# Patient Record
Sex: FEMALE | Race: WHITE | NOT HISPANIC OR LATINO | Employment: UNEMPLOYED | URBAN - METROPOLITAN AREA
[De-identification: names, ages, dates, MRNs, and addresses within clinical notes are randomized per-mention and may not be internally consistent; named-entity substitution may affect disease eponyms.]

---

## 2018-01-15 NOTE — MISCELLANEOUS
Provider Comments  Provider Comments:   pt no showed today 08/30/2016 lmom asking pt to give office a call back      Signatures   Electronically signed by : Cristino Mittal, ; Aug 30 2016  3:13PM EST                       (Author)

## 2018-12-02 ENCOUNTER — HOSPITAL ENCOUNTER (EMERGENCY)
Facility: HOSPITAL | Age: 48
Discharge: HOME | End: 2018-12-02
Attending: EMERGENCY MEDICINE | Admitting: EMERGENCY MEDICINE
Payer: COMMERCIAL

## 2018-12-02 VITALS
SYSTOLIC BLOOD PRESSURE: 146 MMHG | DIASTOLIC BLOOD PRESSURE: 85 MMHG | OXYGEN SATURATION: 96 % | TEMPERATURE: 98.6 F | HEIGHT: 60 IN | WEIGHT: 200 LBS | RESPIRATION RATE: 18 BRPM | BODY MASS INDEX: 39.27 KG/M2 | HEART RATE: 83 BPM

## 2018-12-02 DIAGNOSIS — M54.9 CHRONIC BACK PAIN, UNSPECIFIED BACK LOCATION, UNSPECIFIED BACK PAIN LATERALITY: Primary | ICD-10-CM

## 2018-12-02 DIAGNOSIS — G89.29 CHRONIC BACK PAIN, UNSPECIFIED BACK LOCATION, UNSPECIFIED BACK PAIN LATERALITY: Primary | ICD-10-CM

## 2018-12-02 PROCEDURE — 99283 EMERGENCY DEPT VISIT LOW MDM: CPT

## 2018-12-02 PROCEDURE — 96372 THER/PROPH/DIAG INJ SC/IM: CPT

## 2018-12-02 RX ORDER — LEVETIRACETAM 500 MG/1
500 TABLET ORAL EVERY 12 HOURS SCHEDULED
COMMUNITY
Start: 2018-03-19 | End: 2022-01-14 | Stop reason: SDUPTHER

## 2018-12-02 RX ORDER — CITALOPRAM 40 MG/1
40 TABLET ORAL DAILY
COMMUNITY
End: 2022-01-13 | Stop reason: SDUPTHER

## 2018-12-02 RX ORDER — HYDROCODONE BITARTRATE AND ACETAMINOPHEN 7.5; 325 MG/1; MG/1
1 TABLET ORAL EVERY 6 HOURS PRN
COMMUNITY
Start: 2018-07-11 | End: 2021-03-26 | Stop reason: HOSPADM

## 2018-12-02 RX ORDER — PREGABALIN 50 MG/1
CAPSULE ORAL
COMMUNITY
Start: 2018-10-12 | End: 2021-08-13

## 2018-12-02 RX ORDER — CYCLOBENZAPRINE HCL 10 MG
10 TABLET ORAL ONCE
Status: COMPLETED | OUTPATIENT
Start: 2018-12-02 | End: 2018-12-02

## 2018-12-02 RX ORDER — SUMATRIPTAN 100 MG/1
100 TABLET, FILM COATED ORAL
COMMUNITY
End: 2021-04-12

## 2018-12-02 RX ORDER — LISINOPRIL 10 MG/1
10 TABLET ORAL
COMMUNITY
Start: 2018-03-19 | End: 2022-01-13 | Stop reason: SDUPTHER

## 2018-12-02 RX ORDER — KETOROLAC TROMETHAMINE 30 MG/ML
30 INJECTION, SOLUTION INTRAMUSCULAR; INTRAVENOUS ONCE
Status: COMPLETED | OUTPATIENT
Start: 2018-12-02 | End: 2018-12-02

## 2018-12-02 RX ORDER — SOLIFENACIN SUCCINATE 5 MG/1
5 TABLET, FILM COATED ORAL DAILY
COMMUNITY
End: 2022-01-13 | Stop reason: SDUPTHER

## 2018-12-02 RX ORDER — PANTOPRAZOLE SODIUM 40 MG/1
TABLET, DELAYED RELEASE ORAL
COMMUNITY
Start: 2018-09-10 | End: 2021-08-13 | Stop reason: SDUPTHER

## 2018-12-02 RX ORDER — DEXAMETHASONE SODIUM PHOSPHATE 4 MG/ML
10 INJECTION, SOLUTION INTRA-ARTICULAR; INTRALESIONAL; INTRAMUSCULAR; INTRAVENOUS; SOFT TISSUE ONCE
Status: COMPLETED | OUTPATIENT
Start: 2018-12-02 | End: 2018-12-02

## 2018-12-02 RX ORDER — ALBUTEROL SULFATE 90 UG/1
1 AEROSOL, METERED RESPIRATORY (INHALATION) EVERY 6 HOURS PRN
COMMUNITY
Start: 2017-10-28 | End: 2022-04-06 | Stop reason: SDUPTHER

## 2018-12-02 RX ORDER — ATORVASTATIN CALCIUM 40 MG/1
TABLET, FILM COATED ORAL
COMMUNITY
Start: 2018-09-10 | End: 2022-01-17 | Stop reason: SDUPTHER

## 2018-12-02 RX ORDER — METOCLOPRAMIDE 10 MG/1
10 TABLET ORAL DAILY
COMMUNITY
Start: 2018-04-17 | End: 2022-01-13 | Stop reason: SDUPTHER

## 2018-12-02 RX ADMIN — DEXAMETHASONE SODIUM PHOSPHATE 10 MG: 4 INJECTION, SOLUTION INTRA-ARTICULAR; INTRALESIONAL; INTRAMUSCULAR; INTRAVENOUS; SOFT TISSUE at 16:58

## 2018-12-02 RX ADMIN — CYCLOBENZAPRINE HYDROCHLORIDE 10 MG: 10 TABLET, FILM COATED ORAL at 16:58

## 2018-12-02 RX ADMIN — KETOROLAC TROMETHAMINE 30 MG: 30 INJECTION, SOLUTION INTRAMUSCULAR at 16:58
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
## 2018-12-02 NOTE — DISCHARGE INSTRUCTIONS
Back Pain   WHAT YOU NEED TO KNOW:   Back pain is common  It can be caused by many conditions, such as arthritis or the breakdown of spinal discs  Your risk for back pain is increased by injuries, lack of activity, or repeated bending and twisting  You may feel sore or stiff on one or both sides of your back  The pain may spread to your buttocks or thighs  DISCHARGE INSTRUCTIONS:   Medicines:   · NSAIDs  help decrease swelling and pain  This medicine is available with or without a doctor's order  NSAIDs can cause stomach bleeding or kidney problems in certain people  If you take blood thinner medicine, always ask your healthcare provider if NSAIDs are safe for you  Always read the medicine label and follow directions  · Acetaminophen  decreases pain  It is available without a doctor's order  Ask how much to take and how often to take it  Follow directions  Acetaminophen can cause liver damage if not taken correctly  · Prescription pain medicine  may be given  Ask your healthcare provider how to take this medicine safely  · Take your medicine as directed  Contact your healthcare provider if you think your medicine is not helping or if you have side effects  Tell him or her if you are allergic to any medicine  Keep a list of the medicines, vitamins, and herbs you take  Include the amounts, and when and why you take them  Bring the list or the pill bottles to follow-up visits  Carry your medicine list with you in case of an emergency  Follow up with your healthcare provider in 2 weeks, or as directed:  Write down your questions so you remember to ask them during your visits  How to manage your back pain:   · Apply ice  on your back or affected area for 15 to 20 minutes every hour or as directed  Use an ice pack, or put crushed ice in a plastic bag  Cover it with a towel  Ice helps prevent tissue damage and decreases pain      · Apply heat  on your back or affected area for 20 to 30 minutes every 2 hours for as many days as directed  Heat helps decrease pain and muscle spasms  · Stay active  as much as you can without causing more pain  Bed rest could make your back pain worse  Avoid heavy lifting until your pain is gone  Return to the emergency department if:   · You have pain, numbness, or weakness in one or both legs  · Your pain becomes so severe that you cannot walk  · You cannot control your urine or bowel movements  · You have severe back pain with chest pain  · You have severe back pain, nausea, and vomiting  · You have severe back pain that spreads to your side or genital area  Contact your healthcare provider if:   · You have back pain that does not get better with rest and pain medicine  · You have a fever  · You have pain that worsens when you are on your back or when you rest     · You have pain that worsens when you cough or sneeze  · You lose weight without trying  · You have questions or concerns about your condition or care  © 2017 [facility] Information is for End User's use only and may not be sold, redistributed or otherwise used for commercial purposes  All illustrations and images included in CareNotes® are the copyrighted property of [employer] , [employer]  or Hilton Mcdaniel  The above information is an  only  It is not intended as medical advice for individual conditions or treatments  Talk to your doctor, nurse or pharmacist before following any medical regimen to see if it is safe and effective for you

## 2019-06-22 ENCOUNTER — OFFICE VISIT (OUTPATIENT)
Dept: URGENT CARE | Facility: CLINIC | Age: 49
End: 2019-06-22
Payer: COMMERCIAL

## 2019-06-22 ENCOUNTER — APPOINTMENT (OUTPATIENT)
Dept: RADIOLOGY | Facility: CLINIC | Age: 49
End: 2019-06-22
Payer: COMMERCIAL

## 2019-06-22 VITALS
TEMPERATURE: 98 F | DIASTOLIC BLOOD PRESSURE: 56 MMHG | OXYGEN SATURATION: 99 % | RESPIRATION RATE: 16 BRPM | HEART RATE: 67 BPM | SYSTOLIC BLOOD PRESSURE: 121 MMHG | BODY MASS INDEX: 39.27 KG/M2 | HEIGHT: 60 IN | WEIGHT: 200 LBS

## 2019-06-22 DIAGNOSIS — S53.402A ELBOW SPRAIN, LEFT, INITIAL ENCOUNTER: ICD-10-CM

## 2019-06-22 DIAGNOSIS — S63.502A LEFT WRIST SPRAIN, INITIAL ENCOUNTER: Primary | ICD-10-CM

## 2019-06-22 DIAGNOSIS — R20.2 PARESTHESIA AND PAIN OF LEFT EXTREMITY: ICD-10-CM

## 2019-06-22 DIAGNOSIS — S49.92XA ARM INJURY, LEFT, INITIAL ENCOUNTER: ICD-10-CM

## 2019-06-22 DIAGNOSIS — S63.92XA HAND SPRAIN, LEFT, INITIAL ENCOUNTER: ICD-10-CM

## 2019-06-22 DIAGNOSIS — M79.609 PARESTHESIA AND PAIN OF LEFT EXTREMITY: ICD-10-CM

## 2019-06-22 PROCEDURE — 73110 X-RAY EXAM OF WRIST: CPT

## 2019-06-22 PROCEDURE — 99214 OFFICE O/P EST MOD 30 MIN: CPT | Performed by: NURSE PRACTITIONER

## 2019-06-22 PROCEDURE — 73130 X-RAY EXAM OF HAND: CPT

## 2019-06-22 PROCEDURE — 73080 X-RAY EXAM OF ELBOW: CPT

## 2019-06-22 RX ORDER — ACETAMINOPHEN AND CODEINE PHOSPHATE 300; 30 MG/1; MG/1
1 TABLET ORAL EVERY 6 HOURS PRN
Refills: 0 | COMMUNITY
Start: 2019-06-05 | End: 2021-03-26 | Stop reason: HOSPADM

## 2019-06-22 RX ORDER — NAPROXEN 500 MG/1
500 TABLET ORAL 2 TIMES DAILY WITH MEALS
Qty: 28 TABLET | Refills: 0 | Status: SHIPPED | OUTPATIENT
Start: 2019-06-22 | End: 2020-02-03

## 2019-06-22 RX ORDER — CHLORHEXIDINE GLUCONATE 0.12 MG/ML
RINSE ORAL
Refills: 0 | COMMUNITY
Start: 2019-06-05 | End: 2021-04-12

## 2019-06-22 RX ORDER — CLINDAMYCIN HYDROCHLORIDE 300 MG/1
300 CAPSULE ORAL 4 TIMES DAILY
Refills: 0 | COMMUNITY
Start: 2019-06-05 | End: 2020-04-20 | Stop reason: ALTCHOICE

## 2019-06-22 RX ORDER — MELOXICAM 15 MG/1
15 TABLET ORAL DAILY
COMMUNITY
Start: 2019-05-13 | End: 2021-05-06

## 2019-10-20 ENCOUNTER — APPOINTMENT (EMERGENCY)
Dept: CT IMAGING | Facility: HOSPITAL | Age: 49
End: 2019-10-20
Payer: COMMERCIAL

## 2019-10-20 ENCOUNTER — HOSPITAL ENCOUNTER (EMERGENCY)
Facility: HOSPITAL | Age: 49
Discharge: HOME | End: 2019-10-20
Attending: EMERGENCY MEDICINE | Admitting: EMERGENCY MEDICINE
Payer: COMMERCIAL

## 2019-10-20 ENCOUNTER — OFFICE VISIT (OUTPATIENT)
Dept: URGENT CARE | Facility: CLINIC | Age: 49
End: 2019-10-20
Payer: COMMERCIAL

## 2019-10-20 VITALS
HEART RATE: 60 BPM | DIASTOLIC BLOOD PRESSURE: 64 MMHG | TEMPERATURE: 98.4 F | RESPIRATION RATE: 18 BRPM | OXYGEN SATURATION: 96 % | SYSTOLIC BLOOD PRESSURE: 130 MMHG | WEIGHT: 185 LBS | HEIGHT: 60 IN | BODY MASS INDEX: 36.32 KG/M2

## 2019-10-20 VITALS
RESPIRATION RATE: 18 BRPM | BODY MASS INDEX: 36.32 KG/M2 | HEIGHT: 60 IN | TEMPERATURE: 97 F | DIASTOLIC BLOOD PRESSURE: 82 MMHG | OXYGEN SATURATION: 98 % | HEART RATE: 76 BPM | SYSTOLIC BLOOD PRESSURE: 118 MMHG | WEIGHT: 185 LBS

## 2019-10-20 DIAGNOSIS — I10 HYPERTENSION: ICD-10-CM

## 2019-10-20 DIAGNOSIS — R10.9 RIGHT SIDED ABDOMINAL PAIN: Primary | ICD-10-CM

## 2019-10-20 DIAGNOSIS — N30.00 ACUTE CYSTITIS: Primary | ICD-10-CM

## 2019-10-20 DIAGNOSIS — R10.9 ABDOMINAL PAIN: ICD-10-CM

## 2019-10-20 DIAGNOSIS — N28.1 RENAL CYST: ICD-10-CM

## 2019-10-20 LAB
ALBUMIN SERPL BCP-MCNC: 3.9 G/DL (ref 3.5–5.7)
ALP SERPL-CCNC: 115 U/L (ref 40–150)
ALT SERPL W P-5'-P-CCNC: 28 U/L (ref 7–52)
ANION GAP SERPL CALCULATED.3IONS-SCNC: 8 MMOL/L (ref 4–13)
APTT PPP: 28 SECONDS (ref 23–37)
AST SERPL W P-5'-P-CCNC: 16 U/L (ref 13–39)
BACTERIA UR QL AUTO: ABNORMAL /HPF
BASOPHILS # BLD AUTO: 0 THOUSANDS/ΜL (ref 0–0.1)
BASOPHILS NFR BLD AUTO: 1 % (ref 0–2)
BILIRUB SERPL-MCNC: 0.5 MG/DL (ref 0.2–1)
BILIRUB UR QL STRIP: ABNORMAL
BUN SERPL-MCNC: 13 MG/DL (ref 7–25)
CALCIUM SERPL-MCNC: 9.5 MG/DL (ref 8.6–10.5)
CHLORIDE SERPL-SCNC: 100 MMOL/L (ref 98–107)
CLARITY UR: ABNORMAL
CO2 SERPL-SCNC: 30 MMOL/L (ref 21–31)
COLOR UR: YELLOW
CREAT SERPL-MCNC: 0.78 MG/DL (ref 0.6–1.2)
EOSINOPHIL # BLD AUTO: 0.1 THOUSAND/ΜL (ref 0–0.61)
EOSINOPHIL NFR BLD AUTO: 2 % (ref 0–5)
ERYTHROCYTE [DISTWIDTH] IN BLOOD BY AUTOMATED COUNT: 14.1 % (ref 11.5–14.5)
GFR SERPL CREATININE-BSD FRML MDRD: 90 ML/MIN/1.73SQ M
GLUCOSE SERPL-MCNC: 262 MG/DL (ref 65–99)
GLUCOSE UR STRIP-MCNC: ABNORMAL MG/DL
HCT VFR BLD AUTO: 39.5 % (ref 42–47)
HGB BLD-MCNC: 13.1 G/DL (ref 12–16)
HGB UR QL STRIP.AUTO: NEGATIVE
INR PPP: 1.11 (ref 0.9–1.5)
KETONES UR STRIP-MCNC: NEGATIVE MG/DL
LEUKOCYTE ESTERASE UR QL STRIP: ABNORMAL
LIPASE SERPL-CCNC: 27 U/L (ref 11–82)
LYMPHOCYTES # BLD AUTO: 2.7 THOUSANDS/ΜL (ref 0.6–4.47)
LYMPHOCYTES NFR BLD AUTO: 34 % (ref 21–51)
MCH RBC QN AUTO: 27.1 PG (ref 26–34)
MCHC RBC AUTO-ENTMCNC: 33.1 G/DL (ref 31–37)
MCV RBC AUTO: 82 FL (ref 81–99)
MONOCYTES # BLD AUTO: 0.6 THOUSAND/ΜL (ref 0.17–1.22)
MONOCYTES NFR BLD AUTO: 7 % (ref 2–12)
NEUTROPHILS # BLD AUTO: 4.6 THOUSANDS/ΜL (ref 1.4–6.5)
NEUTS SEG NFR BLD AUTO: 57 % (ref 42–75)
NITRITE UR QL STRIP: NEGATIVE
NON-SQ EPI CELLS URNS QL MICRO: ABNORMAL /HPF
PH UR STRIP.AUTO: 5 [PH]
PLATELET # BLD AUTO: 301 THOUSANDS/UL (ref 149–390)
PMV BLD AUTO: 8.4 FL (ref 8.6–11.7)
POTASSIUM SERPL-SCNC: 4 MMOL/L (ref 3.5–5.5)
PROT SERPL-MCNC: 7.1 G/DL (ref 6.4–8.9)
PROT UR STRIP-MCNC: NEGATIVE MG/DL
PROTHROMBIN TIME: 12.8 SECONDS (ref 10.2–13)
RBC # BLD AUTO: 4.83 MILLION/UL (ref 3.9–5.2)
RBC #/AREA URNS AUTO: ABNORMAL /HPF
SODIUM SERPL-SCNC: 138 MMOL/L (ref 134–143)
SP GR UR STRIP.AUTO: >=1.03 (ref 1–1.03)
UROBILINOGEN UR QL STRIP.AUTO: 0.2 E.U./DL
WBC # BLD AUTO: 8 THOUSAND/UL (ref 4.8–10.8)
WBC #/AREA URNS AUTO: ABNORMAL /HPF

## 2019-10-20 PROCEDURE — 81001 URINALYSIS AUTO W/SCOPE: CPT | Performed by: PHYSICIAN ASSISTANT

## 2019-10-20 PROCEDURE — 80053 COMPREHEN METABOLIC PANEL: CPT | Performed by: PHYSICIAN ASSISTANT

## 2019-10-20 PROCEDURE — 87086 URINE CULTURE/COLONY COUNT: CPT | Performed by: PHYSICIAN ASSISTANT

## 2019-10-20 PROCEDURE — 85025 COMPLETE CBC W/AUTO DIFF WBC: CPT | Performed by: PHYSICIAN ASSISTANT

## 2019-10-20 PROCEDURE — 36415 COLL VENOUS BLD VENIPUNCTURE: CPT | Performed by: PHYSICIAN ASSISTANT

## 2019-10-20 PROCEDURE — 85610 PROTHROMBIN TIME: CPT | Performed by: PHYSICIAN ASSISTANT

## 2019-10-20 PROCEDURE — 96376 TX/PRO/DX INJ SAME DRUG ADON: CPT

## 2019-10-20 PROCEDURE — G0382 LEV 3 HOSP TYPE B ED VISIT: HCPCS | Performed by: PHYSICIAN ASSISTANT

## 2019-10-20 PROCEDURE — 99284 EMERGENCY DEPT VISIT MOD MDM: CPT

## 2019-10-20 PROCEDURE — 83690 ASSAY OF LIPASE: CPT | Performed by: PHYSICIAN ASSISTANT

## 2019-10-20 PROCEDURE — 85730 THROMBOPLASTIN TIME PARTIAL: CPT | Performed by: PHYSICIAN ASSISTANT

## 2019-10-20 PROCEDURE — 96365 THER/PROPH/DIAG IV INF INIT: CPT

## 2019-10-20 PROCEDURE — 81003 URINALYSIS AUTO W/O SCOPE: CPT | Performed by: PHYSICIAN ASSISTANT

## 2019-10-20 PROCEDURE — 74177 CT ABD & PELVIS W/CONTRAST: CPT

## 2019-10-20 PROCEDURE — 96375 TX/PRO/DX INJ NEW DRUG ADDON: CPT

## 2019-10-20 PROCEDURE — 96361 HYDRATE IV INFUSION ADD-ON: CPT

## 2019-10-20 RX ORDER — ONDANSETRON 4 MG/1
4 TABLET, ORALLY DISINTEGRATING ORAL EVERY 8 HOURS PRN
Qty: 20 TABLET | Refills: 0 | Status: SHIPPED | OUTPATIENT
Start: 2019-10-20 | End: 2020-04-20 | Stop reason: ALTCHOICE

## 2019-10-20 RX ORDER — CEFTRIAXONE 1 G/50ML
1000 INJECTION, SOLUTION INTRAVENOUS ONCE
Status: COMPLETED | OUTPATIENT
Start: 2019-10-20 | End: 2019-10-20

## 2019-10-20 RX ORDER — KETOROLAC TROMETHAMINE 30 MG/ML
30 INJECTION, SOLUTION INTRAMUSCULAR; INTRAVENOUS ONCE
Status: COMPLETED | OUTPATIENT
Start: 2019-10-20 | End: 2019-10-20

## 2019-10-20 RX ORDER — CEPHALEXIN 500 MG/1
500 CAPSULE ORAL EVERY 12 HOURS SCHEDULED
Qty: 20 CAPSULE | Refills: 0 | Status: SHIPPED | OUTPATIENT
Start: 2019-10-20 | End: 2019-10-30

## 2019-10-20 RX ORDER — ONDANSETRON 2 MG/ML
4 INJECTION INTRAMUSCULAR; INTRAVENOUS ONCE
Status: COMPLETED | OUTPATIENT
Start: 2019-10-20 | End: 2019-10-20

## 2019-10-20 RX ORDER — TRAMADOL HYDROCHLORIDE 50 MG/1
50 TABLET ORAL EVERY 6 HOURS PRN
Qty: 10 TABLET | Refills: 0 | Status: SHIPPED | OUTPATIENT
Start: 2019-10-20 | End: 2020-04-20 | Stop reason: ALTCHOICE

## 2019-10-20 RX ADMIN — MORPHINE SULFATE 2 MG: 2 INJECTION, SOLUTION INTRAMUSCULAR; INTRAVENOUS at 17:12

## 2019-10-20 RX ADMIN — MORPHINE SULFATE 2 MG: 2 INJECTION, SOLUTION INTRAMUSCULAR; INTRAVENOUS at 19:04

## 2019-10-20 RX ADMIN — IOHEXOL 100 ML: 350 INJECTION, SOLUTION INTRAVENOUS at 17:44

## 2019-10-20 RX ADMIN — KETOROLAC TROMETHAMINE 30 MG: 30 INJECTION, SOLUTION INTRAMUSCULAR; INTRAVENOUS at 17:08

## 2019-10-20 RX ADMIN — SODIUM CHLORIDE 1000 ML: 0.9 INJECTION, SOLUTION INTRAVENOUS at 16:24

## 2019-10-20 RX ADMIN — ONDANSETRON 4 MG: 2 INJECTION INTRAMUSCULAR; INTRAVENOUS at 18:56

## 2019-10-20 RX ADMIN — ONDANSETRON 4 MG: 2 INJECTION INTRAMUSCULAR; INTRAVENOUS at 16:25

## 2019-10-20 RX ADMIN — CEFTRIAXONE 1000 MG: 1 INJECTION, SOLUTION INTRAVENOUS at 18:59

## 2019-10-20 NOTE — PROGRESS NOTES
[facility]        NAME: Augustine Castano is a 50 y o  female  : 1970    MRN: [id number]  DATE: 2019  TIME: 3:06 PM    Assessment and Plan   Right sided abdominal pain [R10 9]  1  Right sided abdominal pain  Transfer to other facility         Patient Instructions       Go to the emergency room for further evaluation  Chief Complaint     Chief Complaint   Patient presents with    Abdominal Pain     vomiting, diarrhea and right abdominal pain since yesterday  Had same symptoms 6 days ago and it went away, then returned yesterday         History of Present Illness       Patient started with vomiting and diarrhea last Tuesday it lasted for a day or so and now return yesterday  She now has right-sided abdominal pain  Denies any fever chills  Review of Systems   Review of Systems   Constitutional: Negative for chills and fever  Gastrointestinal: Positive for abdominal pain, diarrhea, nausea and vomiting  Hematological: Negative for adenopathy  Current Medications       Current Outpatient Medications:     albuterol (PROAIR HFA) 90 mcg/act inhaler, Inhale 1 puff every 6 (six) hours as needed, Disp: , Rfl:     Aspirin (ASPIR-81 PO), Take 81 mg by mouth, Disp: , Rfl:     Aspirin Buf,CaCarb-MgCarb-MgO, (BUFFERIN LOW DOSE) 81 MG TABS, Take 81 mg by mouth, Disp: , Rfl:     atorvastatin (LIPITOR) 40 mg tablet, take 1 tablet by mouth at bedtime, Disp: , Rfl:     insulin degludec (TRESIBA) 200 units/mL CONCENTRATED U-200 injection pen, Inject 84 units daily   E11 65, Disp: , Rfl:     insulin glulisine (APIDRA SOLOSTAR) 100 units/mL injection pen, Inject 18 Units under the skin, Disp: , Rfl:     levETIRAcetam (KEPPRA) 500 mg tablet, Take 500 mg by mouth, Disp: , Rfl:     lisinopril (ZESTRIL) 10 mg tablet, Take 10 mg by mouth, Disp: , Rfl:     meloxicam (MOBIC) 15 mg tablet, Take 15 mg by mouth daily, Disp: , Rfl:     metFORMIN (GLUCOPHAGE) 1000 MG tablet, take 1 tablet by mouth twice a day WITH MEALS, Disp: , Rfl:     metoclopramide (REGLAN) 10 mg tablet, Take 10 mg by mouth, Disp: , Rfl:     metoprolol tartrate (LOPRESSOR) 25 mg tablet, Take 25 mg by mouth, Disp: , Rfl:     pantoprazole (PROTONIX) 40 mg tablet, take 1 tablet by mouth twice a day, Disp: , Rfl:     pregabalin (LYRICA) 50 mg capsule, take 1 capsule by mouth three times a day, Disp: , Rfl:     sitaGLIPtin (JANUVIA) 100 mg tablet, Take by mouth, Disp: , Rfl:     solifenacin (VESICARE) 5 mg tablet, Take by mouth, Disp: , Rfl:     SUMAtriptan (IMITREX) 100 mg tablet, Take 100 mg by mouth, Disp: , Rfl:     acetaminophen-codeine (TYLENOL #3) 300-30 mg per tablet, Take 1 tablet by mouth every 6 (six) hours as needed, Disp: , Rfl: 0    chlorhexidine (PERIDEX) 0 12 % solution, Rinse with 1/2 ounce by mouth for 30 seconds then spit out   use twice a day, Disp: , Rfl: 0    citalopram (CELEXA) 40 mg tablet, Take by mouth, Disp: , Rfl:     clindamycin (CLEOCIN) 300 MG capsule, Take 300 mg by mouth 4 (four) times a day, Disp: , Rfl: 0    HYDROcodone-acetaminophen (NORCO) 7 5-325 mg per tablet, Take 1 tablet by mouth every 6 (six) hours as needed, Disp: , Rfl:     naproxen (NAPROSYN) 500 mg tablet, Take 1 tablet (500 mg total) by mouth 2 (two) times a day with meals for 14 days, Disp: 28 tablet, Rfl: 0    Current Allergies     Allergies as of 10/20/2019 - Reviewed 10/20/2019   Allergen Reaction Noted    Azithromycin GI Intolerance and Hives 07/13/2012    Benztropine  08/09/2016    Butorphanol  08/09/2016    Iodine  02/25/2016    Penicillins  11/12/2015    Zolpidem  11/12/2015            The following portions of the patient's history were reviewed and updated as appropriate: allergies, current medications, past family history, past medical history, past social history, past surgical history and problem list      Past Medical History:   Diagnosis Date    Depression     Diabetes mellitus ([address] )     Gastroparesis  GERD (gastroesophageal reflux disease)     Hyperlipidemia     Hypertension     Sciatica     Seizure disorder ([facility] )        History reviewed  No pertinent surgical history  History reviewed  No pertinent family history  Medications have been verified  Objective   /82   Pulse 76   Temp (!) 97 °F (36 1 °C) (Tympanic)   Resp 18   Ht 5' (1 524 m)   Wt 83 9 kg (185 lb)   SpO2 98%   BMI 36 13 kg/m²        Physical Exam     Physical Exam   Constitutional: She is oriented to person, place, and time  She appears well-developed and well-nourished  HENT:   Head: Normocephalic and atraumatic  Abdominal: Soft  Normal appearance  There is tenderness in the right upper quadrant and right lower quadrant  Positive psoas sign   Neurological: She is alert and oriented to person, place, and time  Skin: Skin is warm and dry  Psychiatric: She has a normal mood and affect  Her behavior is normal    Nursing note and vitals reviewed

## 2019-10-20 NOTE — ED PROVIDER NOTES
History  Chief Complaint   Patient presents with    Abdominal Pain     RLQ     Nausea    Vomiting     on and off for the last week, vomited a couple times today     Diarrhea     multiple tiems today and since monday      51-year-old female presents emergency room with complaints of right lower quadrant abdominal pain which she states radiates to her mid abdomen  She admitted to symptoms beginning approximately 1 week ago with nausea a few episodes of vomiting and diarrhea  She states the symptoms and resolved and now returned again in last 2 days prompting her to present to the urgent care today for evaluation  She was advised to come to the emergency room for further evaluation due to work with right lower quadrant abdominal pain  No medication tried at home  No chest pain or shortness of breath fevers or chills  Patient is status post cholecystectomy and hysterectomy  Patient denies urinary complaints dysuria frequency urgency or gross hematuria  History provided by:  Patient   used: No    Abdominal Pain   Pain location:  RLQ  Pain quality: aching and dull    Pain radiation: Radiates to umbilicus    Pain severity:  Moderate  Onset quality:  Sudden  Duration:  1 week  Timing:  Intermittent  Progression:  Waxing and waning  Chronicity:  New  Context: not recent travel, not sick contacts, not suspicious food intake and not trauma    Relieved by:  None tried  Worsened by:  Nothing  Ineffective treatments:  None tried  Associated symptoms: diarrhea, nausea and vomiting    Associated symptoms: no chest pain, no chills, no constipation, no cough, no dysuria, no fatigue, no fever, no hematuria, no melena, no shortness of breath and no sore throat    Diarrhea:     Quality:  Semi-solid    Number of occurrences:  Several    Severity:  Mild    Timing:  Intermittent    Progression:  Unchanged  Nausea:     Severity:  Moderate    Onset quality:  Sudden    Timing:  Intermittent    Progression: Waxing and waning  Vomiting:     Quality:  Stomach contents    Number of occurrences:  Several    Severity:  Moderate    Timing:  Intermittent    Progression:  Unchanged  Risk factors: not obese    Nausea   The primary symptoms include abdominal pain, nausea, vomiting and diarrhea  Primary symptoms do not include fever, fatigue, melena, dysuria, myalgias or rash  The illness does not include chills or constipation  Vomiting   Associated symptoms: abdominal pain and diarrhea    Associated symptoms: no chills, no cough, no fever, no headaches, no myalgias and no sore throat    Diarrhea   Associated symptoms: abdominal pain and vomiting    Associated symptoms: no chills, no diaphoresis, no fever, no headaches and no myalgias        Allergies   Allergen Reactions    Azithromycin GI Intolerance and Hives    Benztropine     Butorphanol Hallucinations    Penicillins     Zolpidem            Prior to Admission Medications   Prescriptions Last Dose Informant Patient Reported? Taking? Aspirin (ASPIR-81 PO)   Yes No   Sig: Take 81 mg by mouth   Aspirin Buf,CaCarb-MgCarb-MgO, (BUFFERIN LOW DOSE) 81 MG TABS   Yes No   Sig: Take 81 mg by mouth   HYDROcodone-acetaminophen (NORCO) 7 5-325 mg per tablet   Yes No   Sig: Take 1 tablet by mouth every 6 (six) hours as needed   SUMAtriptan (IMITREX) 100 mg tablet   Yes No   Sig: Take 100 mg by mouth   acetaminophen-codeine (TYLENOL #3) 300-30 mg per tablet   Yes No   Sig: Take 1 tablet by mouth every 6 (six) hours as needed   albuterol (PROAIR HFA) 90 mcg/act inhaler   Yes No   Sig: Inhale 1 puff every 6 (six) hours as needed   atorvastatin (LIPITOR) 40 mg tablet   Yes No   Sig: take 1 tablet by mouth at bedtime   chlorhexidine (PERIDEX) 0 12 % solution   Yes No   Sig: Rinse with 1/2 ounce by mouth for 30 seconds then spit out   use twice a day   citalopram (CELEXA) 40 mg tablet   Yes No   Sig: Take by mouth   clindamycin (CLEOCIN) 300 MG capsule   Yes No   Sig: Take 300 mg by mouth 4 (four) times a day   insulin degludec (TRESIBA) 200 units/mL CONCENTRATED U-200 injection pen   Yes No   Sig: Inject 84 units daily  E11 65   insulin glulisine (APIDRA SOLOSTAR) 100 units/mL injection pen   Yes No   Sig: Inject 18 Units under the skin   levETIRAcetam (KEPPRA) 500 mg tablet   Yes No   Sig: Take 500 mg by mouth   lisinopril (ZESTRIL) 10 mg tablet   Yes No   Sig: Take 10 mg by mouth   meloxicam (MOBIC) 15 mg tablet   Yes No   Sig: Take 15 mg by mouth daily   metFORMIN (GLUCOPHAGE) 1000 MG tablet   Yes No   Sig: take 1 tablet by mouth twice a day WITH MEALS   metoclopramide (REGLAN) 10 mg tablet   Yes No   Sig: Take 10 mg by mouth   metoprolol tartrate (LOPRESSOR) 25 mg tablet   Yes No   Sig: Take 25 mg by mouth   naproxen (NAPROSYN) 500 mg tablet   No No   Sig: Take 1 tablet (500 mg total) by mouth 2 (two) times a day with meals for 14 days   pantoprazole (PROTONIX) 40 mg tablet   Yes No   Sig: take 1 tablet by mouth twice a day   pregabalin (LYRICA) 50 mg capsule   Yes No   Sig: take 1 capsule by mouth three times a day   sitaGLIPtin (JANUVIA) 100 mg tablet   Yes No   Sig: Take by mouth   solifenacin (VESICARE) 5 mg tablet   Yes No   Sig: Take by mouth      Facility-Administered Medications: None       Past Medical History:   Diagnosis Date    Depression     Diabetes mellitus (HCC)     Gastroparesis     GERD (gastroesophageal reflux disease)     Hyperlipidemia     Hypertension     Sciatica     Seizure disorder (HCC)        Past Surgical History:   Procedure Laterality Date    CHOLECYSTECTOMY      HYSTERECTOMY         History reviewed  No pertinent family history  I have reviewed and agree with the history as documented  Social History     Tobacco Use    Smoking status: Never Smoker    Smokeless tobacco: Never Used   Substance Use Topics    Alcohol use: No    Drug use: No        Review of Systems   Constitutional: Negative for chills, diaphoresis, fatigue and fever     HENT: Negative for congestion, ear pain, rhinorrhea, sneezing and sore throat  Respiratory: Negative for cough, shortness of breath, wheezing and stridor  Cardiovascular: Negative for chest pain, palpitations and leg swelling  Gastrointestinal: Positive for abdominal pain, diarrhea, nausea and vomiting  Negative for abdominal distention, blood in stool, constipation and melena  Genitourinary: Negative for difficulty urinating, dysuria, frequency, hematuria and urgency  Musculoskeletal: Negative for gait problem, myalgias and neck pain  Skin: Negative for rash  Neurological: Negative for dizziness, syncope, weakness, light-headedness and headaches  All other systems reviewed and are negative  Physical Exam  Physical Exam   Constitutional: She is oriented to person, place, and time  She appears well-developed and well-nourished  HENT:   Head: Normocephalic and atraumatic  Mouth/Throat: Oropharynx is clear and moist    Eyes: Pupils are equal, round, and reactive to light  EOM are normal    Neck: Normal range of motion  Neck supple  No JVD present  No tracheal deviation present  Cardiovascular: Normal rate, regular rhythm, normal heart sounds and intact distal pulses  Pulmonary/Chest: Effort normal and breath sounds normal  No respiratory distress  She has no wheezes  Abdominal: Soft  Bowel sounds are normal  She exhibits no distension and no mass  There is tenderness in the right lower quadrant  There is no CVA tenderness  Positive psoas sign   Musculoskeletal: Normal range of motion  She exhibits no edema or tenderness  Neurological: She is alert and oriented to person, place, and time  Skin: Skin is warm and dry  No rash noted  No erythema  Nursing note and vitals reviewed        Vital Signs  ED Triage Vitals [10/20/19 1529]   Temperature Pulse Respirations Blood Pressure SpO2   (!) 97 °F (36 1 °C) 74 16 157/75 99 %      Temp Source Heart Rate Source Patient Position - Orthostatic VS BP Location FiO2 (%)   Temporal Monitor Sitting Left arm --      Pain Score       7           Vitals:    10/20/19 1800 10/20/19 1830 10/20/19 1900 10/20/19 1903   BP: 121/59 123/60 131/60 131/60   Pulse: 57 58 58 57   Patient Position - Orthostatic VS:             Visual Acuity      ED Medications  Medications   cefTRIAXone (ROCEPHIN) IVPB (premix) 1,000 mg (1,000 mg Intravenous New Bag 10/20/19 1859)   sodium chloride 0 9 % bolus 1,000 mL (1,000 mL Intravenous New Bag 10/20/19 1624)   ondansetron (ZOFRAN) injection 4 mg (4 mg Intravenous Given 10/20/19 1625)   ketorolac (TORADOL) injection 30 mg (30 mg Intravenous Given 10/20/19 1708)   morphine injection 2 mg (2 mg Intravenous Given 10/20/19 1712)   iohexol (OMNIPAQUE) 350 MG/ML injection (MULTI-DOSE) 100 mL (100 mL Intravenous Given 10/20/19 1744)   ondansetron (ZOFRAN) injection 4 mg (4 mg Intravenous Given 10/20/19 1856)   morphine injection 2 mg (2 mg Intravenous Given 10/20/19 1904)       Diagnostic Studies  Results Reviewed     Procedure Component Value Units Date/Time    Lipase [[id number]]  (Normal) Collected:  10/20/19 1619    Lab Status:  Final result Specimen:  Blood from Arm, Left Updated:  10/20/19 1644     Lipase 27 u/L     Comprehensive metabolic panel [[id number]]  (Abnormal) Collected:  10/20/19 1619    Lab Status:  Final result Specimen:  Blood from Arm, Left Updated:  10/20/19 1644     Sodium 138 mmol/L      Potassium 4 0 mmol/L      Chloride 100 mmol/L      CO2 30 mmol/L      ANION GAP 8 mmol/L      BUN 13 mg/dL      Creatinine 0 78 mg/dL      Glucose 262 mg/dL      Calcium 9 5 mg/dL      AST 16 U/L      ALT 28 U/L      Alkaline Phosphatase 115 U/L      Total Protein 7 1 g/dL      Albumin 3 9 g/dL      Total Bilirubin 0 50 mg/dL      eGFR 90 ml/min/1 73sq m     Narrative:       [place] guidelines for Chronic Kidney Disease (CKD):     Stage 1 with normal or high GFR (GFR > 90 mL/min/1 73 square meters)   Stage 2 Mild CKD (GFR = 60-89 mL/min/1 73 square meters)    Stage 3A Moderate CKD (GFR = 45-59 mL/min/1 73 square meters)    Stage 3B Moderate CKD (GFR = 30-44 mL/min/1 73 square meters)    Stage 4 Severe CKD (GFR = 15-29 mL/min/1 73 square meters)    Stage 5 End Stage CKD (GFR <15 mL/min/1 73 square meters)  Note: GFR calculation is accurate only with a steady state creatinine    Urine Microscopic [[id number]]  (Abnormal) Collected:  10/20/19 1607    Lab Status:  Final result Specimen:  Urine, Clean Catch Updated:  10/20/19 1642     RBC, UA 0-1 /hpf      WBC, UA 10-20 /hpf      Epithelial Cells Occasional /hpf      Bacteria, UA Occasional /hpf     Urine culture [[id number]] Collected:  10/20/19 1607    Lab Status:   In process Specimen:  Urine, Clean Catch Updated:  10/20/19 1641    Protime-INR [[id number]]  (Normal) Collected:  10/20/19 1619    Lab Status:  Final result Specimen:  Blood from Arm, Left Updated:  10/20/19 1641     Protime 12 8 seconds      INR 1 11    APTT [[id number]]  (Normal) Collected:  10/20/19 1619    Lab Status:  Final result Specimen:  Blood from Arm, Left Updated:  10/20/19 1641     PTT 28 seconds     CBC and differential [[id number]]  (Abnormal) Collected:  10/20/19 1619    Lab Status:  Final result Specimen:  Blood from Arm, Left Updated:  10/20/19 1627     WBC 8 00 Thousand/uL      RBC 4 83 Million/uL      Hemoglobin 13 1 g/dL      Hematocrit 39 5 %      MCV 82 fL      MCH 27 1 pg      MCHC 33 1 g/dL      RDW 14 1 %      MPV 8 4 fL      Platelets 287 Thousands/uL      Neutrophils Relative 57 %      Lymphocytes Relative 34 %      Monocytes Relative 7 %      Eosinophils Relative 2 %      Basophils Relative 1 %      Neutrophils Absolute 4 60 Thousands/µL      Lymphocytes Absolute 2 70 Thousands/µL      Monocytes Absolute 0 60 Thousand/µL      Eosinophils Absolute 0 10 Thousand/µL      Basophils Absolute 0 00 Thousands/µL     UA w Reflex to Microscopic w Reflex to Culture [[id number]] (Abnormal) Collected:  10/20/19 1607    Lab Status:  Final result Specimen:  Urine, Clean Catch Updated:  10/20/19 1627     Color, UA Yellow     Clarity, UA Slightly Cloudy     Specific Gravity, UA >=1 030     pH, UA 5 0     Leukocytes, UA 1+     Nitrite, UA Negative     Protein, UA Negative mg/dl      Glucose, UA 2+ mg/dl      Ketones, UA Negative mg/dl      Urobilinogen, UA 0 2 E U /dl      Bilirubin, UA 1+     Blood, UA Negative                 CT abdomen pelvis with contrast   Final Result by Aaliyah Fay MD (10/20 7820)      1  Bladder wall thickening, may represent cystitis  2   Cystic structure in the kidney, indeterminate  Recommend nonemergent renal ultrasound following symptom resolution  Additionally, the bladder wall should be evaluated on this follow-up  The study was marked in [facility] for immediate notification  Workstation performed: [id number]                    Procedures  Procedures       ED Course  ED Course as of Oct 20 1922   Justice Gupta Oct 20, 2019   1711 Counseled patient regarding labs within normal limits  She is tender right lower quadrant with radiation to the [facility] bottom suggesting appendicitis although without a white count afebrile will CT scan with IV contrast to rule out surgical pathology  46 Counseled with patient regarding CT findings of suggestive cystitis  Will treat with IV antibiotics discharged on oral antibiotics and Zofran  Anticipatory guidance given and patient stable for discharge home  Cystic structure in kidney was previously known by patient advise her following up with her PCP for further outpatient evaluation                                    MDM  Number of Diagnoses or Management Options  Abdominal pain: new and requires workup  Acute cystitis: new and requires workup  Hypertension:   Renal cyst:      Amount and/or Complexity of Data Reviewed  Clinical lab tests: ordered and reviewed  Tests in the radiology section of CPT®: ordered and reviewed  Review and summarize past medical records: yes  Independent visualization of images, tracings, or specimens: yes    Risk of Complications, Morbidity, and/or Mortality  Presenting problems: moderate  Diagnostic procedures: moderate  Management options: moderate  General comments: 15-year-old female presents emergency room with right lower quadrant abdominal pain  Nausea vomiting and diarrhea intermittently over the past week  No fevers or chills  Patient has history of colectomy and hysterectomy  Labs within normal limits CT suggests acute cystitis  Stable for discharge home on oral antibiotics follow-up with PCP for cystic structure of the kidney  Patient Progress  Patient progress: stable      Disposition  Final diagnoses:   Acute cystitis   Abdominal pain   Hypertension   Renal cyst - follow up with PCP     Time reflects when diagnosis was documented in both MDM as applicable and the Disposition within this note     Time User Action Codes Description Comment    10/20/2019  6:57 PM Sumaya Preciado Add [N30 00] Acute cystitis     10/20/2019  6:57 PM Sumaya Preciado Add [R10 9] Abdominal pain     10/20/2019  7:00 PM Sumaya Preciado Add [I10] Hypertension     10/20/2019  7:01 PM Sumaya Preciado Add [N28 1] Renal cyst     10/20/2019  7:01 PM Sumaya Preciado Modify [N28 1] Renal cyst follow up with PCP      ED Disposition     ED Disposition Condition Date/Time Comment    Discharge Stable Sun Oct 20, 2019  6:57 PM Annalise Oliveros discharge to home/self care              Follow-up Information     Follow up With Specialties Details Why Contact Clare Boyer DO Family Medicine Schedule an appointment as soon as possible for a visit in 5 days If symptoms worsen return to [address] [place] [id number] [phone]            Patient's Medications   Discharge Prescriptions    CEPHALEXIN (KEFLEX) 500 MG CAPSULE    Take 1 capsule (500 mg total) by mouth every 12 (twelve) hours for 10 days       Start Date: 10/20/2019End Date: 10/30/2019       Order Dose: 500 mg       Quantity: 20 capsule    Refills: 0    ONDANSETRON (ZOFRAN-ODT) 4 MG DISINTEGRATING TABLET    Take 1 tablet (4 mg total) by mouth every 8 (eight) hours as needed for nausea or vomiting       Start Date: 10/20/2019End Date: --       Order Dose: 4 mg       Quantity: 20 tablet    Refills: 0    TRAMADOL (ULTRAM) 50 MG TABLET    Take 1 tablet (50 mg total) by mouth every 6 (six) hours as needed for moderate pain for up to 10 doses       Start Date: 10/20/2019End Date: --       Order Dose: 50 mg       Quantity: 10 tablet    Refills: 0     No discharge procedures on file      ED Provider  Electronically Signed by           Akilah Cardozo PA-C  10/20/19 1926

## 2019-10-22 LAB — BACTERIA UR CULT: NORMAL

## 2020-02-02 ENCOUNTER — TRANSCRIBE ORDERS (OUTPATIENT)
Dept: URGENT CARE | Facility: CLINIC | Age: 50
End: 2020-02-02

## 2020-02-02 ENCOUNTER — APPOINTMENT (OUTPATIENT)
Dept: RADIOLOGY | Facility: CLINIC | Age: 50
End: 2020-02-02
Payer: COMMERCIAL

## 2020-02-02 DIAGNOSIS — M54.40 LOW BACK PAIN WITH SCIATICA, SCIATICA LATERALITY UNSPECIFIED, UNSPECIFIED BACK PAIN LATERALITY, UNSPECIFIED CHRONICITY: ICD-10-CM

## 2020-02-02 DIAGNOSIS — M54.2 CERVICALGIA: Primary | ICD-10-CM

## 2020-02-02 DIAGNOSIS — M54.2 CERVICALGIA: ICD-10-CM

## 2020-02-02 PROCEDURE — 72110 X-RAY EXAM L-2 SPINE 4/>VWS: CPT

## 2020-02-02 PROCEDURE — 72050 X-RAY EXAM NECK SPINE 4/5VWS: CPT

## 2020-02-03 ENCOUNTER — APPOINTMENT (EMERGENCY)
Dept: CT IMAGING | Facility: HOSPITAL | Age: 50
End: 2020-02-03
Payer: COMMERCIAL

## 2020-02-03 ENCOUNTER — HOSPITAL ENCOUNTER (EMERGENCY)
Facility: HOSPITAL | Age: 50
Discharge: HOME | End: 2020-02-03
Attending: FAMILY MEDICINE
Payer: COMMERCIAL

## 2020-02-03 VITALS
TEMPERATURE: 98.8 F | BODY MASS INDEX: 33.49 KG/M2 | OXYGEN SATURATION: 95 % | SYSTOLIC BLOOD PRESSURE: 151 MMHG | HEIGHT: 62 IN | WEIGHT: 182 LBS | HEART RATE: 73 BPM | DIASTOLIC BLOOD PRESSURE: 91 MMHG | RESPIRATION RATE: 18 BRPM

## 2020-02-03 DIAGNOSIS — R73.9 HYPERGLYCEMIA: Primary | ICD-10-CM

## 2020-02-03 DIAGNOSIS — E86.0 DEHYDRATION: ICD-10-CM

## 2020-02-03 DIAGNOSIS — Z79.4 TYPE 2 DIABETES MELLITUS WITHOUT COMPLICATION, WITH LONG-TERM CURRENT USE OF INSULIN (HCC): ICD-10-CM

## 2020-02-03 DIAGNOSIS — E11.9 TYPE 2 DIABETES MELLITUS WITHOUT COMPLICATION, WITH LONG-TERM CURRENT USE OF INSULIN (HCC): ICD-10-CM

## 2020-02-03 LAB
ALBUMIN SERPL BCP-MCNC: 4 G/DL (ref 3.5–5.7)
ALP SERPL-CCNC: 120 U/L (ref 40–150)
ALT SERPL W P-5'-P-CCNC: 39 U/L (ref 7–52)
ANION GAP SERPL CALCULATED.3IONS-SCNC: 8 MMOL/L (ref 4–13)
AST SERPL W P-5'-P-CCNC: 25 U/L (ref 13–39)
BASE EX.OXY STD BLDV CALC-SCNC: 79 %
BASE EXCESS BLDV CALC-SCNC: 0.2 MMOL/L
BASOPHILS # BLD AUTO: 0 THOUSANDS/ΜL (ref 0–0.1)
BASOPHILS NFR BLD AUTO: 0 % (ref 0–2)
BETA-HYDROXYBUTYRATE: 0.1 MMOL/L
BILIRUB SERPL-MCNC: 0.3 MG/DL (ref 0.2–1)
BUN SERPL-MCNC: 18 MG/DL (ref 7–25)
CALCIUM SERPL-MCNC: 9.2 MG/DL (ref 8.6–10.5)
CHLORIDE SERPL-SCNC: 95 MMOL/L (ref 98–107)
CO2 SERPL-SCNC: 27 MMOL/L (ref 21–31)
CREAT SERPL-MCNC: 0.8 MG/DL (ref 0.6–1.2)
EOSINOPHIL # BLD AUTO: 0.1 THOUSAND/ΜL (ref 0–0.61)
EOSINOPHIL NFR BLD AUTO: 2 % (ref 0–5)
ERYTHROCYTE [DISTWIDTH] IN BLOOD BY AUTOMATED COUNT: 13.8 % (ref 11.5–14.5)
GFR SERPL CREATININE-BSD FRML MDRD: 87 ML/MIN/1.73SQ M
GLUCOSE SERPL-MCNC: 198 MG/DL (ref 65–140)
GLUCOSE SERPL-MCNC: 284 MG/DL (ref 65–140)
GLUCOSE SERPL-MCNC: 352 MG/DL (ref 65–99)
GLUCOSE SERPL-MCNC: 407 MG/DL (ref 65–140)
HCO3 BLDV-SCNC: 26.6 MMOL/L (ref 24–30)
HCT VFR BLD AUTO: 42.4 % (ref 42–47)
HGB BLD-MCNC: 13.7 G/DL (ref 12–16)
LYMPHOCYTES # BLD AUTO: 2.9 THOUSANDS/ΜL (ref 0.6–4.47)
LYMPHOCYTES NFR BLD AUTO: 37 % (ref 21–51)
MCH RBC QN AUTO: 26.4 PG (ref 26–34)
MCHC RBC AUTO-ENTMCNC: 32.2 G/DL (ref 31–37)
MCV RBC AUTO: 82 FL (ref 81–99)
MONOCYTES # BLD AUTO: 0.6 THOUSAND/ΜL (ref 0.17–1.22)
MONOCYTES NFR BLD AUTO: 8 % (ref 2–12)
NEUTROPHILS # BLD AUTO: 4.1 THOUSANDS/ΜL (ref 1.4–6.5)
NEUTS SEG NFR BLD AUTO: 53 % (ref 42–75)
O2 CT BLDV-SCNC: 15.4 ML/DL
PCO2 BLDV: 50.5 MM HG
PH BLDV: 7.34 [PH] (ref 7.3–7.4)
PLATELET # BLD AUTO: 290 THOUSANDS/UL (ref 149–390)
PMV BLD AUTO: 8.2 FL (ref 8.6–11.7)
PO2 BLDV: 50 MM HG (ref 35–45)
POTASSIUM SERPL-SCNC: 4 MMOL/L (ref 3.5–5.5)
PROT SERPL-MCNC: 6.8 G/DL (ref 6.4–8.9)
RBC # BLD AUTO: 5.18 MILLION/UL (ref 3.9–5.2)
SODIUM SERPL-SCNC: 130 MMOL/L (ref 134–143)
TROPONIN I SERPL-MCNC: <0.03 NG/ML
WBC # BLD AUTO: 7.7 THOUSAND/UL (ref 4.8–10.8)

## 2020-02-03 PROCEDURE — 80053 COMPREHEN METABOLIC PANEL: CPT | Performed by: PHYSICIAN ASSISTANT

## 2020-02-03 PROCEDURE — 84484 ASSAY OF TROPONIN QUANT: CPT | Performed by: PHYSICIAN ASSISTANT

## 2020-02-03 PROCEDURE — 96360 HYDRATION IV INFUSION INIT: CPT

## 2020-02-03 PROCEDURE — 70450 CT HEAD/BRAIN W/O DYE: CPT

## 2020-02-03 PROCEDURE — 85025 COMPLETE CBC W/AUTO DIFF WBC: CPT | Performed by: PHYSICIAN ASSISTANT

## 2020-02-03 PROCEDURE — 93005 ELECTROCARDIOGRAM TRACING: CPT

## 2020-02-03 PROCEDURE — 82948 REAGENT STRIP/BLOOD GLUCOSE: CPT

## 2020-02-03 PROCEDURE — 99284 EMERGENCY DEPT VISIT MOD MDM: CPT | Performed by: PHYSICIAN ASSISTANT

## 2020-02-03 PROCEDURE — 82010 KETONE BODYS QUAN: CPT | Performed by: PHYSICIAN ASSISTANT

## 2020-02-03 PROCEDURE — 36415 COLL VENOUS BLD VENIPUNCTURE: CPT | Performed by: PHYSICIAN ASSISTANT

## 2020-02-03 PROCEDURE — 82805 BLOOD GASES W/O2 SATURATION: CPT | Performed by: PHYSICIAN ASSISTANT

## 2020-02-03 PROCEDURE — 96361 HYDRATE IV INFUSION ADD-ON: CPT

## 2020-02-03 PROCEDURE — 96372 THER/PROPH/DIAG INJ SC/IM: CPT

## 2020-02-03 PROCEDURE — 99285 EMERGENCY DEPT VISIT HI MDM: CPT

## 2020-02-03 RX ORDER — ALBUTEROL SULFATE 0.63 MG/3ML
SOLUTION RESPIRATORY (INHALATION)
COMMUNITY
Start: 2020-01-20

## 2020-02-03 RX ORDER — ONDANSETRON 4 MG/1
TABLET, FILM COATED ORAL
COMMUNITY
Start: 2019-11-21 | End: 2021-04-12 | Stop reason: SDUPTHER

## 2020-02-03 RX ORDER — INSULIN GLARGINE 100 [IU]/ML
60 INJECTION, SOLUTION SUBCUTANEOUS DAILY
COMMUNITY
Start: 2020-01-24 | End: 2021-08-26 | Stop reason: SDUPTHER

## 2020-02-03 RX ORDER — CYCLOBENZAPRINE HCL 5 MG
TABLET ORAL
COMMUNITY
Start: 2020-01-30 | End: 2021-04-12

## 2020-02-03 RX ORDER — DICYCLOMINE HCL 20 MG
TABLET ORAL
COMMUNITY
Start: 2019-11-12 | End: 2020-04-20 | Stop reason: ALTCHOICE

## 2020-02-03 RX ORDER — DOCUSATE SODIUM 100 MG/1
100 CAPSULE, LIQUID FILLED ORAL 2 TIMES DAILY
COMMUNITY
Start: 2020-01-21 | End: 2022-01-13 | Stop reason: SDUPTHER

## 2020-02-03 RX ORDER — DIPHENHYDRAMINE HCL 50 MG/1
CAPSULE ORAL
COMMUNITY
Start: 2019-11-12

## 2020-02-03 RX ORDER — PEN NEEDLE, DIABETIC 31 GX5/16"
NEEDLE, DISPOSABLE MISCELLANEOUS
COMMUNITY
Start: 2020-01-21

## 2020-02-03 RX ORDER — DIPHENHYDRAMINE HYDROCHLORIDE 12.5 MG/5ML
LIQUID ORAL
COMMUNITY
Start: 2019-11-10 | End: 2021-04-12

## 2020-02-03 RX ADMIN — SODIUM CHLORIDE 1000 ML: 0.9 INJECTION, SOLUTION INTRAVENOUS at 20:56

## 2020-02-03 RX ADMIN — INSULIN HUMAN 8 UNITS: 100 INJECTION, SOLUTION PARENTERAL at 21:19

## 2020-02-03 RX ADMIN — SODIUM CHLORIDE 1000 ML: 0.9 INJECTION, SOLUTION INTRAVENOUS at 19:07

## 2020-02-03 NOTE — ED PROVIDER NOTES
History  Chief Complaint   Patient presents with    Hyperglycemia - Symptomatic     tired; blood sugar greater than 500 at home per patient      This is a 70-year-old female patient in some tendon diabetes who has an endocrinologist   She states that today her sugar has been above 500 and she feels very thirsty she is urinating a lot and she feels a bit dizzy  She states she feels like this when her sure he is high  No fever no chills no headache blurred vision double vision cough congestion sore throat nausea vomiting diarrhea abdominal pain no chest pain or shortness of breath  No urinary symptoms  Nothing makes it better or worse  Differential diagnosis includes not limited to hyperglycemia, ketoacidosis          Prior to Admission Medications   Prescriptions Last Dose Informant Patient Reported? Taking?    Aspirin (ASPIR-81 PO) 2/3/2020 at Unknown time  Yes Yes   Sig: Take 81 mg by mouth   Aspirin Buf,CaCarb-MgCarb-MgO, (BUFFERIN LOW DOSE) 81 MG TABS   Yes No   Sig: Take 81 mg by mouth   B-D UF III MINI PEN NEEDLES 31G X 5 MM MISC   Yes Yes   BANOPHEN 50 MG capsule   Yes Yes   Sig: take 1 capsule by mouth every 6 hours if needed for itching    MG capsule 2/3/2020 at Unknown time  Yes Yes   Sig: Take 100 mg by mouth 2 (two) times a day   HYDROcodone-acetaminophen (NORCO) 7 5-325 mg per tablet Not Taking at Unknown time  Yes No   Sig: Take 1 tablet by mouth every 6 (six) hours as needed   LANTUS SOLOSTAR 100 units/mL injection pen   Yes Yes   Sig: INJECT 54 UNITS UNDER THE SKIN DAILY   RA ALLERGY MEDICATION 25 MG capsule   Yes Yes   Sig: take 1 capsule by mouth every 6 hours if needed for itching   SUMAtriptan (IMITREX) 100 mg tablet Unknown at Unknown time  Yes No   Sig: Take 100 mg by mouth   acetaminophen-codeine (TYLENOL #3) 300-30 mg per tablet Not Taking at Unknown time  Yes No   Sig: Take 1 tablet by mouth every 6 (six) hours as needed   albuterol (ACCUNEB) 0 63 MG/3ML nebulizer solution   Yes Yes   Sig: take 3 milliliters by mouth every 4 hours if needed for wheezing   albuterol (PROAIR HFA) 90 mcg/act inhaler Unknown at Unknown time  Yes No   Sig: Inhale 1 puff every 6 (six) hours as needed   atorvastatin (LIPITOR) 40 mg tablet 2/2/2020 at Unknown time  Yes Yes   Sig: take 1 tablet by mouth at bedtime   chlorhexidine (PERIDEX) 0 12 % solution Not Taking at Unknown time  Yes No   Sig: Rinse with 1/2 ounce by mouth for 30 seconds then spit out  use twice a day   citalopram (CELEXA) 40 mg tablet 2/3/2020 at Unknown time  Yes Yes   Sig: Take 40 mg by mouth daily    clindamycin (CLEOCIN) 300 MG capsule Not Taking at Unknown time  Yes No   Sig: Take 300 mg by mouth 4 (four) times a day   cyclobenzaprine (FLEXERIL) 5 mg tablet 2/2/2020 at Unknown time  Yes Yes   Sig: take 1 to 2 tablets by mouth three times a day if needed for muscle spasm   dicyclomine (BENTYL) 20 mg tablet 2/2/2020 at Unknown time  Yes Yes   Sig: take 1 tablet by mouth every 6 hours for 7 days   insulin degludec (TRESIBA) 200 units/mL CONCENTRATED U-200 injection pen 2/3/2020 at Unknown time  Yes Yes   Sig: Inject 84 units daily   E11 65   insulin glulisine (APIDRA SOLOSTAR) 100 units/mL injection pen 2/3/2020 at Unknown time  Yes Yes   Sig: Inject 18 Units under the skin   levETIRAcetam (KEPPRA) 500 mg tablet 2/3/2020 at Unknown time  Yes Yes   Sig: Take 500 mg by mouth every 12 (twelve) hours    lisinopril (ZESTRIL) 10 mg tablet 2/3/2020 at Unknown time  Yes Yes   Sig: Take 10 mg by mouth   meloxicam (MOBIC) 15 mg tablet 2/3/2020 at Unknown time  Yes Yes   Sig: Take 15 mg by mouth daily   metFORMIN (GLUCOPHAGE) 1000 MG tablet 2/3/2020 at Unknown time  Yes Yes   Sig: take 1 tablet by mouth twice a day WITH MEALS   metoclopramide (REGLAN) 10 mg tablet 2/3/2020 at Unknown time  Yes Yes   Sig: Take 10 mg by mouth   metoprolol tartrate (LOPRESSOR) 25 mg tablet 2/3/2020 at Unknown time  Yes Yes   Sig: Take 25 mg by mouth   ondansetron (ZOFRAN) 4 mg tablet Unknown at Unknown time  Yes No   Sig: take 1 tablet by mouth up to three times a day if needed for nausea   ondansetron (ZOFRAN-ODT) 4 mg disintegrating tablet   No No   Sig: Take 1 tablet (4 mg total) by mouth every 8 (eight) hours as needed for nausea or vomiting   pantoprazole (PROTONIX) 40 mg tablet 2/3/2020 at Unknown time  Yes Yes   Sig: take 1 tablet by mouth twice a day   pregabalin (LYRICA) 50 mg capsule 2/3/2020 at Unknown time  Yes Yes   Sig: take 1 capsule by mouth three times a day   sitaGLIPtin (JANUVIA) 100 mg tablet 2/3/2020 at Unknown time  Yes Yes   Sig: Take 100 mg by mouth daily    solifenacin (VESICARE) 5 mg tablet 2/3/2020 at Unknown time  Yes Yes   Sig: Take 5 mg by mouth daily    traMADol (ULTRAM) 50 mg tablet Not Taking at Unknown time  No No   Sig: Take 1 tablet (50 mg total) by mouth every 6 (six) hours as needed for moderate pain for up to 10 doses   Patient not taking: Reported on 2/3/2020      Facility-Administered Medications: None       Past Medical History:   Diagnosis Date    Depression     Diabetes mellitus (HCC)     Gastroparesis     GERD (gastroesophageal reflux disease)     Hyperlipidemia     Hypertension     Sciatica     Seizure disorder ([facility] [address] )        Past Surgical History:   Procedure Laterality Date    CHOLECYSTECTOMY      HYSTERECTOMY         History reviewed  No pertinent family history  I have reviewed and agree with the history as documented  Social History     Tobacco Use    Smoking status: Former Smoker     Last attempt to quit:      Years since quittin 1    Smokeless tobacco: Never Used   Substance Use Topics    Alcohol use: No    Drug use: No        Review of Systems   All other systems reviewed and are negative  Physical Exam  Physical Exam   Constitutional: She is oriented to person, place, and time  She appears well-developed and well-nourished  HENT:   Head: Normocephalic and atraumatic     Right Ear: External ear normal  Left Ear: External ear normal    Nose: Nose normal    Mouth/Throat: Oropharynx is clear and moist    Eyes: Pupils are equal, round, and reactive to light  Conjunctivae are normal    Neck: Normal range of motion  Neck supple  Cardiovascular: Normal rate and regular rhythm  Pulmonary/Chest: Effort normal and breath sounds normal    Abdominal: Soft  Bowel sounds are normal  There is no tenderness  Musculoskeletal: She exhibits no edema  Neurological: She is alert and oriented to person, place, and time  She displays normal reflexes  No cranial nerve deficit or sensory deficit  She exhibits normal muscle tone  Coordination normal    Skin: Skin is warm  Psychiatric: She has a normal mood and affect  Her behavior is normal    Nursing note and vitals reviewed        Vital Signs  ED Triage Vitals [02/03/20 1826]   Temperature Pulse Respirations Blood Pressure SpO2   98 8 °F (37 1 °C) 87 16 141/97 97 %      Temp Source Heart Rate Source Patient Position - Orthostatic VS BP Location FiO2 (%)   Temporal Monitor Sitting Left arm --      Pain Score       8           Vitals:    02/03/20 1826 02/03/20 2100 02/03/20 2304   BP: 141/97 162/85 151/91   Pulse: 87 64 73   Patient Position - Orthostatic VS: Sitting Lying          Visual Acuity      ED Medications  Medications   sodium chloride 0 9 % bolus 1,000 mL (0 mL Intravenous Stopped 2/3/20 2056)   sodium chloride 0 9 % bolus 1,000 mL (0 mL Intravenous Stopped 2/3/20 2305)   insulin regular (HumuLIN R,NovoLIN R) injection 8 Units (8 Units Subcutaneous Given 2/3/20 2119)       Diagnostic Studies  Results Reviewed     Procedure Component Value Units Date/Time    Fingerstick Glucose (POCT) [[id number]]  (Abnormal) Collected:  02/03/20 2225    Lab Status:  Final result Updated:  02/03/20 2226     POC Glucose 198 mg/dl     Fingerstick Glucose (POCT) [[id number]]  (Abnormal) Collected:  02/03/20 2034    Lab Status:  Final result Updated:  02/03/20 2036     POC Glucose 284 mg/dl Troponin I [[id number]]  (Normal) Collected:  02/03/20 1904    Lab Status:  Final result Specimen:  Blood from Arm, Left Updated:  02/03/20 2003     Troponin I <0 03 ng/mL     Blood gas, venous [[id number]]  (Abnormal) Collected:  02/03/20 1902    Lab Status:  Final result Specimen:  Blood from Arm, Left Updated:  02/03/20 1944     pH, Ovidio 7 340     pCO2, Ovidio 50 5 mm Hg      pO2, Ovidio 50 0 mm Hg      HCO3, Ovidio 26 6 mmol/L      Base Excess, Ovidio 0 2 mmol/L      O2 Content, Ovidio 15 4 ml/dL      O2 HGB, VENOUS 79 0 %     Comprehensive metabolic panel [[id number]]  (Abnormal) Collected:  02/03/20 1902    Lab Status:  Final result Specimen:  Blood from Arm, Left Updated:  02/03/20 1932     Sodium 130 mmol/L      Potassium 4 0 mmol/L      Chloride 95 mmol/L      CO2 27 mmol/L      ANION GAP 8 mmol/L      BUN 18 mg/dL      Creatinine 0 80 mg/dL      Glucose 352 mg/dL      Calcium 9 2 mg/dL      AST 25 U/L      ALT 39 U/L      Alkaline Phosphatase 120 U/L      Total Protein 6 8 g/dL      Albumin 4 0 g/dL      Total Bilirubin 0 30 mg/dL      eGFR 87 ml/min/1 73sq m     Narrative:       [place] guidelines for Chronic Kidney Disease (CKD):     Stage 1 with normal or high GFR (GFR > 90 mL/min/1 73 square meters)    Stage 2 Mild CKD (GFR = 60-89 mL/min/1 73 square meters)    Stage 3A Moderate CKD (GFR = 45-59 mL/min/1 73 square meters)    Stage 3B Moderate CKD (GFR = 30-44 mL/min/1 73 square meters)    Stage 4 Severe CKD (GFR = 15-29 mL/min/1 73 square meters)    Stage 5 End Stage CKD (GFR <15 mL/min/1 73 square meters)  Note: GFR calculation is accurate only with a steady state creatinine    Beta Hydroxybutyrate [[id number]]  (Normal) Collected:  02/03/20 1902    Lab Status:  Final result Specimen:  Blood from Arm, Left Updated:  02/03/20 1928     BETA-HYDROXYBUTYRATE 0 1 mmol/L     CBC and differential [[id number]]  (Abnormal) Collected:  02/03/20 1902    Lab Status:  Final result Specimen:  Blood from Arm, Left Updated:  02/03/20 1926     WBC 7 70 Thousand/uL      RBC 5 18 Million/uL      Hemoglobin 13 7 g/dL      Hematocrit 42 4 %      MCV 82 fL      MCH 26 4 pg      MCHC 32 2 g/dL      RDW 13 8 %      MPV 8 2 fL      Platelets 009 Thousands/uL      Neutrophils Relative 53 %      Lymphocytes Relative 37 %      Monocytes Relative 8 %      Eosinophils Relative 2 %      Basophils Relative 0 %      Neutrophils Absolute 4 10 Thousands/µL      Lymphocytes Absolute 2 90 Thousands/µL      Monocytes Absolute 0 60 Thousand/µL      Eosinophils Absolute 0 10 Thousand/µL      Basophils Absolute 0 00 Thousands/µL     Fingerstick Glucose (POCT) [[id number]]  (Abnormal) Collected:  02/03/20 1826    Lab Status:  Final result Updated:  02/03/20 1828     POC Glucose 407 mg/dl                  CT head without contrast   Final Result by Deny Castañeda MD (02/03 2142)      No acute intracranial hemorrhage, mass effect or edema  Workstation performed: [id number]                    Procedures  Procedures         ED Course  ED Course as of Feb 05 0901   Mon Feb 03, 2020   1932 Corrected serum sodium 128      1945 Initial EKG interpreted by me 70 beats per minute normal sinus rhythm no ST elevation no ectopic normal axis  no comparison      2043  presented and stated that when she was above 500 she appeared a bit dazed  She answered questions appropriately just slowed she got slower were to have any stroke-like symptoms  She just was not herself  States that she is back to baseline  Patient does states she has a generalized headache no blurred vision or double vision  2052 Patient takes apidra insulin at bedtime  We do not carry that  She was some 0 1 milligram/kilogram regular insulin    Subcutaneous      2055 Signed out to Dr Jimmy Cintron                                  [facility]      Disposition  Final diagnoses:   Hyperglycemia   Type 2 diabetes mellitus without complication, with long-term current use of insulin ([facility] [address] )   Dehydration     Time reflects when diagnosis was documented in both MDM as applicable and the Disposition within this note     Time User Action Codes Description Comment    2/3/2020 10:45 PM Obey Cruz [R73 9] Hyperglycemia     2/3/2020 10:45 PM Obey Cruz [E11 9,  Z79 4] Type 2 diabetes mellitus without complication, with long-term current use of insulin ([facility] [address] )     2/3/2020 10:46 PM Shaun Benavides Add [E86 0] Dehydration       ED Disposition     ED Disposition Condition Date/Time Comment    Discharge Stable Mon Feb 3, 2020 10:46 PM Annalise Oliveros discharge to home/self care  Follow-up Information    None         Discharge Medication List as of 2/3/2020 10:46 PM      CONTINUE these medications which have NOT CHANGED    Details   albuterol (ACCUNEB) 0 63 MG/3ML nebulizer solution take 3 milliliters by mouth every 4 hours if needed for wheezing, Historical Med      Aspirin (ASPIR-81 PO) Take 81 mg by mouth, Starting Mon 2/20/2012, Historical Med      atorvastatin (LIPITOR) 40 mg tablet take 1 tablet by mouth at bedtime, Historical Med      B-D UF III MINI PEN NEEDLES 31G X 5 MM MISC Starting Tue 1/21/2020, Historical Med      !! BANOPHEN 50 MG capsule take 1 capsule by mouth every 6 hours if needed for itching, Historical Med      citalopram (CELEXA) 40 mg tablet Take 40 mg by mouth daily , Historical Med      cyclobenzaprine (FLEXERIL) 5 mg tablet take 1 to 2 tablets by mouth three times a day if needed for muscle spasm, Historical Med      dicyclomine (BENTYL) 20 mg tablet take 1 tablet by mouth every 6 hours for 7 days, Historical Med       MG capsule Take 100 mg by mouth 2 (two) times a day, Starting Tue 1/21/2020, Historical Med      insulin degludec (TRESIBA) 200 units/mL CONCENTRATED U-200 injection pen Inject 84 units daily   E11 65, Historical Med      insulin glulisine (APIDRA SOLOSTAR) 100 units/mL injection pen Inject 18 Units under the skin, Starting Fri 5/11/2018, Historical Med      LANTUS SOLOSTAR 100 units/mL injection pen INJECT 54 UNITS UNDER THE SKIN DAILY, Historical Med      levETIRAcetam (KEPPRA) 500 mg tablet Take 500 mg by mouth every 12 (twelve) hours , Starting Mon 3/19/2018, Historical Med      lisinopril (ZESTRIL) 10 mg tablet Take 10 mg by mouth, Starting Mon 3/19/2018, Historical Med      meloxicam (MOBIC) 15 mg tablet Take 15 mg by mouth daily, Starting Mon 5/13/2019, Until Tue 5/12/2020, Historical Med      metFORMIN (GLUCOPHAGE) 1000 MG tablet take 1 tablet by mouth twice a day WITH MEALS, Historical Med      metoclopramide (REGLAN) 10 mg tablet Take 10 mg by mouth, Starting Tue 4/17/2018, Historical Med      metoprolol tartrate (LOPRESSOR) 25 mg tablet Take 25 mg by mouth, Starting Mon 7/9/2018, Historical Med      pantoprazole (PROTONIX) 40 mg tablet take 1 tablet by mouth twice a day, Historical Med      pregabalin (LYRICA) 50 mg capsule take 1 capsule by mouth three times a day, Historical Med      !! RA ALLERGY MEDICATION 25 MG capsule take 1 capsule by mouth every 6 hours if needed for itching, Historical Med      sitaGLIPtin (JANUVIA) 100 mg tablet Take 100 mg by mouth daily , Historical Med      solifenacin (VESICARE) 5 mg tablet Take 5 mg by mouth daily , Historical Med      acetaminophen-codeine (TYLENOL #3) 300-30 mg per tablet Take 1 tablet by mouth every 6 (six) hours as needed, Starting Wed 6/5/2019, Historical Med      albuterol (PROAIR HFA) 90 mcg/act inhaler Inhale 1 puff every 6 (six) hours as needed, Starting Sat 10/28/2017, Historical Med      Aspirin Buf,CaCarb-MgCarb-MgO, (BUFFERIN LOW DOSE) 81 MG TABS Take 81 mg by mouth, Starting Mon 2/20/2012, Historical Med      chlorhexidine (PERIDEX) 0 12 % solution Rinse with 1/2 ounce by mouth for 30 seconds then spit out   use twice a day, Historical Med      clindamycin (CLEOCIN) 300 MG capsule Take 300 mg by mouth 4 (four) times a day, Starting Wed 6/5/2019, Historical Med HYDROcodone-acetaminophen (NORCO) 7 5-325 mg per tablet Take 1 tablet by mouth every 6 (six) hours as needed, Starting Wed 7/11/2018, Historical Med      ondansetron (ZOFRAN) 4 mg tablet take 1 tablet by mouth up to three times a day if needed for nausea, Historical Med      ondansetron (ZOFRAN-ODT) 4 mg disintegrating tablet Take 1 tablet (4 mg total) by mouth every 8 (eight) hours as needed for nausea or vomiting, Starting Sun 10/20/2019, Normal      SUMAtriptan (IMITREX) 100 mg tablet Take 100 mg by mouth, Historical Med      traMADol (ULTRAM) 50 mg tablet Take 1 tablet (50 mg total) by mouth every 6 (six) hours as needed for moderate pain for up to 10 doses, Starting Sun 10/20/2019, Print       !! - Potential duplicate medications found  Please discuss with provider  No discharge procedures on file      ED Provider  Electronically Signed by           Nara Baum PA-C  02/05/20 8770

## 2020-02-04 LAB
ATRIAL RATE: 70 BPM
P AXIS: 28 DEGREES
PR INTERVAL: 162 MS
QRS AXIS: 8 DEGREES
QRSD INTERVAL: 80 MS
QT INTERVAL: 404 MS
QTC INTERVAL: 436 MS
T WAVE AXIS: 22 DEGREES
VENTRICULAR RATE: 70 BPM

## 2020-02-04 PROCEDURE — 93010 ELECTROCARDIOGRAM REPORT: CPT | Performed by: INTERNAL MEDICINE

## 2020-02-04 NOTE — DISCHARGE INSTRUCTIONS
RETURN IF WORSE IN ANY WAY: CHEST PAIN, SHORTNESS OF BREATH, INCREASED PAIN, FEVER OR FLU LIKE SYMPTOMS, OR NEW AND CONCERNING SYMPTOMS SIGNS OR SYMPTOMS:    PLEASE CALL YOUR ENDOCRINOLOGIST AND PRIMARY DOCTOR IN THE MORNING TO SET UP FOLLOW UP FOR TOMORROW  PLEASE REVIEW THE WORK UP RESULTS WITH YOUR DOCTOR

## 2020-02-04 NOTE — ED NOTES
Pt states the blood glucose result of 284mg/dL is normal for her and that is her baseline  Meghann Galicia made aware of same        Barbara Han RN  02/03/20 2037

## 2020-02-04 NOTE — ED CARE HANDOFF
Emergency Department Sign Out Note        Sign out and transfer of care from [address]  See Separate Emergency Department note  The patient, Kerry Brittle, was evaluated by the previous provider for HYPERGLYCEMIA    Workup Completed:  LABS      ED Course / Workup Pending (followup):    2100  PT GETTING SECOND IVF BOLUS AND SQ INSULIN AND IF   IMPROVED GLUCOSE PT CAN GO HOME    2235  CT BRAIN - WITHOUT CONTRAST     INDICATION:   dizzy      COMPARISON:  4/20/2014     TECHNIQUE:  CT examination of the brain was performed  In addition to axial images, coronal 2D reformatted images were created and submitted for interpretation        Radiation dose length product (DLP) for this visit:  789 mGy-cm   This examination, like all CT scans performed in the [facility], was performed utilizing techniques to minimize radiation dose exposure, including the use of iterative   reconstruction and automated exposure control        IMAGE QUALITY:  Diagnostic      FINDINGS:     PARENCHYMA:  No intracranial mass, mass effect or midline shift  No CT signs of acute infarction  No acute parenchymal hemorrhage  Atherosclerotic calcifications noted      VENTRICLES AND EXTRA-AXIAL SPACES:  Normal for the patient's age      VISUALIZED ORBITS AND PARANASAL SINUSES:  Status post medial antrostomy on the left    Mild mucosal thickening bilaterally      CALVARIUM AND EXTRACRANIAL SOFT TISSUES:  Mild degenerative changes of both temporomandibular joints     IMPRESSION:     No acute intracranial hemorrhage, mass effect or edema         2240  PT LOOKS GREAT  LABS AND CT REVIEWED W/ PT  SHE IS VERY HAPPY W/ HER ER CARE AND READY TO MANAGE FROM HOME, FEELS MUCH BETTER  IS GRATEFUL FOR HER CARE  UNDERSTANDS RETURN PRECAUTIONS, WILL F/U W/ PCP AND ENDOCRINOLOGIST                      Procedures  MDM    Disposition  Final diagnoses:   None     ED Disposition     None      Follow-up Information    None       Patient's Medications   Discharge Prescriptions    No medications on file     No discharge procedures on file         ED Provider  Electronically Signed by     Gema Sanabria MD  02/03/20 9525

## 2020-02-05 ENCOUNTER — TELEPHONE (OUTPATIENT)
Dept: URGENT CARE | Facility: CLINIC | Age: 50
End: 2020-02-05

## 2020-03-27 ENCOUNTER — OFFICE VISIT (OUTPATIENT)
Dept: URGENT CARE | Facility: CLINIC | Age: 50
End: 2020-03-27
Payer: COMMERCIAL

## 2020-03-27 VITALS
HEIGHT: 62 IN | SYSTOLIC BLOOD PRESSURE: 130 MMHG | OXYGEN SATURATION: 95 % | RESPIRATION RATE: 18 BRPM | TEMPERATURE: 97.5 F | HEART RATE: 81 BPM | WEIGHT: 186.8 LBS | BODY MASS INDEX: 34.37 KG/M2 | DIASTOLIC BLOOD PRESSURE: 85 MMHG

## 2020-03-27 DIAGNOSIS — L08.9 LOCAL INFECTION OF THE SKIN AND SUBCUTANEOUS TISSUE, UNSPECIFIED: Primary | ICD-10-CM

## 2020-03-27 PROCEDURE — 90715 TDAP VACCINE 7 YRS/> IM: CPT

## 2020-03-27 PROCEDURE — 99203 OFFICE O/P NEW LOW 30 MIN: CPT | Performed by: PHYSICIAN ASSISTANT

## 2020-03-27 PROCEDURE — G0382 LEV 3 HOSP TYPE B ED VISIT: HCPCS | Performed by: PHYSICIAN ASSISTANT

## 2020-03-27 PROCEDURE — 99283 EMERGENCY DEPT VISIT LOW MDM: CPT | Performed by: PHYSICIAN ASSISTANT

## 2020-03-27 RX ORDER — SULFAMETHOXAZOLE AND TRIMETHOPRIM 800; 160 MG/1; MG/1
1 TABLET ORAL EVERY 12 HOURS SCHEDULED
Qty: 20 TABLET | Refills: 0 | Status: SHIPPED | OUTPATIENT
Start: 2020-03-27 | End: 2020-04-06

## 2020-03-27 NOTE — PROGRESS NOTES
[facility]        NAME: Enrique Wilson is a 52 y o  female  : 1970    MRN: [id number]  DATE: 2020  TIME: 3:02 PM    Assessment and Plan   Local infection of the skin and subcutaneous tissue, unspecified [L08 9]  1  Local infection of the skin and subcutaneous tissue, unspecified  sulfamethoxazole-trimethoprim (BACTRIM DS) 800-160 mg per tablet    TDAP Vaccine greater than or equal to 8yo         Patient Instructions     Patient Instructions   Hydration and rest  Keep wound clean and dry  Apply topical antibiotic ointment  Do not pick or sratch at wound  Return to clinic or follow up with PCP if symptoms do not improve in 3 days  Go to ER if fever, numbness or weakness occurs  Chief Complaint     Chief Complaint   Patient presents with    Blister     on left hand ring finger sore /blister started monday night was useing cream and covering it          History of Present Illness       54-year-old female presents clinic with complaints of red oozing wound on left ring finger x4 days  She denies fever, numbness, weakness  She denies injury that she recalls  Patient has been applying topical Neosporin with little relief  Unsure of last tetanus  Review of Systems   Review of Systems   Constitutional: Negative for chills and fever  HENT: Negative for congestion, rhinorrhea, sneezing and trouble swallowing  Respiratory: Negative for cough, shortness of breath and wheezing  Cardiovascular: Negative for chest pain  Skin: Positive for wound  Allergic/Immunologic: Negative for environmental allergies, food allergies and immunocompromised state           Current Medications       Current Outpatient Medications:     acetaminophen-codeine (TYLENOL #3) 300-30 mg per tablet, Take 1 tablet by mouth every 6 (six) hours as needed, Disp: , Rfl: 0    albuterol (ACCUNEB) 0 63 MG/3ML nebulizer solution, take 3 milliliters by mouth every 4 hours if needed for wheezing, Disp: , Rfl:   albuterol (PROAIR HFA) 90 mcg/act inhaler, Inhale 1 puff every 6 (six) hours as needed, Disp: , Rfl:     Aspirin (ASPIR-81 PO), Take 81 mg by mouth, Disp: , Rfl:     atorvastatin (LIPITOR) 40 mg tablet, take 1 tablet by mouth at bedtime, Disp: , Rfl:     B-D UF III MINI PEN NEEDLES 31G X 5 MM MISC, , Disp: , Rfl:     BANOPHEN 50 MG capsule, take 1 capsule by mouth every 6 hours if needed for itching, Disp: , Rfl:     chlorhexidine (PERIDEX) 0 12 % solution, Rinse with 1/2 ounce by mouth for 30 seconds then spit out   use twice a day, Disp: , Rfl: 0    citalopram (CELEXA) 40 mg tablet, Take 40 mg by mouth daily , Disp: , Rfl:     clindamycin (CLEOCIN) 300 MG capsule, Take 300 mg by mouth 4 (four) times a day, Disp: , Rfl: 0    cyclobenzaprine (FLEXERIL) 5 mg tablet, take 1 to 2 tablets by mouth three times a day if needed for muscle spasm, Disp: , Rfl:     dicyclomine (BENTYL) 20 mg tablet, take 1 tablet by mouth every 6 hours for 7 days, Disp: , Rfl:      MG capsule, Take 100 mg by mouth 2 (two) times a day, Disp: , Rfl:     HYDROcodone-acetaminophen (NORCO) 7 5-325 mg per tablet, Take 1 tablet by mouth every 6 (six) hours as needed, Disp: , Rfl:     insulin glulisine (APIDRA SOLOSTAR) 100 units/mL injection pen, Inject 18 Units under the skin, Disp: , Rfl:     LANTUS SOLOSTAR 100 units/mL injection pen, INJECT 54 UNITS UNDER THE SKIN DAILY, Disp: , Rfl:     levETIRAcetam (KEPPRA) 500 mg tablet, Take 500 mg by mouth every 12 (twelve) hours , Disp: , Rfl:     lisinopril (ZESTRIL) 10 mg tablet, Take 10 mg by mouth, Disp: , Rfl:     meloxicam (MOBIC) 15 mg tablet, Take 15 mg by mouth daily, Disp: , Rfl:     metFORMIN (GLUCOPHAGE) 1000 MG tablet, take 1 tablet by mouth twice a day WITH MEALS, Disp: , Rfl:     metoclopramide (REGLAN) 10 mg tablet, Take 10 mg by mouth, Disp: , Rfl:     metoprolol tartrate (LOPRESSOR) 25 mg tablet, Take 25 mg by mouth, Disp: , Rfl:     ondansetron (ZOFRAN) 4 mg tablet, take 1 tablet by mouth up to three times a day if needed for nausea, Disp: , Rfl:     pantoprazole (PROTONIX) 40 mg tablet, take 1 tablet by mouth twice a day, Disp: , Rfl:     pregabalin (LYRICA) 50 mg capsule, take 1 capsule by mouth three times a day, Disp: , Rfl:     RA ALLERGY MEDICATION 25 MG capsule, take 1 capsule by mouth every 6 hours if needed for itching, Disp: , Rfl:     sitaGLIPtin (JANUVIA) 100 mg tablet, Take 100 mg by mouth daily , Disp: , Rfl:     solifenacin (VESICARE) 5 mg tablet, Take 5 mg by mouth daily , Disp: , Rfl:     SUMAtriptan (IMITREX) 100 mg tablet, Take 100 mg by mouth, Disp: , Rfl:     traMADol (ULTRAM) 50 mg tablet, Take 1 tablet (50 mg total) by mouth every 6 (six) hours as needed for moderate pain for up to 10 doses, Disp: 10 tablet, Rfl: 0    Aspirin Buf,CaCarb-MgCarb-MgO, (BUFFERIN LOW DOSE) 81 MG TABS, Take 81 mg by mouth, Disp: , Rfl:     insulin degludec (TRESIBA) 200 units/mL CONCENTRATED U-200 injection pen, Inject 84 units daily   E11 65, Disp: , Rfl:     ondansetron (ZOFRAN-ODT) 4 mg disintegrating tablet, Take 1 tablet (4 mg total) by mouth every 8 (eight) hours as needed for nausea or vomiting (Patient not taking: Reported on 3/27/2020), Disp: 20 tablet, Rfl: 0    sulfamethoxazole-trimethoprim (BACTRIM DS) 800-160 mg per tablet, Take 1 tablet by mouth every 12 (twelve) hours for 10 days, Disp: 20 tablet, Rfl: 0    Current Allergies     Allergies as of 03/27/2020 - Reviewed 03/27/2020   Allergen Reaction Noted    Azithromycin GI Intolerance and Hives 07/13/2012    Benztropine  08/09/2016    Butorphanol Hallucinations 08/09/2016    Penicillins  11/12/2015    Zolpidem  11/12/2015            The following portions of the patient's history were reviewed and updated as appropriate: allergies, current medications, past family history, past medical history, past social history, past surgical history and problem list      Past Medical History: Diagnosis Date    Depression     Diabetes mellitus ([facility] [address] )     Gastroparesis     GERD (gastroesophageal reflux disease)     Hyperlipidemia     Hypertension     Sciatica     Seizure disorder (HCC)        Past Surgical History:   Procedure Laterality Date    CHOLECYSTECTOMY      HYSTERECTOMY         History reviewed  No pertinent family history  Medications have been verified  Objective   /85   Pulse 81   Temp 97 5 °F (36 4 °C)   Resp 18   Ht 5' 2" (1 575 m)   Wt 84 7 kg (186 lb 12 8 oz)   SpO2 95%   BMI 34 17 kg/m²        Physical Exam     Physical Exam   Constitutional: She appears well-developed and well-nourished  No distress  HENT:   Head: Normocephalic  Cardiovascular: Normal rate, regular rhythm and intact distal pulses  Pulmonary/Chest: Effort normal and breath sounds normal    Skin: Capillary refill takes less than 2 seconds  Erythematous and oozing wound on dorsal mid 4th phalanx of left hand  Tenderness and heat to palpation  No streaking erythema  No tenosynovitis  No necrosis  No abscess  Vitals reviewed

## 2020-03-27 NOTE — PATIENT INSTRUCTIONS
Hydration and rest  Keep wound clean and dry  Apply topical antibiotic ointment  Do not pick or sratch at wound  Return to clinic or follow up with PCP if symptoms do not improve in 3 days  Go to ER if fever, numbness or weakness occurs

## 2020-04-09 ENCOUNTER — OFFICE VISIT (OUTPATIENT)
Dept: URGENT CARE | Facility: CLINIC | Age: 50
End: 2020-04-09
Payer: COMMERCIAL

## 2020-04-09 VITALS
SYSTOLIC BLOOD PRESSURE: 156 MMHG | HEART RATE: 85 BPM | DIASTOLIC BLOOD PRESSURE: 78 MMHG | OXYGEN SATURATION: 97 % | HEIGHT: 62 IN | RESPIRATION RATE: 18 BRPM | BODY MASS INDEX: 34.48 KG/M2 | TEMPERATURE: 97.7 F | WEIGHT: 187.4 LBS

## 2020-04-09 DIAGNOSIS — S80.01XA CONTUSION OF RIGHT KNEE, INITIAL ENCOUNTER: Primary | ICD-10-CM

## 2020-04-09 PROCEDURE — 99213 OFFICE O/P EST LOW 20 MIN: CPT | Performed by: PHYSICIAN ASSISTANT

## 2020-04-09 PROCEDURE — 99283 EMERGENCY DEPT VISIT LOW MDM: CPT | Performed by: PHYSICIAN ASSISTANT

## 2020-04-09 PROCEDURE — G0382 LEV 3 HOSP TYPE B ED VISIT: HCPCS | Performed by: PHYSICIAN ASSISTANT

## 2020-04-14 ENCOUNTER — EVALUATION (OUTPATIENT)
Dept: PHYSICAL THERAPY | Age: 50
End: 2020-04-14
Payer: COMMERCIAL

## 2020-04-14 DIAGNOSIS — S80.01XA CONTUSION OF RIGHT KNEE, INITIAL ENCOUNTER: ICD-10-CM

## 2020-04-14 PROCEDURE — 97014 ELECTRIC STIMULATION THERAPY: CPT | Performed by: PHYSICAL THERAPIST

## 2020-04-14 PROCEDURE — 97162 PT EVAL MOD COMPLEX 30 MIN: CPT | Performed by: PHYSICAL THERAPIST

## 2020-04-14 PROCEDURE — 97110 THERAPEUTIC EXERCISES: CPT | Performed by: PHYSICAL THERAPIST

## 2020-04-14 PROCEDURE — 97140 MANUAL THERAPY 1/> REGIONS: CPT | Performed by: PHYSICAL THERAPIST

## 2020-04-17 ENCOUNTER — OFFICE VISIT (OUTPATIENT)
Dept: PHYSICAL THERAPY | Age: 50
End: 2020-04-17
Payer: COMMERCIAL

## 2020-04-17 DIAGNOSIS — S80.01XA CONTUSION OF RIGHT KNEE, INITIAL ENCOUNTER: Primary | ICD-10-CM

## 2020-04-17 PROCEDURE — 97113 AQUATIC THERAPY/EXERCISES: CPT

## 2020-04-20 ENCOUNTER — OFFICE VISIT (OUTPATIENT)
Dept: PHYSICAL THERAPY | Age: 50
End: 2020-04-20
Payer: COMMERCIAL

## 2020-04-20 ENCOUNTER — APPOINTMENT (EMERGENCY)
Dept: RADIOLOGY | Facility: HOSPITAL | Age: 50
End: 2020-04-20
Payer: COMMERCIAL

## 2020-04-20 ENCOUNTER — HOSPITAL ENCOUNTER (EMERGENCY)
Facility: HOSPITAL | Age: 50
Discharge: HOME | End: 2020-04-20
Attending: EMERGENCY MEDICINE | Admitting: EMERGENCY MEDICINE
Payer: COMMERCIAL

## 2020-04-20 VITALS
HEART RATE: 81 BPM | OXYGEN SATURATION: 98 % | RESPIRATION RATE: 18 BRPM | BODY MASS INDEX: 33.47 KG/M2 | WEIGHT: 183 LBS | TEMPERATURE: 97.1 F | SYSTOLIC BLOOD PRESSURE: 173 MMHG | DIASTOLIC BLOOD PRESSURE: 89 MMHG

## 2020-04-20 DIAGNOSIS — M79.661 PAIN IN RIGHT LOWER LEG: Primary | ICD-10-CM

## 2020-04-20 DIAGNOSIS — S80.01XA CONTUSION OF RIGHT KNEE, INITIAL ENCOUNTER: Primary | ICD-10-CM

## 2020-04-20 PROCEDURE — 73590 X-RAY EXAM OF LOWER LEG: CPT

## 2020-04-20 PROCEDURE — 99282 EMERGENCY DEPT VISIT SF MDM: CPT | Performed by: EMERGENCY MEDICINE

## 2020-04-20 PROCEDURE — 99283 EMERGENCY DEPT VISIT LOW MDM: CPT

## 2020-04-20 PROCEDURE — 97113 AQUATIC THERAPY/EXERCISES: CPT | Performed by: PHYSICAL THERAPIST

## 2020-04-23 ENCOUNTER — APPOINTMENT (OUTPATIENT)
Dept: PHYSICAL THERAPY | Age: 50
End: 2020-04-23
Payer: COMMERCIAL

## 2020-04-24 ENCOUNTER — APPOINTMENT (OUTPATIENT)
Dept: PHYSICAL THERAPY | Age: 50
End: 2020-04-24
Payer: COMMERCIAL

## 2020-04-27 ENCOUNTER — OFFICE VISIT (OUTPATIENT)
Dept: PHYSICAL THERAPY | Age: 50
End: 2020-04-27
Payer: COMMERCIAL

## 2020-04-27 DIAGNOSIS — S80.01XA CONTUSION OF RIGHT KNEE, INITIAL ENCOUNTER: Primary | ICD-10-CM

## 2020-04-27 PROCEDURE — 97113 AQUATIC THERAPY/EXERCISES: CPT | Performed by: PHYSICAL THERAPIST

## 2020-04-30 ENCOUNTER — OFFICE VISIT (OUTPATIENT)
Dept: PHYSICAL THERAPY | Age: 50
End: 2020-04-30
Payer: COMMERCIAL

## 2020-04-30 DIAGNOSIS — S80.01XA CONTUSION OF RIGHT KNEE, INITIAL ENCOUNTER: Primary | ICD-10-CM

## 2020-04-30 PROCEDURE — 97113 AQUATIC THERAPY/EXERCISES: CPT

## 2020-05-04 ENCOUNTER — OFFICE VISIT (OUTPATIENT)
Dept: PHYSICAL THERAPY | Age: 50
End: 2020-05-04
Payer: COMMERCIAL

## 2020-05-04 DIAGNOSIS — S80.01XA CONTUSION OF RIGHT KNEE, INITIAL ENCOUNTER: Primary | ICD-10-CM

## 2020-05-04 PROCEDURE — 97113 AQUATIC THERAPY/EXERCISES: CPT | Performed by: PHYSICAL THERAPIST

## 2020-05-07 ENCOUNTER — APPOINTMENT (OUTPATIENT)
Dept: PHYSICAL THERAPY | Age: 50
End: 2020-05-07
Payer: COMMERCIAL

## 2020-05-08 ENCOUNTER — OFFICE VISIT (OUTPATIENT)
Dept: PHYSICAL THERAPY | Age: 50
End: 2020-05-08
Payer: COMMERCIAL

## 2020-05-08 DIAGNOSIS — S80.01XA CONTUSION OF RIGHT KNEE, INITIAL ENCOUNTER: Primary | ICD-10-CM

## 2020-05-08 PROCEDURE — 97113 AQUATIC THERAPY/EXERCISES: CPT | Performed by: PHYSICAL THERAPIST

## 2020-05-11 ENCOUNTER — APPOINTMENT (OUTPATIENT)
Dept: PHYSICAL THERAPY | Age: 50
End: 2020-05-11
Payer: COMMERCIAL

## 2020-05-14 ENCOUNTER — OFFICE VISIT (OUTPATIENT)
Dept: OBGYN CLINIC | Facility: CLINIC | Age: 50
End: 2020-05-14
Payer: COMMERCIAL

## 2020-05-14 VITALS
TEMPERATURE: 97.8 F | HEIGHT: 62 IN | BODY MASS INDEX: 34.41 KG/M2 | SYSTOLIC BLOOD PRESSURE: 130 MMHG | DIASTOLIC BLOOD PRESSURE: 82 MMHG | WEIGHT: 187 LBS

## 2020-05-14 DIAGNOSIS — M54.16 RADICULOPATHY, LUMBAR REGION: Primary | ICD-10-CM

## 2020-05-14 DIAGNOSIS — M76.811 ANTERIOR TIBIAL TENDONITIS, RIGHT: ICD-10-CM

## 2020-05-14 PROCEDURE — 99243 OFF/OP CNSLTJ NEW/EST LOW 30: CPT | Performed by: ORTHOPAEDIC SURGERY

## 2020-05-19 ENCOUNTER — APPOINTMENT (OUTPATIENT)
Dept: PHYSICAL THERAPY | Age: 50
End: 2020-05-19
Payer: COMMERCIAL

## 2020-05-20 ENCOUNTER — EVALUATION (OUTPATIENT)
Dept: PHYSICAL THERAPY | Age: 50
End: 2020-05-20
Payer: COMMERCIAL

## 2020-05-20 DIAGNOSIS — M54.16 LUMBAR RADICULOPATHY: Primary | ICD-10-CM

## 2020-05-20 PROCEDURE — 97162 PT EVAL MOD COMPLEX 30 MIN: CPT | Performed by: PHYSICAL THERAPIST

## 2020-05-20 PROCEDURE — 97113 AQUATIC THERAPY/EXERCISES: CPT | Performed by: PHYSICAL THERAPIST

## 2020-05-22 ENCOUNTER — OFFICE VISIT (OUTPATIENT)
Dept: PHYSICAL THERAPY | Age: 50
End: 2020-05-22
Payer: COMMERCIAL

## 2020-05-22 ENCOUNTER — APPOINTMENT (OUTPATIENT)
Dept: PHYSICAL THERAPY | Age: 50
End: 2020-05-22
Payer: COMMERCIAL

## 2020-05-22 DIAGNOSIS — M54.16 LUMBAR RADICULOPATHY: Primary | ICD-10-CM

## 2020-05-22 DIAGNOSIS — S80.01XA CONTUSION OF RIGHT KNEE, INITIAL ENCOUNTER: ICD-10-CM

## 2020-05-22 PROCEDURE — 97113 AQUATIC THERAPY/EXERCISES: CPT | Performed by: PHYSICAL THERAPIST

## 2020-05-27 ENCOUNTER — OFFICE VISIT (OUTPATIENT)
Dept: PHYSICAL THERAPY | Age: 50
End: 2020-05-27
Payer: COMMERCIAL

## 2020-05-27 DIAGNOSIS — S80.01XA CONTUSION OF RIGHT KNEE, INITIAL ENCOUNTER: ICD-10-CM

## 2020-05-27 DIAGNOSIS — M54.16 LUMBAR RADICULOPATHY: Primary | ICD-10-CM

## 2020-05-27 PROCEDURE — 97113 AQUATIC THERAPY/EXERCISES: CPT | Performed by: PHYSICAL THERAPIST

## 2020-05-29 ENCOUNTER — OFFICE VISIT (OUTPATIENT)
Dept: PHYSICAL THERAPY | Age: 50
End: 2020-05-29
Payer: COMMERCIAL

## 2020-05-29 DIAGNOSIS — M54.16 LUMBAR RADICULOPATHY: Primary | ICD-10-CM

## 2020-05-29 DIAGNOSIS — S80.01XA CONTUSION OF RIGHT KNEE, INITIAL ENCOUNTER: ICD-10-CM

## 2020-05-29 PROCEDURE — 97113 AQUATIC THERAPY/EXERCISES: CPT | Performed by: PHYSICAL THERAPIST

## 2020-06-03 ENCOUNTER — APPOINTMENT (OUTPATIENT)
Dept: PHYSICAL THERAPY | Age: 50
End: 2020-06-03
Payer: COMMERCIAL

## 2020-06-03 NOTE — PROGRESS NOTES
Daily Note     Today's date: 6/3/2020  Patient name: Melville Gaucher  : 1970  MRN: [id number]  Referring provider: Srinivasan Lal PA-C  Dx:   Encounter Diagnosis     ICD-10-CM    1  Lumbar radiculopathy M54 16    2  Contusion of right knee, initial encounter S80  01XA                   Subjective: ***      Objective: See treatment diary below      Assessment: Tolerated treatment {Tolerated treatment :1565564794}   Patient {assessment:[id number]}      Plan: {PLAN:9143956208}     Precautions: DM, HTN    Daily Diary Treatment:   Exercise Diary     Water walking 10 10' 10' 10' 10 10 10 10' 10 10   Pool rules 5'            Gait training             Home exercise pgm/patient education             Wall: t/h raises 1 1' 1' 1' 1' 1' 1 1' 1 1   Hip abd/add 2 2' 2' 2' 2' 2' 2 2' 2 2   Marching 1 1' 1' 1' 1' 1' 1 1' 1 1   squats 1 1' 1' 1' 1' 1' 1 1' 1 1   Knee flex/ext 1 1' 1' 1' 1' 1' 1 1' 1 1   Step-ups (fwd/bkwd/ss) 4'  nt       3   Hip flexion/ext 4' 4' 4' 4' 4' 4' 4 4' 4 4   SLS w UE mvmt  AROM/ball toss   SLS nv? nv 10" 3x ea 10" 4x 10" 4x 10" 4x 2' 4x 4x   Weight shifting              UE Noodle work x 4         3' 3' 3   UE AROM             Resistive UE work (paddles, bells, TB)             Core work on noodle (sitting/stdg)          3'   Red  pushdowns   Sit on noodle like horse 5' 10' 10' 10' 10' 10' 10 5' 5' 5   Seated on pool bench w proper posture 1            Ankle df/pf 1 1' 1' 1' 1' 1' 1 1' 1 1   marching 1 1' 1' 1' 1' 1' 1 1' 1 1   Hip Ab/add 1 1' 1' 1' 1' 1' 1 1' 1 1   Knee flex/ext 1 1' 1' 1' 1' 1' 1 1' 1 1   Deep water mvmt 2' 5' bike 4' 6' 4' 4' 4 4' 4 4   Deep water tx/stretching 5 5' 5' 10' 5' 5' 5 5' 5 5   Specific self - stretches wall/steps 3 2' 2' 2' 2' 2' 2 2' 2 2       Modalities     whirlpool 10 10' 10 10' 10' 10 10 10 10 10

## 2020-06-05 ENCOUNTER — OFFICE VISIT (OUTPATIENT)
Dept: PHYSICAL THERAPY | Age: 50
End: 2020-06-05
Payer: COMMERCIAL

## 2020-06-05 DIAGNOSIS — M54.16 LUMBAR RADICULOPATHY: Primary | ICD-10-CM

## 2020-06-05 DIAGNOSIS — S80.01XA CONTUSION OF RIGHT KNEE, INITIAL ENCOUNTER: ICD-10-CM

## 2020-06-05 PROCEDURE — 97113 AQUATIC THERAPY/EXERCISES: CPT

## 2020-06-08 ENCOUNTER — OFFICE VISIT (OUTPATIENT)
Dept: PHYSICAL THERAPY | Age: 50
End: 2020-06-08
Payer: COMMERCIAL

## 2020-06-08 DIAGNOSIS — S80.01XA CONTUSION OF RIGHT KNEE, INITIAL ENCOUNTER: ICD-10-CM

## 2020-06-08 DIAGNOSIS — M54.16 LUMBAR RADICULOPATHY: Primary | ICD-10-CM

## 2020-06-08 PROCEDURE — 97113 AQUATIC THERAPY/EXERCISES: CPT

## 2020-06-11 ENCOUNTER — APPOINTMENT (OUTPATIENT)
Dept: PHYSICAL THERAPY | Age: 50
End: 2020-06-11
Payer: COMMERCIAL

## 2020-06-11 NOTE — PROGRESS NOTES
PT Re-Evaluation     Today's date: 2020  Patient name: Cheryl Graham  : 1970  MRN: [id number]  Referring provider: Grzegorz Colón PA-C  Dx:   Encounter Diagnosis     ICD-10-CM    1  Lumbar radiculopathy M54 16    2  Contusion of right knee, initial encounter S80  01XA                   Assessment  Assessment details: Cheryl Graham is a 52 y o  female who presents with pain, decreased strength, decreased ROM and ambulatory dysfunction  Due to these impairments, Patient has difficulty performing a/iadls  Patient's clinical presentation is consistent with their referring diagnosis of right knee pain/contusion  Patient c/o significant pain in right knee radiating down right shin and in arch of foot  Patient has antalgic gait, wears knee brace on right knee also  Advised patient not to wear brace except for ambulation not during rest or sleep  Also advised patient  Follow up with PCP and possibly Ortho consult due to high levels of pain with referred pain down RLE from knee  No significant sign of swelling in lower leg or bruising to speak of  Patient did feel better after session with pain reducing from 10/10 to "9/10"  " I am able to walk on my whole foot now", however still significant antalgic gait and advised an AD for ambulation  Plan to try PT in the water due to large pain level  Patient would benefit from skilled physical therapy to address their aforementioned impairments, improve their level of function and to improve their overall quality of life  Impairments: abnormal gait, abnormal or restricted ROM, activity intolerance, impaired physical strength, lacks appropriate home exercise program, pain with function, weight-bearing intolerance, poor posture  and poor body mechanics  Understanding of Dx/Px/POC: good   Prognosis: good    Goals  ST-4 WEEKS  For knee  1  Decrease pain right knee < 8/10  on VAS at its worst   2   Increase AROM right knee > 90° flexion     3   Improve ambulation to no antalgic gait on level surface  4  Able to perform stairs step over step  For LB  1  Decrease pain by 2 points on VAS at its worst   2   Increase ROM by > 5 deg in all deficients planes  3   Increase CORE/LE by 1/2 MMT grade in all deficient planes  LT-6 WEEKS  1  Patient to be independent with a/iadls  2  Increase functional activities for leisure and home activities to previous LOF  3  Independent with HEP and/or fitness program   4  Increase strength right knee > 4-/5  Plan  Patient would benefit from: skilled physical therapy  Planned modality interventions: cryotherapy, thermotherapy: hydrocollator packs and unattended electrical stimulation  Planned therapy interventions: activity modification, body mechanics training, aquatic therapy, flexibility, functional ROM exercises, home exercise program, IADL retraining, joint mobilization, manual therapy, neuromuscular re-education, patient education, postural training, strengthening, stretching, therapeutic activities, therapeutic exercise, balance/weight bearing training and gait training  Frequency: 2-3x week  Duration in weeks: 12  Plan of Care beginning date: 2020  Plan of Care expiration date: 2020  Treatment plan discussed with: patient        Subjective Evaluation    History of Present Illness  Date of onset: 2020  Mechanism of injury: Patient reports her left knee gave out on 20 and she fell landing on right knee in her home  She ER next day and xray: negative for fracture and referred to PT  Patient given a knee brace and she felt it was too tight hurting her posterior knee so she is currently wearing her own brace on right knee  Patient c/o severe pain in right infra patella with pain radiating down shin and pain to step on right foot in arch     Pain  Current pain rating: 10  At worst pain rating: 10  Location: right knee and shin  Quality: sharp, throbbing, radiating and discomfort  Relieving factors: ice and heat  Progression: worsening    Social Support  Steps to enter house: yes  3  Stairs in house: yes   Lives in: multiple-level home  Lives with: spouse and young children    Employment status: not working    Diagnostic Tests  X-ray: normal  Patient Goals  Patient goals for therapy: decreased pain, increased motion, increased strength and independence with ADLs/IADLs  Patient goal: return to housework, stand longer to shop        Objective     Static Posture   General Observations  Scoliosis  Observations     Right Knee   Positive for effusion  Additional Observation Details  Slight edema noted infra patella on right compared to left  Not measurable due to location  Tenderness     Right Knee   Tenderness in the inferior patella  Right Ankle/Foot   Tenderness in the plantar fascia       Additional Tenderness Details  Hypersensitive along tibial crest    Neurological Testing     Sensation     Knee     Right Knee   Intact: hot/cold discrimination   Hypersensation: light touch     Active Range of Motion   Left Hip   Normal active range of motion    Right Hip   Normal active range of motion  Left Knee   Normal active range of motion    Right Knee   Flexion: 60 degrees with pain  Extension: 0 degrees   Left Ankle/Foot   Normal active range of motion    Right Ankle/Foot   Dorsiflexion (ke): WFL  Plantar flexion: WFL    Passive Range of Motion     Right Knee   Flexion: 90 degrees with pain    Mobility     Additional Mobility Details  Pain with right patella mobilization due to tenderness infra patella    Strength/Myotome Testing     Left Hip   Normal muscle strength    Right Hip   Planes of Motion   Flexion: WFL  Abduction: WFL  Adduction: WFL    Left Knee   Normal strength    Right Knee   Flexion: 3-  Extension: 3-    Right Ankle/Foot   Dorsiflexion: 3+  Plantar flexion: 3+    Ambulation   Weight-Bearing Status   Weight-Bearing Status (Right): weight-bearing as tolerated    Assistive device used: none    Ambulation: Level Surfaces   Ambulation without assistive device: independent    Ambulation: Stairs   Ascend stairs: independent  Pattern: non-reciprocal  Railings: two rails  Descend stairs: independent  Pattern: non-reciprocal  Railings: two rails    Observational Gait   Gait: antalgic   Decreased walking speed, stride length and right stance time  Quality of Movement During Gait     Knee    Knee (Right): Positive increased flexion during stance       Functional Assessment        Single Leg Stance   Left: 15 seconds  Right: 4 seconds             Precautions: DM, HTN    Daily Diary Treatment:   Exercise Diary  6/5 6/8 5/8 5/20 5/22 5/27 5/29   Water walking 10 10' 10' 10' 10 10 10 10' 10 10   Pool rules             Gait training             Home exercise pgm/patient education             Wall: t/h raises 1 1' 1' 1' 1' 1' 1 1' 1 1   Hip abd/add 2 2' 2' 2' 2' 2' 2 2' 2 2   Marching 1 1' 1' 1' 1' 1' 1 1' 1 1   squats 1 1' 1' 1' 1' 1' 1 1' 1 1   Knee flex/ext 1 1' 1' 1' 1' 1' 1 1' 1 1   Step-ups (fwd/bkwd/ss) 4 4 nt       3   Hip flexion/ext 4' 4' 4' 4' 4' 4' 4 4' 4 4   SLS w UE mvmt  AROM/ball toss 2' 2' SLS nv? nv 10" 3x ea 10" 4x 10" 4x 10" 4x 2' 4x 4x   Weight shifting              UE Noodle work x 4  3' 4'      3' 3' 3   Core press w/ KB 2' 2           Resistive UE work (paddles, bells, TB)             Core work on noodle (sitting/stdg)          3'   Red  pushdowns   Sit on noodle like horse   10' 10' 10' 10' 10 5' 5' 5   Seated on pool bench w proper posture             Ankle df/pf 1 1' 1' 1' 1' 1' 1 1' 1 1   marching 1 1' 1' 1' 1' 1' 1 1' 1 1   Hip Ab/add 1 1' 1' 1' 1' 1' 1 1' 1 1   Knee flex/ext 1 1' 1' 1' 1' 1' 1 1' 1 1   Deep water mvmt 6' 6' bike 4' 6' 4' 4' 4 4' 4 4   Deep water tx/stretching 5 10' 5' 10' 5' 5' 5 5' 5 5   Specific self - stretches wall/steps 3 2' 2' 2' 2' 2' 2 2' 2 2       Modalities  6/5 6/8 5/8 5/20 5/22 5/27 5/29   whirlpool 10 10' 10 10' 10' 10 10 10 10 10

## 2020-06-16 ENCOUNTER — APPOINTMENT (OUTPATIENT)
Dept: PHYSICAL THERAPY | Age: 50
End: 2020-06-16
Payer: COMMERCIAL

## 2020-06-18 ENCOUNTER — OFFICE VISIT (OUTPATIENT)
Dept: PHYSICAL THERAPY | Age: 50
End: 2020-06-18
Payer: COMMERCIAL

## 2020-06-18 DIAGNOSIS — S80.01XA CONTUSION OF RIGHT KNEE, INITIAL ENCOUNTER: ICD-10-CM

## 2020-06-18 DIAGNOSIS — M54.16 LUMBAR RADICULOPATHY: Primary | ICD-10-CM

## 2020-06-18 PROCEDURE — 97113 AQUATIC THERAPY/EXERCISES: CPT

## 2020-06-23 ENCOUNTER — EVALUATION (OUTPATIENT)
Dept: PHYSICAL THERAPY | Age: 50
End: 2020-06-23
Payer: COMMERCIAL

## 2020-06-23 DIAGNOSIS — M54.16 LUMBAR RADICULOPATHY: Primary | ICD-10-CM

## 2020-06-23 DIAGNOSIS — S80.01XA CONTUSION OF RIGHT KNEE, INITIAL ENCOUNTER: ICD-10-CM

## 2020-06-23 PROCEDURE — 97113 AQUATIC THERAPY/EXERCISES: CPT | Performed by: PHYSICAL THERAPIST

## 2020-06-25 ENCOUNTER — APPOINTMENT (OUTPATIENT)
Dept: PHYSICAL THERAPY | Age: 50
End: 2020-06-25
Payer: COMMERCIAL

## 2020-09-11 ENCOUNTER — HOSPITAL ENCOUNTER (EMERGENCY)
Facility: HOSPITAL | Age: 50
Discharge: HOME | End: 2020-09-11
Attending: EMERGENCY MEDICINE | Admitting: EMERGENCY MEDICINE
Payer: COMMERCIAL

## 2020-09-11 VITALS
SYSTOLIC BLOOD PRESSURE: 105 MMHG | HEART RATE: 77 BPM | WEIGHT: 183 LBS | TEMPERATURE: 97.7 F | HEIGHT: 61 IN | DIASTOLIC BLOOD PRESSURE: 65 MMHG | OXYGEN SATURATION: 98 % | BODY MASS INDEX: 34.55 KG/M2 | RESPIRATION RATE: 16 BRPM

## 2020-09-11 DIAGNOSIS — L02.92 FURUNCLE: Primary | ICD-10-CM

## 2020-09-11 PROCEDURE — 99284 EMERGENCY DEPT VISIT MOD MDM: CPT | Performed by: EMERGENCY MEDICINE

## 2020-09-11 PROCEDURE — 99283 EMERGENCY DEPT VISIT LOW MDM: CPT

## 2020-09-11 RX ORDER — CEPHALEXIN 500 MG/1
500 CAPSULE ORAL ONCE
Status: COMPLETED | OUTPATIENT
Start: 2020-09-11 | End: 2020-09-11

## 2020-09-11 RX ORDER — CEPHALEXIN 500 MG/1
500 CAPSULE ORAL EVERY 6 HOURS SCHEDULED
Qty: 28 CAPSULE | Refills: 0 | Status: SHIPPED | OUTPATIENT
Start: 2020-09-11 | End: 2020-09-18

## 2020-09-11 RX ADMIN — CEPHALEXIN 500 MG: 500 CAPSULE ORAL at 22:46

## 2020-09-12 NOTE — ED PROVIDER NOTES
History  Chief Complaint   Patient presents with    Sore     open lesion back of neck, dried rainage back of neck X2 days  3 weeks ago had a tick below this site     This is a 52year old female who complains of a draining sore on the back of her neck for the last 2 weeks  Slowly worsening  No trauma  Has never had this before  Is an uncontrolled diabetic  No fevers or chills  Is able to tolerate po without difficulty  No generalized malaise  Prior to Admission Medications   Prescriptions Last Dose Informant Patient Reported? Taking? Aspirin (ASPIR-81 PO)   Yes No   Sig: Take 81 mg by mouth   Aspirin Buf,CaCarb-MgCarb-MgO, (BUFFERIN LOW DOSE) 81 MG TABS   Yes No   Sig: Take 81 mg by mouth   B-D UF III MINI PEN NEEDLES 31G X 5 MM MISC   Yes No   BANOPHEN 50 MG capsule   Yes No   Sig: take 1 capsule by mouth every 6 hours if needed for itching    MG capsule   Yes No   Sig: Take 100 mg by mouth 2 (two) times a day   HYDROcodone-acetaminophen (NORCO) 7 5-325 mg per tablet   Yes No   Sig: Take 1 tablet by mouth every 6 (six) hours as needed   LANTUS SOLOSTAR 100 units/mL injection pen  Self Yes No   Si Units    RA ALLERGY MEDICATION 25 MG capsule   Yes No   Sig: take 1 capsule by mouth every 6 hours if needed for itching   SUMAtriptan (IMITREX) 100 mg tablet   Yes No   Sig: Take 100 mg by mouth   acetaminophen-codeine (TYLENOL #3) 300-30 mg per tablet   Yes No   Sig: Take 1 tablet by mouth every 6 (six) hours as needed   albuterol (ACCUNEB) 0 63 MG/3ML nebulizer solution   Yes No   Sig: take 3 milliliters by mouth every 4 hours if needed for wheezing   albuterol (PROAIR HFA) 90 mcg/act inhaler   Yes No   Sig: Inhale 1 puff every 6 (six) hours as needed   atorvastatin (LIPITOR) 40 mg tablet   Yes No   Sig: take 1 tablet by mouth at bedtime   chlorhexidine (PERIDEX) 0 12 % solution   Yes No   Sig: Rinse with 1/2 ounce by mouth for 30 seconds then spit out   use twice a day   citalopram (CELEXA) 40 mg tablet   Yes No   Sig: Take 40 mg by mouth daily    cyclobenzaprine (FLEXERIL) 5 mg tablet   Yes No   Sig: take 1 to 2 tablets by mouth three times a day if needed for muscle spasm   insulin degludec (TRESIBA) 200 units/mL CONCENTRATED U-200 injection pen   Yes No   Sig: Inject 84 units daily  E11 65   insulin glulisine (APIDRA SOLOSTAR) 100 units/mL injection pen  Self Yes No   Sig: Inject 20 Units under the skin    levETIRAcetam (KEPPRA) 500 mg tablet   Yes No   Sig: Take 500 mg by mouth every 12 (twelve) hours    lisinopril (ZESTRIL) 10 mg tablet   Yes No   Sig: Take 10 mg by mouth   meloxicam (MOBIC) 15 mg tablet   Yes No   Sig: Take 15 mg by mouth daily   metFORMIN (GLUCOPHAGE) 1000 MG tablet   Yes No   Sig: take 1 tablet by mouth twice a day WITH MEALS   metoclopramide (REGLAN) 10 mg tablet   Yes No   Sig: Take 10 mg by mouth   metoprolol tartrate (LOPRESSOR) 25 mg tablet   Yes No   Sig: Take 25 mg by mouth   ondansetron (ZOFRAN) 4 mg tablet   Yes No   Sig: take 1 tablet by mouth up to three times a day if needed for nausea   pantoprazole (PROTONIX) 40 mg tablet   Yes No   Sig: take 1 tablet by mouth twice a day   pregabalin (LYRICA) 50 mg capsule   Yes No   Sig: take 1 capsule by mouth three times a day   sitaGLIPtin (JANUVIA) 100 mg tablet   Yes No   Sig: Take 100 mg by mouth daily    solifenacin (VESICARE) 5 mg tablet   Yes No   Sig: Take 5 mg by mouth daily       Facility-Administered Medications: None       Past Medical History:   Diagnosis Date    Depression     Diabetes mellitus (HCC)     Gastroparesis     GERD (gastroesophageal reflux disease)     Hyperlipidemia     Hypertension     Sciatica     Seizure disorder (HCC)        Past Surgical History:   Procedure Laterality Date    CHOLECYSTECTOMY      HYSTERECTOMY         History reviewed  No pertinent family history  I have reviewed and agree with the history as documented      E-Cigarette/Vaping    E-Cigarette Use Never User E-Cigarette/Vaping Substances     Social History     Tobacco Use    Smoking status: Former Smoker     Last attempt to quit:      Years since quittin 7    Smokeless tobacco: Never Used   Substance Use Topics    Alcohol use: No    Drug use: No       Review of Systems   Constitutional: Negative for activity change, chills, fatigue and fever  HENT: Negative for congestion  Eyes: Negative for visual disturbance  Respiratory: Negative for cough, chest tightness and shortness of breath  Cardiovascular: Negative for chest pain  Gastrointestinal: Negative for abdominal pain, diarrhea and vomiting  Genitourinary: Negative for dysuria  Neurological: Negative for dizziness, weakness and numbness  Physical Exam  Physical Exam  Constitutional:       Appearance: She is well-developed  HENT:      Head: Normocephalic and atraumatic  Comments: 3 cm lesion on back of neck which is crusted and mildly tender  No active discharge  Eyes:      Conjunctiva/sclera: Conjunctivae normal       Pupils: Pupils are equal, round, and reactive to light  Neck:      Musculoskeletal: Normal range of motion and neck supple  Cardiovascular:      Rate and Rhythm: Normal rate and regular rhythm  Heart sounds: Normal heart sounds  Pulmonary:      Effort: Pulmonary effort is normal  No respiratory distress  Breath sounds: Normal breath sounds  No stridor  No wheezing, rhonchi or rales  Abdominal:      General: Bowel sounds are normal  There is no distension  Palpations: Abdomen is soft  Tenderness: There is no abdominal tenderness  There is no guarding or rebound  Musculoskeletal: Normal range of motion  Skin:     General: Skin is warm and dry  Capillary Refill: Capillary refill takes less than 2 seconds  Neurological:      Mental Status: She is alert and oriented to person, place, and time     Psychiatric:         Behavior: Behavior normal          Vital Signs  ED Triage Vitals   Temperature Pulse Respirations Blood Pressure SpO2   09/11/20 2243 09/11/20 2217 09/11/20 2217 09/11/20 2217 09/11/20 2217   97 7 °F (36 5 °C) 77 16 96/73 98 %      Temp Source Heart Rate Source Patient Position - Orthostatic VS BP Location FiO2 (%)   09/11/20 2243 -- -- -- --   Oral          Pain Score       --                  Vitals:    09/11/20 2217 09/11/20 2243   BP: 96/73 105/65   Pulse: 77          Visual Acuity      ED Medications  Medications   cephalexin (KEFLEX) capsule 500 mg (500 mg Oral Given 9/11/20 2246)       Diagnostic Studies  Results Reviewed     None                 No orders to display              Procedures  Procedures         ED Course                                       MDM  Number of Diagnoses or Management Options  Furuncle: new and requires workup  Diagnosis management comments: This is a 54-year-old female with a small impetigo/cellulitis and the back of the neck  Repeatedly discussed the importance of maintaining good glycemic control for healing  Patient started on antibiotics in the ED and given a script to go home with  Discussed warning signs and symptoms with the patient as well as when to return to the emergency department versus follow up with [facility]  Patient states understanding and agreement with the plan  Disposition  Final diagnoses:   Furuncle     Time reflects when diagnosis was documented in both MDM as applicable and the Disposition within this note     Time User Action Codes Description Comment    9/11/2020 10:38 PM Gerda Cavazos Add [L02 92] Furuncle       ED Disposition     ED Disposition Condition Date/Time Comment    Discharge Stable Fri Sep 11, 2020 10:38 PM Aaron Castillo discharge to home/self care              Follow-up Information     Follow up With Specialties Details Why Contact Clare Jauregui, DO Family Medicine In 2 days  [place] [address]  [phone]            Discharge Medication List as of 9/11/2020 10:38 PM      CONTINUE these medications which have NOT CHANGED    Details   acetaminophen-codeine (TYLENOL #3) 300-30 mg per tablet Take 1 tablet by mouth every 6 (six) hours as needed, Starting Wed 6/5/2019, Historical Med      albuterol (ACCUNEB) 0 63 MG/3ML nebulizer solution take 3 milliliters by mouth every 4 hours if needed for wheezing, Historical Med      albuterol (PROAIR HFA) 90 mcg/act inhaler Inhale 1 puff every 6 (six) hours as needed, Starting Sat 10/28/2017, Historical Med      Aspirin (ASPIR-81 PO) Take 81 mg by mouth, Starting Mon 2/20/2012, Historical Med      Aspirin Buf,CaCarb-MgCarb-MgO, (BUFFERIN LOW DOSE) 81 MG TABS Take 81 mg by mouth, Starting Mon 2/20/2012, Historical Med      atorvastatin (LIPITOR) 40 mg tablet take 1 tablet by mouth at bedtime, Historical Med      B-D UF III MINI PEN NEEDLES 31G X 5 MM MISC Starting Tue 1/21/2020, Historical Med      !! BANOPHEN 50 MG capsule take 1 capsule by mouth every 6 hours if needed for itching, Historical Med      chlorhexidine (PERIDEX) 0 12 % solution Rinse with 1/2 ounce by mouth for 30 seconds then spit out  use twice a day, Historical Med      citalopram (CELEXA) 40 mg tablet Take 40 mg by mouth daily , Historical Med      cyclobenzaprine (FLEXERIL) 5 mg tablet take 1 to 2 tablets by mouth three times a day if needed for muscle spasm, Historical Med       MG capsule Take 100 mg by mouth 2 (two) times a day, Starting Tue 1/21/2020, Historical Med      HYDROcodone-acetaminophen ([address] [facility] [address]) 7 5-325 mg per tablet Take 1 tablet by mouth every 6 (six) hours as needed, Starting Wed 7/11/2018, Historical Med      insulin degludec (TRESIBA) 200 units/mL CONCENTRATED U-200 injection pen Inject 84 units daily   E11 65, Historical Med      insulin glulisine (APIDRA SOLOSTAR) 100 units/mL injection pen Inject 20 Units under the skin , Starting Fri 5/11/2018, Historical Med      LANTUS SOLOSTAR 100 units/mL injection pen 64 Units , Historical Med      levETIRAcetam (KEPPRA) 500 mg tablet Take 500 mg by mouth every 12 (twelve) hours , Starting Mon 3/19/2018, Historical Med      lisinopril (ZESTRIL) 10 mg tablet Take 10 mg by mouth, Starting Mon 3/19/2018, Historical Med      meloxicam (MOBIC) 15 mg tablet Take 15 mg by mouth daily, Starting Mon 5/13/2019, Until Tue 5/12/2020, Historical Med      metFORMIN (GLUCOPHAGE) 1000 MG tablet take 1 tablet by mouth twice a day WITH MEALS, Historical Med      metoclopramide (REGLAN) 10 mg tablet Take 10 mg by mouth, Starting Tue 4/17/2018, Historical Med      metoprolol tartrate (LOPRESSOR) 25 mg tablet Take 25 mg by mouth, Starting Mon 7/9/2018, Historical Med      ondansetron (ZOFRAN) 4 mg tablet take 1 tablet by mouth up to three times a day if needed for nausea, Historical Med      pantoprazole (PROTONIX) 40 mg tablet take 1 tablet by mouth twice a day, Historical Med      pregabalin (LYRICA) 50 mg capsule take 1 capsule by mouth three times a day, Historical Med      !! RA ALLERGY MEDICATION 25 MG capsule take 1 capsule by mouth every 6 hours if needed for itching, Historical Med      sitaGLIPtin (JANUVIA) 100 mg tablet Take 100 mg by mouth daily , Historical Med      solifenacin (VESICARE) 5 mg tablet Take 5 mg by mouth daily , Historical Med      SUMAtriptan (IMITREX) 100 mg tablet Take 100 mg by mouth, Historical Med       !! - Potential duplicate medications found  Please discuss with provider  No discharge procedures on file      PDMP Review     None          ED Provider  Electronically Signed by           Arnold Biswas MD  09/13/20 4303

## 2020-10-29 LAB
LEFT EYE DIABETIC RETINOPATHY: NORMAL
RIGHT EYE DIABETIC RETINOPATHY: NORMAL

## 2021-03-23 ENCOUNTER — HOSPITAL ENCOUNTER (INPATIENT)
Facility: HOSPITAL | Age: 51
LOS: 1 days | Discharge: HOME | DRG: 224 | End: 2021-03-26
Attending: FAMILY MEDICINE | Admitting: SURGERY
Payer: COMMERCIAL

## 2021-03-23 ENCOUNTER — APPOINTMENT (EMERGENCY)
Dept: CT IMAGING | Facility: HOSPITAL | Age: 51
DRG: 224 | End: 2021-03-23
Payer: COMMERCIAL

## 2021-03-23 DIAGNOSIS — N17.9 ACUTE RENAL FAILURE (ARF) (HCC): ICD-10-CM

## 2021-03-23 DIAGNOSIS — K59.00 CONSTIPATION: ICD-10-CM

## 2021-03-23 DIAGNOSIS — I15.2 HYPERTENSION ASSOCIATED WITH DIABETES (HCC): ICD-10-CM

## 2021-03-23 DIAGNOSIS — Z79.4 TYPE 2 DIABETES MELLITUS WITH DIABETIC AUTONOMIC NEUROPATHY, WITH LONG-TERM CURRENT USE OF INSULIN (HCC): ICD-10-CM

## 2021-03-23 DIAGNOSIS — R11.0 NAUSEA: ICD-10-CM

## 2021-03-23 DIAGNOSIS — E11.43 TYPE 2 DIABETES MELLITUS WITH DIABETIC AUTONOMIC NEUROPATHY, WITH LONG-TERM CURRENT USE OF INSULIN (HCC): ICD-10-CM

## 2021-03-23 DIAGNOSIS — E11.59 HYPERTENSION ASSOCIATED WITH DIABETES (HCC): ICD-10-CM

## 2021-03-23 DIAGNOSIS — R10.84 GENERALIZED ABDOMINAL PAIN: Primary | ICD-10-CM

## 2021-03-23 DIAGNOSIS — R10.31 ACUTE RIGHT LOWER QUADRANT PAIN: ICD-10-CM

## 2021-03-23 PROBLEM — E11.42 DIABETIC POLYNEUROPATHY ASSOCIATED WITH TYPE 2 DIABETES MELLITUS (HCC): Status: ACTIVE | Noted: 2018-04-07

## 2021-03-23 PROBLEM — C64.1 RENAL CELL CARCINOMA OF RIGHT KIDNEY (HCC): Status: ACTIVE | Noted: 2017-05-15

## 2021-03-23 PROBLEM — M43.16 SPONDYLOLISTHESIS OF LUMBAR REGION: Status: ACTIVE | Noted: 2018-04-10

## 2021-03-23 PROBLEM — M54.40 CHRONIC LOW BACK PAIN WITH SCIATICA: Status: ACTIVE | Noted: 2018-04-07

## 2021-03-23 PROBLEM — E66.09 OBESITY DUE TO EXCESS CALORIES: Status: ACTIVE | Noted: 2017-04-14

## 2021-03-23 PROBLEM — E03.9 ACQUIRED HYPOTHYROIDISM: Status: ACTIVE | Noted: 2017-04-05

## 2021-03-23 PROBLEM — G89.29 CHRONIC LOW BACK PAIN WITH SCIATICA: Status: ACTIVE | Noted: 2018-04-07

## 2021-03-23 LAB
ANION GAP SERPL CALCULATED.3IONS-SCNC: 6 MMOL/L (ref 4–13)
BASOPHILS # BLD AUTO: 0 THOUSANDS/ΜL (ref 0–0.1)
BASOPHILS NFR BLD AUTO: 1 % (ref 0–2)
BILIRUB UR QL STRIP: NEGATIVE
BUN SERPL-MCNC: 31 MG/DL (ref 7–25)
CALCIUM SERPL-MCNC: 8.9 MG/DL (ref 8.6–10.5)
CHLORIDE SERPL-SCNC: 102 MMOL/L (ref 98–107)
CLARITY UR: CLEAR
CO2 SERPL-SCNC: 28 MMOL/L (ref 21–31)
COLOR UR: YELLOW
CREAT SERPL-MCNC: 1.14 MG/DL (ref 0.6–1.2)
EOSINOPHIL # BLD AUTO: 0.3 THOUSAND/ΜL (ref 0–0.61)
EOSINOPHIL NFR BLD AUTO: 3 % (ref 0–5)
ERYTHROCYTE [DISTWIDTH] IN BLOOD BY AUTOMATED COUNT: 13.9 % (ref 11.5–14.5)
FLUAV RNA RESP QL NAA+PROBE: NEGATIVE
FLUBV RNA RESP QL NAA+PROBE: NEGATIVE
GFR SERPL CREATININE-BSD FRML MDRD: 56 ML/MIN/1.73SQ M
GLUCOSE SERPL-MCNC: 107 MG/DL (ref 65–140)
GLUCOSE SERPL-MCNC: 275 MG/DL (ref 65–99)
GLUCOSE UR STRIP-MCNC: ABNORMAL MG/DL
HCT VFR BLD AUTO: 37 % (ref 42–47)
HGB BLD-MCNC: 11.9 G/DL (ref 12–16)
HGB UR QL STRIP.AUTO: NEGATIVE
KETONES UR STRIP-MCNC: NEGATIVE MG/DL
LEUKOCYTE ESTERASE UR QL STRIP: NEGATIVE
LYMPHOCYTES # BLD AUTO: 2.4 THOUSANDS/ΜL (ref 0.6–4.47)
LYMPHOCYTES NFR BLD AUTO: 28 % (ref 21–51)
MCH RBC QN AUTO: 26.6 PG (ref 26–34)
MCHC RBC AUTO-ENTMCNC: 32.3 G/DL (ref 31–37)
MCV RBC AUTO: 83 FL (ref 81–99)
MONOCYTES # BLD AUTO: 0.6 THOUSAND/ΜL (ref 0.17–1.22)
MONOCYTES NFR BLD AUTO: 7 % (ref 2–12)
NEUTROPHILS # BLD AUTO: 5.5 THOUSANDS/ΜL (ref 1.4–6.5)
NEUTS SEG NFR BLD AUTO: 63 % (ref 42–75)
NITRITE UR QL STRIP: NEGATIVE
PH UR STRIP.AUTO: 5.5 [PH]
PLATELET # BLD AUTO: 293 THOUSANDS/UL (ref 149–390)
PLATELET # BLD AUTO: 336 THOUSANDS/UL (ref 149–390)
PMV BLD AUTO: 8.7 FL (ref 8.6–11.7)
POTASSIUM SERPL-SCNC: 4.6 MMOL/L (ref 3.5–5.5)
PROT UR STRIP-MCNC: NEGATIVE MG/DL
RBC # BLD AUTO: 4.49 MILLION/UL (ref 3.9–5.2)
RSV RNA RESP QL NAA+PROBE: NEGATIVE
SARS-COV-2 RNA RESP QL NAA+PROBE: NEGATIVE
SODIUM SERPL-SCNC: 136 MMOL/L (ref 134–143)
SP GR UR STRIP.AUTO: 1.01 (ref 1–1.03)
UROBILINOGEN UR QL STRIP.AUTO: 0.2 E.U./DL
WBC # BLD AUTO: 8.8 THOUSAND/UL (ref 4.8–10.8)

## 2021-03-23 PROCEDURE — 94664 DEMO&/EVAL PT USE INHALER: CPT

## 2021-03-23 PROCEDURE — 96374 THER/PROPH/DIAG INJ IV PUSH: CPT

## 2021-03-23 PROCEDURE — 36415 COLL VENOUS BLD VENIPUNCTURE: CPT | Performed by: FAMILY MEDICINE

## 2021-03-23 PROCEDURE — 99232 SBSQ HOSP IP/OBS MODERATE 35: CPT | Performed by: SURGERY

## 2021-03-23 PROCEDURE — 99285 EMERGENCY DEPT VISIT HI MDM: CPT

## 2021-03-23 PROCEDURE — 85025 COMPLETE CBC W/AUTO DIFF WBC: CPT | Performed by: FAMILY MEDICINE

## 2021-03-23 PROCEDURE — 96375 TX/PRO/DX INJ NEW DRUG ADDON: CPT

## 2021-03-23 PROCEDURE — 74176 CT ABD & PELVIS W/O CONTRAST: CPT

## 2021-03-23 PROCEDURE — 80048 BASIC METABOLIC PNL TOTAL CA: CPT | Performed by: FAMILY MEDICINE

## 2021-03-23 PROCEDURE — 96361 HYDRATE IV INFUSION ADD-ON: CPT

## 2021-03-23 PROCEDURE — 94760 N-INVAS EAR/PLS OXIMETRY 1: CPT

## 2021-03-23 PROCEDURE — 85049 AUTOMATED PLATELET COUNT: CPT | Performed by: SURGERY

## 2021-03-23 PROCEDURE — G1004 CDSM NDSC: HCPCS

## 2021-03-23 PROCEDURE — 0241U HB NFCT DS VIR RESP RNA 4 TRGT: CPT

## 2021-03-23 PROCEDURE — 82948 REAGENT STRIP/BLOOD GLUCOSE: CPT

## 2021-03-23 PROCEDURE — 81003 URINALYSIS AUTO W/O SCOPE: CPT | Performed by: FAMILY MEDICINE

## 2021-03-23 PROCEDURE — 99285 EMERGENCY DEPT VISIT HI MDM: CPT | Performed by: FAMILY MEDICINE

## 2021-03-23 RX ORDER — CITALOPRAM 20 MG/1
40 TABLET ORAL DAILY
Status: DISCONTINUED | OUTPATIENT
Start: 2021-03-23 | End: 2021-03-26 | Stop reason: HOSPADM

## 2021-03-23 RX ORDER — PANTOPRAZOLE SODIUM 40 MG/1
40 TABLET, DELAYED RELEASE ORAL
Status: DISCONTINUED | OUTPATIENT
Start: 2021-03-24 | End: 2021-03-24

## 2021-03-23 RX ORDER — METOCLOPRAMIDE 5 MG/1
10 TABLET ORAL 2 TIMES DAILY
Status: DISCONTINUED | OUTPATIENT
Start: 2021-03-23 | End: 2021-03-26 | Stop reason: HOSPADM

## 2021-03-23 RX ORDER — PREGABALIN 50 MG/1
50 CAPSULE ORAL 3 TIMES DAILY
Status: DISCONTINUED | OUTPATIENT
Start: 2021-03-23 | End: 2021-03-26 | Stop reason: HOSPADM

## 2021-03-23 RX ORDER — ONDANSETRON 2 MG/ML
4 INJECTION INTRAMUSCULAR; INTRAVENOUS ONCE
Status: COMPLETED | OUTPATIENT
Start: 2021-03-23 | End: 2021-03-23

## 2021-03-23 RX ORDER — ALBUTEROL SULFATE 90 UG/1
1 AEROSOL, METERED RESPIRATORY (INHALATION) EVERY 4 HOURS PRN
Status: DISCONTINUED | OUTPATIENT
Start: 2021-03-23 | End: 2021-03-23

## 2021-03-23 RX ORDER — HEPARIN SODIUM 5000 [USP'U]/ML
5000 INJECTION, SOLUTION INTRAVENOUS; SUBCUTANEOUS EVERY 8 HOURS SCHEDULED
Status: DISCONTINUED | OUTPATIENT
Start: 2021-03-23 | End: 2021-03-24

## 2021-03-23 RX ORDER — ALBUTEROL SULFATE 90 UG/1
1 AEROSOL, METERED RESPIRATORY (INHALATION) EVERY 6 HOURS PRN
Status: DISCONTINUED | OUTPATIENT
Start: 2021-03-23 | End: 2021-03-23

## 2021-03-23 RX ORDER — LISINOPRIL 10 MG/1
10 TABLET ORAL DAILY
Status: DISCONTINUED | OUTPATIENT
Start: 2021-03-23 | End: 2021-03-24

## 2021-03-23 RX ORDER — HYDROCODONE BITARTRATE AND ACETAMINOPHEN 5; 325 MG/1; MG/1
1 TABLET ORAL EVERY 6 HOURS PRN
Status: DISCONTINUED | OUTPATIENT
Start: 2021-03-23 | End: 2021-03-26 | Stop reason: HOSPADM

## 2021-03-23 RX ORDER — SODIUM CHLORIDE 9 MG/ML
150 INJECTION, SOLUTION INTRAVENOUS CONTINUOUS
Status: DISCONTINUED | OUTPATIENT
Start: 2021-03-23 | End: 2021-03-24

## 2021-03-23 RX ORDER — ALBUTEROL SULFATE 90 UG/1
1 AEROSOL, METERED RESPIRATORY (INHALATION) EVERY 6 HOURS PRN
Status: DISCONTINUED | OUTPATIENT
Start: 2021-03-23 | End: 2021-03-23 | Stop reason: SDUPTHER

## 2021-03-23 RX ORDER — ONDANSETRON 2 MG/ML
4 INJECTION INTRAMUSCULAR; INTRAVENOUS EVERY 6 HOURS PRN
Status: DISCONTINUED | OUTPATIENT
Start: 2021-03-23 | End: 2021-03-26 | Stop reason: HOSPADM

## 2021-03-23 RX ORDER — KETOROLAC TROMETHAMINE 30 MG/ML
30 INJECTION, SOLUTION INTRAMUSCULAR; INTRAVENOUS ONCE
Status: COMPLETED | OUTPATIENT
Start: 2021-03-23 | End: 2021-03-23

## 2021-03-23 RX ORDER — MORPHINE SULFATE 4 MG/ML
4 INJECTION, SOLUTION INTRAMUSCULAR; INTRAVENOUS ONCE
Status: COMPLETED | OUTPATIENT
Start: 2021-03-23 | End: 2021-03-23

## 2021-03-23 RX ORDER — ALBUTEROL SULFATE 90 UG/1
2 AEROSOL, METERED RESPIRATORY (INHALATION) EVERY 4 HOURS PRN
Status: DISCONTINUED | OUTPATIENT
Start: 2021-03-23 | End: 2021-03-26 | Stop reason: HOSPADM

## 2021-03-23 RX ORDER — ASPIRIN 81 MG/1
81 TABLET ORAL DAILY
Status: DISCONTINUED | OUTPATIENT
Start: 2021-03-23 | End: 2021-03-26 | Stop reason: HOSPADM

## 2021-03-23 RX ORDER — LEVETIRACETAM 500 MG/1
500 TABLET ORAL EVERY 12 HOURS SCHEDULED
Status: DISCONTINUED | OUTPATIENT
Start: 2021-03-23 | End: 2021-03-24

## 2021-03-23 RX ORDER — OXYBUTYNIN CHLORIDE 5 MG/1
5 TABLET, EXTENDED RELEASE ORAL DAILY
Status: DISCONTINUED | OUTPATIENT
Start: 2021-03-23 | End: 2021-03-26 | Stop reason: HOSPADM

## 2021-03-23 RX ORDER — ATORVASTATIN CALCIUM 40 MG/1
40 TABLET, FILM COATED ORAL
Status: DISCONTINUED | OUTPATIENT
Start: 2021-03-23 | End: 2021-03-26 | Stop reason: HOSPADM

## 2021-03-23 RX ORDER — HYDROMORPHONE HCL/PF 1 MG/ML
1 SYRINGE (ML) INJECTION
Status: DISCONTINUED | OUTPATIENT
Start: 2021-03-23 | End: 2021-03-26

## 2021-03-23 RX ADMIN — OXYBUTYNIN 5 MG: 5 TABLET, FILM COATED, EXTENDED RELEASE ORAL at 19:18

## 2021-03-23 RX ADMIN — ONDANSETRON 4 MG: 2 INJECTION INTRAMUSCULAR; INTRAVENOUS at 14:25

## 2021-03-23 RX ADMIN — SITAGLIPTIN 100 MG: 100 TABLET, FILM COATED ORAL at 19:18

## 2021-03-23 RX ADMIN — LEVETIRACETAM 500 MG: 500 TABLET, FILM COATED ORAL at 20:55

## 2021-03-23 RX ADMIN — MORPHINE SULFATE 4 MG: 4 INJECTION INTRAVENOUS at 14:25

## 2021-03-23 RX ADMIN — SODIUM CHLORIDE 1000 ML: 0.9 INJECTION, SOLUTION INTRAVENOUS at 13:18

## 2021-03-23 RX ADMIN — SODIUM CHLORIDE 500 ML: 0.9 INJECTION, SOLUTION INTRAVENOUS at 20:56

## 2021-03-23 RX ADMIN — ATORVASTATIN CALCIUM 40 MG: 40 TABLET, FILM COATED ORAL at 21:20

## 2021-03-23 RX ADMIN — PREGABALIN 50 MG: 50 CAPSULE ORAL at 19:18

## 2021-03-23 RX ADMIN — FAMOTIDINE 20 MG: 10 INJECTION INTRAVENOUS at 14:24

## 2021-03-23 RX ADMIN — METOCLOPRAMIDE 10 MG: 5 TABLET ORAL at 20:55

## 2021-03-23 RX ADMIN — SODIUM CHLORIDE 150 ML/HR: 0.9 INJECTION, SOLUTION INTRAVENOUS at 19:18

## 2021-03-23 RX ADMIN — KETOROLAC TROMETHAMINE 30 MG: 30 INJECTION, SOLUTION INTRAMUSCULAR; INTRAVENOUS at 13:18

## 2021-03-23 RX ADMIN — HEPARIN SODIUM 5000 UNITS: 5000 INJECTION INTRAVENOUS; SUBCUTANEOUS at 19:18

## 2021-03-23 RX ADMIN — HYDROCODONE BITARTRATE AND ACETAMINOPHEN 1 TABLET: 5; 325 TABLET ORAL at 20:55

## 2021-03-23 RX ADMIN — SODIUM CHLORIDE 1000 ML: 0.9 INJECTION, SOLUTION INTRAVENOUS at 19:26

## 2021-03-23 RX ADMIN — SODIUM CHLORIDE 1000 ML: 0.9 INJECTION, SOLUTION INTRAVENOUS at 19:25

## 2021-03-23 NOTE — ED NOTES
1600 and 1630 meds not yet given as all meds have not yet been verified by Edgardo Cardona RN  03/23/21 [id number]

## 2021-03-23 NOTE — H&P
H&P Exam - General Surgery   Raj Hills 48 y o  female MRN: [id number]  Unit/Bed#: ED 06 Encounter: [id number]    Assessment/Plan     Assessment:  The patient is a 40-year-old female with a past medical history significant for type 2 diabetes mellitus, hypertension gastroparesis and removed right renal cell carcinoma presenting with several days of progressively worsening right lower quadrant abdominal pain of uncertain etiology for which inpatient admission is now indicated  Plan:  1  Admission to the hospital for 23 hour observation  2  IV fluid resuscitation to treat her mild renal insufficiency   3  Consultation with the internal medicine service to manage her hypertension and diabetes  4  repeat history, physical examination and lab studies on the morning of Wednesday March 24th    History of Present Illness      HPI:  Raj Hills is a 48 y o  female who presents with several days of progressively worsening right lower quadrant pain of uncertain etiology for which 23 hour observation is indicated  Review of Systems   Constitutional: Positive for activity change, appetite change and fatigue  Negative for chills and fever  HENT: Negative for ear pain and sore throat  Eyes: Negative for pain and visual disturbance  Respiratory: Negative for cough and shortness of breath  Cardiovascular: Negative for chest pain and palpitations  Gastrointestinal: Positive for abdominal pain, constipation and nausea  Negative for vomiting  Endocrine:        History of diabetes complicated by hypertension and gastroparesis   Genitourinary: Negative for dysuria and hematuria  History of right renal cell carcinoma status post robotic partial right nephrectomy approximately 4 years ago at [facility]   Musculoskeletal: Negative for arthralgias and back pain  Skin: Negative for color change and rash  Neurological: Positive for seizures  Negative for syncope  Hematological: Negative  Psychiatric/Behavioral:        History of bipolar disorder   All other systems reviewed and are negative        Historical Information   Past Medical History:   Diagnosis Date    Depression     Diabetes mellitus ([address] )     Gastroparesis     GERD (gastroesophageal reflux disease)     Hyperlipidemia     Hypertension     Sciatica     Seizure disorder (HCC)      Past Surgical History:   Procedure Laterality Date    CHOLECYSTECTOMY      HYSTERECTOMY       Social History   Social History     Substance and Sexual Activity   Alcohol Use No     Social History     Substance and Sexual Activity   Drug Use No     Social History     Tobacco Use   Smoking Status Former Smoker    Quit date:     Years since quittin 2   Smokeless Tobacco Never Used     E-Cigarette/Vaping    E-Cigarette Use Never User      E-Cigarette/Vaping Substances     Family History: non-contributory    Meds/Allergies   all medications and allergies reviewed  Allergies   Allergen Reactions    Azithromycin GI Intolerance and Hives    Benztropine     Butorphanol Hallucinations    Penicillins     Zolpidem        Objective   First Vitals:   Blood Pressure: 114/59 (21 1232)  Pulse: 78 (21 1232)  Temperature: 97 5 °F (36 4 °C) (21 1232)  Temp Source: Temporal (21 1232)  Respirations: 20 (21 1232)  Weight - Scale: 83 5 kg (184 lb) (21 1232)  SpO2: 98 % (21 1232)    Current Vitals:   Blood Pressure: 122/74 (21 1448)  Pulse: 66 (21 1515)  Temperature: 97 5 °F (36 4 °C) (21 1232)  Temp Source: Temporal (21 1232)  Respirations: 20 (21 1448)  Weight - Scale: 83 5 kg (184 lb) (21 1232)  SpO2: 100 % (21 1515)      Intake/Output Summary (Last 24 hours) at 3/23/2021 1536  Last data filed at 3/23/2021 1418  Gross per 24 hour   Intake 1000 ml   Output --   Net 1000 ml       Invasive Devices     Peripheral Intravenous Line            Peripheral IV 21 Right Antecubital less than 1 day                Physical Exam  Vitals signs and nursing note reviewed  Constitutional:       General: She is not in acute distress  Appearance: She is well-developed  Comments: Patient is a pleasant 60-year-old female awake alert oriented  Competent reliable as a historian  Accompanied by her  at the bedside  HENT:      Head: Normocephalic and atraumatic  Nose: Nose normal       Mouth/Throat:      Mouth: Mucous membranes are moist    Eyes:      Conjunctiva/sclera: Conjunctivae normal    Neck:      Musculoskeletal: Neck supple  Cardiovascular:      Rate and Rhythm: Normal rate and regular rhythm  Heart sounds: No murmur  Pulmonary:      Effort: Pulmonary effort is normal  No respiratory distress  Breath sounds: Normal breath sounds  Abdominal:      Palpations: Abdomen is soft  Tenderness: There is no abdominal tenderness  Comments: Rotund  Mild tenderness to percussion palpation limited to the right side of the abdomen equally in the upper and lower quadrants  No true guarding rebound or peritoneal signs  No masses noted no hernias appreciated  Musculoskeletal: Normal range of motion  Skin:     General: Skin is warm and dry  Neurological:      General: No focal deficit present  Mental Status: She is alert and oriented to person, place, and time  Psychiatric:         Mood and Affect: Mood normal          Behavior: Behavior normal          Thought Content: Thought content normal          Judgment: Judgment normal          Lab Results: I have personally reviewed pertinent lab results  Imaging: I have personally reviewed pertinent reports  EKG, Pathology, and Other Studies: I have personally reviewed pertinent reports  Code Status: No Order  Advance Directive and Living Will:      Power of :    POLST:      Counseling / Coordination of Care  Total floor / unit time spent today 35 minutes    Greater than 50% of total time was spent with the patient and / or family counseling and / or coordination of care  A description of the counseling / coordination of care: 15

## 2021-03-23 NOTE — ED PROVIDER NOTES
History  Chief Complaint   Patient presents with    Flank Pain     right sided flank pain       History provided by:  Patient   used: No    This 49-year-old female presented with complain of right-sided flank pain started about 4 days ago  Patient has history of renal cell cancer status post partial nephrectomy presented with complain of flank pain  Patient states the her pain has been intermittent however is was constant today which prompted this ED visit  She does complain of some nausea but denies any vomiting or diarrhea  Denies any dysuria urgency frequency  She denies any blood in the urine  She states nothing makes the pain better or worse and she has not done anything for the pain  She denies any fever chills at this time  Patient denies any similar history of pain in the past     Prior to Admission Medications   Prescriptions Last Dose Informant Patient Reported? Taking?    Aspirin (ASPIR-81 PO)   Yes Yes   Sig: Take 81 mg by mouth   Aspirin Buf,CaCarb-MgCarb-MgO, (BUFFERIN LOW DOSE) 81 MG TABS Not Taking at Unknown time  Yes No   Sig: Take 81 mg by mouth   B-D UF III MINI PEN NEEDLES 31G X 5 MM MISC   Yes Yes   BANOPHEN 50 MG capsule   Yes Yes   Sig: take 1 capsule by mouth every 6 hours if needed for itching    MG capsule   Yes Yes   Sig: Take 100 mg by mouth 2 (two) times a day   HYDROcodone-acetaminophen (NORCO) 7 5-325 mg per tablet Not Taking at Unknown time  Yes No   Sig: Take 1 tablet by mouth every 6 (six) hours as needed   LANTUS SOLOSTAR 100 units/mL injection pen Not Taking at Unknown time Self Yes No   Sig: Inject 60 Units under the skin daily    RA ALLERGY MEDICATION 25 MG capsule Not Taking at Unknown time  Yes No   Sig: take 1 capsule by mouth every 6 hours if needed for itching   SUMAtriptan (IMITREX) 100 mg tablet   Yes Yes   Sig: Take 100 mg by mouth   acetaminophen-codeine (TYLENOL #3) 300-30 mg per tablet Not Taking at Unknown time  Yes No   Sig: Take 1 tablet by mouth every 6 (six) hours as needed   albuterol (ACCUNEB) 0 63 MG/3ML nebulizer solution   Yes Yes   Sig: take 3 milliliters by mouth every 4 hours if needed for wheezing   albuterol (PROAIR HFA) 90 mcg/act inhaler   Yes Yes   Sig: Inhale 1 puff every 6 (six) hours as needed   atorvastatin (LIPITOR) 40 mg tablet   Yes Yes   Sig: take 1 tablet by mouth at bedtime   chlorhexidine (PERIDEX) 0 12 % solution   Yes Yes   Sig: Rinse with 1/2 ounce by mouth for 30 seconds then spit out  use twice a day   citalopram (CELEXA) 40 mg tablet   Yes Yes   Sig: Take 40 mg by mouth daily    cyclobenzaprine (FLEXERIL) 5 mg tablet   Yes Yes   Sig: take 1 to 2 tablets by mouth three times a day if needed for muscle spasm   insulin degludec (TRESIBA) 200 units/mL CONCENTRATED U-200 injection pen   Yes No   Sig: Inject 84 units daily   E11 65   insulin glulisine (APIDRA SOLOSTAR) 100 units/mL injection pen  Self Yes No   Sig: Inject 20 Units under the skin    levETIRAcetam (KEPPRA) 500 mg tablet   Yes Yes   Sig: Take 500 mg by mouth every 12 (twelve) hours    lisinopril (ZESTRIL) 10 mg tablet   Yes Yes   Sig: Take 10 mg by mouth   meloxicam (MOBIC) 15 mg tablet   Yes Yes   Sig: Take 15 mg by mouth daily   metFORMIN (GLUCOPHAGE) 1000 MG tablet   Yes Yes   Sig: take 1 tablet by mouth twice a day WITH MEALS   metoclopramide (REGLAN) 10 mg tablet   Yes Yes   Sig: Take 10 mg by mouth   metoprolol tartrate (LOPRESSOR) 25 mg tablet   Yes Yes   Sig: Take 25 mg by mouth   ondansetron (ZOFRAN) 4 mg tablet   Yes Yes   Sig: take 1 tablet by mouth up to three times a day if needed for nausea   pantoprazole (PROTONIX) 40 mg tablet   Yes Yes   Sig: take 1 tablet by mouth twice a day   pregabalin (LYRICA) 50 mg capsule   Yes Yes   Sig: take 1 capsule by mouth three times a day   sitaGLIPtin (JANUVIA) 100 mg tablet   Yes Yes   Sig: Take 100 mg by mouth daily    solifenacin (VESICARE) 5 mg tablet   Yes Yes   Sig: Take 5 mg by mouth daily Facility-Administered Medications: None       Past Medical History:   Diagnosis Date    Depression     Diabetes mellitus ([address] )     Gastroparesis     GERD (gastroesophageal reflux disease)     Hyperlipidemia     Hypertension     Sciatica     Seizure disorder (HCC)        Past Surgical History:   Procedure Laterality Date    CHOLECYSTECTOMY      HYSTERECTOMY         History reviewed  No pertinent family history  I have reviewed and agree with the history as documented  E-Cigarette/Vaping    E-Cigarette Use Never User      E-Cigarette/Vaping Substances     Social History     Tobacco Use    Smoking status: Former Smoker     Quit date:      Years since quittin 2    Smokeless tobacco: Never Used   Substance Use Topics    Alcohol use: No    Drug use: No       Review of Systems   Constitutional: Negative  HENT: Negative  Eyes: Negative  Respiratory: Negative  Cardiovascular: Negative  Gastrointestinal: Positive for abdominal pain and nausea  Negative for constipation, diarrhea and vomiting  Endocrine: Negative  Genitourinary: Negative  Musculoskeletal: Negative  Skin: Negative  Allergic/Immunologic: Negative  Neurological: Negative  Psychiatric/Behavioral: Negative  Physical Exam  Physical Exam  Vitals signs and nursing note reviewed  Constitutional:       General: She is not in acute distress  Appearance: Normal appearance  She is not ill-appearing or diaphoretic  HENT:      Head: Normocephalic and atraumatic  Nose: Nose normal    Eyes:      Extraocular Movements: Extraocular movements intact  Conjunctiva/sclera: Conjunctivae normal       Pupils: Pupils are equal, round, and reactive to light  Neck:      Musculoskeletal: Normal range of motion and neck supple  No muscular tenderness  Cardiovascular:      Rate and Rhythm: Normal rate and regular rhythm     Pulmonary:      Effort: Pulmonary effort is normal       Breath sounds: Normal breath sounds  Abdominal:      General: Bowel sounds are normal       Palpations: Abdomen is soft  Tenderness: There is abdominal tenderness (generalized)  Musculoskeletal: Normal range of motion  Skin:     General: Skin is warm  Neurological:      General: No focal deficit present  Mental Status: She is alert and oriented to person, place, and time     Psychiatric:         Mood and Affect: Mood normal          Behavior: Behavior normal          Vital Signs  ED Triage Vitals   Temperature Pulse Respirations Blood Pressure SpO2   03/23/21 1232 03/23/21 1232 03/23/21 1232 03/23/21 1232 03/23/21 1232   97 5 °F (36 4 °C) 78 20 114/59 98 %      Temp Source Heart Rate Source Patient Position - Orthostatic VS BP Location FiO2 (%)   03/23/21 1232 03/23/21 1448 03/23/21 1448 03/23/21 1448 --   Temporal Monitor Lying Right arm       Pain Score       03/23/21 1232       7           Vitals:    03/23/21 1232 03/23/21 1448 03/23/21 1515   BP: 114/59 122/74    Pulse: 78 63 66   Patient Position - Orthostatic VS:  Lying          Visual Acuity      ED Medications  Medications   sodium chloride 0 9 % bolus 1,000 mL (0 mL Intravenous Stopped 3/23/21 1418)   ketorolac (TORADOL) injection 30 mg (30 mg Intravenous Given 3/23/21 1318)   morphine (PF) 4 mg/mL injection 4 mg (4 mg Intravenous Given 3/23/21 1425)   ondansetron (ZOFRAN) injection 4 mg (4 mg Intravenous Given 3/23/21 1425)   famotidine (PEPCID) injection 20 mg (20 mg Intravenous Given 3/23/21 1424)       Diagnostic Studies  Results Reviewed     Procedure Component Value Units Date/Time    Basic metabolic panel [[id number]]  (Abnormal) Collected: 03/23/21 1302    Lab Status: Final result Specimen: Blood from Arm, Right Updated: 03/23/21 1325     Sodium 136 mmol/L      Potassium 4 6 mmol/L      Chloride 102 mmol/L      CO2 28 mmol/L      ANION GAP 6 mmol/L      BUN 31 mg/dL      Creatinine 1 14 mg/dL      Glucose 275 mg/dL      Calcium 8 9 mg/dL      eGFR 64 ml/min/1 73sq m     Narrative:      National Kid[employer] Disease Foundation guidelines for Chronic Kidney Disease (CKD):     Stage 1 with normal or high GFR (GFR > 90 mL/min/1 73 square meters)    Stage 2 Mild CKD (GFR = 60-89 mL/min/1 73 square meters)    Stage 3A Moderate CKD (GFR = 45-59 mL/min/1 73 square meters)    Stage 3B Moderate CKD (GFR = 30-44 mL/min/1 73 square meters)    Stage 4 Severe CKD (GFR = 15-29 mL/min/1 73 square meters)    Stage 5 End Stage CKD (GFR <15 mL/min/1 73 square meters)  Note: GFR calculation is accurate only with a steady state creatinine    CBC and differential [[id number]]  (Abnormal) Collected: 03/23/21 1302    Lab Status: Final result Specimen: Blood from Arm, Right Updated: 03/23/21 1318     WBC 8 80 Thousand/uL      RBC 4 49 Million/uL      Hemoglobin 11 9 g/dL      Hematocrit 37 0 %      MCV 83 fL      MCH 26 6 pg      MCHC 32 3 g/dL      RDW 13 9 %      MPV 8 7 fL      Platelets 068 Thousands/uL      Neutrophils Relative 63 %      Lymphocytes Relative 28 %      Monocytes Relative 7 %      Eosinophils Relative 3 %      Basophils Relative 1 %      Neutrophils Absolute 5 50 Thousands/µL      Lymphocytes Absolute 2 40 Thousands/µL      Monocytes Absolute 0 60 Thousand/µL      Eosinophils Absolute 0 30 Thousand/µL      Basophils Absolute 0 00 Thousands/µL     UA w Reflex to Microscopic w Reflex to Culture [[id number]]  (Abnormal) Collected: 03/23/21 1301    Lab Status: Final result Specimen: Urine, Clean Catch Updated: 03/23/21 1312     Color, UA Yellow     Clarity, UA Clear     Specific Gravity, UA 1 015     pH, UA 5 5     Leukocytes, UA Negative     Nitrite, UA Negative     Protein, UA Negative mg/dl      Glucose, UA 3+ mg/dl      Ketones, UA Negative mg/dl      Urobilinogen, UA 0 2 E U /dl      Bilirubin, UA Negative     Blood, UA Negative                 CT renal stone study abdomen pelvis wo contrast   Final Result by Carol Mittal MD (03/23 6123)      No evidence of nephrolithiasis or obstructive uropathy  Evidence of partial right nephrectomy, cholecystectomy, and hysterectomy  Previously noted hypodense renal cortical lower pole lesion is not apparent on this noncontrast study  If additional characterization of the left lower pole renal parenchyma  Workstation performed: [id number]                    Procedures  Procedures         ED Course  ED Course as of Mar 23 1540   Tue Mar 23, 2021   1525 Discuss with Dr Troy Green, saw the pt in the ED  Recommending admission for observation  [address] [place] [address] under surgery ( Dr Troy Green) and consult inpt  MDM    Disposition  Final diagnoses:   Generalized abdominal pain   Acute renal failure (ARF) (HCC)   Nausea   Constipation     Time reflects when diagnosis was documented in both MDM as applicable and the Disposition within this note     Time User Action Codes Description Comment    3/23/2021  3:37 PM Minetta Dania Add [R10 84] Generalized abdominal pain     3/23/2021  3:39 PM Minetta Dania Add [N17 9] Acute renal failure (ARF) ([address] )     3/23/2021  3:40 PM Minetta Dania Add [R11 0] Nausea     3/23/2021  3:40 PM Minetta Dania Add [K59 00] Constipation       ED Disposition     ED Disposition Condition Date/Time Comment    Admit Stable Tue Mar 23, 2021  3:37 PM Case was discussed with Matty Snell and the patient's admission status was agreed to be Admission Status: observation status to the service of Dr Lauren Grnada   Follow-up Information    None         Patient's Medications   Discharge Prescriptions    No medications on file     No discharge procedures on file      PDMP Review     None          ED Provider  Electronically Signed by           Jolene Ramos MD  03/23/21 1225

## 2021-03-24 ENCOUNTER — ANESTHESIA (OUTPATIENT)
Dept: PERIOP | Facility: HOSPITAL | Age: 51
DRG: 224 | End: 2021-03-24
Payer: COMMERCIAL

## 2021-03-24 ENCOUNTER — TELEPHONE (OUTPATIENT)
Dept: OTHER | Facility: OTHER | Age: 51
End: 2021-03-24

## 2021-03-24 ENCOUNTER — ANESTHESIA EVENT (OUTPATIENT)
Dept: PERIOP | Facility: HOSPITAL | Age: 51
DRG: 224 | End: 2021-03-24
Payer: COMMERCIAL

## 2021-03-24 PROBLEM — K66.0 INTRA-ABDOMINAL ADHESIONS: Status: ACTIVE | Noted: 2021-03-24

## 2021-03-24 PROBLEM — K45.8 INTERNAL HERNIA: Status: ACTIVE | Noted: 2021-03-24

## 2021-03-24 PROBLEM — I95.9 HYPOTENSION: Status: ACTIVE | Noted: 2021-03-24

## 2021-03-24 LAB
ABO GROUP BLD: NORMAL
ABO GROUP BLD: NORMAL
ANION GAP SERPL CALCULATED.3IONS-SCNC: 5 MMOL/L (ref 4–13)
BASOPHILS # BLD AUTO: 0 THOUSANDS/ΜL (ref 0–0.1)
BASOPHILS NFR BLD AUTO: 1 % (ref 0–2)
BLD GP AB SCN SERPL QL: NEGATIVE
BUN SERPL-MCNC: 27 MG/DL (ref 7–25)
CALCIUM SERPL-MCNC: 8 MG/DL (ref 8.6–10.5)
CHLORIDE SERPL-SCNC: 110 MMOL/L (ref 98–107)
CO2 SERPL-SCNC: 25 MMOL/L (ref 21–31)
CREAT SERPL-MCNC: 1.14 MG/DL (ref 0.6–1.2)
EOSINOPHIL # BLD AUTO: 0.2 THOUSAND/ΜL (ref 0–0.61)
EOSINOPHIL NFR BLD AUTO: 3 % (ref 0–5)
ERYTHROCYTE [DISTWIDTH] IN BLOOD BY AUTOMATED COUNT: 14.2 % (ref 11.5–14.5)
GFR SERPL CREATININE-BSD FRML MDRD: 56 ML/MIN/1.73SQ M
GLUCOSE SERPL-MCNC: 106 MG/DL (ref 65–140)
GLUCOSE SERPL-MCNC: 106 MG/DL (ref 65–99)
GLUCOSE SERPL-MCNC: 144 MG/DL (ref 65–140)
GLUCOSE SERPL-MCNC: 157 MG/DL (ref 65–140)
GLUCOSE SERPL-MCNC: 181 MG/DL (ref 65–140)
GLUCOSE SERPL-MCNC: 92 MG/DL (ref 65–140)
HCT VFR BLD AUTO: 31.6 % (ref 42–47)
HGB BLD-MCNC: 10.3 G/DL (ref 12–16)
LIPASE SERPL-CCNC: 41 U/L (ref 11–82)
LYMPHOCYTES # BLD AUTO: 3.1 THOUSANDS/ΜL (ref 0.6–4.47)
LYMPHOCYTES NFR BLD AUTO: 51 % (ref 21–51)
MAGNESIUM SERPL-MCNC: 1.9 MG/DL (ref 1.9–2.7)
MCH RBC QN AUTO: 27.1 PG (ref 26–34)
MCHC RBC AUTO-ENTMCNC: 32.8 G/DL (ref 31–37)
MCV RBC AUTO: 83 FL (ref 81–99)
MONOCYTES # BLD AUTO: 0.4 THOUSAND/ΜL (ref 0.17–1.22)
MONOCYTES NFR BLD AUTO: 7 % (ref 2–12)
NEUTROPHILS # BLD AUTO: 2.4 THOUSANDS/ΜL (ref 1.4–6.5)
NEUTS SEG NFR BLD AUTO: 39 % (ref 42–75)
PHOSPHATE SERPL-MCNC: 3.8 MG/DL (ref 3–5.5)
PLATELET # BLD AUTO: 282 THOUSANDS/UL (ref 149–390)
PLATELET # BLD AUTO: 297 THOUSANDS/UL (ref 149–390)
PMV BLD AUTO: 8.6 FL (ref 8.6–11.7)
POTASSIUM SERPL-SCNC: 4.3 MMOL/L (ref 3.5–5.5)
RBC # BLD AUTO: 3.82 MILLION/UL (ref 3.9–5.2)
RH BLD: POSITIVE
RH BLD: POSITIVE
SODIUM SERPL-SCNC: 140 MMOL/L (ref 134–143)
SPECIMEN EXPIRATION DATE: NORMAL
WBC # BLD AUTO: 6.2 THOUSAND/UL (ref 4.8–10.8)

## 2021-03-24 PROCEDURE — 99232 SBSQ HOSP IP/OBS MODERATE 35: CPT | Performed by: PHYSICIAN ASSISTANT

## 2021-03-24 PROCEDURE — 88304 TISSUE EXAM BY PATHOLOGIST: CPT | Performed by: PATHOLOGY

## 2021-03-24 PROCEDURE — 88312 SPECIAL STAINS GROUP 1: CPT | Performed by: PATHOLOGY

## 2021-03-24 PROCEDURE — 44970 LAPAROSCOPY APPENDECTOMY: CPT | Performed by: PHYSICIAN ASSISTANT

## 2021-03-24 PROCEDURE — 86901 BLOOD TYPING SEROLOGIC RH(D): CPT | Performed by: PHYSICIAN ASSISTANT

## 2021-03-24 PROCEDURE — 86850 RBC ANTIBODY SCREEN: CPT | Performed by: PHYSICIAN ASSISTANT

## 2021-03-24 PROCEDURE — 0DTJ4ZZ RESECTION OF APPENDIX, PERCUTANEOUS ENDOSCOPIC APPROACH: ICD-10-PCS | Performed by: SURGERY

## 2021-03-24 PROCEDURE — 82948 REAGENT STRIP/BLOOD GLUCOSE: CPT

## 2021-03-24 PROCEDURE — 85025 COMPLETE CBC W/AUTO DIFF WBC: CPT | Performed by: SURGERY

## 2021-03-24 PROCEDURE — 86900 BLOOD TYPING SEROLOGIC ABO: CPT | Performed by: PHYSICIAN ASSISTANT

## 2021-03-24 PROCEDURE — 84100 ASSAY OF PHOSPHORUS: CPT | Performed by: SURGERY

## 2021-03-24 PROCEDURE — 85049 AUTOMATED PLATELET COUNT: CPT | Performed by: PHYSICIAN ASSISTANT

## 2021-03-24 PROCEDURE — 80048 BASIC METABOLIC PNL TOTAL CA: CPT | Performed by: SURGERY

## 2021-03-24 PROCEDURE — 44970 LAPAROSCOPY APPENDECTOMY: CPT | Performed by: SURGERY

## 2021-03-24 PROCEDURE — 83690 ASSAY OF LIPASE: CPT | Performed by: INTERNAL MEDICINE

## 2021-03-24 PROCEDURE — 99243 OFF/OP CNSLTJ NEW/EST LOW 30: CPT | Performed by: INTERNAL MEDICINE

## 2021-03-24 PROCEDURE — 0DN84ZZ RELEASE SMALL INTESTINE, PERCUTANEOUS ENDOSCOPIC APPROACH: ICD-10-PCS | Performed by: SURGERY

## 2021-03-24 PROCEDURE — 83735 ASSAY OF MAGNESIUM: CPT | Performed by: SURGERY

## 2021-03-24 RX ORDER — PROPOFOL 10 MG/ML
INJECTION, EMULSION INTRAVENOUS AS NEEDED
Status: DISCONTINUED | OUTPATIENT
Start: 2021-03-24 | End: 2021-03-24

## 2021-03-24 RX ORDER — FENTANYL CITRATE/PF 50 MCG/ML
50 SYRINGE (ML) INJECTION
Status: DISCONTINUED | OUTPATIENT
Start: 2021-03-24 | End: 2021-03-24 | Stop reason: HOSPADM

## 2021-03-24 RX ORDER — CEFAZOLIN SODIUM 2 G/50ML
2000 SOLUTION INTRAVENOUS
Status: CANCELLED | OUTPATIENT
Start: 2021-03-24

## 2021-03-24 RX ORDER — BUPIVACAINE HYDROCHLORIDE 5 MG/ML
INJECTION, SOLUTION PERINEURAL AS NEEDED
Status: DISCONTINUED | OUTPATIENT
Start: 2021-03-24 | End: 2021-03-24 | Stop reason: HOSPADM

## 2021-03-24 RX ORDER — HEPARIN SODIUM 5000 [USP'U]/ML
5000 INJECTION, SOLUTION INTRAVENOUS; SUBCUTANEOUS EVERY 8 HOURS SCHEDULED
Status: DISCONTINUED | OUTPATIENT
Start: 2021-03-24 | End: 2021-03-26 | Stop reason: HOSPADM

## 2021-03-24 RX ORDER — ROCURONIUM BROMIDE 10 MG/ML
INJECTION, SOLUTION INTRAVENOUS AS NEEDED
Status: DISCONTINUED | OUTPATIENT
Start: 2021-03-24 | End: 2021-03-24

## 2021-03-24 RX ORDER — PROMETHAZINE HYDROCHLORIDE 25 MG/ML
25 INJECTION, SOLUTION INTRAMUSCULAR; INTRAVENOUS ONCE AS NEEDED
Status: DISCONTINUED | OUTPATIENT
Start: 2021-03-24 | End: 2021-03-24 | Stop reason: HOSPADM

## 2021-03-24 RX ORDER — CEFAZOLIN SODIUM 1 G/3ML
INJECTION, POWDER, FOR SOLUTION INTRAMUSCULAR; INTRAVENOUS AS NEEDED
Status: DISCONTINUED | OUTPATIENT
Start: 2021-03-24 | End: 2021-03-24

## 2021-03-24 RX ORDER — ONDANSETRON 2 MG/ML
INJECTION INTRAMUSCULAR; INTRAVENOUS AS NEEDED
Status: DISCONTINUED | OUTPATIENT
Start: 2021-03-24 | End: 2021-03-24

## 2021-03-24 RX ORDER — MIDAZOLAM HYDROCHLORIDE 2 MG/2ML
INJECTION, SOLUTION INTRAMUSCULAR; INTRAVENOUS AS NEEDED
Status: DISCONTINUED | OUTPATIENT
Start: 2021-03-24 | End: 2021-03-24

## 2021-03-24 RX ORDER — GLYCOPYRROLATE 0.2 MG/ML
INJECTION INTRAMUSCULAR; INTRAVENOUS AS NEEDED
Status: DISCONTINUED | OUTPATIENT
Start: 2021-03-24 | End: 2021-03-24

## 2021-03-24 RX ORDER — MAGNESIUM HYDROXIDE 1200 MG/15ML
LIQUID ORAL AS NEEDED
Status: DISCONTINUED | OUTPATIENT
Start: 2021-03-24 | End: 2021-03-24 | Stop reason: HOSPADM

## 2021-03-24 RX ORDER — SODIUM CHLORIDE, SODIUM LACTATE, POTASSIUM CHLORIDE, CALCIUM CHLORIDE 600; 310; 30; 20 MG/100ML; MG/100ML; MG/100ML; MG/100ML
INJECTION, SOLUTION INTRAVENOUS CONTINUOUS PRN
Status: DISCONTINUED | OUTPATIENT
Start: 2021-03-24 | End: 2021-03-24

## 2021-03-24 RX ORDER — NEOSTIGMINE METHYLSULFATE 1 MG/ML
INJECTION INTRAVENOUS AS NEEDED
Status: DISCONTINUED | OUTPATIENT
Start: 2021-03-24 | End: 2021-03-24

## 2021-03-24 RX ORDER — FENTANYL CITRATE 50 UG/ML
INJECTION, SOLUTION INTRAMUSCULAR; INTRAVENOUS AS NEEDED
Status: DISCONTINUED | OUTPATIENT
Start: 2021-03-24 | End: 2021-03-24

## 2021-03-24 RX ORDER — LIDOCAINE HYDROCHLORIDE 10 MG/ML
INJECTION, SOLUTION EPIDURAL; INFILTRATION; INTRACAUDAL; PERINEURAL AS NEEDED
Status: DISCONTINUED | OUTPATIENT
Start: 2021-03-24 | End: 2021-03-24

## 2021-03-24 RX ORDER — HYDROMORPHONE HCL/PF 1 MG/ML
0.5 SYRINGE (ML) INJECTION
Status: DISCONTINUED | OUTPATIENT
Start: 2021-03-24 | End: 2021-03-24 | Stop reason: HOSPADM

## 2021-03-24 RX ORDER — PANTOPRAZOLE SODIUM 40 MG/1
40 INJECTION, POWDER, FOR SOLUTION INTRAVENOUS
Status: DISCONTINUED | OUTPATIENT
Start: 2021-03-25 | End: 2021-03-26 | Stop reason: HOSPADM

## 2021-03-24 RX ORDER — DEXTROSE, SODIUM CHLORIDE, SODIUM LACTATE, POTASSIUM CHLORIDE, AND CALCIUM CHLORIDE 5; .6; .31; .03; .02 G/100ML; G/100ML; G/100ML; G/100ML; G/100ML
125 INJECTION, SOLUTION INTRAVENOUS CONTINUOUS
Status: DISCONTINUED | OUTPATIENT
Start: 2021-03-24 | End: 2021-03-25

## 2021-03-24 RX ORDER — METOPROLOL TARTRATE 5 MG/5ML
5 INJECTION INTRAVENOUS EVERY 8 HOURS PRN
Status: DISCONTINUED | OUTPATIENT
Start: 2021-03-24 | End: 2021-03-26 | Stop reason: HOSPADM

## 2021-03-24 RX ADMIN — FENTANYL CITRATE 50 MCG: 50 INJECTION INTRAMUSCULAR; INTRAVENOUS at 17:35

## 2021-03-24 RX ADMIN — ATORVASTATIN CALCIUM 40 MG: 40 TABLET, FILM COATED ORAL at 21:18

## 2021-03-24 RX ADMIN — PROPOFOL 200 MG: 10 INJECTION, EMULSION INTRAVENOUS at 16:19

## 2021-03-24 RX ADMIN — PREGABALIN 50 MG: 50 CAPSULE ORAL at 21:19

## 2021-03-24 RX ADMIN — DEXTROSE, SODIUM CHLORIDE, SODIUM LACTATE, POTASSIUM CHLORIDE, AND CALCIUM CHLORIDE 125 ML/HR: 5; .6; .31; .03; .02 INJECTION, SOLUTION INTRAVENOUS at 22:10

## 2021-03-24 RX ADMIN — HEPARIN SODIUM 5000 UNITS: 5000 INJECTION INTRAVENOUS; SUBCUTANEOUS at 21:18

## 2021-03-24 RX ADMIN — PREGABALIN 50 MG: 50 CAPSULE ORAL at 09:13

## 2021-03-24 RX ADMIN — HYDROMORPHONE HYDROCHLORIDE 1 MG: 1 INJECTION, SOLUTION INTRAMUSCULAR; INTRAVENOUS; SUBCUTANEOUS at 21:19

## 2021-03-24 RX ADMIN — NEOSTIGMINE METHYLSULFATE 4 MG: 1 INJECTION, SOLUTION INTRAVENOUS at 18:10

## 2021-03-24 RX ADMIN — FENTANYL CITRATE 50 MCG: 50 INJECTION INTRAMUSCULAR; INTRAVENOUS at 17:53

## 2021-03-24 RX ADMIN — ASPIRIN 81 MG: 81 TABLET, COATED ORAL at 09:12

## 2021-03-24 RX ADMIN — DEXTROSE, SODIUM CHLORIDE, SODIUM LACTATE, POTASSIUM CHLORIDE, AND CALCIUM CHLORIDE 150 ML/HR: 5; .6; .31; .03; .02 INJECTION, SOLUTION INTRAVENOUS at 11:08

## 2021-03-24 RX ADMIN — LIDOCAINE HYDROCHLORIDE 50 MG: 10 INJECTION, SOLUTION EPIDURAL; INFILTRATION; INTRACAUDAL; PERINEURAL at 16:19

## 2021-03-24 RX ADMIN — CITALOPRAM HYDROBROMIDE 40 MG: 20 TABLET ORAL at 09:12

## 2021-03-24 RX ADMIN — FENTANYL CITRATE 50 MCG: 50 INJECTION INTRAMUSCULAR; INTRAVENOUS at 16:19

## 2021-03-24 RX ADMIN — ONDANSETRON 4 MG: 2 INJECTION INTRAMUSCULAR; INTRAVENOUS at 16:58

## 2021-03-24 RX ADMIN — LEVETIRACETAM 500 MG: 500 TABLET, FILM COATED ORAL at 09:14

## 2021-03-24 RX ADMIN — LEVETIRACETAM 500 MG: 100 INJECTION, SOLUTION INTRAVENOUS at 21:18

## 2021-03-24 RX ADMIN — MIDAZOLAM HYDROCHLORIDE 2 MG: 1 INJECTION, SOLUTION INTRAMUSCULAR; INTRAVENOUS at 16:14

## 2021-03-24 RX ADMIN — PANTOPRAZOLE SODIUM 40 MG: 40 TABLET, DELAYED RELEASE ORAL at 05:14

## 2021-03-24 RX ADMIN — OXYBUTYNIN 5 MG: 5 TABLET, FILM COATED, EXTENDED RELEASE ORAL at 09:13

## 2021-03-24 RX ADMIN — GLYCOPYRROLATE 0.4 MG: 0.2 INJECTION, SOLUTION INTRAMUSCULAR; INTRAVENOUS at 18:10

## 2021-03-24 RX ADMIN — ROCURONIUM BROMIDE 50 MG: 10 INJECTION INTRAVENOUS at 16:19

## 2021-03-24 RX ADMIN — SODIUM CHLORIDE 150 ML/HR: 0.9 INJECTION, SOLUTION INTRAVENOUS at 03:13

## 2021-03-24 RX ADMIN — HYDROCODONE BITARTRATE AND ACETAMINOPHEN 1 TABLET: 5; 325 TABLET ORAL at 03:11

## 2021-03-24 RX ADMIN — LEVETIRACETAM 500 MG: 100 INJECTION, SOLUTION INTRAVENOUS at 11:07

## 2021-03-24 RX ADMIN — METOCLOPRAMIDE 10 MG: 5 TABLET ORAL at 21:19

## 2021-03-24 RX ADMIN — FENTANYL CITRATE 50 MCG: 50 INJECTION INTRAMUSCULAR; INTRAVENOUS at 16:58

## 2021-03-24 RX ADMIN — HEPARIN SODIUM 5000 UNITS: 5000 INJECTION INTRAVENOUS; SUBCUTANEOUS at 05:14

## 2021-03-24 RX ADMIN — METOCLOPRAMIDE 10 MG: 5 TABLET ORAL at 09:13

## 2021-03-24 RX ADMIN — FENTANYL CITRATE 50 MCG: 50 INJECTION INTRAMUSCULAR; INTRAVENOUS at 19:05

## 2021-03-24 RX ADMIN — SODIUM CHLORIDE, SODIUM LACTATE, POTASSIUM CHLORIDE, AND CALCIUM CHLORIDE: .6; .31; .03; .02 INJECTION, SOLUTION INTRAVENOUS at 15:58

## 2021-03-24 RX ADMIN — CEFAZOLIN 2000 MG: 1 INJECTION, POWDER, FOR SOLUTION INTRAMUSCULAR; INTRAVENOUS at 15:58

## 2021-03-24 RX ADMIN — FENTANYL CITRATE 50 MCG: 50 INJECTION INTRAMUSCULAR; INTRAVENOUS at 18:55

## 2021-03-24 NOTE — PLAN OF CARE
Problem: Potential for Falls  Goal: Patient will remain free of falls  Description: INTERVENTIONS:  - Assess patient frequently for physical needs  -  Identify cognitive and physical deficits and behaviors that affect risk of falls    -  [place] fall precautions as indicated by assessment   - Educate patient/family on patient safety including physical limitations  - Instruct patient to call for assistance with activity based on assessment  - Modify environment to reduce risk of injury  - Consider OT/PT consult to assist with strengthening/mobility  Outcome: Progressing     Problem: PAIN - ADULT  Goal: Verbalizes/displays adequate comfort level or baseline comfort level  Description: Interventions:  - Encourage patient to monitor pain and request assistance  - Assess pain using appropriate pain scale  - Administer analgesics based on type and severity of pain and evaluate response  - Implement non-pharmacological measures as appropriate and evaluate response  - Consider cultural and social influences on pain and pain management  - Notify physician/advanced practitioner if interventions unsuccessful or patient reports new pain  Outcome: Progressing     Problem: INFECTION - ADULT  Goal: Absence or prevention of progression during hospitalization  Description: INTERVENTIONS:  - Assess and monitor for signs and symptoms of infection  - Monitor lab/diagnostic results  - Monitor all insertion sites, i e  indwelling lines, tubes, and drains  - Monitor endotracheal if appropriate and nasal secretions for changes in amount and color  - [place] appropriate cooling/warming therapies per order  - Administer medications as ordered  - Instruct and encourage patient and family to use good hand hygiene technique  - Identify and instruct in appropriate isolation precautions for identified infection/condition  Outcome: Progressing  Goal: Absence of fever/infection during neutropenic period  Description: INTERVENTIONS:  - Monitor WBC    Outcome: Progressing     Problem: SAFETY ADULT  Goal: Patient will remain free of falls  Description: INTERVENTIONS:  - Assess patient frequently for physical needs  -  Identify cognitive and physical deficits and behaviors that affect risk of falls    -  [place] fall precautions as indicated by assessment   - Educate patient/family on patient safety including physical limitations  - Instruct patient to call for assistance with activity based on assessment  - Modify environment to reduce risk of injury  - Consider OT/PT consult to assist with strengthening/mobility  Outcome: Progressing  Goal: Maintain or return to baseline ADL function  Description: INTERVENTIONS:  -  Assess patient's ability to carry out ADLs; assess patient's baseline for ADL function and identify physical deficits which impact ability to perform ADLs (bathing, care of mouth/teeth, toileting, grooming, dressing, etc )  - Assess/evaluate cause of self-care deficits   - Assess range of motion  - Assess patient's mobility; develop plan if impaired  - Assess patient's need for assistive devices and provide as appropriate  - Encourage maximum independence but intervene and supervise when necessary  - Involve family in performance of ADLs  - Assess for home care needs following discharge   - Consider OT consult to assist with ADL evaluation and planning for discharge  - Provide patient education as appropriate  Outcome: Progressing  Goal: Maintain or return mobility status to optimal level  Description: INTERVENTIONS:  - Assess patient's baseline mobility status (ambulation, transfers, stairs, etc )    - Identify cognitive and physical deficits and behaviors that affect mobility  - Identify mobility aids required to assist with transfers and/or ambulation (gait belt, sit-to-stand, lift, walker, cane, etc )  - [place] fall precautions as indicated by assessment  - Record patient progress and toleration of activity level on Mobility SBAR; progress patient to next Phase/Stage  - Instruct patient to call for assistance with activity based on assessment  - Consider rehabilitation consult to assist with strengthening/weightbearing, etc   Outcome: Progressing     Problem: DISCHARGE PLANNING  Goal: Discharge to home or other facility with appropriate resources  Description: INTERVENTIONS:  - Identify barriers to discharge w/patient and caregiver  - Arrange for needed discharge resources and transportation as appropriate  - Identify discharge learning needs (meds, wound care, etc )  - Arrange for interpretive services to assist at discharge as needed  - Refer to Case Management Department for coordinating discharge planning if the patient needs post-hospital services based on physician/advanced practitioner order or complex needs related to functional status, cognitive ability, or social support system  Outcome: Progressing     Problem: Knowledge Deficit  Goal: Patient/family/caregiver demonstrates understanding of disease process, treatment plan, medications, and discharge instructions  Description: Complete learning assessment and assess knowledge base    Interventions:  - Provide teaching at level of understanding  - Provide teaching via preferred learning methods  Outcome: Progressing

## 2021-03-24 NOTE — ASSESSMENT & PLAN NOTE
/74 - > 84/58 since admission  Continue IV fluid resuscitation with D5 LR @150 mL/hr while NPO  Recheck BP now and Q2 x 3 today  Internal medicine consulted

## 2021-03-24 NOTE — ASSESSMENT & PLAN NOTE
· Blood pressure has been on the lower side  · Will hold off on lisinopril for now  · Will continue metoprolol with holding parameters  · Continue IV fluids as needed

## 2021-03-24 NOTE — UTILIZATION REVIEW
Initial Clinical Review    Admission: Date/Time/Statement:   Admission Orders (From admission, onward)     Ordered        03/23/21 1538  Place in Observation  Once                   Orders Placed This Encounter   Procedures    Place in Observation     Standing Status:   Standing     Number of Occurrences:   1     Order Specific Question:   Level of Care     Answer:   Med Surg [16]     ED Arrival Information     Expected Arrival Acuity Means of Arrival Escorted By Service Admission Type    - 3/23/2021 12:24 Urgent Walk-In Spouse Surgery-General Urgent    Arrival Complaint    right sided pain        Chief Complaint   Patient presents with    Flank Pain     right sided flank pain     Assessment/Plan:   66-year-old female presented with complain of right-sided flank pain started about 4 days ago  Patient has history of renal cell cancer status post partial nephrectomy presented with complain of flank pain  Patient states the her pain has been intermittent however is was constant today which prompted this ED visit  She does complain of some nausea but denies any vomiting or diarrhea  Denies any dysuria urgency frequency  She denies any blood in the urine  She states nothing makes the pain better or worse and she has not done anything for the pain  She denies any fever chills at this time  Patient denies any similar history of pain in the past   Assessment:  The patient is a 66-year-old female with a past medical history significant for type 2 diabetes mellitus, hypertension gastroparesis and removed right renal cell carcinoma presenting with several days of progressively worsening right lower quadrant abdominal pain of uncertain etiology for which inpatient admission is now indicated      Plan:  1  Admission to the hospital for 23 hour observation  2  IV fluid resuscitation to treat her mild renal insufficiency   3  Consultation with the internal medicine service to manage her hypertension and diabetes  4  repeat history, physical examination and lab studies on the morning of Wednesday March 24th      Persistent pain, scheduled for lap appy later today      ED Triage Vitals   Temperature Pulse Respirations Blood Pressure SpO2   03/23/21 1232 03/23/21 1232 03/23/21 1232 03/23/21 1232 03/23/21 1232   97 5 °F (36 4 °C) 78 20 114/59 98 %      Temp Source Heart Rate Source Patient Position - Orthostatic VS BP Location FiO2 (%)   03/23/21 1232 03/23/21 1448 03/23/21 1448 03/23/21 1448 --   Temporal Monitor Lying Right arm       Pain Score       03/23/21 1232       7          Wt Readings from Last 1 Encounters:   03/23/21 84 8 kg (186 lb 13 4 oz)     Additional Vital Signs:   Date/Time  Temp  Pulse  Resp  BP  MAP (mmHg)  SpO2  O2 Device  Patient Position - Orthostatic VS   03/24/21 0711  97 2 °F (36 2 °C)Abnormal   57  18  84/58Abnormal   --  95 %  None (Room air)  Lying   03/24/21 0251  --  64  --  92/58  --  --  --  --   03/23/21 2312  97 5 °F (36 4 °C)  60  18  87/50Abnormal   64  94 %  None (Room air)  Lying   03/23/21 2055  --  72  --  94/58  --  --  --  --   03/23/21 2000  --  74  --  90/58  --  --  --  Sitting   03/23/21 1842  97 6 °F (36 4 °C)  76  20  95/53  --  96 %  None (Room air)  Sitting   03/23/21 1820  97 6 °F (36 4 °C)  67  16  95/53  --  96 %  None (Room air)  Sitting   03/23/21 1730  --  63  --  100/59  75  96 %  None (Room air)  --     Pertinent Labs/Diagnostic Test Results:   Results from last 7 days   Lab Units 03/23/21  1605   SARS-COV-2  Negative     Results from last 7 days   Lab Units 03/24/21  0525 03/23/21  1605 03/23/21  1302   WBC Thousand/uL 6 20  --  8 80   HEMOGLOBIN g/dL 10 3*  --  11 9*   HEMATOCRIT % 31 6*  --  37 0*   PLATELETS Thousands/uL 282 293 336   NEUTROS ABS Thousands/µL 2 40  --  5 50     Results from last 7 days   Lab Units 03/24/21  0525 03/23/21  1302   SODIUM mmol/L 140 136   POTASSIUM mmol/L 4 3 4 6   CHLORIDE mmol/L 110* 102   CO2 mmol/L 25 28   ANION GAP mmol/L 5 6   BUN mg/dL 27* 31*   CREATININE mg/dL 1 14 1 14   EGFR ml/min/1 73sq m 56 56   CALCIUM mg/dL 8 0* 8 9   MAGNESIUM mg/dL 1 9  --    PHOSPHORUS mg/dL 3 8  --      Results from last 7 days   Lab Units 03/24/21  0604 03/24/21  0009 03/23/21  2006   POC GLUCOSE mg/dl 106 92 107     Results from last 7 days   Lab Units 03/24/21  0525 03/23/21  1302   GLUCOSE RANDOM mg/dL 106* 275*     BETA-HYDROXYBUTYRATE   Date Value Ref Range Status   02/03/2020 0 1 <0 6 mmol/L Final      Results from last 7 days   Lab Units 03/23/21  1301   CLARITY UA  Clear   COLOR UA  Yellow   SPEC GRAV UA  1 015   PH UA  5 5   GLUCOSE UA mg/dl 3+*   KETONES UA mg/dl Negative   BLOOD UA  Negative   PROTEIN UA mg/dl Negative   NITRITE UA  Negative   BILIRUBIN UA  Negative   UROBILINOGEN UA E U /dl 0 2   LEUKOCYTES UA  Negative     Results from last 7 days   Lab Units 03/23/21  1605   INFLUENZA A PCR  Negative   INFLUENZA B PCR  Negative   RSV PCR  Negative     3/23  CT renal stone study abdomen pelvis wo contrast  No evidence of nephrolithiasis or obstructive uropathy  Evidence of partial right nephrectomy, cholecystectomy, and hysterectomy  Previously noted hypodense renal cortical lower pole lesion is not apparent on this noncontrast study  If additional characterization of the left lower pole renal parenchyma      ED Treatment:   Medication Administration from 03/23/2021 1224 to 03/23/2021 1758       Date/Time Order Dose Route Action     03/23/2021 1318 sodium chloride 0 9 % bolus 1,000 mL 1,000 mL Intravenous New Bag     03/23/2021 1318 ketorolac (TORADOL) injection 30 mg 30 mg Intravenous Given     03/23/2021 1425 morphine (PF) 4 mg/mL injection 4 mg 4 mg Intravenous Given     03/23/2021 1425 ondansetron (ZOFRAN) injection 4 mg 4 mg Intravenous Given     03/23/2021 1424 famotidine (PEPCID) injection 20 mg 20 mg Intravenous Given        Past Medical History:   Diagnosis Date    Depression     Diabetes mellitus (HCC)     Gastroparesis     GERD (gastroesophageal reflux disease)     Hyperlipidemia     Hypertension     Sciatica     Seizure disorder (HCC)      Present on Admission:   Acute right lower quadrant pain   Acquired hypothyroidism   Bipolar disorder ([place] [address] )   Type 2 diabetes mellitus with neurologic complication ([place] [address] )   Diabetic polyneuropathy associated with type 2 diabetes mellitus ([facility])   Gastroparesis   Hypertension associated with diabetes ([place] [address] )   Obesity due to excess calories   Renal cell carcinoma of right kidney ([place] [address] )    Admitting Diagnosis: Nausea [R11 0]  Generalized abdominal pain [R10 84]  Constipation [K59 00]  Flank pain [R10 9]  Acute renal failure (ARF) ([facility]) [N17 9]  Hypertension associated with diabetes ([place] [address] ) [E11 59, I10]  Type 2 diabetes mellitus with diabetic autonomic neuropathy, with long-term current use of insulin ([facility]) [E11 43, Z79 4]  Age/Sex: 48 y o  female  Admission Orders:  Consult internal medicine  accuchecks    Scheduled Medications:  aspirin, 81 mg, Oral, Daily  atorvastatin, 40 mg, Oral, HS  citalopram, 40 mg, Oral, Daily  heparin (porcine), 5,000 Units, Subcutaneous, Q8H HOLDEN  insulin lispro, 1-6 Units, Subcutaneous, TID AC  levETIRAcetam, 500 mg, Oral, Q12H HOLDEN  lisinopril, 10 mg, Oral, Daily  metoclopramide, 10 mg, Oral, BID  metoprolol tartrate, 25 mg, Oral, Q12H HOLDEN  oxybutynin, 5 mg, Oral, Daily  pantoprazole, 40 mg, Oral, Early Morning  pregabalin, 50 mg, Oral, TID  sitaGLIPtin, 100 mg, Oral, Daily    Continuous IV Infusions:  sodium chloride, 150 mL/hr, Intravenous, Continuous    PRN Meds:  albuterol, 2 puff, Inhalation, Q4H PRN  HYDROcodone-acetaminophen, 1 tablet, Oral, Q6H PRN  HYDROmorphone, 1 mg, Intravenous, Q3H PRN  ondansetron, 4 mg, Intravenous, Q6H PRN    Network Utilization Review Department  ATTENTION: Please call with any questions or concerns to [phone] and carefully listen to the prompts so that you are directed to the right person   All voicemails are confidential   Perez benites requests for admission clinical reviews, approved or denied determinations and any other requests to dedicated fax number below belonging to the campus where the patient is receiving treatment   List of dedicated fax numbers for the Facilities:  [address] [place] [address] DENIALS (Administrative/Medical Necessity) [phone]   [address] (Maternity/NICU/Pediatrics) [phone] [address] Nicola Singleton 1264 (  Piedad Briones "Steffi" 103) [address] [address] Crissy Delgado 1481 [address] [place] [address] [phone]

## 2021-03-24 NOTE — ASSESSMENT & PLAN NOTE
· Per patient she has a history of partial resection  · Continue outpatient follow-up with PCP and Oncology as needed

## 2021-03-24 NOTE — ASSESSMENT & PLAN NOTE
47 yo F hx renal cell CA s/p partial nephrectomy 2017 admitted and now HD#2 with worsening acute RLQ pain, nausea, fatigue starting 3/20/21  Today reports pain better controlled, but overall not improved, poor appetite, no vomiting/diarrhea or fever  Abdomen flat and soft, TTP in RLQ and right flank without guarding  T 97 2, HR 57, RR 18, BP 84/58, SpO2 95% on RA  BUN 27, Cr 1 14, WBC 6 2, Hgb 10 3, Plt 282  CT abdomen/pelvis 3/23  Without acute pathology  No evidence of recurrent renal CA  Assessment:  Acute RLQ pain    Plan:  Discussed with Dr Jade Miguel  Recommendation made to proceed with diagnostic laparoscopy possible appendectomy  Discussed with patient who is agreeable  NPO, IV fluids, anticipate surgery today 16:00  Informed written consent to be obtained

## 2021-03-24 NOTE — PROGRESS NOTES
[address] [facility]  Progress Note Rupal Helm 1970, 48 y o  female MRN: [id number]  Unit/Bed#: -02 Encounter: [id number]  Primary Care Provider: Tobi Raman DO   Date and time admitted to hospital: 3/23/2021 12:39 PM    * Acute right lower quadrant pain  Assessment & Plan  49 yo F hx renal cell CA s/p partial nephrectomy 2017 admitted and now HD#2 with worsening acute RLQ pain, nausea, fatigue starting 3/20/21  Today reports pain better controlled, but overall not improved, poor appetite, no vomiting/diarrhea or fever  Abdomen flat and soft, TTP in RLQ and right flank without guarding  T 97 2, HR 57, RR 18, BP 84/58, SpO2 95% on RA  BUN 27, Cr 1 14, WBC 6 2, Hgb 10 3, Plt 282  CT abdomen/pelvis 3/23  Without acute pathology  No evidence of recurrent renal CA  Assessment:  Acute RLQ pain    Plan:  Discussed with Dr Katia Street  Recommendation made to proceed with diagnostic laparoscopy possible appendectomy  Discussed with patient who is agreeable  NPO, IV fluids, anticipate surgery today 16:00  Informed written consent to be obtained  Hypotension  Assessment & Plan  /74 - > 84/58 since admission  Continue IV fluid resuscitation with D5 LR @150 mL/hr while NPO  Recheck BP now and Q2 x 3 today  Internal medicine consulted  Subjective/Objective   Chief Complaint: "Can't eat much"    Subjective: Reports eating jello and a little juice this AM  Appetite still poor with nausea  No vomiting  Abdominal pain described as constant sharp pain in RLQ with radiation to flank/back  No bloating, cramping, heartburn  No diarrhea/constipation  No urinary/vaginal symptoms  No fever or chills  Objective: No overnight events    Blood pressure (!) 84/58, pulse 57, temperature (!) 97 2 °F (36 2 °C), temperature source Temporal, resp  rate 18, height 5' 1" (1 549 m), weight 84 8 kg (186 lb 13 4 oz), SpO2 95 %, not currently breastfeeding  ,Body mass index is 35 3 kg/m²  Intake/Output Summary (Last 24 hours) at 3/24/2021 1125  Last data filed at 3/24/2021 0214  Gross per 24 hour   Intake 1000 ml   Output 250 ml   Net 750 ml       Invasive Devices     Peripheral Intravenous Line            Peripheral IV 03/23/21 Right Antecubital less than 1 day                Physical Exam  Vitals signs and nursing note reviewed  Constitutional:       General: She is not in acute distress  Appearance: She is well-developed  She is not diaphoretic  HENT:      Head: Normocephalic and atraumatic  Eyes:      Conjunctiva/sclera: Conjunctivae normal       Pupils: Pupils are equal, round, and reactive to light  Neck:      Musculoskeletal: Normal range of motion  Pulmonary:      Effort: No respiratory distress  Abdominal:      General: Bowel sounds are normal  There is no distension  Palpations: Abdomen is soft  Tenderness: There is abdominal tenderness  There is no guarding  Hernia: No hernia is present  Comments: TTP in RLQ and R flank  Musculoskeletal: Normal range of motion  Skin:     General: Skin is warm and dry  Capillary Refill: Capillary refill takes less than 2 seconds  Neurological:      Mental Status: She is alert and oriented to person, place, and time  Psychiatric:         Behavior: Behavior normal            Lab, Imaging and other studies:  I have personally reviewed pertinent lab results    , CBC:   Lab Results   Component Value Date    WBC 6 20 03/24/2021    HGB 10 3 (L) 03/24/2021    HCT 31 6 (L) 03/24/2021    MCV 83 03/24/2021     03/24/2021    MCH 27 1 03/24/2021    MCHC 32 8 03/24/2021    RDW 14 2 03/24/2021    MPV 8 6 03/24/2021   , CMP:   Lab Results   Component Value Date    SODIUM 140 03/24/2021    K 4 3 03/24/2021     (H) 03/24/2021    CO2 25 03/24/2021    BUN 27 (H) 03/24/2021    CREATININE 1 14 03/24/2021    CALCIUM 8 0 (L) 03/24/2021    EGFR 56 03/24/2021     VTE Pharmacologic Prophylaxis: Heparin  VTE Mechanical Prophylaxis: sequential compression device

## 2021-03-24 NOTE — NURSING NOTE
Pt returned to Room 113-02 from the OR  Pt AOx4; lethargic  2L O2 progressing  Lung sounds clear  6 lap sites in total observed to pt's abdomen and umbilical; same CDI  VSS  IV fluids infusing  No complaints of pain

## 2021-03-24 NOTE — CONSULTS
[address] 1970, 48 y o  female MRN: [id number]  Unit/Bed#: -02 Encounter: [id number]  Primary Care Provider: Connie Cervantes DO   Date and time admitted to hospital: 3/23/2021 12:39 PM    Inpatient consult to Internal Medicine  Consult performed by: Georgette Garcia MD  Consult ordered by: Michelet Gaytan MD        * Acute right lower quadrant pain  Assessment & Plan  · No obvious etiology found on CT scan of the abdomen/pelvis  · Continue pain control as needed  · Diet as tolerated  · Further management per surgical team    Type 2 diabetes mellitus with neurologic complication ([place] [address] )  Assessment & Plan  · Hold patient's metformin and Januvia for now  · Will initiate insulin sliding scale  · Diabetic diet  · Will monitor fingersticks before meals and at bedtime  · Will adjust regimen as needed  · Patient may resume home meds once cleared for discharge    Hypertension associated with diabetes ([place] [address] )  Assessment & Plan  · Blood pressure has been on the lower side  · Will hold off on lisinopril for now  · Will continue metoprolol with holding parameters  · Continue IV fluids as needed    Renal cell carcinoma of right kidney [facility])  Assessment & Plan  · Per patient she has a history of partial resection  · Continue outpatient follow-up with PCP and Oncology as needed    Bipolar disorder ([place] [address] )  Assessment & Plan  · Stable  · Continue home medications        Recommendations for Discharge:  · Per Primary Service    History of Present Illness:  Mohinder Sarabia is a 48 y o  female who is originally admitted to the surgical service due to abdominal pain  We are consulted for medical management  Patient with worsening abdominal pain going on for approximately 1 week now  Pain is located in the right side of abdomen, 7 of 10 at worse  Sharp/aching pain  Nonradiating  No alleviating or exacerbating factors    Patient states that she still has this pain this morning however is slightly improved with pain control  Denies any vomiting however was feeling nauseous earlier  No fever or chills  Patient does have a history of renal cell carcinoma of the right kidney with partial resection  Also with history of cholecystectomy and hysterectomy  Patient denies any chest pain or shortness of breath  No lightheadedness or dizziness  Does have decreased appetite  Review of Systems:  Review of Systems   Constitutional: Positive for appetite change and fatigue  Negative for chills and fever  Gastrointestinal: Positive for abdominal pain and nausea  Negative for blood in stool  Neurological: Negative for dizziness and light-headedness  All other systems reviewed and are negative  Past Medical and Surgical History:   Past Medical History:   Diagnosis Date    Depression     Diabetes mellitus ([facility] [address] )     Gastroparesis     GERD (gastroesophageal reflux disease)     Hyperlipidemia     Hypertension     Sciatica     Seizure disorder (HCC)        Past Surgical History:   Procedure Laterality Date    CHOLECYSTECTOMY      HYSTERECTOMY         Meds/Allergies:  all medications and allergies reviewed    Allergies:    Allergies   Allergen Reactions    Azithromycin GI Intolerance and Hives    Benztropine     Butorphanol Hallucinations    Penicillins     Zolpidem        Social History:     Marital Status: /Civil Union    Substance Use History:   Social History     Substance and Sexual Activity   Alcohol Use Never    Frequency: Never     Social History     Tobacco Use   Smoking Status Former Smoker    Quit date:     Years since quittin 2   Smokeless Tobacco Never Used     Social History     Substance and Sexual Activity   Drug Use Never       Family History:  I have reviewed the patients family history    Physical Exam:   Vitals:   Blood Pressure: (!) 84/58 (21 0711)  Pulse: 57 (21 0711)  Temperature: (!) 97 2 °F (36 2 °C) (21 3539)  Temp Source: Temporal (03/24/21 0711)  Respirations: 18 (03/24/21 0711)  Height: 5' 1" (154 9 cm) (03/23/21 1820)  Weight - Scale: 84 8 kg (186 lb 13 4 oz) (03/23/21 1820)  SpO2: 95 % (03/24/21 0711)    Physical Exam  Constitutional:       General: She is not in acute distress  Appearance: She is obese  HENT:      Head: Normocephalic and atraumatic  Nose: Nose normal       Mouth/Throat:      Mouth: Mucous membranes are dry  Eyes:      Extraocular Movements: Extraocular movements intact  Conjunctiva/sclera: Conjunctivae normal    Neck:      Musculoskeletal: Normal range of motion and neck supple  Cardiovascular:      Rate and Rhythm: Normal rate and regular rhythm  Pulmonary:      Effort: Pulmonary effort is normal  No respiratory distress  Abdominal:      Palpations: Abdomen is soft  Tenderness: There is abdominal tenderness  Musculoskeletal: Normal range of motion  General: No swelling  Skin:     General: Skin is warm and dry  Neurological:      General: No focal deficit present  Mental Status: She is alert  Cranial Nerves: No cranial nerve deficit  Psychiatric:         Mood and Affect: Mood normal          Behavior: Behavior normal          Additional Data:   Lab Results: I have personally reviewed pertinent reports        Results from last 7 days   Lab Units 03/24/21  0525   WBC Thousand/uL 6 20   HEMOGLOBIN g/dL 10 3*   HEMATOCRIT % 31 6*   PLATELETS Thousands/uL 282   NEUTROS PCT % 39*   LYMPHS PCT % 51   MONOS PCT % 7   EOS PCT % 3     Results from last 7 days   Lab Units 03/24/21  0525   SODIUM mmol/L 140   POTASSIUM mmol/L 4 3   CHLORIDE mmol/L 110*   CO2 mmol/L 25   BUN mg/dL 27*   CREATININE mg/dL 1 14   CALCIUM mg/dL 8 0*             Lab Results   Component Value Date/Time    HGBA1C 12 6 (H) 08/10/2020 08:31 AM    HGBA1C >14 9 (H) 02/26/2019 03:37 AM    HGBA1C 14 2 (H) 08/29/2018 08:16 AM     Results from last 7 days   Lab Units 03/24/21  0604 03/24/21  0009 03/23/21 2006   POC GLUCOSE mg/dl 106 92 107       Imaging: I have personally reviewed pertinent reports  CT renal stone study abdomen pelvis wo contrast   Final Result by Matilde Berkowitz MD (03/23 8097)      No evidence of nephrolithiasis or obstructive uropathy  Evidence of partial right nephrectomy, cholecystectomy, and hysterectomy  Previously noted hypodense renal cortical lower pole lesion is not apparent on this noncontrast study  If additional characterization of the left lower pole renal parenchyma  Workstation performed: [id number]           ** Please Note: This note has been constructed using a voice recognition system   **

## 2021-03-24 NOTE — ASSESSMENT & PLAN NOTE
· Hold patient's metformin and Januvia for now  · Will initiate insulin sliding scale  · Diabetic diet  · Will monitor fingersticks before meals and at bedtime  · Will adjust regimen as needed  · Patient may resume home meds once cleared for discharge

## 2021-03-24 NOTE — NURSING NOTE
Pt taken to OR holding in her bed  Pt was in no acute distress  D5 Lactated ringers sent over with pt  Pre-op checklist completed on previous shift

## 2021-03-24 NOTE — ANESTHESIA PREPROCEDURE EVALUATION
Procedure:  LAPAROSCOPY DIAGNOSTIC (N/A Abdomen)  APPENDECTOMY LAPAROSCOPIC (N/A Abdomen)    Relevant Problems   CARDIO   (+) Hypertension associated with diabetes ([address] )      ENDO   (+) Acquired hypothyroidism   (+) Type 2 diabetes mellitus with neurologic complication (HCC)      /RENAL   (+) Renal cell carcinoma of right kidney (HCC)      MUSCULOSKELETAL   (+) Chronic low back pain with sciatica      NEURO/PSYCH   (+) Diabetic polyneuropathy associated with type 2 diabetes mellitus (HCC)        Physical Exam    Airway    Mallampati score: III  TM Distance: >3 FB       Dental   upper dentures and lower dentures,     Cardiovascular      Pulmonary      Other Findings        Anesthesia Plan  ASA Score- 3     Anesthesia Type- general with ASA Monitors  Additional Monitors:   Airway Plan: ETT  Plan Factors-Exercise tolerance (METS): >4 METS  Chart reviewed  EKG reviewed  Existing labs reviewed  Patient summary reviewed  Patient is not a current smoker  There is medical exclusion for perioperative obstructive sleep apnea risk education  Induction- intravenous  Postoperative Plan- Plan for postoperative opioid use  Informed Consent- Anesthetic plan and risks discussed with patient  I personally reviewed this patient with the CRNA  Discussed and agreed on the Anesthesia Plan with the CRNA  Joshua Dalton

## 2021-03-24 NOTE — OP NOTE
OPERATIVE REPORT  PATIENT NAME: Sean Barraza    :  1970  MRN: [id number]  Pt Location: [facility] OR ROOM 03    SURGERY DATE: 3/24/2021    Surgeon(s) and Role:     * Alex Gutierrez MD - Primary     * Debby Khan PA-C  The PA was necessary to provide expert assistance; i e  in the form of providing optimal exposure with retraction, suturing, and assistance with dissection in order to perform the most efficient operation and in order to optimize patient safety in the abscence of a qualified surgical resident  Preop Diagnosis:  Acute right lower quadrant pain [R10 31]    Post-Op Diagnosis Codes:     * Acute right lower quadrant pain [R10 31]     * Internal hernia [K45 8]     * Intra-abdominal adhesions [K66 0]    Procedure(s) (LRB):  LAPAROSCOPY DIAGNOSTIC, EXTENSIVE DESI (N/A)  APPENDECTOMY LAPAROSCOPIC (N/A)    Specimen(s):  ID Type Source Tests Collected by Time Destination   1 : APPENDIX AND CONTENTS Tissue Appendix TISSUE Josh Marquez MD 3/24/2021 1749        Estimated Blood Loss:   Minimal    Drains:  Urethral Catheter Latex 16 Fr  (Active)   Collection Container Standard drainage bag 21 1654   Number of days: 0       Anesthesia Type:   General    Operative Indications:  Acute right lower quadrant pain [R10 31]  Patient is a 45-year-old female admitted with acute right lower quadrant abdominal pain of uncertain etiology for which diagnostic laparoscopy is now indicated  Operative Findings:  Patient is found to have an internal hernia in right mid abdomen secondary to adhesions from her prior abdominal surgeries  This resulted in a chronic partial small-bowel obstruction as the likely etiology of her symptom complex  After complete adhesiolysis performed laparoscopically and incidental laparoscopic appendectomy was performed on what appeared to be a grossly normal appendix  This decision had been made preoperatively by both patient and surgeon together      At the completion of the surgery the small bowel was run in a retrograde fashion from ileocecal valve to the ligament of Treitz and the procedure completed uneventfully  Complications:   None    Procedure and Technique:  Patient taken to the operating room where she was properly identified monitored and anesthetized  She received antibiotics perioperatively  Sequential compression devices used for deep vein thrombosis prophylaxis  Castle catheter placed  Abdomen prepped and draped under sterile conditions using aseptic technique  Time-out performed  Skin incised at the umbilicus  Peritoneal cavity entered bluntly with the Tamra clamp  12 mm trocar inserted  Dense intra-abdominal adhesions were encountered  An 11 mm trocar placed in the left upper quadrant  Two 5 mm ports placed in the left mid abdomen all under direct visualization  Laparoscopic lysis of adhesions undertaken during which time the internal hernia was unroofed in the right mid abdomen  A full small-bowel adhesiolysis undertaken  At the completion of the small bowel adhesiolysis and incidental appendectomy was performed in the routine fashion  A window made in the mesoappendix  Appendix divided at its base with a single fire of the Endo GI stapler  The mesoappendix divided with Harmonic lan  He was placed in an endobag and delivered through the umbilical trocar site  Procedure completed with the running of the entirety of the small bowel from ileocecal valve to the ligament of Treitz  Satisfied with the lysis of adhesions the procedure completed with closure of the left upper quadrant trocar site with an 0 Vicryl suture  The periumbilical fascial defect closed with the figure-of-eight of 0 Vicryl suture  Skin closed with subcuticular 4 Monocryl suture  Wounds infiltrated with 0 5% Marcaine  Wounds dressed sterilely  Patient extubated taken to recovery in stable condition       I was present for the entire procedure    Patient Disposition:  PACU     SIGNATURE: Chintan Weaver MD  DATE: March 24, 2021  TIME: 6:18 PM

## 2021-03-24 NOTE — ASSESSMENT & PLAN NOTE
· No obvious etiology found on CT scan of the abdomen/pelvis  · Continue pain control as needed  · Will add on lipase level  · Diet as tolerated  · Further management per surgical team

## 2021-03-25 PROBLEM — E03.9 ACQUIRED HYPOTHYROIDISM: Status: RESOLVED | Noted: 2017-04-05 | Resolved: 2021-03-25

## 2021-03-25 LAB
ANION GAP SERPL CALCULATED.3IONS-SCNC: 9 MMOL/L (ref 4–13)
BASOPHILS # BLD AUTO: 0 THOUSANDS/ΜL (ref 0–0.1)
BASOPHILS NFR BLD AUTO: 0 % (ref 0–2)
BUN SERPL-MCNC: 17 MG/DL (ref 7–25)
CALCIUM SERPL-MCNC: 8.8 MG/DL (ref 8.6–10.5)
CHLORIDE SERPL-SCNC: 105 MMOL/L (ref 98–107)
CO2 SERPL-SCNC: 26 MMOL/L (ref 21–31)
CREAT SERPL-MCNC: 1.06 MG/DL (ref 0.6–1.2)
EOSINOPHIL # BLD AUTO: 0.1 THOUSAND/ΜL (ref 0–0.61)
EOSINOPHIL NFR BLD AUTO: 1 % (ref 0–5)
ERYTHROCYTE [DISTWIDTH] IN BLOOD BY AUTOMATED COUNT: 13.9 % (ref 11.5–14.5)
GFR SERPL CREATININE-BSD FRML MDRD: 61 ML/MIN/1.73SQ M
GLUCOSE SERPL-MCNC: 182 MG/DL (ref 65–140)
GLUCOSE SERPL-MCNC: 183 MG/DL (ref 65–140)
GLUCOSE SERPL-MCNC: 204 MG/DL (ref 65–140)
GLUCOSE SERPL-MCNC: 204 MG/DL (ref 65–99)
GLUCOSE SERPL-MCNC: 207 MG/DL (ref 65–140)
GLUCOSE SERPL-MCNC: 216 MG/DL (ref 65–140)
HCT VFR BLD AUTO: 33.7 % (ref 42–47)
HGB BLD-MCNC: 11 G/DL (ref 12–16)
LYMPHOCYTES # BLD AUTO: 2.6 THOUSANDS/ΜL (ref 0.6–4.47)
LYMPHOCYTES NFR BLD AUTO: 27 % (ref 21–51)
MAGNESIUM SERPL-MCNC: 1.9 MG/DL (ref 1.9–2.7)
MCH RBC QN AUTO: 27.4 PG (ref 26–34)
MCHC RBC AUTO-ENTMCNC: 32.7 G/DL (ref 31–37)
MCV RBC AUTO: 84 FL (ref 81–99)
MONOCYTES # BLD AUTO: 0.7 THOUSAND/ΜL (ref 0.17–1.22)
MONOCYTES NFR BLD AUTO: 7 % (ref 2–12)
NEUTROPHILS # BLD AUTO: 6.2 THOUSANDS/ΜL (ref 1.4–6.5)
NEUTS SEG NFR BLD AUTO: 65 % (ref 42–75)
PHOSPHATE SERPL-MCNC: 3.8 MG/DL (ref 3–5.5)
PLATELET # BLD AUTO: 300 THOUSANDS/UL (ref 149–390)
PMV BLD AUTO: 8.7 FL (ref 8.6–11.7)
POTASSIUM SERPL-SCNC: 3.9 MMOL/L (ref 3.5–5.5)
RBC # BLD AUTO: 4.03 MILLION/UL (ref 3.9–5.2)
SODIUM SERPL-SCNC: 140 MMOL/L (ref 134–143)
WBC # BLD AUTO: 9.5 THOUSAND/UL (ref 4.8–10.8)

## 2021-03-25 PROCEDURE — 80048 BASIC METABOLIC PNL TOTAL CA: CPT | Performed by: PHYSICIAN ASSISTANT

## 2021-03-25 PROCEDURE — NC001 PR NO CHARGE: Performed by: SURGERY

## 2021-03-25 PROCEDURE — C9113 INJ PANTOPRAZOLE SODIUM, VIA: HCPCS | Performed by: PHYSICIAN ASSISTANT

## 2021-03-25 PROCEDURE — 85025 COMPLETE CBC W/AUTO DIFF WBC: CPT | Performed by: PHYSICIAN ASSISTANT

## 2021-03-25 PROCEDURE — 83735 ASSAY OF MAGNESIUM: CPT | Performed by: PHYSICIAN ASSISTANT

## 2021-03-25 PROCEDURE — 84100 ASSAY OF PHOSPHORUS: CPT | Performed by: PHYSICIAN ASSISTANT

## 2021-03-25 PROCEDURE — 99232 SBSQ HOSP IP/OBS MODERATE 35: CPT | Performed by: INTERNAL MEDICINE

## 2021-03-25 PROCEDURE — 99024 POSTOP FOLLOW-UP VISIT: CPT | Performed by: PHYSICIAN ASSISTANT

## 2021-03-25 PROCEDURE — 82948 REAGENT STRIP/BLOOD GLUCOSE: CPT

## 2021-03-25 RX ORDER — INSULIN GLARGINE 100 [IU]/ML
8 INJECTION, SOLUTION SUBCUTANEOUS
Status: DISCONTINUED | OUTPATIENT
Start: 2021-03-25 | End: 2021-03-26 | Stop reason: HOSPADM

## 2021-03-25 RX ORDER — SODIUM CHLORIDE, SODIUM LACTATE, POTASSIUM CHLORIDE, CALCIUM CHLORIDE 600; 310; 30; 20 MG/100ML; MG/100ML; MG/100ML; MG/100ML
75 INJECTION, SOLUTION INTRAVENOUS CONTINUOUS
Status: DISCONTINUED | OUTPATIENT
Start: 2021-03-25 | End: 2021-03-26

## 2021-03-25 RX ADMIN — METOCLOPRAMIDE 10 MG: 5 TABLET ORAL at 08:08

## 2021-03-25 RX ADMIN — CITALOPRAM HYDROBROMIDE 40 MG: 20 TABLET ORAL at 08:08

## 2021-03-25 RX ADMIN — PREGABALIN 50 MG: 50 CAPSULE ORAL at 16:30

## 2021-03-25 RX ADMIN — LEVETIRACETAM 500 MG: 100 INJECTION, SOLUTION INTRAVENOUS at 21:36

## 2021-03-25 RX ADMIN — SODIUM CHLORIDE, SODIUM LACTATE, POTASSIUM CHLORIDE, AND CALCIUM CHLORIDE 75 ML/HR: .6; .31; .03; .02 INJECTION, SOLUTION INTRAVENOUS at 13:57

## 2021-03-25 RX ADMIN — ATORVASTATIN CALCIUM 40 MG: 40 TABLET, FILM COATED ORAL at 21:30

## 2021-03-25 RX ADMIN — METOCLOPRAMIDE 10 MG: 5 TABLET ORAL at 21:30

## 2021-03-25 RX ADMIN — HEPARIN SODIUM 5000 UNITS: 5000 INJECTION INTRAVENOUS; SUBCUTANEOUS at 05:35

## 2021-03-25 RX ADMIN — PREGABALIN 50 MG: 50 CAPSULE ORAL at 21:30

## 2021-03-25 RX ADMIN — HYDROMORPHONE HYDROCHLORIDE 1 MG: 1 INJECTION, SOLUTION INTRAMUSCULAR; INTRAVENOUS; SUBCUTANEOUS at 18:51

## 2021-03-25 RX ADMIN — LEVETIRACETAM 500 MG: 100 INJECTION, SOLUTION INTRAVENOUS at 09:46

## 2021-03-25 RX ADMIN — HEPARIN SODIUM 5000 UNITS: 5000 INJECTION INTRAVENOUS; SUBCUTANEOUS at 13:57

## 2021-03-25 RX ADMIN — PREGABALIN 50 MG: 50 CAPSULE ORAL at 08:08

## 2021-03-25 RX ADMIN — INSULIN GLARGINE 8 UNITS: 100 INJECTION, SOLUTION SUBCUTANEOUS at 21:30

## 2021-03-25 RX ADMIN — PANTOPRAZOLE SODIUM 40 MG: 40 INJECTION, POWDER, FOR SOLUTION INTRAVENOUS at 08:08

## 2021-03-25 RX ADMIN — INSULIN LISPRO 2 UNITS: 100 INJECTION, SOLUTION INTRAVENOUS; SUBCUTANEOUS at 12:10

## 2021-03-25 RX ADMIN — HEPARIN SODIUM 5000 UNITS: 5000 INJECTION INTRAVENOUS; SUBCUTANEOUS at 21:30

## 2021-03-25 RX ADMIN — HYDROMORPHONE HYDROCHLORIDE 1 MG: 1 INJECTION, SOLUTION INTRAMUSCULAR; INTRAVENOUS; SUBCUTANEOUS at 22:30

## 2021-03-25 RX ADMIN — INSULIN LISPRO 2 UNITS: 100 INJECTION, SOLUTION INTRAVENOUS; SUBCUTANEOUS at 08:07

## 2021-03-25 RX ADMIN — DEXTROSE, SODIUM CHLORIDE, SODIUM LACTATE, POTASSIUM CHLORIDE, AND CALCIUM CHLORIDE 125 ML/HR: 5; .6; .31; .03; .02 INJECTION, SOLUTION INTRAVENOUS at 06:14

## 2021-03-25 RX ADMIN — HYDROMORPHONE HYDROCHLORIDE 1 MG: 1 INJECTION, SOLUTION INTRAMUSCULAR; INTRAVENOUS; SUBCUTANEOUS at 08:08

## 2021-03-25 RX ADMIN — ASPIRIN 81 MG: 81 TABLET, COATED ORAL at 08:07

## 2021-03-25 RX ADMIN — HYDROMORPHONE HYDROCHLORIDE 1 MG: 1 INJECTION, SOLUTION INTRAMUSCULAR; INTRAVENOUS; SUBCUTANEOUS at 00:39

## 2021-03-25 RX ADMIN — INSULIN LISPRO 1 UNITS: 100 INJECTION, SOLUTION INTRAVENOUS; SUBCUTANEOUS at 16:33

## 2021-03-25 RX ADMIN — OXYBUTYNIN 5 MG: 5 TABLET, FILM COATED, EXTENDED RELEASE ORAL at 08:08

## 2021-03-25 NOTE — ASSESSMENT & PLAN NOTE
· Hold patient's metformin and Januvia for now  · Continue insulin sliding scale  · Will add Lantus 8 units q h s    · Diabetic diet  · Will monitor fingersticks before meals and at bedtime  · Will adjust regimen as needed  · Patient may resume home meds once cleared for discharge

## 2021-03-25 NOTE — PROGRESS NOTES
[address] [facility]  Progress Note Formerly [facility], [facility] 1970, 48 y o  female MRN: [id number]  Unit/Bed#: -01 Encounter: [id number]  Primary Care Provider: aNkita العراقي DO   Date and time admitted to hospital: 3/23/2021 12:39 PM    Type 2 diabetes mellitus with neurologic complication [facility])  Assessment & Plan  · Hold patient's metformin and Januvia for now  · Continue insulin sliding scale  · Will add Lantus 8 units q h s  · Diabetic diet  · Will monitor fingersticks before meals and at bedtime  · Will adjust regimen as needed  · Patient may resume home meds once cleared for discharge    Hypertension associated with diabetes ([facility] )  Assessment & Plan  · Blood pressure stable  · Will continue to hold lisinopril  · Will continue metoprolol with holding parameters  · Continue IV fluids as needed    Renal cell carcinoma of right kidney [facility])  Assessment & Plan  · Per patient she has a history of partial resection  · Continue outpatient follow-up with PCP and Oncology as needed    Bipolar disorder ([facility] )  Assessment & Plan  · Stable  · Continue home medications    VTE Prophylaxis:  Heparin    Patient Centered Rounds: I have performed bedside rounds with nursing staff today  Discussions with Specialists or Other Care Team Provider: yes  Education and Discussions with Family / Patient:  Updated patient regarding plan of care    Current Length of Stay: 0 day(s)    Current Patient Status: Inpatient   Certification Statement: The patient will continue to require additional inpatient hospital stay due to Appendectomy    Discharge Plan:  Per primary team    Code Status: Level 1 - Full Code    Subjective:   No overnight events noted  Patient had appendectomy done on 2021  Appears to have tolerated the procedure well  Patient tolerating diet at this time  States her pain is controlled    Ambulating    Objective:     Vitals:   Temp (24hrs), Av °F (36 7 °C), Min:97 4 °F (36 3 °C), Max:98 7 °F (37 1 °C)    Temp:  [97 4 °F (36 3 °C)-98 7 °F (37 1 °C)] 98 °F (36 7 °C)  HR:  [54-86] 60  Resp:  [4-21] 18  BP: ()/(54-78) 110/70  SpO2:  [92 %-99 %] 97 %  Body mass index is 36 57 kg/m²  Input and Output Summary (last 24 hours): Intake/Output Summary (Last 24 hours) at 3/25/2021 1444  Last data filed at 3/24/2021 1802  Gross per 24 hour   Intake 1000 ml   Output --   Net 1000 ml       Physical Exam:   Physical Exam  Constitutional:       General: She is not in acute distress  Appearance: She is obese  HENT:      Head: Normocephalic and atraumatic  Nose: Nose normal       Mouth/Throat:      Mouth: Mucous membranes are dry  Eyes:      Extraocular Movements: Extraocular movements intact  Conjunctiva/sclera: Conjunctivae normal    Neck:      Musculoskeletal: Normal range of motion and neck supple  Cardiovascular:      Rate and Rhythm: Normal rate and regular rhythm  Pulmonary:      Effort: Pulmonary effort is normal  No respiratory distress  Abdominal:      Palpations: Abdomen is soft  Comments: Abdominal binder in place  No tenderness noted   Musculoskeletal:         General: No tenderness  Comments: Generalized weakness   Skin:     General: Skin is warm and dry  Neurological:      General: No focal deficit present  Mental Status: She is alert  Cranial Nerves: No cranial nerve deficit     Psychiatric:         Mood and Affect: Mood normal          Behavior: Behavior normal          Additional Data:     Labs:    Results from last 7 days   Lab Units 03/25/21  0440   WBC Thousand/uL 9 50   HEMOGLOBIN g/dL 11 0*   HEMATOCRIT % 33 7*   PLATELETS Thousands/uL 300   NEUTROS PCT % 65   LYMPHS PCT % 27   MONOS PCT % 7   EOS PCT % 1     Results from last 7 days   Lab Units 03/25/21  0440   SODIUM mmol/L 140   POTASSIUM mmol/L 3 9   CHLORIDE mmol/L 105   CO2 mmol/L 26   BUN mg/dL 17   CREATININE mg/dL 1 06   CALCIUM mg/dL 8 8         Results from last 7 days   Lab Units 03/25/21  1129 03/25/21  0531 03/25/21  0113 03/24/21  1841 03/24/21  1531 03/24/21  1026 03/24/21  0604 03/24/21  0009 03/23/21 2006   POC GLUCOSE mg/dl 216* 204* 207* 181* 157* 144* 106 92 107           * I Have Reviewed All Lab Data Listed Above  * Additional Pertinent Lab Tests Reviewed: [facility] Admission  Reviewed    Imaging:  Imaging Reports Reviewed Today Include:  No new imaging    Recent Cultures (last 7 days):           Last 24 Hours Medication List:   Current Facility-Administered Medications   Medication Dose Route Frequency Provider Last Rate    albuterol  2 puff Inhalation Q4H PRN BRUCE Barrios-MICHELLE      aspirin  81 mg Oral Daily Lori Betancourt MD      atorvastatin  40 mg Oral HS Lori Betancourt MD      citalopram  40 mg Oral Daily Lori Betancourt MD      heparin (porcine)  5,000 Units Subcutaneous [facility] Alex Barrios      HYDROcodone-acetaminophen  1 tablet Oral Q6H PRN Luci Diehl PA-C      HYDROmorphone  1 mg Intravenous Q3H PRN Luci Diehl, PA-C      insulin glargine  8 Units Subcutaneous HS Yi Hall MD      insulin lispro  1-6 Units Subcutaneous TID AC Luci Diehl PA-C      lactated ringers  75 mL/hr Intravenous Continuous Gerardo S Dimitriadis, PA-C 75 mL/hr (03/25/21 1357)    levETIRAcetam  500 mg Intravenous Q12H [address] BRUCE Barrios-C 500 mg (03/25/21 0946)    metoclopramide  10 mg Oral BID Lori eBtancourt MD      metoprolol  5 mg Intravenous Q8H PRN BRUCE Barrios-MICHELLE      ondansetron  4 mg Intravenous Q6H PRN BRUCE Barrios-MICHELLE      oxybutynin  5 mg Oral Daily Lori Betancourt MD      pantoprazole  40 mg Intravenous Q24H [address] Luci Diehl, PA-C      pregabalin  50 mg Oral TID Lori Betancourt MD          Today, Patient Was Seen By: Yi Hall MD    ** Please Note: Dictation voice to text software may have been used in the creation of this document   **

## 2021-03-25 NOTE — PROGRESS NOTES
Progress Note - General Surgery   Holly Vazquez 48 y o  female MRN: [id number]  Unit/Bed#: -01 Encounter: [id number]    Assessment:  49 yo female POD 1 s/p diagnostic laparoscopy, lysis of adhesions and incidental appendectomy  Notes RLQ pain improved since surgery  Notes generalized 8/10 abdominal pain and soreness, worse in LUQ  Pain well controlled with medications  Notes 1 BM after surgery last night  Denies fevers, N/V, diarrhea, constipation  Tolerating clear liquid diet well  Hyperglycemia: POC glucose 106-207  T 98, P 60, RR 18, /70, 97% RA  Hgb 11, WBC 9 5, Na 140, K 3 9, P 3 8, Mag 1 9      Plan:  Advance to diabetic full diet   Change fluids to LR without D5  Consult PT to improve mobility and ambulation  Continue to monitor POC glucose and Humalog TID with meals   Encourage ambulation and water intake  Encourage incentive spirometry to optimize pulmonary function   Discuss with case management for inpt rehab vs home upon discharge      Subjective/Objective   Chief Complaint: : "sore, but feel better overall"    Subjective:   49 yo female with PMHx for renal cell cancer s/p partial nephrectomy, seizures, HTN, HLD, GERD, DM, depression POD 1 s/p diagnostic laparoscopy, lysis of adhesions and incidental appendectomy  Notes generalized pain and soreness at its worst 8/10, more pronounced on the LUQ but states she feels better overall from admission  Pain medications are controlling pain well  Notes stabbing RLQ pain has resolved  Pt reports BM yesterday evening after surgery  Pt tolerating clear liquid diet well and notes normal appetite  Urinating with no difficulties  Ambulating with help to the bathroom  Pt denies fever, chills, CP, SOB, nausea, vomiting, constipation, diarrhea  Objective:   Blood pressure 110/70, pulse 60, temperature 98 °F (36 7 °C), resp  rate 18, height 5' 1" (1 549 m), weight 87 8 kg (193 lb 9 oz), SpO2 97 %, not currently breastfeeding  ,Body mass index is 36 57 kg/m²  Intake/Output Summary (Last 24 hours) at 3/25/2021 1254  Last data filed at 3/24/2021 1802  Gross per 24 hour   Intake 1000 ml   Output --   Net 1000 ml       Invasive Devices     Peripheral Intravenous Line            Peripheral IV 03/23/21 Right Antecubital 1 day    Peripheral IV 03/24/21 Right Hand less than 1 day                Physical Exam: /70   Pulse 60   Temp 98 °F (36 7 °C)   Resp 18   Ht 5' 1" (1 549 m)   Wt 87 8 kg (193 lb 9 oz)   SpO2 97%   BMI 36 57 kg/m²      General appearance: alert and oriented, in no acute distress  Head: Normocephalic, without obvious abnormality, atraumatic  Lungs: clear to auscultation bilaterally  Heart: regular rate and rhythm, S1, S2 normal, no murmur, click, rub or gallop  Abdomen: incision sites well approximated, no erythema or discharge, normal BS x4, moderate TTP and percussion   Neuro: alert and oriented to person, place, time, situation    Lab, Imaging and other studies:  I have personally reviewed pertinent lab results    , CBC:   Lab Results   Component Value Date    WBC 9 50 03/25/2021    HGB 11 0 (L) 03/25/2021    HCT 33 7 (L) 03/25/2021    MCV 84 03/25/2021     03/25/2021    MCH 27 4 03/25/2021    MCHC 32 7 03/25/2021    RDW 13 9 03/25/2021    MPV 8 7 03/25/2021   , CMP:   Lab Results   Component Value Date    SODIUM 140 03/25/2021    K 3 9 03/25/2021     03/25/2021    CO2 26 03/25/2021    BUN 17 03/25/2021    CREATININE 1 06 03/25/2021    CALCIUM 8 8 03/25/2021    EGFR 61 03/25/2021     VTE Pharmacologic Prophylaxis: Sequential compression device (Venodyne)  and Heparin  VTE Mechanical Prophylaxis: sequential compression device

## 2021-03-25 NOTE — CASE MANAGEMENT
Cm management met with this pt yesterday morning before she went for surgery,pt was aware she was an obs status that was changed to inpt , pt went to the or for dx lap/lysis of adhesions and incidental appendectomy, pt lives with her spouse in a 2 story home with also a basement, 12-14 steps inside, br on the 2nd floor, DME: glucometer ,walker, commode shower chair, pulse ox, bp ,nebulizer, w/c, pt has hx of bipolar, pt denies smoking & alcohol, pt drives and is independent, pt has had hhc in the past and she does not know the agency, pt has pt/ot consult,pt not staboe for d/c , cm will continue to work on a safe d/c plan, family will transport, CM reviewed d/c planning process including the following: identifying help at home, patient preference for d/c planning needs, availability of treatment team to discuss questions or concerns patient and/or family may have regarding understanding medications and recognizing signs and symptoms once discharged  CM also encouraged patient to follow up with all recommended appointments after discharge  Patient advised of importance for patient and family to participate in managing patients medical well being  Patient/caregiver received discharge checklist   Content reviewed  Patient/caregiver encouraged to participate in discharge plan of care prior to discharge home

## 2021-03-25 NOTE — ASSESSMENT & PLAN NOTE
47 yo F hx renal cell CA s/p partial nephrectomy 2017 admitted and now postop day 1 status post laparoscopic lysis of adhesions and incidental appendectomy  Describes pain as 8/10, tolerating clear liquids, ambulating  Abdomen mild distention, soft, moderate generalized tenderness worse in left upper quadrant  AFVSS on RA, Glucose >200  CT abdomen/pelvis 3/23  Without acute pathology  No evidence of recurrent renal CA  Assessment:  Acute RLQ pain    Plan:  Discussed with Dr Eliot Gardner  Advanced to diabetic full liquid diet  LR @ 75 mL/hr    PT/OT consult

## 2021-03-25 NOTE — TELEPHONE ENCOUNTER
Missed the doctor's phone call last evening  Was told to contact the office  Please give me a call back about the results of Haylee's surgery

## 2021-03-25 NOTE — ASSESSMENT & PLAN NOTE
· Blood pressure stable  · Will continue to hold lisinopril  · Will continue metoprolol with holding parameters  · Continue IV fluids as needed

## 2021-03-25 NOTE — UTILIZATION REVIEW
Continued Stay Review    Date: 3/25/2021                        Current Patient Class: OBSERVATION [email or web address] UPGRADED TO INPT [email or web address] D/T PERSISTENT PAIN POST-OP AND NEED FOR IV ANALGESICS  Current Level of Care: med-surg  03/25/21 1139  Inpatient Admission Once     Question Answer Comment   Level of Care Med Surg    Estimated length of stay More than 2 Midnights    Certification I certify that inpatient services are medically necessary for this patient for a duration of greater than two midnights  See H&P and MD Progress Notes for additional information about the patient's course of treatment  03/25/21 1138       HPI:50 y o  female initially admitted OBS on [email or web address] upgraded to Inpt D/T acute RLQ pain  Assessment/Plan: past medical history significant for type 2 diabetes mellitus, hypertension gastroparesis and removed right renal cell carcinoma presenting with several days of progressively worsening right lower quadrant abdominal pain of uncertain etiology  Persistent RLQ pain despite IV analgesics  OR- lap diagnostic and Lap Appendectomy  Clear liq diet, IVF  IV analgesics  SURGERY DATE: 3/24/2021     Preop Diagnosis:  Acute right lower quadrant pain [R10 31]     Post-Op Diagnosis Codes:     * Acute right lower quadrant pain [R10 31]     * Internal hernia [K45 8]     * Intra-abdominal adhesions [K66 0]     Procedure(s) (LRB):  LAPAROSCOPY DIAGNOSTIC, EXTENSIVE DESI (N/A)  APPENDECTOMY LAPAROSCOPIC (N/A)       Estimated Blood Loss:   Minimal     Anesthesia Type:   General     Operative Indications:  Acute right lower quadrant pain [R10 31]  Patient is a 59-year-old female admitted with acute right lower quadrant abdominal pain of uncertain etiology for which diagnostic laparoscopy is now indicated      Operative Findings:  Patient is found to have an internal hernia in right mid abdomen secondary to adhesions from her prior abdominal surgeries    This resulted in a chronic partial small-bowel obstruction as the likely etiology of her symptom complex      After complete adhesiolysis performed laparoscopically and incidental laparoscopic appendectomy was performed on what appeared to be a grossly normal appendix  This decision had been made preoperatively by both patient and surgeon together      At the completion of the surgery the small bowel was run in a retrograde fashion from ileocecal valve to the ligament of Treitz and the procedure completed uneventfully      Complications:   None    Pertinent Labs/Diagnostic Results:   Results from last 7 days   Lab Units 03/23/21  1605   SARS-COV-2  Negative     Results from last 7 days   Lab Units 03/25/21  0440 03/24/21 2034 03/24/21  0525  03/23/21  1302   WBC Thousand/uL 9 50  --  6 20  --  8 80   HEMOGLOBIN g/dL 11 0*  --  10 3*  --  11 9*   HEMATOCRIT % 33 7*  --  31 6*  --  37 0*   PLATELETS Thousands/uL 300 297 282   < > 336   NEUTROS ABS Thousands/µL 6 20  --  2 40  --  5 50    < > = values in this interval not displayed           Results from last 7 days   Lab Units 03/25/21  0440 03/24/21  0525 03/23/21  1302   SODIUM mmol/L 140 140 136   POTASSIUM mmol/L 3 9 4 3 4 6   CHLORIDE mmol/L 105 110* 102   CO2 mmol/L 26 25 28   ANION GAP mmol/L 9 5 6   BUN mg/dL 17 27* 31*   CREATININE mg/dL 1 06 1 14 1 14   EGFR ml/min/1 73sq m 61 56 56   CALCIUM mg/dL 8 8 8 0* 8 9   MAGNESIUM mg/dL 1 9 1 9  --    PHOSPHORUS mg/dL 3 8 3 8  --          Results from last 7 days   Lab Units 03/25/21  0531 03/25/21  0113 03/24/21  1841 03/24/21  1531 03/24/21  1026 03/24/21  0604 03/24/21  0009 03/23/21 2006   POC GLUCOSE mg/dl 204* 207* 181* 157* 144* 106 92 107     Results from last 7 days   Lab Units 03/25/21  0440 03/24/21  0525 03/23/21  1302   GLUCOSE RANDOM mg/dL 204* 106* 275*         Results from last 7 days   Lab Units 03/24/21  0525   LIPASE u/L 41       Results from last 7 days   Lab Units 03/23/21  1301   CLARITY UA  Clear   COLOR UA  Yellow   SPEC GRAV UA  1 015 PH UA  5 5   GLUCOSE UA mg/dl 3+*   KETONES UA mg/dl Negative   BLOOD UA  Negative   PROTEIN UA mg/dl Negative   NITRITE UA  Negative   BILIRUBIN UA  Negative   UROBILINOGEN UA E U /dl 0 2   LEUKOCYTES UA  Negative     Results from last 7 days   Lab Units 03/23/21  1605   INFLUENZA A PCR  Negative   INFLUENZA B PCR  Negative   RSV PCR  Negative       Vital Signs:   03/25/21 0730  98 4 °F (36 9 °C)  54Abnormal   18  106/54  --  96 %  --  --  --  None (Room air)  --  Lying   03/25/21 0624  --  72  --  102/68  --  --  --  --  --  --  --  --   03/25/21 0300  97 8 °F (36 6 °C)  67  18  98/55  --  96 %  28  --  2 L/min  Nasal cannula  --  Lying   03/24/21 2243  97 9 °F (36 6 °C)  66  17  110/60  --  97 %  28  --  2 L/min  Nasal cannula  --  Lying   03/24/21 2211  97 8 °F (36 6 °C)  65  17  109/61  79  97 %  28  --  2 L/min  Nasal cannula  --  Lying   03/24/21 2033  98 7 °F (37 1 °C)  60  18  123/63  87  92 %  --  --  --  None (Room air)  --  Lying   03/24/21 1938  97 7 °F (36 5 °C)  62  18  114/63  --  --  --  --  --  --  --  Sitting   03/24/21 1930  97 7 °F (36 5 °C)  62  17  136/64  92  95 %  28  --  2 L/min  Nasal cannula  --  Lying   03/24/21 1921  --  --  --  --  --  94 %  28  --  2 L/min  Nasal cannula  --  --   03/24/21 1912  --  62  18  109/58  --  98 %  28  --  2 L/min  Nasal cannula  WDL  --   03/24/21 1902  --  --  18  --  --  --  28  --  2 L/min  Nasal cannula  --  --   03/24/21 1852  --  77  20  144/78  --  98 %  --  4 L/min  --  Nasal cannula  WDL  --   03/24/21 1842  --  81  21  145/77  --  99 %  --  6 L/min  --  Simple mask  --  --   03/24/21 1833  --  --  4Abnormal   --  --  --  --  --  --  --  --  --   03/24/21 1832  97 4 °F (36 3 °C)Abnormal   86  20  152/72  --  97 %  --  6 L/min                 Medications:   Scheduled Medications:  aspirin, 81 mg, Oral, Daily  atorvastatin, 40 mg, Oral, HS  citalopram, 40 mg, Oral, Daily  heparin (porcine), 5,000 Units, Subcutaneous, Q8H [facility]  insulin lispro, 1-6 Units, Subcutaneous, TID AC  levETIRAcetam, 500 mg, Intravenous, Q12H HOLDEN  metoclopramide, 10 mg, Oral, BID  oxybutynin, 5 mg, Oral, Daily  pantoprazole, 40 mg, Intravenous, Q24H HOLDEN  pregabalin, 50 mg, Oral, TID      Continuous IV Infusions:  dextrose 5% lactated ringer's, 125 mL/hr, Intravenous, Continuous    sodium chloride 0 9 % infusion   Rate: 150 mL/hr Dose: 150 mL/hr  Freq: Continuous Route: IV  Indications of Use: IV Hydration  Last Dose: Stopped (03/25/21 0822)  Start: 03/23/21 1600 End: 03/24/21 1005    PRN Meds:  albuterol, 2 puff, Inhalation, Q4H PRN  HYDROcodone-acetaminophen, 1 tablet, Oral, Q6H PRN  HYDROmorphone, 1 mg, Intravenous, Q3H PRN 3/24 x 1, 3/25 x 2  metoprolol, 5 mg, Intravenous, Q8H PRN  ondansetron, 4 mg, Intravenous, Q6H PRN    3/24:  Is  oob  I&o  Clear liq diet  Daily weights  scd      Discharge Plan: TBD    Network Utilization Review Department  ATTENTION: Please call with any questions or concerns to [phone] and carefully listen to the prompts so that you are directed to the right person  All voicemails are confidential   Kiah Lerma all requests for admission clinical reviews, approved or denied determinations and any other requests to dedicated fax number below belonging to the campus where the patient is receiving treatment   List of dedicated fax numbers for the Facilities:  [address] DENIALS (Administrative/Medical Necessity) [phone]   [address] (Maternity/NICU/Pediatrics) [phone]   [address] [place] [address] Dr Nicola Singleton 0039 (  [address] Briones "[address]) [id number] [place] [address] [phone]     [address] [phone]   [place] [address] [phone]

## 2021-03-25 NOTE — PLAN OF CARE
Problem: Potential for Falls  Goal: Patient will remain free of falls  Description: INTERVENTIONS:  - Assess patient frequently for physical needs  -  Identify cognitive and physical deficits and behaviors that affect risk of falls    -  [place] fall precautions as indicated by assessment   - Educate patient/family on patient safety including physical limitations  - Instruct patient to call for assistance with activity based on assessment  - Modify environment to reduce risk of injury  - Consider OT/PT consult to assist with strengthening/mobility  Outcome: Progressing     Problem: PAIN - ADULT  Goal: Verbalizes/displays adequate comfort level or baseline comfort level  Description: Interventions:  - Encourage patient to monitor pain and request assistance  - Assess pain using appropriate pain scale  - Administer analgesics based on type and severity of pain and evaluate response  - Implement non-pharmacological measures as appropriate and evaluate response  - Consider cultural and social influences on pain and pain management  - Notify physician/advanced practitioner if interventions unsuccessful or patient reports new pain  Outcome: Progressing     Problem: INFECTION - ADULT  Goal: Absence or prevention of progression during hospitalization  Description: INTERVENTIONS:  - Assess and monitor for signs and symptoms of infection  - Monitor lab/diagnostic results  - Monitor all insertion sites, i e  indwelling lines, tubes, and drains  - Monitor endotracheal if appropriate and nasal secretions for changes in amount and color  - [place] appropriate cooling/warming therapies per order  - Administer medications as ordered  - Instruct and encourage patient and family to use good hand hygiene technique  - Identify and instruct in appropriate isolation precautions for identified infection/condition  Outcome: Progressing  Goal: Absence of fever/infection during neutropenic period  Description: INTERVENTIONS:  - Monitor WBC    Outcome: Progressing     Problem: SAFETY ADULT  Goal: Patient will remain free of falls  Description: INTERVENTIONS:  - Assess patient frequently for physical needs  -  Identify cognitive and physical deficits and behaviors that affect risk of falls    -  [place] fall precautions as indicated by assessment   - Educate patient/family on patient safety including physical limitations  - Instruct patient to call for assistance with activity based on assessment  - Modify environment to reduce risk of injury  - Consider OT/PT consult to assist with strengthening/mobility  Outcome: Progressing  Goal: Maintain or return to baseline ADL function  Description: INTERVENTIONS:  -  Assess patient's ability to carry out ADLs; assess patient's baseline for ADL function and identify physical deficits which impact ability to perform ADLs (bathing, care of mouth/teeth, toileting, grooming, dressing, etc )  - Assess/evaluate cause of self-care deficits   - Assess range of motion  - Assess patient's mobility; develop plan if impaired  - Assess patient's need for assistive devices and provide as appropriate  - Encourage maximum independence but intervene and supervise when necessary  - Involve family in performance of ADLs  - Assess for home care needs following discharge   - Consider OT consult to assist with ADL evaluation and planning for discharge  - Provide patient education as appropriate  Outcome: Progressing  Goal: Maintain or return mobility status to optimal level  Description: INTERVENTIONS:  - Assess patient's baseline mobility status (ambulation, transfers, stairs, etc )    - Identify cognitive and physical deficits and behaviors that affect mobility  - Identify mobility aids required to assist with transfers and/or ambulation (gait belt, sit-to-stand, lift, walker, cane, etc )  - [place] fall precautions as indicated by assessment  - Record patient progress and toleration of activity level on Mobility SBAR; progress patient to next Phase/Stage  - Instruct patient to call for assistance with activity based on assessment  - Consider rehabilitation consult to assist with strengthening/weightbearing, etc   Outcome: Progressing     Problem: DISCHARGE PLANNING  Goal: Discharge to home or other facility with appropriate resources  Description: INTERVENTIONS:  - Identify barriers to discharge w/patient and caregiver  - Arrange for needed discharge resources and transportation as appropriate  - Identify discharge learning needs (meds, wound care, etc )  - Arrange for interpretive services to assist at discharge as needed  - Refer to Case Management Department for coordinating discharge planning if the patient needs post-hospital services based on physician/advanced practitioner order or complex needs related to functional status, cognitive ability, or social support system  Outcome: Progressing     Problem: Knowledge Deficit  Goal: Patient/family/caregiver demonstrates understanding of disease process, treatment plan, medications, and discharge instructions  Description: Complete learning assessment and assess knowledge base    Interventions:  - Provide teaching at level of understanding  - Provide teaching via preferred learning methods  Outcome: Progressing

## 2021-03-25 NOTE — PROGRESS NOTES
[facility]  Progress Note Sri Fierro 1970, 48 y o  female MRN: [id number]  Unit/Bed#: -01 Encounter: [id number]  Primary Care Provider: Daisy Cronin DO   Date and time admitted to hospital: 3/23/2021 12:39 PM    Acute right lower quadrant pain  Assessment & Plan  49 yo F hx renal cell CA s/p partial nephrectomy 2017 admitted and now postop day 1 status post laparoscopic lysis of adhesions and incidental appendectomy  Describes pain as 8/10, tolerating clear liquids, ambulating  Abdomen mild distention, soft, moderate generalized tenderness worse in left upper quadrant  AFVSS on RA, Glucose >200  CT abdomen/pelvis 3/23  Without acute pathology  No evidence of recurrent renal CA  Assessment:  Acute RLQ pain    Plan:  Discussed with Dr Cowan Median  Advanced to diabetic full liquid diet  LR @ 75 mL/hr  PT/OT consult          Subjective/Objective   Chief Complaint: "Not bad'    Subjective:  Reports 8/10 pain at times, little better controlled today  Tolerating clear liquid breakfast and lunch, requesting more substantial food  No nausea or vomiting  No bowel movements postop period ambulating and urinating  Objective:  No overnight events  Blood pressure 110/70, pulse 60, temperature 98 °F (36 7 °C), resp  rate 18, height 5' 1" (1 549 m), weight 87 8 kg (193 lb 9 oz), SpO2 97 %, not currently breastfeeding  ,Body mass index is 36 57 kg/m²  Intake/Output Summary (Last 24 hours) at 3/25/2021 1438  Last data filed at 3/24/2021 1802  Gross per 24 hour   Intake 1000 ml   Output --   Net 1000 ml       Invasive Devices     Peripheral Intravenous Line            Peripheral IV 03/23/21 Right Antecubital 2 days    Peripheral IV 03/24/21 Right Hand less than 1 day                Physical Exam  Vitals signs and nursing note reviewed  Constitutional:       General: She is not in acute distress  Appearance: She is well-developed  She is not diaphoretic  HENT:      Head: Normocephalic and atraumatic  Eyes:      Conjunctiva/sclera: Conjunctivae normal       Pupils: Pupils are equal, round, and reactive to light  Neck:      Musculoskeletal: Normal range of motion  Cardiovascular:      Rate and Rhythm: Normal rate and regular rhythm  Pulmonary:      Effort: Pulmonary effort is normal  No respiratory distress  Breath sounds: Normal breath sounds  Abdominal:      General: Bowel sounds are normal  There is distension (Mild)  Palpations: Abdomen is soft  Tenderness: There is abdominal tenderness ( to moderate generalized, worse in the left upper quadrant)  Comments: Incisions clean dry and intact  Musculoskeletal: Normal range of motion  Skin:     General: Skin is warm and dry  Capillary Refill: Capillary refill takes less than 2 seconds  Neurological:      Mental Status: She is alert and oriented to person, place, and time  Psychiatric:         Behavior: Behavior normal            Lab, Imaging and other studies:  I have personally reviewed pertinent lab results    , CBC:   Lab Results   Component Value Date    WBC 9 50 03/25/2021    HGB 11 0 (L) 03/25/2021    HCT 33 7 (L) 03/25/2021    MCV 84 03/25/2021     03/25/2021    MCH 27 4 03/25/2021    MCHC 32 7 03/25/2021    RDW 13 9 03/25/2021    MPV 8 7 03/25/2021   , CMP:   Lab Results   Component Value Date    SODIUM 140 03/25/2021    K 3 9 03/25/2021     03/25/2021    CO2 26 03/25/2021    BUN 17 03/25/2021    CREATININE 1 06 03/25/2021    CALCIUM 8 8 03/25/2021    EGFR 61 03/25/2021   1  VTE Pharmacologic Prophylaxis: Heparin  VTE Mechanical Prophylaxis: sequential compression device

## 2021-03-26 VITALS
WEIGHT: 194 LBS | HEIGHT: 61 IN | RESPIRATION RATE: 16 BRPM | BODY MASS INDEX: 36.63 KG/M2 | HEART RATE: 59 BPM | OXYGEN SATURATION: 95 % | TEMPERATURE: 97.1 F | DIASTOLIC BLOOD PRESSURE: 67 MMHG | SYSTOLIC BLOOD PRESSURE: 128 MMHG

## 2021-03-26 LAB
GLUCOSE SERPL-MCNC: 128 MG/DL (ref 65–140)
GLUCOSE SERPL-MCNC: 207 MG/DL (ref 65–140)

## 2021-03-26 PROCEDURE — C9113 INJ PANTOPRAZOLE SODIUM, VIA: HCPCS | Performed by: PHYSICIAN ASSISTANT

## 2021-03-26 PROCEDURE — 82948 REAGENT STRIP/BLOOD GLUCOSE: CPT

## 2021-03-26 PROCEDURE — 99024 POSTOP FOLLOW-UP VISIT: CPT | Performed by: PHYSICIAN ASSISTANT

## 2021-03-26 PROCEDURE — 97162 PT EVAL MOD COMPLEX 30 MIN: CPT

## 2021-03-26 PROCEDURE — 97166 OT EVAL MOD COMPLEX 45 MIN: CPT

## 2021-03-26 RX ORDER — HYDROCODONE BITARTRATE AND ACETAMINOPHEN 5; 325 MG/1; MG/1
1 TABLET ORAL EVERY 6 HOURS PRN
Qty: 12 TABLET | Refills: 0 | Status: SHIPPED | OUTPATIENT
Start: 2021-03-26 | End: 2021-04-05

## 2021-03-26 RX ORDER — HYDROCODONE BITARTRATE AND ACETAMINOPHEN 5; 325 MG/1; MG/1
1 TABLET ORAL EVERY 6 HOURS PRN
Qty: 12 TABLET | Refills: 0 | Status: CANCELLED | OUTPATIENT
Start: 2021-03-26 | End: 2021-04-05

## 2021-03-26 RX ORDER — HYDROMORPHONE HCL/PF 1 MG/ML
1 SYRINGE (ML) INJECTION
Status: DISCONTINUED | OUTPATIENT
Start: 2021-03-26 | End: 2021-03-26 | Stop reason: HOSPADM

## 2021-03-26 RX ORDER — HYDROCODONE BITARTRATE AND ACETAMINOPHEN 5; 325 MG/1; MG/1
2 TABLET ORAL EVERY 6 HOURS PRN
Status: DISCONTINUED | OUTPATIENT
Start: 2021-03-26 | End: 2021-03-26 | Stop reason: HOSPADM

## 2021-03-26 RX ADMIN — CITALOPRAM HYDROBROMIDE 40 MG: 20 TABLET ORAL at 08:29

## 2021-03-26 RX ADMIN — ASPIRIN 81 MG: 81 TABLET, COATED ORAL at 08:29

## 2021-03-26 RX ADMIN — PREGABALIN 50 MG: 50 CAPSULE ORAL at 08:29

## 2021-03-26 RX ADMIN — INSULIN LISPRO 2 UNITS: 100 INJECTION, SOLUTION INTRAVENOUS; SUBCUTANEOUS at 12:02

## 2021-03-26 RX ADMIN — PANTOPRAZOLE SODIUM 40 MG: 40 INJECTION, POWDER, FOR SOLUTION INTRAVENOUS at 08:29

## 2021-03-26 RX ADMIN — HYDROMORPHONE HYDROCHLORIDE 1 MG: 1 INJECTION, SOLUTION INTRAMUSCULAR; INTRAVENOUS; SUBCUTANEOUS at 04:29

## 2021-03-26 RX ADMIN — SODIUM CHLORIDE, SODIUM LACTATE, POTASSIUM CHLORIDE, AND CALCIUM CHLORIDE 75 ML/HR: .6; .31; .03; .02 INJECTION, SOLUTION INTRAVENOUS at 04:29

## 2021-03-26 RX ADMIN — HEPARIN SODIUM 5000 UNITS: 5000 INJECTION INTRAVENOUS; SUBCUTANEOUS at 06:16

## 2021-03-26 RX ADMIN — METOCLOPRAMIDE 10 MG: 5 TABLET ORAL at 08:29

## 2021-03-26 RX ADMIN — LEVETIRACETAM 500 MG: 100 INJECTION, SOLUTION INTRAVENOUS at 08:45

## 2021-03-26 RX ADMIN — HYDROCODONE BITARTRATE AND ACETAMINOPHEN 2 TABLET: 5; 325 TABLET ORAL at 08:49

## 2021-03-26 RX ADMIN — OXYBUTYNIN 5 MG: 5 TABLET, FILM COATED, EXTENDED RELEASE ORAL at 08:29

## 2021-03-26 NOTE — PLAN OF CARE
Problem: Potential for Falls  Goal: Patient will remain free of falls  Description: INTERVENTIONS:  - Assess patient frequently for physical needs  -  Identify cognitive and physical deficits and behaviors that affect risk of falls    -  [place] fall precautions as indicated by assessment   - Educate patient/family on patient safety including physical limitations  - Instruct patient to call for assistance with activity based on assessment  - Modify environment to reduce risk of injury  - Consider OT/PT consult to assist with strengthening/mobility  Outcome: Progressing     Problem: PAIN - ADULT  Goal: Verbalizes/displays adequate comfort level or baseline comfort level  Description: Interventions:  - Encourage patient to monitor pain and request assistance  - Assess pain using appropriate pain scale  - Administer analgesics based on type and severity of pain and evaluate response  - Implement non-pharmacological measures as appropriate and evaluate response  - Consider cultural and social influences on pain and pain management  - Notify physician/advanced practitioner if interventions unsuccessful or patient reports new pain  Outcome: Progressing     Problem: INFECTION - ADULT  Goal: Absence or prevention of progression during hospitalization  Description: INTERVENTIONS:  - Assess and monitor for signs and symptoms of infection  - Monitor lab/diagnostic results  - Monitor all insertion sites, i e  indwelling lines, tubes, and drains  - Monitor endotracheal if appropriate and nasal secretions for changes in amount and color  - [place] appropriate cooling/warming therapies per order  - Administer medications as ordered  - Instruct and encourage patient and family to use good hand hygiene technique  - Identify and instruct in appropriate isolation precautions for identified infection/condition  Outcome: Progressing  Goal: Absence of fever/infection during neutropenic period  Description: INTERVENTIONS:  - Monitor WBC    Outcome: Progressing     Problem: SAFETY ADULT  Goal: Patient will remain free of falls  Description: INTERVENTIONS:  - Assess patient frequently for physical needs  -  Identify cognitive and physical deficits and behaviors that affect risk of falls    -  [place] fall precautions as indicated by assessment   - Educate patient/family on patient safety including physical limitations  - Instruct patient to call for assistance with activity based on assessment  - Modify environment to reduce risk of injury  - Consider OT/PT consult to assist with strengthening/mobility  Outcome: Progressing  Goal: Maintain or return to baseline ADL function  Description: INTERVENTIONS:  -  Assess patient's ability to carry out ADLs; assess patient's baseline for ADL function and identify physical deficits which impact ability to perform ADLs (bathing, care of mouth/teeth, toileting, grooming, dressing, etc )  - Assess/evaluate cause of self-care deficits   - Assess range of motion  - Assess patient's mobility; develop plan if impaired  - Assess patient's need for assistive devices and provide as appropriate  - Encourage maximum independence but intervene and supervise when necessary  - Involve family in performance of ADLs  - Assess for home care needs following discharge   - Consider OT consult to assist with ADL evaluation and planning for discharge  - Provide patient education as appropriate  Outcome: Progressing  Goal: Maintain or return mobility status to optimal level  Description: INTERVENTIONS:  - Assess patient's baseline mobility status (ambulation, transfers, stairs, etc )    - Identify cognitive and physical deficits and behaviors that affect mobility  - Identify mobility aids required to assist with transfers and/or ambulation (gait belt, sit-to-stand, lift, walker, cane, etc )  - [place] fall precautions as indicated by assessment  - Record patient progress and toleration of activity level on Mobility SBAR; progress patient to next Phase/Stage  - Instruct patient to call for assistance with activity based on assessment  - Consider rehabilitation consult to assist with strengthening/weightbearing, etc   Outcome: Progressing     Problem: DISCHARGE PLANNING  Goal: Discharge to home or other facility with appropriate resources  Description: INTERVENTIONS:  - Identify barriers to discharge w/patient and caregiver  - Arrange for needed discharge resources and transportation as appropriate  - Identify discharge learning needs (meds, wound care, etc )  - Arrange for interpretive services to assist at discharge as needed  - Refer to Case Management Department for coordinating discharge planning if the patient needs post-hospital services based on physician/advanced practitioner order or complex needs related to functional status, cognitive ability, or social support system  Outcome: Progressing     Problem: Knowledge Deficit  Goal: Patient/family/caregiver demonstrates understanding of disease process, treatment plan, medications, and discharge instructions  Description: Complete learning assessment and assess knowledge base    Interventions:  - Provide teaching at level of understanding  - Provide teaching via preferred learning methods  Outcome: Progressing

## 2021-03-26 NOTE — PLAN OF CARE
Problem: PHYSICAL THERAPY ADULT  Goal: Performs mobility at highest level of function for planned discharge setting  See evaluation for individualized goals  Description: Treatment/Interventions: Functional transfer training, LE strengthening/ROM, Elevations, Therapeutic exercise, Endurance training, Patient/family training, Equipment eval/education, Bed mobility, Gait training, Spoke to nursing, Spoke to case management  Equipment Recommended: (pt encouraged to continue to use RW)       See flowsheet documentation for full assessment, interventions and recommendations  Note: Prognosis: Good  Problem List: Decreased strength, Decreased endurance, Impaired balance, Decreased mobility, Obesity, Pain  Assessment: Pt is 48 y o  female seen for PT evaluation on 3/26/2021 s/p admit to [address] on 3/23/2021 w/ Internal hernia, s/p appendectomy 3/24/21  PT consulted to assess pt's functional mobility and d/c needs  Order placed for PT eval and tx, w/ up as tolerated order  PT performed at least 2 patient identifiers during session: Name and wristband  Comorbidities affecting pt's physical performance at time of assessment include: DM type 2, HTN, renal cell carcinoma of R kidney, bipolar disorder  PTA, pt was independent w/ all functional mobility w/ no AD, ambulates community distances and elevations, ambulates household distances, has 3 VARGAS and lives w/ spouse in 2 level home  Personal factors affecting pt at time of IE include: lives in 2 story house, stairs to enter home, positive fall history, unable to perform physical activity and inability to perform IADLs  Please find objective findings from PT assessment regarding body systems outlined above with impairments and limitations including weakness, impaired balance, decreased endurance, pain, decreased activity tolerance, fall risk and decreased skin integrity, as well as mobility assessment (need for cueing for mobility technique)   The following objective measures performed on IE also reveal limitations: Barthel Index: 34/934 and AM-PAC 6-Clicks: 43/57  Pt's clinical presentation is currently evolving  Pt to benefit from continued PT tx to address deficits as defined above and maximize level of functional independent mobility and consistency  From PT/mobility standpoint, recommendation at time of d/c would be OP PT vs HHPT, pending progress in order to facilitate return to PLOF  Barriers to Discharge: None     PT Discharge Recommendation: Home with skilled therapy, Return to previous environment with social support(OP PT vs HHPT, pending progress)     PT - OK to Discharge: Yes(when medically cleared)    See flowsheet documentation for full assessment

## 2021-03-26 NOTE — NURSING NOTE
Dr Misty Chopra was in to see and eval the pt and she is ok for d/c to home, iv site removed with the tip intact, all lap abd sites cdi, avs reviewed with the pt, all questions answered to her satisfaction, taken to family car via w/c by the pca and the pt was d/c'd from the hospital

## 2021-03-26 NOTE — CASE MANAGEMENT
Pt was accepted by [facility], pt was made aware she wi receive a call from the nurse, pt's spouse will transport the pt hoe, pt denies any additional d/c needs, pt is in agreement with the d/c and d/c plan home with spouse and hhc

## 2021-03-26 NOTE — UTILIZATION REVIEW
Notification of Inpatient Admission/Inpatient Authorization Request   This is a Notification of Inpatient Admission for [address]  Be advised that this patient was admitted to our facility under Inpatient Status  Contact Darling Fish at [phone] for additional admission information  [facility] DE[facility]  DEDICATED -[phone]  Patient Name:   David Muhammad   YOB: 1970       [address]   [address]:   [address]  Tax ID: [id number]  NPI: [id number] Attending Provider/NPI:  Phone:  Address: [place] [[id number]]  [phone]  Same as Facility   Place of Service Code: 24 Place of Service Name: [address] [place] [address]   Start Date: 3/25/21 1138 Discharge Date & Time: 3/26/2021  1:50 PM    Type of Admission: Inpatient Status Discharge Disposition   (if discharged): Home with [address] [employer] [facility]   Patient Diagnoses: Nausea [R11 0]  Generalized abdominal pain [R10 84]  Constipation [K59 00]  Flank pain [R10 9]  Acute renal failure (ARF) ([address] ) [N17 9]  Hypertension associated with diabetes ([address] ) [E11 59, I10]  Type 2 diabetes mellitus with diabetic autonomic neuropathy, with long-term current use of insulin ([address] ) [E11 43, Z79 4]     Orders: Admission Orders (From admission, onward)     Ordered        03/25/21 1138  Inpatient Admission  Once         03/23/21 1538  Place in Observation  Once                    Assigned Utilization Review Contact: Ritika Weiss  Utilization   Network Utilization Review Department  Phone: [phone]; Fax [phone]  Email: [email or web address]   ATTENTION PAYERS: Please call the assigned Utilization  directly with any questions or concerns ALL voicemails in the department are confidential  Send all requests for admission clinical reviews, approved or denied determinations and any other requests to dedicated fax number belonging to the campus where the patient is receiving treatment

## 2021-03-26 NOTE — DISCHARGE SUMMARY
[facility]  Discharge- Ros Felix 1970, 48 y o  female MRN: [id number]  Unit/Bed#: -[place] Encounter: [id number]  Primary Care Provider: Nadir Cochran DO   Date and time admitted to hospital: 3/23/2021 12:39 PM    Acute right lower quadrant pain  Assessment & Plan  47 yo F hx renal cell CA s/p partial nephrectomy 2017 admitted and now postop day 2 status post laparoscopic lysis of adhesions and incidental appendectomy  Pain controlled, tolerating diet  Abdomen soft with mild incisional tenderness  Afebrile, vital signs stable  CT abdomen/pelvis 3/23  Without acute pathology  No evidence of recurrent renal CA  Assessment:  Acute RLQ pain    Plan:  Discussed with Dr Sylvester Gandhi  Discharged home with home health care  Outpatient follow-up in 2 weeks  Postop instructions reviewed  Resolved Problems  Date Reviewed: 3/24/2021    None          Admission Date:   Admission Orders (From admission, onward)     Ordered        03/25/21 1138  Inpatient Admission  Once         03/23/21 1538  Place in Observation  Once                     Admitting Diagnosis: Nausea [R11 0]  Generalized abdominal pain [R10 84]  Constipation [K59 00]  Flank pain [R10 9]  Acute renal failure (ARF) (HCC) [N17 9]  Hypertension associated with diabetes ([address] ) [E11 59, I10]  Type 2 diabetes mellitus with diabetic autonomic neuropathy, with long-term current use of insulin (HCC) [E11 43, Z79 4]    HPI:  Pleasant 71-year-old female presenting with acute right lower quadrant pain of unknown etiology for with which observation on the general surgery service was recommended by Dr Sylvester Gandhi  Procedures Performed: No orders of the defined types were placed in this encounter  Summary of Hospital Course:     Hospital day 1 she was admitted IV fluids, pain control, clear liquid diet  Hospital day 2 reported persistent symptoms for which recommendation made for diagnostic laparoscopy  Procedure completed by Dr Sheron Hays  Procedure included diagnostic laparoscopy extensive laparoscopic lysis of adhesions and incidental appendectomy  Hospital day 3 reported significant postoperative pain, no nausea or vomiting, her abdomen was soft with incisional tenderness, vital signs stable on room air  Hospital day 4 reported improvement of her pain, tolerating full liquid diet, ambulating and urinating  She appeared clinically and hemodynamically stable for discharge home with outpatient surgical follow-up  Significant Findings, Care, Treatment and Services Provided:  Right lower quadrant pain secondary to adhesive disease and chronic partial small-bowel obstruction requiring laparoscopic lysis of adhesions and incidental appendectomy  Complications:  None    Condition at Discharge: stable         Discharge instructions/Information to patient and family:   See after visit summary for information provided to patient and family  Provisions for Follow-Up Care:  See after visit summary for information related to follow-up care and any pertinent home health orders  PCP: Hemalatha Smart DO    Disposition: Home with home health care    Planned Readmission: No    Discharge Statement   I spent 5 minutes discharging the patient  This time was spent on the day of discharge  I had direct contact with the patient on the day of discharge  Additional documentation is required if more than 30 minutes were spent on discharge  Discharge Medications:  See after visit summary for reconciled discharge medications provided to patient and family

## 2021-03-26 NOTE — DISCHARGE INSTR - AVS FIRST PAGE
[facility]    Discharge Instructions  Light activity for 2 weeks  No heavy lifting for 2 weeks  Max 10 lbs for 2 weeks  No driving for 3-7 days or until pain is well controlled  Take discharge medications as prescribed  Notify our office for nausea, vomiting, fever, diarrhea, chest pain, trouble breathing  Follow up in our office in 2 weeks or sooner if needed  Call with additional questions or concerns [phone]

## 2021-03-26 NOTE — CASE MANAGEMENT
Pt to be d/c home today , A post acute care recommendation was made by your care team for [address]  Discussed Freedom of Choice with patient  List of agencies given to patient via in person  patient aware the list is custom filtered for them by insurance and that [facility] post acute providers are designated   Pt's choice is [facility] referrral was sent as requestes,  will transport the pt home,

## 2021-03-26 NOTE — PLAN OF CARE
Problem: Potential for Falls  Goal: Patient will remain free of falls  Description: INTERVENTIONS:  - Assess patient frequently for physical needs  -  Identify cognitive and physical deficits and behaviors that affect risk of falls    -  [place] fall precautions as indicated by assessment   - Educate patient/family on patient safety including physical limitations  - Instruct patient to call for assistance with activity based on assessment  - Modify environment to reduce risk of injury  - Consider OT/PT consult to assist with strengthening/mobility  3/26/2021 1224 by Sophie Gauthier RN  Outcome: Adequate for Discharge  3/26/2021 0858 by Sophie Gauthier RN  Outcome: Progressing     Problem: PAIN - ADULT  Goal: Verbalizes/displays adequate comfort level or baseline comfort level  Description: Interventions:  - Encourage patient to monitor pain and request assistance  - Assess pain using appropriate pain scale  - Administer analgesics based on type and severity of pain and evaluate response  - Implement non-pharmacological measures as appropriate and evaluate response  - Consider cultural and social influences on pain and pain management  - Notify physician/advanced practitioner if interventions unsuccessful or patient reports new pain  3/26/2021 1224 by Sophie Gauthier RN  Outcome: Adequate for Discharge  3/26/2021 0858 by Sophie Gauthier RN  Outcome: Progressing     Problem: INFECTION - ADULT  Goal: Absence or prevention of progression during hospitalization  Description: INTERVENTIONS:  - Assess and monitor for signs and symptoms of infection  - Monitor lab/diagnostic results  - Monitor all insertion sites, i e  indwelling lines, tubes, and drains  - Monitor endotracheal if appropriate and nasal secretions for changes in amount and color  - [place] appropriate cooling/warming therapies per order  - Administer medications as ordered  - Instruct and encourage patient and family to use good hand hygiene technique  - Identify and instruct in appropriate isolation precautions for identified infection/condition  3/26/2021 1224 by Rickie Carrasco RN  Outcome: Adequate for Discharge  3/26/2021 0858 by Rickie Carrasco RN  Outcome: Progressing  Goal: Absence of fever/infection during neutropenic period  Description: INTERVENTIONS:  - Monitor WBC    3/26/2021 1224 by Rickie Carrasco RN  Outcome: Adequate for Discharge  3/26/2021 0858 by Rickie Carrasco RN  Outcome: Progressing     Problem: SAFETY ADULT  Goal: Patient will remain free of falls  Description: INTERVENTIONS:  - Assess patient frequently for physical needs  -  Identify cognitive and physical deficits and behaviors that affect risk of falls    -  [place] fall precautions as indicated by assessment   - Educate patient/family on patient safety including physical limitations  - Instruct patient to call for assistance with activity based on assessment  - Modify environment to reduce risk of injury  - Consider OT/PT consult to assist with strengthening/mobility  3/26/2021 1224 by Rickie Carrasco RN  Outcome: Adequate for Discharge  3/26/2021 0858 by Rickie Carrasco RN  Outcome: Progressing  Goal: Maintain or return to baseline ADL function  Description: INTERVENTIONS:  -  Assess patient's ability to carry out ADLs; assess patient's baseline for ADL function and identify physical deficits which impact ability to perform ADLs (bathing, care of mouth/teeth, toileting, grooming, dressing, etc )  - Assess/evaluate cause of self-care deficits   - Assess range of motion  - Assess patient's mobility; develop plan if impaired  - Assess patient's need for assistive devices and provide as appropriate  - Encourage maximum independence but intervene and supervise when necessary  - Involve family in performance of ADLs  - Assess for home care needs following discharge   - Consider OT consult to assist with ADL evaluation and planning for discharge  - Provide patient education as appropriate  3/26/2021 1224 by Mauricio Barbosa RN  Outcome: Adequate for Discharge  3/26/2021 0234 by Mauricio Barbosa RN  Outcome: Progressing  Goal: Maintain or return mobility status to optimal level  Description: INTERVENTIONS:  - Assess patient's baseline mobility status (ambulation, transfers, stairs, etc )    - Identify cognitive and physical deficits and behaviors that affect mobility  - Identify mobility aids required to assist with transfers and/or ambulation (gait belt, sit-to-stand, lift, walker, cane, etc )  - [place] fall precautions as indicated by assessment  - Record patient progress and toleration of activity level on Mobility SBAR; progress patient to next Phase/Stage  - Instruct patient to call for assistance with activity based on assessment  - Consider rehabilitation consult to assist with strengthening/weightbearing, etc   3/26/2021 1224 by Mauricio Barbosa RN  Outcome: Adequate for Discharge  3/26/2021 0858 by Mauricio Barbosa RN  Outcome: Progressing     Problem: DISCHARGE PLANNING  Goal: Discharge to home or other facility with appropriate resources  Description: INTERVENTIONS:  - Identify barriers to discharge w/patient and caregiver  - Arrange for needed discharge resources and transportation as appropriate  - Identify discharge learning needs (meds, wound care, etc )  - Arrange for interpretive services to assist at discharge as needed  - Refer to Case Management Department for coordinating discharge planning if the patient needs post-hospital services based on physician/advanced practitioner order or complex needs related to functional status, cognitive ability, or social support system  3/26/2021 1224 by Mauricio Barbosa RN  Outcome: Adequate for Discharge  3/26/2021 0858 by Mauricio Barbosa RN  Outcome: Progressing     Problem: Knowledge Deficit  Goal: Patient/family/caregiver demonstrates understanding of disease process, treatment plan, medications, and discharge instructions  Description: Complete learning assessment and assess knowledge base    Interventions:  - Provide teaching at level of understanding  - Provide teaching via preferred learning methods  3/26/2021 1224 by Sophie Gauthier RN  Outcome: Adequate for Discharge  3/26/2021 0858 by Sophie Gauthier RN  Outcome: Progressing

## 2021-03-26 NOTE — PLAN OF CARE
Problem: OCCUPATIONAL THERAPY ADULT  Goal: Performs self-care activities at highest level of function for planned discharge setting  See evaluation for individualized goals  Description: Treatment Interventions: ADL retraining, Functional transfer training, UE strengthening/ROM, Endurance training, Patient/family training, Equipment evaluation/education          See flowsheet documentation for full assessment, interventions and recommendations  Note: Limitation: Decreased ADL status, Decreased UE strength, Decreased Safe judgement during ADL, Decreased endurance, Decreased self-care trans, Decreased high-level ADLs  Prognosis: Good  Assessment: Patient is a 48 y o  female seen for OT evaluation at [address] [place] [address] on 3/26/21  s/p Internal hernia  Comorbidities impacting functional performance include: Bipolar Disorder, DM2, gastroparesis, HTN, obesity, renal cell carcinoma of R kidney, abdominal pain, hypotension, and intraabdominal adhesions  Patient presents with active orders for OT eval and treat and up as tolerated  Performed at least 2 patient identifiers during session including name and wristband  Patient reports prior level of function as independent  with ADLs, needs light assistance with IADLs, ambulatory without AD , and lives with spouse in a 2 story house  with 3 VARGAS c HR  Pt states she is able to sleep in first floor recliner if needed  Patient is retired  and does  drive  Upon initial evaluation, patient requires supervision for UB ADLs, min assist for LB ADLs, and supervision for transfers and functional mobility with RW  Based on functional eval, patient presents A&Ox4 with intact  attention, safety awareness, and memory  Occupational performance is affected by the following deficits: endurance , muscular strength , balance , functional reach  and (+) pain   Patient would benefit from OT services to maximize independence in self-care and transfers   Personal factors impacting performance include: (+) Hx of falls , VARGAS home , FOS to second floor and decreased IADL independence  Occupational areas to address include:bathing/showering, toileting, dressing , personal hygiene/grooming , bed mobility , functional mobility, meal preparation, fall prevention  and home management   Recommending return to previous environment with skilled therapy upon D/C         OT Discharge Recommendation: Home with skilled therapy  OT - OK to Discharge: Yes(Once medically clear )  ZAK Foster

## 2021-03-26 NOTE — PHYSICAL THERAPY NOTE
Physical Therapy Evaluation     Patient's Name: Alirio Mccain    Admitting Diagnosis  Nausea [R11 0]  Generalized abdominal pain [R10 84]  Constipation [K59 00]  Flank pain [R10 9]  Acute renal failure (ARF) ([facility] ) [N17 9]  Hypertension associated with diabetes ([facility] ) [E11 59, I10]  Type 2 diabetes mellitus with diabetic autonomic neuropathy, with long-term current use of insulin ([facility] ) [E11 43, Z79 4]    Problem List  Patient Active Problem List   Diagnosis    Radiculopathy, lumbar region    Anterior tibial tendonitis, right    Bipolar disorder ([facility] )    Chronic low back pain with sciatica    Type 2 diabetes mellitus with neurologic complication ([facility] )    Diabetic polyneuropathy associated with type 2 diabetes mellitus ([facility] )    Gastroparesis    Hypertension associated with diabetes ([facility] )    Spondylolisthesis of lumbar region    Obesity due to excess calories    Renal cell carcinoma of right kidney ([facility] )    Acute right lower quadrant pain    Hypotension    Internal hernia    Intra-abdominal adhesions       Past Medical History  Past Medical History:   Diagnosis Date    Depression     Diabetes mellitus ([facility] )     Gastroparesis     GERD (gastroesophageal reflux disease)     Hyperlipidemia     Hypertension     Sciatica     Seizure disorder [facility])        Past Surgical History  Past Surgical History:   Procedure Laterality Date    CHOLECYSTECTOMY      HYSTERECTOMY      CA LAP,APPENDECTOMY N/A 3/24/2021    Procedure: APPENDECTOMY LAPAROSCOPIC;  Surgeon: Jacque Hutchinson MD;  Location: [address] [place] [address] OR;  Service: General    CA LAP,DIAGNOSTIC ABDOMEN N/A 3/24/2021    Procedure: LAPAROSCOPY DIAGNOSTIC, EXTENSIVE DESI;  Surgeon: Jacque Hutchinson MD;  Location: [address] [place] [address] OR;  Service: General          03/26/21 0916   PT Last Visit   PT Visit Date 03/26/21   Note Type   Note type Evaluation   Pain Assessment   Pain Assessment Tool 0-10   Pain Score 7   Pain Location/Orientation Location: Abdomen   Pain Onset/Description Onset: Ongoing   Patient's Stated Pain Goal No pain   Hospital Pain Intervention(s) Repositioned; Emotional support; Rest   Home Living   Type of [address] Two level;Bed/bath upstairs;Stairs to enter with rails  (pt can sleep on recliner on 1st floor; 3 VARGSA)   Bathroom Shower/Tub Tub/shower unit   Bathroom Toilet Raised   Bathroom Equipment Shower chair;Grab bars in [facility] [address] Walker;Cane   Prior Function   Level of [place] Independent with ADLs and functional mobility   Lives With [place] Help From Family   ADL Assistance Independent   IADLs Needs assistance  ( assists with laundry)   Falls in the last 6 months 1 to 4  (1 fall d/t pain)   Comments pt drives   Restrictions/Precautions   Weight Bearing Precautions Per Order No   Braces or Orthoses Other (Comment)  (abdominal binder (PT donned appropriately at this time))   Other Precautions Multiple lines; Fall Risk;Pain   General   Family/Caregiver Present No   Cognition   Arousal/Participation Alert   Orientation Level Oriented X4   Memory Decreased recall of precautions   Following Commands Follows one step commands without difficulty   Comments pt agreeable to PT session   RLE Assessment   RLE Assessment WFL  (grossly 4-/5 throughout)   LLE Assessment   LLE Assessment WFL  (grossly 4-/5 throughout)   Coordination   Movements are Fluid and Coordinated 1   Sensation WFL   Light Touch   RLE Light Touch Grossly intact   LLE Light Touch Grossly intact   Bed Mobility   Sit to Supine 5  Supervision   Additional items Assist x 1;HOB elevated; Bedrails; Increased time required;Verbal cues   Additional Comments pt denies any lightheadedness/dizziness  Guarded mobility d/t pain   Transfers   Sit to Stand 5  Supervision   Additional items Assist x 1; Armrests; Increased time required   Stand to Sit 5  Supervision   Additional items Assist x 1; Increased time required;Verbal cues Toilet transfer 5  Supervision   Additional items Assist x 1;Standard toilet;Verbal cues; Increased time required   Ambulation/Elevation   Gait pattern Improper Weight shift;Decreased foot clearance; Foward flexed; Step to;Excessively slow   Gait Assistance 5  Supervision   Additional items Assist x 1;Verbal cues; Tactile cues   Assistive Device Rolling walker   Distance 125 ft   Stair Management Assistance Not tested   Balance   Static Sitting Good   Dynamic Sitting Fair +   Static Standing Fair +   Dynamic Standing Fair   Ambulatory Fair  (with RW)   Endurance Deficit   Endurance Deficit Yes   Activity Tolerance   Activity Tolerance Patient limited by pain; Patient limited by fatigue   Nurse Made Aware Yes, RN Paula made aware of outcomes/recs   Assessment   Prognosis Good   Problem List Decreased strength;Decreased endurance; Impaired balance;Decreased mobility;Obesity;Pain   Assessment Pt is 48 y o  female seen for PT evaluation on 3/26/2021 s/p admit [address] on 3/23/2021 w/ Internal hernia, s/p appendectomy 3/24/21  PT consulted to assess pt's functional mobility and d/c needs  Order placed for PT eval and tx, w/ up as tolerated order  PT performed at least 2 patient identifiers during session: Name and wristband  Comorbidities affecting pt's physical performance at time of assessment include: DM type 2, HTN, renal cell carcinoma of R kidney, bipolar disorder  PTA, pt was independent w/ all functional mobility w/ no AD, ambulates community distances and elevations, ambulates household distances, has 3 VARGAS and lives w/ spouse in 2 level home  Personal factors affecting pt at time of IE include: lives in 2 story house, stairs to enter home, positive fall history, unable to perform physical activity and inability to perform IADLs   Please find objective findings from PT assessment regarding body systems outlined above with impairments and limitations including weakness, impaired balance, decreased endurance, pain, decreased activity tolerance, fall risk and decreased skin integrity, as well as mobility assessment (need for cueing for mobility technique)  The following objective measures performed on IE also reveal limitations: Barthel Index: 05/446 and AM-PAC 6-Clicks: 12/63  Pt's clinical presentation is currently evolving  Pt to benefit from continued PT tx to address deficits as defined above and maximize level of functional independent mobility and consistency  From PT/mobility standpoint, recommendation at time of d/c would be OP PT vs HHPT, pending progress in order to facilitate return to PLOF  Barriers to Discharge None   Goals   Patient Goals "to return home"   RUST Expiration Date 04/05/21   Short Term Goal #1 In 7-10 days: Increase bilateral LE strength 1/2 grade to facilitate independent mobility, Perform all bed mobility tasks modified independent to decrease caregiver burden, Perform all transfers modified independent to improve independence, Ambulate > 150 ft  with least restrictive assistive device modified independent w/o LOB and w/ normalized gait pattern 100% of the time, Navigate 12 stairs with distant S with unilateral handrail to facilitate return to previous living environment, Increase all balance 1/2 grade to decrease risk for falls, Complete exercise program independently and Tolerate 4 hr OOB to faciliate upright tolerance   PT Treatment Day 0   Plan   Treatment/Interventions Functional transfer training;LE strengthening/ROM; Elevations; Therapeutic exercise; Endurance training;Patient/family training;Equipment eval/education; Bed mobility;Gait training;Spoke to nursing;Spoke to case management   PT Frequency   (3-5x/wk)   Recommendation   PT Discharge Recommendation Home with skilled therapy; Return to previous environment with social support  (OP PT vs HHPT, pending progress)   Equipment Recommended   (pt encouraged to continue to use RW)   PT - OK to Discharge Yes  (when medically cleared)   Additional Comments Pt's raw score on the AM-PAC Basic Mobility inpatient short form is 21, standardized score is 45 55  Patients at this level are likely to benefit from DC to home, however, please refer to therapist recommendation for safe DC planning     AM-PAC Basic Mobility Inpatient   Turning in Bed Without Bedrails 4   Lying on Back to Sitting on Edge of Flat Bed 4   Moving Bed to Chair 4   Standing Up From Chair 4   Walk in Room 3   Climb 3-5 Stairs 2   Basic Mobility Inpatient Raw Score 21   Basic Mobility Standardized Score 45 55   Barthel Index   Feeding 10   Bathing 0   Grooming Score 5   Dressing Score 5   Bladder Score 10   Bowels Score 10   Toilet Use Score 10   Transfers (Bed/Chair) Score 10   Mobility (Level Surface) Score 10   Stairs Score 0   Barthel Index Score 70         Laure Lombard, PT

## 2021-03-26 NOTE — OCCUPATIONAL THERAPY NOTE
Occupational Therapy Evaluation      Jeremy Silveira    3/26/2021    Patient Active Problem List   Diagnosis    Radiculopathy, lumbar region    Anterior tibial tendonitis, right    Bipolar disorder ([facility] )    Chronic low back pain with sciatica    Type 2 diabetes mellitus with neurologic complication ([facility] )    Diabetic polyneuropathy associated with type 2 diabetes mellitus ([facility] )    Gastroparesis    Hypertension associated with diabetes ([facility] )    Spondylolisthesis of lumbar region    Obesity due to excess calories    Renal cell carcinoma of right kidney ([facility] )    Acute right lower quadrant pain    Hypotension    Internal hernia    Intra-abdominal adhesions       Past Medical History:   Diagnosis Date    Depression     Diabetes mellitus ([facility] )     Gastroparesis     GERD (gastroesophageal reflux disease)     Hyperlipidemia     Hypertension     Sciatica     Seizure disorder [facility])        Past Surgical History:   Procedure Laterality Date    CHOLECYSTECTOMY      HYSTERECTOMY      VA LAP,APPENDECTOMY N/A 3/24/2021    Procedure: APPENDECTOMY LAPAROSCOPIC;  Surgeon: Gabrielle Dhillon MD;  Location: [address] [place] [address] OR;  Service: General    VA LAP,DIAGNOSTIC ABDOMEN N/A 3/24/2021    Procedure: LAPAROSCOPY DIAGNOSTIC, EXTENSIVE DESI;  Surgeon: Gabrielle Dhillon MD;  Location: [address] [place] [address] OR;  Service: General        03/26/21 0902   OT Last Visit   OT Visit Date 03/26/21   Note Type   Note type Evaluation   Restrictions/Precautions   Weight Bearing Precautions Per Order No   Braces or Orthoses Other (Comment)  (Abdominal binder  Donned c PT during session )   Pain Assessment   Pain Assessment Tool 0-10   Pain Score 7   Pain Location/Orientation Location: Abdomen   Pain Onset/Description Onset: Ongoing;Frequency: Constant/Continuous   Hospital Pain Intervention(s) Repositioned; Emotional support; Rest   Home Living   Type of Home House  (1/2 a double )   Home Layout Two level;Bed/bath upstairs;Stairs to enter with rails  (Pt reports able to sleep on 1st floor recliner  3 VARGAS c HR  )   Bathroom Shower/Tub Tub/shower unit   Bathroom Toilet Raised   Bathroom Equipment Shower chair;Grab bars in shower   [address] Walker;Cane  (No AD used at baseline )   Prior Function   Level of Archer Independent with ADLs and functional mobility   Lives With [place]el[place] Help From Family   ADL Assistance Independent   IADLs Needs assistance  (husbands assists c laundry )   Falls in the last 6 months 1 to 4  (1 fall d/t pain recently )   Vocational Retired   Comments (+) driving    Lifestyle   Autonomy Pt lives c spouse in 2 story home with 3 VARGAS and FOS to second floor, however pt reports able to sleep in 1st floor recliner if needed  Pt reports PLOF as (I) c ADLs and needs light assistance c IADLS  Pt is retired, driving, and reports no use of AD at baseline    Reciprocal Relationships spouse    Service to Others retired    Intrinsic Gratification enjoys making jewerly    ADL   Eating Assistance 5  [address] [facility] 5  [address] [facility] [address] 5  [address] [facility] 4  [address] 5  [address] 4  [address]  4  [address]   Additional Comments Pt limited by pain, decreased balance, and strength    Bed Mobility   Supine to Sit   (DNT: pt seated OOB in chair upon arrival )   Sit to Supine 5  Supervision   Additional items Assist x 1;HOB elevated; Bedrails; Increased time required;Verbal cues   Additional Comments Pt denies lightheadedness/dizziness c positional changes  Transfers   Sit to Stand 5  Supervision   Additional items Assist x 1; Armrests; Increased time required   Stand to Sit 5  Supervision   Additional items Assist x 1; Increased time required;Verbal cues   Toilet transfer 5  Supervision   Additional items Assist x 1;Standard toilet;Verbal cues; Increased time required   Functional Mobility   Functional Mobility 5  Supervision   Additional Comments Pt ambulated household distance within room to bathroom  Pt grossly unsteady, but no overt LOB noted  VC provided for safe body mechanics    Additional items Rolling walker   Balance   Static Sitting Good   Dynamic Sitting Fair +   Static Standing Fair +   Dynamic Standing Fair   Activity Tolerance   Activity Tolerance Patient limited by pain   Medical Staff Made Aware PT DIEGO Brown present during session    Nurse Made Aware RN Adolfo Sunshine made aware of otucomes/recs    RUE Assessment   RUE Assessment WFL  (~grossly 4/5 MMT based on functional evaluation )   LUE Assessment   LUE Assessment WFL  (~grossly 4/5 MMT based on functional )   Hand Function   Gross Motor Coordination Functional   Fine Motor Coordination Functional   Sensation   Light Touch No apparent deficits  (per pt )   Vision-Basic Assessment   Current Vision Wears glasses all the time   Cognition   Overall Cognitive Status [facility]   Arousal/Participation Alert; Responsive; Cooperative   Attention Within functional limits   Orientation Level Oriented X4   Memory Decreased recall of precautions   Following Commands Follows one step commands without difficulty   Comments Pt agreeable to OT session   Assessment   Limitation Decreased ADL status; Decreased UE strength;Decreased Safe judgement during ADL;Decreased endurance;Decreased self-care trans;Decreased high-level ADLs   Prognosis Good   Assessment Patient is a 48 y o  female seen for OT evaluation at [address] [place] [address] on 3/26/21  s/p Internal hernia  Comorbidities impacting functional performance include: Bipolar Disorder, DM2, gastroparesis, HTN, obesity, renal cell carcinoma of R kidney, abdominal pain, hypotension, and intraabdominal adhesions  Patient presents with active orders for OT eval and treat and up as tolerated   Performed at least 2 patient identifiers during session including name and wristband  Patient reports prior level of function as independent  with ADLs, needs light assistance with IADLs, ambulatory without AD , and lives with spouse in a 2 story house  with 3 VARGAS c HR  Pt states she is able to sleep in first floor recliner if needed  Patient is retired  and does  drive  Upon initial evaluation, patient requires supervision for UB ADLs, min assist for LB ADLs, and supervision for transfers and functional mobility with RW  Based on functional eval, patient presents A&Ox4 with intact  attention, safety awareness, and memory  Occupational performance is affected by the following deficits: endurance , muscular strength , balance , functional reach  and (+) pain   Patient would benefit from OT services to maximize independence in self-care and transfers  Personal factors impacting performance include: (+) Hx of falls , VARGAS home , FOS to second floor and decreased IADL independence  Occupational areas to address include:bathing/showering, toileting, dressing , personal hygiene/grooming , bed mobility , functional mobility, meal preparation, fall prevention  and home management   Recommending return to previous environment with skilled therapy upon D/C  Plan   Treatment Interventions ADL retraining;Functional transfer training;UE strengthening/ROM; Endurance training;Patient/family training;Equipment evaluation/education   Goal Expiration Date 04/05/21   OT Treatment Day 0   OT Frequency 3-5x/wk   Recommendation   OT Discharge Recommendation Home with skilled therapy   OT - OK to Discharge Yes  (Once medically clear )   Additional Comments  Pt seated in bed at end of session  Bed alarm activated and call bell in reach    Additional Comments 2 The patient's raw score on the AM-PAC Daily Activity inpatient short form is 19, standardized score is 40 22, greater than 39 4  Patients at this level are likely to benefit from discharge to home   Please refer to the recommendation of the Occupational Therapist for safe discharge planning  AM-PAC Daily Activity Inpatient   Lower Body Dressing 3   Bathing 3   Toileting 3   Upper Body Dressing 3   Grooming 3   Eating 4   Daily Activity Raw Score 19   Daily Activity Standardized Score (Calc for Raw Score >=11) 40 22   AM-PAC Applied Cognition Inpatient   Following a Speech/Presentation 4   Understanding Ordinary Conversation 4   Taking Medications 4   Remembering Where Things Are Placed or Put Away 4   Remembering List of 4-5 Errands 3   Taking Care of Complicated Tasks 4   Applied Cognition Raw Score 23   Applied Cognition Standardized Score 53 08   Barthel Index   Feeding 10   Bathing 0   Grooming Score 5   Dressing Score 5   Bladder Score 10   Bowels Score 10   Toilet Use Score 5   Transfers (Bed/Chair) Score 10   Mobility (Level Surface) Score 10   Stairs Score 0  (DNT)   Barthel Index Score 65   Goals     Pt will complete UB ADLs Mod independent  with use of AE as needed for increased ADL independence within 10 days  Pt will complete LB ADLs Mod independent   with use of AE as needed for increased ADL independence within 10 days  Pt will complete toileting Mod independent   with use of DME for increased ADL independence within 10 days  Pt will demonstrate proper body mechanics to complete transfers and functional mobility with Mod independent  and use of AD for increased safety and functional mobility within 10 days  Pt will demonstrate standing tolerance of 5 min with Mod independent  and use of AD for increased endurance and activity tolerance within 10 days  Pt will demonstrate OOB sitting tolerance of 2-4 hr/day for increased activity tolerance and engagement within 10 days  Pt will participate in 10 min of BUE therapeutic exercise to increase strength, ROM, and endurance during ADL/IADLs within 10 days       Pt will demonstrate proper body mechanics and fall prevention strategies during 100% of tx sessions for increased safety awareness during ADL/IADLs    Pt will verbalize and demonstrate proper use of long handled AE during 100% of tx session for increased ADL independence upon D/C     ZAK Ray

## 2021-03-26 NOTE — ASSESSMENT & PLAN NOTE
47 yo F hx renal cell CA s/p partial nephrectomy 2017 admitted and now postop day 2 status post laparoscopic lysis of adhesions and incidental appendectomy  Pain controlled, tolerating diet  Abdomen soft with mild incisional tenderness  Afebrile, vital signs stable  CT abdomen/pelvis 3/23  Without acute pathology  No evidence of recurrent renal CA  Assessment:  Acute RLQ pain    Plan:  Discussed with Dr Fernando Alfonso  Discharged home with home health care  Outpatient follow-up in 2 weeks  Postop instructions reviewed

## 2021-03-31 ENCOUNTER — TELEPHONE (OUTPATIENT)
Dept: SURGERY | Facility: CLINIC | Age: 51
End: 2021-03-31

## 2021-03-31 NOTE — TELEPHONE ENCOUNTER
Per a fax received from [facility] - the Site of Care for the patient has been started on 3/29/21 per Remington Eric RN

## 2021-04-05 ENCOUNTER — TELEPHONE (OUTPATIENT)
Dept: SURGERY | Facility: CLINIC | Age: 51
End: 2021-04-05

## 2021-04-05 NOTE — TELEPHONE ENCOUNTER
Donnell Mendez with [facility] called to advise that even though they are scheduled to see the patient twice per week, they will only be treating her once as she has an appointment with our office on Wednesday 4/7/21

## 2021-04-07 ENCOUNTER — OFFICE VISIT (OUTPATIENT)
Dept: SURGERY | Facility: CLINIC | Age: 51
End: 2021-04-07

## 2021-04-07 VITALS — TEMPERATURE: 97.1 F

## 2021-04-07 DIAGNOSIS — R10.31 ACUTE RIGHT LOWER QUADRANT PAIN: ICD-10-CM

## 2021-04-07 DIAGNOSIS — K66.0 INTRA-ABDOMINAL ADHESIONS: ICD-10-CM

## 2021-04-07 DIAGNOSIS — K45.8 INTERNAL HERNIA: Primary | ICD-10-CM

## 2021-04-07 PROCEDURE — 99024 POSTOP FOLLOW-UP VISIT: CPT | Performed by: SURGERY

## 2021-04-07 RX ORDER — EMPAGLIFLOZIN 10 MG/1
10 TABLET, FILM COATED ORAL DAILY
COMMUNITY
Start: 2021-01-25 | End: 2021-08-26 | Stop reason: DRUGHIGH

## 2021-04-07 RX ORDER — OMEGA-3S/DHA/EPA/FISH OIL/D3 300MG-1000
400 CAPSULE ORAL DAILY
COMMUNITY
Start: 2021-01-07 | End: 2022-01-13 | Stop reason: SDUPTHER

## 2021-04-07 RX ORDER — FENOFIBRATE 48 MG/1
48 TABLET, COATED ORAL DAILY
COMMUNITY
Start: 2021-03-11 | End: 2022-01-17 | Stop reason: SDUPTHER

## 2021-04-07 NOTE — LETTER
April 7, 2021     Sheree Ellis DO  [address] [address]    Patient: Steven Rehman   YOB: 1970   Date of Visit: 4/7/2021       Dear Dr Umm Crouch: Thank you for referring Steven Rehman to me for evaluation  Below are my notes for this consultation  If you have questions, please do not hesitate to call me  I look forward to following your patient along with you  Sincerely,        Sukumar Barboza MD        CC: No Recipients  Sukumar Barboza MD  4/7/2021  9:38 AM  Incomplete  Assessment/Plan:    Internal hernia    Patient is a pleasant 51-year-old female status post diagnostic laparoscopy laparoscopic lysis of adhesions and laparoscopic appendectomy for the treatment of her acute right lower quadrant pain  The patient reports feeling well in the postoperative period  Her only complaint is that of left upper quadrant pain in the area of the 11 mm trocar site  On physical examination she is well appearing  Awake alert oriented  Competent reliable as a historian  Her surgical wounds are clean dry and intact  Her abdomen soft and otherwise nontender to palpation with the exception of the left upper quadrant around the  11 mm trocar site  Patient appears well  Operative note and pathology reports reviewed with the patient which included and appendix that revealed chronic granulomatous changes  Likely related to her prior hysterectomy  Patient was reassured that the left upper quadrant pain is normal following laparoscopic repair where the left upper quadrant trocar site was closed with a Vicryl suture  This should be self-limited and dissipate as the suture is reabsorbed  I look for to seeing her back in a month's time  She has been encouraged to follow up sooner on an as-needed basis  Subjective:      Patient ID: Steven Rehman is a 48 y o  female  Patient is here today for s/p diagnostic lap 03/24/201  Stated that she is having pain on her left side with no fever, chills, nausea or vomiting  Noris Graham MA          Review of Systems   Constitutional: Negative for chills and fever  HENT: Negative for ear pain and sore throat  Eyes: Negative for pain and visual disturbance  Respiratory: Negative for cough and shortness of breath  Cardiovascular: Negative for chest pain and palpitations  Gastrointestinal: Negative for abdominal pain and vomiting  Genitourinary: Negative for dysuria and hematuria  Musculoskeletal: Negative for arthralgias and back pain  Skin: Negative for color change and rash  Neurological: Negative for seizures and syncope  All other systems reviewed and are negative  Objective:      Temp (!) 97 1 °F (36 2 °C) (Temporal)          Physical Exam  Constitutional:       Appearance: She is well-developed  HENT:      Head: Normocephalic and atraumatic  Eyes:      Conjunctiva/sclera: Conjunctivae normal       Pupils: Pupils are equal, round, and reactive to light  Neck:      Musculoskeletal: Normal range of motion and neck supple  Cardiovascular:      Rate and Rhythm: Normal rate and regular rhythm  Pulmonary:      Effort: Pulmonary effort is normal       Breath sounds: Normal breath sounds  Abdominal:      General: Bowel sounds are normal       Palpations: Abdomen is soft  Musculoskeletal: Normal range of motion  Skin:     General: Skin is warm and dry  Neurological:      Mental Status: She is alert and oriented to person, place, and time  Psychiatric:         Behavior: Behavior normal          Thought Content:  Thought content normal          Judgment: Judgment normal

## 2021-04-07 NOTE — PROGRESS NOTES
Assessment/Plan:    Internal hernia    Patient is a pleasant 60-year-old female status post diagnostic laparoscopy laparoscopic lysis of adhesions and laparoscopic appendectomy for the treatment of her acute right lower quadrant pain  The patient reports feeling well in the postoperative period  Her only complaint is that of left upper quadrant pain in the area of the 11 mm trocar site  On physical examination she is well appearing  Awake alert oriented  Competent reliable as a historian  Her surgical wounds are clean dry and intact  Her abdomen soft and otherwise nontender to palpation with the exception of the left upper quadrant around the  11 mm trocar site  Patient appears well  Operative note and pathology reports reviewed with the patient which included and appendix that revealed chronic granulomatous changes  Likely related to her prior hysterectomy  Patient was reassured that the left upper quadrant pain is normal following laparoscopic repair where the left upper quadrant trocar site was closed with a Vicryl suture  This should be self-limited and dissipate as the suture is reabsorbed  I look for to seeing her back in a month's time  She has been encouraged to follow up sooner on an as-needed basis  Subjective:      Patient ID: Brit Galeana is a 48 y o  female  Patient is here today for s/p diagnostic lap 03/24/201  Stated that she is having pain on her left side with no fever, chills, nausea or vomiting  Noris Graham MA          Review of Systems   Constitutional: Negative for chills and fever  HENT: Negative for ear pain and sore throat  Eyes: Negative for pain and visual disturbance  Respiratory: Negative for cough and shortness of breath  Cardiovascular: Negative for chest pain and palpitations  Gastrointestinal: Negative for abdominal pain and vomiting  Genitourinary: Negative for dysuria and hematuria     Musculoskeletal: Negative for arthralgias and back pain  Skin: Negative for color change and rash  Neurological: Negative for seizures and syncope  All other systems reviewed and are negative  Objective:      Temp (!) 97 1 °F (36 2 °C) (Temporal)          Physical Exam  Constitutional:       Appearance: She is well-developed  HENT:      Head: Normocephalic and atraumatic  Eyes:      Conjunctiva/sclera: Conjunctivae normal       Pupils: Pupils are equal, round, and reactive to light  Neck:      Musculoskeletal: Normal range of motion and neck supple  Cardiovascular:      Rate and Rhythm: Normal rate and regular rhythm  Pulmonary:      Effort: Pulmonary effort is normal       Breath sounds: Normal breath sounds  Abdominal:      General: Bowel sounds are normal       Palpations: Abdomen is soft  Musculoskeletal: Normal range of motion  Skin:     General: Skin is warm and dry  Neurological:      Mental Status: She is alert and oriented to person, place, and time  Psychiatric:         Behavior: Behavior normal          Thought Content:  Thought content normal          Judgment: Judgment normal

## 2021-04-07 NOTE — ASSESSMENT & PLAN NOTE
Patient is a pleasant 63-year-old female status post diagnostic laparoscopy laparoscopic lysis of adhesions and laparoscopic appendectomy for the treatment of her acute right lower quadrant pain  The patient reports feeling well in the postoperative period  Her only complaint is that of left upper quadrant pain in the area of the 11 mm trocar site  On physical examination she is well appearing  Awake alert oriented  Competent reliable as a historian  Her surgical wounds are clean dry and intact  Her abdomen soft and otherwise nontender to palpation with the exception of the left upper quadrant around the  11 mm trocar site  Patient appears well  Operative note and pathology reports reviewed with the patient which included and appendix that revealed chronic granulomatous changes  Likely related to her prior hysterectomy  Patient was reassured that the left upper quadrant pain is normal following laparoscopic repair where the left upper quadrant trocar site was closed with a Vicryl suture  This should be self-limited and dissipate as the suture is reabsorbed  I look for to seeing her back in a month's time  She has been encouraged to follow up sooner on an as-needed basis

## 2021-04-07 NOTE — PROGRESS NOTES
Assessment/Plan:  49 yo female with PMHx for HTN, DM type II, right renal cell carcinoma and bipolar disorder who presents for 2 week follow up s/p diagnostic laparoscopy for RLQ pain with lysis of adhesions and incidental appendectomy  Pt is doing well, no complaints except constant dull LUQ pain since discharge from hospital     On physical exam patient appears well, older than stated age, no acute distress  Pt is a reliable historian  Abdomen exam: bowel sounds present x 4, tender to percussion and palpation only in LUQ  No tenderness in RUQ or lower quadrants  Incision sites well approximated and healing well without erythema, bleeding or drainage  Pathology revealed non-necrotizing granulomatous appendicitis with foreign body giant cell reaction with no evidence of neoplasm or malignancy  Pt is healing well from surgery  Reviewed pathology report  Re-assured her that her LUQ pain is common due to larger incision site and vicryl stitch placed in the area to prevent future hernias  Informed pt the pain should resolve over the next couple of months completely  Pt instructed to follow up in 1 month or if she is feeling well she may call and cancel the appointment  Pt educated that she can call with any questions or concerns  Diagnoses and all orders for this visit:    Internal hernia    Acute right lower quadrant pain    Intra-abdominal adhesions    Other orders  -     cholecalciferol (VITAMIN D3) 400 units tablet; Take 400 Units by mouth daily  -     Jardiance 10 MG TABS; Take 10 mg by mouth daily  -     fenofibrate (TRICOR) 48 mg tablet; Take 48 mg by mouth daily          Subjective:      Patient ID: Marlo Alpers is a 48 y o  female  49 yo female with PMHx for HTN, DM type II, right renal cell carcinoma and bipolar disorder who presents for 2 week follow up s/p diagnostic laparoscopy for RLQ pain with lysis of adhesions and incidental appendectomy   Pt states she is doing well and her RLQ has resolved since surgery  Pt notes pain in her LUQ that has been present since discharge  She notes the pain is constant and has not worsened  Pt states she takes tylenol and uses ice to help with the discomfort  Pt denies fevers, chills, CP, SOB, other abdominal pain, N/V, diarrhea, constipation  The following portions of the patient's history were reviewed and updated as appropriate: allergies, current medications, past family history, past medical history, past social history, past surgical history and problem list     Review of Systems   Respiratory: Negative for shortness of breath  Cardiovascular: Negative for chest pain  Gastrointestinal: Negative for abdominal pain, blood in stool, constipation, diarrhea, nausea and vomiting  All other systems reviewed and are negative  Objective: There were no vitals taken for this visit  Physical Exam  Constitutional:       General: She is not in acute distress  Appearance: She is obese  HENT:      Head: Normocephalic and atraumatic  Cardiovascular:      Rate and Rhythm: Normal rate and regular rhythm  Pulses: Normal pulses  Heart sounds: Normal heart sounds  Pulmonary:      Effort: Pulmonary effort is normal       Breath sounds: Normal breath sounds  Abdominal:      General: Abdomen is flat  Bowel sounds are normal  There is no distension  Palpations: Abdomen is soft  Tenderness: There is abdominal tenderness (LUQ only)  There is no guarding  Comments: Incision sites well approximated and healing well without erythema, bleeding, draining  Neurological:      Mental Status: She is alert

## 2021-04-08 ENCOUNTER — TELEPHONE (OUTPATIENT)
Dept: SURGERY | Facility: CLINIC | Age: 51
End: 2021-04-08

## 2021-04-08 NOTE — TELEPHONE ENCOUNTER
Leonel Schlatter with [address] [facility] called to make our office aware that she is discharging Barak [facility]   Patient feels she no longer needs there services

## 2021-04-12 ENCOUNTER — OFFICE VISIT (OUTPATIENT)
Dept: FAMILY MEDICINE CLINIC | Facility: CLINIC | Age: 51
End: 2021-04-12
Payer: COMMERCIAL

## 2021-04-12 VITALS
BODY MASS INDEX: 34.25 KG/M2 | OXYGEN SATURATION: 98 % | WEIGHT: 181.4 LBS | HEIGHT: 61 IN | SYSTOLIC BLOOD PRESSURE: 124 MMHG | DIASTOLIC BLOOD PRESSURE: 72 MMHG | RESPIRATION RATE: 20 BRPM | TEMPERATURE: 97.7 F | HEART RATE: 79 BPM

## 2021-04-12 DIAGNOSIS — Z79.4 TYPE 2 DIABETES MELLITUS WITH DIABETIC AUTONOMIC NEUROPATHY, WITH LONG-TERM CURRENT USE OF INSULIN (HCC): Primary | ICD-10-CM

## 2021-04-12 DIAGNOSIS — E55.9 VITAMIN D DEFICIENCY: ICD-10-CM

## 2021-04-12 DIAGNOSIS — D50.8 IRON DEFICIENCY ANEMIA SECONDARY TO INADEQUATE DIETARY IRON INTAKE: ICD-10-CM

## 2021-04-12 DIAGNOSIS — Z12.31 ENCOUNTER FOR SCREENING MAMMOGRAM FOR MALIGNANT NEOPLASM OF BREAST: ICD-10-CM

## 2021-04-12 DIAGNOSIS — F41.8 DEPRESSION WITH ANXIETY: ICD-10-CM

## 2021-04-12 DIAGNOSIS — E11.9 ENCOUNTER FOR DIABETIC FOOT EXAM (HCC): ICD-10-CM

## 2021-04-12 DIAGNOSIS — E66.09 CLASS 1 OBESITY DUE TO EXCESS CALORIES WITH SERIOUS COMORBIDITY AND BODY MASS INDEX (BMI) OF 34.0 TO 34.9 IN ADULT: ICD-10-CM

## 2021-04-12 DIAGNOSIS — J45.20 MILD INTERMITTENT ASTHMA WITHOUT COMPLICATION: ICD-10-CM

## 2021-04-12 DIAGNOSIS — Z12.11 SCREENING FOR COLON CANCER: ICD-10-CM

## 2021-04-12 DIAGNOSIS — E78.00 HYPERCHOLESTEREMIA: ICD-10-CM

## 2021-04-12 DIAGNOSIS — Z76.89 ESTABLISHING CARE WITH NEW DOCTOR, ENCOUNTER FOR: ICD-10-CM

## 2021-04-12 DIAGNOSIS — E11.43 TYPE 2 DIABETES MELLITUS WITH DIABETIC AUTONOMIC NEUROPATHY, WITH LONG-TERM CURRENT USE OF INSULIN (HCC): Primary | ICD-10-CM

## 2021-04-12 DIAGNOSIS — K31.84 GASTROPARESIS: ICD-10-CM

## 2021-04-12 DIAGNOSIS — E78.1 HYPERTRIGLYCERIDEMIA: ICD-10-CM

## 2021-04-12 DIAGNOSIS — Z13.29 SCREENING FOR THYROID DISORDER: ICD-10-CM

## 2021-04-12 LAB — SL AMB POCT HEMOGLOBIN AIC: 10.7 (ref ?–6.5)

## 2021-04-12 PROCEDURE — 99203 OFFICE O/P NEW LOW 30 MIN: CPT | Performed by: NURSE PRACTITIONER

## 2021-04-12 PROCEDURE — 83036 HEMOGLOBIN GLYCOSYLATED A1C: CPT | Performed by: NURSE PRACTITIONER

## 2021-04-12 PROCEDURE — 3725F SCREEN DEPRESSION PERFORMED: CPT | Performed by: NURSE PRACTITIONER

## 2021-04-12 PROCEDURE — 3046F HEMOGLOBIN A1C LEVEL >9.0%: CPT | Performed by: NURSE PRACTITIONER

## 2021-04-12 RX ORDER — ONDANSETRON 4 MG/1
4 TABLET, FILM COATED ORAL EVERY 8 HOURS PRN
Qty: 20 TABLET | Refills: 0 | Status: SHIPPED | OUTPATIENT
Start: 2021-04-12

## 2021-04-12 NOTE — PROGRESS NOTES
[facility]        NAME: Ashok Clinton is a 48 y o  female  : 1970    MRN: [id number]  DATE: 2021  TIME: 1:09 PM    Assessment and Plan   Type 2 diabetes mellitus with diabetic autonomic neuropathy, with long-term current use of insulin (HCC) [E11 43, Z79 4]  1  Type 2 diabetes mellitus with diabetic autonomic neuropathy, with long-term current use of insulin (HCC)  POCT hemoglobin A1c    Ambulatory referral to Endocrinology    Comprehensive metabolic panel    Microalbumin / creatinine urine ratio    HEMOGLOBIN A1C W/ EAG ESTIMATION   2  Encounter for screening mammogram for malignant neoplasm of breast     3  Screening for colon cancer     4  Depression with anxiety     5  Mild intermittent asthma without complication     6  Gastroparesis  Ambulatory referral to Gastroenterology    ondansetron (ZOFRAN) 4 mg tablet    Comprehensive metabolic panel   7  Hypercholesteremia  Lipid panel   8  Hypertriglyceridemia  Lipid panel   9  Iron deficiency anemia secondary to inadequate dietary iron intake  Iron Panel (Includes Ferritin, Iron Sat%, Iron, and TIBC)    CBC and differential   10  Vitamin D deficiency  Vitamin D 25 hydroxy   11  Screening for thyroid disorder  TSH, 3rd generation with Free T4 reflex   12  Establishing care with new doctor, encounter for     13  Encounter for diabetic foot exam ([facility] [address] )     14   Class 1 obesity due to excess calories with serious comorbidity and body mass index (BMI) of 34 0 to 34 9 in adult           Patient Instructions     Patient Instructions   Go to lab for bloodwork/urine sample after 21  Keep appointment with Maty Neumann, Endocrinology tomorrow at [address] [facility] same medications - call for refills  Referral given to local GI  Will get Mammo/[place]/DM eye exam in Care Everywhere to capture  4 month medcheck or call sooner for problems/concerns          Chief Complaint     Chief Complaint   Patient presents with   [employer] Establish Care     pt verbalized no complaints today         History of Present Illness       Here to establish care-     [facility]- worked well for her blood sugars but was too expensive- not covered by insurance  Her HgA1c today is 10 7, down from 12 6 in August 2020  Currently taking Metformin 1000mg twice daily, Jardiance 10mg daily- started a few months ago, Januvia 100mg daily, Lantus 60 units in the morning, and Apidra 20 units three times per day  Her Endocrinologist is with [facility] but is requesting to switch locally to [place] because she lives in [place] and this is more convenient  Asthma- Albuterol inhaler and neb seasonally- is mild- not currently using    Cholesterol- Atorvastatin and Tricor    HTN- Lisinopril 10mg and Lopressor 25mg BID    Chronic low back pain- Meloxicam 15mg daily    Chronic Nausea- Reglan 10mg daily in the morning every day and Zofran 4mg as needed about one time per month  Has been diagnosed with Gastroparesis- would like to find a local Gastroenterologist  Has been feeling improved with Protonix- started on this a couple of years ago    Neuropathy- Lyrica 50mg daily- Neurologist    Overactive Bladder- Vesicare 5mg daily- has been taking this for 4 years          Review of Systems   Review of Systems   Constitutional: Negative for activity change, diaphoresis, fatigue and fever  HENT: Negative for congestion, facial swelling, hearing loss, rhinorrhea, sinus pressure, sinus pain, sneezing, sore throat and voice change  Eyes: Negative for discharge and visual disturbance  Respiratory: Negative for cough, choking, chest tightness, shortness of breath, wheezing and stridor  Cardiovascular: Negative for chest pain, palpitations and leg swelling  Gastrointestinal: Positive for nausea  Negative for abdominal distention, abdominal pain, constipation, diarrhea and vomiting  Endocrine: Negative for polydipsia, polyphagia and polyuria     Genitourinary: Negative for difficulty urinating, dysuria, frequency and urgency  Musculoskeletal: Negative for arthralgias, back pain, gait problem, joint swelling, myalgias, neck pain and neck stiffness  Skin: Negative for color change, rash and wound  Neurological: Negative for dizziness, syncope, speech difficulty, weakness, light-headedness and headaches  Hematological: Negative for adenopathy  Does not bruise/bleed easily  Psychiatric/Behavioral: Negative for agitation, behavioral problems, confusion, hallucinations, sleep disturbance and suicidal ideas  The patient is not nervous/anxious            Current Medications       Current Outpatient Medications:     albuterol (ACCUNEB) 0 63 MG/3ML nebulizer solution, take 3 milliliters by mouth every 4 hours if needed for wheezing, Disp: , Rfl:     albuterol (PROAIR HFA) 90 mcg/act inhaler, Inhale 1 puff every 6 (six) hours as needed, Disp: , Rfl:     Aspirin (ASPIR-81 PO), Take 81 mg by mouth, Disp: , Rfl:     atorvastatin (LIPITOR) 40 mg tablet, take 1 tablet by mouth at bedtime, Disp: , Rfl:     BANOPHEN 50 MG capsule, take 1 capsule by mouth every 6 hours if needed for itching, Disp: , Rfl:     cholecalciferol (VITAMIN D3) 400 units tablet, Take 400 Units by mouth daily, Disp: , Rfl:     citalopram (CELEXA) 40 mg tablet, Take 40 mg by mouth daily , Disp: , Rfl:      MG capsule, Take 100 mg by mouth 2 (two) times a day, Disp: , Rfl:     fenofibrate (TRICOR) 48 mg tablet, Take 48 mg by mouth daily, Disp: , Rfl:     insulin glulisine (APIDRA SOLOSTAR) 100 units/mL injection pen, Inject 20 Units under the skin , Disp: , Rfl:     Jardiance 10 MG TABS, Take 10 mg by mouth daily, Disp: , Rfl:     LANTUS SOLOSTAR 100 units/mL injection pen, Inject 60 Units under the skin daily , Disp: , Rfl:     levETIRAcetam (KEPPRA) 500 mg tablet, Take 500 mg by mouth every 12 (twelve) hours , Disp: , Rfl:     lisinopril (ZESTRIL) 10 mg tablet, Take 10 mg by mouth, Disp: , Rfl:    meloxicam (MOBIC) 15 mg tablet, Take 15 mg by mouth daily, Disp: , Rfl:     metFORMIN (GLUCOPHAGE) 1000 MG tablet, take 1 tablet by mouth twice a day WITH MEALS, Disp: , Rfl:     metoclopramide (REGLAN) 10 mg tablet, Take 10 mg by mouth daily, Disp: , Rfl:     metoprolol tartrate (LOPRESSOR) 25 mg tablet, Take 25 mg by mouth 2 (two) times a day, Disp: , Rfl:     ondansetron (ZOFRAN) 4 mg tablet, Take 1 tablet (4 mg total) by mouth every 8 (eight) hours as needed for nausea or vomiting, Disp: 20 tablet, Rfl: 0    pantoprazole (PROTONIX) 40 mg tablet, take 1 tablet by mouth twice a day, Disp: , Rfl:     pregabalin (LYRICA) 50 mg capsule, take 1 capsule by mouth three times a day, Disp: , Rfl:     sitaGLIPtin (JANUVIA) 100 mg tablet, Take 100 mg by mouth daily , Disp: , Rfl:     solifenacin (VESICARE) 5 mg tablet, Take 5 mg by mouth daily , Disp: , Rfl:     B-D UF III MINI PEN NEEDLES 31G X 5 MM MISC, , Disp: , Rfl:     Current Allergies     Allergies as of 04/12/2021 - Reviewed 04/12/2021   Allergen Reaction Noted    Azithromycin GI Intolerance and Hives 07/13/2012    Benztropine  08/09/2016    Butorphanol Hallucinations 08/09/2016    Penicillins  11/12/2015    Zolpidem  11/12/2015            The following portions of the patient's history were reviewed and updated as appropriate: allergies, current medications, past family history, past medical history, past social history, past surgical history and problem list      Past Medical History:   Diagnosis Date    Depression     Diabetes mellitus ([address] )     Gastroparesis     GERD (gastroesophageal reflux disease)     Hyperlipidemia     Hypertension     Sciatica     Seizure disorder [facility])        Past Surgical History:   Procedure Laterality Date    CHOLECYSTECTOMY      HYSTERECTOMY      [place] LAP,APPENDECTOMY N/A 3/24/2021    Procedure: APPENDECTOMY LAPAROSCOPIC;  Surgeon: Maricruz Jiménez MD;  Location: [facility] MAIN OR;  Service: Lupis ROME LAP,DIAGNOSTIC ABDOMEN N/A 3/24/2021    Procedure: LAPAROSCOPY DIAGNOSTIC, EXTENSIVE DESI;  Surgeon: Narcisa Zaldivar MD;  Location: [facility] MAIN OR;  Service: General       History reviewed  No pertinent family history  Medications have been verified  Objective   /72   Pulse 79   Temp 97 7 °F (36 5 °C)   Resp 20   Ht 5' 1" (1 549 m)   Wt 82 3 kg (181 lb 6 4 oz)   SpO2 98%   BMI 34 28 kg/m²        Physical Exam     Physical Exam  Vitals signs and nursing note reviewed  Constitutional:       General: She is not in acute distress  Appearance: Normal appearance  She is well-developed  She is not diaphoretic  HENT:      Head: Normocephalic and atraumatic  Right Ear: Tympanic membrane, ear canal and external ear normal       Left Ear: Tympanic membrane, ear canal and external ear normal       Nose: Nose normal       Mouth/Throat:      Mouth: Mucous membranes are moist       Dentition: Abnormal dentition (no teeth- top or bottom)  Pharynx: Oropharynx is clear  Uvula midline  Eyes:      General:         Right eye: No discharge  Left eye: No discharge  Conjunctiva/sclera: Conjunctivae normal       Pupils: Pupils are equal, round, and reactive to light  Neck:      Musculoskeletal: Normal range of motion and neck supple  Thyroid: No thyromegaly  Trachea: No tracheal deviation  Cardiovascular:      Rate and Rhythm: Normal rate and regular rhythm  Pulses: no weak pulses          Dorsalis pedis pulses are 2+ on the right side and 2+ on the left side  Heart sounds: Normal heart sounds  No murmur  No friction rub  No gallop  Pulmonary:      Effort: Pulmonary effort is normal  No respiratory distress  Breath sounds: Normal breath sounds  No wheezing or rales  Musculoskeletal: Normal range of motion  General: No tenderness or deformity  Right lower leg: No edema  Left lower leg: No edema     Feet:      Right foot:      Skin integrity: No ulcer, skin breakdown, erythema, warmth, callus or dry skin  Left foot:      Skin integrity: No ulcer, skin breakdown, erythema, warmth, callus or dry skin  Skin:     General: Skin is warm and dry  Findings: No erythema or rash  Neurological:      Mental Status: She is alert and oriented to person, place, and time  Cranial Nerves: No cranial nerve deficit  Coordination: Coordination normal    Psychiatric:         Mood and Affect: Mood normal          Behavior: Behavior normal  Behavior is cooperative  Thought Content: Thought content normal          Judgment: Judgment normal          Patient's shoes and socks removed  Right Foot/Ankle   Right Foot Inspection  Skin Exam: skin normal and skin intact no dry skin, no warmth, no callus, no erythema, no maceration, no abnormal color, no pre-ulcer, no ulcer and no callus                          Toe Exam: ROM and strength within normal limits  Sensory       Monofilament testing: intact  Vascular  Capillary refills: < 3 seconds  The right DP pulse is 2+  Left Foot/Ankle  Left Foot Inspection  Skin Exam: skin normal and skin intactno dry skin, no warmth, no erythema, no maceration, normal color, no pre-ulcer, no ulcer and no callus                         Toe Exam: ROM and strength within normal limits                   Sensory       Monofilament: intact  Vascular  Capillary refills: < 3 seconds  The left DP pulse is 2+  Assign Risk Category:  No deformity present; No loss of protective sensation; No weak pulses       Risk: 0      BMI Counseling: Body mass index is 34 28 kg/m²  The BMI is above normal  Nutrition recommendations include decreasing portion sizes, encouraging healthy choices of fruits and vegetables, decreasing fast food intake, consuming healthier snacks, limiting drinks that contain sugar, moderation in carbohydrate intake and increasing intake of lean protein   Exercise recommendations include exercising 3-5 times per week

## 2021-04-12 NOTE — PATIENT INSTRUCTIONS
Go to lab for bloodwork/urine sample after 5/12/21  Keep appointment with Elige Romberg, Endocrinology tomorrow at [address] same medications - call for refills  Referral given to local GI  Will get Mammo/[place]/DM eye exam in Care Everywhere to capture  4 month medcheck or call sooner for problems/concerns

## 2021-04-13 ENCOUNTER — TELEMEDICINE (OUTPATIENT)
Dept: ENDOCRINOLOGY | Facility: CLINIC | Age: 51
End: 2021-04-13
Payer: COMMERCIAL

## 2021-04-13 DIAGNOSIS — E11.43 TYPE 2 DIABETES MELLITUS WITH DIABETIC AUTONOMIC NEUROPATHY, WITH LONG-TERM CURRENT USE OF INSULIN (HCC): Primary | ICD-10-CM

## 2021-04-13 DIAGNOSIS — Z79.4 TYPE 2 DIABETES MELLITUS WITH DIABETIC AUTONOMIC NEUROPATHY, WITH LONG-TERM CURRENT USE OF INSULIN (HCC): Primary | ICD-10-CM

## 2021-04-13 DIAGNOSIS — E78.00 HYPERCHOLESTEREMIA: ICD-10-CM

## 2021-04-13 PROCEDURE — 1036F TOBACCO NON-USER: CPT | Performed by: NURSE PRACTITIONER

## 2021-04-13 PROCEDURE — 99244 OFF/OP CNSLTJ NEW/EST MOD 40: CPT | Performed by: NURSE PRACTITIONER

## 2021-04-13 NOTE — ASSESSMENT & PLAN NOTE
Patient is currently uncontrolled  She would benefit from a CGM  The patient is a candidate for CGM use: Indications: Diabetes Type 2  The patient is treated with 3 or more injections of insulin daily  The patients performs SMBG at least 4 times daily   SMBG data is being used to make adjustments to the insulin regimen    Instructed patient to keep a glucose log to share with me for review  Recommend discontinuing DDP-4 and replacing with GLP-1, ozempic  Patient will benefit from cardiovascular protection, reduction of HgA1C, and weight loss  Referral provided to meet with RD for MNT  Encouraged patient to increase physical activity  Reviewed hypoglycemia prevention  Signs and symptoms, and treatment options  Counseled on the negative effects of uncontrolled diabetes including retinopathy, neuropathy, nephropathy, MI, and CVA       Lab Results   Component Value Date    HGBA1C 10 7 (A) 04/12/2021

## 2021-04-13 NOTE — PROGRESS NOTES
Virtual Regular Visit      Assessment/Plan:    Problem List Items Addressed This Visit        Endocrine    Type 2 diabetes mellitus with neurologic complication ([facility] [address] ) - Primary     Patient is currently uncontrolled  She would benefit from a CGM  The patient is a candidate for CGM use: Indications: Diabetes Type 2  The patient is treated with 3 or more injections of insulin daily  The patients performs SMBG at least 4 times daily   SMBG data is being used to make adjustments to the insulin regimen    Instructed patient to keep a glucose log to share with me for review  Recommend discontinuing DDP-4 and replacing with GLP-1, ozempic  Patient will benefit from cardiovascular protection, reduction of HgA1C, and weight loss  Referral provided to meet with RD for MNT  Encouraged patient to increase physical activity  Reviewed hypoglycemia prevention  Signs and symptoms, and treatment options  Counseled on the negative effects of uncontrolled diabetes including retinopathy, neuropathy, nephropathy, MI, and CVA  Lab Results   Component Value Date    HGBA1C 10 7 (A) 04/12/2021            Relevant Medications    Semaglutide,0 25 or 0 5MG/DOS, 2 MG/1 5ML SOPN    Other Relevant Orders    Ambulatory referral to Diabetic Education       Other    Hypercholesteremia     Continue current regimen                       Reason for visit is   Chief Complaint   Patient presents with    Virtual Regular Visit        Encounter provider SYLWIA Valerio    Provider located at [address] [address] [phone]      Recent Visits  Date Type Provider Dept   04/12/21 Office Visit [facility]   Showing recent visits within past 7 days and meeting all other requirements     Today's Visits  Date Type Provider Dept   04/13/21 [id number] [facility] [place]   Showing today's visits and meeting all other requirements     Future Appointments  No visits were found meeting these conditions  Showing future appointments within next 150 days and meeting all other requirements        The patient was identified by name and date of birth  Debbie Al was informed that this is a telemedicine visit and that the visit is being conducted through [employer] and patient was informed that this is a secure, HIPAA-compliant platform  She agrees to proceed     My office door was closed  No one else was in the room  She acknowledged consent and understanding of privacy and security of the video platform  The patient has agreed to participate and understands they can discontinue the visit at any time  Patient is aware this is a billable service  Subjective    History of Present Illness:   Debbie Al is a 48 y o  female with HTN, HLD, and T2  diabetes with long term use of insulin since approximately 2013  Reports complications of neuropathy and gastroparesis  Denies recent severe hypoglycemic or severe hyperglycemic episodes  Denies any issues with her current regimen  home glucose monitoring: are performed regularly 4 times daily  Last HgA1C was elevated at 10 7%  Ref Range & Units 4/12/21 12:32 PM   Hemoglobin A1C 6 5 10 7Abnormal           Specimen Collected: 04/12/21 12:32 PM   Last Resulted: 04/12/21 12:32 PM        Patient has PMH significant for renal cell CA s/p partial nephrectomy  Home blood glucose readings:   Before breakfast: 196 (usually in the 200s)     Current regimen:   Apidra 20-20-20  Lantus 60 units daily  Jardiance 10 mg  Metformin 1,000 BID  Januvia 100 mg daily    HPI    Type: T2DM    Onset: 7 years ago, patient lost consciousness while at work  Was taken to the ED where she was told she was diabetic       Medications: Various insulins for 4 years      Blood Sugars: 4x daily  Symptons of hyper- dizzy, GI upset  Symptoms of hypo- dizzy/pass out    Meter: Freestyle    Diet: 6 small meals  1- toast, cereal, crackers with cream cheese and apple butter  2-yogurt, boost  Soups, vegetables    Exercise: Walks    Influenza vaccine: negative; egg allergy    Pneumovax: negative    Ophthalmology: Upcoming appointment    Podiatry/Foot care: Denies neuropathy, performs self care    Dentist: mayra    Diabetes education/nutrition: Remote; referral given    Last Eye Exam: Upcoming appointment; denies changes to vision  Last Foot Exam: 9/4/2020    For HLD, she is taking 40 mg of atorvastatin and 48 mg of fenofibrate  She denies myalgias  For HTN, she is taking 10 mg of lisinopril and 25 mg of metoprolol BID  She denies HA and cough         Past Medical History:   Diagnosis Date    Depression     [facility] ([facility] [address] )     Gastroparesis     GERD (gastroesophageal reflux disease)     Hyperlipidemia     Hypertension     Sciatica     Seizure disorder [facility])        Past Surgical History:   Procedure Laterality Date    CHOLECYSTECTOMY      HYSTERECTOMY      [place] LAP,APPENDECTOMY N/A 3/24/2021    Procedure: APPENDECTOMY LAPAROSCOPIC;  Surgeon: Mer Martin MD;  Location: [address] [place] [address] OR;  Service: General    [place] LAP,DIAGNOSTIC ABDOMEN N/A 3/24/2021    Procedure: LAPAROSCOPY DIAGNOSTIC, EXTENSIVE DESI;  Surgeon: Mer Martin MD;  Location: [address] [place] [address] OR;  Service: General       Current Outpatient Medications   Medication Sig Dispense Refill    albuterol (ACCUNEB) 0 63 MG/3ML nebulizer solution take 3 milliliters by mouth every 4 hours if needed for wheezing      albuterol (PROAIR HFA) 90 mcg/act inhaler Inhale 1 puff every 6 (six) hours as needed      Aspirin (ASPIR-81 PO) Take 81 mg by mouth      atorvastatin (LIPITOR) 40 mg tablet take 1 tablet by mouth at bedtime      B-D UF III MINI PEN NEEDLES 31G X 5 MM MISC       BANOPHEN 50 MG capsule take 1 capsule by mouth every 6 hours if needed for itching      cholecalciferol (VITAMIN D3) 400 units tablet Take 400 Units by mouth daily      citalopram (CELEXA) 40 mg tablet Take 40 mg by mouth daily        MG capsule Take 100 mg by mouth 2 (two) times a day      fenofibrate (TRICOR) 48 mg tablet Take 48 mg by mouth daily      insulin glulisine (APIDRA SOLOSTAR) 100 units/mL injection pen Inject 20 Units under the skin       Jardiance 10 MG TABS Take 10 mg by mouth daily      LANTUS SOLOSTAR 100 units/mL injection pen Inject 60 Units under the skin daily       levETIRAcetam (KEPPRA) 500 mg tablet Take 500 mg by mouth every 12 (twelve) hours       lisinopril (ZESTRIL) 10 mg tablet Take 10 mg by mouth      meloxicam (MOBIC) 15 mg tablet Take 15 mg by mouth daily      metFORMIN (GLUCOPHAGE) 1000 MG tablet take 1 tablet by mouth twice a day WITH MEALS      metoclopramide (REGLAN) 10 mg tablet Take 10 mg by mouth daily      metoprolol tartrate (LOPRESSOR) 25 mg tablet Take 25 mg by mouth 2 (two) times a day      ondansetron (ZOFRAN) 4 mg tablet Take 1 tablet (4 mg total) by mouth every 8 (eight) hours as needed for nausea or vomiting 20 tablet 0    pantoprazole (PROTONIX) 40 mg tablet take 1 tablet by mouth twice a day      pregabalin (LYRICA) 50 mg capsule take 1 capsule by mouth three times a day      Semaglutide,0 25 or 0 5MG/DOS, 2 MG/1 5ML SOPN Inject 0 25 mg once weekly for four weeks then increase to 0 5 mg once weekly  4 pen 1    sitaGLIPtin (JANUVIA) 100 mg tablet Take 100 mg by mouth daily       solifenacin (VESICARE) 5 mg tablet Take 5 mg by mouth daily        No current facility-administered medications for this visit  Allergies   Allergen Reactions    Azithromycin GI Intolerance and Hives    Benztropine     Butorphanol Hallucinations    Penicillins     Zolpidem        Review of Systems   Constitutional: Negative for activity change, appetite change, fatigue and unexpected weight change     HENT: Positive for dental problem (edentulous)  Negative for sore throat, trouble swallowing and voice change  Eyes: Negative for visual disturbance  Respiratory: Negative for cough, chest tightness and shortness of breath  Cardiovascular: Negative for chest pain, palpitations and leg swelling  Gastrointestinal: Negative for constipation, diarrhea, nausea and vomiting  Endocrine: Negative for polydipsia, polyphagia and polyuria  Genitourinary: Negative for frequency  Musculoskeletal: Negative for arthralgias, back pain, gait problem, joint swelling and myalgias  Skin: Negative for wound  Allergic/Immunologic: Positive for environmental allergies  Neurological: Negative for dizziness, weakness, light-headedness, numbness and headaches  Hematological: Does not bruise/bleed easily  Psychiatric/Behavioral: Negative for decreased concentration, dysphoric mood and sleep disturbance  The patient is not nervous/anxious  Video Exam    There were no vitals filed for this visit  Physical Exam     Physical Exam   Constitutional: She is oriented to person, place, and time  She appears well-developed and well-nourished  No distress  HENT:   Head: Normocephalic and atraumatic  Neck: Normal range of motion  Pulmonary/Chest: Effort normal    Musculoskeletal: Normal range of motion  Neurological: She is alert and oriented to person, place, and time  Skin: She is not diaphoretic  Psychiatric: She has a normal mood and affect  Her behavior is normal        I spent 45 minutes directly with the patient during this visit      VIRTUAL VISIT DISCLAIMER    Debbie Al acknowledges that she has consented to an online visit or consultation  She understands that the online visit is based solely on information provided by her, and that, in the absence of a face-to-face physical evaluation by the physician, the diagnosis she receives is both limited and provisional in terms of accuracy and completeness   This is not intended to replace a full medical face-to-face evaluation by the physician  Joseph Goodwin understands and accepts these terms

## 2021-04-14 ENCOUNTER — OFFICE VISIT (OUTPATIENT)
Dept: SURGERY | Facility: CLINIC | Age: 51
End: 2021-04-14
Payer: COMMERCIAL

## 2021-04-14 ENCOUNTER — TELEPHONE (OUTPATIENT)
Dept: ADMINISTRATIVE | Facility: OTHER | Age: 51
End: 2021-04-14

## 2021-04-14 ENCOUNTER — TELEPHONE (OUTPATIENT)
Dept: SURGERY | Facility: CLINIC | Age: 51
End: 2021-04-14

## 2021-04-14 ENCOUNTER — TELEPHONE (OUTPATIENT)
Dept: ENDOCRINOLOGY | Facility: CLINIC | Age: 51
End: 2021-04-14

## 2021-04-14 VITALS
HEART RATE: 81 BPM | HEIGHT: 61 IN | SYSTOLIC BLOOD PRESSURE: 132 MMHG | BODY MASS INDEX: 34.28 KG/M2 | DIASTOLIC BLOOD PRESSURE: 74 MMHG | TEMPERATURE: 97.8 F | RESPIRATION RATE: 20 BRPM

## 2021-04-14 DIAGNOSIS — Z48.89 ENCOUNTER FOR POST SURGICAL WOUND CHECK: Primary | ICD-10-CM

## 2021-04-14 PROCEDURE — 10060 I&D ABSCESS SIMPLE/SINGLE: CPT | Performed by: PHYSICIAN ASSISTANT

## 2021-04-14 NOTE — TELEPHONE ENCOUNTER
Upon review of the In Basket request we have found/obtained the documentation  After careful review of the document we are unable to complete this request for CRC: Colonoscopy and Diabetic Eye Exam because the documentation does not have the result(s) needed to close the requested care gap(s)  Any additional questions or concerns should be emailed to the Practice Liaisons via [email or web address] email, please do not reply via In Basket      Thank you  Pita Engle MA

## 2021-04-14 NOTE — TELEPHONE ENCOUNTER
----- Message from Alessandro Hamilton sent at 4/13/2021  9:57 PM EDT -----  Regarding: Non-Urgent Medical Question  Contact: [phone]  Evening  I just wanted to let you know that one of my incisions opened and there is a little puss coming out    I did cover it but is there anything else I should do?      Rubén Mojgan

## 2021-04-14 NOTE — ASSESSMENT & PLAN NOTE
Evelina Whitmore returns with acute pain of her LLQ port site  Some drainage and chills noted  On exam it is erythematous with induration and epidermal necrosis  Informed consent obtained and incision was opened with suture removal and debridement of epidermal necrosis  No purulence encountered  Wound was scrubbed with soap/water and bandage applied with Neosporin  Advised daily wound care and follow-up in 1 week  Agreeable to call sooner with any questions or concerns

## 2021-04-14 NOTE — TELEPHONE ENCOUNTER
Upon review of the In Basket request we were able to locate, review, and update the patient chart as requested for Mammogram     Any additional questions or concerns should be emailed to the Practice Liaisons via [email or web address] email, please do not reply via In Basket      Thank you  Cristina Myers MA

## 2021-04-14 NOTE — TELEPHONE ENCOUNTER
Left message for the patient that we can put her on the schedule today at 10:15 am with [facility]

## 2021-04-14 NOTE — TELEPHONE ENCOUNTER
----- Message from Debbie Hubbard sent at 4/12/2021  3:14 PM EDT -----  04/12/21 3:19 PM    Hello, our patient Alessandro Hamilton has had Diabetic Eye Exam completed/performed  Please assist in updating the patient chart by pulling the Care Everywhere (CE) document  The date of service is 10/2020  Hello, our patient Alessandro Hamilton has had Mammogram completed/performed  Please assist in updating the patient chart by pulling the Care Everywhere (CE) document  The date of service is 5/2020  Hel, our patient Alessandro Hamilton has had CRC: Colonoscopy completed/performed  Please assist in updating the patient chart by pulling the Care Everywhere (CE) document  The date of service is 12/2019              Thank you,  NATHAN Hubbard PG

## 2021-04-14 NOTE — PROGRESS NOTES
Post-Op Note - General Surgery   Chucky Giron 48 y o  female MRN: [id number]  Encounter: [id number]    Assessment/Plan    Encounter for post surgical wound check  Arlis Pallas returns with acute pain of her LLQ port site  Some drainage and chills noted  On exam it is erythematous with induration and epidermal necrosis  Informed consent obtained and incision was opened with suture removal and debridement of epidermal necrosis  No purulence encountered  Wound was scrubbed with soap/water and bandage applied with Neosporin  Advised daily wound care and follow-up in 1 week  Agreeable to call sooner with any questions or concerns  Diagnoses and all orders for this visit:    Encounter for post surgical wound check    Other orders  -     Incision and Drainage        Subjective    No chief complaint on file  Patient is here today for discharge at 2 of her incision site that started to drain last night  The left lower incision is sore with discharge and the mid abdominal incision is open with no discharge  Stated that she gets chills at night along with no appetite  Oral Spar    Pleasant 49 yo F here for follow-up incision check after lap appe and DESI  Reports drainage and pain last night affecting LLQ port site  Chills without fever  Mild rash  Review of Systems   Constitutional: Positive for chills  Skin: Positive for wound  The following portions of the patient's history were reviewed and updated as appropriate: allergies, current medications, past family history, past medical history, past social history, past surgical history and problem list     Objective      Blood pressure 132/74, pulse 81, temperature 97 8 °F (36 6 °C), temperature source Temporal, resp  rate 20, height 5' 1" (1 549 m), not currently breastfeeding  Physical Exam  Vitals signs and nursing note reviewed  Constitutional:       General: She is not in acute distress  Appearance: She is well-developed   She is not diaphoretic  HENT:      Head: Normocephalic and atraumatic  Eyes:      Conjunctiva/sclera: Conjunctivae normal       Pupils: Pupils are equal, round, and reactive to light  Neck:      Musculoskeletal: Normal range of motion  Pulmonary:      Effort: No respiratory distress  Abdominal:       Musculoskeletal: Normal range of motion  Skin:     General: Skin is warm and dry  Capillary Refill: Capillary refill takes less than 2 seconds  Neurological:      Mental Status: She is alert and oriented to person, place, and time  Psychiatric:         Behavior: Behavior normal        Incision and Drainage    Date/Time: 4/14/2021 2:08 PM  Performed by: Pati Rahman PA-C  Authorized by: Pati Rahman PA-C   Universal Protocol:  Consent: Verbal consent obtained  Risks and benefits: risks, benefits and alternatives were discussed  Consent given by: patient  Patient understanding: patient states understanding of the procedure being performed      Patient location:  Clinic  Location:     Type:  Surgical wound infection    Size:  2 cm    Location:  Trunk    Trunk location:  Abdomen  Pre-procedure details:     Skin preparation:  Betadine    Skin preparation:  Swab x 2  Anesthesia (see MAR for exact dosages): Anesthesia method:  Local infiltration    Local anesthetic:  Lidocaine 1% WITH epi  Procedure details:     Complexity:  Simple    Needle aspiration: no      Incision types:  Stab incision    Scalpel blade:  15    Approach:  Open    Incision depth:  Subcutaneous    Wound management:  Probed and deloculated and debrided    Drainage:  Bloody    Drainage amount:  Scant    Wound treatment:  Wound left open    Packing materials:  None  Post-procedure details:     Patient tolerance of procedure: Tolerated well, no immediate complications  Comments:      Informed consent obtained  Positioned supine  Area prepped with betadine swab x 2   Local administered using 10 mL 1% xylocaine with epinephrine  Incision opened with #11 scalpel and Monocryl suture cut and removed  Subcutaneous tissue probed without evidence of pus/abscess/tunneling  Partial thickness skin necrosis affecting 5 mm x 3 mm portion of lower skin edge noted and sharply debrided with iris scissors to expose healthy dermis  Skin and wound cleansed with soap/water  Topical antibiotic ointment and border gauze dressing applied  Minimal blood loss with good hemostasis  Tolerated well          Signature:  Mae Rahman PA-C  Date: 4/14/2021 Time: 2:06 PM

## 2021-04-20 ENCOUNTER — TELEPHONE (OUTPATIENT)
Dept: SURGERY | Facility: CLINIC | Age: 51
End: 2021-04-20

## 2021-04-20 NOTE — TELEPHONE ENCOUNTER
I spoke with Charline Swenson regarding her concerns and I stated that Dr Jose Daniel Araya and Cassidy Milian are in surgery, however Dr Kei Whitmore is here today in the office and she can seen by him  Patient states she will wait until tomorrow to be seen at her scheduled appointment

## 2021-04-20 NOTE — TELEPHONE ENCOUNTER
----- Message from Aaron Castillo sent at 4/19/2021  7:54 PM EDT -----  Regarding: Non-Urgent Medical Question  Contact: [phone]  Hello there,  The one that was infected is not looking good and there is a hard spot above the infected area    I have been keeping it clean    please let me know what I need to do      thank you

## 2021-04-21 ENCOUNTER — OFFICE VISIT (OUTPATIENT)
Dept: SURGERY | Facility: CLINIC | Age: 51
End: 2021-04-21
Payer: COMMERCIAL

## 2021-04-21 VITALS — TEMPERATURE: 97.4 F

## 2021-04-21 DIAGNOSIS — Z48.89 ENCOUNTER FOR POST SURGICAL WOUND CHECK: Primary | ICD-10-CM

## 2021-04-21 RX ORDER — SULFAMETHOXAZOLE AND TRIMETHOPRIM 800; 160 MG/1; MG/1
1 TABLET ORAL EVERY 12 HOURS SCHEDULED
Qty: 14 TABLET | Refills: 0 | Status: SHIPPED | OUTPATIENT
Start: 2021-04-21 | End: 2021-04-28

## 2021-04-21 NOTE — PROGRESS NOTES
Post-Op Note - General Surgery   Katya Trejo 48 y o  female MRN: [id number]  Encounter: [id number]    Assessment/Plan    Encounter for post surgical wound check  New area just superior to the prior I&D appears infected without spontaneous drainage  I&D advised, informed verbal consent obtained, and procedure completed in the office  No gross purulence encountered, small area of epidermal necrosis noted  Suspect inciting event was probable button-holed skin closure with pressure necrosis  Advised daily wound care with soap scrub, antibiotic ointment, and bandage  Will start Bactrim DS x 1 week and follow-up with Dr Saad Gaston  Diagnoses and all orders for this visit:    Encounter for post surgical wound check  -     sulfamethoxazole-trimethoprim (BACTRIM DS) 800-160 mg per tablet; Take 1 tablet by mouth every 12 (twelve) hours for 7 days        Subjective      Chief Complaint   Patient presents with    Wound Check     1 wk wound check of LLQ     Patient is here today for a one week check LLQ incision sie  Stated that she has a hard lump above the incision site with discharge  No fever or chills  Eileen Jones 63-year-old female here for follow-up wound check after I&D left lower quadrant port site infection  Reports drainage has been decreasing however noticed new lump just above the prior I&D site and is sensitive to touch with redness  No fevers or chills  Not currently on antibiotics  Review of Systems   All other systems reviewed and are negative  The following portions of the patient's history were reviewed and updated as appropriate: allergies, current medications, past family history, past medical history, past social history, past surgical history and problem list     Objective      Temperature (!) 97 4 °F (36 3 °C), temperature source Temporal, not currently breastfeeding  Physical Exam  Vitals signs and nursing note reviewed     Constitutional:       General: She is not in acute distress  Appearance: She is well-developed  She is not diaphoretic  HENT:      Head: Normocephalic and atraumatic  Eyes:      Conjunctiva/sclera: Conjunctivae normal       Pupils: Pupils are equal, round, and reactive to light  Neck:      Musculoskeletal: Normal range of motion  Pulmonary:      Effort: No respiratory distress  Abdominal:       Musculoskeletal: Normal range of motion  Skin:     General: Skin is warm and dry  Capillary Refill: Capillary refill takes less than 2 seconds  Neurological:      Mental Status: She is alert and oriented to person, place, and time  Psychiatric:         Behavior: Behavior normal        Incision and Drainage    Date/Time: 4/22/2021 1:39 PM  Performed by: Eric Rahman PA-C  Authorized by: Eric Rahman PA-C   Universal Protocol:  Consent: Verbal consent obtained  Consent given by: patient      Patient location:  Clinic  Location:     Type:  Abscess    Size:  1 cm    Location:  Trunk    Trunk location:  Abdomen  Pre-procedure details:     Skin preparation:  Betadine  Anesthesia (see MAR for exact dosages): Anesthesia method:  Local infiltration    Local anesthetic:  Lidocaine 1% WITH epi  Procedure details:     Complexity:  Simple    Incision types:  Stab incision    Scalpel blade:  11    Approach:  Open    Incision depth:  Subcutaneous    Drainage amount:  Scant    Wound treatment:  Wound left open    Packing materials:  None  Post-procedure details:     Patient tolerance of procedure: Tolerated well, no immediate complications  Comments:      Informed consent obtained  Positioned supine  Area prepped with Betadine swabs x2 draped  Local anesthetic administered utilizing 1% xylocaine with epinephrine in a field block  Single stab incision made with 11 blade scalpel area of maximal fluctuance  Scant drainage noted diluted by local anesthesia  Probed without obvious tunneling or loculations    Procedure terminated, wound cleansed and dressed with topical antibiotic and bandage  Tolerated well with minimal blood loss no complications no specimens          Signature:  Lex Rahman PA-C  Date: 4/22/2021 Time: 1:38 PM

## 2021-04-21 NOTE — ASSESSMENT & PLAN NOTE
New area just superior to the prior I&D appears infected without spontaneous drainage  I&D advised, informed verbal consent obtained, and procedure completed in the office  No gross purulence encountered, small area of epidermal necrosis noted  Suspect inciting event was probable button-holed skin closure with pressure necrosis  Advised daily wound care with soap scrub, antibiotic ointment, and bandage  Will start Bactrim DS x 1 week and follow-up with Dr Live Charles

## 2021-04-22 PROCEDURE — 10060 I&D ABSCESS SIMPLE/SINGLE: CPT | Performed by: PHYSICIAN ASSISTANT

## 2021-04-26 ENCOUNTER — TELEPHONE (OUTPATIENT)
Dept: SURGERY | Facility: CLINIC | Age: 51
End: 2021-04-26

## 2021-04-26 ENCOUNTER — OFFICE VISIT (OUTPATIENT)
Dept: SURGERY | Facility: CLINIC | Age: 51
End: 2021-04-26

## 2021-04-26 VITALS
WEIGHT: 174 LBS | HEART RATE: 80 BPM | HEIGHT: 61 IN | DIASTOLIC BLOOD PRESSURE: 78 MMHG | BODY MASS INDEX: 32.85 KG/M2 | SYSTOLIC BLOOD PRESSURE: 116 MMHG

## 2021-04-26 DIAGNOSIS — Z12.31 ENCOUNTER FOR SCREENING MAMMOGRAM FOR MALIGNANT NEOPLASM OF BREAST: Primary | ICD-10-CM

## 2021-04-26 DIAGNOSIS — Z48.89 ENCOUNTER FOR POST SURGICAL WOUND CHECK: Primary | ICD-10-CM

## 2021-04-26 PROCEDURE — 3008F BODY MASS INDEX DOCD: CPT | Performed by: NURSE PRACTITIONER

## 2021-04-26 PROCEDURE — 99024 POSTOP FOLLOW-UP VISIT: CPT | Performed by: SURGERY

## 2021-04-26 NOTE — TELEPHONE ENCOUNTER
Called and lm that Dr Krystal Ridley would like to see her today and to call the office so we can set up appt  Another incision has opened w/pus draining

## 2021-04-26 NOTE — LETTER
April 26, 2021     Dahlia Becker, [facility]    Patient: Fina Yeh   YOB: 1970   Date of Visit: 4/26/2021       Dear Dr Price Files: Thank you for referring Fina eYh to me for evaluation  Below are my notes for this consultation  If you have questions, please do not hesitate to call me  I look forward to following your patient along with you  Sincerely,        Dina Ralph MD        CC: No Recipients  Dina Ralph MD  4/26/2021  8:26 PM  Incomplete  Assessment/Plan:    Encounter for post surgical wound check  Patient returns to the office with questions regarding the left inferior 5 mm trocar site and her umbilical trocar site  Patient reports some drainage from the wounds and has expressed concern for a possible infection  On physical examination the patient is pleasant well-nourished well-developed female  She is in no acute distress  Awake alert oriented  Competent reliable as a historian  Inspection of the left inferior trocar site reveals a slight opening of the skin but no evidence of active soft tissue infection  Inspection of the umbilical trocar site reveals the wound to be clean dry and intact without signs of postoperative wound infection  I have reassured the patient that there appears to be no evidence for a postoperative wound infection at the present time  I educated her regarding the normal wound healing process  I believe that her symptom complex is likely secondary to a combination of her obesity, the location of the 5 mm trocar site directly at the base of a crease in her abdominal wall pannus and her diabetes  I have educated the patient regarding wound care  I look for to seeing her back in a month's time  She has been encouraged to follow up sooner on an as-needed basis         Diagnoses and all orders for this visit:    Encounter for post surgical wound check Subjective:      Patient ID: Alessandro Hamilton is a 48 y o  female  HPI    The following portions of the patient's history were reviewed and updated as appropriate: allergies, current medications, past family history, past medical history, past social history, past surgical history and problem list     Review of Systems   Constitutional: Negative for chills and fever  HENT: Negative for ear pain and sore throat  Eyes: Negative for pain and visual disturbance  Respiratory: Negative for cough and shortness of breath  Cardiovascular: Negative for chest pain and palpitations  Gastrointestinal: Negative for abdominal pain and vomiting  Genitourinary: Negative for dysuria and hematuria  Musculoskeletal: Negative for arthralgias and back pain  Skin: Negative for color change and rash  Neurological: Negative for seizures and syncope  All other systems reviewed and are negative  Objective:      /78   Pulse 80   Ht 5' 1" (1 549 m)   Wt 78 9 kg (174 lb)   BMI 32 88 kg/m²          Physical Exam  Constitutional:       Appearance: She is well-developed  HENT:      Head: Normocephalic and atraumatic  Eyes:      Conjunctiva/sclera: Conjunctivae normal       Pupils: Pupils are equal, round, and reactive to light  Neck:      Musculoskeletal: Normal range of motion and neck supple  Cardiovascular:      Rate and Rhythm: Normal rate and regular rhythm  Pulmonary:      Effort: Pulmonary effort is normal       Breath sounds: Normal breath sounds  Abdominal:      General: Bowel sounds are normal       Palpations: Abdomen is soft  Musculoskeletal: Normal range of motion  Skin:     General: Skin is warm and dry  Comments: Skin exam as described above   Neurological:      Mental Status: She is alert and oriented to person, place, and time  Psychiatric:         Behavior: Behavior normal          Thought Content:  Thought content normal          Judgment: Judgment normal

## 2021-04-27 ENCOUNTER — TELEPHONE (OUTPATIENT)
Dept: ADMINISTRATIVE | Facility: OTHER | Age: 51
End: 2021-04-27

## 2021-04-27 NOTE — TELEPHONE ENCOUNTER
----- Message from Debbie Kauffman sent at 4/26/2021  2:31 PM EDT -----  04/26/21 2:31 PM    Hello, our patient Hilda Sim has had Diabetic Eye Exam completed/performed  Please assist in updating the patient chart by making an External outreach to Dr Dilip Jeffers facility located in [place], [place]  The date of service is 10/2020      Thank you,  NATHAN Kauffman PG

## 2021-04-27 NOTE — LETTER
Shelvy Mingle Procedure Request Form: Colonoscopy      Date Requested: 21  Patient: Fam Alpers  Patient : 1970   Referring Provider: Bunny Iyer, SYLWIA        Date of Procedure ______________________________       The above patient has informed us that they have completed their   most recent Colonoscopy at your facility  Please complete   this form and attach all corresponding procedure reports/results  Comments __________________________________________________________  ____________________________________________________________________  ____________________________________________________________________  ____________________________________________________________________    Facility Completing Procedure _________________________________________    Form Completed By (print name) _______________________________________      Signature __________________________________________________________      These reports are needed for  compliance    Please fax this completed form and a copy of the procedure report to our office located at [place] [address] as soon as possible to [phone] ryland Tineo: Phone [phone]    We thank you for your assistance in treating our mutual patient

## 2021-04-27 NOTE — TELEPHONE ENCOUNTER
Upon review of the In Basket request we were able to locate, review, and update the patient chart as requested for CRC: Colonoscopy and Diabetic Eye Exam     Any additional questions or concerns should be emailed to the Practice Liaisons via [email or web address] email, please do not reply via In Basket      Thank you  Vijaya Rees MA

## 2021-04-27 NOTE — ASSESSMENT & PLAN NOTE
Patient returns to the office with questions regarding the left inferior 5 mm trocar site and her umbilical trocar site  Patient reports some drainage from the wounds and has expressed concern for a possible infection  On physical examination the patient is pleasant well-nourished well-developed female  She is in no acute distress  Awake alert oriented  Competent reliable as a historian  Inspection of the left inferior trocar site reveals a slight opening of the skin but no evidence of active soft tissue infection  Inspection of the umbilical trocar site reveals the wound to be clean dry and intact without signs of postoperative wound infection  I have reassured the patient that there appears to be no evidence for a postoperative wound infection at the present time  I educated her regarding the normal wound healing process  I believe that her symptom complex is likely secondary to a combination of her obesity, the location of the 5 mm trocar site directly at the base of a crease in her abdominal wall pannus and her diabetes  I have educated the patient regarding wound care  I look for to seeing her back in a month's time  She has been encouraged to follow up sooner on an as-needed basis

## 2021-04-27 NOTE — TELEPHONE ENCOUNTER
Upon review of the In Basket request and the patient's chart, initial outreach has been made via fax, please see Contacts section for details       Thank you  Sharon Rodriguez MA

## 2021-04-27 NOTE — PROGRESS NOTES
Assessment/Plan:    Encounter for post surgical wound check  Patient returns to the office with questions regarding the left inferior 5 mm trocar site and her umbilical trocar site  Patient reports some drainage from the wounds and has expressed concern for a possible infection  On physical examination the patient is pleasant well-nourished well-developed female  She is in no acute distress  Awake alert oriented  Competent reliable as a historian  Inspection of the left inferior trocar site reveals a slight opening of the skin but no evidence of active soft tissue infection  Inspection of the umbilical trocar site reveals the wound to be clean dry and intact without signs of postoperative wound infection  I have reassured the patient that there appears to be no evidence for a postoperative wound infection at the present time  I educated her regarding the normal wound healing process  I believe that her symptom complex is likely secondary to a combination of her obesity, the location of the 5 mm trocar site directly at the base of a crease in her abdominal wall pannus and her diabetes  I have educated the patient regarding wound care  I look for to seeing her back in a month's time  She has been encouraged to follow up sooner on an as-needed basis  Diagnoses and all orders for this visit:    Encounter for post surgical wound check          Subjective:      Patient ID: Suri Littlejohn is a 48 y o  female  HPI    The following portions of the patient's history were reviewed and updated as appropriate: allergies, current medications, past family history, past medical history, past social history, past surgical history and problem list     Review of Systems   Constitutional: Negative for chills and fever  HENT: Negative for ear pain and sore throat  Eyes: Negative for pain and visual disturbance  Respiratory: Negative for cough and shortness of breath  Cardiovascular: Negative for chest pain and palpitations  Gastrointestinal: Negative for abdominal pain and vomiting  Genitourinary: Negative for dysuria and hematuria  Musculoskeletal: Negative for arthralgias and back pain  Skin: Negative for color change and rash  Neurological: Negative for seizures and syncope  All other systems reviewed and are negative  Objective:      /78   Pulse 80   Ht 5' 1" (1 549 m)   Wt 78 9 kg (174 lb)   BMI 32 88 kg/m²          Physical Exam  Constitutional:       Appearance: She is well-developed  HENT:      Head: Normocephalic and atraumatic  Eyes:      Conjunctiva/sclera: Conjunctivae normal       Pupils: Pupils are equal, round, and reactive to light  Neck:      Musculoskeletal: Normal range of motion and neck supple  Cardiovascular:      Rate and Rhythm: Normal rate and regular rhythm  Pulmonary:      Effort: Pulmonary effort is normal       Breath sounds: Normal breath sounds  Abdominal:      General: Bowel sounds are normal       Palpations: Abdomen is soft  Musculoskeletal: Normal range of motion  Skin:     General: Skin is warm and dry  Comments: Skin exam as described above   Neurological:      Mental Status: She is alert and oriented to person, place, and time  Psychiatric:         Behavior: Behavior normal          Thought Content:  Thought content normal          Judgment: Judgment normal

## 2021-04-27 NOTE — LETTER
Diabetic Eye Exam Form    Date Requested: 21  Patient: Marlo Alpers  Patient : 1970   Referring Provider: SYLWIA Leonard    Dilated Retinal Exam, Optomap-Iris Exam, or Fundus Photography Done         Yes (Lime one above)         No     Date of Diabetic Eye Exam ______________________________  Left Eye      Exam did show retinopathy    Exam did not show retinopathy         Mild       Moderate       None       Proliferative       Severe     Right Eye     Exam did show retinopathy    Exam did not show retinopathy         Mild       Moderate       None       Proliferative       Severe     Comments __________________________________________________________    Practice Providing Exam ______________________________________________    Exam Performed By (print name) _______________________________________      Provider Signature ___________________________________________________      These reports are needed for  compliance    Please fax this completed form and a copy of the Diabetic Eye Exam report to our office located at [place] [address] as soon as possible to [phone] attention Fabienne Tineo: Phone [phone]    We thank you for your assistance in treating our mutual patient

## 2021-04-27 NOTE — TELEPHONE ENCOUNTER
----- Message from Debbie Kauffman sent at 4/26/2021  2:30 PM EDT -----  04/26/21 2:31 PM    Hello, our patient Hilda Sim has had CRC: Colonoscopy completed/performed  Please assist in updating the patient chart by making an External outreach to [facility] located in [place], [place]  The date of service is 12/30/19      Thank you,  NATHAN Kauffman PG

## 2021-05-06 DIAGNOSIS — G89.4 CHRONIC PAIN SYNDROME: Primary | ICD-10-CM

## 2021-05-06 RX ORDER — MELOXICAM 15 MG/1
15 TABLET ORAL DAILY
Qty: 90 TABLET | Refills: 3 | Status: SHIPPED | OUTPATIENT
Start: 2021-05-06 | End: 2021-08-13

## 2021-05-10 ENCOUNTER — HOSPITAL ENCOUNTER (OUTPATIENT)
Dept: MAMMOGRAPHY | Facility: HOSPITAL | Age: 51
Discharge: HOME | End: 2021-05-10
Payer: COMMERCIAL

## 2021-05-10 VITALS — BODY MASS INDEX: 32.85 KG/M2 | WEIGHT: 174 LBS | HEIGHT: 61 IN

## 2021-05-10 DIAGNOSIS — Z12.31 ENCOUNTER FOR SCREENING MAMMOGRAM FOR MALIGNANT NEOPLASM OF BREAST: ICD-10-CM

## 2021-05-10 PROCEDURE — 77063 BREAST TOMOSYNTHESIS BI: CPT

## 2021-05-10 PROCEDURE — 77067 SCR MAMMO BI INCL CAD: CPT

## 2021-05-19 ENCOUNTER — TELEPHONE (OUTPATIENT)
Dept: FAMILY MEDICINE CLINIC | Facility: CLINIC | Age: 51
End: 2021-05-19

## 2021-05-19 NOTE — TELEPHONE ENCOUNTER
Left message that pt needs to call [phone] on her [facility] card and change her PCP to Dr Lucy Garcia

## 2021-05-26 ENCOUNTER — OFFICE VISIT (OUTPATIENT)
Dept: SURGERY | Facility: CLINIC | Age: 51
End: 2021-05-26
Payer: COMMERCIAL

## 2021-05-26 VITALS
RESPIRATION RATE: 20 BRPM | HEIGHT: 61 IN | BODY MASS INDEX: 32.85 KG/M2 | HEART RATE: 88 BPM | TEMPERATURE: 96.9 F | SYSTOLIC BLOOD PRESSURE: 118 MMHG | WEIGHT: 174 LBS | DIASTOLIC BLOOD PRESSURE: 72 MMHG

## 2021-05-26 DIAGNOSIS — Z48.89 ENCOUNTER FOR POST SURGICAL WOUND CHECK: Primary | ICD-10-CM

## 2021-05-26 PROCEDURE — 3008F BODY MASS INDEX DOCD: CPT | Performed by: NURSE PRACTITIONER

## 2021-05-26 PROCEDURE — 17250 CHEM CAUT OF GRANLTJ TISSUE: CPT | Performed by: SURGERY

## 2021-05-26 NOTE — ASSESSMENT & PLAN NOTE
Patient returns for one-month wound check status post laparoscopic appendectomy  The patient reports persistent intermittent drainage from the left lower quadrant trocar site  She denies that the wound is impacting her quality of life in any meaningful way  On physical examination the wound is clean and open  Abnormal better granulation tissue at the wound base  3 mm maximum depth  A single stick of silver nitrate applied to chemically cauterize the wound  I look for to seeing her back in 2 weeks time

## 2021-05-26 NOTE — PROGRESS NOTES
Assessment/Plan:    Encounter for post surgical wound check    Patient returns for one-month wound check status post laparoscopic appendectomy  The patient reports persistent intermittent drainage from the left lower quadrant trocar site  She denies that the wound is impacting her quality of life in any meaningful way  On physical examination the wound is clean and open  Abnormal better granulation tissue at the wound base  3 mm maximum depth  A single stick of silver nitrate applied to chemically cauterize the wound  I look for to seeing her back in 2 weeks time  Subjective:      Patient ID: Kain Parsons is a 48 y o  female  Patient is here today for a one month follow up lap diagnostic and appy 3/24/2021  Stated that her left lower incision site keeps rubbing opening up otherwise she is doing well  No fever or chills  Noris Graham MA        Review of Systems   Constitutional: Negative for chills and fever  HENT: Negative for ear pain and sore throat  Eyes: Negative for pain and visual disturbance  Respiratory: Negative for cough and shortness of breath  Cardiovascular: Negative for chest pain and palpitations  Gastrointestinal: Negative for abdominal pain and vomiting  Genitourinary: Negative for dysuria and hematuria  Musculoskeletal: Negative for arthralgias and back pain  Skin: Negative for color change and rash  Neurological: Negative for seizures and syncope  All other systems reviewed and are negative  Objective:      /72 (BP Location: Left arm, Patient Position: Sitting, Cuff Size: Standard)   Pulse 88   Temp (!) 96 9 °F (36 1 °C) (Temporal)   Resp 20   Ht 5' 1" (1 549 m)   Wt 78 9 kg (174 lb)   BMI 32 88 kg/m²            Physical Exam  Constitutional:       Appearance: She is well-developed  HENT:      Head: Normocephalic and atraumatic     Eyes:      Conjunctiva/sclera: Conjunctivae normal       Pupils: Pupils are equal, round, and reactive to light  Neck:      Musculoskeletal: Normal range of motion and neck supple  Cardiovascular:      Rate and Rhythm: Normal rate and regular rhythm  Pulmonary:      Effort: Pulmonary effort is normal       Breath sounds: Normal breath sounds  Abdominal:      General: Bowel sounds are normal       Palpations: Abdomen is soft  Musculoskeletal: Normal range of motion  Skin:     General: Skin is warm and dry  Comments: As described above   Neurological:      Mental Status: She is alert and oriented to person, place, and time  Psychiatric:         Behavior: Behavior normal          Thought Content: Thought content normal          Judgment: Judgment normal        Lesion Destruction    Date/Time: 5/26/2021 9:12 AM  Performed by: Janette Fraser MD  Authorized by: Janette Fraser MD   Universal Protocol:  Consent: Verbal consent obtained  Risks and benefits: risks, benefits and alternatives were discussed  Consent given by: patient  Time out: Immediately prior to procedure a "time out" was called to verify the correct patient, procedure, equipment, support staff and site/side marked as required    Timeout called at: 5/26/2021 9:12 AM   Patient understanding: patient states understanding of the procedure being performed  Patient consent: the patient's understanding of the procedure matches consent given  Site marked: the operative site was marked  Patient identity confirmed: verbally with patient      Procedure Details - Lesion Destruction:     Number of Lesions:  1  Lesion 1:     Body area:  Trunk    Trunk location:  Abdomen    Final defect size (mm):  5    Malignancy: granulation tissue      Destruction method: chemical removal

## 2021-05-26 NOTE — LETTER
May 26, 2021     Kristen Billings, [address] [place] [address]    Patient: Edilberto Earl   YOB: 1970   Date of Visit: 5/26/2021       Dear Dr Ester Odell: Thank you for referring Edilberto Earl to me for evaluation  Below are my notes for this consultation  If you have questions, please do not hesitate to call me  I look forward to following your patient along with you  Sincerely,        Edwin Hutchinson MD        CC: No Recipients  Edwin Hutchinson MD  5/26/2021  9:12 AM  Sign when Signing Visit  Assessment/Plan:    Encounter for post surgical wound check    Patient returns for one-month wound check status post laparoscopic appendectomy  The patient reports persistent intermittent drainage from the left lower quadrant trocar site  She denies that the wound is impacting her quality of life in any meaningful way  On physical examination the wound is clean and open  Abnormal better granulation tissue at the wound base  3 mm maximum depth  A single stick of silver nitrate applied to chemically cauterize the wound  I look for to seeing her back in 2 weeks time  Subjective:      Patient ID: Edilberto Earl is a 48 y o  female  Patient is here today for a one month follow up lap diagnostic and appy 3/24/2021  Stated that her left lower incision site keeps rubbing opening up otherwise she is doing well  No fever or chills  Noris Graham MA        Review of Systems   Constitutional: Negative for chills and fever  HENT: Negative for ear pain and sore throat  Eyes: Negative for pain and visual disturbance  Respiratory: Negative for cough and shortness of breath  Cardiovascular: Negative for chest pain and palpitations  Gastrointestinal: Negative for abdominal pain and vomiting  Genitourinary: Negative for dysuria and hematuria  Musculoskeletal: Negative for arthralgias and back pain     Skin: Negative for color change and rash    Neurological: Negative for seizures and syncope  All other systems reviewed and are negative  Objective:      /72 (BP Location: Left arm, Patient Position: Sitting, Cuff Size: Standard)   Pulse 88   Temp (!) 96 9 °F (36 1 °C) (Temporal)   Resp 20   Ht 5' 1" (1 549 m)   Wt 78 9 kg (174 lb)   BMI 32 88 kg/m²            Physical Exam  Constitutional:       Appearance: She is well-developed  HENT:      Head: Normocephalic and atraumatic  Eyes:      Conjunctiva/sclera: Conjunctivae normal       Pupils: Pupils are equal, round, and reactive to light  Neck:      Musculoskeletal: Normal range of motion and neck supple  Cardiovascular:      Rate and Rhythm: Normal rate and regular rhythm  Pulmonary:      Effort: Pulmonary effort is normal       Breath sounds: Normal breath sounds  Abdominal:      General: Bowel sounds are normal       Palpations: Abdomen is soft  Musculoskeletal: Normal range of motion  Skin:     General: Skin is warm and dry  Comments: As described above   Neurological:      Mental Status: She is alert and oriented to person, place, and time  Psychiatric:         Behavior: Behavior normal          Thought Content: Thought content normal          Judgment: Judgment normal        Lesion Destruction    Date/Time: 5/26/2021 9:12 AM  Performed by: Barak Collier MD  Authorized by: Barak Collier MD   [facility] Protocol:  Consent: Verbal consent obtained  Risks and benefits: risks, benefits and alternatives were discussed  Consent given by: patient  Time out: Immediately prior to procedure a "time out" was called to verify the correct patient, procedure, equipment, support staff and site/side marked as required    Timeout called at: 5/26/2021 9:12 AM   Patient understanding: patient states understanding of the procedure being performed  Patient consent: the patient's understanding of the procedure matches consent given  Site marked: the operative site was marked  Patient identity confirmed: verbally with patient      Procedure Details - Lesion Destruction:     Number of Lesions:  1  Lesion 1:     Body area:  Trunk    Trunk location:  Abdomen    Final defect size (mm):  5    Malignancy: granulation tissue      Destruction method: chemical removal

## 2021-06-09 ENCOUNTER — OFFICE VISIT (OUTPATIENT)
Dept: SURGERY | Facility: CLINIC | Age: 51
End: 2021-06-09

## 2021-06-09 DIAGNOSIS — Z48.89 ENCOUNTER FOR POST SURGICAL WOUND CHECK: Primary | ICD-10-CM

## 2021-06-09 PROCEDURE — 99024 POSTOP FOLLOW-UP VISIT: CPT | Performed by: SURGERY

## 2021-06-09 NOTE — PROGRESS NOTES
Assessment/Plan:    Encounter for post surgical wound check   Patient returns to the office voicing no new concerns regarding the left lower quadrant trocar site  She reports that continues to heal nicely  Her wound care regimen has included topical application of zinc oxide on a daily basis and covering the wound with a Band-Aid  On physical examination the patient is well appearing  Awake alert oriented  Competent reliable as a historian  Inspection of the wound reveals it to be well healed and epithelialized without the necessity to redressed at today  Patient appears clinically stable status post laparoscopic appendectomy  All questions answered to the satisfaction of the patient  She has been released  She has been encouraged to return to the practice with any questions or concerns in the future  Diagnoses and all orders for this visit:    Encounter for post surgical wound check          Subjective:      Patient ID: Joseph Goodwin is a 48 y o  female  HPI    The following portions of the patient's history were reviewed and updated as appropriate: allergies, current medications, past family history, past medical history, past social history, past surgical history and problem list     Review of Systems   Constitutional: Negative for chills and fever  HENT: Negative for ear pain and sore throat  Eyes: Negative for pain and visual disturbance  Respiratory: Negative for cough and shortness of breath  Cardiovascular: Negative for chest pain and palpitations  Gastrointestinal: Negative for abdominal pain and vomiting  Genitourinary: Negative for dysuria and hematuria  Musculoskeletal: Negative for arthralgias and back pain  Skin: Negative for color change and rash  Neurological: Negative for seizures and syncope  All other systems reviewed and are negative  Objective: There were no vitals taken for this visit           Physical Exam  Constitutional: Appearance: She is well-developed  HENT:      Head: Normocephalic and atraumatic  Eyes:      Conjunctiva/sclera: Conjunctivae normal       Pupils: Pupils are equal, round, and reactive to light  Neck:      Musculoskeletal: Normal range of motion and neck supple  Cardiovascular:      Rate and Rhythm: Normal rate and regular rhythm  Pulmonary:      Effort: Pulmonary effort is normal       Breath sounds: Normal breath sounds  Abdominal:      General: Bowel sounds are normal       Palpations: Abdomen is soft  Musculoskeletal: Normal range of motion  Skin:     General: Skin is warm and dry  Comments: Left lower quadrant trocar site well-healed   Neurological:      Mental Status: She is alert and oriented to person, place, and time  Psychiatric:         Behavior: Behavior normal          Thought Content:  Thought content normal          Judgment: Judgment normal

## 2021-06-09 NOTE — LETTER
June 9, 2021     [facility] [address] [place] [address]    Patient: Debbie Al   YOB: 1970   Date of Visit: 6/9/2021       Dear Dr Iron eD La Rosa: Thank you for referring Debbie Al to me for evaluation  Below are my notes for this consultation  If you have questions, please do not hesitate to call me  I look forward to following your patient along with you  Sincerely,        Anat Mullen MD        CC: No Recipients  Anat Mullen MD  6/9/2021 10:49 AM  Incomplete  Assessment/Plan:    Encounter for post surgical wound check   Patient returns to the office voicing no new concerns regarding the left lower quadrant trocar site  She reports that continues to heal nicely  Her wound care regimen has included topical application of zinc oxide on a daily basis and covering the wound with a Band-Aid  On physical examination the patient is well appearing  Awake alert oriented  Competent reliable as a historian  Inspection of the wound reveals it to be well healed and epithelialized without the necessity to redressed at today  Patient appears clinically stable status post laparoscopic appendectomy  All questions answered to the satisfaction of the patient  She has been released  She has been encouraged to return to the practice with any questions or concerns in the future  Diagnoses and all orders for this visit:    Encounter for post surgical wound check          Subjective:      Patient ID: Debbie Al is a 48 y o  female  HPI    The following portions of the patient's history were reviewed and updated as appropriate: allergies, current medications, past family history, past medical history, past social history, past surgical history and problem list     Review of Systems   Constitutional: Negative for chills and fever  HENT: Negative for ear pain and sore throat  Eyes: Negative for pain and visual disturbance  Respiratory: Negative for cough and shortness of breath  Cardiovascular: Negative for chest pain and palpitations  Gastrointestinal: Negative for abdominal pain and vomiting  Genitourinary: Negative for dysuria and hematuria  Musculoskeletal: Negative for arthralgias and back pain  Skin: Negative for color change and rash  Neurological: Negative for seizures and syncope  All other systems reviewed and are negative  Objective: There were no vitals taken for this visit  Physical Exam  Constitutional:       Appearance: She is well-developed  HENT:      Head: Normocephalic and atraumatic  Eyes:      Conjunctiva/sclera: Conjunctivae normal       Pupils: Pupils are equal, round, and reactive to light  Neck:      Musculoskeletal: Normal range of motion and neck supple  Cardiovascular:      Rate and Rhythm: Normal rate and regular rhythm  Pulmonary:      Effort: Pulmonary effort is normal       Breath sounds: Normal breath sounds  Abdominal:      General: Bowel sounds are normal       Palpations: Abdomen is soft  Musculoskeletal: Normal range of motion  Skin:     General: Skin is warm and dry  Comments: Left lower quadrant trocar site well-healed   Neurological:      Mental Status: She is alert and oriented to person, place, and time  Psychiatric:         Behavior: Behavior normal          Thought Content:  Thought content normal          Judgment: Judgment normal

## 2021-06-09 NOTE — ASSESSMENT & PLAN NOTE
Patient returns to the office voicing no new concerns regarding the left lower quadrant trocar site  She reports that continues to heal nicely  Her wound care regimen has included topical application of zinc oxide on a daily basis and covering the wound with a Band-Aid  On physical examination the patient is well appearing  Awake alert oriented  Competent reliable as a historian  Inspection of the wound reveals it to be well healed and epithelialized without the necessity to redressed at today  Patient appears clinically stable status post laparoscopic appendectomy  All questions answered to the satisfaction of the patient  She has been released  She has been encouraged to return to the practice with any questions or concerns in the future

## 2021-08-10 ENCOUNTER — APPOINTMENT (OUTPATIENT)
Dept: LAB | Facility: CLINIC | Age: 51
End: 2021-08-10
Payer: COMMERCIAL

## 2021-08-10 DIAGNOSIS — Z79.4 TYPE 2 DIABETES MELLITUS WITH DIABETIC AUTONOMIC NEUROPATHY, WITH LONG-TERM CURRENT USE OF INSULIN (HCC): ICD-10-CM

## 2021-08-10 DIAGNOSIS — E55.9 VITAMIN D DEFICIENCY: ICD-10-CM

## 2021-08-10 DIAGNOSIS — K31.84 GASTROPARESIS: ICD-10-CM

## 2021-08-10 DIAGNOSIS — E78.00 HYPERCHOLESTEREMIA: ICD-10-CM

## 2021-08-10 DIAGNOSIS — Z13.29 SCREENING FOR THYROID DISORDER: ICD-10-CM

## 2021-08-10 DIAGNOSIS — E11.43 TYPE 2 DIABETES MELLITUS WITH DIABETIC AUTONOMIC NEUROPATHY, WITH LONG-TERM CURRENT USE OF INSULIN (HCC): ICD-10-CM

## 2021-08-10 DIAGNOSIS — D50.8 IRON DEFICIENCY ANEMIA SECONDARY TO INADEQUATE DIETARY IRON INTAKE: ICD-10-CM

## 2021-08-10 DIAGNOSIS — Z79.899 MEDICATION MANAGEMENT: ICD-10-CM

## 2021-08-10 DIAGNOSIS — E78.1 HYPERTRIGLYCERIDEMIA: ICD-10-CM

## 2021-08-10 LAB
25(OH)D3 SERPL-MCNC: 32.8 NG/ML (ref 30–100)
ALBUMIN SERPL BCP-MCNC: 3.2 G/DL (ref 3.5–5)
ALP SERPL-CCNC: 129 U/L (ref 46–116)
ALT SERPL W P-5'-P-CCNC: 36 U/L (ref 12–78)
ANION GAP SERPL CALCULATED.3IONS-SCNC: 2 MMOL/L (ref 4–13)
AST SERPL W P-5'-P-CCNC: 20 U/L (ref 5–45)
BASOPHILS # BLD AUTO: 0.05 THOUSANDS/ΜL (ref 0–0.1)
BASOPHILS NFR BLD AUTO: 1 % (ref 0–1)
BILIRUB SERPL-MCNC: 0.25 MG/DL (ref 0.2–1)
BUN SERPL-MCNC: 19 MG/DL (ref 5–25)
CALCIUM ALBUM COR SERPL-MCNC: 9.6 MG/DL (ref 8.3–10.1)
CALCIUM SERPL-MCNC: 9 MG/DL (ref 8.3–10.1)
CHLORIDE SERPL-SCNC: 105 MMOL/L (ref 100–108)
CHOLEST SERPL-MCNC: 193 MG/DL (ref 50–200)
CO2 SERPL-SCNC: 27 MMOL/L (ref 21–32)
CREAT SERPL-MCNC: 1.12 MG/DL (ref 0.6–1.3)
EOSINOPHIL # BLD AUTO: 0.29 THOUSAND/ΜL (ref 0–0.61)
EOSINOPHIL NFR BLD AUTO: 3 % (ref 0–6)
ERYTHROCYTE [DISTWIDTH] IN BLOOD BY AUTOMATED COUNT: 13.4 % (ref 11.6–15.1)
EST. AVERAGE GLUCOSE BLD GHB EST-MCNC: 217 MG/DL
FERRITIN SERPL-MCNC: 98 NG/ML (ref 8–388)
GFR SERPL CREATININE-BSD FRML MDRD: 57 ML/MIN/1.73SQ M
GLUCOSE P FAST SERPL-MCNC: 210 MG/DL (ref 65–99)
HBA1C MFR BLD: 9.2 %
HCT VFR BLD AUTO: 35.5 % (ref 34.8–46.1)
HDLC SERPL-MCNC: 40 MG/DL
HGB BLD-MCNC: 11.5 G/DL (ref 11.5–15.4)
IMM GRANULOCYTES # BLD AUTO: 0.04 THOUSAND/UL (ref 0–0.2)
IMM GRANULOCYTES NFR BLD AUTO: 0 % (ref 0–2)
IRON SATN MFR SERPL: 32 %
IRON SERPL-MCNC: 49 UG/DL (ref 50–170)
LDLC SERPL CALC-MCNC: 80 MG/DL (ref 0–100)
LYMPHOCYTES # BLD AUTO: 4.26 THOUSANDS/ΜL (ref 0.6–4.47)
LYMPHOCYTES NFR BLD AUTO: 43 % (ref 14–44)
MCH RBC QN AUTO: 27.1 PG (ref 26.8–34.3)
MCHC RBC AUTO-ENTMCNC: 32.4 G/DL (ref 31.4–37.4)
MCV RBC AUTO: 84 FL (ref 82–98)
MONOCYTES # BLD AUTO: 0.71 THOUSAND/ΜL (ref 0.17–1.22)
MONOCYTES NFR BLD AUTO: 7 % (ref 4–12)
NEUTROPHILS # BLD AUTO: 4.49 THOUSANDS/ΜL (ref 1.85–7.62)
NEUTS SEG NFR BLD AUTO: 46 % (ref 43–75)
NONHDLC SERPL-MCNC: 153 MG/DL
NRBC BLD AUTO-RTO: 0 /100 WBCS
PLATELET # BLD AUTO: 358 THOUSANDS/UL (ref 149–390)
PMV BLD AUTO: 10.6 FL (ref 8.9–12.7)
POTASSIUM SERPL-SCNC: 3.8 MMOL/L (ref 3.5–5.3)
PROT SERPL-MCNC: 8.1 G/DL (ref 6.4–8.2)
RBC # BLD AUTO: 4.24 MILLION/UL (ref 3.81–5.12)
SODIUM SERPL-SCNC: 134 MMOL/L (ref 136–145)
TIBC SERPL-MCNC: 155 UG/DL (ref 250–450)
TRIGL SERPL-MCNC: 366 MG/DL
TSH SERPL DL<=0.05 MIU/L-ACNC: 1.32 UIU/ML (ref 0.36–3.74)
WBC # BLD AUTO: 9.84 THOUSAND/UL (ref 4.31–10.16)

## 2021-08-10 PROCEDURE — 36415 COLL VENOUS BLD VENIPUNCTURE: CPT

## 2021-08-10 PROCEDURE — 84443 ASSAY THYROID STIM HORMONE: CPT

## 2021-08-10 PROCEDURE — 80061 LIPID PANEL: CPT

## 2021-08-10 PROCEDURE — 80053 COMPREHEN METABOLIC PANEL: CPT

## 2021-08-10 PROCEDURE — 83550 IRON BINDING TEST: CPT

## 2021-08-10 PROCEDURE — 82746 ASSAY OF FOLIC ACID SERUM: CPT

## 2021-08-10 PROCEDURE — 82728 ASSAY OF FERRITIN: CPT

## 2021-08-10 PROCEDURE — 82306 VITAMIN D 25 HYDROXY: CPT

## 2021-08-10 PROCEDURE — 85025 COMPLETE CBC W/AUTO DIFF WBC: CPT

## 2021-08-10 PROCEDURE — 83540 ASSAY OF IRON: CPT

## 2021-08-10 PROCEDURE — 83036 HEMOGLOBIN GLYCOSYLATED A1C: CPT

## 2021-08-11 NOTE — PROGRESS NOTES
[facility]  Gastroenterology Outpatient Consultation  Patient Esau Ricci   Age 46 y o  Gender female   MRN: [id number]  [facility] [id number]     ASSESSMENT AND PLAN:   Problem List Items Addressed This Visit        Digestive    Gastroparesis - Primary     Patient does report having intermittent episodes of vomiting, nausea, early satiety  She did have a recent upper endoscopy in 2019 that was unrevealing (I  do not have the report but a gastric biopsy was negative for H pylori)  She has not had a gastric emptying scan in many years  I suspect her symptoms are related to gastroparesis in addition to poorly controlled diabetes  I noticed her last hemoglobin A1c was 9 2  Her gastric function may improve with improvement of her hemoglobin A1c  She is working on tighter control of her diabetes  She has been seeing Endocrinology  I did give her instructions regarding a low residue diet  Also asked her to look up the [facility] site for a low residue diet as well as this is very helpful  When she has episodes of vomiting, she should go on a clear liquid diet and maybe use a nutritional supplement that is diabetes friendly like Glucerna or its equivalent  The pharmacy could probably help her with this  I would check a gastric emptying scan  Check ELIZABET  Small frequent meals           Relevant Medications    famotidine (PEPCID) 40 MG tablet    Other Relevant Orders    NM gastric emptying    ELIZABET Screen w/ Reflex to Titer/Pattern    Celiac Disease Antibody Profile    GERD (gastroesophageal reflux disease)     Jason Us reports having reflux dating back many years  In fact she is currently on pantoprazole 40 mg twice a day  I did suggest maybe trying to decrease this dose to 40 mg once a day  Take this 30 min to 1 hr before 1 meal a day  If she develops significant breakthrough heartburn with making this change, did ask her to resume 40 mg twice a day    I will also add famotidine 40 mg, take this 2 hr before bed  Lifestyle modifications for gastroesophageal reflux disease were discussed and include limiting fried and fatty foods, mints, chocolates, carbonated and caffeinated beverages , and alcohol, etc   Avoid lying down for 3 hours after meals  If you have nighttime symptoms consider raising the head of the bed up on 4-6 inch blocks  Pillows typically are not useful  If you are overweight, weight loss will be helpful  I also suggested that she stop taking meloxicam as this can cause inflammation in the GI tract and ulcers  Relevant Medications    famotidine (PEPCID) 40 MG tablet    Slow transit constipation     Haylee typically with having 1 bowel movement every other day  Recently she has noticed worsening constipation since her laparoscopy with lysis of adhesions back in March of 2021  MiraLax makes her sick  I did suggest trying benefit fiber 2 tsp fulls mixed in 8 oz of water daily  I would avoid fiber capsules in addition to other fiber powder that do not mix clear  I would also avoid fiber bars for now especially given her gastroparesis  Given that she is skipping 3 weeks without a bowel movement, I will try either Linzess, or true lanced  I will hold off on Amitiza at this time given that he can lead to nausea  For now we will try Linzess at 72 mcg, take 1 tablet daily  I explained if she gets significant diarrhea she should stop taking this medication  Genitourinary    Renal cell carcinoma of right kidney ([facility] [address] )     I did review her records regarding right-sided renal cell carcinoma  She had a partial nephrectomy on the right side in May of 2017  She is followed closely by Urology  Her follow-up CT scans have been unrevealing  Other    History of colon polyps     Primo Ted did have a colonoscopy back on December 12, 2019    She had a 4 mm polyp in transverse colon that was a tubular adenoma, a 3 mm polyp in the sigmoid colon, biopsy unremarkable and a 3 mm polyp in the rectum that was hyperplastic random biopsies were negative for any form of microscopic colitis  A 3 year follow-up was recommended at that time thus she will be due for repeat colonoscopy in December of 2022  Anemia, unspecified     Patient has had a mild anemia  Her most recent iron studies suggest more anemia of chronic disease with a low TIBC  Check B12 and folate for completeness  Relevant Orders    Celiac Disease Antibody Profile      Other Visit Diagnoses     Medication management        Relevant Orders    Vitamin B12    Folate (Completed)         _____________________________________________________________    HPI:   Denise Buerger is a delightful 51-year-old woman who I am seeing in the office in consultation to establish a new gastroenterologist   She does have a variety of gastrointestinal issues including constipation, gastroparesis, reflux  Patient reports that she had a bad week last week  She had a lot of nausea and episodes of vomiting  She had a hard time keeping anything down  This lasted for about 4 days  She will experience these episodes about every other month  She does report feeling very full after just a few bites of food  There has been no dysphagia  Her reflux seems to be well controlled on pantoprazole 40 mg twice a day and that she rarely has breakthrough heartburn  She does take metoclopramide 10 mg twice a day before 2 meals a day  She does try to watch what she eats but she does not specifically follow a low residue diet  She does have occasional breakthrough heartburn but it is less than twice a week  Despite the symptoms she has not experienced any weight loss  She does report having constipation that she may not have a bowel movement for 3 weeks  Her last bowel movement was last week and was formed  There is no diarrhea  There is no rectal bleeding or melena    She does get abdominal pain typically right before she needs to have a bowel movement, about every 3 weeks, pain is relieved with a bowel movement  She states her constipation has gotten worse since she had the laparoscopic lysis of adhesions and incidental appendectomy back in March of 2021  She has tried using MiraLax for constipation but it made her sick  She has never tried Linzess, through Lantus, or Amitiza  There has been no rectal bleeding or melena  Prior to surgery back in March her bowel movements are every other day  She has tried using a fiber 1 bar which seems to help with her constipation to some degree  Not currently using any laxatives  She does take meloxicam once a day  She denies any family history of colon cancer colon polyps  She does not smoke tobacco and does not drink alcohol  Records reviewed for 20 min prior to office visit     08/10/2021 chemistry panel  Sodium 134   Glucose 210   AST 20   ALT 36   Alk-phos 129   Albumin 3 2   Iron 49   Ferritin 98   Iron saturation 32   TIBC 155   Vitamin-D 32 8  CBC-WBC 9 8 HGB 11 5 HCT 35 5 MCV 84 platelet count [id number]  Hemoglobin A1c 9 2 down from 10 7      6/09/2021 saw  General surgery in follow-up  3/2021 laparoscopic extensive lysis of adhesions and incidental appendectomy    03/03/2021 CT scan [facility]   Heterogeneous steatosis of the liver      03/23/2021 CT scan abdomen pelvis   Prior colonoscopy   Moderate amount of stool in the rectosigmoid colon    Probable appendectomy  Degenerative changes SI joints and lower lumbar spine  Prior hysterectomy   Prior cholecystectomy  Evidence of partial right nephrectomy    01/11/2020  Hepatitis a antibody total negative   Hepatitis B core antibody IgM negative   Hepatitis B core antibody total negative   Hepatitis-B surface antigen negative  Hepatitis B surface antibody negative   Hepatitis a antibody IgG negative  Celiac antibodies negative  Mitochondrial antibody negative  Liver kidney microsomal antibody negative   ELIZABET negative  Smooth muscle antibody negative    12/12/2019 colonoscopy Dr John José  Normal terminal ileum   4 mm polyp transverse colon  Tubular adenoma  3 mm polyp sigmoid colon  Biopsy unremarkable  3 mm polyp rectum  Hyperplastic polyp  Otherwise normal colonoscopy  Random biopsies obtained to evaluate for microscopic colitis  Biopsies negative for microscopic or collagenous colitis  Repeat colonoscopy 3 years recommended    12/12/2019 EGD, no report available however pathology report available  Biopsies stomach mild reactive gastropathy  Negative for H pylori      Allergies   Allergen Reactions    Azithromycin GI Intolerance and Hives    Benztropine     Butorphanol Hallucinations    Penicillins     Zolpidem      Current Outpatient Medications   Medication Sig Dispense Refill    albuterol (ACCUNEB) 0 63 MG/3ML nebulizer solution take 3 milliliters by mouth every 4 hours if needed for wheezing      albuterol (PROAIR HFA) 90 mcg/act inhaler Inhale 1 puff every 6 (six) hours as needed      Aspirin (ASPIR-81 PO) Take 81 mg by mouth      atorvastatin (LIPITOR) 40 mg tablet take 1 tablet by mouth at bedtime      B-D UF III MINI PEN NEEDLES 31G X 5 MM MISC       BANOPHEN 50 MG capsule take 1 capsule by mouth every 6 hours if needed for itching      cholecalciferol (VITAMIN D3) 400 units tablet Take 400 Units by mouth daily      citalopram (CELEXA) 40 mg tablet Take 40 mg by mouth daily        MG capsule Take 100 mg by mouth 2 (two) times a day      fenofibrate (TRICOR) 48 mg tablet Take 48 mg by mouth daily      levETIRAcetam (KEPPRA) 500 mg tablet Take 500 mg by mouth every 12 (twelve) hours       lisinopril (ZESTRIL) 10 mg tablet Take 10 mg by mouth      metoclopramide (REGLAN) 10 mg tablet Take 10 mg by mouth daily      metoprolol tartrate (LOPRESSOR) 25 mg tablet Take 25 mg by mouth 2 (two) times a day      ondansetron (ZOFRAN) 4 mg tablet Take 1 tablet (4 mg total) by mouth every 8 (eight) hours as needed for nausea or vomiting 20 tablet 0    solifenacin (VESICARE) 5 mg tablet Take 5 mg by mouth daily       Continuous Blood Gluc  (FreeStyle Refugio 14 Day [place]) TEMITOPE Use for continuous blood glucose monitoring 1 each 0    Continuous Blood Gluc Sensor (FreeStyle Refugio 14 Day Sensor) MISC Use one sensor every 14 days for continuous blood sugar monitoring  6 each 3    Empagliflozin 25 MG TABS Take 1 tablet (25 mg total) by mouth every morning 90 tablet 1    famotidine (PEPCID) 40 MG tablet Take 1 tablet (40 mg total) by mouth daily at bedtime Take in evening 2 hours prior to bed 30 tablet 3    glucose blood (OneTouch Verio) test strip Use to test blood glucose 4 times a day 200 strip 2    insulin glulisine (Apidra SoloStar) 100 units/mL injection pen Inject 20 units under the skin three times daily with meals   30 mL 1    Lantus SoloStar 100 units/mL injection pen Inject 70 Units under the skin daily 30 mL 1    linaCLOtide (Linzess) 72 MCG CAPS Take 1 capsule by mouth daily before breakfast 10 capsule 0    linaCLOtide (Linzess) 72 MCG CAPS Take 1 capsule by mouth daily before breakfast 30 capsule 5    metFORMIN (GLUCOPHAGE) 1000 MG tablet Take 1 tablet (1,000 mg total) by mouth 2 (two) times a day with meals 180 tablet 1    neomycin-polymyxin-hydrocortisone (CORTISPORIN) otic solution Administer 4 drops to the right ear every 6 (six) hours 10 mL 0    ondansetron (ZOFRAN-ODT) 4 mg disintegrating tablet Take 1 tablet (4 mg total) by mouth every 6 (six) hours as needed for nausea or vomiting 20 tablet 0    pantoprazole (PROTONIX) 40 mg tablet Take 1 tablet (40 mg total) by mouth 2 (two) times a day 180 tablet 3    pregabalin (LYRICA) 100 mg capsule Take 1 capsule (100 mg total) by mouth 3 (three) times a day 270 capsule 1    semaglutide, 1 mg/dose, (Ozempic) 4 MG/3ML SOPN injection pen Inject 0 75 mL (1 mg total) under the skin once a week 9 mL 1     No current facility-administered medications for this visit  MEDICAL HISTORY:  Past Medical History:   Diagnosis Date    Depression     Diabetes mellitus ([address] )     Gastroparesis     GERD (gastroesophageal reflux disease)     Hyperlipidemia     Hypertension     Sciatica     Seizure disorder [facility])      Past Surgical History:   Procedure Laterality Date    BREAST BIOPSY Left  benign    CHOLECYSTECTOMY      COLONOSCOPY      HYSTERECTOMY      GA LAP,APPENDECTOMY N/A 3/24/2021    Procedure: APPENDECTOMY LAPAROSCOPIC;  Surgeon: Camille Upton MD;  [facility];  Service: General    GA LAP,DIAGNOSTIC ABDOMEN N/A 3/24/2021    Procedure: LAPAROSCOPY DIAGNOSTIC, EXTENSIVE DESI;  Surgeon: Camille Upton MD;  [facility];  Service: General    UPPER GASTROINTESTINAL ENDOSCOPY       Social History     Substance and Sexual Activity   Alcohol Use Never     Social History     Substance and Sexual Activity   Drug Use Never     Social History     Tobacco Use   Smoking Status Former Smoker    Quit date: 200    Years since quittin 9   Smokeless Tobacco Never Used     Family History   Problem Relation Age of Onset    No Known Problems Mother     No Known Problems Father     No Known Problems Daughter     No Known Problems Maternal Grandmother     No Known Problems Paternal Grandmother     No Known Problems Paternal Aunt     No Known Problems Paternal Aunt     No Known Problems Paternal Aunt        REVIEW OF SYSTEMS:  CONSTITUTIONAL: Denies any fever, chills, rigors, and weight loss  HEENT: No earache or tinnitus  Denies hearing loss or visual disturbances  She does report some visual changes associated with her diabetes  CARDIOVASCULAR: No chest pain or palpitations  RESPIRATORY: Denies any cough, hemoptysis, shortness of breath or dyspnea on exertion  GASTROINTESTINAL: As noted in the History of Present Illness  GENITOURINARY: No problems with urination  Denies any hematuria or dysuria    NEUROLOGIC:  She does report numbness in her feet and legs  MUSCULOSKELETAL: Denies any muscle or joint pain  SKIN: Denies skin rashes or itching  ENDOCRINE: Denies excessive thirst   She does report some heat intolerance  PSYCHOSOCIAL: Denies depression or anxiety  Denies any recent memory loss  Objective   Blood pressure 108/78, pulse 84, temperature (!) 97 1 °F (36 2 °C), temperature source Tympanic, resp  rate 16, height 5' 1" (1 549 m), weight 79 8 kg (176 lb), not currently breastfeeding  Body mass index is 33 25 kg/m²  PHYSICAL EXAM:   General Appearance: Alert, cooperative, no distress  HEENT: Normocephalic, atraumatic, anicteric  Neck: Supple, symmetrical, trachea midline  Lungs: Clear to auscultation bilaterally; no rales, rhonchi or wheezing; respirations unlabored   Heart: Regular rate and rhythm; no murmur, rub, or gallop  Abdomen: Soft, bowel sounds normal, non-tender, non-distended; no masses, there is no hepatosplenomegaly  No spider angiomas  Obese abdomen   No splash  Genitalia: Deferred   Rectal: Deferred   Extremities: No cyanosis, clubbing or edema   Skin: No jaundice, rashes, or lesions   Lymph nodes: No palpable cervical lymphadenopathy   Lab Results:   Appointment on 08/10/2021   Component Date Value    Cholesterol 08/10/2021 193     Triglycerides 08/10/2021 366*    HDL, Direct 08/10/2021 40     LDL Calculated 08/10/2021 80     Non-HDL-Chol (CHOL-HDL) 08/10/2021 153     Sodium 08/10/2021 134*    Potassium 08/10/2021 3 8     Chloride 08/10/2021 105     CO2 08/10/2021 27     ANION GAP 08/10/2021 2*    BUN 08/10/2021 19     Creatinine 08/10/2021 1 12     Glucose, Fasting 08/10/2021 210*    Calcium 08/10/2021 9 0     Corrected Calcium 08/10/2021 9 6     AST 08/10/2021 20     ALT 08/10/2021 36     Alkaline Phosphatase 08/10/2021 129*    Total Protein 08/10/2021 8 1     Albumin 08/10/2021 3 2*    Total Bilirubin 08/10/2021 0 25     eGFR 08/10/2021 57     Hemoglobin A1C 08/10/2021 9 2*    EAG 08/10/2021 217     WBC 08/10/2021 9 84     RBC 08/10/2021 4 24     Hemoglobin 08/10/2021 11 5     Hematocrit 08/10/2021 35 5     MCV 08/10/2021 84     MCH 08/10/2021 27 1     MCHC 08/10/2021 32 4     RDW 08/10/2021 13 4     MPV 08/10/2021 10 6     Platelets 37/63/2082 358     nRBC 08/10/2021 0     Neutrophils Relative 08/10/2021 46     Immat GRANS % 08/10/2021 0     Lymphocytes Relative 08/10/2021 43     Monocytes Relative 08/10/2021 7     Eosinophils Relative 08/10/2021 3     Basophils Relative 08/10/2021 1     Neutrophils Absolute 08/10/2021 4 49     Immature Grans Absolute 08/10/2021 0 04     Lymphocytes Absolute 08/10/2021 4 26     Monocytes Absolute 08/10/2021 0 71     Eosinophils Absolute 08/10/2021 0 29     Basophils Absolute 08/10/2021 0 05     Vit D, 25-Hydroxy 08/10/2021 32 8     TSH 3RD GENERATON 08/10/2021 1 320     Iron Saturation 08/10/2021 32     TIBC 08/10/2021 155*    Iron 08/10/2021 49*    Ferritin 08/10/2021 98     Folate 08/10/2021 9 5      Radiology Results:   No results found    [address] [facility] Tim    11/29/21   Cc:

## 2021-08-12 ENCOUNTER — CONSULT (OUTPATIENT)
Dept: GASTROENTEROLOGY | Facility: CLINIC | Age: 51
End: 2021-08-12
Payer: COMMERCIAL

## 2021-08-12 VITALS
DIASTOLIC BLOOD PRESSURE: 78 MMHG | TEMPERATURE: 97.1 F | SYSTOLIC BLOOD PRESSURE: 108 MMHG | BODY MASS INDEX: 33.23 KG/M2 | RESPIRATION RATE: 16 BRPM | HEART RATE: 84 BPM | WEIGHT: 176 LBS | HEIGHT: 61 IN

## 2021-08-12 DIAGNOSIS — K31.84 GASTROPARESIS: Primary | ICD-10-CM

## 2021-08-12 DIAGNOSIS — D64.9 ANEMIA, UNSPECIFIED TYPE: ICD-10-CM

## 2021-08-12 DIAGNOSIS — K59.01 SLOW TRANSIT CONSTIPATION: ICD-10-CM

## 2021-08-12 DIAGNOSIS — K21.9 GASTROESOPHAGEAL REFLUX DISEASE, UNSPECIFIED WHETHER ESOPHAGITIS PRESENT: ICD-10-CM

## 2021-08-12 DIAGNOSIS — Z86.010 HISTORY OF COLON POLYPS: ICD-10-CM

## 2021-08-12 DIAGNOSIS — C64.1 RENAL CELL CARCINOMA OF RIGHT KIDNEY (HCC): ICD-10-CM

## 2021-08-12 DIAGNOSIS — Z79.899 MEDICATION MANAGEMENT: ICD-10-CM

## 2021-08-12 PROBLEM — Z86.0100 HISTORY OF COLON POLYPS: Status: ACTIVE | Noted: 2021-08-12

## 2021-08-12 LAB — FOLATE SERPL-MCNC: 9.5 NG/ML (ref 3.1–17.5)

## 2021-08-12 PROCEDURE — 99244 OFF/OP CNSLTJ NEW/EST MOD 40: CPT | Performed by: INTERNAL MEDICINE

## 2021-08-12 RX ORDER — LINACLOTIDE 72 UG/1
1 CAPSULE, GELATIN COATED ORAL
Qty: 10 CAPSULE | Refills: 0 | Status: SHIPPED | COMMUNITY
Start: 2021-08-12 | End: 2021-08-26 | Stop reason: SDUPTHER

## 2021-08-12 RX ORDER — FAMOTIDINE 40 MG/1
40 TABLET, FILM COATED ORAL
Qty: 30 TABLET | Refills: 3 | Status: SHIPPED | OUTPATIENT
Start: 2021-08-12 | End: 2021-11-30 | Stop reason: SDUPTHER

## 2021-08-12 NOTE — ASSESSMENT & PLAN NOTE
I did review her records regarding right-sided renal cell carcinoma  She had a partial nephrectomy on the right side in May of 2017  She is followed closely by Urology  Her follow-up CT scans have been unrevealing

## 2021-08-12 NOTE — ASSESSMENT & PLAN NOTE
Patient has had a mild anemia  Her most recent iron studies suggest more anemia of chronic disease with a low TIBC  Check B12 and folate for completeness

## 2021-08-12 NOTE — ASSESSMENT & PLAN NOTE
Marjan Almeidaelías typically with having 1 bowel movement every other day  Recently she has noticed worsening constipation since her laparoscopy with lysis of adhesions back in March of 2021  MiraLax makes her sick  I did suggest trying benefit fiber 2 tsp fulls mixed in 8 oz of water daily  I would avoid fiber capsules in addition to other fiber powder that do not mix clear  I would also avoid fiber bars for now especially given her gastroparesis  Given that she is skipping 3 weeks without a bowel movement, I will try either Linzess, or true lanced  I will hold off on Amitiza at this time given that he can lead to nausea  For now we will try Linzess at 72 mcg, take 1 tablet daily  I explained if she gets significant diarrhea she should stop taking this medication

## 2021-08-12 NOTE — ASSESSMENT & PLAN NOTE
Caleb Carrasco reports having reflux dating back many years  In fact she is currently on pantoprazole 40 mg twice a day  I did suggest maybe trying to decrease this dose to 40 mg once a day  Take this 30 min to 1 hr before 1 meal a day  If she develops significant breakthrough heartburn with making this change, did ask her to resume 40 mg twice a day  I will also add famotidine 40 mg, take this 2 hr before bed  Lifestyle modifications for gastroesophageal reflux disease were discussed and include limiting fried and fatty foods, mints, chocolates, carbonated and caffeinated beverages , and alcohol, etc   Avoid lying down for 3 hours after meals  If you have nighttime symptoms consider raising the head of the bed up on 4-6 inch blocks  Pillows typically are not useful  If you are overweight, weight loss will be helpful  I also suggested that she stop taking meloxicam as this can cause inflammation in the GI tract and ulcers

## 2021-08-12 NOTE — ASSESSMENT & PLAN NOTE
Colton Maldonado did have a colonoscopy back on December 12, 2019  She had a 4 mm polyp in transverse colon that was a tubular adenoma, a 3 mm polyp in the sigmoid colon, biopsy unremarkable and a 3 mm polyp in the rectum that was hyperplastic random biopsies were negative for any form of microscopic colitis  A 3 year follow-up was recommended at that time thus she will be due for repeat colonoscopy in December of 2022

## 2021-08-12 NOTE — PATIENT INSTRUCTIONS
Gastroparesis  Patient does report having intermittent episodes of vomiting, nausea, early satiety  She did have a recent upper endoscopy in 2019 that was unrevealing (I  do not have the report but a gastric biopsy was negative for H pylori)  She has not had a gastric emptying scan in many years  I suspect her symptoms are related to gastroparesis in addition to poorly controlled diabetes  I noticed her last hemoglobin A1c was 9 2  Her gastric function may improve with improvement of her hemoglobin A1c  She is working on tighter control of her diabetes  She has been seeing Endocrinology  I did give her instructions regarding a low residue diet  Also asked her to look up the [address] [place] [address] site for a low residue diet as well as this is very helpful  When she has episodes of vomiting, she should go on a clear liquid diet and maybe use a nutritional supplement that is diabetes friendly like Glucerna or its equivalent  The pharmacy could probably help her with this  I would check a gastric emptying scan  Check ELIZABET  Small frequent meals      GERD (gastroesophageal reflux disease)  Herlinda Mcdaniel reports having reflux dating back many years  In fact she is currently on pantoprazole 40 mg twice a day  I did suggest maybe trying to decrease this dose to 40 mg once a day  Take this 30 min to 1 hr before 1 meal a day  If she develops significant breakthrough heartburn with making this change, did ask her to resume 40 mg twice a day  I will also add famotidine 40 mg, take this 2 hr before bed  Lifestyle modifications for gastroesophageal reflux disease were discussed and include limiting fried and fatty foods, mints, chocolates, carbonated and caffeinated beverages , and alcohol, etc   Avoid lying down for 3 hours after meals  If you have nighttime symptoms consider raising the head of the bed up on 4-6 inch blocks  Pillows typically are not useful  If you are overweight, weight loss will be helpful      I also suggested that she stop taking meloxicam as this can cause inflammation in the GI tract and ulcers  History of colon polyps  Jami Arriaza did have a colonoscopy back on December 12, 2019  She had a 4 mm polyp in transverse colon that was a tubular adenoma, a 3 mm polyp in the sigmoid colon, biopsy unremarkable and a 3 mm polyp in the rectum that was hyperplastic random biopsies were negative for any form of microscopic colitis  A 3 year follow-up was recommended at that time thus she will be due for repeat colonoscopy in December of 2022  Slow transit constipation  Jami Arriaza typically with having 1 bowel movement every other day  Recently she has noticed worsening constipation since her laparoscopy with lysis of adhesions back in March of 2021  MiraLax makes her sick  I did suggest trying benefit fiber 2 tsp fulls mixed in 8 oz of water daily  I would avoid fiber capsules in addition to other fiber powder that do not mix clear  I would also avoid fiber bars for now especially given her gastroparesis  Given that she is skipping 3 weeks without a bowel movement, I will try either Linzess, or true lanced  I will hold off on Amitiza at this time given that he can lead to nausea  For now we will try Linzess at 72 mcg, take 1 tablet daily  I explained if she gets significant diarrhea she should stop taking this medication  Renal cell carcinoma of right kidney ([facility] [address] )  I did review her records regarding right-sided renal cell carcinoma  She had a partial nephrectomy on the right side in May of 2017  She is followed closely by Urology  Her follow-up CT scans have been unrevealing  Anemia, unspecified  Patient has had a mild anemia  Her most recent iron studies suggest more anemia of chronic disease with a low TIBC  Check B12 and folate for completeness

## 2021-08-12 NOTE — ASSESSMENT & PLAN NOTE
Patient does report having intermittent episodes of vomiting, nausea, early satiety  She did have a recent upper endoscopy in 2019 that was unrevealing (I  do not have the report but a gastric biopsy was negative for H pylori)  She has not had a gastric emptying scan in many years  I suspect her symptoms are related to gastroparesis in addition to poorly controlled diabetes  I noticed her last hemoglobin A1c was 9 2  Her gastric function may improve with improvement of her hemoglobin A1c  She is working on tighter control of her diabetes  She has been seeing Endocrinology  I did give her instructions regarding a low residue diet  Also asked her to look up the [address] [place] [address] site for a low residue diet as well as this is very helpful  When she has episodes of vomiting, she should go on a clear liquid diet and maybe use a nutritional supplement that is diabetes friendly like Glucerna or its equivalent  The pharmacy could probably help her with this  I would check a gastric emptying scan    Check ELIZABET  Small frequent meals

## 2021-08-13 ENCOUNTER — OFFICE VISIT (OUTPATIENT)
Dept: FAMILY MEDICINE CLINIC | Facility: CLINIC | Age: 51
End: 2021-08-13
Payer: COMMERCIAL

## 2021-08-13 VITALS
OXYGEN SATURATION: 99 % | HEART RATE: 74 BPM | DIASTOLIC BLOOD PRESSURE: 80 MMHG | BODY MASS INDEX: 33.79 KG/M2 | SYSTOLIC BLOOD PRESSURE: 112 MMHG | HEIGHT: 61 IN | WEIGHT: 179 LBS | RESPIRATION RATE: 12 BRPM | TEMPERATURE: 98 F

## 2021-08-13 DIAGNOSIS — Z79.4 TYPE 2 DIABETES MELLITUS WITH DIABETIC AUTONOMIC NEUROPATHY, WITH LONG-TERM CURRENT USE OF INSULIN (HCC): Primary | ICD-10-CM

## 2021-08-13 DIAGNOSIS — E11.43 TYPE 2 DIABETES MELLITUS WITH DIABETIC AUTONOMIC NEUROPATHY, WITH LONG-TERM CURRENT USE OF INSULIN (HCC): Primary | ICD-10-CM

## 2021-08-13 DIAGNOSIS — K31.84 GASTROPARESIS: ICD-10-CM

## 2021-08-13 DIAGNOSIS — K21.9 GASTROESOPHAGEAL REFLUX DISEASE WITHOUT ESOPHAGITIS: ICD-10-CM

## 2021-08-13 DIAGNOSIS — E11.42 DIABETIC POLYNEUROPATHY ASSOCIATED WITH TYPE 2 DIABETES MELLITUS (HCC): ICD-10-CM

## 2021-08-13 DIAGNOSIS — F41.8 DEPRESSION WITH ANXIETY: ICD-10-CM

## 2021-08-13 DIAGNOSIS — H60.391 OTHER INFECTIVE ACUTE OTITIS EXTERNA OF RIGHT EAR: ICD-10-CM

## 2021-08-13 DIAGNOSIS — Z00.00 ANNUAL PHYSICAL EXAM: ICD-10-CM

## 2021-08-13 LAB
CREAT UR-MCNC: 58.8 MG/DL
MICROALBUMIN UR-MCNC: 6 MG/L (ref 0–20)
MICROALBUMIN/CREAT 24H UR: 10 MG/G CREATININE (ref 0–30)

## 2021-08-13 PROCEDURE — 99214 OFFICE O/P EST MOD 30 MIN: CPT | Performed by: NURSE PRACTITIONER

## 2021-08-13 PROCEDURE — 99396 PREV VISIT EST AGE 40-64: CPT | Performed by: NURSE PRACTITIONER

## 2021-08-13 PROCEDURE — 82570 ASSAY OF URINE CREATININE: CPT | Performed by: NURSE PRACTITIONER

## 2021-08-13 PROCEDURE — 82043 UR ALBUMIN QUANTITATIVE: CPT | Performed by: NURSE PRACTITIONER

## 2021-08-13 RX ORDER — PREGABALIN 100 MG/1
100 CAPSULE ORAL 3 TIMES DAILY
Qty: 270 CAPSULE | Refills: 1 | Status: SHIPPED | OUTPATIENT
Start: 2021-08-13 | End: 2022-02-08

## 2021-08-13 RX ORDER — PANTOPRAZOLE SODIUM 40 MG/1
40 TABLET, DELAYED RELEASE ORAL 2 TIMES DAILY
Qty: 180 TABLET | Refills: 3 | Status: SHIPPED | OUTPATIENT
Start: 2021-08-13 | End: 2021-11-30 | Stop reason: SDUPTHER

## 2021-08-13 NOTE — PROGRESS NOTES
[facility]        NAME: Ronal Tijerina is a 48 y o  female  : 1970    MRN: [id number]  DATE: 2021  TIME: 1:51 PM    Assessment and Plan   Type 2 diabetes mellitus with diabetic autonomic neuropathy, with long-term current use of insulin ([facility]) [E11 43, Z79 4]  1  Type 2 diabetes mellitus with diabetic autonomic neuropathy, with long-term current use of insulin ([facility])  Microalbumin / creatinine urine ratio   2  Gastroesophageal reflux disease without esophagitis  pantoprazole (PROTONIX) 40 mg tablet   3  Diabetic polyneuropathy associated with type 2 diabetes mellitus ([facility])  pregabalin (LYRICA) 100 mg capsule   4  Other infective acute otitis externa of right ear  neomycin-polymyxin-hydrocortisone (CORTISPORIN) otic solution   5  Depression with anxiety     6  Gastroparesis     7  Annual physical exam           Patient Instructions     Patient Instructions   Make appointment with Endocrinology ASAP  Increase Lyrica to 100mg three times a day  Continue other medications  Urine sample today for Microalbumin  6 month medcheck or call sooner for problems/concerns          Chief Complaint     Chief Complaint   Patient presents with    Follow-up         History of Present Illness       Here for lab review- her HgA1c and glucose are very high- she is agreeable to follow up with Endo for guidance  Reviewed her other lab results- will continue to monitor her Triglycerides as her glucose improves  C/o neuropathy/severe pain in bilateral shins when touched- is taking Lyrica 50mg three times a day, is agreeable to increase to 100mg three times a day  C/o right ear canal very painful x a few days      Review of Systems   Review of Systems   Constitutional: Negative for activity change, diaphoresis, fatigue and fever  HENT: Positive for ear pain  Negative for congestion, facial swelling, hearing loss, rhinorrhea, sinus pressure, sinus pain, sneezing, sore throat and voice change  Eyes: Negative for discharge and visual disturbance  Respiratory: Negative for cough, choking, chest tightness, shortness of breath, wheezing and stridor  Cardiovascular: Negative for chest pain, palpitations and leg swelling  Gastrointestinal: Negative for abdominal distention, abdominal pain, constipation, diarrhea, nausea and vomiting  Endocrine: Negative for polydipsia, polyphagia and polyuria  Genitourinary: Negative for difficulty urinating, dysuria, frequency and urgency  Musculoskeletal: Positive for arthralgias and myalgias  Negative for back pain, gait problem, joint swelling, neck pain and neck stiffness  Skin: Negative for color change, rash and wound  Neurological: Negative for dizziness, syncope, speech difficulty, weakness, light-headedness and headaches  Hematological: Negative for adenopathy  Does not bruise/bleed easily  Psychiatric/Behavioral: Negative for agitation, behavioral problems, confusion, hallucinations, sleep disturbance and suicidal ideas  The patient is not nervous/anxious            Current Medications       Current Outpatient Medications:     albuterol (ACCUNEB) 0 63 MG/3ML nebulizer solution, take 3 milliliters by mouth every 4 hours if needed for wheezing, Disp: , Rfl:     albuterol (PROAIR HFA) 90 mcg/act inhaler, Inhale 1 puff every 6 (six) hours as needed, Disp: , Rfl:     Aspirin (ASPIR-81 PO), Take 81 mg by mouth, Disp: , Rfl:     atorvastatin (LIPITOR) 40 mg tablet, take 1 tablet by mouth at bedtime, Disp: , Rfl:     B-D UF III MINI PEN NEEDLES 31G X 5 MM MISC, , Disp: , Rfl:     BANOPHEN 50 MG capsule, take 1 capsule by mouth every 6 hours if needed for itching, Disp: , Rfl:     cholecalciferol (VITAMIN D3) 400 units tablet, Take 400 Units by mouth daily, Disp: , Rfl:     citalopram (CELEXA) 40 mg tablet, Take 40 mg by mouth daily , Disp: , Rfl:      MG capsule, Take 100 mg by mouth 2 (two) times a day, Disp: , Rfl:     famotidine (PEPCID) 40 MG tablet, Take 1 tablet (40 mg total) by mouth daily at bedtime Take in evening 2 hours prior to bed, Disp: 30 tablet, Rfl: 3    fenofibrate (TRICOR) 48 mg tablet, Take 48 mg by mouth daily, Disp: , Rfl:     insulin glulisine (APIDRA SOLOSTAR) 100 units/mL injection pen, Inject 20 Units under the skin , Disp: , Rfl:     Jardiance 10 MG TABS, Take 10 mg by mouth daily, Disp: , Rfl:     LANTUS SOLOSTAR 100 units/mL injection pen, Inject 60 Units under the skin daily , Disp: , Rfl:     levETIRAcetam (KEPPRA) 500 mg tablet, Take 500 mg by mouth every 12 (twelve) hours , Disp: , Rfl:     linaCLOtide (Linzess) 72 MCG CAPS, Take 1 capsule by mouth daily before breakfast, Disp: 10 capsule, Rfl: 0    lisinopril (ZESTRIL) 10 mg tablet, Take 10 mg by mouth, Disp: , Rfl:     metFORMIN (GLUCOPHAGE) 1000 MG tablet, take 1 tablet by mouth twice a day WITH MEALS, Disp: , Rfl:     metoclopramide (REGLAN) 10 mg tablet, Take 10 mg by mouth daily, Disp: , Rfl:     metoprolol tartrate (LOPRESSOR) 25 mg tablet, Take 25 mg by mouth 2 (two) times a day, Disp: , Rfl:     ondansetron (ZOFRAN) 4 mg tablet, Take 1 tablet (4 mg total) by mouth every 8 (eight) hours as needed for nausea or vomiting, Disp: 20 tablet, Rfl: 0    pantoprazole (PROTONIX) 40 mg tablet, Take 1 tablet (40 mg total) by mouth 2 (two) times a day, Disp: 180 tablet, Rfl: 3    Semaglutide,0 25 or 0 5MG/DOS, 2 MG/1 5ML SOPN, Inject 0 25 mg once weekly for four weeks then increase to 0 5 mg once weekly  , Disp: 4 pen, Rfl: 1    solifenacin (VESICARE) 5 mg tablet, Take 5 mg by mouth daily , Disp: , Rfl:     neomycin-polymyxin-hydrocortisone (CORTISPORIN) otic solution, Administer 4 drops to the right ear every 6 (six) hours, Disp: 10 mL, Rfl: 0    pregabalin (LYRICA) 100 mg capsule, Take 1 capsule (100 mg total) by mouth 3 (three) times a day, Disp: 270 capsule, Rfl: 1    Current Allergies     Allergies as of 08/13/2021 - Reviewed 08/13/2021 Allergen Reaction Noted    Azithromycin GI Intolerance and Hives 07/13/2012    Benztropine  08/09/2016    Butorphanol Hallucinations 08/09/2016    Penicillins  11/12/2015    Zolpidem  11/12/2015            The following portions of the patient's history were reviewed and updated as appropriate: allergies, current medications, past family history, past medical history, past social history, past surgical history and problem list      Past Medical History:   Diagnosis Date    Depression     Diabetes mellitus ([facility] )     Gastroparesis     GERD (gastroesophageal reflux disease)     Hyperlipidemia     Hypertension     Sciatica     Seizure disorder ([facility] )        Past Surgical History:   Procedure Laterality Date    BREAST BIOPSY Left  benign    CHOLECYSTECTOMY      COLONOSCOPY      HYSTERECTOMY      HI LAP,APPENDECTOMY N/A 3/24/2021    Procedure: APPENDECTOMY LAPAROSCOPIC;  Surgeon: Angelika Aburto MD;  Location: [address] [place] [address] MAIN OR;  Service: General    HI LAP,DIAGNOSTIC ABDOMEN N/A 3/24/2021    Procedure: LAPAROSCOPY DIAGNOSTIC, EXTENSIVE DESI;  Surgeon: Aneglika Aburto MD;  Location: [address] [place] [address] MAIN OR;  Service: General    UPPER GASTROINTESTINAL ENDOSCOPY         Family History   Problem Relation Age of Onset    No Known Problems Mother     No Known Problems Father     No Known Problems Daughter     No Known Problems Maternal Grandmother     No Known Problems Paternal Grandmother     No Known Problems Paternal Aunt     No Known Problems Paternal Aunt     No Known Problems Paternal Aunt          Medications have been verified  Objective   /80   Pulse 74   Temp 98 °F (36 7 °C) (Tympanic)   Resp 12   Ht 5' 1" (1 549 m)   Wt 81 2 kg (179 lb)   SpO2 99%   BMI 33 82 kg/m²        Physical Exam     Physical Exam  Vitals and nursing note reviewed  Constitutional:       General: She is not in acute distress  Appearance: Normal appearance  She is well-developed  She is not diaphoretic  HENT:      Head: Normocephalic and atraumatic  Right Ear: Tympanic membrane normal  Tenderness (right inserting otoscope/ there is erythema and mild swelling of canal) present  Left Ear: Tympanic membrane, ear canal and external ear normal       Nose: Nose normal       Mouth/Throat:      Mouth: Mucous membranes are moist       Pharynx: Uvula midline  Eyes:      General:         Right eye: No discharge  Left eye: No discharge  Conjunctiva/sclera: Conjunctivae normal       Pupils: Pupils are equal, round, and reactive to light  Neck:      Thyroid: No thyromegaly  Trachea: No tracheal deviation  Cardiovascular:      Rate and Rhythm: Normal rate and regular rhythm  Heart sounds: Normal heart sounds  No murmur heard  No friction rub  No gallop  Pulmonary:      Effort: Pulmonary effort is normal  No respiratory distress  Breath sounds: Normal breath sounds  No wheezing or rales  Musculoskeletal:         General: No tenderness or deformity  Normal range of motion  Cervical back: Normal range of motion and neck supple  Right lower leg: No edema  Left lower leg: No edema  Skin:     General: Skin is warm and dry  Findings: No erythema or rash  Neurological:      Mental Status: She is alert and oriented to person, place, and time  Cranial Nerves: No cranial nerve deficit  Coordination: Coordination normal    Psychiatric:         Mood and Affect: Mood normal          Behavior: Behavior normal  Behavior is cooperative  Thought Content:  Thought content normal          Judgment: Judgment normal

## 2021-08-13 NOTE — PATIENT INSTRUCTIONS
Make appointment with Endocrinology ASAP  Increase Lyrica to 100mg three times a day  Continue other medications  Urine sample today for Microalbumin  6 month medcheck or call sooner for problems/concerns

## 2021-08-13 NOTE — PROGRESS NOTES
HPI:  Holli Ralph is a 48 y o  female here for her yearly health maintenance exam    Patient Active Problem List   Diagnosis    Radiculopathy, lumbar region    Anterior tibial tendonitis, right    [facility]    Chronic low back pain with sciatica    Type [facility] complication [facility]    Diabetic polyneuropathy associated with type 2 diabetes mellitus ([facility]    Gastroparesis    Hypertension associated with diabetes ([facility]    Spondylolisthesis of lumbar region    Obesity due to excess calories    Renal cell carcinoma of right kidney ([facility] )    Acute right lower quadrant pain    Hypotension    Internal hernia    Intra-abdominal adhesions    Depression with anxiety    Mild intermittent asthma without complication    Hypercholesteremia    Hypertriglyceridemia    Iron deficiency anemia secondary to inadequate dietary iron intake    Encounter for post surgical wound check    GERD (gastroesophageal reflux disease)    History of colon polyps    Slow transit constipation    Anemia, unspecified     Past Medical History:   Diagnosis Date    Depression     Diabetes mellitus (HCC)     Gastroparesis     GERD (gastroesophageal reflux disease)     Hyperlipidemia     Hypertension     Sciatica     Seizure disorder ([facility])          PHQ-9 Depression Screening    PHQ-9:   Frequency of the following problems over the past two weeks:      Little interest or pleasure in doing things: 0 - not at all  Feeling down, depressed, or hopeless: 0 - not at all  Trouble falling or staying asleep, or sleeping too much: 0 - not at all  Feeling tired or having little energy: 2 - more than half the days  Poor appetite or overeatin - not at all  Feeling bad about yourself - or that you are a failure or have let yourself or your family down: 0 - not at all  Trouble concentrating on things, such as reading the newspaper or watching television: 0 - not at all  Moving or speaking so slowly that other people could have noticed   Or the opposite - being so fidgety or restless that you have been moving around a lot more than usual: 0 - not at all  Thoughts that you would be better off dead, or of hurting yourself in some way: 0 - not at all  PHQ-2 Score: 0  PHQ-9 Score: 2           Current Outpatient Medications   Medication Sig Dispense Refill    albuterol (ACCUNEB) 0 63 MG/3ML nebulizer solution take 3 milliliters by mouth every 4 hours if needed for wheezing      albuterol (PROAIR HFA) 90 mcg/act inhaler Inhale 1 puff every 6 (six) hours as needed      Aspirin (ASPIR-81 PO) Take 81 mg by mouth      atorvastatin (LIPITOR) 40 mg tablet take 1 tablet by mouth at bedtime      B-D UF III MINI PEN NEEDLES 31G X 5 MM MISC       BANOPHEN 50 MG capsule take 1 capsule by mouth every 6 hours if needed for itching      cholecalciferol (VITAMIN D3) 400 units tablet Take 400 Units by mouth daily      citalopram (CELEXA) 40 mg tablet Take 40 mg by mouth daily        MG capsule Take 100 mg by mouth 2 (two) times a day      famotidine (PEPCID) 40 MG tablet Take 1 tablet (40 mg total) by mouth daily at bedtime Take in evening 2 hours prior to bed 30 tablet 3    fenofibrate (TRICOR) 48 mg tablet Take 48 mg by mouth daily      insulin glulisine (APIDRA SOLOSTAR) 100 units/mL injection pen Inject 20 Units under the skin       Jardiance 10 MG TABS Take 10 mg by mouth daily      LANTUS SOLOSTAR 100 units/mL injection pen Inject 60 Units under the skin daily       levETIRAcetam (KEPPRA) 500 mg tablet Take 500 mg by mouth every 12 (twelve) hours       linaCLOtide (Linzess) 72 MCG CAPS Take 1 capsule by mouth daily before breakfast 10 capsule 0    lisinopril (ZESTRIL) 10 mg tablet Take 10 mg by mouth      metFORMIN (GLUCOPHAGE) 1000 MG tablet take 1 tablet by mouth twice a day WITH MEALS      metoclopramide (REGLAN) 10 mg tablet Take 10 mg by mouth daily      metoprolol tartrate (LOPRESSOR) 25 mg tablet Take 25 mg by mouth 2 (two) times a day      ondansetron (ZOFRAN) 4 mg tablet Take 1 tablet (4 mg total) by mouth every 8 (eight) hours as needed for nausea or vomiting 20 tablet 0    pantoprazole (PROTONIX) 40 mg tablet Take 1 tablet (40 mg total) by mouth 2 (two) times a day 180 tablet 3    Semaglutide,0 25 or 0 5MG/DOS, 2 MG/1 5ML SOPN Inject 0 25 mg once weekly for four weeks then increase to 0 5 mg once weekly  4 pen 1    solifenacin (VESICARE) 5 mg tablet Take 5 mg by mouth daily       neomycin-polymyxin-hydrocortisone (CORTISPORIN) otic solution Administer 4 drops to the right ear every 6 (six) hours 10 mL 0    pregabalin (LYRICA) 100 mg capsule Take 1 capsule (100 mg total) by mouth 3 (three) times a day 270 capsule 1     No current facility-administered medications for this visit  Allergies   Allergen Reactions    Azithromycin GI Intolerance and Hives    Benztropine     Butorphanol Hallucinations    Penicillins     Zolpidem      Immunization History   Administered Date(s) Administered    INFLUENZA 10/13/2010, 11/28/2011, 10/28/2013, 09/06/2017, 03/01/2020    Influenza Quadrivalent Preservative Free 3 years and older IM 01/04/2017    Pneumococcal Polysaccharide PPV23 08/29/2010, 02/25/2016    Tdap 03/27/2020    Tuberculin Skin Test-PPD Intradermal 09/23/2019, 10/06/2020       Patient Care Team:  China Wilson as PCP - General (Family Medicine)  Morena See, DO as PCP - [address] [place] [address] (RTE)  Morena Stapleton, DO as PCP - PCP-[employer] (RTE)  Miles Callaway DO (Gastroenterology)    Review of Systems   Constitutional: Negative for activity change, diaphoresis, fatigue and fever  HENT: Positive for ear pain  Negative for congestion, facial swelling, hearing loss, rhinorrhea, sinus pressure, sinus pain, sneezing, sore throat and voice change  Eyes: Negative for discharge and visual disturbance     Respiratory: Negative for cough, choking, chest tightness, shortness of breath, wheezing and stridor  Cardiovascular: Negative for chest pain, palpitations and leg swelling  Gastrointestinal: Negative for abdominal distention, abdominal pain, constipation, diarrhea, nausea and vomiting  Endocrine: Negative for polydipsia, polyphagia and polyuria  Genitourinary: Negative for difficulty urinating, dysuria, frequency and urgency  Musculoskeletal: Positive for arthralgias and myalgias  Negative for back pain, gait problem, joint swelling, neck pain and neck stiffness  Skin: Negative for color change, rash and wound  Neurological: Negative for dizziness, syncope, speech difficulty, weakness, light-headedness and headaches  Hematological: Negative for adenopathy  Does not bruise/bleed easily  Psychiatric/Behavioral: Negative for agitation, behavioral problems, confusion, hallucinations, sleep disturbance and suicidal ideas  The patient is not nervous/anxious  Physical Exam :  Physical Exam  Vitals and nursing note reviewed  Constitutional:       General: She is not in acute distress  Appearance: Normal appearance  She is well-developed  She is not diaphoretic  HENT:      Ears:      Comments: See acute note  Neck:      Thyroid: No thyromegaly  Trachea: No tracheal deviation  Cardiovascular:      Rate and Rhythm: Normal rate and regular rhythm  Heart sounds: Normal heart sounds  No murmur heard  Pulmonary:      Effort: Pulmonary effort is normal  No respiratory distress  Breath sounds: Normal breath sounds  No wheezing  Musculoskeletal:         General: No tenderness or deformity  Normal range of motion  Cervical back: Normal range of motion and neck supple  Right lower leg: No edema  Left lower leg: No edema  Skin:     General: Skin is warm and dry  Findings: No erythema or rash  Neurological:      Mental Status: She is alert and oriented to person, place, and time     Psychiatric: Behavior: Behavior normal  Behavior is cooperative  Thought Content: Thought content normal          Judgment: Judgment normal            Reviewed Updated [facility] Prior Wellness Visits:   Last Health Maintenance visit information was reviewed, patient interviewed , no change since last HM visit yes  Last HM visit information was reviewed, patient interviewed and updates made to the record today yes    Assessment and Plan:  1  Annual physical exam     2  Type 2 diabetes mellitus with diabetic autonomic neuropathy, with long-term current use of insulin (HCC)  Microalbumin / creatinine urine ratio   3  Gastroesophageal reflux disease without esophagitis  pantoprazole (PROTONIX) 40 mg tablet   4  Diabetic polyneuropathy associated with type 2 diabetes mellitus (HCC)  pregabalin (LYRICA) 100 mg capsule   5  Other infective acute otitis externa of right ear  neomycin-polymyxin-hydrocortisone (CORTISPORIN) otic solution   6  Depression with anxiety     7   Gastroparesis         Health Maintenance Due   Topic Date Due    Hepatitis C Screening  Never done    COVID-19 Vaccine (1) Never done    HIV Screening  Never done    Annual Physical  Never done    Cervical Cancer Screening  Never done    Influenza Vaccine (1) 09/01/2021

## 2021-08-16 ENCOUNTER — TELEPHONE (OUTPATIENT)
Dept: ENDOCRINOLOGY | Facility: CLINIC | Age: 51
End: 2021-08-16

## 2021-08-16 NOTE — TELEPHONE ENCOUNTER
----- Message from [address] Ryder sent at 8/13/2021 12:28 PM EDT -----  Can we get her scheduled? Also ask if she has been testing her sugars? If not, start so I can adjust her insulin appropriately  Thanks    ----- Message -----  From: SYLWIA Diaz  Sent: 8/13/2021  12:13 PM EDT  To: SYLWIA Huerta! I just saw our mutual patient in the office  Her new Hga1c and glucose are still very high- she said she has no follow up with you and she is unsure how to increase her insulin  Can someone reach out to her to adjust her insulin and get her scheduled  ? Thank you!   Have a great weekend,   Allyson Diaz

## 2021-08-26 ENCOUNTER — OFFICE VISIT (OUTPATIENT)
Dept: ENDOCRINOLOGY | Facility: CLINIC | Age: 51
End: 2021-08-26
Payer: COMMERCIAL

## 2021-08-26 VITALS
OXYGEN SATURATION: 98 % | HEART RATE: 80 BPM | HEIGHT: 61 IN | WEIGHT: 176.2 LBS | BODY MASS INDEX: 33.27 KG/M2 | DIASTOLIC BLOOD PRESSURE: 64 MMHG | SYSTOLIC BLOOD PRESSURE: 92 MMHG

## 2021-08-26 DIAGNOSIS — K59.01 SLOW TRANSIT CONSTIPATION: ICD-10-CM

## 2021-08-26 DIAGNOSIS — E11.59 HYPERTENSION ASSOCIATED WITH DIABETES (HCC): ICD-10-CM

## 2021-08-26 DIAGNOSIS — E11.43 TYPE 2 DIABETES MELLITUS WITH DIABETIC AUTONOMIC NEUROPATHY, WITH LONG-TERM CURRENT USE OF INSULIN (HCC): Primary | ICD-10-CM

## 2021-08-26 DIAGNOSIS — E66.09 CLASS 1 OBESITY DUE TO EXCESS CALORIES WITH SERIOUS COMORBIDITY AND BODY MASS INDEX (BMI) OF 33.0 TO 33.9 IN ADULT: ICD-10-CM

## 2021-08-26 DIAGNOSIS — I15.2 HYPERTENSION ASSOCIATED WITH DIABETES (HCC): ICD-10-CM

## 2021-08-26 DIAGNOSIS — Z79.4 TYPE 2 DIABETES MELLITUS WITH DIABETIC AUTONOMIC NEUROPATHY, WITH LONG-TERM CURRENT USE OF INSULIN (HCC): Primary | ICD-10-CM

## 2021-08-26 DIAGNOSIS — E78.00 HYPERCHOLESTEREMIA: ICD-10-CM

## 2021-08-26 PROCEDURE — 99214 OFFICE O/P EST MOD 30 MIN: CPT | Performed by: NURSE PRACTITIONER

## 2021-08-26 PROCEDURE — 3078F DIAST BP <80 MM HG: CPT | Performed by: NURSE PRACTITIONER

## 2021-08-26 PROCEDURE — 3074F SYST BP LT 130 MM HG: CPT | Performed by: NURSE PRACTITIONER

## 2021-08-26 RX ORDER — BLOOD-GLUCOSE,RECEIVER,CONT
EACH MISCELLANEOUS
Qty: 1 EACH | Refills: 0 | Status: SHIPPED | OUTPATIENT
Start: 2021-08-26 | End: 2021-10-07

## 2021-08-26 RX ORDER — LINACLOTIDE 72 UG/1
1 CAPSULE, GELATIN COATED ORAL
Qty: 10 CAPSULE | Refills: 0 | Status: SHIPPED | COMMUNITY
Start: 2021-08-26 | End: 2021-11-13 | Stop reason: SDUPTHER

## 2021-08-26 RX ORDER — BLOOD-GLUCOSE SENSOR
EACH MISCELLANEOUS
Qty: 1 EACH | Refills: 12 | Status: SHIPPED | OUTPATIENT
Start: 2021-08-26 | End: 2021-10-07

## 2021-08-26 RX ORDER — INSULIN GLULISINE 100 [IU]/ML
INJECTION, SOLUTION SUBCUTANEOUS
Qty: 30 ML | Refills: 1 | Status: SHIPPED | OUTPATIENT
Start: 2021-08-26 | End: 2021-11-30 | Stop reason: SDUPTHER

## 2021-08-26 RX ORDER — INSULIN GLARGINE 100 [IU]/ML
60 INJECTION, SOLUTION SUBCUTANEOUS DAILY
Qty: 30 ML | Refills: 1 | Status: SHIPPED | OUTPATIENT
Start: 2021-08-26 | End: 2021-11-19

## 2021-08-26 RX ORDER — BLOOD-GLUCOSE TRANSMITTER
EACH MISCELLANEOUS
Qty: 1 EACH | Refills: 3 | Status: SHIPPED | OUTPATIENT
Start: 2021-08-26 | End: 2021-10-07

## 2021-08-26 NOTE — PATIENT INSTRUCTIONS
1  Increase Jardiance to 25 mg daily  2  Increase ozempic to 1mg weekly  3  Schedule an appointment to meet with dietician, please  4  Continue to test blood sugar 4x daily  Let me know about the Dexcom if that works out  5  Send sugar logs to me in the meantime so I know how your numbers look  Hopefully, we will be able to start decreasing your insulin

## 2021-08-26 NOTE — ASSESSMENT & PLAN NOTE
Continue to focus on a healthy diet and regular exercise  Weight loss will reduce her insulin needs

## 2021-08-26 NOTE — ASSESSMENT & PLAN NOTE
Patient remains uncontrolled  Due to lack of data, no adjustments to insulin at this time  Increase Ozempic to 1mg weekly  Increase to Jardiance to 25 mg daily  Another referral given for MNT  Strongly encouraged patient to follow up with dietician  Patient had difficulty using freestyle carri due to losing sensors frequently  She does need a cgm  The patient is a candidate for CGM use: Indications: Diabetes Type 2  The patient is treated with 3 or more injections of insulin daily  The patients performs SMBG at least 4 times daily   SMBG data is being used to make adjustments to the insulin regimen  In the meantime, patient is to continue testing blood sugars 4 times daily and share her glucose logs with me for medication adjustments  Counseled on pathophysiology of diabetes  Counseled on negative, long-term effects associated with uncontrolled diabetes including neuropathy, nephropathy, retinopathy, heart attack, and stroke    Lab Results   Component Value Date    HGBA1C 9 2 (H) 08/10/2021

## 2021-08-26 NOTE — PROGRESS NOTES
Established Patient Progress Note      Chief Complaint   Patient presents with    Diabetes Type 2        History of Present Illness:   Kayley Washington is a 48 y o  female with HTN, HLD, and T2  diabetes with long term use of insulin since approximately 2013  Reports complications of neuropathy  Denies recent severe hypoglycemic or severe hyperglycemic episodes  Denies any issues with her current regimen  home glucose monitoring: are performed regularly 4 times daily  Patient's initial appointment was on 4/13/2021 via telemedicine  At that time, a CGM was ordered and her DPP-4 was replaced by ozempic  She was also referred to diabetes education for MNT  Home blood glucose readings: Patient did not bring log or glucometer to today's visit      Reports blood sugars range between 150-250 mg/dL  She reports feeling well  She denies polydipsia, polyuria, and blurred vision  She has been working on diet and exercise to lose weight  Component      Latest Ref Rng & Units 8/10/2020 4/12/2021 8/10/2021   Hemoglobin A1C      Normal 3 8-5 6%; PreDiabetic 5 7-6 4%;  Diabetic >=6 5%; Glycemic control for adults with diabetes <7 0% % 12 6 (H) 10 7 (A) 9 2 (H)   eAG, EST AVG Glucose      mg/dl 315 (H)  217        Current regimen:   Apidra 20 units TID with meals  Lantus 60 units daily  Jardiance 10 units daily  Metformin 1,000 mg BID  Ozempic 0 5 mg daily    Last Eye Exam: UTD  Last Foot Exam: UTD    Patient Active Problem List   Diagnosis    Radiculopathy, lumbar region    Anterior tibial tendonitis, right    Bipolar disorder ([address] )    Chronic low back pain with sciatica    Type 2 diabetes mellitus with neurologic complication ([address] )    Diabetic polyneuropathy associated with type 2 diabetes mellitus ([address] )    Gastroparesis    Hypertension associated with diabetes ([address] )    Spondylolisthesis of lumbar region    Obesity due to excess calories    Renal cell carcinoma of right kidney ([address] )    Acute right lower quadrant pain    Hypotension    Internal hernia    Intra-abdominal adhesions    Depression with anxiety    Mild intermittent asthma without complication    Hypercholesteremia    Hypertriglyceridemia    Iron deficiency anemia secondary to inadequate dietary iron intake    Encounter for post surgical wound check    GERD (gastroesophageal reflux disease)    History of colon polyps    Slow transit constipation    Anemia, unspecified      Past Medical History:   Diagnosis Date    Depression     Diabetes mellitus ([place] [address] )     Gastroparesis     GERD (gastroesophageal reflux disease)     Hyperlipidemia     Hypertension     Sciatica     Seizure disorder ([place] [address] )       Past Surgical History:   Procedure Laterality Date    BREAST BIOPSY Left  benign    CHOLECYSTECTOMY      COLONOSCOPY      HYSTERECTOMY      IL LAP,APPENDECTOMY N/A 3/24/2021    Procedure: APPENDECTOMY LAPAROSCOPIC;  Surgeon: Emelia Mcintyre MD;  Location: [address] [place] [address] OR;  Service: General    IL LAP,DIAGNOSTIC ABDOMEN N/A 3/24/2021    Procedure: LAPAROSCOPY DIAGNOSTIC, EXTENSIVE DESI;  Surgeon: Emelia Mcintyre MD;  Location: [address] [place] [address] OR;  Service: General    UPPER GASTROINTESTINAL ENDOSCOPY        Family History   Problem Relation Age of Onset    No Known Problems Mother     No Known Problems Father     No Known Problems Daughter     No Known Problems Maternal Grandmother     No Known Problems Paternal Grandmother     No Known Problems Paternal Aunt     No Known Problems Paternal Aunt     No Known Problems Paternal Aunt      Social History     Tobacco Use    Smoking status: Former Smoker     Quit date:      Years since quittin 6    Smokeless tobacco: Never Used   Substance Use Topics    Alcohol use: Never     Allergies   Allergen Reactions    Azithromycin GI Intolerance and Hives    Benztropine     Butorphanol Hallucinations    Penicillins     Zolpidem          Current Outpatient Medications:     albuterol (ACCUNEB) 0 63 MG/3ML nebulizer solution, take 3 milliliters by mouth every 4 hours if needed for wheezing, Disp: , Rfl:     albuterol (PROAIR HFA) 90 mcg/act inhaler, Inhale 1 puff every 6 (six) hours as needed, Disp: , Rfl:     Aspirin (ASPIR-81 PO), Take 81 mg by mouth, Disp: , Rfl:     atorvastatin (LIPITOR) 40 mg tablet, take 1 tablet by mouth at bedtime, Disp: , Rfl:     B-D UF III MINI PEN NEEDLES 31G X 5 MM MISC, , Disp: , Rfl:     BANOPHEN 50 MG capsule, take 1 capsule by mouth every 6 hours if needed for itching, Disp: , Rfl:     cholecalciferol (VITAMIN D3) 400 units tablet, Take 400 Units by mouth daily, Disp: , Rfl:     citalopram (CELEXA) 40 mg tablet, Take 40 mg by mouth daily , Disp: , Rfl:      MG capsule, Take 100 mg by mouth 2 (two) times a day, Disp: , Rfl:     famotidine (PEPCID) 40 MG tablet, Take 1 tablet (40 mg total) by mouth daily at bedtime Take in evening 2 hours prior to bed, Disp: 30 tablet, Rfl: 3    fenofibrate (TRICOR) 48 mg tablet, Take 48 mg by mouth daily, Disp: , Rfl:     insulin glulisine (Apidra SoloStar) 100 units/mL injection pen, Inject 20 units under the skin three times daily with meals  , Disp: 30 mL, Rfl: 1    Lantus SoloStar 100 units/mL injection pen, Inject 60 Units under the skin daily, Disp: 30 mL, Rfl: 1    levETIRAcetam (KEPPRA) 500 mg tablet, Take 500 mg by mouth every 12 (twelve) hours , Disp: , Rfl:     linaCLOtide (Linzess) 72 MCG CAPS, Take 1 capsule by mouth daily before breakfast, Disp: 10 capsule, Rfl: 0    lisinopril (ZESTRIL) 10 mg tablet, Take 10 mg by mouth, Disp: , Rfl:     metFORMIN (GLUCOPHAGE) 1000 MG tablet, take 1 tablet by mouth twice a day WITH MEALS, Disp: , Rfl:     metoclopramide (REGLAN) 10 mg tablet, Take 10 mg by mouth daily, Disp: , Rfl:     metoprolol tartrate (LOPRESSOR) 25 mg tablet, Take 25 mg by mouth 2 (two) times a day, Disp: , Rfl:     neomycin-polymyxin-hydrocortisone (CORTISPORIN) otic solution, Administer 4 drops to the right ear every 6 (six) hours, Disp: 10 mL, Rfl: 0    ondansetron (ZOFRAN) 4 mg tablet, Take 1 tablet (4 mg total) by mouth every 8 (eight) hours as needed for nausea or vomiting, Disp: 20 tablet, Rfl: 0    pantoprazole (PROTONIX) 40 mg tablet, Take 1 tablet (40 mg total) by mouth 2 (two) times a day, Disp: 180 tablet, Rfl: 3    pregabalin (LYRICA) 100 mg capsule, Take 1 capsule (100 mg total) by mouth 3 (three) times a day, Disp: 270 capsule, Rfl: 1    solifenacin (VESICARE) 5 mg tablet, Take 5 mg by mouth daily , Disp: , Rfl:     Continuous Blood Gluc  (Dexcom G6 ) TEMITOPE, Disp 1 Kit, use as directed, Disp: 1 each, Rfl: 0    Continuous Blood Gluc Sensor (Dexcom G6 Sensor) MISC, Disp 1 box of 3 sensors with 12 refills change sensor q 10 days, Disp: 1 each, Rfl: 12    Continuous Blood Gluc Transmit (Dexcom G6 Transmitter) MISC, Dispense 1 kit, change every 90 days, Disp: 1 each, Rfl: 3    Empagliflozin 25 MG TABS, Take 1 tablet (25 mg total) by mouth every morning, Disp: 90 tablet, Rfl: 1    Semaglutide, 1 MG/DOSE, 2 MG/1 5ML SOPN, Inject 1 mg under the skin once a week, Disp: 4 5 mL, Rfl: 2    Review of Systems   Constitutional: Negative for activity change, appetite change, fatigue and unexpected weight change  HENT: Negative for sore throat, trouble swallowing and voice change  Eyes: Negative for visual disturbance  Respiratory: Negative for cough, chest tightness and shortness of breath  Cardiovascular: Negative for chest pain, palpitations and leg swelling  Gastrointestinal: Negative for constipation, diarrhea, nausea and vomiting  Endocrine: Negative for cold intolerance, heat intolerance, polydipsia, polyphagia and polyuria  Genitourinary: Negative for frequency  Musculoskeletal: Negative for arthralgias, gait problem and joint swelling  Skin: Negative for wound  Allergic/Immunologic: Positive for environmental allergies  Negative for food allergies  Neurological: Negative for dizziness, weakness, light-headedness, numbness and headaches  Hematological: Does not bruise/bleed easily  Psychiatric/Behavioral: Negative for decreased concentration, dysphoric mood and sleep disturbance  The patient is not nervous/anxious  Physical Exam:  Body mass index is 33 29 kg/m²  BP 92/64 (BP Location: Left arm, Cuff Size: Adult)   Pulse 80   Ht 5' 1" (1 549 m)   Wt 79 9 kg (176 lb 3 2 oz)   SpO2 98%   BMI 33 29 kg/m²    Wt Readings from Last 3 Encounters:   08/26/21 79 9 kg (176 lb 3 2 oz)   08/13/21 81 2 kg (179 lb)   08/12/21 79 8 kg (176 lb)       Physical Exam  Vitals reviewed  Constitutional:       General: She is not in acute distress  Appearance: She is well-developed  She is obese  She is not ill-appearing  HENT:      Head: Normocephalic and atraumatic  Comments: Mask in place  Eyes:      Pupils: Pupils are equal, round, and reactive to light  Neck:      Thyroid: No thyromegaly  Cardiovascular:      Rate and Rhythm: Normal rate and regular rhythm  Pulses: Normal pulses  Heart sounds: Normal heart sounds  Pulmonary:      Effort: Pulmonary effort is normal       Breath sounds: Normal breath sounds  Abdominal:      General: Bowel sounds are normal  There is no distension  Palpations: Abdomen is soft  Tenderness: There is no abdominal tenderness  Musculoskeletal:      Cervical back: Normal range of motion and neck supple  Right lower leg: No edema  Left lower leg: No edema  Lymphadenopathy:      Cervical: No cervical adenopathy  Skin:     General: Skin is warm and dry  Capillary Refill: Capillary refill takes less than 2 seconds  Neurological:      Mental Status: She is alert and oriented to person, place, and time        Gait: Gait normal    Psychiatric:         Mood and Affect: Mood normal          Behavior: Behavior normal            Labs:   Lab Results   Component Value Date    HGBA1C 9 2 (H) 08/10/2021    HGBA1C 10 7 (A) 04/12/2021    HGBA1C 12 6 (H) 08/10/2020     Lab Results   Component Value Date    CREATININE 1 12 08/10/2021    CREATININE 1 06 03/25/2021    CREATININE 1 14 03/24/2021    BUN 19 08/10/2021    K 3 8 08/10/2021     08/10/2021    CO2 27 08/10/2021     eGFR   Date Value Ref Range Status   08/10/2021 57 ml/min/1 73sq m Final     Lab Results   Component Value Date    HDL 40 08/10/2021    TRIG 366 (H) 08/10/2021     Lab Results   Component Value Date    ALT 36 08/10/2021    AST 20 08/10/2021    ALKPHOS 129 (H) 08/10/2021     Lab Results   Component Value Date    EIT9DZSYMVVO 1 320 08/10/2021     No results found for: FREET4, TSI    Impression & Plan:    Problem List Items Addressed This Visit        Endocrine    Type 2 diabetes mellitus with neurologic complication ([facility] ) - Primary     Patient remains uncontrolled  Due to lack of data, no adjustments to insulin at this time  Increase Ozempic to 1mg weekly  Increase to Jardiance to 25 mg daily  Another referral given for MNT  Strongly encouraged patient to follow up with dietician  Patient had difficulty using freestyle carri due to losing sensors frequently  She does need a cgm  The patient is a candidate for CGM use: Indications: Diabetes Type 2  The patient is treated with 3 or more injections of insulin daily  The patients performs SMBG at least 4 times daily   SMBG data is being used to make adjustments to the insulin regimen  In the meantime, patient is to continue testing blood sugars 4 times daily and share her glucose logs with me for medication adjustments  Counseled on pathophysiology of diabetes  Counseled on negative, long-term effects associated with uncontrolled diabetes including neuropathy, nephropathy, retinopathy, heart attack, and stroke    Lab Results   Component Value Date    HGBA1C 9 2 (H) 08/10/2021            Relevant Medications    Continuous Blood Gluc  (Dexcom G6 ) TEMITOPE    Continuous Blood Gluc Sensor (Dexcom G6 Sensor) MISC    Continuous Blood Gluc Transmit (Dexcom G6 Transmitter) MISC    Empagliflozin 25 MG TABS    Semaglutide, 1 MG/DOSE, 2 MG/1 5ML SOPN    Lantus SoloStar 100 units/mL injection pen    insulin glulisine (Apidra SoloStar) 100 units/mL injection pen    Other Relevant Orders    Ambulatory referral to Diabetic Education    Comprehensive metabolic panel Lab Collect    HEMOGLOBIN A1C W/ EAG ESTIMATION Lab Collect    Hypertension associated with diabetes ([facility])       BP is 92/64  Lab Results   Component Value Date    HGBA1C 9 2 (H) 08/10/2021            Relevant Medications    Empagliflozin 25 MG TABS    Semaglutide, 1 MG/DOSE, 2 MG/1 5ML SOPN    Lantus SoloStar 100 units/mL injection pen    insulin glulisine (Apidra SoloStar) 100 units/mL injection pen       Other    Obesity due to excess calories       Continue to focus on a healthy diet and regular exercise  Weight loss will reduce her insulin needs  Hypercholesteremia       Continue statin  Orders Placed This Encounter   Procedures    Comprehensive metabolic panel Lab Collect     This is a patient instruction: Patient fasting for 8 hours or longer recommended  Standing Status:   Future     Standing Expiration Date:   8/26/2022    HEMOGLOBIN A1C W/ EAG ESTIMATION Lab Collect     Standing Status:   Future     Standing Expiration Date:   8/26/2022    Ambulatory referral to Diabetic Education     Standing Status:   Future     Standing Expiration Date:   8/26/2022     Referral Priority:   Routine     Referral Type:   Consult - AMB     Referral Reason:   Specialty Services Required     Referred to Provider:   Lady Heredia     Requested Specialty:   Diabetes Services     Number of Visits Requested:   1     Expiration Date:   8/26/2022       Patient Instructions   1  Increase Jardiance to 25 mg daily  2  Increase ozempic to 1mg weekly  3  Schedule an appointment to meet with dietician, please     4  Continue to test blood sugar 4x daily  Let me know about the Dexcom if that works out  5  Send sugar logs to me in the meantime so I know how your numbers look  Hopefully, we will be able to start decreasing your insulin  Discussed with the patient and all questioned fully answered  She will call me if any problems arise  Follow-up appointment in 3 months       Counseled patient on diagnostic results, prognosis, risk and benefit of treatment options, instruction for management, importance of treatment compliance, Risk  factor reduction and impressions    SYLWIA Wilkerson

## 2021-09-07 ENCOUNTER — HOSPITAL ENCOUNTER (EMERGENCY)
Facility: HOSPITAL | Age: 51
Discharge: HOME | End: 2021-09-07
Attending: EMERGENCY MEDICINE
Payer: COMMERCIAL

## 2021-09-07 ENCOUNTER — TELEPHONE (OUTPATIENT)
Dept: ENDOCRINOLOGY | Facility: CLINIC | Age: 51
End: 2021-09-07

## 2021-09-07 ENCOUNTER — APPOINTMENT (EMERGENCY)
Dept: CT IMAGING | Facility: HOSPITAL | Age: 51
End: 2021-09-07
Payer: COMMERCIAL

## 2021-09-07 VITALS
OXYGEN SATURATION: 96 % | TEMPERATURE: 97.4 F | DIASTOLIC BLOOD PRESSURE: 76 MMHG | BODY MASS INDEX: 33.23 KG/M2 | HEART RATE: 78 BPM | WEIGHT: 176 LBS | SYSTOLIC BLOOD PRESSURE: 135 MMHG | HEIGHT: 61 IN | RESPIRATION RATE: 18 BRPM

## 2021-09-07 DIAGNOSIS — N12 PYELONEPHRITIS: Primary | ICD-10-CM

## 2021-09-07 LAB
ALBUMIN SERPL BCP-MCNC: 3.8 G/DL (ref 3.5–5.7)
ALP SERPL-CCNC: 154 U/L (ref 40–150)
ALT SERPL W P-5'-P-CCNC: 84 U/L (ref 7–52)
ANION GAP SERPL CALCULATED.3IONS-SCNC: 8 MMOL/L (ref 4–13)
AST SERPL W P-5'-P-CCNC: 57 U/L (ref 13–39)
BACTERIA UR QL AUTO: ABNORMAL /HPF
BASOPHILS # BLD AUTO: 0 THOUSANDS/ΜL (ref 0–0.1)
BASOPHILS NFR BLD AUTO: 1 % (ref 0–2)
BILIRUB SERPL-MCNC: 0.3 MG/DL (ref 0.2–1)
BILIRUB UR QL STRIP: NEGATIVE
BUN SERPL-MCNC: 19 MG/DL (ref 7–25)
CALCIUM SERPL-MCNC: 9.2 MG/DL (ref 8.6–10.5)
CHLORIDE SERPL-SCNC: 99 MMOL/L (ref 98–107)
CLARITY UR: ABNORMAL
CO2 SERPL-SCNC: 27 MMOL/L (ref 21–31)
COLOR UR: YELLOW
CREAT SERPL-MCNC: 1.02 MG/DL (ref 0.6–1.2)
EOSINOPHIL # BLD AUTO: 0.1 THOUSAND/ΜL (ref 0–0.61)
EOSINOPHIL NFR BLD AUTO: 1 % (ref 0–5)
ERYTHROCYTE [DISTWIDTH] IN BLOOD BY AUTOMATED COUNT: 13.7 % (ref 11.5–14.5)
GFR SERPL CREATININE-BSD FRML MDRD: 64 ML/MIN/1.73SQ M
GLUCOSE SERPL-MCNC: 279 MG/DL (ref 65–99)
GLUCOSE UR STRIP-MCNC: ABNORMAL MG/DL
HCT VFR BLD AUTO: 34 % (ref 42–47)
HGB BLD-MCNC: 11.3 G/DL (ref 12–16)
HGB UR QL STRIP.AUTO: ABNORMAL
KETONES UR STRIP-MCNC: NEGATIVE MG/DL
LEUKOCYTE ESTERASE UR QL STRIP: ABNORMAL
LIPASE SERPL-CCNC: 43 U/L (ref 11–82)
LYMPHOCYTES # BLD AUTO: 1.7 THOUSANDS/ΜL (ref 0.6–4.47)
LYMPHOCYTES NFR BLD AUTO: 18 % (ref 21–51)
MCH RBC QN AUTO: 27.1 PG (ref 26–34)
MCHC RBC AUTO-ENTMCNC: 33.2 G/DL (ref 31–37)
MCV RBC AUTO: 82 FL (ref 81–99)
MONOCYTES # BLD AUTO: 0.7 THOUSAND/ΜL (ref 0.17–1.22)
MONOCYTES NFR BLD AUTO: 7 % (ref 2–12)
NEUTROPHILS # BLD AUTO: 7.1 THOUSANDS/ΜL (ref 1.4–6.5)
NEUTS SEG NFR BLD AUTO: 73 % (ref 42–75)
NITRITE UR QL STRIP: POSITIVE
NON-SQ EPI CELLS URNS QL MICRO: ABNORMAL /HPF
OTHER STN SPEC: ABNORMAL
PH UR STRIP.AUTO: 5.5 [PH]
PLATELET # BLD AUTO: 371 THOUSANDS/UL (ref 149–390)
PMV BLD AUTO: 8.2 FL (ref 8.6–11.7)
POTASSIUM SERPL-SCNC: 4.6 MMOL/L (ref 3.5–5.5)
PROT SERPL-MCNC: 6.8 G/DL (ref 6.4–8.9)
PROT UR STRIP-MCNC: ABNORMAL MG/DL
RBC # BLD AUTO: 4.16 MILLION/UL (ref 3.9–5.2)
RBC #/AREA URNS AUTO: ABNORMAL /HPF
SODIUM SERPL-SCNC: 134 MMOL/L (ref 134–143)
SP GR UR STRIP.AUTO: 1.02 (ref 1–1.03)
UROBILINOGEN UR QL STRIP.AUTO: 0.2 E.U./DL
WBC # BLD AUTO: 9.7 THOUSAND/UL (ref 4.8–10.8)
WBC #/AREA URNS AUTO: ABNORMAL /HPF

## 2021-09-07 PROCEDURE — 99284 EMERGENCY DEPT VISIT MOD MDM: CPT

## 2021-09-07 PROCEDURE — 80053 COMPREHEN METABOLIC PANEL: CPT | Performed by: EMERGENCY MEDICINE

## 2021-09-07 PROCEDURE — 87077 CULTURE AEROBIC IDENTIFY: CPT | Performed by: EMERGENCY MEDICINE

## 2021-09-07 PROCEDURE — 87186 SC STD MICRODIL/AGAR DIL: CPT | Performed by: EMERGENCY MEDICINE

## 2021-09-07 PROCEDURE — 85025 COMPLETE CBC W/AUTO DIFF WBC: CPT | Performed by: EMERGENCY MEDICINE

## 2021-09-07 PROCEDURE — 96374 THER/PROPH/DIAG INJ IV PUSH: CPT

## 2021-09-07 PROCEDURE — 83690 ASSAY OF LIPASE: CPT | Performed by: EMERGENCY MEDICINE

## 2021-09-07 PROCEDURE — 87086 URINE CULTURE/COLONY COUNT: CPT | Performed by: EMERGENCY MEDICINE

## 2021-09-07 PROCEDURE — 96375 TX/PRO/DX INJ NEW DRUG ADDON: CPT

## 2021-09-07 PROCEDURE — 74176 CT ABD & PELVIS W/O CONTRAST: CPT

## 2021-09-07 PROCEDURE — 99285 EMERGENCY DEPT VISIT HI MDM: CPT | Performed by: EMERGENCY MEDICINE

## 2021-09-07 PROCEDURE — 81001 URINALYSIS AUTO W/SCOPE: CPT | Performed by: EMERGENCY MEDICINE

## 2021-09-07 PROCEDURE — 36415 COLL VENOUS BLD VENIPUNCTURE: CPT | Performed by: EMERGENCY MEDICINE

## 2021-09-07 RX ORDER — OXYCODONE HYDROCHLORIDE 5 MG/1
5 TABLET ORAL EVERY 6 HOURS PRN
Qty: 5 TABLET | Refills: 0 | Status: SHIPPED | OUTPATIENT
Start: 2021-09-07 | End: 2021-09-12

## 2021-09-07 RX ORDER — KETOROLAC TROMETHAMINE 30 MG/ML
15 INJECTION, SOLUTION INTRAMUSCULAR; INTRAVENOUS ONCE
Status: DISCONTINUED | OUTPATIENT
Start: 2021-09-07 | End: 2021-09-07

## 2021-09-07 RX ORDER — ONDANSETRON 4 MG/1
4 TABLET, ORALLY DISINTEGRATING ORAL EVERY 6 HOURS PRN
Qty: 20 TABLET | Refills: 0 | Status: SHIPPED | OUTPATIENT
Start: 2021-09-07

## 2021-09-07 RX ORDER — MORPHINE SULFATE 4 MG/ML
4 INJECTION, SOLUTION INTRAMUSCULAR; INTRAVENOUS ONCE
Status: COMPLETED | OUTPATIENT
Start: 2021-09-07 | End: 2021-09-07

## 2021-09-07 RX ORDER — CIPROFLOXACIN 500 MG/1
500 TABLET, FILM COATED ORAL ONCE
Status: COMPLETED | OUTPATIENT
Start: 2021-09-07 | End: 2021-09-07

## 2021-09-07 RX ORDER — HYDROMORPHONE HCL/PF 1 MG/ML
0.5 SYRINGE (ML) INJECTION ONCE
Status: COMPLETED | OUTPATIENT
Start: 2021-09-07 | End: 2021-09-07

## 2021-09-07 RX ORDER — CIPROFLOXACIN 500 MG/1
500 TABLET, FILM COATED ORAL EVERY 12 HOURS SCHEDULED
Qty: 20 TABLET | Refills: 0 | Status: SHIPPED | OUTPATIENT
Start: 2021-09-07 | End: 2021-09-17

## 2021-09-07 RX ADMIN — MORPHINE SULFATE 4 MG: 4 INJECTION INTRAVENOUS at 12:59

## 2021-09-07 RX ADMIN — CIPROFLOXACIN 500 MG: 500 TABLET, FILM COATED ORAL at 15:01

## 2021-09-07 RX ADMIN — HYDROMORPHONE HYDROCHLORIDE 0.5 MG: 1 INJECTION, SOLUTION INTRAMUSCULAR; INTRAVENOUS; SUBCUTANEOUS at 15:02

## 2021-09-07 NOTE — TELEPHONE ENCOUNTER
Received a request for a Prior Auth to be started for Jardiance  Initiated through [employer]  Key: [facility]  Approval received  Good through 9/4/2022    Prior Auth initiated for     [employer]   Key: [id number]  Key: [id number]  Both Denied  Need to go through DME  Will call and inquire about the DME used

## 2021-09-07 NOTE — ED PROVIDER NOTES
History  Chief Complaint   Patient presents with    Flank Pain     left flank pain that began on sunday     Patient is a 77-year-old female with history of hypertension, diabetes who presents for evaluation of left flank pain  Patient says the symptoms started on Sunday  She says that the pain has been persistent and worsening  She says that it mainly sees in the flank but does radiate into left lower abdomen  She admits to pain after urinating  She denies any hematuria increased urinary frequency  She admits to some nausea and vomiting yesterday denies any fevers or chills  Prior to Admission Medications   Prescriptions Last Dose Informant Patient Reported? Taking?    Aspirin (ASPIR-81 PO)  Self Yes No   Sig: Take 81 mg by mouth   B-D UF III MINI PEN NEEDLES 31G X 5 MM MISC  Self Yes No   BANOPHEN 50 MG capsule  Self Yes No   Sig: take 1 capsule by mouth every 6 hours if needed for itching   Continuous Blood Gluc  (Dexcom G6 ) TEMITOPE   No No   Sig: Disp 1 Kit, use as directed   Continuous Blood Gluc Sensor (Dexcom G6 Sensor) MISC   No No   Sig: Disp 1 box of 3 sensors with 12 refills change sensor q 10 days   Continuous Blood Gluc Transmit (Dexcom G6 Transmitter) MISC   No No   Sig: Dispense 1 kit, change every 90 days    MG capsule  Self Yes No   Sig: Take 100 mg by mouth 2 (two) times a day   Empagliflozin 25 MG TABS   No No   Sig: Take 1 tablet (25 mg total) by mouth every morning   Lantus SoloStar 100 units/mL injection pen   No No   Sig: Inject 60 Units under the skin daily   Semaglutide, 1 MG/DOSE, 2 MG/1 5ML SOPN   No No   Sig: Inject 1 mg under the skin once a week   albuterol (ACCUNEB) 0 63 MG/3ML nebulizer solution  Self Yes No   Sig: take 3 milliliters by mouth every 4 hours if needed for wheezing   albuterol (PROAIR HFA) 90 mcg/act inhaler  Self Yes No   Sig: Inhale 1 puff every 6 (six) hours as needed   atorvastatin (LIPITOR) 40 mg tablet  Self Yes No   Sig: take 1 tablet by mouth at bedtime   cholecalciferol (VITAMIN D3) 400 units tablet  Self Yes No   Sig: Take 400 Units by mouth daily   citalopram (CELEXA) 40 mg tablet  Self Yes No   Sig: Take 40 mg by mouth daily    famotidine (PEPCID) 40 MG tablet   No No   Sig: Take 1 tablet (40 mg total) by mouth daily at bedtime Take in evening 2 hours prior to bed   fenofibrate (TRICOR) 48 mg tablet  Self Yes No   Sig: Take 48 mg by mouth daily   insulin glulisine (Apidra SoloStar) 100 units/mL injection pen   No No   Sig: Inject 20 units under the skin three times daily with meals     levETIRAcetam (KEPPRA) 500 mg tablet  Self Yes No   Sig: Take 500 mg by mouth every 12 (twelve) hours    linaCLOtide (Linzess) 72 MCG CAPS   No No   Sig: Take 1 capsule by mouth daily before breakfast   lisinopril (ZESTRIL) 10 mg tablet  Self Yes No   Sig: Take 10 mg by mouth   metFORMIN (GLUCOPHAGE) 1000 MG tablet  Self Yes No   Sig: take 1 tablet by mouth twice a day WITH MEALS   metoclopramide (REGLAN) 10 mg tablet  Self Yes No   Sig: Take 10 mg by mouth daily   metoprolol tartrate (LOPRESSOR) 25 mg tablet  Self Yes No   Sig: Take 25 mg by mouth 2 (two) times a day   neomycin-polymyxin-hydrocortisone (CORTISPORIN) otic solution   No No   Sig: Administer 4 drops to the right ear every 6 (six) hours   ondansetron (ZOFRAN) 4 mg tablet  Self No No   Sig: Take 1 tablet (4 mg total) by mouth every 8 (eight) hours as needed for nausea or vomiting   pantoprazole (PROTONIX) 40 mg tablet   No No   Sig: Take 1 tablet (40 mg total) by mouth 2 (two) times a day   pregabalin (LYRICA) 100 mg capsule   No No   Sig: Take 1 capsule (100 mg total) by mouth 3 (three) times a day   solifenacin (VESICARE) 5 mg tablet  Self Yes No   Sig: Take 5 mg by mouth daily       Facility-Administered Medications: None       Past Medical History:   Diagnosis Date    Depression     Diabetes mellitus (HCC)     Gastroparesis     GERD (gastroesophageal reflux disease)     Hyperlipidemia     Hypertension     Sciatica     Seizure disorder [facility])        Past Surgical History:   Procedure Laterality Date    BREAST BIOPSY Left  benign    CHOLECYSTECTOMY      COLONOSCOPY      HYSTERECTOMY      KS LAP,APPENDECTOMY N/A 3/24/2021    Procedure: APPENDECTOMY LAPAROSCOPIC;  Surgeon: Michael Nuñez MD;  Location: [facility] MAIN OR;  Service: General    [place] LAP,DIAGNOSTIC ABDOMEN N/A 3/24/2021    Procedure: LAPAROSCOPY DIAGNOSTIC, EXTENSIVE DESI;  Surgeon: Michael Nuñez MD;  Location: [facility] MAIN OR;  Service: General    UPPER GASTROINTESTINAL ENDOSCOPY         Family History   Problem Relation Age of Onset    No Known Problems Mother     No Known Problems Father     No Known Problems Daughter     No Known Problems Maternal Grandmother     No Known Problems Paternal Grandmother     No Known Problems Paternal Aunt     No Known Problems Paternal Aunt     No Known Problems Paternal Aunt      I have reviewed and agree with the history as documented  E-Cigarette/Vaping    E-Cigarette Use Never User      E-Cigarette/Vaping Substances    Nicotine No     THC No     CBD No     Flavoring No     Other No     Unknown No      Social History     Tobacco Use    Smoking status: Former Smoker     Quit date:      Years since quittin 7    Smokeless tobacco: Never Used   Vaping Use    Vaping Use: Never used   Substance Use Topics    Alcohol use: Never    Drug use: Never       Review of Systems   Constitutional: Negative for chills, diaphoresis and fever  HENT: Negative for congestion, sinus pressure, sore throat and trouble swallowing  Eyes: Negative for pain, discharge and itching  Respiratory: Negative for cough, chest tightness, shortness of breath and wheezing  Cardiovascular: Negative for chest pain, palpitations and leg swelling  Gastrointestinal: Positive for nausea and vomiting  Negative for abdominal distention, abdominal pain, blood in stool and diarrhea  Endocrine: Negative for polyphagia and polyuria  Genitourinary: Positive for dysuria and flank pain  Negative for difficulty urinating, hematuria, pelvic pain and vaginal bleeding  Musculoskeletal: Negative for arthralgias and back pain  Skin: Negative for rash  Neurological: Negative for dizziness, syncope, weakness, light-headedness and headaches  Physical Exam  Physical Exam  Vitals and nursing note reviewed  Constitutional:       General: She is not in acute distress  Appearance: She is well-developed  HENT:      Head: Normocephalic and atraumatic  Right Ear: External ear normal       Left Ear: External ear normal       Mouth/Throat:      Pharynx: No oropharyngeal exudate  Eyes:      Conjunctiva/sclera: Conjunctivae normal       Pupils: Pupils are equal, round, and reactive to light  Cardiovascular:      Rate and Rhythm: Normal rate and regular rhythm  Heart sounds: Normal heart sounds  No murmur heard  No friction rub  No gallop  Pulmonary:      Effort: Pulmonary effort is normal  No respiratory distress  Breath sounds: Normal breath sounds  No wheezing or rales  Abdominal:      General: There is no distension  Palpations: Abdomen is soft  Tenderness: There is abdominal tenderness (LLQ)  There is left CVA tenderness  There is no guarding  Musculoskeletal:         General: No tenderness or deformity  Normal range of motion  Cervical back: Normal range of motion and neck supple  Lymphadenopathy:      Cervical: No cervical adenopathy  Skin:     General: Skin is warm and dry  Neurological:      General: No focal deficit present  Mental Status: She is alert and oriented to person, place, and time  Cranial Nerves: No cranial nerve deficit  Sensory: No sensory deficit  Motor: No weakness or abnormal muscle tone        Coordination: Coordination normal          Vital Signs  ED Triage Vitals [09/07/21 1146]   Temperature Pulse Respirations Blood Pressure SpO2   (!) 97 4 °F (36 3 °C) 84 20 140/66 98 %      Temp Source Heart Rate Source Patient Position - Orthostatic VS BP Location FiO2 (%)   Tympanic Monitor Sitting Left arm --      Pain Score       9           Vitals:    09/07/21 1146 09/07/21 1230 09/07/21 1259 09/07/21 1537   BP: 140/66 143/80 (!) 181/85 135/76   Pulse: 84 83 80 78   Patient Position - Orthostatic VS: Sitting Lying Lying          Visual Acuity      ED Medications  Medications   morphine (PF) 4 mg/mL injection 4 mg (4 mg Intravenous Given 9/7/21 1259)   HYDROmorphone (DILAUDID) injection 0 5 mg (0 5 mg Intravenous Given 9/7/21 1502)   ciprofloxacin (CIPRO) tablet 500 mg (500 mg Oral Given 9/7/21 1501)       Diagnostic Studies  Results Reviewed     Procedure Component Value Units Date/Time    Urine culture [[id number]] Collected: 09/07/21 1156    Lab Status:  In process Specimen: Urine, Clean Catch Updated: 09/07/21 1334    Urine Microscopic [[id number]]  (Abnormal) Collected: 09/07/21 1156    Lab Status: Final result Specimen: Urine, Clean Catch Updated: 09/07/21 1229     RBC, UA 2-4 /hpf      WBC, UA Innumerable /hpf      Epithelial Cells Occasional /hpf      Bacteria, UA Innumerable /hpf      OTHER OBSERVATIONS WBCs Clumped  Renal Epithelial Cells Present    Comprehensive metabolic panel [[id number]]  (Abnormal) Collected: 09/07/21 1155    Lab Status: Final result Specimen: Blood from Arm, Left Updated: 09/07/21 1220     Sodium 134 mmol/L      Potassium 4 6 mmol/L      Chloride 99 mmol/L      CO2 27 mmol/L      ANION GAP 8 mmol/L      BUN 19 mg/dL      Creatinine 1 02 mg/dL      Glucose 279 mg/dL      Calcium 9 2 mg/dL      AST 57 U/L      ALT 84 U/L      Alkaline Phosphatase 154 U/L      Total Protein 6 8 g/dL      Albumin 3 8 g/dL      Total Bilirubin 0 30 mg/dL      eGFR 64 ml/min/1 73sq m     Narrative:      [place] guidelines for Chronic Kidney Disease (CKD):     Stage 1 with normal or high GFR (GFR > 90 mL/min/1 73 square meters)    Stage 2 Mild CKD (GFR = 60-89 mL/min/1 73 square meters)    Stage 3A Moderate CKD (GFR = 45-59 mL/min/1 73 square meters)    Stage 3B Moderate CKD (GFR = 30-44 mL/min/1 73 square meters)    Stage 4 Severe CKD (GFR = 15-29 mL/min/1 73 square meters)    Stage 5 End Stage CKD (GFR <15 mL/min/1 73 square meters)  Note: GFR calculation is accurate only with a steady state creatinine    Lipase [[id number]]  (Normal) Collected: 09/07/21 1155    Lab Status: Final result Specimen: Blood from Arm, Left Updated: 09/07/21 1219     Lipase 43 u/L     UA (URINE) with reflex to Scope [[id number]]  (Abnormal) Collected: 09/07/21 1156    Lab Status: Final result Specimen: Urine, Clean Catch Updated: 09/07/21 1209     Color, UA Yellow     Clarity, UA Cloudy     Specific [place], UA 1 020     pH, UA 5 5     Leukocytes, UA Trace     Nitrite, UA Positive     Protein, UA 2+ mg/dl      Glucose, UA 3+ mg/dl      Ketones, UA Negative mg/dl      Urobilinogen, UA 0 2 E U /dl      Bilirubin, UA Negative     Blood, UA Trace-Intact    CBC and differential [[id number]]  (Abnormal) Collected: 09/07/21 1155    Lab Status: Final result Specimen: Blood from Arm, Left Updated: 09/07/21 1203     WBC 9 70 Thousand/uL      RBC 4 16 Million/uL      Hemoglobin 11 3 g/dL      Hematocrit 34 0 %      MCV 82 fL      MCH 27 1 pg      MCHC 33 2 g/dL      RDW 13 7 %      MPV 8 2 fL      Platelets 333 Thousands/uL      Neutrophils Relative 73 %      Lymphocytes Relative 18 %      Monocytes Relative 7 %      Eosinophils Relative 1 %      Basophils Relative 1 %      Neutrophils Absolute 7 10 Thousands/µL      Lymphocytes Absolute 1 70 Thousands/µL      Monocytes Absolute 0 70 Thousand/µL      Eosinophils Absolute 0 10 Thousand/µL      Basophils Absolute 0 00 Thousands/µL                  CT renal stone study abdomen pelvis wo contrast   Final Result by Melissa Valenzuela DO (09/07 7850)   1    Fat stranding involving the left renal collecting system and left ureter without calcified ureteral or renal calculus  Given the concurrent bladder wall thickening and perivesicular fat stranding, findings are highly suspicious for cystitis with    ascending infection involving the left urinary tract  There is prominence of the left renal pelvis without [place] hydronephrosis  A recently passed ureteral calculus can produce similar findings, however, this is felt to be less likely  2   Status post partial right nephrectomy  Previously described subcentimeter hypodensities of within the bilateral kidneys are not well characterized on this noncontrast study  Consider nonemergent follow-up renal ultrasound  The study was marked in [facility] for immediate notification  Workstation performed: [id number]                    Procedures  Procedures         ED Course                                           MDM  Number of Diagnoses or Management Options  Pyelonephritis  Diagnosis management comments: 59-year-old female presenting for left-sided flank pain  Persistent for past 2 days  Associated with dysuria, nausea vomiting  Has no CVA tenderness in left lower quadrant abdominal tenderness on exam   Vitals within normal limits  Will obtain CBC, CMP, UA  Will obtain CT abdomen pelvis without contrast given concern for kidney stone  CT shows signs of pyelonephritis, no stone noted  Labs within normal limits  Vitals remained within normal limits  Offered discharge versus home with p o  Antibiotics and small amount of pain medicine  Patient would like to go home at this time    Told to return to the ED if symptoms worsen      Disposition  Final diagnoses:   Pyelonephritis     Time reflects when diagnosis was documented in both MDM as applicable and the Disposition within this note     Time User Action Codes Description Comment    9/7/2021  3:27 PM Gabriele Pointer Add [N12] Pyelonephritis       ED Disposition     ED Disposition Condition Date/Time Comment    Discharge Stable Tue Sep 7, 2021  3:27 PM Hailey Miramontes discharge to home/self care  Follow-up Information     Follow up With Specialties Details Why Contact Info Additional Information    Maryellen Hernandez, [address], Nurse Practitioner, Emergency Medicine Schedule an appointment as soon as possible for a visit  For follow up of symptoms [address]       [address]  Emergency Department Emergency Medicine Go to  If symptoms worsen [place] [address]  [phone] [facility]  Emergency Department          Patient's Medications   Discharge Prescriptions    CIPROFLOXACIN (CIPRO) 500 MG TABLET    Take 1 tablet (500 mg total) by mouth every 12 (twelve) hours for 10 days       Start Date: 9/7/2021  End Date: 9/17/2021       Order Dose: 500 mg       Quantity: 20 tablet    Refills: 0    ONDANSETRON (ZOFRAN-ODT) 4 MG DISINTEGRATING TABLET    Take 1 tablet (4 mg total) by mouth every 6 (six) hours as needed for nausea or vomiting       Start Date: 9/7/2021  End Date: --       Order Dose: 4 mg       Quantity: 20 tablet    Refills: 0    OXYCODONE (ROXICODONE) 5 MG IMMEDIATE RELEASE TABLET    Take 1 tablet (5 mg total) by mouth every 6 (six) hours as needed for severe pain for up to 5 daysMax Daily Amount: 20 mg       Start Date: 9/7/2021  End Date: 9/12/2021       Order Dose: 5 mg       Quantity: 5 tablet    Refills: 0     No discharge procedures on file      PDMP Review       Value Time User    PDMP Reviewed  Yes 3/26/2021 10:43 AM Guerline Rahman PA-C          ED Provider  Electronically Signed by           Valerie Russo DO  09/07/21 5466

## 2021-09-07 NOTE — ED NOTES
PT expressed to this RN that she would like toradol listed as an allergy "because it does not work for me so I want it listed as an allergy " Explained to patient that it should not be listed as an allergy because that is truly not an allergy and it could prevent her from receiving other medications in the future if I list toradol as an allergy  Pt verbalized understanding and was ok with not having me list toradol as an allergy after I explained the reasoning to her        Alejandro Montenegro, BRIAN  09/07/21 8488

## 2021-09-09 LAB — BACTERIA UR CULT: ABNORMAL

## 2021-09-16 ENCOUNTER — APPOINTMENT (EMERGENCY)
Dept: CT IMAGING | Facility: HOSPITAL | Age: 51
End: 2021-09-16
Payer: COMMERCIAL

## 2021-09-16 ENCOUNTER — HOSPITAL ENCOUNTER (EMERGENCY)
Facility: HOSPITAL | Age: 51
Discharge: HOME | End: 2021-09-17
Attending: EMERGENCY MEDICINE | Admitting: EMERGENCY MEDICINE
Payer: COMMERCIAL

## 2021-09-16 DIAGNOSIS — N39.0 UTI (URINARY TRACT INFECTION): Primary | ICD-10-CM

## 2021-09-16 LAB
ALBUMIN SERPL BCP-MCNC: 4 G/DL (ref 3.5–5.7)
ALP SERPL-CCNC: 124 U/L (ref 40–150)
ALT SERPL W P-5'-P-CCNC: 29 U/L (ref 7–52)
ANION GAP SERPL CALCULATED.3IONS-SCNC: 8 MMOL/L (ref 4–13)
AST SERPL W P-5'-P-CCNC: 20 U/L (ref 13–39)
BASOPHILS # BLD AUTO: 0 THOUSANDS/ΜL (ref 0–0.1)
BASOPHILS NFR BLD AUTO: 1 % (ref 0–2)
BILIRUB SERPL-MCNC: 0.3 MG/DL (ref 0.2–1)
BILIRUB UR QL STRIP: NEGATIVE
BUN SERPL-MCNC: 17 MG/DL (ref 7–25)
CALCIUM SERPL-MCNC: 9.4 MG/DL (ref 8.6–10.5)
CHLORIDE SERPL-SCNC: 101 MMOL/L (ref 98–107)
CLARITY UR: CLEAR
CO2 SERPL-SCNC: 27 MMOL/L (ref 21–31)
COLOR UR: YELLOW
CREAT SERPL-MCNC: 0.95 MG/DL (ref 0.6–1.2)
EOSINOPHIL # BLD AUTO: 0.2 THOUSAND/ΜL (ref 0–0.61)
EOSINOPHIL NFR BLD AUTO: 2 % (ref 0–5)
ERYTHROCYTE [DISTWIDTH] IN BLOOD BY AUTOMATED COUNT: 13.8 % (ref 11.5–14.5)
GFR SERPL CREATININE-BSD FRML MDRD: 70 ML/MIN/1.73SQ M
GLUCOSE SERPL-MCNC: 184 MG/DL (ref 65–99)
GLUCOSE UR STRIP-MCNC: ABNORMAL MG/DL
HCT VFR BLD AUTO: 35.5 % (ref 42–47)
HGB BLD-MCNC: 11.6 G/DL (ref 12–16)
HGB UR QL STRIP.AUTO: NEGATIVE
KETONES UR STRIP-MCNC: NEGATIVE MG/DL
LEUKOCYTE ESTERASE UR QL STRIP: NEGATIVE
LIPASE SERPL-CCNC: 60 U/L (ref 11–82)
LYMPHOCYTES # BLD AUTO: 3.1 THOUSANDS/ΜL (ref 0.6–4.47)
LYMPHOCYTES NFR BLD AUTO: 36 % (ref 21–51)
MCH RBC QN AUTO: 27.1 PG (ref 26–34)
MCHC RBC AUTO-ENTMCNC: 32.7 G/DL (ref 31–37)
MCV RBC AUTO: 83 FL (ref 81–99)
MONOCYTES # BLD AUTO: 0.7 THOUSAND/ΜL (ref 0.17–1.22)
MONOCYTES NFR BLD AUTO: 8 % (ref 2–12)
NEUTROPHILS # BLD AUTO: 4.6 THOUSANDS/ΜL (ref 1.4–6.5)
NEUTS SEG NFR BLD AUTO: 53 % (ref 42–75)
NITRITE UR QL STRIP: NEGATIVE
PH UR STRIP.AUTO: 5 [PH]
PLATELET # BLD AUTO: 475 THOUSANDS/UL (ref 149–390)
PMV BLD AUTO: 8.1 FL (ref 8.6–11.7)
POTASSIUM SERPL-SCNC: 4 MMOL/L (ref 3.5–5.5)
PROT SERPL-MCNC: 7 G/DL (ref 6.4–8.9)
PROT UR STRIP-MCNC: NEGATIVE MG/DL
RBC # BLD AUTO: 4.28 MILLION/UL (ref 3.9–5.2)
SODIUM SERPL-SCNC: 136 MMOL/L (ref 134–143)
SP GR UR STRIP.AUTO: 1.02 (ref 1–1.03)
UROBILINOGEN UR QL STRIP.AUTO: 0.2 E.U./DL
WBC # BLD AUTO: 8.5 THOUSAND/UL (ref 4.8–10.8)

## 2021-09-16 PROCEDURE — 99284 EMERGENCY DEPT VISIT MOD MDM: CPT | Performed by: EMERGENCY MEDICINE

## 2021-09-16 PROCEDURE — 81003 URINALYSIS AUTO W/O SCOPE: CPT | Performed by: EMERGENCY MEDICINE

## 2021-09-16 PROCEDURE — G1004 CDSM NDSC: HCPCS

## 2021-09-16 PROCEDURE — 85025 COMPLETE CBC W/AUTO DIFF WBC: CPT | Performed by: EMERGENCY MEDICINE

## 2021-09-16 PROCEDURE — 83690 ASSAY OF LIPASE: CPT | Performed by: EMERGENCY MEDICINE

## 2021-09-16 PROCEDURE — 96361 HYDRATE IV INFUSION ADD-ON: CPT

## 2021-09-16 PROCEDURE — 80053 COMPREHEN METABOLIC PANEL: CPT | Performed by: EMERGENCY MEDICINE

## 2021-09-16 PROCEDURE — 96374 THER/PROPH/DIAG INJ IV PUSH: CPT

## 2021-09-16 PROCEDURE — 74176 CT ABD & PELVIS W/O CONTRAST: CPT

## 2021-09-16 PROCEDURE — 36415 COLL VENOUS BLD VENIPUNCTURE: CPT | Performed by: EMERGENCY MEDICINE

## 2021-09-16 PROCEDURE — 96375 TX/PRO/DX INJ NEW DRUG ADDON: CPT

## 2021-09-16 PROCEDURE — 99284 EMERGENCY DEPT VISIT MOD MDM: CPT

## 2021-09-16 RX ORDER — OXYCODONE HYDROCHLORIDE AND ACETAMINOPHEN 5; 325 MG/1; MG/1
1 TABLET ORAL ONCE
Status: COMPLETED | OUTPATIENT
Start: 2021-09-16 | End: 2021-09-16

## 2021-09-16 RX ORDER — KETOROLAC TROMETHAMINE 30 MG/ML
15 INJECTION, SOLUTION INTRAMUSCULAR; INTRAVENOUS ONCE
Status: COMPLETED | OUTPATIENT
Start: 2021-09-16 | End: 2021-09-16

## 2021-09-16 RX ORDER — ONDANSETRON 2 MG/ML
4 INJECTION INTRAMUSCULAR; INTRAVENOUS ONCE
Status: COMPLETED | OUTPATIENT
Start: 2021-09-16 | End: 2021-09-16

## 2021-09-16 RX ORDER — SULFAMETHOXAZOLE AND TRIMETHOPRIM 800; 160 MG/1; MG/1
1 TABLET ORAL ONCE
Status: COMPLETED | OUTPATIENT
Start: 2021-09-16 | End: 2021-09-17

## 2021-09-16 RX ORDER — SULFAMETHOXAZOLE AND TRIMETHOPRIM 800; 160 MG/1; MG/1
1 TABLET ORAL 2 TIMES DAILY
Qty: 14 TABLET | Refills: 0 | Status: SHIPPED | OUTPATIENT
Start: 2021-09-16 | End: 2021-09-23

## 2021-09-16 RX ADMIN — ONDANSETRON 4 MG: 2 INJECTION INTRAMUSCULAR; INTRAVENOUS at 20:37

## 2021-09-16 RX ADMIN — OXYCODONE HYDROCHLORIDE AND ACETAMINOPHEN 1 TABLET: 5; 325 TABLET ORAL at 21:05

## 2021-09-16 RX ADMIN — KETOROLAC TROMETHAMINE 15 MG: 30 INJECTION, SOLUTION INTRAMUSCULAR; INTRAVENOUS at 20:36

## 2021-09-16 RX ADMIN — SODIUM CHLORIDE 1000 ML: 0.9 INJECTION, SOLUTION INTRAVENOUS at 20:37

## 2021-09-17 VITALS
WEIGHT: 175 LBS | RESPIRATION RATE: 18 BRPM | HEIGHT: 61 IN | BODY MASS INDEX: 33.04 KG/M2 | DIASTOLIC BLOOD PRESSURE: 76 MMHG | TEMPERATURE: 97.9 F | SYSTOLIC BLOOD PRESSURE: 142 MMHG | HEART RATE: 78 BPM | OXYGEN SATURATION: 97 %

## 2021-09-17 RX ADMIN — SULFAMETHOXAZOLE AND TRIMETHOPRIM 1 TABLET: 800; 160 TABLET ORAL at 00:05

## 2021-09-17 NOTE — ED CARE HANDOFF
Emergency Department Sign Out Note        Sign out and transfer of care from Dr Barajas  See Separate Emergency Department note  The patient, Miguel Crooks, was evaluated by the previous provider for dysuria, urinary symptoms  Workup Completed:  Lab work, urinalysis, CT imaging    ED Course / Workup Pending (followup):  CT imaging negative  Patient to be placed on antibiotics per Dr Barajas for possible persistent UTI  ED Course as of Sep 16 2240   Thu Sep 16, 2021   2152 Pyelo previously nausea, urinary symptoms  CT imaging pending dispo with antibiotics        Procedures  MDM    Disposition  Final diagnoses:   UTI (urinary tract infection)     Time reflects when diagnosis was documented in both MDM as applicable and the Disposition within this note     Time User Action Codes Description Comment    9/16/2021 10:40 PM Thania To Add [N39 0] UTI (urinary tract infection)       ED Disposition     ED Disposition Condition Date/Time Comment    Discharge Stable Thu Sep 16, 2021 10:38 PM Miguel Crooks discharge to home/self care  Follow-up Information     Follow up With Specialties Details Why Contact Info Additional [address]  Emergency Department Emergency Medicine  If symptoms worsen [facility]  [phone] [address]  Emergency Department        Patient's Medications   Discharge Prescriptions    SULFAMETHOXAZOLE-TRIMETHOPRIM (BACTRIM DS) 800-160 MG PER TABLET    Take 1 tablet by mouth 2 (two) times a day for 7 days smx-tmp DS (BACTRIM) 800-160 mg tabs (1tab q12 D10)       Start Date: 9/16/2021 End Date: 9/23/2021       Order Dose: 1 tablet       Quantity: 14 tablet    Refills: 0     No discharge procedures on file         ED Provider  Electronically Signed by     Tatiana Borges MD  09/16/21 7768

## 2021-09-17 NOTE — ED PROVIDER NOTES
Chief Complaint   Patient presents with    Flank Pain     right side, denies nausea/vomiting/diarrhea     This is a 51-year-old female complains of right suprapubic to flank pain  She states this is identical to when she had pyelonephritis 1 week ago  Her symptoms started approximately 1 day after discontinuation of antibiotics for pyelonephritis  She does not think she has had any kidney stones in this area before previously  She states that she has discomfort after urinating as well as a feeling of incomplete emptying of the bladder  She states she is having some nausea but has not vomited  No change in vaginal discharge  Bowel movements with her baseline which is chronic constipation  Prior to Admission Medications   Prescriptions Last Dose Informant Patient Reported? Taking?    Aspirin (ASPIR-81 PO)  Self Yes No   Sig: Take 81 mg by mouth   B-D UF III MINI PEN NEEDLES 31G X 5 MM MISC  Self Yes No   BANOPHEN 50 MG capsule  Self Yes No   Sig: take 1 capsule by mouth every 6 hours if needed for itching   Continuous Blood Gluc  (Dexcom G6 ) TEMITOPE   No No   Sig: Disp 1 Kit, use as directed   Continuous Blood Gluc Sensor (Dexcom G6 Sensor) MISC   No No   Sig: Disp 1 box of 3 sensors with 12 refills change sensor q 10 days   Continuous Blood Gluc Transmit (Dexcom G6 Transmitter) MISC   No No   Sig: Dispense 1 kit, change every 90 days    MG capsule  Self Yes No   Sig: Take 100 mg by mouth 2 (two) times a day   Empagliflozin 25 MG TABS   No No   Sig: Take 1 tablet (25 mg total) by mouth every morning   Lantus SoloStar 100 units/mL injection pen   No No   Sig: Inject 60 Units under the skin daily   Semaglutide, 1 MG/DOSE, 2 MG/1 5ML SOPN   No No   Sig: Inject 1 mg under the skin once a week   albuterol (ACCUNEB) 0 63 MG/3ML nebulizer solution  Self Yes No   Sig: take 3 milliliters by mouth every 4 hours if needed for wheezing   albuterol (PROAIR HFA) 90 mcg/act inhaler  Self Yes No Sig: Inhale 1 puff every 6 (six) hours as needed   atorvastatin (LIPITOR) 40 mg tablet  Self Yes No   Sig: take 1 tablet by mouth at bedtime   cholecalciferol (VITAMIN D3) 400 units tablet  Self Yes No   Sig: Take 400 Units by mouth daily   ciprofloxacin (CIPRO) 500 mg tablet   No No   Sig: Take 1 tablet (500 mg total) by mouth every 12 (twelve) hours for 10 days   citalopram (CELEXA) 40 mg tablet  Self Yes No   Sig: Take 40 mg by mouth daily    famotidine (PEPCID) 40 MG tablet   No No   Sig: Take 1 tablet (40 mg total) by mouth daily at bedtime Take in evening 2 hours prior to bed   fenofibrate (TRICOR) 48 mg tablet  Self Yes No   Sig: Take 48 mg by mouth daily   insulin glulisine (Apidra SoloStar) 100 units/mL injection pen   No No   Sig: Inject 20 units under the skin three times daily with meals     levETIRAcetam (KEPPRA) 500 mg tablet  Self Yes No   Sig: Take 500 mg by mouth every 12 (twelve) hours    linaCLOtide (Linzess) 72 MCG CAPS   No No   Sig: Take 1 capsule by mouth daily before breakfast   lisinopril (ZESTRIL) 10 mg tablet  Self Yes No   Sig: Take 10 mg by mouth   metFORMIN (GLUCOPHAGE) 1000 MG tablet  Self Yes No   Sig: take 1 tablet by mouth twice a day WITH MEALS   metoclopramide (REGLAN) 10 mg tablet  Self Yes No   Sig: Take 10 mg by mouth daily   metoprolol tartrate (LOPRESSOR) 25 mg tablet  Self Yes No   Sig: Take 25 mg by mouth 2 (two) times a day   neomycin-polymyxin-hydrocortisone (CORTISPORIN) otic solution   No No   Sig: Administer 4 drops to the right ear every 6 (six) hours   ondansetron (ZOFRAN) 4 mg tablet  Self No No   Sig: Take 1 tablet (4 mg total) by mouth every 8 (eight) hours as needed for nausea or vomiting   ondansetron (ZOFRAN-ODT) 4 mg disintegrating tablet   No No   Sig: Take 1 tablet (4 mg total) by mouth every 6 (six) hours as needed for nausea or vomiting   pantoprazole (PROTONIX) 40 mg tablet   No No   Sig: Take 1 tablet (40 mg total) by mouth 2 (two) times a day pregabalin (LYRICA) 100 mg capsule   No No   Sig: Take 1 capsule (100 mg total) by mouth 3 (three) times a day   solifenacin (VESICARE) 5 mg tablet  Self Yes No   Sig: Take 5 mg by mouth daily       Facility-Administered Medications: None       Past Medical History:   Diagnosis Date    Depression     Diabetes mellitus (HCC)     Gastroparesis     GERD (gastroesophageal reflux disease)     Hyperlipidemia     Hypertension     Sciatica     Seizure disorder ([address] )        Past Surgical History:   Procedure Laterality Date    BREAST BIOPSY Left  benign    CHOLECYSTECTOMY      COLONOSCOPY      HYSTERECTOMY      [place] LAP,APPENDECTOMY N/A 3/24/2021    Procedure: APPENDECTOMY LAPAROSCOPIC;  Surgeon: Michelle Herrera MD;  Location: [address] [place] [address] OR;  Service: General    WA LAP,DIAGNOSTIC ABDOMEN N/A 3/24/2021    Procedure: LAPAROSCOPY DIAGNOSTIC, EXTENSIVE DESI;  Surgeon: Michelle Herrera MD;  Location: [address] [place] [address];  Service: General    UPPER GASTROINTESTINAL ENDOSCOPY         Family History   Problem Relation Age of Onset    No Known Problems Mother     No Known Problems Father     No Known Problems Daughter     No Known Problems Maternal Grandmother     No Known Problems Paternal Grandmother     No Known Problems Paternal Aunt     No Known Problems Paternal Aunt     No Known Problems Paternal Aunt      I have reviewed and agree with the history as documented  E-Cigarette/Vaping    E-Cigarette Use Never User      E-Cigarette/Vaping Substances    Nicotine No     THC No     CBD No     Flavoring No     Other No     Unknown No      Social History     Tobacco Use    Smoking status: Former Smoker     Quit date:      Years since quittin 7    Smokeless tobacco: Never Used   Vaping Use    Vaping Use: Never used   Substance Use Topics    Alcohol use: Never    Drug use: Never       Review of Systems   Constitutional: Negative for activity change, chills, fatigue and fever     HENT: Negative for congestion  Eyes: Negative for visual disturbance  Respiratory: Negative for cough, chest tightness and shortness of breath  Cardiovascular: Negative for chest pain  Gastrointestinal: Positive for abdominal pain  Negative for diarrhea and vomiting  Genitourinary: Positive for difficulty urinating, dysuria and flank pain  Negative for vaginal discharge and vaginal pain  Skin: Negative for rash  Neurological: Negative for dizziness, weakness and numbness  Physical Exam  Physical Exam  Constitutional:       General: She is not in acute distress  Appearance: She is well-developed  She is not ill-appearing, toxic-appearing or diaphoretic  HENT:      Head: Normocephalic and atraumatic  Right Ear: External ear normal       Left Ear: External ear normal    Eyes:      Conjunctiva/sclera: Conjunctivae normal       Pupils: Pupils are equal, round, and reactive to light  Cardiovascular:      Rate and Rhythm: Normal rate and regular rhythm  Heart sounds: Normal heart sounds  Pulmonary:      Effort: Pulmonary effort is normal  No respiratory distress  Breath sounds: Normal breath sounds  Abdominal:      General: Bowel sounds are normal  There is no distension  Palpations: Abdomen is soft  Tenderness: There is abdominal tenderness  There is right CVA tenderness  There is no left CVA tenderness or guarding  Musculoskeletal:         General: Normal range of motion  Cervical back: Normal range of motion and neck supple  Skin:     General: Skin is warm and dry  Capillary Refill: Capillary refill takes less than 2 seconds  Neurological:      Mental Status: She is alert and oriented to person, place, and time     Psychiatric:         Behavior: Behavior normal          Vital Signs  ED Triage Vitals [09/16/21 2012]   Temperature Pulse Respirations Blood Pressure SpO2   97 9 °F (36 6 °C) 78 18 143/79 97 %      Temp Source Heart Rate Source Patient Position - Orthostatic VS BP Location FiO2 (%)   Tympanic Monitor Sitting Left arm --      Pain Score       8           Vitals:    09/16/21 2012 09/17/21 0015   BP: 143/79 142/76   Pulse: 78 78   Patient Position - Orthostatic VS: Sitting          Visual Acuity      ED Medications  Medications   ondansetron (ZOFRAN) injection 4 mg (4 mg Intravenous Given 9/16/21 2037)   sodium chloride 0 9 % bolus 1,000 mL (0 mL Intravenous Stopped 9/17/21 0015)   ketorolac (TORADOL) injection 15 mg (15 mg Intravenous Given 9/16/21 2036)   oxyCODONE-acetaminophen (PERCOCET) 5-325 mg per tablet 1 tablet (1 tablet Oral Given 9/16/21 2105)   sulfamethoxazole-trimethoprim (BACTRIM DS) 800-160 mg per tablet 1 tablet (1 tablet Oral Given 9/17/21 0005)       Diagnostic Studies  Results Reviewed     Procedure Component Value Units Date/Time    CMP [[id number]]  (Abnormal) Collected: 09/16/21 2030    Lab Status: Final result Specimen: Blood from Arm, Left Updated: 09/16/21 2052     Sodium 136 mmol/L      Potassium 4 0 mmol/L      Chloride 101 mmol/L      CO2 27 mmol/L      ANION GAP 8 mmol/L      BUN 17 mg/dL      Creatinine 0 95 mg/dL      Glucose 184 mg/dL      Calcium 9 4 mg/dL      AST 20 U/L      ALT 29 U/L      Alkaline Phosphatase 124 U/L      Total Protein 7 0 g/dL      Albumin 4 0 g/dL      Total Bilirubin 0 30 mg/dL      eGFR 70 ml/min/1 73sq m     Narrative:      Kaci guidelines for Chronic Kidney Disease (CKD):     Stage 1 with normal or high GFR (GFR > 90 mL/min/1 73 square meters)    Stage 2 Mild CKD (GFR = 60-89 mL/min/1 73 square meters)    Stage 3A Moderate CKD (GFR = 45-59 mL/min/1 73 square meters)    Stage 3B Moderate CKD (GFR = 30-44 mL/min/1 73 square meters)    Stage 4 Severe CKD (GFR = 15-29 mL/min/1 73 square meters)    Stage 5 End Stage CKD (GFR <15 mL/min/1 73 square meters)  Note: GFR calculation is accurate only with a steady state creatinine    Lipase [[id number]]  (Normal) Collected: 09/16/21 2030    Lab Status: Final result Specimen: Blood from Arm, Left Updated: 09/16/21 2050     Lipase 60 u/L     UA w Reflex to Microscopic w Reflex to Culture [[id number]]  (Abnormal) Collected: 09/16/21 2029    Lab Status: Final result Specimen: Urine, Clean Catch Updated: 09/16/21 2038     Color, UA Yellow     Clarity, UA Clear     Specific [place], UA 1 020     pH, UA 5 0     Leukocytes, UA Negative     Nitrite, UA Negative     Protein, UA Negative mg/dl      Glucose, UA 1+ mg/dl      Ketones, UA Negative mg/dl      Urobilinogen, UA 0 2 E U /dl      Bilirubin, UA Negative     Blood, UA Negative    CBC and differential [[id number]]  (Abnormal) Collected: 09/16/21 2030    Lab Status: Final result Specimen: Blood from Arm, Left Updated: 09/16/21 2037     WBC 8 50 Thousand/uL      RBC 4 28 Million/uL      Hemoglobin 11 6 g/dL      Hematocrit 35 5 %      MCV 83 fL      MCH 27 1 pg      MCHC 32 7 g/dL      RDW 13 8 %      MPV 8 1 fL      Platelets 118 Thousands/uL      Neutrophils Relative 53 %      Lymphocytes Relative 36 %      Monocytes Relative 8 %      Eosinophils Relative 2 %      Basophils Relative 1 %      Neutrophils Absolute 4 60 Thousands/µL      Lymphocytes Absolute 3 10 Thousands/µL      Monocytes Absolute 0 70 Thousand/µL      Eosinophils Absolute 0 20 Thousand/µL      Basophils Absolute 0 00 Thousands/µL                  CT renal stone study abdomen pelvis wo contrast   Final Result by Juan M Bailey MD (09/16 2215)         1  Previously visualized findings attributed to left ureteritis have resolved  No evidence of nephrolithiasis or obstructive uropathy  2   Constipation  Workstation performed: [id number]                    Procedures  Procedures         ED Course                             SBIRT 22yo+      Most Recent Value   SBIRT (24 yo +)   In order to provide better care to our patients, we are screening all of our patients for alcohol and drug use   Would it be okay to ask you these screening questions? Yes Filed at: 09/16/2021 2024   Initial Alcohol Screen: US AUDIT-C    1  How often do you have a drink containing alcohol?  0 Filed at: 09/16/2021 2024   2  How many drinks containing alcohol do you have on a typical day you are drinking? 0 Filed at: 09/16/2021 2024   3a  Male UNDER 65: How often do you have five or more drinks on one occasion? 0 Filed at: 09/16/2021 2024   3b  FEMALE Any Age, or MALE 65+: How often do you have 4 or more drinks on one occassion? 0 Filed at: 09/16/2021 2024   Audit-C Score  0 Filed at: 09/16/2021 2024   LILIA: How many times in the past year have you    Used an illegal drug or used a prescription medication for non-medical reasons? Never Filed at: 09/16/2021 2024                    MDM  Number of Diagnoses or Management Options  UTI (urinary tract infection)  Diagnosis management comments: This is a 49-year-old female with urinary tract infection  Patient has a relatively benign UA but very typical symptoms in light of her chest having had pyelonephritis 1 week ago  Scan still pending at the time of sign-out  If scan is negative plan for discharge with antibiotics  Patient appears clinically well and is agreeable with the plan  Amount and/or Complexity of Data Reviewed  Clinical lab tests: ordered and reviewed  Independent visualization of images, tracings, or specimens: yes        Disposition  Final diagnoses:   UTI (urinary tract infection)     Time reflects when diagnosis was documented in both MDM as applicable and the Disposition within this note     Time User Action Codes Description Comment    9/16/2021 10:40 PM steve, [address] [[address] 0] UTI (urinary tract infection)       ED Disposition     ED Disposition Condition Date/Time Comment    Discharge Stable Thu Sep 16, 2021 10:38 PM Kandace Patel discharge to home/self care              Follow-up Information     Follow up With Specialties Details Why Contact Info Additional [address] [facility]  Emergency Department Emergency Medicine  If symptoms worsen [address] [facility] [id number] [phone] [address]  Emergency Department          Discharge Medication List as of 9/17/2021 12:08 AM      START taking these medications    Details   sulfamethoxazole-trimethoprim (BACTRIM DS) 800-160 mg per tablet Take 1 tablet by mouth 2 (two) times a day for 7 days smx-tmp DS (BACTRIM) 800-160 mg tabs (1tab q12 D10), Starting Thu 9/16/2021, Until Thu 9/23/2021, Normal         CONTINUE these medications which have NOT CHANGED    Details   albuterol (ACCUNEB) 0 63 MG/3ML nebulizer solution take 3 milliliters by mouth every 4 hours if needed for wheezing, Historical Med      albuterol (PROAIR HFA) 90 mcg/act inhaler Inhale 1 puff every 6 (six) hours as needed, Starting Sat 10/28/2017, Historical Med      Aspirin (ASPIR-81 PO) Take 81 mg by mouth, Starting Mon 2/20/2012, Historical Med      atorvastatin (LIPITOR) 40 mg tablet take 1 tablet by mouth at bedtime, Historical Med      B-D UF III MINI PEN NEEDLES 31G X 5 MM MISC Starting Tue 1/21/2020, Historical Med      BANOPHEN 50 MG capsule take 1 capsule by mouth every 6 hours if needed for itching, Historical Med      cholecalciferol (VITAMIN D3) 400 units tablet Take 400 Units by mouth daily, Starting Thu 1/7/2021, Historical Med      ciprofloxacin (CIPRO) 500 mg tablet Take 1 tablet (500 mg total) by mouth every 12 (twelve) hours for 10 days, Starting Tue 9/7/2021, Until Fri 9/17/2021, Normal      citalopram (CELEXA) 40 mg tablet Take 40 mg by mouth daily , Historical Med      Continuous Blood Gluc  (Dexcom G6 ) TEMITOPE Disp 1 Kit, use as directed, Normal      Continuous Blood Gluc Sensor (Dexcom G6 Sensor) MISC Disp 1 box of 3 sensors with 12 refills change sensor q 10 days, Normal      Continuous Blood Gluc Transmit (Dexcom G6 Transmitter) MISC Dispense 1 kit, change every 90 days, Normal       MG capsule Take 100 mg by mouth 2 (two) times a day, Starting Tue 1/21/2020, Historical Med      Empagliflozin 25 MG TABS Take 1 tablet (25 mg total) by mouth every morning, Starting Thu 8/26/2021, Normal      famotidine (PEPCID) 40 MG tablet Take 1 tablet (40 mg total) by mouth daily at bedtime Take in evening 2 hours prior to bed, Starting Thu 8/12/2021, Normal      fenofibrate (TRICOR) 48 mg tablet Take 48 mg by mouth daily, Starting Thu 3/11/2021, Historical Med      insulin glulisine (Apidra SoloStar) 100 units/mL injection pen Inject 20 units under the skin three times daily with meals  , Normal      Lantus SoloStar 100 units/mL injection pen Inject 60 Units under the skin daily, Starting Thu 8/26/2021, Normal      levETIRAcetam (KEPPRA) 500 mg tablet Take 500 mg by mouth every 12 (twelve) hours , Starting Mon 3/19/2018, Historical Med      linaCLOtide (Linzess) 72 MCG CAPS Take 1 capsule by mouth daily before breakfast, Starting Thu 8/26/2021, Sample      lisinopril (ZESTRIL) 10 mg tablet Take 10 mg by mouth, Starting Mon 3/19/2018, Historical Med      metFORMIN (GLUCOPHAGE) 1000 MG tablet take 1 tablet by mouth twice a day WITH MEALS, Historical Med      metoclopramide (REGLAN) 10 mg tablet Take 10 mg by mouth daily, Starting Tue 4/17/2018, Historical Med      metoprolol tartrate (LOPRESSOR) 25 mg tablet Take 25 mg by mouth 2 (two) times a day, Starting Mon 7/9/2018, Historical Med      neomycin-polymyxin-hydrocortisone (CORTISPORIN) otic solution Administer 4 drops to the right ear every 6 (six) hours, Starting Fri 8/13/2021, Normal      ondansetron (ZOFRAN) 4 mg tablet Take 1 tablet (4 mg total) by mouth every 8 (eight) hours as needed for nausea or vomiting, Starting Mon 4/12/2021, Normal      ondansetron (ZOFRAN-ODT) 4 mg disintegrating tablet Take 1 tablet (4 mg total) by mouth every 6 (six) hours as needed for nausea or vomiting, Starting Tue 9/7/2021, Normal pantoprazole (PROTONIX) 40 mg tablet Take 1 tablet (40 mg total) by mouth 2 (two) times a day, Starting Fri 8/13/2021, Normal      pregabalin (LYRICA) 100 mg capsule Take 1 capsule (100 mg total) by mouth 3 (three) times a day, Starting Fri 8/13/2021, Normal      Semaglutide, 1 MG/DOSE, 2 MG/1 5ML SOPN Inject 1 mg under the skin once a week, Starting Thu 8/26/2021, Normal      solifenacin (VESICARE) 5 mg tablet Take 5 mg by mouth daily , Historical Med           No discharge procedures on file      PDMP Review       Value Time User    PDMP Reviewed  Yes 3/26/2021 10:43 AM Farrah Rahman PA-C          ED Provider  Electronically Signed by           Myah Crowley MD  09/17/21 7570

## 2021-09-21 ENCOUNTER — TELEPHONE (OUTPATIENT)
Dept: ENDOCRINOLOGY | Facility: CLINIC | Age: 51
End: 2021-09-21

## 2021-09-23 ENCOUNTER — TELEPHONE (OUTPATIENT)
Dept: ENDOCRINOLOGY | Facility: CLINIC | Age: 51
End: 2021-09-23

## 2021-10-05 ENCOUNTER — TELEPHONE (OUTPATIENT)
Dept: VASCULAR SURGERY | Facility: CLINIC | Age: 51
End: 2021-10-05

## 2021-10-05 DIAGNOSIS — Z79.4 TYPE 2 DIABETES MELLITUS WITH DIABETIC AUTONOMIC NEUROPATHY, WITH LONG-TERM CURRENT USE OF INSULIN (HCC): ICD-10-CM

## 2021-10-05 DIAGNOSIS — E11.43 TYPE 2 DIABETES MELLITUS WITH DIABETIC AUTONOMIC NEUROPATHY, WITH LONG-TERM CURRENT USE OF INSULIN (HCC): ICD-10-CM

## 2021-10-07 RX ORDER — FLASH GLUCOSE SCANNING READER
EACH MISCELLANEOUS
Qty: 1 EACH | Refills: 0 | Status: SHIPPED | OUTPATIENT
Start: 2021-10-07

## 2021-10-07 RX ORDER — BLOOD-GLUCOSE,RECEIVER,CONT
EACH MISCELLANEOUS
Qty: 1 EACH | Refills: 0 | Status: CANCELLED | OUTPATIENT
Start: 2021-10-07

## 2021-10-07 RX ORDER — FLASH GLUCOSE SENSOR
KIT MISCELLANEOUS
Qty: 6 EACH | Refills: 3 | Status: SHIPPED | OUTPATIENT
Start: 2021-10-07 | End: 2022-02-25 | Stop reason: SDUPTHER

## 2021-10-07 RX ORDER — BLOOD-GLUCOSE SENSOR
EACH MISCELLANEOUS
Qty: 1 EACH | Refills: 12 | Status: CANCELLED | OUTPATIENT
Start: 2021-10-07

## 2021-10-07 RX ORDER — BLOOD-GLUCOSE TRANSMITTER
EACH MISCELLANEOUS
Qty: 1 EACH | Refills: 3 | Status: CANCELLED | OUTPATIENT
Start: 2021-10-07

## 2021-10-12 ENCOUNTER — PATIENT MESSAGE (OUTPATIENT)
Dept: ENDOCRINOLOGY | Facility: CLINIC | Age: 51
End: 2021-10-12

## 2021-10-13 ENCOUNTER — TELEPHONE (OUTPATIENT)
Dept: ENDOCRINOLOGY | Facility: CLINIC | Age: 51
End: 2021-10-13

## 2021-10-26 DIAGNOSIS — E11.43 TYPE 2 DIABETES MELLITUS WITH DIABETIC AUTONOMIC NEUROPATHY, WITH LONG-TERM CURRENT USE OF INSULIN (HCC): Primary | ICD-10-CM

## 2021-10-26 DIAGNOSIS — Z79.4 TYPE 2 DIABETES MELLITUS WITH DIABETIC AUTONOMIC NEUROPATHY, WITH LONG-TERM CURRENT USE OF INSULIN (HCC): Primary | ICD-10-CM

## 2021-11-01 ENCOUNTER — PATIENT MESSAGE (OUTPATIENT)
Dept: ENDOCRINOLOGY | Facility: CLINIC | Age: 51
End: 2021-11-01

## 2021-11-01 DIAGNOSIS — E11.43 TYPE 2 DIABETES MELLITUS WITH DIABETIC AUTONOMIC NEUROPATHY, WITH LONG-TERM CURRENT USE OF INSULIN (HCC): Primary | ICD-10-CM

## 2021-11-01 DIAGNOSIS — Z79.4 TYPE 2 DIABETES MELLITUS WITH DIABETIC AUTONOMIC NEUROPATHY, WITH LONG-TERM CURRENT USE OF INSULIN (HCC): Primary | ICD-10-CM

## 2021-11-02 RX ORDER — BLOOD SUGAR DIAGNOSTIC
STRIP MISCELLANEOUS
Qty: 200 STRIP | Refills: 2 | Status: SHIPPED | OUTPATIENT
Start: 2021-11-02

## 2021-11-10 ENCOUNTER — PATIENT MESSAGE (OUTPATIENT)
Dept: ENDOCRINOLOGY | Facility: CLINIC | Age: 51
End: 2021-11-10

## 2021-11-10 DIAGNOSIS — Z79.4 TYPE 2 DIABETES MELLITUS WITH DIABETIC AUTONOMIC NEUROPATHY, WITH LONG-TERM CURRENT USE OF INSULIN (HCC): ICD-10-CM

## 2021-11-10 DIAGNOSIS — E11.43 TYPE 2 DIABETES MELLITUS WITH DIABETIC AUTONOMIC NEUROPATHY, WITH LONG-TERM CURRENT USE OF INSULIN (HCC): ICD-10-CM

## 2021-11-19 RX ORDER — INSULIN GLARGINE 100 [IU]/ML
70 INJECTION, SOLUTION SUBCUTANEOUS DAILY
Qty: 30 ML | Refills: 1
Start: 2021-11-19 | End: 2021-11-30 | Stop reason: SDUPTHER

## 2021-11-28 DIAGNOSIS — K59.01 SLOW TRANSIT CONSTIPATION: ICD-10-CM

## 2021-11-28 RX ORDER — LINACLOTIDE 72 UG/1
1 CAPSULE, GELATIN COATED ORAL
Qty: 10 CAPSULE | Refills: 0 | Status: CANCELLED | OUTPATIENT
Start: 2021-11-28

## 2021-11-30 ENCOUNTER — OFFICE VISIT (OUTPATIENT)
Dept: GASTROENTEROLOGY | Facility: CLINIC | Age: 51
End: 2021-11-30
Payer: COMMERCIAL

## 2021-11-30 ENCOUNTER — TELEPHONE (OUTPATIENT)
Dept: PULMONOLOGY | Facility: CLINIC | Age: 51
End: 2021-11-30

## 2021-11-30 ENCOUNTER — TELEPHONE (OUTPATIENT)
Dept: ENDOCRINOLOGY | Facility: CLINIC | Age: 51
End: 2021-11-30

## 2021-11-30 ENCOUNTER — TELEMEDICINE (OUTPATIENT)
Dept: ENDOCRINOLOGY | Facility: CLINIC | Age: 51
End: 2021-11-30
Payer: COMMERCIAL

## 2021-11-30 VITALS — WEIGHT: 175 LBS | BODY MASS INDEX: 33.07 KG/M2

## 2021-11-30 VITALS
WEIGHT: 178.6 LBS | DIASTOLIC BLOOD PRESSURE: 68 MMHG | RESPIRATION RATE: 16 BRPM | HEART RATE: 72 BPM | HEIGHT: 61 IN | BODY MASS INDEX: 33.72 KG/M2 | SYSTOLIC BLOOD PRESSURE: 112 MMHG | TEMPERATURE: 97.5 F

## 2021-11-30 DIAGNOSIS — E11.43 TYPE 2 DIABETES MELLITUS WITH DIABETIC AUTONOMIC NEUROPATHY, WITH LONG-TERM CURRENT USE OF INSULIN (HCC): Primary | ICD-10-CM

## 2021-11-30 DIAGNOSIS — Z79.4 TYPE 2 DIABETES MELLITUS WITH DIABETIC AUTONOMIC NEUROPATHY, WITH LONG-TERM CURRENT USE OF INSULIN (HCC): Primary | ICD-10-CM

## 2021-11-30 DIAGNOSIS — K31.84 GASTROPARESIS: Primary | ICD-10-CM

## 2021-11-30 DIAGNOSIS — D64.9 ANEMIA, UNSPECIFIED TYPE: ICD-10-CM

## 2021-11-30 DIAGNOSIS — K59.01 SLOW TRANSIT CONSTIPATION: ICD-10-CM

## 2021-11-30 DIAGNOSIS — Z86.010 HISTORY OF COLON POLYPS: ICD-10-CM

## 2021-11-30 DIAGNOSIS — K21.9 GASTROESOPHAGEAL REFLUX DISEASE WITHOUT ESOPHAGITIS: ICD-10-CM

## 2021-11-30 DIAGNOSIS — K21.9 GASTROESOPHAGEAL REFLUX DISEASE, UNSPECIFIED WHETHER ESOPHAGITIS PRESENT: ICD-10-CM

## 2021-11-30 PROCEDURE — 99214 OFFICE O/P EST MOD 30 MIN: CPT | Performed by: INTERNAL MEDICINE

## 2021-11-30 PROCEDURE — 3074F SYST BP LT 130 MM HG: CPT | Performed by: INTERNAL MEDICINE

## 2021-11-30 PROCEDURE — G2012 BRIEF CHECK IN BY MD/QHP: HCPCS | Performed by: NURSE PRACTITIONER

## 2021-11-30 PROCEDURE — 3078F DIAST BP <80 MM HG: CPT | Performed by: INTERNAL MEDICINE

## 2021-11-30 RX ORDER — LINACLOTIDE 72 UG/1
1 CAPSULE, GELATIN COATED ORAL
Qty: 30 CAPSULE | Refills: 5 | Status: SHIPPED | OUTPATIENT
Start: 2021-11-30 | End: 2022-02-21 | Stop reason: SDUPTHER

## 2021-11-30 RX ORDER — PANTOPRAZOLE SODIUM 40 MG/1
40 TABLET, DELAYED RELEASE ORAL 2 TIMES DAILY
Qty: 180 TABLET | Refills: 3 | Status: SHIPPED | OUTPATIENT
Start: 2021-11-30

## 2021-11-30 RX ORDER — INSULIN GLARGINE 100 [IU]/ML
60 INJECTION, SOLUTION SUBCUTANEOUS DAILY
Qty: 30 ML | Refills: 1 | Status: SHIPPED | OUTPATIENT
Start: 2021-11-30 | End: 2022-01-25

## 2021-11-30 RX ORDER — FAMOTIDINE 40 MG/1
40 TABLET, FILM COATED ORAL
Qty: 30 TABLET | Refills: 6 | Status: SHIPPED | OUTPATIENT
Start: 2021-11-30

## 2021-11-30 RX ORDER — INSULIN GLULISINE 100 [IU]/ML
INJECTION, SOLUTION SUBCUTANEOUS
Qty: 30 ML | Refills: 1 | Status: SHIPPED | OUTPATIENT
Start: 2021-11-30 | End: 2022-01-25

## 2021-12-01 ENCOUNTER — LAB (OUTPATIENT)
Dept: LAB | Facility: CLINIC | Age: 51
End: 2021-12-01
Payer: COMMERCIAL

## 2021-12-01 DIAGNOSIS — E11.43 TYPE 2 DIABETES MELLITUS WITH DIABETIC AUTONOMIC NEUROPATHY, WITH LONG-TERM CURRENT USE OF INSULIN (HCC): ICD-10-CM

## 2021-12-01 DIAGNOSIS — K31.84 GASTROPARESIS: ICD-10-CM

## 2021-12-01 DIAGNOSIS — Z79.4 TYPE 2 DIABETES MELLITUS WITH DIABETIC AUTONOMIC NEUROPATHY, WITH LONG-TERM CURRENT USE OF INSULIN (HCC): ICD-10-CM

## 2021-12-01 DIAGNOSIS — D64.9 ANEMIA, UNSPECIFIED TYPE: ICD-10-CM

## 2021-12-01 LAB
ALBUMIN SERPL BCP-MCNC: 3.4 G/DL (ref 3.5–5)
ALP SERPL-CCNC: 137 U/L (ref 46–116)
ALT SERPL W P-5'-P-CCNC: 37 U/L (ref 12–78)
ANION GAP SERPL CALCULATED.3IONS-SCNC: 7 MMOL/L (ref 4–13)
AST SERPL W P-5'-P-CCNC: 21 U/L (ref 5–45)
BILIRUB SERPL-MCNC: 0.39 MG/DL (ref 0.2–1)
BUN SERPL-MCNC: 18 MG/DL (ref 5–25)
CALCIUM ALBUM COR SERPL-MCNC: 9.9 MG/DL (ref 8.3–10.1)
CALCIUM SERPL-MCNC: 9.4 MG/DL (ref 8.3–10.1)
CHLORIDE SERPL-SCNC: 107 MMOL/L (ref 100–108)
CHOLEST SERPL-MCNC: 176 MG/DL
CO2 SERPL-SCNC: 25 MMOL/L (ref 21–32)
CREAT SERPL-MCNC: 1.25 MG/DL (ref 0.6–1.3)
EST. AVERAGE GLUCOSE BLD GHB EST-MCNC: 240 MG/DL
FERRITIN SERPL-MCNC: 33 NG/ML (ref 8–388)
FOLATE SERPL-MCNC: 17.5 NG/ML (ref 3.1–17.5)
GFR SERPL CREATININE-BSD FRML MDRD: 50 ML/MIN/1.73SQ M
GLUCOSE P FAST SERPL-MCNC: 216 MG/DL (ref 65–99)
HBA1C MFR BLD: 10 %
HDLC SERPL-MCNC: 48 MG/DL
IRON SATN MFR SERPL: 11 % (ref 15–50)
IRON SERPL-MCNC: 37 UG/DL (ref 50–170)
LDLC SERPL CALC-MCNC: 83 MG/DL (ref 0–100)
NONHDLC SERPL-MCNC: 128 MG/DL
POTASSIUM SERPL-SCNC: 4.2 MMOL/L (ref 3.5–5.3)
PROT SERPL-MCNC: 7.6 G/DL (ref 6.4–8.2)
SODIUM SERPL-SCNC: 139 MMOL/L (ref 136–145)
TIBC SERPL-MCNC: 342 UG/DL (ref 250–450)
TRIGL SERPL-MCNC: 224 MG/DL
VIT B12 SERPL-MCNC: 447 PG/ML (ref 100–900)

## 2021-12-01 PROCEDURE — 36415 COLL VENOUS BLD VENIPUNCTURE: CPT

## 2021-12-01 PROCEDURE — 80061 LIPID PANEL: CPT

## 2021-12-01 PROCEDURE — 3046F HEMOGLOBIN A1C LEVEL >9.0%: CPT | Performed by: INTERNAL MEDICINE

## 2021-12-01 PROCEDURE — 82746 ASSAY OF FOLIC ACID SERUM: CPT

## 2021-12-01 PROCEDURE — 82784 ASSAY IGA/IGD/IGG/IGM EACH: CPT

## 2021-12-01 PROCEDURE — 83540 ASSAY OF IRON: CPT

## 2021-12-01 PROCEDURE — 83516 IMMUNOASSAY NONANTIBODY: CPT

## 2021-12-01 PROCEDURE — 82607 VITAMIN B-12: CPT

## 2021-12-01 PROCEDURE — 82728 ASSAY OF FERRITIN: CPT

## 2021-12-01 PROCEDURE — 83550 IRON BINDING TEST: CPT

## 2021-12-01 PROCEDURE — 83036 HEMOGLOBIN GLYCOSYLATED A1C: CPT

## 2021-12-01 PROCEDURE — 86255 FLUORESCENT ANTIBODY SCREEN: CPT

## 2021-12-01 PROCEDURE — 86038 ANTINUCLEAR ANTIBODIES: CPT

## 2021-12-01 PROCEDURE — 80053 COMPREHEN METABOLIC PANEL: CPT

## 2021-12-02 ENCOUNTER — TELEPHONE (OUTPATIENT)
Dept: GASTROENTEROLOGY | Facility: CLINIC | Age: 51
End: 2021-12-02

## 2021-12-02 DIAGNOSIS — D64.9 ANEMIA, UNSPECIFIED TYPE: Primary | ICD-10-CM

## 2021-12-02 LAB
ENDOMYSIUM IGA SER QL: NEGATIVE
GLIADIN PEPTIDE IGA SER-ACNC: 4 UNITS (ref 0–19)
GLIADIN PEPTIDE IGG SER-ACNC: 2 UNITS (ref 0–19)
IGA SERPL-MCNC: 197 MG/DL (ref 87–352)
TTG IGA SER-ACNC: <2 U/ML (ref 0–3)
TTG IGG SER-ACNC: <2 U/ML (ref 0–5)

## 2021-12-03 LAB — RYE IGE QN: NEGATIVE

## 2021-12-06 ENCOUNTER — TELEPHONE (OUTPATIENT)
Dept: GASTROENTEROLOGY | Facility: CLINIC | Age: 51
End: 2021-12-06

## 2021-12-22 DIAGNOSIS — R05.9 COUGH: Primary | ICD-10-CM

## 2021-12-23 RX ORDER — BENZONATATE 200 MG/1
200 CAPSULE ORAL 3 TIMES DAILY PRN
Qty: 60 CAPSULE | Refills: 1 | Status: SHIPPED | OUTPATIENT
Start: 2021-12-23

## 2021-12-27 ENCOUNTER — OFFICE VISIT (OUTPATIENT)
Dept: FAMILY MEDICINE CLINIC | Facility: CLINIC | Age: 51
End: 2021-12-27
Payer: COMMERCIAL

## 2021-12-27 VITALS
DIASTOLIC BLOOD PRESSURE: 70 MMHG | TEMPERATURE: 97.1 F | HEART RATE: 74 BPM | BODY MASS INDEX: 34.17 KG/M2 | WEIGHT: 181 LBS | RESPIRATION RATE: 18 BRPM | SYSTOLIC BLOOD PRESSURE: 118 MMHG | HEIGHT: 61 IN | OXYGEN SATURATION: 95 %

## 2021-12-27 DIAGNOSIS — J01.01 ACUTE RECURRENT MAXILLARY SINUSITIS: Primary | ICD-10-CM

## 2021-12-27 DIAGNOSIS — B34.9 ACUTE VIRAL SYNDROME: ICD-10-CM

## 2021-12-27 PROCEDURE — 0241U HB NFCT DS VIR RESP RNA 4 TRGT: CPT | Performed by: NURSE PRACTITIONER

## 2021-12-27 PROCEDURE — 99213 OFFICE O/P EST LOW 20 MIN: CPT | Performed by: NURSE PRACTITIONER

## 2021-12-27 RX ORDER — SULFAMETHOXAZOLE AND TRIMETHOPRIM 800; 160 MG/1; MG/1
1 TABLET ORAL EVERY 12 HOURS SCHEDULED
Qty: 20 TABLET | Refills: 0 | Status: SHIPPED | OUTPATIENT
Start: 2021-12-27 | End: 2022-01-06

## 2021-12-29 LAB
FLUAV RNA RESP QL NAA+PROBE: NEGATIVE
FLUBV RNA RESP QL NAA+PROBE: NEGATIVE
RSV RNA RESP QL NAA+PROBE: NEGATIVE
SARS-COV-2 RNA RESP QL NAA+PROBE: NEGATIVE

## 2022-01-13 DIAGNOSIS — I15.2 HYPERTENSION ASSOCIATED WITH DIABETES (HCC): Primary | ICD-10-CM

## 2022-01-13 DIAGNOSIS — E55.9 VITAMIN D DEFICIENCY: ICD-10-CM

## 2022-01-13 DIAGNOSIS — K59.00 CONSTIPATION, UNSPECIFIED CONSTIPATION TYPE: ICD-10-CM

## 2022-01-13 DIAGNOSIS — K21.9 GASTROESOPHAGEAL REFLUX DISEASE, UNSPECIFIED WHETHER ESOPHAGITIS PRESENT: ICD-10-CM

## 2022-01-13 DIAGNOSIS — F31.9 BIPOLAR AFFECTIVE DISORDER, REMISSION STATUS UNSPECIFIED (HCC): ICD-10-CM

## 2022-01-13 DIAGNOSIS — E11.59 HYPERTENSION ASSOCIATED WITH DIABETES (HCC): Primary | ICD-10-CM

## 2022-01-13 DIAGNOSIS — N32.89 BLADDER SPASMS: ICD-10-CM

## 2022-01-13 RX ORDER — LISINOPRIL 10 MG/1
10 TABLET ORAL DAILY
Qty: 90 TABLET | Refills: 1 | Status: SHIPPED | OUTPATIENT
Start: 2022-01-13 | End: 2022-06-10 | Stop reason: SDUPTHER

## 2022-01-13 RX ORDER — DOCUSATE SODIUM 100 MG/1
100 CAPSULE, LIQUID FILLED ORAL 2 TIMES DAILY
Qty: 180 CAPSULE | Refills: 1 | Status: SHIPPED | OUTPATIENT
Start: 2022-01-13 | End: 2022-02-21 | Stop reason: SDUPTHER

## 2022-01-13 RX ORDER — OMEGA-3S/DHA/EPA/FISH OIL/D3 300MG-1000
400 CAPSULE ORAL DAILY
Qty: 90 TABLET | Refills: 1 | Status: SHIPPED | OUTPATIENT
Start: 2022-01-13 | End: 2022-08-04

## 2022-01-13 RX ORDER — SOLIFENACIN SUCCINATE 5 MG/1
5 TABLET, FILM COATED ORAL DAILY
Qty: 90 TABLET | Refills: 1 | Status: SHIPPED | OUTPATIENT
Start: 2022-01-13 | End: 2022-06-06 | Stop reason: SDUPTHER

## 2022-01-13 RX ORDER — CITALOPRAM 40 MG/1
40 TABLET ORAL DAILY
Qty: 90 TABLET | Refills: 1 | Status: SHIPPED | OUTPATIENT
Start: 2022-01-13 | End: 2022-06-10 | Stop reason: SDUPTHER

## 2022-01-13 RX ORDER — METOCLOPRAMIDE 10 MG/1
10 TABLET ORAL DAILY
Qty: 90 TABLET | Refills: 1 | Status: SHIPPED | OUTPATIENT
Start: 2022-01-13 | End: 2022-02-21 | Stop reason: SDUPTHER

## 2022-01-13 NOTE — TELEPHONE ENCOUNTER
Does not look like we have filled any of these medications in the past    Her last appointment here was 12/27/2021  Next appointment is 2/17/22

## 2022-01-14 DIAGNOSIS — R56.9 SEIZURES (HCC): Primary | ICD-10-CM

## 2022-01-14 RX ORDER — LEVETIRACETAM 500 MG/1
500 TABLET ORAL EVERY 12 HOURS SCHEDULED
Qty: 180 TABLET | Refills: 1 | Status: SHIPPED | OUTPATIENT
Start: 2022-01-14 | End: 2022-06-09 | Stop reason: SDUPTHER

## 2022-01-17 DIAGNOSIS — E78.1 HYPERTRIGLYCERIDEMIA: ICD-10-CM

## 2022-01-17 DIAGNOSIS — E78.00 HYPERCHOLESTEREMIA: Primary | ICD-10-CM

## 2022-01-17 RX ORDER — FENOFIBRATE 48 MG/1
48 TABLET, COATED ORAL DAILY
Qty: 90 TABLET | Refills: 3 | Status: SHIPPED | OUTPATIENT
Start: 2022-01-17

## 2022-01-17 RX ORDER — ATORVASTATIN CALCIUM 40 MG/1
40 TABLET, FILM COATED ORAL
Qty: 90 TABLET | Refills: 3 | Status: SHIPPED | OUTPATIENT
Start: 2022-01-17

## 2022-01-17 NOTE — TELEPHONE ENCOUNTER
Patient requesting refill(s) of: atorvastatin     Last filled: historical provider      Patient requesting refill(s) of: fenofibrate    Last filled: historical provider  Last appt:12/27/2021  Next appt:2/17/2022  [email or web address]

## 2022-01-18 ENCOUNTER — TELEPHONE (OUTPATIENT)
Dept: FAMILY MEDICINE CLINIC | Facility: CLINIC | Age: 52
End: 2022-01-18

## 2022-01-18 DIAGNOSIS — R43.0 LOSS OF SMELL: ICD-10-CM

## 2022-01-18 DIAGNOSIS — R43.2 LOSS OF TASTE: Primary | ICD-10-CM

## 2022-01-18 DIAGNOSIS — R05.9 COUGH: ICD-10-CM

## 2022-01-18 PROCEDURE — U0003 INFECTIOUS AGENT DETECTION BY NUCLEIC ACID (DNA OR RNA); SEVERE ACUTE RESPIRATORY SYNDROME CORONAVIRUS 2 (SARS-COV-2) (CORONAVIRUS DISEASE [COVID-19]), AMPLIFIED PROBE TECHNIQUE, MAKING USE OF HIGH THROUGHPUT TECHNOLOGIES AS DESCRIBED BY CMS-2020-01-R: HCPCS | Performed by: NURSE PRACTITIONER

## 2022-01-18 PROCEDURE — U0005 INFEC AGEN DETEC AMPLI PROBE: HCPCS | Performed by: NURSE PRACTITIONER

## 2022-01-19 ENCOUNTER — TELEPHONE (OUTPATIENT)
Dept: FAMILY MEDICINE CLINIC | Facility: CLINIC | Age: 52
End: 2022-01-19

## 2022-01-19 LAB — SARS-COV-2 RNA RESP QL NAA+PROBE: NEGATIVE

## 2022-01-19 NOTE — TELEPHONE ENCOUNTER
Pt called requesting results from covid test done yesterday  Informed pt results are negative  All questions answered

## 2022-01-25 ENCOUNTER — OFFICE VISIT (OUTPATIENT)
Dept: ENDOCRINOLOGY | Facility: CLINIC | Age: 52
End: 2022-01-25
Payer: COMMERCIAL

## 2022-01-25 VITALS
TEMPERATURE: 98.4 F | SYSTOLIC BLOOD PRESSURE: 120 MMHG | DIASTOLIC BLOOD PRESSURE: 65 MMHG | HEART RATE: 75 BPM | HEIGHT: 61 IN | BODY MASS INDEX: 32.85 KG/M2 | WEIGHT: 174 LBS

## 2022-01-25 DIAGNOSIS — E11.42 DIABETIC POLYNEUROPATHY ASSOCIATED WITH TYPE 2 DIABETES MELLITUS (HCC): ICD-10-CM

## 2022-01-25 DIAGNOSIS — Z79.4 TYPE 2 DIABETES MELLITUS WITH DIABETIC AUTONOMIC NEUROPATHY, WITH LONG-TERM CURRENT USE OF INSULIN (HCC): Primary | ICD-10-CM

## 2022-01-25 DIAGNOSIS — E78.1 HYPERTRIGLYCERIDEMIA: ICD-10-CM

## 2022-01-25 DIAGNOSIS — I95.9 HYPOTENSION, UNSPECIFIED HYPOTENSION TYPE: ICD-10-CM

## 2022-01-25 DIAGNOSIS — E66.09 CLASS 1 OBESITY DUE TO EXCESS CALORIES WITH SERIOUS COMORBIDITY AND BODY MASS INDEX (BMI) OF 33.0 TO 33.9 IN ADULT: ICD-10-CM

## 2022-01-25 DIAGNOSIS — K31.84 GASTROPARESIS: ICD-10-CM

## 2022-01-25 DIAGNOSIS — E11.43 TYPE 2 DIABETES MELLITUS WITH DIABETIC AUTONOMIC NEUROPATHY, WITH LONG-TERM CURRENT USE OF INSULIN (HCC): Primary | ICD-10-CM

## 2022-01-25 PROCEDURE — 3078F DIAST BP <80 MM HG: CPT | Performed by: NURSE PRACTITIONER

## 2022-01-25 PROCEDURE — 99214 OFFICE O/P EST MOD 30 MIN: CPT | Performed by: NURSE PRACTITIONER

## 2022-01-25 PROCEDURE — 95251 CONT GLUC MNTR ANALYSIS I&R: CPT | Performed by: NURSE PRACTITIONER

## 2022-01-25 PROCEDURE — 3074F SYST BP LT 130 MM HG: CPT | Performed by: NURSE PRACTITIONER

## 2022-01-25 RX ORDER — INSULIN GLARGINE 100 [IU]/ML
66 INJECTION, SOLUTION SUBCUTANEOUS DAILY
Qty: 30 ML | Refills: 1
Start: 2022-01-25 | End: 2022-02-10

## 2022-01-25 RX ORDER — INSULIN GLULISINE 100 [IU]/ML
INJECTION, SOLUTION SUBCUTANEOUS
Qty: 30 ML | Refills: 1
Start: 2022-01-25 | End: 2022-02-10

## 2022-01-25 NOTE — PROGRESS NOTES
Established Patient Progress Note      Chief Complaint   Patient presents with    Diabetes Type 2        History of Present Illness:   Serenity Hernandez is a 46 y o  female with HTN, HLD, and T2 diabetes with long term use of insulin since approximately 2013  Reports complications of neuropathy  Denies any issues with her current regimen  home glucose monitoring: are performed regularly 4 times daily  Patient's last appointment was a telemedicine visit on 11/30/2021  At that time, it was recommended that she make an appointment with the diabetes educator for MNT  She has yet to do this  Component      Latest Ref Rng & Units 4/12/2021 8/10/2021 12/1/2021   Hemoglobin A1C      Normal 3 8-5 6%; PreDiabetic 5 7-6 4%; Diabetic >=6 5%; Glycemic control for adults with diabetes <7 0% % 10 7 (A) 9 2 (H) 10 0 (H)   eAG, EST AVG Glucose      mg/dl  217 240     Component      Latest Ref Rng & Units 9/7/2021 9/16/2021 12/1/2021   eGFR      ml/min/1 73sq m 64 70 Shania Del [place] 47   Device used [employer] use     Indication   Type 2 Diabetes    More than 72 hours of data was reviewed  Report to be scanned to chart  Date Range: 01/12/2022-1/25/2022    Analysis of data:   Average Glucose: 251 mg/dL  Coefficient of Variation: 37%   Time in Target Range: 30%   Time Above Range: 21% >180-250; 49% > 250 mg/dL  Time Below Range: 0%    Interpretation of data:  Patient is experiencing persistent hyperglycemia  Most notably, she has a pattern of postprandial hyperglycemia after dinner  She states that she knows she needs to make changes in her diet  She has started walking- approximately 2 blocks- a few times per week  She reports fatigue, polydipsia, and polyuria  She denies missing any doses of her medication          Current regimen:   Jardiance 25 mg daily  Lantus 60 units daily  Apidra 20-20-20  Metformin 1,000 mg BID  ozempic 1 mg weekly    Last Eye Exam: Reminded to schedule  Last Foot Exam: UTD      For hyperlipidemia, she is taking 40 mg of atorvastatin daily  She denies myalgias  For hypertension, she is taking 10 mg lisinopril daily and 25 mg of metoprolol tartrate twice daily  She denies headache, cough, and orthostatic hypotension      Patient Active Problem List   Diagnosis    Radiculopathy, lumbar region    Anterior tibial tendonitis, right    Bipolar disorder ([place] [address] )    Chronic low back pain with sciatica    Type 2 diabetes mellitus with neurologic complication ([place] [address] )    Diabetic polyneuropathy associated with type 2 diabetes mellitus ([place] [address] )    Gastroparesis    Hypertension associated with diabetes ([place] [address] )    Spondylolisthesis of lumbar region    Obesity due to excess calories    Renal cell carcinoma of right kidney (HCC)    Acute right lower quadrant pain    Hypotension    Internal hernia    Intra-abdominal adhesions    Depression with anxiety    Mild intermittent asthma without complication    Hypercholesteremia    Hypertriglyceridemia    Iron deficiency anemia secondary to inadequate dietary iron intake    Encounter for post surgical wound check    GERD (gastroesophageal reflux disease)    History of colon polyps    Slow transit constipation    Anemia, unspecified      Past Medical History:   Diagnosis Date    Depression     Diabetes mellitus ([place] [address] )     Gastroparesis     GERD (gastroesophageal reflux disease)     Hyperlipidemia     Hypertension     Sciatica     Seizure disorder ([place] [address] )       Past Surgical History:   Procedure Laterality Date    BREAST BIOPSY Left  benign    CHOLECYSTECTOMY      COLONOSCOPY      HYSTERECTOMY      NC LAP,APPENDECTOMY N/A 3/24/2021    Procedure: APPENDECTOMY LAPAROSCOPIC;  Surgeon: Galdino Kemp MD;  Location: [address] [place] [address] MAIN OR;  Service: General    NC LAP,DIAGNOSTIC ABDOMEN N/A 3/24/2021    Procedure: LAPAROSCOPY DIAGNOSTIC, EXTENSIVE DESI;  Surgeon: Galdino Kemp MD;  Location: [address] [place] [address] MAIN OR;  Service: General    UPPER GASTROINTESTINAL ENDOSCOPY        Family History   Problem Relation Age of Onset    No Known Problems Mother     No Known Problems Father     No Known Problems Daughter     No Known Problems Maternal Grandmother     No Known Problems Paternal Grandmother     No Known Problems Paternal Aunt     No Known Problems Paternal Aunt     No Known Problems Paternal Aunt      Social History     Tobacco Use    Smoking status: Former Smoker     Quit date:      Years since quittin 0    Smokeless tobacco: Never Used   Substance Use Topics    Alcohol use: Never     Allergies   Allergen Reactions    Azithromycin GI Intolerance and Hives    Benztropine     Butorphanol Hallucinations    Penicillins     Zolpidem          Current Outpatient Medications:     albuterol (ACCUNEB) 0 63 MG/3ML nebulizer solution, take 3 milliliters by mouth every 4 hours if needed for wheezing, Disp: , Rfl:     albuterol (PROAIR HFA) 90 mcg/act inhaler, Inhale 1 puff every 6 (six) hours as needed, Disp: , Rfl:     Aspirin (ASPIR-81 PO), Take 81 mg by mouth, Disp: , Rfl:     atorvastatin (LIPITOR) 40 mg tablet, Take 1 tablet (40 mg total) by mouth daily at bedtime, Disp: 90 tablet, Rfl: 3    B-D UF III MINI PEN NEEDLES 31G X 5 MM MISC, , Disp: , Rfl:     BANOPHEN 50 MG capsule, take 1 capsule by mouth every 6 hours if needed for itching, Disp: , Rfl:     benzonatate (TESSALON) 200 MG capsule, Take 1 capsule (200 mg total) by mouth 3 (three) times a day as needed for cough, Disp: 60 capsule, Rfl: 1    cholecalciferol (VITAMIN D3) 400 units tablet, Take 1 tablet (400 Units total) by mouth daily, Disp: 90 tablet, Rfl: 1    citalopram (CeleXA) 40 mg tablet, Take 1 tablet (40 mg total) by mouth daily, Disp: 90 tablet, Rfl: 1    Continuous Blood Gluc  (FreeStyle Refugio 14 Day [place]) TEMITOPE, Use for continuous blood glucose monitoring, Disp: 1 each, Rfl: 0    Continuous Blood Gluc Sensor (FreeStyle Refugio 14 Day Sensor) MISC, Use one sensor every 14 days for continuous blood sugar monitoring , Disp: 6 each, Rfl: 3     MG capsule, Take 1 capsule (100 mg total) by mouth 2 (two) times a day, Disp: 180 capsule, Rfl: 1    Empagliflozin 25 MG TABS, Take 1 tablet (25 mg total) by mouth every morning, Disp: 90 tablet, Rfl: 1    famotidine (PEPCID) 40 MG tablet, Take 1 tablet (40 mg total) by mouth daily at bedtime Take in evening 2 hours prior to bed, Disp: 30 tablet, Rfl: 6    fenofibrate (TRICOR) 48 mg tablet, Take 1 tablet (48 mg total) by mouth daily, Disp: 90 tablet, Rfl: 3    glucose blood (OneTouch Verio) test strip, Use to test blood glucose 4 times a day, Disp: 200 strip, Rfl: 2    insulin glulisine (Apidra SoloStar) 100 units/mL injection pen, Inject 20 units before breakfast and lunch   Inject 28 units prior to dinner , Disp: 30 mL, Rfl: 1    Lantus SoloStar 100 units/mL injection pen, Inject 66 Units under the skin daily, Disp: 30 mL, Rfl: 1    levETIRAcetam (KEPPRA) 500 mg tablet, Take 1 tablet (500 mg total) by mouth every 12 (twelve) hours, Disp: 180 tablet, Rfl: 1    linaCLOtide (Linzess) 72 MCG CAPS, Take 1 capsule by mouth daily before breakfast, Disp: 30 capsule, Rfl: 5    lisinopril (ZESTRIL) 10 mg tablet, Take 1 tablet (10 mg total) by mouth daily, Disp: 90 tablet, Rfl: 1    metFORMIN (GLUCOPHAGE) 1000 MG tablet, Take 1 tablet (1,000 mg total) by mouth 2 (two) times a day with meals, Disp: 180 tablet, Rfl: 1    metoclopramide (REGLAN) 10 mg tablet, Take 1 tablet (10 mg total) by mouth daily, Disp: 90 tablet, Rfl: 1    metoprolol tartrate (LOPRESSOR) 25 mg tablet, Take 1 tablet (25 mg total) by mouth 2 (two) times a day, Disp: 180 tablet, Rfl: 1    neomycin-polymyxin-hydrocortisone (CORTISPORIN) otic solution, Administer 4 drops to the right ear every 6 (six) hours, Disp: 10 mL, Rfl: 0    ondansetron (ZOFRAN) 4 mg tablet, Take 1 tablet (4 mg total) by mouth every 8 (eight) hours as needed for nausea or vomiting, Disp: 20 tablet, Rfl: 0    ondansetron (ZOFRAN-ODT) 4 mg disintegrating tablet, Take 1 tablet (4 mg total) by mouth every 6 (six) hours as needed for nausea or vomiting, Disp: 20 tablet, Rfl: 0    pantoprazole (PROTONIX) 40 mg tablet, Take 1 tablet (40 mg total) by mouth 2 (two) times a day, Disp: 180 tablet, Rfl: 3    pregabalin (LYRICA) 100 mg capsule, Take 1 capsule (100 mg total) by mouth 3 (three) times a day, Disp: 270 capsule, Rfl: 1    semaglutide, 1 mg/dose, (Ozempic) 4 MG/3ML SOPN injection pen, Inject 0 75 mL (1 mg total) under the skin once a week, Disp: 9 mL, Rfl: 1    solifenacin (VESIcare) 5 mg tablet, Take 1 tablet (5 mg total) by mouth daily, Disp: 90 tablet, Rfl: 1    Review of Systems   Constitutional: Positive for fatigue  Negative for activity change, appetite change and unexpected weight change  HENT: Negative for dental problem, sore throat, trouble swallowing and voice change  Eyes: Negative for visual disturbance  Respiratory: Negative for cough, chest tightness and shortness of breath  Cardiovascular: Negative for chest pain, palpitations and leg swelling  Gastrointestinal: Negative for constipation, diarrhea, nausea and vomiting  Endocrine: Positive for polydipsia and polyuria  Negative for cold intolerance, heat intolerance and polyphagia  Genitourinary: Positive for frequency  Musculoskeletal: Negative for arthralgias, back pain, gait problem and myalgias  Skin: Negative for wound  Allergic/Immunologic: Positive for environmental allergies  Negative for food allergies  Neurological: Positive for numbness  Negative for dizziness, weakness, light-headedness and headaches  Hematological: Does not bruise/bleed easily  Psychiatric/Behavioral: Negative for decreased concentration, dysphoric mood and sleep disturbance  The patient is not nervous/anxious  Physical Exam:  Body mass index is 32 88 kg/m²    /65   Pulse 75   Temp 98 4 °F (36 9 °C)   Ht 5' 1" (1 549 m)   Wt 78 9 kg (174 lb)   BMI 32 88 kg/m²    Wt Readings from Last 3 Encounters:   01/25/22 78 9 kg (174 lb)   12/27/21 82 1 kg (181 lb)   11/30/21 81 kg (178 lb 9 6 oz)       Physical Exam  Vitals reviewed  Constitutional:       General: She is not in acute distress  Appearance: She is well-developed  She is obese  She is not ill-appearing  HENT:      Head: Normocephalic and atraumatic  Comments: Mask in place  Eyes:      Pupils: Pupils are equal, round, and reactive to light  Neck:      Thyroid: No thyromegaly  Cardiovascular:      Rate and Rhythm: Normal rate and regular rhythm  Pulses: Normal pulses  Heart sounds: Normal heart sounds  Pulmonary:      Effort: Pulmonary effort is normal       Breath sounds: Normal breath sounds  Abdominal:      General: Bowel sounds are normal  There is no distension  Palpations: Abdomen is soft  Tenderness: There is no abdominal tenderness  Musculoskeletal:      Cervical back: Normal range of motion and neck supple  Right lower leg: No edema  Left lower leg: No edema  Lymphadenopathy:      Cervical: No cervical adenopathy  Skin:     General: Skin is warm and dry  Capillary Refill: Capillary refill takes less than 2 seconds  Neurological:      Mental Status: She is alert and oriented to person, place, and time        Gait: Gait normal    Psychiatric:         Mood and Affect: Mood normal          Behavior: Behavior normal            Labs:   Lab Results   Component Value Date    HGBA1C 10 0 (H) 12/01/2021    HGBA1C 9 2 (H) 08/10/2021    HGBA1C 10 7 (A) 04/12/2021     Lab Results   Component Value Date    CREATININE 1 25 12/01/2021    CREATININE 0 95 09/16/2021    CREATININE 1 02 09/07/2021    BUN 18 12/01/2021    K 4 2 12/01/2021     12/01/2021    CO2 25 12/01/2021     eGFR   Date Value Ref Range Status   12/01/2021 50 ml/min/1 73sq m Final     Lab Results   Component Value Date    HDL 48 (L) 12/01/2021 TRIG 224 (H) 12/01/2021     Lab Results   Component Value Date    ALT 37 12/01/2021    AST 21 12/01/2021    ALKPHOS 137 (H) 12/01/2021     Lab Results   Component Value Date    QYH1AIYSVGOO 1 320 08/10/2021     No results found for: FREET4, TSI    Impression & Plan:    Problem List Items Addressed This Visit        Digestive    Gastroparesis     Patient denies symptoms of gastroparesis  Denies any difficulty tolerating ozempic  Endocrine    Type 2 diabetes mellitus with neurologic complication ([place] [address] ) - Primary     Patient remains uncontrolled with persistent hyperglycemia  Counseled on pathophysiology of diabetes  Counseled on negative, long-term effects associated with uncontrolled diabetes including neuropathy, nephropathy, retinopathy, heart attack, and stroke  Patient's diet is a significant barrier to diabetic control  Referral given for MNT  Strongly recommended patient schedule an appointment for this  In the meantime, increase Lantus to 66 units daily  Continue with 20 units of novolog prior to breakfast and lunch  Increase pre-dinner novolog to 28 units  Counseled on appropriate surveillance and treatment of hypoglycemia  Continue to engage in regular physical activity  Will try to link patient's CGM with the office so that I can review her data in between appointments and make necessary medication adjustments  Lab Results   Component Value Date    HGBA1C 10 0 (H) 12/01/2021            Relevant Medications    Lantus SoloStar 100 units/mL injection pen    insulin glulisine (Apidra SoloStar) 100 units/mL injection pen    Other Relevant Orders    HEMOGLOBIN A1C W/ EAG ESTIMATION Lab Collect    Comprehensive metabolic panel Lab Collect    Microalbumin / creatinine urine ratio Lab Collect    Ambulatory referral to Diabetic Education    Diabetic polyneuropathy associated with type 2 diabetes mellitus ([place] [address] )     Continue Lyrica     Lab Results   Component Value Date    HGBA1C 10 0 (H) 12/01/2021 Relevant Medications    Lantus SoloStar 100 units/mL injection pen    insulin glulisine (Apidra SoloStar) 100 units/mL injection pen       Cardiovascular and Mediastinum    Hypotension     /65  Continue current regimen  Other    Obesity due to excess calories     Continue Ozempic  Meet with dietician  Hypertriglyceridemia     Has improved somewhat  Counseled on the relationship between diet, uncontrolled diabetes, and elevated TG  Continue fenofibrate  Orders Placed This Encounter   Procedures    HEMOGLOBIN A1C W/ EAG ESTIMATION Lab Collect     Standing Status:   Future     Standing Expiration Date:   1/25/2023    Comprehensive metabolic panel Lab Collect     This is a patient instruction: Patient fasting for 8 hours or longer recommended  Standing Status:   Future     Standing Expiration Date:   1/25/2023    Microalbumin / creatinine urine ratio Lab Collect     Standing Status:   Future     Standing Expiration Date:   1/25/2023    Ambulatory referral to Diabetic Education     Standing Status:   Future     Standing Expiration Date:   1/25/2023     Referral Priority:   Routine     Referral Type:   Consult - AMB     Referral Reason:   Specialty Services Required     Referred to Provider:   Татьяна Hutchinson     Requested Specialty:   Diabetes Services     Number of Visits Requested:   1     Expiration Date:   1/25/2023       Patient Instructions   1  Increase Lantus to 66 units daily  2  Increase Apidra prior to dinner to 28 units  Continue with 20 units prior to breakfast and lunch  3  Make an appointment to see Randal Sales for medical nutrition therapy  4  Schedule an eye exam        Discussed with the patient and all questioned fully answered  She will call me if any problems arise  Follow-up appointment in 4 months       Counseled patient on diagnostic results, prognosis, risk and benefit of treatment options, instruction for management, importance of treatment compliance, Risk  factor reduction and impressions    Luis Kimball, SYLWIA

## 2022-01-25 NOTE — PATIENT INSTRUCTIONS
1  Increase Lantus to 66 units daily  2  Increase Apidra prior to dinner to 28 units  Continue with 20 units prior to breakfast and lunch  3  Make an appointment to see Priscilla Romero for medical nutrition therapy    4  Schedule an eye exam

## 2022-01-25 NOTE — ASSESSMENT & PLAN NOTE
Patient remains uncontrolled with persistent hyperglycemia  Counseled on pathophysiology of diabetes  Counseled on negative, long-term effects associated with uncontrolled diabetes including neuropathy, nephropathy, retinopathy, heart attack, and stroke  Patient's diet is a significant barrier to diabetic control  Referral given for MNT  Strongly recommended patient schedule an appointment for this  In the meantime, increase Lantus to 66 units daily  Continue with 20 units of novolog prior to breakfast and lunch  Increase pre-dinner novolog to 28 units  Counseled on appropriate surveillance and treatment of hypoglycemia  Continue to engage in regular physical activity  Will try to link patient's CGM with the office so that I can review her data in between appointments and make necessary medication adjustments    Lab Results   Component Value Date    HGBA1C 10 0 (H) 12/01/2021

## 2022-01-25 NOTE — ASSESSMENT & PLAN NOTE
Has improved somewhat  Counseled on the relationship between diet, uncontrolled diabetes, and elevated TG  Continue fenofibrate

## 2022-02-08 DIAGNOSIS — E11.42 DIABETIC POLYNEUROPATHY ASSOCIATED WITH TYPE 2 DIABETES MELLITUS (HCC): ICD-10-CM

## 2022-02-08 RX ORDER — PREGABALIN 100 MG/1
CAPSULE ORAL
Qty: 270 CAPSULE | Refills: 3 | Status: SHIPPED | OUTPATIENT
Start: 2022-02-08 | End: 2022-06-10 | Stop reason: SDUPTHER

## 2022-02-08 NOTE — TELEPHONE ENCOUNTER
Patient requesting refill(s) of:  pregabalin (LYRICA) 100 mg capsule  Last filled:8/13/21  Last appt:12/27/21  Next appt:2/17/22  Pharmacy: [facility],

## 2022-02-09 ENCOUNTER — TELEPHONE (OUTPATIENT)
Dept: ENDOCRINOLOGY | Facility: CLINIC | Age: 52
End: 2022-02-09

## 2022-02-09 NOTE — TELEPHONE ENCOUNTER
----- Message from [id number] sent at 2/8/2022 12:49 PM EST -----  Please download her Mary Francois report for review  ----- Message -----  From: SYLWIA Vasquez  Sent: 2/8/2022   7:00 AM EST  To: SYLWIA Vasquez    Look at Mary Francois and make insulin adjustments  Is she scheduled with adelso?

## 2022-02-10 ENCOUNTER — TREATMENT (OUTPATIENT)
Dept: ENDOCRINOLOGY | Facility: CLINIC | Age: 52
End: 2022-02-10
Payer: COMMERCIAL

## 2022-02-10 DIAGNOSIS — Z79.4 TYPE 2 DIABETES MELLITUS WITH DIABETIC AUTONOMIC NEUROPATHY, WITH LONG-TERM CURRENT USE OF INSULIN (HCC): ICD-10-CM

## 2022-02-10 DIAGNOSIS — E11.43 TYPE 2 DIABETES MELLITUS WITH DIABETIC AUTONOMIC NEUROPATHY, WITH LONG-TERM CURRENT USE OF INSULIN (HCC): ICD-10-CM

## 2022-02-10 PROCEDURE — 95251 CONT GLUC MNTR ANALYSIS I&R: CPT | Performed by: NURSE PRACTITIONER

## 2022-02-10 RX ORDER — INSULIN GLARGINE 100 [IU]/ML
INJECTION, SOLUTION SUBCUTANEOUS
Qty: 30 ML | Refills: 1
Start: 2022-02-10 | End: 2022-02-11 | Stop reason: SDUPTHER

## 2022-02-10 RX ORDER — INSULIN GLULISINE 100 [IU]/ML
INJECTION, SOLUTION SUBCUTANEOUS
Qty: 30 ML | Refills: 1
Start: 2022-02-10 | End: 2022-02-11 | Stop reason: SDUPTHER

## 2022-02-10 NOTE — PROGRESS NOTES
Current regimen:    Jardiance 25 mg daily  Lantus 66 units daily  Apidra 20-20-28  Metformin 1,000 mg BID  Ozempic 1 mg once weekly    Kelsey Jetty   Device used [facility] use     Indication   Type 2 Diabetes    More than 72 hours of data was reviewed  Report to be scanned to chart  Date Range: 1/26/22-2/8/22    Analysis of data:   Average Glucose: 299  Coefficient of Variation: 24 8%    Time in Target Range: 6%   Time Above Range: 21% 181-250 mg/dL; 73% >250 mg/dL   Time Below Range: 0%     Interpretation of data: Patient is completely uncontrolled with persistent hyperglycemia  Please instruct her to increase Lantus to 80 units daily  I would like her to split this into two separate doses  Take 40 units in the morning and 40 units in the evening  Try in inject approximately 12 hours apart  Increase Apidra to 24 units before breakfast and lunch  Take 34 units prior to dinner  Continue the rest of her medications as prescribed  I have adjusted medications in the chart  She will run out of her insulin faster  Does she need me to send refills now?

## 2022-02-10 NOTE — Clinical Note
Please call patient with the instructions: Her blood sugars remain completely uncontrolled  increase Lantus to 80 units daily  I would like her to split this into two separate doses  Take 40 units in the morning and 40 units in the evening  Try in inject approximately 12 hours apart  Increase Apidra to 24 units before breakfast and lunch  Take 34 units prior to dinner  Continue the rest of her medications as prescribed  I have adjusted medications in the chart  She will run out of her insulin faster  Does she need me to send refills now?

## 2022-02-11 DIAGNOSIS — Z79.4 TYPE 2 DIABETES MELLITUS WITH DIABETIC AUTONOMIC NEUROPATHY, WITH LONG-TERM CURRENT USE OF INSULIN (HCC): ICD-10-CM

## 2022-02-11 DIAGNOSIS — E11.43 TYPE 2 DIABETES MELLITUS WITH DIABETIC AUTONOMIC NEUROPATHY, WITH LONG-TERM CURRENT USE OF INSULIN (HCC): ICD-10-CM

## 2022-02-11 RX ORDER — INSULIN GLARGINE 100 [IU]/ML
INJECTION, SOLUTION SUBCUTANEOUS
Qty: 30 ML | Refills: 1 | Status: SHIPPED | OUTPATIENT
Start: 2022-02-11 | End: 2022-02-17

## 2022-02-11 RX ORDER — INSULIN GLULISINE 100 [IU]/ML
INJECTION, SOLUTION SUBCUTANEOUS
Qty: 30 ML | Refills: 1 | Status: SHIPPED | OUTPATIENT
Start: 2022-02-11 | End: 2022-02-17

## 2022-02-11 NOTE — TELEPHONE ENCOUNTER
----- Message from [id number] sent at 2/10/2022 12:27 PM EST -----  Please call patient with the instructions: Her blood sugars remain completely uncontrolled  increase Lantus to 80 units daily  I would like her to split this into two separate doses  Take 40 units in the morning and 40 units in the evening  Try in inject approximately 12 hours apart  Increase Apidra to 24 units before breakfast and lunch  Take 34 units prior to dinner  Continue the rest of her medications as prescribed  I have adjusted medications in the chart  She will run out of her insulin faster  Does she need me to send refills now?

## 2022-02-16 ENCOUNTER — OFFICE VISIT (OUTPATIENT)
Dept: DIABETES SERVICES | Facility: CLINIC | Age: 52
End: 2022-02-16
Payer: COMMERCIAL

## 2022-02-16 VITALS — BODY MASS INDEX: 35.12 KG/M2 | HEIGHT: 61 IN | WEIGHT: 186 LBS

## 2022-02-16 DIAGNOSIS — Z79.4 TYPE 2 DIABETES MELLITUS WITH DIABETIC AUTONOMIC NEUROPATHY, WITH LONG-TERM CURRENT USE OF INSULIN (HCC): ICD-10-CM

## 2022-02-16 DIAGNOSIS — E11.43 TYPE 2 DIABETES MELLITUS WITH DIABETIC AUTONOMIC NEUROPATHY, WITH LONG-TERM CURRENT USE OF INSULIN (HCC): ICD-10-CM

## 2022-02-16 PROCEDURE — 97802 MEDICAL NUTRITION INDIV IN: CPT | Performed by: DIETITIAN, REGISTERED

## 2022-02-16 NOTE — PROGRESS NOTES
Medical Nutrition Therapy        Assessment    Visit Type: Initial visit    Chief complaint Pt has had diabetes for 30 years  She has required insulin for 9 years  States she never saw a dietitian before  Also relates that her blood sugars are always high lately and she is under a lot of stress    HPI: Stephania Márquez diet history reveals attempts to reduce her carbohydrate intake by avoiding added sugar  Her diet remains high in saturated fat  Her living situation with her cousin makes it difficult for her to control some food choices  She does include some whole grains, vegetables, and fruits  She is edentulous and has gastroparesis, so does better with a soft diet  Currently meals range from 25 to 75 grams of carbohydrate  Explained basic pathophysiology of diabetes and impact of diet on blood glucose levels  Together we discussed what foods contain CHO, reading a food label, and serving sizes  Used the portion booklet to teach Chariton Shara GARCIAS more about food groups and basic carbohydrate counting  Created an individualized meal plan for Haylee GARCIAS with 3 meals and 2-3 snacks providing 30-45 g carb per meal and 15 g carb per snack  This plan will help promote weight loss  Put together sample meals for Dinorah GARCIAS's reference  Haylee GARCIAS demonstrated good understanding, Prieto Haque will call with questions or for more education      Ht Readings from Last 1 Encounters:   01/25/22 5' 1" (1 549 m)     Wt Readings from Last 2 Encounters:   01/25/22 78 9 kg (174 lb)   12/27/21 82 1 kg (181 lb)     Weight Change: Yes gained 10# recently, footwear may contribute    Medical Diagnosis/ICD10: E11 43, Z79 4 (ICD-10-CM) - Type 2 diabetes mellitus with diabetic autonomic neuropathy, with long-term current use of insulin ([email or web address] )    Barriers to Learning: emotional, she is very worried about her mom right now, overall has a lot of stress in current living situation and chronic pain    Do you follow any special diet presently?: No, avoids candy, soda, juice  Who shops: patient and cousin  Who cooks: cousin    Slept from 4 am until 12 noon, usually sleeps from 8 - 11 pm until 8-9 am  Food Log: Completed via the method of food recall    Breakfast: usually 8:30-9:30, today at 12 noon, reduced sugar cinnamon apple oatmeal packet, made with water, 1 large banana, small glass of 2% milk, a little coffee w/ a little sugar free creamer OR scrambled egg and 1 piece of toast and fruit and coffee  Morning Snack:11 am, 3 saltine crackers and a piece of cheese  Lunch:12:30-1, 1/2 can soup and a few crackers, 12 oz  milk  Afternoon Snack: same as am (missed it yesterday)  Dinner: 5-6, cheesesteak without the bread, applesauce (at a diner)  Evening Snack:popcorn, 1/2 a bag or less  Beverages: coffee, milk, water  Eating out/Take out:not often  Exercise a little, starting to walk with her cousin, 2 blocks both ways    Calorie needs 1400kcals/day Carbs: 30-45 g/meal, 15 g/snack         Nutrition Diagnosis:  Food and nutrition related knowledge deficit  related to Lack of prior exposure to accurate nutrition related information as evidenced by No prior knowledge of need for food and nutrition related recommendations    Intervention: plate method, reduced fat intake, carbohydrate counting, increased plant based foods, meal timing, meal planning, individualized meal plan, monitoring portion control, exercise guidelines and food diary     Treatment Goals: Patient understands education and recommendations, Patient will count carbohydrates and Patient will exercise    Monitoring and evaluation:    Term code indicator  FH 1 6 3 Carbohydrate Intake Criteria: Follow meal plan  Term code indicator  CH 2 2 Treatments/Therapy/Alternative Medicine Criteria: Walk daily, a shorter walk after each meal is good for blood sugar control    Patients Response to Instruction:  Comprehensiongood  Motivationgood  Expected Compliancefair    Thank you for coming to the [facility] for education today  Please feel free to call with any questions or concerns      [address] [facility] [place] [address] [phone]

## 2022-02-16 NOTE — Clinical Note
Initial MNT completed, for your review  She's dealing with additional stress with mother hospitalized right now

## 2022-02-16 NOTE — PATIENT INSTRUCTIONS
1  Follow meal plan, keep mealtime carbs between 30-45 grams for each meal  2  Keep walking, walking after meals is a great way to help get blood sugar levels down  3   Use measuring cups and read food labels

## 2022-02-17 ENCOUNTER — OFFICE VISIT (OUTPATIENT)
Dept: FAMILY MEDICINE CLINIC | Facility: CLINIC | Age: 52
End: 2022-02-17
Payer: COMMERCIAL

## 2022-02-17 VITALS
RESPIRATION RATE: 19 BRPM | WEIGHT: 184.2 LBS | SYSTOLIC BLOOD PRESSURE: 112 MMHG | HEART RATE: 78 BPM | OXYGEN SATURATION: 98 % | BODY MASS INDEX: 34.78 KG/M2 | DIASTOLIC BLOOD PRESSURE: 72 MMHG | HEIGHT: 61 IN | TEMPERATURE: 99.2 F

## 2022-02-17 DIAGNOSIS — C64.1 RENAL CELL CARCINOMA OF RIGHT KIDNEY (HCC): ICD-10-CM

## 2022-02-17 DIAGNOSIS — E55.9 VITAMIN D DEFICIENCY: ICD-10-CM

## 2022-02-17 DIAGNOSIS — E11.9 ENCOUNTER FOR DIABETIC FOOT EXAM (HCC): ICD-10-CM

## 2022-02-17 DIAGNOSIS — Z85.528 HISTORY OF KIDNEY CANCER: ICD-10-CM

## 2022-02-17 DIAGNOSIS — Z79.4 TYPE 2 DIABETES MELLITUS WITH DIABETIC AUTONOMIC NEUROPATHY, WITH LONG-TERM CURRENT USE OF INSULIN (HCC): Primary | ICD-10-CM

## 2022-02-17 DIAGNOSIS — E66.09 CLASS 1 OBESITY DUE TO EXCESS CALORIES WITH SERIOUS COMORBIDITY AND BODY MASS INDEX (BMI) OF 34.0 TO 34.9 IN ADULT: ICD-10-CM

## 2022-02-17 DIAGNOSIS — N18.31 STAGE 3A CHRONIC KIDNEY DISEASE (HCC): ICD-10-CM

## 2022-02-17 DIAGNOSIS — D50.8 OTHER IRON DEFICIENCY ANEMIA: ICD-10-CM

## 2022-02-17 DIAGNOSIS — E11.43 TYPE 2 DIABETES MELLITUS WITH DIABETIC AUTONOMIC NEUROPATHY, WITH LONG-TERM CURRENT USE OF INSULIN (HCC): Primary | ICD-10-CM

## 2022-02-17 PROBLEM — E66.811 CLASS 1 OBESITY DUE TO EXCESS CALORIES WITH SERIOUS COMORBIDITY AND BODY MASS INDEX (BMI) OF 34.0 TO 34.9 IN ADULT: Status: ACTIVE | Noted: 2017-04-14

## 2022-02-17 LAB — SL AMB POCT HEMOGLOBIN AIC: 11.7 (ref ?–6.5)

## 2022-02-17 PROCEDURE — 3074F SYST BP LT 130 MM HG: CPT | Performed by: NURSE PRACTITIONER

## 2022-02-17 PROCEDURE — 99214 OFFICE O/P EST MOD 30 MIN: CPT | Performed by: NURSE PRACTITIONER

## 2022-02-17 PROCEDURE — 3078F DIAST BP <80 MM HG: CPT | Performed by: NURSE PRACTITIONER

## 2022-02-17 PROCEDURE — 83036 HEMOGLOBIN GLYCOSYLATED A1C: CPT | Performed by: NURSE PRACTITIONER

## 2022-02-17 PROCEDURE — 3046F HEMOGLOBIN A1C LEVEL >9.0%: CPT | Performed by: NURSE PRACTITIONER

## 2022-02-17 RX ORDER — INSULIN GLULISINE 100 [IU]/ML
INJECTION, SOLUTION SUBCUTANEOUS
Qty: 30 ML | Refills: 1
Start: 2022-02-17 | End: 2022-02-22 | Stop reason: SDUPTHER

## 2022-02-17 RX ORDER — INSULIN GLARGINE 100 [IU]/ML
INJECTION, SOLUTION SUBCUTANEOUS
Qty: 30 ML | Refills: 1
Start: 2022-02-17 | End: 2022-02-22 | Stop reason: SDUPTHER

## 2022-02-17 NOTE — PROGRESS NOTES
[facility]        NAME: Aisha Arias is a 46 y o  female  : 1970    MRN: [id number]  DATE: 2022  TIME: 11:11 AM    Assessment and Plan   Type 2 diabetes mellitus with diabetic autonomic neuropathy, with long-term current use of insulin (HCC) [E11 43, Z79 4]  1  Type 2 diabetes mellitus with diabetic autonomic neuropathy, with long-term current use of insulin (HCC)  POCT hemoglobin A1c   2  Vitamin D deficiency  Vitamin D 25 hydroxy   3  Other iron deficiency anemia  CBC and differential    Iron Panel (Includes Ferritin, Iron Sat%, Iron, and TIBC)   4  Stage 3a chronic kidney disease ([facility] )  Ambulatory Referral to Nephrology   5  History of kidney cancer  Ambulatory Referral to Nephrology   6  Renal cell carcinoma of right kidney (HCC)     7  Class 1 obesity due to excess calories with serious comorbidity and body mass index (BMI) of 34 0 to 34 9 in adult     8  Encounter for diabetic foot exam [facility])           Patient Instructions     Patient Instructions   Call Dr Carline Martinez to schedule diabetic eye exam  Referral given for Nephrology  Call Jasmine Stone with Endocrinology for sooner appointment than May  Get labs done when due for Endocrinology  10% - bad control"> 10% - bad control,Hemoglobin A1c (HbA1c) greater than 10% indicating poor diabetic control,Haemoglobin A1c greater than 10% indicating poor diabetic control">   Diabetes Mellitus Type 2 in Adults, Ambulatory Care   GENERAL INFORMATION:   Diabetes mellitus type 2  is a disease that affects how your body uses glucose (sugar)  Insulin helps move sugar out of the blood so it can be used for energy  Normally, when the blood sugar level increases, the pancreas makes more insulin  Type 2 diabetes develops because either the body cannot make enough insulin, or it cannot use the insulin correctly  After many years, your pancreas may stop making insulin    Common symptoms include the following:   · More hunger or thirst than usual     · Frequent urination     · Weight loss without trying     · Blurred vision  Seek immediate care for the following symptoms:   · Severe abdominal pain, or pain that spreads to your back  You may also be vomiting  · Trouble staying awake or focusing    · Shaking or sweating    · Blurred or double vision    · Breath has a fruity, sweet smell    · Breathing is deep and labored, or rapid and shallow    · Heartbeat is fast and weak  Treatment for diabetes mellitus type 2  includes keeping your blood sugar at a normal level  You must eat the right foods, and exercise regularly  You may also need medicine if you cannot control your blood sugar level with nutrition and exercise  Manage diabetes mellitus type 2:   · Check your blood sugar level  You will be taught how to check a small drop of blood in a glucose monitor  Ask your healthcare provider when and how often to check during the day  Ask your healthcare provider what your blood sugar levels should be when you check them  · Keep track of carbohydrates (sugar and starchy foods)  Your blood sugar level can get too high if you eat too many carbohydrates  Your dietitian will help you plan meals and snacks that have the right amount of carbohydrates  · Eat low-fat foods  Some examples are skinless chicken and low-fat milk  · Eat less sodium (salt)  Some examples of high-sodium foods to limit are soy sauce, potato chips, and soup  Do not add salt to food you cook  Limit your use of table salt  · Eat high-fiber foods  Foods that are a good source of fiber include vegetables, whole grain bread, and beans  · Limit alcohol  Alcohol affects your blood sugar level and can make it harder to manage your diabetes  Women should limit alcohol to 1 drink a day  Men should limit alcohol to 2 drinks a day  A drink of alcohol is 12 ounces of beer, 5 ounces of wine, or 1½ ounces of liquor  · Get regular exercise    Exercise can help keep your blood sugar level steady, decrease your risk of heart disease, and help you lose weight  Exercise for at least 30 minutes, 5 days a week  Include muscle strengthening activities 2 days each week  Work with your healthcare provider to create an exercise plan  · Check your feet each day  for injuries or open sores  Ask your healthcare provider for activities you can do if you have an open sore  · Quit smoking  If you smoke, it is never too late to quit  Smoking can worsen the problems that may occur with diabetes  Ask your healthcare provider for information about how to stop smoking if you are having trouble quitting  · Ask about your weight:  Ask healthcare providers if you need to lose weight, and how much to lose  Ask them to help you with a weight loss program  Even a 10 to 15 pound weight loss can help you manage your blood sugar level  · Carry medical alert identification  Wear medical alert jewelry or carry a card that says you have diabetes  Ask your healthcare provider where to get these items  · Ask about vaccines  Diabetes puts you at risk of serious illness if you get the flu, pneumonia, or hepatitis  Ask your healthcare provider if you should get a flu, pneumonia, or hepatitis B vaccine, and when to get the vaccine  Follow up with your healthcare provider as directed:  Write down your questions so you remember to ask them during your visits  CARE AGREEMENT:   You have the right to help plan your care  Learn about your health condition and how it may be treated  Discuss treatment options with your caregivers to decide what care you want to receive  You always have the right to refuse treatment  The above information is an  only  It is not intended as medical advice for individual conditions or treatments  Talk to your doctor, nurse or pharmacist before following any medical regimen to see if it is safe and effective for you    © 2014 [address] is for End User's use only and may not be sold, redistributed or otherwise used for commercial purposes  All illustrations and images included in CareNotes® are the copyrighted property of A D A M , Inc  or Hilton Mcdaniel  Chief Complaint     Chief Complaint   Patient presents with    Follow-up         History of Present Illness       Here for follow up-   Needs HgA1c today- last one was early December 10 0- she reports her sugars have been high at home recently from the stress with her mother  In office today- 11 7- will reach out to Endocrinology for sooner appointment      Review of Systems   Review of Systems   Constitutional: Negative for activity change, diaphoresis, fatigue and fever  HENT: Negative for congestion, facial swelling, hearing loss, rhinorrhea, sinus pressure, sinus pain, sneezing, sore throat and voice change  Eyes: Negative for discharge and visual disturbance  Respiratory: Negative for cough, choking, chest tightness, shortness of breath, wheezing and stridor  Cardiovascular: Negative for chest pain, palpitations and leg swelling  Gastrointestinal: Negative for abdominal distention, abdominal pain, constipation, diarrhea, nausea and vomiting  Endocrine: Negative for polydipsia, polyphagia and polyuria  Genitourinary: Negative for difficulty urinating, dysuria, frequency and urgency  Musculoskeletal: Negative for arthralgias, back pain, gait problem, joint swelling, myalgias, neck pain and neck stiffness  Skin: Negative for color change, rash and wound  Neurological: Negative for dizziness, syncope, speech difficulty, weakness, light-headedness and headaches  Hematological: Negative for adenopathy  Does not bruise/bleed easily  Psychiatric/Behavioral: Negative for agitation, behavioral problems, confusion, hallucinations, sleep disturbance and suicidal ideas  The patient is nervous/anxious (mother is having surgery today)            Current Medications       Current Outpatient Medications:     albuterol (ACCUNEB) 0 63 MG/3ML nebulizer solution, take 3 milliliters by mouth every 4 hours if needed for wheezing, Disp: , Rfl:     albuterol (PROAIR HFA) 90 mcg/act inhaler, Inhale 1 puff every 6 (six) hours as needed, Disp: , Rfl:     Aspirin (ASPIR-81 PO), Take 81 mg by mouth, Disp: , Rfl:     atorvastatin (LIPITOR) 40 mg tablet, Take 1 tablet (40 mg total) by mouth daily at bedtime, Disp: 90 tablet, Rfl: 3    B-D UF III MINI PEN NEEDLES 31G X 5 MM MISC, , Disp: , Rfl:     BANOPHEN 50 MG capsule, take 1 capsule by mouth every 6 hours if needed for itching, Disp: , Rfl:     benzonatate (TESSALON) 200 MG capsule, Take 1 capsule (200 mg total) by mouth 3 (three) times a day as needed for cough, Disp: 60 capsule, Rfl: 1    cholecalciferol (VITAMIN D3) 400 units tablet, Take 1 tablet (400 Units total) by mouth daily, Disp: 90 tablet, Rfl: 1    citalopram (CeleXA) 40 mg tablet, Take 1 tablet (40 mg total) by mouth daily, Disp: 90 tablet, Rfl: 1    Continuous Blood Gluc  (FreeStyle Refugio 14 Day [place]) TEMITOPE, Use for continuous blood glucose monitoring, Disp: 1 each, Rfl: 0    Continuous Blood Gluc Sensor (FreeStyle Refugio 14 Day Sensor) MISC, Use one sensor every 14 days for continuous blood sugar monitoring , Disp: 6 each, Rfl: 3     MG capsule, Take 1 capsule (100 mg total) by mouth 2 (two) times a day, Disp: 180 capsule, Rfl: 1    Empagliflozin 25 MG TABS, Take 1 tablet (25 mg total) by mouth every morning, Disp: 90 tablet, Rfl: 1    famotidine (PEPCID) 40 MG tablet, Take 1 tablet (40 mg total) by mouth daily at bedtime Take in evening 2 hours prior to bed, Disp: 30 tablet, Rfl: 6    fenofibrate (TRICOR) 48 mg tablet, Take 1 tablet (48 mg total) by mouth daily, Disp: 90 tablet, Rfl: 3    glucose blood (OneTouch Verio) test strip, Use to test blood glucose 4 times a day, Disp: 200 strip, Rfl: 2    insulin glulisine (Apidra SoloStar) 100 units/mL injection pen, Inject 24 units before breakfast and lunch  Inject 34 units prior to dinner , Disp: 30 mL, Rfl: 1    Lantus SoloStar 100 units/mL injection pen, Inject 40 units every 12 hours  , Disp: 30 mL, Rfl: 1    levETIRAcetam (KEPPRA) 500 mg tablet, Take 1 tablet (500 mg total) by mouth every 12 (twelve) hours, Disp: 180 tablet, Rfl: 1    linaCLOtide (Linzess) 72 MCG CAPS, Take 1 capsule by mouth daily before breakfast, Disp: 30 capsule, Rfl: 5    lisinopril (ZESTRIL) 10 mg tablet, Take 1 tablet (10 mg total) by mouth daily, Disp: 90 tablet, Rfl: 1    metFORMIN (GLUCOPHAGE) 1000 MG tablet, Take 1 tablet (1,000 mg total) by mouth 2 (two) times a day with meals, Disp: 180 tablet, Rfl: 1    metoclopramide (REGLAN) 10 mg tablet, Take 1 tablet (10 mg total) by mouth daily, Disp: 90 tablet, Rfl: 1    metoprolol tartrate (LOPRESSOR) 25 mg tablet, Take 1 tablet (25 mg total) by mouth 2 (two) times a day, Disp: 180 tablet, Rfl: 1    neomycin-polymyxin-hydrocortisone (CORTISPORIN) otic solution, Administer 4 drops to the right ear every 6 (six) hours, Disp: 10 mL, Rfl: 0    ondansetron (ZOFRAN) 4 mg tablet, Take 1 tablet (4 mg total) by mouth every 8 (eight) hours as needed for nausea or vomiting, Disp: 20 tablet, Rfl: 0    ondansetron (ZOFRAN-ODT) 4 mg disintegrating tablet, Take 1 tablet (4 mg total) by mouth every 6 (six) hours as needed for nausea or vomiting, Disp: 20 tablet, Rfl: 0    pantoprazole (PROTONIX) 40 mg tablet, Take 1 tablet (40 mg total) by mouth 2 (two) times a day, Disp: 180 tablet, Rfl: 3    pregabalin (LYRICA) 100 mg capsule, take 1 capsule by mouth three times a day, Disp: 270 capsule, Rfl: 3    semaglutide, 1 mg/dose, (Ozempic) 4 MG/3ML SOPN injection pen, Inject 0 75 mL (1 mg total) under the skin once a week, Disp: 9 mL, Rfl: 1    solifenacin (VESIcare) 5 mg tablet, Take 1 tablet (5 mg total) by mouth daily, Disp: 90 tablet, Rfl: 1    Current Allergies Allergies as of 02/17/2022 - Reviewed 02/17/2022   Allergen Reaction Noted    Azithromycin GI Intolerance and Hives 07/13/2012    Benztropine  08/09/2016    Butorphanol Hallucinations 08/09/2016    Penicillins  11/12/2015    Zolpidem  11/12/2015            The following portions of the patient's history were reviewed and updated as appropriate: allergies, current medications, past family history, past medical history, past social history, past surgical history and problem list      Past Medical History:   Diagnosis Date    Depression     Diabetes mellitus ([facility] [address] )     Gastroparesis     GERD (gastroesophageal reflux disease)     Hyperlipidemia     Hypertension     Sciatica     [facility]        Past Surgical History:   Procedure Laterality Date    BREAST BIOPSY Left  benign    CHOLECYSTECTOMY      COLONOSCOPY      HYSTERECTOMY      [place] LAP,APPENDECTOMY N/A 3/24/2021    Procedure: APPENDECTOMY LAPAROSCOPIC;  Surgeon: Luz Marina Sotelo MD;  Location: [address] [place] [address] OR;  Service: General    [place] LAP,DIAGNOSTIC ABDOMEN N/A 3/24/2021    Procedure: LAPAROSCOPY DIAGNOSTIC, EXTENSIVE DESI;  Surgeon: Luz Marina Sotelo MD;  Location: [address] [place] [address] OR;  Service: General    UPPER GASTROINTESTINAL ENDOSCOPY         Family History   Problem Relation Age of Onset    No Known Problems Mother     No Known Problems Father     No Known Problems Daughter     No Known Problems Maternal Grandmother     No Known Problems Paternal Grandmother     No Known Problems Paternal Aunt     No Known Problems Paternal Aunt     No Known Problems Paternal Aunt          Medications have been verified  Objective   /72 (BP Location: Left arm, Patient Position: Sitting, Cuff Size: Standard)   Pulse 78   Temp 99 2 °F (37 3 °C)   Resp 19   Ht 5' 1" (1 549 m)   Wt 83 6 kg (184 lb 3 2 oz)   SpO2 98%   BMI 34 80 kg/m²        Physical Exam     Physical Exam  Vitals and nursing note reviewed     Constitutional: General: She is not in acute distress  Appearance: She is well-developed  She is not diaphoretic  Neck:      Thyroid: No thyromegaly  Trachea: No tracheal deviation  Cardiovascular:      Rate and Rhythm: Normal rate and regular rhythm  Pulses: no weak pulses          Dorsalis pedis pulses are 2+ on the right side and 2+ on the left side  Heart sounds: Normal heart sounds  No murmur heard  Pulmonary:      Effort: Pulmonary effort is normal  No respiratory distress  Breath sounds: Normal breath sounds  No wheezing  Musculoskeletal:         General: No tenderness or deformity  Normal range of motion  Cervical back: Normal range of motion and neck supple  Right lower leg: No edema  Left lower leg: No edema  Feet:      Right foot:      Skin integrity: Dry skin present  No ulcer, skin breakdown, erythema, warmth or callus  Left foot:      Skin integrity: Dry skin present  No ulcer, skin breakdown, erythema, warmth or callus  Skin:     General: Skin is warm and dry  Findings: No erythema or rash  Neurological:      Mental Status: She is alert and oriented to person, place, and time  Psychiatric:         Attention and Perception: Attention normal          Mood and Affect: Affect is flat  Speech: Speech normal          Behavior: Behavior normal  Behavior is cooperative  Thought Content: Thought content normal          Cognition and Memory: Cognition and memory normal          Judgment: Judgment normal            Patient's shoes and socks removed  Right Foot/Ankle   Right Foot Inspection  Skin Exam: skin normal, skin intact and dry skin  No warmth, no callus, no erythema, no maceration, no abnormal color, no pre-ulcer, no ulcer and no callus  Toe Exam: ROM and strength within normal limits  Sensory   Monofilament testing: intact    Vascular  Capillary refills: < 3 seconds  The right DP pulse is 2+       Left Foot/Ankle  Left Foot Inspection  Skin Exam: skin normal, skin intact and dry skin  No warmth, no erythema, no maceration, normal color, no pre-ulcer, no ulcer and no callus  Toe Exam: ROM and strength within normal limits  Sensory   Monofilament testing: intact    Vascular  Capillary refills: < 3 seconds  The left DP pulse is 2+  Assign Risk Category  No deformity present  No loss of protective sensation  No weak pulses  Risk: 0    BMI Counseling: Body mass index is 34 8 kg/m²  The BMI is above normal  Nutrition recommendations include decreasing portion sizes, encouraging healthy choices of fruits and vegetables, decreasing fast food intake, consuming healthier snacks, limiting drinks that contain sugar, moderation in carbohydrate intake and increasing intake of lean protein  Rationale for BMI follow-up plan is due to patient being overweight or obese

## 2022-02-17 NOTE — TELEPHONE ENCOUNTER
Ok  Please instruct patient to increase Lantus to 45 units in the morning and 45 units at night  Increase Apridra to 28 units before breakfast and lunch  Continue with 34 units before dinner  Let's look again then at the end of next week

## 2022-02-17 NOTE — TELEPHONE ENCOUNTER
Spoke to patient she is indeed doing the changes to her insulin regimen and the Ganesh Girard report is after changes as of the 10th of febuary her scan shows from [facility] 3rd to 16th

## 2022-02-17 NOTE — TELEPHONE ENCOUNTER
Can we please confirm that patient made the recommended changes? Is this current [facility] [place] report after she made these changes or is she doing something different?

## 2022-02-18 ENCOUNTER — TELEPHONE (OUTPATIENT)
Dept: ENDOCRINOLOGY | Facility: CLINIC | Age: 52
End: 2022-02-18

## 2022-02-21 DIAGNOSIS — K59.01 SLOW TRANSIT CONSTIPATION: ICD-10-CM

## 2022-02-22 ENCOUNTER — TELEPHONE (OUTPATIENT)
Dept: ENDOCRINOLOGY | Facility: CLINIC | Age: 52
End: 2022-02-22

## 2022-02-22 DIAGNOSIS — E11.43 TYPE 2 DIABETES MELLITUS WITH DIABETIC AUTONOMIC NEUROPATHY, WITH LONG-TERM CURRENT USE OF INSULIN (HCC): ICD-10-CM

## 2022-02-22 DIAGNOSIS — Z79.4 TYPE 2 DIABETES MELLITUS WITH DIABETIC AUTONOMIC NEUROPATHY, WITH LONG-TERM CURRENT USE OF INSULIN (HCC): ICD-10-CM

## 2022-02-22 RX ORDER — INSULIN GLARGINE 100 [IU]/ML
INJECTION, SOLUTION SUBCUTANEOUS
Qty: 30 ML | Refills: 1 | Status: SHIPPED | OUTPATIENT
Start: 2022-02-22 | End: 2022-04-19

## 2022-02-22 RX ORDER — INSULIN GLULISINE 100 [IU]/ML
INJECTION, SOLUTION SUBCUTANEOUS
Qty: 30 ML | Refills: 1 | Status: SHIPPED | OUTPATIENT
Start: 2022-02-22 | End: 2022-04-19

## 2022-02-22 NOTE — TELEPHONE ENCOUNTER
----- Message from Serenity Hernandez sent at 2/20/2022  2:16 PM EST -----  Regarding: My insulin   I went to [facility] for my insulin and they told me that it was canceled  What is going on? Please let me know so that I can pick it up

## 2022-02-22 NOTE — TELEPHONE ENCOUNTER
Spoke to [facility] and patient was not sure what happened how ever we where able to resend the rx for her insulins over to [facility] and pharmacy had them ready for

## 2022-02-22 NOTE — TELEPHONE ENCOUNTER
----- Message from Anlay George sent at 2/20/2022  2:16 PM EST -----  Regarding: My insulin   I went to [facility] for my insulin and they told me that it was canceled  What is going on? Please let me know so that I can pick it up

## 2022-02-24 ENCOUNTER — PATIENT MESSAGE (OUTPATIENT)
Dept: ENDOCRINOLOGY | Facility: CLINIC | Age: 52
End: 2022-02-24

## 2022-02-24 RX ORDER — LINACLOTIDE 72 UG/1
1 CAPSULE, GELATIN COATED ORAL
Qty: 30 CAPSULE | Refills: 0 | Status: SHIPPED | OUTPATIENT
Start: 2022-02-24

## 2022-02-25 DIAGNOSIS — E11.43 TYPE 2 DIABETES MELLITUS WITH DIABETIC AUTONOMIC NEUROPATHY, WITH LONG-TERM CURRENT USE OF INSULIN (HCC): ICD-10-CM

## 2022-02-25 DIAGNOSIS — Z79.4 TYPE 2 DIABETES MELLITUS WITH DIABETIC AUTONOMIC NEUROPATHY, WITH LONG-TERM CURRENT USE OF INSULIN (HCC): ICD-10-CM

## 2022-02-25 RX ORDER — FLASH GLUCOSE SENSOR
KIT MISCELLANEOUS
Qty: 6 EACH | Refills: 3 | Status: SHIPPED | OUTPATIENT
Start: 2022-02-25

## 2022-02-25 NOTE — TELEPHONE ENCOUNTER
----- Message from Esvin Parker sent at 2/25/2022  3:23 PM EST -----  Regarding: Blood sugar   Free style Amy 2 test stripes

## 2022-02-28 ENCOUNTER — PATIENT MESSAGE (OUTPATIENT)
Dept: ENDOCRINOLOGY | Facility: CLINIC | Age: 52
End: 2022-02-28

## 2022-03-01 ENCOUNTER — TELEPHONE (OUTPATIENT)
Dept: ENDOCRINOLOGY | Facility: CLINIC | Age: 52
End: 2022-03-01

## 2022-03-01 ENCOUNTER — TREATMENT (OUTPATIENT)
Dept: ENDOCRINOLOGY | Facility: CLINIC | Age: 52
End: 2022-03-01
Payer: COMMERCIAL

## 2022-03-01 DIAGNOSIS — E11.43 TYPE 2 DIABETES MELLITUS WITH DIABETIC AUTONOMIC NEUROPATHY, WITH LONG-TERM CURRENT USE OF INSULIN (HCC): ICD-10-CM

## 2022-03-01 DIAGNOSIS — Z79.4 TYPE 2 DIABETES MELLITUS WITH DIABETIC AUTONOMIC NEUROPATHY, WITH LONG-TERM CURRENT USE OF INSULIN (HCC): ICD-10-CM

## 2022-03-01 PROCEDURE — 95251 CONT GLUC MNTR ANALYSIS I&R: CPT | Performed by: NURSE PRACTITIONER

## 2022-03-01 NOTE — Clinical Note
Blood sugars were running very high from 2/16-2/21 followed by a few days without sensor data  Starting on Saturday 02/26, blood sugars are much more reasonable except for two incidences where blood sugar dropped below 70 mg/dL  Did the diet change? Did she start a new insulin pen? Based on data from Saturday, Sunday, and Monday, I would not recommend changing any doses at this time

## 2022-03-01 NOTE — TELEPHONE ENCOUNTER
----- Message from SYLWIA Shetty sent at 3/1/2022 12:40 PM EST -----  Blood sugars were running very high from 2/16-2/21 followed by a few days without sensor data  Starting on Saturday 02/26, blood sugars are much more reasonable except for two incidences where blood sugar dropped below 70 mg/dL  Did the diet change? Did she start a new insulin pen? Based on data from Saturday, Sunday, and Monday, I would not recommend changing any doses at this time

## 2022-03-01 NOTE — PROGRESS NOTES
Current regimen:    Jardiance 25 mg daily  Metformin 1,000 mg BID  Ozempic 1 mg once weekly  Lantus 45 units twice daily  Apidra 28-28-34    Eliason Muss   [facility] use     Indication   Type 2 Diabetes    More than 72 hours of data was reviewed  Report to be scanned to chart  Date Range: 2/16/2022-3/1/2022    Analysis of data:   Average Glucose:  223 mg/dL  Coefficient of Variation:  45%    Time in Target Range:  44%   Time Above Range:  20% 181-250 mg/dL; 35% greater than 250 mg/dL   Time Below Range:  1% 54-69 mg/dL     Interpretation of data: Blood sugars were running very high from 2/16-2/21 followed by a few days without sensor data  Starting on Saturday 02/26, blood sugars are much more reasonable except for two incidences where blood sugar dropped below 70 mg/dL  Did the diet change? Did she start a new insulin pen? Based on data from Saturday, Sunday, and Monday, I would not recommend changing any doses at this time 
 at the [employer]

## 2022-03-01 NOTE — TELEPHONE ENCOUNTER
Scanned patients Tahira Oglesby report for provider to review to help respond to patients mychart message regarding sugars

## 2022-03-01 NOTE — TELEPHONE ENCOUNTER
Spoke to patient she will send a message next week to update us on how her numbers are trending, advised her to move her low sugar alarm up tp 80-90 as she does not hear it right away so by the time she is getting up to correct she is in the 50's

## 2022-03-08 ENCOUNTER — TELEPHONE (OUTPATIENT)
Dept: ENDOCRINOLOGY | Facility: CLINIC | Age: 52
End: 2022-03-08

## 2022-03-08 NOTE — TELEPHONE ENCOUNTER
Left message for patient to either review her message from provider in MyChart or to call the office  See Message below for details

## 2022-03-08 NOTE — TELEPHONE ENCOUNTER
Regarding: Blood sugar levels   ----- Message from Jovanni Garcia sent at 3/7/2022  3:47 PM EST -----       ----- Message from Susu Stevens to SYLWIA Rush sent at 3/5/2022  8:40 PM -----   Hi, Im just concerned, I took my insulin before I ate lunch  Went for a walk and my blood sugars have been low  Im not sure what is going on ?? Im trying so hard to keep my blood sugars in the range that they should be    what do I do??? Is it to much insulin now    please help me figure this out

## 2022-03-22 ENCOUNTER — TELEPHONE (OUTPATIENT)
Dept: NEPHROLOGY | Facility: CLINIC | Age: 52
End: 2022-03-22

## 2022-03-22 DIAGNOSIS — N18.31 STAGE 3A CHRONIC KIDNEY DISEASE (HCC): ICD-10-CM

## 2022-03-22 DIAGNOSIS — I15.2 HYPERTENSION ASSOCIATED WITH DIABETES (HCC): Primary | ICD-10-CM

## 2022-03-22 DIAGNOSIS — E11.59 HYPERTENSION ASSOCIATED WITH DIABETES (HCC): Primary | ICD-10-CM

## 2022-03-22 DIAGNOSIS — C64.1 RENAL CELL CARCINOMA OF RIGHT KIDNEY (HCC): ICD-10-CM

## 2022-03-22 NOTE — TELEPHONE ENCOUNTER
Left message for patient that she needs to have fasting blood work and urine done before her appointment with Dr Bunny Gaviria next week  Told her to call our office if she has further questions

## 2022-03-25 ENCOUNTER — APPOINTMENT (EMERGENCY)
Dept: RADIOLOGY | Facility: HOSPITAL | Age: 52
End: 2022-03-25
Payer: COMMERCIAL

## 2022-03-25 ENCOUNTER — HOSPITAL ENCOUNTER (EMERGENCY)
Facility: HOSPITAL | Age: 52
Discharge: HOME | End: 2022-03-25
Attending: EMERGENCY MEDICINE
Payer: COMMERCIAL

## 2022-03-25 VITALS
WEIGHT: 178 LBS | HEART RATE: 66 BPM | DIASTOLIC BLOOD PRESSURE: 66 MMHG | BODY MASS INDEX: 33.61 KG/M2 | OXYGEN SATURATION: 97 % | SYSTOLIC BLOOD PRESSURE: 114 MMHG | RESPIRATION RATE: 20 BRPM | TEMPERATURE: 98.4 F | HEIGHT: 61 IN

## 2022-03-25 DIAGNOSIS — R07.89 ATYPICAL CHEST PAIN: Primary | ICD-10-CM

## 2022-03-25 LAB
ALBUMIN SERPL BCP-MCNC: 4 G/DL (ref 3.5–5)
ALP SERPL-CCNC: 81 U/L (ref 34–104)
ALT SERPL W P-5'-P-CCNC: 20 U/L (ref 7–52)
ANION GAP SERPL CALCULATED.3IONS-SCNC: 10 MMOL/L (ref 4–13)
APTT PPP: 27 SECONDS (ref 23–37)
AST SERPL W P-5'-P-CCNC: 20 U/L (ref 13–39)
ATRIAL RATE: 65 BPM
BASOPHILS # BLD AUTO: 0.04 THOUSANDS/ΜL (ref 0–0.1)
BASOPHILS NFR BLD AUTO: 0 % (ref 0–1)
BILIRUB SERPL-MCNC: 0.31 MG/DL (ref 0.2–1)
BUN SERPL-MCNC: 26 MG/DL (ref 5–25)
CALCIUM SERPL-MCNC: 9.4 MG/DL (ref 8.4–10.2)
CARDIAC TROPONIN I PNL SERPL HS: <2 NG/L
CHLORIDE SERPL-SCNC: 102 MMOL/L (ref 96–108)
CO2 SERPL-SCNC: 25 MMOL/L (ref 21–32)
CREAT SERPL-MCNC: 1.13 MG/DL (ref 0.6–1.3)
D DIMER PPP FEU-MCNC: 0.43 UG/ML FEU
EOSINOPHIL # BLD AUTO: 0.16 THOUSAND/ΜL (ref 0–0.61)
EOSINOPHIL NFR BLD AUTO: 2 % (ref 0–6)
ERYTHROCYTE [DISTWIDTH] IN BLOOD BY AUTOMATED COUNT: 14.4 % (ref 11.6–15.1)
GFR SERPL CREATININE-BSD FRML MDRD: 56 ML/MIN/1.73SQ M
GLUCOSE SERPL-MCNC: 187 MG/DL (ref 65–140)
HCT VFR BLD AUTO: 38 % (ref 34.8–46.1)
HGB BLD-MCNC: 12 G/DL (ref 11.5–15.4)
IMM GRANULOCYTES # BLD AUTO: 0.01 THOUSAND/UL (ref 0–0.2)
IMM GRANULOCYTES NFR BLD AUTO: 0 % (ref 0–2)
INR PPP: 0.91 (ref 0.84–1.19)
LYMPHOCYTES # BLD AUTO: 4.45 THOUSANDS/ΜL (ref 0.6–4.47)
LYMPHOCYTES NFR BLD AUTO: 47 % (ref 14–44)
MCH RBC QN AUTO: 26.4 PG (ref 26.8–34.3)
MCHC RBC AUTO-ENTMCNC: 31.6 G/DL (ref 31.4–37.4)
MCV RBC AUTO: 84 FL (ref 82–98)
MONOCYTES # BLD AUTO: 0.84 THOUSAND/ΜL (ref 0.17–1.22)
MONOCYTES NFR BLD AUTO: 9 % (ref 4–12)
NEUTROPHILS # BLD AUTO: 3.99 THOUSANDS/ΜL (ref 1.85–7.62)
NEUTS SEG NFR BLD AUTO: 42 % (ref 43–75)
NRBC BLD AUTO-RTO: 0 /100 WBCS
P AXIS: 59 DEGREES
PLATELET # BLD AUTO: 347 THOUSANDS/UL (ref 149–390)
PMV BLD AUTO: 10.4 FL (ref 8.9–12.7)
POTASSIUM SERPL-SCNC: 4 MMOL/L (ref 3.5–5.3)
PR INTERVAL: 168 MS
PROT SERPL-MCNC: 7.4 G/DL (ref 6.4–8.4)
PROTHROMBIN TIME: 12.2 SECONDS (ref 11.6–14.5)
QRS AXIS: 39 DEGREES
QRSD INTERVAL: 76 MS
QT INTERVAL: 416 MS
QTC INTERVAL: 432 MS
RBC # BLD AUTO: 4.55 MILLION/UL (ref 3.81–5.12)
SODIUM SERPL-SCNC: 137 MMOL/L (ref 135–147)
T WAVE AXIS: 56 DEGREES
VENTRICULAR RATE: 65 BPM
WBC # BLD AUTO: 9.49 THOUSAND/UL (ref 4.31–10.16)

## 2022-03-25 PROCEDURE — 84484 ASSAY OF TROPONIN QUANT: CPT | Performed by: EMERGENCY MEDICINE

## 2022-03-25 PROCEDURE — 85730 THROMBOPLASTIN TIME PARTIAL: CPT | Performed by: EMERGENCY MEDICINE

## 2022-03-25 PROCEDURE — 96374 THER/PROPH/DIAG INJ IV PUSH: CPT

## 2022-03-25 PROCEDURE — 80053 COMPREHEN METABOLIC PANEL: CPT | Performed by: EMERGENCY MEDICINE

## 2022-03-25 PROCEDURE — 99285 EMERGENCY DEPT VISIT HI MDM: CPT

## 2022-03-25 PROCEDURE — 85610 PROTHROMBIN TIME: CPT | Performed by: EMERGENCY MEDICINE

## 2022-03-25 PROCEDURE — 93010 ELECTROCARDIOGRAM REPORT: CPT | Performed by: INTERNAL MEDICINE

## 2022-03-25 PROCEDURE — 93005 ELECTROCARDIOGRAM TRACING: CPT

## 2022-03-25 PROCEDURE — 71045 X-RAY EXAM CHEST 1 VIEW: CPT

## 2022-03-25 PROCEDURE — 36415 COLL VENOUS BLD VENIPUNCTURE: CPT | Performed by: EMERGENCY MEDICINE

## 2022-03-25 PROCEDURE — 99285 EMERGENCY DEPT VISIT HI MDM: CPT | Performed by: EMERGENCY MEDICINE

## 2022-03-25 PROCEDURE — 85025 COMPLETE CBC W/AUTO DIFF WBC: CPT | Performed by: EMERGENCY MEDICINE

## 2022-03-25 PROCEDURE — 85379 FIBRIN DEGRADATION QUANT: CPT | Performed by: EMERGENCY MEDICINE

## 2022-03-25 RX ORDER — ASPIRIN 325 MG
325 TABLET ORAL ONCE
Status: COMPLETED | OUTPATIENT
Start: 2022-03-25 | End: 2022-03-25

## 2022-03-25 RX ORDER — ONDANSETRON 2 MG/ML
4 INJECTION INTRAMUSCULAR; INTRAVENOUS ONCE
Status: COMPLETED | OUTPATIENT
Start: 2022-03-25 | End: 2022-03-25

## 2022-03-25 RX ORDER — NITROGLYCERIN 0.4 MG/1
0.4 TABLET SUBLINGUAL ONCE
Status: COMPLETED | OUTPATIENT
Start: 2022-03-25 | End: 2022-03-25

## 2022-03-25 RX ADMIN — NITROGLYCERIN 0.4 MG: 0.4 TABLET SUBLINGUAL at 07:40

## 2022-03-25 RX ADMIN — ONDANSETRON 4 MG: 2 INJECTION INTRAMUSCULAR; INTRAVENOUS at 07:44

## 2022-03-25 RX ADMIN — ASPIRIN 325 MG: 325 TABLET ORAL at 07:45

## 2022-03-25 NOTE — ED PROVIDER NOTES
History  Chief Complaint   Patient presents with    Chest Pain     starting 1 hour PTA, radiates into back and down left arm     Patient is a 63-year-old female with history of diabetes, hypertension, hyperlipidemia, presents for evaluation of chest pain  Patient says that the chest pain woke her up about an hour prior to arrival   She describes it as a sharp sensation in the center of her chest   She says it radiates into her mid back into her left arm  She says it was associated with some lightheadedness, shortness of breath and nausea  She denies any vomiting or diaphoresis  She denies personal history of cardiac disease  She says that the chest pain does feel worse when she takes a deep breath in  She denies a history of DVT/PE, leg pain or swelling  Prior to Admission Medications   Prescriptions Last Dose Informant Patient Reported? Taking? Aspirin (ASPIR-81 PO)  Self Yes No   Sig: Take 81 mg by mouth   B-D UF III MINI PEN NEEDLES 31G X 5 MM MISC  Self Yes No   BANOPHEN 50 MG capsule  Self Yes No   Sig: take 1 capsule by mouth every 6 hours if needed for itching   Continuous Blood Gluc  (FreeStyle Refugio 14 Day [place]) TEMITOPE  Self No No   Sig: Use for continuous blood glucose monitoring   Continuous Blood Gluc Sensor (FreeStyle Refugio 14 Day Sensor) MISC   No No   Sig: Use one sensor every 14 days for continuous blood sugar monitoring   MG capsule   No No   Sig: Take 1 capsule (100 mg total) by mouth 2 (two) times a day   Empagliflozin 25 MG TABS  Self No No   Sig: Take 1 tablet (25 mg total) by mouth every morning   Lantus SoloStar 100 units/mL injection pen   No No   Sig: Inject 45 units every 12 hours     albuterol (ACCUNEB) 0 63 MG/3ML nebulizer solution  Self Yes No   Sig: take 3 milliliters by mouth every 4 hours if needed for wheezing   albuterol (PROAIR HFA) 90 mcg/act inhaler  Self Yes No   Sig: Inhale 1 puff every 6 (six) hours as needed   atorvastatin (LIPITOR) 40 mg tablet   No No   Sig: Take 1 tablet (40 mg total) by mouth daily at bedtime   benzonatate (TESSALON) 200 MG capsule   No No   Sig: Take 1 capsule (200 mg total) by mouth 3 (three) times a day as needed for cough   cholecalciferol (VITAMIN D3) 400 units tablet   No No   Sig: Take 1 tablet (400 Units total) by mouth daily   citalopram (CeleXA) 40 mg tablet   No No   Sig: Take 1 tablet (40 mg total) by mouth daily   famotidine (PEPCID) 40 MG tablet   No No   Sig: Take 1 tablet (40 mg total) by mouth daily at bedtime Take in evening 2 hours prior to bed   fenofibrate (TRICOR) 48 mg tablet   No No   Sig: Take 1 tablet (48 mg total) by mouth daily   glucose blood (OneTouch Verio) test strip  Self No No   Sig: Use to test blood glucose 4 times a day   insulin glulisine (Apidra SoloStar) 100 units/mL injection pen   No No   Sig: Inject 28 units before breakfast and lunch  Inject 34 units prior to dinner     levETIRAcetam (KEPPRA) 500 mg tablet   No No   Sig: Take 1 tablet (500 mg total) by mouth every 12 (twelve) hours   linaCLOtide (Linzess) 72 MCG CAPS   No No   Sig: Take 1 capsule by mouth daily before breakfast   lisinopril (ZESTRIL) 10 mg tablet   No No   Sig: Take 1 tablet (10 mg total) by mouth daily   metFORMIN (GLUCOPHAGE) 1000 MG tablet  Self No No   Sig: Take 1 tablet (1,000 mg total) by mouth 2 (two) times a day with meals   metoclopramide (REGLAN) 10 mg tablet   No No   Sig: Take 1 tablet (10 mg total) by mouth daily   metoprolol tartrate (LOPRESSOR) 25 mg tablet   No No   Sig: Take 1 tablet (25 mg total) by mouth 2 (two) times a day   neomycin-polymyxin-hydrocortisone (CORTISPORIN) otic solution  Self No No   Sig: Administer 4 drops to the right ear every 6 (six) hours   ondansetron (ZOFRAN) 4 mg tablet  Self No No   Sig: Take 1 tablet (4 mg total) by mouth every 8 (eight) hours as needed for nausea or vomiting   ondansetron (ZOFRAN-ODT) 4 mg disintegrating tablet  Self No No   Sig: Take 1 tablet (4 mg total) by mouth every 6 (six) hours as needed for nausea or vomiting   pantoprazole (PROTONIX) 40 mg tablet   No No   Sig: Take 1 tablet (40 mg total) by mouth 2 (two) times a day   pregabalin (LYRICA) 100 mg capsule   No No   Sig: take 1 capsule by mouth three times a day   semaglutide, 1 mg/dose, (Ozempic) 4 MG/3ML SOPN injection pen  Self No No   Sig: Inject 0 75 mL (1 mg total) under the skin once a week   solifenacin (VESIcare) 5 mg tablet   No No   Sig: Take 1 tablet (5 mg total) by mouth daily      Facility-Administered Medications: None       Past Medical History:   Diagnosis Date    Depression     Diabetes mellitus ([place] [address] )     Gastroparesis     GERD (gastroesophageal reflux disease)     Hyperlipidemia     Hypertension     Sciatica     Seizure disorder ([place] [address] )        Past Surgical History:   Procedure Laterality Date    BREAST BIOPSY Left  benign    CHOLECYSTECTOMY      COLONOSCOPY      HYSTERECTOMY      OK LAP,APPENDECTOMY N/A 3/24/2021    Procedure: APPENDECTOMY LAPAROSCOPIC;  Surgeon: Barak Collier MD;  Location: [address] [place] [address] OR;  Service: General    OK LAP,DIAGNOSTIC ABDOMEN N/A 3/24/2021    Procedure: LAPAROSCOPY DIAGNOSTIC, EXTENSIVE DESI;  Surgeon: Barak Collier MD;  Location: [address] [place] [address] OR;  Service: General    UPPER GASTROINTESTINAL ENDOSCOPY         Family History   Problem Relation Age of Onset    No Known Problems Mother     No Known Problems Father     No Known Problems Daughter     No Known Problems Maternal Grandmother     No Known Problems Paternal Grandmother     No Known Problems Paternal Aunt     No Known Problems Paternal Aunt     No Known Problems Paternal Aunt      I have reviewed and agree with the history as documented      E-Cigarette/Vaping    E-Cigarette Use Never User      E-Cigarette/Vaping Substances    Nicotine No     THC No     CBD No     Flavoring No     Other No     Unknown No      Social History     Tobacco Use    Smoking status: Former Smoker     Quit date: 200     Years since quittin 2    Smokeless tobacco: Never Used   Vaping Use    Vaping Use: Never used   Substance Use Topics    Alcohol use: Never    Drug use: Never       Review of Systems   Constitutional: Negative for chills, diaphoresis and fever  HENT: Negative for congestion, sinus pressure, sore throat and trouble swallowing  Eyes: Negative for pain, discharge and itching  Respiratory: Positive for shortness of breath  Negative for cough, chest tightness and wheezing  Cardiovascular: Positive for chest pain  Negative for palpitations and leg swelling  Gastrointestinal: Positive for nausea  Negative for abdominal distention, abdominal pain, blood in stool, diarrhea and vomiting  Endocrine: Negative for polyphagia and polyuria  Genitourinary: Negative for difficulty urinating, dysuria, flank pain, hematuria, pelvic pain and vaginal bleeding  Musculoskeletal: Negative for arthralgias and back pain  Skin: Negative for rash  Neurological: Positive for light-headedness  Negative for dizziness, syncope, weakness and headaches  Physical Exam  Physical Exam  Vitals and nursing note reviewed  Constitutional:       General: She is not in acute distress  Appearance: She is well-developed  HENT:      Head: Normocephalic and atraumatic  Right Ear: External ear normal       Left Ear: External ear normal       Nose: Nose normal       Mouth/Throat:      Mouth: Mucous membranes are moist       Pharynx: No oropharyngeal exudate  Eyes:      Conjunctiva/sclera: Conjunctivae normal       Pupils: Pupils are equal, round, and reactive to light  Cardiovascular:      Rate and Rhythm: Normal rate and regular rhythm  Heart sounds: Normal heart sounds  No murmur heard  No friction rub  No gallop  Pulmonary:      Effort: Pulmonary effort is normal  No respiratory distress  Breath sounds: Normal breath sounds  No wheezing or rales     Chest:      Chest wall: Tenderness (sternal) present  Abdominal:      General: There is no distension  Palpations: Abdomen is soft  Tenderness: There is no abdominal tenderness  There is no guarding  Musculoskeletal:         General: No swelling, tenderness or deformity  Normal range of motion  Cervical back: Normal range of motion and neck supple  Lymphadenopathy:      Cervical: No cervical adenopathy  Skin:     General: Skin is warm and dry  Neurological:      General: No focal deficit present  Mental Status: She is alert and oriented to person, place, and time  Mental status is at baseline  Cranial Nerves: No cranial nerve deficit  Sensory: No sensory deficit  Motor: No weakness or abnormal muscle tone        Coordination: Coordination normal          Vital Signs  ED Triage Vitals   Temperature Pulse Respirations Blood Pressure SpO2   03/25/22 0626 03/25/22 0626 03/25/22 0626 03/25/22 0626 03/25/22 0626   98 4 °F (36 9 °C) 66 17 137/81 100 %      Temp Source Heart Rate Source Patient Position - Orthostatic VS BP Location FiO2 (%)   03/25/22 0626 03/25/22 0626 03/25/22 0834 03/25/22 0834 --   Oral Monitor Lying Left arm       Pain Score       03/25/22 0624       9           Vitals:    03/25/22 0626 03/25/22 0834   BP: 137/81 114/66   Pulse: 66 66   Patient Position - Orthostatic VS:  Lying         Visual Acuity      ED Medications  Medications   ondansetron (ZOFRAN) injection 4 mg (4 mg Intravenous Given 3/25/22 0744)   nitroglycerin (NITROSTAT) SL tablet 0 4 mg (0 4 mg Sublingual Given 3/25/22 0740)   aspirin tablet 325 mg (325 mg Oral Given 3/25/22 0745)       Diagnostic Studies  Results Reviewed     Procedure Component Value Units Date/Time    Comprehensive metabolic panel [[id number]]  (Abnormal) Collected: 03/25/22 0636    Lab Status: Final result Specimen: Blood from Arm, Right Updated: 03/25/22 0719     Sodium 137 mmol/L      Potassium 4 0 mmol/L      Chloride 102 mmol/L      CO2 25 mmol/L      ANION GAP 10 mmol/L      BUN 26 mg/dL      Creatinine 1 13 mg/dL      Glucose 187 mg/dL      Calcium 9 4 mg/dL      AST 20 U/L      ALT 20 U/L      Alkaline Phosphatase 81 U/L      Total Protein 7 4 g/dL      Albumin 4 0 g/dL      Total Bilirubin 0 31 mg/dL      eGFR 56 ml/min/1 73sq m     Narrative:      [place] guidelines for Chronic Kidney Disease (CKD):     Stage 1 with normal or high GFR (GFR > 90 mL/min/1 73 square meters)    Stage 2 Mild CKD (GFR = 60-89 mL/min/1 73 square meters)    Stage 3A Moderate CKD (GFR = 45-59 mL/min/1 73 square meters)    Stage 3B Moderate CKD (GFR = 30-44 mL/min/1 73 square meters)    Stage 4 Severe CKD (GFR = 15-29 mL/min/1 73 square meters)    Stage 5 End Stage CKD (GFR <15 mL/min/1 73 square meters)  Note: GFR calculation is accurate only with a steady state creatinine    HS Troponin 0hr (reflex protocol) [[id number]]  (Normal) Collected: 03/25/22 0636    Lab Status: Final result Specimen: Blood from Arm, Right Updated: 03/25/22 0717     hs TnI 0hr <2 ng/L     D-Dimer [[id number]]  (Normal) Collected: 03/25/22 0636    Lab Status: Final result Specimen: Blood from Arm, Right Updated: 03/25/22 0706     D-Dimer, Quant 0 43 ug/ml FEU     Narrative: In the evaluation for possible pulmonary embolism, in the appropriate (Well's Score of 4 or less) patient, the age adjusted d-dimer cutoff for this patient can be calculated as:    Age x 0 01 (in ug/mL) for Age-adjusted D-dimer exclusion threshold for a patient over 50 years      Protime-INR [[id number]]  (Normal) Collected: 03/25/22 0636    Lab Status: Final result Specimen: Blood from Arm, Right Updated: 03/25/22 0704     Protime 12 2 seconds      INR 0 91    APTT [[id number]]  (Normal) Collected: 03/25/22 0636    Lab Status: Final result Specimen: Blood from Arm, Right Updated: 03/25/22 0704     PTT 27 seconds     CBC and differential [[id number]]  (Abnormal) Collected: 03/25/22 0636    Lab Status: Final result Specimen: Blood from Arm, Right Updated: 03/25/22 0645     WBC 9 49 Thousand/uL      RBC 4 55 Million/uL      Hemoglobin 12 0 g/dL      Hematocrit 38 0 %      MCV 84 fL      MCH 26 4 pg      MCHC 31 6 g/dL      RDW 14 4 %      MPV 10 4 fL      Platelets 896 Thousands/uL      nRBC 0 /100 WBCs      Neutrophils Relative 42 %      Immat GRANS % 0 %      Lymphocytes Relative 47 %      Monocytes Relative 9 %      Eosinophils Relative 2 %      Basophils Relative 0 %      Neutrophils Absolute 3 99 Thousands/µL      Immature Grans Absolute 0 01 Thousand/uL      Lymphocytes Absolute 4 45 Thousands/µL      Monocytes Absolute 0 84 Thousand/µL      Eosinophils Absolute 0 16 Thousand/µL      Basophils Absolute 0 04 Thousands/µL                  XR chest 1 view portable   Final Result by Dee Powell MD (03/25 1082)      No acute cardiopulmonary disease        Findings are stable            Workstation performed: [id number]                    Procedures  ECG 12 Lead Documentation Only    Date/Time: 3/25/2022 6:34 AM  Performed by: Obed Doll DO  Authorized by: Obed Doll DO     Indications / Diagnosis:  Chest pain, sob  ECG reviewed by me, the ED Provider: yes    Patient location:  ED  Previous ECG:     Previous ECG:  Compared to current    Similarity:  No change    Comparison to cardiac monitor: Yes    Interpretation:     Interpretation: normal    Rate:     ECG rate:  65    ECG rate assessment: normal    Rhythm:     Rhythm: sinus rhythm    Ectopy:     Ectopy: none    QRS:     QRS axis:  Normal  Conduction:     Conduction: normal    ST segments:     ST segments:  Normal  T waves:     T waves: normal               ED Course             HEART Risk Score      Most Recent Value   Heart Score Risk Calculator    History 1 Filed at: 03/25/2022 1501   ECG 0 Filed at: 03/25/2022 1501   Age 1 Filed at: 03/25/2022 1501   Risk Factors 2 Filed at: 03/25/2022 1501   Troponin 0 Filed at: 03/25/2022 1501 HEART Score 4 Filed at: 03/25/2022 1501                PERC Rule for PE      Most Recent Value   PERC Rule for PE    Age >=50 1 Filed at: 03/25/2022 0633   HR >=100 0 Filed at: 03/25/2022 0633   O2 Sat on room air < 95% 0 Filed at: 03/25/2022 9597   History of PE or DVT 0 Filed at: 03/25/2022 2417   Recent trauma or surgery 0 Filed at: 03/25/2022 5758   Hemoptysis 0 Filed at: 03/25/2022 3235   Exogenous estrogen 0 Filed at: 03/25/2022 2176   Unilateral leg swelling 0 Filed at: 03/25/2022 3682   PERC Rule for PE Results 1 Filed at: 03/25/2022 5868              SBIRT 20yo+      Most Recent Value   SBIRT (25 yo +)    In order to provide better care to our patients, we are screening all of our patients for alcohol and drug use  Would it be okay to ask you these screening questions? Yes Filed at: 03/25/2022 3485   Initial Alcohol Screen: US AUDIT-C     1  How often do you have a drink containing alcohol? 0 Filed at: 03/25/2022 0639   2  How many drinks containing alcohol do you have on a typical day you are drinking? 0 Filed at: 03/25/2022 0639   3a  Male UNDER 65: How often do you have five or more drinks on one occasion? 0 Filed at: 03/25/2022 0639   3b  FEMALE Any Age, or MALE 65+: How often do you have 4 or more drinks on one occassion? 0 Filed at: 03/25/2022 5635   Audit-C Score 0 Filed at: 03/25/2022 4090   LILIA: How many times in the past year have you    Used an illegal drug or used a prescription medication for non-medical reasons?  Never Filed at: 03/25/2022 1693          Wells' Criteria for PE      Most Recent Value   Wells' Criteria for PE    Clinical signs and symptoms of DVT 0 Filed at: 03/25/2022 5106   PE is primary diagnosis or equally likely 0 Filed at: 03/25/2022 0633   HR >100 0 Filed at: 03/25/2022 6837   Immobilization at least 3 days or Surgery in the previous 4 weeks 0 Filed at: 03/25/2022 0165   Previous, objectively diagnosed PE or DVT 0 Filed at: 03/25/2022 6445   Hemoptysis 0 Filed at: 03/25/2022 1492   Malignancy with treatment within 6 months or palliative 0 Filed at: 03/25/2022 9055   Wells' Criteria Total 0 Filed at: 03/25/2022 9199                [facility]  Number of Diagnoses or Management Options  Diagnosis management comments: 69-year-old female with history of diabetes, hypertension, hyperlipidemia presenting for evaluation of substernal chest pain  Woke her up about an hour prior to arrival   Nathanael Kendra in nature  Radiates into back and left arm  Associated lightheadedness, shortness of breath  Says chest pain is worse when she takes a deep breath  No leg pain or leg swelling  No history DVT or PE  Patient's vitals within normal limits  Will obtain cardiac workup, given pleuritic nature of pain in patient's age, will obtain D-dimer  EKG normal sinus rhythm with no ischemic changes      Disposition  Final diagnoses:   Atypical chest pain     Time reflects when diagnosis was documented in both MDM as applicable and the Disposition within this note     Time User Action Codes Description Comment    3/25/2022  8:21 AM Robles Elizabeth [R07 [id number]] Atypical chest pain       ED Disposition     ED Disposition Condition Date/Time Comment    Discharge Stable Fri Mar 25, 2022  8:21 AM [employer] Divers discharge to home/self care              Follow-up Information     Follow up With Specialties Details Why [address], [address] [facility], Nurse Practitioner, Emergency Medicine Schedule an appointment as soon as possible for a visit in 2 days If symptoms worsen [address] [facility] [address]  [phone]            Discharge Medication List as of 3/25/2022  8:22 AM      CONTINUE these medications which have NOT CHANGED    Details   albuterol (ACCUNEB) 0 63 MG/3ML nebulizer solution take 3 milliliters by mouth every 4 hours if needed for wheezing, Historical Med      albuterol (PROAIR HFA) 90 mcg/act inhaler Inhale 1 puff every 6 (six) hours as needed, Starting Sat 10/28/2017, Historical Med      Aspirin (ASPIR-81 PO) Take 81 mg by mouth, Starting Mon 2/20/2012, Historical Med      atorvastatin (LIPITOR) 40 mg tablet Take 1 tablet (40 mg total) by mouth daily at bedtime, Starting Mon 1/17/2022, Normal      B-D UF III MINI PEN NEEDLES 31G X 5 MM MISC Starting Tue 1/21/2020, Historical Med      BANOPHEN 50 MG capsule take 1 capsule by mouth every 6 hours if needed for itching, Historical Med      benzonatate (TESSALON) 200 MG capsule Take 1 capsule (200 mg total) by mouth 3 (three) times a day as needed for cough, Starting Thu 12/23/2021, Normal      cholecalciferol (VITAMIN D3) 400 units tablet Take 1 tablet (400 Units total) by mouth daily, Starting Thu 1/13/2022, Normal      citalopram (CeleXA) 40 mg tablet Take 1 tablet (40 mg total) by mouth daily, Starting Thu 1/13/2022, Normal      Continuous Blood Gluc  (FreeStyle Refugio 14 Day [place]) TEMITOPE Use for continuous blood glucose monitoring, Print      Continuous Blood Gluc Sensor (FreeStyle Refugio 14 Day Sensor) MISC Use one sensor every 14 days for continuous blood sugar monitoring , Print       MG capsule Take 1 capsule (100 mg total) by mouth 2 (two) times a day, Starting Mon 2/21/2022, Normal      Empagliflozin 25 MG TABS Take 1 tablet (25 mg total) by mouth every morning, Starting Tue 11/30/2021, Normal      famotidine (PEPCID) 40 MG tablet Take 1 tablet (40 mg total) by mouth daily at bedtime Take in evening 2 hours prior to bed, Starting Tue 11/30/2021, Normal      fenofibrate (TRICOR) 48 mg tablet Take 1 tablet (48 mg total) by mouth daily, Starting Mon 1/17/2022, Normal      glucose blood (OneTouch Verio) test strip Use to test blood glucose 4 times a day, Normal      insulin glulisine (Apidra SoloStar) 100 units/mL injection pen Inject 28 units before breakfast and lunch  Inject 34 units prior to dinner , Normal      Lantus SoloStar 100 units/mL injection pen Inject 45 units every 12 hours  , Normal levETIRAcetam (KEPPRA) 500 mg tablet Take 1 tablet (500 mg total) by mouth every 12 (twelve) hours, Starting Fri 1/14/2022, Normal      linaCLOtide (Linzess) 72 MCG CAPS Take 1 capsule by mouth daily before breakfast, Starting Thu 2/24/2022, Normal      lisinopril (ZESTRIL) 10 mg tablet Take 1 tablet (10 mg total) by mouth daily, Starting Thu 1/13/2022, Normal      metFORMIN (GLUCOPHAGE) 1000 MG tablet Take 1 tablet (1,000 mg total) by mouth 2 (two) times a day with meals, Starting Tue 10/26/2021, Normal      metoclopramide (REGLAN) 10 mg tablet Take 1 tablet (10 mg total) by mouth daily, Starting Mon 2/21/2022, Normal      metoprolol tartrate (LOPRESSOR) 25 mg tablet Take 1 tablet (25 mg total) by mouth 2 (two) times a day, Starting Thu 1/13/2022, Normal      neomycin-polymyxin-hydrocortisone (CORTISPORIN) otic solution Administer 4 drops to the right ear every 6 (six) hours, Starting Fri 8/13/2021, Normal      ondansetron (ZOFRAN) 4 mg tablet Take 1 tablet (4 mg total) by mouth every 8 (eight) hours as needed for nausea or vomiting, Starting Mon 4/12/2021, Normal      ondansetron (ZOFRAN-ODT) 4 mg disintegrating tablet Take 1 tablet (4 mg total) by mouth every 6 (six) hours as needed for nausea or vomiting, Starting Tue 9/7/2021, Normal      pantoprazole (PROTONIX) 40 mg tablet Take 1 tablet (40 mg total) by mouth 2 (two) times a day, Starting Tue 11/30/2021, Normal      pregabalin (LYRICA) 100 mg capsule take 1 capsule by mouth three times a day, Normal      semaglutide, 1 mg/dose, (Ozempic) 4 MG/3ML SOPN injection pen Inject 0 75 mL (1 mg total) under the skin once a week, Starting Tue 10/26/2021, Normal      solifenacin (VESIcare) 5 mg tablet Take 1 tablet (5 mg total) by mouth daily, Starting Thu 1/13/2022, Normal                 PDMP Review       Value Time User    PDMP Reviewed  Yes 3/26/2021 10:43 AM Biju Rahman PA-C          ED Provider  Electronically Signed by           Kristie Auguste, DO  03/25/22 1502

## 2022-03-25 NOTE — ED CARE HANDOFF
Emergency Department Sign Out Note        Sign out and transfer of care from Dr Tc Guerra   See Separate Emergency Department note  The patient, Essie Merlos, was evaluated by the previous provider for chest pain   Workup Completed:  Labs and chest x-ray    ED Course / Workup Pending (followup): Patient was seen examined by bedside  Patient is awake alert oriented x3 GCS 15  Heart rate is 2  Patient is chest pain-free  Patient requesting discharge home        HEART Risk Score      Most Recent Value   Heart Score Risk Calculator    History 0 Filed at: 03/25/2022 0809   ECG 0 Filed at: 03/25/2022 0809   Age 1 Filed at: 03/25/2022 0809   Risk Factors 1 Filed at: 03/25/2022 0809   Troponin 0 Filed at: 03/25/2022 0809   HEART Score 2 Filed at: 03/25/2022 0809                PERC Rule for PE      Most Recent Value   PERC Rule for PE    Age >=50 1 Filed at: 03/25/2022 0633   HR >=100 0 Filed at: 03/25/2022 0633   O2 Sat on room air < 95% 0 Filed at: 03/25/2022 5066   History of PE or DVT 0 Filed at: 03/25/2022 4996   Recent trauma or surgery 0 Filed at: 03/25/2022 7331   Hemoptysis 0 Filed at: 03/25/2022 1761   Exogenous estrogen 0 Filed at: 03/25/2022 1468   Unilateral leg swelling 0 Filed at: 03/25/2022 6311   PERC Rule for PE Results 1 Filed at: 03/25/2022 5715              Wells' Criteria for PE      Most Recent Value   Wells' Criteria for PE    Clinical signs and symptoms of DVT 0 Filed at: 03/25/2022 0084   PE is primary diagnosis or equally likely 0 Filed at: 03/25/2022 0633   HR >100 0 Filed at: 03/25/2022 6780   Immobilization at least 3 days or Surgery in the previous 4 weeks 0 Filed at: 03/25/2022 2847   Previous, objectively diagnosed PE or DVT 0 Filed at: 03/25/2022 4202   Hemoptysis 0 Filed at: 03/25/2022 5691   Malignancy with treatment within 6 months or palliative 0 Filed at: 03/25/2022 9836   Wells' Criteria Total 0 Filed at: 03/25/2022 3015                ED Course as of 03/25/22 0829   [facility] Mar 25, 2022   0658 Diabetic with chest pain going to l arm, vitals are stable  EKG normal pending labs    1117 Patient still complaining of chest pain will try nitroglycerin also requesting for something for nausea  Zofran is ordered  D-dimer is negative  Troponin pending    [id number] Patient is lying comfortably in bed states she has the feeling so much better pain has resolved  Lab result discussed with patient troponin is negative patient does not want to wait for 2nd set of enzymes  She is awake alert oriented x3 GCS 15  Precaution provided regarding return recommending follow-up with the Cardiology  Procedures  MDM        Disposition  Final diagnoses:   Atypical chest pain     Time reflects when diagnosis was documented in both MDM as applicable and the Disposition within this note     Time User Action Codes Description Comment    3/25/2022  8:21 AM Robles Elizabeth [[id number]] Atypical chest pain       ED Disposition     ED Disposition Condition Date/Time Comment    Discharge Stable Fri Mar 25, 2022  8:21 AM Nila Divers discharge to home/self care              Follow-up Information     Follow up With Specialties Details Why Contact Info    Nila Altamirano, [address], Nurse Practitioner, Emergency Medicine Schedule an appointment as soon as possible for a visit in 2 days If symptoms worsen [address]  [phone]          Patient's Medications   Discharge Prescriptions    No medications on file            ED Provider  Electronically Signed by     Krystina Gomez MD  03/25/22 5355

## 2022-03-28 ENCOUNTER — APPOINTMENT (OUTPATIENT)
Dept: LAB | Facility: CLINIC | Age: 52
End: 2022-03-28
Payer: COMMERCIAL

## 2022-03-28 ENCOUNTER — TELEPHONE (OUTPATIENT)
Dept: NEPHROLOGY | Facility: CLINIC | Age: 52
End: 2022-03-28

## 2022-03-28 DIAGNOSIS — E11.59 HYPERTENSION ASSOCIATED WITH DIABETES (HCC): ICD-10-CM

## 2022-03-28 DIAGNOSIS — I15.2 HYPERTENSION ASSOCIATED WITH DIABETES (HCC): ICD-10-CM

## 2022-03-28 DIAGNOSIS — C64.1 RENAL CELL CARCINOMA OF RIGHT KIDNEY (HCC): ICD-10-CM

## 2022-03-28 DIAGNOSIS — N18.31 STAGE 3A CHRONIC KIDNEY DISEASE (HCC): ICD-10-CM

## 2022-03-28 LAB
ANION GAP SERPL CALCULATED.3IONS-SCNC: 4 MMOL/L (ref 4–13)
BACTERIA UR QL AUTO: ABNORMAL /HPF
BASOPHILS # BLD AUTO: 0.05 THOUSANDS/ΜL (ref 0–0.1)
BASOPHILS NFR BLD AUTO: 1 % (ref 0–1)
BILIRUB UR QL STRIP: NEGATIVE
BUN SERPL-MCNC: 35 MG/DL (ref 5–25)
CALCIUM SERPL-MCNC: 9.2 MG/DL (ref 8.3–10.1)
CHLORIDE SERPL-SCNC: 105 MMOL/L (ref 100–108)
CLARITY UR: CLEAR
CO2 SERPL-SCNC: 28 MMOL/L (ref 21–32)
COLOR UR: ABNORMAL
CREAT SERPL-MCNC: 1.09 MG/DL (ref 0.6–1.3)
CREAT UR-MCNC: 42.9 MG/DL
EOSINOPHIL # BLD AUTO: 0.18 THOUSAND/ΜL (ref 0–0.61)
EOSINOPHIL NFR BLD AUTO: 2 % (ref 0–6)
ERYTHROCYTE [DISTWIDTH] IN BLOOD BY AUTOMATED COUNT: 14.5 % (ref 11.6–15.1)
GFR SERPL CREATININE-BSD FRML MDRD: 58 ML/MIN/1.73SQ M
GLUCOSE P FAST SERPL-MCNC: 239 MG/DL (ref 65–99)
GLUCOSE UR STRIP-MCNC: ABNORMAL MG/DL
HCT VFR BLD AUTO: 36.3 % (ref 34.8–46.1)
HGB BLD-MCNC: 11.4 G/DL (ref 11.5–15.4)
HGB UR QL STRIP.AUTO: NEGATIVE
IMM GRANULOCYTES # BLD AUTO: 0.04 THOUSAND/UL (ref 0–0.2)
IMM GRANULOCYTES NFR BLD AUTO: 0 % (ref 0–2)
KETONES UR STRIP-MCNC: NEGATIVE MG/DL
LEUKOCYTE ESTERASE UR QL STRIP: ABNORMAL
LYMPHOCYTES # BLD AUTO: 3.27 THOUSANDS/ΜL (ref 0.6–4.47)
LYMPHOCYTES NFR BLD AUTO: 35 % (ref 14–44)
MCH RBC QN AUTO: 26 PG (ref 26.8–34.3)
MCHC RBC AUTO-ENTMCNC: 31.4 G/DL (ref 31.4–37.4)
MCV RBC AUTO: 83 FL (ref 82–98)
MONOCYTES # BLD AUTO: 0.69 THOUSAND/ΜL (ref 0.17–1.22)
MONOCYTES NFR BLD AUTO: 7 % (ref 4–12)
NEUTROPHILS # BLD AUTO: 5.26 THOUSANDS/ΜL (ref 1.85–7.62)
NEUTS SEG NFR BLD AUTO: 55 % (ref 43–75)
NITRITE UR QL STRIP: NEGATIVE
NON-SQ EPI CELLS URNS QL MICRO: ABNORMAL /HPF
NRBC BLD AUTO-RTO: 0 /100 WBCS
PH UR STRIP.AUTO: 6 [PH]
PLATELET # BLD AUTO: 391 THOUSANDS/UL (ref 149–390)
PMV BLD AUTO: 10.6 FL (ref 8.9–12.7)
POTASSIUM SERPL-SCNC: 4.1 MMOL/L (ref 3.5–5.3)
PROT UR STRIP-MCNC: NEGATIVE MG/DL
PROT UR-MCNC: <6 MG/DL
PROT/CREAT UR: <0.14 MG/G{CREAT} (ref 0–0.1)
RBC # BLD AUTO: 4.38 MILLION/UL (ref 3.81–5.12)
RBC #/AREA URNS AUTO: ABNORMAL /HPF
SODIUM SERPL-SCNC: 137 MMOL/L (ref 136–145)
SP GR UR STRIP.AUTO: 1.03 (ref 1–1.03)
UROBILINOGEN UR STRIP-ACNC: <2 MG/DL
WBC # BLD AUTO: 9.49 THOUSAND/UL (ref 4.31–10.16)
WBC #/AREA URNS AUTO: ABNORMAL /HPF

## 2022-03-28 PROCEDURE — 82570 ASSAY OF URINE CREATININE: CPT

## 2022-03-28 PROCEDURE — 85025 COMPLETE CBC W/AUTO DIFF WBC: CPT

## 2022-03-28 PROCEDURE — 80048 BASIC METABOLIC PNL TOTAL CA: CPT

## 2022-03-28 PROCEDURE — 36415 COLL VENOUS BLD VENIPUNCTURE: CPT

## 2022-03-28 PROCEDURE — 84156 ASSAY OF PROTEIN URINE: CPT

## 2022-03-28 PROCEDURE — 81001 URINALYSIS AUTO W/SCOPE: CPT

## 2022-03-28 NOTE — TELEPHONE ENCOUNTER
Appointment Confirmation   Person confirmed appointment with  If not patient, name of the person [email or web address]    Date and time of appointment 3/29/22  4pm    Patient acknowledged and will be at appointment? no    Did you advise the patient that they will need a urine sample if they are a new patient?   Yes   Did you advise the patient to bring their current medications for verification? (including any OTC) Yes    Additional Information

## 2022-03-29 ENCOUNTER — CONSULT (OUTPATIENT)
Dept: NEPHROLOGY | Facility: CLINIC | Age: 52
End: 2022-03-29
Payer: COMMERCIAL

## 2022-03-29 VITALS
BODY MASS INDEX: 34.17 KG/M2 | SYSTOLIC BLOOD PRESSURE: 132 MMHG | OXYGEN SATURATION: 98 % | DIASTOLIC BLOOD PRESSURE: 72 MMHG | HEIGHT: 61 IN | WEIGHT: 181 LBS | HEART RATE: 60 BPM

## 2022-03-29 DIAGNOSIS — Z85.528 HISTORY OF KIDNEY CANCER: ICD-10-CM

## 2022-03-29 DIAGNOSIS — N18.31 STAGE 3A CHRONIC KIDNEY DISEASE (HCC): ICD-10-CM

## 2022-03-29 DIAGNOSIS — Z79.4 TYPE 2 DIABETES MELLITUS WITH DIABETIC AUTONOMIC NEUROPATHY, WITH LONG-TERM CURRENT USE OF INSULIN (HCC): ICD-10-CM

## 2022-03-29 DIAGNOSIS — I15.2 HYPERTENSION ASSOCIATED WITH DIABETES (HCC): ICD-10-CM

## 2022-03-29 DIAGNOSIS — R80.9 PROTEINURIA, UNSPECIFIED TYPE: ICD-10-CM

## 2022-03-29 DIAGNOSIS — E11.9 TYPE 2 DIABETES MELLITUS WITHOUT COMPLICATION, WITHOUT LONG-TERM CURRENT USE OF INSULIN (HCC): Primary | ICD-10-CM

## 2022-03-29 DIAGNOSIS — E11.43 TYPE 2 DIABETES MELLITUS WITH DIABETIC AUTONOMIC NEUROPATHY, WITH LONG-TERM CURRENT USE OF INSULIN (HCC): ICD-10-CM

## 2022-03-29 DIAGNOSIS — C64.1 RENAL CELL CARCINOMA OF RIGHT KIDNEY (HCC): ICD-10-CM

## 2022-03-29 DIAGNOSIS — E11.59 HYPERTENSION ASSOCIATED WITH DIABETES (HCC): ICD-10-CM

## 2022-03-29 DIAGNOSIS — E55.9 VITAMIN D DEFICIENCY: ICD-10-CM

## 2022-03-29 PROCEDURE — 99245 OFF/OP CONSLTJ NEW/EST HI 55: CPT | Performed by: INTERNAL MEDICINE

## 2022-03-29 PROCEDURE — 3075F SYST BP GE 130 - 139MM HG: CPT | Performed by: INTERNAL MEDICINE

## 2022-03-29 PROCEDURE — 3078F DIAST BP <80 MM HG: CPT | Performed by: INTERNAL MEDICINE

## 2022-03-29 NOTE — ASSESSMENT & PLAN NOTE
Lab Results   Component Value Date    EGFR 58 03/28/2022    EGFR 56 03/25/2022    EGFR 50 12/01/2021    CREATININE 1 09 03/28/2022    CREATININE 1 13 03/25/2022    CREATININE 1 25 12/01/2021   She has chronic kidney disease stage IIIA with a baseline creatinine of 1-1 2 mg/dL  Factors include decreased renal mass from a partial right nephrectomy, longstanding diabetes mellitus with hyperglycemia, hypertension  Will check a kidney ultrasound to evaluate the cortex  Will check urine microalbumin to creatinine ratios  Will give her information on diabetes in the kidneys, safe over-the-counter medications to take  Recommend that she switch pantoprazole to Pepcid 20 mg twice a day for acid reflux to see if this helps  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

## 2022-03-29 NOTE — ASSESSMENT & PLAN NOTE
Lab Results   Component Value Date    HGBA1C 11 7 (A) 02/17/2022    she is following with endocrinology  Currently taking insulin and Jardiance    As well as metformin

## 2022-03-29 NOTE — ASSESSMENT & PLAN NOTE
Lab Results   Component Value Date    HGBA1C 11 7 (A) 02/17/2022     Agree with continuing lisinopril

## 2022-03-29 NOTE — PROGRESS NOTES
[address] [facility], [place]    Name: Kristel Gutiérrez  YOB: 1970      Assessment/Plan:           Problem List Items Addressed This Visit        Endocrine    Type 2 diabetes mellitus with neurologic complication [facility])       Lab Results   Component Value Date    HGBA1C 11 7 (A) 02/17/2022    she is following with endocrinology  Currently taking insulin and Jardiance  As well as metformin         Relevant Medications    Empagliflozin (Jardiance) 25 MG TABS    Hypertension associated with diabetes ([facility] [address] )       Lab Results   Component Value Date    HGBA1C 11 7 (A) 02/17/2022     Agree with continuing lisinopril  Relevant Medications    Empagliflozin (Jardiance) 25 MG TABS       Genitourinary    Renal cell carcinoma of right kidney ([facility] [address] )       She will have a CT scan of the kidneys to evaluate for stability         Stage 3a chronic kidney disease [facility])     Lab Results   Component Value Date    EGFR 58 03/28/2022    EGFR 56 03/25/2022    EGFR 50 12/01/2021    CREATININE 1 09 03/28/2022    CREATININE 1 13 03/25/2022    CREATININE 1 25 12/01/2021   She has chronic kidney disease stage IIIA with a baseline creatinine of 1-1 2 mg/dL  Factors include decreased renal mass from a partial right nephrectomy, longstanding diabetes mellitus with hyperglycemia, hypertension  Will check a kidney ultrasound to evaluate the cortex  Will check urine microalbumin to creatinine ratios  Will give her information on diabetes in the kidneys, safe over-the-counter medications to take  Recommend that she switch pantoprazole to Pepcid 20 mg twice a day for acid reflux to see if this helps  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              Other    History of kidney cancer    Vitamin D deficiency      Continue vitamin-D supplement            Other Visit Diagnoses     Type 2 diabetes mellitus without complication, without long-term current use of insulin (HCC)    -  Primary    Relevant Medications    Empagliflozin (Jardiance) 25 MG TABS            Subjective:      Patient ID: Rafiq Barraza is a 46 y o  female  referred by Nannette Sidhu    HPI Had a partial right nephrectomy on 5/15/17 for clear cell CA at [facility] Organ Has DM_ insulin requiring for 10+ years without retinopathy  She has hypertension, HHLD     Her last creatinine was 1 09 mg/dl but has ranged 0 9-1 2 mg/dl   She does not take NSAIDs and has no FH of kidney disease    The following portions of the patient's history were reviewed and updated as appropriate: allergies, current medications, past family history, past medical history, past social history, past surgical history and problem list     Review of Systems   Constitutional: Negative for fatigue  HENT: Negative for hearing loss  Eyes: Negative for visual disturbance  Respiratory: Negative for cough and shortness of breath  Cardiovascular: Positive for chest pain  Negative for palpitations and leg swelling  Went to ED the other day and nothing was found   Gastrointestinal: Negative for abdominal pain, blood in stool, constipation and diarrhea  Genitourinary: Negative for difficulty urinating, dysuria, flank pain, frequency and urgency  Musculoskeletal: Negative for arthralgias, back pain and gait problem  Neurological: Negative for dizziness, weakness and headaches  Hematological: Does not bruise/bleed easily  Psychiatric/Behavioral: Negative for dysphoric mood           Social History     Socioeconomic History    Marital status: /Civil Union     Spouse name: None    Number of children: None    Years of education: None    Highest education level: None   Occupational History    None   Tobacco Use    Smoking status: Former Smoker     Quit date:      Years since quittin 2    Smokeless tobacco: Never Used   Vaping Use    Vaping Use: Never used   Substance and Sexual Activity    Alcohol use: Never    Drug use: Never    Sexual activity: Not Currently     Partners: Male   Other Topics Concern    None   Social History Narrative    None     Social Determinants of Health     Financial Resource Strain: Not on file   Food Insecurity: Not on file   Transportation Needs: Not on file   Physical Activity: Not on file   Stress: Not on file   Social Connections: Not on file   Intimate Partner Violence: Not on file   Housing Stability: Not on file     Past Medical History:   Diagnosis Date    Depression     Diabetes mellitus ([facility] )     Gastroparesis     GERD (gastroesophageal reflux disease)     Hyperlipidemia     Hypertension     Sciatica     Seizure disorder ([facility] )      Past Surgical History:   Procedure Laterality Date    BREAST BIOPSY Left  benign    CHOLECYSTECTOMY      COLONOSCOPY      HYSTERECTOMY      [place] LAP,APPENDECTOMY N/A 3/24/2021    Procedure: APPENDECTOMY LAPAROSCOPIC;  Surgeon: Igor Seymour MD;  Location: [facility] MAIN OR;  Service: General    [place] LAP,DIAGNOSTIC ABDOMEN N/A 3/24/2021    Procedure: LAPAROSCOPY DIAGNOSTIC, EXTENSIVE DESI;  Surgeon: Igor Seymour MD;  Location: [facility] MAIN OR;  Service: General    UPPER GASTROINTESTINAL ENDOSCOPY         Current Outpatient Medications:     albuterol (ACCUNEB) 0 63 MG/3ML nebulizer solution, take 3 milliliters by mouth every 4 hours if needed for wheezing, Disp: , Rfl:     albuterol (PROAIR HFA) 90 mcg/act inhaler, Inhale 1 puff every 6 (six) hours as needed, Disp: , Rfl:     Aspirin (ASPIR-81 PO), Take 81 mg by mouth, Disp: , Rfl:     atorvastatin (LIPITOR) 40 mg tablet, Take 1 tablet (40 mg total) by mouth daily at bedtime, Disp: 90 tablet, Rfl: 3    B-D UF III MINI PEN NEEDLES 31G X 5 MM MISC, , Disp: , Rfl:     BANOPHEN 50 MG capsule, take 1 capsule by mouth every 6 hours if needed for itching, Disp: , Rfl:     benzonatate (TESSALON) 200 MG capsule, Take 1 capsule (200 mg total) by mouth 3 (three) times a day as needed for cough, Disp: 60 capsule, Rfl: 1    cholecalciferol (VITAMIN D3) 400 units tablet, Take 1 tablet (400 Units total) by mouth daily, Disp: 90 tablet, Rfl: 1    citalopram (CeleXA) 40 mg tablet, Take 1 tablet (40 mg total) by mouth daily, Disp: 90 tablet, Rfl: 1    Continuous Blood Gluc  (FreeStyle Refugio 14 Day [place]) TEMITOPE, Use for continuous blood glucose monitoring, Disp: 1 each, Rfl: 0    Continuous Blood Gluc Sensor (FreeStyle Refugio 14 Day Sensor) MISC, Use one sensor every 14 days for continuous blood sugar monitoring , Disp: 6 each, Rfl: 3     MG capsule, Take 1 capsule (100 mg total) by mouth 2 (two) times a day, Disp: 180 capsule, Rfl: 3    Empagliflozin 25 MG TABS, Take 1 tablet (25 mg total) by mouth every morning, Disp: 90 tablet, Rfl: 1    famotidine (PEPCID) 40 MG tablet, Take 1 tablet (40 mg total) by mouth daily at bedtime Take in evening 2 hours prior to bed, Disp: 30 tablet, Rfl: 6    fenofibrate (TRICOR) 48 mg tablet, Take 1 tablet (48 mg total) by mouth daily, Disp: 90 tablet, Rfl: 3    glucose blood (OneTouch Verio) test strip, Use to test blood glucose 4 times a day, Disp: 200 strip, Rfl: 2    insulin glulisine (Apidra SoloStar) 100 units/mL injection pen, Inject 28 units before breakfast and lunch  Inject 34 units prior to dinner , Disp: 30 mL, Rfl: 1    Lantus SoloStar 100 units/mL injection pen, Inject 45 units every 12 hours  , Disp: 30 mL, Rfl: 1    levETIRAcetam (KEPPRA) 500 mg tablet, Take 1 tablet (500 mg total) by mouth every 12 (twelve) hours, Disp: 180 tablet, Rfl: 1    linaCLOtide (Linzess) 72 MCG CAPS, Take 1 capsule by mouth daily before breakfast, Disp: 30 capsule, Rfl: 0    lisinopril (ZESTRIL) 10 mg tablet, Take 1 tablet (10 mg total) by mouth daily, Disp: 90 tablet, Rfl: 1    metFORMIN (GLUCOPHAGE) 1000 MG tablet, Take 1 tablet (1,000 mg total) by mouth 2 (two) times a day with meals, Disp: 180 tablet, Rfl: 1    metoclopramide (REGLAN) 10 mg tablet, Take 1 tablet (10 mg total) by mouth daily, Disp: 90 tablet, Rfl: 3    metoprolol tartrate (LOPRESSOR) 25 mg tablet, Take 1 tablet (25 mg total) by mouth 2 (two) times a day, Disp: 180 tablet, Rfl: 1    neomycin-polymyxin-hydrocortisone (CORTISPORIN) otic solution, Administer 4 drops to the right ear every 6 (six) hours, Disp: 10 mL, Rfl: 0    ondansetron (ZOFRAN) 4 mg tablet, Take 1 tablet (4 mg total) by mouth every 8 (eight) hours as needed for nausea or vomiting, Disp: 20 tablet, Rfl: 0    ondansetron (ZOFRAN-ODT) 4 mg disintegrating tablet, Take 1 tablet (4 mg total) by mouth every 6 (six) hours as needed for nausea or vomiting, Disp: 20 tablet, Rfl: 0    pantoprazole (PROTONIX) 40 mg tablet, Take 1 tablet (40 mg total) by mouth 2 (two) times a day, Disp: 180 tablet, Rfl: 3    pregabalin (LYRICA) 100 mg capsule, take 1 capsule by mouth three times a day, Disp: 270 capsule, Rfl: 3    semaglutide, 1 mg/dose, (Ozempic) 4 MG/3ML SOPN injection pen, Inject 0 75 mL (1 mg total) under the skin once a week, Disp: 9 mL, Rfl: 1    solifenacin (VESIcare) 5 mg tablet, Take 1 tablet (5 mg total) by mouth daily, Disp: 90 tablet, Rfl: 1    Empagliflozin (Jardiance) 25 MG TABS, Take 1 tablet (25 mg total) by mouth every morning, Disp: 90 tablet, Rfl: 3    Lab Results   Component Value Date    SODIUM 137 03/28/2022    K 4 1 03/28/2022     03/28/2022    CO2 28 03/28/2022    AGAP 4 03/28/2022    BUN 35 (H) 03/28/2022    CREATININE 1 09 03/28/2022    GLUC 187 (H) 03/25/2022    GLUF 239 (H) 03/28/2022    CALCIUM 9 2 03/28/2022    AST 20 03/25/2022    ALT 20 03/25/2022    ALKPHOS 81 03/25/2022    TP 7 4 03/25/2022    TBILI 0 31 03/25/2022    EGFR 58 03/28/2022     Lab Results   Component Value Date    WBC 9 49 03/28/2022    HGB 11 4 (L) 03/28/2022    HCT 36 3 03/28/2022    MCV 83 03/28/2022     (H) 03/28/2022     Lab Results   Component Value Date CHOLESTEROL 176 12/01/2021    CHOLESTEROL 193 08/10/2021     Lab Results   Component Value Date    HDL 48 (L) 12/01/2021    HDL 40 08/10/2021     Lab Results   Component Value Date    LDLCALC 83 12/01/2021    LDLCALC 80 08/10/2021     Lab Results   Component Value Date    TRIG 224 (H) 12/01/2021    TRIG 366 (H) 08/10/2021     No results found for: [place], [place]  Lab Results   Component Value Date    RCY4XNIPKWUZ 1 320 08/10/2021     Lab Results   Component Value Date    CALCIUM 9 2 03/28/2022    PHOS 3 8 03/25/2021     No results found for: SPEP, UPEP  No results found for: ELVIS KILGORE4HUR        Objective:      /72   Pulse 60   Ht 5' 1" (1 549 m)   Wt 82 1 kg (181 lb)   SpO2 98%   BMI 34 20 kg/m²          Physical Exam  Vitals reviewed  Constitutional:       Appearance: She is normal weight  HENT:      Right Ear: External ear normal       Left Ear: External ear normal    Eyes:      Extraocular Movements: Extraocular movements intact  Conjunctiva/sclera: Conjunctivae normal       Pupils: Pupils are equal, round, and reactive to light  Cardiovascular:      Rate and Rhythm: Normal rate and regular rhythm  Pulmonary:      Effort: Pulmonary effort is normal  No respiratory distress  Breath sounds: Normal breath sounds  No wheezing or rales  Abdominal:      General: Bowel sounds are normal  There is no distension  Palpations: Abdomen is soft  Hernia: No hernia is present  Musculoskeletal:         General: Normal range of motion  Right lower leg: No edema  Left lower leg: No edema  Skin:     General: Skin is warm and dry  Neurological:      General: No focal deficit present  Mental Status: She is alert and oriented to person, place, and time  Mental status is at baseline  Motor: No weakness  Gait: Gait normal    Psychiatric:         Mood and Affect: Mood normal          Behavior: Behavior normal          Thought Content:  Thought content normal  Judgment: Judgment normal

## 2022-04-06 DIAGNOSIS — J45.20 MILD INTERMITTENT ASTHMA WITHOUT COMPLICATION: Primary | ICD-10-CM

## 2022-04-06 NOTE — TELEPHONE ENCOUNTER
Patient requesting refill(s) of: albuterol inhaler    Last filled: 10/28/2017  Last appt: 2/17/22  Next appt: N/A  Pharmacy: [facility]

## 2022-04-07 RX ORDER — ALBUTEROL SULFATE 90 UG/1
1 AEROSOL, METERED RESPIRATORY (INHALATION) EVERY 6 HOURS PRN
Qty: 18 G | Refills: 3 | Status: SHIPPED | OUTPATIENT
Start: 2022-04-07

## 2022-04-08 ENCOUNTER — CLINICAL SUPPORT (OUTPATIENT)
Dept: FAMILY MEDICINE CLINIC | Facility: CLINIC | Age: 52
End: 2022-04-08
Payer: COMMERCIAL

## 2022-04-08 VITALS — WEIGHT: 180 LBS | BODY MASS INDEX: 34.01 KG/M2

## 2022-04-08 DIAGNOSIS — Z79.4 TYPE 2 DIABETES MELLITUS WITH DIABETIC AUTONOMIC NEUROPATHY, WITH LONG-TERM CURRENT USE OF INSULIN (HCC): Primary | ICD-10-CM

## 2022-04-08 DIAGNOSIS — E11.43 TYPE 2 DIABETES MELLITUS WITH DIABETIC AUTONOMIC NEUROPATHY, WITH LONG-TERM CURRENT USE OF INSULIN (HCC): Primary | ICD-10-CM

## 2022-04-08 LAB — SL AMB POCT HEMOGLOBIN AIC: 9.8 (ref ?–6.5)

## 2022-04-08 PROCEDURE — 3046F HEMOGLOBIN A1C LEVEL >9.0%: CPT | Performed by: INTERNAL MEDICINE

## 2022-04-08 PROCEDURE — 83036 HEMOGLOBIN GLYCOSYLATED A1C: CPT

## 2022-04-12 ENCOUNTER — TELEPHONE (OUTPATIENT)
Dept: DIABETES SERVICES | Facility: CLINIC | Age: 52
End: 2022-04-12

## 2022-04-14 ENCOUNTER — CONSULT (OUTPATIENT)
Dept: CARDIOLOGY CLINIC | Facility: CLINIC | Age: 52
End: 2022-04-14
Payer: COMMERCIAL

## 2022-04-14 VITALS
SYSTOLIC BLOOD PRESSURE: 100 MMHG | HEART RATE: 66 BPM | BODY MASS INDEX: 34.36 KG/M2 | WEIGHT: 182 LBS | DIASTOLIC BLOOD PRESSURE: 68 MMHG | HEIGHT: 61 IN

## 2022-04-14 DIAGNOSIS — Z79.4 TYPE 2 DIABETES MELLITUS WITH DIABETIC AUTONOMIC NEUROPATHY, WITH LONG-TERM CURRENT USE OF INSULIN (HCC): ICD-10-CM

## 2022-04-14 DIAGNOSIS — R07.89 ATYPICAL CHEST PAIN: Primary | ICD-10-CM

## 2022-04-14 DIAGNOSIS — E78.00 HYPERCHOLESTEREMIA: ICD-10-CM

## 2022-04-14 DIAGNOSIS — E11.43 TYPE 2 DIABETES MELLITUS WITH DIABETIC AUTONOMIC NEUROPATHY, WITH LONG-TERM CURRENT USE OF INSULIN (HCC): ICD-10-CM

## 2022-04-14 DIAGNOSIS — E11.59 HYPERTENSION ASSOCIATED WITH DIABETES (HCC): ICD-10-CM

## 2022-04-14 DIAGNOSIS — E66.09 CLASS 1 OBESITY DUE TO EXCESS CALORIES WITH SERIOUS COMORBIDITY AND BODY MASS INDEX (BMI) OF 34.0 TO 34.9 IN ADULT: ICD-10-CM

## 2022-04-14 DIAGNOSIS — I15.2 HYPERTENSION ASSOCIATED WITH DIABETES (HCC): ICD-10-CM

## 2022-04-14 PROCEDURE — 3078F DIAST BP <80 MM HG: CPT | Performed by: INTERNAL MEDICINE

## 2022-04-14 PROCEDURE — 99244 OFF/OP CNSLTJ NEW/EST MOD 40: CPT | Performed by: INTERNAL MEDICINE

## 2022-04-14 PROCEDURE — 3074F SYST BP LT 130 MM HG: CPT | Performed by: INTERNAL MEDICINE

## 2022-04-14 NOTE — PROGRESS NOTES
Cardiology Consultation     Piero Silverio  [id number]  1970  [facility] [address] [id number]      1  Atypical chest pain  Ambulatory Referral to Cardiology   2  Hypertension associated with diabetes ([address] )     3  Hypercholesteremia     4  Class 1 obesity due to excess calories with serious comorbidity and body mass index (BMI) of 34 0 to 34 9 in adult         Discussion/Summary:  1  Chest pain  2  Hypertension  3  Hyperlipidemia  4  Obesity  5  History CVA    -EKG 03/25/2022 showing sinus rhythm  -in the setting of multiple comorbidities and risk factors would recommend patient undergo exercise nuclear stress test for further evaluation room possible ischemic evaluation  -will also have patient undergo transthoracic echocardiogram to evaluate overall cardiac structure and function  -patient continue aspirin 81 mg daily, atorvastatin 40 mg daily, lisinopril 10 mg daily, metoprolol tartrate 25 mg twice daily  -patient counseled on dietary lifestyle modifications including following a low-salt low-fat heart healthy diet with some weight loss  -patient notes she is leaving for vacation on Sunday and while I did advise against this patient notes she has been looking forward to this for some time and will be going but states she will be very careful and if anything urgent were to arise she would seek emergency medical care immediately  -patient will be seen in 1 month or sooner if necessary once testing is completed  -patient counseled if she were to have any warning or alarm type symptoms she is to seek emergency medical care immediately      History of Present Illness:  - patient is a 63-year-old female with hypertension, CKD, history of renal cell carcinoma in the right kidney s/p resection in 2017, uncontrolled diabetes mellitus, history of stroke in 1990 1 obesity who presents to the office today for evaluation after being seen in the emergency department on 03/25/2022 for chest pain  The patient notes that that time the pain woke her up from sleep about an hour prior to arriving in the emergency department  The pain was sharp in nature and radiated to her back  She notes the pain lasted for about 5 minutes in duration and then resolved on its own   -she notes that since that ER visit she has had these symptoms twice both other times at rest with the same duration of 5 minutes sharp in nature and radiating to her back  -currently in the office she denies any chest pain, palpitations, lightheadedness or dizziness, loss of consciousness lower extremity swelling or shortness of breath   -patient denies any tobacco or illicit drug use and has very intermittent social alcohol use  -patient does note history of swallowing difficulties and will be seeing primary care physician about referral to GI to determine if this could also be a contributing factor   -patient notes significant family history of heart disease in her father who had MI at age 48, mother who had MI at age 39, grandmother but is unsure of age  And her aunt has history of strokes      Patient Active Problem List   Diagnosis    Radiculopathy, lumbar region    Anterior tibial tendonitis, right    Bipolar disorder ([address] )    Chronic low back pain with sciatica    Type 2 diabetes mellitus with neurologic complication ([address] )    Diabetic polyneuropathy associated with type 2 diabetes mellitus ([address] )    Gastroparesis    Hypertension associated with diabetes ([address] )    Spondylolisthesis of lumbar region    Class 1 obesity due to excess calories with serious comorbidity and body mass index (BMI) of 34 0 to 34 9 in adult    Renal cell carcinoma of right kidney (HCC)    Acute right lower quadrant pain    Hypotension    Internal hernia    Intra-abdominal adhesions    Depression with anxiety    Mild intermittent asthma without complication    Hypercholesteremia    Hypertriglyceridemia    Iron deficiency anemia secondary to inadequate dietary iron intake    Encounter for post surgical wound check    GERD (gastroesophageal reflux disease)    History of colon polyps    Slow transit constipation    Anemia, unspecified    History of kidney cancer    Vitamin D deficiency    Stage 3a chronic kidney disease (HCC)     Past Medical History:   Diagnosis Date    Depression     Diabetes mellitus (HCC)     Gastroparesis     GERD (gastroesophageal reflux disease)     Hyperlipidemia     Hypertension     Sciatica     Seizure disorder ([address] )      Social History     Socioeconomic History    Marital status: /Civil Union     Spouse name: Not on file    Number of children: Not on file    Years of education: Not on file    Highest education level: Not on file   Occupational History    Not on file   Tobacco Use    Smoking status: Former Smoker     Quit date:      Years since quittin 3    Smokeless tobacco: Never Used   Vaping Use    Vaping Use: Never used   Substance and Sexual Activity    Alcohol use: Never    Drug use: Never    Sexual activity: Not Currently     Partners: Male   Other Topics Concern    Not on file   Social History Narrative    Not on file     Social Determinants of Health     Financial Resource Strain: Not on file   Food Insecurity: Not on file   Transportation Needs: Not on file   Physical Activity: Not on file   Stress: Not on file   Social Connections: Not on file   Intimate Partner Violence: Not on file   Housing Stability: Not on file      Family History   Problem Relation Age of Onset    No Known Problems Mother     No Known Problems Father     No Known Problems Daughter     No Known Problems Maternal Grandmother     No Known Problems Paternal Grandmother     No Known Problems Paternal Aunt     No Known Problems Paternal Aunt     No Known Problems Paternal Aunt      Past Surgical History:   Procedure Laterality Date    BREAST BIOPSY Left  benign    CHOLECYSTECTOMY      COLONOSCOPY      HYSTERECTOMY      FL LAP,APPENDECTOMY N/A 3/24/2021    Procedure: APPENDECTOMY LAPAROSCOPIC;  Surgeon: Gabrielle Dhillon MD;  Location: [facility] MAIN OR;  Service: General    FL LAP,DIAGNOSTIC ABDOMEN N/A 3/24/2021    Procedure: LAPAROSCOPY DIAGNOSTIC, EXTENSIVE DESI;  Surgeon: Gabrielle Dhillon MD;  Location: [facility] MAIN OR;  Service: General    UPPER GASTROINTESTINAL ENDOSCOPY         Current Outpatient Medications:     albuterol (ProAir HFA) 90 mcg/act inhaler, Inhale 1 puff every 6 (six) hours as needed for wheezing or shortness of breath, Disp: 18 g, Rfl: 3    Aspirin (ASPIR-81 PO), Take 81 mg by mouth, Disp: , Rfl:     atorvastatin (LIPITOR) 40 mg tablet, Take 1 tablet (40 mg total) by mouth daily at bedtime, Disp: 90 tablet, Rfl: 3    B-D UF III MINI PEN NEEDLES 31G X 5 MM MISC, , Disp: , Rfl:     BANOPHEN 50 MG capsule, take 1 capsule by mouth every 6 hours if needed for itching, Disp: , Rfl:     benzonatate (TESSALON) 200 MG capsule, Take 1 capsule (200 mg total) by mouth 3 (three) times a day as needed for cough, Disp: 60 capsule, Rfl: 1    cholecalciferol (VITAMIN D3) 400 units tablet, Take 1 tablet (400 Units total) by mouth daily, Disp: 90 tablet, Rfl: 1    citalopram (CeleXA) 40 mg tablet, Take 1 tablet (40 mg total) by mouth daily, Disp: 90 tablet, Rfl: 1    Continuous Blood Gluc  (FreeStyle Refugio 14 Day [place]) TEMITOPE, Use for continuous blood glucose monitoring, Disp: 1 each, Rfl: 0    Continuous Blood Gluc Sensor (FreeStyle Refugio 14 Day Sensor) MISC, Use one sensor every 14 days for continuous blood sugar monitoring , Disp: 6 each, Rfl: 3     MG capsule, Take 1 capsule (100 mg total) by mouth 2 (two) times a day, Disp: 180 capsule, Rfl: 3    Empagliflozin (Jardiance) 25 MG TABS, Take 1 tablet (25 mg total) by mouth every morning, Disp: 90 tablet, Rfl: 3    famotidine (PEPCID) 40 MG tablet, Take 1 tablet (40 mg total) by mouth daily at bedtime Take in evening 2 hours prior to bed, Disp: 30 tablet, Rfl: 6    fenofibrate (TRICOR) 48 mg tablet, Take 1 tablet (48 mg total) by mouth daily, Disp: 90 tablet, Rfl: 3    glucose blood (OneTouch Verio) test strip, Use to test blood glucose 4 times a day, Disp: 200 strip, Rfl: 2    insulin glulisine (Apidra SoloStar) 100 units/mL injection pen, Inject 28 units before breakfast and lunch  Inject 34 units prior to dinner , Disp: 30 mL, Rfl: 1    Lantus SoloStar 100 units/mL injection pen, Inject 45 units every 12 hours  , Disp: 30 mL, Rfl: 1    levETIRAcetam (KEPPRA) 500 mg tablet, Take 1 tablet (500 mg total) by mouth every 12 (twelve) hours, Disp: 180 tablet, Rfl: 1    linaCLOtide (Linzess) 72 MCG CAPS, Take 1 capsule by mouth daily before breakfast, Disp: 30 capsule, Rfl: 0    lisinopril (ZESTRIL) 10 mg tablet, Take 1 tablet (10 mg total) by mouth daily, Disp: 90 tablet, Rfl: 1    metFORMIN (GLUCOPHAGE) 1000 MG tablet, Take 1 tablet (1,000 mg total) by mouth 2 (two) times a day with meals, Disp: 180 tablet, Rfl: 1    metoclopramide (REGLAN) 10 mg tablet, Take 1 tablet (10 mg total) by mouth daily, Disp: 90 tablet, Rfl: 3    metoprolol tartrate (LOPRESSOR) 25 mg tablet, Take 1 tablet (25 mg total) by mouth 2 (two) times a day, Disp: 180 tablet, Rfl: 1    neomycin-polymyxin-hydrocortisone (CORTISPORIN) otic solution, Administer 4 drops to the right ear every 6 (six) hours, Disp: 10 mL, Rfl: 0    ondansetron (ZOFRAN) 4 mg tablet, Take 1 tablet (4 mg total) by mouth every 8 (eight) hours as needed for nausea or vomiting, Disp: 20 tablet, Rfl: 0    ondansetron (ZOFRAN-ODT) 4 mg disintegrating tablet, Take 1 tablet (4 mg total) by mouth every 6 (six) hours as needed for nausea or vomiting, Disp: 20 tablet, Rfl: 0    pantoprazole (PROTONIX) 40 mg tablet, Take 1 tablet (40 mg total) by mouth 2 (two) times a day, Disp: 180 tablet, Rfl: 3    pregabalin (LYRICA) 100 mg capsule, take 1 capsule by mouth three times a day, Disp: 270 capsule, Rfl: 3    semaglutide, 1 mg/dose, (Ozempic) 4 MG/3ML SOPN injection pen, Inject 0 75 mL (1 mg total) under the skin once a week, Disp: 9 mL, Rfl: 1    solifenacin (VESIcare) 5 mg tablet, Take 1 tablet (5 mg total) by mouth daily, Disp: 90 tablet, Rfl: 1    albuterol (ACCUNEB) 0 63 MG/3ML nebulizer solution, take 3 milliliters by mouth every 4 hours if needed for wheezing, Disp: , Rfl:     Empagliflozin 25 MG TABS, Take 1 tablet (25 mg total) by mouth every morning, Disp: 90 tablet, Rfl: 1  Allergies   Allergen Reactions    Azithromycin GI Intolerance and Hives    Benztropine     Butorphanol Hallucinations    Penicillins     Zolpidem          Labs:  Clinical Support on 04/08/2022   Component Date Value    Hemoglobin A1C 04/08/2022 9 8*   Appointment on 03/28/2022   Component Date Value    Sodium 03/28/2022 137     Potassium 03/28/2022 4 1     Chloride 03/28/2022 105     CO2 03/28/2022 28     ANION GAP 03/28/2022 4     BUN 03/28/2022 35*    Creatinine 03/28/2022 1 09     Glucose, Fasting 03/28/2022 239*    Calcium 03/28/2022 9 2     eGFR 03/28/2022 58     WBC 03/28/2022 9 49     RBC 03/28/2022 4 38     Hemoglobin 03/28/2022 11 4*    Hematocrit 03/28/2022 36 3     MCV 03/28/2022 83     MCH 03/28/2022 26 0*    MCHC 03/28/2022 31 4     RDW 03/28/2022 14 5     MPV 03/28/2022 10 6     Platelets 30/22/2145 391*    nRBC 03/28/2022 0     Neutrophils Relative 03/28/2022 55     Immat GRANS % 03/28/2022 0     Lymphocytes Relative 03/28/2022 35     Monocytes Relative 03/28/2022 7     Eosinophils Relative 03/28/2022 2     Basophils Relative 03/28/2022 1     Neutrophils Absolute 03/28/2022 5 26     Immature Grans Absolute 03/28/2022 0 04     Lymphocytes Absolute 03/28/2022 3 27     Monocytes Absolute 03/28/2022 0 69     Eosinophils Absolute 03/28/2022 0 18     Basophils Absolute 03/28/2022 0 05    Admission on 03/25/2022, Discharged on 03/25/2022   Component Date Value    WBC 03/25/2022 9 49     RBC 03/25/2022 4 55     Hemoglobin 03/25/2022 12 0     Hematocrit 03/25/2022 38 0     MCV 03/25/2022 84     MCH 03/25/2022 26 4*    MCHC 03/25/2022 31 6     RDW 03/25/2022 14 4     MPV 03/25/2022 10 4     Platelets 40/93/6972 347     nRBC 03/25/2022 0     Neutrophils Relative 03/25/2022 42*    Immat GRANS % 03/25/2022 0     Lymphocytes Relative 03/25/2022 47*    Monocytes Relative 03/25/2022 9     Eosinophils Relative 03/25/2022 2     Basophils Relative 03/25/2022 0     Neutrophils Absolute 03/25/2022 3 99     Immature Grans Absolute 03/25/2022 0 01     Lymphocytes Absolute 03/25/2022 4 45     Monocytes Absolute 03/25/2022 0 84     Eosinophils Absolute 03/25/2022 0 16     Basophils Absolute 03/25/2022 0 04     Sodium 03/25/2022 137     Potassium 03/25/2022 4 0     Chloride 03/25/2022 102     CO2 03/25/2022 25     ANION GAP 03/25/2022 10     BUN 03/25/2022 26*    Creatinine 03/25/2022 1 13     Glucose 03/25/2022 187*    Calcium 03/25/2022 9 4     AST 03/25/2022 20     ALT 03/25/2022 20     Alkaline Phosphatase 03/25/2022 81     Total Protein 03/25/2022 7 4     Albumin 03/25/2022 4 0     Total Bilirubin 03/25/2022 0 31     eGFR 03/25/2022 56     D-Dimer, Quant 03/25/2022 0 43     hs TnI 0hr 03/25/2022 <2     Protime 03/25/2022 12 2     INR 03/25/2022 0 91     PTT 03/25/2022 27     Ventricular Rate 03/25/2022 65     Atrial Rate 03/25/2022 65     NV Interval 03/25/2022 168     QRSD Interval 03/25/2022 76     QT Interval 03/25/2022 416     QTC Interval 03/25/2022 432     P Axis 03/25/2022 59     QRS Axis 03/25/2022 39     T Wave Axis 03/25/2022 56    Orders Only on 03/22/2022   Component Date Value    Color, UA 03/28/2022 Light Yellow     Clarity, UA 03/28/2022 Clear     Specific Gravity, UA 03/28/2022 1 026     pH, UA 03/28/2022 6 0     Leukocytes, UA 03/28/2022 Elevated glucose may cause decreased leukocyte values  See urine microscopic for [facility] result/*    Nitrite, UA 03/28/2022 Negative     Protein, UA 03/28/2022 Negative     Glucose, UA 03/28/2022 >=1000 (1%)*    Ketones, UA 03/28/2022 Negative     Urobilinogen, UA 03/28/2022 <2 0     Bilirubin, UA 03/28/2022 Negative     Blood, UA 03/28/2022 Negative     RBC, UA 03/28/2022 1-2     WBC, UA 03/28/2022 None Seen     Epithelial Cells 03/28/2022 Occasional     Bacteria, UA 03/28/2022 None Seen     Creatinine, Ur 03/28/2022 42 9     Protein Urine Random 03/28/2022 <6     Prot/Creat Ratio, Ur 03/28/2022 <0 14*   Office Visit on 02/17/2022   Component Date Value    Hemoglobin A1C 02/17/2022 11 7*        Imaging: XR chest 1 view portable    Result Date: 3/25/2022  Narrative: CHEST INDICATION:   chest pain  COMPARISON:  9/9/2016 EXAM PERFORMED/VIEWS:  XR CHEST PORTABLE Single view FINDINGS: Cardiomediastinal silhouette appears unremarkable  The lungs are clear  No pneumothorax or pleural effusion  Osseous structures appear within normal limits for patient age  Impression: No acute cardiopulmonary disease  Findings are stable Workstation performed: [id number]       Review of Systems:  Review of Systems   Constitutional: Negative for chills, diaphoresis, fatigue and fever  HENT: Negative for trouble swallowing and voice change  Eyes: Negative for pain and redness  Respiratory: Negative for cough, shortness of breath and wheezing  Cardiovascular: Negative for chest pain, palpitations and leg swelling  Gastrointestinal: Negative for abdominal pain, constipation, diarrhea, nausea and vomiting  Genitourinary: Negative for dysuria  Musculoskeletal: Negative for neck pain and neck stiffness  Neurological: Negative for dizziness, syncope, light-headedness and headaches  Psychiatric/Behavioral: Negative for agitation and confusion     All other systems reviewed and are negative  Vitals:    04/14/22 1509   BP: 100/68   Pulse: 66   Weight: 82 6 kg (182 lb)   Height: 5' 1" (1 549 m)     Vitals:    04/14/22 1509   Weight: 82 6 kg (182 lb)     Height: 5' 1" (154 9 cm)     Physical Exam:  Physical Exam  Vitals reviewed  Constitutional:       General: She is not in acute distress  Appearance: She is obese  She is not diaphoretic  HENT:      Head: Normocephalic and atraumatic  Eyes:      General:         Right eye: No discharge  Left eye: No discharge  Neck:      Comments: Trachea midline, no JVD present  Cardiovascular:      Rate and Rhythm: Normal rate and regular rhythm  Heart sounds: No friction rub  Pulmonary:      Effort: Pulmonary effort is normal  No respiratory distress  Breath sounds: Normal breath sounds  No wheezing  Abdominal:      General: Bowel sounds are normal       Palpations: Abdomen is soft  Tenderness: There is no abdominal tenderness  Musculoskeletal:      Right lower leg: No edema  Left lower leg: No edema  Skin:     General: Skin is warm and dry  Neurological:      Mental Status: She is alert  Comments: Awake, alert, able answer questions appropriately, able move extremities bilaterally     Psychiatric:         Mood and Affect: Mood normal          Behavior: Behavior normal

## 2022-04-19 DIAGNOSIS — E11.43 TYPE 2 DIABETES MELLITUS WITH DIABETIC AUTONOMIC NEUROPATHY, WITH LONG-TERM CURRENT USE OF INSULIN (HCC): ICD-10-CM

## 2022-04-19 DIAGNOSIS — Z79.4 TYPE 2 DIABETES MELLITUS WITH DIABETIC AUTONOMIC NEUROPATHY, WITH LONG-TERM CURRENT USE OF INSULIN (HCC): ICD-10-CM

## 2022-04-19 RX ORDER — INSULIN GLARGINE 100 [IU]/ML
INJECTION, SOLUTION SUBCUTANEOUS
Qty: 30 ML | Refills: 1 | Status: SHIPPED | OUTPATIENT
Start: 2022-04-19 | End: 2022-05-25

## 2022-04-19 RX ORDER — INSULIN GLULISINE 100 [IU]/ML
INJECTION, SOLUTION SUBCUTANEOUS
Qty: 30 ML | Refills: 1 | Status: SHIPPED | OUTPATIENT
Start: 2022-04-19 | End: 2022-05-25

## 2022-05-09 NOTE — TELEPHONE ENCOUNTER
Patient called stating she has been experiencing super low blood sugars when injecting her Apidra and Lantus insulins  I inquired how low they have been dropping and what she has been giving herself unit wise  She stated this happened 2 weeks ago before she went on vacation  She stated her blood sugar was 100 and after injecting it dropped down to 50  She stated for the past 2 weeks she has not been taking any insulin or checking her blood sugars as she has been away  She started taking her insulin again today and put her Refugio back on last night  She stated this morning her sugar was a little over 360's and she injected Apidra 28 units then she took it before lunch and it was 363 so she injected again as directed

## 2022-05-10 LAB
LEFT EYE DIABETIC RETINOPATHY: NORMAL
RIGHT EYE DIABETIC RETINOPATHY: NORMAL

## 2022-05-10 NOTE — TELEPHONE ENCOUNTER
Please let the patient know that when her blood sugars are running low, she is to call the office so that we can make appropriate adjustments to her insulin  She is not to stop insulin altogether  Because she did this, her blood sugars are now running high  Is a possible for me to get a Hien Santana report?

## 2022-05-10 NOTE — TELEPHONE ENCOUNTER
Patient is connected in our computer, I printed data and will place on Kathi's desk  Patient called today stating at 1:36 pm her sugar was 366, after injecting Apidra her sugar is now 179

## 2022-05-10 NOTE — TELEPHONE ENCOUNTER
Given that patient did not have her sensor on and was not using insulin, I am not surprised that her blood sugars are so high  I have only 2 days of information here  A blood sugar of 179 is better  Goal blood sugar is  currently  She needs to take her insulin and scan her sensor regularly, ex- 10x daily is not too much  Once there is more data, I will be able to make recommendations  I also think she should come in for education on insulin use/injections

## 2022-05-10 NOTE — TELEPHONE ENCOUNTER
Left message for patient to call the office  We need her Izzy Paul either brought to the office or linked in order for provider to access and change insulin  Mindy patient was instructed to call the office when she runs low  Please see provider message below

## 2022-05-12 NOTE — TELEPHONE ENCOUNTER
(Pt had been experiencing super low blood sugars when injecting her Apidra and Lantus insulins  Hadn't taken insulin for 2 weeks, sensor was off etc )    LM for pt to call back    Need to discuss timing of insulin injection vs when she eats her meal and also explain the function of both  Not stopping when she has concerns  She needs to call the office to discuss lows and we can also review her report at that time    Per Yanet Saldivar: Goal blood sugar is  currently  She needs to take her insulin and scan her sensor regularly, ex- 10x daily is not too much  Once there is more data, I will be able to make recommendations  I also think she should come in for education on insulin use/injections

## 2022-05-19 ENCOUNTER — HOSPITAL ENCOUNTER (OUTPATIENT)
Dept: CT IMAGING | Facility: HOSPITAL | Age: 52
Discharge: HOME | End: 2022-05-19
Attending: INTERNAL MEDICINE
Payer: COMMERCIAL

## 2022-05-19 DIAGNOSIS — C64.1 RENAL CELL CARCINOMA OF RIGHT KIDNEY (HCC): ICD-10-CM

## 2022-05-19 PROCEDURE — 74150 CT ABDOMEN W/O CONTRAST: CPT

## 2022-05-19 PROCEDURE — G1004 CDSM NDSC: HCPCS

## 2022-05-23 ENCOUNTER — APPOINTMENT (EMERGENCY)
Dept: CT IMAGING | Facility: HOSPITAL | Age: 52
End: 2022-05-23
Payer: COMMERCIAL

## 2022-05-23 ENCOUNTER — TELEPHONE (OUTPATIENT)
Dept: NEPHROLOGY | Facility: CLINIC | Age: 52
End: 2022-05-23

## 2022-05-23 ENCOUNTER — TELEPHONE (OUTPATIENT)
Dept: FAMILY MEDICINE CLINIC | Facility: CLINIC | Age: 52
End: 2022-05-23

## 2022-05-23 ENCOUNTER — OFFICE VISIT (OUTPATIENT)
Dept: URGENT CARE | Facility: CLINIC | Age: 52
End: 2022-05-23
Payer: COMMERCIAL

## 2022-05-23 ENCOUNTER — HOSPITAL ENCOUNTER (EMERGENCY)
Facility: HOSPITAL | Age: 52
Discharge: HOME | End: 2022-05-23
Attending: EMERGENCY MEDICINE | Admitting: EMERGENCY MEDICINE
Payer: COMMERCIAL

## 2022-05-23 VITALS
RESPIRATION RATE: 18 BRPM | BODY MASS INDEX: 35.5 KG/M2 | TEMPERATURE: 98.2 F | SYSTOLIC BLOOD PRESSURE: 118 MMHG | WEIGHT: 188 LBS | OXYGEN SATURATION: 97 % | DIASTOLIC BLOOD PRESSURE: 76 MMHG | HEART RATE: 72 BPM | HEIGHT: 61 IN

## 2022-05-23 VITALS
BODY MASS INDEX: 35.52 KG/M2 | HEART RATE: 59 BPM | DIASTOLIC BLOOD PRESSURE: 65 MMHG | SYSTOLIC BLOOD PRESSURE: 108 MMHG | WEIGHT: 188 LBS | RESPIRATION RATE: 18 BRPM | OXYGEN SATURATION: 94 % | TEMPERATURE: 98 F

## 2022-05-23 DIAGNOSIS — R42 DIZZINESS: Primary | ICD-10-CM

## 2022-05-23 DIAGNOSIS — R73.9 HYPERGLYCEMIA: ICD-10-CM

## 2022-05-23 DIAGNOSIS — R73.9 HYPERGLYCEMIA: Primary | ICD-10-CM

## 2022-05-23 DIAGNOSIS — R42 DIZZY: ICD-10-CM

## 2022-05-23 DIAGNOSIS — E11.43 TYPE 2 DIABETES MELLITUS WITH DIABETIC AUTONOMIC NEUROPATHY, WITH LONG-TERM CURRENT USE OF INSULIN (HCC): ICD-10-CM

## 2022-05-23 DIAGNOSIS — Z79.4 TYPE 2 DIABETES MELLITUS WITH DIABETIC AUTONOMIC NEUROPATHY, WITH LONG-TERM CURRENT USE OF INSULIN (HCC): ICD-10-CM

## 2022-05-23 LAB
ALBUMIN SERPL BCP-MCNC: 4.2 G/DL (ref 3.5–5)
ALP SERPL-CCNC: 97 U/L (ref 34–104)
ALT SERPL W P-5'-P-CCNC: 27 U/L (ref 7–52)
ANION GAP SERPL CALCULATED.3IONS-SCNC: 7 MMOL/L (ref 4–13)
APTT PPP: 24 SECONDS (ref 23–37)
AST SERPL W P-5'-P-CCNC: 26 U/L (ref 13–39)
BASOPHILS # BLD AUTO: 0.04 THOUSANDS/ΜL (ref 0–0.1)
BASOPHILS NFR BLD AUTO: 0 % (ref 0–1)
BILIRUB SERPL-MCNC: 0.42 MG/DL (ref 0.2–1)
BILIRUB UR QL STRIP: NEGATIVE
BUN SERPL-MCNC: 39 MG/DL (ref 5–25)
CALCIUM SERPL-MCNC: 9.7 MG/DL (ref 8.4–10.2)
CARDIAC TROPONIN I PNL SERPL HS: 2 NG/L
CHLORIDE SERPL-SCNC: 97 MMOL/L (ref 96–108)
CLARITY UR: CLEAR
CO2 SERPL-SCNC: 28 MMOL/L (ref 21–32)
COLOR UR: YELLOW
CREAT SERPL-MCNC: 1.2 MG/DL (ref 0.6–1.3)
EOSINOPHIL # BLD AUTO: 0.19 THOUSAND/ΜL (ref 0–0.61)
EOSINOPHIL NFR BLD AUTO: 2 % (ref 0–6)
ERYTHROCYTE [DISTWIDTH] IN BLOOD BY AUTOMATED COUNT: 14 % (ref 11.6–15.1)
FLUAV RNA RESP QL NAA+PROBE: NEGATIVE
FLUBV RNA RESP QL NAA+PROBE: NEGATIVE
GFR SERPL CREATININE-BSD FRML MDRD: 52 ML/MIN/1.73SQ M
GLUCOSE SERPL-MCNC: 284 MG/DL (ref 65–140)
GLUCOSE SERPL-MCNC: 314 MG/DL (ref 65–140)
GLUCOSE UR STRIP-MCNC: ABNORMAL MG/DL
HCT VFR BLD AUTO: 38.4 % (ref 34.8–46.1)
HGB BLD-MCNC: 12.8 G/DL (ref 11.5–15.4)
HGB UR QL STRIP.AUTO: NEGATIVE
IMM GRANULOCYTES # BLD AUTO: 0.02 THOUSAND/UL (ref 0–0.2)
IMM GRANULOCYTES NFR BLD AUTO: 0 % (ref 0–2)
INR PPP: 0.88 (ref 0.84–1.19)
KETONES UR STRIP-MCNC: NEGATIVE MG/DL
LACTATE SERPL-SCNC: 1.8 MMOL/L (ref 0.5–2)
LEUKOCYTE ESTERASE UR QL STRIP: NEGATIVE
LYMPHOCYTES # BLD AUTO: 2.65 THOUSANDS/ΜL (ref 0.6–4.47)
LYMPHOCYTES NFR BLD AUTO: 26 % (ref 14–44)
MAGNESIUM SERPL-MCNC: 2.1 MG/DL (ref 1.9–2.7)
MCH RBC QN AUTO: 27.3 PG (ref 26.8–34.3)
MCHC RBC AUTO-ENTMCNC: 33.3 G/DL (ref 31.4–37.4)
MCV RBC AUTO: 82 FL (ref 82–98)
MONOCYTES # BLD AUTO: 0.52 THOUSAND/ΜL (ref 0.17–1.22)
MONOCYTES NFR BLD AUTO: 5 % (ref 4–12)
NEUTROPHILS # BLD AUTO: 6.76 THOUSANDS/ΜL (ref 1.85–7.62)
NEUTS SEG NFR BLD AUTO: 67 % (ref 43–75)
NITRITE UR QL STRIP: NEGATIVE
NRBC BLD AUTO-RTO: 0 /100 WBCS
PH UR STRIP.AUTO: 7 [PH]
PLATELET # BLD AUTO: 296 THOUSANDS/UL (ref 149–390)
PMV BLD AUTO: 10.8 FL (ref 8.9–12.7)
POTASSIUM SERPL-SCNC: 5.2 MMOL/L (ref 3.5–5.3)
PROCALCITONIN SERPL-MCNC: 0.06 NG/ML
PROT SERPL-MCNC: 7.4 G/DL (ref 6.4–8.4)
PROT UR STRIP-MCNC: NEGATIVE MG/DL
PROTHROMBIN TIME: 11.9 SECONDS (ref 11.6–14.5)
RBC # BLD AUTO: 4.69 MILLION/UL (ref 3.81–5.12)
RSV RNA RESP QL NAA+PROBE: NEGATIVE
SARS-COV-2 RNA RESP QL NAA+PROBE: NEGATIVE
SODIUM SERPL-SCNC: 132 MMOL/L (ref 135–147)
SP GR UR STRIP.AUTO: 1.01 (ref 1–1.03)
UROBILINOGEN UR QL STRIP.AUTO: 0.2 E.U./DL
WBC # BLD AUTO: 10.18 THOUSAND/UL (ref 4.31–10.16)

## 2022-05-23 PROCEDURE — 99213 OFFICE O/P EST LOW 20 MIN: CPT | Performed by: PHYSICIAN ASSISTANT

## 2022-05-23 PROCEDURE — 85730 THROMBOPLASTIN TIME PARTIAL: CPT | Performed by: EMERGENCY MEDICINE

## 2022-05-23 PROCEDURE — 87040 BLOOD CULTURE FOR BACTERIA: CPT | Performed by: EMERGENCY MEDICINE

## 2022-05-23 PROCEDURE — 99285 EMERGENCY DEPT VISIT HI MDM: CPT | Performed by: EMERGENCY MEDICINE

## 2022-05-23 PROCEDURE — 81003 URINALYSIS AUTO W/O SCOPE: CPT | Performed by: EMERGENCY MEDICINE

## 2022-05-23 PROCEDURE — 85610 PROTHROMBIN TIME: CPT | Performed by: EMERGENCY MEDICINE

## 2022-05-23 PROCEDURE — G1004 CDSM NDSC: HCPCS

## 2022-05-23 PROCEDURE — 70480 CT ORBIT/EAR/FOSSA W/O DYE: CPT

## 2022-05-23 PROCEDURE — 85025 COMPLETE CBC W/AUTO DIFF WBC: CPT | Performed by: EMERGENCY MEDICINE

## 2022-05-23 PROCEDURE — 84484 ASSAY OF TROPONIN QUANT: CPT | Performed by: EMERGENCY MEDICINE

## 2022-05-23 PROCEDURE — 96360 HYDRATION IV INFUSION INIT: CPT

## 2022-05-23 PROCEDURE — 82948 REAGENT STRIP/BLOOD GLUCOSE: CPT | Performed by: PHYSICIAN ASSISTANT

## 2022-05-23 PROCEDURE — 99284 EMERGENCY DEPT VISIT MOD MDM: CPT

## 2022-05-23 PROCEDURE — 0241U HB NFCT DS VIR RESP RNA 4 TRGT: CPT | Performed by: EMERGENCY MEDICINE

## 2022-05-23 PROCEDURE — 80053 COMPREHEN METABOLIC PANEL: CPT | Performed by: EMERGENCY MEDICINE

## 2022-05-23 PROCEDURE — 83605 ASSAY OF LACTIC ACID: CPT | Performed by: EMERGENCY MEDICINE

## 2022-05-23 PROCEDURE — 96361 HYDRATE IV INFUSION ADD-ON: CPT

## 2022-05-23 PROCEDURE — 84145 PROCALCITONIN (PCT): CPT | Performed by: EMERGENCY MEDICINE

## 2022-05-23 PROCEDURE — 36415 COLL VENOUS BLD VENIPUNCTURE: CPT | Performed by: EMERGENCY MEDICINE

## 2022-05-23 PROCEDURE — 83735 ASSAY OF MAGNESIUM: CPT | Performed by: EMERGENCY MEDICINE

## 2022-05-23 PROCEDURE — 70450 CT HEAD/BRAIN W/O DYE: CPT

## 2022-05-23 RX ADMIN — SODIUM CHLORIDE 1000 ML: 0.9 INJECTION, SOLUTION INTRAVENOUS at 18:23

## 2022-05-23 NOTE — PROGRESS NOTES
[address]      NAME: Ana María Uribe is a 46 y o  female  : 1970    MRN: [id number]  DATE: May 23, 2022  TIME: 2:50 PM    Assessment and Plan   Hyperglycemia [R73 9]  1  Hyperglycemia     2  Dizzy  Transfer to other facility       Patient Instructions   Due to bs over 300, along with symptoms, will send to ER  Also appears dehydrated  Patient's  to drive to [facility] for further workup  Patient agreeable to plan  Proceed to ER  Chief Complaint     Chief Complaint   Patient presents with    Earache     Pt c/o left ear pain that started 2 weeks ago, vertigo started 1 week ago  History of Present Illness   Ana María Uribe presents to the clinic c/o    Reports elevated bs lately due to increased household stress  Yesterday sugar was reported to be 400  Earache   There is pain in the left ear  This is a new problem  The current episode started 1 to 4 weeks ago (2 weeks)  The problem occurs constantly  The problem has been gradually worsening  There has been no fever  The pain is at a severity of 9/10  The pain is moderate  Pertinent negatives include no abdominal pain, coughing, diarrhea, ear discharge, headaches, rash, sore throat or vomiting  She has tried ear drops for the symptoms  The treatment provided no relief  There is no history of a tympanostomy tube  Review of Systems   Review of Systems   Constitutional: Negative for chills, diaphoresis, fatigue and fever  HENT: Positive for ear pain  Negative for congestion, ear discharge, facial swelling and sore throat  Eyes: Negative for photophobia, pain, discharge, redness, itching and visual disturbance  Respiratory: Negative for apnea, cough, chest tightness, shortness of breath and wheezing  Cardiovascular: Negative for chest pain and palpitations  Gastrointestinal: Negative for abdominal pain, diarrhea and vomiting  Skin: Negative for color change, rash and wound     Neurological: Positive for dizziness (reported a spinning of the room sensation)  Negative for headaches  Hematological: Negative for adenopathy           Current Medications     Long-Term Medications   Medication Sig Dispense Refill    Apidra SoloStar 100 units/mL injection pen INJECT 28 UNITS BEFORE BREAKFAST AND LUNCH, INJECT 34 UNITS PRIOR TO DINNER 30 mL 1    atorvastatin (LIPITOR) 40 mg tablet Take 1 tablet (40 mg total) by mouth daily at bedtime 90 tablet 3    B-D UF III MINI PEN NEEDLES 31G X 5 MM MISC       BANOPHEN 50 MG capsule take 1 capsule by mouth every 6 hours if needed for itching      cholecalciferol (VITAMIN D3) 400 units tablet Take 1 tablet (400 Units total) by mouth daily 90 tablet 1    citalopram (CeleXA) 40 mg tablet Take 1 tablet (40 mg total) by mouth daily 90 tablet 1     MG capsule Take 1 capsule (100 mg total) by mouth 2 (two) times a day 180 capsule 3    Empagliflozin (Jardiance) 25 MG TABS Take 1 tablet (25 mg total) by mouth every morning 90 tablet 3    Empagliflozin 25 MG TABS Take 1 tablet (25 mg total) by mouth every morning 90 tablet 1    famotidine (PEPCID) 40 MG tablet Take 1 tablet (40 mg total) by mouth daily at bedtime Take in evening 2 hours prior to bed 30 tablet 6    fenofibrate (TRICOR) 48 mg tablet Take 1 tablet (48 mg total) by mouth daily 90 tablet 3    Lantus SoloStar 100 units/mL injection pen INJECT 45 UNITS EVERY 12 HOURS 30 mL 1    levETIRAcetam (KEPPRA) 500 mg tablet Take 1 tablet (500 mg total) by mouth every 12 (twelve) hours 180 tablet 1    lisinopril (ZESTRIL) 10 mg tablet Take 1 tablet (10 mg total) by mouth daily 90 tablet 1    metFORMIN (GLUCOPHAGE) 1000 MG tablet Take 1 tablet (1,000 mg total) by mouth 2 (two) times a day with meals 180 tablet 1    metoprolol tartrate (LOPRESSOR) 25 mg tablet Take 1 tablet (25 mg total) by mouth 2 (two) times a day 180 tablet 1    neomycin-polymyxin-hydrocortisone (CORTISPORIN) otic solution Administer 4 drops to the right ear every 6 (six) hours 10 mL 0    ondansetron (ZOFRAN) 4 mg tablet Take 1 tablet (4 mg total) by mouth every 8 (eight) hours as needed for nausea or vomiting 20 tablet 0    ondansetron (ZOFRAN-ODT) 4 mg disintegrating tablet Take 1 tablet (4 mg total) by mouth every 6 (six) hours as needed for nausea or vomiting 20 tablet 0    pantoprazole (PROTONIX) 40 mg tablet Take 1 tablet (40 mg total) by mouth 2 (two) times a day 180 tablet 3    pregabalin (LYRICA) 100 mg capsule take 1 capsule by mouth three times a day 270 capsule 3    solifenacin (VESIcare) 5 mg tablet Take 1 tablet (5 mg total) by mouth daily 90 tablet 1       Current Allergies     Allergies as of 05/23/2022 - Reviewed 05/23/2022   Allergen Reaction Noted    Azithromycin GI Intolerance and Hives 07/13/2012    Benztropine  08/09/2016    Butorphanol Hallucinations 08/09/2016    Penicillins  11/12/2015    Zolpidem  11/12/2015            The following portions of the patient's history were reviewed and updated as appropriate: allergies, current medications, past family history, past medical history, past social history, past surgical history and problem list   Past Medical History:   Diagnosis Date    Depression     Diabetes mellitus ([facility] [address] )     Gastroparesis     GERD (gastroesophageal reflux disease)     Hyperlipidemia     Hypertension     Sciatica     Seizure disorder ([facility] [address] )      Past Surgical History:   Procedure Laterality Date    BREAST BIOPSY Left  benign    CHOLECYSTECTOMY      COLONOSCOPY      HYSTERECTOMY      TX LAP,APPENDECTOMY N/A 3/24/2021    Procedure: APPENDECTOMY LAPAROSCOPIC;  Surgeon: Richard Hayes MD;  Location: [facility] MAIN OR;  Service: General    TX LAP,DIAGNOSTIC ABDOMEN N/A 3/24/2021    Procedure: LAPAROSCOPY DIAGNOSTIC, EXTENSIVE DESI;  Surgeon: Richard Hayes MD;  Location: [facility] MAIN OR;  Service: General    UPPER GASTROINTESTINAL ENDOSCOPY       Social History     Socioeconomic History    Marital status: /Civil Union     Spouse name: Not on file    Number of children: Not on file    Years of education: Not on file    Highest education level: Not on file   Occupational History    Not on file   Tobacco Use    Smoking status: Former Smoker     Quit date:      Years since quittin 4    Smokeless tobacco: Never Used   Vaping Use    Vaping Use: Never used   Substance and Sexual Activity    Alcohol use: Never    Drug use: Never    Sexual activity: Not Currently     Partners: Male   Other Topics Concern    Not on file   Social History Narrative    Not on file     Social Determinants of Health     Financial Resource Strain: Not on file   Food Insecurity: Not on file   Transportation Needs: Not on file   Physical Activity: Not on file   Stress: Not on file   Social Connections: Not on file   Intimate Partner Violence: Not on file   Housing Stability: Not on file       Objective   /76   Pulse 72   Temp 98 2 °F (36 8 °C)   Resp 18   Ht 5' 1" (1 549 m)   Wt 85 3 kg (188 lb)   SpO2 97%   BMI 35 52 kg/m²      Physical Exam     Physical Exam  Vitals and nursing note reviewed  Constitutional:       General: She is not in acute distress  Appearance: She is well-developed  She is not diaphoretic  HENT:      Head: Normocephalic and atraumatic  Right Ear: Tympanic membrane and external ear normal       Left Ear: Tympanic membrane and external ear normal  Tenderness (TTP tragus ) present  No drainage or swelling  Nose: Nose normal       Mouth/Throat:      Mouth: Mucous membranes are dry  Pharynx: Oropharynx is clear  No posterior oropharyngeal erythema  Eyes:      General: No scleral icterus  Right eye: No discharge  Left eye: No discharge  Conjunctiva/sclera: Conjunctivae normal    Cardiovascular:      Rate and Rhythm: Normal rate and regular rhythm  Heart sounds: Normal heart sounds  No murmur heard  No friction rub  No gallop     Pulmonary:      Effort: Pulmonary effort is normal  No respiratory distress  Breath sounds: Normal breath sounds  No decreased breath sounds, wheezing, rhonchi or rales  Skin:     General: Skin is warm and dry  Coloration: Skin is not pale  Findings: No erythema or rash  Neurological:      Mental Status: She is alert and oriented to person, place, and time  Psychiatric:         Behavior: Behavior normal          Thought Content:  Thought content normal          Judgment: Judgment normal          Nicole Ruth PA-C

## 2022-05-23 NOTE — ED PROVIDER NOTES
History  Chief Complaint   Patient presents with    Dizziness     Pt reports dizziness x1 week and left ear pain  HPI    This is a 59-year-old female with a history of seizure disorder, sciatica, hypertension, hyperlipidemia, gastroparesis, depression, GERD, CVA in 1990, history of renal cell carcinoma in the right kidney status post resection 2017 and diabetes (last hemoglobin A1c as of February 2022 was 11 7), presents the emergency department with a chief complaint of 1 week of dizziness along with coinciding left ear pain  Patient reports that her blood sugars been anywhere from the mid 300-420  Patient denies any fever or chills  Patient does report that she has had mastoiditis on the left side previously  Patient denies vomiting does report a history of nausea, denies abdominal pain chest pain diarrhea shortness of breath syncope  Remaining portion of the 12 point review systems is negative  Strong family history of premature cardiovascular disease in the family, father had MI at age 48, mother had an MI at age 39  Prior to Admission Medications   Prescriptions Last Dose Informant Patient Reported? Taking? Apidra SoloStar 100 units/mL injection pen   No No   Sig: INJECT 28 UNITS BEFORE BREAKFAST AND LUNCH, INJECT 34 UNITS PRIOR TO DINNER   Aspirin (ASPIR-81 PO)  Self Yes No   Sig: Take 81 mg by mouth   B-D UF III MINI PEN NEEDLES 31G X 5 MM MISC  Self Yes No   BANOPHEN 50 MG capsule  Self Yes No   Sig: take 1 capsule by mouth every 6 hours if needed for itching   Continuous Blood Gluc  (FreeStyle Refugio 14 Day [place]) TEMITOPE  Self No No   Sig: Use for continuous blood glucose monitoring   Continuous Blood Gluc Sensor (FreeStyle Refugio 14 Day Sensor) MISC   No No   Sig: Use one sensor every 14 days for continuous blood sugar monitoring      MG capsule   No No   Sig: Take 1 capsule (100 mg total) by mouth 2 (two) times a day   Empagliflozin (Jardiance) 25 MG TABS   No No   Sig: Take 1 tablet (25 mg total) by mouth every morning   Empagliflozin 25 MG TABS  Self No No   Sig: Take 1 tablet (25 mg total) by mouth every morning   Lantus SoloStar 100 units/mL injection pen   No No   Sig: INJECT 45 UNITS EVERY 12 HOURS   albuterol (ACCUNEB) 0 63 MG/3ML nebulizer solution  Self Yes No   Sig: take 3 milliliters by mouth every 4 hours if needed for wheezing   albuterol (ProAir HFA) 90 mcg/act inhaler   No No   Sig: Inhale 1 puff every 6 (six) hours as needed for wheezing or shortness of breath   atorvastatin (LIPITOR) 40 mg tablet   No No   Sig: Take 1 tablet (40 mg total) by mouth daily at bedtime   benzonatate (TESSALON) 200 MG capsule   No No   Sig: Take 1 capsule (200 mg total) by mouth 3 (three) times a day as needed for cough   cholecalciferol (VITAMIN D3) 400 units tablet   No No   Sig: Take 1 tablet (400 Units total) by mouth daily   citalopram (CeleXA) 40 mg tablet   No No   Sig: Take 1 tablet (40 mg total) by mouth daily   famotidine (PEPCID) 40 MG tablet   No No   Sig: Take 1 tablet (40 mg total) by mouth daily at bedtime Take in evening 2 hours prior to bed   fenofibrate (TRICOR) 48 mg tablet   No No   Sig: Take 1 tablet (48 mg total) by mouth daily   glucose blood (OneTouch Verio) test strip  Self No No   Sig: Use to test blood glucose 4 times a day   levETIRAcetam (KEPPRA) 500 mg tablet   No No   Sig: Take 1 tablet (500 mg total) by mouth every 12 (twelve) hours   linaCLOtide (Linzess) 72 MCG CAPS   No No   Sig: Take 1 capsule by mouth daily before breakfast   lisinopril (ZESTRIL) 10 mg tablet   No No   Sig: Take 1 tablet (10 mg total) by mouth daily   metFORMIN (GLUCOPHAGE) 1000 MG tablet   No No   Sig: Take 1 tablet (1,000 mg total) by mouth 2 (two) times a day with meals   metoclopramide (REGLAN) 10 mg tablet   No No   Sig: Take 1 tablet (10 mg total) by mouth daily   metoprolol tartrate (LOPRESSOR) 25 mg tablet   No No   Sig: Take 1 tablet (25 mg total) by mouth 2 (two) times a day neomycin-polymyxin-hydrocortisone (CORTISPORIN) otic solution  Self No No   Sig: Administer 4 drops to the right ear every 6 (six) hours   ondansetron (ZOFRAN) 4 mg tablet  Self No No   Sig: Take 1 tablet (4 mg total) by mouth every 8 (eight) hours as needed for nausea or vomiting   ondansetron (ZOFRAN-ODT) 4 mg disintegrating tablet  Self No No   Sig: Take 1 tablet (4 mg total) by mouth every 6 (six) hours as needed for nausea or vomiting   pantoprazole (PROTONIX) 40 mg tablet   No No   Sig: Take 1 tablet (40 mg total) by mouth 2 (two) times a day   pregabalin (LYRICA) 100 mg capsule   No No   Sig: take 1 capsule by mouth three times a day   semaglutide, 1 mg/dose, (Ozempic) 4 MG/3ML SOPN injection pen  Self No No   Sig: Inject 0 75 mL (1 mg total) under the skin once a week   solifenacin (VESIcare) 5 mg tablet   No No   Sig: Take 1 tablet (5 mg total) by mouth daily      Facility-Administered Medications: None       Past Medical History:   Diagnosis Date    Depression     Diabetes mellitus (HCC)     Gastroparesis     GERD (gastroesophageal reflux disease)     Hyperlipidemia     Hypertension     Sciatica     Seizure disorder (HCC)        Past Surgical History:   Procedure Laterality Date    BREAST BIOPSY Left  benign    CHOLECYSTECTOMY      COLONOSCOPY      HYSTERECTOMY      GA LAP,APPENDECTOMY N/A 3/24/2021    Procedure: APPENDECTOMY LAPAROSCOPIC;  Surgeon: Gerri Day MD;  Location: [facility] MAIN OR;  Service: General    GA LAP,DIAGNOSTIC ABDOMEN N/A 3/24/2021    Procedure: LAPAROSCOPY DIAGNOSTIC, EXTENSIVE DESI;  Surgeon: Gerri Day MD;  Location: [facility] MAIN OR;  Service: General    UPPER GASTROINTESTINAL ENDOSCOPY         Family History   Problem Relation Age of Onset    No Known Problems Mother     No Known Problems Father     No Known Problems Daughter     No Known Problems Maternal Grandmother     No Known Problems Paternal Grandmother     No Known Problems Paternal Aunt     No Known Problems Paternal Aunt     No Known Problems Paternal Aunt      I have reviewed and agree with the history as documented  E-Cigarette/Vaping    E-Cigarette Use Never User      E-Cigarette/Vaping Substances    Nicotine No     THC No     CBD No     Flavoring No     Other No     Unknown No      Social History     Tobacco Use    Smoking status: Former Smoker     Quit date:      Years since quittin 4    Smokeless tobacco: Never Used   Vaping Use    Vaping Use: Never used   Substance Use Topics    Alcohol use: Never    Drug use: Never       Review of Systems   Constitutional: Negative  HENT: Negative  Eyes: Negative  Respiratory: Negative  Cardiovascular: Negative  Gastrointestinal: Negative  Endocrine: Negative  Genitourinary: Negative  Musculoskeletal: Negative  Skin: Negative  Allergic/Immunologic: Negative  Neurological: Positive for dizziness  Hematological: Negative  Psychiatric/Behavioral: Negative  Physical Exam  Physical Exam  Vitals and nursing note reviewed  Constitutional:       General: She is awake  Appearance: She is obese  Comments: BURDICK x 2   HENT:      Head: Normocephalic and atraumatic  Right Ear: Tympanic membrane, ear canal and external ear normal       Left Ear: Tympanic membrane, ear canal and external ear normal       Ears:      Comments: Left-sided mastoid tenderness upon palpation, no induration erythema noted  Nose: Nose normal       Mouth/Throat:      Mouth: Mucous membranes are moist       Pharynx: Oropharynx is clear  Eyes:      Extraocular Movements: Extraocular movements intact  Conjunctiva/sclera: Conjunctivae normal       Pupils: Pupils are equal, round, and reactive to light  Cardiovascular:      Rate and Rhythm: Normal rate and regular rhythm  Pulses: Normal pulses  Heart sounds: Normal heart sounds     Pulmonary:      Effort: Pulmonary effort is normal       Breath sounds: Normal breath sounds  Abdominal:      General: Abdomen is flat  Bowel sounds are normal       Palpations: Abdomen is soft  Genitourinary:     General: Normal vulva  Musculoskeletal:         General: Normal range of motion  Cervical back: Normal range of motion  Skin:     General: Skin is warm  Capillary Refill: Capillary refill takes less than 2 seconds  Neurological:      General: No focal deficit present  Mental Status: She is oriented to person, place, and time  Mental status is at baseline  Cranial Nerves: No cranial nerve deficit  Sensory: No sensory deficit  Motor: No weakness  Coordination: Coordination normal       Gait: Gait normal       Deep Tendon Reflexes: Reflexes normal       Comments: Negative HINTS exam    Psychiatric:         Mood and Affect: Mood normal          Vital Signs  ED Triage Vitals   Temperature Pulse Respirations Blood Pressure SpO2   05/23/22 1828 05/23/22 1625 05/23/22 1625 05/23/22 1625 05/23/22 1625   98 °F (36 7 °C) 61 18 100/62 100 %      Temp Source Heart Rate Source Patient Position - Orthostatic VS BP Location FiO2 (%)   05/23/22 1828 05/23/22 1625 05/23/22 2049 05/23/22 1625 --   Tympanic Monitor Lying Right arm       Pain Score       --                  Vitals:    05/23/22 1625 05/23/22 2049   BP: 100/62 108/65   Pulse: 61 59   Patient Position - Orthostatic VS:  Lying         Visual Acuity      ED Medications  Medications   sodium chloride 0 9 % bolus 1,000 mL (0 mL Intravenous Stopped 5/23/22 2137)       Diagnostic Studies  Results Reviewed     Procedure Component Value Units Date/Time    Blood culture #1 [[id number]] Collected: 05/23/22 1716    Lab Status: Preliminary result Specimen: Blood from Arm, Right Updated: 05/23/22 2304     Blood Culture Received in Microbiology Lab  Culture in Progress      HS Troponin 0hr (reflex protocol) [[id number]]  (Normal) Collected: 05/23/22 1818    Lab Status: Final result Specimen: Blood Updated: 05/23/22 1841     hs TnI 0hr 2 ng/L     Blood culture #2 [[id number]] Collected: 05/23/22 1716    Lab Status: In process Specimen: Blood from Arm, Right Updated: 05/23/22 [address], Influenza A/B, RSV PCR, [facility] - 2 hour STAT [[id number]]  (Normal) Collected: 05/23/22 1716    Lab Status: Final result Specimen: Nares from Nasopharyngeal Swab Updated: 05/23/22 1813     SARS-CoV-2 Negative     INFLUENZA A PCR Negative     INFLUENZA B PCR Negative     RSV PCR Negative    Narrative:      FOR PEDIATRIC PATIENTS - copy/paste COVID Guidelines URL to browser: [email or web address]  [employer]    SARS-CoV-2 assay is a Nucleic Acid Amplification assay intended for the  qualitative detection of nucleic acid from SARS-CoV-2 in nasopharyngeal  swabs  Results are for the presumptive identification of SARS-CoV-2 RNA  Positive results are indicative of infection with SARS-CoV-2, the virus  causing COVID-19, but do not rule out bacterial infection or co-infection  with other viruses  Laboratories within the [place] and its  territories are required to report all positive results to the appropriate  public health authorities  Negative results do not preclude SARS-CoV-2  infection and should not be used as the sole basis for treatment or other  patient management decisions  Negative results must be combined with  clinical observations, patient history, and epidemiological information  This test has not been FDA cleared or approved  This test has been authorized by FDA under an Emergency Use Authorization  (EUA)  This test is only authorized for the duration of time the  declaration that circumstances exist justifying the authorization of the  emergency use of an in vitro diagnostic tests for detection of SARS-CoV-2  virus and/or diagnosis of COVID-19 infection under section 564(b)(1) of  the Act, 21 U  S C  150GTY-2(L)(2), unless the authorization is terminated  or revoked sooner  The test has been validated but independent review by FDA  and CLIA is pending  Test performed using [employer] GeneXpert: This RT-PCR assay targets N2,  a region unique to SARS-CoV-2  A conserved region in the E-gene was chosen  for pan-Sarbecovirus detection which includes SARS-CoV-2  Procalcitonin [[id number]]  (Normal) Collected: 05/23/22 1716    Lab Status: Final result Specimen: Blood from Arm, Right Updated: 05/23/22 1751     Procalcitonin 0 06 ng/ml     Comprehensive metabolic panel [[id number]]  (Abnormal) Collected: 05/23/22 1716    Lab Status: Final result Specimen: Blood from Arm, Right Updated: 05/23/22 1743     Sodium 132 mmol/L      Potassium 5 2 mmol/L      Chloride 97 mmol/L      CO2 28 mmol/L      ANION GAP 7 mmol/L      BUN 39 mg/dL      Creatinine 1 20 mg/dL      Glucose 284 mg/dL      Calcium 9 7 mg/dL      AST 26 U/L      ALT 27 U/L      Alkaline Phosphatase 97 U/L      Total Protein 7 4 g/dL      Albumin 4 2 g/dL      Total Bilirubin 0 42 mg/dL      eGFR 52 ml/min/1 73sq m     Narrative:      [place] guidelines for Chronic Kidney Disease (CKD):     Stage 1 with normal or high GFR (GFR > 90 mL/min/1 73 square meters)    Stage 2 Mild CKD (GFR = 60-89 mL/min/1 73 square meters)    Stage 3A Moderate CKD (GFR = 45-59 mL/min/1 73 square meters)    Stage 3B Moderate CKD (GFR = 30-44 mL/min/1 73 square meters)    Stage 4 Severe CKD (GFR = 15-29 mL/min/1 73 square meters)    Stage 5 End Stage CKD (GFR <15 mL/min/1 73 square meters)  Note: GFR calculation is accurate only with a steady state creatinine    Lactic acid [[id number]]  (Normal) Collected: 05/23/22 1716    Lab Status: Final result Specimen: Blood from Arm, Right Updated: 05/23/22 1743     LACTIC ACID 1 8 mmol/L     Narrative:      Result may be elevated if tourniquet was used during collection      Magnesium [[id number]]  (Normal) Collected: 05/23/22 1716    Lab Status: Final result Specimen: Blood from Arm, Right Updated: 05/23/22 1743     Magnesium 2 1 mg/dL     Protime-INR [[id number]]  (Normal) Collected: 05/23/22 1716    Lab Status: Final result Specimen: Blood from Arm, Right Updated: 05/23/22 1737     Protime 11 9 seconds      INR 0 88    APTT [[id number]]  (Normal) Collected: 05/23/22 1716    Lab Status: Final result Specimen: Blood from Arm, Right Updated: 05/23/22 1737     PTT 24 seconds     CBC and differential [[id number]]  (Abnormal) Collected: 05/23/22 1716    Lab Status: Final result Specimen: Blood from Arm, Right Updated: 05/23/22 1725     WBC 10 18 Thousand/uL      RBC 4 69 Million/uL      Hemoglobin 12 8 g/dL      Hematocrit 38 4 %      MCV 82 fL      MCH 27 3 pg      MCHC 33 3 g/dL      RDW 14 0 %      MPV 10 8 fL      Platelets 889 Thousands/uL      nRBC 0 /100 WBCs      Neutrophils Relative 67 %      Immat GRANS % 0 %      Lymphocytes Relative 26 %      Monocytes Relative 5 %      Eosinophils Relative 2 %      Basophils Relative 0 %      Neutrophils Absolute 6 76 Thousands/µL      Immature Grans Absolute 0 02 Thousand/uL      Lymphocytes Absolute 2 65 Thousands/µL      Monocytes Absolute 0 52 Thousand/µL      Eosinophils Absolute 0 19 Thousand/µL      Basophils Absolute 0 04 Thousands/µL     UA w Reflex to Microscopic w Reflex to Culture [[id number]]  (Abnormal) Collected: 05/23/22 1700    Lab Status: Final result Specimen: Urine, Clean Catch Updated: 05/23/22 1712     Color, UA Yellow     Clarity, UA Clear     Specific Gravity, UA 1 010     pH, UA 7 0     Leukocytes, UA Negative     Nitrite, UA Negative     Protein, UA Negative mg/dl      Glucose, UA 3+ mg/dl      Ketones, UA Negative mg/dl      Urobilinogen, UA 0 2 E U /dl      Bilirubin, UA Negative     Blood, UA Negative                 CT head without contrast   Final Result by Deepti Bingham DO (05/23 1943)      No acute intracranial abnormality                    Workstation performed: [id number]         CT orbits/temporal bones/skull base wo contrast   Final Result by Aashish Dugan MD (05/23 1738)      No evidence of otitis media or mastoiditis  Workstation performed: [id number]                    Procedures  ECG 12 Lead Documentation Only    Date/Time: 5/23/2022 5:30 PM  Performed by: Karen Cota DO  Authorized by: Teodoro Okeefe III, DO     Indications / Diagnosis:  DIZZINESS  ECG reviewed by me, the ED Provider: yes    Patient location:  ED  Comments:      I personally reviewed this EKG is performed the May 23, 2022, EKG was completed at 5:28 p m  and interpreted by me at 5:30 p m   Normal sinus rhythm no acute ST abnormalities all intervals within normal limits, no evidence of Brugada syndrome, no evidence of ST elevation, heart rate below 100  ED Course  ED Course as of 05/23/22 2316   Mon May 23, 2022   1644 Patient seen and evaluated, H and P completed, 47 y/o w female present s/ dizziness, L ear pain with prior hx of mastoiditits on this side previously, known DM: highest BS: 420 over the course of 1 week, concern for mastoiditis, TM clear, pain along the mastoid area, case d/w Dr Ferraro Mention from radiology, noted CT of temporal bones w/o contrast approximate, if fluid or erosion of walls of the mastoid, safely assume no mastoiditis  1744 CT the temple area shows no evidence of mastoiditis  1903 Patient is reassessed, now she knows that she has a mild headache with the dizziness, heel to shin testing negative, finger-nose testing negative, patient does have lateral nystagmus wanting to a peripheral cause of the patient's presentation  Due to the patient being a poorly controlled diabetic will proceed with a CT of the head  2009 Will attempt to ambulate the patient, baseline labs unremarkable  2100 Patient was able to ambulate with minimal assistance, blood sugar is 284, CT the head negative  Patient's exam is consistent with inner ear dysfunction, lateral nystagmus  HEART Risk Score    Flowsheet Row Most Recent Value   Heart Score Risk Calculator    History 0 Filed at: 05/23/2022 1903   ECG 0 Filed at: 05/23/2022 1903   Age 1 Filed at: 05/23/2022 1903   Risk Factors 1 Filed at: 05/23/2022 1903   Troponin 0 Filed at: 05/23/2022 1903   HEART Score 2 Filed at: 05/23/2022 1903                        SBIRT 20yo+    Flowsheet Row Most Recent Value   SBIRT (23 yo +)    In order to provide better care to our patients, we are screening all of our patients for alcohol and drug use  Would it be okay to ask you these screening questions? Yes Filed at: 05/23/2022 1829   Initial Alcohol Screen: US AUDIT-C     1  How often do you have a drink containing alcohol? 0 Filed at: 05/23/2022 1829   2  How many drinks containing alcohol do you have on a typical day you are drinking? 0 Filed at: 05/23/2022 1829   3a  Male UNDER 65: How often do you have five or more drinks on one occasion? 0 Filed at: 05/23/2022 1829   3b  FEMALE Any Age, or MALE 65+: How often do you have 4 or more drinks on one occassion? 0 Filed at: 05/23/2022 1829   Audit-C Score 0 Filed at: 05/23/2022 1829   LILIA: How many times in the past year have you    Used an illegal drug or used a prescription medication for non-medical reasons? Never Filed at: 05/23/2022 1829                    MDM  Number of Diagnoses or Management Options  Dizziness  Hyperglycemia  Diagnosis management comments: 26-year-old female presents the emergency department with a history of uncontrolled diabetes, hemoglobin A1c historically greater than 10, dizziness x1 week no syncopal episodes, does report a left-sided ear pain, TMs clear, after consultation with the radiologist due to prior history of mastoiditis, CT was ordered without contrast was negative    Patient was given IV fluid hydration, blood sugar was mid 250 range, no evidence of gapped metabolic acidosis, CT the head negative, exam points to the fact the patient most likely has a peripheral cause, negative HINTS exam, lateral nystagmus noted, no vertical nystagmus, no dysmetria or past pointing, patient was able ambulate without difficulty with minimal assistance my staff, advised patient to follow-up with her PCP regarding more optimal management of her fluctuating hyperglycemia which could be contributing factor of patient's presentation, EKG unremarkable, troponin  enzymes or less than threshold for repeating lab work  Patient stable for discharge  Portions of the record may have been created with voice recognition software  Occasional wrong word or "sound a like" substitutions may have occurred due to the inherent limitations of voice recognition software  Read the chart carefully and recognize, using context, where substitutions have occurred  Counseling: I had a detailed discussion with the patient and/or guardian regarding: the historical points, exam findings, and any diagnostic results supporting the discharge diagnosis, lab results, radiology results, discharge instructions reviewed with patient and/or family/caregiver and understanding was verbalized   Instructions given to return to the emergency department if symptoms worsen or persist, or if there are any questions or concerns that arise at home         Amount and/or Complexity of Data Reviewed  Clinical lab tests: ordered and reviewed  Tests in the radiology section of CPT®: ordered and reviewed  Tests in the medicine section of CPT®: ordered and reviewed        Disposition  Final diagnoses:   Dizziness   Hyperglycemia     Time reflects when diagnosis was documented in both MDM as applicable and the Disposition within this note     Time User Action Codes Description Comment    5/23/2022  4:49 PM Zarina Liner Add [R42] Dizziness     5/23/2022  4:49 PM Zarina Liner Add [R73 9] Hyperglycemia       ED Disposition     ED Disposition   Discharge    Condition   Stable    Date/Time   Mon May 23, 2022  9:28 PM    Comment [address] discharge to home/self care                 Follow-up Information     Follow up With Specialties Details Why [address], [address] [facility], Nurse Practitioner, Emergency Medicine   [address] [facility]  [facility] [address]  [phone]            Discharge Medication List as of 5/23/2022  9:29 PM      CONTINUE these medications which have NOT CHANGED    Details   albuterol (ACCUNEB) 0 63 MG/3ML nebulizer solution take 3 milliliters by mouth every 4 hours if needed for wheezing, Historical Med      albuterol (ProAir HFA) 90 mcg/act inhaler Inhale 1 puff every 6 (six) hours as needed for wheezing or shortness of breath, Starting Thu 4/7/2022, Normal      Apidra SoloStar 100 units/mL injection pen INJECT 28 UNITS BEFORE BREAKFAST AND LUNCH, INJECT 34 UNITS PRIOR TO DINNER, Normal      Aspirin (ASPIR-81 PO) Take 81 mg by mouth, Starting Mon 2/20/2012, Historical Med      atorvastatin (LIPITOR) 40 mg tablet Take 1 tablet (40 mg total) by mouth daily at bedtime, Starting Mon 1/17/2022, Normal      B-D UF III MINI PEN NEEDLES 31G X 5 MM MISC Starting Tue 1/21/2020, Historical Med      BANOPHEN 50 MG capsule take 1 capsule by mouth every 6 hours if needed for itching, Historical Med      benzonatate (TESSALON) 200 MG capsule Take 1 capsule (200 mg total) by mouth 3 (three) times a day as needed for cough, Starting Thu 12/23/2021, Normal      cholecalciferol (VITAMIN D3) 400 units tablet Take 1 tablet (400 Units total) by mouth daily, Starting Thu 1/13/2022, Normal      citalopram (CeleXA) 40 mg tablet Take 1 tablet (40 mg total) by mouth daily, Starting Thu 1/13/2022, Normal      Continuous Blood Gluc  (FreeStyle Refugio 14 Day [place]) TEMITOPE Use for continuous blood glucose monitoring, Print      Continuous Blood Gluc Sensor (FreeStyle Refugio 14 Day Sensor) MISC Use one sensor every 14 days for continuous blood sugar monitoring , Print       MG capsule Take 1 capsule (100 mg total) by mouth 2 (two) times a day, Starting Mon 2/21/2022, Normal      !! Empagliflozin (Jardiance) 25 MG TABS Take 1 tablet (25 mg total) by mouth every morning, Starting Tue 3/29/2022, No Print      !! Empagliflozin 25 MG TABS Take 1 tablet (25 mg total) by mouth every morning, Starting Tue 11/30/2021, Normal      famotidine (PEPCID) 40 MG tablet Take 1 tablet (40 mg total) by mouth daily at bedtime Take in evening 2 hours prior to bed, Starting Tue 11/30/2021, Normal      fenofibrate (TRICOR) 48 mg tablet Take 1 tablet (48 mg total) by mouth daily, Starting Mon 1/17/2022, Normal      glucose blood (OneTouch Verio) test strip Use to test blood glucose 4 times a day, Normal      Lantus SoloStar 100 units/mL injection pen INJECT 45 UNITS EVERY 12 HOURS, Normal      levETIRAcetam (KEPPRA) 500 mg tablet Take 1 tablet (500 mg total) by mouth every 12 (twelve) hours, Starting Fri 1/14/2022, Normal      linaCLOtide (Linzess) 72 MCG CAPS Take 1 capsule by mouth daily before breakfast, Starting Thu 2/24/2022, Normal      lisinopril (ZESTRIL) 10 mg tablet Take 1 tablet (10 mg total) by mouth daily, Starting Thu 1/13/2022, Normal      metFORMIN (GLUCOPHAGE) 1000 MG tablet Take 1 tablet (1,000 mg total) by mouth 2 (two) times a day with meals, Starting Thu 4/14/2022, Normal      metoclopramide (REGLAN) 10 mg tablet Take 1 tablet (10 mg total) by mouth daily, Starting Mon 2/21/2022, Normal      metoprolol tartrate (LOPRESSOR) 25 mg tablet Take 1 tablet (25 mg total) by mouth 2 (two) times a day, Starting Thu 1/13/2022, Normal      neomycin-polymyxin-hydrocortisone (CORTISPORIN) otic solution Administer 4 drops to the right ear every 6 (six) hours, Starting Fri 8/13/2021, Normal      ondansetron (ZOFRAN) 4 mg tablet Take 1 tablet (4 mg total) by mouth every 8 (eight) hours as needed for nausea or vomiting, Starting Mon 4/12/2021, Normal      ondansetron (ZOFRAN-ODT) 4 mg disintegrating tablet Take 1 tablet (4 mg total) by mouth every 6 (six) hours as needed for nausea or vomiting, Starting Tue 9/7/2021, Normal      pantoprazole (PROTONIX) 40 mg tablet Take 1 tablet (40 mg total) by mouth 2 (two) times a day, Starting Tue 11/30/2021, Normal      pregabalin (LYRICA) 100 mg capsule take 1 capsule by mouth three times a day, Normal      semaglutide, 1 mg/dose, (Ozempic) 4 MG/3ML SOPN injection pen Inject 0 75 mL (1 mg total) under the skin once a week, Starting Tue 10/26/2021, Normal      solifenacin (VESIcare) 5 mg tablet Take 1 tablet (5 mg total) by mouth daily, Starting Thu 1/13/2022, Normal       !! - Potential duplicate medications found  Please discuss with provider  No discharge procedures on file      PDMP Review       Value Time User    PDMP Reviewed  Yes 3/26/2021 10:43 AM Candace Kawasaki Dimitriadis, PA-C          ED Provider  Electronically Signed by           Nakita Walker III, DO  05/23/22 [id number]

## 2022-05-23 NOTE — TELEPHONE ENCOUNTER
Pt calling she has ear ache and dizzy for about 2 weeks now, checked with provider, she said to go to urgent care

## 2022-05-24 ENCOUNTER — TELEPHONE (OUTPATIENT)
Dept: NEPHROLOGY | Facility: CLINIC | Age: 52
End: 2022-05-24

## 2022-05-24 RX ORDER — EMPAGLIFLOZIN 25 MG/1
TABLET, FILM COATED ORAL
Qty: 90 TABLET | Refills: 1 | Status: SHIPPED | OUTPATIENT
Start: 2022-05-24 | End: 2022-06-09 | Stop reason: SDUPTHER

## 2022-05-24 NOTE — TELEPHONE ENCOUNTER
Spoke with patient she is aware  Patient stated she didn't go for her ultrasound that you ordered back in March  Patient asked if you would like her to have the ultrasound done before 3-6 months

## 2022-05-24 NOTE — TELEPHONE ENCOUNTER
Has a complicated cyst on the upper part of the right kidney  The recommend ultrasound follow up  Wouldl have one in 3-6 months

## 2022-05-25 ENCOUNTER — HOSPITAL ENCOUNTER (OUTPATIENT)
Dept: NON INVASIVE DIAGNOSTICS | Facility: CLINIC | Age: 52
Discharge: HOME | End: 2022-05-25
Payer: COMMERCIAL

## 2022-05-25 VITALS
HEIGHT: 61 IN | WEIGHT: 188 LBS | HEART RATE: 80 BPM | BODY MASS INDEX: 35.5 KG/M2 | SYSTOLIC BLOOD PRESSURE: 122 MMHG | DIASTOLIC BLOOD PRESSURE: 80 MMHG

## 2022-05-25 DIAGNOSIS — E11.43 TYPE 2 DIABETES MELLITUS WITH DIABETIC AUTONOMIC NEUROPATHY, WITH LONG-TERM CURRENT USE OF INSULIN (HCC): ICD-10-CM

## 2022-05-25 DIAGNOSIS — R07.89 ATYPICAL CHEST PAIN: ICD-10-CM

## 2022-05-25 DIAGNOSIS — Z79.4 TYPE 2 DIABETES MELLITUS WITH DIABETIC AUTONOMIC NEUROPATHY, WITH LONG-TERM CURRENT USE OF INSULIN (HCC): ICD-10-CM

## 2022-05-25 LAB
AORTIC ROOT: 3.2 CM
AORTIC VALVE MEAN VELOCITY: 8.4 M/S
APICAL FOUR CHAMBER EJECTION FRACTION: 62 %
ASCENDING AORTA: 3 CM
AV LVOT MEAN GRADIENT: 2 MMHG
AV LVOT PEAK GRADIENT: 4 MMHG
AV MEAN GRADIENT: 3 MMHG
AV PEAK GRADIENT: 6 MMHG
DOP CALC AO PEAK VEL: 1.22 M/S
DOP CALC AO VTI: 31.56 CM
DOP CALC LVOT PEAK VEL VTI: 26.77 CM
DOP CALC LVOT PEAK VEL: 1.02 M/S
DOP CALC RVOT PEAK VEL: 0.65 M/S
DOP CALC RVOT VTI: 18.51 CM
E WAVE DECELERATION TIME: 281 MS
E/A RATIO: 1.19
FRACTIONAL SHORTENING: 35 (ref 28–44)
INTERVENTRICULAR SEPTUM IN DIASTOLE (PARASTERNAL SHORT AXIS VIEW): 0.7 CM
INTERVENTRICULAR SEPTUM: 0.7 CM (ref 0.6–1.1)
LAAS-AP4: 15.8 CM2
LEFT ATRIUM SIZE: 3.8 CM
LEFT ATRIUM VOLUME INDEX (MOD BIPLANE): 20.7
LEFT INTERNAL DIMENSION IN SYSTOLE: 2.8 CM (ref 2.1–4)
LEFT VENTRICULAR INTERNAL DIMENSION IN DIASTOLE: 4.3 CM (ref 3.5–6)
LEFT VENTRICULAR POSTERIOR WALL IN END DIASTOLE: 0.6 CM
LEFT VENTRICULAR STROKE VOLUME: 54 ML
LVSV (TEICH): 54 ML
MV E'TISSUE VEL-SEP: 12 CM/S
MV PEAK A VEL: 0.75 M/S
MV PEAK E VEL: 89 CM/S
MV STENOSIS PRESSURE HALF TIME: 81 MS
MV VALVE AREA P 1/2 METHOD: 2.7
PV MEAN GRADIENT: 1 MMHG
PV MEAN VELOCITY: 49 CM/S
PV PEAK GRADIENT: 2 MMHG
PV PEAK VELOCITY: 73 CM/S
PV VTI: 19.51 CM
RIGHT VENTRICLE ID DIMENSION: 1.7 CM
SL CV LV EF: 65
SL CV PED ECHO LEFT VENTRICLE DIASTOLIC VOLUME (MOD BIPLANE) 2D: 82 ML
SL CV PED ECHO LEFT VENTRICLE SYSTOLIC VOLUME (MOD BIPLANE) 2D: 28 ML
SL CV RVOT MAX GRADIENT: 2 MMHG
SL CV RVOT MEAN GRADIENT: 1 MMHG
SL CV RVOT VMEAN: 0.49 M/S

## 2022-05-25 PROCEDURE — 93306 TTE W/DOPPLER COMPLETE: CPT

## 2022-05-25 PROCEDURE — 93306 TTE W/DOPPLER COMPLETE: CPT | Performed by: INTERNAL MEDICINE

## 2022-05-25 RX ORDER — INSULIN GLARGINE 100 [IU]/ML
INJECTION, SOLUTION SUBCUTANEOUS
Qty: 30 ML | Refills: 1 | Status: SHIPPED | OUTPATIENT
Start: 2022-05-25 | End: 2022-07-13

## 2022-05-25 RX ORDER — INSULIN GLULISINE 100 [IU]/ML
INJECTION, SOLUTION SUBCUTANEOUS
Qty: 30 ML | Refills: 1 | Status: SHIPPED | OUTPATIENT
Start: 2022-05-25

## 2022-05-26 ENCOUNTER — HOSPITAL ENCOUNTER (OUTPATIENT)
Dept: ULTRASOUND IMAGING | Facility: HOSPITAL | Age: 52
Discharge: HOME | End: 2022-05-26
Attending: INTERNAL MEDICINE
Payer: COMMERCIAL

## 2022-05-26 DIAGNOSIS — N18.31 STAGE 3A CHRONIC KIDNEY DISEASE (HCC): ICD-10-CM

## 2022-05-26 PROCEDURE — 76770 US EXAM ABDO BACK WALL COMP: CPT

## 2022-05-28 LAB
BACTERIA BLD CULT: NORMAL
BACTERIA BLD CULT: NORMAL

## 2022-05-31 ENCOUNTER — TELEPHONE (OUTPATIENT)
Dept: NEPHROLOGY | Facility: CLINIC | Age: 52
End: 2022-05-31

## 2022-05-31 NOTE — TELEPHONE ENCOUNTER
Kidney ultrasound demonstrates chronic kidney disease   The area where the kidney cells reside is thin  When she comes in for a visit I will explain using an anatomic diagram  No masses or stones or blockages at all

## 2022-06-01 ENCOUNTER — OFFICE VISIT (OUTPATIENT)
Dept: CARDIOLOGY CLINIC | Facility: CLINIC | Age: 52
End: 2022-06-01
Payer: COMMERCIAL

## 2022-06-01 VITALS
DIASTOLIC BLOOD PRESSURE: 62 MMHG | WEIGHT: 190 LBS | HEART RATE: 100 BPM | OXYGEN SATURATION: 98 % | BODY MASS INDEX: 35.87 KG/M2 | HEIGHT: 61 IN | RESPIRATION RATE: 16 BRPM | SYSTOLIC BLOOD PRESSURE: 112 MMHG | TEMPERATURE: 96.9 F

## 2022-06-01 DIAGNOSIS — R07.9 CHEST PAIN, UNSPECIFIED TYPE: Primary | ICD-10-CM

## 2022-06-01 DIAGNOSIS — E66.09 CLASS 2 OBESITY DUE TO EXCESS CALORIES WITH BODY MASS INDEX (BMI) OF 35.0 TO 35.9 IN ADULT, UNSPECIFIED WHETHER SERIOUS COMORBIDITY PRESENT: ICD-10-CM

## 2022-06-01 DIAGNOSIS — E78.5 HYPERLIPIDEMIA, UNSPECIFIED HYPERLIPIDEMIA TYPE: ICD-10-CM

## 2022-06-01 DIAGNOSIS — I10 PRIMARY HYPERTENSION: ICD-10-CM

## 2022-06-01 PROCEDURE — 99214 OFFICE O/P EST MOD 30 MIN: CPT | Performed by: INTERNAL MEDICINE

## 2022-06-01 NOTE — PROGRESS NOTES
Cardiology Follow up    Mouna Garcia  [id number]  1970  [facility] [address] [address] [phone]      1  Chest pain, unspecified type     2  Primary hypertension     3  Hyperlipidemia, unspecified hyperlipidemia type     4  Class 2 obesity due to excess calories with body mass index (BMI) of 35 0 to 35 9 in adult, unspecified whether serious comorbidity present         Discussion/Summary:  1  Chest pain  2  Hypertension  3  Hyperlipidemia  4  Obesity  5  History CVA      -transthoracic echocardiogram 05/25/2022 showing left ventricular systolic function normal estimated LVEF 65% with trace mitral regurgitation and normal right ventricular systolic function  -nuclear stress testing is pending at this time we will follow-up results once completed  -as patient's blood pressures are very well controlled home will continue current medical regimen with aspirin 81 mg daily, atorvastatin 40 mg daily, lisinopril 10 mg daily and metoprolol tartrate 25 mg twice daily however if patient's blood pressures remain 2 well controlled or drop consistently into the 63X mmHg systolic range will likely reduce medical therapy above and patient will continue to monitor and let our office know if there is any significant issues    -will see patient in 3 months or sooner if necessary once testing is completed  -patient counseled on dietary and lifestyle modifications including following a low-salt low-fat heart healthy diet with weight loss and if stress testing is negative she will be strongly encouraged to up titrate physical activity as tolerated  -patient counseled if she were to have any warning or alarm type symptoms she is to seek emergency medical care immediately    History of Present Illness:  -patient is a 59-year-old female with hypertension, CKD and history of renal cell carcinoma in the right kidney status post resection 2017, uncontrolled diabetes mellitus, history of stroke in 1990, obesity who initially presented to the office in April of 2022 for evaluation after being seen in the emergency department on March 25, 2022 for chest pain  The patient noted that at that time the pain woke her up from sleep about an hour prior to arrival in the ER was sharp in nature and radiated to her back she had 100 and lasted about 5 minutes in duration and then resolved on its own she had been having some symptoms a couple times since that ER visit prior to last office visit and testing/evaluation was ordered   -unfortunately patient due to stressful living situation issues had not been able to undergo stress testing at this time but will be rescheduling it for the near future but does note that in the meantime since her stress at home has improved chest pain has also improved   -she denies any current chest pain, palpitations, lightheadedness or dizziness, loss of consciousness, shortness of breath or lower extremity swelling and overall is feeling better today   -patient notes home blood pressure readings averaging around 100-110 mmHg in denies any issues with dizziness, lightheadedness or loss of consciousness          Patient Active Problem List   Diagnosis    Radiculopathy, lumbar region    Anterior tibial tendonitis, right    Bipolar disorder ([address] )    Chronic low back pain with sciatica    Type 2 diabetes mellitus with neurologic complication ([address] )    Diabetic polyneuropathy associated with type 2 diabetes mellitus ([address] )    Gastroparesis    Hypertension associated with diabetes ([address] )    Spondylolisthesis of lumbar region    Class 1 obesity due to excess calories with serious comorbidity and body mass index (BMI) of 34 0 to 34 9 in adult    Renal cell carcinoma of right kidney (HCC)    Acute right lower quadrant pain    Hypotension    Internal hernia    Intra-abdominal adhesions    Depression with anxiety    Mild intermittent asthma without complication    Hypercholesteremia    Hypertriglyceridemia    Iron deficiency anemia secondary to inadequate dietary iron intake    Encounter for post surgical wound check    GERD (gastroesophageal reflux disease)    History of colon polyps    Slow transit constipation    Anemia, unspecified    History of kidney cancer    Vitamin D deficiency    Stage 3a chronic kidney disease (HCC)     Past Medical History:   Diagnosis Date    Depression     Diabetes mellitus (HCC)     Gastroparesis     GERD (gastroesophageal reflux disease)     Hyperlipidemia     Hypertension     Sciatica     Seizure disorder ([facility] )      Social History     Socioeconomic History    Marital status: /Civil Union     Spouse name: Not on file    Number of children: Not on file    Years of education: Not on file    Highest education level: Not on file   Occupational History    Not on file   Tobacco Use    Smoking status: Former Smoker     Quit date:      Years since quittin 4    Smokeless tobacco: Never Used   Vaping Use    Vaping Use: Never used   Substance and Sexual Activity    Alcohol use: Never    Drug use: Never    Sexual activity: Not Currently     Partners: Male   Other Topics Concern    Not on file   Social History Narrative    Not on file     Social Determinants of Health     Financial Resource Strain: Not on file   Food Insecurity: Not on file   Transportation Needs: Not on file   Physical Activity: Not on file   Stress: Not on file   Social Connections: Not on file   Intimate Partner Violence: Not on file   Housing Stability: Not on file      Family History   Problem Relation Age of Onset    No Known Problems Mother     No Known Problems Father     No Known Problems Daughter     No Known Problems Maternal Grandmother     No Known Problems Paternal Grandmother     No Known Problems Paternal Aunt     No Known Problems Paternal Aunt     No Known Problems Paternal Aunt      Past Surgical History:   Procedure Laterality Date    BREAST BIOPSY Left  benign    CHOLECYSTECTOMY      COLONOSCOPY      HYSTERECTOMY      IL LAP,APPENDECTOMY N/A 3/24/2021    Procedure: APPENDECTOMY LAPAROSCOPIC;  Surgeon: Alex Gutierrez MD;  Location: [address] [place] [address] OR;  Service: General    IL LAP,DIAGNOSTIC ABDOMEN N/A 3/24/2021    Procedure: LAPAROSCOPY DIAGNOSTIC, EXTENSIVE DESI;  Surgeon: Alex Gutierrez MD;  Location[facility] OR;  Service: General    UPPER GASTROINTESTINAL ENDOSCOPY         Current Outpatient Medications:     albuterol (ProAir HFA) 90 mcg/act inhaler, Inhale 1 puff every 6 (six) hours as needed for wheezing or shortness of breath, Disp: 18 g, Rfl: 3    Aspirin (ASPIR-81 PO), Take 81 mg by mouth, Disp: , Rfl:     atorvastatin (LIPITOR) 40 mg tablet, Take 1 tablet (40 mg total) by mouth daily at bedtime, Disp: 90 tablet, Rfl: 3    B-D UF III MINI PEN NEEDLES 31G X 5 MM MISC, , Disp: , Rfl:     BANOPHEN 50 MG capsule, take 1 capsule by mouth every 6 hours if needed for itching, Disp: , Rfl:     benzonatate (TESSALON) 200 MG capsule, Take 1 capsule (200 mg total) by mouth 3 (three) times a day as needed for cough, Disp: 60 capsule, Rfl: 1    cholecalciferol (VITAMIN D3) 400 units tablet, Take 1 tablet (400 Units total) by mouth daily, Disp: 90 tablet, Rfl: 1    citalopram (CeleXA) 40 mg tablet, Take 1 tablet (40 mg total) by mouth daily, Disp: 90 tablet, Rfl: 1    Continuous Blood Gluc  (FreeStyle Refugio 14 Day [place]) TEMITOPE, Use for continuous blood glucose monitoring, Disp: 1 each, Rfl: 0    Continuous Blood Gluc Sensor (FreeStyle Refugio 14 Day Sensor) MISC, Use one sensor every 14 days for continuous blood sugar monitoring , Disp: 6 each, Rfl: 3     MG capsule, Take 1 capsule (100 mg total) by mouth 2 (two) times a day, Disp: 180 capsule, Rfl: 3    famotidine (PEPCID) 40 MG tablet, Take 1 tablet (40 mg total) by mouth daily at bedtime Take in evening 2 hours prior to bed, Disp: 30 tablet, Rfl: 6    fenofibrate (TRICOR) 48 mg tablet, Take 1 tablet (48 mg total) by mouth daily, Disp: 90 tablet, Rfl: 3    glucose blood (OneTouch Verio) test strip, Use to test blood glucose 4 times a day, Disp: 200 strip, Rfl: 2    insulin glulisine (Apidra SoloStar) 100 units/mL injection pen, INJECT 28 UNITS BEFORE BREAKFAST, INJECT 34 UNITS PRIOR TO DINNER, Disp: 30 mL, Rfl: 1    Jardiance 25 MG TABS, take 1 tablet by mouth every morning, Disp: 90 tablet, Rfl: 1    Lantus SoloStar 100 units/mL injection pen, INJECT 45 UNITS EVERY 12 HOURS, Disp: 30 mL, Rfl: 1    levETIRAcetam (KEPPRA) 500 mg tablet, Take 1 tablet (500 mg total) by mouth every 12 (twelve) hours, Disp: 180 tablet, Rfl: 1    linaCLOtide (Linzess) 72 MCG CAPS, Take 1 capsule by mouth daily before breakfast, Disp: 30 capsule, Rfl: 0    lisinopril (ZESTRIL) 10 mg tablet, Take 1 tablet (10 mg total) by mouth daily, Disp: 90 tablet, Rfl: 1    metFORMIN (GLUCOPHAGE) 1000 MG tablet, Take 1 tablet (1,000 mg total) by mouth 2 (two) times a day with meals, Disp: 180 tablet, Rfl: 1    metoclopramide (REGLAN) 10 mg tablet, Take 1 tablet (10 mg total) by mouth daily, Disp: 90 tablet, Rfl: 3    metoprolol tartrate (LOPRESSOR) 25 mg tablet, Take 1 tablet (25 mg total) by mouth 2 (two) times a day, Disp: 180 tablet, Rfl: 1    neomycin-polymyxin-hydrocortisone (CORTISPORIN) otic solution, Administer 4 drops to the right ear every 6 (six) hours, Disp: 10 mL, Rfl: 0    ondansetron (ZOFRAN) 4 mg tablet, Take 1 tablet (4 mg total) by mouth every 8 (eight) hours as needed for nausea or vomiting, Disp: 20 tablet, Rfl: 0    ondansetron (ZOFRAN-ODT) 4 mg disintegrating tablet, Take 1 tablet (4 mg total) by mouth every 6 (six) hours as needed for nausea or vomiting, Disp: 20 tablet, Rfl: 0    pantoprazole (PROTONIX) 40 mg tablet, Take 1 tablet (40 mg total) by mouth 2 (two) times a day, Disp: 180 tablet, Rfl: 3    pregabalin (LYRICA) 100 mg capsule, take 1 capsule by mouth three times a day, Disp: 270 capsule, Rfl: 3    semaglutide, 1 mg/dose, (Ozempic) 4 MG/3ML SOPN injection pen, Inject 0 75 mL (1 mg total) under the skin once a week, Disp: 9 mL, Rfl: 1    solifenacin (VESIcare) 5 mg tablet, Take 1 tablet (5 mg total) by mouth daily, Disp: 90 tablet, Rfl: 1    albuterol (ACCUNEB) 0 63 MG/3ML nebulizer solution, take 3 milliliters by mouth every 4 hours if needed for wheezing, Disp: , Rfl:   Allergies   Allergen Reactions    Azithromycin GI Intolerance and Hives    Benztropine     Butorphanol Hallucinations    Penicillins     Zolpidem          Labs:  Hospital Outpatient Visit on 05/25/2022   Component Date Value    LV EF 05/25/2022 65     A4C EF 05/25/2022 62     LVIDd 05/25/2022 4 30     LVIDS 05/25/2022 2 80     IVSd 05/25/2022 0 70     LVPWd 05/25/2022 0 60     FS 05/25/2022 35     MV E' Tissue Velocity Se* 05/25/2022 12     LA Volume Index (BP) 05/25/2022 20 7     E/A ratio 05/25/2022 1 19     E wave deceleration time 05/25/2022 281     MV Peak E Herman 05/25/2022 89     MV Peak A Herman 05/25/2022 0 75     AV LVOT peak gradient 05/25/2022 4     LVOT peak VTI 05/25/2022 26 77     LVOT peak herman 05/25/2022 1 02     RVOT peak VTI 05/25/2022 18 51     RVID d 05/25/2022 1 7     RVOT VMEAN 05/25/2022 0 49     RVOT PMEAN 05/25/2022 1     RVOT PMAX 05/25/2022 2     LA size 05/25/2022 3 8     Ao peak herman retrograde 05/25/2022 1 22     Ao VTI 05/25/2022 31 56     AV mean gradient 05/25/2022 3     LVOT mn grad 05/25/2022 2 0     AV peak gradient 05/25/2022 6     MV stenosis pressure 1/2* 05/25/2022 81     MV valve area p 1/2 meth* 05/25/2022 2 70     PV mean gradient antegra* 05/25/2022 1     PV peak gradient antegra* 05/25/2022 2     RVOT peak herman 05/25/2022 0 65     Pulmonic Valve Systolic * 55/83/3069 85 81     Ao root 05/25/2022 3 20     Asc Ao 05/25/2022 3     PV Mean Herman 05/25/2022 49     Aortic valve mean veloci* 05/25/2022 8 40     Left ventricular stroke * 05/25/2022 54 00     IVS 05/25/2022 0 7     PV Peak Velocity 05/25/2022 73     LEFT VENTRICLE SYSTOLIC * 61/60/5535 28     LV DIASTOLIC VOLUME (MOD* 80/76/4256 82     Left Atrium Area-systoli* 05/25/2022 15 8     LVSV, 2D 05/25/2022 54    Admission on 05/23/2022, Discharged on 05/23/2022   Component Date Value    WBC 05/23/2022 10 18 (A)    RBC 05/23/2022 4 69     Hemoglobin 05/23/2022 12 8     Hematocrit 05/23/2022 38 4     MCV 05/23/2022 82     MCH 05/23/2022 27 3     MCHC 05/23/2022 33 3     RDW 05/23/2022 14 0     MPV 05/23/2022 10 8     Platelets 91/84/6360 296     nRBC 05/23/2022 0     Neutrophils Relative 05/23/2022 67     Immat GRANS % 05/23/2022 0     Lymphocytes Relative 05/23/2022 26     Monocytes Relative 05/23/2022 5     Eosinophils Relative 05/23/2022 2     Basophils Relative 05/23/2022 0     Neutrophils Absolute 05/23/2022 6 76     Immature Grans Absolute 05/23/2022 0 02     Lymphocytes Absolute 05/23/2022 2 65     Monocytes Absolute 05/23/2022 0 52     Eosinophils Absolute 05/23/2022 0 19     Basophils Absolute 05/23/2022 0 04     Sodium 05/23/2022 132 (A)    Potassium 05/23/2022 5 2     Chloride 05/23/2022 97     CO2 05/23/2022 28     ANION GAP 05/23/2022 7     BUN 05/23/2022 39 (A)    Creatinine 05/23/2022 1 20     Glucose 05/23/2022 284 (A)    Calcium 05/23/2022 9 7     AST 05/23/2022 26     ALT 05/23/2022 27     Alkaline Phosphatase 05/23/2022 97     Total Protein 05/23/2022 7 4     Albumin 05/23/2022 4 2     Total Bilirubin 05/23/2022 0 42     eGFR 05/23/2022 52     LACTIC ACID 05/23/2022 1 8     Procalcitonin 05/23/2022 0 06     Protime 05/23/2022 11 9     INR 05/23/2022 0 88     PTT 05/23/2022 24     Blood Culture 05/23/2022 No Growth After 5 Days   Blood Culture 05/23/2022 No Growth After 5 Days       Color, UA 05/23/2022 Yellow     Clarity, UA 05/23/2022 Clear     Specific Gravity, UA 05/23/2022 1 010     pH, UA 05/23/2022 7 0     Leukocytes, UA 05/23/2022 Negative     Nitrite, UA 05/23/2022 Negative     Protein, UA 05/23/2022 Negative     Glucose, UA 05/23/2022 3+ (A)    Ketones, UA 05/23/2022 Negative     Urobilinogen, UA 05/23/2022 0 2     Bilirubin, UA 05/23/2022 Negative     Blood, UA 05/23/2022 Negative     Magnesium 05/23/2022 2 1     SARS-CoV-2 05/23/2022 Negative     INFLUENZA A PCR 05/23/2022 Negative     INFLUENZA B PCR 05/23/2022 Negative     RSV PCR 05/23/2022 Negative     hs TnI 0hr 05/23/2022 2    Office Visit on 05/23/2022   Component Date Value    POC Glucose 05/23/2022 314 (A)   Clinical Support on 04/08/2022   Component Date Value    Hemoglobin A1C 04/08/2022 9 8 (A)        Imaging: CT abdomen wo contrast    Result Date: 5/23/2022  Narrative: CT - ABDOMEN WITHOUT IV CONTRAST INDICATION:   C64 1: Malignant neoplasm of right kidney, except renal pelvis  COMPARISON:  9/16/2021 TECHNIQUE: CT examination of the abdomen was performed without intravenous contrast  This examination was performed without intravenous contrast in the context of the critical nationwide Omnipaque shortage  Lack of IV contrast limits the sensitivity for pathology within the visualized solid organs  Axial, sagittal, and coronal 2D reformatted images were created from the source data and submitted for interpretation  Radiation dose length product (DLP) for this visit:  505 36 mGy-cm   This examination, like all CT scans performed in the [facility], was performed utilizing techniques to minimize radiation dose exposure, including the use of iterative  reconstruction and automated exposure control  Enteric contrast was administered  FINDINGS: ABDOMEN LOWER CHEST:  No clinically significant abnormality identified in the visualized lower chest  LIVER/BILIARY TREE:  Unremarkable   GALLBLADDER:  Gallbladder is surgically absent  SPLEEN:  Unremarkable  PANCREAS: Unremarkable  ADRENAL GLANDS:  Unremarkable  KIDNEYS/URETERS:  There is a 1 3 cm exophytic lesion at the upper pole of the right kidney  This does not have Hounsfield units consistent with a simple cyst   Possible complex cyst   Recommend ultrasound follow-up  (2/24, 601/99)  Otherwise, posttreatment changes with cortical defect at the lower pole reidentified  No hydronephrosis  VISUALIZED STOMACH AND BOWEL:  Unremarkable  VISUALIZED ABDOMINAL CAVITY:  No pathologically enlarged periportal, mesenteric or upper retroperitoneal lymph nodes  No ascites  VISUALIZED BODY WALL:  Unremarkable  VESSELS:  Unremarkable for patient's age  OSSEOUS STRUCTURES:  No acute fracture or destructive osseous lesion  Impression: 1 3 cm exophytic lesion at the upper pole of the right kidney  Ultrasound follow-up recommended  The study was marked in EPIC for significant notification  Workstation performed: [id number]     CT head without contrast    Result Date: 5/23/2022  Narrative: CT BRAIN - WITHOUT CONTRAST INDICATION:   Dizziness, non-specific dizziness, headache  COMPARISON:  February 3, 2020 TECHNIQUE:  CT examination of the brain was performed  In addition to axial images, sagittal and coronal 2D reformatted images were created and submitted for interpretation  Radiation dose length product (DLP) for this visit:  848  93 mGy-cm   This examination, like all CT scans performed in the [facility], was performed utilizing techniques to minimize radiation dose exposure, including the use of iterative  reconstruction and automated exposure control  IMAGE QUALITY:  Diagnostic  FINDINGS: PARENCHYMA:  No intracranial mass, mass effect or midline shift  No CT signs of acute infarction  No acute parenchymal hemorrhage  Arterial atherosclerosis  VENTRICLES AND EXTRA-AXIAL SPACES:  Normal for the patient's age   VISUALIZED ORBITS AND PARANASAL SINUSES:  No acute abnormality involving the orbits  Mild scattered sinus mucosal thickening is noted  No fluid levels are seen  Stable postop changes (left antrectomy)  CALVARIUM AND EXTRACRANIAL SOFT TISSUES:  Normal   Similar degenerative changes both temporomandibular joints  Impression: No acute intracranial abnormality  Workstation performed: [id number]     CT orbits/temporal bones/skull base wo contrast    Result Date: 5/23/2022  Narrative: CT ORBITS WITHOUT CONTRAST INDICATION:   Left mastoid pain  COMPARISON: 2/3/2020 TECHNIQUE: Axial CT imaging through the orbits was performed  In addition, sagittal and coronal reformatted images were submitted for interpretation  This examination was performed without intravenous contrast in the context of the critical nationwide Omnipaque shortage  Radiation dose length product (DLP) for this visit:  635 mGy-cm   This examination, like all CT scans performed in the [facility], was performed utilizing techniques to minimize radiation dose exposure, including the use of iterative reconstruction and automated exposure control  IMAGE QUALITY: Diagnostic  FINDINGS: ORBITS: Bilateral lens implants  No retro-orbital inflammation  SINUSES: Postsurgical change  Polypoid mucosal thickening in the left maxillary sinus, likely chronic  SOFT TISSUES: No inflammation, gas or fluid collection  BONY STRUCTURES: Right external auditory canal is intact  No blunting of the scutum  Well-pneumatized and clear middle ear  Limited visualization of the ossicles, grossly intact  No widening of the right internal auditory canal   Cochlea, vestibule and semicircular canals are intact  Facial nerve describes a normal course without canal dehiscence  Left extracranial auditory canals intact  No blunting of the scutum  Well-pneumatized and clear left middle ear  Limited evaluation of the ossicles, grossly intact    No widening of the left internal auditory canal   Intact cochlea, vestibule and semicircular canals  Facial nerve describes a normal course without evidence of canal dehiscence  Right mastoid air cells are well-pneumatized and clear  Abscess or dehiscence of the lateral wall of the left mastoid bone and absence of the air cells suggests postsurgical change from prostatectomy versus sequela of remote infection  No fluid or inflammation in the mastoidectomy bowl  Impression: No evidence of otitis media or mastoiditis  Workstation performed: [id number]     US kidney and bladder    Result Date: 5/29/2022  Narrative: RENAL ULTRASOUND INDICATION:   N18 31: Chronic kidney disease, stage 3a  COMPARISON: None TECHNIQUE:   Ultrasound of the retroperitoneum was performed with a curvilinear transducer utilizing volumetric sweeps and still imaging techniques  FINDINGS: KIDNEYS: Symmetric and normal size  Right kidney:  10 1 x 4 7 x 4 8 cm  Volume 119 2 mL Left kidney:  11 0 x 6 2 x 4 5 cm  Volume 159 3 mL Right kidney The renal parenchyma is diffusely echogenic consistent with medical renal disease  No mass is identified  Simple cyst measuring 1 5 x 1 3 x 1 4 cm and 1 7 x 1 6 x 1 5 cm is  No hydronephrosis  No shadowing calculi  No perinephric fluid collections  Left kidney The renal parenchyma is diffusely echogenic consistent with medical renal disease  No mass is identified  Simple cyst measuring 7 x 7 x 11 mm  No hydronephrosis  No shadowing calculi  No perinephric fluid collections  URETERS: Nonvisualized  BLADDER: Normally distended  No focal thickening or mass lesions  Right jet not visualized  Echogenic liver of fatty infiltration  Impression: Echogenic kidneys of medical renal disease  Bilateral simple renal cysts  Incidental fatty liver  Workstation performed: [id number]     Echo complete w/ contrast if indicated    Result Date: 5/25/2022  Narrative: [place]  Left Ventricle: Left ventricular cavity size is normal  Wall thickness is normal  The left ventricular ejection fraction is 65%  Systolic function is normal  Wall motion is normal  Diastolic function is normal    Right Ventricle: Systolic function is normal    Pericardium: There is no pericardial effusion  Review of Systems:  Review of Systems   Constitutional: Negative for chills, diaphoresis, fatigue, fever and unexpected weight change  HENT: Negative for trouble swallowing and voice change  Eyes: Negative for pain and redness  Respiratory: Negative for shortness of breath and wheezing  Cardiovascular: Negative for chest pain, palpitations and leg swelling  Gastrointestinal: Negative for abdominal pain, constipation, diarrhea, nausea and vomiting  Genitourinary: Negative for dysuria  Musculoskeletal: Negative for neck pain and neck stiffness  Neurological: Negative for dizziness, syncope, light-headedness and headaches  Psychiatric/Behavioral: Negative for agitation and confusion  All other systems reviewed and are negative  Vitals:    06/01/22 1140   BP: 112/62   Pulse: 100   Resp: 16   Temp: (!) 96 9 °F (36 1 °C)   TempSrc: Tympanic   SpO2: 98%   Weight: 86 2 kg (190 lb)   Height: 5' 1" (1 549 m)     Vitals:    06/01/22 1140   Weight: 86 2 kg (190 lb)     Height: 5' 1" (154 9 cm)     Physical Exam:  Physical Exam  Vitals reviewed  Constitutional:       General: She is not in acute distress  Appearance: Normal appearance  She is obese  She is not diaphoretic  HENT:      Head: Normocephalic and atraumatic  Eyes:      General:         Right eye: No discharge  Left eye: No discharge  Neck:      Comments: Trachea midline, no JVD present  Cardiovascular:      Rate and Rhythm: Normal rate and regular rhythm  Heart sounds: No friction rub  Pulmonary:      Effort: Pulmonary effort is normal  No respiratory distress  Breath sounds: No wheezing or rhonchi  Abdominal:      General: Bowel sounds are normal       Palpations: Abdomen is soft  Tenderness:  There is no abdominal tenderness  Musculoskeletal:      Right lower leg: No edema  Left lower leg: No edema  Skin:     General: Skin is warm and dry  Neurological:      Mental Status: She is alert  Comments: Awake, alert, able to answer questions appropriately, able move extremities bilaterally     Psychiatric:         Mood and Affect: Mood normal          Behavior: Behavior normal

## 2022-06-06 DIAGNOSIS — I15.2 HYPERTENSION ASSOCIATED WITH DIABETES (HCC): ICD-10-CM

## 2022-06-06 DIAGNOSIS — E11.59 HYPERTENSION ASSOCIATED WITH DIABETES (HCC): ICD-10-CM

## 2022-06-06 DIAGNOSIS — N32.89 BLADDER SPASMS: ICD-10-CM

## 2022-06-06 RX ORDER — SOLIFENACIN SUCCINATE 5 MG/1
5 TABLET, FILM COATED ORAL DAILY
Qty: 90 TABLET | Refills: 1 | Status: SHIPPED | OUTPATIENT
Start: 2022-06-06

## 2022-06-06 NOTE — TELEPHONE ENCOUNTER
Lisseth Vergara called from [address] is getting blister pack from this pharmacy  She is new to the pharmacy    They are asking if Oswald Sanchez can extend her script for Metoprolol tartrate 25 mg to a 90 day supply  # 180 tabs    She only has enough for 45 days    So they would not have to call back so soon    She will need a refill on her Vesicare 5 mg takes 1 QD # 90    Please advise    Phone: [phone]  Fax [phone]

## 2022-06-07 ENCOUNTER — OFFICE VISIT (OUTPATIENT)
Dept: NEPHROLOGY | Facility: CLINIC | Age: 52
End: 2022-06-07
Payer: COMMERCIAL

## 2022-06-07 VITALS
WEIGHT: 190.6 LBS | HEART RATE: 64 BPM | DIASTOLIC BLOOD PRESSURE: 64 MMHG | BODY MASS INDEX: 35.98 KG/M2 | OXYGEN SATURATION: 98 % | HEIGHT: 61 IN | SYSTOLIC BLOOD PRESSURE: 118 MMHG

## 2022-06-07 DIAGNOSIS — N25.81 SECONDARY HYPERPARATHYROIDISM (HCC): ICD-10-CM

## 2022-06-07 DIAGNOSIS — K59.00 CONSTIPATION, UNSPECIFIED CONSTIPATION TYPE: ICD-10-CM

## 2022-06-07 DIAGNOSIS — I12.9 BENIGN HYPERTENSION WITH CKD (CHRONIC KIDNEY DISEASE) STAGE III (HCC): ICD-10-CM

## 2022-06-07 DIAGNOSIS — C64.1 CLEAR CELL CARCINOMA OF KIDNEY, RIGHT (HCC): ICD-10-CM

## 2022-06-07 DIAGNOSIS — E55.9 VITAMIN D DEFICIENCY: ICD-10-CM

## 2022-06-07 DIAGNOSIS — E11.21 DIABETIC NEPHROPATHY ASSOCIATED WITH TYPE 2 DIABETES MELLITUS (HCC): ICD-10-CM

## 2022-06-07 DIAGNOSIS — N18.30 BENIGN HYPERTENSION WITH CKD (CHRONIC KIDNEY DISEASE) STAGE III (HCC): ICD-10-CM

## 2022-06-07 DIAGNOSIS — N18.31 STAGE 3A CHRONIC KIDNEY DISEASE (HCC): Primary | ICD-10-CM

## 2022-06-07 PROCEDURE — 99214 OFFICE O/P EST MOD 30 MIN: CPT | Performed by: NURSE PRACTITIONER

## 2022-06-07 PROCEDURE — 3078F DIAST BP <80 MM HG: CPT | Performed by: NURSE PRACTITIONER

## 2022-06-07 PROCEDURE — 3074F SYST BP LT 130 MM HG: CPT | Performed by: NURSE PRACTITIONER

## 2022-06-07 RX ORDER — DOCUSATE SODIUM 100 MG/1
CAPSULE, LIQUID FILLED ORAL
Qty: 30 CAPSULE | Refills: 0 | Status: SHIPPED | OUTPATIENT
Start: 2022-06-07 | End: 2022-06-13

## 2022-06-07 NOTE — PROGRESS NOTES
Nephrology   Office Follow-Up  Clemencia Olivera 46 y o  female MRN: [id number]    Encounter: [id number]        Edgardo Cisneros was seen in the [place] office today  All diagnoses and orders for visit:     1  Stage 3a chronic kidney disease ([facility] )  · Likely due to decreased nephron mass status post partial nephrectomy and diabetic nephropathy  She also tells me she was 400 lb and lost weight on her own placing her at risk for obesity related FSGS  Needs better blood sugar control  Continue Jardiance and lisinopril  Lab work in August   Continue to focus on healthy lifestyle including further weight loss, diet and exercise, avoidance of nephrotoxins like NSAIDs and contrast dye   -     Comprehensive metabolic panel; Future; Expected date: 08/01/2022   -     Microalbumin / creatinine urine ratio; Future; Expected date: 08/01/2022   -     Urinalysis with microscopic; Future; Expected date: 08/01/2022   -     Vitamin D 25 hydroxy; Future; Expected date: 08/01/2022   -     CBC and differential; Future; Expected date: 08/01/2022  2  Diabetic nephropathy associated with type 2 diabetes mellitus ([facility] )  · Suspect non proteinuric diabetic kidney disease  She has neuropathy and retinopathy  She is on lisinopril and Jardiance  Her A1c is not controlled  We discussed this in detail  She will continue care according to her endocrinologist   3  Clear cell carcinoma of kidney, right ([facility] )  · No evidence of malignancy on updated ultrasound  Reviewed in detail with patient  She will have surveillance imaging at the expertise of the primary nephrologist  4  Benign hypertension with CKD (chronic kidney disease) stage III (HCC)  · Blood pressure is appropriate, no changes made  5  Secondary hyperparathyroidism (HCC)   -     PTH, intact; Future; Expected date: 08/01/2022  6  Vitamin D deficiency   -     Vitamin D 25 hydroxy;  Future; Expected date: 08/01/2022        HPI: Clemencia Olivera is a 46 y o  female Who is here for follow-up regarding chronic kidney disease stage IIIA and renal cyst   Other pertinent medical problems include clear cell carcinoma of right kidney status post partial nephrectomy 05/05/2017, longstanding uncontrolled type 2 diabetes mellitus since 8th grade with retinopathy and neuropathy, hypertension, GERD  Patient presents today with questions regarding most recent kidney ultrasound  Did review this in detail  She tells me she has had diabetes since 8th grade  She calls herself a brittle diabetic  She also states she has had hypertension for some time  She denies family history of  Dialysis or kidney disease  She denies NSAID use  Stable from a Nephrology perspective but must focus on better blood sugar control as she is at risk for progression of chronic kidney disease  I explained this to her in detail  She will avoid potential nephrotoxins including NSAIDs and contrast dye -reviewed this in detail patient she expressed understanding  She will have surveillance imaging for clear cell carcinoma at the discretion of her primary nephrologist   Blood work to be done in August and then return office with primary nephrologist later this year  ROS:   Review of Systems   Constitutional: Positive for unexpected weight change  Negative for chills and fever  HENT: Negative for ear pain and sore throat  Eyes: Negative for pain and visual disturbance  Respiratory: Negative for cough and shortness of breath  Cardiovascular: Negative for chest pain and palpitations  Gastrointestinal: Negative for abdominal pain and vomiting  Genitourinary: Negative for dysuria and hematuria  Musculoskeletal: Negative for arthralgias and back pain  Skin: Negative for color change and rash  Neurological: Negative for seizures and syncope  All other systems reviewed and are negative        Allergies: Azithromycin, Benztropine, Butorphanol, Penicillins, and Zolpidem    Medications: Current Outpatient Medications:     albuterol (ProAir HFA) 90 mcg/act inhaler, Inhale 1 puff every 6 (six) hours as needed for wheezing or shortness of breath, Disp: 18 g, Rfl: 3    Aspirin (ASPIR-81 PO), Take 81 mg by mouth, Disp: , Rfl:     atorvastatin (LIPITOR) 40 mg tablet, Take 1 tablet (40 mg total) by mouth daily at bedtime, Disp: 90 tablet, Rfl: 3    B-D UF III MINI PEN NEEDLES 31G X 5 MM MISC, , Disp: , Rfl:     BANOPHEN 50 MG capsule, take 1 capsule by mouth every 6 hours if needed for itching, Disp: , Rfl:     benzonatate (TESSALON) 200 MG capsule, Take 1 capsule (200 mg total) by mouth 3 (three) times a day as needed for cough, Disp: 60 capsule, Rfl: 1    cholecalciferol (VITAMIN D3) 400 units tablet, Take 1 tablet (400 Units total) by mouth daily, Disp: 90 tablet, Rfl: 1    citalopram (CeleXA) 40 mg tablet, Take 1 tablet (40 mg total) by mouth daily, Disp: 90 tablet, Rfl: 1    Continuous Blood Gluc  (FreeStyle Refugio 14 Day Darling) [facility], Use for continuous blood glucose monitoring, Disp: 1 each, Rfl: 0    Continuous Blood Gluc Sensor (FreeStyle Refugio 14 Day Sensor) MISC, Use one sensor every 14 days for continuous blood sugar monitoring , Disp: 6 each, Rfl: 3    docusate sodium (COLACE) 100 mg capsule, TAKE ONE CAPSULE BY MOUTH TWICE DAILY, Disp: 30 capsule, Rfl: 0    famotidine (PEPCID) 40 MG tablet, Take 1 tablet (40 mg total) by mouth daily at bedtime Take in evening 2 hours prior to bed, Disp: 30 tablet, Rfl: 6    fenofibrate (TRICOR) 48 mg tablet, Take 1 tablet (48 mg total) by mouth daily, Disp: 90 tablet, Rfl: 3    glucose blood (OneTouch Verio) test strip, Use to test blood glucose 4 times a day, Disp: 200 strip, Rfl: 2    insulin glulisine (Apidra SoloStar) 100 units/mL injection pen, INJECT 28 UNITS BEFORE BREAKFAST, INJECT 34 UNITS PRIOR TO DINNER, Disp: 30 mL, Rfl: 1    Jardiance 25 MG TABS, take 1 tablet by mouth every morning, Disp: 90 tablet, Rfl: 1    Lantus SoloStar 100 units/mL injection pen, INJECT 45 UNITS EVERY 12 HOURS, Disp: 30 mL, Rfl: 1    levETIRAcetam (KEPPRA) 500 mg tablet, Take 1 tablet (500 mg total) by mouth every 12 (twelve) hours, Disp: 180 tablet, Rfl: 1    linaCLOtide (Linzess) 72 MCG CAPS, Take 1 capsule by mouth daily before breakfast, Disp: 30 capsule, Rfl: 0    lisinopril (ZESTRIL) 10 mg tablet, Take 1 tablet (10 mg total) by mouth daily, Disp: 90 tablet, Rfl: 1    metFORMIN (GLUCOPHAGE) 1000 MG tablet, Take 1 tablet (1,000 mg total) by mouth 2 (two) times a day with meals, Disp: 180 tablet, Rfl: 1    metoclopramide (REGLAN) 10 mg tablet, Take 1 tablet (10 mg total) by mouth daily, Disp: 90 tablet, Rfl: 3    metoprolol tartrate (LOPRESSOR) 25 mg tablet, Take 1 tablet (25 mg total) by mouth 2 (two) times a day, Disp: 180 tablet, Rfl: 1    ondansetron (ZOFRAN) 4 mg tablet, Take 1 tablet (4 mg total) by mouth every 8 (eight) hours as needed for nausea or vomiting, Disp: 20 tablet, Rfl: 0    ondansetron (ZOFRAN-ODT) 4 mg disintegrating tablet, Take 1 tablet (4 mg total) by mouth every 6 (six) hours as needed for nausea or vomiting, Disp: 20 tablet, Rfl: 0    pantoprazole (PROTONIX) 40 mg tablet, Take 1 tablet (40 mg total) by mouth 2 (two) times a day, Disp: 180 tablet, Rfl: 3    pregabalin (LYRICA) 100 mg capsule, take 1 capsule by mouth three times a day, Disp: 270 capsule, Rfl: 3    semaglutide, 1 mg/dose, (Ozempic) 4 MG/3ML SOPN injection pen, Inject 0 75 mL (1 mg total) under the skin once a week, Disp: 9 mL, Rfl: 1    solifenacin (VESIcare) 5 mg tablet, Take 1 tablet (5 mg total) by mouth daily, Disp: 90 tablet, Rfl: 1    albuterol (ACCUNEB) 0 63 MG/3ML nebulizer solution, take 3 milliliters by mouth every 4 hours if needed for wheezing (Patient not taking: Reported on 6/7/2022), Disp: , Rfl:     neomycin-polymyxin-hydrocortisone (CORTISPORIN) otic solution, Administer 4 drops to the right ear every 6 (six) hours (Patient not taking: Reported on 6/7/2022), Disp: 10 mL, Rfl: 0    Past Medical History:   Diagnosis Date    Depression     Diabetes mellitus ([facility] )     Gastroparesis     GERD (gastroesophageal reflux disease)     Hyperlipidemia     Hypertension     Sciatica     Seizure disorder [facility])      Past Surgical History:   Procedure Laterality Date    BREAST BIOPSY Left  benign    CHOLECYSTECTOMY      COLONOSCOPY      HYSTERECTOMY      IN LAP,APPENDECTOMY N/A 3/24/2021    Procedure: APPENDECTOMY LAPAROSCOPIC;  Surgeon: Anuj Dean MD;  Location: [facility] MAIN OR;  Service: General    IN LAP,DIAGNOSTIC ABDOMEN N/A 3/24/2021    Procedure: LAPAROSCOPY DIAGNOSTIC, EXTENSIVE DESI;  Surgeon: Anuj Dean MD;  Location: [facility] MAIN OR;  Service: General    UPPER GASTROINTESTINAL ENDOSCOPY       Family History   Problem Relation Age of Onset    No Known Problems Mother     No Known Problems Father     No Known Problems Daughter     No Known Problems Maternal Grandmother     No Known Problems Paternal Grandmother     No Known Problems Paternal Aunt     No Known Problems Paternal Aunt     No Known Problems Paternal Aunt       reports that she quit smoking about 32 years ago  She has never used smokeless tobacco  She reports that she does not drink alcohol and does not use drugs  Physical Exam:   Vitals:    06/07/22 1333   BP: 118/64   Pulse: 64   SpO2: 98%   Weight: 86 5 kg (190 lb 9 6 oz)   Height: 5' 1" (1 549 m)     Body mass index is 36 01 kg/m²      General: conscious, cooperative, in no acute distress, appears older than stated age  Eyes: conjunctivae pale, anicteric sclerae  ENT: lips and mucous membranes moist  Neck: supple, no JVD, no masses  Chest:  essentially clear breath sounds bilaterally, no crackles, ronchus or wheezings  CVS: S1 & S2, normal rate, regular rhythm  Abdomen: soft, non-tender, non-distended, normoactive bowel sounds, rounded, obese  Extremities: no edema of both legs  Skin: no rash   Neuro: awake, alert, oriented       Diagnostic Data:  Lab: I have personally reviewed pertinent lab results  ,   CBC:       CMP: No results found for: SODIUM, K, CL, CO2, ANIONGAP, BUN, CREATININE, GLUCOSE, CALCIUM, AST, ALT, ALKPHOS, PROT, BILITOT, EGFR,   PT/INR: No results found for: PT, INR,   Magnesium: No components found for: MAG,  Phosphorous: No results found for: PHOS    Patient Instructions   Goal A1C 7 0%, goal blood pressure < 130/70 mmHg  Weight loss is encouraged  Repeat lab work August       Portions of the record may have been created with voice recognition software  Occasional wrong word or "sound a like" substitutions may have occurred due to the inherent limitations of voice recognition software  Read the chart carefully and recognize, using context, where substitutions have occurred  If you have any questions, please contact the dictating provider

## 2022-06-08 DIAGNOSIS — R56.9 SEIZURES (HCC): ICD-10-CM

## 2022-06-08 NOTE — TELEPHONE ENCOUNTER
Delia Hinson from [address] [place] [address] called about patient's Tad Josephine  Maricarmen Leigh it was called in on 6/6/22 (I don't see a note)  Saying it was called in for Kepra 500mg 1 daily but it had previously been called in for twice daily  Is asking for clarification  Please advise

## 2022-06-09 DIAGNOSIS — E11.43 TYPE 2 DIABETES MELLITUS WITH DIABETIC AUTONOMIC NEUROPATHY, WITH LONG-TERM CURRENT USE OF INSULIN (HCC): ICD-10-CM

## 2022-06-09 DIAGNOSIS — Z79.4 TYPE 2 DIABETES MELLITUS WITH DIABETIC AUTONOMIC NEUROPATHY, WITH LONG-TERM CURRENT USE OF INSULIN (HCC): ICD-10-CM

## 2022-06-09 RX ORDER — LEVETIRACETAM 500 MG/1
500 TABLET ORAL EVERY 12 HOURS SCHEDULED
Qty: 180 TABLET | Refills: 1 | Status: SHIPPED | OUTPATIENT
Start: 2022-06-09

## 2022-06-09 NOTE — TELEPHONE ENCOUNTER
No one here verballed this in- please call patient to discuss   You can send me RX for appropriate refill to take twice daily

## 2022-06-09 NOTE — TELEPHONE ENCOUNTER
Patient said she switched pharmacy's from [facility] to the new [facility]  [place] [facility] had verbally transferred over her scripts  Spoke to [facility] and they confirmed  Had verbal for 1 daily       Sending script back for approval

## 2022-06-09 NOTE — TELEPHONE ENCOUNTER
We have not filled this for her since January 2022 and it should be every 12 hours  Can he clarify who the ordering provider is on it?

## 2022-06-09 NOTE — TELEPHONE ENCOUNTER
When I spoke to him yesterday he said it was a verbal he received on 6/6/2022 from provider Arielle

## 2022-06-09 NOTE — TELEPHONE ENCOUNTER
Pt is transferring all her prescriptions to [facility], they are missing a RX for Jardiance ,Please approve pending RX

## 2022-06-10 DIAGNOSIS — I15.2 HYPERTENSION ASSOCIATED WITH DIABETES (HCC): ICD-10-CM

## 2022-06-10 DIAGNOSIS — F31.9 BIPOLAR AFFECTIVE DISORDER, REMISSION STATUS UNSPECIFIED (HCC): ICD-10-CM

## 2022-06-10 DIAGNOSIS — E11.59 HYPERTENSION ASSOCIATED WITH DIABETES (HCC): ICD-10-CM

## 2022-06-10 DIAGNOSIS — E11.42 DIABETIC POLYNEUROPATHY ASSOCIATED WITH TYPE 2 DIABETES MELLITUS (HCC): ICD-10-CM

## 2022-06-10 RX ORDER — CITALOPRAM 40 MG/1
40 TABLET ORAL DAILY
Qty: 90 TABLET | Refills: 1 | Status: SHIPPED | OUTPATIENT
Start: 2022-06-10

## 2022-06-10 RX ORDER — LISINOPRIL 10 MG/1
10 TABLET ORAL DAILY
Qty: 90 TABLET | Refills: 1 | Status: SHIPPED | OUTPATIENT
Start: 2022-06-10

## 2022-06-10 RX ORDER — PREGABALIN 100 MG/1
100 CAPSULE ORAL 3 TIMES DAILY
Qty: 270 CAPSULE | Refills: 3 | Status: SHIPPED | OUTPATIENT
Start: 2022-06-10

## 2022-06-10 NOTE — TELEPHONE ENCOUNTER
Needs to get HgA1c done every 30 days until under 9 0- over due since 5/8- please ask her to come to the office for this

## 2022-06-10 NOTE — TELEPHONE ENCOUNTER
Patient requesting refill(s) of: Pregabalin 100mg    Last filled: 2/8/22 #270 x3    Patient requesting refill(s) of: Lisinopril 10mg     Last filled: 1/13/22 #90 x1    Patient requesting refill(s) of: Citalopram 40mg    Last filled: 1/13/22 #90 x1  Last appt: 2/17/22  Next appt: 8/25/22  Pharmacy: [address] [place] [address]

## 2022-06-13 DIAGNOSIS — K59.00 CONSTIPATION, UNSPECIFIED CONSTIPATION TYPE: ICD-10-CM

## 2022-06-13 RX ORDER — DOCUSATE SODIUM 100 MG/1
CAPSULE, LIQUID FILLED ORAL
Qty: 30 CAPSULE | Refills: 0 | Status: SHIPPED | OUTPATIENT
Start: 2022-06-13 | End: 2022-08-08

## 2022-06-14 ENCOUNTER — CLINICAL SUPPORT (OUTPATIENT)
Dept: FAMILY MEDICINE CLINIC | Facility: CLINIC | Age: 52
End: 2022-06-14
Payer: COMMERCIAL

## 2022-06-14 ENCOUNTER — TELEPHONE (OUTPATIENT)
Dept: FAMILY MEDICINE CLINIC | Facility: CLINIC | Age: 52
End: 2022-06-14

## 2022-06-14 DIAGNOSIS — Z79.4 TYPE 2 DIABETES MELLITUS WITH DIABETIC AUTONOMIC NEUROPATHY, WITH LONG-TERM CURRENT USE OF INSULIN (HCC): Primary | ICD-10-CM

## 2022-06-14 DIAGNOSIS — E11.43 TYPE 2 DIABETES MELLITUS WITH DIABETIC AUTONOMIC NEUROPATHY, WITH LONG-TERM CURRENT USE OF INSULIN (HCC): Primary | ICD-10-CM

## 2022-06-14 LAB — SL AMB POCT HEMOGLOBIN AIC: 13.3 (ref ?–6.5)

## 2022-06-14 PROCEDURE — 83036 HEMOGLOBIN GLYCOSYLATED A1C: CPT

## 2022-06-14 PROCEDURE — 99211 OFF/OP EST MAY X REQ PHY/QHP: CPT

## 2022-06-14 PROCEDURE — 3046F HEMOGLOBIN A1C LEVEL >9.0%: CPT

## 2022-06-14 NOTE — TELEPHONE ENCOUNTER
Patient called the office back  Patient said she forgot about coming yesterday  Will come in today before eye doctor appointment

## 2022-06-14 NOTE — TELEPHONE ENCOUNTER
Per chart review saw patient did not come in yesterday to have a1c done  Called and left message to have patient call the office back

## 2022-06-14 NOTE — TELEPHONE ENCOUNTER
This is still very high  It looks like endocrine manages her diabetes, so I will defer any changes to them  Please verify current medications she's taking for diabetes, including insulin dosing and home BG records

## 2022-06-14 NOTE — TELEPHONE ENCOUNTER
I did send a message to the front staff after a refill of 1 of her medications, she does need to be scheduled for follow-up appointment

## 2022-06-14 NOTE — TELEPHONE ENCOUNTER
Called and spoke with pt  Informed as below  States she takes Ozempic 1 mg/wk, Jardiance 25 mg daily, Metformin 1000 mg BID, Apidra 28 IU in the morning and 34 Iu before dinner, Lantus 45 IU Q12H      Reports she has not been able to get readings on her Orellana as it just says "High" and her fingerstick only says 400 when she checks sugars

## 2022-06-15 NOTE — TELEPHONE ENCOUNTER
Blood sugar is very high-- if she ate without taking insulin that is the reason for the high blood sugar  IF she doesn't want to take her full dose of Apidra, she can take a small amount according to the scale below to get some insulin to get the blood sugar down a little bit since it is a few hours after lunch  Check Blood sugar again and given Apidra  according to sliding scale below so if her blood sugar is still 388 that would be 10 units  150-200: 2 units  201-250: 4 units  251-300: 6 units  301-350: 8 units  Over 350: 10 units     Her follow up is not until September so if she can not get in sooner, she should send blood sugar readings for revew in a week

## 2022-06-15 NOTE — TELEPHONE ENCOUNTER
Spoke to patient her current blood sugar was 388  I asked when she had last given herself insulin or eaten lunch she had eaten about an hour ago and had not given herself any insulin  Shes afraid of "dropping" to low  I made her a follow up appt and instructed her to drink plenty of fluids, keep getting her ketone levels, and went over signs over hyperglycemia   Instructed patient to go to ED if sugars remain high

## 2022-06-16 NOTE — TELEPHONE ENCOUNTER
Spoke to patient and relayed/reviewed the sliding scale as documented below from provider  She understood  I will be looking to get her in sooner and she will be sending us her sugars next week

## 2022-06-30 ENCOUNTER — HOSPITAL ENCOUNTER (OUTPATIENT)
Dept: NUCLEAR MEDICINE | Facility: HOSPITAL | Age: 52
Discharge: HOME | End: 2022-06-30
Payer: COMMERCIAL

## 2022-06-30 ENCOUNTER — HOSPITAL ENCOUNTER (OUTPATIENT)
Dept: NON INVASIVE DIAGNOSTICS | Facility: HOSPITAL | Age: 52
Discharge: HOME | End: 2022-06-30
Payer: COMMERCIAL

## 2022-06-30 VITALS
SYSTOLIC BLOOD PRESSURE: 114 MMHG | HEIGHT: 61 IN | BODY MASS INDEX: 34.36 KG/M2 | HEART RATE: 61 BPM | OXYGEN SATURATION: 98 % | WEIGHT: 182 LBS | DIASTOLIC BLOOD PRESSURE: 70 MMHG | RESPIRATION RATE: 16 BRPM

## 2022-06-30 DIAGNOSIS — R07.89 ATYPICAL CHEST PAIN: ICD-10-CM

## 2022-06-30 LAB
NUC STRESS EJECTION FRACTION: 75 %
RATE PRESSURE PRODUCT: NORMAL
SL CV REST NUCLEAR ISOTOPE DOSE: 10.5 MCI
SL CV STRESS NUCLEAR ISOTOPE DOSE: 32.8 MCI
SL CV STRESS RECOVERY BP: NORMAL MMHG
SL CV STRESS RECOVERY HR: 86 BPM
SL CV STRESS RECOVERY O2 SAT: 100 %
STRESS ANGINA INDEX: 0
STRESS BASELINE BP: NORMAL MMHG
STRESS BASELINE HR: 61 BPM
STRESS O2 SAT REST: 98 %
STRESS PEAK HR: 112 BPM
STRESS POST O2 SAT PEAK: 97 %
STRESS POST PEAK BP: 160 MMHG
STRESS/REST PERFUSION RATIO: 0.79

## 2022-06-30 PROCEDURE — G1004 CDSM NDSC: HCPCS

## 2022-06-30 PROCEDURE — 78452 HT MUSCLE IMAGE SPECT MULT: CPT

## 2022-06-30 PROCEDURE — 93017 CV STRESS TEST TRACING ONLY: CPT

## 2022-06-30 PROCEDURE — A9502 TC99M TETROFOSMIN: HCPCS

## 2022-06-30 PROCEDURE — 78452 HT MUSCLE IMAGE SPECT MULT: CPT | Performed by: INTERNAL MEDICINE

## 2022-06-30 PROCEDURE — 93016 CV STRESS TEST SUPVJ ONLY: CPT | Performed by: INTERNAL MEDICINE

## 2022-06-30 PROCEDURE — 93018 CV STRESS TEST I&R ONLY: CPT | Performed by: INTERNAL MEDICINE

## 2022-06-30 RX ADMIN — REGADENOSON 0.4 MG: 0.08 INJECTION, SOLUTION INTRAVENOUS at 09:16

## 2022-07-05 LAB
CHEST PAIN STATEMENT: NORMAL
MAX DIASTOLIC BP: 90 MMHG
MAX HEART RATE: 112 BPM
MAX PREDICTED HEART RATE: 169 BPM
MAX. SYSTOLIC BP: 160 MMHG
PROTOCOL NAME: NORMAL
REASON FOR TERMINATION: NORMAL
TARGET HR FORMULA: NORMAL
TEST INDICATION: NORMAL
TIME IN EXERCISE PHASE: NORMAL

## 2022-07-13 DIAGNOSIS — E11.43 TYPE 2 DIABETES MELLITUS WITH DIABETIC AUTONOMIC NEUROPATHY, WITH LONG-TERM CURRENT USE OF INSULIN (HCC): ICD-10-CM

## 2022-07-13 DIAGNOSIS — Z79.4 TYPE 2 DIABETES MELLITUS WITH DIABETIC AUTONOMIC NEUROPATHY, WITH LONG-TERM CURRENT USE OF INSULIN (HCC): ICD-10-CM

## 2022-07-13 RX ORDER — INSULIN GLARGINE 100 [IU]/ML
INJECTION, SOLUTION SUBCUTANEOUS
Qty: 30 ML | Refills: 1 | Status: SHIPPED | OUTPATIENT
Start: 2022-07-13 | End: 2022-08-24

## 2022-08-04 DIAGNOSIS — E55.9 VITAMIN D DEFICIENCY: ICD-10-CM

## 2022-08-04 RX ORDER — OMEGA-3S/DHA/EPA/FISH OIL/D3 300MG-1000
CAPSULE ORAL
Qty: 90 TABLET | Refills: 1 | Status: SHIPPED | OUTPATIENT
Start: 2022-08-04

## 2022-08-08 DIAGNOSIS — K59.00 CONSTIPATION, UNSPECIFIED CONSTIPATION TYPE: ICD-10-CM

## 2022-08-08 RX ORDER — DOCUSATE SODIUM 100 MG/1
CAPSULE, LIQUID FILLED ORAL
Qty: 30 CAPSULE | Refills: 0 | Status: SHIPPED | OUTPATIENT
Start: 2022-08-08 | End: 2022-08-17

## 2022-08-15 DIAGNOSIS — E11.43 TYPE 2 DIABETES MELLITUS WITH DIABETIC AUTONOMIC NEUROPATHY, WITH LONG-TERM CURRENT USE OF INSULIN (HCC): ICD-10-CM

## 2022-08-15 DIAGNOSIS — Z79.4 TYPE 2 DIABETES MELLITUS WITH DIABETIC AUTONOMIC NEUROPATHY, WITH LONG-TERM CURRENT USE OF INSULIN (HCC): ICD-10-CM

## 2022-08-15 RX ORDER — INSULIN GLULISINE 100 [IU]/ML
INJECTION, SOLUTION SUBCUTANEOUS
Qty: 30 ML | Refills: 1 | Status: SHIPPED | OUTPATIENT
Start: 2022-08-15 | End: 2022-10-12

## 2022-08-17 DIAGNOSIS — K59.00 CONSTIPATION, UNSPECIFIED CONSTIPATION TYPE: ICD-10-CM

## 2022-08-17 RX ORDER — DOCUSATE SODIUM 100 MG/1
CAPSULE, LIQUID FILLED ORAL
Qty: 30 CAPSULE | Refills: 0 | Status: SHIPPED | OUTPATIENT
Start: 2022-08-17 | End: 2022-09-08

## 2022-08-23 ENCOUNTER — OFFICE VISIT (OUTPATIENT)
Dept: FAMILY MEDICINE CLINIC | Facility: CLINIC | Age: 52
End: 2022-08-23
Payer: COMMERCIAL

## 2022-08-23 ENCOUNTER — TELEPHONE (OUTPATIENT)
Dept: FAMILY MEDICINE CLINIC | Facility: CLINIC | Age: 52
End: 2022-08-23

## 2022-08-23 VITALS
TEMPERATURE: 98.3 F | HEIGHT: 61 IN | HEART RATE: 72 BPM | BODY MASS INDEX: 36.82 KG/M2 | SYSTOLIC BLOOD PRESSURE: 108 MMHG | WEIGHT: 195 LBS | RESPIRATION RATE: 20 BRPM | DIASTOLIC BLOOD PRESSURE: 68 MMHG | OXYGEN SATURATION: 100 %

## 2022-08-23 DIAGNOSIS — Z13.820 SCREENING FOR OSTEOPOROSIS: ICD-10-CM

## 2022-08-23 DIAGNOSIS — Z79.4 TYPE 2 DIABETES MELLITUS WITH DIABETIC AUTONOMIC NEUROPATHY, WITH LONG-TERM CURRENT USE OF INSULIN (HCC): ICD-10-CM

## 2022-08-23 DIAGNOSIS — E11.65 UNCONTROLLED DIABETES MELLITUS WITH HYPERGLYCEMIA, WITH LONG-TERM CURRENT USE OF INSULIN (HCC): Primary | ICD-10-CM

## 2022-08-23 DIAGNOSIS — Z79.4 UNCONTROLLED DIABETES MELLITUS WITH HYPERGLYCEMIA, WITH LONG-TERM CURRENT USE OF INSULIN (HCC): Primary | ICD-10-CM

## 2022-08-23 DIAGNOSIS — Z12.31 ENCOUNTER FOR SCREENING MAMMOGRAM FOR BREAST CANCER: ICD-10-CM

## 2022-08-23 DIAGNOSIS — E11.43 TYPE 2 DIABETES MELLITUS WITH DIABETIC AUTONOMIC NEUROPATHY, WITH LONG-TERM CURRENT USE OF INSULIN (HCC): ICD-10-CM

## 2022-08-23 DIAGNOSIS — E78.00 HYPERCHOLESTEREMIA: ICD-10-CM

## 2022-08-23 DIAGNOSIS — E11.9 ENCOUNTER FOR DIABETIC FOOT EXAM (HCC): ICD-10-CM

## 2022-08-23 DIAGNOSIS — Z00.00 ANNUAL PHYSICAL EXAM: ICD-10-CM

## 2022-08-23 PROCEDURE — 99214 OFFICE O/P EST MOD 30 MIN: CPT | Performed by: NURSE PRACTITIONER

## 2022-08-23 PROCEDURE — 99396 PREV VISIT EST AGE 40-64: CPT | Performed by: NURSE PRACTITIONER

## 2022-08-23 RX ORDER — SEMAGLUTIDE 1.34 MG/ML
INJECTION, SOLUTION SUBCUTANEOUS
Qty: 9 ML | Refills: 0 | Status: SHIPPED | OUTPATIENT
Start: 2022-08-23 | End: 2022-10-26 | Stop reason: ALTCHOICE

## 2022-08-23 NOTE — PROGRESS NOTES
HPI:  Karlee Lee is a 46 y o  female here for her yearly health maintenance exam    Patient Active Problem List   Diagnosis    Radiculopathy, lumbar region    Anterior tibial tendonitis, right    [facility]    Chronic low back pain with sciatica    Type 2 diabetes mellitus with neurologic complication ([facility] )    Diabetic polyneuropathy associated with type 2 diabetes mellitus ([facility]    Gastroparesis    Hypertension associated with diabetes ([facility]    Spondylolisthesis of lumbar region    Class 1 obesity due to excess calories with serious comorbidity and body mass index (BMI) of 34 0 to 34 9 in adult    Clear cell carcinoma of kidney, right (HCC)    Acute right lower quadrant pain    Hypotension    Internal hernia    Intra-abdominal adhesions    Depression with anxiety    Mild intermittent asthma without complication    Hypercholesteremia    Hypertriglyceridemia    Iron deficiency anemia secondary to inadequate dietary iron intake    Encounter for post surgical wound check    GERD (gastroesophageal reflux disease)    History of colon polyps    Slow transit constipation    Anemia, unspecified    History of kidney cancer    Vitamin D deficiency    Stage 3a chronic kidney disease ([place] [address] )    Secondary hyperparathyroidism ([place] [address] )    Atypical chest pain     Past Medical History:   Diagnosis Date    Atypical chest pain     Depression     [facility]     Gastroparesis     GERD (gastroesophageal reflux disease)     Hyperlipidemia     Hypertension     Sciatica     Seizure disorder ([facility] )          PHQ-2/9 Depression Screening    Little interest or pleasure in doing things: 0 - not at all  Feeling down, depressed, or hopeless: 0 - not at all  Trouble falling or staying asleep, or sleeping too much: 0 - not at all  Feeling tired or having little energy: 0 - not at all  Poor appetite or overeatin - not at all  Feeling bad about yourself - or that you are a failure or have let yourself or your family down: 0 - not at all  Trouble concentrating on things, such as reading the newspaper or watching television: 0 - not at all  Moving or speaking so slowly that other people could have noticed  Or the opposite - being so fidgety or restless that you have been moving around a lot more than usual: 0 - not at all  Thoughts that you would be better off dead, or of hurting yourself in some way: 0 - not at all  PHQ-9 Score: 0   PHQ-9 Interpretation: No or Minimal depression            Current Outpatient Medications   Medication Sig Dispense Refill    albuterol (ProAir HFA) 90 mcg/act inhaler Inhale 1 puff every 6 (six) hours as needed for wheezing or shortness of breath 18 g 3    Apidra SoloStar 100 units/mL injection pen INJECT 28 UNITS BEFORE BREAKFAST, INJECT 34 UNITS PRIOR TO DINNER 30 mL 1    Aspirin (ASPIR-81 PO) Take 81 mg by mouth      atorvastatin (LIPITOR) 40 mg tablet Take 1 tablet (40 mg total) by mouth daily at bedtime 90 tablet 3    B-D UF III MINI PEN NEEDLES 31G X 5 MM MISC       BANOPHEN 50 MG capsule take 1 capsule by mouth every 6 hours if needed for itching      cholecalciferol (VITAMIN D3) 400 units tablet TAKE ONE TABLET BY MOUTH DAILY 90 tablet 1    citalopram (CeleXA) 40 mg tablet Take 1 tablet (40 mg total) by mouth daily 90 tablet 1    Continuous Blood Gluc  (FreeStyle Refugio 14 Day Hugo) TEMITOPE Use for continuous blood glucose monitoring 1 each 0    Continuous Blood Gluc Sensor (FreeStyle Refugio 14 Day Sensor) MISC Use one sensor every 14 days for continuous blood sugar monitoring   6 each 3    docusate sodium (COLACE) 100 mg capsule TAKE ONE CAPSULE BY MOUTH TWICE DAILY 30 capsule 0    Empagliflozin (Jardiance) 25 MG TABS Take 1 tablet (25 mg total) by mouth every morning 90 tablet 1    famotidine (PEPCID) 40 MG tablet Take 1 tablet (40 mg total) by mouth daily at bedtime Take in evening 2 hours prior to bed 30 tablet 6    fenofibrate (TRICOR) 48 mg tablet Take 1 tablet (48 mg total) by mouth daily 90 tablet 3    glucose blood (OneTouch Verio) test strip Use to test blood glucose 4 times a day 200 strip 2    Lantus SoloStar 100 units/mL injection pen INJECT 45 UNITS EVERY 12 HOURS 30 mL 1    levETIRAcetam (KEPPRA) 500 mg tablet Take 1 tablet (500 mg total) by mouth every 12 (twelve) hours 180 tablet 1    linaCLOtide (Linzess) 72 MCG CAPS Take 1 capsule by mouth daily before breakfast 30 capsule 0    lisinopril (ZESTRIL) 10 mg tablet Take 1 tablet (10 mg total) by mouth daily 90 tablet 1    metFORMIN (GLUCOPHAGE) 1000 MG tablet Take 1 tablet (1,000 mg total) by mouth 2 (two) times a day with meals 180 tablet 1    metoclopramide (REGLAN) 10 mg tablet Take 1 tablet (10 mg total) by mouth daily 90 tablet 3    metoprolol tartrate (LOPRESSOR) 25 mg tablet Take 1 tablet (25 mg total) by mouth 2 (two) times a day 180 tablet 1    neomycin-polymyxin-hydrocortisone (CORTISPORIN) otic solution Administer 4 drops to the right ear every 6 (six) hours 10 mL 0    ondansetron (ZOFRAN) 4 mg tablet Take 1 tablet (4 mg total) by mouth every 8 (eight) hours as needed for nausea or vomiting 20 tablet 0    ondansetron (ZOFRAN-ODT) 4 mg disintegrating tablet Take 1 tablet (4 mg total) by mouth every 6 (six) hours as needed for nausea or vomiting 20 tablet 0    Ozempic, 1 MG/DOSE, 4 MG/3ML SOPN injection pen INJECT 1 MG TOTAL UNDER THE SKIN ONCE A WEEK 9 mL 0    pantoprazole (PROTONIX) 40 mg tablet Take 1 tablet (40 mg total) by mouth 2 (two) times a day 180 tablet 3    pregabalin (LYRICA) 100 mg capsule Take 1 capsule (100 mg total) by mouth 3 (three) times a day 270 capsule 3    solifenacin (VESIcare) 5 mg tablet Take 1 tablet (5 mg total) by mouth daily 90 tablet 1    benzonatate (TESSALON) 200 MG capsule Take 1 capsule (200 mg total) by mouth 3 (three) times a day as needed for cough 60 capsule 1     No current facility-administered medications for this visit  Allergies   Allergen Reactions    Azithromycin GI Intolerance and Hives    Benztropine     Butorphanol Hallucinations    Penicillins     Zolpidem      Immunization History   Administered Date(s) Administered    INFLUENZA 10/13/2010, 11/28/2011, 10/28/2013, 09/06/2017, 03/01/2020    Influenza Quadrivalent Preservative Free 3 years and older IM 01/04/2017    Pneumococcal Polysaccharide PPV23 08/29/2010, 02/25/2016    Tdap 03/27/2020    Tuberculin Skin Test-PPD Intradermal 09/23/2019, 10/06/2020       Patient Care Team:  Sonal Block as PCP - General (Family Medicine)  [place] Patient, DO as PCP - [address] [place] [address] (RTE)  [place] Patient, DO as PCP - PCP-[employer] ([place]E)  Ashu Martin DO (Gastroenterology)  Governor Miles as Nurse Practitioner (Endocrinology)    Review of Systems   Constitutional: Positive for fatigue  Negative for activity change, diaphoresis and fever  HENT: Negative for congestion, facial swelling, hearing loss, rhinorrhea, sinus pressure, sinus pain, sneezing, sore throat and voice change  Eyes: Negative for discharge and visual disturbance  Respiratory: Negative for cough, choking, chest tightness, shortness of breath, wheezing and stridor  Cardiovascular: Negative for chest pain, palpitations and leg swelling  Gastrointestinal: Negative for abdominal distention, abdominal pain, constipation, diarrhea, nausea and vomiting  Endocrine: Negative for polydipsia, polyphagia and polyuria  Genitourinary: Negative for difficulty urinating, dysuria, frequency and urgency  Musculoskeletal: Negative for arthralgias, back pain, gait problem, joint swelling, myalgias, neck pain and neck stiffness  Skin: Negative for color change, rash and wound  Neurological: Negative for dizziness, syncope, speech difficulty, weakness, light-headedness and headaches  Hematological: Negative for adenopathy  Does not bruise/bleed easily  Psychiatric/Behavioral: Negative for agitation, behavioral problems, confusion, hallucinations, sleep disturbance and suicidal ideas  The patient is nervous/anxious  Physical Exam :  Physical Exam  Vitals and nursing note reviewed  Constitutional:       General: She is not in acute distress  Appearance: She is well-developed  She is not diaphoretic  Neck:      Thyroid: No thyromegaly  Trachea: No tracheal deviation  Cardiovascular:      Rate and Rhythm: Normal rate and regular rhythm  Heart sounds: Normal heart sounds  No murmur heard  Pulmonary:      Effort: Pulmonary effort is normal  No respiratory distress  Breath sounds: Normal breath sounds  No wheezing  Musculoskeletal:         General: No tenderness or deformity  Normal range of motion  Cervical back: Normal range of motion and neck supple  Right lower leg: No edema  Left lower leg: No edema  Lymphadenopathy:      Cervical: No cervical adenopathy  Skin:     General: Skin is warm and dry  Findings: No erythema or rash  Neurological:      Mental Status: She is alert and oriented to person, place, and time  Psychiatric:         Attention and Perception: Attention normal          Mood and Affect: Mood is anxious  Affect is flat  Speech: Speech normal          Behavior: Behavior normal  Behavior is cooperative  Thought Content: Thought content normal          Cognition and Memory: Cognition and memory normal          Judgment: Judgment normal            Reviewed Updated [facility]ness Visits:   Last Health Maintenance visit information was reviewed, patient interviewed , no change since last HM visit yes  Last HM visit information was reviewed, patient interviewed and updates made to the record today yes    Assessment and Plan:  1  Annual physical exam     2  Encounter for screening mammogram for breast cancer  Mammo screening bilateral w 3d & cad   3   Type 2 diabetes mellitus with diabetic autonomic neuropathy, with long-term current use of insulin ([facility] )     4  Uncontrolled diabetes mellitus with hyperglycemia, with long-term current use of insulin ([facility] )  Ambulatory Referral to Endocrinology    HEMOGLOBIN A1C W/ EAG ESTIMATION   5  Screening for osteoporosis  DXA bone density spine hip and pelvis   6  Hypercholesteremia  Lipid panel   7   Encounter for diabetic foot exam [facility])         Health Maintenance Due   Topic Date Due    Hepatitis C Screening  Never done    COVID-19 Vaccine (1) Never done    HIV Screening  Never done    Cervical Cancer Screening  Never done    Pneumococcal Vaccine: Pediatrics (0 to 5 Years) and At-Risk Patients (6 to 59 Years) (2 - PCV) 02/25/2017    Osteoporosis Screening  Never done    Breast Cancer Screening: Mammogram  05/10/2022    Influenza Vaccine (1) 09/01/2022    DM Eye Exam  10/29/2022    BMI: Followup Plan  02/17/2023

## 2022-08-23 NOTE — PROGRESS NOTES
[facility]        NAME: Brenda Gutierrez is a 46 y o  female  : 1970    MRN: [id number]  DATE: 2022  TIME: 11:26 AM    Assessment and Plan   Uncontrolled diabetes mellitus with hyperglycemia, with long-term current use of insulin ([facility] [E11 65, Z79 4]  1  Uncontrolled diabetes mellitus with hyperglycemia, with long-term current use of insulin ([facility] )  Ambulatory Referral to Endocrinology    HEMOGLOBIN A1C W/ EAG ESTIMATION   2  Encounter for screening mammogram for breast cancer  Mammo screening bilateral w 3d & cad   3  Type 2 diabetes mellitus with diabetic autonomic neuropathy, with long-term current use of insulin ([facility] )     4  Screening for osteoporosis  DXA bone density spine hip and pelvis   5  Hypercholesteremia  Lipid panel   6  Encounter for diabetic foot exam ([place] [id number] )     7  Annual physical exam           Patient Instructions     Patient Instructions   Continue all diabetic medications- change Aphidra to 20units 3x/day, consider 4th dose per day  Keep appointment with Dr Jasmin Gonzalez on   Get labs done as ordered  Will get DM eye exam faxed from Dr Sheikh Gabby office  3 month medcheck or call sooner for problems/concerns          Chief Complaint     Chief Complaint   Patient presents with    Follow-up         History of Present Illness       Here today for routine visit  She reports she does not see her Endo until  but her glucose levels have been very high  Her current Orellana is reading "HI", she reports it goes to 400  Her HgA1c in the office is reporting an error code that it is too high to read  Her HgA1c in  was 13 3, up from 9 8 2 months prior  Glucose level in our office is 468  She reports feeling very tired all the time and is questioning if this can be from her glucose levels     She is currently taking Apidra SoloStar 28 units before breakfast and 24 units before dinner, Lantus 45 units twice a day, Ozempic 1mg weekly, Jardiance 25mg daily, and Metformin 1000mg twice daily  She reports her glucose levels do drop quickly to 73, feels symptomatic with this number  The lows occur after her Aphidra dosing, she reports it happens every time, twice a day  She took her Aphidra this morning  Discussed with Dr Dayana Womack, she would like her to try 20units 3x/day of Aphidra  Discussed this with patient- she is agreeable, will also add 4th time a day dosing if needed  She reports she is unable to get into her Endocrinologist when needed and finds they do not accomodate her schedule  Would like to switch to another Endo- was able to get an appointment with Dr Eva Montes on 8/29 at 11am        Review of Systems   Review of Systems   Constitutional: Positive for activity change and fatigue  Negative for diaphoresis and fever  HENT: Negative for congestion, facial swelling, hearing loss, rhinorrhea, sinus pressure, sinus pain, sneezing, sore throat and voice change  Eyes: Negative for discharge and visual disturbance  Respiratory: Negative for cough, choking, chest tightness, shortness of breath, wheezing and stridor  Cardiovascular: Negative for chest pain, palpitations and leg swelling  Gastrointestinal: Negative for abdominal distention, abdominal pain, constipation, diarrhea, nausea and vomiting  Endocrine: Negative for polydipsia, polyphagia and polyuria  Genitourinary: Negative for difficulty urinating, dysuria, frequency and urgency  Musculoskeletal: Negative for arthralgias, back pain, gait problem, joint swelling, myalgias, neck pain and neck stiffness  Skin: Negative for color change, rash and wound  Neurological: Negative for dizziness, syncope, speech difficulty, weakness, light-headedness and headaches  Hematological: Negative for adenopathy  Does not bruise/bleed easily  Psychiatric/Behavioral: Negative for agitation, behavioral problems, confusion, hallucinations, sleep disturbance and suicidal ideas  The patient is nervous/anxious  Current Medications       Current Outpatient Medications:     albuterol (ProAir HFA) 90 mcg/act inhaler, Inhale 1 puff every 6 (six) hours as needed for wheezing or shortness of breath, Disp: 18 g, Rfl: 3    Apidra SoloStar 100 units/mL injection pen, INJECT 28 UNITS BEFORE BREAKFAST, INJECT 34 UNITS PRIOR TO DINNER, Disp: 30 mL, Rfl: 1    Aspirin (ASPIR-81 PO), Take 81 mg by mouth, Disp: , Rfl:     atorvastatin (LIPITOR) 40 mg tablet, Take 1 tablet (40 mg total) by mouth daily at bedtime, Disp: 90 tablet, Rfl: 3    B-D UF III MINI PEN NEEDLES 31G X 5 MM MISC, , Disp: , Rfl:     BANOPHEN 50 MG capsule, take 1 capsule by mouth every 6 hours if needed for itching, Disp: , Rfl:     cholecalciferol (VITAMIN D3) 400 units tablet, TAKE ONE TABLET BY MOUTH DAILY, Disp: 90 tablet, Rfl: 1    citalopram (CeleXA) 40 mg tablet, Take 1 tablet (40 mg total) by mouth daily, Disp: 90 tablet, Rfl: 1    Continuous Blood Gluc  (FreeStyle Refugio 14 Day Angie) TEMITOPE, Use for continuous blood glucose monitoring, Disp: 1 each, Rfl: 0    Continuous Blood Gluc Sensor (FreeStyle Refugio 14 Day Sensor) MISC, Use one sensor every 14 days for continuous blood sugar monitoring , Disp: 6 each, Rfl: 3    docusate sodium (COLACE) 100 mg capsule, TAKE ONE CAPSULE BY MOUTH TWICE DAILY, Disp: 30 capsule, Rfl: 0    Empagliflozin (Jardiance) 25 MG TABS, Take 1 tablet (25 mg total) by mouth every morning, Disp: 90 tablet, Rfl: 1    famotidine (PEPCID) 40 MG tablet, Take 1 tablet (40 mg total) by mouth daily at bedtime Take in evening 2 hours prior to bed, Disp: 30 tablet, Rfl: 6    fenofibrate (TRICOR) 48 mg tablet, Take 1 tablet (48 mg total) by mouth daily, Disp: 90 tablet, Rfl: 3    glucose blood (OneTouch Verio) test strip, Use to test blood glucose 4 times a day, Disp: 200 strip, Rfl: 2    Lantus SoloStar 100 units/mL injection pen, INJECT 45 UNITS EVERY 12 HOURS, Disp: 30 mL, Rfl: 1    levETIRAcetam (KEPPRA) 500 mg tablet, Take 1 tablet (500 mg total) by mouth every 12 (twelve) hours, Disp: 180 tablet, Rfl: 1    linaCLOtide (Linzess) 72 MCG CAPS, Take 1 capsule by mouth daily before breakfast, Disp: 30 capsule, Rfl: 0    lisinopril (ZESTRIL) 10 mg tablet, Take 1 tablet (10 mg total) by mouth daily, Disp: 90 tablet, Rfl: 1    metFORMIN (GLUCOPHAGE) 1000 MG tablet, Take 1 tablet (1,000 mg total) by mouth 2 (two) times a day with meals, Disp: 180 tablet, Rfl: 1    metoclopramide (REGLAN) 10 mg tablet, Take 1 tablet (10 mg total) by mouth daily, Disp: 90 tablet, Rfl: 3    metoprolol tartrate (LOPRESSOR) 25 mg tablet, Take 1 tablet (25 mg total) by mouth 2 (two) times a day, Disp: 180 tablet, Rfl: 1    neomycin-polymyxin-hydrocortisone (CORTISPORIN) otic solution, Administer 4 drops to the right ear every 6 (six) hours, Disp: 10 mL, Rfl: 0    ondansetron (ZOFRAN) 4 mg tablet, Take 1 tablet (4 mg total) by mouth every 8 (eight) hours as needed for nausea or vomiting, Disp: 20 tablet, Rfl: 0    ondansetron (ZOFRAN-ODT) 4 mg disintegrating tablet, Take 1 tablet (4 mg total) by mouth every 6 (six) hours as needed for nausea or vomiting, Disp: 20 tablet, Rfl: 0    Ozempic, 1 MG/DOSE, 4 MG/3ML SOPN injection pen, INJECT 1 MG TOTAL UNDER THE SKIN ONCE A WEEK, Disp: 9 mL, Rfl: 0    pantoprazole (PROTONIX) 40 mg tablet, Take 1 tablet (40 mg total) by mouth 2 (two) times a day, Disp: 180 tablet, Rfl: 3    pregabalin (LYRICA) 100 mg capsule, Take 1 capsule (100 mg total) by mouth 3 (three) times a day, Disp: 270 capsule, Rfl: 3    solifenacin (VESIcare) 5 mg tablet, Take 1 tablet (5 mg total) by mouth daily, Disp: 90 tablet, Rfl: 1    benzonatate (TESSALON) 200 MG capsule, Take 1 capsule (200 mg total) by mouth 3 (three) times a day as needed for cough, Disp: 60 capsule, Rfl: 1    Current Allergies     Allergies as of 08/23/2022 - Reviewed 08/23/2022   Allergen Reaction Noted    Azithromycin GI Intolerance and Hives 07/13/2012  Benztropine  08/09/2016    Butorphanol Hallucinations 08/09/2016    Penicillins  11/12/2015    Zolpidem  11/12/2015            The following portions of the patient's history were reviewed and updated as appropriate: allergies, current medications, past family history, past medical history, past social history, past surgical history and problem list      Past Medical History:   Diagnosis Date    Atypical chest pain     Depression     [facility]     Gastroparesis     GERD (gastroesophageal reflux disease)     Hyperlipidemia     Hypertension     Sciatica     [facility] ([facility] )        Past Surgical History:   Procedure Laterality Date    BREAST BIOPSY Left  benign    CHOLECYSTECTOMY      COLONOSCOPY      HYSTERECTOMY      OH LAP,APPENDECTOMY N/A 3/24/2021    Procedure: APPENDECTOMY LAPAROSCOPIC;  Surgeon: Idalia Grossman MD;  Location: [facility] MAIN OR;  Service: General    OH LAP,DIAGNOSTIC ABDOMEN N/A 3/24/2021    Procedure: LAPAROSCOPY DIAGNOSTIC, EXTENSIVE DESI;  Surgeon: Idalia Grossman MD;  Location: [facility] MAIN OR;  Service: General    UPPER GASTROINTESTINAL ENDOSCOPY         Family History   Problem Relation Age of Onset    No Known Problems Mother     No Known Problems Father     No Known Problems Daughter     No Known Problems Maternal Grandmother     No Known Problems Paternal Grandmother     No Known Problems Paternal Aunt     No Known Problems Paternal Aunt     No Known Problems Paternal Aunt          Medications have been verified  Objective   /68   Pulse 72   Temp 98 3 °F (36 8 °C) (Tympanic)   Resp 20   Ht 5' 1" (1 549 m)   Wt 88 5 kg (195 lb)   SpO2 100%   BMI 36 84 kg/m²        Physical Exam     Physical Exam  Vitals and nursing note reviewed  Constitutional:       General: She is not in acute distress  Appearance: She is well-developed  She is not diaphoretic  Neck:      Thyroid: No thyromegaly  Trachea: No tracheal deviation  Cardiovascular:      Rate and Rhythm: Normal rate and regular rhythm  Pulses: no weak pulses          Dorsalis pedis pulses are 2+ on the right side and 2+ on the left side  Heart sounds: Normal heart sounds  No murmur heard  Pulmonary:      Effort: Pulmonary effort is normal  No respiratory distress  Breath sounds: Normal breath sounds  No wheezing  Musculoskeletal:         General: No tenderness or deformity  Normal range of motion  Cervical back: Normal range of motion and neck supple  Feet:      Right foot:      Skin integrity: No ulcer, skin breakdown, erythema, warmth, callus or dry skin  Left foot:      Skin integrity: No ulcer, skin breakdown, erythema, warmth, callus or dry skin  Skin:     General: Skin is warm and dry  Findings: No erythema or rash  Neurological:      Mental Status: She is alert and oriented to person, place, and time  Psychiatric:         Attention and Perception: Attention normal          Mood and Affect: Mood is anxious  Affect is flat  Speech: Speech normal          Behavior: Behavior normal  Behavior is cooperative  Thought Content: Thought content normal          Cognition and Memory: Cognition and memory normal          Judgment: Judgment normal          Patient's shoes and socks removed  Right Foot/Ankle   Right Foot Inspection  Skin Exam: skin normal and skin intact  No dry skin, no warmth, no callus, no erythema, no maceration, no abnormal color, no pre-ulcer, no ulcer and no callus  Toe Exam: ROM and strength within normal limits  Sensory   Monofilament testing: intact    Vascular  Capillary refills: < 3 seconds  The right DP pulse is 2+  Left Foot/Ankle  Left Foot Inspection  Skin Exam: skin normal and skin intact  No dry skin, no warmth, no erythema, no maceration, normal color, no pre-ulcer, no ulcer and no callus  Toe Exam: ROM and strength within normal limits       Sensory   Monofilament testing: intact    Vascular  Capillary refills: < 3 seconds  The left DP pulse is 2+       Assign Risk Category  No deformity present  No loss of protective sensation  No weak pulses  Risk: 0

## 2022-08-23 NOTE — PATIENT INSTRUCTIONS
Continue all diabetic medications- change Aphidra to 20units 3x/day, consider 4th dose per day  Keep appointment with Dr Freida York on 8/29  Get labs done as ordered  Will get DM eye exam faxed from Dr Micheline Jack office  3 month medcheck or call sooner for problems/concerns
36.4

## 2022-08-23 NOTE — Clinical Note
Brian Abdi, I am sending this patient to you, scheduled on 8/29 at 11am  She has been followed with our Endo here in [place] but is finding them very hard to get in with and help with her medication  I have included the chart so you can see the HPI in her acute note from today  I did tell her if you have any other recommendations before Monday I would let her know  She is getting labs done tomorrow  The HgA1c today was too high to read on our in office machine  Thanks!   SYLWIA Zhu

## 2022-08-23 NOTE — TELEPHONE ENCOUNTER
Called and left voicemail for [facility] [phone] to fax the eye report over for the patient, left fax and call back number,

## 2022-08-24 DIAGNOSIS — E78.00 HYPERCHOLESTEREMIA: ICD-10-CM

## 2022-08-24 DIAGNOSIS — E11.43 TYPE 2 DIABETES MELLITUS WITH DIABETIC AUTONOMIC NEUROPATHY, WITH LONG-TERM CURRENT USE OF INSULIN (HCC): ICD-10-CM

## 2022-08-24 DIAGNOSIS — E11.59 HYPERTENSION ASSOCIATED WITH DIABETES (HCC): ICD-10-CM

## 2022-08-24 DIAGNOSIS — Z79.4 TYPE 2 DIABETES MELLITUS WITH DIABETIC AUTONOMIC NEUROPATHY, WITH LONG-TERM CURRENT USE OF INSULIN (HCC): ICD-10-CM

## 2022-08-24 DIAGNOSIS — I15.2 HYPERTENSION ASSOCIATED WITH DIABETES (HCC): ICD-10-CM

## 2022-08-24 RX ORDER — LISINOPRIL 10 MG/1
TABLET ORAL
Qty: 90 TABLET | Refills: 1 | Status: SHIPPED | OUTPATIENT
Start: 2022-08-24 | End: 2022-10-26

## 2022-08-24 RX ORDER — ATORVASTATIN CALCIUM 40 MG/1
TABLET, FILM COATED ORAL
Qty: 90 TABLET | Refills: 3 | Status: SHIPPED | OUTPATIENT
Start: 2022-08-24 | End: 2022-09-07 | Stop reason: SDUPTHER

## 2022-08-24 RX ORDER — INSULIN GLARGINE 100 [IU]/ML
INJECTION, SOLUTION SUBCUTANEOUS
Qty: 30 ML | Refills: 1 | Status: SHIPPED | OUTPATIENT
Start: 2022-08-24

## 2022-08-29 ENCOUNTER — OFFICE VISIT (OUTPATIENT)
Dept: ENDOCRINOLOGY | Facility: CLINIC | Age: 52
End: 2022-08-29
Payer: COMMERCIAL

## 2022-08-29 VITALS
HEIGHT: 61 IN | SYSTOLIC BLOOD PRESSURE: 120 MMHG | HEART RATE: 60 BPM | WEIGHT: 195.2 LBS | BODY MASS INDEX: 36.85 KG/M2 | DIASTOLIC BLOOD PRESSURE: 78 MMHG

## 2022-08-29 DIAGNOSIS — E11.59 HYPERTENSION ASSOCIATED WITH DIABETES (HCC): ICD-10-CM

## 2022-08-29 DIAGNOSIS — E11.43 TYPE 2 DIABETES MELLITUS WITH DIABETIC AUTONOMIC NEUROPATHY, WITH LONG-TERM CURRENT USE OF INSULIN (HCC): Primary | ICD-10-CM

## 2022-08-29 DIAGNOSIS — Z79.4 TYPE 2 DIABETES MELLITUS WITH DIABETIC AUTONOMIC NEUROPATHY, WITH LONG-TERM CURRENT USE OF INSULIN (HCC): Primary | ICD-10-CM

## 2022-08-29 DIAGNOSIS — I15.2 HYPERTENSION ASSOCIATED WITH DIABETES (HCC): ICD-10-CM

## 2022-08-29 PROCEDURE — 99214 OFFICE O/P EST MOD 30 MIN: CPT | Performed by: PHYSICIAN ASSISTANT

## 2022-08-29 PROCEDURE — 3074F SYST BP LT 130 MM HG: CPT | Performed by: PHYSICIAN ASSISTANT

## 2022-08-29 PROCEDURE — 3078F DIAST BP <80 MM HG: CPT | Performed by: PHYSICIAN ASSISTANT

## 2022-08-29 PROCEDURE — 95251 CONT GLUC MNTR ANALYSIS I&R: CPT | Performed by: PHYSICIAN ASSISTANT

## 2022-08-29 NOTE — PATIENT INSTRUCTIONS
Start lantus again but at 45 units  Continue apidra at 20 units with meals - can take 1/2 dose if eating less  Restart ozempic  Obtain labwork when possible (ASAP)  Scan Orellana more often  Goal is at least once every 8 hours (Be aware it only uploads 8 hours worth of data at a time)

## 2022-08-29 NOTE — PROGRESS NOTES
Najma Garcia 46 y o  female MRN: [id number]    Encounter: [id number]      Assessment/Plan   Problem List Items Addressed This Visit        Endocrine    Type 2 diabetes mellitus with neurologic complication ([facility] [address] ) - Primary       Lab Results   Component Value Date    HGBA1C 13 3 (A) 06/14/2022 ·   A1c from 6/14/22 13 3, but review of prior labs show persistent A1c > 10 since 2018  · She notes she has been on several medications for diabetes over the past several years as well as Lantus and Apidra  She is frustrated that medications and insulin is no longer working and inquires about insulin pump, which is a reasonable strategy to pursue  · Patient's variable diet and variable portion sizes pose a challenge to this process, but she is willing and open to accept our advice  · Will refer Jennifer Else for Diabetic education -  pre-pump education as well as sliding scale insulin and carbohydrate counting teaching needed  · Labwork needed - C-peptide  · She understands the process to start insulin pump will likely take several months  · Short term medication adjustment includes continue Apidra at 20 units, but if she eats a very small meal and is fearful of low she can 1/2 the dose if needed  · Restart Lanuts but at 45 units daily (previously on 45 units BID)  Anticipate we may need to increase further depending on her response  · Restart Ozempic  It appears she tolerated this well, and it may help aid in weight loss  Discussed with patient it generally does not contribute to hypoglycemia  · In the meantime encouraged patient to eat consistent carbohydrates with each meal, and balance meals with a healthy protein, fat, or fiber (including vegetables), which should help blunt extreme BG highs and lows  · She will keep a blood glucose diary to review  · Will set a reminder to review her Refugio BG report in 1 weeks time after these adjustments, and she will follow up in the office with me in 2 weeks           Relevant Orders    C-peptide Lab Collect    Ambulatory referral to Diabetic Education    Hypertension associated with diabetes ([facility] [address] )       Lab Results   Component Value Date    HGBA1C 13 3 (A) 06/14/2022 ·   BP stable at 120/78  · Continue ACE-I               CC: Diabetes  History of Present Illness     HPI:  Mario Newman is a pleasant 46year old female here for DM follow up  She notes she did make recommended medication changes by her PCP on 8/23 - Apidra was decreased to 20 units with all meals  This was adjusted due to reports of hypoglycemia following some meals  For example, she reports recent BG in the 46s  She felt very weak at the time  However, in addition this change, she also made her own adjustments to include discontinuation of Lantus as well as Ozempic  She was fearful of the extreme lows she was getting with insulin, and felt taking less may help this  Additionally, she felt Ozempic was no longer working  Overall, Mario Newman feels pretty lousy  She reports fatigue all the time, which is worse when BG is extremely high or drops below 150  Today, for example, her BG during our visit is 131 and she feels very tired, as if she could go to sleep  Regarding diet, it is very variable in size and carb content  This morning, for example, she had just a piece of toast and butter as she didn't feel hungry  Mario Newman has seen a dietician in February 2022 for some basic diabetic education overview and advice  She cannot recall formal teaching on carb counting, but does have some awareness  She reports her short acting insulin has always been a set dose with meals, no sliding scale or carb counting insulin  Today she is inquiring about insulin pump therapy  She is open to advice    Of note, Mario Newman has carried a DM diagnosis since 1991  Has neuropathy, as well as CKD stage 3  No history of CAD  She is up to date with foot and eye exams  Review of Systems   Constitutional: Positive for fatigue   Negative for chills and fever  HENT: Negative for ear pain and sore throat  Eyes: Negative for pain and visual disturbance  Respiratory: Negative for cough and shortness of breath  Cardiovascular: Negative for chest pain and palpitations  Gastrointestinal: Negative for abdominal pain and vomiting  Genitourinary: Negative for dysuria and hematuria  Musculoskeletal: Negative for arthralgias and back pain  Skin: Negative for color change and rash  Neurological: Negative for seizures and syncope  All other systems reviewed and are negative        Historical Information   Past Medical History:   Diagnosis Date    Atypical chest pain     Depression     Diabetes mellitus (HCC)     Gastroparesis     GERD (gastroesophageal reflux disease)     Hyperlipidemia     Hypertension     Sciatica     Seizure disorder [facility])      Past Surgical History:   Procedure Laterality Date    BREAST BIOPSY Left  benign    CHOLECYSTECTOMY      COLONOSCOPY      HYSTERECTOMY      NH LAP,APPENDECTOMY N/A 3/24/2021    Procedure: APPENDECTOMY LAPAROSCOPIC;  Surgeon: Janette Fraser MD;  Location: [address] [place] [address] OR;  Service: General    NH LAP,DIAGNOSTIC ABDOMEN N/A 3/24/2021    Procedure: LAPAROSCOPY DIAGNOSTIC, EXTENSIVE DESI;  Surgeon: Janette Fraser MD;  Location: [address] [place] [address] OR;  Service: General    UPPER GASTROINTESTINAL ENDOSCOPY       Social History   Social History     Substance and Sexual Activity   Alcohol Use Never     Social History     Substance and Sexual Activity   Drug Use Never     Social History     Tobacco Use   Smoking Status Former Smoker    Quit date: 200    Years since quittin 6   Smokeless Tobacco Never Used     Family History:   Family History   Problem Relation Age of Onset    No Known Problems Mother     No Known Problems Father     No Known Problems Daughter     No Known Problems Maternal Grandmother     No Known Problems Paternal Grandmother     No Known Problems Paternal Aunt     No Known Problems Paternal Aunt     No Known Problems Paternal Aunt        Meds/Allergies   Current Outpatient Medications   Medication Sig Dispense Refill    albuterol (ProAir HFA) 90 mcg/act inhaler Inhale 1 puff every 6 (six) hours as needed for wheezing or shortness of breath 18 g 3    Apidra SoloStar 100 units/mL injection pen INJECT 28 UNITS BEFORE BREAKFAST, INJECT 34 UNITS PRIOR TO DINNER (Patient taking differently: 20 Units 3 (three) times a day with meals I) 30 mL 1    Aspirin (ASPIR-81 PO) Take 81 mg by mouth      atorvastatin (LIPITOR) 40 mg tablet TAKE ONE TABLET BY MOUTH AT BEDTIME 90 tablet 3    B-D UF III MINI PEN NEEDLES 31G X 5 MM MISC       BANOPHEN 50 MG capsule take 1 capsule by mouth every 6 hours if needed for itching      benzonatate (TESSALON) 200 MG capsule Take 1 capsule (200 mg total) by mouth 3 (three) times a day as needed for cough 60 capsule 1    cholecalciferol (VITAMIN D3) 400 units tablet TAKE ONE TABLET BY MOUTH DAILY 90 tablet 1    citalopram (CeleXA) 40 mg tablet Take 1 tablet (40 mg total) by mouth daily 90 tablet 1    Continuous Blood Gluc  (FreeStyle Refugio 14 Day [place]) TEMITOPE Use for continuous blood glucose monitoring 1 each 0    Continuous Blood Gluc Sensor (FreeStyle Refugio 14 Day Sensor) MISC Use one sensor every 14 days for continuous blood sugar monitoring   6 each 3    docusate sodium (COLACE) 100 mg capsule TAKE ONE CAPSULE BY MOUTH TWICE DAILY 30 capsule 0    Empagliflozin (Jardiance) 25 MG TABS Take 1 tablet (25 mg total) by mouth every morning 90 tablet 1    famotidine (PEPCID) 40 MG tablet Take 1 tablet (40 mg total) by mouth daily at bedtime Take in evening 2 hours prior to bed 30 tablet 6    fenofibrate (TRICOR) 48 mg tablet Take 1 tablet (48 mg total) by mouth daily 90 tablet 3    glucose blood (OneTouch Verio) test strip Use to test blood glucose 4 times a day 200 strip 2    levETIRAcetam (KEPPRA) 500 mg tablet Take 1 tablet (500 mg total) by mouth every 12 (twelve) hours 180 tablet 1    linaCLOtide (Linzess) 72 MCG CAPS Take 1 capsule by mouth daily before breakfast 30 capsule 0    lisinopril (ZESTRIL) 10 mg tablet TAKE ONE TABLET BY MOUTH DAILY 90 tablet 1    metFORMIN (GLUCOPHAGE) 1000 MG tablet Take 1 tablet (1,000 mg total) by mouth 2 (two) times a day with meals 180 tablet 1    metoclopramide (REGLAN) 10 mg tablet Take 1 tablet (10 mg total) by mouth daily 90 tablet 3    metoprolol tartrate (LOPRESSOR) 25 mg tablet TAKE 1 TABLET (25 MG TOTAL) BY MOUTH 2 (TWO) TIMES A  tablet 1    ondansetron (ZOFRAN) 4 mg tablet Take 1 tablet (4 mg total) by mouth every 8 (eight) hours as needed for nausea or vomiting 20 tablet 0    pantoprazole (PROTONIX) 40 mg tablet Take 1 tablet (40 mg total) by mouth 2 (two) times a day 180 tablet 3    pregabalin (LYRICA) 100 mg capsule Take 1 capsule (100 mg total) by mouth 3 (three) times a day 270 capsule 3    solifenacin (VESIcare) 5 mg tablet Take 1 tablet (5 mg total) by mouth daily 90 tablet 1    Lantus SoloStar 100 units/mL SOPN INJECT 45 UNITS EVERY 12 HOURS (Patient not taking: Reported on 8/29/2022) 30 mL 1    neomycin-polymyxin-hydrocortisone (CORTISPORIN) otic solution Administer 4 drops to the right ear every 6 (six) hours 10 mL 0    ondansetron (ZOFRAN-ODT) 4 mg disintegrating tablet Take 1 tablet (4 mg total) by mouth every 6 (six) hours as needed for nausea or vomiting 20 tablet 0    Ozempic, 1 MG/DOSE, 4 MG/3ML SOPN injection pen INJECT 1 MG TOTAL UNDER THE SKIN ONCE A WEEK (Patient not taking: Reported on 8/29/2022) 9 mL 0     No current facility-administered medications for this visit       Allergies   Allergen Reactions    Azithromycin GI Intolerance and Hives    Benztropine     Butorphanol Hallucinations    Penicillins     Zolpidem        Objective   Vitals: Blood pressure 120/78, pulse 60, height 5' 1" (1 549 m), weight 88 5 kg (195 lb 3 2 oz), not currently breastfeeding  Physical Exam  Vitals reviewed  HENT:      Head: Normocephalic  Mouth/Throat:      Mouth: Mucous membranes are moist    Pulmonary:      Effort: Pulmonary effort is normal    Skin:     General: Skin is warm and dry  Neurological:      Mental Status: She is alert and oriented to person, place, and time  Psychiatric:         Mood and Affect: Mood normal        The history was obtained from the review of the chart, patient      Lab Results:   Lab Results   Component Value Date/Time    Hemoglobin A1C 13 3 (A) 06/14/2022 12:55 PM    Hemoglobin A1C 9 8 (A) 04/08/2022 04:29 PM    Hemoglobin A1C 11 7 (A) 02/17/2022 11:19 AM    Hemoglobin A1C 10 0 (H) 12/01/2021 01:04 PM    WBC 10 18 (H) 05/23/2022 05:16 PM    WBC 9 49 03/28/2022 12:44 PM    WBC 9 49 03/25/2022 06:36 AM    Hemoglobin 12 8 05/23/2022 05:16 PM    Hemoglobin 11 4 (L) 03/28/2022 12:44 PM    Hemoglobin 12 0 03/25/2022 06:36 AM    Hematocrit 38 4 05/23/2022 05:16 PM    Hematocrit 36 3 03/28/2022 12:44 PM    Hematocrit 38 0 03/25/2022 06:36 AM    MCV 82 05/23/2022 05:16 PM    MCV 83 03/28/2022 12:44 PM    MCV 84 03/25/2022 06:36 AM    Platelets 857 39/69/5061 05:16 PM    Platelets 055 (H) 77/17/2768 12:44 PM    Platelets 168 06/71/5075 06:36 AM    BUN 39 (H) 05/23/2022 05:16 PM    BUN 35 (H) 03/28/2022 12:44 PM    BUN 26 (H) 03/25/2022 06:36 AM    Potassium 5 2 05/23/2022 05:16 PM    Potassium 4 1 03/28/2022 12:44 PM    Potassium 4 0 03/25/2022 06:36 AM    Chloride 97 05/23/2022 05:16 PM    Chloride 105 03/28/2022 12:44 PM    Chloride 102 03/25/2022 06:36 AM    CO2 28 05/23/2022 05:16 PM    CO2 28 03/28/2022 12:44 PM    CO2 25 03/25/2022 06:36 AM    Creatinine 1 20 05/23/2022 05:16 PM    Creatinine 1 09 03/28/2022 12:44 PM    Creatinine 1 13 03/25/2022 06:36 AM    AST 26 05/23/2022 05:16 PM    AST 20 03/25/2022 06:36 AM    AST 21 12/01/2021 01:04 PM    ALT 27 05/23/2022 05:16 PM    ALT 20 03/25/2022 06:36 AM    ALT 37 12/01/2021 01:04 PM Albumin 4 2 05/23/2022 05:16 PM    Albumin 4 0 03/25/2022 06:36 AM    Albumin 3 4 (L) 12/01/2021 01:04 PM    HDL, Direct 48 (L) 12/01/2021 01:04 PM    Triglycerides 224 (H) 12/01/2021 01:04 PM       Lise Emery   Device used: [facility] use   Indication: Type 2 Diabetes    More than 72 hours of data was reviewed  Report to be scanned to chart  Date Range: 8/15/22 - 8/28/22     % time CGM used: 45%  Average Glucose: 298  Coefficient of Variation: 30 2%  Time in Target Range: 12%  Time Above Range: 88% (68% very high, 20% high)  Time Below Range: 0%    Interpretation of data: Patient is pretty consistently hyperglycemic, with average BG of almost 300  She does still have some meals where there is a spike and then subsequent extreme drop in her BG  Imaging Studies: n/a    Portions of the record may have been created with voice recognition software  Occasional wrong word or "sound a like" substitutions may have occurred due to the inherent limitations of voice recognition software  Read the chart carefully and recognize, using context, where substitutions have occurred

## 2022-08-29 NOTE — ASSESSMENT & PLAN NOTE
Lab Results   Component Value Date    HGBA1C 13 3 (A) 06/14/2022   · BP stable at 120/78    · Continue ACE-I

## 2022-08-29 NOTE — ASSESSMENT & PLAN NOTE
Lab Results   Component Value Date    HGBA1C 13 3 (A) 06/14/2022   · A1c from 6/14/22 13 3, but review of prior labs show persistent A1c > 10 since 2018  · She notes she has been on several medications for diabetes over the past several years as well as Lantus and Apidra  She is frustrated that medications and insulin is no longer working and inquires about insulin pump, which is a reasonable strategy to pursue  · Patient's variable diet and variable portion sizes pose a challenge to this process, but she is willing and open to accept our advice  · Will refer Rubén Holdens for Diabetic education -  pre-pump education as well as sliding scale insulin and carbohydrate counting teaching needed  · Labwork needed - C-peptide  · She understands the process to start insulin pump will likely take several months  · Short term medication adjustment includes continue Apidra at 20 units, but if she eats a very small meal and is fearful of low she can 1/2 the dose if needed  · Restart Lanuts but at 45 units daily (previously on 45 units BID)  Anticipate we may need to increase further depending on her response  · Restart Ozempic  It appears she tolerated this well, and it may help aid in weight loss  Discussed with patient it generally does not contribute to hypoglycemia  · In the meantime encouraged patient to eat consistent carbohydrates with each meal, and balance meals with a healthy protein, fat, or fiber (including vegetables), which should help blunt extreme BG highs and lows  · She will keep a blood glucose diary to review  · Will set a reminder to review her Refugio BG report in 1 weeks time after these adjustments, and she will follow up in the office with me in 2 weeks

## 2022-08-30 ENCOUNTER — APPOINTMENT (OUTPATIENT)
Dept: LAB | Facility: CLINIC | Age: 52
End: 2022-08-30
Payer: COMMERCIAL

## 2022-08-30 DIAGNOSIS — E55.9 VITAMIN D DEFICIENCY: ICD-10-CM

## 2022-08-30 DIAGNOSIS — E78.00 HYPERCHOLESTEREMIA: ICD-10-CM

## 2022-08-30 DIAGNOSIS — E11.65 UNCONTROLLED DIABETES MELLITUS WITH HYPERGLYCEMIA, WITH LONG-TERM CURRENT USE OF INSULIN (HCC): ICD-10-CM

## 2022-08-30 DIAGNOSIS — Z79.4 TYPE 2 DIABETES MELLITUS WITH DIABETIC AUTONOMIC NEUROPATHY, WITH LONG-TERM CURRENT USE OF INSULIN (HCC): ICD-10-CM

## 2022-08-30 DIAGNOSIS — E11.43 TYPE 2 DIABETES MELLITUS WITH DIABETIC AUTONOMIC NEUROPATHY, WITH LONG-TERM CURRENT USE OF INSULIN (HCC): ICD-10-CM

## 2022-08-30 DIAGNOSIS — D50.8 OTHER IRON DEFICIENCY ANEMIA: ICD-10-CM

## 2022-08-30 DIAGNOSIS — N25.81 SECONDARY HYPERPARATHYROIDISM (HCC): ICD-10-CM

## 2022-08-30 DIAGNOSIS — Z79.4 UNCONTROLLED DIABETES MELLITUS WITH HYPERGLYCEMIA, WITH LONG-TERM CURRENT USE OF INSULIN (HCC): ICD-10-CM

## 2022-08-30 DIAGNOSIS — N18.31 STAGE 3A CHRONIC KIDNEY DISEASE (HCC): ICD-10-CM

## 2022-08-30 LAB
25(OH)D3 SERPL-MCNC: 34.2 NG/ML (ref 30–100)
ALBUMIN SERPL BCP-MCNC: 3 G/DL (ref 3.5–5)
ALP SERPL-CCNC: 74 U/L (ref 46–116)
ALT SERPL W P-5'-P-CCNC: 35 U/L (ref 12–78)
ANION GAP SERPL CALCULATED.3IONS-SCNC: 5 MMOL/L (ref 4–13)
AST SERPL W P-5'-P-CCNC: 22 U/L (ref 5–45)
BACTERIA UR QL AUTO: ABNORMAL /HPF
BASOPHILS # BLD AUTO: 0.06 THOUSANDS/ΜL (ref 0–0.1)
BASOPHILS NFR BLD AUTO: 1 % (ref 0–1)
BILIRUB SERPL-MCNC: 0.18 MG/DL (ref 0.2–1)
BILIRUB UR QL STRIP: NEGATIVE
BUN SERPL-MCNC: 30 MG/DL (ref 5–25)
CALCIUM ALBUM COR SERPL-MCNC: 9.9 MG/DL (ref 8.3–10.1)
CALCIUM SERPL-MCNC: 9.1 MG/DL (ref 8.3–10.1)
CHLORIDE SERPL-SCNC: 107 MMOL/L (ref 96–108)
CHOLEST SERPL-MCNC: 176 MG/DL
CLARITY UR: CLEAR
CO2 SERPL-SCNC: 26 MMOL/L (ref 21–32)
COLOR UR: ABNORMAL
CREAT SERPL-MCNC: 1.33 MG/DL (ref 0.6–1.3)
CREAT UR-MCNC: 38.7 MG/DL
CREAT UR-MCNC: 38.7 MG/DL
EOSINOPHIL # BLD AUTO: 0.21 THOUSAND/ΜL (ref 0–0.61)
EOSINOPHIL NFR BLD AUTO: 3 % (ref 0–6)
ERYTHROCYTE [DISTWIDTH] IN BLOOD BY AUTOMATED COUNT: 13.9 % (ref 11.6–15.1)
EST. AVERAGE GLUCOSE BLD GHB EST-MCNC: 352 MG/DL
FERRITIN SERPL-MCNC: 28 NG/ML (ref 8–388)
GFR SERPL CREATININE-BSD FRML MDRD: 46 ML/MIN/1.73SQ M
GLUCOSE P FAST SERPL-MCNC: 182 MG/DL (ref 65–99)
GLUCOSE UR STRIP-MCNC: ABNORMAL MG/DL
HBA1C MFR BLD: 13.9 %
HCT VFR BLD AUTO: 36.7 % (ref 34.8–46.1)
HDLC SERPL-MCNC: 49 MG/DL
HGB BLD-MCNC: 11.3 G/DL (ref 11.5–15.4)
HGB UR QL STRIP.AUTO: NEGATIVE
IMM GRANULOCYTES # BLD AUTO: 0.04 THOUSAND/UL (ref 0–0.2)
IMM GRANULOCYTES NFR BLD AUTO: 1 % (ref 0–2)
IRON SATN MFR SERPL: 13 % (ref 15–50)
IRON SERPL-MCNC: 44 UG/DL (ref 50–170)
KETONES UR STRIP-MCNC: NEGATIVE MG/DL
LDLC SERPL CALC-MCNC: 78 MG/DL (ref 0–100)
LEUKOCYTE ESTERASE UR QL STRIP: ABNORMAL
LYMPHOCYTES # BLD AUTO: 3.54 THOUSANDS/ΜL (ref 0.6–4.47)
LYMPHOCYTES NFR BLD AUTO: 43 % (ref 14–44)
MCH RBC QN AUTO: 26.2 PG (ref 26.8–34.3)
MCHC RBC AUTO-ENTMCNC: 30.8 G/DL (ref 31.4–37.4)
MCV RBC AUTO: 85 FL (ref 82–98)
MICROALBUMIN UR-MCNC: <5 MG/L (ref 0–20)
MICROALBUMIN/CREAT 24H UR: <13 MG/G CREATININE (ref 0–30)
MONOCYTES # BLD AUTO: 0.74 THOUSAND/ΜL (ref 0.17–1.22)
MONOCYTES NFR BLD AUTO: 9 % (ref 4–12)
NEUTROPHILS # BLD AUTO: 3.67 THOUSANDS/ΜL (ref 1.85–7.62)
NEUTS SEG NFR BLD AUTO: 43 % (ref 43–75)
NITRITE UR QL STRIP: NEGATIVE
NON-SQ EPI CELLS URNS QL MICRO: ABNORMAL /HPF
NONHDLC SERPL-MCNC: 127 MG/DL
NRBC BLD AUTO-RTO: 0 /100 WBCS
PH UR STRIP.AUTO: 6 [PH]
PLATELET # BLD AUTO: 368 THOUSANDS/UL (ref 149–390)
PMV BLD AUTO: 11.3 FL (ref 8.9–12.7)
POTASSIUM SERPL-SCNC: 4.6 MMOL/L (ref 3.5–5.3)
PROT SERPL-MCNC: 6.3 G/DL (ref 6.4–8.4)
PROT UR STRIP-MCNC: NEGATIVE MG/DL
PROT UR-MCNC: <6 MG/DL
PROT/CREAT UR: <0.16 MG/G{CREAT} (ref 0–0.1)
PTH-INTACT SERPL-MCNC: 37 PG/ML (ref 18.4–80.1)
RBC # BLD AUTO: 4.32 MILLION/UL (ref 3.81–5.12)
RBC #/AREA URNS AUTO: ABNORMAL /HPF
SODIUM SERPL-SCNC: 138 MMOL/L (ref 135–147)
SP GR UR STRIP.AUTO: 1.01 (ref 1–1.03)
TIBC SERPL-MCNC: 328 UG/DL (ref 250–450)
TRIGL SERPL-MCNC: 243 MG/DL
UROBILINOGEN UR STRIP-ACNC: <2 MG/DL
WBC # BLD AUTO: 8.26 THOUSAND/UL (ref 4.31–10.16)
WBC #/AREA URNS AUTO: ABNORMAL /HPF

## 2022-08-30 PROCEDURE — 82306 VITAMIN D 25 HYDROXY: CPT

## 2022-08-30 PROCEDURE — 82728 ASSAY OF FERRITIN: CPT

## 2022-08-30 PROCEDURE — 85025 COMPLETE CBC W/AUTO DIFF WBC: CPT

## 2022-08-30 PROCEDURE — 80061 LIPID PANEL: CPT

## 2022-08-30 PROCEDURE — 3046F HEMOGLOBIN A1C LEVEL >9.0%: CPT | Performed by: PHYSICIAN ASSISTANT

## 2022-08-30 PROCEDURE — 80053 COMPREHEN METABOLIC PANEL: CPT

## 2022-08-30 PROCEDURE — 83540 ASSAY OF IRON: CPT

## 2022-08-30 PROCEDURE — 36415 COLL VENOUS BLD VENIPUNCTURE: CPT

## 2022-08-30 PROCEDURE — 83036 HEMOGLOBIN GLYCOSYLATED A1C: CPT

## 2022-08-30 PROCEDURE — 84681 ASSAY OF C-PEPTIDE: CPT

## 2022-08-30 PROCEDURE — 83550 IRON BINDING TEST: CPT

## 2022-08-30 PROCEDURE — 83970 ASSAY OF PARATHORMONE: CPT

## 2022-08-31 ENCOUNTER — TELEPHONE (OUTPATIENT)
Dept: NEPHROLOGY | Facility: CLINIC | Age: 52
End: 2022-08-31

## 2022-08-31 DIAGNOSIS — N18.31 STAGE 3A CHRONIC KIDNEY DISEASE (HCC): Primary | ICD-10-CM

## 2022-08-31 LAB — C PEPTIDE SERPL-MCNC: 4.3 NG/ML (ref 1.1–4.4)

## 2022-09-07 ENCOUNTER — OFFICE VISIT (OUTPATIENT)
Dept: CARDIOLOGY CLINIC | Facility: CLINIC | Age: 52
End: 2022-09-07
Payer: COMMERCIAL

## 2022-09-07 VITALS
SYSTOLIC BLOOD PRESSURE: 126 MMHG | HEART RATE: 67 BPM | HEIGHT: 61 IN | DIASTOLIC BLOOD PRESSURE: 72 MMHG | OXYGEN SATURATION: 98 % | WEIGHT: 197.8 LBS | TEMPERATURE: 96.6 F | BODY MASS INDEX: 37.34 KG/M2

## 2022-09-07 DIAGNOSIS — E66.09 CLASS 2 OBESITY DUE TO EXCESS CALORIES WITH BODY MASS INDEX (BMI) OF 37.0 TO 37.9 IN ADULT, UNSPECIFIED WHETHER SERIOUS COMORBIDITY PRESENT: ICD-10-CM

## 2022-09-07 DIAGNOSIS — I10 PRIMARY HYPERTENSION: ICD-10-CM

## 2022-09-07 DIAGNOSIS — R07.89 ATYPICAL CHEST PAIN: Primary | ICD-10-CM

## 2022-09-07 DIAGNOSIS — E78.00 HYPERCHOLESTEREMIA: ICD-10-CM

## 2022-09-07 PROCEDURE — 99214 OFFICE O/P EST MOD 30 MIN: CPT | Performed by: INTERNAL MEDICINE

## 2022-09-07 RX ORDER — ATORVASTATIN CALCIUM 80 MG/1
80 TABLET, FILM COATED ORAL
Qty: 30 TABLET | Refills: 6 | Status: SHIPPED | OUTPATIENT
Start: 2022-09-07

## 2022-09-08 DIAGNOSIS — K59.00 CONSTIPATION, UNSPECIFIED CONSTIPATION TYPE: ICD-10-CM

## 2022-09-08 RX ORDER — DOCUSATE SODIUM 100 MG/1
CAPSULE, LIQUID FILLED ORAL
Qty: 30 CAPSULE | Refills: 0 | Status: SHIPPED | OUTPATIENT
Start: 2022-09-08 | End: 2022-10-28

## 2022-09-12 ENCOUNTER — OFFICE VISIT (OUTPATIENT)
Dept: ENDOCRINOLOGY | Facility: CLINIC | Age: 52
End: 2022-09-12
Payer: COMMERCIAL

## 2022-09-12 ENCOUNTER — APPOINTMENT (OUTPATIENT)
Dept: LAB | Facility: CLINIC | Age: 52
End: 2022-09-12
Payer: COMMERCIAL

## 2022-09-12 VITALS
HEIGHT: 61 IN | DIASTOLIC BLOOD PRESSURE: 60 MMHG | HEART RATE: 66 BPM | WEIGHT: 196.4 LBS | BODY MASS INDEX: 37.08 KG/M2 | SYSTOLIC BLOOD PRESSURE: 100 MMHG

## 2022-09-12 DIAGNOSIS — Z79.4 TYPE 2 DIABETES MELLITUS WITH DIABETIC AUTONOMIC NEUROPATHY, WITH LONG-TERM CURRENT USE OF INSULIN (HCC): Primary | ICD-10-CM

## 2022-09-12 DIAGNOSIS — N18.31 STAGE 3A CHRONIC KIDNEY DISEASE (HCC): ICD-10-CM

## 2022-09-12 DIAGNOSIS — I15.2 HYPERTENSION ASSOCIATED WITH DIABETES (HCC): ICD-10-CM

## 2022-09-12 DIAGNOSIS — F31.9 BIPOLAR AFFECTIVE DISORDER, REMISSION STATUS UNSPECIFIED (HCC): ICD-10-CM

## 2022-09-12 DIAGNOSIS — E11.43 TYPE 2 DIABETES MELLITUS WITH DIABETIC AUTONOMIC NEUROPATHY, WITH LONG-TERM CURRENT USE OF INSULIN (HCC): Primary | ICD-10-CM

## 2022-09-12 DIAGNOSIS — E11.59 HYPERTENSION ASSOCIATED WITH DIABETES (HCC): ICD-10-CM

## 2022-09-12 LAB
ANION GAP SERPL CALCULATED.3IONS-SCNC: 6 MMOL/L (ref 4–13)
BUN SERPL-MCNC: 35 MG/DL (ref 5–25)
CALCIUM SERPL-MCNC: 9.1 MG/DL (ref 8.3–10.1)
CHLORIDE SERPL-SCNC: 99 MMOL/L (ref 96–108)
CO2 SERPL-SCNC: 26 MMOL/L (ref 21–32)
CREAT SERPL-MCNC: 1.55 MG/DL (ref 0.6–1.3)
GFR SERPL CREATININE-BSD FRML MDRD: 38 ML/MIN/1.73SQ M
GLUCOSE P FAST SERPL-MCNC: 332 MG/DL (ref 65–99)
POTASSIUM SERPL-SCNC: 4.9 MMOL/L (ref 3.5–5.3)
SODIUM SERPL-SCNC: 131 MMOL/L (ref 135–147)

## 2022-09-12 PROCEDURE — 3078F DIAST BP <80 MM HG: CPT | Performed by: PHYSICIAN ASSISTANT

## 2022-09-12 PROCEDURE — 95251 CONT GLUC MNTR ANALYSIS I&R: CPT | Performed by: PHYSICIAN ASSISTANT

## 2022-09-12 PROCEDURE — 99213 OFFICE O/P EST LOW 20 MIN: CPT | Performed by: PHYSICIAN ASSISTANT

## 2022-09-12 PROCEDURE — 84681 ASSAY OF C-PEPTIDE: CPT

## 2022-09-12 PROCEDURE — 3074F SYST BP LT 130 MM HG: CPT | Performed by: PHYSICIAN ASSISTANT

## 2022-09-12 PROCEDURE — 80048 BASIC METABOLIC PNL TOTAL CA: CPT

## 2022-09-12 PROCEDURE — 36415 COLL VENOUS BLD VENIPUNCTURE: CPT

## 2022-09-12 RX ORDER — CITALOPRAM 40 MG/1
TABLET ORAL
Qty: 90 TABLET | Refills: 1 | Status: SHIPPED | OUTPATIENT
Start: 2022-09-12

## 2022-09-12 NOTE — PROGRESS NOTES
Pablo Atkins 46 y o  female MRN: [id number]    Encounter: [id number]      Assessment/Plan   Problem List Items Addressed This Visit        Endocrine    Type 2 diabetes mellitus with neurologic complication ([place] [address] ) - Primary       Lab Results   Component Value Date    HGBA1C 13 9 (H) 08/30/2022 ·   Short term goals - getting patient in a safer range with BG for her upcoming vacation, obtaining supplies needed for CGM during vacation  Long term - A1c < 7 0, prevention of poor health outcomes, possible transition to insulin pump  · Will provide samples of Refugio sensor x 4 weeks of wear time  · Encouraged to check CGM frequently: upon waking, TID AC, and just prior to bedtime  This is in an effort to caputure more data  · Increase Lantus from 45 units BID to 47 units BID  · Continue Apidra 10-20-20, can take an additional 2 units with each meal if planning for higher carb content / heavy meal  May benefit from carb counting / flexible insulin training in future, even if pump not available  · Restart Ozempic at 0 5mg weekly Mondays  With plan to up-titrate further to 1mg dose at next visit if she tolerates  · Continue Jardiance, metformin at previous doses  · Patient still interested in pursing Insulin pump  In preparation for this we need to take the following steps:   · Referral to CDE for pump training / carb counting  · C-peptide, fasting BMP - obtained at lab today  Relevant Orders    Ambulatory referral to Diabetic Education    Hypertension associated with diabetes ([place] [address] )       Lab Results   Component Value Date    HGBA1C 13 9 (H) 08/30/2022 ·   BP stable today  CC: Diabetes  History of Present Illness     HPI:  Patient is a pleasant 46year old female here for short interval follow up of uncontrolled diabetes follow up before she departs for her 2 week cruise       She is currently without any CGM sensors and unfortunately will not obtain refills in time before she departs on her cruise  She has not been checking fingerstick glucose routinely due to cold fingers, pain  We did print out a recent CGM report for the patient on 22 and attempted to review over the phone at that time in anticipation for this visit today, so that data is reviewed below  Medications:   Ozempic - holding for 1 month, wasn't sure if she should restart yet  Lantus - 45 units BID  Apidra 10 units with breakfast, 20 units with lunch and dinner  Metformin 1000mg BID  Jardiance 25mg daily  She is feeling well overall, no complaints  Looking forward to her upcoming cruise  Recently finished preparing woodworking / crafts for a [employer], so is looking forward to relax after putting in more hours preparing for this  Review of Systems   All other systems reviewed and are negative        Historical Information   Past Medical History:   Diagnosis Date    Atypical chest pain     Depression     Diabetes mellitus (HCC)     Gastroparesis     GERD (gastroesophageal reflux disease)     Hyperlipidemia     Hypertension     Sciatica     Seizure disorder [facility])      Past Surgical History:   Procedure Laterality Date    BREAST BIOPSY Left  benign    CHOLECYSTECTOMY      COLONOSCOPY      HYSTERECTOMY      MT LAP,APPENDECTOMY N/A 3/24/2021    Procedure: APPENDECTOMY LAPAROSCOPIC;  Surgeon: Terrance Sorto MD;  Location: [address] [place] [address] OR;  Service: General    MT LAP,DIAGNOSTIC ABDOMEN N/A 3/24/2021    Procedure: LAPAROSCOPY DIAGNOSTIC, EXTENSIVE DESI;  Surgeon: Terrance Sorto MD;  Location: [address] [place] [address] OR;  Service: General    UPPER GASTROINTESTINAL ENDOSCOPY       Social History   Social History     Substance and Sexual Activity   Alcohol Use Never     Social History     Substance and Sexual Activity   Drug Use Never     Social History     Tobacco Use   Smoking Status Former Smoker    Quit date: 200    Years since quittin 7   Smokeless Tobacco Never Used     Family History:   Family History Problem Relation Age of Onset    No Known Problems Mother     No Known Problems Father     No Known Problems Daughter     No Known Problems Maternal Grandmother     No Known Problems Paternal Grandmother     No Known Problems Paternal Aunt     No Known Problems Paternal Aunt     No Known Problems Paternal Aunt        Meds/Allergies   Current Outpatient Medications   Medication Sig Dispense Refill    albuterol (ProAir HFA) 90 mcg/act inhaler Inhale 1 puff every 6 (six) hours as needed for wheezing or shortness of breath 18 g 3    Apidra SoloStar 100 units/mL injection pen INJECT 28 UNITS BEFORE BREAKFAST, INJECT 34 UNITS PRIOR TO DINNER (Patient taking differently: 20 Units 3 (three) times a day with meals I) 30 mL 1    Aspirin (ASPIR-81 PO) Take 81 mg by mouth      atorvastatin (LIPITOR) 80 mg tablet Take 1 tablet (80 mg total) by mouth daily at bedtime 30 tablet 6    B-D UF III MINI PEN NEEDLES 31G X 5 MM MISC       BANOPHEN 50 MG capsule take 1 capsule by mouth every 6 hours if needed for itching      benzonatate (TESSALON) 200 MG capsule Take 1 capsule (200 mg total) by mouth 3 (three) times a day as needed for cough 60 capsule 1    cholecalciferol (VITAMIN D3) 400 units tablet TAKE ONE TABLET BY MOUTH DAILY 90 tablet 1    citalopram (CeleXA) 40 mg tablet Take 1 tablet (40 mg total) by mouth daily 90 tablet 1    docusate sodium (COLACE) 100 mg capsule TAKE ONE CAPSULE BY MOUTH TWICE DAILY 30 capsule 0    Empagliflozin (Jardiance) 25 MG TABS Take 1 tablet (25 mg total) by mouth every morning 90 tablet 1    famotidine (PEPCID) 40 MG tablet Take 1 tablet (40 mg total) by mouth daily at bedtime Take in evening 2 hours prior to bed 30 tablet 6    fenofibrate (TRICOR) 48 mg tablet Take 1 tablet (48 mg total) by mouth daily 90 tablet 3    glucose blood (OneTouch Verio) test strip Use to test blood glucose 4 times a day 200 strip 2    Lantus SoloStar 100 units/mL SOPN INJECT 45 UNITS EVERY 12 HOURS 30 mL 1    levETIRAcetam (KEPPRA) 500 mg tablet Take 1 tablet (500 mg total) by mouth every 12 (twelve) hours 180 tablet 1    linaCLOtide (Linzess) 72 MCG CAPS Take 1 capsule by mouth daily before breakfast 30 capsule 0    lisinopril (ZESTRIL) 10 mg tablet TAKE ONE TABLET BY MOUTH DAILY 90 tablet 1    metFORMIN (GLUCOPHAGE) 1000 MG tablet Take 1 tablet (1,000 mg total) by mouth 2 (two) times a day with meals 180 tablet 1    metoclopramide (REGLAN) 10 mg tablet Take 1 tablet (10 mg total) by mouth daily 90 tablet 3    metoprolol tartrate (LOPRESSOR) 25 mg tablet TAKE 1 TABLET (25 MG TOTAL) BY MOUTH 2 (TWO) TIMES A  tablet 1    neomycin-polymyxin-hydrocortisone (CORTISPORIN) otic solution Administer 4 drops to the right ear every 6 (six) hours 10 mL 0    ondansetron (ZOFRAN) 4 mg tablet Take 1 tablet (4 mg total) by mouth every 8 (eight) hours as needed for nausea or vomiting 20 tablet 0    ondansetron (ZOFRAN-ODT) 4 mg disintegrating tablet Take 1 tablet (4 mg total) by mouth every 6 (six) hours as needed for nausea or vomiting 20 tablet 0    pantoprazole (PROTONIX) 40 mg tablet Take 1 tablet (40 mg total) by mouth 2 (two) times a day 180 tablet 3    pregabalin (LYRICA) 100 mg capsule Take 1 capsule (100 mg total) by mouth 3 (three) times a day 270 capsule 3    solifenacin (VESIcare) 5 mg tablet Take 1 tablet (5 mg total) by mouth daily 90 tablet 1    Continuous Blood Gluc  (FreeStyle Refugio 14 Day [place]) TEMITOPE Use for continuous blood glucose monitoring (Patient not taking: Reported on 9/12/2022) 1 each 0    Continuous Blood Gluc Sensor (FreeStyle Refugio 14 Day Sensor) MISC Use one sensor every 14 days for continuous blood sugar monitoring   (Patient not taking: Reported on 9/12/2022) 6 each 3    Ozempic, 1 MG/DOSE, 4 MG/3ML SOPN injection pen INJECT 1 MG TOTAL UNDER THE SKIN ONCE A WEEK (Patient not taking: Reported on 9/12/2022) 9 mL 0     No current facility-administered medications for this visit  Allergies   Allergen Reactions    Azithromycin GI Intolerance and Hives    Benztropine     Butorphanol Hallucinations    Penicillins     Zolpidem        Objective   Vitals: Blood pressure 100/60, pulse 66, height 5' 1" (1 549 m), weight 89 1 kg (196 lb 6 4 oz), not currently breastfeeding  Physical Exam  Vitals reviewed  HENT:      Head: Normocephalic  Cardiovascular:      Rate and Rhythm: Normal rate and regular rhythm  Pulmonary:      Effort: Pulmonary effort is normal  No respiratory distress  Musculoskeletal:      Right lower leg: No edema  Left lower leg: No edema  Skin:     General: Skin is warm and dry  Neurological:      Mental Status: She is alert  Psychiatric:         Mood and Affect: Mood normal          The history was obtained from the review of the chart, patient      Lab Results:   Lab Results   Component Value Date/Time    Hemoglobin A1C 13 9 (H) 08/30/2022 08:27 AM    Hemoglobin A1C 13 3 (A) 06/14/2022 12:55 PM    Hemoglobin A1C 9 8 (A) 04/08/2022 04:29 PM    Hemoglobin A1C 11 7 (A) 02/17/2022 11:19 AM    Hemoglobin A1C 10 0 (H) 12/01/2021 01:04 PM    WBC 8 26 08/30/2022 08:27 AM    WBC 10 18 (H) 05/23/2022 05:16 PM    WBC 9 49 03/28/2022 12:44 PM    Hemoglobin 11 3 (L) 08/30/2022 08:27 AM    Hemoglobin 12 8 05/23/2022 05:16 PM    Hemoglobin 11 4 (L) 03/28/2022 12:44 PM    Hematocrit 36 7 08/30/2022 08:27 AM    Hematocrit 38 4 05/23/2022 05:16 PM    Hematocrit 36 3 03/28/2022 12:44 PM    MCV 85 08/30/2022 08:27 AM    MCV 82 05/23/2022 05:16 PM    MCV 83 03/28/2022 12:44 PM    Platelets 837 23/34/4369 08:27 AM    Platelets 016 17/51/7432 05:16 PM    Platelets 328 (H) 62/97/1266 12:44 PM    BUN 30 (H) 08/30/2022 08:27 AM    BUN 39 (H) 05/23/2022 05:16 PM    BUN 35 (H) 03/28/2022 12:44 PM    Potassium 4 6 08/30/2022 08:27 AM    Potassium 5 2 05/23/2022 05:16 PM    Potassium 4 1 03/28/2022 12:44 PM    Chloride 107 08/30/2022 08:27 AM    Chloride 97 05/23/2022 05:16 PM    Chloride 105 03/28/2022 12:44 PM    CO2 26 08/30/2022 08:27 AM    CO2 28 05/23/2022 05:16 PM    CO2 28 03/28/2022 12:44 PM    Creatinine 1 33 (H) 08/30/2022 08:27 AM    Creatinine 1 20 05/23/2022 05:16 PM    Creatinine 1 09 03/28/2022 12:44 PM    AST 22 08/30/2022 08:27 AM    AST 26 05/23/2022 05:16 PM    AST 20 03/25/2022 06:36 AM    ALT 35 08/30/2022 08:27 AM    ALT 27 05/23/2022 05:16 PM    ALT 20 03/25/2022 06:36 AM    Albumin 3 0 (L) 08/30/2022 08:27 AM    Albumin 4 2 05/23/2022 05:16 PM    Albumin 4 0 03/25/2022 06:36 AM    HDL, Direct 49 (L) 08/30/2022 08:27 AM    HDL, Direct 48 (L) 12/01/2021 01:04 PM    Triglycerides 243 (H) 08/30/2022 08:27 AM    Triglycerides 224 (H) 12/01/2021 01:04 PM       CGM report:  Marvell Severs   Device used: [facility] use     Indication: Type 2 Diabetes    More than 72 hours of data was reviewed  Report to be scanned to chart  Date Range: 8/26/22 - 9/8/22    Analysis of data:   % time CGM used: 51%  Average Glucose: 268  Coefficient of Variation: 38 1%  Time in Target Range: 25%  Time Above Range: 17% high, 58% very high  Time Below Range: 0%    Interpretation of data: BG still very uncontrolled but more time spent in target range now compared to prior Esteban report from earlier this month  She still has not had any significant hypoglycemia  There is still some issues with scanning the sensor frequently enough to capture full day  Imaging Studies: none    Portions of the record may have been created with voice recognition software  Occasional wrong word or "sound a like" substitutions may have occurred due to the inherent limitations of voice recognition software  Read the chart carefully and recognize, using context, where substitutions have occurred

## 2022-09-12 NOTE — ASSESSMENT & PLAN NOTE
Lab Results   Component Value Date    HGBA1C 13 9 (H) 08/30/2022   · Short term goals - getting patient in a safer range with BG for her upcoming vacation, obtaining supplies needed for CGM during vacation  Long term - A1c < 7 0, prevention of poor health outcomes, possible transition to insulin pump  · Will provide samples of Refugio sensor x 4 weeks of wear time  · Encouraged to check CGM frequently: upon waking, TID AC, and just prior to bedtime  This is in an effort to caputure more data  · Increase Lantus from 45 units BID to 47 units BID  · Continue Apidra 10-20-20, can take an additional 2 units with each meal if planning for higher carb content / heavy meal  May benefit from carb counting / flexible insulin training in future, even if pump not available  · Restart Ozempic at 0 5mg weekly Mondays  With plan to up-titrate further to 1mg dose at next visit if she tolerates  · Continue Jardiance, metformin at previous doses  · Patient still interested in pursing Insulin pump  In preparation for this we need to take the following steps:   · Referral to CDE for pump training / carb counting  · C-peptide, fasting BMP - obtained at lab today

## 2022-09-13 LAB — C PEPTIDE SERPL-MCNC: 5.9 NG/ML (ref 1.1–4.4)

## 2022-09-27 DIAGNOSIS — E11.43 TYPE 2 DIABETES MELLITUS WITH DIABETIC AUTONOMIC NEUROPATHY, WITH LONG-TERM CURRENT USE OF INSULIN (HCC): ICD-10-CM

## 2022-09-27 DIAGNOSIS — K21.9 GASTROESOPHAGEAL REFLUX DISEASE WITHOUT ESOPHAGITIS: ICD-10-CM

## 2022-09-27 DIAGNOSIS — Z79.4 TYPE 2 DIABETES MELLITUS WITH DIABETIC AUTONOMIC NEUROPATHY, WITH LONG-TERM CURRENT USE OF INSULIN (HCC): ICD-10-CM

## 2022-09-27 DIAGNOSIS — K21.9 GASTROESOPHAGEAL REFLUX DISEASE, UNSPECIFIED WHETHER ESOPHAGITIS PRESENT: Primary | ICD-10-CM

## 2022-09-27 RX ORDER — BLOOD SUGAR DIAGNOSTIC
STRIP MISCELLANEOUS
Qty: 200 STRIP | Refills: 2 | Status: SHIPPED | OUTPATIENT
Start: 2022-09-27

## 2022-09-27 RX ORDER — FAMOTIDINE 40 MG/1
40 TABLET, FILM COATED ORAL
Qty: 30 TABLET | Refills: 6 | Status: SHIPPED | OUTPATIENT
Start: 2022-09-27 | End: 2022-09-30 | Stop reason: SDUPTHER

## 2022-09-27 RX ORDER — PEN NEEDLE, DIABETIC 31 GX5/16"
NEEDLE, DISPOSABLE MISCELLANEOUS
Qty: 450 EACH | Refills: 0 | Status: SHIPPED | OUTPATIENT
Start: 2022-09-27

## 2022-09-29 DIAGNOSIS — R56.9 SEIZURES (HCC): ICD-10-CM

## 2022-09-30 ENCOUNTER — TELEPHONE (OUTPATIENT)
Dept: SURGERY | Facility: CLINIC | Age: 52
End: 2022-09-30

## 2022-09-30 ENCOUNTER — TELEPHONE (OUTPATIENT)
Dept: FAMILY MEDICINE CLINIC | Facility: CLINIC | Age: 52
End: 2022-09-30

## 2022-09-30 DIAGNOSIS — K21.9 GASTROESOPHAGEAL REFLUX DISEASE, UNSPECIFIED WHETHER ESOPHAGITIS PRESENT: ICD-10-CM

## 2022-09-30 DIAGNOSIS — K21.9 GASTROESOPHAGEAL REFLUX DISEASE WITHOUT ESOPHAGITIS: ICD-10-CM

## 2022-09-30 DIAGNOSIS — T75.3XXS MOTION SICKNESS, SEQUELA: Primary | ICD-10-CM

## 2022-09-30 RX ORDER — FAMOTIDINE 40 MG/1
40 TABLET, FILM COATED ORAL
Qty: 30 TABLET | Refills: 6 | Status: SHIPPED | OUTPATIENT
Start: 2022-09-30

## 2022-09-30 NOTE — TELEPHONE ENCOUNTER
Called patient because I had seen that she had canceled her appt and I wanted to see if she wanted to reschedule   Pt states that she wanted to let it go for a couple of months so I put her in my recall list

## 2022-09-30 NOTE — TELEPHONE ENCOUNTER
Stevan Elijah called and stated that she will be going on 2 cruises back to back  For she will need for 14 days    And she will also be doing another cruise in December, again another 14 days    She did get to use a friends patch on the last cruise  She said it was called Scopolamine (spelling might not be correct)  She would like patches   Might have to call her and get correct spelling  She stated that is the one that works    Please advise    [facility]    Phone: [phone]

## 2022-10-02 RX ORDER — LEVETIRACETAM 500 MG/1
TABLET ORAL
Qty: 180 TABLET | Refills: 1 | Status: SHIPPED | OUTPATIENT
Start: 2022-10-02

## 2022-10-03 RX ORDER — SCOLOPAMINE TRANSDERMAL SYSTEM 1 MG/1
1 PATCH, EXTENDED RELEASE TRANSDERMAL
Qty: 24 PATCH | Refills: 1 | Status: SHIPPED | OUTPATIENT
Start: 2022-10-03

## 2022-10-05 ENCOUNTER — OFFICE VISIT (OUTPATIENT)
Dept: NEPHROLOGY | Facility: CLINIC | Age: 52
End: 2022-10-05
Payer: COMMERCIAL

## 2022-10-05 VITALS
HEART RATE: 67 BPM | SYSTOLIC BLOOD PRESSURE: 110 MMHG | OXYGEN SATURATION: 99 % | HEIGHT: 61 IN | DIASTOLIC BLOOD PRESSURE: 62 MMHG | BODY MASS INDEX: 36.82 KG/M2 | WEIGHT: 195 LBS

## 2022-10-05 DIAGNOSIS — C64.1 CLEAR CELL CARCINOMA OF KIDNEY, RIGHT (HCC): ICD-10-CM

## 2022-10-05 DIAGNOSIS — I15.2 HYPERTENSION ASSOCIATED WITH DIABETES (HCC): ICD-10-CM

## 2022-10-05 DIAGNOSIS — N18.31 STAGE 3A CHRONIC KIDNEY DISEASE (HCC): Primary | ICD-10-CM

## 2022-10-05 DIAGNOSIS — E87.0 HYPEROSMOLAR HYPONATREMIA: ICD-10-CM

## 2022-10-05 DIAGNOSIS — E11.42 DIABETIC POLYNEUROPATHY ASSOCIATED WITH TYPE 2 DIABETES MELLITUS (HCC): ICD-10-CM

## 2022-10-05 DIAGNOSIS — I12.9 BENIGN HYPERTENSION WITH CKD (CHRONIC KIDNEY DISEASE) STAGE III (HCC): ICD-10-CM

## 2022-10-05 DIAGNOSIS — N18.30 BENIGN HYPERTENSION WITH CKD (CHRONIC KIDNEY DISEASE) STAGE III (HCC): ICD-10-CM

## 2022-10-05 DIAGNOSIS — E11.59 HYPERTENSION ASSOCIATED WITH DIABETES (HCC): ICD-10-CM

## 2022-10-05 DIAGNOSIS — D50.8 IRON DEFICIENCY ANEMIA SECONDARY TO INADEQUATE DIETARY IRON INTAKE: ICD-10-CM

## 2022-10-05 DIAGNOSIS — E87.1 HYPEROSMOLAR HYPONATREMIA: ICD-10-CM

## 2022-10-05 PROCEDURE — 99213 OFFICE O/P EST LOW 20 MIN: CPT | Performed by: NURSE PRACTITIONER

## 2022-10-05 NOTE — PATIENT INSTRUCTIONS
Start taking iron heme polypeptide (Proferrin) 1 pill a day for your iron deficiency   You can find this on [employer]  Lab work in 2 months

## 2022-10-06 ENCOUNTER — TELEPHONE (OUTPATIENT)
Dept: FAMILY MEDICINE CLINIC | Facility: CLINIC | Age: 52
End: 2022-10-06

## 2022-10-06 PROBLEM — E87.1 HYPEROSMOLAR HYPONATREMIA: Status: ACTIVE | Noted: 2022-10-06

## 2022-10-06 PROBLEM — I12.9 BENIGN HYPERTENSION WITH CKD (CHRONIC KIDNEY DISEASE) STAGE III (HCC): Status: ACTIVE | Noted: 2022-02-17

## 2022-10-06 PROBLEM — E87.0 HYPEROSMOLAR HYPONATREMIA: Status: ACTIVE | Noted: 2022-10-06

## 2022-10-06 PROBLEM — N18.30 BENIGN HYPERTENSION WITH CKD (CHRONIC KIDNEY DISEASE) STAGE III (HCC): Status: ACTIVE | Noted: 2022-02-17

## 2022-10-06 NOTE — PROGRESS NOTES
Nephrology   Office Follow-Up  Esvin Parker 46 y o  female MRN: [id number]    Encounter: [id number]        Edgardo Cisneros was seen in the [place] office today  All diagnoses and orders for visit:     1  Stage 3a chronic kidney disease (HCC)  · Elevated creatinine from typical, baseline usually around 1 0-1 2 mg/dL  Her blood sugar was very elevated and possibly causing volume depletion from osmotic diuresis  She saw endocrinology  She will have short interval lab work and if no better will image  -     Comprehensive metabolic panel; Future; Expected date: 12/07/2022  2  Diabetic polyneuropathy associated with type 2 diabetes mellitus ([facility] )  · Long standing uncontrolled T2DM  Continue lisinopril and Jardiance at this time  Insulin management per endocrinology  3  Benign hypertension with CKD (chronic kidney disease) stage III (HCC)  · Blood pressure appropriate, no changes made  4  Iron deficiency anemia secondary to inadequate dietary iron intake  · Declined iron infusions  She will trial Proferrin 1 tab daily  Per her report, normal colonoscopy 2019  5  Clear cell carcinoma of kidney, right [facility])  · Per Urology  6  Hypertension associated with diabetes ([facility] )  7  Hyperosmolar hyponatremia  · Serum sodium corrects to low end of normal for blood sugar    HPI: Esvin Parker is a 46 y o  female who follows with Dr Kristy Sim for CKD 3a, probable diabetic nephropathy, hypertension, history of clear cell RCC of right kidney s/p partial nephrectomy  Other pertinent medical problems include history diabetic polyneuropathy, previous morbid obesity > 400 pounds, YINKA    Kidney function reduced from previous  She will attempt better blood sugar control per our Endocrinology colleages and repeat lab work in about 2 months  I have also recommend IV iron for which she declined and prefers to try Proferrin 1 tab per day  ROS:   Review of Systems   Constitutional: Positive for fatigue   Negative for chills and fever  HENT: Negative for ear pain and sore throat  Eyes: Negative for pain and visual disturbance  Respiratory: Negative for cough and shortness of breath  Cardiovascular: Negative for chest pain and palpitations  Gastrointestinal: Negative for abdominal pain and vomiting  Genitourinary: Negative for dysuria and hematuria  Musculoskeletal: Negative for arthralgias and back pain  Skin: Negative for color change and rash  Neurological: Positive for weakness  Negative for seizures and syncope  All other systems reviewed and are negative        Allergies: Azithromycin, Benztropine, Butorphanol, Penicillins, and Zolpidem    Medications:   Current Outpatient Medications:     albuterol (ProAir HFA) 90 mcg/act inhaler, Inhale 1 puff every 6 (six) hours as needed for wheezing or shortness of breath, Disp: 18 g, Rfl: 3    Apidra SoloStar 100 units/mL injection pen, INJECT 28 UNITS BEFORE BREAKFAST, INJECT 34 UNITS PRIOR TO DINNER (Patient taking differently: 20 Units 3 (three) times a day with meals I), Disp: 30 mL, Rfl: 1    Aspirin (ASPIR-81 PO), Take 81 mg by mouth, Disp: , Rfl:     atorvastatin (LIPITOR) 80 mg tablet, Take 1 tablet (80 mg total) by mouth daily at bedtime, Disp: 30 tablet, Rfl: 6    B-D UF III MINI PEN NEEDLES 31G X 5 MM MISC, Pt uses 5 pen needles daily, Disp: 450 each, Rfl: 0    BANOPHEN 50 MG capsule, take 1 capsule by mouth every 6 hours if needed for itching, Disp: , Rfl:     benzonatate (TESSALON) 200 MG capsule, Take 1 capsule (200 mg total) by mouth 3 (three) times a day as needed for cough, Disp: 60 capsule, Rfl: 1    cholecalciferol (VITAMIN D3) 400 units tablet, TAKE ONE TABLET BY MOUTH DAILY, Disp: 90 tablet, Rfl: 1    citalopram (CeleXA) 40 mg tablet, TAKE ONE TABLET BY MOUTH DAILY, Disp: 90 tablet, Rfl: 1    docusate sodium (COLACE) 100 mg capsule, TAKE ONE CAPSULE BY MOUTH TWICE DAILY, Disp: 30 capsule, Rfl: 0    Empagliflozin (Jardiance) 25 MG TABS, Take 1 tablet (25 mg total) by mouth every morning, Disp: 90 tablet, Rfl: 1    famotidine (PEPCID) 40 MG tablet, Take 1 tablet (40 mg total) by mouth daily at bedtime Take in evening 2 hours prior to bed, Disp: 30 tablet, Rfl: 6    fenofibrate (TRICOR) 48 mg tablet, Take 1 tablet (48 mg total) by mouth daily, Disp: 90 tablet, Rfl: 3    glucose blood (OneTouch Verio) test strip, Use to test blood glucose 4 times a day, Disp: 200 strip, Rfl: 2    Iron Heme Polypeptide 12 MG TABS, Take 1 tablet by mouth, Disp: , Rfl:     Lantus SoloStar 100 units/mL SOPN, INJECT 45 UNITS EVERY 12 HOURS, Disp: 30 mL, Rfl: 1    levETIRAcetam (KEPPRA) 500 mg tablet, TAKE ONE TABLET TWICE DAILY, Disp: 180 tablet, Rfl: 1    linaCLOtide (Linzess) 72 MCG CAPS, Take 1 capsule by mouth daily before breakfast, Disp: 30 capsule, Rfl: 0    lisinopril (ZESTRIL) 10 mg tablet, TAKE ONE TABLET BY MOUTH DAILY, Disp: 90 tablet, Rfl: 1    metFORMIN (GLUCOPHAGE) 1000 MG tablet, Take 1 tablet (1,000 mg total) by mouth 2 (two) times a day with meals, Disp: 180 tablet, Rfl: 1    metoclopramide (REGLAN) 10 mg tablet, Take 1 tablet (10 mg total) by mouth daily, Disp: 90 tablet, Rfl: 3    metoprolol tartrate (LOPRESSOR) 25 mg tablet, TAKE 1 TABLET (25 MG TOTAL) BY MOUTH 2 (TWO) TIMES A DAY, Disp: 180 tablet, Rfl: 1    neomycin-polymyxin-hydrocortisone (CORTISPORIN) otic solution, Administer 4 drops to the right ear every 6 (six) hours, Disp: 10 mL, Rfl: 0    ondansetron (ZOFRAN) 4 mg tablet, Take 1 tablet (4 mg total) by mouth every 8 (eight) hours as needed for nausea or vomiting, Disp: 20 tablet, Rfl: 0    ondansetron (ZOFRAN-ODT) 4 mg disintegrating tablet, Take 1 tablet (4 mg total) by mouth every 6 (six) hours as needed for nausea or vomiting, Disp: 20 tablet, Rfl: 0    pantoprazole (PROTONIX) 40 mg tablet, Take 1 tablet (40 mg total) by mouth 2 (two) times a day, Disp: 180 tablet, Rfl: 3    pregabalin (LYRICA) 100 mg capsule, Take 1 capsule (100 mg total) by mouth 3 (three) times a day, Disp: 270 capsule, Rfl: 3    scopolamine (TRANSDERM-SCOP) 1 mg/3 days TD 72 hr patch, Place 1 patch on the skin every third day, Disp: 24 patch, Rfl: 1    solifenacin (VESIcare) 5 mg tablet, Take 1 tablet (5 mg total) by mouth daily, Disp: 90 tablet, Rfl: 1    Continuous Blood Gluc  (FreeStyle Refugio 14 Day [place]) TEMITOPE, Use for continuous blood glucose monitoring (Patient not taking: No sig reported), Disp: 1 each, Rfl: 0    Continuous Blood Gluc Sensor (FreeStyle Refugio 14 Day Sensor) MISC, Use one sensor every 14 days for continuous blood sugar monitoring   (Patient not taking: No sig reported), Disp: 6 each, Rfl: 3    Ozempic, 1 MG/DOSE, 4 MG/3ML SOPN injection pen, INJECT 1 MG TOTAL UNDER THE SKIN ONCE A WEEK (Patient not taking: No sig reported), Disp: 9 mL, Rfl: 0    Past Medical History:   Diagnosis Date    Atypical chest pain     Depression     Diabetes mellitus (HCC)     Gastroparesis     GERD (gastroesophageal reflux disease)     Hyperlipidemia     Hypertension     Sciatica     Seizure disorder [facility])      Past Surgical History:   Procedure Laterality Date    BREAST BIOPSY Left  benign    CHOLECYSTECTOMY      COLONOSCOPY      HYSTERECTOMY      MT LAP,APPENDECTOMY N/A 3/24/2021    Procedure: APPENDECTOMY LAPAROSCOPIC;  Surgeon: Ary Najjar, MD;  Location: [address] [place] [address] OR;  Service: General    MT LAP,DIAGNOSTIC ABDOMEN N/A 3/24/2021    Procedure: LAPAROSCOPY DIAGNOSTIC, EXTENSIVE DESI;  Surgeon: Ary Najjar, MD;  Location: [address] [place] [address] OR;  Service: General    UPPER GASTROINTESTINAL ENDOSCOPY       Family History   Problem Relation Age of Onset    No Known Problems Mother     No Known Problems Father     No Known Problems Daughter     No Known Problems Maternal Grandmother     No Known Problems Paternal Grandmother     No Known Problems Paternal Aunt     No Known Problems Paternal Aunt     No Known Problems Paternal Aunt reports that she quit smoking about 32 years ago  She has never used smokeless tobacco  She reports that she does not drink alcohol and does not use drugs  Physical Exam:   Vitals:    10/05/22 1352   BP: 110/62   BP Location: Left arm   Patient Position: Sitting   Cuff Size: Large   Pulse: 67   SpO2: 99%   Weight: 88 5 kg (195 lb)   Height: 5' 1" (1 549 m)     Body mass index is 36 84 kg/m²  General: conscious, cooperative, in no acute distress, appears older than stated age, chronically ill appearing   Eyes: conjunctivae pale, anicteric sclerae  ENT: lips and mucous membranes moist  Neck: supple, no JVD, no masses  Chest:  essentially clear breath sounds bilaterally, no crackles, ronchus or wheezings  CVS: S1 & S2, normal rate, regular rhythm  Abdomen: soft, non-tender, non-distended, normoactive bowel sounds, rounded, obese  Extremities: no edema of both legs  Skin: no rash pale  Neuro: awake, alert, oriented       Diagnostic Data:  Lab: I have personally reviewed pertinent lab results  ,   CBC:       CMP: No results found for: SODIUM, K, CL, CO2, ANIONGAP, BUN, CREATININE, GLUCOSE, CALCIUM, AST, ALT, ALKPHOS, PROT, BILITOT, EGFR,   PT/INR: No results found for: PT, INR,   Magnesium: No components found for: MAG,  Phosphorous: No results found for: PHOS    Patient Instructions   Start taking iron heme polypeptide (Proferrin) 1 pill a day for your iron deficiency  You can find this on [employer], [address] [place] [address] work in 2 months       Portions of the record may have been created with voice recognition software  Occasional wrong word or "sound a like" substitutions may have occurred due to the inherent limitations of voice recognition software  Read the chart carefully and recognize, using context, where substitutions have occurred  If you have any questions, please contact the dictating provider

## 2022-10-06 NOTE — TELEPHONE ENCOUNTER
Called and left voicemail for patient to call the office back, she is going on a trip and was looking for the medication to hold her over, the transderm needs a prior auth before hand, called to see if the medication was able to be paid out of pocket in the mean time,     Prior Auth Submitted: [id number]

## 2022-10-10 NOTE — TELEPHONE ENCOUNTER
Letter scanned into media that medication does not require a prior auth  Pharmacy needs to process a preferred NDC  Called pharmacy and informed of above  They will contact insurance to see what they preferred NDC is

## 2022-10-12 ENCOUNTER — TELEPHONE (OUTPATIENT)
Dept: ENDOCRINOLOGY | Facility: CLINIC | Age: 52
End: 2022-10-12

## 2022-10-12 NOTE — TELEPHONE ENCOUNTER
[facility] [address] [facility], Pt has [employer] , pt may need to try first Victoza or Torsten

## 2022-10-13 ENCOUNTER — TELEPHONE (OUTPATIENT)
Dept: ENDOCRINOLOGY | Facility: CLINIC | Age: 52
End: 2022-10-13

## 2022-10-13 NOTE — TELEPHONE ENCOUNTER
Called patient and left a message to give our office a call back to see if she would like an appointment just because the doctors schedule is filling up pretty quickly

## 2022-10-21 DIAGNOSIS — N32.89 BLADDER SPASMS: ICD-10-CM

## 2022-10-21 RX ORDER — SOLIFENACIN SUCCINATE 5 MG/1
TABLET, FILM COATED ORAL
Qty: 90 TABLET | Refills: 1 | Status: SHIPPED | OUTPATIENT
Start: 2022-10-21

## 2022-10-24 ENCOUNTER — OFFICE VISIT (OUTPATIENT)
Dept: FAMILY MEDICINE CLINIC | Facility: CLINIC | Age: 52
End: 2022-10-24
Payer: COMMERCIAL

## 2022-10-24 ENCOUNTER — TELEPHONE (OUTPATIENT)
Dept: SURGERY | Facility: CLINIC | Age: 52
End: 2022-10-24

## 2022-10-24 ENCOUNTER — APPOINTMENT (OUTPATIENT)
Dept: LAB | Facility: CLINIC | Age: 52
End: 2022-10-24
Payer: COMMERCIAL

## 2022-10-24 VITALS
OXYGEN SATURATION: 99 % | HEART RATE: 67 BPM | BODY MASS INDEX: 37 KG/M2 | HEIGHT: 61 IN | RESPIRATION RATE: 20 BRPM | SYSTOLIC BLOOD PRESSURE: 102 MMHG | DIASTOLIC BLOOD PRESSURE: 64 MMHG | TEMPERATURE: 98 F | WEIGHT: 196 LBS

## 2022-10-24 DIAGNOSIS — Z79.4 UNCONTROLLED DIABETES MELLITUS WITH HYPERGLYCEMIA, WITH LONG-TERM CURRENT USE OF INSULIN (HCC): ICD-10-CM

## 2022-10-24 DIAGNOSIS — R53.83 FATIGUE, UNSPECIFIED TYPE: ICD-10-CM

## 2022-10-24 DIAGNOSIS — E55.9 VITAMIN D DEFICIENCY: ICD-10-CM

## 2022-10-24 DIAGNOSIS — E11.65 UNCONTROLLED DIABETES MELLITUS WITH HYPERGLYCEMIA, WITH LONG-TERM CURRENT USE OF INSULIN (HCC): Primary | ICD-10-CM

## 2022-10-24 DIAGNOSIS — D50.9 IRON DEFICIENCY ANEMIA, UNSPECIFIED IRON DEFICIENCY ANEMIA TYPE: ICD-10-CM

## 2022-10-24 DIAGNOSIS — Z79.4 UNCONTROLLED DIABETES MELLITUS WITH HYPERGLYCEMIA, WITH LONG-TERM CURRENT USE OF INSULIN (HCC): Primary | ICD-10-CM

## 2022-10-24 DIAGNOSIS — E11.65 UNCONTROLLED DIABETES MELLITUS WITH HYPERGLYCEMIA, WITH LONG-TERM CURRENT USE OF INSULIN (HCC): ICD-10-CM

## 2022-10-24 LAB
25(OH)D3 SERPL-MCNC: 25.4 NG/ML (ref 30–100)
ALBUMIN SERPL BCP-MCNC: 3.1 G/DL (ref 3.5–5)
ALP SERPL-CCNC: 90 U/L (ref 46–116)
ALT SERPL W P-5'-P-CCNC: 31 U/L (ref 12–78)
ANION GAP SERPL CALCULATED.3IONS-SCNC: 7 MMOL/L (ref 4–13)
AST SERPL W P-5'-P-CCNC: 16 U/L (ref 5–45)
BASOPHILS # BLD AUTO: 0.04 THOUSANDS/ÂΜL (ref 0–0.1)
BASOPHILS NFR BLD AUTO: 1 % (ref 0–1)
BILIRUB SERPL-MCNC: 0.16 MG/DL (ref 0.2–1)
BUN SERPL-MCNC: 34 MG/DL (ref 5–25)
CALCIUM ALBUM COR SERPL-MCNC: 9.9 MG/DL (ref 8.3–10.1)
CALCIUM SERPL-MCNC: 9.2 MG/DL (ref 8.3–10.1)
CHLORIDE SERPL-SCNC: 102 MMOL/L (ref 96–108)
CO2 SERPL-SCNC: 24 MMOL/L (ref 21–32)
CREAT SERPL-MCNC: 1.54 MG/DL (ref 0.6–1.3)
EOSINOPHIL # BLD AUTO: 0.24 THOUSAND/ÂΜL (ref 0–0.61)
EOSINOPHIL NFR BLD AUTO: 3 % (ref 0–6)
ERYTHROCYTE [DISTWIDTH] IN BLOOD BY AUTOMATED COUNT: 13.8 % (ref 11.6–15.1)
EST. AVERAGE GLUCOSE BLD GHB EST-MCNC: 335 MG/DL
FERRITIN SERPL-MCNC: 45 NG/ML (ref 8–388)
GFR SERPL CREATININE-BSD FRML MDRD: 38 ML/MIN/1.73SQ M
GLUCOSE P FAST SERPL-MCNC: 455 MG/DL (ref 65–99)
HBA1C MFR BLD: 13.3 %
HCT VFR BLD AUTO: 37 % (ref 34.8–46.1)
HGB BLD-MCNC: 11.4 G/DL (ref 11.5–15.4)
IMM GRANULOCYTES # BLD AUTO: 0.04 THOUSAND/UL (ref 0–0.2)
IMM GRANULOCYTES NFR BLD AUTO: 1 % (ref 0–2)
IRON SATN MFR SERPL: 15 % (ref 15–50)
IRON SERPL-MCNC: 53 UG/DL (ref 50–170)
LYMPHOCYTES # BLD AUTO: 2.66 THOUSANDS/ÂΜL (ref 0.6–4.47)
LYMPHOCYTES NFR BLD AUTO: 30 % (ref 14–44)
MCH RBC QN AUTO: 25.7 PG (ref 26.8–34.3)
MCHC RBC AUTO-ENTMCNC: 30.8 G/DL (ref 31.4–37.4)
MCV RBC AUTO: 84 FL (ref 82–98)
MONOCYTES # BLD AUTO: 0.64 THOUSAND/ÂΜL (ref 0.17–1.22)
MONOCYTES NFR BLD AUTO: 7 % (ref 4–12)
NEUTROPHILS # BLD AUTO: 5.14 THOUSANDS/ÂΜL (ref 1.85–7.62)
NEUTS SEG NFR BLD AUTO: 58 % (ref 43–75)
NRBC BLD AUTO-RTO: 0 /100 WBCS
PLATELET # BLD AUTO: 332 THOUSANDS/UL (ref 149–390)
PMV BLD AUTO: 11.3 FL (ref 8.9–12.7)
POTASSIUM SERPL-SCNC: 5.3 MMOL/L (ref 3.5–5.3)
PROT SERPL-MCNC: 6.9 G/DL (ref 6.4–8.4)
RBC # BLD AUTO: 4.43 MILLION/UL (ref 3.81–5.12)
SODIUM SERPL-SCNC: 133 MMOL/L (ref 135–147)
TIBC SERPL-MCNC: 350 UG/DL (ref 250–450)
WBC # BLD AUTO: 8.76 THOUSAND/UL (ref 4.31–10.16)

## 2022-10-24 PROCEDURE — 99214 OFFICE O/P EST MOD 30 MIN: CPT | Performed by: NURSE PRACTITIONER

## 2022-10-24 PROCEDURE — 85025 COMPLETE CBC W/AUTO DIFF WBC: CPT

## 2022-10-24 PROCEDURE — 83540 ASSAY OF IRON: CPT

## 2022-10-24 PROCEDURE — 83036 HEMOGLOBIN GLYCOSYLATED A1C: CPT

## 2022-10-24 PROCEDURE — 82728 ASSAY OF FERRITIN: CPT

## 2022-10-24 PROCEDURE — 36415 COLL VENOUS BLD VENIPUNCTURE: CPT

## 2022-10-24 PROCEDURE — 82306 VITAMIN D 25 HYDROXY: CPT

## 2022-10-24 PROCEDURE — 80053 COMPREHEN METABOLIC PANEL: CPT

## 2022-10-24 PROCEDURE — 83550 IRON BINDING TEST: CPT

## 2022-10-24 NOTE — PATIENT INSTRUCTIONS
Please get blood work done today  Keep appointment with Endocrinology we made for you this coming week  Can take Sudafed OTC for ear fullness and congestion

## 2022-10-24 NOTE — TELEPHONE ENCOUNTER
Called patient and stated I notice she canceled her appt for 10/26/22 with Dr Anu Spence and asked if she would like to reschedule? She stated no, that she has other olivia issues to take care of  I asked if I can put her in my recall list for 3 months to follow up on how she is doing and she stated yes

## 2022-10-24 NOTE — PROGRESS NOTES
[facility]        NAME: Satnam Allen is a 46 y o  female  : 1970    MRN: [id number]  DATE: 2022  TIME: 1:16 PM    Assessment and Plan   Uncontrolled diabetes mellitus with hyperglycemia, with long-term current use of insulin (HCC) [E11 65, Z79 4]  1  Uncontrolled diabetes mellitus with hyperglycemia, with long-term current use of insulin (HCC)  Comprehensive metabolic panel    CBC and differential    HEMOGLOBIN A1C W/ EAG ESTIMATION    Ambulatory Referral to Endocrinology   2  Fatigue, unspecified type  Comprehensive metabolic panel    CBC and differential   3  Vitamin D deficiency  Vitamin D 25 hydroxy   4  Iron deficiency anemia, unspecified iron deficiency anemia type  Iron Panel (Includes Ferritin, Iron Sat%, Iron, and TIBC)         Patient Instructions     Patient Instructions   Please get blood work done today  Keep appointment with Endocrinology we made for you this coming week  Can take Sudafed OTC for ear fullness and congestion          Chief Complaint     Chief Complaint   Patient presents with   • Follow-up         History of Present Illness       Not feeling well, feeling fatigued  Reports that her iron is low and she has been dealing with that by increasing food with iron  Declines referral to Hematology  Did not take her blood sugar today because she did not eat and is not hungry  In office, her machine says "high" which pt reports means her blood sugar is >400  Pt did have an appointment with Endocrinologist, but had to cancel follow up because she was fatigued, having numbness in her legs, difficulty walking  She did not call to reschedule because she did not have her number  New referral to Endocrinology placed  Upon reviewing the blood sugars from her device, her blood sugars have been > 370 for the last week and a half  Pt reports she is compliant with her DM medication, has cut out sugary drinks like soda, and has been drinking a lot of water  Mild pressure in her ears- declines nasal spray, discussed OTC Sudafed  Review of Systems   Review of Systems   Constitutional: Positive for activity change and fatigue  Negative for diaphoresis and fever  HENT: Positive for ear pain (fullness)  Negative for congestion, facial swelling, hearing loss, rhinorrhea, sinus pressure, sinus pain, sneezing, sore throat and voice change  Eyes: Negative for discharge and visual disturbance  Respiratory: Negative for cough, choking, chest tightness, shortness of breath, wheezing and stridor  Cardiovascular: Negative for chest pain, palpitations and leg swelling  Gastrointestinal: Negative for abdominal distention, abdominal pain, constipation, diarrhea, nausea and vomiting  Endocrine: Negative for polydipsia, polyphagia and polyuria  Genitourinary: Negative for difficulty urinating, dysuria, frequency and urgency  Musculoskeletal: Negative for arthralgias, back pain, gait problem, joint swelling, myalgias, neck pain and neck stiffness  Skin: Negative for color change, rash and wound  Neurological: Negative for dizziness, syncope, speech difficulty, weakness, light-headedness and headaches  Hematological: Negative for adenopathy  Does not bruise/bleed easily  Psychiatric/Behavioral: Negative for agitation, behavioral problems, confusion, hallucinations, sleep disturbance and suicidal ideas  The patient is not nervous/anxious            Current Medications       Current Outpatient Medications:   •  albuterol (ProAir HFA) 90 mcg/act inhaler, Inhale 1 puff every 6 (six) hours as needed for wheezing or shortness of breath, Disp: 18 g, Rfl: 3  •  Aspirin (ASPIR-81 PO), Take 81 mg by mouth, Disp: , Rfl:   •  atorvastatin (LIPITOR) 80 mg tablet, Take 1 tablet (80 mg total) by mouth daily at bedtime, Disp: 30 tablet, Rfl: 6  •  B-D UF III MINI PEN NEEDLES 31G X 5 MM MISC, Pt uses 5 pen needles daily, Disp: 450 each, Rfl: 0  •  cholecalciferol (VITAMIN D3) 400 units tablet, TAKE ONE TABLET BY MOUTH DAILY, Disp: 90 tablet, Rfl: 1  •  citalopram (CeleXA) 40 mg tablet, TAKE ONE TABLET BY MOUTH DAILY, Disp: 90 tablet, Rfl: 1  •  docusate sodium (COLACE) 100 mg capsule, TAKE ONE CAPSULE BY MOUTH TWICE DAILY, Disp: 30 capsule, Rfl: 0  •  Empagliflozin (Jardiance) 25 MG TABS, Take 1 tablet (25 mg total) by mouth every morning, Disp: 90 tablet, Rfl: 1  •  famotidine (PEPCID) 40 MG tablet, Take 1 tablet (40 mg total) by mouth daily at bedtime Take in evening 2 hours prior to bed, Disp: 30 tablet, Rfl: 6  •  fenofibrate (TRICOR) 48 mg tablet, Take 1 tablet (48 mg total) by mouth daily, Disp: 90 tablet, Rfl: 3  •  glucose blood (OneTouch Verio) test strip, Use to test blood glucose 4 times a day, Disp: 200 strip, Rfl: 2  •  insulin glulisine (Apidra SoloStar) 100 units/mL injection pen, INJECT 10 UNITS BEFORE BREAKFAST, INJECT 20 UNITS PRIOR TO LUNCH AND DINNER, Disp: 30 mL, Rfl: 1  •  Iron Heme Polypeptide 12 MG TABS, Take 1 tablet by mouth, Disp: , Rfl:   •  Lantus SoloStar 100 units/mL SOPN, INJECT 45 UNITS EVERY 12 HOURS, Disp: 30 mL, Rfl: 1  •  linaCLOtide (Linzess) 72 MCG CAPS, Take 1 capsule by mouth daily before breakfast, Disp: 30 capsule, Rfl: 0  •  lisinopril (ZESTRIL) 10 mg tablet, TAKE ONE TABLET BY MOUTH DAILY, Disp: 90 tablet, Rfl: 1  •  metFORMIN (GLUCOPHAGE) 1000 MG tablet, Take 1 tablet (1,000 mg total) by mouth 2 (two) times a day with meals, Disp: 180 tablet, Rfl: 1  •  metoclopramide (REGLAN) 10 mg tablet, Take 1 tablet (10 mg total) by mouth daily, Disp: 90 tablet, Rfl: 3  •  metoprolol tartrate (LOPRESSOR) 25 mg tablet, TAKE 1 TABLET (25 MG TOTAL) BY MOUTH 2 (TWO) TIMES A DAY, Disp: 180 tablet, Rfl: 1  •  ondansetron (ZOFRAN) 4 mg tablet, Take 1 tablet (4 mg total) by mouth every 8 (eight) hours as needed for nausea or vomiting, Disp: 20 tablet, Rfl: 0  •  ondansetron (ZOFRAN-ODT) 4 mg disintegrating tablet, Take 1 tablet (4 mg total) by mouth every 6 (six) hours as needed for nausea or vomiting, Disp: 20 tablet, Rfl: 0  •  Ozempic, 1 MG/DOSE, 4 MG/3ML SOPN injection pen, INJECT 1 MG TOTAL UNDER THE SKIN ONCE A WEEK, Disp: 9 mL, Rfl: 0  •  pantoprazole (PROTONIX) 40 mg tablet, Take 1 tablet (40 mg total) by mouth 2 (two) times a day, Disp: 180 tablet, Rfl: 3  •  pregabalin (LYRICA) 100 mg capsule, Take 1 capsule (100 mg total) by mouth 3 (three) times a day, Disp: 270 capsule, Rfl: 3  •  scopolamine (TRANSDERM-SCOP) 1 mg/3 days TD 72 hr patch, Place 1 patch on the skin every third day, Disp: 24 patch, Rfl: 1  •  solifenacin (VESICARE) 5 mg tablet, TAKE ONE TABLET BY MOUTH DAILY, Disp: 90 tablet, Rfl: 1  •  BANOPHEN 50 MG capsule, take 1 capsule by mouth every 6 hours if needed for itching, Disp: , Rfl:   •  benzonatate (TESSALON) 200 MG capsule, Take 1 capsule (200 mg total) by mouth 3 (three) times a day as needed for cough, Disp: 60 capsule, Rfl: 1  •  Continuous Blood Gluc  (FreeStyle Refugio 14 Day [place]) [facility], Use for continuous blood glucose monitoring, Disp: 1 each, Rfl: 0  •  Continuous Blood Gluc Sensor (FreeStyle Refugio 14 Day Sensor) MISC, Use one sensor every 14 days for continuous blood sugar monitoring , Disp: 6 each, Rfl: 3  •  levETIRAcetam (KEPPRA) 500 mg tablet, TAKE ONE TABLET TWICE DAILY, Disp: 180 tablet, Rfl: 1  •  neomycin-polymyxin-hydrocortisone (CORTISPORIN) otic solution, Administer 4 drops to the right ear every 6 (six) hours, Disp: 10 mL, Rfl: 0    Current Allergies     Allergies as of 10/24/2022 - Reviewed 10/24/2022   Allergen Reaction Noted   • Azithromycin GI Intolerance and Hives 07/13/2012   • Benztropine  08/09/2016   • Butorphanol Hallucinations 08/09/2016   • Penicillins  11/12/2015   • Zolpidem  11/12/2015            The following portions of the patient's history were reviewed and updated as appropriate: allergies, current medications, past family history, past medical history, past social history, past surgical history and problem list      Past Medical History:   Diagnosis Date   • Atypical chest pain    • Depression    • Diabetes mellitus ([address] )    • Gastroparesis    • GERD (gastroesophageal reflux disease)    • Hyperlipidemia    • Hypertension    • Sciatica    • Seizure disorder [facility])        Past Surgical History:   Procedure Laterality Date   • BREAST BIOPSY Left  benign   • CHOLECYSTECTOMY     • COLONOSCOPY     • HYSTERECTOMY     • ND LAP,APPENDECTOMY N/A 3/24/2021    Procedure: APPENDECTOMY LAPAROSCOPIC;  Surgeon: Sofi Bryant MD;  Location: [facility] MAIN OR;  Service: General   • ND LAP,DIAGNOSTIC ABDOMEN N/A 3/24/2021    Procedure: LAPAROSCOPY DIAGNOSTIC, EXTENSIVE DESI;  Surgeon: Sofi Bryant MD;  Location: [facility] MAIN OR;  Service: General   • UPPER GASTROINTESTINAL ENDOSCOPY         Family History   Problem Relation Age of Onset   • No Known Problems Mother    • No Known Problems Father    • No Known Problems Daughter    • No Known Problems Maternal Grandmother    • No Known Problems Paternal Grandmother    • No Known Problems Paternal Aunt    • No Known Problems Paternal Aunt    • No Known Problems Paternal Aunt          Medications have been verified  Objective   /64   Pulse 67   Temp 98 °F (36 7 °C) (Tympanic)   Resp 20   Ht 5' 1" (1 549 m)   Wt 88 9 kg (196 lb)   SpO2 99%   BMI 37 03 kg/m²        Physical Exam     Physical Exam  Vitals and nursing note reviewed  Constitutional:       General: She is not in acute distress  Appearance: She is well-developed  She is obese  She is ill-appearing  She is not diaphoretic  HENT:      Right Ear: Ear canal and external ear normal  A middle ear effusion is present  There is no impacted cerumen  Tympanic membrane is not erythematous  Left Ear: Ear canal and external ear normal  A middle ear effusion is present  There is no impacted cerumen  Tympanic membrane is not erythematous        Ears:      Comments: Fluid noted behind left ear drum, no erythema noted  Neck:      Thyroid: No thyromegaly  Trachea: No tracheal deviation  Cardiovascular:      Rate and Rhythm: Normal rate and regular rhythm  Heart sounds: Normal heart sounds  Pulmonary:      Effort: Pulmonary effort is normal  No respiratory distress  Breath sounds: Normal breath sounds  No wheezing  Musculoskeletal:         General: No tenderness or deformity  Normal range of motion  Cervical back: Normal range of motion and neck supple  Skin:     General: Skin is warm and dry  Findings: No erythema or rash  Neurological:      Mental Status: She is alert and oriented to person, place, and time  Psychiatric:         Attention and Perception: Attention normal          Mood and Affect: Affect is flat  Speech: Speech normal          Behavior: Behavior normal  Behavior is cooperative  Thought Content:  Thought content normal          Cognition and Memory: Cognition and memory normal          Judgment: Judgment normal

## 2022-10-25 NOTE — ASSESSMENT & PLAN NOTE
Lab Results   Component Value Date    HGBA1C 13 3 (H) 10/24/2022   · Although was initially doing well the month following her last visit, BG trends have significantly worsened the past month, > 400 at times  · Continues to have severely uncontrolled DM with latest A1c > 13  · Likely due to treatment non-compliance, understandably due to fear of hypoglycemic events  Insulin compliance is only about 50%, but varies; difficult to predict her TDD of basal / bolus needs  · We reviewed importance of BG control to limit complications related to diabetes  Reviewed A1c goal is < 7, BG ideally between   Short term, consistently < 200 is acceptable  · Diet - diabetic education recommended again  Patient would like to hold off for now (busy schedule)  I did provide some basic nutrition advice - balanced meals at regular intervals, limiting added sugars  ·   Pharmacotherapy:   · For pharmacotherapy changes, particularly with insulin, I used shared decision making with the patient to formulate a plan we are both comfortable with, with the goal of 100% compliance  · For basal insulin, would decrease Lantus to 40 units ONCE daily in effort to increase compliance  · Will decrease Apidra from 10-20-20 to goal of 10-10-10  She felt 20 units would at times drop her BG too quickly, leading to symptoms  She understands it's ok to hold or reduce the insulin dose if appetite is poor, not eating a lot  · Attempted to prescribe Ozempic recently at 1mg dose, but unfortunately not covered, so she has been off GLP1  She did tolerate it well, and did experience weight loss as well as improvement in BG trends  Will send in Rx for Trulicity 4 09SW weekly x 1 month, with plans to further increase if well tolerated  · Already on SGLT2i, - Jardiance 25mg daily, ok to continue this  · STOP metformin for now due to worsening creatinine clearance / GFR      Follow up / monitoring plan:  · Data monitoring - encouraged patient to scan Kartik Jimenez very frequently to capture more accurate trends and capture more data throughout the day  · Food diary / log - encouraged  · Patient to call us on Friday 10/28 to review BG trends, with plans to continue weekly check ins and further adjustment in treatment plan as needed  · She will notify us as needed for extreme hyperglycemia or hypoglycemia  · Reviewed signs / symptoms to look for that would warrant ER evaluation, particularly extreme fatigue or weakness, inability to tolerate PO intake or with vomiting  · Obtain repeat CMP in 1 week  · Follow up in office in 1 month

## 2022-10-25 NOTE — PROGRESS NOTES
"Chief Complaint   Patient presents with     Prenatal Care     20 week 3 day     Pt presents to clinic today for prenatal care 20 week 3 day. Patient has no other concerns.   Initial /70   Pulse 84   Wt 94.8 kg (209 lb)   LMP 05/26/2022   BMI 33.73 kg/m   Estimated body mass index is 33.73 kg/m  as calculated from the following:    Height as of 7/21/22: 1.676 m (5' 6\").    Weight as of this encounter: 94.8 kg (209 lb).  Medication Reconciliation: complete          Anyi Doan, TIFFANIE  " Cardiology Follow Up    Prabhakar Nagel  [id number]  1970  [facility] [address] [address] [phone]      1  Atypical chest pain     2  Hypercholesteremia  atorvastatin (LIPITOR) 80 mg tablet   3  Primary hypertension     4  Class 2 obesity due to excess calories with body mass index (BMI) of 37 0 to 37 9 in adult, unspecified whether serious comorbidity present         Discussion/Summary:  1  Chest pain-improved  2  Hypertension  3  Hyperlipidemia  4  Obesity  5  History of CVA    -transthoracic echocardiogram 05/25/2022 showing left ventricular systolic function normal estimated LVEF 65% with trace mitral regurgitation and normal right ventricular systolic function  -nuclear stress test 06/30/2022 showed no diagnostic evidence of ischemia on perfusion imaging however artifact was present with LVEF 75%  -lipid panel 08/30/2022 showing total cholesterol 176, triglyceride 243, HDL 49, LDL 78  -as patient is still above LDL goal will increase atorvastatin 80 mg daily and continue fenofibrate 40 mg daily  If and next check LDL still above goal may need to initiate Zetia at that time  -patient will continue aspirin 81 mg daily  -patient will continue lisinopril 10 mg daily, metoprolol tartrate 25 mg twice daily  -patient will continue to monitor home blood pressure readings and if blood pressures maintained greater than 130/80 mmHg will need to up titrate medical therapy at that time  -counseled patient on dietary and lifestyle modifications including following a low-salt, low-fat, heart healthy diet with weight loss and continued physical activity as tolerated  -I will see patient in 6 months or sooner if necessary  -patient counseled if she were to have any warning or alarm type symptoms she is to seek emergency medical care immediately      History of Present Illness:  - patient is a 80-year-old female with hypertension, CKD, history of renal cell carcinoma and right kidney s/p resection 2017, diabetes mellitus, history of stroke in 1990, obesity who initially presented to the office in April of 2022 after being seen in the emergency department on March 25, 2022 for chest pain  The patient noted at that time the pain woke her up from sleep about an hour prior to arrival in the ER, which sharp in nature, radiated to her back lasting approximately 5 minutes in duration then resolved on its own as she had been having some symptoms a couple times since the ER visit she underwent testing and evaluation   -since last office visit patient denies any chest pain, palpitations, lightheadedness or dizziness, loss of consciousness or shortness of breath  She notes at home blood pressures are well controlled except for a couple times when she has been stressed however this is short-lived after she calms down blood pressure returns to within normal range      Patient Active Problem List   Diagnosis    Radiculopathy, lumbar region    Anterior tibial tendonitis, right    Bipolar disorder ([address] )    Chronic low back pain with sciatica    Type 2 diabetes mellitus with neurologic complication ([address] )    Diabetic polyneuropathy associated with type 2 diabetes mellitus ([address] )    Gastroparesis    Hypertension associated with diabetes ([address] )    Spondylolisthesis of lumbar region    Class 1 obesity due to excess calories with serious comorbidity and body mass index (BMI) of 34 0 to 34 9 in adult    Clear cell carcinoma of kidney, right (HCC)    Acute right lower quadrant pain    Hypotension    Internal hernia    Intra-abdominal adhesions    Depression with anxiety    Mild intermittent asthma without complication    Hypercholesteremia    Hypertriglyceridemia    Iron deficiency anemia secondary to inadequate dietary iron intake    Encounter for post surgical wound check    GERD (gastroesophageal reflux disease)    History of colon polyps    Slow transit constipation    Anemia, unspecified    History of kidney cancer    Vitamin D deficiency    Stage 3a chronic kidney disease ([place] [address] )    Secondary hyperparathyroidism ([place] [address] )    Atypical chest pain     Past Medical History:   Diagnosis Date    Atypical chest pain     Depression     Diabetes mellitus ([place] [address] )     Gastroparesis     GERD (gastroesophageal reflux disease)     Hyperlipidemia     Hypertension     Sciatica     Seizure disorder ([place] [address] )      Social History     Socioeconomic History    Marital status: /Civil Union     Spouse name: Not on file    Number of children: Not on file    Years of education: Not on file    Highest education level: Not on file   Occupational History    Not on file   Tobacco Use    Smoking status: Former Smoker     Quit date:      Years since quittin 7    Smokeless tobacco: Never Used   Vaping Use    Vaping Use: Never used   Substance and Sexual Activity    Alcohol use: Never    Drug use: Never    Sexual activity: Not Currently     Partners: Male   Other Topics Concern    Not on file   Social History Narrative    Not on file     Social Determinants of Health     Financial Resource Strain: Not on file   Food Insecurity: Not on file   Transportation Needs: Not on file   Physical Activity: Not on file   Stress: Not on file   Social Connections: Not on file   Intimate Partner Violence: Not on file   Housing Stability: Not on file      Family History   Problem Relation Age of Onset    No Known Problems Mother     No Known Problems Father     No Known Problems Daughter     No Known Problems Maternal Grandmother     No Known Problems Paternal Grandmother     No Known Problems Paternal Aunt     No Known Problems Paternal Aunt     No Known Problems Paternal Aunt      Past Surgical History:   Procedure Laterality Date    BREAST BIOPSY Left  benign    CHOLECYSTECTOMY      COLONOSCOPY      HYSTERECTOMY      [place] LAP,APPENDECTOMY N/A 3/24/2021    Procedure: APPENDECTOMY LAPAROSCOPIC;  Surgeon: Luz Marina Sotelo MD;  Location: [facility] MAIN OR;  Service: General    [place] LAP,DIAGNOSTIC ABDOMEN N/A 3/24/2021    Procedure: LAPAROSCOPY DIAGNOSTIC, EXTENSIVE DESI;  Surgeon: Luz Marina Sotelo MD;  Location: [facility] MAIN OR;  Service: General    UPPER GASTROINTESTINAL ENDOSCOPY         Current Outpatient Medications:     albuterol (ProAir HFA) 90 mcg/act inhaler, Inhale 1 puff every 6 (six) hours as needed for wheezing or shortness of breath, Disp: 18 g, Rfl: 3    Apidra SoloStar 100 units/mL injection pen, INJECT 28 UNITS BEFORE BREAKFAST, INJECT 34 UNITS PRIOR TO DINNER (Patient taking differently: 20 Units 3 (three) times a day with meals I), Disp: 30 mL, Rfl: 1    Aspirin (ASPIR-81 PO), Take 81 mg by mouth, Disp: , Rfl:     atorvastatin (LIPITOR) 80 mg tablet, Take 1 tablet (80 mg total) by mouth daily at bedtime, Disp: 30 tablet, Rfl: 6    B-D UF III MINI PEN NEEDLES 31G X 5 MM MISC, , Disp: , Rfl:     BANOPHEN 50 MG capsule, take 1 capsule by mouth every 6 hours if needed for itching, Disp: , Rfl:     benzonatate (TESSALON) 200 MG capsule, Take 1 capsule (200 mg total) by mouth 3 (three) times a day as needed for cough, Disp: 60 capsule, Rfl: 1    cholecalciferol (VITAMIN D3) 400 units tablet, TAKE ONE TABLET BY MOUTH DAILY, Disp: 90 tablet, Rfl: 1    citalopram (CeleXA) 40 mg tablet, Take 1 tablet (40 mg total) by mouth daily, Disp: 90 tablet, Rfl: 1    Continuous Blood Gluc  (FreeStyle Refugio 14 Day Camilla) TEMITOPE, Use for continuous blood glucose monitoring, Disp: 1 each, Rfl: 0    Continuous Blood Gluc Sensor (FreeStyle Refugio 14 Day Sensor) MISC, Use one sensor every 14 days for continuous blood sugar monitoring , Disp: 6 each, Rfl: 3    docusate sodium (COLACE) 100 mg capsule, TAKE ONE CAPSULE BY MOUTH TWICE DAILY, Disp: 30 capsule, Rfl: 0    Empagliflozin (Jardiance) 25 MG TABS, Take 1 tablet (25 mg total) by mouth every morning, Disp: 90 tablet, Rfl: 1    famotidine (PEPCID) 40 MG tablet, Take 1 tablet (40 mg total) by mouth daily at bedtime Take in evening 2 hours prior to bed, Disp: 30 tablet, Rfl: 6    fenofibrate (TRICOR) 48 mg tablet, Take 1 tablet (48 mg total) by mouth daily, Disp: 90 tablet, Rfl: 3    glucose blood (OneTouch Verio) test strip, Use to test blood glucose 4 times a day, Disp: 200 strip, Rfl: 2    Lantus SoloStar 100 units/mL SOPN, INJECT 45 UNITS EVERY 12 HOURS, Disp: 30 mL, Rfl: 1    levETIRAcetam (KEPPRA) 500 mg tablet, Take 1 tablet (500 mg total) by mouth every 12 (twelve) hours, Disp: 180 tablet, Rfl: 1    linaCLOtide (Linzess) 72 MCG CAPS, Take 1 capsule by mouth daily before breakfast, Disp: 30 capsule, Rfl: 0    lisinopril (ZESTRIL) 10 mg tablet, TAKE ONE TABLET BY MOUTH DAILY, Disp: 90 tablet, Rfl: 1    metFORMIN (GLUCOPHAGE) 1000 MG tablet, Take 1 tablet (1,000 mg total) by mouth 2 (two) times a day with meals, Disp: 180 tablet, Rfl: 1    metoclopramide (REGLAN) 10 mg tablet, Take 1 tablet (10 mg total) by mouth daily, Disp: 90 tablet, Rfl: 3    metoprolol tartrate (LOPRESSOR) 25 mg tablet, TAKE 1 TABLET (25 MG TOTAL) BY MOUTH 2 (TWO) TIMES A DAY, Disp: 180 tablet, Rfl: 1    neomycin-polymyxin-hydrocortisone (CORTISPORIN) otic solution, Administer 4 drops to the right ear every 6 (six) hours, Disp: 10 mL, Rfl: 0    ondansetron (ZOFRAN) 4 mg tablet, Take 1 tablet (4 mg total) by mouth every 8 (eight) hours as needed for nausea or vomiting, Disp: 20 tablet, Rfl: 0    ondansetron (ZOFRAN-ODT) 4 mg disintegrating tablet, Take 1 tablet (4 mg total) by mouth every 6 (six) hours as needed for nausea or vomiting, Disp: 20 tablet, Rfl: 0    Ozempic, 1 MG/DOSE, 4 MG/3ML SOPN injection pen, INJECT 1 MG TOTAL UNDER THE SKIN ONCE A WEEK, Disp: 9 mL, Rfl: 0    pantoprazole (PROTONIX) 40 mg tablet, Take 1 tablet (40 mg total) by mouth 2 (two) times a day, Disp: 180 tablet, Rfl: 3    pregabalin (LYRICA) 100 mg capsule, Take 1 capsule (100 mg total) by mouth 3 (three) times a day, Disp: 270 capsule, Rfl: 3    solifenacin (VESIcare) 5 mg tablet, Take 1 tablet (5 mg total) by mouth daily, Disp: 90 tablet, Rfl: 1  Allergies   Allergen Reactions    Azithromycin GI Intolerance and Hives    Benztropine     Butorphanol Hallucinations    Penicillins     Zolpidem          Labs:  Appointment on 08/30/2022   Component Date Value    Sodium 08/30/2022 138     Potassium 08/30/2022 4 6     Chloride 08/30/2022 107     CO2 08/30/2022 26     ANION GAP 08/30/2022 5     BUN 08/30/2022 30 (A)    Creatinine 08/30/2022 1 33 (A)    Glucose, Fasting 08/30/2022 182 (A)    Calcium 08/30/2022 9 1     Corrected Calcium 08/30/2022 9 9     AST 08/30/2022 22     ALT 08/30/2022 35     Alkaline Phosphatase 08/30/2022 74     Total Protein 08/30/2022 6 3 (A)    Albumin 08/30/2022 3 0 (A)    Total Bilirubin 08/30/2022 0 18 (A)    eGFR 08/30/2022 46     PTH 08/30/2022 37 0     Hemoglobin A1C 08/30/2022 13 9 (A)    EAG 08/30/2022 352     Cholesterol 08/30/2022 176     Triglycerides 08/30/2022 243 (A)    HDL, Direct 08/30/2022 49 (A)    LDL Calculated 08/30/2022 78     Non-HDL-Chol (CHOL-HDL) 08/30/2022 127     C-Peptide 08/30/2022 4 3     WBC 08/30/2022 8 26     RBC 08/30/2022 4 32     Hemoglobin 08/30/2022 11 3 (A)    Hematocrit 08/30/2022 36 7     MCV 08/30/2022 85     MCH 08/30/2022 26 2 (A)    MCHC 08/30/2022 30 8 (A)    RDW 08/30/2022 13 9     MPV 08/30/2022 11 3     Platelets 96/09/7161 368     nRBC 08/30/2022 0     Neutrophils Relative 08/30/2022 43     Immat GRANS % 08/30/2022 1     Lymphocytes Relative 08/30/2022 43     Monocytes Relative 08/30/2022 9     Eosinophils Relative 08/30/2022 3     Basophils Relative 08/30/2022 1     Neutrophils Absolute 08/30/2022 3 67     Immature Grans Absolute 08/30/2022 0 04     Lymphocytes Absolute 08/30/2022 3 54     Monocytes Absolute 08/30/2022 0 74     Eosinophils Absolute 08/30/2022 0 21     Basophils Absolute 08/30/2022 0 06     Vit D, 25-Hydroxy 08/30/2022 34 2     Iron Saturation 08/30/2022 13 (A)    TIBC 08/30/2022 328     Iron 08/30/2022 44 (A)    Ferritin 08/30/2022 28         Imaging: No results found  Review of Systems:  Review of Systems   Constitutional: Negative for chills, diaphoresis, fatigue and fever  HENT: Negative for trouble swallowing and voice change  Eyes: Negative for pain and redness  Respiratory: Negative for shortness of breath and wheezing  Cardiovascular: Negative for chest pain, palpitations and leg swelling  Gastrointestinal: Negative for abdominal pain, constipation, diarrhea, nausea and vomiting  Genitourinary: Negative for dysuria  Musculoskeletal: Negative for neck pain and neck stiffness  Skin: Negative for rash  Neurological: Negative for dizziness, syncope, light-headedness and headaches  Psychiatric/Behavioral: Negative for agitation and confusion  All other systems reviewed and are negative  Vitals:    09/07/22 1247   BP: 126/72   BP Location: Right arm   Patient Position: Sitting   Cuff Size: Standard   Pulse: 67   Temp: (!) 96 6 °F (35 9 °C)   TempSrc: Tympanic   SpO2: 98%   Weight: 89 7 kg (197 lb 12 8 oz)   Height: 5' 1" (1 549 m)     Vitals:    09/07/22 1247   Weight: 89 7 kg (197 lb 12 8 oz)     Height: 5' 1" (154 9 cm)     Physical Exam:  Physical Exam  Vitals reviewed  Constitutional:       General: She is not in acute distress  Appearance: She is obese  She is not diaphoretic  HENT:      Head: Normocephalic and atraumatic  Eyes:      General:         Right eye: No discharge  Left eye: No discharge  Neck:      Comments: Trachea midline, no JVD present  Cardiovascular:      Rate and Rhythm: Normal rate and regular rhythm  Heart sounds: No friction rub     Pulmonary:      Effort: No respiratory distress  Breath sounds: No wheezing or rhonchi  Chest:      Chest wall: No tenderness  Abdominal:      General: Bowel sounds are normal       Palpations: Abdomen is soft  Tenderness: There is no abdominal tenderness  There is no rebound  Musculoskeletal:      Right lower leg: No edema  Left lower leg: No edema  Skin:     General: Skin is warm and dry  Neurological:      Mental Status: She is alert  Comments: Awake, alert, able to answer questions appropriately, able move extremities bilaterally     Psychiatric:         Mood and Affect: Mood normal          Behavior: Behavior normal

## 2022-10-25 NOTE — PROGRESS NOTES
Evangelista Patino 46 y o  female MRN: [id number]    Encounter: [id number]      Assessment/Plan    Overall, this is a 46year old uncontrolled type 2 diabetic that is having worsening hyperglycemia likely due to treatment noncompliance, with fatigue from severe hyperglycemia, but also in the setting of mild LINDY, borderline hypotension, as well as mild anemia, vitamin D deficiency  Problem List Items Addressed This Visit        Endocrine    Type 2 diabetes mellitus with hyperglycemia, with long-term current use of insulin ([facility] [address] ) - Primary       Lab Results   Component Value Date    HGBA1C 13 3 (H) 10/24/2022 ·   Although was initially doing well the month following her last visit, BG trends have significantly worsened the past month, > 400 at times  · Continues to have severely uncontrolled DM with latest A1c > 13  · Likely due to treatment non-compliance, understandably due to fear of hypoglycemic events  Insulin compliance is only about 50%, but varies; difficult to predict her TDD of basal / bolus needs  · We reviewed importance of BG control to limit complications related to diabetes  Reviewed A1c goal is < 7, BG ideally between   Short term, consistently < 200 is acceptable  · Diet - diabetic education recommended again  Patient would like to hold off for now (busy schedule)  I did provide some basic nutrition advice - balanced meals at regular intervals, limiting added sugars  ·   Pharmacotherapy:   · For pharmacotherapy changes, particularly with insulin, I used shared decision making with the patient to formulate a plan we are both comfortable with, with the goal of 100% compliance  · For basal insulin, would decrease Lantus to 40 units ONCE daily in effort to increase compliance  · Will decrease Apidra from 10-20-20 to goal of 10-10-10  She felt 20 units would at times drop her BG too quickly, leading to symptoms   She understands it's ok to hold or reduce the insulin dose if appetite is poor, not eating a lot  · Attempted to prescribe Ozempic recently at 1mg dose, but unfortunately not covered, so she has been off GLP1  She did tolerate it well, and did experience weight loss as well as improvement in BG trends  Will send in Rx for Trulicity 4 74EJ weekly x 1 month, with plans to further increase if well tolerated  · Already on SGLT2i, - Jardiance 25mg daily, ok to continue this  · STOP metformin for now due to worsening creatinine clearance / GFR  Follow up / monitoring plan:  · Data monitoring - encouraged patient to scan Chris Genei very frequently to capture more accurate trends and capture more data throughout the day  · Food diary / log - encouraged  · Patient to call us on Friday 10/28 to review BG trends, with plans to continue weekly check ins and further adjustment in treatment plan as needed  · She will notify us as needed for extreme hyperglycemia or hypoglycemia  · Reviewed signs / symptoms to look for that would warrant ER evaluation, particularly extreme fatigue or weakness, inability to tolerate PO intake or with vomiting  · Obtain repeat CMP in 1 week  · Follow up in office in 1 month  Relevant Medications    ergocalciferol (VITAMIN D2) 50,000 units    Dulaglutide (Trulicity) 1 5 KO/4 6UF SOPN    Other Relevant Orders    Comprehensive metabolic panel Lab Collect    TSH, 3rd generation with Free T4 reflex       Cardiovascular and Mediastinum    Hypotension     · Borderline low BP with some symptoms concerning for orthostatic hypotension  · Would HOLD ACE-I for now, especially in the setting of mild LINDY / worsening CKD  · Repeat CMP in 1 week           Benign hypertension with CKD (chronic kidney disease) stage III [facility])     Lab Results   Component Value Date    EGFR 38 10/24/2022    EGFR 38 09/12/2022    EGFR 46 08/30/2022    CREATININE 1 54 (H) 10/24/2022    CREATININE 1 55 (H) 09/12/2022    CREATININE 1 33 (H) 08/30/2022 ·   Suspect mild LINDY vs  Recently worsening CKD  Baseline Crt was 1 1 earlier this year, now increased to 1 5 on recent labs with hyponatremia, likely in the setting of dehydration due to polyuria from hyperglycemia  No anion gap on recent labs  · Suspect once BG is corrected, LINDY will resolve  · Tolerating PO intake well, encouraged her to increase fluid intake  · Would also hold ACE-I for now, especially with borderline hypotension  · Hold metformin for now due to CrCl of < 40, with consideration to restart in future pending improvement in renal function  · Repeat CMP in 1 week  Other    Vitamin D deficiency     · Recurrent issue  Likely contributing to fatigue  · Will start Vitamin D 50,000 units weekly for 8 weeks and reassess vitamin D level at that time  Relevant Medications    ergocalciferol (VITAMIN D2) 50,000 units      Other Visit Diagnoses     Type 2 diabetes mellitus with diabetic autonomic neuropathy, with long-term current use of insulin (HCC)        Relevant Medications    Dulaglutide (Trulicity) 1 5 EQ/3 0BZ SOPN    Other fatigue        Relevant Orders    TSH, 3rd generation with Free T4 reflex          CC: Diabetes  History of Present Illness     HPI:  Patient is a 46year old female here for short interval follow up that was coordinated by her PCP due to persistent and worsening hyperglycemia (> 400 at times), weakness and fatigue  Patient has had worsening fatigue over the past month, and especially this past week  She also reports arthralgias, particularly in the lower extremities  Increased abdominal pain after eating as well without nausea or vomiting  She feels "lousy" overall, sleeping a lot  She is very thirsty and urinating a lot; she feels dehydrated  She gets dizzy frequently, particularly with position changes from sitting to standing  She recently went on a 2 week cruise, and reports things were going well initially   She had recently lost almost 20lbs since starting Ozempic, and blood glucose levels were improving - getting more values in the 200 range vs  300s as previously  However, while on the cruise she experienced a symptomatic hypoglycemic event, BG in the 60s  She felt very ill and weak for almost a full day  Since that time, she has been very fearful of taking insulin as prescribed  Art Whiteside admits that she is actually only taking her prescribed insulin about 50% of the time this past month  She is currently prescribed Apidra TID AC at 10-20-20, as well as Lantus 47 units BID  She usually only takes Apidra twice a day, skipping lunchtime dose, and at times she reduces the 20 unit dose to 10 units  She is also particularly hesitant to take Lantus, only taking it 45 units once a day, and sometimes skipping days altogether  She also started Ozempic within the past few months, although this was stopped approximately one month ago  It appears the attempt at increasing from 0 5mg weekly dose to 1mg weekly dose was denied by her insurance  She has experienced rebound weight gain of about 20lbs  Cleven Ege is also prescribed at 25mg daily, metformin 1000mg twice daily  Refugio CGM is active only 31% of the time  Average BG is 318  Further detailed analysis is below  Review of Systems   Constitutional: Positive for fatigue  Negative for chills and fever  HENT: Negative for ear pain and sore throat  Eyes: Negative for pain and visual disturbance  Respiratory: Negative for cough and shortness of breath  Cardiovascular: Negative for chest pain and palpitations  Gastrointestinal: Positive for abdominal pain and constipation  Negative for diarrhea, nausea and vomiting  Endocrine: Positive for polydipsia and polyuria  Genitourinary: Negative for dysuria and hematuria  Musculoskeletal: Positive for arthralgias  Negative for back pain  Skin: Negative for color change and rash  Neurological: Positive for dizziness and light-headedness  Negative for seizures and syncope  All other systems reviewed and are negative        Historical Information   Past Medical History:   Diagnosis Date   • Atypical chest pain    • Depression    • Diabetes mellitus ([facility] [address] )    • Gastroparesis    • GERD (gastroesophageal reflux disease)    • Hyperlipidemia    • Hypertension    • Sciatica    • Seizure disorder [facility])      Past Surgical History:   Procedure Laterality Date   • BREAST BIOPSY Left  benign   • CHOLECYSTECTOMY     • COLONOSCOPY     • HYSTERECTOMY     • [place] LAP,APPENDECTOMY N/A 3/24/2021    Procedure: APPENDECTOMY LAPAROSCOPIC;  Surgeon: Nadine Domínguez MD;  Location: [facility] MAIN OR;  Service: General   • [place] LAP,DIAGNOSTIC ABDOMEN N/A 3/24/2021    Procedure: LAPAROSCOPY DIAGNOSTIC, EXTENSIVE DESI;  Surgeon: Nadine Domínguez MD;  Location: [facility] MAIN OR;  Service: General   • UPPER GASTROINTESTINAL ENDOSCOPY       Social History   Social History     Substance and Sexual Activity   Alcohol Use Never     Social History     Substance and Sexual Activity   Drug Use Never     Social History     Tobacco Use   Smoking Status Former Smoker   • Quit date:    • Years since quittin 8   Smokeless Tobacco Never Used     Family History:   Family History   Problem Relation Age of Onset   • No Known Problems Mother    • No Known Problems Father    • No Known Problems Daughter    • No Known Problems Maternal Grandmother    • No Known Problems Paternal Grandmother    • No Known Problems Paternal Aunt    • No Known Problems Paternal Aunt    • No Known Problems Paternal Aunt        Meds/Allergies   Current Outpatient Medications   Medication Sig Dispense Refill   • albuterol (ProAir HFA) 90 mcg/act inhaler Inhale 1 puff every 6 (six) hours as needed for wheezing or shortness of breath 18 g 3   • Aspirin (ASPIR-81 PO) Take 81 mg by mouth     • atorvastatin (LIPITOR) 80 mg tablet Take 1 tablet (80 mg total) by mouth daily at bedtime 30 tablet 6   • B-D UF III MINI PEN NEEDLES 31G X 5 MM MISC Pt uses 5 pen needles daily 450 each 0   • BANOPHEN 50 MG capsule take 1 capsule by mouth every 6 hours if needed for itching     • benzonatate (TESSALON) 200 MG capsule Take 1 capsule (200 mg total) by mouth 3 (three) times a day as needed for cough 60 capsule 1   • cholecalciferol (VITAMIN D3) 400 units tablet TAKE ONE TABLET BY MOUTH DAILY 90 tablet 1   • citalopram (CeleXA) 40 mg tablet TAKE ONE TABLET BY MOUTH DAILY 90 tablet 1   • Continuous Blood Gluc  (FreeStyle Refugio 14 Day [place]) [facility] Use for continuous blood glucose monitoring 1 each 0   • Continuous Blood Gluc Sensor (FreeStyle Refugio 14 Day Sensor) MISC Use one sensor every 14 days for continuous blood sugar monitoring   6 each 3   • docusate sodium (COLACE) 100 mg capsule TAKE ONE CAPSULE BY MOUTH TWICE DAILY 30 capsule 0   • Empagliflozin (Jardiance) 25 MG TABS Take 1 tablet (25 mg total) by mouth every morning 90 tablet 1   • famotidine (PEPCID) 40 MG tablet Take 1 tablet (40 mg total) by mouth daily at bedtime Take in evening 2 hours prior to bed 30 tablet 6   • fenofibrate (TRICOR) 48 mg tablet Take 1 tablet (48 mg total) by mouth daily 90 tablet 3   • glucose blood (OneTouch Verio) test strip Use to test blood glucose 4 times a day 200 strip 2   • insulin glulisine (Apidra SoloStar) 100 units/mL injection pen INJECT 10 UNITS BEFORE BREAKFAST, INJECT 20 UNITS PRIOR TO LUNCH AND DINNER 30 mL 1   • Iron Heme Polypeptide 12 MG TABS Take 1 tablet by mouth     • Lantus SoloStar 100 units/mL SOPN INJECT 45 UNITS EVERY 12 HOURS 30 mL 1   • levETIRAcetam (KEPPRA) 500 mg tablet TAKE ONE TABLET TWICE DAILY 180 tablet 1   • linaCLOtide (Linzess) 72 MCG CAPS Take 1 capsule by mouth daily before breakfast 30 capsule 0   • lisinopril (ZESTRIL) 10 mg tablet TAKE ONE TABLET BY MOUTH DAILY 90 tablet 1   • metFORMIN (GLUCOPHAGE) 1000 MG tablet Take 1 tablet (1,000 mg total) by mouth 2 (two) times a day with meals 180 tablet 1   • metoclopramide (REGLAN) 10 mg tablet Take 1 tablet (10 mg total) by mouth daily 90 tablet 3   • metoprolol tartrate (LOPRESSOR) 25 mg tablet TAKE 1 TABLET (25 MG TOTAL) BY MOUTH 2 (TWO) TIMES A  tablet 1   • neomycin-polymyxin-hydrocortisone (CORTISPORIN) otic solution Administer 4 drops to the right ear every 6 (six) hours 10 mL 0   • ondansetron (ZOFRAN) 4 mg tablet Take 1 tablet (4 mg total) by mouth every 8 (eight) hours as needed for nausea or vomiting 20 tablet 0   • ondansetron (ZOFRAN-ODT) 4 mg disintegrating tablet Take 1 tablet (4 mg total) by mouth every 6 (six) hours as needed for nausea or vomiting 20 tablet 0   • Ozempic, 1 MG/DOSE, 4 MG/3ML SOPN injection pen INJECT 1 MG TOTAL UNDER THE SKIN ONCE A WEEK 9 mL 0   • pantoprazole (PROTONIX) 40 mg tablet Take 1 tablet (40 mg total) by mouth 2 (two) times a day 180 tablet 3   • pregabalin (LYRICA) 100 mg capsule Take 1 capsule (100 mg total) by mouth 3 (three) times a day 270 capsule 3   • scopolamine (TRANSDERM-SCOP) 1 mg/3 days TD 72 hr patch Place 1 patch on the skin every third day 24 patch 1   • solifenacin (VESICARE) 5 mg tablet TAKE ONE TABLET BY MOUTH DAILY 90 tablet 1     No current facility-administered medications for this visit  Allergies   Allergen Reactions   • Azithromycin GI Intolerance and Hives   • Benztropine    • Butorphanol Hallucinations   • Penicillins    • Zolpidem        Objective   Vitals: Blood pressure 100/62, height 5' 1" (1 549 m), weight 88 5 kg (195 lb 3 2 oz), not currently breastfeeding  Physical Exam  Vitals reviewed  Constitutional:       General: She is not in acute distress  Appearance: She is not ill-appearing  HENT:      Mouth/Throat:      Mouth: Mucous membranes are dry  Cardiovascular:      Rate and Rhythm: Normal rate and regular rhythm  Heart sounds: Normal heart sounds  Pulmonary:      Effort: Pulmonary effort is normal  No respiratory distress  Breath sounds: Normal breath sounds  No rales     Musculoskeletal:      Right lower leg: No edema  Left lower leg: No edema  Skin:     Coloration: Skin is pale  Neurological:      Mental Status: She is alert and oriented to person, place, and time  Psychiatric:         Mood and Affect: Mood normal           The history was obtained from the review of the chart, patient      Lab Results:   Component      Latest Ref Rng & Units 10/24/2022   WBC      4 31 - 10 16 Thousand/uL 8 76   Red Blood Cell Count      3 81 - 5 12 Million/uL 4 43   Hemoglobin      11 5 - 15 4 g/dL 11 4 (L)   HCT      34 8 - 46 1 % 37 0   MCV      82 - 98 fL 84   MCH      26 8 - 34 3 pg 25 7 (L)   MCHC      31 4 - 37 4 g/dL 30 8 (L)   RDW      11 6 - 15 1 % 13 8   MPV      8 9 - 12 7 fL 11 3   Platelet Count      626 - 390 Thousands/uL 332   nRBC      /100 WBCs 0   Neutrophils %      43 - 75 % 58   Immat GRANS %      0 - 2 % 1   Lymphocytes Relative      14 - 44 % 30   Monocytes Relative      4 - 12 % 7   Eosinophils      0 - 6 % 3   Basophils Relative      0 - 1 % 1   Absolute Neutrophils      1 85 - 7 62 Thousands/µL 5 14   Immature Grans Absolute      0 00 - 0 20 Thousand/uL 0 04   Lymphocytes Absolute      0 60 - 4 47 Thousands/µL 2 66   Absolute Monocytes      0 17 - 1 22 Thousand/µL 0 64   Absolute Eosinophils      0 00 - 0 61 Thousand/µL 0 24   Basophils Absolute      0 00 - 0 10 Thousands/µL 0 04   Sodium      135 - 147 mmol/L 133 (L)   Potassium      3 5 - 5 3 mmol/L 5 3   Chloride      96 - 108 mmol/L 102   CO2      21 - 32 mmol/L 24   Anion Gap      4 - 13 mmol/L 7   BUN      5 - 25 mg/dL 34 (H)   Creatinine      0 60 - 1 30 mg/dL 1 54 (H)   GLUCOSE FASTING      65 - 99 mg/dL 455 (H)   Calcium      8 3 - 10 1 mg/dL 9 2   CORRECTED CALCIUM      8 3 - 10 1 mg/dL 9 9   AST      5 - 45 U/L 16   ALT      12 - 78 U/L 31   Alkaline Phosphatase      46 - 116 U/L 90   Total Protein      6 4 - 8 4 g/dL 6 9   Albumin      3 5 - 5 0 g/dL 3 1 (L)   TOTAL BILIRUBIN      0 20 - 1 00 mg/dL 0 16 (L)   eGFR      ml/min/1 73sq m 38   Iron Saturation      15 - 50 % 15   TIBC      250 - 450 ug/dL 350   Iron      50 - 170 ug/dL 53   Hemoglobin A1C      Normal 3 8-5 6%; PreDiabetic 5 7-6 4%; Diabetic >=6 5%; Glycemic control for adults with diabetes <7 0% % 13 3 (H)   eAG, EST AVG Glucose      mg/dl 335   Vit D, 25-Hydroxy      30 0 - 100 0 ng/mL 25 4 (L)   Ferritin      8 - 388 ng/mL 45         Continuous glucose monitor analysis:   Carline Galeano   Device used:Refugio, Home use   Indication:  Type 2 Diabetes  More than 72 hours of data was reviewed  Report to be scanned to chart  Date Range: 9/29 - 10/26  Analysis of data:   % time CGM used: 31%  Average Glucose: 318  SD : 24 5%  Time in Target Range: 5%  Time Above Range: 20% high, 75% very high  Time Below Range: 0% low, 0% very low  Interpretation of data: persistent severe hyperglycemia with target range achieved only sometimes around early AM and 6pm  Limited data overall however due to active time of 31%  Imaging Studies: n/a    Portions of the record may have been created with voice recognition software  Occasional wrong word or "sound a like" substitutions may have occurred due to the inherent limitations of voice recognition software  Read the chart carefully and recognize, using context, where substitutions have occurred

## 2022-10-26 ENCOUNTER — OFFICE VISIT (OUTPATIENT)
Dept: ENDOCRINOLOGY | Facility: CLINIC | Age: 52
End: 2022-10-26
Payer: COMMERCIAL

## 2022-10-26 VITALS
SYSTOLIC BLOOD PRESSURE: 100 MMHG | DIASTOLIC BLOOD PRESSURE: 62 MMHG | HEIGHT: 61 IN | BODY MASS INDEX: 36.85 KG/M2 | WEIGHT: 195.2 LBS

## 2022-10-26 DIAGNOSIS — I95.9 HYPOTENSION, UNSPECIFIED HYPOTENSION TYPE: ICD-10-CM

## 2022-10-26 DIAGNOSIS — I12.9 BENIGN HYPERTENSION WITH CKD (CHRONIC KIDNEY DISEASE) STAGE III (HCC): ICD-10-CM

## 2022-10-26 DIAGNOSIS — Z79.4 TYPE 2 DIABETES MELLITUS WITH DIABETIC AUTONOMIC NEUROPATHY, WITH LONG-TERM CURRENT USE OF INSULIN (HCC): ICD-10-CM

## 2022-10-26 DIAGNOSIS — E11.43 TYPE 2 DIABETES MELLITUS WITH DIABETIC AUTONOMIC NEUROPATHY, WITH LONG-TERM CURRENT USE OF INSULIN (HCC): ICD-10-CM

## 2022-10-26 DIAGNOSIS — E55.9 VITAMIN D DEFICIENCY: ICD-10-CM

## 2022-10-26 DIAGNOSIS — R53.83 OTHER FATIGUE: ICD-10-CM

## 2022-10-26 DIAGNOSIS — N18.30 BENIGN HYPERTENSION WITH CKD (CHRONIC KIDNEY DISEASE) STAGE III (HCC): ICD-10-CM

## 2022-10-26 DIAGNOSIS — E11.65 TYPE 2 DIABETES MELLITUS WITH HYPERGLYCEMIA, WITH LONG-TERM CURRENT USE OF INSULIN (HCC): Primary | ICD-10-CM

## 2022-10-26 DIAGNOSIS — Z79.4 TYPE 2 DIABETES MELLITUS WITH HYPERGLYCEMIA, WITH LONG-TERM CURRENT USE OF INSULIN (HCC): Primary | ICD-10-CM

## 2022-10-26 PROBLEM — N17.9 AKI (ACUTE KIDNEY INJURY) (HCC): Status: ACTIVE | Noted: 2022-10-26

## 2022-10-26 PROCEDURE — 99214 OFFICE O/P EST MOD 30 MIN: CPT | Performed by: PHYSICIAN ASSISTANT

## 2022-10-26 PROCEDURE — 95251 CONT GLUC MNTR ANALYSIS I&R: CPT | Performed by: PHYSICIAN ASSISTANT

## 2022-10-26 RX ORDER — ERGOCALCIFEROL 1.25 MG/1
50000 CAPSULE ORAL WEEKLY
Qty: 8 CAPSULE | Refills: 1 | Status: SHIPPED | OUTPATIENT
Start: 2022-10-26

## 2022-10-26 RX ORDER — DULAGLUTIDE 1.5 MG/.5ML
1.5 INJECTION, SOLUTION SUBCUTANEOUS WEEKLY
Qty: 2 ML | Refills: 1 | Status: SHIPPED | OUTPATIENT
Start: 2022-10-26

## 2022-10-26 NOTE — ASSESSMENT & PLAN NOTE
· Recurrent issue  Likely contributing to fatigue  · Will start Vitamin D 50,000 units weekly for 8 weeks and reassess vitamin D level at that time

## 2022-10-26 NOTE — PATIENT INSTRUCTIONS
Haylee's goals/plan for the next month:  1) Start Trulicity 4 7MV weekly  2) Will reduce dosing of Lantus to 40 units daily with the goal of 100% compliance  3) Reduce Novolog to 10 units with each meal with goal of 100% compliance  4) Scan the Orellana frequently! Every 2-4 hours  5) Labs - CMP, TSH in 1 week  6) Vitamin D - start taking weekly  7) come back in 1 month

## 2022-10-26 NOTE — ASSESSMENT & PLAN NOTE
Lab Results   Component Value Date    EGFR 38 10/24/2022    EGFR 38 09/12/2022    EGFR 46 08/30/2022    CREATININE 1 54 (H) 10/24/2022    CREATININE 1 55 (H) 09/12/2022    CREATININE 1 33 (H) 08/30/2022   · Suspect mild LINDY vs  Recently worsening CKD  Baseline Crt was 1 1 earlier this year, now increased to 1 5 on recent labs with hyponatremia, likely in the setting of dehydration due to polyuria from hyperglycemia  No anion gap on recent labs  · Suspect once BG is corrected, LINDY will resolve  · Tolerating PO intake well, encouraged her to increase fluid intake  · Would also hold ACE-I for now, especially with borderline hypotension  · Hold metformin for now due to CrCl of < 40, with consideration to restart in future pending improvement in renal function  · Repeat CMP in 1 week

## 2022-10-26 NOTE — ASSESSMENT & PLAN NOTE
· Borderline low BP with some symptoms concerning for orthostatic hypotension  · Would HOLD ACE-I for now, especially in the setting of mild LINDY / worsening CKD  · Repeat CMP in 1 week

## 2022-10-28 ENCOUNTER — TELEPHONE (OUTPATIENT)
Dept: ENDOCRINOLOGY | Facility: CLINIC | Age: 52
End: 2022-10-28

## 2022-10-28 DIAGNOSIS — Z79.4 TYPE 2 DIABETES MELLITUS WITH DIABETIC AUTONOMIC NEUROPATHY, WITH LONG-TERM CURRENT USE OF INSULIN (HCC): ICD-10-CM

## 2022-10-28 DIAGNOSIS — E11.43 TYPE 2 DIABETES MELLITUS WITH DIABETIC AUTONOMIC NEUROPATHY, WITH LONG-TERM CURRENT USE OF INSULIN (HCC): ICD-10-CM

## 2022-10-28 DIAGNOSIS — K59.00 CONSTIPATION, UNSPECIFIED CONSTIPATION TYPE: ICD-10-CM

## 2022-10-28 RX ORDER — INSULIN GLULISINE 100 [IU]/ML
INJECTION, SOLUTION SUBCUTANEOUS
Qty: 30 ML | Refills: 1 | Status: SHIPPED | OUTPATIENT
Start: 2022-10-28

## 2022-10-28 RX ORDER — DOCUSATE SODIUM 100 MG/1
CAPSULE, LIQUID FILLED ORAL
Qty: 30 CAPSULE | Refills: 0 | Status: SHIPPED | OUTPATIENT
Start: 2022-10-28 | End: 2022-11-02

## 2022-10-28 NOTE — TELEPHONE ENCOUNTER
Farzana Parra and I checked in about her progress the over the past few days  She has started her new insulin regimen of Apidra 10 units with each meal, and Lantus 40 units in morning  She also started Trulicity at 7 82IO weekly on the evening of 10/26  Farzana Fidel is concerned her BG dropped to 63 around 7:30pm yesterday evening  She ate cheeseburger and salad around 5pm, administering 10 units of Apidra at the start of the meal  She ate an egg sandwich and candy to treat her hypoglycemic event  Throughout the night her blood sugar remained stable  When she checked again this morning at 10:30 her BG was 203  Farzana Fidel is asking if she can possibly take a lower dose of Novolog, 5 units, with dinner from now on, which I agree this is a reasonable solution to try  She will continue Apidra 10 units with breakfast and lunch, 5 units with dinner  Also continue Lantus at 40 units daily for now, as fasting values seem acceptable (108 yesterday, 203 this morning)  She will obtain follow up labs early next week and we will check in again once resulted

## 2022-10-31 ENCOUNTER — NURSE TRIAGE (OUTPATIENT)
Dept: OTHER | Facility: OTHER | Age: 52
End: 2022-10-31

## 2022-10-31 NOTE — TELEPHONE ENCOUNTER
Patient had bleeding at site of new Refugio sensor placed this afternoon  The bleeding stopped  Should she leave the sensor in or change it? Please follow up with patient  Reason for Disposition  • Nursing judgment    Answer Assessment - Initial Assessment Questions  1  REASON FOR CALL or QUESTION: "What is your reason for calling today?" or "How can I best help you?" or "What question do you have that I can help answer?"      Patient put new sensor for Orellana system and the site started bleeding  The bleeding stopped  Is it alright to leave this sensor in or should I change it?     Protocols used: INFORMATION ONLY CALL - NO TRIAGE-ADULT-[place]

## 2022-11-01 DIAGNOSIS — I15.2 HYPERTENSION ASSOCIATED WITH DIABETES (HCC): ICD-10-CM

## 2022-11-01 DIAGNOSIS — E11.59 HYPERTENSION ASSOCIATED WITH DIABETES (HCC): ICD-10-CM

## 2022-11-01 DIAGNOSIS — E11.43 TYPE 2 DIABETES MELLITUS WITH DIABETIC AUTONOMIC NEUROPATHY, WITH LONG-TERM CURRENT USE OF INSULIN (HCC): ICD-10-CM

## 2022-11-01 DIAGNOSIS — K59.00 CONSTIPATION, UNSPECIFIED CONSTIPATION TYPE: ICD-10-CM

## 2022-11-01 DIAGNOSIS — Z79.4 TYPE 2 DIABETES MELLITUS WITH DIABETIC AUTONOMIC NEUROPATHY, WITH LONG-TERM CURRENT USE OF INSULIN (HCC): ICD-10-CM

## 2022-11-01 RX ORDER — INSULIN GLARGINE 100 [IU]/ML
INJECTION, SOLUTION SUBCUTANEOUS
Qty: 30 ML | Refills: 1 | Status: SHIPPED | OUTPATIENT
Start: 2022-11-01

## 2022-11-01 NOTE — TELEPHONE ENCOUNTER
Spoke to patient, she reported she left the sensor on and it has been reading with no problem  Advised patient that this can happen upon insertion due to the capillaries just below the skins service  She reported bleeding did stop and has no further concern

## 2022-11-02 ENCOUNTER — APPOINTMENT (EMERGENCY)
Dept: RADIOLOGY | Facility: HOSPITAL | Age: 52
End: 2022-11-02

## 2022-11-02 ENCOUNTER — APPOINTMENT (EMERGENCY)
Dept: CT IMAGING | Facility: HOSPITAL | Age: 52
End: 2022-11-02

## 2022-11-02 ENCOUNTER — HOSPITAL ENCOUNTER (EMERGENCY)
Facility: HOSPITAL | Age: 52
Discharge: HOME | End: 2022-11-02
Attending: EMERGENCY MEDICINE

## 2022-11-02 VITALS
WEIGHT: 192 LBS | HEART RATE: 71 BPM | TEMPERATURE: 98.6 F | BODY MASS INDEX: 36.25 KG/M2 | HEIGHT: 61 IN | SYSTOLIC BLOOD PRESSURE: 134 MMHG | RESPIRATION RATE: 18 BRPM | DIASTOLIC BLOOD PRESSURE: 75 MMHG | OXYGEN SATURATION: 95 %

## 2022-11-02 DIAGNOSIS — S49.91XA INJURY OF RIGHT SHOULDER, INITIAL ENCOUNTER: Primary | ICD-10-CM

## 2022-11-02 RX ORDER — DOCUSATE SODIUM 100 MG/1
CAPSULE, LIQUID FILLED ORAL
Qty: 30 CAPSULE | Refills: 0 | Status: SHIPPED | OUTPATIENT
Start: 2022-11-02

## 2022-11-02 RX ORDER — OXYCODONE HYDROCHLORIDE 5 MG/1
5 TABLET ORAL ONCE
Status: COMPLETED | OUTPATIENT
Start: 2022-11-02 | End: 2022-11-02

## 2022-11-02 RX ADMIN — OXYCODONE HYDROCHLORIDE 5 MG: 5 TABLET ORAL at 21:03

## 2022-11-03 NOTE — ED PROVIDER NOTES
History  Chief Complaint   Patient presents with   • Shoulder Injury     Pt reports she was on the ground painting when her cousin tripped and fell on her R shoulder     Patient is a 20-year-old female with history of diabetes mellitus, hypertension, hyperlipidemia that presents for evaluation right shoulder pain  Patient says that about an hour prior to ED arrival someone tripped and fell into her landing on her right shoulder  She did not fall to the ground  However, she is complaining of moderate sharp/stabbing nonradiating right shoulder pain  She says the pain is exacerbated by movement of the right arm  No weakness or numbness in the right upper extremity noted  No chest pain or dyspnea  He has also endorse a generalized headache and some bilateral neck pain  She has not taken anything for symptoms  She otherwise denies abdominal pain urinary or bowel symptoms  On 81 mg aspirin  No surgical history of the right shoulder noted  Prior to Admission Medications   Prescriptions Last Dose Informant Patient Reported? Taking? Aspirin (ASPIR-81 PO)  Self Yes No   Sig: Take 81 mg by mouth   B-D UF III MINI PEN NEEDLES 31G X 5 MM MISC  Self No No   Sig: Pt uses 5 pen needles daily   BANOPHEN 50 MG capsule  Self Yes No   Sig: take 1 capsule by mouth every 6 hours if needed for itching   Continuous Blood Gluc  (FreeStyle Refugio 14 Day [place]) TEMITOPE   No No   Sig: Use for continuous blood glucose monitoring   Continuous Blood Gluc Sensor (FreeStyle Refugio 14 Day Sensor) MISC   No No   Sig: Use one sensor every 14 days for continuous blood sugar monitoring     Dulaglutide (Trulicity) 1 5 HD/2 3WB SOPN   No No   Sig: Inject 0 5 mL (1 5 mg total) under the skin once a week   Empagliflozin (Jardiance) 25 MG TABS  Self No No   Sig: Take 1 tablet (25 mg total) by mouth every morning   Iron Heme Polypeptide 12 MG TABS   Yes No   Sig: Take 1 tablet by mouth   Lantus SoloStar 100 units/mL SOPN   No No Sig: INJECT 45 UNITS EVERY 12 HOURS   albuterol (ProAir HFA) 90 mcg/act inhaler  Self No No   Sig: Inhale 1 puff every 6 (six) hours as needed for wheezing or shortness of breath   atorvastatin (LIPITOR) 80 mg tablet  Self No No   Sig: Take 1 tablet (80 mg total) by mouth daily at bedtime   cholecalciferol (VITAMIN D3) 400 units tablet  Self No No   Sig: TAKE ONE TABLET BY MOUTH DAILY   citalopram (CeleXA) 40 mg tablet  Self No No   Sig: TAKE ONE TABLET BY MOUTH DAILY   docusate sodium (COLACE) 100 mg capsule   No No   Sig: TAKE ONE CAPSULE BY MOUTH TWICE DAILY   ergocalciferol (VITAMIN D2) 50,000 units   No No   Sig: Take 1 capsule (50,000 Units total) by mouth once a week   famotidine (PEPCID) 40 MG tablet  Self No No   Sig: Take 1 tablet (40 mg total) by mouth daily at bedtime Take in evening 2 hours prior to bed   fenofibrate (TRICOR) 48 mg tablet  Self No No   Sig: Take 1 tablet (48 mg total) by mouth daily   glucose blood (OneTouch Verio) test strip  Self No No   Sig: Use to test blood glucose 4 times a day   insulin glulisine (Apidra SoloStar) 100 units/mL injection pen   No No   Sig: Inject 10 units before breakfast and lunch, 5 units before dinner     levETIRAcetam (KEPPRA) 500 mg tablet  Self No No   Sig: TAKE ONE TABLET TWICE DAILY   linaCLOtide (Linzess) 72 MCG CAPS  Self No No   Sig: Take 1 capsule by mouth daily before breakfast   metoclopramide (REGLAN) 10 mg tablet  Self No No   Sig: Take 1 tablet (10 mg total) by mouth daily   metoprolol tartrate (LOPRESSOR) 25 mg tablet   No No   Sig: TAKE 1 TABLET (25 MG TOTAL) BY MOUTH 2 (TWO) TIMES A DAY   neomycin-polymyxin-hydrocortisone (CORTISPORIN) otic solution  Self No No   Sig: Administer 4 drops to the right ear every 6 (six) hours   ondansetron (ZOFRAN) 4 mg tablet  Self No No   Sig: Take 1 tablet (4 mg total) by mouth every 8 (eight) hours as needed for nausea or vomiting   ondansetron (ZOFRAN-ODT) 4 mg disintegrating tablet  Self No No   Sig: Take 1 tablet (4 mg total) by mouth every 6 (six) hours as needed for nausea or vomiting   pantoprazole (PROTONIX) 40 mg tablet  Self No No   Sig: Take 1 tablet (40 mg total) by mouth 2 (two) times a day   pregabalin (LYRICA) 100 mg capsule  Self No No   Sig: Take 1 capsule (100 mg total) by mouth 3 (three) times a day   scopolamine (TRANSDERM-SCOP) 1 mg/3 days TD 72 hr patch  Self No No   Sig: Place 1 patch on the skin every third day   solifenacin (VESICARE) 5 mg tablet   No No   Sig: TAKE ONE TABLET BY MOUTH DAILY      Facility-Administered Medications: None       Past Medical History:   Diagnosis Date   • Atypical chest pain    • Depression    • Diabetes mellitus (HCC)    • Gastroparesis    • GERD (gastroesophageal reflux disease)    • Hyperlipidemia    • Hypertension    • Sciatica    • Seizure disorder ([address] )        Past Surgical History:   Procedure Laterality Date   • BREAST BIOPSY Left  benign   • CHOLECYSTECTOMY     • COLONOSCOPY     • HYSTERECTOMY     • VT LAP,APPENDECTOMY N/A 3/24/2021    Procedure: APPENDECTOMY LAPAROSCOPIC;  Surgeon: Uri Zarco MD;  Location: [facility] MAIN OR;  Service: General   • VT LAP,DIAGNOSTIC ABDOMEN N/A 3/24/2021    Procedure: LAPAROSCOPY DIAGNOSTIC, EXTENSIVE DESI;  Surgeon: Uri Zarco MD;  Location: [facility] MAIN OR;  Service: General   • UPPER GASTROINTESTINAL ENDOSCOPY         Family History   Problem Relation Age of Onset   • No Known Problems Mother    • No Known Problems Father    • No Known Problems Daughter    • No Known Problems Maternal Grandmother    • No Known Problems Paternal Grandmother    • No Known Problems Paternal Aunt    • No Known Problems Paternal Aunt    • No Known Problems Paternal Aunt      I have reviewed and agree with the history as documented      E-Cigarette/Vaping   • E-Cigarette Use Never User      E-Cigarette/Vaping Substances   • Nicotine No    • THC No    • CBD No    • Flavoring No    • Other No    • Unknown No      Social History     Tobacco Use   • Smoking status: Former Smoker     Quit date:      Years since quittin 8   • Smokeless tobacco: Never Used   Vaping Use   • Vaping Use: Never used   Substance Use Topics   • Alcohol use: Never   • Drug use: Never       Review of Systems   Constitutional: Negative for fever  HENT: Negative for sore throat  Respiratory: Negative for shortness of breath  Cardiovascular: Negative for chest pain  Gastrointestinal: Negative for abdominal pain  Genitourinary: Negative for dysuria  Musculoskeletal: Positive for neck pain  Negative for back pain  Right shoulder pain   Skin: Negative for rash  Neurological: Positive for headaches  Negative for light-headedness  Psychiatric/Behavioral: Negative for agitation  All other systems reviewed and are negative  Physical Exam  Physical Exam  Vitals reviewed  Constitutional:       General: She is not in acute distress  Appearance: She is well-developed  HENT:      Head: Normocephalic  Eyes:      Pupils: Pupils are equal, round, and reactive to light  Cardiovascular:      Rate and Rhythm: Normal rate and regular rhythm  Heart sounds: Normal heart sounds  Pulmonary:      Effort: Pulmonary effort is normal       Breath sounds: Normal breath sounds  Abdominal:      General: Bowel sounds are normal  There is no distension  Palpations: Abdomen is soft  Tenderness: There is no abdominal tenderness  There is no guarding  Musculoskeletal:         General: Tenderness present  No deformity  Cervical back: Normal range of motion and neck supple  Comments: Right shoulder:  Mild diffuse tenderness  Neurovascular intact distally  Decreased range of motion secondary to pain   Skin:     General: Skin is warm and dry  Capillary Refill: Capillary refill takes less than 2 seconds  Neurological:      Mental Status: She is alert and oriented to person, place, and time  Cranial Nerves: No cranial nerve deficit  Sensory: No sensory deficit  Psychiatric:         Behavior: Behavior normal          Thought Content: Thought content normal          Judgment: Judgment normal          Vital Signs  ED Triage Vitals [11/02/22 2033]   Temperature Pulse Respirations Blood Pressure SpO2   98 6 °F (37 °C) 71 18 134/75 95 %      Temp Source Heart Rate Source Patient Position - Orthostatic VS BP Location FiO2 (%)   Oral Monitor Sitting Left arm --      Pain Score       8           Vitals:    11/02/22 2033   BP: 134/75   Pulse: 71   Patient Position - Orthostatic VS: Sitting         Visual Acuity      ED Medications  Medications   oxyCODONE (ROXICODONE) IR tablet 5 mg (5 mg Oral Given 11/2/22 2103)       Diagnostic Studies  Results Reviewed     None                 CT spine cervical without contrast   Final Result by Alfredo Davis MD (11/02 2129)      No cervical spine fracture or traumatic malalignment  Incidental thyroid nodule(s) for which nonemergent thyroid ultrasound is recommended  Workstation performed: [id number]         CT head without contrast   Final Result by Alfredo Davis MD (11/02 2130)      No acute intracranial abnormality  Workstation performed: [id number]         XR shoulder 2+ vw right    (Results Pending)              Procedures  Procedures         ED Course                               SBIRT 22yo+    Flowsheet Row Most Recent Value   SBIRT (23 yo +)    In order to provide better care to our patients, we are screening all of our patients for alcohol and drug use  Would it be okay to ask you these screening questions? Yes Filed at: 11/02/2022 2035   Initial Alcohol Screen: US AUDIT-C     1  How often do you have a drink containing alcohol? 0 Filed at: 11/02/2022 2035   2  How many drinks containing alcohol do you have on a typical day you are drinking? 0 Filed at: 11/02/2022 2035   3a  Male UNDER 65: How often do you have five or more drinks on one occasion?  0 Filed at: 11/02/2022 2035   3b  FEMALE Any Age, or MALE 65+: How often do you have 4 or more drinks on one occassion? 0 Filed at: 11/02/2022 2035   Audit-C Score 0 Filed at: 11/02/2022 2035   LILIA: How many times in the past year have you    Used an illegal drug or used a prescription medication for non-medical reasons? Never Filed at: 11/02/2022 2035                    MDM  Number of Diagnoses or Management Options  Injury of right shoulder, initial encounter  Diagnosis management comments: Patient is a 59-year-old female presents for evaluation right shoulder pain  Likely rotator cuff tear/injury  Radiographs negative  Otherwise CT head and C-spine negative  Advised supportive measures and strict return precautions with orthopedic follow-up  Disposition  Final diagnoses:   Injury of right shoulder, initial encounter     Time reflects when diagnosis was documented in both MDM as applicable and the Disposition within this note     Time User Action Codes Description Comment    11/2/2022  9:37 PM George To Injury of right shoulder, initial encounter     11/2/2022  9:38 PM Cara Neff Modify [O22 59IM] Injury of right shoulder, initial encounter       ED Disposition     ED Disposition   Discharge    Condition   Stable    Date/Time   Wed Nov 2, 2022 2137    14 [facility] discharge to home/self care                 Follow-up Information     Follow up With Specialties Details Why Contact Info Additional [address]  Emergency Department Emergency Medicine  If symptoms worsen [address] [phone]  [facility] Emergency Department, [address] [place] [address] Tomás Trujillo, [address] Simona Hoffman, DO Orthopedic Surgery Schedule an appointment as soon as possible for a visit   [address]  [address] [facility] [address]             Discharge Medication List as of 11/2/2022  9:38 PM      CONTINUE these medications which have NOT CHANGED    Details   albuterol (ProAir HFA) 90 mcg/act inhaler Inhale 1 puff every 6 (six) hours as needed for wheezing or shortness of breath, Starting Thu 4/7/2022, Normal      Aspirin (ASPIR-81 PO) Take 81 mg by mouth, Starting Mon 2/20/2012, Historical Med      atorvastatin (LIPITOR) 80 mg tablet Take 1 tablet (80 mg total) by mouth daily at bedtime, Starting Wed 9/7/2022, Normal      B-D UF III MINI PEN NEEDLES 31G X 5 MM MISC Pt uses 5 pen needles daily, Normal      BANOPHEN 50 MG capsule take 1 capsule by mouth every 6 hours if needed for itching, Historical Med      cholecalciferol (VITAMIN D3) 400 units tablet TAKE ONE TABLET BY MOUTH DAILY, Normal      citalopram (CeleXA) 40 mg tablet TAKE ONE TABLET BY MOUTH DAILY, Normal      Continuous Blood Gluc  (FreeStyle Refugio 14 Day [place]) TEMITOPE Use for continuous blood glucose monitoring, Print      Continuous Blood Gluc Sensor (FreeStyle Refugio 14 Day Sensor) MISC Use one sensor every 14 days for continuous blood sugar monitoring , Print      docusate sodium (COLACE) 100 mg capsule TAKE ONE CAPSULE BY MOUTH TWICE DAILY, Normal      Dulaglutide (Trulicity) 1 5 AY/0 3QN SOPN Inject 0 5 mL (1 5 mg total) under the skin once a week, Starting Wed 10/26/2022, Normal      Empagliflozin (Jardiance) 25 MG TABS Take 1 tablet (25 mg total) by mouth every morning, Starting Thu 6/9/2022, Normal      ergocalciferol (VITAMIN D2) 50,000 units Take 1 capsule (50,000 Units total) by mouth once a week, Starting Wed 10/26/2022, Normal      famotidine (PEPCID) 40 MG tablet Take 1 tablet (40 mg total) by mouth daily at bedtime Take in evening 2 hours prior to bed, Starting Fri 9/30/2022, Normal      fenofibrate (TRICOR) 48 mg tablet Take 1 tablet (48 mg total) by mouth daily, Starting Mon 1/17/2022, Normal      glucose blood (OneTouch Verio) test strip Use to test blood glucose 4 times a day, Normal      insulin glulisine (Apidra SoloStar) 100 units/mL injection pen Inject 10 units before breakfast and lunch, 5 units before dinner , Normal      Iron Heme Polypeptide 12 MG TABS Take 1 tablet by mouth, Historical Med      Lantus SoloStar 100 units/mL SOPN INJECT 45 UNITS EVERY 12 HOURS, Normal      levETIRAcetam (KEPPRA) 500 mg tablet TAKE ONE TABLET TWICE DAILY, Normal      linaCLOtide (Linzess) 72 MCG CAPS Take 1 capsule by mouth daily before breakfast, Starting Thu 2/24/2022, Normal      metoclopramide (REGLAN) 10 mg tablet Take 1 tablet (10 mg total) by mouth daily, Starting Mon 2/21/2022, Normal      metoprolol tartrate (LOPRESSOR) 25 mg tablet TAKE 1 TABLET (25 MG TOTAL) BY MOUTH 2 (TWO) TIMES A DAY, Starting Wed 11/2/2022, Normal      neomycin-polymyxin-hydrocortisone (CORTISPORIN) otic solution Administer 4 drops to the right ear every 6 (six) hours, Starting Fri 8/13/2021, Normal      ondansetron (ZOFRAN) 4 mg tablet Take 1 tablet (4 mg total) by mouth every 8 (eight) hours as needed for nausea or vomiting, Starting Mon 4/12/2021, Normal      ondansetron (ZOFRAN-ODT) 4 mg disintegrating tablet Take 1 tablet (4 mg total) by mouth every 6 (six) hours as needed for nausea or vomiting, Starting Tue 9/7/2021, Normal      pantoprazole (PROTONIX) 40 mg tablet Take 1 tablet (40 mg total) by mouth 2 (two) times a day, Starting Tue 11/30/2021, Normal      pregabalin (LYRICA) 100 mg capsule Take 1 capsule (100 mg total) by mouth 3 (three) times a day, Starting Fri 6/10/2022, Normal      scopolamine (TRANSDERM-SCOP) 1 mg/3 days TD 72 hr patch Place 1 patch on the skin every third day, Starting Mon 10/3/2022, Normal      solifenacin (VESICARE) 5 mg tablet TAKE ONE TABLET BY MOUTH DAILY, Normal                 PDMP Review       Value Time User    PDMP Reviewed  Yes 3/26/2021 10:43 AM Dickson Rahman PA-C          ED Provider  Electronically Signed by           Paco Antonio MD  11/03/22 0004

## 2022-11-04 ENCOUNTER — TELEPHONE (OUTPATIENT)
Dept: ENDOCRINOLOGY | Facility: CLINIC | Age: 52
End: 2022-11-04

## 2022-11-04 NOTE — TELEPHONE ENCOUNTER
Spoke with Wood Ryan via phone to see how things are going  She is doing well overall, with reports of one hyperglycemic event the past few days, BG > 400  But this was in the setting of acute pain following an ER visit  Since that time she has been getting BG values between 100 - 200 range  She had to discontinue her CGM due to impending MRI for her R shoulder  She plans to place CGM sensor again this weekend  Will plan to follow up again with patient in 10 days for CGM review

## 2022-11-05 ENCOUNTER — OFFICE VISIT (OUTPATIENT)
Dept: OBGYN CLINIC | Facility: CLINIC | Age: 52
End: 2022-11-05

## 2022-11-05 VITALS
HEIGHT: 61 IN | WEIGHT: 200 LBS | HEART RATE: 73 BPM | DIASTOLIC BLOOD PRESSURE: 68 MMHG | SYSTOLIC BLOOD PRESSURE: 102 MMHG | BODY MASS INDEX: 37.76 KG/M2

## 2022-11-05 DIAGNOSIS — S49.91XA INJURY OF RIGHT SHOULDER, INITIAL ENCOUNTER: ICD-10-CM

## 2022-11-05 NOTE — PROGRESS NOTES
[facility] [place] [id number]  [facility] [id number] [phone]      Chief Complaint:  Chief Complaint   Patient presents with   • Right Shoulder - Pain       Vitals:  /68   Pulse 73   Ht 5' 1" (1 549 m)   Wt 90 7 kg (200 lb)   BMI 37 79 kg/m²     The following portions of the patient's history were reviewed and updated as appropriate: allergies, current medications, past family history, past medical history, past social history, past surgical history, and problem list       Subjective:   Patient ID: Bob Zamora is a 46 y o  female  Here c/o R shoulder pain  Her cousin tripped and fell forward and hit her R shoulder 3 days ago  Seen in ER  CT/XR done- neg for fx  Given sling  Note reviewed  Still having pain  Sharp pain  Hurts to move shoulder  Denies n/t  Taking tylenol      Review of Systems   Constitutional: Negative for fatigue and fever  Respiratory: Negative for shortness of breath  Cardiovascular: Negative for chest pain  Gastrointestinal: Negative for abdominal pain and nausea  Genitourinary: Negative for dysuria  Skin: Negative for rash and wound  Neurological: Negative for weakness and headaches  Objective:  Right Shoulder Exam     Tenderness   The patient is experiencing tenderness in the acromion  Range of Motion   Active abduction: abnormal   Passive abduction: abnormal   Extension: normal   External rotation: normal   Forward flexion: abnormal             Physical Exam  Constitutional:       Appearance: Normal appearance  She is normal weight  Eyes:      Extraocular Movements: Extraocular movements intact  Pulmonary:      Effort: Pulmonary effort is normal    Musculoskeletal:         General: Tenderness present  Cervical back: Normal range of motion  Skin:     General: Skin is warm and dry  Neurological:      General: No focal deficit present        Mental Status: She is alert and oriented to person, place, and time  Mental status is at baseline  Psychiatric:         Mood and Affect: Mood normal          Behavior: Behavior normal          Thought Content: Thought content normal          Judgment: Judgment normal          I have personally reviewed pertinent films in PACS and my interpretation is XR-  R shoulder nml study  Assessment/Plan:  Assessment/Plan   Diagnoses and all orders for this visit:    Injury of right shoulder, initial encounter  -     Ambulatory Referral to Orthopedic Surgery        Return in about 2 weeks (around 11/19/2022) for Rechshana Gutiérrez

## 2022-11-05 NOTE — PATIENT INSTRUCTIONS
F/u 2 wks  Begin wearing sling for 1/2 day  Begin range of motion exercises as tolerated  F/u with PCP- US thyroid- nodule seen on CT  Icing/heat/OTC pain meds as needed

## 2022-11-07 DIAGNOSIS — E11.42 DIABETIC POLYNEUROPATHY ASSOCIATED WITH TYPE 2 DIABETES MELLITUS (HCC): ICD-10-CM

## 2022-11-07 RX ORDER — PREGABALIN 100 MG/1
CAPSULE ORAL
Qty: 270 CAPSULE | Refills: 1 | Status: SHIPPED | OUTPATIENT
Start: 2022-11-07

## 2022-11-08 ENCOUNTER — OFFICE VISIT (OUTPATIENT)
Dept: FAMILY MEDICINE CLINIC | Facility: CLINIC | Age: 52
End: 2022-11-08

## 2022-11-08 VITALS
HEIGHT: 61 IN | OXYGEN SATURATION: 98 % | BODY MASS INDEX: 37.76 KG/M2 | RESPIRATION RATE: 20 BRPM | HEART RATE: 74 BPM | DIASTOLIC BLOOD PRESSURE: 68 MMHG | WEIGHT: 200 LBS | TEMPERATURE: 98.8 F | SYSTOLIC BLOOD PRESSURE: 110 MMHG

## 2022-11-08 DIAGNOSIS — E04.1 THYROID NODULE GREATER THAN OR EQUAL TO 1.5 CM IN DIAMETER INCIDENTALLY NOTED ON IMAGING STUDY: Primary | ICD-10-CM

## 2022-11-08 NOTE — PROGRESS NOTES
[facility]        NAME: Gerri Evans is a 46 y o  female  : 1970    MRN: [id number]  DATE: 2022  TIME: 8:40 AM    Assessment and Plan   Thyroid nodule greater than or equal to 1 5 cm in diameter incidentally noted on imaging study [E04 1]  1  Thyroid nodule greater than or equal to 1 5 cm in diameter incidentally noted on imaging study  US thyroid         Patient Instructions     Patient Instructions   Thyroid ultrasound scheduled  Scheduled with GI   Consider putting Refugio back on for better monitoring of glucose levels  Get bloodwork done this week as ordered  Call for problems/concerns          Chief Complaint     Chief Complaint   Patient presents with   • Follow-up         History of Present Illness       Here for follow up- was told to come here to get order for thyroid ultrasound for an incidental right 1 7cm thyroid nodule seen on CT scan cervical spine  Reports some issue with swallowing, feeling fullness in her throat  We discussed GI referral also for her GERD and upcoming due colonoscopy  She is willing to schedule with GI for this  Review of Systems   Review of Systems   Constitutional: Negative for activity change, diaphoresis, fatigue and fever  HENT: Positive for trouble swallowing  Negative for congestion, facial swelling, hearing loss, rhinorrhea, sinus pressure, sinus pain, sneezing, sore throat and voice change  Eyes: Negative for discharge and visual disturbance  Respiratory: Negative for cough, choking, chest tightness, shortness of breath, wheezing and stridor  Cardiovascular: Negative for chest pain, palpitations and leg swelling  Gastrointestinal: Negative for abdominal distention, abdominal pain, constipation, diarrhea, nausea and vomiting  Endocrine: Negative for polydipsia, polyphagia and polyuria  Genitourinary: Negative for difficulty urinating, dysuria, frequency and urgency     Musculoskeletal: Negative for arthralgias, back pain, gait problem, joint swelling, myalgias, neck pain and neck stiffness  Skin: Negative for color change, rash and wound  Neurological: Negative for dizziness, syncope, speech difficulty, weakness, light-headedness and headaches  Hematological: Negative for adenopathy  Does not bruise/bleed easily  Psychiatric/Behavioral: Negative for agitation, behavioral problems, confusion, hallucinations, sleep disturbance and suicidal ideas  The patient is not nervous/anxious            Current Medications       Current Outpatient Medications:   •  albuterol (ProAir HFA) 90 mcg/act inhaler, Inhale 1 puff every 6 (six) hours as needed for wheezing or shortness of breath, Disp: 18 g, Rfl: 3  •  Aspirin (ASPIR-81 PO), Take 81 mg by mouth, Disp: , Rfl:   •  atorvastatin (LIPITOR) 80 mg tablet, Take 1 tablet (80 mg total) by mouth daily at bedtime, Disp: 30 tablet, Rfl: 6  •  B-D UF III MINI PEN NEEDLES 31G X 5 MM MISC, Pt uses 5 pen needles daily, Disp: 450 each, Rfl: 0  •  BANOPHEN 50 MG capsule, take 1 capsule by mouth every 6 hours if needed for itching, Disp: , Rfl:   •  cholecalciferol (VITAMIN D3) 400 units tablet, TAKE ONE TABLET BY MOUTH DAILY, Disp: 90 tablet, Rfl: 1  •  citalopram (CeleXA) 40 mg tablet, TAKE ONE TABLET BY MOUTH DAILY, Disp: 90 tablet, Rfl: 1  •  Continuous Blood Gluc  (FreeStyle Refugio 14 Day [place]) TEMITOPE, Use for continuous blood glucose monitoring, Disp: 1 each, Rfl: 0  •  Continuous Blood Gluc Sensor (FreeStyle Refugio 14 Day Sensor) MISC, Use one sensor every 14 days for continuous blood sugar monitoring , Disp: 6 each, Rfl: 3  •  docusate sodium (COLACE) 100 mg capsule, TAKE ONE CAPSULE BY MOUTH TWICE DAILY, Disp: 30 capsule, Rfl: 0  •  Dulaglutide (Trulicity) 1 5 AX/3 1AN SOPN, Inject 0 5 mL (1 5 mg total) under the skin once a week, Disp: 2 mL, Rfl: 1  •  Empagliflozin (Jardiance) 25 MG TABS, Take 1 tablet (25 mg total) by mouth every morning, Disp: 90 tablet, Rfl: 1  •  famotidine (PEPCID) 40 MG tablet, Take 1 tablet (40 mg total) by mouth daily at bedtime Take in evening 2 hours prior to bed, Disp: 30 tablet, Rfl: 6  •  fenofibrate (TRICOR) 48 mg tablet, Take 1 tablet (48 mg total) by mouth daily, Disp: 90 tablet, Rfl: 3  •  glucose blood (OneTouch Verio) test strip, Use to test blood glucose 4 times a day, Disp: 200 strip, Rfl: 2  •  insulin glulisine (Apidra SoloStar) 100 units/mL injection pen, Inject 10 units before breakfast and lunch, 5 units before dinner , Disp: 30 mL, Rfl: 1  •  Iron Heme Polypeptide 12 MG TABS, Take 1 tablet by mouth, Disp: , Rfl:   •  Lantus SoloStar 100 units/mL SOPN, INJECT 45 UNITS EVERY 12 HOURS, Disp: 30 mL, Rfl: 1  •  levETIRAcetam (KEPPRA) 500 mg tablet, TAKE ONE TABLET TWICE DAILY, Disp: 180 tablet, Rfl: 1  •  linaCLOtide (Linzess) 72 MCG CAPS, Take 1 capsule by mouth daily before breakfast, Disp: 30 capsule, Rfl: 0  •  metoclopramide (REGLAN) 10 mg tablet, Take 1 tablet (10 mg total) by mouth daily, Disp: 90 tablet, Rfl: 3  •  metoprolol tartrate (LOPRESSOR) 25 mg tablet, TAKE 1 TABLET (25 MG TOTAL) BY MOUTH 2 (TWO) TIMES A DAY, Disp: 180 tablet, Rfl: 1  •  neomycin-polymyxin-hydrocortisone (CORTISPORIN) otic solution, Administer 4 drops to the right ear every 6 (six) hours, Disp: 10 mL, Rfl: 0  •  ondansetron (ZOFRAN) 4 mg tablet, Take 1 tablet (4 mg total) by mouth every 8 (eight) hours as needed for nausea or vomiting, Disp: 20 tablet, Rfl: 0  •  ondansetron (ZOFRAN-ODT) 4 mg disintegrating tablet, Take 1 tablet (4 mg total) by mouth every 6 (six) hours as needed for nausea or vomiting, Disp: 20 tablet, Rfl: 0  •  pantoprazole (PROTONIX) 40 mg tablet, Take 1 tablet (40 mg total) by mouth 2 (two) times a day, Disp: 180 tablet, Rfl: 3  •  pregabalin (LYRICA) 100 mg capsule, TAKE ONE CAPSULE BY MOUTH THREE TIMES A DAY, Disp: 270 capsule, Rfl: 1  •  solifenacin (VESICARE) 5 mg tablet, TAKE ONE TABLET BY MOUTH DAILY, Disp: 90 tablet, Rfl: 1  •  ergocalciferol (VITAMIN D2) 50,000 units, Take 1 capsule (50,000 Units total) by mouth once a week, Disp: 8 capsule, Rfl: 1  •  scopolamine (TRANSDERM-SCOP) 1 mg/3 days TD 72 hr patch, Place 1 patch on the skin every third day, Disp: 24 patch, Rfl: 1    Current Allergies     Allergies as of 11/08/2022 - Reviewed 11/08/2022   Allergen Reaction Noted   • Azithromycin GI Intolerance and Hives 07/13/2012   • Benztropine  08/09/2016   • Butorphanol Hallucinations 08/09/2016   • Penicillins  11/12/2015   • Zolpidem  11/12/2015            The following portions of the patient's history were reviewed and updated as appropriate: allergies, current medications, past family history, past medical history, past social history, past surgical history and problem list      Past Medical History:   Diagnosis Date   • Atypical chest pain    • Depression    • [facility] )    • Gastroparesis    • GERD (gastroesophageal reflux disease)    • Hyperlipidemia    • Hypertension    • Sciatica    • Seizure disorder ([facility] )        Past Surgical History:   Procedure Laterality Date   • BREAST BIOPSY Left  benign   • CHOLECYSTECTOMY     • COLONOSCOPY     • HYSTERECTOMY     • ME LAP,APPENDECTOMY N/A 3/24/2021    Procedure: APPENDECTOMY LAPAROSCOPIC;  Surgeon: Richard Hayes MD;  Location: [facility] MAIN OR;  Service: General   • ME LAP,DIAGNOSTIC ABDOMEN N/A 3/24/2021    Procedure: LAPAROSCOPY DIAGNOSTIC, EXTENSIVE DESI;  Surgeon: Richard Hayes MD;  Location: [facility] MAIN OR;  Service: General   • UPPER GASTROINTESTINAL ENDOSCOPY         Family History   Problem Relation Age of Onset   • No Known Problems Mother    • No Known Problems Father    • No Known Problems Daughter    • No Known Problems Maternal Grandmother    • No Known Problems Paternal Grandmother    • No Known Problems Paternal Aunt    • No Known Problems Paternal Aunt    • No Known Problems Paternal Aunt          Medications have been verified          Objective   /68 Pulse 74   Temp 98 8 °F (37 1 °C) (Tympanic)   Resp 20   Ht 5' 1" (1 549 m)   Wt 90 7 kg (200 lb)   SpO2 98%   BMI 37 79 kg/m²        Physical Exam     Physical Exam  Vitals and nursing note reviewed  Constitutional:       General: She is not in acute distress  Appearance: She is well-developed  She is not diaphoretic  Neck:      Thyroid: Thyromegaly (mildly enlarged on right side) present  No thyroid mass or thyroid tenderness  Trachea: No tracheal deviation  Cardiovascular:      Rate and Rhythm: Normal rate and regular rhythm  Heart sounds: Normal heart sounds  No murmur heard  Pulmonary:      Effort: Pulmonary effort is normal  No respiratory distress  Breath sounds: Normal breath sounds  No wheezing  Musculoskeletal:         General: No tenderness or deformity  Normal range of motion  Cervical back: Normal range of motion and neck supple  Skin:     General: Skin is warm and dry  Findings: No erythema or rash  Neurological:      Mental Status: She is alert and oriented to person, place, and time  Psychiatric:         Behavior: Behavior normal  Behavior is cooperative  Thought Content:  Thought content normal          Judgment: Judgment normal

## 2022-11-08 NOTE — PATIENT INSTRUCTIONS
Thyroid ultrasound scheduled  Scheduled with GI   Consider putting Refugio back on for better monitoring of glucose levels  Get bloodwork done this week as ordered  Call for problems/concerns

## 2022-11-10 ENCOUNTER — HOSPITAL ENCOUNTER (OUTPATIENT)
Dept: ULTRASOUND IMAGING | Facility: HOSPITAL | Age: 52
End: 2022-11-10

## 2022-11-10 DIAGNOSIS — K21.9 GASTROESOPHAGEAL REFLUX DISEASE WITHOUT ESOPHAGITIS: ICD-10-CM

## 2022-11-10 DIAGNOSIS — E04.1 THYROID NODULE GREATER THAN OR EQUAL TO 1.5 CM IN DIAMETER INCIDENTALLY NOTED ON IMAGING STUDY: ICD-10-CM

## 2022-11-10 RX ORDER — PANTOPRAZOLE SODIUM 40 MG/1
TABLET, DELAYED RELEASE ORAL
Qty: 380 TABLET | Refills: 2 | Status: SHIPPED | OUTPATIENT
Start: 2022-11-10

## 2022-11-14 ENCOUNTER — HOSPITAL ENCOUNTER (EMERGENCY)
Facility: HOSPITAL | Age: 52
Discharge: HOME | End: 2022-11-15
Attending: EMERGENCY MEDICINE

## 2022-11-14 ENCOUNTER — TELEPHONE (OUTPATIENT)
Dept: ENDOCRINOLOGY | Facility: CLINIC | Age: 52
End: 2022-11-14

## 2022-11-14 ENCOUNTER — APPOINTMENT (OUTPATIENT)
Dept: RADIOLOGY | Facility: CLINIC | Age: 52
End: 2022-11-14

## 2022-11-14 ENCOUNTER — APPOINTMENT (OUTPATIENT)
Dept: LAB | Facility: CLINIC | Age: 52
End: 2022-11-14

## 2022-11-14 DIAGNOSIS — R06.02 SOB (SHORTNESS OF BREATH) ON EXERTION: ICD-10-CM

## 2022-11-14 DIAGNOSIS — U07.1 COVID-19: Primary | ICD-10-CM

## 2022-11-14 DIAGNOSIS — Z79.4 TYPE 2 DIABETES MELLITUS WITH HYPERGLYCEMIA, WITH LONG-TERM CURRENT USE OF INSULIN (HCC): ICD-10-CM

## 2022-11-14 DIAGNOSIS — E11.65 TYPE 2 DIABETES MELLITUS WITH HYPERGLYCEMIA, WITH LONG-TERM CURRENT USE OF INSULIN (HCC): ICD-10-CM

## 2022-11-14 DIAGNOSIS — U07.1 COVID-19: ICD-10-CM

## 2022-11-14 DIAGNOSIS — U07.1 COVID: ICD-10-CM

## 2022-11-14 DIAGNOSIS — R53.83 OTHER FATIGUE: ICD-10-CM

## 2022-11-14 DIAGNOSIS — R06.2 WHEEZING: Primary | ICD-10-CM

## 2022-11-14 LAB
ALBUMIN SERPL BCP-MCNC: 3.3 G/DL (ref 3.5–5)
ALP SERPL-CCNC: 92 U/L (ref 46–116)
ALT SERPL W P-5'-P-CCNC: 54 U/L (ref 12–78)
ANION GAP SERPL CALCULATED.3IONS-SCNC: 9 MMOL/L (ref 4–13)
AST SERPL W P-5'-P-CCNC: 43 U/L (ref 5–45)
BILIRUB SERPL-MCNC: 0.46 MG/DL (ref 0.2–1)
BUN SERPL-MCNC: 28 MG/DL (ref 5–25)
CALCIUM ALBUM COR SERPL-MCNC: 9.8 MG/DL (ref 8.3–10.1)
CALCIUM SERPL-MCNC: 9.2 MG/DL (ref 8.3–10.1)
CHLORIDE SERPL-SCNC: 101 MMOL/L (ref 96–108)
CO2 SERPL-SCNC: 23 MMOL/L (ref 21–32)
CREAT SERPL-MCNC: 1.63 MG/DL (ref 0.6–1.3)
GFR SERPL CREATININE-BSD FRML MDRD: 36 ML/MIN/1.73SQ M
GLUCOSE P FAST SERPL-MCNC: 267 MG/DL (ref 65–99)
POTASSIUM SERPL-SCNC: 4.2 MMOL/L (ref 3.5–5.3)
PROT SERPL-MCNC: 7.9 G/DL (ref 6.4–8.4)
SODIUM SERPL-SCNC: 133 MMOL/L (ref 135–147)
TSH SERPL DL<=0.05 MIU/L-ACNC: 1.09 UIU/ML (ref 0.45–4.5)

## 2022-11-14 RX ORDER — BUDESONIDE AND FORMOTEROL FUMARATE DIHYDRATE 80; 4.5 UG/1; UG/1
2 AEROSOL RESPIRATORY (INHALATION) 2 TIMES DAILY
Qty: 10.2 G | Refills: 0 | Status: SHIPPED | OUTPATIENT
Start: 2022-11-14

## 2022-11-14 NOTE — TELEPHONE ENCOUNTER
She has an allergy to zithromax and can't take prednisone due to her uncontrolled glucose levels  She is out of the time window for Paxlovid  You could offer her a steroid inhaler like Pulmicort or Symbicort depending on insurance coverage  Is she using her albuterol? Does she want a chest xray?

## 2022-11-14 NOTE — TELEPHONE ENCOUNTER
Patient tested positive for covid today  Symptoms started about Thursday last week  Cough, sinus congestion, little appetite  Reports a hard time breathing at night time  Patient does have a Pulse OX but cannot find it and hasn't used it while being sick  Reports a little bit of chest pressure and a little SOB when walking  Please advise       [phone]

## 2022-11-14 NOTE — TELEPHONE ENCOUNTER
CGM review:   Device used:  Refugio, home use  Indication: Type 2 Diabetes  More than 72 hours of data was reviewed  Report to be scanned to chart  Date Range: 10/17/22 - 11/13/22  Analysis of data:   % time CGM used: 46%  Average Glucose: 261  GMI - 9 6%  Coefficient of Variation: 38 2%  Time in Target Range: 28%  Time Above Range: 19% high, 53% very high  Time Below Range: 0% low, 0% very low  Interpretation of data: CGM data improving with BG averages, although data available still lacking (only 46% active)  Target range BG levels improving - 28% now compared to only 5% at prior! Discussed with [facility] via phone  Of note, she was recently diagnosed with Covid, lost appetite and not eating or drinking much due to throat pain  She has NOT taken any insulin the past few days for this reason  Despite this, she is pretty consistently at a BG > 180 regardless, into the 300s at times over the past few days  Encouraged [facility] to try Lantus at a reduced dose of 10 units daily, and Apidra to 2 units only with meals and snacks, with the instruction to increase a few units at a time as she is comfortable, and ok to wait until seeing how much she eats before injecting Apidra  Ideally, 50% of her usual basal-bolus insulin requirements were recommended, but she would like to start very low due to fear of hypoglycemia  Reviewed our BG goal is still to get her to < 200, and to continue to wear and scan CGM for protective benefit from extreme hyper- and hypoglycemia events  Also reminded to obtain labs - TSH and CMP, when able

## 2022-11-15 VITALS
SYSTOLIC BLOOD PRESSURE: 123 MMHG | OXYGEN SATURATION: 96 % | TEMPERATURE: 97.8 F | RESPIRATION RATE: 16 BRPM | HEART RATE: 69 BPM | DIASTOLIC BLOOD PRESSURE: 75 MMHG

## 2022-11-15 DIAGNOSIS — E78.1 HYPERTRIGLYCERIDEMIA: ICD-10-CM

## 2022-11-15 RX ORDER — PREDNISONE 10 MG/1
TABLET ORAL
Qty: 35 TABLET | Refills: 0 | Status: SHIPPED | OUTPATIENT
Start: 2022-11-15 | End: 2022-11-21 | Stop reason: ALTCHOICE

## 2022-11-15 RX ORDER — LIDOCAINE HYDROCHLORIDE 20 MG/ML
15 SOLUTION OROPHARYNGEAL ONCE
Status: COMPLETED | OUTPATIENT
Start: 2022-11-15 | End: 2022-11-15

## 2022-11-15 RX ORDER — ALBUTEROL SULFATE 90 UG/1
2 AEROSOL, METERED RESPIRATORY (INHALATION) ONCE
Status: COMPLETED | OUTPATIENT
Start: 2022-11-15 | End: 2022-11-15

## 2022-11-15 RX ADMIN — PREDNISONE 50 MG: 10 TABLET ORAL at 00:37

## 2022-11-15 RX ADMIN — ALBUTEROL SULFATE 2 PUFF: 90 AEROSOL, METERED RESPIRATORY (INHALATION) at 00:37

## 2022-11-15 RX ADMIN — LIDOCAINE HYDROCHLORIDE 15 ML: 20 SOLUTION ORAL; TOPICAL at 00:36

## 2022-11-15 NOTE — ED PROVIDER NOTES
History  Chief Complaint   Patient presents with   • Shortness of Breath     Pt reports at home covid + test, SOB, muscle aches, back pain, chest pain     77-year-old female with past medical history diabetes, hypertension, hyperlipidemia presenting with coughing, wheezing, muscle aches over last several days  Patient has been taking Tylenol for fevers and body aches  She tested positive for COVID this morning  She had outpatient blood work and chest x-ray today  She denies chest pain, nausea, vomiting  Does report a sore throat which is worse with swallowing  Patient states her wheezing is improved after she gives herself albuterol, however then returns afterwards  Shortness of Breath  Severity:  Mild  Onset quality:  Gradual  Timing:  Constant  Chronicity:  New  Relieved by: Inhaler  Worsened by:  Nothing  Ineffective treatments:  None tried  Associated symptoms: fever and wheezing    Associated symptoms: no abdominal pain, no chest pain, no cough, no rash, no sore throat and no vomiting        Prior to Admission Medications   Prescriptions Last Dose Informant Patient Reported? Taking? Aspirin (ASPIR-81 PO)   Yes No   Sig: Take 81 mg by mouth   B-D UF III MINI PEN NEEDLES 31G X 5 MM MISC   No No   Sig: Pt uses 5 pen needles daily   BANOPHEN 50 MG capsule   Yes No   Sig: take 1 capsule by mouth every 6 hours if needed for itching   Continuous Blood Gluc  (FreeStyle Refugio 14 Day [place]) TEMITOPE   No No   Sig: Use for continuous blood glucose monitoring   Continuous Blood Gluc Sensor (FreeStyle Refugio 14 Day Sensor) MISC   No No   Sig: Use one sensor every 14 days for continuous blood sugar monitoring     Dulaglutide (Trulicity) 1 5 /3 9VJ SOPN   No No   Sig: Inject 0 5 mL (1 5 mg total) under the skin once a week   Iron Heme Polypeptide 12 MG TABS   Yes No   Sig: Take 1 tablet by mouth   Lantus SoloStar 100 units/mL SOPN   No No   Sig: INJECT 45 UNITS EVERY 12 HOURS   atorvastatin (LIPITOR) 80 mg tablet   No No   Sig: Take 1 tablet (80 mg total) by mouth daily at bedtime   budesonide-formoterol (Symbicort) 80-4 5 MCG/ACT inhaler   No No   Sig: Inhale 2 puffs 2 (two) times a day Rinse mouth after use  cholecalciferol (VITAMIN D3) 400 units tablet   No No   Sig: TAKE ONE TABLET BY MOUTH DAILY   citalopram (CeleXA) 40 mg tablet   No No   Sig: TAKE ONE TABLET BY MOUTH DAILY   docusate sodium (COLACE) 100 mg capsule   No No   Sig: TAKE ONE CAPSULE BY MOUTH TWICE DAILY   ergocalciferol (VITAMIN D2) 50,000 units   No No   Sig: Take 1 capsule (50,000 Units total) by mouth once a week   famotidine (PEPCID) 40 MG tablet   No No   Sig: Take 1 tablet (40 mg total) by mouth daily at bedtime Take in evening 2 hours prior to bed   glucose blood (OneTouch Verio) test strip   No No   Sig: Use to test blood glucose 4 times a day   insulin glulisine (Apidra SoloStar) 100 units/mL injection pen   No No   Sig: Inject 10 units before breakfast and lunch, 5 units before dinner     levETIRAcetam (KEPPRA) 500 mg tablet   No No   Sig: TAKE ONE TABLET TWICE DAILY   linaCLOtide (Linzess) 72 MCG CAPS   No No   Sig: Take 1 capsule by mouth daily before breakfast   metoclopramide (REGLAN) 10 mg tablet   No No   Sig: Take 1 tablet (10 mg total) by mouth daily   metoprolol tartrate (LOPRESSOR) 25 mg tablet   No No   Sig: TAKE 1 TABLET (25 MG TOTAL) BY MOUTH 2 (TWO) TIMES A DAY   neomycin-polymyxin-hydrocortisone (CORTISPORIN) otic solution   No No   Sig: Administer 4 drops to the right ear every 6 (six) hours   ondansetron (ZOFRAN) 4 mg tablet   No No   Sig: Take 1 tablet (4 mg total) by mouth every 8 (eight) hours as needed for nausea or vomiting   ondansetron (ZOFRAN-ODT) 4 mg disintegrating tablet   No No   Sig: Take 1 tablet (4 mg total) by mouth every 6 (six) hours as needed for nausea or vomiting   pantoprazole (PROTONIX) 40 mg tablet   No No   Sig: TAKE ONE TABLET BY MOUTH TWICE DAILY   pregabalin (LYRICA) 100 mg capsule   No No Sig: TAKE ONE CAPSULE BY MOUTH THREE TIMES A DAY   scopolamine (TRANSDERM-SCOP) 1 mg/3 days TD 72 hr patch   No No   Sig: Place 1 patch on the skin every third day   solifenacin (VESICARE) 5 mg tablet   No No   Sig: TAKE ONE TABLET BY MOUTH DAILY      Facility-Administered Medications: None       Past Medical History:   Diagnosis Date   • Atypical chest pain    • Depression    • Diabetes mellitus ([place] [address] )    • Gastroparesis    • GERD (gastroesophageal reflux disease)    • Hyperlipidemia    • Hypertension    • Sciatica    • Seizure disorder ([place] [address] )        Past Surgical History:   Procedure Laterality Date   • BREAST BIOPSY Left  benign   • CHOLECYSTECTOMY     • COLONOSCOPY     • HYSTERECTOMY     • UT LAP,APPENDECTOMY N/A 3/24/2021    Procedure: APPENDECTOMY LAPAROSCOPIC;  Surgeon: Cecelia Lloyd MD;  Location: [address] [place] [address] MAIN OR;  Service: General   • UT LAP,DIAGNOSTIC ABDOMEN N/A 3/24/2021    Procedure: LAPAROSCOPY DIAGNOSTIC, EXTENSIVE DESI;  Surgeon: Cecelia Lloyd MD;  Location: [address] [place] [address] MAIN OR;  Service: General   • UPPER GASTROINTESTINAL ENDOSCOPY         Family History   Problem Relation Age of Onset   • No Known Problems Mother    • No Known Problems Father    • No Known Problems Daughter    • No Known Problems Maternal Grandmother    • No Known Problems Paternal Grandmother    • No Known Problems Paternal Aunt    • No Known Problems Paternal Aunt    • No Known Problems Paternal Aunt      I have reviewed and agree with the history as documented      E-Cigarette/Vaping   • E-Cigarette Use Never User      E-Cigarette/Vaping Substances   • Nicotine No    • THC No    • CBD No    • Flavoring No    • Other No    • Unknown No      Social History     Tobacco Use   • Smoking status: Former     Types: Cigarettes     Quit date:      Years since quittin 8   • Smokeless tobacco: Never   Vaping Use   • Vaping Use: Never used   Substance Use Topics   • Alcohol use: Never   • Drug use: Never       Review of Systems Constitutional: Positive for chills and fever  HENT: Negative for congestion, nosebleeds, rhinorrhea and sore throat  Eyes: Negative for pain and visual disturbance  Respiratory: Positive for shortness of breath and wheezing  Negative for cough  Cardiovascular: Negative for chest pain and leg swelling  Gastrointestinal: Negative for abdominal distention, abdominal pain, diarrhea, nausea and vomiting  Genitourinary: Negative for dysuria and frequency  Musculoskeletal: Positive for arthralgias and myalgias  Negative for back pain and joint swelling  Skin: Negative for rash and wound  Neurological: Negative for weakness and numbness  Psychiatric/Behavioral: Negative for decreased concentration and suicidal ideas  Physical Exam  Physical Exam  Constitutional:       Appearance: She is well-developed  HENT:      Head: Normocephalic and atraumatic  Eyes:      Conjunctiva/sclera: Conjunctivae normal       Pupils: Pupils are equal, round, and reactive to light  Neck:      Trachea: No tracheal deviation  Cardiovascular:      Rate and Rhythm: Normal rate and regular rhythm  Heart sounds: Normal heart sounds  No murmur heard  Pulmonary:      Effort: Pulmonary effort is normal  No respiratory distress  Breath sounds: Normal breath sounds  No wheezing (scatterred) or rales  Abdominal:      General: Bowel sounds are normal  There is no distension  Palpations: Abdomen is soft  Tenderness: There is no abdominal tenderness  Musculoskeletal:         General: No deformity  Cervical back: Normal range of motion and neck supple  Skin:     General: Skin is warm and dry  Capillary Refill: Capillary refill takes less than 2 seconds  Neurological:      Mental Status: She is alert and oriented to person, place, and time  Sensory: No sensory deficit     Psychiatric:         Judgment: Judgment normal          Vital Signs  ED Triage Vitals [11/14/22 [address] Temperature Pulse Respirations Blood Pressure SpO2   97 8 °F (36 6 °C) 93 16 123/75 95 %      Temp Source Heart Rate Source Patient Position - Orthostatic VS BP Location FiO2 (%)   Oral Monitor Sitting Left arm --      Pain Score       --           Vitals:    11/14/22 2355 11/15/22 0016   BP: 123/75    Pulse: 93 69   Patient Position - Orthostatic VS: Sitting          Visual Acuity      ED Medications  Medications   Lidocaine Viscous HCl (XYLOCAINE) 2 % mucosal solution 15 mL (15 mL Swish & Swallow Given 11/15/22 0036)   predniSONE tablet 50 mg (50 mg Oral Given 11/15/22 0037)   albuterol (PROVENTIL HFA,VENTOLIN HFA) inhaler 2 puff (2 puffs Inhalation Given 11/15/22 0037)       Diagnostic Studies  Results Reviewed     None                 No orders to display              Procedures  Procedures         ED Course                               SBIRT 20yo+    Flowsheet Row Most Recent Value   SBIRT (25 yo +)    In order to provide better care to our patients, we are screening all of our patients for alcohol and drug use  Would it be okay to ask you these screening questions? Yes Filed at: 11/15/2022 0001   Initial Alcohol Screen: US AUDIT-C     1  How often do you have a drink containing alcohol? 0 Filed at: 11/15/2022 0001   2  How many drinks containing alcohol do you have on a typical day you are drinking? 0 Filed at: 11/15/2022 0001   3a  Male UNDER 65: How often do you have five or more drinks on one occasion? 0 Filed at: 11/15/2022 0001   3b  FEMALE Any Age, or MALE 65+: How often do you have 4 or more drinks on one occassion? 0 Filed at: 11/15/2022 0001   Audit-C Score 0 Filed at: 11/15/2022 0001   LILIA: How many times in the past year have you    Used an illegal drug or used a prescription medication for non-medical reasons?  Never Filed at: 11/15/2022 0001                    MDM  Number of Diagnoses or Management Options  COVID: new and requires workup  Wheezing: new and requires workup  Diagnosis management comments: 75-year-old female presenting with signs and symptoms consistent with COVID,  She is between 95 and 96% on room air, resting comfortably without signs of respiratory distress  Symptoms not consistent with a cardiac etiology  Her symptoms are easily explained by the COVID as well as E wheezing that she is having, and improvement with albuterol  A pulmonary embolism a not improve with albuterol, and would not have wheezing on exam   Additionally she is not tachycardic, normal oxygen saturation, and minimally at risk for this diagnosis  I believe further testing for this would expose patient to unnecessary risk (need for dialysis), especially given her baseline depressed GFR  Her Labs were reassuring and CXR taken today was normal       Amount and/or Complexity of Data Reviewed  Review and summarize past medical records: yes  Independent visualization of images, tracings, or specimens: yes    Risk of Complications, Morbidity, and/or Mortality  Presenting problems: moderate  Diagnostic procedures: minimal  Management options: moderate        Disposition  Final diagnoses:   Wheezing   COVID     Time reflects when diagnosis was documented in both MDM as applicable and the Disposition within this note     Time User Action Codes Description Comment    11/15/2022 12:21 AM Enriqueta Nash Add [R06 2] Wheezing     11/15/2022 12:21 AM Enriqueta Nash Add [U07 1] [facility]       ED Disposition     ED Disposition   Discharge    Condition   Stable    Date/Time   Tue Nov 15, 2022 12:21 AM    Comment   Karlee Lee discharge to home/self care                 Follow-up Information     Follow up With Specialties Details Why [address], [address] [place] [address], Nurse Practitioner, Emergency Medicine Schedule an appointment as soon as possible for a visit   [facility] [address]  [phone]            Discharge Medication List as of 11/15/2022 12:22 AM      START taking these medications    Details   predniSONE 10 mg tablet Multiple Dosages:Starting Tue 11/15/2022, Until Sat 11/19/2022 at 2359, THEN Starting Sun 11/20/2022, Until Sun 11/20/2022 at 2359, THEN Starting Mon 11/21/2022, Until Mon 11/21/2022 at 2359, THEN Starting Tue 11/22/2022, Until Tue 11/22/2022 at 2359 , THEN Starting Wed 11/23/2022, Until Wed 11/23/2022 at 2359Take 5 tablets (50 mg total) by mouth daily for 5 days, THEN 4 tablets (40 mg total) daily for 1 day, THEN 3 tablets (30 mg total) daily for 1 day, THEN 2 tablets (20 mg total) daily for 1 day , THEN 1 tablet (10 mg total) daily for 1 day , Normal         CONTINUE these medications which have NOT CHANGED    Details   Aspirin (ASPIR-81 PO) Take 81 mg by mouth, Starting Mon 2/20/2012, Historical Med      atorvastatin (LIPITOR) 80 mg tablet Take 1 tablet (80 mg total) by mouth daily at bedtime, Starting Wed 9/7/2022, Normal      B-D UF III MINI PEN NEEDLES 31G X 5 MM MISC Pt uses 5 pen needles daily, Normal      BANOPHEN 50 MG capsule take 1 capsule by mouth every 6 hours if needed for itching, Historical Med      budesonide-formoterol (Symbicort) 80-4 5 MCG/ACT inhaler Inhale 2 puffs 2 (two) times a day Rinse mouth after use , Starting Mon 11/14/2022, Normal      cholecalciferol (VITAMIN D3) 400 units tablet TAKE ONE TABLET BY MOUTH DAILY, Normal      citalopram (CeleXA) 40 mg tablet TAKE ONE TABLET BY MOUTH DAILY, Normal      Continuous Blood Gluc  (FreeStyle Refugio 14 Day [place]) TEMITOPE Use for continuous blood glucose monitoring, Print      Continuous Blood Gluc Sensor (FreeStyle Refugio 14 Day Sensor) MISC Use one sensor every 14 days for continuous blood sugar monitoring , Print      docusate sodium (COLACE) 100 mg capsule TAKE ONE CAPSULE BY MOUTH TWICE DAILY, Normal      Dulaglutide (Trulicity) 1 5 DF/1 2SQ SOPN Inject 0 5 mL (1 5 mg total) under the skin once a week, Starting Wed 10/26/2022, Normal      ergocalciferol (VITAMIN D2) 50,000 units Take 1 capsule (50,000 Units total) by mouth once a week, Starting Wed 10/26/2022, Normal      famotidine (PEPCID) 40 MG tablet Take 1 tablet (40 mg total) by mouth daily at bedtime Take in evening 2 hours prior to bed, Starting Fri 9/30/2022, Normal      glucose blood (OneTouch Verio) test strip Use to test blood glucose 4 times a day, Normal      insulin glulisine (Apidra SoloStar) 100 units/mL injection pen Inject 10 units before breakfast and lunch, 5 units before dinner , Normal      Iron Heme Polypeptide 12 MG TABS Take 1 tablet by mouth, Historical Med      Lantus SoloStar 100 units/mL SOPN INJECT 45 UNITS EVERY 12 HOURS, Normal      levETIRAcetam (KEPPRA) 500 mg tablet TAKE ONE TABLET TWICE DAILY, Normal      linaCLOtide (Linzess) 72 MCG CAPS Take 1 capsule by mouth daily before breakfast, Starting Thu 2/24/2022, Normal      metoclopramide (REGLAN) 10 mg tablet Take 1 tablet (10 mg total) by mouth daily, Starting Mon 2/21/2022, Normal      metoprolol tartrate (LOPRESSOR) 25 mg tablet TAKE 1 TABLET (25 MG TOTAL) BY MOUTH 2 (TWO) TIMES A DAY, Starting Wed 11/2/2022, Normal      neomycin-polymyxin-hydrocortisone (CORTISPORIN) otic solution Administer 4 drops to the right ear every 6 (six) hours, Starting Fri 8/13/2021, Normal      ondansetron (ZOFRAN) 4 mg tablet Take 1 tablet (4 mg total) by mouth every 8 (eight) hours as needed for nausea or vomiting, Starting Mon 4/12/2021, Normal      ondansetron (ZOFRAN-ODT) 4 mg disintegrating tablet Take 1 tablet (4 mg total) by mouth every 6 (six) hours as needed for nausea or vomiting, Starting Tue 9/7/2021, Normal      pantoprazole (PROTONIX) 40 mg tablet TAKE ONE TABLET BY MOUTH TWICE DAILY, Normal      pregabalin (LYRICA) 100 mg capsule TAKE ONE CAPSULE BY MOUTH THREE TIMES A DAY, Normal      scopolamine (TRANSDERM-SCOP) 1 mg/3 days TD 72 hr patch Place 1 patch on the skin every third day, Starting Mon 10/3/2022, Normal      solifenacin (VESICARE) 5 mg tablet TAKE ONE TABLET BY MOUTH DAILY, Normal      albuterol (ProAir HFA) 90 mcg/act inhaler Inhale 1 puff every 6 (six) hours as needed for wheezing or shortness of breath, Starting Thu 4/7/2022, Normal      Empagliflozin (Jardiance) 25 MG TABS Take 1 tablet (25 mg total) by mouth every morning, Starting Thu 6/9/2022, Normal      fenofibrate (TRICOR) 48 mg tablet Take 1 tablet (48 mg total) by mouth daily, Starting Mon 1/17/2022, Normal             No discharge procedures on file      PDMP Review       Value Time User    PDMP Reviewed  Yes 3/26/2021 10:43 AM Eber Rahman PA-C          ED Provider  Electronically Signed by           Sabino January, DO 11/17/22 2051

## 2022-11-16 DIAGNOSIS — E11.43 TYPE 2 DIABETES MELLITUS WITH DIABETIC AUTONOMIC NEUROPATHY, WITH LONG-TERM CURRENT USE OF INSULIN (HCC): ICD-10-CM

## 2022-11-16 DIAGNOSIS — Z79.4 TYPE 2 DIABETES MELLITUS WITH DIABETIC AUTONOMIC NEUROPATHY, WITH LONG-TERM CURRENT USE OF INSULIN (HCC): ICD-10-CM

## 2022-11-16 DIAGNOSIS — J45.20 MILD INTERMITTENT ASTHMA WITHOUT COMPLICATION: ICD-10-CM

## 2022-11-16 LAB
ATRIAL RATE: 68 BPM
P AXIS: 25 DEGREES
PR INTERVAL: 160 MS
QRS AXIS: 8 DEGREES
QRSD INTERVAL: 76 MS
QT INTERVAL: 448 MS
QTC INTERVAL: 476 MS
T WAVE AXIS: 19 DEGREES
VENTRICULAR RATE: 68 BPM

## 2022-11-16 RX ORDER — ALBUTEROL SULFATE 90 UG/1
AEROSOL, METERED RESPIRATORY (INHALATION)
Qty: 36 G | Refills: 3 | Status: SHIPPED | OUTPATIENT
Start: 2022-11-16

## 2022-11-16 RX ORDER — FENOFIBRATE 48 MG/1
TABLET, COATED ORAL
Qty: 90 TABLET | Refills: 3 | Status: SHIPPED | OUTPATIENT
Start: 2022-11-16

## 2022-11-18 NOTE — PROGRESS NOTES
Oz Pereira 46 y o  female MRN: [id number]    Encounter: [id number]      Assessment/Plan   Problem List Items Addressed This Visit        Endocrine    Type 2 diabetes mellitus with hyperglycemia, with long-term current use of insulin ([address] )       Lab Results   Component Value Date    HGBA1C 13 3 (H) 10/24/2022 ·   Until recent acute illness  BG control was greatly improving based on CGM review  Currently, however, she is recovering from Covid and just finished steroid taper, BG have been variable  · Would recommend she continue to slowly increase Lantus and Apidra as appetite increases, with goal dosing of Lantus 40 units daily (currently 5 units daily in setting of acute illness), Apidra 10 - 10 - 5 (currently 5 units TID AC) if tolerated  · Continue Trulicity at 8 3OS weekly dose for now  · Continue Jardiance 25mg daily  · HOLD metformin due to CKD, GFR < 40 recently  · Continue CGM use ASAP as this will help with BG monitoring and protective benefit of alarms when extreme highs or lows occur  · Will set notification to review CGM report in 1-2 weeks with patient to ensure stability  · Follow up in office in 2 months  · Repeat A1c in 2 months time  Relevant Medications    Insulin Glargine Solostar (Lantus SoloStar) 100 UNIT/ML SOPN       Cardiovascular and Mediastinum    Hypotension     · Now resolved  Off lisinopril  Continue to hold  Benign hypertension with CKD (chronic kidney disease) stage III [facility])     Lab Results   Component Value Date    EGFR 36 11/14/2022    EGFR 38 10/24/2022    EGFR 38 09/12/2022    CREATININE 1 63 (H) 11/14/2022    CREATININE 1 54 (H) 10/24/2022    CREATININE 1 55 (H) 09/12/2022 ·   Creatinine trends continue to worsen but this was in the setting of acute illness  · Continue to hold ACE-I and metformin     · Follow up BMP in 1 week        Other Visit Diagnoses     Type 2 diabetes mellitus with diabetic autonomic neuropathy, with long-term current use of insulin (HCC)    -  Primary    Relevant Medications    Insulin Glargine Solostar (Lantus SoloStar) 100 UNIT/ML SOPN    Other Relevant Orders    HEMOGLOBIN A1C W/ EAG ESTIMATION Lab Collect    Basic metabolic panel Lab Collect          CC: Diabetes  History of Present Illness     HPI:  Huy Vu unfortunately has been recently diagnosed with Covid, and required a recent ER visit 11/15/22, where she was prescribed oral steroids due to wheezing / SOB  Current Diabetic medication regimen:   Apidra reduced to 5 units with meals (prescribed dose is 10-5-5), Lantus only 5 units for now (prescribed dose is 40 units daily)  Jardiance 25mg daily  Trulicity 8 1QV weekly  Of note, metformin and lisinopril were recently discontinued due to worsening renal function as well as borderline hypotension  Huy Vu usually wears Refugio CGM, but has removed it in the setting of acute illness, going back to 2-4 times daily fingerstick testing  BG values were running as high as 300-400s last week when acutely ill and on steroid taper  Now, since discontinuing steroids yesterday, her BG this morning is in the 90s  Yesterday BG was in the 200s at [place], nothing > 300  Appetite and food intake is still very limited, she is getting her smell and taste back so feels things are slowly improving, will make attempt to eat more over the next several days  Weight reviewed 195lbs --> 186 since October  Has upcoming cruise planned mid-December  Would like to have BG stable prior to leaving  Review of Systems   Constitutional: Negative for chills and fever  HENT: Negative for ear pain and sore throat  Eyes: Negative for pain and visual disturbance  Respiratory: Positive for cough (residual from acute illness)  Negative for shortness of breath  Cardiovascular: Negative for chest pain and palpitations  Gastrointestinal: Negative for abdominal pain and vomiting  Genitourinary: Negative for dysuria and hematuria  Musculoskeletal: Negative for arthralgias and back pain  Skin: Negative for color change and rash  Neurological: Negative for seizures and syncope  All other systems reviewed and are negative        Historical Information   Past Medical History:   Diagnosis Date   • Atypical chest pain    • Depression    • Diabetes mellitus (HCC)    • Gastroparesis    • GERD (gastroesophageal reflux disease)    • Hyperlipidemia    • Hypertension    • Sciatica    • Seizure disorder [facility])      Past Surgical History:   Procedure Laterality Date   • BREAST BIOPSY Left  benign   • CHOLECYSTECTOMY     • COLONOSCOPY     • HYSTERECTOMY     • CA LAP,APPENDECTOMY N/A 3/24/2021    Procedure: APPENDECTOMY LAPAROSCOPIC;  Surgeon: Glenna Ambriz MD;  Location: [facility] MAIN OR;  Service: General   • CA LAP,DIAGNOSTIC ABDOMEN N/A 3/24/2021    Procedure: LAPAROSCOPY DIAGNOSTIC, EXTENSIVE DESI;  Surgeon: Glenna Ambriz MD;  Location: [facility] MAIN OR;  Service: General   • UPPER GASTROINTESTINAL ENDOSCOPY       Social History   Social History     Substance and Sexual Activity   Alcohol Use Never     Social History     Substance and Sexual Activity   Drug Use Never     Social History     Tobacco Use   Smoking Status Former   • Types: Cigarettes   • Quit date:    • Years since quittin 9   Smokeless Tobacco Never     Family History:   Family History   Problem Relation Age of Onset   • No Known Problems Mother    • No Known Problems Father    • No Known Problems Daughter    • No Known Problems Maternal Grandmother    • No Known Problems Paternal Grandmother    • No Known Problems Paternal Aunt    • No Known Problems Paternal Aunt    • No Known Problems Paternal Aunt        Meds/Allergies   Current Outpatient Medications   Medication Sig Dispense Refill   • albuterol (PROVENTIL HFA,VENTOLIN HFA) 90 mcg/act inhaler INHALE ONE PUFF BY MOUTH AND INTO THE LUNGS EVERY 6 HOURS AS NEEDED FOR WHEEZING 36 g 3   • Aspirin (ASPIR-81 PO) Take 81 mg by mouth • atorvastatin (LIPITOR) 80 mg tablet Take 1 tablet (80 mg total) by mouth daily at bedtime 30 tablet 6   • B-D UF III MINI PEN NEEDLES 31G X 5 MM MISC Pt uses 5 pen needles daily 450 each 0   • BANOPHEN 50 MG capsule take 1 capsule by mouth every 6 hours if needed for itching     • budesonide-formoterol (Symbicort) 80-4 5 MCG/ACT inhaler Inhale 2 puffs 2 (two) times a day Rinse mouth after use  10 2 g 0   • cholecalciferol (VITAMIN D3) 400 units tablet TAKE ONE TABLET BY MOUTH DAILY 90 tablet 1   • citalopram (CeleXA) 40 mg tablet TAKE ONE TABLET BY MOUTH DAILY 90 tablet 1   • Continuous Blood Gluc  (FreeStyle Refugio 14 Day [place]) [facility] Use for continuous blood glucose monitoring 1 each 0   • Continuous Blood Gluc Sensor (FreeStyle Refugio 14 Day Sensor) MISC Use one sensor every 14 days for continuous blood sugar monitoring  6 each 3   • docusate sodium (COLACE) 100 mg capsule TAKE ONE CAPSULE BY MOUTH TWICE DAILY 30 capsule 0   • Dulaglutide (Trulicity) 1 5 MT/8 1AD SOPN Inject 0 5 mL (1 5 mg total) under the skin once a week 2 mL 1   • Empagliflozin (Jardiance) 25 MG TABS Take 1 tablet (25 mg total) by mouth every morning 90 tablet 1   • ergocalciferol (VITAMIN D2) 50,000 units Take 1 capsule (50,000 Units total) by mouth once a week 8 capsule 1   • famotidine (PEPCID) 40 MG tablet Take 1 tablet (40 mg total) by mouth daily at bedtime Take in evening 2 hours prior to bed 30 tablet 6   • fenofibrate (TRICOR) 48 mg tablet TAKE ONE TABLET BY MOUTH DAILY 90 tablet 3   • glucose blood (OneTouch Verio) test strip Use to test blood glucose 4 times a day 200 strip 2   • insulin glulisine (Apidra SoloStar) 100 units/mL injection pen Inject 10 units before breakfast and lunch, 5 units before dinner   30 mL 1   • Iron Heme Polypeptide 12 MG TABS Take 1 tablet by mouth     • Lantus SoloStar 100 units/mL SOPN INJECT 45 UNITS EVERY 12 HOURS 30 mL 1   • levETIRAcetam (KEPPRA) 500 mg tablet TAKE ONE TABLET TWICE DAILY 180 tablet 1   • linaCLOtide (Linzess) 72 MCG CAPS Take 1 capsule by mouth daily before breakfast 30 capsule 0   • metoclopramide (REGLAN) 10 mg tablet Take 1 tablet (10 mg total) by mouth daily 90 tablet 3   • metoprolol tartrate (LOPRESSOR) 25 mg tablet TAKE 1 TABLET (25 MG TOTAL) BY MOUTH 2 (TWO) TIMES A  tablet 1   • neomycin-polymyxin-hydrocortisone (CORTISPORIN) otic solution Administer 4 drops to the right ear every 6 (six) hours 10 mL 0   • ondansetron (ZOFRAN) 4 mg tablet Take 1 tablet (4 mg total) by mouth every 8 (eight) hours as needed for nausea or vomiting 20 tablet 0   • ondansetron (ZOFRAN-ODT) 4 mg disintegrating tablet Take 1 tablet (4 mg total) by mouth every 6 (six) hours as needed for nausea or vomiting 20 tablet 0   • pantoprazole (PROTONIX) 40 mg tablet TAKE ONE TABLET BY MOUTH TWICE DAILY 380 tablet 2   • predniSONE 10 mg tablet Take 5 tablets (50 mg total) by mouth daily for 5 days, THEN 4 tablets (40 mg total) daily for 1 day, THEN 3 tablets (30 mg total) daily for 1 day, THEN 2 tablets (20 mg total) daily for 1 day, THEN 1 tablet (10 mg total) daily for 1 day  35 tablet 0   • pregabalin (LYRICA) 100 mg capsule TAKE ONE CAPSULE BY MOUTH THREE TIMES A  capsule 1   • scopolamine (TRANSDERM-SCOP) 1 mg/3 days TD 72 hr patch Place 1 patch on the skin every third day 24 patch 1   • solifenacin (VESICARE) 5 mg tablet TAKE ONE TABLET BY MOUTH DAILY 90 tablet 1     No current facility-administered medications for this visit  Allergies   Allergen Reactions   • Azithromycin GI Intolerance and Hives   • Benztropine    • Butorphanol Hallucinations   • Penicillins    • Zolpidem        Objective   Vitals: Blood pressure 118/72, pulse 83, height 5' 2" (1 575 m), weight 84 5 kg (186 lb 3 2 oz), not currently breastfeeding  Physical Exam  Vitals reviewed  Constitutional:       General: She is not in acute distress  Appearance: She is not ill-appearing     HENT:      Head: Normocephalic  Cardiovascular:      Rate and Rhythm: Normal rate and regular rhythm  Pulmonary:      Effort: Pulmonary effort is normal  No respiratory distress  Breath sounds: Normal breath sounds  No wheezing or rales  Musculoskeletal:      Right lower leg: No edema  Left lower leg: No edema  Skin:     Coloration: Skin is pale  Neurological:      Mental Status: She is alert  The history was obtained from the review of the chart, patient      Lab Results:   Component      Latest Ref Rng & Units 10/24/2022 11/14/2022   WBC      4 31 - 10 16 Thousand/uL 8 76    Red Blood Cell Count      3 81 - 5 12 Million/uL 4 43    Hemoglobin      11 5 - 15 4 g/dL 11 4 (L)    HCT      34 8 - 46 1 % 37 0    MCV      82 - 98 fL 84    MCH      26 8 - 34 3 pg 25 7 (L)    MCHC      31 4 - 37 4 g/dL 30 8 (L)    RDW      11 6 - 15 1 % 13 8    MPV      8 9 - 12 7 fL 11 3    Platelet Count      632 - 390 Thousands/uL 332    nRBC      /100 WBCs 0    Neutrophils %      43 - 75 % 58    Immat GRANS %      0 - 2 % 1    Lymphocytes Relative      14 - 44 % 30    Monocytes Relative      4 - 12 % 7    Eosinophils      0 - 6 % 3    Basophils Relative      0 - 1 % 1    Absolute Neutrophils      1 85 - 7 62 Thousands/µL 5 14    Immature Grans Absolute      0 00 - 0 20 Thousand/uL 0 04    Lymphocytes Absolute      0 60 - 4 47 Thousands/µL 2 66    Absolute Monocytes      0 17 - 1 22 Thousand/µL 0 64    Absolute Eosinophils      0 00 - 0 61 Thousand/µL 0 24    Basophils Absolute      0 00 - 0 10 Thousands/µL 0 04    Sodium      135 - 147 mmol/L 133 (L) 133 (L)   Potassium      3 5 - 5 3 mmol/L 5 3 4 2   Chloride      96 - 108 mmol/L 102 101   CO2      21 - 32 mmol/L 24 23   Anion Gap      4 - 13 mmol/L 7 9   BUN      5 - 25 mg/dL 34 (H) 28 (H)   Creatinine      0 60 - 1 30 mg/dL 1 54 (H) 1 63 (H)   GLUCOSE FASTING      65 - 99 mg/dL 455 (H) 267 (H)   Calcium      8 3 - 10 1 mg/dL 9 2 9 2   CORRECTED CALCIUM      8 3 - 10 1 mg/dL 9 9 9 8   AST      5 - 45 U/L 16 43   ALT      12 - 78 U/L 31 54   Alkaline Phosphatase      46 - 116 U/L 90 92   Total Protein      6 4 - 8 4 g/dL 6 9 7 9   Albumin      3 5 - 5 0 g/dL 3 1 (L) 3 3 (L)   TOTAL BILIRUBIN      0 20 - 1 00 mg/dL 0 16 (L) 0 46   eGFR      ml/min/1 73sq m 38 36   Iron Saturation      15 - 50 % 15    TIBC      250 - 450 ug/dL 350    Iron      50 - 170 ug/dL 53    Hemoglobin A1C      Normal 3 8-5 6%; PreDiabetic 5 7-6 4%; Diabetic >=6 5%; Glycemic control for adults with diabetes <7 0% % 13 3 (H)    eAG, EST AVG Glucose      mg/dl 335    Vit D, 25-Hydroxy      30 0 - 100 0 ng/mL 25 4 (L)    Ferritin      8 - 388 ng/mL 45    TSH 3RD GENERATON      0 450 - 4 500 uIU/mL  1 090       Imaging Studies: n/a    Portions of the record may have been created with voice recognition software  Occasional wrong word or "sound a like" substitutions may have occurred due to the inherent limitations of voice recognition software  Read the chart carefully and recognize, using context, where substitutions have occurred

## 2022-11-21 ENCOUNTER — OFFICE VISIT (OUTPATIENT)
Dept: ENDOCRINOLOGY | Facility: CLINIC | Age: 52
End: 2022-11-21

## 2022-11-21 VITALS
BODY MASS INDEX: 34.27 KG/M2 | SYSTOLIC BLOOD PRESSURE: 118 MMHG | HEIGHT: 62 IN | WEIGHT: 186.2 LBS | DIASTOLIC BLOOD PRESSURE: 72 MMHG | HEART RATE: 83 BPM

## 2022-11-21 DIAGNOSIS — I12.9 BENIGN HYPERTENSION WITH CKD (CHRONIC KIDNEY DISEASE) STAGE III (HCC): ICD-10-CM

## 2022-11-21 DIAGNOSIS — N18.30 BENIGN HYPERTENSION WITH CKD (CHRONIC KIDNEY DISEASE) STAGE III (HCC): ICD-10-CM

## 2022-11-21 DIAGNOSIS — E11.43 TYPE 2 DIABETES MELLITUS WITH DIABETIC AUTONOMIC NEUROPATHY, WITH LONG-TERM CURRENT USE OF INSULIN (HCC): Primary | ICD-10-CM

## 2022-11-21 DIAGNOSIS — E11.65 TYPE 2 DIABETES MELLITUS WITH HYPERGLYCEMIA, WITH LONG-TERM CURRENT USE OF INSULIN (HCC): ICD-10-CM

## 2022-11-21 DIAGNOSIS — Z79.4 TYPE 2 DIABETES MELLITUS WITH DIABETIC AUTONOMIC NEUROPATHY, WITH LONG-TERM CURRENT USE OF INSULIN (HCC): Primary | ICD-10-CM

## 2022-11-21 DIAGNOSIS — I95.9 HYPOTENSION, UNSPECIFIED HYPOTENSION TYPE: ICD-10-CM

## 2022-11-21 DIAGNOSIS — Z79.4 TYPE 2 DIABETES MELLITUS WITH HYPERGLYCEMIA, WITH LONG-TERM CURRENT USE OF INSULIN (HCC): ICD-10-CM

## 2022-11-21 RX ORDER — INSULIN GLARGINE 100 [IU]/ML
INJECTION, SOLUTION SUBCUTANEOUS
Qty: 30 ML | Refills: 1 | Status: SHIPPED
Start: 2022-11-21

## 2022-11-21 RX ORDER — INSULIN GLARGINE 100 [IU]/ML
INJECTION, SOLUTION SUBCUTANEOUS
Qty: 30 ML | Refills: 1 | Status: SHIPPED | OUTPATIENT
Start: 2022-11-21 | End: 2022-11-21

## 2022-11-21 NOTE — ASSESSMENT & PLAN NOTE
Lab Results   Component Value Date    HGBA1C 13 3 (H) 10/24/2022   · Until recent acute illness  BG control was greatly improving based on CGM review  Currently, however, she is recovering from Covid and just finished steroid taper, BG have been variable  · Would recommend she continue to slowly increase Lantus and Apidra as appetite increases, with goal dosing of Lantus 40 units daily (currently 5 units daily in setting of acute illness), Apidra 10 - 10 - 5 (currently 5 units TID AC) if tolerated  · Continue Trulicity at 1 6LN weekly dose for now  · Continue Jardiance 25mg daily  · HOLD metformin due to CKD, GFR < 40 recently  · Continue CGM use ASAP as this will help with BG monitoring and protective benefit of alarms when extreme highs or lows occur  · Will set notification to review CGM report in 1-2 weeks with patient to ensure stability  · Follow up in office in 2 months  · Repeat A1c in 2 months time

## 2022-11-21 NOTE — ASSESSMENT & PLAN NOTE
Lab Results   Component Value Date    EGFR 36 11/14/2022    EGFR 38 10/24/2022    EGFR 38 09/12/2022    CREATININE 1 63 (H) 11/14/2022    CREATININE 1 54 (H) 10/24/2022    CREATININE 1 55 (H) 09/12/2022   · Creatinine trends continue to worsen but this was in the setting of acute illness  · Continue to hold ACE-I and metformin     · Follow up BMP in 1 week

## 2022-11-25 DIAGNOSIS — K59.00 CONSTIPATION, UNSPECIFIED CONSTIPATION TYPE: ICD-10-CM

## 2022-11-25 RX ORDER — DOCUSATE SODIUM 100 MG/1
CAPSULE, LIQUID FILLED ORAL
Qty: 30 CAPSULE | Refills: 0 | Status: SHIPPED | OUTPATIENT
Start: 2022-11-25

## 2022-11-29 ENCOUNTER — OFFICE VISIT (OUTPATIENT)
Dept: FAMILY MEDICINE CLINIC | Facility: CLINIC | Age: 52
End: 2022-11-29

## 2022-11-29 VITALS
RESPIRATION RATE: 20 BRPM | OXYGEN SATURATION: 99 % | WEIGHT: 187 LBS | HEART RATE: 78 BPM | DIASTOLIC BLOOD PRESSURE: 74 MMHG | SYSTOLIC BLOOD PRESSURE: 130 MMHG | HEIGHT: 62 IN | TEMPERATURE: 97.4 F | BODY MASS INDEX: 34.41 KG/M2

## 2022-11-29 DIAGNOSIS — E11.42 DIABETIC POLYNEUROPATHY ASSOCIATED WITH TYPE 2 DIABETES MELLITUS (HCC): ICD-10-CM

## 2022-11-29 DIAGNOSIS — E11.65 TYPE 2 DIABETES MELLITUS WITH HYPERGLYCEMIA, WITH LONG-TERM CURRENT USE OF INSULIN (HCC): Primary | ICD-10-CM

## 2022-11-29 DIAGNOSIS — I12.9 BENIGN HYPERTENSION WITH CKD (CHRONIC KIDNEY DISEASE) STAGE III (HCC): ICD-10-CM

## 2022-11-29 DIAGNOSIS — F41.8 DEPRESSION WITH ANXIETY: ICD-10-CM

## 2022-11-29 DIAGNOSIS — K21.9 GASTROESOPHAGEAL REFLUX DISEASE WITHOUT ESOPHAGITIS: ICD-10-CM

## 2022-11-29 DIAGNOSIS — E04.1 THYROID NODULE: ICD-10-CM

## 2022-11-29 DIAGNOSIS — E66.09 CLASS 1 OBESITY DUE TO EXCESS CALORIES WITH SERIOUS COMORBIDITY AND BODY MASS INDEX (BMI) OF 34.0 TO 34.9 IN ADULT: ICD-10-CM

## 2022-11-29 DIAGNOSIS — E55.9 VITAMIN D DEFICIENCY: ICD-10-CM

## 2022-11-29 DIAGNOSIS — E78.00 HYPERCHOLESTEREMIA: ICD-10-CM

## 2022-11-29 DIAGNOSIS — Z79.4 TYPE 2 DIABETES MELLITUS WITH HYPERGLYCEMIA, WITH LONG-TERM CURRENT USE OF INSULIN (HCC): Primary | ICD-10-CM

## 2022-11-29 DIAGNOSIS — N18.30 BENIGN HYPERTENSION WITH CKD (CHRONIC KIDNEY DISEASE) STAGE III (HCC): ICD-10-CM

## 2022-11-29 LAB — SL AMB POCT HEMOGLOBIN AIC: 13.4 (ref ?–6.5)

## 2022-11-29 NOTE — PATIENT INSTRUCTIONS
Continue same medications- get more aggressive with glucose control  Return in 3 months or sooner for problems/concerns

## 2022-11-29 NOTE — PROGRESS NOTES
[facility]        NAME: Jose F Do is a 46 y o  female  : 1970    MRN: [id number]  DATE: 2022  TIME: 10:42 AM    Assessment and Plan   Type 2 diabetes mellitus with hyperglycemia, with long-term current use of insulin (HCC) [E11 65, Z79 4]  1  Type 2 diabetes mellitus with hyperglycemia, with long-term current use of insulin ([facility])  POCT hemoglobin A1c      2  Thyroid nodule  Ambulatory Referral to Otolaryngology      3  Hypercholesteremia        4  Depression with anxiety        5  Class 1 obesity due to excess calories with serious comorbidity and body mass index (BMI) of 34 0 to 34 9 in adult        6  Benign hypertension with CKD (chronic kidney disease) stage III ([facility] [address] )        7  Gastroesophageal reflux disease without esophagitis        8  Vitamin D deficiency        9  Diabetic polyneuropathy associated with type 2 diabetes mellitus [facility])              Patient Instructions     Patient Instructions   Continue same medications- get more aggressive with glucose control  Return in 3 months or sooner for problems/concerns          Chief Complaint     Chief Complaint   Patient presents with   • Follow-up         History of Present Illness       Here for follow up-  She reports she had covid a few weeks ago- she reports that she has not been doing well with her diabetes during that time and now her phone is acting up and only shows "high" on her [place]  She has been testing her sugar with a glucometer- reports this morning was 200  HgA1c today in office 13 4, 1 month ago was 13 3  She was seen by Endocrinology 1 week ago- was given different doses of insulin, off of Metformin, Lisinopril, and Ozempic- put her on Trulicity once a week  She recently started this (about 1 month)  She has her 5th dose tomorrow  When seen by Endo the goal is to get to Lantus 40 units per day, she is currently taking 10 units       Reviewed her other medications- is doing well on Celexa, taking her Vitamin D weekly, Lyrica, and Pantoprazole    Most recent US of thyroid did recommend either biopsy of nodule or repeat US in 1 year- she would like biopsy done      Review of Systems   Review of Systems   Constitutional: Negative for diaphoresis and fever  HENT: Negative for congestion, facial swelling, hearing loss, rhinorrhea, sinus pressure, sinus pain, sneezing, sore throat and voice change  Eyes: Negative for discharge and visual disturbance  Respiratory: Negative for cough, choking, chest tightness, shortness of breath, wheezing and stridor  Cardiovascular: Negative for chest pain, palpitations and leg swelling  Gastrointestinal: Negative for abdominal distention, abdominal pain, constipation, diarrhea, nausea and vomiting  Endocrine: Negative for polydipsia, polyphagia and polyuria  Genitourinary: Negative for difficulty urinating, dysuria, frequency and urgency  Musculoskeletal: Negative for arthralgias, back pain, gait problem, joint swelling, myalgias, neck pain and neck stiffness  Skin: Negative for color change, rash and wound  Neurological: Negative for dizziness, syncope, speech difficulty, weakness, light-headedness and headaches  Hematological: Negative for adenopathy  Does not bruise/bleed easily  Psychiatric/Behavioral: Negative for agitation, behavioral problems, confusion, hallucinations, sleep disturbance and suicidal ideas  The patient is not nervous/anxious            Current Medications       Current Outpatient Medications:   •  albuterol (PROVENTIL HFA,VENTOLIN HFA) 90 mcg/act inhaler, INHALE ONE PUFF BY MOUTH AND INTO THE LUNGS EVERY 6 HOURS AS NEEDED FOR WHEEZING, Disp: 36 g, Rfl: 3  •  Aspirin (ASPIR-81 PO), Take 81 mg by mouth, Disp: , Rfl:   •  atorvastatin (LIPITOR) 80 mg tablet, Take 1 tablet (80 mg total) by mouth daily at bedtime, Disp: 30 tablet, Rfl: 6  •  B-D UF III MINI PEN NEEDLES 31G X 5 MM MISC, Pt uses 5 pen needles daily, Disp: 450 each, Rfl: 0  •  cholecalciferol (VITAMIN D3) 400 units tablet, TAKE ONE TABLET BY MOUTH DAILY, Disp: 90 tablet, Rfl: 1  •  citalopram (CeleXA) 40 mg tablet, TAKE ONE TABLET BY MOUTH DAILY, Disp: 90 tablet, Rfl: 1  •  Continuous Blood Gluc  (FreeStyle Refugio 14 Day [place]) TEMITOPE, Use for continuous blood glucose monitoring, Disp: 1 each, Rfl: 0  •  Continuous Blood Gluc Sensor (FreeStyle Refugio 14 Day Sensor) MISC, Use one sensor every 14 days for continuous blood sugar monitoring , Disp: 6 each, Rfl: 3  •  docusate sodium (COLACE) 100 mg capsule, TAKE ONE CAPSULE BY MOUTH TWICE DAILY, Disp: 30 capsule, Rfl: 0  •  Dulaglutide (Trulicity) 1 5 [place]/0 6QM SOPN, Inject 0 5 mL (1 5 mg total) under the skin once a week, Disp: 2 mL, Rfl: 1  •  Empagliflozin (Jardiance) 25 MG TABS, Take 1 tablet (25 mg total) by mouth every morning, Disp: 90 tablet, Rfl: 1  •  ergocalciferol (VITAMIN D2) 50,000 units, Take 1 capsule (50,000 Units total) by mouth once a week, Disp: 8 capsule, Rfl: 1  •  famotidine (PEPCID) 40 MG tablet, Take 1 tablet (40 mg total) by mouth daily at bedtime Take in evening 2 hours prior to bed, Disp: 30 tablet, Rfl: 6  •  fenofibrate (TRICOR) 48 mg tablet, TAKE ONE TABLET BY MOUTH DAILY, Disp: 90 tablet, Rfl: 3  •  glucose blood (OneTouch Verio) test strip, Use to test blood glucose 4 times a day, Disp: 200 strip, Rfl: 2  •  Insulin Glargine Solostar (Lantus SoloStar) 100 UNIT/ML SOPN, 40 units daily, Disp: 30 mL, Rfl: 1  •  insulin glulisine (Apidra SoloStar) 100 units/mL injection pen, Inject 10 units before breakfast and lunch, 5 units before dinner , Disp: 30 mL, Rfl: 1  •  Iron Heme Polypeptide 12 MG TABS, Take 1 tablet by mouth, Disp: , Rfl:   •  levETIRAcetam (KEPPRA) 500 mg tablet, TAKE ONE TABLET TWICE DAILY, Disp: 180 tablet, Rfl: 1  •  linaCLOtide (Linzess) 72 MCG CAPS, Take 1 capsule by mouth daily before breakfast, Disp: 30 capsule, Rfl: 0  •  metoclopramide (REGLAN) 10 mg tablet, Take 1 tablet (10 mg total) by mouth daily, Disp: 90 tablet, Rfl: 3  •  metoprolol tartrate (LOPRESSOR) 25 mg tablet, TAKE 1 TABLET (25 MG TOTAL) BY MOUTH 2 (TWO) TIMES A DAY, Disp: 180 tablet, Rfl: 1  •  ondansetron (ZOFRAN) 4 mg tablet, Take 1 tablet (4 mg total) by mouth every 8 (eight) hours as needed for nausea or vomiting, Disp: 20 tablet, Rfl: 0  •  pantoprazole (PROTONIX) 40 mg tablet, TAKE ONE TABLET BY MOUTH TWICE DAILY, Disp: 380 tablet, Rfl: 2  •  pregabalin (LYRICA) 100 mg capsule, TAKE ONE CAPSULE BY MOUTH THREE TIMES A DAY, Disp: 270 capsule, Rfl: 1  •  scopolamine (TRANSDERM-SCOP) 1 mg/3 days TD 72 hr patch, Place 1 patch on the skin every third day, Disp: 24 patch, Rfl: 1  •  solifenacin (VESICARE) 5 mg tablet, TAKE ONE TABLET BY MOUTH DAILY, Disp: 90 tablet, Rfl: 1  •  BANOPHEN 50 MG capsule, take 1 capsule by mouth every 6 hours if needed for itching, Disp: , Rfl:   •  budesonide-formoterol (Symbicort) 80-4 5 MCG/ACT inhaler, Inhale 2 puffs 2 (two) times a day Rinse mouth after use , Disp: 10 2 g, Rfl: 0  •  neomycin-polymyxin-hydrocortisone (CORTISPORIN) otic solution, Administer 4 drops to the right ear every 6 (six) hours, Disp: 10 mL, Rfl: 0    Current Allergies     Allergies as of 11/29/2022 - Reviewed 11/29/2022   Allergen Reaction Noted   • Azithromycin GI Intolerance and Hives 07/13/2012   • Benztropine  08/09/2016   • Butorphanol Hallucinations 08/09/2016   • Penicillins  11/12/2015   • Zolpidem  11/12/2015            The following portions of the patient's history were reviewed and updated as appropriate: allergies, current medications, past family history, past medical history, past social history, past surgical history and problem list      Past Medical History:   Diagnosis Date   • Atypical chest pain    • Depression    • Diabetes mellitus ([facility] [address] )    • Gastroparesis    • GERD (gastroesophageal reflux disease)    • Hyperlipidemia    • Hypertension    • Sciatica    • Seizure disorder (HCC)        Past Surgical History:   Procedure Laterality Date   • BREAST BIOPSY Left  benign   • CHOLECYSTECTOMY     • COLONOSCOPY     • HYSTERECTOMY     • OK LAP,APPENDECTOMY N/A 3/24/2021    Procedure: APPENDECTOMY LAPAROSCOPIC;  Surgeon: Harpreet Wetzel MD;  Location: [facility] MAIN OR;  Service: General   • OK LAP,DIAGNOSTIC ABDOMEN N/A 3/24/2021    Procedure: LAPAROSCOPY DIAGNOSTIC, EXTENSIVE DESI;  Surgeon: Harpreet Wetzel MD;  Location: [facility] MAIN OR;  Service: General   • UPPER GASTROINTESTINAL ENDOSCOPY         Family History   Problem Relation Age of Onset   • No Known Problems Mother    • No Known Problems Father    • No Known Problems Daughter    • No Known Problems Maternal Grandmother    • No Known Problems Paternal Grandmother    • No Known Problems Paternal Aunt    • No Known Problems Paternal Aunt    • No Known Problems Paternal Aunt          Medications have been verified  Objective   /74   Pulse 78   Temp (!) 97 4 °F (36 3 °C) (Tympanic)   Resp 20   Ht 5' 2" (1 575 m)   Wt 84 8 kg (187 lb)   SpO2 99%   BMI 34 20 kg/m²        Physical Exam     Physical Exam  Vitals and nursing note reviewed  Constitutional:       General: She is active  She is not in acute distress  Vital signs are normal       Appearance: She is well-developed and well-nourished  She is not diaphoretic  Eyes:      Extraocular Movements: EOM normal    Neck:      Thyroid: No thyromegaly  Trachea: No tracheal deviation  Cardiovascular:      Rate and Rhythm: Normal rate and regular rhythm  Heart sounds: Normal heart sounds  Pulmonary:      Effort: Pulmonary effort is normal  No respiratory distress  Breath sounds: Normal breath sounds  No wheezing  Musculoskeletal:         General: No tenderness, deformity or edema  Normal range of motion  Cervical back: Normal range of motion and neck supple  Skin:     General: Skin is warm and dry  Findings: No erythema or rash     Neurological:      Mental Status: She is alert and oriented to person, place, and time  Psychiatric:         Attention and Perception: Attention normal          Mood and Affect: Mood and affect normal  Affect is flat  Behavior: Behavior normal  Behavior is cooperative  Thought Content:  Thought content normal          Judgment: Judgment normal

## 2022-11-30 ENCOUNTER — APPOINTMENT (OUTPATIENT)
Dept: LAB | Facility: CLINIC | Age: 52
End: 2022-11-30

## 2022-11-30 ENCOUNTER — TELEPHONE (OUTPATIENT)
Dept: NEPHROLOGY | Facility: CLINIC | Age: 52
End: 2022-11-30

## 2022-11-30 DIAGNOSIS — Z79.4 TYPE 2 DIABETES MELLITUS WITH DIABETIC AUTONOMIC NEUROPATHY, WITH LONG-TERM CURRENT USE OF INSULIN (HCC): ICD-10-CM

## 2022-11-30 DIAGNOSIS — N18.31 STAGE 3A CHRONIC KIDNEY DISEASE (HCC): ICD-10-CM

## 2022-11-30 DIAGNOSIS — E11.43 TYPE 2 DIABETES MELLITUS WITH DIABETIC AUTONOMIC NEUROPATHY, WITH LONG-TERM CURRENT USE OF INSULIN (HCC): ICD-10-CM

## 2022-11-30 LAB
ALBUMIN SERPL BCP-MCNC: 2.9 G/DL (ref 3.5–5)
ALP SERPL-CCNC: 89 U/L (ref 46–116)
ALT SERPL W P-5'-P-CCNC: 20 U/L (ref 12–78)
ANION GAP SERPL CALCULATED.3IONS-SCNC: 6 MMOL/L (ref 4–13)
AST SERPL W P-5'-P-CCNC: 13 U/L (ref 5–45)
BILIRUB SERPL-MCNC: 0.25 MG/DL (ref 0.2–1)
BUN SERPL-MCNC: 26 MG/DL (ref 5–25)
CALCIUM ALBUM COR SERPL-MCNC: 10.2 MG/DL (ref 8.3–10.1)
CALCIUM SERPL-MCNC: 9.3 MG/DL (ref 8.3–10.1)
CHLORIDE SERPL-SCNC: 102 MMOL/L (ref 96–108)
CO2 SERPL-SCNC: 27 MMOL/L (ref 21–32)
CREAT SERPL-MCNC: 1.6 MG/DL (ref 0.6–1.3)
EST. AVERAGE GLUCOSE BLD GHB EST-MCNC: 326 MG/DL
GFR SERPL CREATININE-BSD FRML MDRD: 36 ML/MIN/1.73SQ M
GLUCOSE P FAST SERPL-MCNC: 561 MG/DL (ref 65–99)
HBA1C MFR BLD: 13 %
POTASSIUM SERPL-SCNC: 4.3 MMOL/L (ref 3.5–5.3)
PROT SERPL-MCNC: 6.9 G/DL (ref 6.4–8.4)
SODIUM SERPL-SCNC: 135 MMOL/L (ref 135–147)

## 2022-11-30 NOTE — TELEPHONE ENCOUNTER
Please call patient and let her know fasting blood sugar greater than 500  Ask her if she addressed this?  I will forward to Dr Wilson Hilton

## 2022-11-30 NOTE — TELEPHONE ENCOUNTER
Mark Henson from [facility] lab called with a critical glucose of 561

## 2022-12-01 ENCOUNTER — TELEPHONE (OUTPATIENT)
Dept: ENDOCRINOLOGY | Facility: CLINIC | Age: 52
End: 2022-12-01

## 2022-12-01 NOTE — TELEPHONE ENCOUNTER
Called patient to check in re: uncontrolled BG - Left voicemail  I mentioned recent MyChart message re: Jesenia Roche 2 CGM  I also noted her critically high BG on recent BMP yesterday (> 600), and inquired if she had adjusted her insulin recently to treat this, and if she is having any concerning symptoms  Patient does have both Lantus and Apidra, but has been non-compliant with dosing due to very poor appetite / food intake from recent Covid infection, coupled with her fear of hypoglycemia  We have been working with her closely to titrate diabetes medication regimen  I asked her to call us back ASAP to check in and further assess for potential concerning symptoms

## 2022-12-02 DIAGNOSIS — E11.43 TYPE 2 DIABETES MELLITUS WITH DIABETIC AUTONOMIC NEUROPATHY, WITH LONG-TERM CURRENT USE OF INSULIN (HCC): Primary | ICD-10-CM

## 2022-12-02 DIAGNOSIS — Z79.4 TYPE 2 DIABETES MELLITUS WITH DIABETIC AUTONOMIC NEUROPATHY, WITH LONG-TERM CURRENT USE OF INSULIN (HCC): Primary | ICD-10-CM

## 2022-12-02 RX ORDER — DULAGLUTIDE 3 MG/.5ML
3 INJECTION, SOLUTION SUBCUTANEOUS WEEKLY
Qty: 2 ML | Refills: 3 | Status: SHIPPED | OUTPATIENT
Start: 2022-12-02 | End: 2023-03-15 | Stop reason: SDUPTHER

## 2022-12-02 NOTE — TELEPHONE ENCOUNTER
Spoke with patient via phone re: recent critically elevated BG on CMP  Patient denies feeling any concerning symptoms  Her SMBG checks have been fasting in the 200s the past few days  Potential lab error? [place] Heck 2 has been malfunctioning, she will replace with new sensor and let us know of any concerning BG values  Will opt to increase Trulicity from 2 6KF weekly to 3mg weekly dose in attempt for further BG control  Continue insulin regimen with Lantus at 10 units daily and Apidra 10 units with meals (patient has not felt comfortable up-titrating past this yet)  She may be able to increase basal insulin due to continued elevated fasting BG, but would exercise caution with starting new Trulicy dose, and instead, make 1 adjustment at a time

## 2022-12-05 DIAGNOSIS — E11.65 TYPE 2 DIABETES MELLITUS WITH HYPERGLYCEMIA, WITH LONG-TERM CURRENT USE OF INSULIN (HCC): Primary | ICD-10-CM

## 2022-12-05 DIAGNOSIS — Z79.4 TYPE 2 DIABETES MELLITUS WITH HYPERGLYCEMIA, WITH LONG-TERM CURRENT USE OF INSULIN (HCC): Primary | ICD-10-CM

## 2022-12-05 RX ORDER — LANCING DEVICE
EACH MISCELLANEOUS
Qty: 100 EACH | Refills: 1 | Status: SHIPPED | OUTPATIENT
Start: 2022-12-05

## 2022-12-06 ENCOUNTER — TELEPHONE (OUTPATIENT)
Dept: OTHER | Facility: OTHER | Age: 52
End: 2022-12-06

## 2022-12-06 NOTE — TELEPHONE ENCOUNTER
Patient stated she was prescribed Dulaglutide (Trulicity) 3 XM/8 5GH SOPN  Patient expressed that the pharmacy is on back order for this medication and is requesting a call back to see if the office has any samples

## 2022-12-07 ENCOUNTER — TELEPHONE (OUTPATIENT)
Dept: NEPHROLOGY | Facility: CLINIC | Age: 52
End: 2022-12-07

## 2022-12-07 ENCOUNTER — TELEPHONE (OUTPATIENT)
Dept: ENDOCRINOLOGY | Facility: CLINIC | Age: 52
End: 2022-12-07

## 2022-12-07 NOTE — TELEPHONE ENCOUNTER
Patient called and her pharamcy cant get trulicity, she has 3 pens with the lower dose she can take that till other dose come in correct

## 2022-12-15 DIAGNOSIS — E11.43 TYPE 2 DIABETES MELLITUS WITH DIABETIC AUTONOMIC NEUROPATHY, WITH LONG-TERM CURRENT USE OF INSULIN (HCC): ICD-10-CM

## 2022-12-15 DIAGNOSIS — N32.89 BLADDER SPASMS: ICD-10-CM

## 2022-12-15 DIAGNOSIS — Z79.4 TYPE 2 DIABETES MELLITUS WITH DIABETIC AUTONOMIC NEUROPATHY, WITH LONG-TERM CURRENT USE OF INSULIN (HCC): ICD-10-CM

## 2022-12-15 DIAGNOSIS — K59.00 CONSTIPATION, UNSPECIFIED CONSTIPATION TYPE: ICD-10-CM

## 2022-12-15 RX ORDER — SOLIFENACIN SUCCINATE 5 MG/1
TABLET, FILM COATED ORAL
Qty: 90 TABLET | Refills: 1 | Status: SHIPPED | OUTPATIENT
Start: 2022-12-15

## 2022-12-15 RX ORDER — INSULIN GLARGINE 100 [IU]/ML
INJECTION, SOLUTION SUBCUTANEOUS
Qty: 30 ML | Refills: 1 | Status: SHIPPED | OUTPATIENT
Start: 2022-12-15

## 2022-12-15 RX ORDER — DOCUSATE SODIUM 100 MG/1
CAPSULE, LIQUID FILLED ORAL
Qty: 30 CAPSULE | Refills: 0 | Status: SHIPPED | OUTPATIENT
Start: 2022-12-15 | End: 2022-12-19 | Stop reason: SDUPTHER

## 2022-12-16 DIAGNOSIS — E11.43 TYPE 2 DIABETES MELLITUS WITH DIABETIC AUTONOMIC NEUROPATHY, WITH LONG-TERM CURRENT USE OF INSULIN (HCC): ICD-10-CM

## 2022-12-16 DIAGNOSIS — Z79.4 TYPE 2 DIABETES MELLITUS WITH DIABETIC AUTONOMIC NEUROPATHY, WITH LONG-TERM CURRENT USE OF INSULIN (HCC): ICD-10-CM

## 2022-12-16 RX ORDER — PEN NEEDLE, DIABETIC 31 GX5/16"
NEEDLE, DISPOSABLE MISCELLANEOUS
Qty: 500 EACH | Refills: 0 | Status: SHIPPED | OUTPATIENT
Start: 2022-12-16

## 2022-12-19 DIAGNOSIS — U07.1 COVID-19: ICD-10-CM

## 2022-12-19 DIAGNOSIS — R06.02 SOB (SHORTNESS OF BREATH) ON EXERTION: ICD-10-CM

## 2022-12-19 DIAGNOSIS — K59.00 CONSTIPATION, UNSPECIFIED CONSTIPATION TYPE: ICD-10-CM

## 2022-12-19 RX ORDER — DOCUSATE SODIUM 100 MG/1
100 CAPSULE, LIQUID FILLED ORAL 2 TIMES DAILY
Qty: 30 CAPSULE | Refills: 0 | Status: SHIPPED | OUTPATIENT
Start: 2022-12-19

## 2022-12-19 RX ORDER — BUDESONIDE AND FORMOTEROL FUMARATE DIHYDRATE 80; 4.5 UG/1; UG/1
2 AEROSOL RESPIRATORY (INHALATION) 2 TIMES DAILY
Qty: 10.2 G | Refills: 0 | Status: SHIPPED | OUTPATIENT
Start: 2022-12-19

## 2022-12-19 NOTE — TELEPHONE ENCOUNTER
Patient requesting refill(s) of: symbicort    Last filled: 11/14/22    Patient requesting refill(s) of: docusate sodium    Last filled: 12/15/2022  Last appt:11/29/22  Next appt:  3/6/23  Pharmacy:  [address] [place] [address]

## 2022-12-20 ENCOUNTER — TELEPHONE (OUTPATIENT)
Dept: FAMILY MEDICINE CLINIC | Facility: CLINIC | Age: 52
End: 2022-12-20

## 2022-12-21 ENCOUNTER — OFFICE VISIT (OUTPATIENT)
Dept: FAMILY MEDICINE CLINIC | Facility: CLINIC | Age: 52
End: 2022-12-21

## 2022-12-21 VITALS
BODY MASS INDEX: 36.1 KG/M2 | RESPIRATION RATE: 18 BRPM | DIASTOLIC BLOOD PRESSURE: 64 MMHG | SYSTOLIC BLOOD PRESSURE: 102 MMHG | TEMPERATURE: 98.1 F | HEART RATE: 75 BPM | WEIGHT: 196.2 LBS | OXYGEN SATURATION: 97 % | HEIGHT: 62 IN

## 2022-12-21 DIAGNOSIS — H70.92 MASTOIDITIS OF LEFT SIDE: ICD-10-CM

## 2022-12-21 DIAGNOSIS — H66.002 ACUTE SUPPURATIVE OTITIS MEDIA OF LEFT EAR WITHOUT SPONTANEOUS RUPTURE OF TYMPANIC MEMBRANE, RECURRENCE NOT SPECIFIED: Primary | ICD-10-CM

## 2022-12-21 RX ORDER — CIPROFLOXACIN 500 MG/1
500 TABLET, FILM COATED ORAL EVERY 12 HOURS SCHEDULED
Qty: 14 TABLET | Refills: 0 | Status: SHIPPED | OUTPATIENT
Start: 2022-12-21 | End: 2022-12-28

## 2022-12-21 NOTE — PROGRESS NOTES
[facility]        NAME: Christi Palomo is a 46 y o  female  : 1970    MRN: [id number]  DATE: 2022  TIME: 2:42 PM    Assessment and Plan   1  Acute suppurative otitis media of left ear without spontaneous rupture of tympanic membrane, recurrence not specified  -     ciprofloxacin (CIPRO) 500 mg tablet; Take 1 tablet (500 mg total) by mouth every 12 (twelve) hours for 7 days    2  Mastoiditis of left side  -     ciprofloxacin (CIPRO) 500 mg tablet; Take 1 tablet (500 mg total) by mouth every 12 (twelve) hours for 7 days           Chief Complaint     Chief Complaint   Patient presents with   • Earache     Onset last night  Left ear  The ear was draining a little but and hearing is decreased on that side  History of Present Illness       47yo female presents for acute same-day visit due to ear pain  Pt reports that it started yesterday with left ear pain, drainage and hearing loss  She reports a history of possible mastoidectomy on that side as well as prior tube placement  No longer follows with ENT  Denies fever/chills, sore throat or URI symptoms  Review of Systems   Review of Systems   Constitutional: Negative for appetite change, chills, fatigue and fever  HENT: Positive for ear discharge, ear pain and hearing loss  Negative for postnasal drip, rhinorrhea and sore throat  Respiratory: Negative for cough            Current Medications       Current Outpatient Medications:   •  albuterol (PROVENTIL HFA,VENTOLIN HFA) 90 mcg/act inhaler, INHALE ONE PUFF BY MOUTH AND INTO THE LUNGS EVERY 6 HOURS AS NEEDED FOR WHEEZING, Disp: 36 g, Rfl: 3  •  Aspirin (ASPIR-81 PO), Take 81 mg by mouth, Disp: , Rfl:   •  atorvastatin (LIPITOR) 80 mg tablet, Take 1 tablet (80 mg total) by mouth daily at bedtime, Disp: 30 tablet, Rfl: 6  •  B-D UF III MINI PEN NEEDLES 31G X 5 MM MISC, Pt uses 5 pen needles daily, Disp: 500 each, Rfl: 0  •  BANOPHEN 50 MG capsule, take 1 capsule by mouth every 6 hours if needed for itching, Disp: , Rfl:   •  budesonide-formoterol (Symbicort) 80-4 5 MCG/ACT inhaler, Inhale 2 puffs 2 (two) times a day Rinse mouth after use , Disp: 10 2 g, Rfl: 0  •  cholecalciferol (VITAMIN D3) 400 units tablet, TAKE ONE TABLET BY MOUTH DAILY, Disp: 90 tablet, Rfl: 1  •  ciprofloxacin (CIPRO) 500 mg tablet, Take 1 tablet (500 mg total) by mouth every 12 (twelve) hours for 7 days, Disp: 14 tablet, Rfl: 0  •  citalopram (CeleXA) 40 mg tablet, TAKE ONE TABLET BY MOUTH DAILY, Disp: 90 tablet, Rfl: 1  •  Continuous Blood Gluc  (FreeStyle Refugio 14 Day [place]) TEMITOPE, Use for continuous blood glucose monitoring, Disp: 1 each, Rfl: 0  •  Continuous Blood Gluc Sensor (FreeStyle Refugio 14 Day Sensor) MISC, Use one sensor every 14 days for continuous blood sugar monitoring , Disp: 6 each, Rfl: 3  •  docusate sodium (COLACE) 100 mg capsule, Take 1 capsule (100 mg total) by mouth 2 (two) times a day, Disp: 30 capsule, Rfl: 0  •  Dulaglutide (Trulicity) 3 WL/9 3KG SOPN, Inject 0 5 mL (3 mg total) under the skin once a week, Disp: 2 mL, Rfl: 3  •  Empagliflozin (Jardiance) 25 MG TABS, Take 1 tablet (25 mg total) by mouth every morning, Disp: 90 tablet, Rfl: 1  •  ergocalciferol (VITAMIN D2) 50,000 units, Take 1 capsule (50,000 Units total) by mouth once a week, Disp: 8 capsule, Rfl: 1  •  famotidine (PEPCID) 40 MG tablet, Take 1 tablet (40 mg total) by mouth daily at bedtime Take in evening 2 hours prior to bed, Disp: 30 tablet, Rfl: 6  •  fenofibrate (TRICOR) 48 mg tablet, TAKE ONE TABLET BY MOUTH DAILY, Disp: 90 tablet, Rfl: 3  •  glucose blood (OneTouch Verio) test strip, Use to test blood glucose 4 times a day, Disp: 200 strip, Rfl: 2  •  Insulin Glargine Solostar (Lantus SoloStar) 100 UNIT/ML SOPN, INJECT 45 UNITS EVERY 12 HOURS, Disp: 30 mL, Rfl: 1  •  insulin glulisine (Apidra SoloStar) 100 units/mL injection pen, Inject 10 units before breakfast and lunch, 5 units before dinner , Disp: 30 mL, Rfl: 1  •  Iron Heme Polypeptide 12 MG TABS, Take 1 tablet by mouth, Disp: , Rfl:   •  levETIRAcetam (KEPPRA) 500 mg tablet, TAKE ONE TABLET TWICE DAILY, Disp: 180 tablet, Rfl: 1  •  linaCLOtide (Linzess) 72 MCG CAPS, Take 1 capsule by mouth daily before breakfast, Disp: 30 capsule, Rfl: 0  •  metoclopramide (REGLAN) 10 mg tablet, Take 1 tablet (10 mg total) by mouth daily, Disp: 90 tablet, Rfl: 3  •  metoprolol tartrate (LOPRESSOR) 25 mg tablet, TAKE 1 TABLET (25 MG TOTAL) BY MOUTH 2 (TWO) TIMES A DAY, Disp: 180 tablet, Rfl: 1  •  neomycin-polymyxin-hydrocortisone (CORTISPORIN) otic solution, Administer 4 drops to the right ear every 6 (six) hours, Disp: 10 mL, Rfl: 0  •  ondansetron (ZOFRAN) 4 mg tablet, Take 1 tablet (4 mg total) by mouth every 8 (eight) hours as needed for nausea or vomiting, Disp: 20 tablet, Rfl: 0  •  pantoprazole (PROTONIX) 40 mg tablet, TAKE ONE TABLET BY MOUTH TWICE DAILY, Disp: 380 tablet, Rfl: 2  •  Pharmacist Choice Lancets MISC, USE AS DIRECTED, Disp: 100 each, Rfl: 1  •  pregabalin (LYRICA) 100 mg capsule, TAKE ONE CAPSULE BY MOUTH THREE TIMES A DAY, Disp: 270 capsule, Rfl: 1  •  scopolamine (TRANSDERM-SCOP) 1 mg/3 days TD 72 hr patch, Place 1 patch on the skin every third day, Disp: 24 patch, Rfl: 1  •  solifenacin (VESICARE) 5 mg tablet, TAKE ONE TABLET BY MOUTH DAILY, Disp: 90 tablet, Rfl: 1    Current Allergies     Allergies as of 12/21/2022 - Reviewed 12/21/2022   Allergen Reaction Noted   • Azithromycin GI Intolerance and Hives 07/13/2012   • Benztropine  08/09/2016   • Butorphanol Hallucinations 08/09/2016   • Penicillins  11/12/2015   • Zolpidem  11/12/2015            The following portions of the patient's history were reviewed and updated as appropriate: allergies, current medications, past family history, past medical history, past social history, past surgical history and problem list      Past Medical History:   Diagnosis Date   • Atypical chest pain    • Depression    • Diabetes mellitus ([address] )    • Gastroparesis    • GERD (gastroesophageal reflux disease)    • Hyperlipidemia    • Hypertension    • Sciatica    • Seizure disorder [facility])        Past Surgical History:   Procedure Laterality Date   • BREAST BIOPSY Left  benign   • CHOLECYSTECTOMY     • COLONOSCOPY     • HYSTERECTOMY     • OR LAP,APPENDECTOMY N/A 3/24/2021    Procedure: APPENDECTOMY LAPAROSCOPIC;  Surgeon: Thuy Olvera MD;  Location: [facility] MAIN OR;  Service: General   • OR LAP,DIAGNOSTIC ABDOMEN N/A 3/24/2021    Procedure: LAPAROSCOPY DIAGNOSTIC, EXTENSIVE DESI;  Surgeon: Thuy Olvera MD;  Location: [facility] MAIN OR;  Service: General   • UPPER GASTROINTESTINAL ENDOSCOPY         Family History   Problem Relation Age of Onset   • No Known Problems Mother    • No Known Problems Father    • No Known Problems Daughter    • No Known Problems Maternal Grandmother    • No Known Problems Paternal Grandmother    • No Known Problems Paternal Aunt    • No Known Problems Paternal Aunt    • No Known Problems Paternal Aunt          Medications have been verified  Objective   /64   Pulse 75   Temp 98 1 °F (36 7 °C)   Resp 18   Ht 5' 2" (1 575 m)   Wt 89 kg (196 lb 3 2 oz)   SpO2 97%   BMI 35 89 kg/m²        Physical Exam     Physical Exam  Vitals reviewed  Constitutional:       General: She is not in acute distress  Appearance: She is obese  HENT:      Head: Normocephalic and atraumatic  Left Ear: Hearing normal  Drainage and swelling present  There is mastoid tenderness  Tympanic membrane is injected, scarred, erythematous and retracted  Neurological:      General: No focal deficit present  Mental Status: She is alert     Psychiatric:         Mood and Affect: Mood normal          Behavior: Behavior normal              Results:  Lab Results   Component Value Date    SODIUM 135 11/30/2022    K 4 3 11/30/2022     11/30/2022    CO2 27 11/30/2022    BUN 26 (H) 11/30/2022    CREATININE 1 60 (H) 11/30/2022    GLUC 284 (H) 05/23/2022    CALCIUM 9 3 11/30/2022       Lab Results   Component Value Date    HGBA1C 13 0 (H) 11/30/2022       Lab Results   Component Value Date    WBC 8 76 10/24/2022    HGB 11 4 (L) 10/24/2022    HCT 37 0 10/24/2022    MCV 84 10/24/2022     10/24/2022

## 2023-01-10 DIAGNOSIS — Z79.4 TYPE 2 DIABETES MELLITUS WITH HYPERGLYCEMIA, WITH LONG-TERM CURRENT USE OF INSULIN (HCC): ICD-10-CM

## 2023-01-10 DIAGNOSIS — R56.9 SEIZURES (HCC): ICD-10-CM

## 2023-01-10 DIAGNOSIS — K21.9 GASTROESOPHAGEAL REFLUX DISEASE WITHOUT ESOPHAGITIS: ICD-10-CM

## 2023-01-10 DIAGNOSIS — E11.65 TYPE 2 DIABETES MELLITUS WITH HYPERGLYCEMIA, WITH LONG-TERM CURRENT USE OF INSULIN (HCC): ICD-10-CM

## 2023-01-10 RX ORDER — LANCING DEVICE
EACH MISCELLANEOUS
Qty: 100 EACH | Refills: 1 | Status: SHIPPED | OUTPATIENT
Start: 2023-01-10

## 2023-01-10 RX ORDER — PANTOPRAZOLE SODIUM 40 MG/1
TABLET, DELAYED RELEASE ORAL
Qty: 60 TABLET | Refills: 0 | Status: SHIPPED | OUTPATIENT
Start: 2023-01-10

## 2023-01-11 DIAGNOSIS — E55.9 VITAMIN D DEFICIENCY: ICD-10-CM

## 2023-01-11 DIAGNOSIS — E11.43 TYPE 2 DIABETES MELLITUS WITH DIABETIC AUTONOMIC NEUROPATHY, WITH LONG-TERM CURRENT USE OF INSULIN (HCC): ICD-10-CM

## 2023-01-11 DIAGNOSIS — Z79.4 TYPE 2 DIABETES MELLITUS WITH DIABETIC AUTONOMIC NEUROPATHY, WITH LONG-TERM CURRENT USE OF INSULIN (HCC): ICD-10-CM

## 2023-01-11 RX ORDER — OMEGA-3S/DHA/EPA/FISH OIL/D3 300MG-1000
CAPSULE ORAL
Qty: 90 TABLET | Refills: 1 | Status: SHIPPED | OUTPATIENT
Start: 2023-01-11

## 2023-01-11 RX ORDER — BLOOD SUGAR DIAGNOSTIC
STRIP MISCELLANEOUS
Qty: 200 STRIP | Refills: 2 | Status: SHIPPED | OUTPATIENT
Start: 2023-01-11

## 2023-01-12 DIAGNOSIS — R06.02 SOB (SHORTNESS OF BREATH) ON EXERTION: ICD-10-CM

## 2023-01-12 DIAGNOSIS — U07.1 COVID-19: ICD-10-CM

## 2023-01-12 RX ORDER — DILTIAZEM HYDROCHLORIDE 60 MG/1
TABLET, FILM COATED ORAL
Qty: 10.2 G | Refills: 1 | Status: SHIPPED | OUTPATIENT
Start: 2023-01-12

## 2023-01-12 RX ORDER — LEVETIRACETAM 500 MG/1
TABLET ORAL
Qty: 180 TABLET | Refills: 1 | Status: SHIPPED | OUTPATIENT
Start: 2023-01-12

## 2023-01-16 DIAGNOSIS — K59.00 CONSTIPATION, UNSPECIFIED CONSTIPATION TYPE: ICD-10-CM

## 2023-01-16 DIAGNOSIS — E78.00 HYPERCHOLESTEREMIA: ICD-10-CM

## 2023-01-16 RX ORDER — DOCUSATE SODIUM 100 MG/1
CAPSULE, LIQUID FILLED ORAL
Qty: 30 CAPSULE | Refills: 0 | Status: SHIPPED | OUTPATIENT
Start: 2023-01-16

## 2023-01-16 RX ORDER — ATORVASTATIN CALCIUM 80 MG/1
80 TABLET, FILM COATED ORAL
Qty: 30 TABLET | Refills: 6 | Status: SHIPPED | OUTPATIENT
Start: 2023-01-16

## 2023-01-23 DIAGNOSIS — Z79.4 TYPE 2 DIABETES MELLITUS WITH HYPERGLYCEMIA, WITH LONG-TERM CURRENT USE OF INSULIN (HCC): Primary | ICD-10-CM

## 2023-01-23 DIAGNOSIS — E11.65 TYPE 2 DIABETES MELLITUS WITH HYPERGLYCEMIA, WITH LONG-TERM CURRENT USE OF INSULIN (HCC): Primary | ICD-10-CM

## 2023-01-23 RX ORDER — ISOPROPYL ALCOHOL 0.7 ML/ML
SWAB TOPICAL
Qty: 100 EACH | Refills: 1 | Status: SHIPPED | OUTPATIENT
Start: 2023-01-23

## 2023-01-24 ENCOUNTER — TELEPHONE (OUTPATIENT)
Dept: SURGERY | Facility: CLINIC | Age: 53
End: 2023-01-24

## 2023-01-24 NOTE — TELEPHONE ENCOUNTER
Called patient and asked if she was ready to have the mole removed from her neck and she states not at this time that she will call

## 2023-01-24 NOTE — TELEPHONE ENCOUNTER
----- Message from Miriam Rojas sent at 10/24/2022  1:59 PM EDT -----  Regarding: mole on neck  Call patient to schedule an appt with Dr Anna Biswas to remove mole on her neck and ask how she is doing       - She canceled her appt on 10/26/22 saying she has other health issues to take care of

## 2023-02-06 DIAGNOSIS — U07.1 COVID-19: ICD-10-CM

## 2023-02-06 DIAGNOSIS — R06.02 SOB (SHORTNESS OF BREATH) ON EXERTION: ICD-10-CM

## 2023-02-06 RX ORDER — DILTIAZEM HYDROCHLORIDE 60 MG/1
TABLET, FILM COATED ORAL
Qty: 10.2 G | Refills: 0 | Status: SHIPPED | OUTPATIENT
Start: 2023-02-06

## 2023-02-07 DIAGNOSIS — T75.3XXS MOTION SICKNESS, SEQUELA: ICD-10-CM

## 2023-02-07 RX ORDER — SCOLOPAMINE TRANSDERMAL SYSTEM 1 MG/1
1 PATCH, EXTENDED RELEASE TRANSDERMAL
Qty: 24 PATCH | Refills: 1 | Status: SHIPPED | OUTPATIENT
Start: 2023-02-07

## 2023-02-09 ENCOUNTER — OFFICE VISIT (OUTPATIENT)
Dept: ENDOCRINOLOGY | Facility: CLINIC | Age: 53
End: 2023-02-09

## 2023-02-09 VITALS
SYSTOLIC BLOOD PRESSURE: 124 MMHG | HEART RATE: 72 BPM | BODY MASS INDEX: 35.3 KG/M2 | WEIGHT: 191.8 LBS | HEIGHT: 62 IN | DIASTOLIC BLOOD PRESSURE: 78 MMHG

## 2023-02-09 DIAGNOSIS — Z79.4 TYPE 2 DIABETES MELLITUS WITH DIABETIC AUTONOMIC NEUROPATHY, WITH LONG-TERM CURRENT USE OF INSULIN (HCC): Primary | ICD-10-CM

## 2023-02-09 DIAGNOSIS — E11.43 TYPE 2 DIABETES MELLITUS WITH DIABETIC AUTONOMIC NEUROPATHY, WITH LONG-TERM CURRENT USE OF INSULIN (HCC): Primary | ICD-10-CM

## 2023-02-09 DIAGNOSIS — N18.30 BENIGN HYPERTENSION WITH CKD (CHRONIC KIDNEY DISEASE) STAGE III (HCC): ICD-10-CM

## 2023-02-09 DIAGNOSIS — I12.9 BENIGN HYPERTENSION WITH CKD (CHRONIC KIDNEY DISEASE) STAGE III (HCC): ICD-10-CM

## 2023-02-09 DIAGNOSIS — E11.65 TYPE 2 DIABETES MELLITUS WITH HYPERGLYCEMIA, WITH LONG-TERM CURRENT USE OF INSULIN (HCC): ICD-10-CM

## 2023-02-09 DIAGNOSIS — Z79.4 TYPE 2 DIABETES MELLITUS WITH HYPERGLYCEMIA, WITH LONG-TERM CURRENT USE OF INSULIN (HCC): ICD-10-CM

## 2023-02-09 RX ORDER — INSULIN GLULISINE 100 [IU]/ML
INJECTION, SOLUTION SUBCUTANEOUS
Qty: 30 ML | Refills: 1 | Status: SHIPPED | OUTPATIENT
Start: 2023-02-09

## 2023-02-09 RX ORDER — INSULIN GLARGINE 100 [IU]/ML
INJECTION, SOLUTION SUBCUTANEOUS
Qty: 30 ML | Refills: 1 | Status: SHIPPED | OUTPATIENT
Start: 2023-02-09

## 2023-02-09 NOTE — PATIENT INSTRUCTIONS
Recommend increase in Basal insulin - Lantus 40 units daily ---> 48 units daily  Also recommend increase in your Apidra from 10 units with meals to 12 units with meals (this covers what you are eating), with scale to also cover high sugars as follows: For blood sugar 150 - 200 - give additional 2 units  For blood sugar 201 - 250 - give additional 4 units  For blood sugar 251 - 300 give additional 6 units  For blood sugar 301 - 350 give additional 8 units  For blood sugar 351 - 400 give additional 10 units

## 2023-02-09 NOTE — PROGRESS NOTES
Vicky Durand 46 y o  female MRN: [id number]    Encounter: [id number]      Assessment/Plan   Problem List Items Addressed This Visit        Endocrine    Type 2 diabetes mellitus with hyperglycemia, with long-term current use of insulin ([address] )       Lab Results   Component Value Date    HGBA1C 13 0 (H) 11/30/2022 ·   Reviewed poor health outcomes associated with diabetes and motivation for wanting to reach glycemic targets, which I counseled patients thatwere reviewed with patient as well  · I counseled patient that she may expect some relative hypoglycemia symptoms as we start to bring her blood glucose into a more acceptable range  · Blood glucose monitoring -encouraged her to continue Refugio 2 CGM  Also reviewed that she should scan more frequently so we may capture all BG data (at least every 8 hours)  · Diet -patient is willing to see CDE for MNT -referral placed  · Pharmacotherapy -today, we will make dose adjustments in both her basal and mealtime insulin  Recommend increase in Lantus from 40 to 45 units daily  Also recommend increasing Apidra from 10 units 3 times daily AC to 12 units 3 times daily AC  Also discussed addition of scale coverage ISF 25 at 150  She may also continue her Jardiance 25 mg daily as well as Trulicity 3 mg weekly  · Of note, metformin was previously discontinued due to reduced GFR  May consider re-introduction if GFR recovers  · Labs -follow-up labs include A1c and CMP at 3-month interval   · Return to office for short interval follow-up in 6 weeks for review of latest CGM data           Relevant Medications    Insulin Glargine Solostar (Lantus SoloStar) 100 UNIT/ML SOPN    insulin glulisine (Apidra SoloStar) 100 units/mL injection pen       Cardiovascular and Mediastinum    Benign hypertension with CKD (chronic kidney disease) stage III [facility])     Lab Results   Component Value Date    EGFR 36 11/30/2022    EGFR 36 11/14/2022    EGFR 38 10/24/2022    CREATININE 1 60 (H) 11/30/2022    CREATININE 1 63 (H) 11/14/2022    CREATININE 1 54 (H) 10/24/2022 ·   Recommend repeat BMP ASAP for follow up reduced GFR  · May consider re-introduction of ACE-I at low dose  This was discontinued previously due to mild hypotension  Other Visit Diagnoses     Type 2 diabetes mellitus with diabetic autonomic neuropathy, with long-term current use of insulin (HCC)    -  Primary    Relevant Medications    Insulin Glargine Solostar (Lantus SoloStar) 100 UNIT/ML SOPN    insulin glulisine (Apidra SoloStar) 100 units/mL injection pen    Other Relevant Orders    Comprehensive metabolic panel Lab Collect    HEMOGLOBIN A1C W/ EAG ESTIMATION Lab Collect    Ambulatory referral to Diabetic Education          CC: Diabetes  History of Present Illness     HPI:  Shelly Huerta is here for routine follow up of diabetes  She reports she had a good time on recent cruise, but unfortunately her [place] 2 was not working well at the time as the WiFi was inconsistent  Shelly Huerta reports her blood sugars are not as bad as they have been in the past, but are still mostly very elevated  She is having hyperglycemia symptoms frequently to include polyuria and polydipsia  She enjoys using Refugio 2 CGM although was active 39% of the time within the past 28 days (see full report and interpretation below)  She reports experiencing relative hypoglycemia symptoms when her sugar goes below 200 which is not common  Medication review:   Jardiance 25mg daily  Trulicity 3mg weekly  Lantus 40 units once daily  (45 units BID was previous dosing)  Apidra 10 - 10 - 10         Review of Systems   Constitutional: Negative for chills and fever  HENT: Negative for ear pain and sore throat  Eyes: Negative for pain and visual disturbance  Respiratory: Negative for cough and shortness of breath  Cardiovascular: Negative for chest pain and palpitations  Gastrointestinal: Negative for abdominal pain and vomiting     Genitourinary: Negative for dysuria and hematuria  Musculoskeletal: Negative for arthralgias and back pain  Skin: Negative for color change and rash  Neurological: Negative for seizures and syncope  All other systems reviewed and are negative        Historical Information   Past Medical History:   Diagnosis Date   • Atypical chest pain    • Depression    • Diabetes mellitus (HCC)    • Gastroparesis    • GERD (gastroesophageal reflux disease)    • Hyperlipidemia    • Hypertension    • Sciatica    • Seizure disorder ([facility] [address] )      Past Surgical History:   Procedure Laterality Date   • BREAST BIOPSY Left  benign   • CHOLECYSTECTOMY     • COLONOSCOPY     • HYSTERECTOMY     • ND LAPAROSCOPIC APPENDECTOMY N/A 3/24/2021    Procedure: APPENDECTOMY LAPAROSCOPIC;  Surgeon: Vinod Houston MD;  Location: [address] [facility] MAIN OR;  Service: General   • ND LAPS ABD PRTM&OMENTUM DX W/WO SPEC BR/WA SPX N/A 3/24/2021    Procedure: LAPAROSCOPY DIAGNOSTIC, EXTENSIVE DESI;  Surgeon: Vinod Houston MD;  Location: [address] [facility] MAIN OR;  Service: General   • UPPER GASTROINTESTINAL ENDOSCOPY       Social History   Social History     Substance and Sexual Activity   Alcohol Use Never     Social History     Substance and Sexual Activity   Drug Use Never     Social History     Tobacco Use   Smoking Status Former   • Types: Cigarettes   • Quit date:    • Years since quittin 1   Smokeless Tobacco Never     Family History:   Family History   Problem Relation Age of Onset   • No Known Problems Mother    • No Known Problems Father    • No Known Problems Daughter    • No Known Problems Maternal Grandmother    • No Known Problems Paternal Grandmother    • No Known Problems Paternal Aunt    • No Known Problems Paternal Aunt    • No Known Problems Paternal Aunt        Meds/Allergies   Current Outpatient Medications   Medication Sig Dispense Refill   • albuterol (PROVENTIL HFA,VENTOLIN HFA) 90 mcg/act inhaler INHALE ONE PUFF BY MOUTH AND INTO THE LUNGS EVERY 6 HOURS AS NEEDED FOR WHEEZING 36 g 3   • Alcohol Swabs (Pharmacist Choice Alcohol) PADS USE AS DIRECTED 100 each 1   • Aspirin (ASPIR-81 PO) Take 81 mg by mouth     • atorvastatin (LIPITOR) 80 mg tablet TAKE 1 TABLET (80 MG TOTAL) BY MOUTH DAILY AT BEDTIME 30 tablet 6   • B-D UF III MINI PEN NEEDLES 31G X 5 MM MISC Pt uses 5 pen needles daily 500 each 0   • BANOPHEN 50 MG capsule take 1 capsule by mouth every 6 hours if needed for itching     • cholecalciferol (VITAMIN D3) 400 units tablet TAKE ONE TABLET BY MOUTH DAILY 90 tablet 1   • citalopram (CeleXA) 40 mg tablet TAKE ONE TABLET BY MOUTH DAILY 90 tablet 1   • Continuous Blood Gluc  (FreeStyle Refugio 14 Day [place]) [facility] Use for continuous blood glucose monitoring 1 each 0   • Continuous Blood Gluc Sensor (FreeStyle Refugio 14 Day Sensor) MISC Use one sensor every 14 days for continuous blood sugar monitoring  6 each 3   • docusate sodium (COLACE) 100 mg capsule TAKE ONE CAPSULE BY MOUTH TWICE DAILY 60 capsule 5   • Dulaglutide (Trulicity) 3 BI/8 9GP SOPN Inject 0 5 mL (3 mg total) under the skin once a week 2 mL 3   • Empagliflozin (Jardiance) 25 MG TABS Take 1 tablet (25 mg total) by mouth every morning 90 tablet 1   • ergocalciferol (VITAMIN D2) 50,000 units Take 1 capsule (50,000 Units total) by mouth once a week 8 capsule 1   • famotidine (PEPCID) 40 MG tablet Take 1 tablet (40 mg total) by mouth daily at bedtime Take in evening 2 hours prior to bed 30 tablet 6   • fenofibrate (TRICOR) 48 mg tablet TAKE ONE TABLET BY MOUTH DAILY 90 tablet 3   • Insulin Glargine Solostar (Lantus SoloStar) 100 UNIT/ML SOPN INJECT 45 UNITS EVERY 12 HOURS 30 mL 1   • insulin glulisine (Apidra SoloStar) 100 units/mL injection pen Inject 10 units before breakfast and lunch, 5 units before dinner   30 mL 1   • Iron Heme Polypeptide 12 MG TABS Take 1 tablet by mouth     • levETIRAcetam (KEPPRA) 500 mg tablet TAKE ONE TABLET TWICE DAILY 180 tablet 1   • linaCLOtide (Linzess) 72 MCG CAPS Take 1 capsule by mouth daily before breakfast 30 capsule 0   • metoclopramide (REGLAN) 10 mg tablet Take 1 tablet (10 mg total) by mouth daily 90 tablet 3   • metoprolol tartrate (LOPRESSOR) 25 mg tablet TAKE 1 TABLET (25 MG TOTAL) BY MOUTH 2 (TWO) TIMES A  tablet 1   • neomycin-polymyxin-hydrocortisone (CORTISPORIN) otic solution Administer 4 drops to the right ear every 6 (six) hours 10 mL 0   • ondansetron (ZOFRAN) 4 mg tablet Take 1 tablet (4 mg total) by mouth every 8 (eight) hours as needed for nausea or vomiting 20 tablet 0   • OneTouch Ultra test strip USE 4 TIMES A  each 0   • pantoprazole (PROTONIX) 40 mg tablet TAKE ONE TABLET BY MOUTH TWICE DAILY 60 tablet 0   • Pharmacist Choice Lancets MISC USE AS DIRECTED 100 each 1   • pregabalin (LYRICA) 100 mg capsule TAKE ONE CAPSULE BY MOUTH THREE TIMES A  capsule 1   • scopolamine (TRANSDERM-SCOP) 1 mg/3 days TD 72 hr patch Place 1 patch on the skin over 72 hours every third day 24 patch 1   • solifenacin (VESICARE) 5 mg tablet TAKE ONE TABLET BY MOUTH DAILY 90 tablet 1   • Symbicort 80-4 5 MCG/ACT inhaler INHALE 2 PUFFS 2 (TWO) TIMES A DAY RINSE MOUTH AFTER USE  10 2 g 0     No current facility-administered medications for this visit  Allergies   Allergen Reactions   • Azithromycin GI Intolerance and Hives   • Benztropine    • Butorphanol Hallucinations   • Penicillins    • Zolpidem        Objective   Vitals: Blood pressure 124/78, pulse 72, height 5' 2" (1 575 m), weight 87 kg (191 lb 12 8 oz), not currently breastfeeding  Physical Exam  Vitals reviewed  Constitutional:       Appearance: She is obese  She is not ill-appearing  HENT:      Head: Normocephalic  Mouth/Throat:      Mouth: Mucous membranes are moist    Cardiovascular:      Rate and Rhythm: Normal rate  Pulmonary:      Effort: Pulmonary effort is normal  No respiratory distress  Musculoskeletal:      Right lower leg: No edema  Left lower leg: No edema  Skin:     General: Skin is warm and dry  Neurological:      Mental Status: She is alert and oriented to person, place, and time  Psychiatric:         Mood and Affect: Mood normal          The history was obtained from the review of the chart, patient      Lab Results:   Lab Results   Component Value Date/Time    Hemoglobin A1C 13 0 (H) 11/30/2022 09:09 AM    Hemoglobin A1C 13 4 (A) 11/29/2022 10:43 AM    Hemoglobin A1C 13 3 (H) 10/24/2022 02:10 PM    Hemoglobin A1C 13 9 (H) 08/30/2022 08:27 AM    WBC 8 76 10/24/2022 02:10 PM    WBC 8 26 08/30/2022 08:27 AM    WBC 10 18 (H) 05/23/2022 05:16 PM    Hemoglobin 11 4 (L) 10/24/2022 02:10 PM    Hemoglobin 11 3 (L) 08/30/2022 08:27 AM    Hemoglobin 12 8 05/23/2022 05:16 PM    Hematocrit 37 0 10/24/2022 02:10 PM    Hematocrit 36 7 08/30/2022 08:27 AM    Hematocrit 38 4 05/23/2022 05:16 PM    MCV 84 10/24/2022 02:10 PM    MCV 85 08/30/2022 08:27 AM    MCV 82 05/23/2022 05:16 PM    Platelets 981 30/63/7223 02:10 PM    Platelets 262 08/28/2178 08:27 AM    Platelets 865 23/61/4530 05:16 PM    BUN 26 (H) 11/30/2022 09:09 AM    BUN 28 (H) 11/14/2022 01:47 PM    BUN 34 (H) 10/24/2022 02:10 PM    Potassium 4 3 11/30/2022 09:09 AM    Potassium 4 2 11/14/2022 01:47 PM    Potassium 5 3 10/24/2022 02:10 PM    Chloride 102 11/30/2022 09:09 AM    Chloride 101 11/14/2022 01:47 PM    Chloride 102 10/24/2022 02:10 PM    CO2 27 11/30/2022 09:09 AM    CO2 23 11/14/2022 01:47 PM    CO2 24 10/24/2022 02:10 PM    Creatinine 1 60 (H) 11/30/2022 09:09 AM    Creatinine 1 63 (H) 11/14/2022 01:47 PM    Creatinine 1 54 (H) 10/24/2022 02:10 PM    AST 13 11/30/2022 09:09 AM    AST 43 11/14/2022 01:47 PM    AST 16 10/24/2022 02:10 PM    ALT 20 11/30/2022 09:09 AM    ALT 54 11/14/2022 01:47 PM    ALT 31 10/24/2022 02:10 PM    Albumin 2 9 (L) 11/30/2022 09:09 AM    Albumin 3 3 (L) 11/14/2022 01:47 PM    Albumin 3 1 (L) 10/24/2022 02:10 PM    HDL, Direct 49 (L) 08/30/2022 08:27 AM    Triglycerides 243 (H) 08/30/2022 08:27 AM       Continuous Glucose Monitoring:   Arnoldo Ayala   Device used: Refugio 2  Home use     Indication   Type 2 Diabetes    More than 72 hours of data was reviewed  Report to be scanned to chart  Date Range: 1/10/23 - 2/6/23    Analysis of data:   % time CGM used: 39%  Average Glucose: 332mg/dl  Coefficient of Variation: 20 5%  Time in Target Range: 3%  Time Above Range: 12% high, 85% very high  Time Below Range: 0% low, 0% very low  Interpretation of data: Karli Reyna have uncontrolled BG with only 3% of the time spent in the upper limit of target range (> 170), which only seems to be achieved during fasting hours  She has a sharp increase in her blood glucose on awakening which likely correlates with her history of consuming coffee with flavored creamer  Continued < 50% compliance with noncompliant Refugio wear and scanning  Imaging Studies: n/a    Portions of the record may have been created with voice recognition software  Occasional wrong word or "sound a like" substitutions may have occurred due to the inherent limitations of voice recognition software  Read the chart carefully and recognize, using context, where substitutions have occurred

## 2023-02-10 NOTE — ASSESSMENT & PLAN NOTE
Lab Results   Component Value Date    HGBA1C 13 0 (H) 11/30/2022   · Reviewed poor health outcomes associated with diabetes and motivation for wanting to reach glycemic targets, which I counseled patients thatwere reviewed with patient as well  · I counseled patient that she may expect some relative hypoglycemia symptoms as we start to bring her blood glucose into a more acceptable range  · Blood glucose monitoring -encouraged her to continue Refugio 2 CGM  Also reviewed that she should scan more frequently so we may capture all BG data (at least every 8 hours)  · Diet -patient is willing to see CDE for MNT -referral placed  · Pharmacotherapy -today, we will make dose adjustments in both her basal and mealtime insulin  Recommend increase in Lantus from 40 to 45 units daily  Also recommend increasing Apidra from 10 units 3 times daily AC to 12 units 3 times daily AC  Also discussed addition of scale coverage ISF 25 at 150  She may also continue her Jardiance 25 mg daily as well as Trulicity 3 mg weekly  · Of note, metformin was previously discontinued due to reduced GFR  May consider re-introduction if GFR recovers  · Labs -follow-up labs include A1c and CMP at 3-month interval   · Return to office for short interval follow-up in 6 weeks for review of latest CGM data

## 2023-02-10 NOTE — ASSESSMENT & PLAN NOTE
Lab Results   Component Value Date    EGFR 36 11/30/2022    EGFR 36 11/14/2022    EGFR 38 10/24/2022    CREATININE 1 60 (H) 11/30/2022    CREATININE 1 63 (H) 11/14/2022    CREATININE 1 54 (H) 10/24/2022   · Recommend repeat BMP ASAP for follow up reduced GFR  · May consider re-introduction of ACE-I at low dose  This was discontinued previously due to mild hypotension

## 2023-02-15 ENCOUNTER — CONSULT (OUTPATIENT)
Dept: CARDIOLOGY CLINIC | Facility: CLINIC | Age: 53
End: 2023-02-15

## 2023-02-15 ENCOUNTER — TELEPHONE (OUTPATIENT)
Dept: FAMILY MEDICINE CLINIC | Facility: CLINIC | Age: 53
End: 2023-02-15

## 2023-02-15 VITALS
DIASTOLIC BLOOD PRESSURE: 62 MMHG | OXYGEN SATURATION: 95 % | SYSTOLIC BLOOD PRESSURE: 112 MMHG | TEMPERATURE: 97.7 F | BODY MASS INDEX: 35.37 KG/M2 | WEIGHT: 192.2 LBS | HEIGHT: 62 IN | HEART RATE: 77 BPM

## 2023-02-15 DIAGNOSIS — E66.9 OBESITY (BMI 30-39.9): ICD-10-CM

## 2023-02-15 DIAGNOSIS — I10 PRIMARY HYPERTENSION: ICD-10-CM

## 2023-02-15 DIAGNOSIS — E78.5 HYPERLIPIDEMIA, UNSPECIFIED HYPERLIPIDEMIA TYPE: ICD-10-CM

## 2023-02-15 DIAGNOSIS — Z01.810 PREOP CARDIOVASCULAR EXAM: Primary | ICD-10-CM

## 2023-02-15 RX ORDER — DIPHENOXYLATE HYDROCHLORIDE AND ATROPINE SULFATE 2.5; .025 MG/1; MG/1
1 TABLET ORAL EVERY MORNING
COMMUNITY

## 2023-02-15 NOTE — TELEPHONE ENCOUNTER
Pre op appointment remigio for 2/21 at 2pm  Form in PCP folder at [facility] station

## 2023-02-15 NOTE — PROGRESS NOTES
Cardiology Follow up    Jose F Green  [id number]  1970  [facility] [address] [address] [phone]      1  Preop cardiovascular exam        2  Primary hypertension        3  Hyperlipidemia, unspecified hyperlipidemia type        4  Obesity (BMI 30-39  9)            Discussion/Summary:  1  Perioperative restratification  2  Chest pain-improved  3  Hypertension  4  Hyperlipidemia  5  Obesity  6  History of CVA    -EKG 11/15/2022 showing sinus rhythm heart rate 68 bpm with nonspecific ST/T wave abnormalities with no significant change from prior to comparison from March 2022  -Transthoracic echocardiogram 5/25/2022 showing left ventricular systolic function normal estimated LVEF 65% with trace mitral regurgitation normal right ventricular systolic function  -Nuclear stress test 6/30/2022 showing no diagnostic evidence of ischemia on perfusion imaging or artifact was present with LVEF calculated 75%  -We will continue atorvastatin 80 mg daily and fenofibrate 40 mg daily  Will check fasting lipid panel prior to next office visit in 3 months  -Patient will continue aspirin 81 mg daily, lisinopril 10 mg daily, metoprolol tartrate 25 mg twice daily  -Patient will continue to monitor home blood pressure readings and let us know if there are any significant issues with sustained blood pressure greater than 130/80 mmHg  -According to revised cardiac risk index patient is intermediate-high risk for planned operative procedure  Patient fully understands and is accepting of these risks and wishes to proceed with surgery as planned    In that setting along with patient's adequate functional status, no significant symptoms, and stress testing from June 2022 unrevealing patient is therefore appropriate risk to proceed with surgery as planned   -Counseled on dietary and lifestyle modifications including following a low-salt, low-fat, heart healthy diet with sodium restriction to less than 1800 mg of sodium daily along with weight loss with goal BMI less than 30  -Patient counseled if she were to have any warning or alarm type symptoms she is to seek emergency medical care immediately  History of Present Illness:  -Patient is a 58-year-old female with hypertension, CKD with history of renal cell carcinoma s/p resection in 2017, diabetes mellitus, history of stroke in 1990 along with obesity who initially presented to the office in April 2022 after being seen in the emergency department on 3/25/2022 for chest pain  The patient had noted at that time that the pain woke her up from sleep about an hour prior to arrival in the ER was sharp in nature radiated to her back lasting 5 minutes in duration and resolved on its own   -Since last office visit patient denies any chest pain, palpitations, lightheadedness or dizziness, loss of consciousness, shortness of breath or lower extremity edema  She states that her only major complaint has been hearing difficulty and ear discomfort and is scheduled to undergo ear tube insertion on 3/8/2023   -Patient notes blood pressures are well controlled and she can easily walk 4 city blocks on flat surface or up 2 flights of stairs with no chest pain, shortness of breath or anginal equivalent symptoms  She states that overall she feels well  She notes her anesthesia in the past that she does feel groggy after waking up but denies any significant issues      Patient Active Problem List   Diagnosis   • Radiculopathy, lumbar region   • Anterior tibial tendonitis, right   • Bipolar disorder ([facility] [address] [address] )   • Chronic low back pain with sciatica   • Type 2 diabetes mellitus with hyperglycemia, with long-term current use of insulin (HCC)   • Diabetic polyneuropathy associated with type 2 diabetes mellitus ([address] )   • Gastroparesis   • Hypertension associated with diabetes ([place] [address] )   • Spondylolisthesis of lumbar region   • Class 1 obesity due to excess calories with serious comorbidity and body mass index (BMI) of 34 0 to 34 9 in adult   • Clear cell carcinoma of kidney, right (HCC)   • Acute right lower quadrant pain   • Hypotension   • Internal hernia   • Intra-abdominal adhesions   • Depression with anxiety   • Mild intermittent asthma without complication   • Hypercholesteremia   • Hypertriglyceridemia   • Iron deficiency anemia secondary to inadequate dietary iron intake   • Encounter for post surgical wound check   • GERD (gastroesophageal reflux disease)   • History of colon polyps   • Slow transit constipation   • Anemia, unspecified   • History of kidney cancer   • Vitamin D deficiency   • Benign hypertension with CKD (chronic kidney disease) stage III ([facility])   • Secondary hyperparathyroidism ([place] [address] )   • Atypical chest pain   • Hyperosmolar hyponatremia     Past Medical History:   Diagnosis Date   • Atypical chest pain    • Depression    • Diabetes mellitus (HCC)    • Gastroparesis    • GERD (gastroesophageal reflux disease)    • Hyperlipidemia    • Hypertension    • Sciatica    • Seizure disorder ([place] [address] )      Social History     Socioeconomic History   • Marital status: /Civil Union     Spouse name: Not on file   • Number of children: Not on file   • Years of education: Not on file   • Highest education level: Not on file   Occupational History   • Not on file   Tobacco Use   • Smoking status: Former     Types: Cigarettes     Quit date:      Years since quittin 1   • Smokeless tobacco: Never   Vaping Use   • Vaping Use: Never used   Substance and Sexual Activity   • Alcohol use: Never   • Drug use: Never   • Sexual activity: Not Currently     Partners: Male   Other Topics Concern   • Not on file   Social History Narrative   • Not on file     Social Determinants of Health     Financial Resource Strain: Not on file   Food Insecurity: Not on file   Transportation Needs: Not on file   Physical Activity: Not on file   Stress: Not on file   Social Connections: Not on file   Intimate Partner Violence: Not on file   Housing Stability: Not on file      Family History   Problem Relation Age of Onset   • No Known Problems Mother    • No Known Problems Father    • No Known Problems Daughter    • No Known Problems Maternal Grandmother    • No Known Problems Paternal Grandmother    • No Known Problems Paternal Aunt    • No Known Problems Paternal Aunt    • No Known Problems Paternal Aunt      Past Surgical History:   Procedure Laterality Date   • BREAST BIOPSY Left  benign   • CHOLECYSTECTOMY     • COLONOSCOPY     • HYSTERECTOMY     • VA LAPAROSCOPIC APPENDECTOMY N/A 3/24/2021    Procedure: APPENDECTOMY LAPAROSCOPIC;  Surgeon: Tomi Song MD;  Location: [address] [facility] MAIN OR;  Service: General   • VA LAPS ABD PRTM&OMENTUM DX W/WO SPEC BR/WA SPX N/A 3/24/2021    Procedure: LAPAROSCOPY DIAGNOSTIC, EXTENSIVE DESI;  Surgeon: Tomi Song MD;  Location: [address] [facility] MAIN OR;  Service: General   • UPPER GASTROINTESTINAL ENDOSCOPY         Current Outpatient Medications:   •  albuterol (PROVENTIL HFA,VENTOLIN HFA) 90 mcg/act inhaler, INHALE ONE PUFF BY MOUTH AND INTO THE LUNGS EVERY 6 HOURS AS NEEDED FOR WHEEZING, Disp: 36 g, Rfl: 3  •  Alcohol Swabs (Pharmacist Choice Alcohol) PADS, USE AS DIRECTED, Disp: 100 each, Rfl: 1  •  Aspirin (ASPIR-81 PO), Take 81 mg by mouth, Disp: , Rfl:   •  atorvastatin (LIPITOR) 80 mg tablet, TAKE 1 TABLET (80 MG TOTAL) BY MOUTH DAILY AT BEDTIME, Disp: 30 tablet, Rfl: 6  •  B-D UF III MINI PEN NEEDLES 31G X 5 MM MISC, Pt uses 5 pen needles daily, Disp: 500 each, Rfl: 0  •  BANOPHEN 50 MG capsule, take 1 capsule by mouth every 6 hours if needed for itching, Disp: , Rfl:   •  cholecalciferol (VITAMIN D3) 400 units tablet, TAKE ONE TABLET BY MOUTH DAILY, Disp: 90 tablet, Rfl: 1  •  citalopram (CeleXA) 40 mg tablet, TAKE ONE TABLET BY MOUTH DAILY, Disp: 90 tablet, Rfl: 1  • Continuous Blood Gluc  (FreeStyle Refugio 14 Day [place]) TEMITOPE, Use for continuous blood glucose monitoring, Disp: 1 each, Rfl: 0  •  Continuous Blood Gluc Sensor (FreeStyle Refugio 14 Day Sensor) MISC, Use one sensor every 14 days for continuous blood sugar monitoring , Disp: 6 each, Rfl: 3  •  docusate sodium (COLACE) 100 mg capsule, TAKE ONE CAPSULE BY MOUTH TWICE DAILY, Disp: 60 capsule, Rfl: 5  •  Dulaglutide (Trulicity) 3 RT/3 2OF SOPN, Inject 0 5 mL (3 mg total) under the skin once a week, Disp: 2 mL, Rfl: 3  •  Empagliflozin (Jardiance) 25 MG TABS, Take 1 tablet (25 mg total) by mouth every morning, Disp: 90 tablet, Rfl: 1  •  ergocalciferol (VITAMIN D2) 50,000 units, Take 1 capsule (50,000 Units total) by mouth once a week, Disp: 8 capsule, Rfl: 1  •  famotidine (PEPCID) 40 MG tablet, Take 1 tablet (40 mg total) by mouth daily at bedtime Take in evening 2 hours prior to bed, Disp: 30 tablet, Rfl: 6  •  fenofibrate (TRICOR) 48 mg tablet, TAKE ONE TABLET BY MOUTH DAILY, Disp: 90 tablet, Rfl: 3  •  Insulin Glargine Solostar (Lantus SoloStar) 100 UNIT/ML SOPN, INJECT 48 units daily, Disp: 30 mL, Rfl: 1  •  insulin glulisine (Apidra SoloStar) 100 units/mL injection pen, Inject 12 units TID AC, with scale ISF 25 @ 150 as needed, Disp: 30 mL, Rfl: 1  •  Iron Heme Polypeptide 12 MG TABS, Take 1 tablet by mouth, Disp: , Rfl:   •  levETIRAcetam (KEPPRA) 500 mg tablet, TAKE ONE TABLET TWICE DAILY, Disp: 180 tablet, Rfl: 1  •  linaCLOtide (Linzess) 72 MCG CAPS, Take 1 capsule by mouth daily before breakfast, Disp: 30 capsule, Rfl: 0  •  metoclopramide (REGLAN) 10 mg tablet, Take 1 tablet (10 mg total) by mouth daily, Disp: 90 tablet, Rfl: 3  •  metoprolol tartrate (LOPRESSOR) 25 mg tablet, TAKE 1 TABLET (25 MG TOTAL) BY MOUTH 2 (TWO) TIMES A DAY, Disp: 180 tablet, Rfl: 1  •  multivitamin (THERAGRAN) TABS, Take 1 tablet by mouth every morning, Disp: , Rfl:   •  neomycin-polymyxin-hydrocortisone (CORTISPORIN) otic solution, Administer 4 drops to the right ear every 6 (six) hours, Disp: 10 mL, Rfl: 0  •  ondansetron (ZOFRAN) 4 mg tablet, Take 1 tablet (4 mg total) by mouth every 8 (eight) hours as needed for nausea or vomiting, Disp: 20 tablet, Rfl: 0  •  OneTouch Ultra test strip, USE 4 TIMES A DAY, Disp: 400 each, Rfl: 0  •  pantoprazole (PROTONIX) 40 mg tablet, TAKE ONE TABLET BY MOUTH TWICE DAILY, Disp: 60 tablet, Rfl: 0  •  Pharmacist Choice Lancets MISC, USE AS DIRECTED, Disp: 100 each, Rfl: 1  •  pregabalin (LYRICA) 100 mg capsule, TAKE ONE CAPSULE BY MOUTH THREE TIMES A DAY, Disp: 270 capsule, Rfl: 1  •  scopolamine (TRANSDERM-SCOP) 1 mg/3 days TD 72 hr patch, Place 1 patch on the skin over 72 hours every third day, Disp: 24 patch, Rfl: 1  •  solifenacin (VESICARE) 5 mg tablet, TAKE ONE TABLET BY MOUTH DAILY, Disp: 90 tablet, Rfl: 1  •  Symbicort 80-4 5 MCG/ACT inhaler, INHALE 2 PUFFS 2 (TWO) TIMES A DAY RINSE MOUTH AFTER USE , Disp: 10 2 g, Rfl: 0  Allergies   Allergen Reactions   • Azithromycin GI Intolerance and Hives   • Benztropine    • Butorphanol Hallucinations   • Penicillins    • Zolpidem          Labs:  No visits with results within 2 Month(s) from this visit     Latest known visit with results is:   Appointment on 11/30/2022   Component Date Value   • Sodium 11/30/2022 135    • Potassium 11/30/2022 4 3    • Chloride 11/30/2022 102    • CO2 11/30/2022 27    • ANION GAP 11/30/2022 6    • BUN 11/30/2022 26 (H)    • Creatinine 11/30/2022 1 60 (H)    • Glucose, Fasting 11/30/2022 561 (HH)    • Calcium 11/30/2022 9 3    • Corrected Calcium 11/30/2022 10 2 (H)    • AST 11/30/2022 13    • ALT 11/30/2022 20    • Alkaline Phosphatase 11/30/2022 89    • Total Protein 11/30/2022 6 9    • Albumin 11/30/2022 2 9 (L)    • Total Bilirubin 11/30/2022 0 25    • eGFR 11/30/2022 36    • Hemoglobin A1C 11/30/2022 13 0 (H)    • EAG 11/30/2022 326         Imaging: No results found    Review of Systems:  Review of Systems Constitutional: Negative for chills, diaphoresis, fatigue and fever  HENT: Positive for ear pain and hearing loss  Negative for trouble swallowing and voice change  Eyes: Negative for pain and redness  Respiratory: Negative for shortness of breath and wheezing  Cardiovascular: Negative for chest pain, palpitations and leg swelling  Patient denies orthopnea or bendopnea   Gastrointestinal: Negative for abdominal pain, constipation, diarrhea, nausea and vomiting  Genitourinary: Negative for dysuria  Musculoskeletal: Positive for arthralgias  Negative for neck pain and neck stiffness  Neurological: Negative for dizziness, syncope, light-headedness and headaches  Psychiatric/Behavioral: Negative for agitation and confusion  All other systems reviewed and are negative  Vitals:    02/15/23 1547   BP: 112/62   BP Location: Right arm   Patient Position: Sitting   Cuff Size: Standard   Pulse: 77   Temp: 97 7 °F (36 5 °C)   TempSrc: Temporal   SpO2: 95%   Weight: 87 2 kg (192 lb 3 2 oz)   Height: 5' 2" (1 575 m)     Vitals:    02/15/23 1547   Weight: 87 2 kg (192 lb 3 2 oz)     Height: 5' 2" (157 5 cm)     Physical Exam:  Physical Exam  Vitals reviewed  Constitutional:       General: She is not in acute distress  Appearance: She is obese  She is not diaphoretic  HENT:      Head: Normocephalic and atraumatic  Eyes:      General:         Right eye: No discharge  Left eye: No discharge  Neck:      Comments: Trachea midline, no JVD present  Cardiovascular:      Rate and Rhythm: Normal rate and regular rhythm  Heart sounds: No friction rub  Pulmonary:      Effort: Pulmonary effort is normal  No respiratory distress  Breath sounds: No wheezing  Chest:      Chest wall: No tenderness  Abdominal:      General: Bowel sounds are normal       Palpations: Abdomen is soft  Tenderness: There is no abdominal tenderness  There is no rebound     Musculoskeletal: Right lower leg: No edema  Left lower leg: No edema  Skin:     General: Skin is warm and dry  Neurological:      Mental Status: She is alert  Comments: Awake, alert, able to answer questions appropriately, able to move extremities bilaterally     Psychiatric:         Mood and Affect: Mood normal          Behavior: Behavior normal

## 2023-02-15 NOTE — TELEPHONE ENCOUNTER
Haylee Balbuena pre op clearance [facility] ear nose and throat  Scanned in media  Placed in [employer]

## 2023-02-21 ENCOUNTER — CONSULT (OUTPATIENT)
Dept: FAMILY MEDICINE CLINIC | Facility: CLINIC | Age: 53
End: 2023-02-21

## 2023-02-21 VITALS
OXYGEN SATURATION: 99 % | HEIGHT: 62 IN | RESPIRATION RATE: 20 BRPM | WEIGHT: 192.4 LBS | BODY MASS INDEX: 35.41 KG/M2 | SYSTOLIC BLOOD PRESSURE: 130 MMHG | TEMPERATURE: 97 F | HEART RATE: 74 BPM | DIASTOLIC BLOOD PRESSURE: 72 MMHG

## 2023-02-21 DIAGNOSIS — Z01.818 PRE-OPERATIVE CLEARANCE: ICD-10-CM

## 2023-02-21 DIAGNOSIS — E55.9 VITAMIN D DEFICIENCY: ICD-10-CM

## 2023-02-21 DIAGNOSIS — E66.09 CLASS 1 OBESITY DUE TO EXCESS CALORIES WITH SERIOUS COMORBIDITY AND BODY MASS INDEX (BMI) OF 34.0 TO 34.9 IN ADULT: ICD-10-CM

## 2023-02-21 DIAGNOSIS — Z12.31 ENCOUNTER FOR SCREENING MAMMOGRAM FOR MALIGNANT NEOPLASM OF BREAST: ICD-10-CM

## 2023-02-21 DIAGNOSIS — E11.65 TYPE 2 DIABETES MELLITUS WITH HYPERGLYCEMIA, WITH LONG-TERM CURRENT USE OF INSULIN (HCC): ICD-10-CM

## 2023-02-21 DIAGNOSIS — Z79.4 TYPE 2 DIABETES MELLITUS WITH HYPERGLYCEMIA, WITH LONG-TERM CURRENT USE OF INSULIN (HCC): ICD-10-CM

## 2023-02-21 DIAGNOSIS — E78.00 HYPERCHOLESTEREMIA: ICD-10-CM

## 2023-02-21 DIAGNOSIS — H69.93 EUSTACHIAN TUBE DISORDER, BILATERAL: Primary | ICD-10-CM

## 2023-02-21 NOTE — PATIENT INSTRUCTIONS
Patient is medically cleared for surgery  Please stop your Aspirin 1 week before surgery  Mild cardiovascular risk  Have preop BW completed as discussed  Call and schedule your mammogram  Return in 6 months for scheduled follow up or as needed

## 2023-02-21 NOTE — PROGRESS NOTES
[facility]        NAME: Dino Morejon is a 46 y o  female  : 1970    MRN: [id number]  DATE: 2023  TIME: 1:44 PM    Assessment and Plan   Eustachian tube disorder, bilateral [H69 [id number]]  1  Eustachian tube disorder, bilateral        2  Encounter for screening mammogram for malignant neoplasm of breast  Mammo screening bilateral w 3d & cad      3  Pre-operative clearance        4  Class 1 obesity due to excess calories with serious comorbidity and body mass index (BMI) of 34 0 to 34 9 in adult        5  Type 2 diabetes mellitus with hyperglycemia, with long-term current use of insulin (HCC)  Hemoglobin A1C    Comprehensive metabolic panel    CBC and differential    Microalbumin / creatinine urine ratio      6  Hypercholesteremia  Lipid Panel with Direct LDL reflex      7  Vitamin D deficiency  Vitamin D 25 hydroxy            Patient Instructions     Patient Instructions   Patient is medically cleared for surgery  Please stop your Aspirin 1 week before surgery  Mild cardiovascular risk  Have preop BW completed as discussed  Call and schedule your mammogram  Return in 6 months for scheduled follow up or as needed          Chief Complaint     Chief Complaint   Patient presents with   • Pre-op Exam         History of Present Illness       Presurgical Evaluation    Subjective:     Patient ID: Dino Morejon is a 46 y o  female      Patient presents with:  [facility] Exam      [facility]    The following portions of the patient's history were reviewed and updated as appropriate: allergies, current medications, past family history, past medical history, past social history, past surgical history and problem list     Procedure date: 3 8 23    Surgeon:  Dr Kendal Tan  Planned procedure:  bilateral tympanostomy   Diagnosis for procedure:  ETD (Eustachian tube dysfunction), bilateral    Prior anesthesia: Yes   General; Complications:  Slow to wake up    CAD History: None   NOTE: Patient should see Cardiology if time available before surgery, and if appropriate      Pulmonary History: Asthma    Renal history: CKD stage 3 - GFR 30-59    Diabetes History:  Type 2  Uncontrolled     Neurological History: CVA     On Immunosuppressant meds/biologics: Lizbet      [unfilled]     Current Outpatient Medications:  albuterol (PROVENTIL HFA,VENTOLIN HFA) 90 mcg/act inhaler, INHALE ONE PUFF BY MOUTH AND INTO THE LUNGS EVERY 6 HOURS AS NEEDED FOR WHEEZING, Disp: 36 g, Rfl: 3  Alcohol Swabs (Pharmacist Choice Alcohol) PADS, USE AS DIRECTED, Disp: 100 each, Rfl: 1  Aspirin (ASPIR-81 PO), Take 81 mg by mouth, Disp: , Rfl:   atorvastatin (LIPITOR) 80 mg tablet, TAKE 1 TABLET (80 MG TOTAL) BY MOUTH DAILY AT BEDTIME, Disp: 30 tablet, Rfl: 6  B-D UF III MINI PEN NEEDLES 31G X 5 MM MISC, Pt uses 5 pen needles daily, Disp: 500 each, Rfl: 0  BANOPHEN 50 MG capsule, take 1 capsule by mouth every 6 hours if needed for itching, Disp: , Rfl:   cholecalciferol (VITAMIN D3) 400 units tablet, TAKE ONE TABLET BY MOUTH DAILY, Disp: 90 tablet, Rfl: 1  citalopram (CeleXA) 40 mg tablet, TAKE ONE TABLET BY MOUTH DAILY, Disp: 90 tablet, Rfl: 1  Continuous Blood Gluc  (FreeStyle Refugio 14 Day [place]) [facility], Use for continuous blood glucose monitoring, Disp: 1 each, Rfl: 0  Continuous Blood Gluc Sensor (FreeStyle Refugio 14 Day Sensor) MISC, Use one sensor every 14 days for continuous blood sugar monitoring , Disp: 6 each, Rfl: 3  docusate sodium (COLACE) 100 mg capsule, TAKE ONE CAPSULE BY MOUTH TWICE DAILY, Disp: 60 capsule, Rfl: 5  Dulaglutide (Trulicity) 3 VW/0 8MV SOPN, Inject 0 5 mL (3 mg total) under the skin once a week, Disp: 2 mL, Rfl: 3  Empagliflozin (Jardiance) 25 MG TABS, Take 1 tablet (25 mg total) by mouth every morning, Disp: 90 tablet, Rfl: 1  ergocalciferol (VITAMIN D2) 50,000 units, Take 1 capsule (50,000 Units total) by mouth once a week, Disp: 8 capsule, Rfl: 1  famotidine (PEPCID) 40 MG tablet, Take 1 tablet (40 mg total) by mouth daily at bedtime Take in evening 2 hours prior to bed, Disp: 30 tablet, Rfl: 6  fenofibrate (TRICOR) 48 mg tablet, TAKE ONE TABLET BY MOUTH DAILY, Disp: 90 tablet, Rfl: 3  Insulin Glargine Solostar (Lantus SoloStar) 100 UNIT/ML SOPN, INJECT 48 units daily, Disp: 30 mL, Rfl: 1  insulin glulisine (Apidra SoloStar) 100 units/mL injection pen, Inject 12 units TID AC, with scale ISF 25 @ 150 as needed, Disp: 30 mL, Rfl: 1  Iron Heme Polypeptide 12 MG TABS, Take 1 tablet by mouth, Disp: , Rfl:   levETIRAcetam (KEPPRA) 500 mg tablet, TAKE ONE TABLET TWICE DAILY, Disp: 180 tablet, Rfl: 1  linaCLOtide (Linzess) 72 MCG CAPS, Take 1 capsule by mouth daily before breakfast, Disp: 30 capsule, Rfl: 0  metoclopramide (REGLAN) 10 mg tablet, Take 1 tablet (10 mg total) by mouth daily, Disp: 90 tablet, Rfl: 3  metoprolol tartrate (LOPRESSOR) 25 mg tablet, TAKE 1 TABLET (25 MG TOTAL) BY MOUTH 2 (TWO) TIMES A DAY, Disp: 180 tablet, Rfl: 1  multivitamin (THERAGRAN) TABS, Take 1 tablet by mouth every morning, Disp: , Rfl:   neomycin-polymyxin-hydrocortisone (CORTISPORIN) otic solution, Administer 4 drops to the right ear every 6 (six) hours, Disp: 10 mL, Rfl: 0  ondansetron (ZOFRAN) 4 mg tablet, Take 1 tablet (4 mg total) by mouth every 8 (eight) hours as needed for nausea or vomiting, Disp: 20 tablet, Rfl: 0  OneTouch Ultra test strip, USE 4 TIMES A DAY, Disp: 400 each, Rfl: 0  pantoprazole (PROTONIX) 40 mg tablet, TAKE ONE TABLET BY MOUTH TWICE DAILY, Disp: 60 tablet, Rfl: 0  Pharmacist Choice Lancets MISC, USE AS DIRECTED, Disp: 100 each, Rfl: 1  pregabalin (LYRICA) 100 mg capsule, TAKE ONE CAPSULE BY MOUTH THREE TIMES A DAY, Disp: 270 capsule, Rfl: 1  scopolamine (TRANSDERM-SCOP) 1 mg/3 days TD 72 hr patch, Place 1 patch on the skin over 72 hours every third day, Disp: 24 patch, Rfl: 1  solifenacin (VESICARE) 5 mg tablet, TAKE ONE TABLET BY MOUTH DAILY, Disp: 90 tablet, Rfl: 1  Symbicort 80-4 5 MCG/ACT inhaler, INHALE 2 PUFFS 2 (TWO) TIMES A DAY RINSE MOUTH AFTER USE , Disp: 10 2 g, Rfl: 0    No current facility-administered medications for this visit  Allergies on file:   Azithromycin, Benztropine, Butorphanol, Penicillins, and Zolpidem    Patient Active Problem List:     Radiculopathy, lumbar region     Anterior tibial tendonitis, right     Bipolar disorder (HCC)     Chronic low back pain with sciatica     Type 2 diabetes mellitus with hyperglycemia, with long-term current use of insulin ([facility])     Diabetic polyneuropathy associated with type 2 diabetes mellitus ([facility])     Gastroparesis     Hypertension associated with diabetes ([facility] )     Spondylolisthesis of lumbar region     Class 1 obesity due to excess calories with serious comorbidity and body mass index (BMI) of 34 0 to 34 9 in adult     Clear cell carcinoma of kidney, right ([facility])     Acute right lower quadrant pain     Hypotension     Internal hernia     Intra-abdominal adhesions     Depression with anxiety     Mild intermittent asthma without complication     Hypercholesteremia     Hypertriglyceridemia     Iron deficiency anemia secondary to inadequate dietary iron intake     Encounter for post surgical wound check     GERD (gastroesophageal reflux disease)     History of colon polyps     Slow transit constipation     Anemia, unspecified     History of kidney cancer     Vitamin D deficiency     Benign hypertension with CKD (chronic kidney disease) stage III ([facility])     Secondary hyperparathyroidism ([facility] )     Atypical chest pain     Hyperosmolar hyponatremia       Past Medical History:  No date: Atypical chest pain  No date: Depression  No date: Diabetes mellitus (HCC)  No date: Gastroparesis  No date: GERD (gastroesophageal reflux disease)  No date: Hyperlipidemia  No date: Hypertension  No date: Sciatica  No date: Seizure disorder [facility])    Past Surgical History:   benign: BREAST BIOPSY;  Left  No date: CHOLECYSTECTOMY  No date: COLONOSCOPY  No date: HYSTERECTOMY  3/24/2021: MO LAPAROSCOPIC APPENDECTOMY; N/A      Comment:  Procedure: APPENDECTOMY LAPAROSCOPIC;  Surgeon: Gloria Mitchell MD;  Location: [facility] MAIN OR;  Service: General  3/24/2021: MS LAPS ABD PRTM&OMENTUM DX W/WO SPEC BR/WA SPX; N/A      Comment:  Procedure: LAPAROSCOPY DIAGNOSTIC, EXTENSIVE DESI;                 Surgeon: Jarred Leon MD;  Location: [facility] MAIN OR;                 Service: General  No date: UPPER GASTROINTESTINAL ENDOSCOPY    Review of patient's family history indicates:      Social History    Tobacco Use      Smoking status: Former        Types: Cigarettes        Quit date:         Years since quittin 1      Smokeless tobacco: Never    Vaping Use      Vaping Use: Never used    Alcohol use: Never    Drug use: Never      Objective:    ---------------------------------                  23                            1320            ---------------------------------   BP:             130/72            Pulse:            74              Resp:             20              Temp:     (!) 97 °F (36 1 °C)     TempSrc:       Tympanic           SpO2:             99%             Weight: 87 3 kg (192 lb 6 4 oz)   Height:     5' 2" (1 575 m)      ---------------------------------    [unfilled]     Preop labs/testing available and reviewed: no               EKG yes    Echo no    Stress test/cath no    PFT/Morgan no    Functional capacity: Walking , 4-5 MPH               4 Mets   Pick the highest level patient can comfortably perform   4 mets or greater for surgery    RCRI  High Risk surgery? 1 Point  CAD History:         1 Point   MI; Positive Stress Test; CP due to Mi;  Nitrate Usage to control Angina;  Pathologic Q wave on EKG  CHF Active:         1 Point   Pulm Edema; Paroxysmal Nocturnal Dyspnea;  Bibasilar Rales (crackles);S3; CHF on CXR  Cerebrovascular Disease (TIA or CVA):     1 Point  DM on Insulin:        1 Point  Serum Creat >2 0 mg/dl:       1 Point          Total Points: 3     Scorin: Class I, Very Low Risk (0 4%)     1: Class II, Low risk (0 9%)     2: Class III Moderate (6 6%)     3: Class IV High (>11%)      LINDY Risk:  GFR:        For PCP:  If GFR>60, Hold ACE/ARB/Diuretic on the day of surgery, and NSAIDS 10 days before  If GFR<45, Consider PRE and POST op Nephrology Consult  If 46 <GFR> 59 : Has Patient had LINDY in last 6 Months? no   If YES: Preop Nephrology consult   If No:  [place] 26 Nephrology consult  Assessment/Plan:    Patient is medically optimized (cleared) for the planned procedure  Further testing/evaluation is not required      Postop concerns: no    Problem List Items Addressed This Visit    None    Diagnoses and associated orders for this visit:    • Eustachian tube disorder, bilateral       Outpatient Medications Marked as Taking for the 23 encounter (Consult) with SYLWIA Allan:  albuterol (PROVENTIL HFA,VENTOLIN HFA) 90 mcg/act inhaler, per anesthesia guidelines   Alcohol Swabs (Pharmacist Choice Alcohol) PADS, per anesthesia guidelines   Aspirin (ASPIR-81 PO), Stop taking 1 week prior to surgery  atorvastatin (LIPITOR) 80 mg tablet, per anesthesia guidelines   B-D UF III MINI PEN NEEDLES 31G X 5 MM MISC, per anesthesia guidelines    BANOPHEN 50 MG capsule, per anesthesia guidelines   cholecalciferol (VITAMIN D3) 400 units tablet, per anesthesia guidelines   citalopram (CeleXA) 40 mg tablet, per anesthesia guidelines   Continuous Blood Gluc  (FreeStyle Refugio 14 Day [place]) TEMITOPE, per anesthesia guidelines   Continuous Blood Gluc Sensor (FreeStyle Refugio 14 Day Sensor) MISC, per anesthesia guidelines   docusate sodium (COLACE) 100 mg capsule, per anesthesia guidelines   Dulaglutide (Trulicity) 3 ME/6 4HD SOPN, per anesthesia guidelines   Empagliflozin (Jardiance) 25 MG TABS, per anesthesia guidelines   ergocalciferol (VITAMIN D2) 50,000 units, per anesthesia guidelines   famotidine (PEPCID) 40 MG tablet, per anesthesia guidelines   fenofibrate (TRICOR) 48 mg tablet, per anesthesia guidelines   Insulin Glargine Solostar (Lantus SoloStar) 100 UNIT/ML SOPN, per anesthesia guidelines   insulin glulisine (Apidra SoloStar) 100 units/mL injection pen, per anesthesia guidelines   Iron Heme Polypeptide 12 MG TABS, per anesthesia guidelines   levETIRAcetam (KEPPRA) 500 mg tablet, per anesthesia guidelines   linaCLOtide (Linzess) 72 MCG CAPS, per anesthesia guidelines   metoclopramide (REGLAN) 10 mg tablet, per anesthesia guidelines   metoprolol tartrate (LOPRESSOR) 25 mg tablet, per anesthesia guidelines   multivitamin (THERAGRAN) TABS, per anesthesia guidelines   neomycin-polymyxin-hydrocortisone (CORTISPORIN) otic solution, per anesthesia guidelines   ondansetron (ZOFRAN) 4 mg tablet, per anesthesia guidelines   OneTouch Ultra test strip, per anesthesia guidelines   pantoprazole (PROTONIX) 40 mg tablet, per anesthesia guidelines   Pharmacist Choice Lancets MISC, per anesthesia guidelines   pregabalin (LYRICA) 100 mg capsule, per anesthesia guidelines   scopolamine (TRANSDERM-SCOP) 1 mg/3 days TD 72 hr patch, Take morning of surgery  solifenacin (VESICARE) 5 mg tablet, per anesthesia guidelines   Symbicort 80-4 5 MCG/ACT inhaler, per anesthesia guidelines          NOTE: Please use the above to review important meds for your specialty, the remainder "per anesthesia Guidelines "    NOTE: Please place an Inbasket message for "[facility]" pool for complicated patients  Review of Systems   Review of Systems   Constitutional: Negative for activity change, diaphoresis, fatigue and fever  HENT: Positive for ear pain  Negative for congestion, facial swelling, hearing loss, rhinorrhea, sinus pressure, sinus pain, sneezing, sore throat and voice change  Eyes: Negative for discharge and visual disturbance     Respiratory: Negative for cough, choking, chest tightness, shortness of breath, wheezing and stridor  Cardiovascular: Negative for chest pain, palpitations and leg swelling  Gastrointestinal: Negative for abdominal distention, abdominal pain, constipation, diarrhea, nausea and vomiting  Endocrine: Negative for polydipsia, polyphagia and polyuria  Genitourinary: Negative for difficulty urinating, dysuria, frequency and urgency  Musculoskeletal: Negative for arthralgias, back pain, gait problem, joint swelling, myalgias, neck pain and neck stiffness  Skin: Negative for color change, rash and wound  Neurological: Negative for dizziness, syncope, speech difficulty, weakness, light-headedness and headaches  Hematological: Negative for adenopathy  Does not bruise/bleed easily  Psychiatric/Behavioral: Negative for agitation, behavioral problems, confusion, hallucinations, sleep disturbance and suicidal ideas  The patient is not nervous/anxious            Current Medications       Current Outpatient Medications:   •  albuterol (PROVENTIL HFA,VENTOLIN HFA) 90 mcg/act inhaler, INHALE ONE PUFF BY MOUTH AND INTO THE LUNGS EVERY 6 HOURS AS NEEDED FOR WHEEZING, Disp: 36 g, Rfl: 3  •  Alcohol Swabs (Pharmacist Choice Alcohol) PADS, USE AS DIRECTED, Disp: 100 each, Rfl: 1  •  Aspirin (ASPIR-81 PO), Take 81 mg by mouth, Disp: , Rfl:   •  atorvastatin (LIPITOR) 80 mg tablet, TAKE 1 TABLET (80 MG TOTAL) BY MOUTH DAILY AT BEDTIME, Disp: 30 tablet, Rfl: 6  •  B-D UF III MINI PEN NEEDLES 31G X 5 MM MISC, Pt uses 5 pen needles daily, Disp: 500 each, Rfl: 0  •  BANOPHEN 50 MG capsule, take 1 capsule by mouth every 6 hours if needed for itching, Disp: , Rfl:   •  cholecalciferol (VITAMIN D3) 400 units tablet, TAKE ONE TABLET BY MOUTH DAILY, Disp: 90 tablet, Rfl: 1  •  citalopram (CeleXA) 40 mg tablet, TAKE ONE TABLET BY MOUTH DAILY, Disp: 90 tablet, Rfl: 1  •  Continuous Blood Gluc  (FreeStyle Refugio 14 Day [place]) TEMITOPE, Use for continuous blood glucose monitoring, Disp: 1 each, Rfl: 0  •  Continuous Blood Gluc Sensor (FreeStyle Refugio 14 Day Sensor) MISC, Use one sensor every 14 days for continuous blood sugar monitoring , Disp: 6 each, Rfl: 3  •  docusate sodium (COLACE) 100 mg capsule, TAKE ONE CAPSULE BY MOUTH TWICE DAILY, Disp: 60 capsule, Rfl: 5  •  Dulaglutide (Trulicity) 3 /8 8JH SOPN, Inject 0 5 mL (3 mg total) under the skin once a week, Disp: 2 mL, Rfl: 3  •  Empagliflozin (Jardiance) 25 MG TABS, Take 1 tablet (25 mg total) by mouth every morning, Disp: 90 tablet, Rfl: 1  •  ergocalciferol (VITAMIN D2) 50,000 units, Take 1 capsule (50,000 Units total) by mouth once a week, Disp: 8 capsule, Rfl: 1  •  famotidine (PEPCID) 40 MG tablet, Take 1 tablet (40 mg total) by mouth daily at bedtime Take in evening 2 hours prior to bed, Disp: 30 tablet, Rfl: 6  •  fenofibrate (TRICOR) 48 mg tablet, TAKE ONE TABLET BY MOUTH DAILY, Disp: 90 tablet, Rfl: 3  •  Insulin Glargine Solostar (Lantus SoloStar) 100 UNIT/ML SOPN, INJECT 48 units daily, Disp: 30 mL, Rfl: 1  •  insulin glulisine (Apidra SoloStar) 100 units/mL injection pen, Inject 12 units TID AC, with scale ISF 25 @ 150 as needed, Disp: 30 mL, Rfl: 1  •  Iron Heme Polypeptide 12 MG TABS, Take 1 tablet by mouth, Disp: , Rfl:   •  levETIRAcetam (KEPPRA) 500 mg tablet, TAKE ONE TABLET TWICE DAILY, Disp: 180 tablet, Rfl: 1  •  linaCLOtide (Linzess) 72 MCG CAPS, Take 1 capsule by mouth daily before breakfast, Disp: 30 capsule, Rfl: 0  •  metoclopramide (REGLAN) 10 mg tablet, Take 1 tablet (10 mg total) by mouth daily, Disp: 90 tablet, Rfl: 3  •  metoprolol tartrate (LOPRESSOR) 25 mg tablet, TAKE 1 TABLET (25 MG TOTAL) BY MOUTH 2 (TWO) TIMES A DAY, Disp: 180 tablet, Rfl: 1  •  multivitamin (THERAGRAN) TABS, Take 1 tablet by mouth every morning, Disp: , Rfl:   •  neomycin-polymyxin-hydrocortisone (CORTISPORIN) otic solution, Administer 4 drops to the right ear every 6 (six) hours, Disp: 10 mL, Rfl: 0  •  ondansetron (ZOFRAN) 4 mg tablet, Take 1 tablet (4 mg total) by mouth every 8 (eight) hours as needed for nausea or vomiting, Disp: 20 tablet, Rfl: 0  •  OneTouch Ultra test strip, USE 4 TIMES A DAY, Disp: 400 each, Rfl: 0  •  pantoprazole (PROTONIX) 40 mg tablet, TAKE ONE TABLET BY MOUTH TWICE DAILY, Disp: 60 tablet, Rfl: 0  •  Pharmacist Choice Lancets MISC, USE AS DIRECTED, Disp: 100 each, Rfl: 1  •  pregabalin (LYRICA) 100 mg capsule, TAKE ONE CAPSULE BY MOUTH THREE TIMES A DAY, Disp: 270 capsule, Rfl: 1  •  scopolamine (TRANSDERM-SCOP) 1 mg/3 days TD 72 hr patch, Place 1 patch on the skin over 72 hours every third day, Disp: 24 patch, Rfl: 1  •  solifenacin (VESICARE) 5 mg tablet, TAKE ONE TABLET BY MOUTH DAILY, Disp: 90 tablet, Rfl: 1  •  Symbicort 80-4 5 MCG/ACT inhaler, INHALE 2 PUFFS 2 (TWO) TIMES A DAY RINSE MOUTH AFTER USE , Disp: 10 2 g, Rfl: 0    Current Allergies     Allergies as of 02/21/2023 - Reviewed 02/21/2023   Allergen Reaction Noted   • Azithromycin GI Intolerance and Hives 07/13/2012   • Benztropine  08/09/2016   • Butorphanol Hallucinations 08/09/2016   • Penicillins  11/12/2015   • Zolpidem  11/12/2015            The following portions of the patient's history were reviewed and updated as appropriate: allergies, current medications, past family history, past medical history, past social history, past surgical history and problem list      Past Medical History:   Diagnosis Date   • Atypical chest pain    • Depression    • Diabetes mellitus ([facility] )    • Gastroparesis    • GERD (gastroesophageal reflux disease)    • Hyperlipidemia    • Hypertension    • Sciatica    • Seizure disorder ([facility] )        Past Surgical History:   Procedure Laterality Date   • BREAST BIOPSY Left  benign   • CHOLECYSTECTOMY     • COLONOSCOPY     • HYSTERECTOMY     • VA LAPAROSCOPIC APPENDECTOMY N/A 3/24/2021    Procedure: APPENDECTOMY LAPAROSCOPIC;  Surgeon: Anat Mullen MD;  Location: [facility] MAIN OR;  Service: General   • VA LAPS ABD PRTM&OMENTUM DX W/WO SPEC BR/WA SPX N/A 3/24/2021 Procedure: LAPAROSCOPY DIAGNOSTIC, EXTENSIVE DESI;  Surgeon: Edda Yi MD;  Location: [address] [place] [address] MAIN OR;  Service: General   • UPPER GASTROINTESTINAL ENDOSCOPY         Family History   Problem Relation Age of Onset   • No Known Problems Mother    • No Known Problems Father    • No Known Problems Daughter    • No Known Problems Maternal Grandmother    • No Known Problems Paternal Grandmother    • No Known Problems Paternal Aunt    • No Known Problems Paternal Aunt    • No Known Problems Paternal Aunt          Medications have been verified  Objective   /72   Pulse 74   Temp (!) 97 °F (36 1 °C) (Tympanic)   Resp 20   Ht 5' 2" (1 575 m)   Wt 87 3 kg (192 lb 6 4 oz)   SpO2 99%   BMI 35 19 kg/m²        Physical Exam     Physical Exam  Vitals and nursing note reviewed  Constitutional:       General: She is not in acute distress  Appearance: She is well-developed  She is not diaphoretic  HENT:      Head: Normocephalic and atraumatic  Right Ear: Ear canal and external ear normal  Tympanic membrane is erythematous  Tympanic membrane is not perforated or bulging  Left Ear: Ear canal and external ear normal  Tympanic membrane is erythematous  Tympanic membrane is not perforated or bulging  Nose: Nose normal       Right Sinus: No maxillary sinus tenderness or frontal sinus tenderness  Left Sinus: No maxillary sinus tenderness or frontal sinus tenderness  Mouth/Throat:      Pharynx: Uvula midline  No oropharyngeal exudate  Eyes:      General:         Right eye: No discharge  Left eye: No discharge  Conjunctiva/sclera: Conjunctivae normal       Pupils: Pupils are equal, round, and reactive to light  Neck:      Thyroid: No thyromegaly  Trachea: No tracheal deviation  Cardiovascular:      Rate and Rhythm: Normal rate and regular rhythm  Heart sounds: Normal heart sounds  No murmur heard  No friction rub  No gallop     Pulmonary:      Effort: Pulmonary effort is normal  No respiratory distress  Breath sounds: Normal breath sounds  No wheezing or rales  Abdominal:      General: Bowel sounds are normal  There is no distension  Palpations: Abdomen is soft  There is no mass  Tenderness: There is no abdominal tenderness  There is no guarding or rebound  Musculoskeletal:         General: No tenderness or deformity  Normal range of motion  Cervical back: Normal range of motion and neck supple  Lymphadenopathy:      Cervical: No cervical adenopathy  Skin:     General: Skin is warm and dry  Findings: No erythema or rash  Neurological:      Mental Status: She is alert and oriented to person, place, and time  Cranial Nerves: No cranial nerve deficit  Coordination: Coordination normal    Psychiatric:         Speech: Speech normal          Behavior: Behavior normal          Thought Content: Thought content normal          Judgment: Judgment normal        BMI Counseling: Body mass index is 35 19 kg/m²  The BMI is above normal  Nutrition recommendations include decreasing portion sizes, encouraging healthy choices of fruits and vegetables, decreasing fast food intake, consuming healthier snacks, limiting drinks that contain sugar, moderation in carbohydrate intake and increasing intake of lean protein  Exercise recommendations include exercising 3-5 times per week  Rationale for BMI follow-up plan is due to patient being overweight or obese

## 2023-03-01 DIAGNOSIS — K21.9 GASTROESOPHAGEAL REFLUX DISEASE WITHOUT ESOPHAGITIS: ICD-10-CM

## 2023-03-01 RX ORDER — PANTOPRAZOLE SODIUM 40 MG/1
TABLET, DELAYED RELEASE ORAL
Qty: 60 TABLET | Refills: 0 | Status: SHIPPED | OUTPATIENT
Start: 2023-03-01

## 2023-03-13 DIAGNOSIS — E11.43 TYPE 2 DIABETES MELLITUS WITH DIABETIC AUTONOMIC NEUROPATHY, WITH LONG-TERM CURRENT USE OF INSULIN (HCC): ICD-10-CM

## 2023-03-13 DIAGNOSIS — Z79.4 TYPE 2 DIABETES MELLITUS WITH DIABETIC AUTONOMIC NEUROPATHY, WITH LONG-TERM CURRENT USE OF INSULIN (HCC): ICD-10-CM

## 2023-03-13 DIAGNOSIS — Z79.4 TYPE 2 DIABETES MELLITUS WITH HYPERGLYCEMIA, WITH LONG-TERM CURRENT USE OF INSULIN (HCC): ICD-10-CM

## 2023-03-13 DIAGNOSIS — E11.65 TYPE 2 DIABETES MELLITUS WITH HYPERGLYCEMIA, WITH LONG-TERM CURRENT USE OF INSULIN (HCC): ICD-10-CM

## 2023-03-13 RX ORDER — INSULIN GLARGINE 100 [IU]/ML
INJECTION, SOLUTION SUBCUTANEOUS
Qty: 30 ML | Refills: 1 | Status: SHIPPED | OUTPATIENT
Start: 2023-03-13

## 2023-03-13 RX ORDER — LANCING DEVICE
EACH MISCELLANEOUS
Qty: 100 EACH | Refills: 1 | Status: SHIPPED | OUTPATIENT
Start: 2023-03-13

## 2023-03-15 DIAGNOSIS — Z79.4 TYPE 2 DIABETES MELLITUS WITH DIABETIC AUTONOMIC NEUROPATHY, WITH LONG-TERM CURRENT USE OF INSULIN (HCC): ICD-10-CM

## 2023-03-15 DIAGNOSIS — E11.43 TYPE 2 DIABETES MELLITUS WITH DIABETIC AUTONOMIC NEUROPATHY, WITH LONG-TERM CURRENT USE OF INSULIN (HCC): ICD-10-CM

## 2023-03-16 RX ORDER — DULAGLUTIDE 3 MG/.5ML
3 INJECTION, SOLUTION SUBCUTANEOUS WEEKLY
Qty: 2 ML | Refills: 3 | Status: SHIPPED | OUTPATIENT
Start: 2023-03-16

## 2023-03-25 ENCOUNTER — APPOINTMENT (OUTPATIENT)
Dept: LAB | Facility: CLINIC | Age: 53
End: 2023-03-25

## 2023-03-25 DIAGNOSIS — E55.9 VITAMIN D DEFICIENCY: ICD-10-CM

## 2023-03-25 DIAGNOSIS — E78.00 HYPERCHOLESTEREMIA: ICD-10-CM

## 2023-03-25 DIAGNOSIS — Z79.4 TYPE 2 DIABETES MELLITUS WITH DIABETIC AUTONOMIC NEUROPATHY, WITH LONG-TERM CURRENT USE OF INSULIN (HCC): ICD-10-CM

## 2023-03-25 DIAGNOSIS — E11.65 TYPE 2 DIABETES MELLITUS WITH HYPERGLYCEMIA, WITH LONG-TERM CURRENT USE OF INSULIN (HCC): ICD-10-CM

## 2023-03-25 DIAGNOSIS — Z79.4 TYPE 2 DIABETES MELLITUS WITH HYPERGLYCEMIA, WITH LONG-TERM CURRENT USE OF INSULIN (HCC): ICD-10-CM

## 2023-03-25 DIAGNOSIS — E11.43 TYPE 2 DIABETES MELLITUS WITH DIABETIC AUTONOMIC NEUROPATHY, WITH LONG-TERM CURRENT USE OF INSULIN (HCC): ICD-10-CM

## 2023-03-25 DIAGNOSIS — E78.5 HYPERLIPIDEMIA, UNSPECIFIED HYPERLIPIDEMIA TYPE: ICD-10-CM

## 2023-03-25 LAB
25(OH)D3 SERPL-MCNC: 38.3 NG/ML (ref 30–100)
ALBUMIN SERPL BCP-MCNC: 3.5 G/DL (ref 3.5–5)
ALP SERPL-CCNC: 103 U/L (ref 46–116)
ALT SERPL W P-5'-P-CCNC: 25 U/L (ref 12–78)
ANION GAP SERPL CALCULATED.3IONS-SCNC: 4 MMOL/L (ref 4–13)
AST SERPL W P-5'-P-CCNC: 16 U/L (ref 5–45)
BASOPHILS # BLD AUTO: 0.04 THOUSANDS/ÂΜL (ref 0–0.1)
BASOPHILS NFR BLD AUTO: 1 % (ref 0–1)
BILIRUB SERPL-MCNC: 0.33 MG/DL (ref 0.2–1)
BUN SERPL-MCNC: 22 MG/DL (ref 5–25)
CALCIUM SERPL-MCNC: 9.5 MG/DL (ref 8.3–10.1)
CHLORIDE SERPL-SCNC: 102 MMOL/L (ref 96–108)
CHOLEST SERPL-MCNC: 179 MG/DL
CO2 SERPL-SCNC: 29 MMOL/L (ref 21–32)
CREAT SERPL-MCNC: 1.21 MG/DL (ref 0.6–1.3)
EOSINOPHIL # BLD AUTO: 0.17 THOUSAND/ÂΜL (ref 0–0.61)
EOSINOPHIL NFR BLD AUTO: 2 % (ref 0–6)
ERYTHROCYTE [DISTWIDTH] IN BLOOD BY AUTOMATED COUNT: 14 % (ref 11.6–15.1)
EST. AVERAGE GLUCOSE BLD GHB EST-MCNC: 312 MG/DL
GFR SERPL CREATININE-BSD FRML MDRD: 51 ML/MIN/1.73SQ M
GLUCOSE P FAST SERPL-MCNC: 341 MG/DL (ref 65–99)
HBA1C MFR BLD: 12.5 %
HCT VFR BLD AUTO: 39.3 % (ref 34.8–46.1)
HDLC SERPL-MCNC: 46 MG/DL
HGB BLD-MCNC: 12.6 G/DL (ref 11.5–15.4)
IMM GRANULOCYTES # BLD AUTO: 0.02 THOUSAND/UL (ref 0–0.2)
IMM GRANULOCYTES NFR BLD AUTO: 0 % (ref 0–2)
LDLC SERPL CALC-MCNC: 71 MG/DL (ref 0–100)
LYMPHOCYTES # BLD AUTO: 2.31 THOUSANDS/ÂΜL (ref 0.6–4.47)
LYMPHOCYTES NFR BLD AUTO: 29 % (ref 14–44)
MCH RBC QN AUTO: 25.8 PG (ref 26.8–34.3)
MCHC RBC AUTO-ENTMCNC: 32.1 G/DL (ref 31.4–37.4)
MCV RBC AUTO: 80 FL (ref 82–98)
MONOCYTES # BLD AUTO: 0.6 THOUSAND/ÂΜL (ref 0.17–1.22)
MONOCYTES NFR BLD AUTO: 8 % (ref 4–12)
NEUTROPHILS # BLD AUTO: 4.71 THOUSANDS/ÂΜL (ref 1.85–7.62)
NEUTS SEG NFR BLD AUTO: 60 % (ref 43–75)
NRBC BLD AUTO-RTO: 0 /100 WBCS
PLATELET # BLD AUTO: 313 THOUSANDS/UL (ref 149–390)
PMV BLD AUTO: 11.2 FL (ref 8.9–12.7)
POTASSIUM SERPL-SCNC: 4.1 MMOL/L (ref 3.5–5.3)
PROT SERPL-MCNC: 7 G/DL (ref 6.4–8.4)
RBC # BLD AUTO: 4.89 MILLION/UL (ref 3.81–5.12)
SODIUM SERPL-SCNC: 135 MMOL/L (ref 135–147)
TRIGL SERPL-MCNC: 311 MG/DL
WBC # BLD AUTO: 7.85 THOUSAND/UL (ref 4.31–10.16)

## 2023-03-27 ENCOUNTER — OFFICE VISIT (OUTPATIENT)
Dept: ENDOCRINOLOGY | Facility: CLINIC | Age: 53
End: 2023-03-27

## 2023-03-27 VITALS
HEIGHT: 62 IN | HEART RATE: 71 BPM | BODY MASS INDEX: 36.29 KG/M2 | DIASTOLIC BLOOD PRESSURE: 82 MMHG | WEIGHT: 197.2 LBS | SYSTOLIC BLOOD PRESSURE: 118 MMHG

## 2023-03-27 DIAGNOSIS — I12.9 BENIGN HYPERTENSION WITH CKD (CHRONIC KIDNEY DISEASE) STAGE III (HCC): ICD-10-CM

## 2023-03-27 DIAGNOSIS — Z79.4 TYPE 2 DIABETES MELLITUS WITH HYPERGLYCEMIA, WITH LONG-TERM CURRENT USE OF INSULIN (HCC): Primary | ICD-10-CM

## 2023-03-27 DIAGNOSIS — Z79.4 TYPE 2 DIABETES MELLITUS WITH DIABETIC AUTONOMIC NEUROPATHY, WITH LONG-TERM CURRENT USE OF INSULIN (HCC): ICD-10-CM

## 2023-03-27 DIAGNOSIS — E11.65 TYPE 2 DIABETES MELLITUS WITH HYPERGLYCEMIA, WITH LONG-TERM CURRENT USE OF INSULIN (HCC): Primary | ICD-10-CM

## 2023-03-27 DIAGNOSIS — N18.30 BENIGN HYPERTENSION WITH CKD (CHRONIC KIDNEY DISEASE) STAGE III (HCC): ICD-10-CM

## 2023-03-27 DIAGNOSIS — E11.43 TYPE 2 DIABETES MELLITUS WITH DIABETIC AUTONOMIC NEUROPATHY, WITH LONG-TERM CURRENT USE OF INSULIN (HCC): ICD-10-CM

## 2023-03-27 RX ORDER — INSULIN GLARGINE 100 [IU]/ML
INJECTION, SOLUTION SUBCUTANEOUS
Qty: 30 ML | Refills: 1 | Status: SHIPPED | OUTPATIENT
Start: 2023-03-27

## 2023-03-27 RX ORDER — LISINOPRIL 2.5 MG/1
2.5 TABLET ORAL DAILY
Qty: 30 TABLET | Refills: 4 | Status: SHIPPED | OUTPATIENT
Start: 2023-03-27

## 2023-03-27 RX ORDER — INSULIN GLULISINE 100 [IU]/ML
INJECTION, SOLUTION SUBCUTANEOUS
Qty: 30 ML | Refills: 1 | Status: SHIPPED | OUTPATIENT
Start: 2023-03-27

## 2023-03-27 NOTE — ASSESSMENT & PLAN NOTE
Lab Results   Component Value Date    EGFR 51 03/25/2023    EGFR 36 11/30/2022    EGFR 36 11/14/2022    CREATININE 1 21 03/25/2023    CREATININE 1 60 (H) 11/30/2022    CREATININE 1 63 (H) 11/14/2022   · ACE-I withdrawn in past due to worsening CKD and hypotension  · Will add back ACE-I at very low dose primarily for renal protective benefits in setting of DM, lisinopril 2 5mg daily (previously on 10mg daily)

## 2023-03-27 NOTE — ASSESSMENT & PLAN NOTE
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  This is reportedly helping her BG values  · Pharmacotherapy adjustment - Recommend continue adjustment of insulin in 10-20% increments until targets achieved  Given ongoing fasting values averaging around 200+, with only some values reaching 140 at best, recommend increase in Lantus from 48 units to 52 units daily  Also recommend slight dose increase in Novolog with dinner only 12-12-14 and can continue SS coverage ISF 25 @ 150  · Although GFR appears to have recovered back to acceptable baseline, will hold off on adding back metformin for now  Patient is tentative to do this  May consider in near future  · There continues to be some non-compliance with mealtime insulin dosing, about 33% of doses missed  Most missed doses occur at breakfast or lunch  Counseled patient on importance of taking insulin as prescribed and was able to show her BG trends to reflect consequences of missed doses  · Discussed the consideration of VGo insulin delivery system, which will likely improve compliance as well as total insulin dosing  Patient feels this system may benefit her  She is currently in between insurance coverage however, so we will await new insurance information prior to submitting request to start VGo  Provided educational materials about this device  · Short interval follow up needed again in 2 months  · Will perform CGM review in 2 weeks to assess progress    ·

## 2023-03-27 NOTE — PROGRESS NOTES
Kathe Handler 46 y o  female MRN: [id number]    Encounter: [id number]      Assessment/Plan   Problem List Items Addressed This Visit        Endocrine    Type 2 diabetes mellitus with hyperglycemia, with long-term current use of insulin ([facility]) - Primary       Lab Results   Component Value Date    HGBA1C 12 5 (H) 03/25/2023 ·   Although A1c is very uncontrolled, recent CGM review demonstrates a GMI is 9 9%  Although this is still uncontrolled, there is improvement  · Data monitoring - continue Refugio 3 CGM  · Diet - would like to defer dietician referral today  She has been making positive changes to diet on her own  · Exercise - 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  This is reportedly helping her BG values  · Pharmacotherapy adjustment - Recommend continue adjustment of insulin in 10-20% increments until targets achieved  Given ongoing fasting values averaging around 200+, with only some values reaching 140 at best, recommend increase in Lantus from 48 units to 52 units daily  Also recommend slight dose increase in Novolog with dinner only 12-12-14 and can continue SS coverage ISF 25 @ 150  · Although GFR appears to have recovered back to acceptable baseline, will hold off on adding back metformin for now  Patient is tentative to do this  May consider in near future  · There continues to be some non-compliance with mealtime insulin dosing, about 33% of doses missed  Most missed doses occur at breakfast or lunch  Counseled patient on importance of taking insulin as prescribed and was able to show her BG trends to reflect consequences of missed doses  · Discussed the consideration of VGo insulin delivery system, which will likely improve compliance as well as total insulin dosing  Patient feels this system may benefit her  She is currently in between insurance coverage however, so we will await new insurance information prior to submitting request to start VGo   Provided educational materials about this device  · Short interval follow up needed again in 2 months  · Will perform CGM review in 2 weeks to assess progress  ·             Cardiovascular and Mediastinum    Benign hypertension with CKD (chronic kidney disease) stage III [facility])     Lab Results   Component Value Date    EGFR 51 03/25/2023    EGFR 36 11/30/2022    EGFR 36 11/14/2022    CREATININE 1 21 03/25/2023    CREATININE 1 60 (H) 11/30/2022    CREATININE 1 63 (H) 11/14/2022 ·   ACE-I withdrawn in past due to worsening CKD and hypotension  · Will add back ACE-I at very low dose primarily for renal protective benefits in setting of DM, lisinopril 2 5mg daily (previously on 10mg daily)  CC: Diabetes    History of Present Illness     HPI:  Silvano Alcantar is here for close interval follow up for uncontrolled DM  She has a new job at [employer] as a pharmacy tech  She is working full time hours starting about a month ago  She is more active due to working again and has noticed an improvement in her blood sugars overall  She continues to use Refugio 3 CGM and enjoys CGM use overall  Diet recall from yesterday -   Breakfast - Apple cinnamon oatmeal,  Lunch - usually lighter, snack at work such as PB crackers, yogurt  dinner is usually a full meal - ex meatloaf mashed potatoes, corn  She has made attempts to cut out sweets such as cake or donuts  DM medication review:   Lantus 48 units daily  Apidra 12 TID AC with scale ISF 25 @ 150  New dose recommendations today -   apidra 14 base with scale     lantus 52               Review of Systems    Historical Information   Past Medical History:   Diagnosis Date   • Atypical chest pain    • Depression    • Diabetes mellitus ([address] )    • Gastroparesis    • GERD (gastroesophageal reflux disease)    • Hyperlipidemia    • Hypertension    • Sciatica    • Seizure disorder [facility])      Past Surgical History:   Procedure Laterality Date   • BREAST BIOPSY Left  benign   • CHOLECYSTECTOMY     • COLONOSCOPY     • HYSTERECTOMY     • CT LAPAROSCOPIC APPENDECTOMY N/A 3/24/2021    Procedure: APPENDECTOMY LAPAROSCOPIC;  Surgeon: Galdino Dobson MD;  Location: [facility] MAIN OR;  Service: General   • CT LAPS ABD PRTM&OMENTUM DX W/WO [address] [facility] [address]/WA SPX N/A 3/24/2021    Procedure: LAPAROSCOPY DIAGNOSTIC, EXTENSIVE DESI;  Surgeon: Galdino Dobson MD;  Location: [facility] MAIN OR;  Service: General   • UPPER GASTROINTESTINAL ENDOSCOPY       Social History   Social History     Substance and Sexual Activity   Alcohol Use Never     Social History     Substance and Sexual Activity   Drug Use Never     Social History     Tobacco Use   Smoking Status Former   • Types: Cigarettes   • Quit date:    • Years since quittin 2   Smokeless Tobacco Never     Family History:   Family History   Problem Relation Age of Onset   • No Known Problems Mother    • No Known Problems Father    • No Known Problems Daughter    • No Known Problems Maternal Grandmother    • No Known Problems Paternal Grandmother    • No Known Problems Paternal Aunt    • No Known Problems Paternal Aunt    • No Known Problems Paternal Aunt        Meds/Allergies   Current Outpatient Medications   Medication Sig Dispense Refill   • albuterol (PROVENTIL HFA,VENTOLIN HFA) 90 mcg/act inhaler INHALE ONE PUFF BY MOUTH AND INTO THE LUNGS EVERY 6 HOURS AS NEEDED FOR WHEEZING 36 g 3   • Alcohol Swabs (Pharmacist Choice Alcohol) PADS USE AS DIRECTED 100 each 1   • Aspirin (ASPIR-81 PO) Take 81 mg by mouth     • atorvastatin (LIPITOR) 80 mg tablet TAKE 1 TABLET (80 MG TOTAL) BY MOUTH DAILY AT BEDTIME 30 tablet 6   • B-D UF III MINI PEN NEEDLES 31G X 5 MM MISC Pt uses 5 pen needles daily 500 each 0   • cholecalciferol (VITAMIN D3) 400 units tablet TAKE ONE TABLET BY MOUTH DAILY 90 tablet 1   • citalopram (CeleXA) 40 mg tablet TAKE ONE TABLET BY MOUTH DAILY 90 tablet 1   • Continuous Blood Gluc  (FreeStyle Refugio 14 Day [place]) TEMITOPE Use for continuous blood glucose monitoring 1 each 0   • Continuous Blood Gluc Sensor (FreeStyle Refugio 14 Day Sensor) MISC Use one sensor every 14 days for continuous blood sugar monitoring   6 each 3   • docusate sodium (COLACE) 100 mg capsule TAKE ONE CAPSULE BY MOUTH TWICE DAILY 60 capsule 5   • dulaglutide (Trulicity) 3 LD/9 5BZ injection Inject 0 5 mL (3 mg total) under the skin once a week 2 mL 3   • Empagliflozin (Jardiance) 25 MG TABS Take 1 tablet (25 mg total) by mouth every morning 90 tablet 1   • ergocalciferol (VITAMIN D2) 50,000 units Take 1 capsule (50,000 Units total) by mouth once a week 8 capsule 1   • famotidine (PEPCID) 40 MG tablet Take 1 tablet (40 mg total) by mouth daily at bedtime Take in evening 2 hours prior to bed 30 tablet 6   • fenofibrate (TRICOR) 48 mg tablet TAKE ONE TABLET BY MOUTH DAILY 90 tablet 3   • Insulin Glargine Solostar (Lantus SoloStar) 100 UNIT/ML SOPN INJECT 45 UNITS EVERY 12 HOURS 30 mL 1   • insulin glulisine (Apidra SoloStar) 100 units/mL injection pen Inject 12 units TID AC, with scale ISF 25 @ 150 as needed 30 mL 1   • Iron Heme Polypeptide 12 MG TABS Take 1 tablet by mouth     • levETIRAcetam (KEPPRA) 500 mg tablet TAKE ONE TABLET TWICE DAILY 180 tablet 1   • linaCLOtide (Linzess) 72 MCG CAPS Take 1 capsule by mouth daily before breakfast 30 capsule 0   • metoprolol tartrate (LOPRESSOR) 25 mg tablet TAKE 1 TABLET (25 MG TOTAL) BY MOUTH 2 (TWO) TIMES A  tablet 1   • multivitamin (THERAGRAN) TABS Take 1 tablet by mouth every morning     • neomycin-polymyxin-hydrocortisone (CORTISPORIN) otic solution Administer 4 drops to the right ear every 6 (six) hours 10 mL 0   • ondansetron (ZOFRAN) 4 mg tablet Take 1 tablet (4 mg total) by mouth every 8 (eight) hours as needed for nausea or vomiting 20 tablet 0   • OneTouch Ultra test strip USE 4 TIMES A  each 0   • pantoprazole (PROTONIX) 40 mg tablet TAKE ONE TABLET BY MOUTH TWICE DAILY 60 tablet 0   • Pharmacist Choice Lancets MISC "USE AS DIRECTED 100 each 1   • pregabalin (LYRICA) 100 mg capsule TAKE ONE CAPSULE BY MOUTH THREE TIMES A  capsule 1   • scopolamine (TRANSDERM-SCOP) 1 mg/3 days TD 72 hr patch Place 1 patch on the skin over 72 hours every third day 24 patch 1   • solifenacin (VESICARE) 5 mg tablet TAKE ONE TABLET BY MOUTH DAILY 90 tablet 1   • Symbicort 80-4 5 MCG/ACT inhaler INHALE 2 PUFFS 2 (TWO) TIMES A DAY RINSE MOUTH AFTER USE  10 2 g 0   • BANOPHEN 50 MG capsule take 1 capsule by mouth every 6 hours if needed for itching (Patient not taking: Reported on 3/27/2023)     • metoclopramide (REGLAN) 10 mg tablet Take 1 tablet (10 mg total) by mouth daily (Patient not taking: Reported on 3/27/2023) 90 tablet 3     No current facility-administered medications for this visit  Allergies   Allergen Reactions   • Azithromycin GI Intolerance and Hives   • Benztropine    • Butorphanol Hallucinations   • Penicillins    • Zolpidem        Objective   Vitals: Blood pressure 118/82, pulse 71, height 5' 2\" (1 575 m), weight 89 4 kg (197 lb 3 2 oz), not currently breastfeeding  Physical Exam  Vitals reviewed  Constitutional:       General: She is not in acute distress  Appearance: She is not ill-appearing  HENT:      Head: Normocephalic  Pulmonary:      Effort: No respiratory distress  Musculoskeletal:      Right lower leg: No edema  Left lower leg: No edema  Skin:     General: Skin is warm  Neurological:      Mental Status: She is oriented to person, place, and time  Psychiatric:         Mood and Affect: Mood normal          The history was obtained from the review of the chart, patient      Lab Results:   Lab Results   Component Value Date/Time    Hemoglobin A1C 12 5 (H) 03/25/2023 09:01 AM    Hemoglobin A1C 13 0 (H) 11/30/2022 09:09 AM    Hemoglobin A1C 13 4 (A) 11/29/2022 10:43 AM    Hemoglobin A1C 13 3 (H) 10/24/2022 02:10 PM    WBC 7 85 03/25/2023 09:01 AM    WBC 8 76 10/24/2022 02:10 PM    WBC 8 26 " 08/30/2022 08:27 AM    Hemoglobin 12 6 03/25/2023 09:01 AM    Hemoglobin 11 4 (L) 10/24/2022 02:10 PM    Hemoglobin 11 3 (L) 08/30/2022 08:27 AM    Hematocrit 39 3 03/25/2023 09:01 AM    Hematocrit 37 0 10/24/2022 02:10 PM    Hematocrit 36 7 08/30/2022 08:27 AM    MCV 80 (L) 03/25/2023 09:01 AM    MCV 84 10/24/2022 02:10 PM    MCV 85 08/30/2022 08:27 AM    Platelets 893 11/45/3554 09:01 AM    Platelets 621 13/23/4649 02:10 PM    Platelets 694 34/82/9163 08:27 AM    BUN 22 03/25/2023 09:01 AM    BUN 26 (H) 11/30/2022 09:09 AM    BUN 28 (H) 11/14/2022 01:47 PM    Potassium 4 1 03/25/2023 09:01 AM    Potassium 4 3 11/30/2022 09:09 AM    Potassium 4 2 11/14/2022 01:47 PM    Chloride 102 03/25/2023 09:01 AM    Chloride 102 11/30/2022 09:09 AM    Chloride 101 11/14/2022 01:47 PM    CO2 29 03/25/2023 09:01 AM    CO2 27 11/30/2022 09:09 AM    CO2 23 11/14/2022 01:47 PM    Creatinine 1 21 03/25/2023 09:01 AM    Creatinine 1 60 (H) 11/30/2022 09:09 AM    Creatinine 1 63 (H) 11/14/2022 01:47 PM    AST 16 03/25/2023 09:01 AM    AST 13 11/30/2022 09:09 AM    AST 43 11/14/2022 01:47 PM    ALT 25 03/25/2023 09:01 AM    ALT 20 11/30/2022 09:09 AM    ALT 54 11/14/2022 01:47 PM    Albumin 3 5 03/25/2023 09:01 AM    Albumin 2 9 (L) 11/30/2022 09:09 AM    Albumin 3 3 (L) 11/14/2022 01:47 PM    HDL, Direct 46 (L) 03/25/2023 09:01 AM    HDL, Direct 49 (L) 08/30/2022 08:27 AM    Triglycerides 311 (H) 03/25/2023 09:01 AM    Triglycerides 243 (H) 08/30/2022 08:27 AM           Imaging Studies: I have personally reviewed pertinent reports  Sabino Imus   Device used:  Refugio 2  Home use     Indication: Type 2 Diabetes    More than 72 hours of data was reviewed  Report to be scanned to chart       Date Range: March 14, 2023 - March 27, 2023    Analysis of data:   % time CGM used: 63%  Average Glucose: 275mg/dL  Coefficient of Variation: 28 6%  Time in Target Range: 13%  Time Above Range: 27% high, 60% very high  Time Below Range: 0% "low, 0% very low  Interpretation of data: When comparing data from last visit to now, time in target range is improving from 3% to 13%  She is also spending less time in the \"very high\" range, previously 85%, now 60%  Current GMI is estimated at 9 9% based on current data, which is an improvement compared to her most recent A1c on 3/25/23, which was > 12  Portions of the record may have been created with voice recognition software  Occasional wrong word or \"sound a like\" substitutions may have occurred due to the inherent limitations of voice recognition software  Read the chart carefully and recognize, using context, where substitutions have occurred    "

## 2023-04-26 ENCOUNTER — OFFICE VISIT (OUTPATIENT)
Dept: ENDOCRINOLOGY | Facility: CLINIC | Age: 53
End: 2023-04-26

## 2023-04-26 VITALS
SYSTOLIC BLOOD PRESSURE: 102 MMHG | DIASTOLIC BLOOD PRESSURE: 74 MMHG | HEIGHT: 62 IN | WEIGHT: 196 LBS | BODY MASS INDEX: 36.07 KG/M2 | HEART RATE: 69 BPM

## 2023-04-26 DIAGNOSIS — E11.65 TYPE 2 DIABETES MELLITUS WITH HYPERGLYCEMIA, WITH LONG-TERM CURRENT USE OF INSULIN (HCC): Primary | ICD-10-CM

## 2023-04-26 DIAGNOSIS — Z79.4 TYPE 2 DIABETES MELLITUS WITH HYPERGLYCEMIA, WITH LONG-TERM CURRENT USE OF INSULIN (HCC): Primary | ICD-10-CM

## 2023-04-26 DIAGNOSIS — E55.9 VITAMIN D DEFICIENCY: ICD-10-CM

## 2023-04-26 NOTE — PROGRESS NOTES
Anitha Chacon 46 y o  female MRN: [id number]    Encounter: [id number]      Assessment/Plan   Problem List Items Addressed This Visit        Endocrine    Type 2 diabetes mellitus with hyperglycemia, with long-term current use of insulin ([address] ) - Primary       Lab Results   Component Value Date    HGBA1C 12 5 (H) 03/25/2023 ·   Diabetes remains very poorly controlled  · Short term - will help Lenin Little obtain CGM supplies she needs for impending insurance lapse  Recommended slight adjustment in Lantus from 52 to 58 units daily, with dosing adjustment in Apidra from 12-12-14 to 12-10-16 to better reflect her diet habits (usually eats light lunch, if at all)  Encouraged Lenin Little to be mindful of her BG while working and encouraged meals at regular intervals  Compliance also remains an issue, but encouraged Lenin Little to do her best to provide prandial coverage when needed  May consider adding renally dosed metformin back to medications as her renal function now appears improved  This is also a very affordable medication option considering her impending insurance lapse  · Longer term - Lenin Little may benefit from VGo insulin delivery system  This should help with total daily insulin needs as well as compliance with insulin dosing  She is unfortunately losing insurance coverage temporarily and will revisit this once coverage resumes  · Follow up - 6-8 week short interval follow up ideal but may be closer to 8-12 weeks depending on insurance  · Will refer to complex care management for possible further assistance with insurance concerns  Relevant Orders    Ambulatory Referral to Complex Care Management Program       Other    Vitamin D deficiency     · Improving  · Continue vitamin D supplementation at maintenance dose of 2,000 units daily  CC: Diabetes    History of Present Illness     HPI:  Lenin Little is here for routine follow up for type 2 diabetes       She mentions she will be without insurance soon due to new employment  Anticipates new insurance will be active sometime in late June - early July  DM medication review:   Apidra - 12-12-14  Lantus - 52 units daily  jardiance 25mg daily  Trulicity 3mg weekly (1 5 months on this increased dose)    Previously on metformin but this was discontinued due to renal function  She was using Refugio 2 CGM for BG monitoring however her last sensor was removed 4/19/23  She has been checking fingerstick BG TID AC and HS however  She does not have a BG log with her today but notes values are similar to when she was using CGM earlier this month  Review of Systems   Constitutional: Positive for fatigue  Negative for chills and fever  HENT: Negative for ear pain and sore throat  Eyes: Negative for pain and visual disturbance  Respiratory: Negative for cough and shortness of breath  Cardiovascular: Negative for chest pain and palpitations  Gastrointestinal: Negative for abdominal pain and vomiting  Genitourinary: Negative for dysuria and hematuria  Musculoskeletal: Negative for arthralgias and back pain  Skin: Negative for color change and rash  Neurological: Negative for seizures and syncope  All other systems reviewed and are negative        Historical Information   Past Medical History:   Diagnosis Date    Atypical chest pain     Depression     Diabetes mellitus (HCC)     Gastroparesis     GERD (gastroesophageal reflux disease)     Hyperlipidemia     Hypertension     Sciatica     Seizure disorder ([facility] [address] )      Past Surgical History:   Procedure Laterality Date    BREAST BIOPSY Left  benign    CHOLECYSTECTOMY      COLONOSCOPY      HYSTERECTOMY      CO LAPAROSCOPIC APPENDECTOMY N/A 3/24/2021    Procedure: APPENDECTOMY LAPAROSCOPIC;  Surgeon: Geremias Gaspar MD;  Location: [address] [place] [address] OR;  Service: General    CO LAPS ABD PRTM&OMENTUM DX W/WO SPEC BR/WA SPX N/A 3/24/2021    Procedure: LAPAROSCOPY DIAGNOSTIC, EXTENSIVE DESI;  Surgeon: Junior Tito MD;  Location: [facility] MAIN OR;  Service: General    UPPER GASTROINTESTINAL ENDOSCOPY       Social History   Social History     Substance and Sexual Activity   Alcohol Use Never     Social History     Substance and Sexual Activity   Drug Use Never     Social History     Tobacco Use   Smoking Status Former    Types: Cigarettes    Quit date: 200    Years since quittin 3   Smokeless Tobacco Never     Family History:   Family History   Problem Relation Age of Onset    No Known Problems Mother     No Known Problems Father     No Known Problems Daughter     No Known Problems Maternal Grandmother     No Known Problems Paternal Grandmother     No Known Problems Paternal Aunt     No Known Problems Paternal Aunt     No Known Problems Paternal Aunt        Meds/Allergies   Current Outpatient Medications   Medication Sig Dispense Refill    albuterol (PROVENTIL HFA,VENTOLIN HFA) 90 mcg/act inhaler INHALE ONE PUFF BY MOUTH AND INTO THE LUNGS EVERY 6 HOURS AS NEEDED FOR WHEEZING 36 g 3    Alcohol Swabs (Pharmacist Choice Alcohol) PADS USE AS DIRECTED 100 each 1    Aspirin (ASPIR-81 PO) Take 81 mg by mouth      atorvastatin (LIPITOR) 80 mg tablet TAKE 1 TABLET (80 MG TOTAL) BY MOUTH DAILY AT BEDTIME 30 tablet 6    B-D UF III MINI PEN NEEDLES 31G X 5 MM MISC Pt uses 5 pen needles daily 500 each 0    cholecalciferol (VITAMIN D3) 400 units tablet TAKE ONE TABLET BY MOUTH DAILY 90 tablet 1    citalopram (CeleXA) 40 mg tablet TAKE ONE TABLET BY MOUTH DAILY 90 tablet 1    Continuous Blood Gluc  (FreeStyle Refugio 14 Day [place]) TEMITOPE Use for continuous blood glucose monitoring 1 each 0    Continuous Blood Gluc Sensor (FreeStyle Refugio 14 Day Sensor) MISC Use one sensor every 14 days for continuous blood sugar monitoring   6 each 3    docusate sodium (COLACE) 100 mg capsule TAKE ONE CAPSULE BY MOUTH TWICE DAILY 60 capsule 5    dulaglutide (Trulicity) 3 LY/4 5MV injection Inject 0 5 mL (3 mg total) under the skin once a week 2 mL 3    Empagliflozin (Jardiance) 25 MG TABS Take 1 tablet (25 mg total) by mouth every morning 90 tablet 1    ergocalciferol (VITAMIN D2) 50,000 units Take 1 capsule (50,000 Units total) by mouth once a week 8 capsule 1    famotidine (PEPCID) 40 MG tablet Take 1 tablet (40 mg total) by mouth daily at bedtime Take in evening 2 hours prior to bed 30 tablet 6    fenofibrate (TRICOR) 48 mg tablet TAKE ONE TABLET BY MOUTH DAILY 90 tablet 3    Insulin Glargine Solostar (Lantus SoloStar) 100 UNIT/ML SOPN 52 units daily 30 mL 1    insulin glulisine (Apidra SoloStar) 100 units/mL injection pen Inject 12 units with breakfast and lunch, 14 units with dinner, with scale ISF 25 @ 150 as needed 30 mL 1    Iron Heme Polypeptide 12 MG TABS Take 1 tablet by mouth      levETIRAcetam (KEPPRA) 500 mg tablet TAKE ONE TABLET TWICE DAILY 180 tablet 1    linaCLOtide (Linzess) 72 MCG CAPS Take 1 capsule by mouth daily before breakfast 30 capsule 0    lisinopril (ZESTRIL) 2 5 mg tablet Take 1 tablet (2 5 mg total) by mouth daily 30 tablet 4    metoprolol tartrate (LOPRESSOR) 25 mg tablet Take 1 tablet (25 mg total) by mouth 2 (two) times a day 180 tablet 1    multivitamin (THERAGRAN) TABS Take 1 tablet by mouth every morning      ondansetron (ZOFRAN) 4 mg tablet Take 1 tablet (4 mg total) by mouth every 8 (eight) hours as needed for nausea or vomiting 20 tablet 0    OneTouch Ultra test strip USE 4 TIMES A  each 0    pantoprazole (PROTONIX) 40 mg tablet Take 1 tablet (40 mg total) by mouth 2 (two) times a day 60 tablet 0    Pharmacist Choice Lancets MISC USE AS DIRECTED 100 each 1    pregabalin (LYRICA) 100 mg capsule TAKE ONE CAPSULE BY MOUTH THREE TIMES A  capsule 1    scopolamine (TRANSDERM-SCOP) 1 mg/3 days TD 72 hr patch Place 1 patch on the skin over 72 hours every third day 24 patch 1    solifenacin (VESICARE) 5 mg tablet TAKE ONE TABLET BY MOUTH DAILY 90 tablet 1 "   Symbicort 80-4 5 MCG/ACT inhaler INHALE 2 PUFFS 2 (TWO) TIMES A DAY RINSE MOUTH AFTER USE  10 2 g 0    BANOPHEN 50 MG capsule take 1 capsule by mouth every 6 hours if needed for itching (Patient not taking: Reported on 3/27/2023)      neomycin-polymyxin-hydrocortisone (CORTISPORIN) otic solution Administer 4 drops to the right ear every 6 (six) hours (Patient not taking: Reported on 4/26/2023) 10 mL 0     No current facility-administered medications for this visit  Allergies   Allergen Reactions    Azithromycin GI Intolerance and Hives    Benztropine     Butorphanol Hallucinations    Penicillins     Zolpidem        Objective   Vitals: Blood pressure 102/74, pulse 69, height 5' 2\" (1 575 m), weight 88 9 kg (196 lb), not currently breastfeeding  Physical Exam  Vitals reviewed  Constitutional:       Appearance: She is not ill-appearing  HENT:      Head: Normocephalic  Mouth/Throat:      Mouth: Mucous membranes are moist    Cardiovascular:      Rate and Rhythm: Normal rate and regular rhythm  Pulmonary:      Effort: Pulmonary effort is normal  No respiratory distress  Neurological:      Mental Status: She is alert and oriented to person, place, and time  Psychiatric:         Mood and Affect: Mood normal          The history was obtained from the review of the chart, patient      Lab Results:   Lab Results   Component Value Date/Time    Hemoglobin A1C 12 5 (H) 03/25/2023 09:01 AM    Hemoglobin A1C 13 0 (H) 11/30/2022 09:09 AM    Hemoglobin A1C 13 4 (A) 11/29/2022 10:43 AM    Hemoglobin A1C 13 3 (H) 10/24/2022 02:10 PM    WBC 7 85 03/25/2023 09:01 AM    WBC 8 76 10/24/2022 02:10 PM    WBC 8 26 08/30/2022 08:27 AM    Hemoglobin 12 6 03/25/2023 09:01 AM    Hemoglobin 11 4 (L) 10/24/2022 02:10 PM    Hemoglobin 11 3 (L) 08/30/2022 08:27 AM    Hematocrit 39 3 03/25/2023 09:01 AM    Hematocrit 37 0 10/24/2022 02:10 PM    Hematocrit 36 7 08/30/2022 08:27 AM    MCV 80 (L) 03/25/2023 09:01 AM    MCV " 84 10/24/2022 02:10 PM    MCV 85 08/30/2022 08:27 AM    Platelets 959 06/94/1852 09:01 AM    Platelets 097 48/89/7376 02:10 PM    Platelets 751 11/34/8308 08:27 AM    BUN 22 03/25/2023 09:01 AM    BUN 26 (H) 11/30/2022 09:09 AM    BUN 28 (H) 11/14/2022 01:47 PM    Potassium 4 1 03/25/2023 09:01 AM    Potassium 4 3 11/30/2022 09:09 AM    Potassium 4 2 11/14/2022 01:47 PM    Chloride 102 03/25/2023 09:01 AM    Chloride 102 11/30/2022 09:09 AM    Chloride 101 11/14/2022 01:47 PM    CO2 29 03/25/2023 09:01 AM    CO2 27 11/30/2022 09:09 AM    CO2 23 11/14/2022 01:47 PM    Creatinine 1 21 03/25/2023 09:01 AM    Creatinine 1 60 (H) 11/30/2022 09:09 AM    Creatinine 1 63 (H) 11/14/2022 01:47 PM    AST 16 03/25/2023 09:01 AM    AST 13 11/30/2022 09:09 AM    AST 43 11/14/2022 01:47 PM    ALT 25 03/25/2023 09:01 AM    ALT 20 11/30/2022 09:09 AM    ALT 54 11/14/2022 01:47 PM    Albumin 3 5 03/25/2023 09:01 AM    Albumin 2 9 (L) 11/30/2022 09:09 AM    Albumin 3 3 (L) 11/14/2022 01:47 PM    HDL, Direct 46 (L) 03/25/2023 09:01 AM    HDL, Direct 49 (L) 08/30/2022 08:27 AM    Triglycerides 311 (H) 03/25/2023 09:01 AM    Triglycerides 243 (H) 08/30/2022 08:27 AM           Imaging Studies: n/a    Genell Slight   Device used: [employer] 2  Home use     Indication : Type 2 Diabetes    More than 72 hours of data was reviewed  Report to be scanned to chart  Date Range: 4/6/23 - 4/19/23    Analysis of data:   % time CGM used: 36%  Average Glucose: 302mg/dL  Coefficient of Variation: 21 8%  Time in Target Range: 1%  Time Above Range: high 26%, very high 73%  Time Below Range: low 0%, very low 0%    Interpretation of data: very uncontrolled BG with persistent hyperglycemia  Missing data due to sensors unavailable, sensor malfunction, as well as lack of scanning  Noncompliance with insulin dosing due to hypoglycemia fear, as well as some dietary indiscretion suspected  Insulin dosing adjustments noted above      Portions of the record may "have been created with voice recognition software  Occasional wrong word or \"sound a like\" substitutions may have occurred due to the inherent limitations of voice recognition software  Read the chart carefully and recognize, using context, where substitutions have occurred    "

## 2023-04-26 NOTE — PATIENT INSTRUCTIONS
Go up to 58 units for Lantus     Meanwhile Apidra new dosing should be 14 with breakfast, 10 with lunch, 16 units with dinner

## 2023-04-28 ENCOUNTER — PATIENT OUTREACH (OUTPATIENT)
Dept: FAMILY MEDICINE CLINIC | Facility: CLINIC | Age: 53
End: 2023-04-28

## 2023-04-28 NOTE — PROGRESS NOTES
Outpatient Care Management Note:  Received return call from a[employer]rak Cancino her Medical Assistance  yesterday  She has started a job but will not have health insurance until  or July  She has not spoken to her  at [facility] [employer] about an extension to her MA  Suggested she do that although I am not sure it will be an option  Provided the option of [place] to enroll in health insurance until she obtains it through her new employer  Discussed the $4 [facility] list for medications as it appears many of her current medications will be on it  Advised her that her providers are able to send short term prescriptions for desired medications to [facility] until she obtains her insurance  Discussed her DM and A1C of 12 5  Barak states she is trying to do better but when she is under stress, she finds it difficult to manage her blood glucose levels  She is agreeable to receiving educational materials and follow up calls to better manage her disease  Barak lives with her spouse and is independent with her needs  They have adult children  No other needs at this time  Encouraged to call with any questions or concerns  Mailed healthy eating, plate method, sick day management and diabetic zone tool to Barak

## 2023-04-28 NOTE — PROGRESS NOTES
Outpatient Care Management Note:  Referral received from endocrinology  Chart review completed  Outreach call attempted to Ms Balbuena  Message left for patient to please return call  Contact information left on message

## 2023-05-01 ENCOUNTER — TELEPHONE (OUTPATIENT)
Dept: ENDOCRINOLOGY | Facility: CLINIC | Age: 53
End: 2023-05-01

## 2023-05-04 NOTE — ASSESSMENT & PLAN NOTE
Lab Results   Component Value Date    HGBA1C 12 5 (H) 03/25/2023   · Diabetes remains very poorly controlled  · Short term - will help Chula Landa obtain CGM supplies she needs for impending insurance lapse  Recommended slight adjustment in Lantus from 52 to 58 units daily, with dosing adjustment in Apidra from 12-12-14 to 12-10-16 to better reflect her diet habits (usually eats light lunch, if at all)  Encouraged Chula Landa to be mindful of her BG while working and encouraged meals at regular intervals  Compliance also remains an issue, but encouraged Chula Landa to do her best to provide prandial coverage when needed  May consider adding renally dosed metformin back to medications as her renal function now appears improved  This is also a very affordable medication option considering her impending insurance lapse  · Longer term - Chula Landa may benefit from VGo insulin delivery system  This should help with total daily insulin needs as well as compliance with insulin dosing  She is unfortunately losing insurance coverage temporarily and will revisit this once coverage resumes  · Follow up - 6-8 week short interval follow up ideal but may be closer to 8-12 weeks depending on insurance  · Will refer to complex care management for possible further assistance with insurance concerns

## 2023-05-09 ENCOUNTER — TELEPHONE (OUTPATIENT)
Dept: NEPHROLOGY | Facility: CLINIC | Age: 53
End: 2023-05-09

## 2023-05-09 DIAGNOSIS — F31.9 BIPOLAR AFFECTIVE DISORDER, REMISSION STATUS UNSPECIFIED (HCC): ICD-10-CM

## 2023-05-09 DIAGNOSIS — N18.31 STAGE 3A CHRONIC KIDNEY DISEASE (HCC): Primary | ICD-10-CM

## 2023-05-09 RX ORDER — CITALOPRAM 40 MG/1
TABLET ORAL
Qty: 30 TABLET | Refills: 0 | Status: SHIPPED | OUTPATIENT
Start: 2023-05-09 | End: 2023-05-11 | Stop reason: SDUPTHER

## 2023-05-11 DIAGNOSIS — F31.9 BIPOLAR AFFECTIVE DISORDER, REMISSION STATUS UNSPECIFIED (HCC): ICD-10-CM

## 2023-05-11 RX ORDER — CITALOPRAM 40 MG/1
40 TABLET ORAL DAILY
Qty: 90 TABLET | Refills: 3 | Status: SHIPPED | OUTPATIENT
Start: 2023-05-11

## 2023-05-12 ENCOUNTER — PATIENT OUTREACH (OUTPATIENT)
Dept: FAMILY MEDICINE CLINIC | Facility: CLINIC | Age: 53
End: 2023-05-12

## 2023-05-12 NOTE — PROGRESS NOTES
Outpatient Care Management Note:  Follow up call attempted to [facility]  Message left for patient to please return call  Contact information left on message

## 2023-05-15 ENCOUNTER — TELEPHONE (OUTPATIENT)
Dept: NEPHROLOGY | Facility: CLINIC | Age: 53
End: 2023-05-15

## 2023-05-15 DIAGNOSIS — R06.02 SOB (SHORTNESS OF BREATH) ON EXERTION: ICD-10-CM

## 2023-05-15 DIAGNOSIS — U07.1 COVID-19: ICD-10-CM

## 2023-05-15 DIAGNOSIS — N32.89 BLADDER SPASMS: ICD-10-CM

## 2023-05-15 RX ORDER — SOLIFENACIN SUCCINATE 5 MG/1
TABLET, FILM COATED ORAL
Qty: 90 TABLET | Refills: 1 | Status: SHIPPED | OUTPATIENT
Start: 2023-05-15

## 2023-05-15 RX ORDER — DILTIAZEM HYDROCHLORIDE 60 MG/1
TABLET, FILM COATED ORAL
Qty: 10.2 G | Refills: 0 | Status: SHIPPED | OUTPATIENT
Start: 2023-05-15

## 2023-05-17 ENCOUNTER — OFFICE VISIT (OUTPATIENT)
Dept: NEPHROLOGY | Facility: CLINIC | Age: 53
End: 2023-05-17

## 2023-05-17 VITALS
SYSTOLIC BLOOD PRESSURE: 110 MMHG | HEIGHT: 62 IN | HEART RATE: 62 BPM | OXYGEN SATURATION: 98 % | BODY MASS INDEX: 36.7 KG/M2 | DIASTOLIC BLOOD PRESSURE: 70 MMHG | WEIGHT: 199.4 LBS

## 2023-05-17 DIAGNOSIS — I12.9 BENIGN HYPERTENSION WITH CKD (CHRONIC KIDNEY DISEASE) STAGE III (HCC): ICD-10-CM

## 2023-05-17 DIAGNOSIS — N18.31 STAGE 3A CHRONIC KIDNEY DISEASE (HCC): Primary | ICD-10-CM

## 2023-05-17 DIAGNOSIS — R10.9 FLANK PAIN: ICD-10-CM

## 2023-05-17 DIAGNOSIS — N18.30 BENIGN HYPERTENSION WITH CKD (CHRONIC KIDNEY DISEASE) STAGE III (HCC): ICD-10-CM

## 2023-05-17 DIAGNOSIS — C64.1 CLEAR CELL CARCINOMA OF KIDNEY, RIGHT (HCC): ICD-10-CM

## 2023-05-17 DIAGNOSIS — E55.9 VITAMIN D DEFICIENCY: ICD-10-CM

## 2023-05-17 DIAGNOSIS — Z79.4 TYPE 2 DIABETES MELLITUS WITH HYPERGLYCEMIA, WITH LONG-TERM CURRENT USE OF INSULIN (HCC): ICD-10-CM

## 2023-05-17 DIAGNOSIS — D50.8 IRON DEFICIENCY ANEMIA SECONDARY TO INADEQUATE DIETARY IRON INTAKE: ICD-10-CM

## 2023-05-17 DIAGNOSIS — N25.81 SECONDARY HYPERPARATHYROIDISM (HCC): ICD-10-CM

## 2023-05-17 DIAGNOSIS — E11.65 TYPE 2 DIABETES MELLITUS WITH HYPERGLYCEMIA, WITH LONG-TERM CURRENT USE OF INSULIN (HCC): ICD-10-CM

## 2023-05-17 RX ORDER — CYCLOSPORINE 0.5 MG/ML
EMULSION OPHTHALMIC
COMMUNITY
Start: 2023-03-02

## 2023-05-17 NOTE — PROGRESS NOTES
Nephrology   Office [address] [facility] 46 y o  female MRN: [id number]    Encounter: [id number]        Edgardo Cisneros was seen in the [place] office today  All diagnoses and orders for visit:     1  Stage 3a chronic kidney disease ([address] )  · Record review indicates baseline creatinine around 1 1-1 2 mg/dL since 2019  Has history of partial nephrectomy for clear-cell carcinoma of right kidney and uncontrolled type 2 diabetes mellitus with suspected diabetic kidney disease  Elevated creatinine noted last year potentially due to mild volume depletion from osmotic diuresis in the setting of uncontrolled hyperglycemia  Fortunately renal function was back to baseline in March  We will obtain repeat labs and ultrasound once patient secures insurance coverage  She will continue to Jardiance and lisinopril for nephro protection  She is aware to keep hydrated   -     Comprehensive metabolic panel; Future; Expected date: 07/11/2023   -     CBC and differential; Future; Expected date: 07/11/2023   -     Albumin / creatinine urine ratio; Future; Expected date: 07/11/2023   -     Phosphorus; Future; Expected date: 07/11/2023   -     Urinalysis with microscopic; Future; Expected date: 07/11/2023  2  Iron deficiency anemia secondary to inadequate dietary iron intake  · Continue pro Marilou and repeat iron panel   -     Vitamin D 25 hydroxy; Future; Expected date: 07/11/2023  3  Vitamin D deficiency  4  Flank pain  · Obtain ultrasound   -     US kidney and bladder with pvr; Future; Expected date: 07/11/2023  5  Secondary hyperparathyroidism (HCC)   -     PTH, intact; Future; Expected date: 07/11/2023  6  Type 2 diabetes mellitus with hyperglycemia, with long-term current use of insulin (HCC)  · Fortunately no significant urine protein  Do suspect patient is potentially nonproteinuric diabetic kidney disease    She has diabetes since high school, it is uncontrolled, with polyneuropathy and retinopathy  7  Benign hypertension with CKD (chronic kidney disease) stage III (HCC)  · Blood pressure goal less than 130/80 mmHg  Blood pressure is appropriate  8  Clear cell carcinoma of kidney, right ([address] )  · Follow-up with appropriate specialists in this regard    HPI: Titus Seo is a 46 y o  female who is here for scheduled follow-up regarding stage IIIa chronic kidney disease, clear-cell carcinoma right kidney status post partial nephrectomy 2017, uncontrolled type 2 diabetes mellitus, hypertension, GERD, history of nephrolithiasis    Complains of right flank pain and endorses history of nephrolithiasis  Unfortunately has gap in insurance due to changing jobs but should be expected to have insurance once early this summer  Uncontrolled diabetes being managed by endocrinology  Remains on lisinopril and Jardiance for nephro protection  We will check lab work and renal ultrasound once patient obtains medical insurance  Return office in 4 months with nephrologist      ROS:   Review of Systems   Constitutional: Negative for chills and fever  HENT: Negative for ear pain and sore throat  Eyes: Negative for pain and visual disturbance  Respiratory: Negative for cough and shortness of breath  Cardiovascular: Negative for chest pain and palpitations  Gastrointestinal: Negative for abdominal pain and vomiting  Genitourinary: Positive for flank pain  Negative for dysuria and hematuria  Musculoskeletal: Negative for arthralgias and back pain  Skin: Negative for color change and rash  Neurological: Negative for seizures and syncope  All other systems reviewed and are negative        Allergies: Azithromycin, Benztropine, Butorphanol, Penicillins, and Zolpidem    Medications:   Current Outpatient Medications:   •  albuterol (PROVENTIL HFA,VENTOLIN HFA) 90 mcg/act inhaler, INHALE ONE PUFF BY MOUTH AND INTO THE LUNGS EVERY 6 HOURS AS NEEDED FOR WHEEZING, Disp: 36 g, Rfl: 3  •  Alcohol Swabs (Pharmacist Choice Alcohol) PADS, USE AS DIRECTED, Disp: 100 each, Rfl: 1  •  Aspirin (ASPIR-81 PO), Take 81 mg by mouth, Disp: , Rfl:   •  atorvastatin (LIPITOR) 80 mg tablet, TAKE 1 TABLET (80 MG TOTAL) BY MOUTH DAILY AT BEDTIME, Disp: 30 tablet, Rfl: 6  •  B-D UF III MINI PEN NEEDLES 31G X 5 MM MISC, Pt uses 5 pen needles daily, Disp: 500 each, Rfl: 0  •  cholecalciferol (VITAMIN D3) 400 units tablet, TAKE ONE TABLET BY MOUTH DAILY, Disp: 90 tablet, Rfl: 1  •  citalopram (CeleXA) 40 mg tablet, Take 1 tablet (40 mg total) by mouth daily, Disp: 90 tablet, Rfl: 3  •  Continuous Blood Gluc  (FreeStyle Refugio 14 Day [place]) TEMITOPE, Use for continuous blood glucose monitoring, Disp: 1 each, Rfl: 0  •  Continuous Blood Gluc Sensor (FreeStyle Refugio 14 Day Sensor) MISC, Use one sensor every 14 days for continuous blood sugar monitoring , Disp: 6 each, Rfl: 3  •  docusate sodium (COLACE) 100 mg capsule, TAKE ONE CAPSULE BY MOUTH TWICE DAILY, Disp: 60 capsule, Rfl: 5  •  dulaglutide (Trulicity) 3 IR/0 6HA injection, Inject 0 5 mL (3 mg total) under the skin once a week, Disp: 2 mL, Rfl: 3  •  Empagliflozin (Jardiance) 25 MG TABS, Take 1 tablet (25 mg total) by mouth every morning, Disp: 90 tablet, Rfl: 1  •  ergocalciferol (VITAMIN D2) 50,000 units, Take 1 capsule (50,000 Units total) by mouth once a week, Disp: 8 capsule, Rfl: 1  •  famotidine (PEPCID) 40 MG tablet, Take 1 tablet (40 mg total) by mouth daily at bedtime Take in evening 2 hours prior to bed, Disp: 30 tablet, Rfl: 6  •  fenofibrate (TRICOR) 48 mg tablet, TAKE ONE TABLET BY MOUTH DAILY, Disp: 90 tablet, Rfl: 3  •  Insulin Glargine Solostar (Lantus SoloStar) 100 UNIT/ML SOPN, 52 units daily, Disp: 30 mL, Rfl: 1  •  insulin glulisine (Apidra SoloStar) 100 units/mL injection pen, Inject 12 units with breakfast and lunch, 14 units with dinner, with scale ISF 25 @ 150 as needed, Disp: 30 mL, Rfl: 1  •  Iron Heme Polypeptide 12 MG TABS, Take 1 tablet by mouth, Disp: , Rfl:   •  levETIRAcetam (KEPPRA) 500 mg tablet, TAKE ONE TABLET TWICE DAILY, Disp: 180 tablet, Rfl: 1  •  linaCLOtide (Linzess) 72 MCG CAPS, Take 1 capsule by mouth daily before breakfast, Disp: 30 capsule, Rfl: 0  •  lisinopril (ZESTRIL) 2 5 mg tablet, Take 1 tablet (2 5 mg total) by mouth daily, Disp: 30 tablet, Rfl: 4  •  metoprolol tartrate (LOPRESSOR) 25 mg tablet, Take 1 tablet (25 mg total) by mouth 2 (two) times a day, Disp: 180 tablet, Rfl: 1  •  multivitamin (THERAGRAN) TABS, Take 1 tablet by mouth every morning, Disp: , Rfl:   •  ondansetron (ZOFRAN) 4 mg tablet, Take 1 tablet (4 mg total) by mouth every 8 (eight) hours as needed for nausea or vomiting, Disp: 20 tablet, Rfl: 0  •  OneTouch Ultra test strip, USE 4 TIMES A DAY, Disp: 400 each, Rfl: 0  •  pantoprazole (PROTONIX) 40 mg tablet, Take 1 tablet (40 mg total) by mouth 2 (two) times a day, Disp: 60 tablet, Rfl: 0  •  Pharmacist Choice Lancets MISC, USE AS DIRECTED, Disp: 100 each, Rfl: 1  •  pregabalin (LYRICA) 100 mg capsule, TAKE ONE CAPSULE BY MOUTH THREE TIMES A DAY, Disp: 270 capsule, Rfl: 3  •  Restasis 0 05 % ophthalmic emulsion, , Disp: , Rfl:   •  scopolamine (TRANSDERM-SCOP) 1 mg/3 days TD 72 hr patch, Place 1 patch on the skin over 72 hours every third day, Disp: 24 patch, Rfl: 1  •  solifenacin (VESICARE) 5 mg tablet, TAKE ONE TABLET BY MOUTH DAILY, Disp: 90 tablet, Rfl: 1  •  Symbicort 80-4 5 MCG/ACT inhaler, INHALE 2 PUFFS 2 (TWO) TIMES A DAY RINSE MOUTH AFTER USE , Disp: 10 2 g, Rfl: 0  •  neomycin-polymyxin-hydrocortisone (CORTISPORIN) otic solution, Administer 4 drops to the right ear every 6 (six) hours (Patient not taking: Reported on 4/26/2023), Disp: 10 mL, Rfl: 0    Past Medical History:   Diagnosis Date   • Atypical chest pain    • Depression    • Diabetes mellitus ([facility] [address] )    • Gastroparesis    • GERD (gastroesophageal reflux disease)    • Hyperlipidemia    • Hypertension    • Sciatica    • Seizure disorder (HCC) "    Past Surgical History:   Procedure Laterality Date   • BREAST BIOPSY Left  benign   • CHOLECYSTECTOMY     • COLONOSCOPY     • HYSTERECTOMY     • LA LAPAROSCOPIC APPENDECTOMY N/A 3/24/2021    Procedure: APPENDECTOMY LAPAROSCOPIC;  Surgeon: Ashia Maria MD;  Location: [address] [facility] MAIN OR;  Service: General   • LA LAPS ABD PRTM&OMENTUM DX W/WO SPEC BR/WA SPX N/A 3/24/2021    Procedure: LAPAROSCOPY DIAGNOSTIC, EXTENSIVE DESI;  Surgeon: Ashia Maria MD;  Location: [address] [facility] [place] MAIN OR;  Service: General   • UPPER GASTROINTESTINAL ENDOSCOPY       Family History   Problem Relation Age of Onset   • No Known Problems Mother    • No Known Problems Father    • No Known Problems Daughter    • No Known Problems Maternal Grandmother    • No Known Problems Paternal Grandmother    • No Known Problems Paternal Aunt    • No Known Problems Paternal Aunt    • No Known Problems Paternal Aunt       reports that she quit smoking about 33 years ago  Her smoking use included cigarettes  She has never used smokeless tobacco  She reports that she does not drink alcohol and does not use drugs  Physical Exam:   Vitals:    05/17/23 0836   BP: 110/70   BP Location: Right arm   Patient Position: Sitting   Cuff Size: Large   Pulse: 62   SpO2: 98%   Weight: 90 4 kg (199 lb 6 4 oz)   Height: 5' 2\" (1 575 m)     Body mass index is 36 47 kg/m²  General: conscious, cooperative, in no acute distress, appears stated age  Eyes: conjunctivae pale, anicteric sclerae  ENT: lips and mucous membranes moist  Neck: supple, no JVD, no masses  Chest:  essentially clear breath sounds bilaterally, no crackles, ronchus or wheezings  CVS: S1 & S2, normal rate, regular rhythm  Abdomen: soft, non-tender, non-distended, normoactive bowel sounds, rounded  Extremities: no edema of both legs  Skin: no rash   Neuro: awake, alert, oriented       Diagnostic Data:  Lab: I have personally reviewed pertinent lab results  ,   CBC:       CMP: No results found for: SODIUM, K, CL, " "CO2, ANIONGAP, BUN, CREATININE, GLUCOSE, CALCIUM, AST, ALT, ALKPHOS, PROT, BILITOT, EGFR,   PT/INR: No results found for: PT, INR,   Magnesium: No components found for: MAG,  Phosphorous: No results found for: PHOS    Patient Instructions   Get lab work and ultrasound done when you have insurance       Portions of the record may have been created with voice recognition software  Occasional wrong word or \"sound a like\" substitutions may have occurred due to the inherent limitations of voice recognition software  Read the chart carefully and recognize, using context, where substitutions have occurred  If you have any questions, please contact the dictating provider    "

## 2023-05-19 ENCOUNTER — PATIENT OUTREACH (OUTPATIENT)
Dept: FAMILY MEDICINE CLINIC | Facility: CLINIC | Age: 53
End: 2023-05-19

## 2023-05-19 NOTE — PROGRESS NOTES
Outpatient Care Management Note:  Received return call from Tamra Low, who is on her lunch break  She is doing well  Tamra Low states that she changed her eating habits and her glucose levels have not gone above 200  She did notice that her levels would increase after drinking coffee with flavored creamer  Discussed that the creamer most likely has some type of sugar in it  Haylee plans on asking her boss today when her insurance will be active  Denies any needs  Due to her new job and work schedule, Tamra Low would prefer to call me on an as needed basis  Agreeable to same  Closing care management episode at this time

## 2023-05-19 NOTE — PROGRESS NOTES
Outpatient Care Management Note:  Follow up call attempted to [facility]  Message left for patient to please return call  Contact information left on message

## 2023-05-25 ENCOUNTER — TELEPHONE (OUTPATIENT)
Dept: ENDOCRINOLOGY | Facility: CLINIC | Age: 53
End: 2023-05-25

## 2023-05-26 NOTE — TELEPHONE ENCOUNTER
Patient asking for our advice with discontinuing Apidra with upcoming 2 week cruise planned  She reports feeling unwell, nauseated, low appetite, which seems to be worse with taking Apidra  She reported a trial OFF Apidra this week and was nearly 100% in range this past week  CGM Refugio 2 report reviewed - 28 day report 4/29/23 - 5/26/23 with particular attention to 14 day daily glucose profile  Authur Lever still spending a lot of time outside target range (32%  High, 42% very high on 28 day report, with similar trends anticipated on 2 week review)  Recommended continue Apidra as prescribed at 12-10-16, before start of meal  If uncomfortable with this dose (anticipated light meal for example), instead of skipping prandial insulin altogether as she is currently doing, I urged her to still take at least a portion of the dose  Also encouraged her to call us to reschedule follow up appointment when she is able (lost insurance coverage with anticipated new insurance to start soon)

## 2023-06-16 DIAGNOSIS — K21.9 GASTROESOPHAGEAL REFLUX DISEASE, UNSPECIFIED WHETHER ESOPHAGITIS PRESENT: ICD-10-CM

## 2023-06-16 DIAGNOSIS — K21.9 GASTROESOPHAGEAL REFLUX DISEASE WITHOUT ESOPHAGITIS: ICD-10-CM

## 2023-06-16 RX ORDER — FAMOTIDINE 40 MG/1
40 TABLET, FILM COATED ORAL
Qty: 30 TABLET | Refills: 6 | Status: SHIPPED | OUTPATIENT
Start: 2023-06-16

## 2023-06-19 ENCOUNTER — OFFICE VISIT (OUTPATIENT)
Dept: FAMILY MEDICINE CLINIC | Facility: CLINIC | Age: 53
End: 2023-06-19
Payer: COMMERCIAL

## 2023-06-19 ENCOUNTER — TELEPHONE (OUTPATIENT)
Dept: FAMILY MEDICINE CLINIC | Facility: CLINIC | Age: 53
End: 2023-06-19

## 2023-06-19 VITALS
WEIGHT: 196 LBS | SYSTOLIC BLOOD PRESSURE: 132 MMHG | TEMPERATURE: 99.5 F | RESPIRATION RATE: 20 BRPM | HEART RATE: 88 BPM | OXYGEN SATURATION: 97 % | BODY MASS INDEX: 36.07 KG/M2 | HEIGHT: 62 IN | DIASTOLIC BLOOD PRESSURE: 78 MMHG

## 2023-06-19 DIAGNOSIS — A08.4 VIRAL GASTROENTERITIS: Primary | ICD-10-CM

## 2023-06-19 DIAGNOSIS — E11.42 DIABETIC POLYNEUROPATHY ASSOCIATED WITH TYPE 2 DIABETES MELLITUS (HCC): ICD-10-CM

## 2023-06-19 DIAGNOSIS — K21.9 GASTROESOPHAGEAL REFLUX DISEASE WITHOUT ESOPHAGITIS: ICD-10-CM

## 2023-06-19 PROCEDURE — 99213 OFFICE O/P EST LOW 20 MIN: CPT | Performed by: NURSE PRACTITIONER

## 2023-06-19 RX ORDER — PANTOPRAZOLE SODIUM 40 MG/1
TABLET, DELAYED RELEASE ORAL
Qty: 60 TABLET | Refills: 0 | Status: SHIPPED | OUTPATIENT
Start: 2023-06-19

## 2023-06-19 RX ORDER — ONDANSETRON 4 MG/1
4 TABLET, FILM COATED ORAL EVERY 8 HOURS PRN
Qty: 20 TABLET | Refills: 0 | Status: SHIPPED | OUTPATIENT
Start: 2023-06-19

## 2023-06-19 RX ORDER — PREGABALIN 100 MG/1
CAPSULE ORAL
Qty: 270 CAPSULE | Refills: 0 | Status: SHIPPED | OUTPATIENT
Start: 2023-06-19

## 2023-06-19 NOTE — LETTER
June 19, 2023     Patient: Angela Michelle  YOB: 1970  Date of Visit: 6/19/2023      To Whom it May Concern:    Angela Michelle is under my professional care  Christa Brown was seen in my office on 6/19/2023  Christa Brown may return to work on 6/22/2023   If you have any questions or concerns, please don't hesitate to call           Sincerely,          SYLWIA Diane        CC: No Recipients

## 2023-06-19 NOTE — PATIENT INSTRUCTIONS
Zofran as directed  Get OTC Immodium for diarrhea  Go to ER if dehyrated as discussed  Call if any questions/concerns  Increase fluids as tolerated  BRAT diet

## 2023-06-19 NOTE — PROGRESS NOTES
[facility]        NAME: Marina Shelley is a 46 y o  female  : 1970    MRN: [id number]  DATE: 2023  TIME: 2:11 PM    Assessment and Plan   Viral gastroenteritis [A08 4]  1  Viral gastroenteritis  ondansetron (ZOFRAN) 4 mg tablet            Patient Instructions     Patient Instructions   Zofran as directed  Get OTC Immodium for diarrhea  Go to ER if dehyrated as discussed  Call if any questions/concerns  Increase fluids as tolerated  BRAT diet          Chief Complaint     Chief Complaint   Patient presents with   • Vomiting         History of Present Illness       Here for vomiting and diarrhea for 3 days- has chills but no known fever  CGM in office is 156  HgA1c in office today is 11 0  Previously 12 2023  She lost her insurance due to her  making more than he was allowed to have state insurance  Is now working at [employer] and was told she would be getting insurance through them  Last time she vomited was this morning- has had nothing to eat or drink since then  Last time she had diarrhea was this morning- had about 5 times today  Review of Systems   Review of Systems   Constitutional: Positive for fatigue  Negative for activity change, diaphoresis and fever  HENT: Negative for congestion, facial swelling, hearing loss, rhinorrhea, sinus pressure, sinus pain, sneezing, sore throat and voice change  Eyes: Negative for discharge and visual disturbance  Respiratory: Negative for cough, choking, chest tightness, shortness of breath, wheezing and stridor  Cardiovascular: Negative for chest pain, palpitations and leg swelling  Gastrointestinal: Positive for abdominal pain, diarrhea, nausea and vomiting  Negative for abdominal distention and constipation  Endocrine: Negative for polydipsia, polyphagia and polyuria  Genitourinary: Negative for difficulty urinating, dysuria, frequency and urgency     Musculoskeletal: Negative for arthralgias, back pain, gait problem, joint swelling, myalgias, neck pain and neck stiffness  Skin: Negative for color change, rash and wound  Neurological: Negative for dizziness, syncope, speech difficulty, weakness, light-headedness and headaches  Hematological: Negative for adenopathy  Does not bruise/bleed easily  Psychiatric/Behavioral: Negative for agitation, behavioral problems, confusion, hallucinations, sleep disturbance and suicidal ideas  The patient is not nervous/anxious            Current Medications       Current Outpatient Medications:   •  famotidine (PEPCID) 40 MG tablet, Take 1 tablet (40 mg total) by mouth daily at bedtime Take in evening 2 hours prior to bed, Disp: 30 tablet, Rfl: 6  •  ondansetron (ZOFRAN) 4 mg tablet, Take 1 tablet (4 mg total) by mouth every 8 (eight) hours as needed for nausea or vomiting, Disp: 20 tablet, Rfl: 0  •  albuterol (PROVENTIL HFA,VENTOLIN HFA) 90 mcg/act inhaler, INHALE ONE PUFF BY MOUTH AND INTO THE LUNGS EVERY 6 HOURS AS NEEDED FOR WHEEZING, Disp: 36 g, Rfl: 3  •  Alcohol Swabs (Pharmacist Choice Alcohol) PADS, USE AS DIRECTED, Disp: 100 each, Rfl: 1  •  Aspirin (ASPIR-81 PO), Take 81 mg by mouth, Disp: , Rfl:   •  atorvastatin (LIPITOR) 80 mg tablet, TAKE 1 TABLET (80 MG TOTAL) BY MOUTH DAILY AT BEDTIME, Disp: 30 tablet, Rfl: 6  •  B-D UF III MINI PEN NEEDLES 31G X 5 MM MISC, Pt uses 5 pen needles daily, Disp: 500 each, Rfl: 0  •  cholecalciferol (VITAMIN D3) 400 units tablet, TAKE ONE TABLET BY MOUTH DAILY, Disp: 90 tablet, Rfl: 1  •  citalopram (CeleXA) 40 mg tablet, Take 1 tablet (40 mg total) by mouth daily, Disp: 90 tablet, Rfl: 3  •  Continuous Blood Gluc  (FreeStyle Refugio 14 Day [place]) TEMITOPE, Use for continuous blood glucose monitoring, Disp: 1 each, Rfl: 0  •  Continuous Blood Gluc Sensor (FreeStyle Refugio 14 Day Sensor) MISC, Use one sensor every 14 days for continuous blood sugar monitoring , Disp: 6 each, Rfl: 3  •  docusate sodium (COLACE) 100 mg capsule, TAKE ONE CAPSULE BY MOUTH TWICE DAILY, Disp: 60 capsule, Rfl: 5  •  dulaglutide (Trulicity) 3 ZD/2 2NO injection, Inject 0 5 mL (3 mg total) under the skin once a week, Disp: 2 mL, Rfl: 3  •  Empagliflozin (Jardiance) 25 MG TABS, Take 1 tablet (25 mg total) by mouth every morning, Disp: 90 tablet, Rfl: 1  •  ergocalciferol (VITAMIN D2) 50,000 units, Take 1 capsule (50,000 Units total) by mouth once a week, Disp: 8 capsule, Rfl: 1  •  fenofibrate (TRICOR) 48 mg tablet, TAKE ONE TABLET BY MOUTH DAILY, Disp: 90 tablet, Rfl: 3  •  Insulin Glargine Solostar (Lantus SoloStar) 100 UNIT/ML SOPN, 52 units daily, Disp: 30 mL, Rfl: 1  •  insulin glulisine (Apidra SoloStar) 100 units/mL injection pen, Inject 12 units with breakfast and lunch, 14 units with dinner, with scale ISF 25 @ 150 as needed, Disp: 30 mL, Rfl: 1  •  Iron Heme Polypeptide 12 MG TABS, Take 1 tablet by mouth, Disp: , Rfl:   •  levETIRAcetam (KEPPRA) 500 mg tablet, TAKE ONE TABLET TWICE DAILY, Disp: 180 tablet, Rfl: 1  •  linaCLOtide (Linzess) 72 MCG CAPS, Take 1 capsule by mouth daily before breakfast, Disp: 30 capsule, Rfl: 0  •  lisinopril (ZESTRIL) 2 5 mg tablet, Take 1 tablet (2 5 mg total) by mouth daily, Disp: 30 tablet, Rfl: 4  •  metoprolol tartrate (LOPRESSOR) 25 mg tablet, Take 1 tablet (25 mg total) by mouth 2 (two) times a day, Disp: 180 tablet, Rfl: 1  •  multivitamin (THERAGRAN) TABS, Take 1 tablet by mouth every morning, Disp: , Rfl:   •  OneTouch Ultra test strip, USE 4 TIMES A DAY, Disp: 400 each, Rfl: 0  •  pantoprazole (PROTONIX) 40 mg tablet, Take 1 tablet (40 mg total) by mouth 2 (two) times a day, Disp: 60 tablet, Rfl: 0  •  Pharmacist Choice Lancets MISC, USE AS DIRECTED, Disp: 100 each, Rfl: 1  •  pregabalin (LYRICA) 100 mg capsule, TAKE ONE CAPSULE BY MOUTH THREE TIMES A DAY, Disp: 270 capsule, Rfl: 3  •  Restasis 0 05 % ophthalmic emulsion, , Disp: , Rfl:   •  scopolamine (TRANSDERM-SCOP) 1 mg/3 days TD 72 hr patch, Place 1 patch on the skin over 72 hours every third day, Disp: 24 patch, Rfl: 1  •  solifenacin (VESICARE) 5 mg tablet, TAKE ONE TABLET BY MOUTH DAILY, Disp: 90 tablet, Rfl: 1  •  Symbicort 80-4 5 MCG/ACT inhaler, INHALE 2 PUFFS 2 (TWO) TIMES A DAY RINSE MOUTH AFTER USE , Disp: 10 2 g, Rfl: 0    Current Allergies     Allergies as of 06/19/2023 - Reviewed 06/19/2023   Allergen Reaction Noted   • Azithromycin GI Intolerance and Hives 07/13/2012   • Benztropine  08/09/2016   • Butorphanol Hallucinations 08/09/2016   • Penicillins  11/12/2015   • Zolpidem  11/12/2015            The following portions of the patient's history were reviewed and updated as appropriate: allergies, current medications, past family history, past medical history, past social history, past surgical history and problem list      Past Medical History:   Diagnosis Date   • Atypical chest pain    • Depression    • Diabetes mellitus ([facility] )    • Gastroparesis    • GERD (gastroesophageal reflux disease)    • Hyperlipidemia    • Hypertension    • Sciatica    • Seizure disorder ([facility] )        Past Surgical History:   Procedure Laterality Date   • BREAST BIOPSY Left  benign   • CHOLECYSTECTOMY     • COLONOSCOPY     • HYSTERECTOMY     • CA LAPAROSCOPIC APPENDECTOMY N/A 3/24/2021    Procedure: APPENDECTOMY LAPAROSCOPIC;  Surgeon: Richard Faust MD;  Location: [facility] MAIN OR;  Service: General   • CA LAPS ABD PRTM&OMENTUM DX W/WO SPEC BR/WA SPX N/A 3/24/2021    Procedure: LAPAROSCOPY DIAGNOSTIC, EXTENSIVE DESI;  Surgeon: Richard Faust MD;  Location: [facility] MAIN OR;  Service: General   • UPPER GASTROINTESTINAL ENDOSCOPY         Family History   Problem Relation Age of Onset   • No Known Problems Mother    • No Known Problems Father    • No Known Problems Daughter    • No Known Problems Maternal Grandmother    • No Known Problems Paternal Grandmother    • No Known Problems Paternal Aunt    • No Known Problems Paternal Aunt    • No Known Problems Paternal "Aunt          Medications have been verified  Objective   /78   Pulse 88   Temp 99 5 °F (37 5 °C) (Tympanic)   Resp 20   Ht 5' 2\" (1 575 m)   Wt 88 9 kg (196 lb)   SpO2 97%   BMI 35 85 kg/m²        Physical Exam     Physical Exam  Vitals and nursing note reviewed  Constitutional:       General: She is not in acute distress  Appearance: She is well-developed  She is not diaphoretic  HENT:      Head: Normocephalic and atraumatic  Cardiovascular:      Rate and Rhythm: Normal rate and regular rhythm  Heart sounds: Normal heart sounds  No murmur heard  No friction rub  No gallop  Pulmonary:      Effort: Pulmonary effort is normal  No respiratory distress  Breath sounds: Normal breath sounds  No wheezing or rales  Abdominal:      General: Bowel sounds are increased  There is no distension  Palpations: Abdomen is soft  There is no mass  Tenderness: There is generalized abdominal tenderness  There is no guarding or rebound  Musculoskeletal:         General: No tenderness or deformity  Normal range of motion  Skin:     General: Skin is warm and dry  Findings: No erythema or rash  Neurological:      Mental Status: She is alert and oriented to person, place, and time  Cranial Nerves: No cranial nerve deficit  Coordination: Coordination normal    Psychiatric:         Attention and Perception: Attention normal          Mood and Affect: Mood is anxious  Affect is flat  Behavior: Behavior normal          Thought Content:  Thought content normal          Cognition and Memory: Cognition and memory normal          Judgment: Judgment normal                    "

## 2023-06-26 NOTE — TELEPHONE ENCOUNTER
Scheduled with Dr Adelaida Urban tomorrow   Wants to know what she can do in the meantime Skin Substitute: EpiFix Micronized

## 2023-07-06 ENCOUNTER — TELEPHONE (OUTPATIENT)
Dept: NEPHROLOGY | Facility: CLINIC | Age: 53
End: 2023-07-06

## 2023-07-06 NOTE — TELEPHONE ENCOUNTER
Tried reaching patient, no answer. Left message on voicemail for patient to have lab/urine studies done for upcoming appt.

## 2023-07-10 ENCOUNTER — APPOINTMENT (EMERGENCY)
Dept: CT IMAGING | Facility: HOSPITAL | Age: 53
End: 2023-07-10
Payer: COMMERCIAL

## 2023-07-10 ENCOUNTER — TELEPHONE (OUTPATIENT)
Dept: ENDOCRINOLOGY | Facility: CLINIC | Age: 53
End: 2023-07-10

## 2023-07-10 ENCOUNTER — HOSPITAL ENCOUNTER (OUTPATIENT)
Facility: HOSPITAL | Age: 53
Setting detail: OBSERVATION
Discharge: HOME | End: 2023-07-10
Attending: FAMILY MEDICINE | Admitting: INTERNAL MEDICINE
Payer: COMMERCIAL

## 2023-07-10 ENCOUNTER — APPOINTMENT (EMERGENCY)
Dept: RADIOLOGY | Facility: HOSPITAL | Age: 53
End: 2023-07-10
Payer: COMMERCIAL

## 2023-07-10 VITALS
WEIGHT: 193 LBS | BODY MASS INDEX: 35.3 KG/M2 | RESPIRATION RATE: 17 BRPM | TEMPERATURE: 97.7 F | DIASTOLIC BLOOD PRESSURE: 70 MMHG | SYSTOLIC BLOOD PRESSURE: 127 MMHG | OXYGEN SATURATION: 94 % | HEART RATE: 78 BPM

## 2023-07-10 DIAGNOSIS — R11.2 INTRACTABLE NAUSEA AND VOMITING: ICD-10-CM

## 2023-07-10 DIAGNOSIS — R73.9 HYPERGLYCEMIA: Primary | ICD-10-CM

## 2023-07-10 DIAGNOSIS — K31.89 GASTRIC DISTENTION: ICD-10-CM

## 2023-07-10 DIAGNOSIS — R42 DIZZINESS: Primary | ICD-10-CM

## 2023-07-10 PROBLEM — I12.9 BENIGN HYPERTENSION WITH CKD (CHRONIC KIDNEY DISEASE) STAGE III (HCC): Chronic | Status: ACTIVE | Noted: 2022-02-17

## 2023-07-10 PROBLEM — E87.0 HYPEROSMOLAR HYPONATREMIA: Status: RESOLVED | Noted: 2022-10-06 | Resolved: 2023-07-10

## 2023-07-10 PROBLEM — J45.20 MILD INTERMITTENT ASTHMA WITHOUT COMPLICATION: Chronic | Status: ACTIVE | Noted: 2021-04-12

## 2023-07-10 PROBLEM — E66.09 CLASS 1 OBESITY DUE TO EXCESS CALORIES WITH SERIOUS COMORBIDITY AND BODY MASS INDEX (BMI) OF 34.0 TO 34.9 IN ADULT: Chronic | Status: ACTIVE | Noted: 2017-04-14

## 2023-07-10 PROBLEM — N18.30 BENIGN HYPERTENSION WITH CKD (CHRONIC KIDNEY DISEASE) STAGE III (HCC): Chronic | Status: ACTIVE | Noted: 2022-02-17

## 2023-07-10 PROBLEM — F41.8 DEPRESSION WITH ANXIETY: Chronic | Status: ACTIVE | Noted: 2021-04-12

## 2023-07-10 PROBLEM — I95.9 HYPOTENSION: Status: RESOLVED | Noted: 2021-03-24 | Resolved: 2023-07-10

## 2023-07-10 PROBLEM — E66.811 CLASS 1 OBESITY DUE TO EXCESS CALORIES WITH SERIOUS COMORBIDITY AND BODY MASS INDEX (BMI) OF 34.0 TO 34.9 IN ADULT: Chronic | Status: ACTIVE | Noted: 2017-04-14

## 2023-07-10 PROBLEM — D64.9 ANEMIA, UNSPECIFIED: Status: RESOLVED | Noted: 2021-08-12 | Resolved: 2023-07-10

## 2023-07-10 PROBLEM — E87.1 HYPEROSMOLAR HYPONATREMIA: Status: RESOLVED | Noted: 2022-10-06 | Resolved: 2023-07-10

## 2023-07-10 LAB
ANION GAP SERPL CALCULATED.3IONS-SCNC: 11 MMOL/L
BASE EX.OXY STD BLDV CALC-SCNC: 71.8 % (ref 60–80)
BASE EXCESS BLDV CALC-SCNC: 2.4 MMOL/L
BASOPHILS # BLD AUTO: 0.03 THOUSANDS/ÂΜL (ref 0–0.1)
BASOPHILS NFR BLD AUTO: 0 % (ref 0–1)
BETA-HYDROXYBUTYRATE: 0.9 MMOL/L
BILIRUB UR QL STRIP: NEGATIVE
BUN SERPL-MCNC: 27 MG/DL (ref 5–25)
CALCIUM SERPL-MCNC: 10.3 MG/DL (ref 8.4–10.2)
CHLORIDE SERPL-SCNC: 95 MMOL/L (ref 96–108)
CLARITY UR: CLEAR
CO2 SERPL-SCNC: 28 MMOL/L (ref 21–32)
COLOR UR: YELLOW
CREAT SERPL-MCNC: 1.16 MG/DL (ref 0.6–1.3)
EOSINOPHIL # BLD AUTO: 0.14 THOUSAND/ÂΜL (ref 0–0.61)
EOSINOPHIL NFR BLD AUTO: 1 % (ref 0–6)
ERYTHROCYTE [DISTWIDTH] IN BLOOD BY AUTOMATED COUNT: 14.2 % (ref 11.6–15.1)
GFR SERPL CREATININE-BSD FRML MDRD: 54 ML/MIN/1.73SQ M
GLUCOSE SERPL-MCNC: 202 MG/DL (ref 65–140)
GLUCOSE SERPL-MCNC: 347 MG/DL (ref 65–140)
GLUCOSE SERPL-MCNC: 402 MG/DL (ref 65–140)
GLUCOSE SERPL-MCNC: 461 MG/DL (ref 65–140)
GLUCOSE SERPL-MCNC: 475 MG/DL (ref 65–140)
GLUCOSE UR STRIP-MCNC: ABNORMAL MG/DL
HCO3 BLDV-SCNC: 27.9 MMOL/L (ref 24–30)
HCT VFR BLD AUTO: 43.2 % (ref 34.8–46.1)
HGB BLD-MCNC: 13.9 G/DL (ref 11.5–15.4)
HGB UR QL STRIP.AUTO: NEGATIVE
IMM GRANULOCYTES # BLD AUTO: 0.03 THOUSAND/UL (ref 0–0.2)
IMM GRANULOCYTES NFR BLD AUTO: 0 % (ref 0–2)
KETONES UR STRIP-MCNC: ABNORMAL MG/DL
LEUKOCYTE ESTERASE UR QL STRIP: NEGATIVE
LYMPHOCYTES # BLD AUTO: 1.91 THOUSANDS/ÂΜL (ref 0.6–4.47)
LYMPHOCYTES NFR BLD AUTO: 19 % (ref 14–44)
MCH RBC QN AUTO: 26 PG (ref 26.8–34.3)
MCHC RBC AUTO-ENTMCNC: 32.2 G/DL (ref 31.4–37.4)
MCV RBC AUTO: 81 FL (ref 82–98)
MONOCYTES # BLD AUTO: 0.56 THOUSAND/ÂΜL (ref 0.17–1.22)
MONOCYTES NFR BLD AUTO: 6 % (ref 4–12)
NEUTROPHILS # BLD AUTO: 7.53 THOUSANDS/ÂΜL (ref 1.85–7.62)
NEUTS SEG NFR BLD AUTO: 74 % (ref 43–75)
NITRITE UR QL STRIP: NEGATIVE
NRBC BLD AUTO-RTO: 0 /100 WBCS
O2 CT BLDV-SCNC: 15.1 ML/DL
PCO2 BLDV: 46 MM HG (ref 42–50)
PH BLDV: 7.4 [PH] (ref 7.3–7.4)
PH UR STRIP.AUTO: 7 [PH]
PLATELET # BLD AUTO: 347 THOUSANDS/UL (ref 149–390)
PMV BLD AUTO: 11.4 FL (ref 8.9–12.7)
PO2 BLDV: 38.2 MM HG (ref 35–45)
POTASSIUM SERPL-SCNC: 4.2 MMOL/L (ref 3.5–5.3)
PROT UR STRIP-MCNC: NEGATIVE MG/DL
RBC # BLD AUTO: 5.35 MILLION/UL (ref 3.81–5.12)
SODIUM SERPL-SCNC: 134 MMOL/L (ref 135–147)
SP GR UR STRIP.AUTO: 1.01
UROBILINOGEN UR QL STRIP.AUTO: 0.2 E.U./DL
WBC # BLD AUTO: 10.2 THOUSAND/UL (ref 4.31–10.16)

## 2023-07-10 PROCEDURE — 73630 X-RAY EXAM OF FOOT: CPT

## 2023-07-10 PROCEDURE — 96361 HYDRATE IV INFUSION ADD-ON: CPT

## 2023-07-10 PROCEDURE — 85025 COMPLETE CBC W/AUTO DIFF WBC: CPT | Performed by: FAMILY MEDICINE

## 2023-07-10 PROCEDURE — 96374 THER/PROPH/DIAG INJ IV PUSH: CPT

## 2023-07-10 PROCEDURE — 99285 EMERGENCY DEPT VISIT HI MDM: CPT

## 2023-07-10 PROCEDURE — 36415 COLL VENOUS BLD VENIPUNCTURE: CPT | Performed by: FAMILY MEDICINE

## 2023-07-10 PROCEDURE — 80048 BASIC METABOLIC PNL TOTAL CA: CPT | Performed by: FAMILY MEDICINE

## 2023-07-10 PROCEDURE — 82948 REAGENT STRIP/BLOOD GLUCOSE: CPT

## 2023-07-10 PROCEDURE — 96372 THER/PROPH/DIAG INJ SC/IM: CPT

## 2023-07-10 PROCEDURE — 82805 BLOOD GASES W/O2 SATURATION: CPT | Performed by: FAMILY MEDICINE

## 2023-07-10 PROCEDURE — 96375 TX/PRO/DX INJ NEW DRUG ADDON: CPT

## 2023-07-10 PROCEDURE — 82010 KETONE BODYS QUAN: CPT | Performed by: FAMILY MEDICINE

## 2023-07-10 PROCEDURE — G1004 CDSM NDSC: HCPCS

## 2023-07-10 PROCEDURE — 81003 URINALYSIS AUTO W/O SCOPE: CPT | Performed by: FAMILY MEDICINE

## 2023-07-10 PROCEDURE — 74177 CT ABD & PELVIS W/CONTRAST: CPT

## 2023-07-10 RX ORDER — PANTOPRAZOLE SODIUM 40 MG/10ML
40 INJECTION, POWDER, LYOPHILIZED, FOR SOLUTION INTRAVENOUS
Status: DISCONTINUED | OUTPATIENT
Start: 2023-07-10 | End: 2023-07-10 | Stop reason: HOSPADM

## 2023-07-10 RX ORDER — INSULIN LISPRO 100 [IU]/ML
1-6 INJECTION, SOLUTION INTRAVENOUS; SUBCUTANEOUS EVERY 6 HOURS SCHEDULED
Status: DISCONTINUED | OUTPATIENT
Start: 2023-07-10 | End: 2023-07-10 | Stop reason: HOSPADM

## 2023-07-10 RX ORDER — MECLIZINE HYDROCHLORIDE 25 MG/1
25 TABLET ORAL 3 TIMES DAILY PRN
Qty: 30 TABLET | Refills: 0 | Status: SHIPPED | OUTPATIENT
Start: 2023-07-10

## 2023-07-10 RX ORDER — ONDANSETRON 2 MG/ML
4 INJECTION INTRAMUSCULAR; INTRAVENOUS EVERY 6 HOURS PRN
Status: DISCONTINUED | OUTPATIENT
Start: 2023-07-10 | End: 2023-07-10 | Stop reason: HOSPADM

## 2023-07-10 RX ORDER — ACETAMINOPHEN 325 MG/1
650 TABLET ORAL EVERY 4 HOURS PRN
Status: DISCONTINUED | OUTPATIENT
Start: 2023-07-10 | End: 2023-07-10 | Stop reason: HOSPADM

## 2023-07-10 RX ORDER — ONDANSETRON 2 MG/ML
4 INJECTION INTRAMUSCULAR; INTRAVENOUS ONCE
Status: COMPLETED | OUTPATIENT
Start: 2023-07-10 | End: 2023-07-10

## 2023-07-10 RX ORDER — SODIUM CHLORIDE 9 MG/ML
100 INJECTION, SOLUTION INTRAVENOUS CONTINUOUS
Status: DISCONTINUED | OUTPATIENT
Start: 2023-07-10 | End: 2023-07-10 | Stop reason: HOSPADM

## 2023-07-10 RX ORDER — INSULIN LISPRO 100 [IU]/ML
5 INJECTION, SOLUTION INTRAVENOUS; SUBCUTANEOUS ONCE
Status: DISCONTINUED | OUTPATIENT
Start: 2023-07-10 | End: 2023-07-10 | Stop reason: HOSPADM

## 2023-07-10 RX ORDER — METOCLOPRAMIDE HYDROCHLORIDE 5 MG/ML
10 INJECTION INTRAMUSCULAR; INTRAVENOUS EVERY 6 HOURS SCHEDULED
Status: DISCONTINUED | OUTPATIENT
Start: 2023-07-11 | End: 2023-07-10 | Stop reason: HOSPADM

## 2023-07-10 RX ADMIN — INSULIN HUMAN 10 UNITS: 100 INJECTION, SOLUTION PARENTERAL at 14:48

## 2023-07-10 RX ADMIN — ONDANSETRON 4 MG: 2 INJECTION INTRAMUSCULAR; INTRAVENOUS at 14:45

## 2023-07-10 RX ADMIN — SODIUM CHLORIDE 1000 ML: 0.9 INJECTION, SOLUTION INTRAVENOUS at 14:51

## 2023-07-10 RX ADMIN — INSULIN HUMAN 10 UNITS: 100 INJECTION, SOLUTION PARENTERAL at 19:08

## 2023-07-10 RX ADMIN — INSULIN LISPRO 2 UNITS: 100 INJECTION, SOLUTION INTRAVENOUS; SUBCUTANEOUS at 21:00

## 2023-07-10 RX ADMIN — IOHEXOL 100 ML: 350 INJECTION, SOLUTION INTRAVENOUS at 15:27

## 2023-07-10 NOTE — ED PROVIDER NOTES
History  Chief Complaint   Patient presents with   • Hyperglycemia - Symptomatic     Patient arrives with complaints of high glucose levels for the past week. States has felt sick to the stomach for the past few days. Does nopt take short acting insulin, last night took 54 units of lantus and Apedra 14 units. HPI  59-year-old female with history of type 2 diabetes presented with complaint of hypoglycemia. Patient states its been ongoing for few months spoke with her endocrinologist who recommended to come to the ED. patient also complained of abdominal pain nausea and vomiting. Patient states that she does have gastroparesis. States that she been having trouble controlling her blood sugar and I called her endocrinologist who recommended to come to the ED. Prior to Admission Medications   Prescriptions Last Dose Informant Patient Reported? Taking? Alcohol Swabs (Pharmacist Choice Alcohol) PADS  Self No No   Sig: USE AS DIRECTED   Aspirin (ASPIR-81 PO)  Self Yes No   Sig: Take 81 mg by mouth   B-D UF III MINI PEN NEEDLES 31G X 5 MM MISC  Self No No   Sig: Pt uses 5 pen needles daily   Continuous Blood Gluc  (FreeStyle Refugio 14 Day [place]) TEMITOPE  Self No No   Sig: Use for continuous blood glucose monitoring   Continuous Blood Gluc Sensor (FreeStyle Refugio 14 Day Sensor) MISC  Self No No   Sig: Use one sensor every 14 days for continuous blood sugar monitoring.    Empagliflozin (Jardiance) 25 MG TABS  Self No No   Sig: Take 1 tablet (25 mg total) by mouth every morning   Insulin Glargine Solostar (Lantus SoloStar) 100 UNIT/ML SOPN  Self No No   Si units daily   Iron Heme Polypeptide 12 MG TABS  Self Yes No   Sig: Take 1 tablet by mouth   OneTouch Ultra test strip  Self No No   Sig: USE 4 TIMES A DAY   Pharmacist Choice Lancets MISC  Self No No   Sig: USE AS DIRECTED   Restasis 0.05 % ophthalmic emulsion  Self Yes No   Symbicort 80-4.5 MCG/ACT inhaler  Self No No   Sig: INHALE 2 PUFFS 2 (TWO) TIMES A DAY RINSE MOUTH AFTER USE.   albuterol (PROVENTIL HFA,VENTOLIN HFA) 90 mcg/act inhaler  Self No No   Sig: INHALE ONE PUFF BY MOUTH AND INTO THE LUNGS EVERY 6 HOURS AS NEEDED FOR WHEEZING   atorvastatin (LIPITOR) 80 mg tablet  Self No No   Sig: TAKE 1 TABLET (80 MG TOTAL) BY MOUTH DAILY AT BEDTIME   cholecalciferol (VITAMIN D3) 400 units tablet  Self No No   Sig: TAKE ONE TABLET BY MOUTH DAILY   citalopram (CeleXA) 40 mg tablet  Self No No   Sig: Take 1 tablet (40 mg total) by mouth daily   docusate sodium (COLACE) 100 mg capsule  Self No No   Sig: TAKE ONE CAPSULE BY MOUTH TWICE DAILY   dulaglutide (Trulicity) 3 WA/0.7WS injection  Self No No   Sig: Inject 0.5 mL (3 mg total) under the skin once a week   ergocalciferol (VITAMIN D2) 50,000 units  Self No No   Sig: Take 1 capsule (50,000 Units total) by mouth once a week   famotidine (PEPCID) 40 MG tablet   No No   Sig: Take 1 tablet (40 mg total) by mouth daily at bedtime Take in evening 2 hours prior to bed   fenofibrate (TRICOR) 48 mg tablet  Self No No   Sig: TAKE ONE TABLET BY MOUTH DAILY   insulin glulisine (Apidra SoloStar) 100 units/mL injection pen  Self No No   Sig: Inject 12 units with breakfast and lunch, 14 units with dinner, with scale ISF 25 @ 150 as needed   levETIRAcetam (KEPPRA) 500 mg tablet  Self No No   Sig: TAKE ONE TABLET TWICE DAILY   linaCLOtide (Linzess) 72 MCG CAPS  Self No No   Sig: Take 1 capsule by mouth daily before breakfast   lisinopril (ZESTRIL) 2.5 mg tablet  Self No No   Sig: Take 1 tablet (2.5 mg total) by mouth daily   meclizine (ANTIVERT) 25 mg tablet   No No   Sig: Take 1 tablet (25 mg total) by mouth 3 (three) times a day as needed for dizziness   metoprolol tartrate (LOPRESSOR) 25 mg tablet  Self No No   Sig: Take 1 tablet (25 mg total) by mouth 2 (two) times a day   multivitamin (THERAGRAN) TABS  Self Yes No   Sig: Take 1 tablet by mouth every morning   ondansetron (ZOFRAN) 4 mg tablet   No No   Sig: Take 1 tablet (4 mg total) by mouth every 8 (eight) hours as needed for nausea or vomiting   pantoprazole (PROTONIX) 40 mg tablet   No No   Sig: TAKE ONE TABLET BY MOUTH TWICE DAILY   pregabalin (LYRICA) 100 mg capsule   No No   Sig: TAKE ONE CAPSULE BY MOUTH THREE TIMES A DAY   scopolamine (TRANSDERM-SCOP) 1 mg/3 days TD 72 hr patch  Self No No   Sig: Place 1 patch on the skin over 72 hours every third day   solifenacin (VESICARE) 5 mg tablet  Self No No   Sig: TAKE ONE TABLET BY MOUTH DAILY      Facility-Administered Medications: None       Past Medical History:   Diagnosis Date   • Atypical chest pain    • Depression    • Diabetes mellitus (HCC)    • Gastroparesis    • GERD (gastroesophageal reflux disease)    • Hyperlipidemia    • Hypertension    • Sciatica    • Seizure disorder ([address])        Past Surgical History:   Procedure Laterality Date   • BREAST BIOPSY Left  benign   • CHOLECYSTECTOMY     • COLONOSCOPY     • HYSTERECTOMY     • SC LAPAROSCOPIC APPENDECTOMY N/A 3/24/2021    Procedure: APPENDECTOMY LAPAROSCOPIC;  Surgeon: Marylen Delay, MD;  Location: [address] [place] [address] OR;  Service: General   • SC LAPS ABD PRTM&OMENTUM DX W/WO SPEC BR/WA SPX N/A 3/24/2021    Procedure: LAPAROSCOPY DIAGNOSTIC, EXTENSIVE DESI;  Surgeon: Marylen Delay, MD;  Location: [address] [place] [address] OR;  Service: General   • UPPER GASTROINTESTINAL ENDOSCOPY         Family History   Problem Relation Age of Onset   • No Known Problems Mother    • No Known Problems Father    • No Known Problems Daughter    • No Known Problems Maternal Grandmother    • No Known Problems Paternal Grandmother    • No Known Problems Paternal Aunt    • No Known Problems Paternal Aunt    • No Known Problems Paternal Aunt      I have reviewed and agree with the history as documented.     E-Cigarette/Vaping   • E-Cigarette Use Never User      E-Cigarette/Vaping Substances   • Nicotine No    • THC No    • CBD No    • Flavoring No    • Other No    • Unknown No      Social History     Tobacco Use   • Smoking status: Former     Types: Cigarettes     Quit date:      Years since quittin.5   • Smokeless tobacco: Never   Vaping Use   • Vaping Use: Never used   Substance Use Topics   • Alcohol use: Never   • Drug use: Never       Review of Systems   Constitutional: Negative for chills and fever. HENT: Negative for rhinorrhea and sore throat. Eyes: Negative for visual disturbance. Respiratory: Negative for cough and shortness of breath. Cardiovascular: Negative for chest pain and leg swelling. Gastrointestinal: Positive for abdominal pain, nausea and vomiting. Negative for diarrhea. Genitourinary: Negative for dysuria. Musculoskeletal: Negative for back pain and myalgias. Skin: Negative for rash. Neurological: Negative for dizziness and headaches. Psychiatric/Behavioral: Negative for confusion. All other systems reviewed and are negative. Physical Exam  Physical Exam  Vitals and nursing note reviewed. Constitutional:       Appearance: She is well-developed. HENT:      Head: Normocephalic and atraumatic. Right Ear: External ear normal.      Left Ear: External ear normal.      Nose: Nose normal.      Mouth/Throat:      Mouth: Mucous membranes are moist.      Pharynx: No oropharyngeal exudate. Eyes:      General: No scleral icterus. Right eye: No discharge. Left eye: No discharge. Conjunctiva/sclera: Conjunctivae normal.      Pupils: Pupils are equal, round, and reactive to light. Cardiovascular:      Rate and Rhythm: Normal rate and regular rhythm. Pulses: Normal pulses. Heart sounds: Normal heart sounds. Pulmonary:      Effort: Pulmonary effort is normal. No respiratory distress. Breath sounds: Normal breath sounds. No wheezing. Abdominal:      General: Bowel sounds are normal.      Palpations: Abdomen is soft. Tenderness: There is abdominal tenderness. Musculoskeletal:         General: Normal range of motion.       Cervical back: Normal range of motion and neck supple. Lymphadenopathy:      Cervical: No cervical adenopathy. Skin:     General: Skin is warm and dry. Capillary Refill: Capillary refill takes less than 2 seconds. Neurological:      General: No focal deficit present. Mental Status: She is alert and oriented to person, place, and time.    Psychiatric:         Mood and Affect: Mood normal.         Behavior: Behavior normal.         Vital Signs  ED Triage Vitals [07/10/23 1248]   Temperature Pulse Respirations Blood Pressure SpO2   97.7 °F (36.5 °C) 81 18 165/75 95 %      Temp Source Heart Rate Source Patient Position - Orthostatic VS BP Location FiO2 (%)   Temporal Monitor Sitting Left arm --      Pain Score       8           Vitals:    07/10/23 1248 07/10/23 1500 07/10/23 1630 07/10/23 1800   BP: 165/75 127/65 144/66 127/70   Pulse: 81 80 75 78   Patient Position - Orthostatic VS: Sitting Sitting Lying Lying         Visual Acuity      ED Medications  Medications   sodium chloride 0.9 % bolus 1,000 mL (0 mL Intravenous Stopped 7/10/23 1910)   insulin regular (HumuLIN R,NovoLIN R) injection 10 Units (10 Units Subcutaneous Given 7/10/23 1448)   ondansetron (ZOFRAN) injection 4 mg (4 mg Intravenous Given 7/10/23 1445)   iohexol (OMNIPAQUE) 350 MG/ML injection (SINGLE-DOSE) 100 mL (100 mL Intravenous Given 7/10/23 1527)   insulin regular (HumuLIN R,NovoLIN R) injection 10 Units (10 Units Intravenous Given 7/10/23 1908)       Diagnostic Studies  Results Reviewed     Procedure Component Value Units Date/Time    Fingerstick Glucose (POCT) [[id number]]  (Abnormal) Collected: 07/10/23 2039    Lab Status: Final result Updated: 07/10/23 2040     POC Glucose 202 mg/dl     Fingerstick Glucose (POCT) [[id number]]  (Abnormal) Collected: 07/10/23 1628    Lab Status: Final result Updated: 07/10/23 1629     POC Glucose 347 mg/dl     Basic metabolic panel [[id number]]  (Abnormal) Collected: 07/10/23 1447    Lab Status: Final result Specimen: Blood from Arm, Left Updated: 07/10/23 1517     Sodium 134 mmol/L      Potassium 4.2 mmol/L      Chloride 95 mmol/L      CO2 28 mmol/L      ANION GAP 11 mmol/L      BUN 27 mg/dL      Creatinine 1.16 mg/dL      Glucose 402 mg/dL      Calcium 10.3 mg/dL      eGFR 54 ml/min/1.73sq m     Narrative:      [facility] guidelines for Chronic Kidney Disease (CKD):   •  Stage 1 with normal or high GFR (GFR > 90 mL/min/1.73 square meters)  •  Stage 2 Mild CKD (GFR = 60-89 mL/min/1.73 square meters)  •  Stage 3A Moderate CKD (GFR = 45-59 mL/min/1.73 square meters)  •  Stage 3B Moderate CKD (GFR = 30-44 mL/min/1.73 square meters)  •  Stage 4 Severe CKD (GFR = 15-29 mL/min/1.73 square meters)  •  Stage 5 End Stage CKD (GFR <15 mL/min/1.73 square meters)  Note: GFR calculation is accurate only with a steady state creatinine    Fingerstick Glucose (POCT) [[id number]]  (Abnormal) Collected: 07/10/23 1511    Lab Status: Final result Updated: 07/10/23 1512     POC Glucose 475 mg/dl     CBC and differential [[id number]]  (Abnormal) Collected: 07/10/23 1447    Lab Status: Final result Specimen: Blood from Arm, Left Updated: 07/10/23 1505     WBC 10.20 Thousand/uL      RBC 5.35 Million/uL      Hemoglobin 13.9 g/dL      Hematocrit 43.2 %      MCV 81 fL      MCH 26.0 pg      MCHC 32.2 g/dL      RDW 14.2 %      MPV 11.4 fL      Platelets 786 Thousands/uL      nRBC 0 /100 WBCs      Neutrophils Relative 74 %      Immat GRANS % 0 %      Lymphocytes Relative 19 %      Monocytes Relative 6 %      Eosinophils Relative 1 %      Basophils Relative 0 %      Neutrophils Absolute 7.53 Thousands/µL      Immature Grans Absolute 0.03 Thousand/uL      Lymphocytes Absolute 1.91 Thousands/µL      Monocytes Absolute 0.56 Thousand/µL      Eosinophils Absolute 0.14 Thousand/µL      Basophils Absolute 0.03 Thousands/µL     Blood gas, venous [[id number]] Collected: 07/10/23 1447    Lab Status: Final result Specimen: Blood from Arm, Left Updated: 07/10/23 1505     pH, Ovidio 7.400     pCO2, Ovidio 46.0 mm Hg      pO2, Ovidio 38.2 mm Hg      HCO3, Ovidio 27.9 mmol/L      Base Excess, Ovidio 2.4 mmol/L      O2 Content, Ovidio 15.1 ml/dL      O2 HGB, VENOUS 71.8 %     Beta Hydroxybutyrate [[id number]]  (Abnormal) Collected: 07/10/23 1447    Lab Status: Final result Specimen: Blood from Arm, Left Updated: 07/10/23 1504     BETA-HYDROXYBUTYRATE 0.9 mmol/L     UA w Reflex to Microscopic w Reflex to Culture [[id number]]  (Abnormal) Collected: 07/10/23 1439    Lab Status: Final result Specimen: Urine, Clean Catch Updated: 07/10/23 1503     Color, UA Yellow     Clarity, UA Clear     Specific Gravity, UA 1.010     pH, UA 7.0     Leukocytes, UA Negative     Nitrite, UA Negative     Protein, UA Negative mg/dl      Glucose, UA 3+ mg/dl      Ketones, UA Trace mg/dl      Urobilinogen, UA 0.2 E.U./dl      Bilirubin, UA Negative     Occult Blood, UA Negative    Fingerstick Glucose (POCT) [[id number]]  (Abnormal) Collected: 07/10/23 1245    Lab Status: Final result Updated: 07/10/23 1251     POC Glucose 461 mg/dl                  XR foot 3+ views LEFT   ED Interpretation by Abram Dupree MD (07/10 2040)   X-ray reveals some swelling around the lateral malleolus however no fracture dislocation is appreciated      Final Result by Kalyani Robles MD (07/11 3222)      No acute osseous abnormality. Workstation performed: [id number]         CT abdomen pelvis with contrast   Final Result by Chidi Card MD (07/10 1628)      Gastric distention does raise the possibility of gastroparesis or outlet obstruction. There is mild thickening of the gastric antrum region. No evidence of small bowel obstruction. Mild fecal stasis. Mildly hyperdense cysts were noted on ultrasound on ultrasound in May 2022 and slightly larger currently. Mild bladder wall thickening. Correlation with any evidence of urinary tract infection is advised.       The study was marked in [facility] for immediate notification. Workstation performed: [id number]                    Procedures  Procedures         ED Course  ED Course as of 07/11/23 1611   Mon Jul 10, 2023   1645 Gastric distention does raise the possibility of gastroparesis or outlet obstruction. There is mild thickening of the gastric antrum region.     No evidence of small bowel obstruction. Mild fecal stasis.     Mildly hyperdense cysts were noted on ultrasound on ultrasound in May 2022 and slightly larger currently.     Mild bladder wall thickening. Correlation with any evidence of urinary tract infection is advised.                                  SBIRT 20yo+    Flowsheet Row Most Recent Value   Initial Alcohol Screen: US AUDIT-C     1. How often do you have a drink containing alcohol? 0 Filed at: 07/10/2023 1250   2. How many drinks containing alcohol do you have on a typical day you are drinking? 0 Filed at: 07/10/2023 1250   3a. Male UNDER 65: How often do you have five or more drinks on one occasion? 0 Filed at: 07/10/2023 1250   3b. FEMALE Any Age, or MALE 65+: How often do you have 4 or more drinks on one occassion? 0 Filed at: 07/10/2023 1250   Audit-C Score 0 Filed at: 07/10/2023 1250   LILIA: How many times in the past year have you. .. Used an illegal drug or used a prescription medication for non-medical reasons? Never Filed at: 07/10/2023 1250                    Medical Decision Making  80-year-old female type II diabetic presented to ED with the complaint of abdominal pain nausea vomiting and elevated blood sugar. States that she is having trouble controlling her blood sugar for past few months. Did talk to her endocrinologist recommended to come to the ED. Will obtain labs CBC BMP lipase CT abdomen pelvis with contrast.  Concerning for pancreatitis/DKA/HH H HN KS/appendicitis/obstruction/cholecystitis. History is taken from patient and  who is at bedside. Labs reviewed which showed elevated blood sugar. Patient is treated with IV fluid insulin and Zofran for nausea. Patient states her symptoms did improve somewhat. I will try oral challenge. CT pending. At this point patient care is transferred to Dr. Leon Romo pending reevaluation. Amount and/or Complexity of Data Reviewed  Labs: ordered. Radiology: ordered and independent interpretation performed. Risk  OTC drugs. Prescription drug management. Disposition  Final diagnoses:   Hyperglycemia   Intractable nausea and vomiting     Time reflects when diagnosis was documented in both MDM as applicable and the Disposition within this note     Time User Action Codes Description Comment    7/10/2023  8:15 PM Henrikaries Schumacher [R73.9] Hyperglycemia     7/10/2023  8:16 PM Ricardo Morillo Add [R11.2] Intractable nausea and vomiting     7/10/2023  8:19 PM Mariela BONILLA Add [K31.89] Gastric distention       ED Disposition     ED Disposition   Discharge    Condition   Stable    Date/Time   Mon Jul 10, 2023  8:42 PM    Comment   Case was discussed with AI  and the patient's admission status was agreed to be  to the service of Dr. Fredrick Delgado. Patient states she is now tolerating her diet and would like to go home. She has no abdominal pain nausea vomiting. Initially patient fel t nauseous with eating however at this time is tolerating diet. Will discharge home follow-up with her PCP.            Follow-up Information     Follow up With Specialties Details Why [address] [facility], [address] [place] [address], Nurse Practitioner, Emergency Medicine In 2 days  [facility]  [phone]            Discharge Medication List as of 7/10/2023  8:45 PM      CONTINUE these medications which have NOT CHANGED    Details   famotidine (PEPCID) 40 MG tablet Take 1 tablet (40 mg total) by mouth daily at bedtime Take in evening 2 hours prior to bed, Starting Fri 6/16/2023, Normal      albuterol (PROVENTIL HFA,VENTOLIN HFA) 90 mcg/act inhaler INHALE ONE PUFF BY MOUTH AND INTO THE LUNGS EVERY 6 HOURS AS NEEDED FOR WHEEZING, Normal      Alcohol Swabs (Pharmacist Choice Alcohol) PADS USE AS DIRECTED, Normal      Aspirin (ASPIR-81 PO) Take 81 mg by mouth, Starting Mon 2/20/2012, Historical Med      atorvastatin (LIPITOR) 80 mg tablet TAKE 1 TABLET (80 MG TOTAL) BY MOUTH DAILY AT BEDTIME, Starting Mon 1/16/2023, Normal      B-D UF III MINI PEN NEEDLES 31G X 5 MM MISC Pt uses 5 pen needles daily, Normal      cholecalciferol (VITAMIN D3) 400 units tablet TAKE ONE TABLET BY MOUTH DAILY, Normal      citalopram (CeleXA) 40 mg tablet Take 1 tablet (40 mg total) by mouth daily, Starting Thu 5/11/2023, Normal      Continuous Blood Gluc  (FreeStyle Refugio 14 Day [place]) TEMITOPE Use for continuous blood glucose monitoring, Print      Continuous Blood Gluc Sensor (FreeStyle Refugio 14 Day Sensor) MISC Use one sensor every 14 days for continuous blood sugar monitoring., Print      docusate sodium (COLACE) 100 mg capsule TAKE ONE CAPSULE BY MOUTH TWICE DAILY, Normal      dulaglutide (Trulicity) 3 EV/6.0KB injection Inject 0.5 mL (3 mg total) under the skin once a week, Starting Thu 3/16/2023, Normal      Empagliflozin (Jardiance) 25 MG TABS Take 1 tablet (25 mg total) by mouth every morning, Starting Wed 11/16/2022, Normal      ergocalciferol (VITAMIN D2) 50,000 units Take 1 capsule (50,000 Units total) by mouth once a week, Starting Wed 10/26/2022, Normal      fenofibrate (TRICOR) 48 mg tablet TAKE ONE TABLET BY MOUTH DAILY, Normal      Insulin Glargine Solostar (Lantus SoloStar) 100 UNIT/ML SOPN 52 units daily, Fill Later      insulin glulisine (Apidra SoloStar) 100 units/mL injection pen Inject 12 units with breakfast and lunch, 14 units with dinner, with scale ISF 25 @ 150 as needed, Fill Later      Iron Heme Polypeptide 12 MG TABS Take 1 tablet by mouth, Historical Med      levETIRAcetam (KEPPRA) 500 mg tablet TAKE ONE TABLET TWICE DAILY, Normal linaCLOtide (Linzess) 72 MCG CAPS Take 1 capsule by mouth daily before breakfast, Starting Thu 2/24/2022, Normal      lisinopril (ZESTRIL) 2.5 mg tablet Take 1 tablet (2.5 mg total) by mouth daily, Starting Mon 3/27/2023, Normal      meclizine (ANTIVERT) 25 mg tablet Take 1 tablet (25 mg total) by mouth 3 (three) times a day as needed for dizziness, Starting Mon 7/10/2023, Normal      metoprolol tartrate (LOPRESSOR) 25 mg tablet Take 1 tablet (25 mg total) by mouth 2 (two) times a day, Starting Wed 4/12/2023, Normal      multivitamin (THERAGRAN) TABS Take 1 tablet by mouth every morning, Historical Med      ondansetron (ZOFRAN) 4 mg tablet Take 1 tablet (4 mg total) by mouth every 8 (eight) hours as needed for nausea or vomiting, Starting Mon 6/19/2023, Normal      OneTouch Ultra test strip USE 4 TIMES A DAY, Normal      pantoprazole (PROTONIX) 40 mg tablet TAKE ONE TABLET BY MOUTH TWICE DAILY, Normal      Pharmacist Choice Lancets MISC USE AS DIRECTED, Normal      pregabalin (LYRICA) 100 mg capsule TAKE ONE CAPSULE BY MOUTH THREE TIMES A DAY, Normal      Restasis 0.05 % ophthalmic emulsion Starting Thu 3/2/2023, Historical Med      scopolamine (TRANSDERM-SCOP) 1 mg/3 days TD 72 hr patch Place 1 patch on the skin over 72 hours every third day, Starting Tue 2/7/2023, Normal      solifenacin (VESICARE) 5 mg tablet TAKE ONE TABLET BY MOUTH DAILY, Normal      Symbicort 80-4.5 MCG/ACT inhaler INHALE 2 PUFFS 2 (TWO) TIMES A DAY RINSE MOUTH AFTER USE., Normal             No discharge procedures on file.     PDMP Review       Value Time User    PDMP Reviewed  Yes 3/26/2021 10:43 AM Milly Rahman PA-C          ED Provider  Electronically Signed by           Jacklyn Hernandez MD  07/11/23 9738

## 2023-07-10 NOTE — TELEPHONE ENCOUNTER
Is she bringing her mother in for her 9am appointment? What does she think she needs? Zofran? Meclizine? What is her glucose level? Does she need to go to the hospital for IV fluids? Okay to refill.

## 2023-07-10 NOTE — TELEPHONE ENCOUNTER
Patient called looking for advise on severe hyperglycemia. Her Refugio 2 CGM reading "high" over the past few days, particularly today. Feels very ill overall, and progressively worsening with nausea, vomiting. Not able to eat anything today, just drank a small amount of soda and couldn't hold it down. Although she took her regularly scheduled DM medications and insulin yesterday, she did not take any insulin today in fear of hypoglycemia in the setting of inability to tolerate PO intake. She is having polyuria, dysuria, vulvar itching, questioning yeast infection vs. UTI. Also reports what she believes to be seasonal allergy sx; she is afebrile. She also reports feeling very dizzy and lightheaded, very dry mouth. Suspect acute hyperglycemia vs. Early DKA in the setting of possible UTI / URI, with inability to tolerate PO intake. Recommended go to ER for further evaluation, advised to have someone drive her for safety. Latest Refugio 2 CGM report very limited, > 400 over the past day. Data available was printed and will be scanned into her chart.

## 2023-07-10 NOTE — Clinical Note
Case was discussed with AI  and the patient's admission status was agreed to be  to the service of Dr. Nathalie Lamb.

## 2023-07-10 NOTE — Clinical Note
Nikia Gorman was seen and treated in our emergency department on 7/10/2023. Diagnosis: Hyperglycemia with intractable nausea vomiting    Roula Laird  may return to work on return date. She may return on this date: 07/13/2023         If you have any questions or concerns, please don't hesitate to call.       Shandra Buchanan MD    ______________________________           _______________          _______________  Hospital Representative                              Date                                Time

## 2023-07-10 NOTE — TELEPHONE ENCOUNTER
Spoke with pt. Zofran does not help. Willing to try meclizine. Checked BG while on the phone with me, it just read High, did not give her a number. Reports her mouth is dry.      Pt states she is drinking plenty of fluids

## 2023-07-10 NOTE — TELEPHONE ENCOUNTER
High means it is over 400-500, she should go to the hospital or call her Endocrinologist. This would explain her symptoms.  You can send Meclizine 25mg every 8 hours PRN dizziness/nausea to me for approval

## 2023-07-10 NOTE — ED PROVIDER NOTES
History  Chief Complaint   Patient presents with   • Hyperglycemia - Symptomatic     Patient arrives with complaints of high glucose levels for the past week. States has felt sick to the stomach for the past few days. Does nopt take short acting insulin, last night took 54 units of lantus and Apedra 14 units. HPI    Prior to Admission Medications   Prescriptions Last Dose Informant Patient Reported? Taking? Alcohol Swabs (Pharmacist Choice Alcohol) PADS  Self No No   Sig: USE AS DIRECTED   Aspirin (ASPIR-81 PO)  Self Yes No   Sig: Take 81 mg by mouth   B-D UF III MINI PEN NEEDLES 31G X 5 MM MISC  Self No No   Sig: Pt uses 5 pen needles daily   Continuous Blood Gluc  (FreeStyle Refugio 14 Day [place]) TEMITOPE  Self No No   Sig: Use for continuous blood glucose monitoring   Continuous Blood Gluc Sensor (FreeStyle Refugio 14 Day Sensor) MISC  Self No No   Sig: Use one sensor every 14 days for continuous blood sugar monitoring.    Empagliflozin (Jardiance) 25 MG TABS  Self No No   Sig: Take 1 tablet (25 mg total) by mouth every morning   Insulin Glargine Solostar (Lantus SoloStar) 100 UNIT/ML SOPN  Self No No   Si units daily   Iron Heme Polypeptide 12 MG TABS  Self Yes No   Sig: Take 1 tablet by mouth   OneTouch Ultra test strip  Self No No   Sig: USE 4 TIMES A DAY   Pharmacist Choice Lancets MISC  Self No No   Sig: USE AS DIRECTED   Restasis 0.05 % ophthalmic emulsion  Self Yes No   Symbicort 80-4.5 MCG/ACT inhaler  Self No No   Sig: INHALE 2 PUFFS 2 (TWO) TIMES A DAY RINSE MOUTH AFTER USE.   albuterol (PROVENTIL HFA,VENTOLIN HFA) 90 mcg/act inhaler  Self No No   Sig: INHALE ONE PUFF BY MOUTH AND INTO THE LUNGS EVERY 6 HOURS AS NEEDED FOR WHEEZING   atorvastatin (LIPITOR) 80 mg tablet  Self No No   Sig: TAKE 1 TABLET (80 MG TOTAL) BY MOUTH DAILY AT BEDTIME   cholecalciferol (VITAMIN D3) 400 units tablet  Self No No   Sig: TAKE ONE TABLET BY MOUTH DAILY   citalopram (CeleXA) 40 mg tablet  Self No No   Sig: Take 1 tablet (40 mg total) by mouth daily   docusate sodium (COLACE) 100 mg capsule  Self No No   Sig: TAKE ONE CAPSULE BY MOUTH TWICE DAILY   dulaglutide (Trulicity) 3 PZ/0.1HD injection  Self No No   Sig: Inject 0.5 mL (3 mg total) under the skin once a week   ergocalciferol (VITAMIN D2) 50,000 units  Self No No   Sig: Take 1 capsule (50,000 Units total) by mouth once a week   famotidine (PEPCID) 40 MG tablet   No No   Sig: Take 1 tablet (40 mg total) by mouth daily at bedtime Take in evening 2 hours prior to bed   fenofibrate (TRICOR) 48 mg tablet  Self No No   Sig: TAKE ONE TABLET BY MOUTH DAILY   insulin glulisine (Apidra SoloStar) 100 units/mL injection pen  Self No No   Sig: Inject 12 units with breakfast and lunch, 14 units with dinner, with scale ISF 25 @ 150 as needed   levETIRAcetam (KEPPRA) 500 mg tablet  Self No No   Sig: TAKE ONE TABLET TWICE DAILY   linaCLOtide (Linzess) 72 MCG CAPS  Self No No   Sig: Take 1 capsule by mouth daily before breakfast   lisinopril (ZESTRIL) 2.5 mg tablet  Self No No   Sig: Take 1 tablet (2.5 mg total) by mouth daily   meclizine (ANTIVERT) 25 mg tablet   No No   Sig: Take 1 tablet (25 mg total) by mouth 3 (three) times a day as needed for dizziness   metoprolol tartrate (LOPRESSOR) 25 mg tablet  Self No No   Sig: Take 1 tablet (25 mg total) by mouth 2 (two) times a day   multivitamin (THERAGRAN) TABS  Self Yes No   Sig: Take 1 tablet by mouth every morning   ondansetron (ZOFRAN) 4 mg tablet   No No   Sig: Take 1 tablet (4 mg total) by mouth every 8 (eight) hours as needed for nausea or vomiting   pantoprazole (PROTONIX) 40 mg tablet   No No   Sig: TAKE ONE TABLET BY MOUTH TWICE DAILY   pregabalin (LYRICA) 100 mg capsule   No No   Sig: TAKE ONE CAPSULE BY MOUTH THREE TIMES A DAY   scopolamine (TRANSDERM-SCOP) 1 mg/3 days TD 72 hr patch  Self No No   Sig: Place 1 patch on the skin over 72 hours every third day   solifenacin (VESICARE) 5 mg tablet  Self No No   Sig: TAKE ONE TABLET BY MOUTH DAILY      Facility-Administered Medications: None       Past Medical History:   Diagnosis Date   • Atypical chest pain    • Depression    • Diabetes mellitus (HCC)    • Gastroparesis    • GERD (gastroesophageal reflux disease)    • Hyperlipidemia    • Hypertension    • Sciatica    • Seizure disorder ([address] [facility])        Past Surgical History:   Procedure Laterality Date   • BREAST BIOPSY Left  benign   • CHOLECYSTECTOMY     • COLONOSCOPY     • HYSTERECTOMY     • MI LAPAROSCOPIC APPENDECTOMY N/A 3/24/2021    Procedure: APPENDECTOMY LAPAROSCOPIC;  Surgeon: Iman Slade MD;  Location: [facility] MAIN OR;  Service: General   • MI LAPS ABD PRTM&OMENTUM DX W/WO SPEC BR/WA SPX N/A 3/24/2021    Procedure: LAPAROSCOPY DIAGNOSTIC, EXTENSIVE DESI;  Surgeon: Iman Slade MD;  Location: [facility] MAIN OR;  Service: General   • UPPER GASTROINTESTINAL ENDOSCOPY         Family History   Problem Relation Age of Onset   • No Known Problems Mother    • No Known Problems Father    • No Known Problems Daughter    • No Known Problems Maternal Grandmother    • No Known Problems Paternal Grandmother    • No Known Problems Paternal Aunt    • No Known Problems Paternal Aunt    • No Known Problems Paternal Aunt      I have reviewed and agree with the history as documented.     E-Cigarette/Vaping   • E-Cigarette Use Never User      E-Cigarette/Vaping Substances   • Nicotine No    • THC No    • CBD No    • Flavoring No    • Other No    • Unknown No      Social History     Tobacco Use   • Smoking status: Former     Types: Cigarettes     Quit date:      Years since quittin.5   • Smokeless tobacco: Never   Vaping Use   • Vaping Use: Never used   Substance Use Topics   • Alcohol use: Never   • Drug use: Never       Review of Systems    Physical Exam  Physical Exam    Vital Signs  ED Triage Vitals [07/10/23 1248]   Temperature Pulse Respirations Blood Pressure SpO2   97.7 °F (36.5 °C) 81 18 165/75 95 %      Temp Source Heart Rate Source Patient Position - Orthostatic VS BP Location FiO2 (%)   Temporal Monitor Sitting Left arm --      Pain Score       8           Vitals:    07/10/23 1248 07/10/23 1500 07/10/23 1630 07/10/23 1800   BP: 165/75 127/65 144/66 127/70   Pulse: 81 80 75 78   Patient Position - Orthostatic VS: Sitting Sitting Lying Lying         Visual Acuity      ED Medications  Medications   sodium chloride 0.9 % bolus 1,000 mL (1,000 mL Intravenous New Bag 7/10/23 1451)   insulin regular (HumuLIN R,NovoLIN R) injection 10 Units (10 Units Subcutaneous Given 7/10/23 1448)   ondansetron (ZOFRAN) injection 4 mg (4 mg Intravenous Given 7/10/23 1445)   iohexol (OMNIPAQUE) 350 MG/ML injection (SINGLE-DOSE) 100 mL (100 mL Intravenous Given 7/10/23 1527)       Diagnostic Studies  Results Reviewed     Procedure Component Value Units Date/Time    Fingerstick Glucose (POCT) [[id number]]  (Abnormal) Collected: 07/10/23 1628    Lab Status: Final result Updated: 07/10/23 1629     POC Glucose 347 mg/dl     Basic metabolic panel [[id number]]  (Abnormal) Collected: 07/10/23 1447    Lab Status: Final result Specimen: Blood from Arm, Left Updated: 07/10/23 1517     Sodium 134 mmol/L      Potassium 4.2 mmol/L      Chloride 95 mmol/L      CO2 28 mmol/L      ANION GAP 11 mmol/L      BUN 27 mg/dL      Creatinine 1.16 mg/dL      Glucose 402 mg/dL      Calcium 10.3 mg/dL      eGFR 54 ml/min/1.73sq m     Narrative:      [place] guidelines for Chronic Kidney Disease (CKD):   •  Stage 1 with normal or high GFR (GFR > 90 mL/min/1.73 square meters)  •  Stage 2 Mild CKD (GFR = 60-89 mL/min/1.73 square meters)  •  Stage 3A Moderate CKD (GFR = 45-59 mL/min/1.73 square meters)  •  Stage 3B Moderate CKD (GFR = 30-44 mL/min/1.73 square meters)  •  Stage 4 Severe CKD (GFR = 15-29 mL/min/1.73 square meters)  •  Stage 5 End Stage CKD (GFR <15 mL/min/1.73 square meters)  Note: GFR calculation is accurate only with a steady state creatinine Fingerstick Glucose (POCT) [[id number]]  (Abnormal) Collected: 07/10/23 1511    Lab Status: Final result Updated: 07/10/23 1512     POC Glucose 475 mg/dl     CBC and differential [[id number]]  (Abnormal) Collected: 07/10/23 1447    Lab Status: Final result Specimen: Blood from Arm, Left Updated: 07/10/23 1505     WBC 10.20 Thousand/uL      RBC 5.35 Million/uL      Hemoglobin 13.9 g/dL      Hematocrit 43.2 %      MCV 81 fL      MCH 26.0 pg      MCHC 32.2 g/dL      RDW 14.2 %      MPV 11.4 fL      Platelets 208 Thousands/uL      nRBC 0 /100 WBCs      Neutrophils Relative 74 %      Immat GRANS % 0 %      Lymphocytes Relative 19 %      Monocytes Relative 6 %      Eosinophils Relative 1 %      Basophils Relative 0 %      Neutrophils Absolute 7.53 Thousands/µL      Immature Grans Absolute 0.03 Thousand/uL      Lymphocytes Absolute 1.91 Thousands/µL      Monocytes Absolute 0.56 Thousand/µL      Eosinophils Absolute 0.14 Thousand/µL      Basophils Absolute 0.03 Thousands/µL     Blood gas, venous [[id number]] Collected: 07/10/23 1447    Lab Status: Final result Specimen: Blood from Arm, Left Updated: 07/10/23 1505     pH, Ovidio 7.400     pCO2, Ovidio 46.0 mm Hg      pO2, Ovidio 38.2 mm Hg      HCO3, Ovidio 27.9 mmol/L      Base Excess, Ovidio 2.4 mmol/L      O2 Content, Ovidio 15.1 ml/dL      O2 HGB, VENOUS 71.8 %     Beta Hydroxybutyrate [[id number]]  (Abnormal) Collected: 07/10/23 1447    Lab Status: Final result Specimen: Blood from Arm, Left Updated: 07/10/23 1504     BETA-HYDROXYBUTYRATE 0.9 mmol/L     UA w Reflex to Microscopic w Reflex to Culture [[id number]]  (Abnormal) Collected: 07/10/23 1439    Lab Status: Final result Specimen: Urine, Clean Catch Updated: 07/10/23 1503     Color, UA Yellow     Clarity, UA Clear     Specific Gravity, UA 1.010     pH, UA 7.0     Leukocytes, UA Negative     Nitrite, UA Negative     Protein, UA Negative mg/dl      Glucose, UA 3+ mg/dl      Ketones, UA Trace mg/dl      Urobilinogen, UA 0.2 E.U./dl Bilirubin, UA Negative     Occult Blood, UA Negative    Fingerstick Glucose (POCT) [[id number]]  (Abnormal) Collected: 07/10/23 1245    Lab Status: Final result Updated: 07/10/23 1251     POC Glucose 461 mg/dl                  CT abdomen pelvis with contrast   Final Result by Karissa Wright MD (07/10 1628)      Gastric distention does raise the possibility of gastroparesis or outlet obstruction. There is mild thickening of the gastric antrum region. No evidence of small bowel obstruction. Mild fecal stasis. Mildly hyperdense cysts were noted on ultrasound on ultrasound in May 2022 and slightly larger currently. Mild bladder wall thickening. Correlation with any evidence of urinary tract infection is advised. The study was marked in [facility] for immediate notification. Workstation performed: [id number]         XR foot 3+ views LEFT    (Results Pending)              Procedures  Procedures         ED Course                               SBIRT 20yo+    Flowsheet Row Most Recent Value   Initial Alcohol Screen: US AUDIT-C     1. How often do you have a drink containing alcohol? 0 Filed at: 07/10/2023 1250   2. How many drinks containing alcohol do you have on a typical day you are drinking? 0 Filed at: 07/10/2023 1250   3a. Male UNDER 65: How often do you have five or more drinks on one occasion? 0 Filed at: 07/10/2023 1250   3b. FEMALE Any Age, or MALE 65+: How often do you have 4 or more drinks on one occassion? 0 Filed at: 07/10/2023 1250   Audit-C Score 0 Filed at: 07/10/2023 1250   LILIA: How many times in the past year have you. .. Used an illegal drug or used a prescription medication for non-medical reasons? Never Filed at: 07/10/2023 1250                    MDM    Disposition  Final diagnoses:   None     ED Disposition     None      Follow-up Information    None         Patient's Medications   Discharge Prescriptions    No medications on file       No discharge procedures on file.     PDMP Review       Value Time User    PDMP Reviewed  Yes 3/26/2021 10:43 AM Shanta Rahman PA-C          ED Provider  Electronically Signed by           Vu Whitley MD  07/10/23 2045

## 2023-07-11 ENCOUNTER — HOSPITAL ENCOUNTER (OUTPATIENT)
Dept: ULTRASOUND IMAGING | Facility: HOSPITAL | Age: 53
Discharge: HOME | End: 2023-07-11
Payer: COMMERCIAL

## 2023-07-11 DIAGNOSIS — R10.9 FLANK PAIN: ICD-10-CM

## 2023-07-11 PROCEDURE — 76770 US EXAM ABDO BACK WALL COMP: CPT

## 2023-07-11 NOTE — ASSESSMENT & PLAN NOTE
Lab Results   Component Value Date    EGFR 54 07/10/2023    EGFR 51 03/25/2023    EGFR 36 11/30/2022    CREATININE 1.16 07/10/2023    CREATININE 1.21 03/25/2023    CREATININE 1.60 (H) 11/30/2022   · Continue home meds with hold parameters  · Creatinine stable with chronic kidney disease stage III

## 2023-07-11 NOTE — ASSESSMENT & PLAN NOTE
Patient with nausea/vomiting. Abnormal CT findings  · NPO  · IV PPI  · GI consult  · CT: Gastric distention does raise the possibility of gastroparesis or outlet obstruction. There is mild thickening of the gastric antrum region.

## 2023-07-11 NOTE — ASSESSMENT & PLAN NOTE
Lab Results   Component Value Date    HGBA1C 12.5 (H) 03/25/2023       Recent Labs     07/10/23  1245 07/10/23  1511 07/10/23  1628   POCGLU 461* 475* 347*       Blood Sugar Average: Last 72 hrs:  (P) [id number]   · Poorly controlled diabetes w/ A1C of 12.5 in March 2023  · BS elevated - improving w/ insulin, will give additional 5 units now  · Patient will be NPO at midnight to cover with q 6 h acu-check

## 2023-07-12 ENCOUNTER — TRANSITIONAL CARE MANAGEMENT (OUTPATIENT)
Dept: FAMILY MEDICINE CLINIC | Facility: CLINIC | Age: 53
End: 2023-07-12

## 2023-07-13 ENCOUNTER — OFFICE VISIT (OUTPATIENT)
Dept: NEPHROLOGY | Facility: CLINIC | Age: 53
End: 2023-07-13
Payer: COMMERCIAL

## 2023-07-13 ENCOUNTER — OFFICE VISIT (OUTPATIENT)
Dept: GASTROENTEROLOGY | Facility: CLINIC | Age: 53
End: 2023-07-13
Payer: COMMERCIAL

## 2023-07-13 ENCOUNTER — OFFICE VISIT (OUTPATIENT)
Dept: FAMILY MEDICINE CLINIC | Facility: CLINIC | Age: 53
End: 2023-07-13
Payer: COMMERCIAL

## 2023-07-13 VITALS
HEIGHT: 62 IN | HEART RATE: 57 BPM | DIASTOLIC BLOOD PRESSURE: 68 MMHG | WEIGHT: 190 LBS | SYSTOLIC BLOOD PRESSURE: 110 MMHG | BODY MASS INDEX: 34.96 KG/M2

## 2023-07-13 VITALS
HEART RATE: 57 BPM | BODY MASS INDEX: 34.96 KG/M2 | OXYGEN SATURATION: 98 % | SYSTOLIC BLOOD PRESSURE: 110 MMHG | WEIGHT: 190 LBS | DIASTOLIC BLOOD PRESSURE: 68 MMHG | HEIGHT: 62 IN

## 2023-07-13 VITALS
HEIGHT: 62 IN | SYSTOLIC BLOOD PRESSURE: 100 MMHG | BODY MASS INDEX: 34.96 KG/M2 | DIASTOLIC BLOOD PRESSURE: 64 MMHG | RESPIRATION RATE: 18 BRPM | HEART RATE: 69 BPM | WEIGHT: 190 LBS | OXYGEN SATURATION: 98 %

## 2023-07-13 DIAGNOSIS — K31.89 GASTRIC DISTENTION: ICD-10-CM

## 2023-07-13 DIAGNOSIS — R11.0 NAUSEA: ICD-10-CM

## 2023-07-13 DIAGNOSIS — Z79.4 TYPE 2 DIABETES MELLITUS WITH HYPERGLYCEMIA, WITH LONG-TERM CURRENT USE OF INSULIN (HCC): Primary | Chronic | ICD-10-CM

## 2023-07-13 DIAGNOSIS — E11.59 HYPERTENSION ASSOCIATED WITH DIABETES (HCC): ICD-10-CM

## 2023-07-13 DIAGNOSIS — K59.01 SLOW TRANSIT CONSTIPATION: ICD-10-CM

## 2023-07-13 DIAGNOSIS — K31.84 GASTROPARESIS: ICD-10-CM

## 2023-07-13 DIAGNOSIS — N18.30 BENIGN HYPERTENSION WITH CKD (CHRONIC KIDNEY DISEASE) STAGE III (HCC): Primary | Chronic | ICD-10-CM

## 2023-07-13 DIAGNOSIS — R39.9 UTI SYMPTOMS: ICD-10-CM

## 2023-07-13 DIAGNOSIS — D50.8 IRON DEFICIENCY ANEMIA SECONDARY TO INADEQUATE DIETARY IRON INTAKE: ICD-10-CM

## 2023-07-13 DIAGNOSIS — E55.9 VITAMIN D DEFICIENCY: ICD-10-CM

## 2023-07-13 DIAGNOSIS — R10.84 GENERALIZED ABDOMINAL PAIN: Primary | ICD-10-CM

## 2023-07-13 DIAGNOSIS — N89.8 VAGINA ITCHING: ICD-10-CM

## 2023-07-13 DIAGNOSIS — I12.9 BENIGN HYPERTENSION WITH CKD (CHRONIC KIDNEY DISEASE) STAGE III (HCC): Primary | Chronic | ICD-10-CM

## 2023-07-13 DIAGNOSIS — R93.3 ABNORMAL CT SCAN, STOMACH: ICD-10-CM

## 2023-07-13 DIAGNOSIS — N32.81 OAB (OVERACTIVE BLADDER): ICD-10-CM

## 2023-07-13 DIAGNOSIS — E11.65 TYPE 2 DIABETES MELLITUS WITH HYPERGLYCEMIA, WITH LONG-TERM CURRENT USE OF INSULIN (HCC): Primary | Chronic | ICD-10-CM

## 2023-07-13 DIAGNOSIS — R73.9 HYPERGLYCEMIA: ICD-10-CM

## 2023-07-13 DIAGNOSIS — I15.2 HYPERTENSION ASSOCIATED WITH DIABETES (HCC): ICD-10-CM

## 2023-07-13 DIAGNOSIS — R01.1 HEART MURMUR: ICD-10-CM

## 2023-07-13 DIAGNOSIS — K21.9 GASTROESOPHAGEAL REFLUX DISEASE WITHOUT ESOPHAGITIS: ICD-10-CM

## 2023-07-13 DIAGNOSIS — B37.31 CANDIDAL VAGINITIS: ICD-10-CM

## 2023-07-13 DIAGNOSIS — Z86.010 HISTORY OF COLON POLYPS: ICD-10-CM

## 2023-07-13 LAB
BACTERIA UR QL AUTO: NORMAL /HPF
BILIRUB UR QL STRIP: NEGATIVE
CLARITY UR: CLEAR
COLOR UR: ABNORMAL
GLUCOSE UR STRIP-MCNC: ABNORMAL MG/DL
HGB UR QL STRIP.AUTO: NEGATIVE
KETONES UR STRIP-MCNC: NEGATIVE MG/DL
LEUKOCYTE ESTERASE UR QL STRIP: ABNORMAL
NITRITE UR QL STRIP: NEGATIVE
NON-SQ EPI CELLS URNS QL MICRO: NORMAL /HPF
PH UR STRIP.AUTO: 5.5 [PH]
PROT UR STRIP-MCNC: NEGATIVE MG/DL
RBC #/AREA URNS AUTO: NORMAL /HPF
SL AMB  POCT GLUCOSE, UA: ABNORMAL
SL AMB LEUKOCYTE ESTERASE,UA: NEGATIVE
SL AMB POCT BILIRUBIN,UA: NEGATIVE
SL AMB POCT BLOOD,UA: NEGATIVE
SL AMB POCT CLARITY,UA: CLEAR
SL AMB POCT COLOR,UA: YELLOW
SL AMB POCT KETONES,UA: NEGATIVE
SL AMB POCT NITRITE,UA: NEGATIVE
SL AMB POCT PH,UA: 5
SL AMB POCT SPECIFIC GRAVITY,UA: 1.01
SL AMB POCT URINE PROTEIN: NEGATIVE
SL AMB POCT UROBILINOGEN: ABNORMAL
SP GR UR STRIP.AUTO: 1.03 (ref 1–1.03)
UROBILINOGEN UR STRIP-ACNC: <2 MG/DL
WBC #/AREA URNS AUTO: NORMAL /HPF

## 2023-07-13 PROCEDURE — 99215 OFFICE O/P EST HI 40 MIN: CPT | Performed by: INTERNAL MEDICINE

## 2023-07-13 PROCEDURE — 87510 GARDNER VAG DNA DIR PROBE: CPT | Performed by: FAMILY MEDICINE

## 2023-07-13 PROCEDURE — 87660 TRICHOMONAS VAGIN DIR PROBE: CPT | Performed by: FAMILY MEDICINE

## 2023-07-13 PROCEDURE — 99214 OFFICE O/P EST MOD 30 MIN: CPT | Performed by: INTERNAL MEDICINE

## 2023-07-13 PROCEDURE — 81001 URINALYSIS AUTO W/SCOPE: CPT | Performed by: FAMILY MEDICINE

## 2023-07-13 PROCEDURE — 87480 CANDIDA DNA DIR PROBE: CPT | Performed by: FAMILY MEDICINE

## 2023-07-13 PROCEDURE — 87147 CULTURE TYPE IMMUNOLOGIC: CPT | Performed by: FAMILY MEDICINE

## 2023-07-13 PROCEDURE — 87086 URINE CULTURE/COLONY COUNT: CPT | Performed by: FAMILY MEDICINE

## 2023-07-13 PROCEDURE — 99495 TRANSJ CARE MGMT MOD F2F 14D: CPT | Performed by: FAMILY MEDICINE

## 2023-07-13 PROCEDURE — 81002 URINALYSIS NONAUTO W/O SCOPE: CPT | Performed by: FAMILY MEDICINE

## 2023-07-13 RX ORDER — OMEPRAZOLE 40 MG/1
40 CAPSULE, DELAYED RELEASE ORAL DAILY
Qty: 90 CAPSULE | Refills: 3 | Status: SHIPPED | OUTPATIENT
Start: 2023-07-13

## 2023-07-13 RX ORDER — FLUCONAZOLE 150 MG/1
150 TABLET ORAL
Qty: 2 TABLET | Refills: 0 | Status: SHIPPED | OUTPATIENT
Start: 2023-07-13 | End: 2023-07-20

## 2023-07-13 RX ORDER — NYSTATIN 100000 U/G
CREAM TOPICAL 2 TIMES DAILY
Qty: 30 G | Refills: 0 | Status: SHIPPED | OUTPATIENT
Start: 2023-07-13

## 2023-07-13 NOTE — ASSESSMENT & PLAN NOTE
Patient has been experiencing some increased constipation. She is having about 1 bowel meant a week. Typically she would have 1 bowel twice a week. I did suggest the addition of MiraLAX maybe one half dose to 1 full dose daily. Stop using fiber bars for now given probable exacerbation of gastroparesis. Start Benefiber which mixes clear and water. Take 2 teaspoonfuls mixed in 6 to 8 ounce of noncarbonated liquid daily. This should all help with her constipation.

## 2023-07-13 NOTE — ASSESSMENT & PLAN NOTE
Patient does have a diagnosis of gastroparesis. I had ordered a gastric emptying scan back in 2021 however this was not performed as she was feeling much better at that time. I suspect her symptoms are exacerbated by her poorly controlled diabetes at this time. Trulicity may also be contributing to her nausea. At this point in time I asked her to go on a low residue diet. If she is unable to eat she should try drinking only liquids and maybe try nutritional supplements such as Glucerna 1 can 3 times per day. When she is feeling better she can transition to a low residue diet. I did ask her to look up a low residue diet on the [facility] website and follow this. She should eat smaller more frequent meals. Schedule gastric emptying scan  Schedule EGD  I did ask her to contact her endocrinologist to see what they want her to do with her Trulicity dose. They may want to hold her next dose which is on Sunday or decrease the dose given that she having the symptoms.

## 2023-07-13 NOTE — ASSESSMENT & PLAN NOTE
CT scan did raise the possibility of mild thickening in the gastric antrum. Her acid suppression will be adjusted. She will be scheduled for an upper endoscopy. I explained to the patient the procedure of upper endoscopy as well as its potential risk which are approximately 1 in 1000 chance of bleeding, infection, and perforation.

## 2023-07-13 NOTE — ASSESSMENT & PLAN NOTE
Patient has worsening of her reflux symptoms despite pantoprazole 40 mg daily and famotidine 40 mg 2 hours before bed. I suspect her worsening reflux symptoms are more related to her gastroparesis and delayed gastric emptying leading to increased reflux. I would like to change her pantoprazole to omeprazole 40 mg twice a day for 4 weeks then 40 mg once a day. Best to take this 30 minutes to 1 hour before breakfast and 30 minutes to 1 hour before dinner. Continue famotidine 40 mg 2 hours before bed. Lifestyle modifications for gastroesophageal reflux disease were discussed and include limiting fried and fatty foods, mints, chocolates, carbonated and caffeinated beverages , and alcohol, etc.  Avoid lying down for 2-3 hours after meals. If you have nighttime symptoms consider raising the head of the bed up on 4-6 inch blocks. Pillows typically are not useful. If you are overweight, weight loss will be helpful.

## 2023-07-13 NOTE — ASSESSMENT & PLAN NOTE
Lab Results   Component Value Date    EGFR 54 07/10/2023    EGFR 51 03/25/2023    EGFR 36 11/30/2022    CREATININE 1.16 07/10/2023    CREATININE 1.21 03/25/2023    CREATININE 1.60 (H) 11/30/2022     Patient previous baseline creatinine is between 1.1-1.2 mg/dL, most recent blood work shows that she has returned to this baseline. Previously creatinine was greater than about 1.6 mg/dL. Continue to optimize care and avoid potential nephrotoxins.

## 2023-07-13 NOTE — PATIENT INSTRUCTIONS
Generalized abdominal pain  Ari Salazar is experiencing generalized abdominal pain. This abdominal pain is likely multifactorial.  CAT scan did reveal some gastric distention, also she has been having some constipation. CAT scan did not reveal any other significant inflammatory component that would explain her abdominal pain. If she has worsening abdominal pain she should return to the emergency room. I am suspicious that her poorly controlled diabetes is contributing to her symptoms. Please see comments under nausea/vomiting    GERD (gastroesophageal reflux disease)  Patient has worsening of her reflux symptoms despite pantoprazole 40 mg daily and famotidine 40 mg 2 hours before bed. I suspect her worsening reflux symptoms are more related to her gastroparesis and delayed gastric emptying leading to increased reflux. I would like to change her pantoprazole to omeprazole 40 mg twice a day for 4 weeks then 40 mg once a day. Best to take this 30 minutes to 1 hour before breakfast and 30 minutes to 1 hour before dinner. Continue famotidine 40 mg 2 hours before bed. Lifestyle modifications for gastroesophageal reflux disease were discussed and include limiting fried and fatty foods, mints, chocolates, carbonated and caffeinated beverages , and alcohol, etc.  Avoid lying down for 2-3 hours after meals. If you have nighttime symptoms consider raising the head of the bed up on 4-6 inch blocks. Pillows typically are not useful. If you are overweight, weight loss will be helpful. Gastroparesis  Patient does have a diagnosis of gastroparesis. I had ordered a gastric emptying scan back in 2021 however this was not performed as she was feeling much better at that time. I suspect her symptoms are exacerbated by her poorly controlled diabetes at this time. Trulicity may also be contributing to her nausea. At this point in time I asked her to go on a low residue diet.   If she is unable to eat she should try drinking only liquids and maybe try nutritional supplements such as Glucerna 1 can 3 times per day. When she is feeling better she can transition to a low residue diet. I did ask her to look up a low residue diet on the [facility] website and follow this. She should eat smaller more frequent meals. Schedule gastric emptying scan  Schedule EGD  I did ask her to contact her endocrinologist to see what they want her to do with her Trulicity dose. They may want to hold her next dose which is on Sunday or decrease the dose given that she having the symptoms. Nausea  Please see comments under gastroparesis. History of colon polyps  Goldie Adames does have a history of colon polyps. Last colonoscopy was December 12, 2019. She did have a 4 mm polyp in transverse colon that was a tubular adenoma and a 3 mm polyp in sigmoid colon but biopsy was unremarkable. There was a 3 mm polyp in rectum that was hyperplastic. Random biopsies were negative for any form of microscopic colitis. A 3 year repeat colonoscopy was recommended following that procedure. She was due for repeat colonoscopy in December 2022. She will need to schedule colonoscopy at some point however she will not be able to tolerate the preparation given her current symptoms. We will hold off on scheduling colonoscopy until the acute symptoms resolved. Also weight to her diabetes under better control. Gastric distention  CT scan did reveal some gastric distention poss related to her underlying gastroparesis. Doubt gastric outlet obstruction. Abnormal CT scan, stomach  CT scan did raise the possibility of mild thickening in the gastric antrum. Her acid suppression will be adjusted. She will be scheduled for an upper endoscopy. I explained to the patient the procedure of upper endoscopy as well as its potential risk which are approximately 1 in 1000 chance of bleeding, infection, and perforation.       Slow transit constipation  Patient has been experiencing some increased constipation. She is having about 1 bowel meant a week. Typically she would have 1 bowel twice a week. I did suggest the addition of MiraLAX maybe one half dose to 1 full dose daily. Stop using fiber bars for now given probable exacerbation of gastroparesis. Start Benefiber which mixes clear and water. Take 2 teaspoonfuls mixed in 6 to 8 ounce of noncarbonated liquid daily. This should all help with her constipation.

## 2023-07-13 NOTE — PROGRESS NOTES
[facility] [address] [place] [address]    Name: Shani Garcia  YOB: 1970      Assessment/Plan:    Benign hypertension with CKD (chronic kidney disease) stage III [facility])  Lab Results   Component Value Date    EGFR 54 07/10/2023    EGFR 51 03/25/2023    EGFR 36 11/30/2022    CREATININE 1.16 07/10/2023    CREATININE 1.21 03/25/2023    CREATININE 1.60 (H) 11/30/2022     Patient previous baseline creatinine is between 1.1-1.2 mg/dL, most recent blood work shows that she has returned to this baseline. Previously creatinine was greater than about 1.6 mg/dL. Continue to optimize care and avoid potential nephrotoxins. Hypertension associated with diabetes ([address])  Blood pressure unfortunately too low, patient is symptomatic with lightheadedness especially with changes in position. We will discontinue lisinopril, continue with metoprolol at this time. Lab Results   Component Value Date    HGBA1C 12.5 (H) 03/25/2023       Vitamin D deficiency  Continue with ergocalciferol 1 tablet weekly. Follow-up vitamin D stores in about 1 year. Iron deficiency anemia secondary to inadequate dietary iron intake  Continue with iron supplementation. Problem List Items Addressed This Visit        Endocrine    Hypertension associated with diabetes ([address])     Blood pressure unfortunately too low, patient is symptomatic with lightheadedness especially with changes in position. We will discontinue lisinopril, continue with metoprolol at this time.     Lab Results   Component Value Date    HGBA1C 12.5 (H) 03/25/2023               Cardiovascular and Mediastinum    Benign hypertension with CKD (chronic kidney disease) stage III (HCC) - Primary (Chronic)     Lab Results   Component Value Date    EGFR 54 07/10/2023    EGFR 51 03/25/2023    EGFR 36 11/30/2022    CREATININE 1.16 07/10/2023    CREATININE 1.21 03/25/2023    CREATININE 1.60 (H) 11/30/2022     Patient previous baseline creatinine is between 1.1-1.2 mg/dL, most recent blood work shows that she has returned to this baseline. Previously creatinine was greater than about 1.6 mg/dL. Continue to optimize care and avoid potential nephrotoxins. Other    Iron deficiency anemia secondary to inadequate dietary iron intake     Continue with iron supplementation. Vitamin D deficiency     Continue with ergocalciferol 1 tablet weekly. Follow-up vitamin D stores in about 1 year. Patient appears to be stable from renal standpoint. Continue to optimize care and avoid potential nephrotoxins. We will see her back for regular appointment in approximately 4 months for regular appointment. Subjective:      Patient ID: Judge Delgado is a 46 y.o. female. Patient presents for follow-up appoint. Reviewed the patient's labs in detail, creatinine currently excellent and better than typical baseline at 1.16 mg/dL, she had a mild hypercalcemia of 10.3 mg/dL, as well as hyperglycemia just over 400 mg/dL. Those labs were obtained during a visit to the emergency room a few days ago. Patient is feeling improved at this time and blood sugars are better controlled. She is currently taking all medications as prescribed with no specific side effects. She is avoiding all nonsteroid anti-inflammatory medications. The following portions of the patient's history were reviewed and updated as appropriate: allergies, current medications, past family history, past medical history, past social history, past surgical history and problem list.    Review of Systems   All other systems reviewed and are negative.         Social History     Socioeconomic History   • Marital status: /Civil Union     Spouse name: Not on file   • Number of children: Not on file   • Years of education: Not on file   • Highest education level: Not on file   Occupational History   • Not on file   Tobacco Use   • Smoking status: Former     Types: Cigarettes     Quit date: 200     Years since quittin.5   • Smokeless tobacco: Never   Vaping Use   • Vaping Use: Never used   Substance and Sexual Activity   • Alcohol use: Never   • Drug use: Never   • Sexual activity: Not Currently     Partners: Male   Other Topics Concern   • Not on file   Social History Narrative   • Not on file     Social Determinants of Health     Financial Resource Strain: Not on file   Food Insecurity: Not on file   Transportation Needs: Not on file   Physical Activity: Not on file   Stress: Not on file   Social Connections: Not on file   Intimate Partner Violence: Not on file   Housing Stability: Not on file     Past Medical History:   Diagnosis Date   • Atypical chest pain    • Depression    • Diabetes mellitus ([address])    • Gastroparesis    • GERD (gastroesophageal reflux disease)    • Hyperlipidemia    • Hypertension    • Sciatica    • Seizure disorder ([address])      Past Surgical History:   Procedure Laterality Date   • BREAST BIOPSY Left  benign   • CHOLECYSTECTOMY     • COLONOSCOPY     • HYSTERECTOMY     • AL LAPAROSCOPIC APPENDECTOMY N/A 3/24/2021    Procedure: APPENDECTOMY LAPAROSCOPIC;  Surgeon: Wing Zimmerman MD;  Location: [address] [place] [address] OR;  Service: General   • AL LAPS ABD PRTM&OMENTUM DX W/WO SPEC BR/WA SPX N/A 3/24/2021    Procedure: LAPAROSCOPY DIAGNOSTIC, EXTENSIVE DESI;  Surgeon: Wing Zimmerman MD;  Location: [address] [place] [address] OR;  Service: General   • UPPER GASTROINTESTINAL ENDOSCOPY         Current Outpatient Medications:   •  albuterol (PROVENTIL HFA,VENTOLIN HFA) 90 mcg/act inhaler, INHALE ONE PUFF BY MOUTH AND INTO THE LUNGS EVERY 6 HOURS AS NEEDED FOR WHEEZING, Disp: 36 g, Rfl: 3  •  Alcohol Swabs (Pharmacist Choice Alcohol) PADS, USE AS DIRECTED, Disp: 100 each, Rfl: 1  •  Aspirin (ASPIR-81 PO), Take 81 mg by mouth, Disp: , Rfl:   •  atorvastatin (LIPITOR) 80 mg tablet, TAKE 1 TABLET (80 MG TOTAL) BY MOUTH DAILY AT BEDTIME, Disp: 30 tablet, Rfl: 6  •  B-D UF III MINI PEN NEEDLES 31G X 5 MM MISC, Pt uses 5 pen needles daily, Disp: 500 each, Rfl: 0  •  cholecalciferol (VITAMIN D3) 400 units tablet, TAKE ONE TABLET BY MOUTH DAILY, Disp: 90 tablet, Rfl: 1  •  citalopram (CeleXA) 40 mg tablet, Take 1 tablet (40 mg total) by mouth daily, Disp: 90 tablet, Rfl: 3  •  Continuous Blood Gluc  (FreeStyle Refugio 14 Day Meredith) TEMITOPE, Use for continuous blood glucose monitoring, Disp: 1 each, Rfl: 0  •  Continuous Blood Gluc Sensor (FreeStyle Refugio 14 Day Sensor) MISC, Use one sensor every 14 days for continuous blood sugar monitoring., Disp: 6 each, Rfl: 3  •  docusate sodium (COLACE) 100 mg capsule, TAKE ONE CAPSULE BY MOUTH TWICE DAILY, Disp: 60 capsule, Rfl: 5  •  dulaglutide (Trulicity) 3 HM/0.9QN injection, Inject 0.5 mL (3 mg total) under the skin once a week, Disp: 2 mL, Rfl: 3  •  Empagliflozin (Jardiance) 25 MG TABS, Take 1 tablet (25 mg total) by mouth every morning, Disp: 90 tablet, Rfl: 1  •  ergocalciferol (VITAMIN D2) 50,000 units, Take 1 capsule (50,000 Units total) by mouth once a week, Disp: 8 capsule, Rfl: 1  •  famotidine (PEPCID) 40 MG tablet, Take 1 tablet (40 mg total) by mouth daily at bedtime Take in evening 2 hours prior to bed, Disp: 30 tablet, Rfl: 6  •  fenofibrate (TRICOR) 48 mg tablet, TAKE ONE TABLET BY MOUTH DAILY, Disp: 90 tablet, Rfl: 3  •  Insulin Glargine Solostar (Lantus SoloStar) 100 UNIT/ML SOPN, 52 units daily, Disp: 30 mL, Rfl: 1  •  insulin glulisine (Apidra SoloStar) 100 units/mL injection pen, Inject 12 units with breakfast and lunch, 14 units with dinner, with scale ISF 25 @ 150 as needed, Disp: 30 mL, Rfl: 1  •  Iron Heme Polypeptide 12 MG TABS, Take 1 tablet by mouth, Disp: , Rfl:   •  levETIRAcetam (KEPPRA) 500 mg tablet, TAKE ONE TABLET TWICE DAILY, Disp: 180 tablet, Rfl: 1  •  linaCLOtide (Linzess) 72 MCG CAPS, Take 1 capsule by mouth daily before breakfast, Disp: 30 capsule, Rfl: 0  •  meclizine (ANTIVERT) 25 mg tablet, Take 1 tablet (25 mg total) by mouth 3 (three) times a day as needed for dizziness, Disp: 30 tablet, Rfl: 0  •  metoprolol tartrate (LOPRESSOR) 25 mg tablet, Take 1 tablet (25 mg total) by mouth 2 (two) times a day, Disp: 180 tablet, Rfl: 1  •  multivitamin (THERAGRAN) TABS, Take 1 tablet by mouth every morning, Disp: , Rfl:   •  omeprazole (PriLOSEC) 40 MG capsule, Take 1 capsule (40 mg total) by mouth daily, Disp: 90 capsule, Rfl: 3  •  ondansetron (ZOFRAN) 4 mg tablet, Take 1 tablet (4 mg total) by mouth every 8 (eight) hours as needed for nausea or vomiting, Disp: 20 tablet, Rfl: 0  •  OneTouch Ultra test strip, USE 4 TIMES A DAY, Disp: 400 each, Rfl: 0  •  Pharmacist Choice Lancets MISC, USE AS DIRECTED, Disp: 100 each, Rfl: 1  •  pregabalin (LYRICA) 100 mg capsule, TAKE ONE CAPSULE BY MOUTH THREE TIMES A DAY, Disp: 270 capsule, Rfl: 0  •  Restasis 0.05 % ophthalmic emulsion, , Disp: , Rfl:   •  scopolamine (TRANSDERM-SCOP) 1 mg/3 days TD 72 hr patch, Place 1 patch on the skin over 72 hours every third day, Disp: 24 patch, Rfl: 1  •  solifenacin (VESICARE) 5 mg tablet, TAKE ONE TABLET BY MOUTH DAILY, Disp: 90 tablet, Rfl: 1    Lab Results   Component Value Date    SODIUM 134 (L) 07/10/2023    K 4.2 07/10/2023    CL 95 (L) 07/10/2023    CO2 28 07/10/2023    AGAP 11 07/10/2023    BUN 27 (H) 07/10/2023    CREATININE 1.16 07/10/2023    GLUC 402 (H) 07/10/2023    GLUF 341 (H) 03/25/2023    CALCIUM 10.3 (H) 07/10/2023    AST 16 03/25/2023    ALT 25 03/25/2023    ALKPHOS 103 03/25/2023    TP 7.0 03/25/2023    TBILI 0.33 03/25/2023    EGFR 54 07/10/2023     Lab Results   Component Value Date    WBC 10.20 (H) 07/10/2023    HGB 13.9 07/10/2023    HCT 43.2 07/10/2023    MCV 81 (L) 07/10/2023     07/10/2023     Lab Results   Component Value Date    CHOLESTEROL 179 03/25/2023    CHOLESTEROL 176 08/30/2022    CHOLESTEROL 176 12/01/2021     Lab Results   Component Value Date    HDL 46 (L) 03/25/2023    HDL 49 (L) 08/30/2022    HDL 48 (L) 12/01/2021     Lab Results   Component Value Date    LDLCALC 71 03/25/2023    LDLCALC 78 08/30/2022    LDLCALC 83 12/01/2021     Lab Results   Component Value Date    TRIG 311 (H) 03/25/2023    TRIG 243 (H) 08/30/2022    TRIG 224 (H) 12/01/2021     No results found for: "CHOLHDL"  Lab Results   Component Value Date    YAZ7UHIZFCGC 1.090 11/14/2022     Lab Results   Component Value Date    PTH 37.0 08/30/2022    CALCIUM 10.3 (H) 07/10/2023    PHOS 3.8 03/25/2021     No results found for: "SPEP", "UPEP"  No results found for: "Carmel Puffer", "[id number]"        Objective:      /68 (BP Location: Left arm, Patient Position: Sitting, Cuff Size: Large)   Pulse 57   Ht 5' 2" (1.575 m)   Wt 86.2 kg (190 lb)   SpO2 98%   BMI 34.75 kg/m²          Physical Exam  Vitals reviewed. Constitutional:       General: She is not in acute distress. Appearance: She is well-developed. HENT:      Head: Normocephalic and atraumatic. Eyes:      Conjunctiva/sclera: Conjunctivae normal.   Cardiovascular:      Rate and Rhythm: Normal rate and regular rhythm. Pulmonary:      Effort: Pulmonary effort is normal.      Breath sounds: Normal breath sounds. Abdominal:      Palpations: Abdomen is soft. Musculoskeletal:      Cervical back: Neck supple. Skin:     General: Skin is warm. Findings: No rash. Neurological:      Mental Status: She is alert and oriented to person, place, and time. Cranial Nerves: No cranial nerve deficit.    Psychiatric:         Behavior: Behavior normal.

## 2023-07-13 NOTE — ASSESSMENT & PLAN NOTE
Татьяна Meade does have a history of colon polyps. Last colonoscopy was December 12, 2019. She did have a 4 mm polyp in transverse colon that was a tubular adenoma and a 3 mm polyp in sigmoid colon but biopsy was unremarkable. There was a 3 mm polyp in rectum that was hyperplastic. Random biopsies were negative for any form of microscopic colitis. A 3 year repeat colonoscopy was recommended following that procedure. She was due for repeat colonoscopy in December 2022. She will need to schedule colonoscopy at some point however she will not be able to tolerate the preparation given her current symptoms. We will hold off on scheduling colonoscopy until the acute symptoms resolved. Also weight to her diabetes under better control.

## 2023-07-13 NOTE — LETTER
July 13, 2023     Tristen Otero, [address] [place] [id number]    Patient: Melissa Greene   YOB: 1970   Date of Visit: 7/13/2023       Dear Dr. Fernando Hendrickson: Thank you for referring Melissa Greene to me for evaluation. Below are my notes for this consultation. If you have questions, please do not hesitate to call me. I look forward to following your patient along with you. Sincerely,        Michael Franco,         CC: MARILYN Jolley DO  7/13/2023  9:37 AM  Incomplete  [facility]  Gastroenterology Outpatient Follow up  Patient Melissa Greene   Age 46 y.o. Gender female   MRN: [id number]  [facility] [id number]     ASSESSMENT AND PLAN:   Problem List Items Addressed This Visit          Digestive    Gastroparesis     Patient does have a diagnosis of gastroparesis. I had ordered a gastric emptying scan back in 2021 however this was not performed as she was feeling much better at that time. I suspect her symptoms are exacerbated by her poorly controlled diabetes at this time. Trulicity may also be contributing to her nausea. At this point in time I asked her to go on a low residue diet. If she is unable to eat she should try drinking only liquids and maybe try nutritional supplements such as Glucerna 1 can 3 times per day. When she is feeling better she can transition to a low residue diet. I did ask her to look up a low residue diet on the [facility] website and follow this. She should eat smaller more frequent meals. Schedule gastric emptying scan  Schedule EGD  I did ask her to contact her endocrinologist to see what they want her to do with her Trulicity dose. They may want to hold her next dose which is on Sunday or decrease the dose given that she having the symptoms.          Relevant Medications    omeprazole (PriLOSEC) 40 MG capsule    Other Relevant Orders    NM gastric emptying    GERD (gastroesophageal reflux disease)     Patient has worsening of her reflux symptoms despite pantoprazole 40 mg daily and famotidine 40 mg 2 hours before bed. I suspect her worsening reflux symptoms are more related to her gastroparesis and delayed gastric emptying leading to increased reflux. I would like to change her pantoprazole to omeprazole 40 mg twice a day for 4 weeks then 40 mg once a day. Best to take this 30 minutes to 1 hour before breakfast and 30 minutes to 1 hour before dinner. Continue famotidine 40 mg 2 hours before bed. Lifestyle modifications for gastroesophageal reflux disease were discussed and include limiting fried and fatty foods, mints, chocolates, carbonated and caffeinated beverages , and alcohol, etc.  Avoid lying down for 2-3 hours after meals. If you have nighttime symptoms consider raising the head of the bed up on 4-6 inch blocks. Pillows typically are not useful. If you are overweight, weight loss will be helpful. Relevant Medications    omeprazole (PriLOSEC) 40 MG capsule    Other Relevant Orders    EGD    Slow transit constipation     Patient has been experiencing some increased constipation. She is having about 1 bowel meant a week. Typically she would have 1 bowel twice a week. I did suggest the addition of MiraLAX maybe one half dose to 1 full dose daily. Stop using fiber bars for now given probable exacerbation of gastroparesis. Start Benefiber which mixes clear and water. Take 2 teaspoonfuls mixed in 6 to 8 ounce of noncarbonated liquid daily. This should all help with her constipation. Gastric distention     CT scan did reveal some gastric distention poss related to her underlying gastroparesis. Doubt gastric outlet obstruction. Relevant Medications    omeprazole (PriLOSEC) 40 MG capsule    Other Relevant Orders    EGD       Other    History of colon polyps     Hector Fountain does have a history of colon polyps.   Last colonoscopy was December 12, 2019. She did have a 4 mm polyp in transverse colon that was a tubular adenoma and a 3 mm polyp in sigmoid colon but biopsy was unremarkable. There was a 3 mm polyp in rectum that was hyperplastic. Random biopsies were negative for any form of microscopic colitis. A 3 year repeat colonoscopy was recommended following that procedure. She was due for repeat colonoscopy in December 2022. She will need to schedule colonoscopy at some point however she will not be able to tolerate the preparation given her current symptoms. We will hold off on scheduling colonoscopy until the acute symptoms resolved. Also weight to her diabetes under better control. Generalized abdominal pain - Primary     Seema Alcala is experiencing generalized abdominal pain. This abdominal pain is likely multifactorial.  CAT scan did reveal some gastric distention, also she has been having some constipation. CAT scan did not reveal any other significant inflammatory component that would explain her abdominal pain. If she has worsening abdominal pain she should return to the emergency room. I am suspicious that her poorly controlled diabetes is contributing to her symptoms. Please see comments under nausea/vomiting         Nausea     Please see comments under gastroparesis. Relevant Medications    omeprazole (PriLOSEC) 40 MG capsule    Other Relevant Orders    NM gastric emptying    Abnormal CT scan, stomach     CT scan did raise the possibility of mild thickening in the gastric antrum. Her acid suppression will be adjusted. She will be scheduled for an upper endoscopy. I explained to the patient the procedure of upper endoscopy as well as its potential risk which are approximately 1 in 1000 chance of bleeding, infection, and perforation.            Relevant Orders    EGD      _____________________________________________________________    HPI:   Seema Alcala is a delightful 51-year-old woman who I am seeing in the office for nausea, vomiting, abdominal pain and constipation. She had been doing well up to about 2 or 3 weeks ago when she had a lot of nausea and inability to eat. She also had worsening of her reflux symptoms. She is feeling heartburn all the time despite taking pantoprazole 40 mg daily and famotidine 40 mg in the evening. She denies any dysphagia. She does report early satiety. She reports that she has not been able to eat. She has been trying to increase the fiber in her diet and taking fiber bars of some sort. She is eating solids. She denies much a lot of vomiting. She has vomited a small amount but not frequently. The major symptom is that of nausea. She does have some generalized abdominal discomfort. She grades this pain an 8 out of 10. Its more generalized. Nothing seems to make this pain better or worse. Its not made worse by eating. A bowel movement would improve this pain. She has had about 1 bowel week. Typically she would have 1 bowel meant twice a week. If she does not have a bowel she feels sick to her stomach. She denies any rectal bleeding or black stools. Denies any NSAID use. She is lost about 4 pounds. She is not sure what changed a few weeks ago when the symptoms started. She was in the emergency room on July 10. She did have a CAT scan performed which is outlined below. Her blood sugars were markedly elevated with a glucose of 402. Her hemoglobin A1c was 12.5. Records reviewed for 20 minutes prior to office visit    7/10/2023 ER visit for hyperglycemia. Patient feeling sick to her stomach for the past few days. 7/10/2023 CT scan abdomen pelvis  Gastric distention does raise the possibility of gastroparesis or outlet obstruction. There is mildly thickening of the gastric antrum region. No evidence of small bowel obstruction.   Mild fecal stasis  Complex kidney lesions increased in size  Mild bladder wall thickening    7/10/2023 laboratory data  Sodium 134  Potassium 4.2  BUN 27  Creatinine 1.16  Glucose 402  Calcium 10.3  AST 16  ALT 25  Alk phos 103  Albumin 3.5  T. bili 0.33  WBC 10.2 Hgb 13.9 HCT 43.2 MCV 81 platelet count 024,987  Hemoglobin A1c 12.5 (3/25/2023)  09/16/2021 laboratory data   WBC 8.5 HGB 11.6 HCT 35.5 MCV 83 platelet count [id number] ( increased from 371,000)  Glucose 184   AST 20 previously 57 on 09/07   ALT 29 previously 84 on 09/07   Alkaline phosphatase 124 previously 154 on 09/07 09/16/2021  ER visit CT scan abdomen pelvis. Low-dose study. Previously visualized finding attributed to ureteritis  Have resolved. Moderate to large amount of stool throughout the colon. 09/07/2021 ER visit CT scan abdomen pelvis without IV contrast, low-dose renal stone study  (urine culture positive for greater than 100,000 colony-forming units E coli )  1. Fat stranding involving the left renal collecting system and left ureter without calcified ureteral or renal calculus. Given the concurrent bladder wall thickening and perivesicular fat stranding, findings are highly suspicious for cystitis with   ascending infection involving the left urinary tract. There is prominence of the left renal pelvis without dakota hydronephrosis. A recently passed ureteral calculus can produce similar findings, however, this is felt to be less likely. 2.  Status post partial right nephrectomy. Previously described subcentimeter hypodensities of within the bilateral kidneys are not well characterized on this noncontrast study. Consider nonemergent follow-up renal ultrasound.         Records reviewed for 20 min prior to office visit.     08/10/2021 chemistry panel  Sodium 134   Glucose 210   AST 20   ALT 36   Alk-phos 129   Albumin 3.2   Iron 49   Ferritin 98    TIBC low at 155  Iron saturation 32   TIBC 155   Vitamin-D 32.8  CBC-WBC 9.8 HGB 11.5 HCT 35.5 MCV 84 platelet count [id number]  Hemoglobin A1c 9 2 down from 10.7        6/09/2021 saw  General surgery in follow-up. 3/2021 laparoscopic extensive lysis of adhesions and incidental appendectomy     03/03/2021 CT scan [facility]   Heterogeneous steatosis of the liver        03/23/2021 CT scan abdomen pelvis   Prior colonoscopy   Moderate amount of stool in the rectosigmoid colon. Probable appendectomy  Degenerative changes SI joints and lower lumbar spine  Prior hysterectomy   Prior cholecystectomy  Evidence of partial right nephrectomy     01/11/2020  Hepatitis a antibody total negative   Hepatitis B core antibody IgM negative   Hepatitis B core antibody total negative   Hepatitis-B surface antigen negative  Hepatitis B surface antibody negative   Hepatitis a antibody IgG negative  Celiac antibodies negative  Mitochondrial antibody negative  Liver kidney microsomal antibody negative   ELIZABET negative  Smooth muscle antibody negative     12/12/2019 colonoscopy Dr. Chintan Kang  Normal terminal ileum   4 mm polyp transverse colon. Tubular adenoma  3 mm polyp sigmoid colon. Biopsy unremarkable  3 mm polyp rectum. Hyperplastic polyp  Otherwise normal colonoscopy. Random biopsies obtained to evaluate for microscopic colitis. Biopsies negative for microscopic or collagenous colitis. Repeat colonoscopy 3 years recommended     12/12/2019 EGD, no report available however pathology report available. Biopsies stomach mild reactive gastropathy. Negative for H pylori.     Allergies   Allergen Reactions   • Azithromycin GI Intolerance and Hives   • Benztropine    • Butorphanol Hallucinations   • Penicillins    • Zolpidem      Current Outpatient Medications   Medication Sig Dispense Refill   • albuterol (PROVENTIL HFA,VENTOLIN HFA) 90 mcg/act inhaler INHALE ONE PUFF BY MOUTH AND INTO THE LUNGS EVERY 6 HOURS AS NEEDED FOR WHEEZING 36 g 3   • Alcohol Swabs (Pharmacist Choice Alcohol) PADS USE AS DIRECTED 100 each 1   • Aspirin (ASPIR-81 PO) Take 81 mg by mouth     • atorvastatin (LIPITOR) 80 mg tablet TAKE 1 TABLET (80 MG TOTAL) BY MOUTH DAILY AT BEDTIME 30 tablet 6   • B-D UF III MINI PEN NEEDLES 31G X 5 MM MISC Pt uses 5 pen needles daily 500 each 0   • cholecalciferol (VITAMIN D3) 400 units tablet TAKE ONE TABLET BY MOUTH DAILY 90 tablet 1   • citalopram (CeleXA) 40 mg tablet Take 1 tablet (40 mg total) by mouth daily 90 tablet 3   • Continuous Blood Gluc  (FreeStyle Refugio 14 Day [place]) [facility] Use for continuous blood glucose monitoring 1 each 0   • Continuous Blood Gluc Sensor (FreeStyle Refugio 14 Day Sensor) MISC Use one sensor every 14 days for continuous blood sugar monitoring.  6 each 3   • docusate sodium (COLACE) 100 mg capsule TAKE ONE CAPSULE BY MOUTH TWICE DAILY 60 capsule 5   • dulaglutide (Trulicity) 3 CI/8.9CW injection Inject 0.5 mL (3 mg total) under the skin once a week 2 mL 3   • Empagliflozin (Jardiance) 25 MG TABS Take 1 tablet (25 mg total) by mouth every morning 90 tablet 1   • ergocalciferol (VITAMIN D2) 50,000 units Take 1 capsule (50,000 Units total) by mouth once a week 8 capsule 1   • famotidine (PEPCID) 40 MG tablet Take 1 tablet (40 mg total) by mouth daily at bedtime Take in evening 2 hours prior to bed 30 tablet 6   • fenofibrate (TRICOR) 48 mg tablet TAKE ONE TABLET BY MOUTH DAILY 90 tablet 3   • Insulin Glargine Solostar (Lantus SoloStar) 100 UNIT/ML SOPN 52 units daily 30 mL 1   • insulin glulisine (Apidra SoloStar) 100 units/mL injection pen Inject 12 units with breakfast and lunch, 14 units with dinner, with scale ISF 25 @ 150 as needed 30 mL 1   • Iron Heme Polypeptide 12 MG TABS Take 1 tablet by mouth     • levETIRAcetam (KEPPRA) 500 mg tablet TAKE ONE TABLET TWICE DAILY 180 tablet 1   • linaCLOtide (Linzess) 72 MCG CAPS Take 1 capsule by mouth daily before breakfast 30 capsule 0   • lisinopril (ZESTRIL) 2.5 mg tablet Take 1 tablet (2.5 mg total) by mouth daily 30 tablet 4   • meclizine (ANTIVERT) 25 mg tablet Take 1 tablet (25 mg total) by mouth 3 (three) times a day as needed for dizziness 30 tablet 0   • metoprolol tartrate (LOPRESSOR) 25 mg tablet Take 1 tablet (25 mg total) by mouth 2 (two) times a day 180 tablet 1   • multivitamin (THERAGRAN) TABS Take 1 tablet by mouth every morning     • omeprazole (PriLOSEC) 40 MG capsule Take 1 capsule (40 mg total) by mouth daily 90 capsule 3   • ondansetron (ZOFRAN) 4 mg tablet Take 1 tablet (4 mg total) by mouth every 8 (eight) hours as needed for nausea or vomiting 20 tablet 0   • OneTouch Ultra test strip USE 4 TIMES A  each 0   • Pharmacist Choice Lancets MISC USE AS DIRECTED 100 each 1   • pregabalin (LYRICA) 100 mg capsule TAKE ONE CAPSULE BY MOUTH THREE TIMES A  capsule 0   • Restasis 0.05 % ophthalmic emulsion      • scopolamine (TRANSDERM-SCOP) 1 mg/3 days TD 72 hr patch Place 1 patch on the skin over 72 hours every third day 24 patch 1   • solifenacin (VESICARE) 5 mg tablet TAKE ONE TABLET BY MOUTH DAILY 90 tablet 1   • Symbicort 80-4.5 MCG/ACT inhaler INHALE 2 PUFFS 2 (TWO) TIMES A DAY RINSE MOUTH AFTER USE. (Patient not taking: Reported on 7/13/2023) 10.2 g 0     No current facility-administered medications for this visit.      MEDICAL HISTORY:  Past Medical History:   Diagnosis Date   • Atypical chest pain    • Depression    • Diabetes mellitus ([address])    • Gastroparesis    • GERD (gastroesophageal reflux disease)    • Hyperlipidemia    • Hypertension    • Sciatica    • Seizure disorder ([address])      Past Surgical History:   Procedure Laterality Date   • BREAST BIOPSY Left  benign   • CHOLECYSTECTOMY     • COLONOSCOPY     • HYSTERECTOMY     • [place] LAPAROSCOPIC APPENDECTOMY N/A 3/24/2021    Procedure: APPENDECTOMY LAPAROSCOPIC;  Surgeon: Duane Osborne MD;  Location: [address] [place] [address] OR;  Service: General   • [place] LAPS ABD PRTM&OMENTUM DX W/WO SPEC BR/WA SPX N/A 3/24/2021    Procedure: LAPAROSCOPY DIAGNOSTIC, EXTENSIVE DESI;  Surgeon: Duane Osborne MD;  Location: [address] [place] [address] OR;  Service: General   • UPPER GASTROINTESTINAL ENDOSCOPY Social History     Substance and Sexual Activity   Alcohol Use Never     Social History     Substance and Sexual Activity   Drug Use Never     Social History     Tobacco Use   Smoking Status Former   • Types: Cigarettes   • Quit date:    • Years since quittin.5   Smokeless Tobacco Never     Family History   Problem Relation Age of [place]   • No Known Problems Mother    • No Known Problems Father    • No Known Problems Daughter    • No Known Problems Maternal Grandmother    • No Known Problems Paternal Grandmother    • No Known Problems Paternal Aunt    • No Known Problems Paternal Aunt    • No Known Problems Paternal Aunt          Objective   Blood pressure 100/64, pulse 69, resp. rate 18, height 5' 2" (1.575 m), weight 86.2 kg (190 lb), SpO2 98 %, not currently breastfeeding. Body mass index is 34.75 kg/m². PHYSICAL EXAM:   General Appearance: Alert, cooperative, no distress  HEENT: Normocephalic, atraumatic, anicteric. Neck: Supple, symmetrical, trachea midline  Lungs: Clear to auscultation bilaterally; no rales, rhonchi or wheezing; respirations unlabored   Heart: Regular rate and rhythm; no murmur, rub, or gallop. Abdomen: Soft, bowel sounds normal, non-tender, non-distended; no masses, there is no hepatosplenomegaly. No spider angiomas. There is no succussion splash. She is nontender at this time. Obese abdomen.    Genitalia: Deferred   Rectal: Deferred   Extremities: No cyanosis, clubbing or edema   Skin: No jaundice, rashes, or lesions   Lymph nodes: No palpable cervical lymphadenopathy   Lab Results:   Admission on 07/10/2023, Discharged on 07/10/2023   Component Date Value   • POC Glucose 07/10/2023 461 (H)    • WBC 07/10/2023 10.20 (H)    • RBC 07/10/2023 5.35 (H)    • Hemoglobin 07/10/2023 13.9    • Hematocrit 07/10/2023 43.2    • MCV 07/10/2023 81 (L)    • MCH 07/10/2023 26.0 (L)    • MCHC 07/10/2023 32.2    • RDW 07/10/2023 14.2    • MPV 07/10/2023 11.4    • Platelets  347 • nRBC 07/10/2023 0    • Neutrophils Relative 07/10/2023 74    • Immat GRANS % 07/10/2023 0    • Lymphocytes Relative 07/10/2023 19    • Monocytes Relative 07/10/2023 6    • Eosinophils Relative 07/10/2023 1    • Basophils Relative 07/10/2023 0    • Neutrophils Absolute 07/10/2023 7.53    • Immature Grans Absolute 07/10/2023 0.03    • Lymphocytes Absolute 07/10/2023 1.91    • Monocytes Absolute 07/10/2023 0.56    • Eosinophils Absolute 07/10/2023 0.14    • Basophils Absolute 07/10/2023 0.03    • Sodium 07/10/2023 134 (L)    • Potassium 07/10/2023 4.2    • Chloride 07/10/2023 95 (L)    • CO2 07/10/2023 28    • ANION GAP 07/10/2023 11    • BUN 07/10/2023 27 (H)    • Creatinine 07/10/2023 1.16    • Glucose 07/10/2023 402 (H)    • Calcium 07/10/2023 10.3 (H)    • eGFR 07/10/2023 54    • BETA-HYDROXYBUTYRATE 07/10/2023 0.9 (H)    • pH, Ovidio 07/10/2023 7.400    • pCO2, Ovidio 07/10/2023 46.0    • pO2, Ovidio 07/10/2023 38.2    • HCO3, Ovidio 07/10/2023 27.9    • Base Excess, Ovidio 07/10/2023 2.4    • O2 Content, Ovidio 07/10/2023 15.1    • O2 HGB, VENOUS 07/10/2023 71.8    • Color, UA 07/10/2023 Yellow    • Clarity, UA 07/10/2023 Clear    • Specific Gravity, UA 07/10/2023 1.010    • pH, UA 07/10/2023 7.0    • Leukocytes, UA 07/10/2023 Negative    • Nitrite, UA 07/10/2023 Negative    • Protein, UA 07/10/2023 Negative    • Glucose, UA 07/10/2023 3+ (A)    • Ketones, UA 07/10/2023 Trace (A)    • Urobilinogen, UA 07/10/2023 0.2    • Bilirubin, UA 07/10/2023 Negative    • Occult Blood, UA 07/10/2023 Negative    • POC Glucose 07/10/2023 475 (H)    • POC Glucose 07/10/2023 347 (H)    • POC Glucose 07/10/2023 202 (H)      Radiology Results:   XR foot 3+ views LEFT    Result Date: 7/11/2023  Narrative: LEFT FOOT INDICATION:   foot pain. COMPARISON:  None VIEWS:  XR FOOT 3+ VW LEFT FINDINGS: There is no acute fracture or dislocation. No significant degenerative changes. No lytic or blastic osseous lesion. Soft tissues are unremarkable. Impression: No acute osseous abnormality. Workstation performed: [id number]     CT abdomen pelvis with contrast    Result Date: 7/10/2023  Impression: Gastric distention does raise the possibility of gastroparesis or outlet obstruction. There is mild thickening of the gastric antrum region. No evidence of small bowel obstruction. Mild fecal stasis. Mildly hyperdense cysts were noted on ultrasound on ultrasound in May 2022 and slightly larger currently. Mild bladder wall thickening. Correlation with any evidence of urinary tract infection is advised. The study was marked in [facility] for immediate notification. Workstation performed: [id number]     [address], DO   07/13/23   Cc:    [address] , DO  7/12/2023  7:45 PM  Sign when Signing Visit  [facility]  Gastroenterology Outpatient Follow up  Patient Ariela Stauffer   Age 46 y.o. Gender female   MRN: [id number]  [facility] [id number]     ASSESSMENT AND PLAN:   Problem List Items Addressed This Visit    None     _____________________________________________________________    HPI: please see complete note in Epic      7/10/2023 ER visit for hyperglycemia. Patient feeling sick to her stomach for the past few days. 7/10/2023 CT scan abdomen pelvis  Gastric distention does raise the possibility of gastroparesis or outlet obstruction. There is mildly thickening of the gastric antrum region. No evidence of small bowel obstruction.   Mild fecal stasis  Complex kidney lesions increased in size  Mild bladder wall thickening    7/10/2023 laboratory data  Sodium 134  Potassium 4.2  BUN 27  Creatinine 1.16  Glucose 402  Calcium 10.3  AST 16  ALT 25  Alk phos 103  Albumin 3.5  T. bili 0.33  WBC 10.2 Hgb 13.9 HCT 43.2 MCV 81 platelet count 610,130  Hemoglobin A1c 12.5 (3/25/2023)  09/16/2021 laboratory data   WBC 8.5 HGB 11.6 HCT 35.5 MCV 83 platelet count [id number] ( increased from 371,000)  Glucose 184   AST 20 previously 57 on 09/07   ALT 29 previously 84 on 09/07   Alkaline phosphatase 124 previously 154 on 09/07 09/16/2021  ER visit CT scan abdomen pelvis. Low-dose study. Previously visualized finding attributed to ureteritis  Have resolved. Moderate to large amount of stool throughout the colon. 09/07/2021 ER visit CT scan abdomen pelvis without IV contrast, low-dose renal stone study  (urine culture positive for greater than 100,000 colony-forming units E coli )  1. Fat stranding involving the left renal collecting system and left ureter without calcified ureteral or renal calculus. Given the concurrent bladder wall thickening and perivesicular fat stranding, findings are highly suspicious for cystitis with   ascending infection involving the left urinary tract. There is prominence of the left renal pelvis without dakota hydronephrosis. A recently passed ureteral calculus can produce similar findings, however, this is felt to be less likely. 2.  Status post partial right nephrectomy. Previously described subcentimeter hypodensities of within the bilateral kidneys are not well characterized on this noncontrast study. Consider nonemergent follow-up renal ultrasound. Records reviewed for 20 min prior to office visit.     08/10/2021 chemistry panel  Sodium 134   Glucose 210   AST 20   ALT 36   Alk-phos 129   Albumin 3.2   Iron 49   Ferritin 98    TIBC low at 155  Iron saturation 32   TIBC 155   Vitamin-D 32.8  CBC-WBC 9.8 HGB 11.5 HCT 35.5 MCV 84 platelet count [id number]  Hemoglobin A1c 9 2 down from 10.7        6/09/2021 saw  General surgery in follow-up. 3/2021 laparoscopic extensive lysis of adhesions and incidental appendectomy     03/03/2021 CT scan [facility]   Heterogeneous steatosis of the liver        03/23/2021 CT scan abdomen pelvis   Prior colonoscopy   Moderate amount of stool in the rectosigmoid colon.   Probable appendectomy  Degenerative changes SI joints and lower lumbar spine  Prior hysterectomy Prior cholecystectomy  Evidence of partial right nephrectomy     01/11/2020  Hepatitis a antibody total negative   Hepatitis B core antibody IgM negative   Hepatitis B core antibody total negative   Hepatitis-B surface antigen negative  Hepatitis B surface antibody negative   Hepatitis a antibody IgG negative  Celiac antibodies negative  Mitochondrial antibody negative  Liver kidney microsomal antibody negative   ELIZABET negative  Smooth muscle antibody negative     12/12/2019 colonoscopy Dr. Jayce Wong  Normal terminal ileum   4 mm polyp transverse colon. Tubular adenoma  3 mm polyp sigmoid colon. Biopsy unremarkable  3 mm polyp rectum. Hyperplastic polyp  Otherwise normal colonoscopy. Random biopsies obtained to evaluate for microscopic colitis. Biopsies negative for microscopic or collagenous colitis. Repeat colonoscopy 3 years recommended     12/12/2019 EGD, no report available however pathology report available. Biopsies stomach mild reactive gastropathy. Negative for H pylori.     Allergies   Allergen Reactions   • Azithromycin GI Intolerance and Hives   • Benztropine    • Butorphanol Hallucinations   • Penicillins    • Zolpidem      Current Outpatient Medications   Medication Sig Dispense Refill   • famotidine (PEPCID) 40 MG tablet Take 1 tablet (40 mg total) by mouth daily at bedtime Take in evening 2 hours prior to bed 30 tablet 6   • albuterol (PROVENTIL HFA,VENTOLIN HFA) 90 mcg/act inhaler INHALE ONE PUFF BY MOUTH AND INTO THE LUNGS EVERY 6 HOURS AS NEEDED FOR WHEEZING 36 g 3   • Alcohol Swabs (Pharmacist Choice Alcohol) PADS USE AS DIRECTED 100 each 1   • Aspirin (ASPIR-81 PO) Take 81 mg by mouth     • atorvastatin (LIPITOR) 80 mg tablet TAKE 1 TABLET (80 MG TOTAL) BY MOUTH DAILY AT BEDTIME 30 tablet 6   • B-D UF III MINI PEN NEEDLES 31G X 5 MM MISC Pt uses 5 pen needles daily 500 each 0   • cholecalciferol (VITAMIN D3) 400 units tablet TAKE ONE TABLET BY MOUTH DAILY 90 tablet 1   • citalopram (CeleXA) 40 mg tablet Take 1 tablet (40 mg total) by mouth daily 90 tablet 3   • Continuous Blood Gluc  (FreeStyle Refugio 14 Day [place]) [facility] Use for continuous blood glucose monitoring 1 each 0   • Continuous Blood Gluc Sensor (FreeStyle Refugio 14 Day Sensor) MISC Use one sensor every 14 days for continuous blood sugar monitoring.  6 each 3   • docusate sodium (COLACE) 100 mg capsule TAKE ONE CAPSULE BY MOUTH TWICE DAILY 60 capsule 5   • dulaglutide (Trulicity) 3 LG/6.9CD injection Inject 0.5 mL (3 mg total) under the skin once a week 2 mL 3   • Empagliflozin (Jardiance) 25 MG TABS Take 1 tablet (25 mg total) by mouth every morning 90 tablet 1   • ergocalciferol (VITAMIN D2) 50,000 units Take 1 capsule (50,000 Units total) by mouth once a week 8 capsule 1   • fenofibrate (TRICOR) 48 mg tablet TAKE ONE TABLET BY MOUTH DAILY 90 tablet 3   • Insulin Glargine Solostar (Lantus SoloStar) 100 UNIT/ML SOPN 52 units daily 30 mL 1   • insulin glulisine (Apidra SoloStar) 100 units/mL injection pen Inject 12 units with breakfast and lunch, 14 units with dinner, with scale ISF 25 @ 150 as needed 30 mL 1   • Iron Heme Polypeptide 12 MG TABS Take 1 tablet by mouth     • levETIRAcetam (KEPPRA) 500 mg tablet TAKE ONE TABLET TWICE DAILY 180 tablet 1   • linaCLOtide (Linzess) 72 MCG CAPS Take 1 capsule by mouth daily before breakfast 30 capsule 0   • lisinopril (ZESTRIL) 2.5 mg tablet Take 1 tablet (2.5 mg total) by mouth daily 30 tablet 4   • meclizine (ANTIVERT) 25 mg tablet Take 1 tablet (25 mg total) by mouth 3 (three) times a day as needed for dizziness 30 tablet 0   • metoprolol tartrate (LOPRESSOR) 25 mg tablet Take 1 tablet (25 mg total) by mouth 2 (two) times a day 180 tablet 1   • multivitamin (THERAGRAN) TABS Take 1 tablet by mouth every morning     • ondansetron (ZOFRAN) 4 mg tablet Take 1 tablet (4 mg total) by mouth every 8 (eight) hours as needed for nausea or vomiting 20 tablet 0   • OneTouch Ultra test strip USE 4 TIMES A  each 0   • pantoprazole (PROTONIX) 40 mg tablet TAKE ONE TABLET BY MOUTH TWICE DAILY 60 tablet 0   • Pharmacist Choice Lancets MISC USE AS DIRECTED 100 each 1   • pregabalin (LYRICA) 100 mg capsule TAKE ONE CAPSULE BY MOUTH THREE TIMES A  capsule 0   • Restasis 0.05 % ophthalmic emulsion      • scopolamine (TRANSDERM-SCOP) 1 mg/3 days TD 72 hr patch Place 1 patch on the skin over 72 hours every third day 24 patch 1   • solifenacin (VESICARE) 5 mg tablet TAKE ONE TABLET BY MOUTH DAILY 90 tablet 1   • Symbicort 80-4.5 MCG/ACT inhaler INHALE 2 PUFFS 2 (TWO) TIMES A DAY RINSE MOUTH AFTER USE. 10.2 g 0     No current facility-administered medications for this visit.      MEDICAL HISTORY:  Past Medical History:   Diagnosis Date   • Atypical chest pain    • Depression    • Diabetes mellitus ([facility])    • Gastroparesis    • GERD (gastroesophageal reflux disease)    • Hyperlipidemia    • Hypertension    • Sciatica    • Seizure disorder ([facility])      Past Surgical History:   Procedure Laterality Date   • BREAST BIOPSY Left  benign   • CHOLECYSTECTOMY     • COLONOSCOPY     • HYSTERECTOMY     • GA LAPAROSCOPIC APPENDECTOMY N/A 3/24/2021    Procedure: APPENDECTOMY LAPAROSCOPIC;  Surgeon: Anayeli Garcia MD;  Location: [facility] MAIN OR;  Service: General   • GA LAPS ABD PRTM&OMENTUM DX W/WO SPEC BR/WA SPX N/A 3/24/2021    Procedure: LAPAROSCOPY DIAGNOSTIC, EXTENSIVE DESI;  Surgeon: Anayeli Garcia MD;  Location: [facility] MAIN OR;  Service: General   • UPPER GASTROINTESTINAL ENDOSCOPY       Social History     Substance and Sexual Activity   Alcohol Use Never     Social History     Substance and Sexual Activity   Drug Use Never     Social History     Tobacco Use   Smoking Status Former   • Types: Cigarettes   • Quit date:    • Years since quittin.5   Smokeless Tobacco Never     Family History   Problem Relation Age of Onset   • No Known Problems Mother    • No Known Problems Father    • No Known Problems Daughter    • No Known Problems Maternal Grandmother    • No Known Problems Paternal Grandmother    • No Known Problems Paternal Aunt    • No Known Problems Paternal Aunt    • No Known Problems Paternal Aunt        REVIEW OF SYSTEMS:  CONSTITUTIONAL: Denies any fever, chills, rigors, and weight loss. HEENT: No earache or tinnitus. Denies hearing loss or visual disturbances. CARDIOVASCULAR: No chest pain or palpitations. RESPIRATORY: Denies any cough, hemoptysis, shortness of breath or dyspnea on exertion. GASTROINTESTINAL: As noted in the History of Present Illness. GENITOURINARY: No problems with urination. Denies any hematuria or dysuria. NEUROLOGIC: No dizziness or vertigo, denies headaches. MUSCULOSKELETAL: Denies any muscle or joint pain. SKIN: Denies skin rashes or itching. ENDOCRINE: Denies excessive thirst. Denies intolerance to heat or cold. PSYCHOSOCIAL: Denies depression or anxiety. Denies any recent memory loss. Objective   not currently breastfeeding. There is no height or weight on file to calculate BMI. PHYSICAL EXAM:   General Appearance: Alert, cooperative, no distress  HEENT: Normocephalic, atraumatic, anicteric. Neck: Supple, symmetrical, trachea midline  Lungs: Clear to auscultation bilaterally; no rales, rhonchi or wheezing; respirations unlabored   Heart: Regular rate and rhythm; no murmur, rub, or gallop. Abdomen: Soft, bowel sounds normal, non-tender, non-distended; no masses, there is no hepatosplenomegaly.  No spider angiomas  Genitalia: Deferred   Rectal: Deferred   Extremities: No cyanosis, clubbing or edema   Skin: No jaundice, rashes, or lesions   Lymph nodes: No palpable cervical lymphadenopathy   Lab Results:   Admission on 07/10/2023, Discharged on 07/10/2023   Component Date Value   • POC Glucose 07/10/2023 461 (H)    • WBC 07/10/2023 10.20 (H)    • RBC 07/10/2023 5.35 (H)    • Hemoglobin 07/10/2023 13.9    • Hematocrit 07/10/2023 43.2    • MCV 07/10/2023 81 (L)    • MCH 07/10/2023 26.0 (L)    • MCHC 07/10/2023 32.2    • RDW 07/10/2023 14.2    • MPV 07/10/2023 11.4    • Platelets 16/27/9000 347    • nRBC 07/10/2023 0    • Neutrophils Relative 07/10/2023 74    • Immat GRANS % 07/10/2023 0    • Lymphocytes Relative 07/10/2023 19    • Monocytes Relative 07/10/2023 6    • Eosinophils Relative 07/10/2023 1    • Basophils Relative 07/10/2023 0    • Neutrophils Absolute 07/10/2023 7.53    • Immature Grans Absolute 07/10/2023 0.03    • Lymphocytes Absolute 07/10/2023 1.91    • Monocytes Absolute 07/10/2023 0.56    • Eosinophils Absolute 07/10/2023 0.14    • Basophils Absolute 07/10/2023 0.03    • Sodium 07/10/2023 134 (L)    • Potassium 07/10/2023 4.2    • Chloride 07/10/2023 95 (L)    • CO2 07/10/2023 28    • ANION GAP 07/10/2023 11    • BUN 07/10/2023 27 (H)    • Creatinine 07/10/2023 1.16    • Glucose 07/10/2023 402 (H)    • Calcium 07/10/2023 10.3 (H)    • eGFR 07/10/2023 54    • BETA-HYDROXYBUTYRATE 07/10/2023 0.9 (H)    • pH, Ovidio 07/10/2023 7.400    • pCO2, Ovidio 07/10/2023 46.0    • pO2, Ovidio 07/10/2023 38.2    • HCO3, Ovidio 07/10/2023 27.9    • Base Excess, Ovidio 07/10/2023 2.4    • O2 Content, Ovidio 07/10/2023 15.1    • O2 HGB, VENOUS 07/10/2023 71.8    • Color, UA 07/10/2023 Yellow    • Clarity, UA 07/10/2023 Clear    • Specific Gravity, UA 07/10/2023 1.010    • pH, UA 07/10/2023 7.0    • Leukocytes, UA 07/10/2023 Negative    • Nitrite, UA 07/10/2023 Negative    • Protein, UA 07/10/2023 Negative    • Glucose, UA 07/10/2023 3+ (A)    • Ketones, UA 07/10/2023 Trace (A)    • Urobilinogen, UA 07/10/2023 0.2    • Bilirubin, UA 07/10/2023 Negative    • Occult Blood, UA 07/10/2023 Negative    • POC Glucose 07/10/2023 475 (H)    • POC Glucose 07/10/2023 347 (H)    • POC Glucose 07/10/2023 202 (H)      Radiology Results:   XR foot 3+ views LEFT    Result Date: 7/11/2023  Narrative: LEFT FOOT INDICATION:   foot pain.  COMPARISON:  None VIEWS:  XR FOOT 3+ VW LEFT FINDINGS: There is no acute fracture or dislocation. No significant degenerative changes. No lytic or blastic osseous lesion. Soft tissues are unremarkable. Impression: No acute osseous abnormality. Workstation performed: [id number]     CT abdomen pelvis with contrast    Result Date: 7/10/2023  Narrative: CT ABDOMEN AND PELVIS WITH IV CONTRAST INDICATION:   Abdominal pain, acute, nonlocalized abdominal pain. High glucose levels; feels sick to the stomach COMPARISON: 5/19/2022 TECHNIQUE:  CT examination of the abdomen and pelvis was performed. Multiplanar 2D reformatted images were created from the source data. This examination, like all CT scans performed in the [facility], was performed utilizing techniques to minimize radiation dose exposure, including the use of iterative reconstruction and automated exposure control. Radiation dose length product (DLP) for this visit:  872.31 mGy-cm IV Contrast:  100 mL of iohexol (OMNIPAQUE) Enteric Contrast:  Enteric contrast was not administered. FINDINGS: ABDOMEN LOWER CHEST: Dependent changes are noted at the bases. Mart Nieves LIVER/BILIARY TREE:  Unremarkable. GALLBLADDER: Gallbladder is surgically absent. SPLEEN:  Unremarkable. PANCREAS:  Unremarkable. ADRENAL GLANDS:  Unremarkable. KIDNEYS/URETERS: No hydronephrosis or urinary tract calculus. One or more sharply circumscribed subcentimeter renal hypodensities are present, too small to accurately characterize, and statistically most likely benign findings. According to recent literature (Radiology 2019) no further workup of these findings is recommended. Some renal scarring appears to be present at the lower pole of the right kidney. Complex lesion with mild increased density seen at the upper pole of the right kidney and measures approximately 1.6 cm in size. Hyperdense lesion was noted in this area in 2022 but now somewhat larger.  This was found to be a simple cyst on ultrasound of  5/26/2022 measuring 1.5 cm at that time. A mildly hyperdense lesion is seen in the right upper pole parapelvic region measuring 2.5 cm not as well seen on the prior CT although a simple cyst measuring up to 1.6 cm was noted on prior ultrasound also. 1.1 cm simple cyst noted in the left kidney on prior ultrasound also appears to be evident on the current study measuring 1 cm also with intermediate density. STOMACH AND BOWEL: Gastric dilatation is noted with fluid and food debris. There appears to be some thickening of the gastric antrum region. The duodenum demonstrates some fluid but is not dilated. Proximal small bowel is also not dilated. The colon is unremarkable. . APPENDIX: There are expected postoperative changes of appendectomy. ABDOMINOPELVIC CAVITY:  No ascites. No pneumoperitoneum. No lymphadenopathy. VESSELS:  Unremarkable for patient's age. PELVIS REPRODUCTIVE ORGANS: Surgical changes of prior hysterectomy. URINARY BLADDER: Mild diffuse bladder wall thickening is identified. ABDOMINAL WALL/INGUINAL REGIONS: Small periumbilical hernia of fat is identified. OSSEOUS STRUCTURES:  No acute fracture or destructive osseous lesion. Impression: Gastric distention does raise the possibility of gastroparesis or outlet obstruction. There is mild thickening of the gastric antrum region. No evidence of small bowel obstruction. Mild fecal stasis. Mildly hyperdense cysts were noted on ultrasound on ultrasound in May 2022 and slightly larger currently. Mild bladder wall thickening. Correlation with any evidence of urinary tract infection is advised. The study was marked in [facility] for immediate notification.  Workstation performed: [address] [place] [address], DO   07/12/23   Cc:

## 2023-07-13 NOTE — ASSESSMENT & PLAN NOTE
CT scan did reveal some gastric distention poss related to her underlying gastroparesis. Doubt gastric outlet obstruction.

## 2023-07-13 NOTE — ASSESSMENT & PLAN NOTE
Blood pressure unfortunately too low, patient is symptomatic with lightheadedness especially with changes in position. We will discontinue lisinopril, continue with metoprolol at this time.     Lab Results   Component Value Date    HGBA1C 12.5 (H) 03/25/2023

## 2023-07-13 NOTE — PROGRESS NOTES
Assessment/Plan:       Problem List Items Addressed This Visit        Digestive    Gastroparesis (Chronic)       Endocrine    Type 2 diabetes mellitus with hyperglycemia, with long-term current use of insulin (HCC) - Primary (Chronic)   Other Visit Diagnoses     Hyperglycemia        Heart murmur        Relevant Orders    Echo complete w/ contrast if indicated    OAB (overactive bladder)        Relevant Orders    POCT urine dip (Completed)    UTI symptoms        Relevant Orders    Urine culture (Completed)    UA w Reflex to Microscopic w Reflex to Culture -Lab Collect (Completed)    POCT urine dip (Completed)    Urine Microscopic (Completed)    Vagina itching        Relevant Medications    fluconazole (DIFLUCAN) 150 mg tablet    Other Relevant Orders    Chlamydia/GC amplified DNA by PCR    VAGINOSIS DNA PROBE (AFFIRM) (Completed)    POCT urine dip (Completed)    Candidal vaginitis        Relevant Medications    fluconazole (DIFLUCAN) 150 mg tablet    nystatin (MYCOSTATIN) cream    Other Relevant Orders    POCT urine dip (Completed)            Recommended to call and discuss blood sugars with endocrinology, but recommended to split Lantus dosing 26 units BID, increased by 2 units every 2 days until fasting blood sugar between . Stop Vesicare for now, will reassess after UTI is resolved. ECHO to assess heart murmur. Very mild cervical motion tenderness on exam, but exam consistent with candidal appearing discharge and likely cervical mucosal irritation from that. Urine testing and Affirm testing collected. Will also collect urine gonorrheal/chlamydia. Empirically treat candidal infection, nystatin provided for external rash. Urinary frequency likely secondary to hyperglycemia/glucosuria. Subjective:      Patient ID: Chi eWlls is a 46 y.o. female. HPI     Seen in ED 7/10 for hyperglycemia. History of diabetes, also was having abdominal pain and nausea. Glucose initially 461, repeat 475.  Urine trace ketones. Kidney function stable, VBG normal. CT abdomen-  "Gastric distention does raise the possibility of gastroparesis or outlet obstruction. There is mild thickening of the gastric antrum region. No evidence of small bowel obstruction. Mild fecal stasis. Mildly hyperdense cysts were noted on ultrasound on ultrasound in May 2022 and slightly larger currently. Mild bladder wall thickening. Correlation with any evidence of urinary tract infection is advised."    TCM Call  Show Details  Date and time call was made 7/12/2023  9:14 AM   Patient was hospitialized at [address] [facility]   Date of Admission 07/10/23   Date of discharge 07/10/23   Diagnosis hyperglycemia, abdomial pain, gastroporeisis   Disposition Home   Current Symptoms --  Still having alot of stomach pain and nausea, having gastropresis    Value Time User   Date and time call was made 7/12/2023  9:14 AM 7/12/2023 9:15 AM Kristal Rodriguezmarek   Patient was hospitialized at [address] [facility] 7/12/2023 9:15 AM Kristal Lena   Date of Admission 07/10/23 7/12/2023 9:15 AM Kristal Paredes   Date of discharge 07/10/23 7/12/2023 9:15 AM Kristal Paredes   Diagnosis hyperglycemia, abdomial pain, gastroporeisis 7/12/2023 11:14 AM Kristal Paredes   Disposition Home 7/12/2023 9:15 AM Kristal Paredes   Current Symptoms --  Still having alot of stomach pain and nausea, having gastropresis 7/12/2023 11:14 AM Kristal Paredes     TCM Call  Show Details  Post hospital issues None   Scheduled for follow up?  Yes   Did you obtain your prescribed medications No   Why were you unable to obtain your medications no change of medication   Do you need help managing your prescriptions or medications No   Is transportation to your appointment needed No   I have advised the patient to call PCP with any new or worsening symptoms Kristal RASMUSSEN   Living Arrangements Spouse or Significiant other   Support System Spouse   The type of support provided Emotional; Physical; Other (comment)   Do you have social support Yes, as much as I need     Urinating more often but smaller amounts, vaginal itching as well. Occurring for 2 weeks. Tried monistat. Very itchy, discharge, can't sleep due to irritation. Thick white discharge. Sometimes lower abdominal pain/cramping. With the Vesicare doesn't feel urination until bladder is very full and then pain and then goes. She met with GI and nephrology early today. Diabetes- She reports nephrology said to stop Trulicity, Lantus 52 units at 9 AM, Apidra 12-12-14 with sliding scale per endocrinology, average glucose 333, lowest 250. The following portions of the patient's history were reviewed and updated as appropriate: allergies, current medications, past family history, past medical history, past social history, past surgical history, and problem list.    Review of Systems   All other systems reviewed and are negative. Objective:      /68   Pulse 57   Ht 5' 2" (1.575 m)   Wt 86.2 kg (190 lb)   BMI 34.75 kg/m²          Physical Exam  Vitals reviewed. Exam conducted with a chaperone present. Constitutional:       General: She is not in acute distress. Appearance: Normal appearance. She is not ill-appearing, toxic-appearing or diaphoretic. HENT:      Head: Normocephalic and atraumatic. Eyes:      General:         Right eye: No discharge. Left eye: No discharge. Extraocular Movements: Extraocular movements intact. Conjunctiva/sclera: Conjunctivae normal.   Cardiovascular:      Rate and Rhythm: Normal rate and regular rhythm. Heart sounds: Murmur heard. No friction rub. No gallop. Pulmonary:      Effort: Pulmonary effort is normal. No respiratory distress. Breath sounds: Normal breath sounds. No stridor. No wheezing or rhonchi. Genitourinary:     Labia:         Right: Rash present. Vagina: Vaginal discharge and erythema present.       Cervix: Discharge and erythema present. Comments: Mild cervical tenderness on speculum   Musculoskeletal:         General: No swelling, tenderness or signs of injury. Right lower leg: No edema. Left lower leg: No edema. Skin:     General: Skin is warm. Coloration: Skin is not pale. Findings: No erythema or rash. Neurological:      Mental Status: She is alert and oriented to person, place, and time. Motor: No weakness.    Psychiatric:         Mood and Affect: Mood normal.         Behavior: Behavior normal.             DO Cristina Valera [facility]

## 2023-07-13 NOTE — ASSESSMENT & PLAN NOTE
Valentina Roche is experiencing generalized abdominal pain. This abdominal pain is likely multifactorial.  CAT scan did reveal some gastric distention, also she has been having some constipation. CAT scan did not reveal any other significant inflammatory component that would explain her abdominal pain. If she has worsening abdominal pain she should return to the emergency room. I am suspicious that her poorly controlled diabetes is contributing to her symptoms.   Please see comments under nausea/vomiting

## 2023-07-14 ENCOUNTER — HOSPITAL ENCOUNTER (INPATIENT)
Facility: HOSPITAL | Age: 53
LOS: 5 days | Discharge: HOME | DRG: 054 | End: 2023-07-20
Attending: EMERGENCY MEDICINE | Admitting: HOSPITALIST
Payer: COMMERCIAL

## 2023-07-14 DIAGNOSIS — I10 HYPERTENSION: ICD-10-CM

## 2023-07-14 DIAGNOSIS — N17.9 AKI (ACUTE KIDNEY INJURY) (HCC): ICD-10-CM

## 2023-07-14 DIAGNOSIS — R42 DIZZINESS: Primary | ICD-10-CM

## 2023-07-14 DIAGNOSIS — G43.909 MIGRAINE: ICD-10-CM

## 2023-07-14 LAB
BACTERIA UR CULT: ABNORMAL
BASE EX.OXY STD BLDV CALC-SCNC: 62.7 % (ref 60–80)
BASE EXCESS BLDV CALC-SCNC: -1.2 MMOL/L
BASOPHILS # BLD AUTO: 0.03 THOUSANDS/ÂΜL (ref 0–0.1)
BASOPHILS NFR BLD AUTO: 0 % (ref 0–1)
BETA-HYDROXYBUTYRATE: 0.1 MMOL/L
CANDIDA RRNA VAG QL PROBE: NEGATIVE
EOSINOPHIL # BLD AUTO: 0.17 THOUSAND/ÂΜL (ref 0–0.61)
EOSINOPHIL NFR BLD AUTO: 2 % (ref 0–6)
ERYTHROCYTE [DISTWIDTH] IN BLOOD BY AUTOMATED COUNT: 14.4 % (ref 11.6–15.1)
G VAGINALIS RRNA GENITAL QL PROBE: NEGATIVE
HCO3 BLDV-SCNC: 25.7 MMOL/L (ref 24–30)
HCT VFR BLD AUTO: 41.4 % (ref 34.8–46.1)
HGB BLD-MCNC: 13.1 G/DL (ref 11.5–15.4)
IMM GRANULOCYTES # BLD AUTO: 0.02 THOUSAND/UL (ref 0–0.2)
IMM GRANULOCYTES NFR BLD AUTO: 0 % (ref 0–2)
LYMPHOCYTES # BLD AUTO: 3.17 THOUSANDS/ÂΜL (ref 0.6–4.47)
LYMPHOCYTES NFR BLD AUTO: 41 % (ref 14–44)
MCH RBC QN AUTO: 25.8 PG (ref 26.8–34.3)
MCHC RBC AUTO-ENTMCNC: 31.6 G/DL (ref 31.4–37.4)
MCV RBC AUTO: 82 FL (ref 82–98)
MONOCYTES # BLD AUTO: 0.58 THOUSAND/ÂΜL (ref 0.17–1.22)
MONOCYTES NFR BLD AUTO: 7 % (ref 4–12)
NEUTROPHILS # BLD AUTO: 3.85 THOUSANDS/ÂΜL (ref 1.85–7.62)
NEUTS SEG NFR BLD AUTO: 50 % (ref 43–75)
NRBC BLD AUTO-RTO: 0 /100 WBCS
O2 CT BLDV-SCNC: 12.7 ML/DL
PCO2 BLDV: 51.4 MM HG (ref 42–50)
PH BLDV: 7.32 [PH] (ref 7.3–7.4)
PLATELET # BLD AUTO: 330 THOUSANDS/UL (ref 149–390)
PMV BLD AUTO: 11 FL (ref 8.9–12.7)
PO2 BLDV: 35.6 MM HG (ref 35–45)
RBC # BLD AUTO: 5.07 MILLION/UL (ref 3.81–5.12)
T VAGINALIS RRNA GENITAL QL PROBE: NEGATIVE
WBC # BLD AUTO: 7.82 THOUSAND/UL (ref 4.31–10.16)

## 2023-07-14 PROCEDURE — 85025 COMPLETE CBC W/AUTO DIFF WBC: CPT | Performed by: EMERGENCY MEDICINE

## 2023-07-14 PROCEDURE — 83036 HEMOGLOBIN GLYCOSYLATED A1C: CPT | Performed by: PHYSICIAN ASSISTANT

## 2023-07-14 PROCEDURE — 99284 EMERGENCY DEPT VISIT MOD MDM: CPT

## 2023-07-14 PROCEDURE — 82805 BLOOD GASES W/O2 SATURATION: CPT | Performed by: EMERGENCY MEDICINE

## 2023-07-14 PROCEDURE — 96374 THER/PROPH/DIAG INJ IV PUSH: CPT

## 2023-07-14 PROCEDURE — 84484 ASSAY OF TROPONIN QUANT: CPT | Performed by: EMERGENCY MEDICINE

## 2023-07-14 PROCEDURE — 99285 EMERGENCY DEPT VISIT HI MDM: CPT | Performed by: EMERGENCY MEDICINE

## 2023-07-14 PROCEDURE — 83721 ASSAY OF BLOOD LIPOPROTEIN: CPT | Performed by: PHYSICIAN ASSISTANT

## 2023-07-14 PROCEDURE — 96361 HYDRATE IV INFUSION ADD-ON: CPT

## 2023-07-14 PROCEDURE — 82010 KETONE BODYS QUAN: CPT | Performed by: EMERGENCY MEDICINE

## 2023-07-14 PROCEDURE — 36415 COLL VENOUS BLD VENIPUNCTURE: CPT | Performed by: EMERGENCY MEDICINE

## 2023-07-14 PROCEDURE — 80061 LIPID PANEL: CPT | Performed by: PHYSICIAN ASSISTANT

## 2023-07-14 PROCEDURE — 93005 ELECTROCARDIOGRAM TRACING: CPT

## 2023-07-14 PROCEDURE — 80053 COMPREHEN METABOLIC PANEL: CPT | Performed by: EMERGENCY MEDICINE

## 2023-07-14 RX ORDER — MECLIZINE HCL 12.5 MG/1
25 TABLET ORAL ONCE
Status: COMPLETED | OUTPATIENT
Start: 2023-07-14 | End: 2023-07-14

## 2023-07-14 RX ORDER — ACETAMINOPHEN 325 MG/1
975 TABLET ORAL ONCE
Status: COMPLETED | OUTPATIENT
Start: 2023-07-14 | End: 2023-07-14

## 2023-07-14 RX ORDER — ONDANSETRON 2 MG/ML
4 INJECTION INTRAMUSCULAR; INTRAVENOUS ONCE
Status: COMPLETED | OUTPATIENT
Start: 2023-07-14 | End: 2023-07-14

## 2023-07-14 RX ADMIN — SODIUM CHLORIDE 1000 ML: 0.9 INJECTION, SOLUTION INTRAVENOUS at 23:21

## 2023-07-14 RX ADMIN — ONDANSETRON 4 MG: 2 INJECTION INTRAMUSCULAR; INTRAVENOUS at 23:40

## 2023-07-14 RX ADMIN — ACETAMINOPHEN 975 MG: 325 TABLET ORAL at 23:44

## 2023-07-14 RX ADMIN — MECLIZINE 25 MG: 12.5 TABLET ORAL at 23:44

## 2023-07-15 ENCOUNTER — APPOINTMENT (OUTPATIENT)
Dept: MRI IMAGING | Facility: HOSPITAL | Age: 53
DRG: 054 | End: 2023-07-15
Payer: COMMERCIAL

## 2023-07-15 ENCOUNTER — APPOINTMENT (EMERGENCY)
Dept: CT IMAGING | Facility: HOSPITAL | Age: 53
DRG: 054 | End: 2023-07-15
Payer: COMMERCIAL

## 2023-07-15 PROBLEM — R11.0 NAUSEA: Status: RESOLVED | Noted: 2023-07-13 | Resolved: 2023-07-15

## 2023-07-15 PROBLEM — R07.89 ATYPICAL CHEST PAIN: Status: RESOLVED | Noted: 2022-06-30 | Resolved: 2023-07-15

## 2023-07-15 PROBLEM — R42 DIZZINESS: Status: ACTIVE | Noted: 2023-07-15

## 2023-07-15 PROBLEM — K21.9 GERD (GASTROESOPHAGEAL REFLUX DISEASE): Chronic | Status: ACTIVE | Noted: 2021-08-12

## 2023-07-15 PROBLEM — G40.909 SEIZURE DISORDER (HCC): Chronic | Status: ACTIVE | Noted: 2023-07-15

## 2023-07-15 LAB
ALBUMIN SERPL BCP-MCNC: 4.3 G/DL (ref 3.5–5)
ALP SERPL-CCNC: 107 U/L (ref 34–104)
ALT SERPL W P-5'-P-CCNC: 19 U/L (ref 7–52)
ANION GAP SERPL CALCULATED.3IONS-SCNC: 8 MMOL/L
ANION GAP SERPL CALCULATED.3IONS-SCNC: 8 MMOL/L
AST SERPL W P-5'-P-CCNC: 18 U/L (ref 13–39)
ATRIAL RATE: 64 BPM
BILIRUB SERPL-MCNC: 0.34 MG/DL (ref 0.2–1)
BUN SERPL-MCNC: 17 MG/DL (ref 5–25)
BUN SERPL-MCNC: 18 MG/DL (ref 5–25)
CALCIUM SERPL-MCNC: 8.5 MG/DL (ref 8.4–10.2)
CALCIUM SERPL-MCNC: 9.3 MG/DL (ref 8.4–10.2)
CARDIAC TROPONIN I PNL SERPL HS: <2 NG/L
CHLORIDE SERPL-SCNC: 102 MMOL/L (ref 96–108)
CHLORIDE SERPL-SCNC: 99 MMOL/L (ref 96–108)
CHOLEST SERPL-MCNC: 240 MG/DL
CO2 SERPL-SCNC: 26 MMOL/L (ref 21–32)
CO2 SERPL-SCNC: 28 MMOL/L (ref 21–32)
CREAT SERPL-MCNC: 1.34 MG/DL (ref 0.6–1.3)
CREAT SERPL-MCNC: 1.66 MG/DL (ref 0.6–1.3)
EST. AVERAGE GLUCOSE BLD GHB EST-MCNC: 315 MG/DL
GFR SERPL CREATININE-BSD FRML MDRD: 35 ML/MIN/1.73SQ M
GFR SERPL CREATININE-BSD FRML MDRD: 45 ML/MIN/1.73SQ M
GLUCOSE P FAST SERPL-MCNC: 220 MG/DL (ref 65–99)
GLUCOSE SERPL-MCNC: 100 MG/DL (ref 65–140)
GLUCOSE SERPL-MCNC: 165 MG/DL (ref 65–140)
GLUCOSE SERPL-MCNC: 189 MG/DL (ref 65–140)
GLUCOSE SERPL-MCNC: 217 MG/DL (ref 65–140)
GLUCOSE SERPL-MCNC: 220 MG/DL (ref 65–140)
HBA1C MFR BLD: 12.6 %
HDLC SERPL-MCNC: 47 MG/DL
LDLC SERPL DIRECT ASSAY-MCNC: 133 MG/DL (ref 0–100)
P AXIS: 23 DEGREES
POTASSIUM SERPL-SCNC: 3.7 MMOL/L (ref 3.5–5.3)
POTASSIUM SERPL-SCNC: 3.7 MMOL/L (ref 3.5–5.3)
PR INTERVAL: 166 MS
PROT SERPL-MCNC: 7.6 G/DL (ref 6.4–8.4)
QRS AXIS: 6 DEGREES
QRSD INTERVAL: 86 MS
QT INTERVAL: 434 MS
QTC INTERVAL: 447 MS
SODIUM SERPL-SCNC: 135 MMOL/L (ref 135–147)
SODIUM SERPL-SCNC: 136 MMOL/L (ref 135–147)
T WAVE AXIS: 21 DEGREES
TRIGL SERPL-MCNC: 446 MG/DL
VENTRICULAR RATE: 64 BPM

## 2023-07-15 PROCEDURE — 70496 CT ANGIOGRAPHY HEAD: CPT

## 2023-07-15 PROCEDURE — 93005 ELECTROCARDIOGRAM TRACING: CPT

## 2023-07-15 PROCEDURE — 97167 OT EVAL HIGH COMPLEX 60 MIN: CPT

## 2023-07-15 PROCEDURE — 82948 REAGENT STRIP/BLOOD GLUCOSE: CPT

## 2023-07-15 PROCEDURE — 93010 ELECTROCARDIOGRAM REPORT: CPT | Performed by: INTERNAL MEDICINE

## 2023-07-15 PROCEDURE — 97163 PT EVAL HIGH COMPLEX 45 MIN: CPT

## 2023-07-15 PROCEDURE — 80048 BASIC METABOLIC PNL TOTAL CA: CPT | Performed by: PHYSICIAN ASSISTANT

## 2023-07-15 PROCEDURE — 70498 CT ANGIOGRAPHY NECK: CPT

## 2023-07-15 PROCEDURE — 99255 IP/OBS CONSLTJ NEW/EST HI 80: CPT | Performed by: PSYCHIATRY & NEUROLOGY

## 2023-07-15 PROCEDURE — 70551 MRI BRAIN STEM W/O DYE: CPT

## 2023-07-15 PROCEDURE — G1004 CDSM NDSC: HCPCS

## 2023-07-15 PROCEDURE — 99223 1ST HOSP IP/OBS HIGH 75: CPT | Performed by: HOSPITALIST

## 2023-07-15 PROCEDURE — 36415 COLL VENOUS BLD VENIPUNCTURE: CPT | Performed by: PHYSICIAN ASSISTANT

## 2023-07-15 RX ORDER — ONDANSETRON 2 MG/ML
4 INJECTION INTRAMUSCULAR; INTRAVENOUS EVERY 6 HOURS PRN
Status: DISCONTINUED | OUTPATIENT
Start: 2023-07-15 | End: 2023-07-16

## 2023-07-15 RX ORDER — METOCLOPRAMIDE HYDROCHLORIDE 5 MG/ML
10 INJECTION INTRAMUSCULAR; INTRAVENOUS EVERY 8 HOURS SCHEDULED
Status: DISCONTINUED | OUTPATIENT
Start: 2023-07-15 | End: 2023-07-15

## 2023-07-15 RX ORDER — PANTOPRAZOLE SODIUM 40 MG/1
40 TABLET, DELAYED RELEASE ORAL
Status: DISCONTINUED | OUTPATIENT
Start: 2023-07-15 | End: 2023-07-20 | Stop reason: HOSPADM

## 2023-07-15 RX ORDER — ASPIRIN 81 MG/1
81 TABLET, CHEWABLE ORAL DAILY
Status: DISCONTINUED | OUTPATIENT
Start: 2023-07-15 | End: 2023-07-20 | Stop reason: HOSPADM

## 2023-07-15 RX ORDER — FAMOTIDINE 20 MG/1
20 TABLET, FILM COATED ORAL
Status: DISCONTINUED | OUTPATIENT
Start: 2023-07-15 | End: 2023-07-20 | Stop reason: HOSPADM

## 2023-07-15 RX ORDER — HEPARIN SODIUM 5000 [USP'U]/ML
5000 INJECTION, SOLUTION INTRAVENOUS; SUBCUTANEOUS EVERY 8 HOURS SCHEDULED
Status: DISCONTINUED | OUTPATIENT
Start: 2023-07-15 | End: 2023-07-20 | Stop reason: HOSPADM

## 2023-07-15 RX ORDER — CYCLOBENZAPRINE HCL 10 MG
10 TABLET ORAL DAILY
Status: COMPLETED | OUTPATIENT
Start: 2023-07-15 | End: 2023-07-17

## 2023-07-15 RX ORDER — ATORVASTATIN CALCIUM 40 MG/1
40 TABLET, FILM COATED ORAL EVERY EVENING
Status: DISCONTINUED | OUTPATIENT
Start: 2023-07-15 | End: 2023-07-15

## 2023-07-15 RX ORDER — MECLIZINE HCL 12.5 MG/1
25 TABLET ORAL 3 TIMES DAILY PRN
Status: DISCONTINUED | OUTPATIENT
Start: 2023-07-15 | End: 2023-07-16

## 2023-07-15 RX ORDER — LUBIPROSTONE 24 UG/1
24 CAPSULE ORAL 2 TIMES DAILY
Status: DISCONTINUED | OUTPATIENT
Start: 2023-07-15 | End: 2023-07-20 | Stop reason: HOSPADM

## 2023-07-15 RX ORDER — INSULIN LISPRO 100 [IU]/ML
1-5 INJECTION, SOLUTION INTRAVENOUS; SUBCUTANEOUS
Status: DISCONTINUED | OUTPATIENT
Start: 2023-07-15 | End: 2023-07-20 | Stop reason: HOSPADM

## 2023-07-15 RX ORDER — LORAZEPAM 2 MG/ML
0.5 INJECTION INTRAMUSCULAR ONCE
Status: COMPLETED | OUTPATIENT
Start: 2023-07-15 | End: 2023-07-15

## 2023-07-15 RX ORDER — ATORVASTATIN CALCIUM 80 MG/1
80 TABLET, FILM COATED ORAL
Status: DISCONTINUED | OUTPATIENT
Start: 2023-07-15 | End: 2023-07-20 | Stop reason: HOSPADM

## 2023-07-15 RX ORDER — INSULIN GLARGINE 100 [IU]/ML
26 INJECTION, SOLUTION SUBCUTANEOUS EVERY 12 HOURS SCHEDULED
Status: DISCONTINUED | OUTPATIENT
Start: 2023-07-15 | End: 2023-07-17

## 2023-07-15 RX ORDER — INSULIN GLARGINE 100 [IU]/ML
52 INJECTION, SOLUTION SUBCUTANEOUS EVERY MORNING
Status: DISCONTINUED | OUTPATIENT
Start: 2023-07-15 | End: 2023-07-15

## 2023-07-15 RX ORDER — INSULIN LISPRO 100 [IU]/ML
1-6 INJECTION, SOLUTION INTRAVENOUS; SUBCUTANEOUS
Status: DISCONTINUED | OUTPATIENT
Start: 2023-07-15 | End: 2023-07-20 | Stop reason: HOSPADM

## 2023-07-15 RX ORDER — PREGABALIN 100 MG/1
100 CAPSULE ORAL 3 TIMES DAILY
Status: DISCONTINUED | OUTPATIENT
Start: 2023-07-15 | End: 2023-07-20 | Stop reason: HOSPADM

## 2023-07-15 RX ORDER — DIHYDROERGOTAMINE MESYLATE 1 MG/ML
1 INJECTION, SOLUTION INTRAMUSCULAR; INTRAVENOUS; SUBCUTANEOUS EVERY 8 HOURS SCHEDULED
Status: DISPENSED | OUTPATIENT
Start: 2023-07-15 | End: 2023-07-17

## 2023-07-15 RX ORDER — CITALOPRAM 20 MG/1
40 TABLET ORAL DAILY
Status: DISCONTINUED | OUTPATIENT
Start: 2023-07-15 | End: 2023-07-20 | Stop reason: HOSPADM

## 2023-07-15 RX ORDER — INSULIN LISPRO 100 [IU]/ML
10 INJECTION, SOLUTION INTRAVENOUS; SUBCUTANEOUS
Status: DISCONTINUED | OUTPATIENT
Start: 2023-07-15 | End: 2023-07-17

## 2023-07-15 RX ORDER — ACETAMINOPHEN 325 MG/1
975 TABLET ORAL EVERY 8 HOURS SCHEDULED
Status: DISCONTINUED | OUTPATIENT
Start: 2023-07-15 | End: 2023-07-20 | Stop reason: HOSPADM

## 2023-07-15 RX ORDER — MAGNESIUM SULFATE HEPTAHYDRATE 40 MG/ML
2 INJECTION, SOLUTION INTRAVENOUS
Status: DISCONTINUED | OUTPATIENT
Start: 2023-07-15 | End: 2023-07-15

## 2023-07-15 RX ORDER — DIPHENHYDRAMINE HYDROCHLORIDE 50 MG/ML
25 INJECTION INTRAMUSCULAR; INTRAVENOUS EVERY 8 HOURS PRN
Status: ACTIVE | OUTPATIENT
Start: 2023-07-15 | End: 2023-07-18

## 2023-07-15 RX ORDER — LEVETIRACETAM 500 MG/1
500 TABLET ORAL 2 TIMES DAILY
Status: DISCONTINUED | OUTPATIENT
Start: 2023-07-15 | End: 2023-07-20 | Stop reason: HOSPADM

## 2023-07-15 RX ORDER — LORAZEPAM 1 MG/1
1 TABLET ORAL EVERY 8 HOURS PRN
Status: DISCONTINUED | OUTPATIENT
Start: 2023-07-15 | End: 2023-07-20 | Stop reason: HOSPADM

## 2023-07-15 RX ORDER — MAGNESIUM SULFATE HEPTAHYDRATE 40 MG/ML
2 INJECTION, SOLUTION INTRAVENOUS 2 TIMES DAILY
Status: DISCONTINUED | OUTPATIENT
Start: 2023-07-15 | End: 2023-07-16

## 2023-07-15 RX ORDER — DOCUSATE SODIUM 100 MG/1
100 CAPSULE, LIQUID FILLED ORAL 2 TIMES DAILY
Status: DISCONTINUED | OUTPATIENT
Start: 2023-07-15 | End: 2023-07-20 | Stop reason: HOSPADM

## 2023-07-15 RX ORDER — FENOFIBRATE 48 MG/1
48 TABLET, COATED ORAL DAILY
Status: DISCONTINUED | OUTPATIENT
Start: 2023-07-15 | End: 2023-07-20 | Stop reason: HOSPADM

## 2023-07-15 RX ORDER — SODIUM CHLORIDE 9 MG/ML
125 INJECTION, SOLUTION INTRAVENOUS CONTINUOUS
Status: DISCONTINUED | OUTPATIENT
Start: 2023-07-15 | End: 2023-07-15

## 2023-07-15 RX ADMIN — IOHEXOL 85 ML: 350 INJECTION, SOLUTION INTRAVENOUS at 00:41

## 2023-07-15 RX ADMIN — INSULIN GLARGINE 26 UNITS: 100 INJECTION, SOLUTION SUBCUTANEOUS at 09:32

## 2023-07-15 RX ADMIN — FENOFIBRATE 48 MG: 48 TABLET, FILM COATED ORAL at 09:32

## 2023-07-15 RX ADMIN — DOCUSATE SODIUM 100 MG: 100 CAPSULE, LIQUID FILLED ORAL at 08:16

## 2023-07-15 RX ADMIN — MORPHINE SULFATE 2 MG: 2 INJECTION, SOLUTION INTRAMUSCULAR; INTRAVENOUS at 19:29

## 2023-07-15 RX ADMIN — MORPHINE SULFATE 2 MG: 2 INJECTION, SOLUTION INTRAMUSCULAR; INTRAVENOUS at 10:12

## 2023-07-15 RX ADMIN — ONDANSETRON 4 MG: 2 INJECTION INTRAMUSCULAR; INTRAVENOUS at 18:04

## 2023-07-15 RX ADMIN — INSULIN LISPRO 2 UNITS: 100 INJECTION, SOLUTION INTRAVENOUS; SUBCUTANEOUS at 09:32

## 2023-07-15 RX ADMIN — LUBIPROSTONE 24 MCG: 24 CAPSULE, GELATIN COATED ORAL at 21:31

## 2023-07-15 RX ADMIN — ONDANSETRON 4 MG: 2 INJECTION INTRAMUSCULAR; INTRAVENOUS at 08:16

## 2023-07-15 RX ADMIN — PREGABALIN 100 MG: 100 CAPSULE ORAL at 08:16

## 2023-07-15 RX ADMIN — MAGNESIUM SULFATE HEPTAHYDRATE 2 G: 40 INJECTION, SOLUTION INTRAVENOUS at 19:29

## 2023-07-15 RX ADMIN — CYCLOBENZAPRINE HYDROCHLORIDE 10 MG: 10 TABLET, FILM COATED ORAL at 19:30

## 2023-07-15 RX ADMIN — INSULIN LISPRO 10 UNITS: 100 INJECTION, SOLUTION INTRAVENOUS; SUBCUTANEOUS at 13:07

## 2023-07-15 RX ADMIN — DIHYDROERGOTAMINE MESYLATE 1 MG: 1 INJECTION, SOLUTION INTRAMUSCULAR; INTRAVENOUS; SUBCUTANEOUS at 13:32

## 2023-07-15 RX ADMIN — DOCUSATE SODIUM 100 MG: 100 CAPSULE, LIQUID FILLED ORAL at 17:01

## 2023-07-15 RX ADMIN — PREGABALIN 100 MG: 100 CAPSULE ORAL at 21:21

## 2023-07-15 RX ADMIN — ACETAMINOPHEN 975 MG: 325 TABLET ORAL at 18:35

## 2023-07-15 RX ADMIN — PREGABALIN 100 MG: 100 CAPSULE ORAL at 17:01

## 2023-07-15 RX ADMIN — CITALOPRAM HYDROBROMIDE 40 MG: 20 TABLET ORAL at 08:16

## 2023-07-15 RX ADMIN — LEVETIRACETAM 500 MG: 500 TABLET, FILM COATED ORAL at 17:01

## 2023-07-15 RX ADMIN — LORAZEPAM 0.5 MG: 2 INJECTION INTRAMUSCULAR; INTRAVENOUS at 08:16

## 2023-07-15 RX ADMIN — ATORVASTATIN CALCIUM 80 MG: 80 TABLET, FILM COATED ORAL at 21:21

## 2023-07-15 RX ADMIN — HEPARIN SODIUM 5000 UNITS: 5000 INJECTION INTRAVENOUS; SUBCUTANEOUS at 21:21

## 2023-07-15 RX ADMIN — INSULIN LISPRO 1 UNITS: 100 INJECTION, SOLUTION INTRAVENOUS; SUBCUTANEOUS at 21:22

## 2023-07-15 RX ADMIN — ASPIRIN 81 MG CHEWABLE TABLET 81 MG: 81 TABLET CHEWABLE at 08:16

## 2023-07-15 RX ADMIN — INSULIN LISPRO 10 UNITS: 100 INJECTION, SOLUTION INTRAVENOUS; SUBCUTANEOUS at 17:01

## 2023-07-15 RX ADMIN — SODIUM CHLORIDE 125 ML/HR: 0.9 INJECTION, SOLUTION INTRAVENOUS at 04:26

## 2023-07-15 RX ADMIN — MECLIZINE 25 MG: 12.5 TABLET ORAL at 09:38

## 2023-07-15 RX ADMIN — LORAZEPAM 1 MG: 1 TABLET ORAL at 14:01

## 2023-07-15 RX ADMIN — HEPARIN SODIUM 5000 UNITS: 5000 INJECTION INTRAVENOUS; SUBCUTANEOUS at 13:27

## 2023-07-15 RX ADMIN — HEPARIN SODIUM 5000 UNITS: 5000 INJECTION INTRAVENOUS; SUBCUTANEOUS at 06:43

## 2023-07-15 RX ADMIN — LEVETIRACETAM 500 MG: 500 TABLET, FILM COATED ORAL at 08:16

## 2023-07-15 RX ADMIN — INSULIN LISPRO 10 UNITS: 100 INJECTION, SOLUTION INTRAVENOUS; SUBCUTANEOUS at 09:31

## 2023-07-15 RX ADMIN — FAMOTIDINE 20 MG: 20 TABLET, FILM COATED ORAL at 21:21

## 2023-07-15 RX ADMIN — MAGNESIUM SULFATE HEPTAHYDRATE 2 G: 40 INJECTION, SOLUTION INTRAVENOUS at 11:53

## 2023-07-15 RX ADMIN — LUBIPROSTONE 24 MCG: 24 CAPSULE, GELATIN COATED ORAL at 09:32

## 2023-07-15 RX ADMIN — INSULIN GLARGINE 26 UNITS: 100 INJECTION, SOLUTION SUBCUTANEOUS at 21:21

## 2023-07-15 RX ADMIN — INSULIN LISPRO 1 UNITS: 100 INJECTION, SOLUTION INTRAVENOUS; SUBCUTANEOUS at 13:08

## 2023-07-15 NOTE — TELEMEDICINE
TeleConsultation - Neurology   Rossana Eugene 46 y.o. female MRN: [id number]  Unit/Bed#: ED 05 Encounter: [id number]        REQUIRED DOCUMENTATION:     1. This service was provided via Telemedicine. 2. Provider located at [facility]  3. TeleMed provider: Binu Davenport MD.  4. Identify all parties in room with patient during tele consult:  Patient only  5. Patient was then informed that this was a Telemedicine visit and that the exam was being conducted confidentially over secure lines. My office door was closed. No one else was in the room. Patient acknowledged consent and understanding of privacy and security of the Telemedicine visit, and gave us permission to have the assistant stay in the room in order to assist with the history and to conduct the exam.  I informed the patient that I have reviewed their record in Epic and presented the opportunity for them to ask any questions regarding the visit today. The patient agreed to participate. Assessment/Plan   bppv provoking complex migraine. Mri brain negative for acute/chronic cva. cta no dissection. No infectious symptoms other than chills which can be in context of the migraine. Although neck is painful and stiff, headache is worse than neck pain and although head rotation is limited symmetrically bilaterally due to neck pain and worsening of baseline vertigo at rest, she has full neck flexion of chin to chest and no lerhmites sign. Afebrile, no leukocytosis. Low suspicion for an active infection including cns infection. dhe treatment 2 mg q 8 hr. pretreat with zofran 30 min before each treatment. Schedule meclizine 25 mg po q8  Can change solumedrol to 500 mg bid from once a day  Baby aspirin daily for mild carotid disease per cta  Switch mag sulfate to 1gm iv bid.  She has just received 2 gm an hour ago with no improvement however would still keep it on board  Iv fluids  OT/PT eval for inpt rehab vs outpt vestibular therapy  Continue keppra for presumed seizure disorder      History of Present Illness     Reason for Consult / Principal Problem: eval for migraines  HPI: Melissa Greene is a 46 y.o. female who presents with acute vertiginious symptoms, headache. She states yesterday afternoon she was upstairs painting, sitting down and looking downwards, no sudden change in position and suddenly room started spinning and within few minutes pass out feeling. She couldn't focus and vision became blurry. She says she felt a little better putting her head down however when looking upwards the spinning worsened. Within 30 min she developed headache that went to 10/10 intensity starting from neck to top of head and to the left of head. Pulsating. Light sensitivity, nausea however no dry heaving or throwing up. Her mother was with her who then called her . In the meantime patient called her endocrinologist whom she had to call anyways and she was told to go to the ED thus her  brought her there. Headache down to a 9.5 from a 10 she states. Also her LLE feels heavy     Inpatient consult to Neurology  Consult performed by: Peter Gudino MD  Consult ordered by:  Yaquelin Hudson PA-C           Review of Systems   Per 12 point review see hpi, occasionally gets tension ha's, last migraine months ago and when she gets them usually front and top of the head then go to the back of head/neck, opposite to current headache, vertigo 3 years ago, rest negative    Historical Information   Past Medical History:   Diagnosis Date   • Atypical chest pain    • Depression    • Diabetes mellitus ([address])    • Gastroparesis    • GERD (gastroesophageal reflux disease)    • Hyperlipidemia    • Hypertension    • Sciatica    • Seizure disorder [facility])      Past Surgical History:   Procedure Laterality Date   • BREAST BIOPSY Left  benign   • CHOLECYSTECTOMY     • COLONOSCOPY     • HYSTERECTOMY     • [place] LAPAROSCOPIC APPENDECTOMY N/A 3/24/2021    Procedure: APPENDECTOMY LAPAROSCOPIC;  Surgeon: Titus Leary MD;  Location: [facility] MAIN OR;  Service: General   • [place] LAPS ABD PRTM&OMENTUM DX W/WO SPEC BR/WA SPX N/A 3/24/2021    Procedure: LAPAROSCOPY DIAGNOSTIC, EXTENSIVE DESI;  Surgeon: Titus Leary MD;  Location: [facility] MAIN OR;  Service: General   • UPPER GASTROINTESTINAL ENDOSCOPY       Social History   Social History     Substance and Sexual Activity   Alcohol Use Never     Social History     Substance and Sexual Activity   Drug Use Never     E-Cigarette/Vaping   • E-Cigarette Use Never User      E-Cigarette/Vaping Substances   • Nicotine No    • THC No    • CBD No    • Flavoring No    • Other No    • Unknown No      Social History     Tobacco Use   Smoking Status Former   • Types: Cigarettes   • Quit date:    • Years since quittin.5   Smokeless Tobacco Never     Family History: non-contributory    Meds/Allergies   all current active meds have been reviewed    Allergies   Allergen Reactions   • Azithromycin GI Intolerance and Hives   • Benztropine    • Butorphanol Hallucinations   • Penicillins    • Zolpidem        Objective   Vitals:Blood pressure 103/70, pulse 71, temperature (!) 96.4 °F (35.8 °C), temperature source Tympanic, resp. rate 20, SpO2 94 %, not currently breastfeeding. ,There is no height or weight on file to calculate BMI. No intake or output data in the 24 hours ending 07/15/23 1251    Physical Exam   General: minimal visible distress  HEENT: atraumatic, normocephalic. States left side of face feels hotter  Neurologic Exam   MS: Alert and orientedX3. Insight intact. attention, concentration minimally impaired. No expressive/receptive aphasia  CN: 2-12 intact. Difficult to adequately eval for nystagmus with tele. Visual fields not assessed. Motor: on activation lle not elevating as high as rle and lle with low amplitude/frequency tremor on activation. No drift any extremity.   Sensory: light touch intact ue/le bilat  Coordination: finger to nose intact ue bilat. rle heel to shin intact. lle heel to shin with hesitancy however intact. Lab Results: I have personally reviewed pertinent reports.     Imaging Studies: I have personally reviewed pertinent films in PACS  EKG, Pathology, and Other Studies: I have personally reviewed pertinent films in PACS

## 2023-07-15 NOTE — ASSESSMENT & PLAN NOTE
Patient reported dizziness, nausea and difficulty walking shortly after she started her first dose of Ozempic on Friday. She noted a headache. · Stroke pathway  · CTA head and neck V rad read: No definite acute vascular abnormalities.   There is left ICA stenosis

## 2023-07-15 NOTE — ED NOTES
Pt stated increased headache and laura 10/10. Stated she did not feel well. Vitals WNL. Stopped the Solu-medrol and notified provider.      Crispin Kohler  07/15/23 0096

## 2023-07-15 NOTE — ED NOTES
Patient reporting chest pain, EKG completed and sent to Dr. Valencia Castellano via [address] [place] [address].      Abbie Chong RN  07/15/23 8909

## 2023-07-15 NOTE — ASSESSMENT & PLAN NOTE
Lab Results   Component Value Date    HGBA1C 12.5 (H) 03/25/2023       No results for input(s): "POCGLU" in the last 72 hours.     Blood Sugar Average: Last 72 hrs:  · Poorly controlled diabetes with last A1c of 12.5 in March 2023   · Patient with new start of Ozempic and had nausea  · hold oral medication  · Insulin coverage and adjust as needed  · Continue Lyrica for neuropathy

## 2023-07-15 NOTE — H&P
[address] [facility]  H&P  Name: Shayne Bustillo 46 y.o. female I MRN: [id number]  Unit/Bed#: ED 05 I Date of Admission: 7/14/2023   Date of Service: 7/15/2023 I Hospital Day: 0      Assessment/Plan   * Dizziness  Assessment & Plan  Patient reported dizziness, nausea and difficulty walking shortly after she started her first dose of Ozempic on Friday. She noted a headache. · Stroke pathway  · CTA head and neck V rad read: No definite acute vascular abnormalities. There is left ICA stenosis    Seizure disorder ([facility])  Assessment & Plan  · Continue Keppra 500 mg twice daily    Benign hypertension with CKD (chronic kidney disease) stage III [facility])  Assessment & Plan  Lab Results   Component Value Date    EGFR 35 07/14/2023    EGFR 54 07/10/2023    EGFR 51 03/25/2023    CREATININE 1.66 (H) 07/14/2023    CREATININE 1.16 07/10/2023    CREATININE 1.21 03/25/2023   · elevated creat w/ baseline 1.1-1.2  · IVF and monitor    GERD (gastroesophageal reflux disease)  Assessment & Plan  · Continue PPI    Depression with anxiety  Assessment & Plan  · Continue Celexa    Class 1 obesity due to excess calories with serious comorbidity and body mass index (BMI) of 34.0 to 34.9 in adult  Assessment & Plan  BMI 34  · Healthy diet lifestyle modification recommended    Gastroparesis  Assessment & Plan  · Patient seen by GI on 7/13 secondary to report of nausea and gastroparesis  · Felt nausea possibly related to Trulicity  · For EGD on 7/18/23    Type 2 diabetes mellitus with hyperglycemia, with long-term current use of insulin ([facility])  Assessment & Plan  Lab Results   Component Value Date    HGBA1C 12.5 (H) 03/25/2023       No results for input(s): "POCGLU" in the last 72 hours.     Blood Sugar Average: Last 72 hrs:  · Poorly controlled diabetes with last A1c of 12.5 in March 2023   · Patient with new start of Ozempic and had nausea  · hold oral medication  · Insulin coverage and adjust as needed  · Continue Lyrica for neuropathy        VTE Pharmacologic Prophylaxis: VTE Score: 7 High Risk (Score >/= 5) - Pharmacological DVT Prophylaxis Ordered: enoxaparin (Lovenox). Sequential Compression Devices Ordered. Code Status: Level 1 - Full Code   Discussion with patient    Anticipated Length of Stay: Patient will be admitted on an observation basis with an anticipated length of stay of less than 2 midnights secondary to stroke work up. Total Time Spent on Date of Encounter in care of patient: 75 minutes This time was spent on one or more of the following: performing physical exam; counseling and coordination of care; obtaining or reviewing history; documenting in the medical record; reviewing/ordering tests, medications or procedures; communicating with other healthcare professionals and discussing with patient's family/caregivers. Chief Complaint: Dizziness    History of Present Illness:  Chi Wells is a 46 y.o. female with a PMH of depression, hypertension, hyperlipidemia, seizure disorder, diabetes mellitus type 2 poorly controlled on insulin with chronic kidney disease stage III and neuropathy who presents with dizziness. Patient reports she had reaction to new medication for diabetes. She reported dizziness, headache, speech difficulty, neck pain, weakness b/l upper extremity. Felt that things were spinning. She has chronic nausea and that she will have an EGD on 7/18/2023    Review of Systems:  Review of Systems   Constitutional: Negative for chills and fever. HENT: Positive for trouble swallowing. Negative for rhinorrhea and sore throat. Eyes: Negative for redness and visual disturbance. Respiratory: Negative for cough and shortness of breath. Cardiovascular: Negative for chest pain and leg swelling. Gastrointestinal: Positive for nausea. Negative for abdominal pain, diarrhea and vomiting. Genitourinary: Negative for dysuria and hematuria. Musculoskeletal: Positive for neck pain.  Negative for back pain.   Skin: Negative for rash and wound. Neurological: Positive for dizziness, speech difficulty, weakness and headaches. Psychiatric/Behavioral: Negative for agitation and confusion. Past Medical and Surgical History:   Past Medical History:   Diagnosis Date   • Atypical chest pain    • Depression    • Diabetes mellitus ([address])    • Gastroparesis    • GERD (gastroesophageal reflux disease)    • Hyperlipidemia    • Hypertension    • Sciatica    • Seizure disorder ([address])        Past Surgical History:   Procedure Laterality Date   • BREAST BIOPSY Left  benign   • CHOLECYSTECTOMY     • COLONOSCOPY     • HYSTERECTOMY     • [place] LAPAROSCOPIC APPENDECTOMY N/A 3/24/2021    Procedure: APPENDECTOMY LAPAROSCOPIC;  Surgeon: Adrian Vallejo MD;  Location: [address] [place] [address] OR;  Service: General   • [place] LAPS ABD PRTM&OMENTUM DX W/WO SPEC BR/WA SPX N/A 3/24/2021    Procedure: LAPAROSCOPY DIAGNOSTIC, EXTENSIVE DESI;  Surgeon: Adrian Vallejo MD;  Location: [address] [place] [address] OR;  Service: General   • UPPER GASTROINTESTINAL ENDOSCOPY         Meds/Allergies:  Prior to Admission medications    Medication Sig Start Date End Date Taking?  Authorizing Provider   albuterol (PROVENTIL HFA,VENTOLIN HFA) 90 mcg/act inhaler INHALE ONE PUFF BY MOUTH AND INTO THE LUNGS EVERY 6 HOURS AS NEEDED FOR WHEEZING 11/16/22   SYLWIA Longoria   Alcohol Swabs (Pharmacist Choice Alcohol) PADS USE AS DIRECTED 1/23/23   SYLWIA Longoria   Aspirin (ASPIR-81 PO) Take 81 mg by mouth 2/20/12   Historical Provider, MD   atorvastatin (LIPITOR) 80 mg tablet TAKE 1 TABLET (80 MG TOTAL) BY MOUTH DAILY AT BEDTIME 1/16/23   Markos Rivera, DO   B-D UF III MINI PEN NEEDLES 31G X 5 MM MISC Pt uses 5 pen needles daily 12/16/22   Vineet Washington DO   cholecalciferol (VITAMIN D3) 400 units tablet TAKE ONE TABLET BY MOUTH DAILY 1/11/23   SYLWIA Longoria   citalopram (CeleXA) 40 mg tablet Take 1 tablet (40 mg total) by mouth daily 5/11/23   SYLWIA Longoria Continuous Blood Gluc  [facility] 14 Day [place]) TEMITOPE Use for continuous blood glucose monitoring 10/7/21   SYLWIA Peterson   Continuous Blood Gluc Sensor (FreeStyle [place] 14 Day Sensor) MISC Use one sensor every 14 days for continuous blood sugar monitoring.  2/25/22   SYLWIA Peterson   docusate sodium (COLACE) 100 mg capsule TAKE ONE CAPSULE BY MOUTH TWICE DAILY 1/30/23   SYLWIA Eastman   dulaglutide (Trulicity) 3 BS/4.3VW injection Inject 0.5 mL (3 mg total) under the skin once a week 3/16/23   Yusef Cool PA-C   Empagliflozin (Jardiance) 25 MG TABS Take 1 tablet (25 mg total) by mouth every morning 11/16/22   Yusef Cool PA-C   ergocalciferol (VITAMIN D2) 50,000 units Take 1 capsule (50,000 Units total) by mouth once a week 10/26/22   Yusef Cool PA-C   famotidine (PEPCID) 40 MG tablet Take 1 tablet (40 mg total) by mouth daily at bedtime Take in evening 2 hours prior to bed 6/16/23   Uri Parnell DO   fenofibrate (TRICOR) 48 mg tablet TAKE ONE TABLET BY MOUTH DAILY 11/16/22   SYLWIA Larson   fluconazole (DIFLUCAN) 150 mg tablet Take 1 tablet (150 mg total) by mouth every 3 (three) days for 2 doses 7/13/23 7/17/23  Alem Denson DO   Insulin Glargine Solostar (Lantus SoloStar) 100 UNIT/ML SOPN 52 units daily 3/27/23   Adriana Romero PA-C   insulin glulisine (Apidra SoloStar) 100 units/mL injection pen Inject 12 units with breakfast and lunch, 14 units with dinner, with scale ISF 25 @ 150 as needed 3/27/23   Adriana So PA-C   Iron Heme Polypeptide 12 MG TABS Take 1 tablet by mouth    Historical Provider, MD   levETIRAcetam (KEPPRA) 500 mg tablet TAKE ONE TABLET TWICE DAILY 1/12/23   SYLWIA Eastman   linaCLOtide (Linzess) 72 MCG CAPS Take 1 capsule by mouth daily before breakfast 2/24/22   Uri Grullonale,    meclizine (ANTIVERT) 25 mg tablet Take 1 tablet (25 mg total) by mouth 3 (three) times a day as needed for dizziness 7/10/23   Richmond Jeans, CRNP   metoprolol tartrate (LOPRESSOR) 25 mg tablet Take 1 tablet (25 mg total) by mouth 2 (two) times a day 23   Richmond Jeans, CRNP   multivitamin (THERAGRAN) TABS Take 1 tablet by mouth every morning    Historical Provider, MD   nystatin (MYCOSTATIN) cream Apply topically 2 (two) times a day 23   Kaden Sullivanal,    omeprazole (PriLOSEC) 40 MG capsule Take 1 capsule (40 mg total) by mouth daily 23   Chino Parnell, DO   ondansetron (ZOFRAN) 4 mg tablet Take 1 tablet (4 mg total) by mouth every 8 (eight) hours as needed for nausea or vomiting 23   Richmond Jeans, CRNP   OneTouch Ultra test strip USE 4 TIMES A DAY 23   SYLWIA Flores   Pharmacist Choice Lancets MISC USE AS DIRECTED 3/13/23   Richmond Jeans, CRNP   pregabalin (LYRICA) 100 mg capsule TAKE ONE CAPSULE BY MOUTH THREE TIMES A DAY 23   SYLWIA Larson   Restasis 0.05 % ophthalmic emulsion  3/2/23   Historical Provider, MD   scopolamine (TRANSDERM-SCOP) 1 mg/3 days TD 72 hr patch Place 1 patch on the skin over 72 hours every third day 23   Richmond Jeans, CRNP     I have reviewed home medications with patient personally. Allergies:    Allergies   Allergen Reactions   • Azithromycin GI Intolerance and Hives   • Benztropine    • Butorphanol Hallucinations   • Penicillins    • Zolpidem        Social History:  Marital Status: /Civil Union   Occupation: Pharmacy tech  Patient Pre-hospital Living Situation: Home  Patient Pre-hospital Level of Mobility: walks  Patient Pre-hospital Diet Restrictions: None  Substance Use History:   Social History     Substance and Sexual Activity   Alcohol Use Never     Social History     Tobacco Use   Smoking Status Former   • Types: Cigarettes   • Quit date:    • Years since quittin.5   Smokeless Tobacco Never     Social History     Substance and Sexual Activity   Drug Use Never       Family History:  Family History   Problem Relation Age of Onset   • No Known Problems Mother    • No Known Problems Father    • No Known Problems Daughter    • No Known Problems Maternal Grandmother    • No Known Problems Paternal Grandmother    • No Known Problems Paternal Aunt    • No Known Problems Paternal Aunt    • No Known Problems Paternal Aunt        Physical Exam:     Vitals:   Blood Pressure: 148/81 (07/15/23 0130)  Pulse: 63 (07/15/23 0130)  Temperature: 97.9 °F (36.6 °C) (07/14/23 2252)  Temp Source: Tympanic (07/14/23 2252)  Respirations: 18 (07/15/23 0130)  SpO2: 94 % (07/15/23 0030)    Physical Exam  Vitals reviewed. Constitutional:       Appearance: She is obese. Comments: Middle-age  female sleeping, arousable   HENT:      Head: Normocephalic and atraumatic. Nose: Nose normal.   Eyes:      General:         Right eye: No discharge. Left eye: No discharge. Extraocular Movements: Extraocular movements intact. Conjunctiva/sclera: Conjunctivae normal.   Cardiovascular:      Rate and Rhythm: Normal rate and regular rhythm. Pulmonary:      Effort: Pulmonary effort is normal. No respiratory distress. Breath sounds: Normal breath sounds. No wheezing. Abdominal:      General: Bowel sounds are normal. There is no distension. Palpations: Abdomen is soft. Tenderness: There is no abdominal tenderness. There is no guarding. Musculoskeletal:         General: No swelling or tenderness. Normal range of motion. Cervical back: Normal range of motion. Right lower leg: No edema. Left lower leg: No edema. Skin:     General: Skin is warm and dry. Capillary Refill: Capillary refill takes less than 2 seconds. Neurological:      General: No focal deficit present. Mental Status: Mental status is at baseline. GCS: GCS eye subscore is 4. GCS verbal subscore is 5. GCS motor subscore is 6. Sensory: No sensory deficit. Motor: No pronator drift. Coordination: Heel to Shin Test normal. Rapid alternating movements normal.   Psychiatric:         Mood and Affect: Mood normal.         Behavior: Behavior normal.         Thought Content: Thought content normal.         Judgment: Judgment normal.        Additional Data:     Lab Results:  Results from last 7 days   Lab Units 07/14/23  2322   WBC Thousand/uL 7.82   HEMOGLOBIN g/dL 13.1   HEMATOCRIT % 41.4   PLATELETS Thousands/uL 330   NEUTROS PCT % 50   LYMPHS PCT % 41   MONOS PCT % 7   EOS PCT % 2     Results from last 7 days   Lab Units 07/14/23  2322   SODIUM mmol/L 135   POTASSIUM mmol/L 3.7   CHLORIDE mmol/L 99   CO2 mmol/L 28   BUN mg/dL 18   CREATININE mg/dL 1.66*   ANION GAP mmol/L 8   CALCIUM mg/dL 9.3   ALBUMIN g/dL 4.3   TOTAL BILIRUBIN mg/dL 0.34   ALK PHOS U/L 107*   ALT U/L 19   AST U/L 18   GLUCOSE RANDOM mg/dL 217*         Results from last 7 days   Lab Units 07/10/23  2039 07/10/23  1628 07/10/23  1511 07/10/23  1245   POC GLUCOSE mg/dl 202* 347* 475* 461*       Lines/Drains:  Invasive Devices     Peripheral Intravenous Line  Duration           Peripheral IV 07/14/23 Left Antecubital <1 day              Imaging: V rad read no definite acute vascular abnormality. Left ICA stenosis  CTA head and neck with and without contrast    (Results Pending)   MRI Inpatient Order    (Results Pending)       EKG and Other Studies Reviewed on Admission:   · EKG: NSR. HR 64, reviewed and muse personally. ** Please Note: This note has been constructed using a voice recognition system.  **

## 2023-07-15 NOTE — PLAN OF CARE
Problem: OCCUPATIONAL THERAPY ADULT  Goal: Performs self-care activities at highest level of function for planned discharge setting. See evaluation for individualized goals. Description: Treatment Interventions: ADL retraining, Functional transfer training, UE strengthening/ROM, Endurance training, Patient/family training, Neuromuscular reeducation, Compensatory technique education, Energy conservation, Activityengagement    See flowsheet documentation for full assessment, interventions and recommendations. Note: Limitation: Decreased ADL status, Decreased UE strength, Decreased Safe judgement during ADL, Decreased endurance, Decreased sensation, Decreased self-care trans, Decreased high-level ADLs  Prognosis: Good  Assessment: Pt is a 46 y.o. female seen for OT evaluation s/p admit to [facility] on 7/14/2023 w/ Dizziness. Comorbidities affecting pt's functional performance at time of assessment include: Depression, DM, HLD, HTN, Seizure disorder. Personal factors affecting pt at time of IE include:steps to enter environment and difficulty performing ADLS. Prior to admission, pt was Mod I with ADLs. Upon evaluation: the following deficits impact occupational performance: decreased strength, decreased balance, decreased tolerance and impaired sensation. Pt to benefit from continued skilled OT tx while in the hospital to address deficits as defined above and maximize level of functional independence w ADL's and functional mobility. Occupational Performance areas to address include: bathing/shower, toilet hygiene, dressing, functional mobility and clothing management. From OT standpoint, recommendation at time of d/c would be Out-patient OT.      OT Discharge Recommendation: Home with outpatient rehabilitation     Stevan Lizarraga MS, OTR/L

## 2023-07-15 NOTE — PLAN OF CARE
Problem: PAIN - ADULT  Goal: Verbalizes/displays adequate comfort level or baseline comfort level  Description: Interventions:  - Encourage patient to monitor pain and request assistance  - Assess pain using appropriate pain scale  - Administer analgesics based on type and severity of pain and evaluate response  - Implement non-pharmacological measures as appropriate and evaluate response  - Consider cultural and social influences on pain and pain management  - Notify physician/advanced practitioner if interventions unsuccessful or patient reports new pain  Outcome: Progressing     Problem: INFECTION - ADULT  Goal: Absence or prevention of progression during hospitalization  Description: INTERVENTIONS:  - Assess and monitor for signs and symptoms of infection  - Monitor lab/diagnostic results  - Monitor all insertion sites, i.e. indwelling lines, tubes, and drains  - Monitor endotracheal if appropriate and nasal secretions for changes in amount and color  - [place] appropriate cooling/warming therapies per order  - Administer medications as ordered  - Instruct and encourage patient and family to use good hand hygiene technique  - Identify and instruct in appropriate isolation precautions for identified infection/condition  Outcome: Progressing  Goal: Absence of fever/infection during neutropenic period  Description: INTERVENTIONS:  - Monitor WBC    Outcome: Progressing

## 2023-07-15 NOTE — PHYSICAL THERAPY NOTE
Physical Therapy Evaluation     Patient's Name: Norma Eastman    Admitting Diagnosis  Dizziness [R42]  Nausea [R11.0]  Medication side effects [T88. 7XXA]    Problem List  Patient Active Problem List   Diagnosis    Radiculopathy, lumbar region    Anterior tibial tendonitis, right    Bipolar disorder (HCC)    Chronic low back pain with sciatica    Type 2 diabetes mellitus with hyperglycemia, with long-term current use of insulin (HCC)    Diabetic polyneuropathy associated with type 2 diabetes mellitus (HCC)    Gastroparesis    Hypertension associated with diabetes ([address])    Spondylolisthesis of lumbar region    Class 1 obesity due to excess calories with serious comorbidity and body mass index (BMI) of 34.0 to 34.9 in adult    Clear cell carcinoma of kidney, right (HCC)    Internal hernia    Intra-abdominal adhesions    Depression with anxiety    Mild intermittent asthma without complication    Hypercholesteremia    Hypertriglyceridemia    Iron deficiency anemia secondary to inadequate dietary iron intake    Encounter for post surgical wound check    GERD (gastroesophageal reflux disease)    History of colon polyps    Slow transit constipation    History of kidney cancer    Vitamin D deficiency    Benign hypertension with CKD (chronic kidney disease) stage III (HCC)    Secondary hyperparathyroidism (HCC)    Flank pain    Gastric distention    Generalized abdominal pain    Abnormal CT scan, stomach    Dizziness    Seizure disorder [facility])       Past Medical History  Past Medical History:   Diagnosis Date    Atypical chest pain     Depression     Diabetes mellitus ([address])     Gastroparesis     GERD (gastroesophageal reflux disease)     Hyperlipidemia     Hypertension     Sciatica     Seizure disorder ([address])        Past Surgical History  Past Surgical History:   Procedure Laterality Date    BREAST BIOPSY Left  benign    CHOLECYSTECTOMY      COLONOSCOPY      HYSTERECTOMY      PA LAPAROSCOPIC APPENDECTOMY N/A 3/24/2021 Procedure: APPENDECTOMY LAPAROSCOPIC;  Surgeon: Ernesto Hartmann MD;  Location: [facility] MAIN OR;  Service: General    DC LAPS ABD PRTM&OMENTUM DX W/WO SPEC BR/WA SPX N/A 3/24/2021    Procedure: LAPAROSCOPY DIAGNOSTIC, EXTENSIVE DESI;  Surgeon: Ernesto Hartmann MD;  Location: [facility] MAIN OR;  Service: General    UPPER GASTROINTESTINAL ENDOSCOPY          07/15/23 1041   PT Last Visit   PT Visit Date 07/15/23   Note Type   Note type Evaluation   Pain Assessment   Pain Assessment Tool 0-10   Pain Score 9   Pain Location/Orientation Location: Head  (headache)   Pain Onset/Description Onset: Ongoing;Frequency: Constant/Continuous; Descriptor: Aching   Effect of Pain on Daily Activities yes   Patient's Stated Pain Goal No pain   Hospital Pain Intervention(s) Ambulation/increased activity; Emotional support; Environmental changes   Multiple Pain Sites No   Restrictions/Precautions   Weight Bearing Precautions Per Order No   Other Precautions Multiple lines;Telemetry; Fall Risk;Pain  (obesity)   Home Living   Type of [address] [place] [address] Dr Two level;Performs ADLs on one level; Able to live on main level with bedroom/bathroom;Stairs to enter with rails  (3 VARGAS)   Bathroom Shower/Tub Walk-in shower   Bathroom Toilet Raised   Bathroom Equipment Built-in shower seat   [address] Walker;Cane  (did not utilize an AD prior to arrival)   Prior Function   Level of [place] Independent with ADLs; Independent with functional mobility; Independent with IADLS   Lives With Family; Spouse   Receives Help From Family  (prn)   IADLs Independent with driving; Independent with meal prep; Independent with medication management   Falls in the last 6 months 0  (denies)   Vocational Full time employment   General   Additional Pertinent History Myrisa OT present for co-assessment due to medical complexity, required skilled interventions of 2 clinicians for care delivery.    Family/Caregiver Present No   Cognition Overall Cognitive Status WFL   Arousal/Participation Alert   Orientation Level Oriented X4   Memory Within functional limits   Following Commands Follows all commands and directions without difficulty   Comments Stephy Morning was agreeable to PT assessment, pleasant. RLE Assessment   RLE Assessment X  (4-/5 gross musculature)   LLE Assessment   LLE Assessment X  (3+/5 gross musculature)   Vision-Basic Assessment   Current Vision Wears glasses all the time   Vestibular   Spontaneous Nystagmus (-) no evidence of nystagmus at rest in room light   Gaze Holding Nystagmus (-) no evidence of nystagmus   Coordination   Movements are Fluid and Coordinated 0   Coordination and Movement Description Incremental mobility requiring increased time. Light Touch   RLE Light Touch Grossly intact   LLE Light Touch Impaired   LLE Light Touch Comments Dimmed feeling of light touch L lower leg, foot   Sharp/Dull   RLE Sharp/Dull Grossly intact   LLE Sharp/Dull Impaired   Bed Mobility   Supine to Sit 4  Minimal assistance   Additional items Assist x 1;Bedrails;HOB elevated; Increased time required;Verbal cues;LE management   Sit to Supine   (DNT as Stephy Morning was sitting out of bed on the recliner upon conclusion.)   Additional Comments Verbal cues for bedrail utilization and proper body mechanics. Lightheadedness reported upon static sitting balance achievement. Symptomatic improvement with increased time. Transfers   Sit to Stand   (CGA)   Additional items Assist x 1;Bedrails; Increased time required;Verbal cues   Stand to Sit   (CGA)   Additional items Assist x 1;Bedrails; Increased time required;Verbal cues   Stand pivot   (CGA)   Additional items Assist x 1; Increased time required;Verbal cues   Toilet transfer   (CGA)   Additional items Assist x 1; Increased time required;Verbal cues;Standard toilet; Other  (L grab bar)   Additional Comments Verbal cues for proper body mechanics and B hand placement with transitional movements. Ambulation/Elevation   Gait pattern Improper Weight shift;Narrow SON; Forward Flexion;Decreased foot clearance; Short stride; Inconsistent jaida   Gait Assistance   (CGA)   Additional items Assist x 1;Verbal cues; Tactile cues   Assistive Device Rolling walker  (no AD for initial 10 feet, then provided due to noted instability)   Distance 45 feet   Stair Management Assistance Not tested   Ambulation/Elevation Additional Comments Verbal cues for base of support widening, safety while turning. Balance   Static Sitting Good   Dynamic Sitting Fair   Static Standing Fair   Dynamic Standing Fair -   Ambulatory Fair -   Endurance Deficit   Endurance Deficit Yes   Activity Tolerance   Activity Tolerance Patient limited by fatigue;Patient limited by pain   Nurse Made Aware yes, Edilberto Rice RN   Assessment   Prognosis Good   Problem List Decreased strength;Decreased endurance; Impaired balance;Decreased mobility; Decreased coordination; Impaired sensation;Obesity;Pain   Assessment Pt is 46 y.o. female seen for PT evaluation s/p admit to [address] [place] [address] “” [place] on 7/14/2023 w/ Dizziness. PT consulted to assess pt's functional mobility and d/c needs. Order placed for PT eval and tx, w/ up and OOB as tolerated order. Comorbidities affecting pt's physical performance at time of assessment include: weakness, dizziness,class 1 obesity, depression with anxiety, seizure d/o,type 2 DM,HTN . PTA, pt was independent w/ all functional mobility w/ o AD utilization. Personal factors affecting pt at time of IE include: stairs to enter home, inability to navigate community distances, inability to navigate level surfaces w/o external assistance, unable to perform dynamic tasks in community, anxiety, depression, unable to perform physical activity and inability to perform IADLs.  Please find objective findings from PT assessment regarding body systems outlined above with impairments and limitations including weakness, impaired balance, decreased endurance, gait deviations, decreased activity tolerance, decreased functional mobility tolerance, altered sensation and fall risk. From PT/mobility standpoint, recommendation at time of d/c would be home with outpatient rehabilitation pending progress in order to facilitate return to PLOF. Goals   Patient Goals to get better soon and get back to work   LT Expiration Date 07/25/23   Long Term Goal #1 1.)Patient will complete bed mobility supervision of 1 for decrease need for caregiver assistance, decrease burden of care. 2.) Patient will complete transfers supervision of 1 to decrease risk of falls, facilitate upright standing posture. 3.) BLE strength to greater than/equal to 4/5 gross musculature to increase ability to safely transfer, control descent to chair. 4.) Patient will exhibit increase dynamic standing to Good 3-5 minutes without LOB supervision of 1 to improve activity tolerance. 5.) Patient will exhibit increase dynamic ambulatory balance to Fair  feet w/AD  supervision of 1 to improve ability to mobilize to toilet, chair and decrease risk for additional medical complications. 6.) Patient will exhibit good self monitoring and ability to follow 2 step commands to increase complexity of tasks and resume ADL's without LOB. PT Treatment Day 0   Plan   Treatment/Interventions Functional transfer training;LE strengthening/ROM; Elevations; Therapeutic exercise; Endurance training;Patient/family training;Equipment eval/education; Bed mobility;Gait training; Compensatory technique education;Spoke to nursing   PT Frequency 3-5x/wk   Recommendation   PT Discharge Recommendation Home with outpatient rehabilitation   Equipment Recommended [facility] Recommended Wheeled walker   Change/add to [employer]? No   Additional Comments Upon conclusion, Royce Raman was resting out of bed on the recliner. All of her needs were within reach.    AM-PAC Basic Mobility Inpatient   Turning in Flat Bed Without Bedrails 3   Lying on Back to Sitting on Edge of Flat Bed Without Bedrails 3   Moving Bed to Chair 3   Standing Up From Chair Using Arms 3   Walk in Room 3   Climb 3-5 Stairs With Railing 3   Basic Mobility Inpatient Raw Score 18   Basic Mobility Standardized Score 41.05   Highest Level Of Mobility   -HLM Goal 6: Walk 10 steps or more   -HLM Achieved 7: Walk 25 feet or more     History/Personal Factors/Comorbidities: weakness, dizziness,class 1 obesity, depression with anxiety, seizure d/o,type 2 DM,HTN    # of body structures/limitations: muscle weakness, activity intolerance,decreased endurance, impaired balance, gait deviations,pain    Clinical presentation: unstable as seen in pain severity constant in nature, fall risk, progressive symptoms prior to hospitalization, impaired sensation    Initial Assessment Time: [id number]    Lillian Lorenzo, PT

## 2023-07-15 NOTE — ASSESSMENT & PLAN NOTE
· Patient seen by GI on 7/13 secondary to report of nausea and gastroparesis  · Felt nausea possibly related to Trulicity  · For EGD on 7/18/23

## 2023-07-15 NOTE — OCCUPATIONAL THERAPY NOTE
Occupational Therapy Evaluation      Norma Eastman    7/15/2023    Principal Problem:    Dizziness  Active Problems:    Type 2 diabetes mellitus with hyperglycemia, with long-term current use of insulin (HCC)    Gastroparesis    Class 1 obesity due to excess calories with serious comorbidity and body mass index (BMI) of 34.0 to 34.9 in adult    Depression with anxiety    GERD (gastroesophageal reflux disease)    Benign hypertension with CKD (chronic kidney disease) stage III (HCC)    Seizure disorder (HCC)      Past Medical History:   Diagnosis Date    Atypical chest pain     Depression     Diabetes mellitus ([address])     Gastroparesis     GERD (gastroesophageal reflux disease)     Hyperlipidemia     Hypertension     Sciatica     Seizure disorder ([address])        Past Surgical History:   Procedure Laterality Date    BREAST BIOPSY Left  benign    CHOLECYSTECTOMY      COLONOSCOPY      HYSTERECTOMY      GA LAPAROSCOPIC APPENDECTOMY N/A 3/24/2021    Procedure: APPENDECTOMY LAPAROSCOPIC;  Surgeon: Jett Godwin MD;  Location: [address] [place] [address];  Service: General    GA LAPS ABD PRTM&OMENTUM DX W/WO SPEC BR/WA SPX N/A 3/24/2021    Procedure: LAPAROSCOPY DIAGNOSTIC, EXTENSIVE DESI;  Surgeon: Jett Godwin MD;  Location: [address] [place] [address];  Service: General    UPPER GASTROINTESTINAL ENDOSCOPY          07/15/23 1100   OT Last Visit   OT Visit Date 07/15/23   Note Type   Note type Evaluation   Pain Assessment   Pain Assessment Tool 0-10   Pain Score 9   Pain Location/Orientation Location: Head  (headache)   Pain Onset/Description Onset: Ongoing;Frequency: Constant/Continuous; Descriptor: Aching   Effect of Pain on Daily Activities yes   Patient's Stated Pain Goal No pain   Hospital Pain Intervention(s) Ambulation/increased activity; Emotional support; Environmental changes   Multiple Pain Sites No   Restrictions/Precautions   Weight Bearing Precautions Per Order No   Other Precautions Multiple lines;Telemetry; Fall Risk;Pain  (obesity)   Home Living   Type of [address] [place] [address] Dr Two level;Performs ADLs on one level; Able to live on main level with bedroom/bathroom;Stairs to enter with rails  (3 VARGAS)   Bathroom Shower/Tub Walk-in shower   Bathroom Toilet Raised   Bathroom Equipment Built-in shower seat   [address] Walker;Cane  (did not utilize an AD prior to arrival)   Prior Function   Level of [place] Independent with ADLs; Independent with functional mobility; Independent with IADLS   Lives With Family; Spouse   Receives Help From Family  (prn)   IADLs Independent with driving; Independent with meal prep; Independent with medication management   Falls in the last 6 months 0  (denies)   Vocational Full time employment   ADL   UB Bathing Assistance 5  Supervision/Setup   LB [address] 5  [facility] Deficit Setup;Verbal cueing;Supervison/safety; Increased time to complete;Grab bar use   Bed Mobility   Supine to Sit 4  Minimal assistance   Additional items Assist x 1;Bedrails; Increased time required;Verbal cues   Additional Comments Pt remained OOB in recliner upon conclusion   Transfers   Sit to Stand   (CGA)   Additional items Assist x 1; Increased time required;Verbal cues   Stand to Sit   (CGA)   Additional items Assist x 1; Armrests; Increased time required;Verbal cues   Toilet transfer   (CGA)   Additional items Assist x 1; Increased time required;Verbal cues;Standard toilet  (L grab bar)   Functional Mobility   Functional Mobility   (CGA)   Additional Comments A x1 in hallway to bathroom and back   Additional items Rolling walker   Balance   Static Sitting Good   Dynamic Sitting Fair   Static Standing Fair   Dynamic Standing [facility] -   Activity Tolerance   Activity Tolerance Patient limited by fatigue;Patient limited by pain Nurse Made Aware RN Patricia Fell Assessment   RUE Assessment X  (AROM WFL)   RUE Strength   RUE Overall Strength Deficits  (3+/5)   LUE Assessment   LUE Assessment X  (AROM WFL)   LUE Strength   LUE Overall Strength Deficits  (3/5)   Hand Function   Gross Motor Coordination Functional   Fine Motor Coordination Functional  (appears intact, Pt reports it feels "different" in B hands)   Sensation   Light Touch Partial deficits in the RUE  (slight deficits in entire L UE and L LE)   Vision-Basic Assessment   Current Vision Wears glasses all the time   Vision - Complex Assessment   Ocular Range of Motion Intact   Tracking Intact  (Pt c/o increased dizziness during and s/p tracking, however resolves with increased time)   Additional Comments slight deficits with L peripheral visual field   Cognition   Overall Cognitive Status WFL   Arousal/Participation Alert; Cooperative   Attention Within functional limits   Orientation Level Oriented X4   Memory Within functional limits   Following Commands Follows all commands and directions without difficulty   Assessment   Limitation Decreased ADL status; Decreased UE strength;Decreased Safe judgement during ADL;Decreased endurance;Decreased sensation;Decreased self-care trans;Decreased high-level ADLs   Prognosis Good   Assessment Pt is a 46 y.o. female seen for OT evaluation s/p admit [facility] on 7/14/2023 w/ Dizziness. Comorbidities affecting pt's functional performance at time of assessment include: Depression, DM, HLD, HTN, Seizure disorder. Personal factors affecting pt at time of IE include:steps to enter environment and difficulty performing ADLS. Prior to admission, pt was Mod I with ADLs. Upon evaluation: the following deficits impact occupational performance: decreased strength, decreased balance, decreased tolerance and impaired sensation.  Pt to benefit from continued skilled OT tx while in the hospital to address deficits as defined above and maximize level of functional independence w ADL's and functional mobility. Occupational Performance areas to address include: bathing/shower, toilet hygiene, dressing, functional mobility and clothing management. From OT standpoint, recommendation at time of d/c would be Out-patient OT. Goals   Patient Goals to get better soon and get back to work   Plan   Treatment Interventions ADL retraining;Functional transfer training;UE strengthening/ROM; Endurance training;Patient/family training;Neuromuscular reeducation; Compensatory technique education; Energy conservation; Activityengagement   Goal Expiration Date 07/25/23   OT Treatment Day 0   OT Frequency 3-5x/wk   Recommendation   OT Discharge Recommendation Home with outpatient rehabilitation   Additional Comments  Pt seen as a co-eval with PT due to the patient's co-morbidities, clinically unstable presentation, and present impairments which are a regression from the patient's baseline. Additional Comments 2 The patient's raw score on the AM-PAC Daily Activity Inpatient Short Form is 20. A raw score of greater than or equal to 19 suggests the patient may benefit from discharge to home. Please refer to the recommendation of the Occupational Therapist for safe discharge planning.    AM-PAC Daily Activity Inpatient   Lower Body Dressing 3   Bathing 3   Toileting 3   Upper Body Dressing 3   Grooming 4   Eating 4   Daily Activity Raw Score 20   Daily Activity Standardized Score (Calc for Raw Score >=11) 42.03   AM-PAC Applied Cognition Inpatient   Following a Speech/Presentation 4   Understanding Ordinary Conversation 4   Taking Medications 4   Remembering Where Things Are Placed or Put Away 4   Remembering List of 4-5 Errands 4   Taking Care of Complicated Tasks 4   Applied Cognition Raw Score 24   Applied Cognition Standardized Score 62.21     GOALS:    Pt will achieve the following within specified time frame: STG  Pt will achieve the following goals within 5 days    *ADL transfers with (S) for inc'd independence with ADLs/purposeful tasks    *UB ADL with (I) for inc'd independence with self cares    *LB ADL with (S) using AE prn for inc'd independence with self cares    *Toileting with (S) for clothing management and hygiene for return to PLOF with personal care    *Increase static stand balance to F+ and dyn stand balance to F for inc'd safety with standing purposeful tasks    *Increase stand tolerance x3 m for inc'd tolerance with standing purposeful tasks    *Participate in 10m UE therex to increase overall stamina/activity tolerance for purposeful tasks    *Bed mobility- (S) for inc'd independence to manage own comfort and initiate EOB & OOB purposeful tasks    Pt will achieve the following within specified time frame: LTG  Pt will achieve the following goals within 10 days    *ADL transfers with (I) for inc'd independence with ADLs/purposeful tasks    *LB ADL with (I) using AE prn for inc'd independence with self cares    *Toileting with (I) for clothing management and hygiene for return to PLOF with personal care    *Increase static stand balance to G- and dyn stand balance to F+ for inc'd safety with standing purposeful tasks    *Increase stand tolerance x5 m for inc'd tolerance with standing purposeful tasks    *Bed mobility- (I) for inc'd independence to manage own comfort and initiate EOB & OOB purposeful tasks      Jagjit Noel MS, OTR/L

## 2023-07-15 NOTE — ED PROVIDER NOTES
History  Chief Complaint   Patient presents with   • Medication Problem     Pt notes she recently started Ozempic (first dose today) and has had dizziness, nausea, difficulty walking, and fatigue since     Patient is a 54-year-old female with a history of diabetes, hypertension, hyperlipidemia, who presents for evaluation of dizziness, nausea, headache, fatigue. Patient says the symptoms started shortly after taking her first dose of an Ozempic earlier today. She says she feels like she is "lightheaded and like things are spinning around her."  He says that she had difficulty walking due to the symptoms. She admits to a posterior headache which has been gradual in nature. He denies any chest pain, shortness of breath. She does admit to persistent nausea with trying to eat tonight. She admits to "weakness in her upper arms" but this is not unilateral.          Prior to Admission Medications   Prescriptions Last Dose Informant Patient Reported? Taking? Alcohol Swabs (Pharmacist Choice Alcohol) PADS  Self No No   Sig: USE AS DIRECTED   Aspirin (ASPIR-81 PO)  Self Yes No   Sig: Take 81 mg by mouth   B-D UF III MINI PEN NEEDLES 31G X 5 MM MISC  Self No No   Sig: Pt uses 5 pen needles daily   Continuous Blood Gluc  (FreeStyle [place] 14 Day [place]) TEMITOPE  Self No No   Sig: Use for continuous blood glucose monitoring   Continuous Blood Gluc Sensor (FreeStyle [place]io 14 Day Sensor) MISC  Self No No   Sig: Use one sensor every 14 days for continuous blood sugar monitoring.    Empagliflozin (Jardiance) 25 MG TABS  Self No No   Sig: Take 1 tablet (25 mg total) by mouth every morning   Insulin Glargine Solostar (Lantus SoloStar) 100 UNIT/ML SOPN  Self No No   Si units daily   Iron Heme Polypeptide 12 MG TABS  Self Yes No   Sig: Take 1 tablet by mouth   OneTouch Ultra test strip  Self No No   Sig: USE 4 TIMES A DAY   Pharmacist Choice Lancets MISC  Self No No   Sig: USE AS DIRECTED   Restasis 0.05 % ophthalmic emulsion  Self Yes No   albuterol (PROVENTIL HFA,VENTOLIN HFA) 90 mcg/act inhaler  Self No No   Sig: INHALE ONE PUFF BY MOUTH AND INTO THE LUNGS EVERY 6 HOURS AS NEEDED FOR WHEEZING   atorvastatin (LIPITOR) 80 mg tablet  Self No No   Sig: TAKE 1 TABLET (80 MG TOTAL) BY MOUTH DAILY AT BEDTIME   cholecalciferol (VITAMIN D3) 400 units tablet  Self No No   Sig: TAKE ONE TABLET BY MOUTH DAILY   citalopram (CeleXA) 40 mg tablet  Self No No   Sig: Take 1 tablet (40 mg total) by mouth daily   docusate sodium (COLACE) 100 mg capsule  Self No No   Sig: TAKE ONE CAPSULE BY MOUTH TWICE DAILY   dulaglutide (Trulicity) 3 DB/9.5NP injection  Self No No   Sig: Inject 0.5 mL (3 mg total) under the skin once a week   ergocalciferol (VITAMIN D2) 50,000 units  Self No No   Sig: Take 1 capsule (50,000 Units total) by mouth once a week   famotidine (PEPCID) 40 MG tablet   No No   Sig: Take 1 tablet (40 mg total) by mouth daily at bedtime Take in evening 2 hours prior to bed   fenofibrate (TRICOR) 48 mg tablet  Self No No   Sig: TAKE ONE TABLET BY MOUTH DAILY   fluconazole (DIFLUCAN) 150 mg tablet   No No   Sig: Take 1 tablet (150 mg total) by mouth every 3 (three) days for 2 doses   insulin glulisine (Apidra SoloStar) 100 units/mL injection pen  Self No No   Sig: Inject 12 units with breakfast and lunch, 14 units with dinner, with scale ISF 25 @ 150 as needed   levETIRAcetam (KEPPRA) 500 mg tablet  Self No No   Sig: TAKE ONE TABLET TWICE DAILY   linaCLOtide (Linzess) 72 MCG CAPS  Self No No   Sig: Take 1 capsule by mouth daily before breakfast   meclizine (ANTIVERT) 25 mg tablet   No No   Sig: Take 1 tablet (25 mg total) by mouth 3 (three) times a day as needed for dizziness   metoprolol tartrate (LOPRESSOR) 25 mg tablet  Self No No   Sig: Take 1 tablet (25 mg total) by mouth 2 (two) times a day   multivitamin (THERAGRAN) TABS  Self Yes No   Sig: Take 1 tablet by mouth every morning   nystatin (MYCOSTATIN) cream   No No   Sig: Apply topically 2 (two) times a day   omeprazole (PriLOSEC) 40 MG capsule   No No   Sig: Take 1 capsule (40 mg total) by mouth daily   ondansetron (ZOFRAN) 4 mg tablet   No No   Sig: Take 1 tablet (4 mg total) by mouth every 8 (eight) hours as needed for nausea or vomiting   pregabalin (LYRICA) 100 mg capsule   No No   Sig: TAKE ONE CAPSULE BY MOUTH THREE TIMES A DAY   scopolamine (TRANSDERM-SCOP) 1 mg/3 days TD 72 hr patch  Self No No   Sig: Place 1 patch on the skin over 72 hours every third day      Facility-Administered Medications: None       Past Medical History:   Diagnosis Date   • Atypical chest pain    • Depression    • Diabetes mellitus (HCC)    • Gastroparesis    • GERD (gastroesophageal reflux disease)    • Hyperlipidemia    • Hypertension    • Sciatica    • Seizure disorder ([address])        Past Surgical History:   Procedure Laterality Date   • BREAST BIOPSY Left  benign   • CHOLECYSTECTOMY     • COLONOSCOPY     • HYSTERECTOMY     • AK LAPAROSCOPIC APPENDECTOMY N/A 3/24/2021    Procedure: APPENDECTOMY LAPAROSCOPIC;  Surgeon: Alberta Pineda MD;  Location: [address] [place] [address] OR;  Service: General   • AK LAPS ABD PRTM&OMENTUM DX W/WO SPEC BR/WA SPX N/A 3/24/2021    Procedure: LAPAROSCOPY DIAGNOSTIC, EXTENSIVE DESI;  Surgeon: Alberta Pineda MD;  Location: [address] [place] [address] OR;  Service: General   • UPPER GASTROINTESTINAL ENDOSCOPY         Family History   Problem Relation Age of Onset   • No Known Problems Mother    • No Known Problems Father    • No Known Problems Daughter    • No Known Problems Maternal Grandmother    • No Known Problems Paternal Grandmother    • No Known Problems Paternal Aunt    • No Known Problems Paternal Aunt    • No Known Problems Paternal Aunt      I have reviewed and agree with the history as documented.     E-Cigarette/Vaping   • E-Cigarette Use Never User      E-Cigarette/Vaping Substances   • Nicotine No    • THC No    • CBD No    • Flavoring No    • Other No    • Unknown No      Social History Tobacco Use   • Smoking status: Former     Types: Cigarettes     Quit date:      Years since quittin.5   • Smokeless tobacco: Never   Vaping Use   • Vaping Use: Never used   Substance Use Topics   • Alcohol use: Never   • Drug use: Never       Review of Systems   Constitutional: Negative for chills, diaphoresis, fever and unexpected weight change. HENT: Negative for congestion, ear pain, sinus pressure, sore throat and trouble swallowing. Eyes: Negative for pain, discharge, redness and itching. Respiratory: Negative for cough, chest tightness, shortness of breath and wheezing. Cardiovascular: Negative for chest pain, palpitations and leg swelling. Gastrointestinal: Positive for nausea. Negative for abdominal distention, abdominal pain, blood in stool, diarrhea and vomiting. Endocrine: Negative for polyphagia and polyuria. Genitourinary: Negative for difficulty urinating, dysuria, flank pain, hematuria, pelvic pain and vaginal bleeding. Musculoskeletal: Negative for arthralgias, back pain and myalgias. Skin: Negative for rash. Neurological: Positive for dizziness, light-headedness and headaches. Negative for syncope and weakness. Physical Exam  Physical Exam  Vitals and nursing note reviewed. Constitutional:       General: She is not in acute distress. Appearance: She is well-developed. HENT:      Head: Normocephalic and atraumatic. Right Ear: External ear normal.      Left Ear: External ear normal.      Nose: Nose normal.      Mouth/Throat:      Mouth: Mucous membranes are moist.      Pharynx: No oropharyngeal exudate or posterior oropharyngeal erythema. Eyes:      Conjunctiva/sclera: Conjunctivae normal.      Pupils: Pupils are equal, round, and reactive to light. Cardiovascular:      Rate and Rhythm: Normal rate and regular rhythm. Heart sounds: Normal heart sounds. No murmur heard. No friction rub. No gallop.    Pulmonary:      Effort: Pulmonary effort is normal. No respiratory distress. Breath sounds: Normal breath sounds. No wheezing or rales. Abdominal:      General: There is no distension. Palpations: Abdomen is soft. Tenderness: There is no abdominal tenderness. There is no guarding. Musculoskeletal:         General: No swelling, tenderness or deformity. Normal range of motion. Cervical back: Normal range of motion and neck supple. Lymphadenopathy:      Cervical: No cervical adenopathy. Skin:     General: Skin is warm and dry. Neurological:      General: No focal deficit present. Mental Status: She is alert and oriented to person, place, and time. Mental status is at baseline. Cranial Nerves: No cranial nerve deficit. Sensory: No sensory deficit. Motor: No weakness or abnormal muscle tone.       Coordination: Coordination normal.         Vital Signs  ED Triage Vitals [07/14/23 2252]   Temperature Pulse Respirations Blood Pressure SpO2   97.9 °F (36.6 °C) 74 18 138/78 96 %      Temp Source Heart Rate Source Patient Position - Orthostatic VS BP Location FiO2 (%)   Tympanic -- -- Left arm --      Pain Score       No Pain           Vitals:    07/15/23 0030 07/15/23 0130 07/15/23 0400 07/15/23 0500   BP: 138/82 148/81 130/81 143/72   Pulse: 58 63 63 61         Visual Acuity      ED Medications  Medications   sodium chloride 0.9 % infusion (125 mL/hr Intravenous New Bag 7/15/23 0426)   aspirin chewable tablet 81 mg (has no administration in time range)   heparin (porcine) subcutaneous injection 5,000 Units (has no administration in time range)   atorvastatin (LIPITOR) tablet 80 mg (has no administration in time range)   citalopram (CeleXA) tablet 40 mg (has no administration in time range)   docusate sodium (COLACE) capsule 100 mg (has no administration in time range)   famotidine (PEPCID) tablet 20 mg (has no administration in time range)   fenofibrate (TRICOR) tablet 48 mg (has no administration in time range)   levETIRAcetam (KEPPRA) tablet 500 mg (has no administration in time range)   lubiprostone (AMITIZA) capsule 24 mcg (has no administration in time range)   meclizine (ANTIVERT) tablet 25 mg (has no administration in time range)   pantoprazole (PROTONIX) EC tablet 40 mg (has no administration in time range)   pregabalin (LYRICA) capsule 100 mg (has no administration in time range)   insulin lispro (HumaLOG) 100 units/mL subcutaneous injection 10 Units (has no administration in time range)   insulin lispro (HumaLOG) 100 units/mL subcutaneous injection 1-6 Units (has no administration in time range)   insulin lispro (HumaLOG) 100 units/mL subcutaneous injection 1-5 Units (has no administration in time range)   insulin glargine (LANTUS) subcutaneous injection 26 Units 0.26 mL (has no administration in time range)   sodium chloride 0.9 % bolus 1,000 mL (0 mL Intravenous Stopped 7/15/23 0021)   meclizine (ANTIVERT) tablet 25 mg (25 mg Oral Given 7/14/23 2344)   acetaminophen (TYLENOL) tablet 975 mg (975 mg Oral Given 7/14/23 2344)   ondansetron (ZOFRAN) injection 4 mg (4 mg Intravenous Given 7/14/23 2340)   iohexol (OMNIPAQUE) 350 MG/ML injection (MULTI-DOSE) 100 mL (85 mL Intravenous Given 7/15/23 0041)       Diagnostic Studies  Results Reviewed     Procedure Component Value Units Date/Time    LDL cholesterol, direct [[id number]]  (Abnormal) Collected: 07/14/23 2322    Lab Status: Final result Specimen: Blood from Arm, Left Updated: 07/15/23 0436     LDL Direct 133 mg/dl     Narrative:      LDL Cholesterol:        Optimal           <100 mg/dl      Near Optimal      100-129 mg/dl      Above Optimal       Borderline High  130-159 mg/dl       High             160-189 mg/dl       Very High        >189 mg/dl    Basic metabolic panel [[id number]]     Lab Status: No result Specimen: Blood     Lipid Panel with Direct LDL reflex [[id number]]  (Abnormal) Collected: 07/14/23 2322    Lab Status: Final result Specimen: Blood from Arm, Left Updated: 07/15/23 0353     Cholesterol 240 mg/dL      Triglycerides 446 mg/dL      HDL, Direct 47 mg/dL      LDL Calculated --    Hemoglobin A1c w/EAG Estimation [[id number]] Collected: 07/14/23 2322    Lab Status:  In process Specimen: Blood from Arm, Left Updated: 07/15/23 0342    HS Troponin 0hr (reflex protocol) [[id number]]  (Normal) Collected: 07/14/23 2322    Lab Status: Final result Specimen: Blood from Arm, Left Updated: 07/15/23 0009     hs TnI 0hr <2 ng/L     Comprehensive metabolic panel [[id number]]  (Abnormal) Collected: 07/14/23 2322    Lab Status: Final result Specimen: Blood from Arm, Left Updated: 07/15/23 0006     Sodium 135 mmol/L      Potassium 3.7 mmol/L      Chloride 99 mmol/L      CO2 28 mmol/L      ANION GAP 8 mmol/L      BUN 18 mg/dL      Creatinine 1.66 mg/dL      Glucose 217 mg/dL      Calcium 9.3 mg/dL      AST 18 U/L      ALT 19 U/L      Alkaline Phosphatase 107 U/L      Total Protein 7.6 g/dL      Albumin 4.3 g/dL      Total Bilirubin 0.34 mg/dL      eGFR 35 ml/min/1.73sq m     Narrative:      [place] guidelines for Chronic Kidney Disease (CKD):   •  Stage 1 with normal or high GFR (GFR > 90 mL/min/1.73 square meters)  •  Stage 2 Mild CKD (GFR = 60-89 mL/min/1.73 square meters)  •  Stage 3A Moderate CKD (GFR = 45-59 mL/min/1.73 square meters)  •  Stage 3B Moderate CKD (GFR = 30-44 mL/min/1.73 square meters)  •  Stage 4 Severe CKD (GFR = 15-29 mL/min/1.73 square meters)  •  Stage 5 End Stage CKD (GFR <15 mL/min/1.73 square meters)  Note: GFR calculation is accurate only with a steady state creatinine    Beta Hydroxybutyrate [[id number]]  (Normal) Collected: 07/14/23 2322    Lab Status: Final result Specimen: Blood from Arm, Left Updated: 07/14/23 2346     BETA-HYDROXYBUTYRATE 0.1 mmol/L     Blood gas, venous [[id number]]  (Abnormal) Collected: 07/14/23 2322    Lab Status: Final result Specimen: Blood from Arm, Left Updated: 07/14/23 1024     Mita, Gucci Smith 7.316     pCO2, Ovidio 51.4 mm Hg      pO2, Ovidio 35.6 mm Hg      HCO3, Ovidio 25.7 mmol/L      Base Excess, Ovidio -1.2 mmol/L      O2 Content, Ovidio 12.7 ml/dL      O2 HGB, VENOUS 62.7 %     CBC and differential [[id number]]  (Abnormal) Collected: 07/14/23 2322    Lab Status: Final result Specimen: Blood from Arm, Left Updated: 07/14/23 2345     WBC 7.82 Thousand/uL      RBC 5.07 Million/uL      Hemoglobin 13.1 g/dL      Hematocrit 41.4 %      MCV 82 fL      MCH 25.8 pg      MCHC 31.6 g/dL      RDW 14.4 %      MPV 11.0 fL      Platelets 237 Thousands/uL      nRBC 0 /100 WBCs      Neutrophils Relative 50 %      Immat GRANS % 0 %      Lymphocytes Relative 41 %      Monocytes Relative 7 %      Eosinophils Relative 2 %      Basophils Relative 0 %      Neutrophils Absolute 3.85 Thousands/µL      Immature Grans Absolute 0.02 Thousand/uL      Lymphocytes Absolute 3.17 Thousands/µL      Monocytes Absolute 0.58 Thousand/µL      Eosinophils Absolute 0.17 Thousand/µL      Basophils Absolute 0.03 Thousands/µL     UA (URINE) with reflex to Scope [[id number]]     Lab Status: No result Specimen: Urine                  CTA head and neck with and without contrast    (Results Pending)   MRI Inpatient Order    (Results Pending)              Procedures  Procedures         ED Course  ED Course as of 07/15/23 0547   Sat Jul 15, 2023   0010 Patient has baseline CKD for which she follows with nephrology                               SBIRT 20yo+    Flowsheet Row Most Recent Value   Initial Alcohol Screen: US AUDIT-C     1. How often do you have a drink containing alcohol? 0 Filed at: 07/14/2023 2251   2. How many drinks containing alcohol do you have on a typical day you are drinking? 0 Filed at: 07/14/2023 2251   3a. Male UNDER 65: How often do you have five or more drinks on one occasion? 0 Filed at: 07/14/2023 2251   3b. FEMALE Any Age, or MALE 65+: How often do you have 4 or more drinks on one occassion?  0 Filed at: 07/14/2023 2251   Audit-C Score 0 Filed at: 07/14/2023 2251   LILIA: How many times in the past year have you. .. Used an illegal drug or used a prescription medication for non-medical reasons? Never Filed at: 07/14/2023 2251                    Medical Decision Making  80-year-old female with history of diabetes, hypertension, presenting for evaluation of lightheadedness, dizziness, headache, nausea. Symptoms started after taking Ozempic today. Chest pain or shortness of breath. Vitals within normal limits. No neurologic deficits  [place] includes dehydration, side effect of Ozempic, vertigo, DKA  We will obtain CBC, CMP, troponin, EKG to rule out arrhythmia. Will obtain VBG and beta hydroxybutyrate. Will obtain UA. Will obtain CTA head and neck. Give IV fluids, meclizine  Patient with persistent dizziness despite medications. 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 Given persistent dizziness and CT findings, will admit to hospital for stroke rule out and neurology consult    Dizziness: acute illness or injury  Amount and/or Complexity of Data Reviewed  Labs: ordered. Radiology: ordered. Risk  OTC drugs. Prescription drug management. Decision regarding hospitalization. Disposition  Final diagnoses:   Dizziness   LINDY (acute kidney injury) ([address])     Time reflects when diagnosis was documented in both MDM as applicable and the Disposition within this note     Time User Action Codes Description Comment    7/15/2023  2:28 AM Ligia Shelling Add [R42] Dizziness     7/15/2023  5:47 AM [place] Shelling Add [N17.9] LINDY (acute kidney injury) [facility])       ED Disposition     ED Disposition   Admit    Condition   Stable    Date/Time   Sat Jul 15, 2023  2:29 AM    Comment   Case was discussed with ASHLEY and the patient's admission status was agreed to be Admission Status: observation status to the service of Dr. Mathew Wolff  .            Follow-up Information    None         Patient's Medications   Discharge Prescriptions    No medications on file       No discharge procedures on file.     PDMP Review       Value Time User    PDMP Reviewed  Yes 7/15/2023  3:54 AM Abraham Soria PA-C          ED Provider  Electronically Signed by           Kolby Beavers DO  07/15/23 4891

## 2023-07-15 NOTE — PLAN OF CARE
Problem: PHYSICAL THERAPY ADULT  Goal: Performs mobility at highest level of function for planned discharge setting. See evaluation for individualized goals. Description: Treatment/Interventions: Functional transfer training, LE strengthening/ROM, Elevations, Therapeutic exercise, Endurance training, Patient/family training, Equipment eval/education, Bed mobility, Gait training, Compensatory technique education, Spoke to nursing  Equipment Recommended: Annetta Ramirez       See flowsheet documentation for full assessment, interventions and recommendations. Note: Prognosis: Good  Problem List: Decreased strength, Decreased endurance, Impaired balance, Decreased mobility, Decreased coordination, Impaired sensation, Obesity, Pain  Assessment: Pt is 46 y.o. female seen for PT evaluation s/p admit to [address] [facility] [place] on 7/14/2023 w/ Dizziness. PT consulted to assess pt's functional mobility and d/c needs. Order placed for PT eval and tx, w/ up and OOB as tolerated order. Comorbidities affecting pt's physical performance at time of assessment include: weakness, dizziness,class 1 obesity, depression with anxiety, seizure d/o,type 2 DM,HTN . PTA, pt was independent w/ all functional mobility w/ o AD utilization. Personal factors affecting pt at time of IE include: stairs to enter home, inability to navigate community distances, inability to navigate level surfaces w/o external assistance, unable to perform dynamic tasks in community, anxiety, depression, unable to perform physical activity and inability to perform IADLs. Please find objective findings from PT assessment regarding body systems outlined above with impairments and limitations including weakness, impaired balance, decreased endurance, gait deviations, decreased activity tolerance, decreased functional mobility tolerance, altered sensation and fall risk. From PT/mobility standpoint, recommendation at time of d/c would be home with outpatient rehabilitation pending progress in order to facilitate return to PLOF. PT Discharge Recommendation: Home with outpatient rehabilitation    See flowsheet documentation for full assessment.

## 2023-07-15 NOTE — ASSESSMENT & PLAN NOTE
Lab Results   Component Value Date    EGFR 35 07/14/2023    EGFR 54 07/10/2023    EGFR 51 03/25/2023    CREATININE 1.66 (H) 07/14/2023    CREATININE 1.16 07/10/2023    CREATININE 1.21 03/25/2023   · elevated creat w/ baseline 1.1-1.2  · IVF and monitor

## 2023-07-16 LAB
ALBUMIN SERPL BCP-MCNC: 3.8 G/DL (ref 3.5–5)
ALP SERPL-CCNC: 121 U/L (ref 34–104)
ALT SERPL W P-5'-P-CCNC: 24 U/L (ref 7–52)
ANION GAP SERPL CALCULATED.3IONS-SCNC: 9 MMOL/L
AST SERPL W P-5'-P-CCNC: 24 U/L (ref 13–39)
ATRIAL RATE: 76 BPM
BASOPHILS # BLD AUTO: 0.02 THOUSANDS/ÂΜL (ref 0–0.1)
BASOPHILS NFR BLD AUTO: 0 % (ref 0–1)
BILIRUB SERPL-MCNC: 0.26 MG/DL (ref 0.2–1)
BUN SERPL-MCNC: 23 MG/DL (ref 5–25)
CALCIUM SERPL-MCNC: 8.8 MG/DL (ref 8.4–10.2)
CHLORIDE SERPL-SCNC: 104 MMOL/L (ref 96–108)
CO2 SERPL-SCNC: 24 MMOL/L (ref 21–32)
CREAT SERPL-MCNC: 1.15 MG/DL (ref 0.6–1.3)
EOSINOPHIL # BLD AUTO: 0 THOUSAND/ÂΜL (ref 0–0.61)
EOSINOPHIL NFR BLD AUTO: 0 % (ref 0–6)
ERYTHROCYTE [DISTWIDTH] IN BLOOD BY AUTOMATED COUNT: 14.6 % (ref 11.6–15.1)
GFR SERPL CREATININE-BSD FRML MDRD: 54 ML/MIN/1.73SQ M
GLUCOSE SERPL-MCNC: 251 MG/DL (ref 65–140)
GLUCOSE SERPL-MCNC: 282 MG/DL (ref 65–140)
GLUCOSE SERPL-MCNC: 319 MG/DL (ref 65–140)
GLUCOSE SERPL-MCNC: 340 MG/DL (ref 65–140)
GLUCOSE SERPL-MCNC: 386 MG/DL (ref 65–140)
HCT VFR BLD AUTO: 42.1 % (ref 34.8–46.1)
HGB BLD-MCNC: 13.3 G/DL (ref 11.5–15.4)
IMM GRANULOCYTES # BLD AUTO: 0.08 THOUSAND/UL (ref 0–0.2)
IMM GRANULOCYTES NFR BLD AUTO: 1 % (ref 0–2)
LYMPHOCYTES # BLD AUTO: 1.25 THOUSANDS/ÂΜL (ref 0.6–4.47)
LYMPHOCYTES NFR BLD AUTO: 9 % (ref 14–44)
MCH RBC QN AUTO: 26 PG (ref 26.8–34.3)
MCHC RBC AUTO-ENTMCNC: 31.6 G/DL (ref 31.4–37.4)
MCV RBC AUTO: 82 FL (ref 82–98)
MONOCYTES # BLD AUTO: 0.23 THOUSAND/ÂΜL (ref 0.17–1.22)
MONOCYTES NFR BLD AUTO: 2 % (ref 4–12)
NEUTROPHILS # BLD AUTO: 12.69 THOUSANDS/ÂΜL (ref 1.85–7.62)
NEUTS SEG NFR BLD AUTO: 88 % (ref 43–75)
NRBC BLD AUTO-RTO: 0 /100 WBCS
P AXIS: 30 DEGREES
PLATELET # BLD AUTO: 314 THOUSANDS/UL (ref 149–390)
PMV BLD AUTO: 10.7 FL (ref 8.9–12.7)
POTASSIUM SERPL-SCNC: 4.4 MMOL/L (ref 3.5–5.3)
PR INTERVAL: 152 MS
PROT SERPL-MCNC: 6.7 G/DL (ref 6.4–8.4)
QRS AXIS: 10 DEGREES
QRSD INTERVAL: 82 MS
QT INTERVAL: 428 MS
QTC INTERVAL: 481 MS
RBC # BLD AUTO: 5.12 MILLION/UL (ref 3.81–5.12)
SODIUM SERPL-SCNC: 137 MMOL/L (ref 135–147)
T WAVE AXIS: 28 DEGREES
VENTRICULAR RATE: 76 BPM
WBC # BLD AUTO: 14.27 THOUSAND/UL (ref 4.31–10.16)

## 2023-07-16 PROCEDURE — 82948 REAGENT STRIP/BLOOD GLUCOSE: CPT

## 2023-07-16 PROCEDURE — 85025 COMPLETE CBC W/AUTO DIFF WBC: CPT | Performed by: HOSPITALIST

## 2023-07-16 PROCEDURE — 99233 SBSQ HOSP IP/OBS HIGH 50: CPT

## 2023-07-16 PROCEDURE — 93010 ELECTROCARDIOGRAM REPORT: CPT | Performed by: INTERNAL MEDICINE

## 2023-07-16 PROCEDURE — 80053 COMPREHEN METABOLIC PANEL: CPT | Performed by: HOSPITALIST

## 2023-07-16 RX ORDER — ONDANSETRON 2 MG/ML
4 INJECTION INTRAMUSCULAR; INTRAVENOUS EVERY 8 HOURS
Status: DISCONTINUED | OUTPATIENT
Start: 2023-07-16 | End: 2023-07-20 | Stop reason: HOSPADM

## 2023-07-16 RX ORDER — HYDROMORPHONE HCL/PF 1 MG/ML
0.5 SYRINGE (ML) INJECTION ONCE
Status: COMPLETED | OUTPATIENT
Start: 2023-07-16 | End: 2023-07-16

## 2023-07-16 RX ORDER — MAGNESIUM SULFATE 1 G/100ML
1 INJECTION INTRAVENOUS 2 TIMES DAILY
Status: DISCONTINUED | OUTPATIENT
Start: 2023-07-16 | End: 2023-07-20 | Stop reason: HOSPADM

## 2023-07-16 RX ORDER — MECLIZINE HCL 12.5 MG/1
25 TABLET ORAL EVERY 8 HOURS SCHEDULED
Status: DISCONTINUED | OUTPATIENT
Start: 2023-07-16 | End: 2023-07-20 | Stop reason: HOSPADM

## 2023-07-16 RX ADMIN — HEPARIN SODIUM 5000 UNITS: 5000 INJECTION INTRAVENOUS; SUBCUTANEOUS at 21:38

## 2023-07-16 RX ADMIN — INSULIN GLARGINE 26 UNITS: 100 INJECTION, SOLUTION SUBCUTANEOUS at 09:04

## 2023-07-16 RX ADMIN — INSULIN LISPRO 3 UNITS: 100 INJECTION, SOLUTION INTRAVENOUS; SUBCUTANEOUS at 09:05

## 2023-07-16 RX ADMIN — INSULIN LISPRO 10 UNITS: 100 INJECTION, SOLUTION INTRAVENOUS; SUBCUTANEOUS at 09:06

## 2023-07-16 RX ADMIN — ACETAMINOPHEN 975 MG: 325 TABLET ORAL at 14:39

## 2023-07-16 RX ADMIN — CYCLOBENZAPRINE HYDROCHLORIDE 10 MG: 10 TABLET, FILM COATED ORAL at 09:04

## 2023-07-16 RX ADMIN — MAGNESIUM SULFATE HEPTAHYDRATE 1 G: 1 INJECTION, SOLUTION INTRAVENOUS at 21:32

## 2023-07-16 RX ADMIN — DIHYDROERGOTAMINE MESYLATE 1 MG: 1 INJECTION, SOLUTION INTRAMUSCULAR; INTRAVENOUS; SUBCUTANEOUS at 05:08

## 2023-07-16 RX ADMIN — HEPARIN SODIUM 5000 UNITS: 5000 INJECTION INTRAVENOUS; SUBCUTANEOUS at 14:39

## 2023-07-16 RX ADMIN — ASPIRIN 81 MG CHEWABLE TABLET 81 MG: 81 TABLET CHEWABLE at 09:04

## 2023-07-16 RX ADMIN — MECLIZINE 25 MG: 12.5 TABLET ORAL at 21:31

## 2023-07-16 RX ADMIN — HYDROMORPHONE HYDROCHLORIDE 0.5 MG: 1 INJECTION, SOLUTION INTRAMUSCULAR; INTRAVENOUS; SUBCUTANEOUS at 17:25

## 2023-07-16 RX ADMIN — DOCUSATE SODIUM 100 MG: 100 CAPSULE, LIQUID FILLED ORAL at 09:04

## 2023-07-16 RX ADMIN — ONDANSETRON 4 MG: 2 INJECTION INTRAMUSCULAR; INTRAVENOUS at 16:59

## 2023-07-16 RX ADMIN — MORPHINE SULFATE 2 MG: 2 INJECTION, SOLUTION INTRAMUSCULAR; INTRAVENOUS at 21:30

## 2023-07-16 RX ADMIN — LUBIPROSTONE 24 MCG: 24 CAPSULE, GELATIN COATED ORAL at 21:37

## 2023-07-16 RX ADMIN — DIHYDROERGOTAMINE MESYLATE 1 MG: 1 INJECTION, SOLUTION INTRAMUSCULAR; INTRAVENOUS; SUBCUTANEOUS at 14:39

## 2023-07-16 RX ADMIN — INSULIN LISPRO 10 UNITS: 100 INJECTION, SOLUTION INTRAVENOUS; SUBCUTANEOUS at 11:55

## 2023-07-16 RX ADMIN — INSULIN LISPRO 10 UNITS: 100 INJECTION, SOLUTION INTRAVENOUS; SUBCUTANEOUS at 16:59

## 2023-07-16 RX ADMIN — DOCUSATE SODIUM 100 MG: 100 CAPSULE, LIQUID FILLED ORAL at 17:00

## 2023-07-16 RX ADMIN — PREGABALIN 100 MG: 100 CAPSULE ORAL at 21:31

## 2023-07-16 RX ADMIN — LEVETIRACETAM 500 MG: 500 TABLET, FILM COATED ORAL at 09:04

## 2023-07-16 RX ADMIN — ONDANSETRON 4 MG: 2 INJECTION INTRAMUSCULAR; INTRAVENOUS at 09:04

## 2023-07-16 RX ADMIN — HEPARIN SODIUM 5000 UNITS: 5000 INJECTION INTRAVENOUS; SUBCUTANEOUS at 05:08

## 2023-07-16 RX ADMIN — INSULIN GLARGINE 26 UNITS: 100 INJECTION, SOLUTION SUBCUTANEOUS at 21:32

## 2023-07-16 RX ADMIN — MECLIZINE 25 MG: 12.5 TABLET ORAL at 14:39

## 2023-07-16 RX ADMIN — CITALOPRAM HYDROBROMIDE 40 MG: 20 TABLET ORAL at 09:04

## 2023-07-16 RX ADMIN — INSULIN LISPRO 5 UNITS: 100 INJECTION, SOLUTION INTRAVENOUS; SUBCUTANEOUS at 21:32

## 2023-07-16 RX ADMIN — ACETAMINOPHEN 975 MG: 325 TABLET ORAL at 21:31

## 2023-07-16 RX ADMIN — PANTOPRAZOLE SODIUM 40 MG: 40 TABLET, DELAYED RELEASE ORAL at 05:08

## 2023-07-16 RX ADMIN — LEVETIRACETAM 500 MG: 500 TABLET, FILM COATED ORAL at 17:00

## 2023-07-16 RX ADMIN — ACETAMINOPHEN 975 MG: 325 TABLET ORAL at 05:08

## 2023-07-16 RX ADMIN — DIHYDROERGOTAMINE MESYLATE 1 MG: 1 INJECTION, SOLUTION INTRAMUSCULAR; INTRAVENOUS; SUBCUTANEOUS at 21:31

## 2023-07-16 RX ADMIN — PREGABALIN 100 MG: 100 CAPSULE ORAL at 09:04

## 2023-07-16 RX ADMIN — MAGNESIUM SULFATE HEPTAHYDRATE 1 G: 1 INJECTION, SOLUTION INTRAVENOUS at 10:41

## 2023-07-16 RX ADMIN — ATORVASTATIN CALCIUM 80 MG: 80 TABLET, FILM COATED ORAL at 21:31

## 2023-07-16 RX ADMIN — FAMOTIDINE 20 MG: 20 TABLET, FILM COATED ORAL at 21:31

## 2023-07-16 RX ADMIN — MECLIZINE 25 MG: 12.5 TABLET ORAL at 05:22

## 2023-07-16 RX ADMIN — SODIUM CHLORIDE 500 MG: 0.9 INJECTION, SOLUTION INTRAVENOUS at 21:32

## 2023-07-16 RX ADMIN — SODIUM CHLORIDE 500 MG: 0.9 INJECTION, SOLUTION INTRAVENOUS at 09:05

## 2023-07-16 RX ADMIN — FENOFIBRATE 48 MG: 48 TABLET, FILM COATED ORAL at 09:04

## 2023-07-16 RX ADMIN — LUBIPROSTONE 24 MCG: 24 CAPSULE, GELATIN COATED ORAL at 10:41

## 2023-07-16 RX ADMIN — INSULIN LISPRO 5 UNITS: 100 INJECTION, SOLUTION INTRAVENOUS; SUBCUTANEOUS at 16:59

## 2023-07-16 RX ADMIN — PREGABALIN 100 MG: 100 CAPSULE ORAL at 16:59

## 2023-07-16 RX ADMIN — INSULIN LISPRO 5 UNITS: 100 INJECTION, SOLUTION INTRAVENOUS; SUBCUTANEOUS at 11:54

## 2023-07-16 NOTE — PLAN OF CARE
Problem: PAIN - ADULT  Goal: Verbalizes/displays adequate comfort level or baseline comfort level  Description: Interventions:  - Encourage patient to monitor pain and request assistance  - Assess pain using appropriate pain scale  - Administer analgesics based on type and severity of pain and evaluate response  - Implement non-pharmacological measures as appropriate and evaluate response  - Consider cultural and social influences on pain and pain management  - Notify physician/advanced practitioner if interventions unsuccessful or patient reports new pain  7/15/2023 2348 by Jessica Pinto RN  Outcome: Progressing  7/15/2023 2347 by Jessica Pinto RN  Outcome: Progressing     Problem: Neurological Deficit  Goal: Neurological status is stable or improving  Description: Interventions:  - Monitor and assess patient's level of consciousness, motor function, sensory function, and level of assistance needed for ADLs. - Monitor and report changes from baseline. Collaborate with interdisciplinary team to initiate plan and implement interventions as ordered. - Provide and maintain a safe environment. - Consider seizure precautions. - Consider fall precautions. - Consider aspiration precautions. - Consider bleeding precautions.   Outcome: Progressing

## 2023-07-16 NOTE — PLAN OF CARE
Problem: PAIN - ADULT  Goal: Verbalizes/displays adequate comfort level or baseline comfort level  Description: Interventions:  - Encourage patient to monitor pain and request assistance  - Assess pain using appropriate pain scale  - Administer analgesics based on type and severity of pain and evaluate response  - Implement non-pharmacological measures as appropriate and evaluate response  - Consider cultural and social influences on pain and pain management  - Notify physician/advanced practitioner if interventions unsuccessful or patient reports new pain  Outcome: Progressing     Problem: INFECTION - ADULT  Goal: Absence or prevention of progression during hospitalization  Description: INTERVENTIONS:  - Assess and monitor for signs and symptoms of infection  - Monitor lab/diagnostic results  - Monitor all insertion sites, i.e. indwelling lines, tubes, and drains  - Monitor endotracheal if appropriate and nasal secretions for changes in amount and color  - [place] appropriate cooling/warming therapies per order  - Administer medications as ordered  - Instruct and encourage patient and family to use good hand hygiene technique  - Identify and instruct in appropriate isolation precautions for identified infection/condition  Outcome: Progressing  Goal: Absence of fever/infection during neutropenic period  Description: INTERVENTIONS:  - Monitor WBC    Outcome: Progressing

## 2023-07-16 NOTE — ASSESSMENT & PLAN NOTE
Lab Results   Component Value Date    EGFR 54 07/16/2023    EGFR 45 07/15/2023    EGFR 35 07/14/2023    CREATININE 1.15 07/16/2023    CREATININE 1.34 (H) 07/15/2023    CREATININE 1.66 (H) 07/14/2023   · elevated creat w/ baseline 1.1-1.2  · Received IV fluids on arrival, since discontinued  · Creatinine at baseline today  · Continue to avoid nephrotoxins and hypotension  · BMP a.m.

## 2023-07-16 NOTE — PROGRESS NOTES
[address] [facility]  Progress Note  Name: Brenda Dueñas  MRN: [id number]  Unit/Bed#: -01 I Date of Admission: 7/14/2023   Date of Service: 7/16/2023 I Hospital Day: 1    Assessment/Plan   * Dizziness  Assessment & Plan  Patient reported dizziness, nausea and difficulty walking shortly after she started her first dose of Ozempic on Friday. She noted a headache. · Patient reports dizziness improved today  · Patient admitted to stroke pathway  · CT brain: No acute intracranial abnormality. ·  CT angiography: Mild atherosclerotic change of the cervical and intracranial vasculature with no moderate to high-grade stenosis or occlusive disease. 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 · Neurology following, appreciate recommendations. Felt likely migraine presentation. · Continue DHE treatment 2 mg every 8 hours. Pretreat with Zofran 30 minutes prior to each treatment  · 7/16 changed meclizine to scheduled 25 mg every 8 hours  · 7/16 changed Solu-Medrol to 500 mg twice daily  · Continue baby aspirin  · 7/16 switched magnesium sulfate to 1 g IV twice daily  · PT/OT eval pending.   May need vestibular rehab therapy per neuro recs  · Continue Keppra per neuro recommendations for presumed seizure disorder    Seizure disorder ([address])  Assessment & Plan  · Continue Keppra 500 mg twice daily    Gastroparesis  Assessment & Plan  · Patient seen by GI on 7/13 secondary to report of nausea and gastroparesis  · Felt nausea possibly related to Trulicity  · For EGD on 7/18/23    Benign hypertension with CKD (chronic kidney disease) stage III [facility])  Assessment & Plan  Lab Results   Component Value Date    EGFR 54 07/16/2023    EGFR 45 07/15/2023    EGFR 35 07/14/2023    CREATININE 1.15 07/16/2023    CREATININE 1.34 (H) 07/15/2023 CREATININE 1.66 (H) 07/14/2023   · elevated creat w/ baseline 1.1-1.2  · Received IV fluids on arrival, since discontinued  · Creatinine at baseline today  · Continue to avoid nephrotoxins and hypotension  · BMP a.m. GERD (gastroesophageal reflux disease)  Assessment & Plan  · Continue PPI    Depression with anxiety  Assessment & Plan  · Currently without signs of anxiety or depression   · Continue Celexa    Class 1 obesity due to excess calories with serious comorbidity and body mass index (BMI) of 34.0 to 34.9 in adult  Assessment & Plan  · BMI 34  · Healthy diet lifestyle modification counseling provided         VTE Pharmacologic Prophylaxis:   Pharmacologic: Heparin  Mechanical VTE Prophylaxis in Place: Yes    Patient Centered Rounds: I have performed bedside rounds with nursing staff today. Discussions with Specialists or Other Care Team Provider: Nursing, case management    Education and Discussions with Family / Patient: Treatment plan discussed with patient understands the plan as it has been explained is agreeable plan as stated. All questions answered to satisfaction. Time Spent for Care: 39 minutes. More than 50% of total time spent on counseling and coordination of care as described above. Current Length of Stay: 1 day(s)    Current Patient Status: Inpatient   Certification Statement: The patient will continue to require additional inpatient hospital stay due to IV Solu-Medrol, IV magnesium per neurology recommendations, monitoring neuro status    Discharge Plan: Patient was at home with , independent prior to arrival.  Presented with dizziness which is now improving. Patient was admitted to stroke pathway, work-up has been negative. Neurology following, felt migraine presentation. Adjusted medications today based on neurology recommendations. Patient's echo is pending, as is PT/OT evaluations. Hopeful discharge home pending recommendations next 24-48 hours.     Code Status: Level 1 - Full Code      Subjective:   Patient reports that she is less dizzy today. Was sleeping upon my arrival to the room. Has no neurological deficits today. Verbalizing needs. No headache at this time. Objective:     Vitals:   Temp (24hrs), Av.1 °F (36.7 °C), Min:97.9 °F (36.6 °C), Max:98.4 °F (36.9 °C)    Temp:  [97.9 °F (36.6 °C)-98.4 °F (36.9 °C)] 97.9 °F (36.6 °C)  HR:  [60-83] 60  Resp:  [16-23] 18  BP: (103-179)/(63-84) 164/80  SpO2:  [90 %-96 %] 91 %  Body mass index is 36.28 kg/m². Input and Output Summary (last 24 hours): Intake/Output Summary (Last 24 hours) at 2023 1033  Last data filed at 2023 0900  Gross per 24 hour   Intake 1120 ml   Output --   Net 1120 ml       Physical Exam:     Physical Exam  Vitals and nursing note reviewed. Constitutional:       General: She is not in acute distress. Appearance: She is obese. She is not ill-appearing. HENT:      Head: Normocephalic and atraumatic. Nose: Nose normal.      Mouth/Throat:      Mouth: Mucous membranes are moist.   Eyes:      Extraocular Movements: Extraocular movements intact. Conjunctiva/sclera: Conjunctivae normal.      Pupils: Pupils are equal, round, and reactive to light. Cardiovascular:      Rate and Rhythm: Normal rate and regular rhythm. Pulses: Normal pulses. Heart sounds: Normal heart sounds. Pulmonary:      Effort: Pulmonary effort is normal. No respiratory distress. Breath sounds: Normal breath sounds. No stridor. No wheezing, rhonchi or rales. Chest:      Chest wall: No tenderness. Abdominal:      General: Bowel sounds are normal. There is no distension. Palpations: Abdomen is soft. Tenderness: There is no abdominal tenderness. There is no guarding. Musculoskeletal:         General: Normal range of motion. Cervical back: Normal range of motion and neck supple. Skin:     General: Skin is warm and dry.       Capillary Refill: Capillary refill takes less than 2 seconds. Neurological:      General: No focal deficit present. Mental Status: She is alert and oriented to person, place, and time. Mental status is at baseline. Cranial Nerves: No cranial nerve deficit. Sensory: No sensory deficit. Motor: No weakness. Coordination: Coordination normal.      Gait: Gait normal.   Psychiatric:         Mood and Affect: Mood normal.         Behavior: Behavior normal.         Thought Content: Thought content normal.         Judgment: Judgment normal.         Additional Data:     Labs:    Results from last 7 days   Lab Units 07/16/23  0456   WBC Thousand/uL 14.27*   HEMOGLOBIN g/dL 13.3   HEMATOCRIT % 42.1   PLATELETS Thousands/uL 314   NEUTROS PCT % 88*   LYMPHS PCT % 9*   MONOS PCT % 2*   EOS PCT % 0     Results from last 7 days   Lab Units 07/16/23  0456   POTASSIUM mmol/L 4.4   CHLORIDE mmol/L 104   CO2 mmol/L 24   BUN mg/dL 23   CREATININE mg/dL 1.15   CALCIUM mg/dL 8.8   ALK PHOS U/L 121*   ALT U/L 24   AST U/L 24           * I Have Reviewed All Lab Data Listed Above. * Additional Pertinent Lab Tests Reviewed:  [address] [facility] Admission Reviewed    Imaging:    Imaging Reports Reviewed Today Include: CTA head and neck, MRI brain  Imaging Personally Reviewed by Myself Includes: CTA head and neck    Recent Cultures (last 7 days):     Results from last 7 days   Lab Units 07/13/23  1635   URINE CULTURE  20,000-29,000 cfu/ml Beta Hemolytic Streptococcus Group B*       Last 24 Hours Medication List:   Current Facility-Administered Medications   Medication Dose Route Frequency Provider Last Rate   • acetaminophen  975 mg Oral [facility] SYLWIA Rachel     • aspirin  81 mg Oral Daily Mazin Maria PA-C     • atorvastatin  80 mg Oral HS Mazin Maria PA-C     • citalopram  40 mg Oral Daily Gisele Hughes     • cyclobenzaprine  10 mg Oral Daily Mazin Maria PA-C     • dihydroergotamine  1 mg Intravenous [facility] Matt Abdi MD     • diphenhydrAMINE  25 mg Intravenous Q8H PRN Matt Abdi MD     • docusate sodium  100 mg Oral BID Mazin Maria PA-C     • famotidine  20 mg Oral HS Mazin Maria PA-C     • fenofibrate  48 mg Oral Daily Retanaefren Maria, [place]     • heparin (porcine)  5,000 Units Subcutaneous [facility] Mazin Maria, [place]     • insulin glargine  26 Units Subcutaneous Q12H [address] [place], [place]     • insulin lispro  1-5 Units Subcutaneous HS Mazin Maria PA-C     • insulin lispro  1-6 Units Subcutaneous TID AC Mazin Maria PA-C     • insulin lispro  10 Units Subcutaneous TID With Meals Mazin Maria PA-C     • levETIRAcetam  500 mg Oral BID Mazin Maria PA-C     • LORazepam  1 mg Oral Q8H PRN Matt Abdi MD     • lubiprostone  24 mcg Oral BID Mazin Maria PA-C     • magnesium sulfate  1 g Intravenous BID SYLWIA Armstrong     • meclizine  25 mg Oral Q8H [address] SYLWIA Armstrong     • methylPREDNISolone sodium succinate (Solu-MEDROL) 500 mg in sodium chloride 0.9 % 250 mL IVPB  500 mg Intravenous BID SYLWIA Armstrong 500 mg (07/16/23 0905)   • ondansetron  4 mg Intravenous Q8H SYLWIA Armstrong     • pantoprazole  40 mg Oral Early Morning Mazin Maria PA-C     • pregabalin  100 mg Oral TID Mazin Maria PA-C          Today, Patient Was Seen By: SYLWIA Aiken    ** Please Note: Dictation voice to text software may have been used in the creation of this document.  **

## 2023-07-16 NOTE — ASSESSMENT & PLAN NOTE
Patient reported dizziness, nausea and difficulty walking shortly after she started her first dose of Ozempic on Friday. She noted a headache. · Patient reports dizziness improved today  · Patient admitted to stroke pathway  · CT brain: No acute intracranial abnormality. ·  CT angiography: Mild atherosclerotic change of the cervical and intracranial vasculature with no moderate to high-grade stenosis or occlusive disease. 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 · Neurology following, appreciate recommendations. Felt likely migraine presentation. · Continue DHE treatment 2 mg every 8 hours. Pretreat with Zofran 30 minutes prior to each treatment  · 7/16 changed meclizine to scheduled 25 mg every 8 hours  · 7/16 changed Solu-Medrol to 500 mg twice daily  · Continue baby aspirin  · 7/16 switched magnesium sulfate to 1 g IV twice daily  · PT/OT eval pending.   May need vestibular rehab therapy per neuro recs  · Continue Keppra per neuro recommendations for presumed seizure disorder

## 2023-07-17 ENCOUNTER — APPOINTMENT (INPATIENT)
Dept: NON INVASIVE DIAGNOSTICS | Facility: HOSPITAL | Age: 53
DRG: 054 | End: 2023-07-17
Payer: COMMERCIAL

## 2023-07-17 LAB
ANION GAP SERPL CALCULATED.3IONS-SCNC: 7 MMOL/L
AORTIC ROOT: 2.8 CM
AORTIC VALVE MEAN VELOCITY: 8.9 M/S
APICAL FOUR CHAMBER EJECTION FRACTION: 68 %
ASCENDING AORTA: 3.1 CM
AV AREA BY CONTINUOUS VTI: 2.1 CM2
AV AREA PEAK VELOCITY: 2.1 CM2
AV LVOT MEAN GRADIENT: 2 MMHG
AV LVOT PEAK GRADIENT: 4 MMHG
AV MEAN GRADIENT: 4 MMHG
AV PEAK GRADIENT: 9 MMHG
AV VALVE AREA: 2.1 CM2
AV VELOCITY RATIO: 0.66
BASOPHILS # BLD AUTO: 0.01 THOUSANDS/ÂΜL (ref 0–0.1)
BASOPHILS NFR BLD AUTO: 0 % (ref 0–1)
BUN SERPL-MCNC: 35 MG/DL (ref 5–25)
CALCIUM SERPL-MCNC: 8.7 MG/DL (ref 8.4–10.2)
CHLORIDE SERPL-SCNC: 103 MMOL/L (ref 96–108)
CO2 SERPL-SCNC: 27 MMOL/L (ref 21–32)
CREAT SERPL-MCNC: 1.2 MG/DL (ref 0.6–1.3)
DOP CALC AO PEAK VEL: 1.46 M/S
DOP CALC AO VTI: 36.81 CM
DOP CALC LVOT AREA: 3.14 CM2
DOP CALC LVOT CARDIAC INDEX: 2.87 L/MIN/M2
DOP CALC LVOT CARDIAC OUTPUT: 5.34 L/MIN
DOP CALC LVOT DIAMETER: 2 CM
DOP CALC LVOT PEAK VEL VTI: 24.65 CM
DOP CALC LVOT PEAK VEL: 0.97 M/S
DOP CALC LVOT STROKE INDEX: 40.9 ML/M2
DOP CALC LVOT STROKE VOLUME: 77.4
E WAVE DECELERATION TIME: 215 MS
EOSINOPHIL # BLD AUTO: 0 THOUSAND/ÂΜL (ref 0–0.61)
EOSINOPHIL NFR BLD AUTO: 0 % (ref 0–6)
ERYTHROCYTE [DISTWIDTH] IN BLOOD BY AUTOMATED COUNT: 14.9 % (ref 11.6–15.1)
FRACTIONAL SHORTENING: 31 (ref 28–44)
GFR SERPL CREATININE-BSD FRML MDRD: 52 ML/MIN/1.73SQ M
GLUCOSE SERPL-MCNC: 305 MG/DL (ref 65–140)
GLUCOSE SERPL-MCNC: 369 MG/DL (ref 65–140)
GLUCOSE SERPL-MCNC: 373 MG/DL (ref 65–140)
GLUCOSE SERPL-MCNC: 374 MG/DL (ref 65–140)
GLUCOSE SERPL-MCNC: 387 MG/DL (ref 65–140)
GLUCOSE SERPL-MCNC: 487 MG/DL (ref 65–140)
HCT VFR BLD AUTO: 41.3 % (ref 34.8–46.1)
HGB BLD-MCNC: 13.4 G/DL (ref 11.5–15.4)
IMM GRANULOCYTES # BLD AUTO: 0.1 THOUSAND/UL (ref 0–0.2)
IMM GRANULOCYTES NFR BLD AUTO: 1 % (ref 0–2)
INTERVENTRICULAR SEPTUM IN DIASTOLE (PARASTERNAL SHORT AXIS VIEW): 1.2 CM
INTERVENTRICULAR SEPTUM: 1.2 CM (ref 0.6–1.1)
LAAS-AP2: 14.8 CM2
LAAS-AP4: 19.6 CM2
LEFT ATRIUM SIZE: 3.5 CM
LEFT ATRIUM VOLUME (MOD BIPLANE): 50 ML
LEFT INTERNAL DIMENSION IN SYSTOLE: 2.4 CM (ref 2.1–4)
LEFT VENTRICULAR INTERNAL DIMENSION IN DIASTOLE: 3.5 CM (ref 3.5–6)
LEFT VENTRICULAR POSTERIOR WALL IN END DIASTOLE: 1.1 CM
LEFT VENTRICULAR STROKE VOLUME: 32 ML
LVSV (TEICH): 32 ML
LYMPHOCYTES # BLD AUTO: 1.34 THOUSANDS/ÂΜL (ref 0.6–4.47)
LYMPHOCYTES NFR BLD AUTO: 10 % (ref 14–44)
MCH RBC QN AUTO: 26.4 PG (ref 26.8–34.3)
MCHC RBC AUTO-ENTMCNC: 32.4 G/DL (ref 31.4–37.4)
MCV RBC AUTO: 81 FL (ref 82–98)
MONOCYTES # BLD AUTO: 0.2 THOUSAND/ÂΜL (ref 0.17–1.22)
MONOCYTES NFR BLD AUTO: 2 % (ref 4–12)
MV E'TISSUE VEL-SEP: 13 CM/S
MV PEAK A VEL: 0.93 M/S
MV PEAK E VEL: 97 CM/S
MV STENOSIS PRESSURE HALF TIME: 62 MS
MV VALVE AREA P 1/2 METHOD: 3.55
NEUTROPHILS # BLD AUTO: 11.84 THOUSANDS/ÂΜL (ref 1.85–7.62)
NEUTS SEG NFR BLD AUTO: 87 % (ref 43–75)
NRBC BLD AUTO-RTO: 0 /100 WBCS
PLATELET # BLD AUTO: 322 THOUSANDS/UL (ref 149–390)
PMV BLD AUTO: 11.5 FL (ref 8.9–12.7)
POTASSIUM SERPL-SCNC: 4.5 MMOL/L (ref 3.5–5.3)
RBC # BLD AUTO: 5.08 MILLION/UL (ref 3.81–5.12)
RIGHT ATRIUM AREA SYSTOLE A4C: 11.8 CM2
RIGHT VENTRICLE ID DIMENSION: 2.7 CM
SL CV LEFT ATRIUM LENGTH A2C: 4.6 CM
SL CV LV EF: 60
SL CV PED ECHO LEFT VENTRICLE DIASTOLIC VOLUME (MOD BIPLANE) 2D: 53 ML
SL CV PED ECHO LEFT VENTRICLE SYSTOLIC VOLUME (MOD BIPLANE) 2D: 21 ML
SODIUM SERPL-SCNC: 137 MMOL/L (ref 135–147)
TR MAX PG: 26 MMHG
TR PEAK VELOCITY: 2.6 M/S
TRICUSPID ANNULAR PLANE SYSTOLIC EXCURSION: 2.4 CM
TRICUSPID VALVE PEAK REGURGITATION VELOCITY: 2.55 M/S
WBC # BLD AUTO: 13.49 THOUSAND/UL (ref 4.31–10.16)

## 2023-07-17 PROCEDURE — 93306 TTE W/DOPPLER COMPLETE: CPT

## 2023-07-17 PROCEDURE — 99232 SBSQ HOSP IP/OBS MODERATE 35: CPT | Performed by: PHYSICIAN ASSISTANT

## 2023-07-17 PROCEDURE — 85025 COMPLETE CBC W/AUTO DIFF WBC: CPT

## 2023-07-17 PROCEDURE — 93306 TTE W/DOPPLER COMPLETE: CPT | Performed by: INTERNAL MEDICINE

## 2023-07-17 PROCEDURE — 82948 REAGENT STRIP/BLOOD GLUCOSE: CPT

## 2023-07-17 PROCEDURE — 92610 EVALUATE SWALLOWING FUNCTION: CPT

## 2023-07-17 PROCEDURE — 80048 BASIC METABOLIC PNL TOTAL CA: CPT

## 2023-07-17 RX ORDER — INSULIN LISPRO 100 [IU]/ML
15 INJECTION, SOLUTION INTRAVENOUS; SUBCUTANEOUS
Status: DISCONTINUED | OUTPATIENT
Start: 2023-07-18 | End: 2023-07-20 | Stop reason: HOSPADM

## 2023-07-17 RX ORDER — INSULIN GLARGINE 100 [IU]/ML
30 INJECTION, SOLUTION SUBCUTANEOUS EVERY 12 HOURS SCHEDULED
Status: DISCONTINUED | OUTPATIENT
Start: 2023-07-17 | End: 2023-07-18

## 2023-07-17 RX ORDER — INSULIN LISPRO 100 [IU]/ML
10 INJECTION, SOLUTION INTRAVENOUS; SUBCUTANEOUS ONCE
Status: COMPLETED | OUTPATIENT
Start: 2023-07-17 | End: 2023-07-17

## 2023-07-17 RX ADMIN — SODIUM CHLORIDE 1000 ML: 0.9 INJECTION, SOLUTION INTRAVENOUS at 13:59

## 2023-07-17 RX ADMIN — MAGNESIUM SULFATE HEPTAHYDRATE 1 G: 1 INJECTION, SOLUTION INTRAVENOUS at 09:01

## 2023-07-17 RX ADMIN — ACETAMINOPHEN 975 MG: 325 TABLET ORAL at 05:28

## 2023-07-17 RX ADMIN — MECLIZINE 25 MG: 12.5 TABLET ORAL at 14:00

## 2023-07-17 RX ADMIN — PREGABALIN 100 MG: 100 CAPSULE ORAL at 08:59

## 2023-07-17 RX ADMIN — ONDANSETRON 4 MG: 2 INJECTION INTRAMUSCULAR; INTRAVENOUS at 16:25

## 2023-07-17 RX ADMIN — SODIUM CHLORIDE 500 MG: 0.9 INJECTION, SOLUTION INTRAVENOUS at 21:43

## 2023-07-17 RX ADMIN — HEPARIN SODIUM 5000 UNITS: 5000 INJECTION INTRAVENOUS; SUBCUTANEOUS at 05:28

## 2023-07-17 RX ADMIN — CYCLOBENZAPRINE HYDROCHLORIDE 10 MG: 10 TABLET, FILM COATED ORAL at 08:59

## 2023-07-17 RX ADMIN — PREGABALIN 100 MG: 100 CAPSULE ORAL at 15:40

## 2023-07-17 RX ADMIN — INSULIN LISPRO 10 UNITS: 100 INJECTION, SOLUTION INTRAVENOUS; SUBCUTANEOUS at 11:31

## 2023-07-17 RX ADMIN — PANTOPRAZOLE SODIUM 40 MG: 40 TABLET, DELAYED RELEASE ORAL at 05:28

## 2023-07-17 RX ADMIN — FENOFIBRATE 48 MG: 48 TABLET, FILM COATED ORAL at 08:59

## 2023-07-17 RX ADMIN — CITALOPRAM HYDROBROMIDE 40 MG: 20 TABLET ORAL at 08:59

## 2023-07-17 RX ADMIN — ACETAMINOPHEN 975 MG: 325 TABLET ORAL at 21:38

## 2023-07-17 RX ADMIN — MECLIZINE 25 MG: 12.5 TABLET ORAL at 21:38

## 2023-07-17 RX ADMIN — DOCUSATE SODIUM 100 MG: 100 CAPSULE, LIQUID FILLED ORAL at 08:59

## 2023-07-17 RX ADMIN — LUBIPROSTONE 24 MCG: 24 CAPSULE, GELATIN COATED ORAL at 21:38

## 2023-07-17 RX ADMIN — INSULIN GLARGINE 26 UNITS: 100 INJECTION, SOLUTION SUBCUTANEOUS at 08:59

## 2023-07-17 RX ADMIN — LEVETIRACETAM 500 MG: 500 TABLET, FILM COATED ORAL at 08:59

## 2023-07-17 RX ADMIN — ONDANSETRON 4 MG: 2 INJECTION INTRAMUSCULAR; INTRAVENOUS at 00:59

## 2023-07-17 RX ADMIN — METOPROLOL TARTRATE 25 MG: 25 TABLET, FILM COATED ORAL at 21:42

## 2023-07-17 RX ADMIN — ATORVASTATIN CALCIUM 80 MG: 80 TABLET, FILM COATED ORAL at 21:38

## 2023-07-17 RX ADMIN — ACETAMINOPHEN 975 MG: 325 TABLET ORAL at 14:00

## 2023-07-17 RX ADMIN — INSULIN LISPRO 10 UNITS: 100 INJECTION, SOLUTION INTRAVENOUS; SUBCUTANEOUS at 16:57

## 2023-07-17 RX ADMIN — INSULIN LISPRO 10 UNITS: 100 INJECTION, SOLUTION INTRAVENOUS; SUBCUTANEOUS at 14:00

## 2023-07-17 RX ADMIN — FAMOTIDINE 20 MG: 20 TABLET, FILM COATED ORAL at 21:38

## 2023-07-17 RX ADMIN — DOCUSATE SODIUM 100 MG: 100 CAPSULE, LIQUID FILLED ORAL at 17:02

## 2023-07-17 RX ADMIN — INSULIN LISPRO 3 UNITS: 100 INJECTION, SOLUTION INTRAVENOUS; SUBCUTANEOUS at 21:28

## 2023-07-17 RX ADMIN — INSULIN LISPRO 6 UNITS: 100 INJECTION, SOLUTION INTRAVENOUS; SUBCUTANEOUS at 07:49

## 2023-07-17 RX ADMIN — LEVETIRACETAM 500 MG: 500 TABLET, FILM COATED ORAL at 17:02

## 2023-07-17 RX ADMIN — DIHYDROERGOTAMINE MESYLATE 1 MG: 1 INJECTION, SOLUTION INTRAMUSCULAR; INTRAVENOUS; SUBCUTANEOUS at 05:28

## 2023-07-17 RX ADMIN — HEPARIN SODIUM 5000 UNITS: 5000 INJECTION INTRAVENOUS; SUBCUTANEOUS at 21:29

## 2023-07-17 RX ADMIN — MECLIZINE 25 MG: 12.5 TABLET ORAL at 05:28

## 2023-07-17 RX ADMIN — HEPARIN SODIUM 5000 UNITS: 5000 INJECTION INTRAVENOUS; SUBCUTANEOUS at 14:00

## 2023-07-17 RX ADMIN — METOPROLOL TARTRATE 25 MG: 25 TABLET, FILM COATED ORAL at 11:28

## 2023-07-17 RX ADMIN — INSULIN LISPRO 10 UNITS: 100 INJECTION, SOLUTION INTRAVENOUS; SUBCUTANEOUS at 07:49

## 2023-07-17 RX ADMIN — ASPIRIN 81 MG CHEWABLE TABLET 81 MG: 81 TABLET CHEWABLE at 08:59

## 2023-07-17 RX ADMIN — INSULIN LISPRO 6 UNITS: 100 INJECTION, SOLUTION INTRAVENOUS; SUBCUTANEOUS at 16:56

## 2023-07-17 RX ADMIN — PREGABALIN 100 MG: 100 CAPSULE ORAL at 21:38

## 2023-07-17 RX ADMIN — INSULIN LISPRO 6 UNITS: 100 INJECTION, SOLUTION INTRAVENOUS; SUBCUTANEOUS at 11:30

## 2023-07-17 RX ADMIN — SODIUM CHLORIDE 500 MG: 0.9 INJECTION, SOLUTION INTRAVENOUS at 10:09

## 2023-07-17 RX ADMIN — INSULIN GLARGINE 30 UNITS: 100 INJECTION, SOLUTION SUBCUTANEOUS at 21:28

## 2023-07-17 RX ADMIN — MAGNESIUM SULFATE HEPTAHYDRATE 1 G: 1 INJECTION, SOLUTION INTRAVENOUS at 21:32

## 2023-07-17 RX ADMIN — ONDANSETRON 4 MG: 2 INJECTION INTRAMUSCULAR; INTRAVENOUS at 08:59

## 2023-07-17 NOTE — ASSESSMENT & PLAN NOTE
Lab Results   Component Value Date    EGFR 52 07/17/2023    EGFR 54 07/16/2023    EGFR 45 07/15/2023    CREATININE 1.20 07/17/2023    CREATININE 1.15 07/16/2023    CREATININE 1.34 (H) 07/15/2023     · elevated creat w/ baseline 1.1-1.2  · Received IV fluids on arrival, since discontinued- will give 1 L IV fluid bolus x 1 now  · Creatinine at baseline today  · Continue to avoid nephrotoxins and hypotension  · BMP a.m.

## 2023-07-17 NOTE — PLAN OF CARE
Problem: Neurological Deficit  Goal: Neurological status is stable or improving  Description: Interventions:  - Monitor and assess patient's level of consciousness, motor function, sensory function, and level of assistance needed for ADLs. - Monitor and report changes from baseline. Collaborate with interdisciplinary team to initiate plan and implement interventions as ordered. - Provide and maintain a safe environment. - Consider seizure precautions. - Consider fall precautions. - Consider aspiration precautions. - Consider bleeding precautions.   Monitor symptoms and assist with ADLS as needed  Outcome: Progressing

## 2023-07-17 NOTE — PROGRESS NOTES
[address] [facility] ([place], [place])  Progress Note  Name: Benitez Watson  MRN: [id number]  Unit/Bed#: -01 I Date of Admission: 7/14/2023   Date of Service: 7/17/2023 I Hospital Day: 2    Assessment/Plan   * Dizziness  Assessment & Plan  Patient reported dizziness, nausea and difficulty walking shortly after she started her first dose of Ozempic on Friday. She noted a headache. · Patient reports dizziness improved today  · Patient admitted to stroke pathway  · CT brain: No acute intracranial abnormality. · CT angiography: Mild atherosclerotic change of the cervical and intracranial vasculature with no moderate to high-grade stenosis or occlusive disease. 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 · Neurology following, appreciate recommendations. Felt likely migraine presentation. · Continue DHE treatment 2 mg every 8 hours.   Pretreat with Zofran 30 minutes prior to each treatment  · Continue meclizine 25 mg every 8 hours scheduled   · Continue Solu-Medrol to 500 mg twice daily  · Continue baby aspirin  · Continue magnesium sulfate to 1 g IV twice daily  · Will give 1 L IV fluid bolus now  · PT/OT eval- home with outpatient PT  · Continue Keppra per neuro recommendations for presumed seizure disorder    Seizure disorder ([address])  Assessment & Plan  · Continue Keppra 500 mg twice daily    Gastroparesis  Assessment & Plan  · Patient seen by GI on 7/13 secondary to report of nausea and gastroparesis  · Felt nausea possibly related to Trulicity  · Patient has outpatient EGD on 7/18/23- will need to reschedule     Benign hypertension with CKD (chronic kidney disease) stage III [facility])  Assessment & Plan  Lab Results   Component Value Date    EGFR 52 07/17/2023    EGFR 54 07/16/2023    EGFR 45 07/15/2023    CREATININE 1.20 07/17/2023 CREATININE 1.15 07/16/2023    CREATININE 1.34 (H) 07/15/2023     · elevated creat w/ baseline 1.1-1.2  · Received IV fluids on arrival, since discontinued- will give 1 L IV fluid bolus x 1 now  · Creatinine at baseline today  · Continue to avoid nephrotoxins and hypotension  · BMP a.m. GERD (gastroesophageal reflux disease)  Assessment & Plan  · Continue PPI    Depression with anxiety  Assessment & Plan  · Currently without signs of anxiety or depression   · Continue Celexa    Class 1 obesity due to excess calories with serious comorbidity and body mass index (BMI) of 34.0 to 34.9 in adult  Assessment & Plan  · BMI 34  · Healthy diet lifestyle modification counseling provided    Diabetic polyneuropathy associated with type 2 diabetes mellitus [facility])  Assessment & Plan  Lab Results   Component Value Date    HGBA1C 12.6 (H) 07/14/2023       Recent Labs     07/17/23  0656 07/17/23  1129 07/17/23  1634 07/17/23  1654   POCGLU 387* 487* 369* 373*       Blood Sugar Average: Last 72 hrs:  (P) 306     · Blood sugars currently uncontrolled- likely due to steroids  · Increase Lantus 25 units BID to 30 units BID. Will increase HumaLog 10 units TID with meals to 15 units TID with meals  · ISS  · Monitor blood glucose trends             VTE Pharmacologic Prophylaxis: VTE Score: 7 High Risk (Score >/= 5) - Pharmacological DVT Prophylaxis Ordered: heparin. Sequential Compression Devices Ordered. Patient Centered Rounds: I performed bedside rounds with nursing staff today. Discussions with Specialists or Other Care Team Provider: nursing, CM    Education and Discussions with Family / Patient: Patient declined call to .      Total Time Spent on Date of Encounter in care of patient: 35 minutes This time was spent on one or more of the following: performing physical exam; counseling and coordination of care; obtaining or reviewing history; documenting in the medical record; reviewing/ordering tests, medications or procedures; communicating with other healthcare professionals and discussing with patient's family/caregivers. Current Length of Stay: 2 day(s)  Current Patient Status: Inpatient   Certification Statement: The patient will continue to require additional inpatient hospital stay due to nursing,   Discharge Plan: Anticipate discharge in 24-48 hrs to home. Code Status: Level 1 - Full Code    Subjective: The patient was seen and examined. The patient is lying in bed in no acute distress. She states she continues to have a headache. He states she's had a decreased appetite. Objective:     Vitals:   Temp (24hrs), Av.6 °F (37 °C), Min:98.2 °F (36.8 °C), Max:98.8 °F (37.1 °C)    Temp:  [98.2 °F (36.8 °C)-98.8 °F (37.1 °C)] 98.8 °F (37.1 °C)  HR:  [54-71] 54  Resp:  [16-19] 16  BP: (135-177)/(60-92) 151/60  SpO2:  [87 %-92 %] 91 %  Body mass index is 36.28 kg/m². Input and Output Summary (last 24 hours): Intake/Output Summary (Last 24 hours) at 2023 1806  Last data filed at 2023 1401  Gross per 24 hour   Intake 600 ml   Output 1000 ml   Net -400 ml       Physical Exam:   Physical Exam  Vitals and nursing note reviewed. Constitutional:       General: She is awake. Appearance: Normal appearance. HENT:      Head: Normocephalic and atraumatic. Cardiovascular:      Rate and Rhythm: Normal rate and regular rhythm. Heart sounds: Normal heart sounds. Pulmonary:      Effort: Pulmonary effort is normal.      Breath sounds: Normal breath sounds. Abdominal:      Palpations: Abdomen is soft. Tenderness: There is no abdominal tenderness. Skin:     General: Skin is warm and dry. Neurological:      General: No focal deficit present. Mental Status: She is alert and oriented to person, place, and time.    Psychiatric:         Attention and Perception: Attention normal.         Mood and Affect: Mood normal.         Speech: Speech normal.         Behavior: Behavior is cooperative. Additional Data:     Labs:  Results from last 7 days   Lab Units 07/17/23  0443   WBC Thousand/uL 13.49*   HEMOGLOBIN g/dL 13.4   HEMATOCRIT % 41.3   PLATELETS Thousands/uL 322   NEUTROS PCT % 87*   LYMPHS PCT % 10*   MONOS PCT % 2*   EOS PCT % 0     Results from last 7 days   Lab Units 07/17/23  0443 07/16/23  0456   SODIUM mmol/L 137 137   POTASSIUM mmol/L 4.5 4.4   CHLORIDE mmol/L 103 104   CO2 mmol/L 27 24   BUN mg/dL 35* 23   CREATININE mg/dL 1.20 1.15   ANION GAP mmol/L 7 9   CALCIUM mg/dL 8.7 8.8   ALBUMIN g/dL  --  3.8   TOTAL BILIRUBIN mg/dL  --  0.26   ALK PHOS U/L  --  121*   ALT U/L  --  24   AST U/L  --  24   GLUCOSE RANDOM mg/dL 374* 282*         Results from last 7 days   Lab Units 07/17/23  1654 07/17/23  1634 07/17/23  1129 07/17/23  0656 07/16/23  2105 07/16/23  1601 07/16/23  1105 07/16/23  0718 07/15/23  2113 07/15/23  1621 07/15/23  1152 07/10/23  2039   POC GLUCOSE mg/dl 373* 369* 487* 387* 386* 340* 319* 251* 165* 100 189* 202*     Results from last 7 days   Lab Units 07/14/23  2322   HEMOGLOBIN A1C % 12.6*           Lines/Drains:  Invasive Devices     Peripheral Intravenous Line  Duration           Peripheral IV 07/14/23 Left Antecubital 2 days                      Imaging: No pertinent imaging reviewed.     Recent Cultures (last 7 days):   Results from last 7 days   Lab Units 07/13/23  1635   URINE CULTURE  20,000-29,000 cfu/ml Beta Hemolytic Streptococcus Group B*       Last 24 Hours Medication List:   Current Facility-Administered Medications   Medication Dose Route Frequency Provider Last Rate   • acetaminophen  975 mg Oral [facility] SYLWIA Osborn     • aspirin  81 mg Oral Daily Sukumar Ivory PA-C     • atorvastatin  80 mg Oral HS Sukumar Ivory PA-C     • citalopram  40 mg Oral Daily Gisele Mora     • diphenhydrAMINE  25 mg Intravenous Q8H PRN Levar Hernadez MD     • docusate sodium  100 mg Oral BID Jeromy Armstrong MARILYN Michael     • famotidine  20 mg Oral HS Adriana Cavazos PA-C     • fenofibrate  48 mg Oral Daily Gisele Chaparro     • heparin (porcine)  5,000 Units Subcutaneous [facility] Gisele Chaparro     • insulin glargine  30 Units Subcutaneous Q12H [facility] Mukul Garcia PA-C     • insulin lispro  1-5 Units Subcutaneous HS Adriana Cavazos PA-C     • insulin lispro  1-6 Units Subcutaneous TID AC Mikaela Garces PA-C     • [START ON 7/18/2023] insulin lispro  15 Units Subcutaneous TID With Meals Mukul Garcia PA-C     • levETIRAcetam  500 mg Oral BID Adriana Cavazos PA-C     • LORazepam  1 mg Oral Q8H PRN Renato Robertson MD     • lubiprostone  24 mcg Oral BID Adriana Cavazos PA-C     • magnesium sulfate  1 g Intravenous BID SYLWIA Armstrong Stopped (07/17/23 1114)   • meclizine  25 mg Oral Q8H [facility] SYLWIA Armstrong     • methylPREDNISolone sodium succinate (Solu-MEDROL) 500 mg in sodium chloride 0.9 % 250 mL IVPB  500 mg Intravenous BID SYLWIA Armstrong 500 mg (07/17/23 1009)   • metoprolol tartrate  25 mg Oral BID Mukul Garcia PA-C     • ondansetron  4 mg Intravenous Q8H SYLWIA Armstrong     • pantoprazole  40 mg Oral Early Morning Adriana Cavazos PA-C     • pregabalin  100 mg Oral TID Adriana Cavazos PA-C          Today, Patient Was Seen By: Mukul Garcia PA-C    **Please Note: This note may have been constructed using a voice recognition system. **

## 2023-07-17 NOTE — ASSESSMENT & PLAN NOTE
Patient reported dizziness, nausea and difficulty walking shortly after she started her first dose of Ozempic on Friday. She noted a headache. · Patient reports dizziness improved today  · Patient admitted to stroke pathway  · CT brain: No acute intracranial abnormality. · CT angiography: Mild atherosclerotic change of the cervical and intracranial vasculature with no moderate to high-grade stenosis or occlusive disease. 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 · Neurology following, appreciate recommendations. Felt likely migraine presentation. · Continue DHE treatment 2 mg every 8 hours.   Pretreat with Zofran 30 minutes prior to each treatment  · Continue meclizine 25 mg every 8 hours scheduled   · Continue Solu-Medrol to 500 mg twice daily  · Continue baby aspirin  · Continue magnesium sulfate to 1 g IV twice daily  · Will give 1 L IV fluid bolus now  · PT/OT eval- home with outpatient PT  · Continue Keppra per neuro recommendations for presumed seizure disorder

## 2023-07-17 NOTE — CASE MANAGEMENT
Case Management Assessment & Discharge Planning Note    Patient name Rossana Eugene  Location /-82 MRN [id number]  : 1970 Date 2023       Current Admission Date: 2023  Current Admission Diagnosis:Dizziness   Patient Active Problem List    Diagnosis Date Noted   • Dizziness 07/15/2023   • Seizure disorder ([address]) 07/15/2023   • Generalized abdominal pain 2023   • Abnormal CT scan, stomach 2023   • Gastric distention 07/10/2023   • Flank pain 2023   • Secondary hyperparathyroidism ([address]) 2022   • History of kidney cancer 2022   • Vitamin D deficiency 2022   • Benign hypertension with CKD (chronic kidney disease) stage III ([address]) 2022   • GERD (gastroesophageal reflux disease) 2021   • History of colon polyps 2021   • Slow transit constipation 2021   • Encounter for post surgical wound check 2021   • Depression with anxiety 2021   • Mild intermittent asthma without complication    • Hypercholesteremia 2021   • Hypertriglyceridemia 2021   • Iron deficiency anemia secondary to inadequate dietary iron intake 2021   • Internal hernia 2021   • Intra-abdominal adhesions 2021   • Radiculopathy, lumbar region 2020   • Anterior tibial tendonitis, right 2020   • Spondylolisthesis of lumbar region 04/10/2018   • Chronic low back pain with sciatica 2018   • Diabetic polyneuropathy associated with type 2 diabetes mellitus ([address]) 2018   • Clear cell carcinoma of kidney, right ([address]) 05/15/2017   • Class 1 obesity due to excess calories with serious comorbidity and body mass index (BMI) of 34.0 to 34.9 in adult 2017   • Hypertension associated with diabetes ([address]) 2016   • Bipolar disorder ([address]) 2015   • Gastroparesis 2015      LOS (days): 2  Geometric Mean LOS (GMLOS) (days):   Days to GMLOS:     OBJECTIVE:    Risk of Unplanned Readmission Score: 21.47         Current admission status: Inpatient  Referral Reason: Other (Discharge planning)    Preferred Pharmacy:   [address] [facility], [address] [facility]  [address] [facility] [place]  Phone: [phone] Fax: [phone]    Primary Care Provider: Richmond Jeans, CRNP    Primary Insurance: [employer]  Secondary Insurance:     ASSESSMENT:  [place] Proxies    There are no active Health Care Proxies on file. Advance Directives  Does patient have a [address]?: No  Was patient offered paperwork?: Yes  Does patient currently have a Health Care decision maker?: No  Does patient have Advance Directives?: No  Was patient offered paperwork?: Yes  Primary Contact: Ninfa Harkins - spouse         Readmission Root Cause  30 Day Readmission: No    Patient Information  Admitted from[de-identified] Home  Mental Status: Alert  During Assessment patient was accompanied by: Not accompanied during assessment  Assessment information provided by[de-identified] Patient  Primary Caregiver: Self  Support Systems: Spouse/significant other  [place] of Residence: [facility] do you live in?: [address] [facility] entry access options.  Select all that apply.: Stairs  Number of steps to enter home.: 3  Do the steps have railings?: Yes  Type of Current Residence: 2 story home  In the last 12 months, was there a time when you were not able to pay the mortgage or rent on time?: No  In the last 12 months, how many places have you lived?: 1  In the last 12 months, was there a time when you did not have a steady place to sleep or slept in a shelter (including now)?: No  Homeless/housing insecurity resource given?: N/A  Living Arrangements: Lives w/ Spouse/significant other  Is patient a ?: No    Activities of Daily Living Prior to Admission  Functional Status: Independent  Completes ADLs independently?: Yes  Ambulates independently?: Yes  Does patient use assisted devices?: No  Does patient currently own DME?: Yes  What DME does the patient currently own?: Lucie Villeda  Does patient have a history of Outpatient Therapy (PT/OT)?: No  Does the patient have a history of Short-Term Rehab?: No  Does patient have a history of HHC?: No  Does patient currently have [facility]?: No         Patient Information Continued  Income Source: Employed  Does patient have prescription coverage?: Yes  Within the past 12 months, you worried that your food would run out before you got the money to buy more.: Never true  Within the past 12 months, the food you bought just didn't last and you didn't have money to get more.: Never true  Food insecurity resource given?: N/A  Does patient receive dialysis treatments?: No  Does patient have a history of substance abuse?: No  Does patient have a history of Mental Health Diagnosis?: No         Means of Transportation  Means of Transport to Appts[de-identified] Drives Self  In the past 12 months, has lack of transportation kept you from medical appointments or from getting medications?: No  In the past 12 months, has lack of transportation kept you from meetings, work, or from getting things needed for daily living?: No  Was application for public transport provided?: N/A        DISCHARGE DETAILS:    Discharge planning discussed with[de-identified] patient  Freedom of Choice: Yes                        [address]         Is the patient interested in [facility] at discharge?: No    DME Referral Provided  Referral made for DME?: No    Other Referral/Resources/Interventions Provided:  Interventions: Outpatient PT  Referral Comments: CM provided pt with OP PT resources for follow up as recommended. CM discussed vestibular therapy as recommended. Pt agreeable and will follow up as recommended.     Would you like to participate in our [address] service program?  : No - Declined    Treatment Team Recommendation: Home (with OP PT)  Discharge Destination Plan[de-identified] Home (with OP PT)  Transport at Discharge : Family

## 2023-07-17 NOTE — PLAN OF CARE
Problem: PAIN - ADULT  Goal: Verbalizes/displays adequate comfort level or baseline comfort level  Description: Interventions:  - Encourage patient to monitor pain and request assistance  - Assess pain using appropriate pain scale  - Administer analgesics based on type and severity of pain and evaluate response  - Implement non-pharmacological measures as appropriate and evaluate response  - Consider cultural and social influences on pain and pain management  - Notify physician/advanced practitioner if interventions unsuccessful or patient reports new pain  Outcome: Progressing     Problem: INFECTION - ADULT  Goal: Absence or prevention of progression during hospitalization  Description: INTERVENTIONS:  - Assess and monitor for signs and symptoms of infection  - Monitor lab/diagnostic results  - Monitor all insertion sites, i.e. indwelling lines, tubes, and drains  - Monitor endotracheal if appropriate and nasal secretions for changes in amount and color  - [place] appropriate cooling/warming therapies per order  - Administer medications as ordered  - Instruct and encourage patient and family to use good hand hygiene technique  - Identify and instruct in appropriate isolation precautions for identified infection/condition  Outcome: Progressing  Goal: Absence of fever/infection during neutropenic period  Description: INTERVENTIONS:  - Monitor WBC    Outcome: Progressing     Problem: SAFETY ADULT  Goal: Patient will remain free of falls  Description: INTERVENTIONS:  - Educate patient/family on patient safety including physical limitations  - Instruct patient to call for assistance with activity   - Consult OT/PT to assist with strengthening/mobility   - Keep Call bell within reach  - Keep bed low and locked with side rails adjusted as appropriate  - Keep care items and personal belongings within reach  - Initiate and maintain comfort rounds  - Make Fall Risk Sign visible to staff  - Offer Toileting every  Hours, in advance of need  - Initiate/Maintain alarm  - Obtain necessary fall risk management equipment:   - Apply yellow socks and bracelet for high fall risk patients  - Consider moving patient to room near nurses station  Outcome: Progressing  Goal: Maintain or return to baseline ADL function  Description: INTERVENTIONS:  -  Assess patient's ability to carry out ADLs; assess patient's baseline for ADL function and identify physical deficits which impact ability to perform ADLs (bathing, care of mouth/teeth, toileting, grooming, dressing, etc.)  - Assess/evaluate cause of self-care deficits   - Assess range of motion  - Assess patient's mobility; develop plan if impaired  - Assess patient's need for assistive devices and provide as appropriate  - Encourage maximum independence but intervene and supervise when necessary  - Involve family in performance of ADLs  - Assess for home care needs following discharge   - Consider OT consult to assist with ADL evaluation and planning for discharge  - Provide patient education as appropriate  Outcome: Progressing  Goal: Maintains/Returns to pre admission functional level  Description: INTERVENTIONS:  - Perform BMAT or MOVE assessment daily.   - Set and communicate daily mobility goal to care team and patient/family/caregiver. - Collaborate with rehabilitation services on mobility goals if consulted  - Perform Range of Motion  times a day. - Reposition patient every  hours.   - Dangle patient  times a day  - Stand patient  times a day  - Ambulate patient  times a day  - Out of bed to chair  times a day   - Out of bed for meals  times a day  - Out of bed for toileting  - Record patient progress and toleration of activity level   Outcome: Progressing     Problem: DISCHARGE PLANNING  Goal: Discharge to home or other facility with appropriate resources  Description: INTERVENTIONS:  - Identify barriers to discharge w/patient and caregiver  - Arrange for needed discharge resources and transportation as appropriate  - Identify discharge learning needs (meds, wound care, etc.)  - Arrange for interpretive services to assist at discharge as needed  - Refer to Case Management Department for coordinating discharge planning if the patient needs post-hospital services based on physician/advanced practitioner order or complex needs related to functional status, cognitive ability, or social support system  Outcome: Progressing     Problem: Knowledge Deficit  Goal: Patient/family/caregiver demonstrates understanding of disease process, treatment plan, medications, and discharge instructions  Description: Complete learning assessment and assess knowledge base. Interventions:  - Provide teaching at level of understanding  - Provide teaching via preferred learning methods  Outcome: Progressing     Problem: MOBILITY - ADULT  Goal: Maintain or return to baseline ADL function  Description: INTERVENTIONS:  -  Assess patient's ability to carry out ADLs; assess patient's baseline for ADL function and identify physical deficits which impact ability to perform ADLs (bathing, care of mouth/teeth, toileting, grooming, dressing, etc.)  - Assess/evaluate cause of self-care deficits   - Assess range of motion  - Assess patient's mobility; develop plan if impaired  - Assess patient's need for assistive devices and provide as appropriate  - Encourage maximum independence but intervene and supervise when necessary  - Involve family in performance of ADLs  - Assess for home care needs following discharge   - Consider OT consult to assist with ADL evaluation and planning for discharge  - Provide patient education as appropriate  Outcome: Progressing  Goal: Maintains/Returns to pre admission functional level  Description: INTERVENTIONS:  - Perform BMAT or MOVE assessment daily.   - Set and communicate daily mobility goal to care team and patient/family/caregiver.    - Collaborate with rehabilitation services on mobility goals if consulted  - Perform Range of Motion  times a day. - Reposition patient every  hours. - Dangle patient  times a day  - Stand patient  times a day  - Ambulate patient  times a day  - Out of bed to chair  times a day   - Out of bed for meals  times a day  - Out of bed for toileting  - Record patient progress and toleration of activity level   Outcome: Progressing     Problem: Neurological Deficit  Goal: Neurological status is stable or improving  Description: Interventions:  - Monitor and assess patient's level of consciousness, motor function, sensory function, and level of assistance needed for ADLs. - Monitor and report changes from baseline. Collaborate with interdisciplinary team to initiate plan and implement interventions as ordered. - Provide and maintain a safe environment. - Consider seizure precautions. - Consider fall precautions. - Consider aspiration precautions. - Consider bleeding precautions. Outcome: Progressing     Problem: Activity Intolerance/Impaired Mobility  Goal: Mobility/activity is maintained at optimum level for patient  Description: Interventions:  - Assess and monitor patient  barriers to mobility and need for assistive/adaptive devices. - Assess patient's emotional response to limitations. - Collaborate with interdisciplinary team and initiate plans and interventions as ordered. - Encourage independent activity per ability.  - Maintain proper body alignment. - Perform active/passive rom as tolerated/ordered. - Plan activities to conserve energy.  - Turn patient as appropriate  Outcome: Progressing     Problem: Communication Impairment  Goal: Ability to express needs and understand communication  Description: Assess patient's communication skills and ability to understand information. Patient will demonstrate use of effective communication techniques, alternative methods of communication and understanding even if not able to speak.      - Encourage communication and provide alternate methods of communication as needed. - Collaborate with case management/ for discharge needs. - Include patient/family/caregiver in decisions related to communication. Outcome: Progressing     Problem: Potential for Aspiration  Goal: Non-ventilated patient's risk of aspiration is minimized  Description: Assess and monitor vital signs, respiratory status, and labs (WBC). Monitor for signs of aspiration (tachypnea, cough, rales, wheezing, cyanosis, fever). - Assess and monitor patient's ability to swallow. - Place patient up in chair to eat if possible. - HOB up at 90 degrees to eat if unable to get patient up into chair.  - Supervise patient during oral intake. - Instruct patient/ family to take small bites. - Instruct patient/ family to take small single sips when taking liquids. - Follow patient-specific strategies generated by speech pathologist.  Outcome: Progressing  Goal: Ventilated patient's risk of aspiration is minimized  Description: Assess and monitor vital signs, respiratory status, airway cuff pressure, and labs (WBC). Monitor for signs of aspiration (tachypnea, cough, rales, wheezing, cyanosis, fever). - Elevate head of bed 30 degrees if patient has tube feeding.  - Monitor tube feeding. Outcome: Progressing     Problem: Nutrition  Goal: Nutrition/Hydration status is improving  Description: Monitor and assess patient's nutrition/hydration status for malnutrition (ex- brittle hair, bruises, dry skin, pale skin and conjunctiva, muscle wasting, smooth red tongue, and disorientation). Collaborate with interdisciplinary team and initiate plan and interventions as ordered. Monitor patient's weight and dietary intake as ordered or per policy. Utilize nutrition screening tool and intervene per policy. Determine patient's food preferences and provide high-protein, high-caloric foods as appropriate.      - Assist patient with eating.  - Allow adequate time for meals.  - Encourage patient to take dietary supplement as ordered. - Collaborate with clinical nutritionist.  - Include patient/family/caregiver in decisions related to nutrition.   Outcome: Progressing

## 2023-07-17 NOTE — UTILIZATION REVIEW
Initial Clinical Review    Admission: Date/Time/Statement:   Admission Orders (From admission, onward)     Ordered        07/15/23 1035  Inpatient Admission  Once            07/15/23 0229  Place in Observation  Once                      Orders Placed This Encounter   Procedures   • Inpatient Admission     Standing Status:   Standing     Number of Occurrences:   1     Order Specific Question:   Level of Care     Answer:   Med Surg [16]     Order Specific Question:   Estimated length of stay     Answer:   More than 2 Midnights     Order Specific Question:   Certification     Answer:   I certify that inpatient services are medically necessary for this patient for a duration of greater than two midnights. See H&P and MD Progress Notes for additional information about the patient's course of treatment. ED Arrival Information     Expected   -    Arrival   7/14/2023 22:39    Acuity   Urgent            Means of arrival   Walk-In    Escorted by   Spouse    Service   Hospitalist    Admission type   Emergency            Arrival complaint   Medication problems           Chief Complaint   Patient presents with   • Medication Problem     Pt notes she recently started Ozempic (first dose today) and has had dizziness, nausea, difficulty walking, and fatigue since       Initial Presentation: 46 y.o. female   W/h/o   DM, HTN, HLD,  Gastroparesis/chronic nausea. To ER From home for evaluation of dizziness, Nausea, HA, fatigue - symptoms started shortly after taking her first dose of Ozempic earlier today. difficulty walking due to the symptoms. A/O x4 - no focal deficits. crt elevated at 1.66   A1C  12.5.    @  1120 c/o chest pain - ekg completed/no ischemia. 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 Given persistent dizziness and CT findings, will admit to hospital for stroke rule out and neurology consult     7/14 @ 2345 -  GCS  15 ;     NIH 0     7/15  NEURO:   bppv provoking complex migraine.  Mri brain negative for acute/chronic cva. cta no dissection. No infectious symptoms . ..she has full neck flexion of chin to chest and no lerhmites sign. Afebrile, no leukocytosis. Low suspicion for an active infection including cns infection. Admit IP status,  MS Level of care for r/o CVA and management of  HA  With  DHE q8h  (pre treat w/zofran before each dose)  Scheduled meclizine, solumedrol, asa, mg sulfate. Cont IVF - trend renal indices, I/O. Order   OT/PT eval for inpt rehab vs outpt vestibular therapy. Continue keppra for presumed seizure disorder.   serial  Neuro cks. Date:  7/16      Day 2:  Pt reports dizziness improved today. crt at baseline today. Stroke workup neg.  gcs remains 15 w/no deficits. Cont DHE regimen. Increase IV solumedrol from daily to BID. Cont meclizine. Increased Mg from daily to bid. PT/OT eval pending.   May need vestibular rehab therapy per neuro recs        ED Triage Vitals   Temperature Pulse Respirations Blood Pressure SpO2   07/14/23 2252 07/14/23 2252 07/14/23 2252 07/14/23 2252 07/14/23 2252   97.9 °F (36.6 °C) 74 18 138/78 96 %      Temp Source Heart Rate Source Patient Position - Orthostatic VS BP Location FiO2 (%)   07/14/23 2252 07/15/23 0700 07/15/23 0700 07/14/23 2252 --   Tympanic Monitor Lying Left arm       Pain Score       07/14/23 2252       No Pain          Wt Readings from Last 1 Encounters:   07/15/23 87.1 kg (192 lb 0.3 oz)     Additional Vital Signs:   07/17/23 06:57:44 98.7 °F (37.1 °C) 58 18 174/70 Abnormal  105 90 % -- --   07/17/23 03:09:28 -- 61 19 157/88 111 87 % Abnormal  -- --   07/16/23 22:47:28 98.2 °F (36.8 °C) 62 19 177/92 Abnormal  120 91 % -- --   07/16/23 18:46:59 98.8 °F (37.1 °C) 71 19 135/69 91 90 % -- --   07/16/23 15:05:14 98 °F (36.7 °C) 68 18 175/84 Abnormal  114 -- -- Lying   07/16/23 11:07:42 98.1 °F (36.7 °C) 67 20 170/93 119 91 % -- --   07/16/23 07:07:31 97.9 °F (36.6 °C) 60 18 164/80 108 91 % None (Room air) Lying   07/15/23 15:17:08 98 °F (36.7 °C) 80 18 147/66 93 92 % -- --   07/15/23 1345 -- 61 23  179/83  119 93 % -- --   07/15/23 1342 -- 67 20 179/83 119 96 % None (Room air) Lying   07/15/23 0930 -- 76 20 140/78 103 94 % None (Room air) Lying   07/15/23 0700 96.4 °F   62 14 153/76 107 96 % None (Room air) Lying   07/15/23 0400 -- 63 15 130/81 100 95 % --        Pertinent Labs/Diagnostic Test Results:   7/15 EKG: NSR. HR 64    MRI brain wo contrast   Final Result by Maribel Bobby MD (07/15 1220)      1. No acute ischemia. 2.  Minimal nonspecific white matter change primarily involving bifrontal subcortical white matter. Main differential considerations include chronic microangiopathy versus sequela of migraines (if there is history of chronic headaches). Workstation performed: [id number]         CTA head and neck with and without contrast   Final Result by Alo Martinez DO (07/15 0840)      CT brain: No acute intracranial abnormality. CT angiography: Mild atherosclerotic change of the cervical and intracranial vasculature with no moderate to high-grade stenosis or occlusive disease. 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 Incidentally noted infundibulum at the right P-comm origin.             Results from last 7 days   Lab Units 07/17/23 0443 07/16/23 0456 07/14/23  2322 07/10/23  1447   WBC Thousand/uL 13.49* 14.27* 7.82 10.20*   HEMOGLOBIN g/dL 13.4 13.3 13.1 13.9   HEMATOCRIT % 41.3 42.1 41.4 43.2   PLATELETS Thousands/uL 322 314 330 347   NEUTROS ABS Thousands/µL 11.84* 12.69* 3.85 7.53         Results from last 7 days   Lab Units 07/17/23  0443 07/16/23  0456 07/15/23  0649 07/14/23  2322 07/10/23  1447   SODIUM mmol/L 137 137 136 135 134*   POTASSIUM mmol/L 4.5 4.4 3.7 3.7 4.2   CHLORIDE mmol/L 103 104 102 99 95*   CO2 mmol/L 27 24 26 28 28   ANION GAP mmol/L 7 9 8 8 11   BUN mg/dL 35* 23 17 18 27*   CREATININE mg/dL 1.20 1.15 1.34* 1.66* 1.16   EGFR ml/min/1.73sq m 52 54 45 35 54   CALCIUM mg/dL 8.7 8.8 8.5 9.3 10.3*     Results from last 7 days   Lab Units 07/16/23  0456 07/14/23  2322   AST U/L 24 18   ALT U/L 24 19   ALK PHOS U/L 121* 107*   TOTAL PROTEIN g/dL 6.7 7.6   ALBUMIN g/dL 3.8 4.3   TOTAL BILIRUBIN mg/dL 0.26 0.34     Results from last 7 days   Lab Units 07/17/23  0656 07/16/23  2105 07/16/23  1601 07/16/23  1105 07/16/23  0718 07/15/23  2113 07/15/23  1621 07/15/23  1152 07/10/23  2039 07/10/23  1628 07/10/23  1511 07/10/23  1245   POC GLUCOSE mg/dl 387* 386* 340* 319* 251* 165* 100 189* 202* 347* 475* 461*     Results from last 7 days   Lab Units 07/17/23  0443 07/16/23  0456 07/15/23  0649 07/14/23  2322 07/10/23  1447   GLUCOSE RANDOM mg/dL 374* 282* 220* 217* 402*         Results from last 7 days   Lab Units 07/14/23 2322   HEMOGLOBIN A1C % 12.6*   EAG mg/dl 315     BETA-HYDROXYBUTYRATE   Date Value Ref Range Status   07/14/2023 0.1 <0.6 mmol/L Final   07/10/2023 0.9 (H) <0.6 mmol/L Final   02/03/2020 0.1 <0.6 mmol/L Final          Results from last 7 days   Lab Units 07/14/23  2322 07/10/23  1447   PH GOGO  7.316 7.400   PCO2 GOGO mm Hg 51.4* 46.0   PO2 GOGO mm Hg 35.6 38.2   HCO3 GOGO mmol/L 25.7 27.9   BASE EXC GOGO mmol/L -1.2 2.4   O2 CONTENT GOGO ml/dL 12.7 15.1   O2 HGB, VENOUS % 62.7 71.8             Results from last 7 days   Lab Units 07/14/23  2322   HS TNI 0HR ng/L <2         Results from last 7 days   Lab Units 07/13/23  1639 07/13/23  1634 07/10/23  1439   CLARITY UA  Clear Clear Clear   COLOR UA  Yellow Light Yellow Yellow   SPEC GRAV UA   --  1.032* 1.010   PH UA   --  5.5 7.0   GLUCOSE UA  >=1000 mg/dL >=1000 (1%)* 3+*   KETONES UA  Negative Negative Trace*   BLOOD UA  Negative Negative Negative   PROTEIN UA  Negative Negative Negative   NITRITE UA  Negative Negative Negative   BILIRUBIN UA   --  Negative Negative BILIRUBIN UA POC  Negative  --   --    UROBILINOGEN UA  0.2 E.U./dL  --  0.2   UROBILINOGEN UA (BE) mg/dl  --  <2.0  --    LEUKOCYTES UA  Negative Elevated glucose may cause decreased leukocyte values.  See urine microscopic for UWBC result* Negative   WBC UA /hpf  --  1-2  --    RBC UA /hpf  --  1-2  --    BACTERIA UA /hpf  --  None Seen  --    EPITHELIAL CELLS WET PREP /hpf  --  Occasional  --          Results from last 7 days   Lab Units 07/13/23  1635   URINE CULTURE  20,000-29,000 cfu/ml Beta Hemolytic Streptococcus Group B*                   ED Treatment:   Medication Administration from 07/14/2023 2239 to 07/15/2023 1504       Date/Time Order Dose Route Action     07/14/2023 2321 EDT sodium chloride 0.9 % bolus 1,000 mL 1,000 mL Intravenous New Bag     07/14/2023 2344 EDT meclizine (ANTIVERT) tablet 25 mg 25 mg Oral Given     07/14/2023 2344 EDT acetaminophen (TYLENOL) tablet 975 mg 975 mg Oral Given     07/14/2023 2340 EDT ondansetron (ZOFRAN) injection 4 mg 4 mg Intravenous Given     07/15/2023 0426 EDT sodium chloride 0.9 % infusion 125 mL/hr Intravenous New Bag     07/15/2023 0816 EDT aspirin chewable tablet 81 mg 81 mg Oral Given     07/15/2023 1327 EDT heparin (porcine) subcutaneous injection 5,000 Units 5,000 Units Subcutaneous Given     07/15/2023 9877 EDT heparin (porcine) subcutaneous injection 5,000 Units 5,000 Units Subcutaneous Given     07/15/2023 0816 EDT citalopram (CeleXA) tablet 40 mg 40 mg Oral Given     07/15/2023 0816 EDT docusate sodium (COLACE) capsule 100 mg 100 mg Oral Given     07/15/2023 0932 EDT fenofibrate (TRICOR) tablet 48 mg 48 mg Oral Given     07/15/2023 0816 EDT levETIRAcetam (KEPPRA) tablet 500 mg 500 mg Oral Given     07/15/2023 0932 EDT lubiprostone (AMITIZA) capsule 24 mcg 24 mcg Oral Given     07/15/2023 7636 EDT meclizine (ANTIVERT) tablet 25 mg 25 mg Oral Given     07/15/2023 0816 EDT pregabalin (LYRICA) capsule 100 mg 100 mg Oral Given     07/15/2023 1307 EDT insulin lispro (HumaLOG) 100 units/mL subcutaneous injection 10 Units 10 Units Subcutaneous Given     07/15/2023 0931 EDT insulin lispro (HumaLOG) 100 units/mL subcutaneous injection 10 Units 10 Units Subcutaneous Given     07/15/2023 1308 EDT insulin lispro (HumaLOG) 100 units/mL subcutaneous injection 1-6 Units 1 Units Subcutaneous Given     07/15/2023 0932 EDT insulin lispro (HumaLOG) 100 units/mL subcutaneous injection 1-6 Units 2 Units Subcutaneous Given     07/15/2023 0932 EDT insulin glargine (LANTUS) subcutaneous injection 26 Units 0.26 mL 26 Units Subcutaneous Given     07/15/2023 0816 EDT LORazepam (ATIVAN) injection 0.5 mg 0.5 mg Intravenous Given     07/15/2023 0816 EDT ondansetron (ZOFRAN) injection 4 mg 4 mg Intravenous Given     07/15/2023 1012 EDT morphine injection 2 mg 2 mg Intravenous Given     07/15/2023 1320 EDT methylPREDNISolone sodium succinate (Solu-MEDROL) 500 mg in sodium chloride 0.9 % 250 mL IVPB 500 mg Intravenous New Bag     07/15/2023 1332 EDT dihydroergotamine (DHE) injection 1 mg 1 mg Intravenous Given     07/15/2023 1153 EDT magnesium sulfate 2 g/50 mL IVPB (premix) 2 g 2 g Intravenous New Bag     07/15/2023 1401 EDT LORazepam (ATIVAN) tablet 1 mg 1 mg Oral Given            Past Medical History:   Diagnosis Date   • Asthma    • Atypical chest pain    • Depression    • Diabetes mellitus (HCC)    • Gastroparesis    • GERD (gastroesophageal reflux disease)    • Hyperlipidemia    • Hypertension    • Psychiatric disorder    • Renal disorder    • Sciatica    • Seizure disorder ([address])      Present on Admission:  • Dizziness  • Benign hypertension with CKD (chronic kidney disease) stage III (HCC)  • GERD (gastroesophageal reflux disease)  • Gastroparesis  • Depression with anxiety  • Seizure disorder ([address])      Admitting Diagnosis: Dizziness [R42]  Nausea [R11.0]  Medication side effects [T88. 7XXA]  LINDY (acute kidney injury) ([address]) [N17.9]  Age/Sex: 46 y.o. female     Admission Orders: Admit  Tele , lo carb diet,   Consult neurology  PT/OT/Speech   evals & treat  VS/Neuro cks q 1 hr x4;  q 2 hr x4;  q 4 hrs   Dysphagia assessment prior to po  Baseline NIH scale  I/O q shift   Sequential compression device to b/l LE  ECHO  Up w/assist  accucks qid w/SSI       Scheduled Medications:  acetaminophen, 975 mg, Oral, Q8H HOLDEN  aspirin, 81 mg, Oral, Daily  atorvastatin, 80 mg, Oral, HS  citalopram, 40 mg, Oral, Daily  cyclobenzaprine, 10 mg, Oral, Daily  dihydroergotamine, 1 mg, Intravenous, Q8H HOLDEN  docusate sodium, 100 mg, Oral, BID  famotidine, 20 mg, Oral, HS  fenofibrate, 48 mg, Oral, Daily  heparin (porcine), 5,000 Units, Subcutaneous, Q8H HOLDEN  insulin glargine, 26 Units, Subcutaneous, Q12H HOLDEN  insulin lispro, 1-5 Units, Subcutaneous, HS  insulin lispro, 1-6 Units, Subcutaneous, TID AC  insulin lispro, 10 Units, Subcutaneous, TID With Meals  levETIRAcetam, 500 mg, Oral, BID  lubiprostone, 24 mcg, Oral, BID  magnesium sulfate, 1 g, Intravenous, BID  meclizine, 25 mg, Oral, Q8H HOLDEN  methylPREDNISolone sodium succinate (Solu-MEDROL) 500 mg in sodium chloride 0.9 % 250 mL IVPB, 500 mg, Intravenous, BID  ondansetron, 4 mg, Intravenous, Q8H  pantoprazole, 40 mg, Oral, Early Morning  pregabalin, 100 mg, Oral, TID      Continuous IV Infusions:     PRN Meds:  7/15 @  1804  Given   IV zofran 4 mg   diphenhydrAMINE, 25 mg, Intravenous, Q8H PRN  LORazepam, 1 mg, Oral, Q8H PRN        IP CONSULT TO NEUROLOGY  IP CONSULT TO CASE MANAGEMENT  IP CONSULT TO NUTRITION SERVICES    Network Utilization Review Department  ATTENTION: Please call with any questions or concerns to [phone] and carefully listen to the prompts so that you are directed to the right person. All voicemails are confidential.  Paulo Patton all requests for admission clinical reviews, approved or denied determinations and any other requests to dedicated fax number below belonging to the campus where the patient is receiving treatment.  List of dedicated fax numbers for the Facilities:  [place] DENIALS (Administrative/Medical Necessity) [phone] [address] (Maternity/NICU/Pediatrics) [phone]   [address] [place] [address] [phone]   [facility] [address] [facility] [phone]   [id number] [place] [address] [phone]   [address] [address] [phone]

## 2023-07-17 NOTE — ASSESSMENT & PLAN NOTE
Lab Results   Component Value Date    HGBA1C 12.6 (H) 07/14/2023       Recent Labs     07/17/23  0656 07/17/23  1129 07/17/23  1634 07/17/23  1654   POCGLU 387* 487* 369* 373*       Blood Sugar Average: Last 72 hrs:  (P) 306     · Blood sugars currently uncontrolled- likely due to steroids  · Increase Lantus 25 units BID to 30 units BID.  Will increase HumaLog 10 units TID with meals to 15 units TID with meals  · ISS  · Monitor blood glucose trends

## 2023-07-17 NOTE — ASSESSMENT & PLAN NOTE
· Patient seen by GI on 7/13 secondary to report of nausea and gastroparesis  · Felt nausea possibly related to Trulicity  · Patient has outpatient EGD on 7/18/23- will need to reschedule

## 2023-07-18 LAB
ANION GAP SERPL CALCULATED.3IONS-SCNC: 8 MMOL/L
BUN SERPL-MCNC: 37 MG/DL (ref 5–25)
CALCIUM SERPL-MCNC: 8.3 MG/DL (ref 8.4–10.2)
CHLORIDE SERPL-SCNC: 104 MMOL/L (ref 96–108)
CO2 SERPL-SCNC: 26 MMOL/L (ref 21–32)
CREAT SERPL-MCNC: 0.98 MG/DL (ref 0.6–1.3)
ERYTHROCYTE [DISTWIDTH] IN BLOOD BY AUTOMATED COUNT: 14.8 % (ref 11.6–15.1)
GFR SERPL CREATININE-BSD FRML MDRD: 66 ML/MIN/1.73SQ M
GLUCOSE SERPL-MCNC: 206 MG/DL (ref 65–140)
GLUCOSE SERPL-MCNC: 211 MG/DL (ref 65–140)
GLUCOSE SERPL-MCNC: 318 MG/DL (ref 65–140)
GLUCOSE SERPL-MCNC: 318 MG/DL (ref 65–140)
GLUCOSE SERPL-MCNC: 390 MG/DL (ref 65–140)
HCT VFR BLD AUTO: 40.6 % (ref 34.8–46.1)
HGB BLD-MCNC: 12.7 G/DL (ref 11.5–15.4)
MCH RBC QN AUTO: 25.6 PG (ref 26.8–34.3)
MCHC RBC AUTO-ENTMCNC: 31.3 G/DL (ref 31.4–37.4)
MCV RBC AUTO: 82 FL (ref 82–98)
PLATELET # BLD AUTO: 303 THOUSANDS/UL (ref 149–390)
PMV BLD AUTO: 11.1 FL (ref 8.9–12.7)
POTASSIUM SERPL-SCNC: 4.1 MMOL/L (ref 3.5–5.3)
RBC # BLD AUTO: 4.96 MILLION/UL (ref 3.81–5.12)
SODIUM SERPL-SCNC: 138 MMOL/L (ref 135–147)
WBC # BLD AUTO: 14.59 THOUSAND/UL (ref 4.31–10.16)

## 2023-07-18 PROCEDURE — 80048 BASIC METABOLIC PNL TOTAL CA: CPT | Performed by: PHYSICIAN ASSISTANT

## 2023-07-18 PROCEDURE — 82948 REAGENT STRIP/BLOOD GLUCOSE: CPT

## 2023-07-18 PROCEDURE — 99232 SBSQ HOSP IP/OBS MODERATE 35: CPT | Performed by: PHYSICIAN ASSISTANT

## 2023-07-18 PROCEDURE — 85027 COMPLETE CBC AUTOMATED: CPT | Performed by: PHYSICIAN ASSISTANT

## 2023-07-18 RX ORDER — INSULIN GLARGINE 100 [IU]/ML
35 INJECTION, SOLUTION SUBCUTANEOUS EVERY 12 HOURS SCHEDULED
Status: DISCONTINUED | OUTPATIENT
Start: 2023-07-18 | End: 2023-07-20 | Stop reason: HOSPADM

## 2023-07-18 RX ADMIN — LEVETIRACETAM 500 MG: 500 TABLET, FILM COATED ORAL at 09:14

## 2023-07-18 RX ADMIN — HEPARIN SODIUM 5000 UNITS: 5000 INJECTION INTRAVENOUS; SUBCUTANEOUS at 13:37

## 2023-07-18 RX ADMIN — ACETAMINOPHEN 975 MG: 325 TABLET ORAL at 13:36

## 2023-07-18 RX ADMIN — INSULIN GLARGINE 30 UNITS: 100 INJECTION, SOLUTION SUBCUTANEOUS at 09:14

## 2023-07-18 RX ADMIN — ONDANSETRON 4 MG: 2 INJECTION INTRAMUSCULAR; INTRAVENOUS at 00:00

## 2023-07-18 RX ADMIN — SODIUM CHLORIDE 500 MG: 0.9 INJECTION, SOLUTION INTRAVENOUS at 09:24

## 2023-07-18 RX ADMIN — LORAZEPAM 1 MG: 1 TABLET ORAL at 21:20

## 2023-07-18 RX ADMIN — INSULIN LISPRO 2 UNITS: 100 INJECTION, SOLUTION INTRAVENOUS; SUBCUTANEOUS at 21:33

## 2023-07-18 RX ADMIN — ASPIRIN 81 MG CHEWABLE TABLET 81 MG: 81 TABLET CHEWABLE at 09:14

## 2023-07-18 RX ADMIN — INSULIN LISPRO 2 UNITS: 100 INJECTION, SOLUTION INTRAVENOUS; SUBCUTANEOUS at 16:58

## 2023-07-18 RX ADMIN — PREGABALIN 100 MG: 100 CAPSULE ORAL at 09:14

## 2023-07-18 RX ADMIN — FENOFIBRATE 48 MG: 48 TABLET, FILM COATED ORAL at 09:14

## 2023-07-18 RX ADMIN — MECLIZINE 25 MG: 12.5 TABLET ORAL at 05:16

## 2023-07-18 RX ADMIN — LUBIPROSTONE 24 MCG: 24 CAPSULE, GELATIN COATED ORAL at 09:24

## 2023-07-18 RX ADMIN — MAGNESIUM SULFATE HEPTAHYDRATE 1 G: 1 INJECTION, SOLUTION INTRAVENOUS at 09:14

## 2023-07-18 RX ADMIN — INSULIN LISPRO 15 UNITS: 100 INJECTION, SOLUTION INTRAVENOUS; SUBCUTANEOUS at 07:47

## 2023-07-18 RX ADMIN — PREGABALIN 100 MG: 100 CAPSULE ORAL at 21:20

## 2023-07-18 RX ADMIN — DOCUSATE SODIUM 100 MG: 100 CAPSULE, LIQUID FILLED ORAL at 09:14

## 2023-07-18 RX ADMIN — HEPARIN SODIUM 5000 UNITS: 5000 INJECTION INTRAVENOUS; SUBCUTANEOUS at 21:27

## 2023-07-18 RX ADMIN — INSULIN LISPRO 6 UNITS: 100 INJECTION, SOLUTION INTRAVENOUS; SUBCUTANEOUS at 11:44

## 2023-07-18 RX ADMIN — INSULIN GLARGINE 35 UNITS: 100 INJECTION, SOLUTION SUBCUTANEOUS at 21:27

## 2023-07-18 RX ADMIN — ONDANSETRON 4 MG: 2 INJECTION INTRAMUSCULAR; INTRAVENOUS at 07:45

## 2023-07-18 RX ADMIN — DOCUSATE SODIUM 100 MG: 100 CAPSULE, LIQUID FILLED ORAL at 17:01

## 2023-07-18 RX ADMIN — PREGABALIN 100 MG: 100 CAPSULE ORAL at 17:01

## 2023-07-18 RX ADMIN — ONDANSETRON 4 MG: 2 INJECTION INTRAMUSCULAR; INTRAVENOUS at 17:01

## 2023-07-18 RX ADMIN — ACETAMINOPHEN 975 MG: 325 TABLET ORAL at 21:20

## 2023-07-18 RX ADMIN — INSULIN LISPRO 15 UNITS: 100 INJECTION, SOLUTION INTRAVENOUS; SUBCUTANEOUS at 11:45

## 2023-07-18 RX ADMIN — MECLIZINE 25 MG: 12.5 TABLET ORAL at 13:36

## 2023-07-18 RX ADMIN — MECLIZINE 25 MG: 12.5 TABLET ORAL at 21:20

## 2023-07-18 RX ADMIN — ONDANSETRON 4 MG: 2 INJECTION INTRAMUSCULAR; INTRAVENOUS at 23:50

## 2023-07-18 RX ADMIN — METOPROLOL TARTRATE 25 MG: 25 TABLET, FILM COATED ORAL at 09:14

## 2023-07-18 RX ADMIN — ACETAMINOPHEN 975 MG: 325 TABLET ORAL at 05:16

## 2023-07-18 RX ADMIN — ATORVASTATIN CALCIUM 80 MG: 80 TABLET, FILM COATED ORAL at 21:20

## 2023-07-18 RX ADMIN — INSULIN LISPRO 15 UNITS: 100 INJECTION, SOLUTION INTRAVENOUS; SUBCUTANEOUS at 16:59

## 2023-07-18 RX ADMIN — PANTOPRAZOLE SODIUM 40 MG: 40 TABLET, DELAYED RELEASE ORAL at 05:16

## 2023-07-18 RX ADMIN — LUBIPROSTONE 24 MCG: 24 CAPSULE, GELATIN COATED ORAL at 21:20

## 2023-07-18 RX ADMIN — SODIUM CHLORIDE 500 MG: 0.9 INJECTION, SOLUTION INTRAVENOUS at 21:23

## 2023-07-18 RX ADMIN — LEVETIRACETAM 500 MG: 500 TABLET, FILM COATED ORAL at 17:01

## 2023-07-18 RX ADMIN — INSULIN LISPRO 5 UNITS: 100 INJECTION, SOLUTION INTRAVENOUS; SUBCUTANEOUS at 07:47

## 2023-07-18 RX ADMIN — HEPARIN SODIUM 5000 UNITS: 5000 INJECTION INTRAVENOUS; SUBCUTANEOUS at 05:16

## 2023-07-18 RX ADMIN — SODIUM CHLORIDE 1000 ML: 0.9 INJECTION, SOLUTION INTRAVENOUS at 13:37

## 2023-07-18 RX ADMIN — CITALOPRAM HYDROBROMIDE 40 MG: 20 TABLET ORAL at 09:14

## 2023-07-18 RX ADMIN — MAGNESIUM SULFATE HEPTAHYDRATE 1 G: 1 INJECTION, SOLUTION INTRAVENOUS at 21:25

## 2023-07-18 RX ADMIN — FAMOTIDINE 20 MG: 20 TABLET, FILM COATED ORAL at 21:20

## 2023-07-18 RX ADMIN — METOPROLOL TARTRATE 25 MG: 25 TABLET, FILM COATED ORAL at 21:21

## 2023-07-18 NOTE — ASSESSMENT & PLAN NOTE
Patient reported dizziness, nausea and difficulty walking shortly after she started her first dose of Ozempic on Friday. She noted a headache. · Patient continues to have intermittent dizziness  · CT brain: No acute intracranial abnormality. · CT angiography: Mild atherosclerotic change of the cervical and intracranial vasculature with no moderate to high-grade stenosis or occlusive disease. 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 · Neurology consultation appreciate- likely migraine presentation. · Patient received 6 doses DHE.    · Continue meclizine 25 mg every 8 hours scheduled   · Continue Solu-Medrol to 500 mg twice daily  · Continue baby aspirin  · Continue magnesium sulfate to 1 g IV twice daily  · Will give another 1 L IV fluid bolus today (7/18)  · PT/OT eval- home with outpatient PT  · Continue Keppra per neuro recommendations for presumed seizure disorder

## 2023-07-18 NOTE — ASSESSMENT & PLAN NOTE
Lab Results   Component Value Date    EGFR 66 07/18/2023    EGFR 52 07/17/2023    EGFR 54 07/16/2023    CREATININE 0.98 07/18/2023    CREATININE 1.20 07/17/2023    CREATININE 1.15 07/16/2023     · elevated creat w/ baseline 1.1-1.2  · Received IV fluids on arrival, since discontinued- will give another 1 L IV fluid bolus x 1 now  · Creatinine at baseline  · Continue to avoid nephrotoxins and hypotension  · BMP a.m.

## 2023-07-18 NOTE — PROGRESS NOTES
[address]  Progress Note  Name: Constantin Bangura  MRN: [id number]  Unit/Bed#: -01 I Date of Admission: 7/14/2023   Date of Service: 7/18/2023 I Hospital Day: 3    Assessment/Plan   * Dizziness  Assessment & Plan  Patient reported dizziness, nausea and difficulty walking shortly after she started her first dose of Ozempic on Friday. She noted a headache. · Patient continues to have intermittent dizziness  · CT brain: No acute intracranial abnormality. · CT angiography: Mild atherosclerotic change of the cervical and intracranial vasculature with no moderate to high-grade stenosis or occlusive disease. 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 · Neurology consultation appreciate- likely migraine presentation. · Patient received 6 doses DHE.    · Continue meclizine 25 mg every 8 hours scheduled   · Continue Solu-Medrol to 500 mg twice daily  · Continue baby aspirin  · Continue magnesium sulfate to 1 g IV twice daily  · Will give another 1 L IV fluid bolus today (7/18)  · PT/OT eval- home with outpatient PT  · Continue Keppra per neuro recommendations for presumed seizure disorder    Seizure disorder ([address])  Assessment & Plan  · Continue Keppra 500 mg twice daily    Gastroparesis  Assessment & Plan  · Patient seen by GI on 7/13 secondary to report of nausea and gastroparesis  · Felt nausea possibly related to Trulicity  · Patient has outpatient EGD on 7/18/23- will need to reschedule     Benign hypertension with CKD (chronic kidney disease) stage III [facility])  Assessment & Plan  Lab Results   Component Value Date    EGFR 66 07/18/2023    EGFR 52 07/17/2023    EGFR 54 07/16/2023    CREATININE 0.98 07/18/2023    CREATININE 1.20 07/17/2023    CREATININE 1.15 07/16/2023     · elevated creat w/ baseline 1.1-1.2  · Received IV fluids on arrival, since discontinued- will give another 1 L IV fluid bolus x 1 now  · Creatinine at baseline  · Continue to avoid nephrotoxins and hypotension  · BMP a.m. GERD (gastroesophageal reflux disease)  Assessment & Plan  · Continue PPI    Depression with anxiety  Assessment & Plan  · Currently without signs of anxiety or depression   · Continue Celexa    Class 1 obesity due to excess calories with serious comorbidity and body mass index (BMI) of 34.0 to 34.9 in adult  Assessment & Plan  · BMI 34  · Healthy diet lifestyle modification counseling provided    Diabetic polyneuropathy associated with type 2 diabetes mellitus [facility])  Assessment & Plan  Lab Results   Component Value Date    HGBA1C 12.6 (H) 07/14/2023       Recent Labs     07/17/23  1654 07/17/23  2053 07/18/23  0658 07/18/23  1053   POCGLU 373* 305* 318* 390*       Blood Sugar Average: Last 72 hrs:  (P) 312.4602458804570240     · Blood sugars currently uncontrolled- likely due to steroids  · Increase Lantus 30 units BID to 35 units BID. Continue HumaLog 15 units TID with meals  · ISS  · Monitor blood glucose trends           VTE Pharmacologic Prophylaxis: VTE Score: 7 High Risk (Score >/= 5) - Pharmacological DVT Prophylaxis Ordered: heparin. Sequential Compression Devices Ordered. Patient Centered Rounds: I performed bedside rounds with nursing staff today. Discussions with Specialists or Other Care Team Provider: nursing, CM    Education and Discussions with Family / Patient: Patient declined call to . Total Time Spent on Date of Encounter in care of patient: 35 minutes This time was spent on one or more of the following: performing physical exam; counseling and coordination of care; obtaining or reviewing history; documenting in the medical record; reviewing/ordering tests, medications or procedures; communicating with other healthcare professionals and discussing with patient's family/caregivers.     Current Length of Stay: 3 day(s)  Current Patient Status: Inpatient   Certification Statement: The patient will continue to require additional inpatient hospital stay due to continued tx of migraine  Discharge Plan: Anticipate discharge in 24-48 hrs to home. Code Status: Level 1 - Full Code    Subjective: The patient was seen and examined. The patient is lying in bed in no acute distress. She states she continues to have a headache. She currently reports it a 10/10. She states it was slightly better this morning at an 8/10. She reports dizziness as intermittent. Objective:     Vitals:   Temp (24hrs), Av.3 °F (36.8 °C), Min:97.9 °F (36.6 °C), Max:98.7 °F (37.1 °C)    Temp:  [97.9 °F (36.6 °C)-98.7 °F (37.1 °C)] 97.9 °F (36.6 °C)  HR:  [49-64] 49  Resp:  [16-19] 19  BP: (144-172)/(66-79) 172/79  SpO2:  [91 %-94 %] 91 %  Body mass index is 36.28 kg/m². Input and Output Summary (last 24 hours): Intake/Output Summary (Last 24 hours) at 2023 1628  Last data filed at 2023 1300  Gross per 24 hour   Intake 450 ml   Output --   Net 450 ml       Physical Exam:   Physical Exam  Vitals and nursing note reviewed. Constitutional:       General: She is awake. Appearance: Normal appearance. HENT:      Head: Normocephalic and atraumatic. Cardiovascular:      Rate and Rhythm: Normal rate and regular rhythm. Pulmonary:      Effort: Pulmonary effort is normal.      Breath sounds: Normal breath sounds. Abdominal:      Palpations: Abdomen is soft. Tenderness: There is no abdominal tenderness. Skin:     General: Skin is warm and dry. Neurological:      General: No focal deficit present. Mental Status: She is alert and oriented to person, place, and time. Psychiatric:         Attention and Perception: Attention normal.         Mood and Affect: Mood normal.         Speech: Speech normal.         Behavior: Behavior is cooperative.           Additional Data:     Labs:  Results from last 7 days Lab Units 07/18/23  0518 07/17/23  0443   WBC Thousand/uL 14.59* 13.49*   HEMOGLOBIN g/dL 12.7 13.4   HEMATOCRIT % 40.6 41.3   PLATELETS Thousands/uL 303 322   NEUTROS PCT %  --  87*   LYMPHS PCT %  --  10*   MONOS PCT %  --  2*   EOS PCT %  --  0     Results from last 7 days   Lab Units 07/18/23  0518 07/17/23  0443 07/16/23  0456   SODIUM mmol/L 138   < > 137   POTASSIUM mmol/L 4.1   < > 4.4   CHLORIDE mmol/L 104   < > 104   CO2 mmol/L 26   < > 24   BUN mg/dL 37*   < > 23   CREATININE mg/dL 0.98   < > 1.15   ANION GAP mmol/L 8   < > 9   CALCIUM mg/dL 8.3*   < > 8.8   ALBUMIN g/dL  --   --  3.8   TOTAL BILIRUBIN mg/dL  --   --  0.26   ALK PHOS U/L  --   --  121*   ALT U/L  --   --  24   AST U/L  --   --  24   GLUCOSE RANDOM mg/dL 318*   < > 282*    < > = values in this interval not displayed. Results from last 7 days   Lab Units 07/18/23  1623 07/18/23  1053 07/18/23  0658 07/17/23  2053 07/17/23  1654 07/17/23  1634 07/17/23  1129 07/17/23  0656 07/16/23  2105 07/16/23  1601 07/16/23  1105 07/16/23  0718   POC GLUCOSE mg/dl 206* 390* 318* 305* 373* 369* 487* 387* 386* 340* 319* 251*     Results from last 7 days   Lab Units 07/14/23  2322   HEMOGLOBIN A1C % 12.6*           Lines/Drains:  Invasive Devices     Peripheral Intravenous Line  Duration           Peripheral IV 07/14/23 Left Antecubital 3 days                      Imaging: No pertinent imaging reviewed.     Recent Cultures (last 7 days):   Results from last 7 days   Lab Units 07/13/23  1635   URINE CULTURE  20,000-29,000 cfu/ml Beta Hemolytic Streptococcus Group B*       Last 24 Hours Medication List:   Current Facility-Administered Medications   Medication Dose Route Frequency Provider Last Rate   • acetaminophen  975 mg Oral [facility] SYLWIA Edwards     • aspirin  81 mg Oral Daily Andrews Coker PA-C     • atorvastatin  80 mg Oral HS Andrews Coker PA-C     • citalopram  40 mg Oral Daily Gisele Coronel • docusate sodium  100 mg Oral BID Constance Cobos PA-C     • famotidine  20 mg Oral HS Constance Cobos PA-C     • fenofibrate  48 mg Oral Daily Constancerobinson Cobos [place]     • heparin (porcine)  5,000 Units Subcutaneous [facility] Constance Cobos [place]     • insulin glargine  35 Units Subcutaneous Q12H [address] Rafaela Pereira PA-C     • insulin lispro  1-5 Units Subcutaneous HS Constance Cobos PA-C     • insulin lispro  1-6 Units Subcutaneous TID AC Mikaela Lopez PA-C     • insulin lispro  15 Units Subcutaneous TID With Meals Rafaela Pereira PA-C     • levETIRAcetam  500 mg Oral BID Constance Cobos PA-C     • LORazepam  1 mg Oral Q8H PRN Juan Du MD     • lubiprostone  24 mcg Oral BID Constance Cobos PA-C     • magnesium sulfate  1 g Intravenous BID SYLWIA Armstrong 0 g (07/17/23 1114)   • meclizine  25 mg Oral Q8H [address] SYLWIA Armstrong     • methylPREDNISolone sodium succinate (Solu-MEDROL) 500 mg in sodium chloride 0.9 % 250 mL IVPB  500 mg Intravenous BID SYLWIA Armstrong 500 mg (07/18/23 6662)   • metoprolol tartrate  25 mg Oral BID Rafaela Pereira PA-C     • ondansetron  4 mg Intravenous Q8H SYLWIA Armstrong     • pantoprazole  40 mg Oral Early Morning Constance Cobos PA-C     • pregabalin  100 mg Oral TID Constance Cobos PA-C          Today, Patient Was Seen By: Rafaela Pereira PA-C    **Please Note: This note may have been constructed using a voice recognition system. **

## 2023-07-18 NOTE — ASSESSMENT & PLAN NOTE
Lab Results   Component Value Date    HGBA1C 12.6 (H) 07/14/2023       Recent Labs     07/17/23  1654 07/17/23  2053 07/18/23  0658 07/18/23  1053   POCGLU 373* 305* 318* 390*       Blood Sugar Average: Last 72 hrs:  (P) 312.8524852173590372     · Blood sugars currently uncontrolled- likely due to steroids  · Increase Lantus 30 units BID to 35 units BID.  Continue HumaLog 15 units TID with meals  · ISS  · Monitor blood glucose trends

## 2023-07-18 NOTE — PLAN OF CARE
Problem: METABOLIC, FLUID AND ELECTROLYTES - ADULT  Goal: Glucose maintained within target range  Description: INTERVENTIONS:  - Monitor Blood Glucose as ordered  - Assess for signs and symptoms of hyperglycemia and hypoglycemia  - Administer ordered medications to maintain glucose within target range  - Assess nutritional intake and initiate nutrition service referral as needed  Outcome: Progressing     Problem: PAIN - ADULT  Goal: Verbalizes/displays adequate comfort level or baseline comfort level  Description: Interventions:  - Encourage patient to monitor pain and request assistance  - Assess pain using appropriate pain scale  - Administer analgesics based on type and severity of pain and evaluate response  - Implement non-pharmacological measures as appropriate and evaluate response  - Consider cultural and social influences on pain and pain management  - Notify physician/advanced practitioner if interventions unsuccessful or patient reports new pain  Outcome: Progressing     Problem: Neurological Deficit  Goal: Neurological status is stable or improving  Description: Interventions:  - Monitor and assess patient's level of consciousness, motor function, sensory function, and level of assistance needed for ADLs. - Monitor and report changes from baseline. Collaborate with interdisciplinary team to initiate plan and implement interventions as ordered. - Provide and maintain a safe environment. - Consider seizure precautions. - Consider fall precautions. - Consider aspiration precautions. - Consider bleeding precautions. Outcome: Progressing     Problem: Knowledge Deficit  Goal: Patient/family/caregiver demonstrates understanding of disease process, treatment plan, medications, and discharge instructions  Description: Complete learning assessment and assess knowledge base.   Interventions:  - Provide teaching at level of understanding  - Provide teaching via preferred learning methods  Outcome: Progressing

## 2023-07-19 LAB
ANION GAP SERPL CALCULATED.3IONS-SCNC: 7 MMOL/L
BUN SERPL-MCNC: 28 MG/DL (ref 5–25)
CALCIUM SERPL-MCNC: 8.1 MG/DL (ref 8.4–10.2)
CHLORIDE SERPL-SCNC: 102 MMOL/L (ref 96–108)
CO2 SERPL-SCNC: 28 MMOL/L (ref 21–32)
CREAT SERPL-MCNC: 0.88 MG/DL (ref 0.6–1.3)
ERYTHROCYTE [DISTWIDTH] IN BLOOD BY AUTOMATED COUNT: 14.7 % (ref 11.6–15.1)
GFR SERPL CREATININE-BSD FRML MDRD: 75 ML/MIN/1.73SQ M
GLUCOSE SERPL-MCNC: 180 MG/DL (ref 65–140)
GLUCOSE SERPL-MCNC: 230 MG/DL (ref 65–140)
GLUCOSE SERPL-MCNC: 239 MG/DL (ref 65–140)
GLUCOSE SERPL-MCNC: 249 MG/DL (ref 65–140)
GLUCOSE SERPL-MCNC: 277 MG/DL (ref 65–140)
GLUCOSE SERPL-MCNC: 281 MG/DL (ref 65–140)
HCT VFR BLD AUTO: 42 % (ref 34.8–46.1)
HGB BLD-MCNC: 13.1 G/DL (ref 11.5–15.4)
MCH RBC QN AUTO: 25.6 PG (ref 26.8–34.3)
MCHC RBC AUTO-ENTMCNC: 31.2 G/DL (ref 31.4–37.4)
MCV RBC AUTO: 82 FL (ref 82–98)
PLATELET # BLD AUTO: 301 THOUSANDS/UL (ref 149–390)
PMV BLD AUTO: 11.4 FL (ref 8.9–12.7)
POTASSIUM SERPL-SCNC: 4 MMOL/L (ref 3.5–5.3)
RBC # BLD AUTO: 5.11 MILLION/UL (ref 3.81–5.12)
SODIUM SERPL-SCNC: 137 MMOL/L (ref 135–147)
WBC # BLD AUTO: 11.87 THOUSAND/UL (ref 4.31–10.16)

## 2023-07-19 PROCEDURE — 82948 REAGENT STRIP/BLOOD GLUCOSE: CPT

## 2023-07-19 PROCEDURE — 80048 BASIC METABOLIC PNL TOTAL CA: CPT | Performed by: PHYSICIAN ASSISTANT

## 2023-07-19 PROCEDURE — NC001 PR NO CHARGE: Performed by: NURSE PRACTITIONER

## 2023-07-19 PROCEDURE — 85027 COMPLETE CBC AUTOMATED: CPT | Performed by: PHYSICIAN ASSISTANT

## 2023-07-19 PROCEDURE — 99232 SBSQ HOSP IP/OBS MODERATE 35: CPT | Performed by: PHYSICIAN ASSISTANT

## 2023-07-19 RX ORDER — AMLODIPINE BESYLATE 5 MG/1
5 TABLET ORAL DAILY
Status: DISCONTINUED | OUTPATIENT
Start: 2023-07-19 | End: 2023-07-20 | Stop reason: HOSPADM

## 2023-07-19 RX ORDER — HYDRALAZINE HYDROCHLORIDE 20 MG/ML
10 INJECTION INTRAMUSCULAR; INTRAVENOUS EVERY 6 HOURS PRN
Status: DISCONTINUED | OUTPATIENT
Start: 2023-07-19 | End: 2023-07-20 | Stop reason: HOSPADM

## 2023-07-19 RX ORDER — OLANZAPINE 5 MG/1
5 TABLET ORAL
Status: DISCONTINUED | OUTPATIENT
Start: 2023-07-19 | End: 2023-07-20 | Stop reason: HOSPADM

## 2023-07-19 RX ADMIN — INSULIN LISPRO 1 UNITS: 100 INJECTION, SOLUTION INTRAVENOUS; SUBCUTANEOUS at 17:28

## 2023-07-19 RX ADMIN — ACETAMINOPHEN 975 MG: 325 TABLET ORAL at 05:29

## 2023-07-19 RX ADMIN — ONDANSETRON 4 MG: 2 INJECTION INTRAMUSCULAR; INTRAVENOUS at 17:28

## 2023-07-19 RX ADMIN — ONDANSETRON 4 MG: 2 INJECTION INTRAMUSCULAR; INTRAVENOUS at 08:03

## 2023-07-19 RX ADMIN — AMLODIPINE BESYLATE 5 MG: 5 TABLET ORAL at 09:32

## 2023-07-19 RX ADMIN — CITALOPRAM HYDROBROMIDE 40 MG: 20 TABLET ORAL at 08:01

## 2023-07-19 RX ADMIN — HEPARIN SODIUM 5000 UNITS: 5000 INJECTION INTRAVENOUS; SUBCUTANEOUS at 14:45

## 2023-07-19 RX ADMIN — METOPROLOL TARTRATE 25 MG: 25 TABLET, FILM COATED ORAL at 08:02

## 2023-07-19 RX ADMIN — OLANZAPINE 5 MG: 5 TABLET, FILM COATED ORAL at 21:10

## 2023-07-19 RX ADMIN — MECLIZINE 25 MG: 12.5 TABLET ORAL at 05:29

## 2023-07-19 RX ADMIN — HEPARIN SODIUM 5000 UNITS: 5000 INJECTION INTRAVENOUS; SUBCUTANEOUS at 05:29

## 2023-07-19 RX ADMIN — FENOFIBRATE 48 MG: 48 TABLET, FILM COATED ORAL at 08:02

## 2023-07-19 RX ADMIN — INSULIN LISPRO 4 UNITS: 100 INJECTION, SOLUTION INTRAVENOUS; SUBCUTANEOUS at 12:04

## 2023-07-19 RX ADMIN — PANTOPRAZOLE SODIUM 40 MG: 40 TABLET, DELAYED RELEASE ORAL at 05:29

## 2023-07-19 RX ADMIN — ATORVASTATIN CALCIUM 80 MG: 80 TABLET, FILM COATED ORAL at 21:10

## 2023-07-19 RX ADMIN — INSULIN LISPRO 15 UNITS: 100 INJECTION, SOLUTION INTRAVENOUS; SUBCUTANEOUS at 12:04

## 2023-07-19 RX ADMIN — SODIUM CHLORIDE 500 MG: 0.9 INJECTION, SOLUTION INTRAVENOUS at 20:34

## 2023-07-19 RX ADMIN — VALPROATE SODIUM 1000 MG: 100 INJECTION, SOLUTION INTRAVENOUS at 12:04

## 2023-07-19 RX ADMIN — MAGNESIUM SULFATE HEPTAHYDRATE 1 G: 1 INJECTION, SOLUTION INTRAVENOUS at 09:33

## 2023-07-19 RX ADMIN — INSULIN LISPRO 15 UNITS: 100 INJECTION, SOLUTION INTRAVENOUS; SUBCUTANEOUS at 08:03

## 2023-07-19 RX ADMIN — LEVETIRACETAM 500 MG: 500 TABLET, FILM COATED ORAL at 17:28

## 2023-07-19 RX ADMIN — MECLIZINE 25 MG: 12.5 TABLET ORAL at 21:10

## 2023-07-19 RX ADMIN — PREGABALIN 100 MG: 100 CAPSULE ORAL at 08:02

## 2023-07-19 RX ADMIN — ASPIRIN 81 MG CHEWABLE TABLET 81 MG: 81 TABLET CHEWABLE at 08:01

## 2023-07-19 RX ADMIN — LUBIPROSTONE 24 MCG: 24 CAPSULE, GELATIN COATED ORAL at 09:34

## 2023-07-19 RX ADMIN — INSULIN LISPRO 2 UNITS: 100 INJECTION, SOLUTION INTRAVENOUS; SUBCUTANEOUS at 21:14

## 2023-07-19 RX ADMIN — PREGABALIN 100 MG: 100 CAPSULE ORAL at 17:28

## 2023-07-19 RX ADMIN — INSULIN LISPRO 15 UNITS: 100 INJECTION, SOLUTION INTRAVENOUS; SUBCUTANEOUS at 17:28

## 2023-07-19 RX ADMIN — DOCUSATE SODIUM 100 MG: 100 CAPSULE, LIQUID FILLED ORAL at 17:28

## 2023-07-19 RX ADMIN — INSULIN GLARGINE 35 UNITS: 100 INJECTION, SOLUTION SUBCUTANEOUS at 08:03

## 2023-07-19 RX ADMIN — SODIUM CHLORIDE 500 MG: 0.9 INJECTION, SOLUTION INTRAVENOUS at 08:03

## 2023-07-19 RX ADMIN — DOCUSATE SODIUM 100 MG: 100 CAPSULE, LIQUID FILLED ORAL at 08:02

## 2023-07-19 RX ADMIN — PREGABALIN 100 MG: 100 CAPSULE ORAL at 21:52

## 2023-07-19 RX ADMIN — HEPARIN SODIUM 5000 UNITS: 5000 INJECTION INTRAVENOUS; SUBCUTANEOUS at 21:09

## 2023-07-19 RX ADMIN — INSULIN GLARGINE 35 UNITS: 100 INJECTION, SOLUTION SUBCUTANEOUS at 20:55

## 2023-07-19 RX ADMIN — INSULIN LISPRO 4 UNITS: 100 INJECTION, SOLUTION INTRAVENOUS; SUBCUTANEOUS at 08:02

## 2023-07-19 RX ADMIN — LEVETIRACETAM 500 MG: 500 TABLET, FILM COATED ORAL at 08:02

## 2023-07-19 RX ADMIN — FAMOTIDINE 20 MG: 20 TABLET, FILM COATED ORAL at 21:10

## 2023-07-19 RX ADMIN — METOPROLOL TARTRATE 25 MG: 25 TABLET, FILM COATED ORAL at 21:10

## 2023-07-19 RX ADMIN — ACETAMINOPHEN 975 MG: 325 TABLET ORAL at 21:09

## 2023-07-19 RX ADMIN — LUBIPROSTONE 24 MCG: 24 CAPSULE, GELATIN COATED ORAL at 20:55

## 2023-07-19 RX ADMIN — MAGNESIUM SULFATE HEPTAHYDRATE 1 G: 1 INJECTION, SOLUTION INTRAVENOUS at 21:52

## 2023-07-19 NOTE — QUICK NOTE
I was notified by the primary team that patient has thus far had no response to solumedrol, reglan mag benadryl. Pt reports no prior response to toradol thus defers this, in regards to migraine treatment. Discussed depacon 1000 mg administer over 25 minutes, q8h x 3 doses or q12 hours x 3-4 doses based on response to this medication.   Can try zyprexa 5 hs prn moderate to severe migraine to help further

## 2023-07-19 NOTE — PLAN OF CARE
Problem: PAIN - ADULT  Goal: Verbalizes/displays adequate comfort level or baseline comfort level  Description: Interventions:  - Encourage patient to monitor pain and request assistance  - Assess pain using appropriate pain scale  - Administer analgesics based on type and severity of pain and evaluate response  - Implement non-pharmacological measures as appropriate and evaluate response  - Consider cultural and social influences on pain and pain management  - Notify physician/advanced practitioner if interventions unsuccessful or patient reports new pain  Outcome: Progressing     Problem: INFECTION - ADULT  Goal: Absence or prevention of progression during hospitalization  Description: INTERVENTIONS:  - Assess and monitor for signs and symptoms of infection  - Monitor lab/diagnostic results  - Monitor all insertion sites, i.e. indwelling lines, tubes, and drains  - Monitor endotracheal if appropriate and nasal secretions for changes in amount and color  - Cooks appropriate cooling/warming therapies per order  - Administer medications as ordered  - Instruct and encourage patient and family to use good hand hygiene technique  - Identify and instruct in appropriate isolation precautions for identified infection/condition  Outcome: Progressing  Goal: Absence of fever/infection during neutropenic period  Description: INTERVENTIONS:  - Monitor WBC    Outcome: Progressing     Problem: SAFETY ADULT  Goal: Patient will remain free of falls  Description: INTERVENTIONS:  - Educate patient/family on patient safety including physical limitations  - Instruct patient to call for assistance with activity   - Consult OT/PT to assist with strengthening/mobility   - Keep Call bell within reach  - Keep bed low and locked with side rails adjusted as appropriate  - Keep care items and personal belongings within reach  - Initiate and maintain comfort rounds  - Make Fall Risk Sign visible to staff  - Offer Toileting every 1 Hours, in advance of need  - Initiate/Maintain bed alarm  - Obtain necessary fall risk management equipment: bed   - Apply yellow socks and bracelet for high fall risk patients  - Consider moving patient to room near nurses station  Outcome: Progressing  Goal: Maintain or return to baseline ADL function  Description: INTERVENTIONS:  -  Assess patient's ability to carry out ADLs; assess patient's baseline for ADL function and identify physical deficits which impact ability to perform ADLs (bathing, care of mouth/teeth, toileting, grooming, dressing, etc.)  - Assess/evaluate cause of self-care deficits   - Assess range of motion  - Assess patient's mobility; develop plan if impaired  - Assess patient's need for assistive devices and provide as appropriate  - Encourage maximum independence but intervene and supervise when necessary  - Involve family in performance of ADLs  - Assess for home care needs following discharge   - Consider OT consult to assist with ADL evaluation and planning for discharge  - Provide patient education as appropriate  Outcome: Progressing  Goal: Maintains/Returns to pre admission functional level  Description: INTERVENTIONS:  - Perform BMAT or MOVE assessment daily.   - Set and communicate daily mobility goal to care team and patient/family/caregiver. - Collaborate with rehabilitation services on mobility goals if consulted  - Perform Range of Motion 3 times a day. - Reposition patient every 2 hours.   - Dangle patient 3 times a day  - Stand patient 3 times a day  - Ambulate patient 3 times a day  - Out of bed to chair 3 times a day   - Out of bed for meals 3 times a day  - Out of bed for toileting  - Record patient progress and toleration of activity level   Outcome: Progressing     Problem: MOBILITY - ADULT  Goal: Maintain or return to baseline ADL function  Description: INTERVENTIONS:  -  Assess patient's ability to carry out ADLs; assess patient's baseline for ADL function and identify physical deficits which impact ability to perform ADLs (bathing, care of mouth/teeth, toileting, grooming, dressing, etc.)  - Assess/evaluate cause of self-care deficits   - Assess range of motion  - Assess patient's mobility; develop plan if impaired  - Assess patient's need for assistive devices and provide as appropriate  - Encourage maximum independence but intervene and supervise when necessary  - Involve family in performance of ADLs  - Assess for home care needs following discharge   - Consider OT consult to assist with ADL evaluation and planning for discharge  - Provide patient education as appropriate  Outcome: Progressing  Goal: Maintains/Returns to pre admission functional level  Description: INTERVENTIONS:  - Perform BMAT or MOVE assessment daily.   - Set and communicate daily mobility goal to care team and patient/family/caregiver. - Collaborate with rehabilitation services on mobility goals if consulted  - Perform Range of Motion 3 times a day. - Reposition patient every 2 hours. - Dangle patient 3 times a day  - Stand patient 3 times a day  - Ambulate patient 3 times a day  - Out of bed to chair 3 times a day   - Out of bed for meals 3 times a day  - Out of bed for toileting  - Record patient progress and toleration of activity level   Outcome: Progressing     Problem: Neurological Deficit  Goal: Neurological status is stable or improving  Description: Interventions:  - Monitor and assess patient's level of consciousness, motor function, sensory function, and level of assistance needed for ADLs. - Monitor and report changes from baseline. Collaborate with interdisciplinary team to initiate plan and implement interventions as ordered. - Provide and maintain a safe environment. - Consider seizure precautions. - Consider fall precautions. - Consider aspiration precautions. - Consider bleeding precautions.   Outcome: Progressing

## 2023-07-19 NOTE — ASSESSMENT & PLAN NOTE
Lab Results   Component Value Date    EGFR 75 07/19/2023    EGFR 66 07/18/2023    EGFR 52 07/17/2023    CREATININE 0.88 07/19/2023    CREATININE 0.98 07/18/2023    CREATININE 1.20 07/17/2023     · elevated creat w/ baseline 1.1-1.2  · Received IV fluids on arrival, since discontinued- will give another 1 L IV fluid bolus x 1 now  · Creatinine at baseline  · Continue to avoid nephrotoxins and hypotension  · BMP a.m.

## 2023-07-19 NOTE — PROGRESS NOTES
Pastoral Care Progress Note    2023  Patient: Shayne Bustillo : 1970  Admission Date & Time: 2023 2253  MRN: [id number] [facility]: [id number]                     Chaplaincy Interventions Utilized:     Relationship Building: Cultivated a relationship of care and support    Chaplaincy Outcomes Achieved:  Expressed feelings of depression and fear of losing her new job    Spiritual Coping Strategies Utilized:   Connectedness

## 2023-07-19 NOTE — PROGRESS NOTES
Pastoral Care Progress Note    2023  Patient: Nikia Gorman : 1970  Admission Date & Time: 2023 2253  MRN: [id number] CSN: [id number]         responded to RR Alert for pt and offered Pastoral Care to  through debriefing, encouraging self care, and normalizing the situation. The pt was in and out of sleep and receptive to prayer. She was teary throughout the consultation and had difficulty expressing herself. Chaplaincy Interventions Utilized:   Empowerment: Clarified, confirmed, or reviewed information from treatment team , Encouraged self-care, Normalized experience of patient/family, and Provided chaplaincy education    Exploration: Explored spiritual needs & resources and Facilitated story telling    Collaboration: Advocated for patient/family    Relationship Building: Cultivated a relationship of care and support, Listened empathically, and Provided silent and supportive presence    Ritual: Provided prayer    Chaplaincy Outcomes Achieved:  Debriefed/defused experience, Expressed humor, and Expressed ultimate hope    Spiritual Coping Strategies Utilized:   Connectedness, Spiritual practices, and Spiritual comfort       [address] Involvement Patient active with Gn[employer]   Spiritual Beliefs/Perceptions   Concept of God Accepting   Relationship with God Close   Support Systems Spouse/significant other;Yarsani/yuval community   Stress Factors   Patient Stress Factors Health changes; Loss of control;Lack of knowledge   Family Stress Factors Health changes; Lack of knowledge; Loss of control   Coping Responses   Patient Coping Anxiety;Depression;Non-verbal   Family Coping Anxiety;Open/discussion; Fearful   Patient Spiritual Assessment   Feelings of Hopelessness Y   Feelings of Discouragement Y   Family Spiritual Assessment   Feelings of Discouragement Y   Plan of Care   Provide Spiritual Support (Visits) 1 time   Comments Pt in and out of sleep   Assessment Completed by: Unit visit

## 2023-07-19 NOTE — PLAN OF CARE
Problem: METABOLIC, FLUID AND ELECTROLYTES - ADULT  Goal: Glucose maintained within target range  Description: INTERVENTIONS:  - Monitor Blood Glucose as ordered  - Assess for signs and symptoms of hyperglycemia and hypoglycemia  - Administer ordered medications to maintain glucose within target range  - Assess nutritional intake and initiate nutrition service referral as needed  Outcome: Progressing     Problem: Nutrition/Hydration-ADULT  Goal: Nutrient/Hydration intake appropriate for improving, restoring or maintaining nutritional needs  Description: Monitor and assess patient's nutrition/hydration status for malnutrition. Collaborate with interdisciplinary team and initiate plan and interventions as ordered. Monitor patient's weight and dietary intake as ordered or per policy. Utilize nutrition screening tool and intervene as necessary. Determine patient's food preferences and provide high-protein, high-caloric foods as appropriate.      INTERVENTIONS:  - Monitor oral intake, urinary output, labs, and treatment plans  - Assess nutrition and hydration status and recommend course of action  - Evaluate amount of meals eaten  - Assist patient with eating if necessary   - Allow adequate time for meals  - Recommend/ encourage appropriate diets, oral nutritional supplements, and vitamin/mineral supplements  - Order, calculate, and assess calorie counts as needed  - Recommend, monitor, and adjust tube feedings and TPN/PPN based on assessed needs  - Assess need for intravenous fluids  - Provide specific nutrition/hydration education as appropriate  - Include patient/family/caregiver in decisions related to nutrition  Outcome: Progressing     Problem: PAIN - ADULT  Goal: Verbalizes/displays adequate comfort level or baseline comfort level  Description: Interventions:  - Encourage patient to monitor pain and request assistance  - Assess pain using appropriate pain scale  - Administer analgesics based on type and severity of pain and evaluate response  - Implement non-pharmacological measures as appropriate and evaluate response  - Consider cultural and social influences on pain and pain management  - Notify physician/advanced practitioner if interventions unsuccessful or patient reports new pain  Outcome: Progressing     Problem: Knowledge Deficit  Goal: Patient/family/caregiver demonstrates understanding of disease process, treatment plan, medications, and discharge instructions  Description: Complete learning assessment and assess knowledge base.   Interventions:  - Provide teaching at level of understanding  - Provide teaching via preferred learning methods  Outcome: Progressing

## 2023-07-19 NOTE — ASSESSMENT & PLAN NOTE
Lab Results   Component Value Date    HGBA1C 12.6 (H) 07/14/2023       Recent Labs     07/19/23  0711 07/19/23  1104 07/19/23  1337 07/19/23  1554   POCGLU 281* 277* 249* 180*       Blood Sugar Average: Last 72 hrs:  (P) [id number]     · Blood sugars currently uncontrolled- likely due to steroids  · Continue Lantus 35 units BID.  Continue HumaLog 15 units TID with meals  · ISS  · Monitor blood glucose trends

## 2023-07-19 NOTE — NURSING NOTE
Pt more awake now than earlier, states that she feels really tired, now oriented times 4, denies any cp and or sob, callbell in reach of the pt, bed alarm on

## 2023-07-19 NOTE — ASSESSMENT & PLAN NOTE
Patient reported dizziness, nausea and difficulty walking shortly after she started her first dose of Ozempic on Friday. She noted a headache. · Patient continues to have intermittent dizziness  · CT brain: No acute intracranial abnormality. · CT angiography: Mild atherosclerotic change of the cervical and intracranial vasculature with no moderate to high-grade stenosis or occlusive disease. 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 · Neurology consultation appreciate- likely migraine presentation. · Patient received 6 doses DHE. · Continue meclizine 25 mg every 8 hours scheduled   · Continue Solu-Medrol to 500 mg twice daily  · Continue baby aspirin  · Continue magnesium sulfate to 1 g IV twice daily  · PT/OT eval- home with outpatient PT  · Spoke with neurology for additional recommendations- patient received Depacon 1000 mg x 1 dose. About 10 minutes later a rapid response was called around 1340 given the patient was lethargic/confused. Critical care felt this was likely due to high dose of Depacon. The patient was monitoring with neuro checks and her mental status improved. · Spoke with neurology- will give Depacon 500 mg q 12 hours x 2 more doses, also added zyprexa 5 mg qhs for migraine.

## 2023-07-19 NOTE — PROGRESS NOTES
[address]  Progress Note  Name: Michael Matute  MRN: [id number]  Unit/Bed#: -01 I Date of Admission: 7/14/2023   Date of Service: 7/19/2023 I Hospital Day: 4    Assessment/Plan   * Dizziness  Assessment & Plan  Patient reported dizziness, nausea and difficulty walking shortly after she started her first dose of Ozempic on Friday. She noted a headache. · Patient continues to have intermittent dizziness  · CT brain: No acute intracranial abnormality. · CT angiography: Mild atherosclerotic change of the cervical and intracranial vasculature with no moderate to high-grade stenosis or occlusive disease. 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 · Neurology consultation appreciate- likely migraine presentation. · Patient received 6 doses DHE. · Continue meclizine 25 mg every 8 hours scheduled   · Continue Solu-Medrol to 500 mg twice daily  · Continue baby aspirin  · Continue magnesium sulfate to 1 g IV twice daily  · PT/OT eval- home with outpatient PT  · Spoke with neurology for additional recommendations- patient received Depacon 1000 mg x 1 dose. About 10 minutes later a rapid response was called around 1340 given the patient was lethargic/confused. Critical care felt this was likely due to high dose of Depacon. The patient was monitoring with neuro checks and her mental status improved. · Spoke with neurology- will give Depacon 500 mg q 12 hours x 2 more doses, also added zyprexa 5 mg qhs for migraine.      Seizure disorder ([address])  Assessment & Plan  · Continue Keppra 500 mg twice daily    Gastroparesis  Assessment & Plan  · Patient seen by GI on 7/13 secondary to report of nausea and gastroparesis  · Felt nausea possibly related to Trulicity  · Patient has outpatient EGD on 7/18/23- will need to reschedule Benign hypertension with CKD (chronic kidney disease) stage III [facility])  Assessment & Plan  Lab Results   Component Value Date    EGFR 75 07/19/2023    EGFR 66 07/18/2023    EGFR 52 07/17/2023    CREATININE 0.88 07/19/2023    CREATININE 0.98 07/18/2023    CREATININE 1.20 07/17/2023     · elevated creat w/ baseline 1.1-1.2  · Received IV fluids on arrival, since discontinued- will give another 1 L IV fluid bolus x 1 now  · Creatinine at baseline  · Continue to avoid nephrotoxins and hypotension  · BMP a.m. GERD (gastroesophageal reflux disease)  Assessment & Plan  · Continue PPI    Depression with anxiety  Assessment & Plan  · Currently without signs of anxiety or depression   · Continue Celexa    Class 1 obesity due to excess calories with serious comorbidity and body mass index (BMI) of 34.0 to 34.9 in adult  Assessment & Plan  · BMI 34  · Healthy diet lifestyle modification counseling provided    Diabetic polyneuropathy associated with type 2 diabetes mellitus [facility])  Assessment & Plan  Lab Results   Component Value Date    HGBA1C 12.6 (H) 07/14/2023       Recent Labs     07/19/23  0711 07/19/23  1104 07/19/23  1337 07/19/23  1554   POCGLU 281* 277* 249* 180*       Blood Sugar Average: Last 72 hrs:  (P) [id number]     · Blood sugars currently uncontrolled- likely due to steroids  · Continue Lantus 35 units BID. Continue HumaLog 15 units TID with meals  · ISS  · Monitor blood glucose trends           VTE Pharmacologic Prophylaxis: VTE Score: 7 High Risk (Score >/= 5) - Pharmacological DVT Prophylaxis Ordered: heparin. Sequential Compression Devices Ordered. Patient Centered Rounds: I performed bedside rounds with nursing staff today. Discussions with Specialists or Other Care Team Provider: nursing, CM    Education and Discussions with Family / Patient: Updated  () at bedside.     Total Time Spent on Date of Encounter in care of patient: 35 minutes This time was spent on one or more of the following: performing physical exam; counseling and coordination of care; obtaining or reviewing history; documenting in the medical record; reviewing/ordering tests, medications or procedures; communicating with other healthcare professionals and discussing with patient's family/caregivers. Current Length of Stay: 4 day(s)  Current Patient Status: Inpatient   Certification Statement: The patient will continue to require additional inpatient hospital stay due to continued tx of migraine  Discharge Plan: Anticipate discharge in 24-48 hrs to home. Code Status: Level 1 - Full Code    Subjective: The patient was seen and examined. In the am the patient was lying in bed in no acute distress. She reported no improvement of her headache. Around 1340 I was called to the patient's room by her nurse due to the patient being lethargic. A rapid response was called. Critical care evaluated the patient and felt it was likely due to the Depacon given this occurred 10 min after she received the medications. The patient's mental status slowly returned to her baseline throughout the day. Objective:     Vitals:   Temp (24hrs), Av.7 °F (37.1 °C), Min:98.5 °F (36.9 °C), Max:98.9 °F (37.2 °C)    Temp:  [98.5 °F (36.9 °C)-98.9 °F (37.2 °C)] 98.5 °F (36.9 °C)  HR:  [49-64] 63  Resp:  [17-22] 20  BP: (126-177)/() 126/61  SpO2:  [89 %-97 %] 93 %  Body mass index is 36.28 kg/m². Input and Output Summary (last 24 hours): Intake/Output Summary (Last 24 hours) at 2023 1824  Last data filed at 2023 1820  Gross per 24 hour   Intake 1120 ml   Output 500 ml   Net 620 ml       Physical Exam:   Physical Exam  Vitals and nursing note reviewed. Constitutional:       General: She is awake. Appearance: Normal appearance. HENT:      Head: Normocephalic and atraumatic. Cardiovascular:      Rate and Rhythm: Normal rate and regular rhythm. Heart sounds: Normal heart sounds.    Pulmonary: Effort: Pulmonary effort is normal.      Breath sounds: Normal breath sounds. Abdominal:      Palpations: Abdomen is soft. Tenderness: There is no abdominal tenderness. Skin:     General: Skin is warm and dry. Neurological:      General: No focal deficit present. Mental Status: She is alert and oriented to person, place, and time. Psychiatric:         Attention and Perception: Attention normal.         Mood and Affect: Mood normal.         Speech: Speech normal.         Behavior: Behavior is cooperative. Additional Data:     Labs:  Results from last 7 days   Lab Units 07/19/23  0541 07/18/23  0518 07/17/23  0443   WBC Thousand/uL 11.87*   < > 13.49*   HEMOGLOBIN g/dL 13.1   < > 13.4   HEMATOCRIT % 42.0   < > 41.3   PLATELETS Thousands/uL 301   < > 322   NEUTROS PCT %  --   --  87*   LYMPHS PCT %  --   --  10*   MONOS PCT %  --   --  2*   EOS PCT %  --   --  0    < > = values in this interval not displayed. Results from last 7 days   Lab Units 07/19/23  0541 07/17/23 0443 07/16/23  0456   SODIUM mmol/L 137   < > 137   POTASSIUM mmol/L 4.0   < > 4.4   CHLORIDE mmol/L 102   < > 104   CO2 mmol/L 28   < > 24   BUN mg/dL 28*   < > 23   CREATININE mg/dL 0.88   < > 1.15   ANION GAP mmol/L 7   < > 9   CALCIUM mg/dL 8.1*   < > 8.8   ALBUMIN g/dL  --   --  3.8   TOTAL BILIRUBIN mg/dL  --   --  0.26   ALK PHOS U/L  --   --  121*   ALT U/L  --   --  24   AST U/L  --   --  24   GLUCOSE RANDOM mg/dL 239*   < > 282*    < > = values in this interval not displayed.          Results from last 7 days   Lab Units 07/19/23  1554 07/19/23  1337 07/19/23  1104 07/19/23  0711 07/18/23  2132 07/18/23  1623 07/18/23  1053 07/18/23  0658 07/17/23  2053 07/17/23  1654 07/17/23  1634 07/17/23  1129   POC GLUCOSE mg/dl 180* 249* 277* 281* 211* 206* 390* 318* 305* 373* 369* 487*     Results from last 7 days   Lab Units 07/14/23  2322   HEMOGLOBIN A1C % 12.6*           Lines/Drains:  Invasive Devices     Peripheral Intravenous Line  Duration           Peripheral IV 07/17/23 Right;Ventral (anterior) Forearm 2 days                      Imaging: No pertinent imaging reviewed.     Recent Cultures (last 7 days):   Results from last 7 days   Lab Units 07/13/23  1635   URINE CULTURE  20,000-29,000 cfu/ml Beta Hemolytic Streptococcus Group B*       Last 24 Hours Medication List:   Current Facility-Administered Medications   Medication Dose Route Frequency Provider Last Rate   • acetaminophen  975 mg Oral [facility] SYLWIA Reddy     • amLODIPine  5 mg Oral Daily Ro Lopez PA-C     • aspirin  81 mg Oral Daily [facility] MARILYN Gonzalez     • atorvastatin  80 mg Oral HS [facility] MARILYN Gonzalez     • citalopram  40 mg Oral Daily [facility] MARILYN Gonzalez     • docusate sodium  100 mg Oral BID [facility] MARILYN Gonzalez     • famotidine  20 mg Oral HS [facility] MARILYN Gonzalez     • fenofibrate  48 mg Oral Daily [facility] Gisele Gonzalez     • heparin (porcine)  5,000 Units Subcutaneous [facility] Lennox Adolf, PA-C     • hydrALAZINE  10 mg Intravenous Q6H PRN Ro Lopez PA-C     • insulin glargine  35 Units Subcutaneous Q12H [address] [facility]     • insulin lispro  1-5 Units Subcutaneous HS Lenn[place] Adolf, PA-C     • insulin lispro  1-6 Units Subcutaneous TID AC Mikaela Hollins PA-C     • insulin lispro  15 Units Subcutaneous TID With Meals Ro Lopez PA-C     • levETIRAcetam  500 mg Oral BID [facility] Adolf, PA-C     • LORazepam  1 mg Oral Q8H PRN Mariposa Marcelo MD     • lubiprostone  24 mcg Oral BID [facility] MARILYN Gonzalez     • magnesium sulfate  1 g Intravenous BID SYLWIA Armstrong Stopped (07/19/23 1200)   • meclizine  25 mg Oral Q8H [address] SYLWIA Armstrong     • methylPREDNISolone sodium succinate (Solu-MEDROL) 500 mg in sodium chloride 0.9 % 250 mL IVPB  500 mg Intravenous BID SYLWIA Armstrong Stopped (07/19/23 5405) • metoprolol tartrate  25 mg Oral BID Jesus Talavera PA-C     • OLANZapine  5 mg Oral HS Bhaskar Cabral PA-C     • ondansetron  4 mg Intravenous Q8H SYLWIA Armstrong     • pantoprazole  40 mg Oral [place] Andrea PA-C     • pregabalin  100 mg Oral TID [place] Andrea PA-C     • [START ON 7/20/2023] valproate (DEPACON) 500 mg in sodium chloride 0.9 % 50 mL for headache  500 mg Intravenous Q12H Jesus Talavera PA-C          Today, Patient Was Seen By: Jesus Talavera PA-C    **Please Note: This note may have been constructed using a voice recognition system. **

## 2023-07-19 NOTE — NURSING NOTE
pts  stated that pt became lethargic and confused, pt assessed, hospitalist at the pts bedside, sugar 249, rapid response called, vitals taken and charted, neuro checks done, in bed at this time sleeping

## 2023-07-19 NOTE — NURSING NOTE
Pt awake alert and oriented times 4, answers all questions appropriately, neuro status intact, in bed at this time in no acute  Distress watching tv

## 2023-07-19 NOTE — RAPID RESPONSE
Rapid Response Note  Khris Lanza 46 y.o. female MRN: [id number]  Unit/Bed#: -01 Encounter: [id number]    Rapid Response Notification(s):   Response called date/time:  7/19/2023 1:40 PM  Response team arrival date/time:  7/19/2023 1:41 PM  Response end date/time:  7/19/2023 1:51 PM  [facility]  Rapid response location:  [facility] unit  Primary reason for rapid response call:  Acute change in neuro status    Rapid Response Intervention(s):   Airway:  None  Breathing:  None  Circulation:  None  Fluids administered:  None  Medications administered:  None       Assessment:   · Acute encephalopathy suspect secondary to [address] likely TIA  · Migraine Headaches    Plan:   · Blood glucose 249, Trend neuro exam if does not improve or worsens-consider Stat CT head  · Neurology following-reach out to them about rxn to depacon     Rapid Response Outcome:   Transfer:  Remain on floor  Provider notification: N/A SLIM LEXIE Cruz PA-C at bedside. Code Status: Level 1 (Full Code)      Family notified of transfer: no  Family member contacted: Veronique Watt spouse at bedside     Background/Situation:   Khris Lanza is a 46 y.o. female past medical history of seizure disorder, hypertension, GERD, depression with anxiety, diabetes type 2 who was admitted to some service on 7/15 for dizziness and rule out stroke. Neurology following. This afternoon patient became lethargic 10 minutes after she received Depacon IV. RRT called. Patient responsive to painful stimuli and able to follow commands with slowed response. Patient able to maintain patent airway.    Review of Systems   Unable to perform ROS: Mental status change       Objective:   Vitals:    07/19/23 1332 07/19/23 1341 07/19/23 1341 07/19/23 1345   BP: 169/86 (!) 147/114 (!) 147/114 167/84   BP Location:       Pulse: 58 64 64 58   Resp: 20 17     Temp: 98.9 °F (37.2 °C)      TempSrc:       SpO2: 95% 97% 97% 94%   Weight:       Height:         Physical Exam  Constitutional:       General: She is not in acute distress. Appearance: She is obese. HENT:      Head: Normocephalic and atraumatic. Mouth/Throat:      Mouth: Mucous membranes are dry. Tongue: No lesions. Tongue does not deviate from midline. Pharynx: No pharyngeal swelling or posterior oropharyngeal erythema. Eyes:      Extraocular Movements: Extraocular movements intact. Conjunctiva/sclera: Conjunctivae normal.      Pupils: Pupils are equal, round, and reactive to light. Cardiovascular:      Pulses: Normal pulses. Pulmonary:      Effort: Pulmonary effort is normal. No respiratory distress. Breath sounds: Normal breath sounds. No stridor. No wheezing or rhonchi. Abdominal:      General: Bowel sounds are normal. There is no distension. Palpations: Abdomen is soft. Tenderness: There is no abdominal tenderness. Musculoskeletal:      Cervical back: Normal range of motion. Skin:     General: Skin is warm and dry. Capillary Refill: Capillary refill takes less than 2 seconds. Neurological:      Mental Status: She is lethargic. GCS: GCS eye subscore is 3. GCS verbal subscore is 4. GCS motor subscore is 6. Cranial Nerves: No facial asymmetry. Comments: Tongue appears midline, slow to respond and react   Psychiatric:         Speech: Speech is delayed. Behavior: Behavior is cooperative. Portions of the record may have been created with voice recognition software. Occasional wrong word or "sound a like" substitutions may have occurred due to the inherent limitations of voice recognition software. Read the chart carefully and recognize, using context, where substitutions have occurred.     [address] [facility]

## 2023-07-20 VITALS
DIASTOLIC BLOOD PRESSURE: 69 MMHG | WEIGHT: 192 LBS | TEMPERATURE: 98.1 F | SYSTOLIC BLOOD PRESSURE: 137 MMHG | BODY MASS INDEX: 36.25 KG/M2 | HEIGHT: 61 IN | RESPIRATION RATE: 17 BRPM | OXYGEN SATURATION: 95 % | HEART RATE: 65 BPM

## 2023-07-20 PROBLEM — G43.909 MIGRAINE: Status: ACTIVE | Noted: 2023-07-15

## 2023-07-20 LAB
ANION GAP SERPL CALCULATED.3IONS-SCNC: 8 MMOL/L
BUN SERPL-MCNC: 32 MG/DL (ref 5–25)
CALCIUM SERPL-MCNC: 7.4 MG/DL (ref 8.4–10.2)
CHLORIDE SERPL-SCNC: 104 MMOL/L (ref 96–108)
CO2 SERPL-SCNC: 26 MMOL/L (ref 21–32)
CREAT SERPL-MCNC: 0.92 MG/DL (ref 0.6–1.3)
ERYTHROCYTE [DISTWIDTH] IN BLOOD BY AUTOMATED COUNT: 14.6 % (ref 11.6–15.1)
GFR SERPL CREATININE-BSD FRML MDRD: 71 ML/MIN/1.73SQ M
GLUCOSE SERPL-MCNC: 303 MG/DL (ref 65–140)
GLUCOSE SERPL-MCNC: 318 MG/DL (ref 65–140)
GLUCOSE SERPL-MCNC: 337 MG/DL (ref 65–140)
HCT VFR BLD AUTO: 38.8 % (ref 34.8–46.1)
HGB BLD-MCNC: 12.3 G/DL (ref 11.5–15.4)
MCH RBC QN AUTO: 26.1 PG (ref 26.8–34.3)
MCHC RBC AUTO-ENTMCNC: 31.7 G/DL (ref 31.4–37.4)
MCV RBC AUTO: 82 FL (ref 82–98)
PLATELET # BLD AUTO: 270 THOUSANDS/UL (ref 149–390)
PMV BLD AUTO: 11.4 FL (ref 8.9–12.7)
POTASSIUM SERPL-SCNC: 4.1 MMOL/L (ref 3.5–5.3)
RBC # BLD AUTO: 4.72 MILLION/UL (ref 3.81–5.12)
SODIUM SERPL-SCNC: 138 MMOL/L (ref 135–147)
WBC # BLD AUTO: 10.91 THOUSAND/UL (ref 4.31–10.16)

## 2023-07-20 PROCEDURE — 85027 COMPLETE CBC AUTOMATED: CPT | Performed by: PHYSICIAN ASSISTANT

## 2023-07-20 PROCEDURE — 80048 BASIC METABOLIC PNL TOTAL CA: CPT | Performed by: PHYSICIAN ASSISTANT

## 2023-07-20 PROCEDURE — 99238 HOSP IP/OBS DSCHRG MGMT 30/<: CPT | Performed by: PHYSICIAN ASSISTANT

## 2023-07-20 PROCEDURE — 82948 REAGENT STRIP/BLOOD GLUCOSE: CPT

## 2023-07-20 RX ORDER — LANOLIN ALCOHOL/MO/W.PET/CERES
400 CREAM (GRAM) TOPICAL DAILY
Qty: 30 TABLET | Refills: 0 | Status: SHIPPED | OUTPATIENT
Start: 2023-07-20 | End: 2023-08-19

## 2023-07-20 RX ORDER — LANOLIN ALCOHOL/MO/W.PET/CERES
400 CREAM (GRAM) TOPICAL 2 TIMES DAILY
Status: DISCONTINUED | OUTPATIENT
Start: 2023-07-20 | End: 2023-07-20 | Stop reason: HOSPADM

## 2023-07-20 RX ORDER — AMITRIPTYLINE HYDROCHLORIDE 25 MG/1
25 TABLET, FILM COATED ORAL
Qty: 30 TABLET | Refills: 0 | Status: SHIPPED | OUTPATIENT
Start: 2023-07-20 | End: 2023-08-19

## 2023-07-20 RX ORDER — AMLODIPINE BESYLATE 5 MG/1
5 TABLET ORAL DAILY
Qty: 30 TABLET | Refills: 0 | Status: SHIPPED | OUTPATIENT
Start: 2023-07-21 | End: 2023-08-01

## 2023-07-20 RX ADMIN — PANTOPRAZOLE SODIUM 40 MG: 40 TABLET, DELAYED RELEASE ORAL at 05:22

## 2023-07-20 RX ADMIN — INSULIN LISPRO 15 UNITS: 100 INJECTION, SOLUTION INTRAVENOUS; SUBCUTANEOUS at 11:53

## 2023-07-20 RX ADMIN — HEPARIN SODIUM 5000 UNITS: 5000 INJECTION INTRAVENOUS; SUBCUTANEOUS at 05:22

## 2023-07-20 RX ADMIN — FENOFIBRATE 48 MG: 48 TABLET, FILM COATED ORAL at 08:13

## 2023-07-20 RX ADMIN — ACETAMINOPHEN 975 MG: 325 TABLET ORAL at 05:22

## 2023-07-20 RX ADMIN — METOPROLOL TARTRATE 25 MG: 25 TABLET, FILM COATED ORAL at 08:13

## 2023-07-20 RX ADMIN — SODIUM CHLORIDE 500 MG: 0.9 INJECTION, SOLUTION INTRAVENOUS at 09:27

## 2023-07-20 RX ADMIN — INSULIN GLARGINE 35 UNITS: 100 INJECTION, SOLUTION SUBCUTANEOUS at 08:14

## 2023-07-20 RX ADMIN — PREGABALIN 100 MG: 100 CAPSULE ORAL at 08:13

## 2023-07-20 RX ADMIN — ONDANSETRON 4 MG: 2 INJECTION INTRAMUSCULAR; INTRAVENOUS at 07:48

## 2023-07-20 RX ADMIN — LEVETIRACETAM 500 MG: 500 TABLET, FILM COATED ORAL at 08:13

## 2023-07-20 RX ADMIN — INSULIN LISPRO 4 UNITS: 100 INJECTION, SOLUTION INTRAVENOUS; SUBCUTANEOUS at 07:48

## 2023-07-20 RX ADMIN — MECLIZINE 25 MG: 12.5 TABLET ORAL at 05:22

## 2023-07-20 RX ADMIN — CITALOPRAM HYDROBROMIDE 40 MG: 20 TABLET ORAL at 08:13

## 2023-07-20 RX ADMIN — ASPIRIN 81 MG CHEWABLE TABLET 81 MG: 81 TABLET CHEWABLE at 08:13

## 2023-07-20 RX ADMIN — DOCUSATE SODIUM 100 MG: 100 CAPSULE, LIQUID FILLED ORAL at 08:13

## 2023-07-20 RX ADMIN — INSULIN LISPRO 15 UNITS: 100 INJECTION, SOLUTION INTRAVENOUS; SUBCUTANEOUS at 07:48

## 2023-07-20 RX ADMIN — INSULIN LISPRO 5 UNITS: 100 INJECTION, SOLUTION INTRAVENOUS; SUBCUTANEOUS at 11:52

## 2023-07-20 RX ADMIN — AMLODIPINE BESYLATE 5 MG: 5 TABLET ORAL at 08:14

## 2023-07-20 RX ADMIN — MAGNESIUM SULFATE HEPTAHYDRATE 1 G: 1 INJECTION, SOLUTION INTRAVENOUS at 08:00

## 2023-07-20 RX ADMIN — LUBIPROSTONE 24 MCG: 24 CAPSULE, GELATIN COATED ORAL at 09:27

## 2023-07-20 NOTE — ASSESSMENT & PLAN NOTE
· Patient seen by GI on 7/13 secondary to report of nausea and gastroparesis  · Felt nausea possibly related to Trulicity  · Patient had outpatient EGD scheduled on 7/18/23- will need to reschedule

## 2023-07-20 NOTE — ASSESSMENT & PLAN NOTE
Patient reported dizziness, nausea and difficulty walking shortly after she started her first dose of Ozempic on Friday. She also noted a headache. · Patient continues to have intermittent dizziness  · CT brain: No acute intracranial abnormality. · CT angiography: Mild atherosclerotic change of the cervical and intracranial vasculature with no moderate to high-grade stenosis or occlusive disease. 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 · MRI brain: No acute ischemia. Minimal nonspecific white matter change primarily involving bifrontal subcortical white matter. Main differential considerations include chronic microangiopathy versus sequela of migraines   · Neurology consultation appreciate- likely migraine presentation. · Patient received 6 doses DHE. · She received meclizine 25 mg every 8 hours scheduled, Solu-Medrol to 500 mg twice daily, and magnesium sulfate to 1 g IV twice daily. · PT/OT eval- home with outpatient PT  · Spoke with neurology for additional recommendations- patient received Depacon 1000 mg x 1 dose. About 10 minutes later a rapid response was called for lethargic/confused. Critical care felt this was likely due to high dose of Depacon. The patient was monitored with neuro checks and her mental status returned to her baseline later that day. · On 7/20/23 the patient reported her headache as improved and she was ready to go home. The patient was discharged on magnesium oxide 400 mg po daily and amitriptyline 25 mg qhs.    · Outpatient follow-up with neurology and PCP

## 2023-07-20 NOTE — ASSESSMENT & PLAN NOTE
Lab Results   Component Value Date    HGBA1C 12.6 (H) 07/14/2023       Recent Labs     07/19/23  1554 07/19/23  2042 07/20/23  0720 07/20/23  1054   POCGLU 180* 230* 303* 337*       Blood Sugar Average: Last 72 hrs:  (P) 658.9281     · Blood sugars were uncontrolled- likely due to steroids. No steroids given on discharge  · Resume home regimen on discharge.    · Outpatient follow-up with endocrinology

## 2023-07-20 NOTE — PLAN OF CARE
Problem: PAIN - ADULT  Goal: Verbalizes/displays adequate comfort level or baseline comfort level  Description: Interventions:  - Encourage patient to monitor pain and request assistance  - Assess pain using appropriate pain scale  - Administer analgesics based on type and severity of pain and evaluate response  - Implement non-pharmacological measures as appropriate and evaluate response  - Consider cultural and social influences on pain and pain management  - Notify physician/advanced practitioner if interventions unsuccessful or patient reports new pain  Outcome: Progressing     Problem: INFECTION - ADULT  Goal: Absence or prevention of progression during hospitalization  Description: INTERVENTIONS:  - Assess and monitor for signs and symptoms of infection  - Monitor lab/diagnostic results  - Monitor all insertion sites, i.e. indwelling lines, tubes, and drains  - Monitor endotracheal if appropriate and nasal secretions for changes in amount and color  - [place] appropriate cooling/warming therapies per order  - Administer medications as ordered  - Instruct and encourage patient and family to use good hand hygiene technique  - Identify and instruct in appropriate isolation precautions for identified infection/condition  Outcome: Progressing  Goal: Absence of fever/infection during neutropenic period  Description: INTERVENTIONS:  - Monitor WBC    Outcome: Progressing     Problem: SAFETY ADULT  Goal: Patient will remain free of falls  Description: INTERVENTIONS:  - Educate patient/family on patient safety including physical limitations  - Instruct patient to call for assistance with activity   - Consult OT/PT to assist with strengthening/mobility   - Keep Call bell within reach  - Keep bed low and locked with side rails adjusted as appropriate  - Keep care items and personal belongings within reach  - Initiate and maintain comfort rounds  - Make Fall Risk Sign visible to staff  - Offer Toileting every  Hours, in advance of need  - Initiate/Maintain alarm  - Obtain necessary fall risk management equipment:   - Apply yellow socks and bracelet for high fall risk patients  - Consider moving patient to room near nurses station  Outcome: Progressing  Goal: Maintain or return to baseline ADL function  Description: INTERVENTIONS:  -  Assess patient's ability to carry out ADLs; assess patient's baseline for ADL function and identify physical deficits which impact ability to perform ADLs (bathing, care of mouth/teeth, toileting, grooming, dressing, etc.)  - Assess/evaluate cause of self-care deficits   - Assess range of motion  - Assess patient's mobility; develop plan if impaired  - Assess patient's need for assistive devices and provide as appropriate  - Encourage maximum independence but intervene and supervise when necessary  - Involve family in performance of ADLs  - Assess for home care needs following discharge   - Consider OT consult to assist with ADL evaluation and planning for discharge  - Provide patient education as appropriate  Outcome: Progressing  Goal: Maintains/Returns to pre admission functional level  Description: INTERVENTIONS:  - Perform BMAT or MOVE assessment daily.   - Set and communicate daily mobility goal to care team and patient/family/caregiver. - Collaborate with rehabilitation services on mobility goals if consulted  - Perform Range of Motion  times a day. - Reposition patient every  hours.   - Dangle patient  times a day  - Stand patient  times a day  - Ambulate patient  times a day  - Out of bed to chair  times a day   - Out of bed for meals  times a day  - Out of bed for toileting  - Record patient progress and toleration of activity level   Outcome: Progressing     Problem: DISCHARGE PLANNING  Goal: Discharge to home or other facility with appropriate resources  Description: INTERVENTIONS:  - Identify barriers to discharge w/patient and caregiver  - Arrange for needed discharge resources and transportation as appropriate  - Identify discharge learning needs (meds, wound care, etc.)  - Arrange for interpretive services to assist at discharge as needed  - Refer to Case Management Department for coordinating discharge planning if the patient needs post-hospital services based on physician/advanced practitioner order or complex needs related to functional status, cognitive ability, or social support system  Outcome: Progressing     Problem: Knowledge Deficit  Goal: Patient/family/caregiver demonstrates understanding of disease process, treatment plan, medications, and discharge instructions  Description: Complete learning assessment and assess knowledge base. Interventions:  - Provide teaching at level of understanding  - Provide teaching via preferred learning methods  Outcome: Progressing     Problem: MOBILITY - ADULT  Goal: Maintain or return to baseline ADL function  Description: INTERVENTIONS:  -  Assess patient's ability to carry out ADLs; assess patient's baseline for ADL function and identify physical deficits which impact ability to perform ADLs (bathing, care of mouth/teeth, toileting, grooming, dressing, etc.)  - Assess/evaluate cause of self-care deficits   - Assess range of motion  - Assess patient's mobility; develop plan if impaired  - Assess patient's need for assistive devices and provide as appropriate  - Encourage maximum independence but intervene and supervise when necessary  - Involve family in performance of ADLs  - Assess for home care needs following discharge   - Consider OT consult to assist with ADL evaluation and planning for discharge  - Provide patient education as appropriate  Outcome: Progressing  Goal: Maintains/Returns to pre admission functional level  Description: INTERVENTIONS:  - Perform BMAT or MOVE assessment daily.   - Set and communicate daily mobility goal to care team and patient/family/caregiver.    - Collaborate with rehabilitation services on mobility goals if consulted  - Perform Range of Motion  times a day. - Reposition patient every  hours. - Dangle patient  times a day  - Stand patient  times a day  - Ambulate patient  times a day  - Out of bed to chair  times a day   - Out of bed for meals  times a day  - Out of bed for toileting  - Record patient progress and toleration of activity level   Outcome: Progressing     Problem: Neurological Deficit  Goal: Neurological status is stable or improving  Description: Interventions:  - Monitor and assess patient's level of consciousness, motor function, sensory function, and level of assistance needed for ADLs. - Monitor and report changes from baseline. Collaborate with interdisciplinary team to initiate plan and implement interventions as ordered. - Provide and maintain a safe environment. - Consider seizure precautions. - Consider fall precautions. - Consider aspiration precautions. - Consider bleeding precautions. Outcome: Progressing     Problem: Activity Intolerance/Impaired Mobility  Goal: Mobility/activity is maintained at optimum level for patient  Description: Interventions:  - Assess and monitor patient  barriers to mobility and need for assistive/adaptive devices. - Assess patient's emotional response to limitations. - Collaborate with interdisciplinary team and initiate plans and interventions as ordered. - Encourage independent activity per ability.  - Maintain proper body alignment. - Perform active/passive rom as tolerated/ordered. - Plan activities to conserve energy.  - Turn patient as appropriate  Outcome: Progressing     Problem: Communication Impairment  Goal: Ability to express needs and understand communication  Description: Assess patient's communication skills and ability to understand information. Patient will demonstrate use of effective communication techniques, alternative methods of communication and understanding even if not able to speak.      - Encourage communication and provide alternate methods of communication as needed. - Collaborate with case management/ for discharge needs. - Include patient/family/caregiver in decisions related to communication. Outcome: Progressing     Problem: Potential for Aspiration  Goal: Non-ventilated patient's risk of aspiration is minimized  Description: Assess and monitor vital signs, respiratory status, and labs (WBC). Monitor for signs of aspiration (tachypnea, cough, rales, wheezing, cyanosis, fever). - Assess and monitor patient's ability to swallow. - Place patient up in chair to eat if possible. - HOB up at 90 degrees to eat if unable to get patient up into chair.  - Supervise patient during oral intake. - Instruct patient/ family to take small bites. - Instruct patient/ family to take small single sips when taking liquids. - Follow patient-specific strategies generated by speech pathologist.  Outcome: Progressing  Goal: Ventilated patient's risk of aspiration is minimized  Description: Assess and monitor vital signs, respiratory status, airway cuff pressure, and labs (WBC). Monitor for signs of aspiration (tachypnea, cough, rales, wheezing, cyanosis, fever). - Elevate head of bed 30 degrees if patient has tube feeding.  - Monitor tube feeding. Outcome: Progressing     Problem: Nutrition  Goal: Nutrition/Hydration status is improving  Description: Monitor and assess patient's nutrition/hydration status for malnutrition (ex- brittle hair, bruises, dry skin, pale skin and conjunctiva, muscle wasting, smooth red tongue, and disorientation). Collaborate with interdisciplinary team and initiate plan and interventions as ordered. Monitor patient's weight and dietary intake as ordered or per policy. Utilize nutrition screening tool and intervene per policy. Determine patient's food preferences and provide high-protein, high-caloric foods as appropriate.      - Assist patient with eating.  - Allow adequate time for meals.  - Encourage patient to take dietary supplement as ordered. - Collaborate with clinical nutritionist.  - Include patient/family/caregiver in decisions related to nutrition. Outcome: Progressing     Problem: Nutrition/Hydration-ADULT  Goal: Nutrient/Hydration intake appropriate for improving, restoring or maintaining nutritional needs  Description: Monitor and assess patient's nutrition/hydration status for malnutrition. Collaborate with interdisciplinary team and initiate plan and interventions as ordered. Monitor patient's weight and dietary intake as ordered or per policy. Utilize nutrition screening tool and intervene as necessary. Determine patient's food preferences and provide high-protein, high-caloric foods as appropriate.      INTERVENTIONS:  - Monitor oral intake, urinary output, labs, and treatment plans  - Assess nutrition and hydration status and recommend course of action  - Evaluate amount of meals eaten  - Assist patient with eating if necessary   - Allow adequate time for meals  - Recommend/ encourage appropriate diets, oral nutritional supplements, and vitamin/mineral supplements  - Order, calculate, and assess calorie counts as needed  - Recommend, monitor, and adjust tube feedings and TPN/PPN based on assessed needs  - Assess need for intravenous fluids  - Provide specific nutrition/hydration education as appropriate  - Include patient/family/caregiver in decisions related to nutrition  Outcome: Progressing     Problem: METABOLIC, FLUID AND ELECTROLYTES - ADULT  Goal: Glucose maintained within target range  Description: INTERVENTIONS:  - Monitor Blood Glucose as ordered  - Assess for signs and symptoms of hyperglycemia and hypoglycemia  - Administer ordered medications to maintain glucose within target range  - Assess nutritional intake and initiate nutrition service referral as needed  Outcome: Progressing

## 2023-07-20 NOTE — PLAN OF CARE
Problem: PAIN - ADULT  Goal: Verbalizes/displays adequate comfort level or baseline comfort level  Description: Interventions:  - Encourage patient to monitor pain and request assistance  - Assess pain using appropriate pain scale  - Administer analgesics based on type and severity of pain and evaluate response  - Implement non-pharmacological measures as appropriate and evaluate response  - Consider cultural and social influences on pain and pain management  - Notify physician/advanced practitioner if interventions unsuccessful or patient reports new pain  7/20/2023 0758 by Marry Ogden RN  Outcome: Progressing  7/20/2023 0758 by Marry Ogden RN  Outcome: Progressing     Problem: INFECTION - ADULT  Goal: Absence or prevention of progression during hospitalization  Description: INTERVENTIONS:  - Assess and monitor for signs and symptoms of infection  - Monitor lab/diagnostic results  - Monitor all insertion sites, i.e. indwelling lines, tubes, and drains  - Monitor endotracheal if appropriate and nasal secretions for changes in amount and color  - [place] appropriate cooling/warming therapies per order  - Administer medications as ordered  - Instruct and encourage patient and family to use good hand hygiene technique  - Identify and instruct in appropriate isolation precautions for identified infection/condition  7/20/2023 0758 by Marry Ogden RN  Outcome: Progressing  7/20/2023 0758 by Marry Ogden RN  Outcome: Progressing  Goal: Absence of fever/infection during neutropenic period  Description: INTERVENTIONS:  - Monitor WBC    7/20/2023 0758 by Marry Ogden RN  Outcome: Progressing  7/20/2023 0758 by Marry Ogden RN  Outcome: Progressing     Problem: SAFETY ADULT  Goal: Patient will remain free of falls  Description: INTERVENTIONS:  - Educate patient/family on patient safety including physical limitations  - Instruct patient to call for assistance with activity   - Consult OT/PT to assist with strengthening/mobility   - Keep Call bell within reach  - Keep bed low and locked with side rails adjusted as appropriate  - Keep care items and personal belongings within reach  - Initiate and maintain comfort rounds  - Make Fall Risk Sign visible to staff  - Offer Toileting every 1 Hours, in advance of need  - Initiate/Maintain bed alarm  - Obtain necessary fall risk management equipment: bed   - Apply yellow socks and bracelet for high fall risk patients  - Consider moving patient to room near nurses station  7/20/2023 0758 by Viri Jones RN  Outcome: Progressing  7/20/2023 0758 by Viri Jones RN  Outcome: Progressing  Goal: Maintain or return to baseline ADL function  Description: INTERVENTIONS:  -  Assess patient's ability to carry out ADLs; assess patient's baseline for ADL function and identify physical deficits which impact ability to perform ADLs (bathing, care of mouth/teeth, toileting, grooming, dressing, etc.)  - Assess/evaluate cause of self-care deficits   - Assess range of motion  - Assess patient's mobility; develop plan if impaired  - Assess patient's need for assistive devices and provide as appropriate  - Encourage maximum independence but intervene and supervise when necessary  - Involve family in performance of ADLs  - Assess for home care needs following discharge   - Consider OT consult to assist with ADL evaluation and planning for discharge  - Provide patient education as appropriate  7/20/2023 0758 by Viri Jones RN  Outcome: Progressing  7/20/2023 0758 by Viri Jones RN  Outcome: Progressing  Goal: Maintains/Returns to pre admission functional level  Description: INTERVENTIONS:  - Perform BMAT or MOVE assessment daily.   - Set and communicate daily mobility goal to care team and patient/family/caregiver. - Collaborate with rehabilitation services on mobility goals if consulted  - Perform Range of Motion 3 times a day.   - Reposition patient every 2 hours.  - Dangle patient 3 times a day  - Stand patient 3 times a day  - Ambulate patient 3 times a day  - Out of bed to chair 3 times a day   - Out of bed for meals 3 times a day  - Out of bed for toileting  - Record patient progress and toleration of activity level   7/20/2023 0758 by Lara Graham RN  Outcome: Progressing  7/20/2023 0758 by Lara Graham RN  Outcome: Progressing     Problem: Neurological Deficit  Goal: Neurological status is stable or improving  Description: Interventions:  - Monitor and assess patient's level of consciousness, motor function, sensory function, and level of assistance needed for ADLs. - Monitor and report changes from baseline. Collaborate with interdisciplinary team to initiate plan and implement interventions as ordered. - Provide and maintain a safe environment. - Consider seizure precautions. - Consider fall precautions. - Consider aspiration precautions. - Consider bleeding precautions.   7/20/2023 0758 by Lara Graham RN  Outcome: Progressing  7/20/2023 0758 by Lara Graham RN  Outcome: Progressing

## 2023-07-20 NOTE — NURSING NOTE
hospitalist was in to see and eval the pt and she is ok for dc to home, iv site removed from the right arm with the tip intact, pt concerned about slight pain at her old left ac iv site, hospitalist made aware and assessed the site prior to dc, pt informed to apply warm compresses to the site at home and go to urgent care if any signs of infection occur, pt agrees, avs reviewed with the pt, all questions answered to her satisfaction, taken to family car via [facility] and the pt was dcd from the hospital

## 2023-07-20 NOTE — ASSESSMENT & PLAN NOTE
Lab Results   Component Value Date    EGFR 71 07/20/2023    EGFR 75 07/19/2023    EGFR 66 07/18/2023    CREATININE 0.92 07/20/2023    CREATININE 0.88 07/19/2023    CREATININE 0.98 07/18/2023     · elevated creat w/ baseline 1.1-1.2  · Received IV fluids on arrival, then fluid bolus x 2 days for migraine.    · Creatinine at baseline

## 2023-07-20 NOTE — DISCHARGE SUMMARY
[address]  Discharge- Walter Beckford 1970, 46 y.o. female MRN: [id number]  Unit/Bed#: -01 Encounter: [id number]  Primary Care Provider: SYLWIA Almanza   Date and time admitted to hospital: 7/14/2023 10:53 PM    * Migraine  Assessment & Plan  Patient reported dizziness, nausea and difficulty walking shortly after she started her first dose of Ozempic on Friday. She also noted a headache. · Patient continues to have intermittent dizziness  · CT brain: No acute intracranial abnormality. · CT angiography: Mild atherosclerotic change of the cervical and intracranial vasculature with no moderate to high-grade stenosis or occlusive disease. 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 · MRI brain: No acute ischemia. Minimal nonspecific white matter change primarily involving bifrontal subcortical white matter. Main differential considerations include chronic microangiopathy versus sequela of migraines   · Neurology consultation appreciate- likely migraine presentation. · Patient received 6 doses DHE. · She received meclizine 25 mg every 8 hours scheduled, Solu-Medrol to 500 mg twice daily, and magnesium sulfate to 1 g IV twice daily. · PT/OT eval- home with outpatient PT  · Spoke with neurology for additional recommendations- patient received Depacon 1000 mg x 1 dose. About 10 minutes later a rapid response was called for lethargic/confused. Critical care felt this was likely due to high dose of Depacon. The patient was monitored with neuro checks and her mental status returned to her baseline later that day. · On 7/20/23 the patient reported her headache as improved and she was ready to go home. The patient was discharged on magnesium oxide 400 mg po daily and amitriptyline 25 mg qhs.    · Outpatient follow-up with neurology and PCP    Seizure disorder ([address])  Assessment & Plan  · Continue Keppra 500 mg twice daily    Gastroparesis  Assessment & Plan  · Patient seen by GI on 7/13 secondary to report of nausea and gastroparesis  · Felt nausea possibly related to Trulicity  · Patient had outpatient EGD scheduled on 7/18/23- will need to reschedule     Benign hypertension with CKD (chronic kidney disease) stage III [facility])  Assessment & Plan  Lab Results   Component Value Date    EGFR 71 07/20/2023    EGFR 75 07/19/2023    EGFR 66 07/18/2023    CREATININE 0.92 07/20/2023    CREATININE 0.88 07/19/2023    CREATININE 0.98 07/18/2023     · elevated creat w/ baseline 1.1-1.2  · Received IV fluids on arrival, then fluid bolus x 2 days for migraine. · Creatinine at baseline    GERD (gastroesophageal reflux disease)  Assessment & Plan  · Continue PPI    Depression with anxiety  Assessment & Plan  · Currently without signs of anxiety or depression   · Continue Celexa    Class 1 obesity due to excess calories with serious comorbidity and body mass index (BMI) of 34.0 to 34.9 in adult  Assessment & Plan  · BMI 34  · Healthy diet lifestyle modification counseling provided    Diabetic polyneuropathy associated with type 2 diabetes mellitus [facility])  Assessment & Plan  Lab Results   Component Value Date    HGBA1C 12.6 (H) 07/14/2023       Recent Labs     07/19/23  1554 07/19/23  2042 07/20/23  0720 07/20/23  1054   POCGLU 180* 230* 303* 337*       Blood Sugar Average: Last 72 hrs:  (P) 306.4375     · Blood sugars were uncontrolled- likely due to steroids. No steroids given on discharge  · Resume home regimen on discharge.    · Outpatient follow-up with endocrinology     Medical Problems     Resolved Problems  Date Reviewed: 7/20/2023          Resolved    Type 2 diabetes mellitus with hyperglycemia, with long-term current use of insulin ([address]) 7/16/2023     Resolved by  SYLWIA Valerio    Overview Signed 3/23/2021  3:33 PM by Wing Zimmerman MD     Last Assessment & Plan:   Formatting of this note might be different from the original.  Reviewed and discussed recent lab results noting suboptimal glycemic control. I make the following recommendations:  Start Jardiance 10 mg daily. Patient counseled on mechanism of action, benefits, and potential side effects. Patient advised to increase water intake and to continue to monitor blood pressure daily, as it could potentially lower her BP. Will continue other medication and doses at this time. Patient advised to contact [facility] to set up account for [place]. Patient advised to continue to monitor BGs 4x daily and report readings. Will continue to adjust medications based on reported blood sugars and [employer]. Patient verbalized understanding and agreement of the current treatment plan. Reviewed hypoglycemia prevention, signs/symptoms, and treatment options. Reinforced ADA recommended goals for prevention of complications with the patient. Discharging Physician / Practitioner: Sreekanth Espinoza PA-C  PCP: Deonte Rapp, [address] [place] [address]  Admission Date:   Admission Orders (From admission, onward)     Ordered        07/15/23 1035  Inpatient Admission  Once            07/15/23 0229  Place in Observation  Once                      Discharge Date: 07/20/23    Consultations During Hospital Stay:  · Neurology    Procedures Performed:   · none    Significant Findings / Test Results:   MRI brain wo contrast    Result Date: 7/15/2023  Impression: 1. No acute ischemia. 2.  Minimal nonspecific white matter change primarily involving bifrontal subcortical white matter. Main differential considerations include chronic microangiopathy versus sequela of migraines (if there is history of chronic headaches). Workstation performed: [id number]     CTA head and neck with and without contrast    Result Date: 7/15/2023  · Impression: CT brain: No acute intracranial abnormality.  CT angiography: Mild atherosclerotic change of the cervical and intracranial vasculature with no moderate to high-grade stenosis or occlusive disease. 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 Incidentally noted infundibulum at the right P-comm origin. Findings consistent with the preliminary report provided by the teleradiology service. Workstation performed: [id number]   ·     Incidental Findings:   · none       Test Results Pending at Discharge (will require follow up):   · none     Outpatient Tests Requested:  · none    Complications:  none    Reason for Admission: dizziness    Hospital Course:   Taya Guadalupe is a 46 y.o. female patient who originally presented to the hospital on 7/14/2023 due to dizziness, nausea, and difficulty walking. Please see H&P by Haider Upton PA-C dated 7/15/2023 for complete details of presentation. The patient was admitted under stroke pathway. Stroke workup of CTA head/neck and MRI brain negative for CVA. Neurology consulted and felt the patient's symptoms were due to a Migraine. The patient was started on IV solu medrol 500 mg BID, scheduled meclizine 25 mg po q 8 hours, and mag sulfate 1 gm BID. The patient continued to have a persistent migraine. Neurology was asked for additional recommendations and recommended starting Depacon 1000 mg IV  q 8 to 12 hours depending on response and zyprexa 5 mg prn qhs. After the patient received the Depacon she became lethargic/confused and a rapid response was called. Critical care evaluated the patient and felt it was due to the Depacon. The patient was ordered a decreased dose of the Depacon at 500 mg however the patient refused it. On 7/20/23 the patient reported that her headache was improving.  She was discharged home and given prescriptions for mag oxide 400 mg po daily and amitriptyline 25 mg qhs. The patient was instructed to follow-up with neurology and PCP. Please see above list of diagnoses and related plan for additional information. Condition at Discharge: good    Discharge Day Visit / Exam:   Subjective:    Vitals: Blood Pressure: 137/69 (07/20/23 0720)  Pulse: 65 (07/20/23 0813)  Temperature: 98.1 °F (36.7 °C) (07/20/23 0720)  Temp Source: Oral (07/20/23 0720)  Respirations: 17 (07/20/23 0720)  Height: 5' 1" (154.9 cm) (07/17/23 1257)  Weight - Scale: 87.1 kg (192 lb) (07/17/23 1035)  SpO2: 95 % (07/20/23 0720)  Exam:   Physical Exam  Vitals and nursing note reviewed. Constitutional:       Appearance: Normal appearance. HENT:      Head: Normocephalic and atraumatic. Cardiovascular:      Rate and Rhythm: Normal rate and regular rhythm. Pulses: Normal pulses. Heart sounds: Normal heart sounds. Pulmonary:      Effort: Pulmonary effort is normal.      Breath sounds: Normal breath sounds. Abdominal:      General: There is no distension. Palpations: Abdomen is soft. Tenderness: There is no abdominal tenderness. Skin:     General: Skin is warm and dry. Neurological:      General: No focal deficit present. Mental Status: She is alert and oriented to person, place, and time. Psychiatric:         Mood and Affect: Mood normal.         Behavior: Behavior normal.         Thought Content: Thought content normal.         Judgment: Judgment normal.          Discussion with Family: Patient declined call to . Discharge instructions/Information to patient and family:   See after visit summary for information provided to patient and family. Provisions for Follow-Up Care:  See after visit summary for information related to follow-up care and any pertinent home health orders. Disposition:   Home    Planned Readmission: no     Discharge Statement:  I spent 25 minutes discharging the patient.  This time was spent on the day of discharge. I had direct contact with the patient on the day of discharge. Greater than 50% of the total time was spent examining patient, answering all patient questions, arranging and discussing plan of care with patient as well as directly providing post-discharge instructions. Additional time then spent on discharge activities. Discharge Medications:  See after visit summary for reconciled discharge medications provided to patient and/or family.       **Please Note: This note may have been constructed using a voice recognition system**

## 2023-07-20 NOTE — PLAN OF CARE
Problem: PAIN - ADULT  Goal: Verbalizes/displays adequate comfort level or baseline comfort level  Description: Interventions:  - Encourage patient to monitor pain and request assistance  - Assess pain using appropriate pain scale  - Administer analgesics based on type and severity of pain and evaluate response  - Implement non-pharmacological measures as appropriate and evaluate response  - Consider cultural and social influences on pain and pain management  - Notify physician/advanced practitioner if interventions unsuccessful or patient reports new pain  7/20/2023 1204 by Chidi Buenrostro RN  Outcome: Adequate for Discharge  7/20/2023 0758 by Chidi Buenrostro RN  Outcome: Progressing  7/20/2023 0758 by Chidi Buenrostro RN  Outcome: Progressing     Problem: INFECTION - ADULT  Goal: Absence or prevention of progression during hospitalization  Description: INTERVENTIONS:  - Assess and monitor for signs and symptoms of infection  - Monitor lab/diagnostic results  - Monitor all insertion sites, i.e. indwelling lines, tubes, and drains  - Monitor endotracheal if appropriate and nasal secretions for changes in amount and color  - [place] appropriate cooling/warming therapies per order  - Administer medications as ordered  - Instruct and encourage patient and family to use good hand hygiene technique  - Identify and instruct in appropriate isolation precautions for identified infection/condition  7/20/2023 1204 by Chidi Buenrostro RN  Outcome: Adequate for Discharge  7/20/2023 0758 by Chidi Buenrostro RN  Outcome: Progressing  7/20/2023 0758 by Chidi Buenrostro RN  Outcome: Progressing  Goal: Absence of fever/infection during neutropenic period  Description: INTERVENTIONS:  - Monitor WBC    7/20/2023 1204 by Chidi Buenrostro RN  Outcome: Adequate for Discharge  7/20/2023 0758 by Chidi Buenrostro RN  Outcome: Progressing  7/20/2023 0758 by Chidi Buenrostro RN  Outcome: Progressing     Problem: SAFETY ADULT  Goal: Patient will remain free of falls  Description: INTERVENTIONS:  - Educate patient/family on patient safety including physical limitations  - Instruct patient to call for assistance with activity   - Consult OT/PT to assist with strengthening/mobility   - Keep Call bell within reach  - Keep bed low and locked with side rails adjusted as appropriate  - Keep care items and personal belongings within reach  - Initiate and maintain comfort rounds  - Make Fall Risk Sign visible to staff  - Offer Toileting every  Hours, in advance of need  - Initiate/Maintain alarm  - Obtain necessary fall risk management equipment:   - Apply yellow socks and bracelet for high fall risk patients  - Consider moving patient to room near nurses station  7/20/2023 1204 by Lara Graham RN  Outcome: Adequate for Discharge  7/20/2023 0758 by Lara Graham RN  Outcome: Progressing  7/20/2023 0758 by Lara Graham RN  Outcome: Progressing  Goal: Maintain or return to baseline ADL function  Description: INTERVENTIONS:  -  Assess patient's ability to carry out ADLs; assess patient's baseline for ADL function and identify physical deficits which impact ability to perform ADLs (bathing, care of mouth/teeth, toileting, grooming, dressing, etc.)  - Assess/evaluate cause of self-care deficits   - Assess range of motion  - Assess patient's mobility; develop plan if impaired  - Assess patient's need for assistive devices and provide as appropriate  - Encourage maximum independence but intervene and supervise when necessary  - Involve family in performance of ADLs  - Assess for home care needs following discharge   - Consider OT consult to assist with ADL evaluation and planning for discharge  - Provide patient education as appropriate  7/20/2023 1204 by Lara Graham RN  Outcome: Adequate for Discharge  7/20/2023 0758 by Lara Graham RN  Outcome: Progressing  7/20/2023 0758 by Lara Graham RN  Outcome: Progressing  Goal: Maintains/Returns to pre admission functional level  Description: INTERVENTIONS:  - Perform BMAT or MOVE assessment daily.   - Set and communicate daily mobility goal to care team and patient/family/caregiver. - Collaborate with rehabilitation services on mobility goals if consulted  - Perform Range of Motion  times a day. - Reposition patient every  hours. - Dangle patient  times a day  - Stand patient  times a day  - Ambulate patient  times a day  - Out of bed to chair  times a day   - Out of bed for meals  times a day  - Out of bed for toileting  - Record patient progress and toleration of activity level   7/20/2023 1204 by Marry Ogden RN  Outcome: Adequate for Discharge  7/20/2023 0758 by Marry Ogden RN  Outcome: Progressing  7/20/2023 0758 by Marry Ogden RN  Outcome: Progressing     Problem: DISCHARGE PLANNING  Goal: Discharge to home or other facility with appropriate resources  Description: INTERVENTIONS:  - Identify barriers to discharge w/patient and caregiver  - Arrange for needed discharge resources and transportation as appropriate  - Identify discharge learning needs (meds, wound care, etc.)  - Arrange for interpretive services to assist at discharge as needed  - Refer to Case Management Department for coordinating discharge planning if the patient needs post-hospital services based on physician/advanced practitioner order or complex needs related to functional status, cognitive ability, or social support system  7/20/2023 1204 by Marry Ogden RN  Outcome: Adequate for Discharge  7/20/2023 0758 by Marry Ogden RN  Outcome: Progressing  7/20/2023 0758 by Marry Ogden RN  Outcome: Progressing     Problem: Knowledge Deficit  Goal: Patient/family/caregiver demonstrates understanding of disease process, treatment plan, medications, and discharge instructions  Description: Complete learning assessment and assess knowledge base.   Interventions:  - Provide teaching at level of understanding  - Provide teaching via preferred learning methods  7/20/2023 1204 by Liz Rae RN  Outcome: Adequate for Discharge  7/20/2023 0758 by Liz Rae RN  Outcome: Progressing  7/20/2023 0758 by Liz Rae RN  Outcome: Progressing     Problem: MOBILITY - ADULT  Goal: Maintain or return to baseline ADL function  Description: INTERVENTIONS:  -  Assess patient's ability to carry out ADLs; assess patient's baseline for ADL function and identify physical deficits which impact ability to perform ADLs (bathing, care of mouth/teeth, toileting, grooming, dressing, etc.)  - Assess/evaluate cause of self-care deficits   - Assess range of motion  - Assess patient's mobility; develop plan if impaired  - Assess patient's need for assistive devices and provide as appropriate  - Encourage maximum independence but intervene and supervise when necessary  - Involve family in performance of ADLs  - Assess for home care needs following discharge   - Consider OT consult to assist with ADL evaluation and planning for discharge  - Provide patient education as appropriate  7/20/2023 1204 by Liz Rae RN  Outcome: Adequate for Discharge  7/20/2023 0758 by Liz Rae RN  Outcome: Progressing  7/20/2023 0758 by Liz Rae RN  Outcome: Progressing  Goal: Maintains/Returns to pre admission functional level  Description: INTERVENTIONS:  - Perform BMAT or MOVE assessment daily.   - Set and communicate daily mobility goal to care team and patient/family/caregiver. - Collaborate with rehabilitation services on mobility goals if consulted  - Perform Range of Motion  times a day. - Reposition patient every  hours.   - Dangle patient  times a day  - Stand patient  times a day  - Ambulate patient  times a day  - Out of bed to chair  times a day   - Out of bed for meals imes a day  - Out of bed for toileting  - Record patient progress and toleration of activity level   7/20/2023 1204 by Michael Walters RN  Outcome: Adequate for Discharge  7/20/2023 2662 by Michael Walters RN  Outcome: Progressing  7/20/2023 0758 by Michael Walters RN  Outcome: Progressing     Problem: Neurological Deficit  Goal: Neurological status is stable or improving  Description: Interventions:  - Monitor and assess patient's level of consciousness, motor function, sensory function, and level of assistance needed for ADLs. - Monitor and report changes from baseline. Collaborate with interdisciplinary team to initiate plan and implement interventions as ordered. - Provide and maintain a safe environment. - Consider seizure precautions. - Consider fall precautions. - Consider aspiration precautions. - Consider bleeding precautions. 7/20/2023 1204 by Michael Walters RN  Outcome: Adequate for Discharge  7/20/2023 1178 by Michael Walters RN  Outcome: Progressing  7/20/2023 0758 by Michael Walters RN  Outcome: Progressing     Problem: Activity Intolerance/Impaired Mobility  Goal: Mobility/activity is maintained at optimum level for patient  Description: Interventions:  - Assess and monitor patient  barriers to mobility and need for assistive/adaptive devices. - Assess patient's emotional response to limitations. - Collaborate with interdisciplinary team and initiate plans and interventions as ordered. - Encourage independent activity per ability.  - Maintain proper body alignment. - Perform active/passive rom as tolerated/ordered. - Plan activities to conserve energy.  - Turn patient as appropriate  7/20/2023 1204 by Mihcael Walters RN  Outcome: Adequate for Discharge  7/20/2023 1428 by Michael Walters RN  Outcome: Progressing  7/20/2023 0758 by Michael Walters RN  Outcome: Progressing     Problem: Communication Impairment  Goal: Ability to express needs and understand communication  Description: Assess patient's communication skills and ability to understand information.   Patient will demonstrate use of effective communication techniques, alternative methods of communication and understanding even if not able to speak. - Encourage communication and provide alternate methods of communication as needed. - Collaborate with case management/ for discharge needs. - Include patient/family/caregiver in decisions related to communication. 7/20/2023 1204 by Carolyn Gentile RN  Outcome: Adequate for Discharge  7/20/2023 0597 by Carolyn Gentile RN  Outcome: Progressing  7/20/2023 0758 by Carolyn Gentile RN  Outcome: Progressing     Problem: Potential for Aspiration  Goal: Non-ventilated patient's risk of aspiration is minimized  Description: Assess and monitor vital signs, respiratory status, and labs (WBC). Monitor for signs of aspiration (tachypnea, cough, rales, wheezing, cyanosis, fever). - Assess and monitor patient's ability to swallow. - Place patient up in chair to eat if possible. - HOB up at 90 degrees to eat if unable to get patient up into chair.  - Supervise patient during oral intake. - Instruct patient/ family to take small bites. - Instruct patient/ family to take small single sips when taking liquids. - Follow patient-specific strategies generated by speech pathologist.  7/20/2023 1204 by Carolyn Gentile RN  Outcome: Adequate for Discharge  7/20/2023 9277 by Carolyn Gentile RN  Outcome: Progressing  7/20/2023 0758 by Carolyn Gentile RN  Outcome: Progressing  Goal: Ventilated patient's risk of aspiration is minimized  Description: Assess and monitor vital signs, respiratory status, airway cuff pressure, and labs (WBC). Monitor for signs of aspiration (tachypnea, cough, rales, wheezing, cyanosis, fever). - Elevate head of bed 30 degrees if patient has tube feeding.  - Monitor tube feeding.   7/20/2023 1204 by Carolyn Gentile RN  Outcome: Adequate for Discharge  7/20/2023 3564 by Carolyn Gentile RN  Outcome: Progressing  7/20/2023 0758 by Kristen Paredes Cynthia Nick RN  Outcome: Progressing     Problem: Nutrition  Goal: Nutrition/Hydration status is improving  Description: Monitor and assess patient's nutrition/hydration status for malnutrition (ex- brittle hair, bruises, dry skin, pale skin and conjunctiva, muscle wasting, smooth red tongue, and disorientation). Collaborate with interdisciplinary team and initiate plan and interventions as ordered. Monitor patient's weight and dietary intake as ordered or per policy. Utilize nutrition screening tool and intervene per policy. Determine patient's food preferences and provide high-protein, high-caloric foods as appropriate. - Assist patient with eating.  - Allow adequate time for meals.  - Encourage patient to take dietary supplement as ordered. - Collaborate with clinical nutritionist.  - Include patient/family/caregiver in decisions related to nutrition. 7/20/2023 1204 by Liz Ko RN  Outcome: Adequate for Discharge  7/20/2023 3460 by Liz Ko RN  Outcome: Progressing  7/20/2023 0758 by Liz Ko RN  Outcome: Progressing     Problem: Nutrition/Hydration-ADULT  Goal: Nutrient/Hydration intake appropriate for improving, restoring or maintaining nutritional needs  Description: Monitor and assess patient's nutrition/hydration status for malnutrition. Collaborate with interdisciplinary team and initiate plan and interventions as ordered. Monitor patient's weight and dietary intake as ordered or per policy. Utilize nutrition screening tool and intervene as necessary. Determine patient's food preferences and provide high-protein, high-caloric foods as appropriate.      INTERVENTIONS:  - Monitor oral intake, urinary output, labs, and treatment plans  - Assess nutrition and hydration status and recommend course of action  - Evaluate amount of meals eaten  - Assist patient with eating if necessary   - Allow adequate time for meals  - Recommend/ encourage appropriate diets, oral nutritional supplements, and vitamin/mineral supplements  - Order, calculate, and assess calorie counts as needed  - Recommend, monitor, and adjust tube feedings and TPN/PPN based on assessed needs  - Assess need for intravenous fluids  - Provide specific nutrition/hydration education as appropriate  - Include patient/family/caregiver in decisions related to nutrition  7/20/2023 1204 by Frieda Triplett RN  Outcome: Adequate for Discharge  7/20/2023 0758 by Fridea Triplett RN  Outcome: Progressing  7/20/2023 0758 by Frieda Triplett RN  Outcome: Progressing     Problem: METABOLIC, FLUID AND ELECTROLYTES - ADULT  Goal: Glucose maintained within target range  Description: INTERVENTIONS:  - Monitor Blood Glucose as ordered  - Assess for signs and symptoms of hyperglycemia and hypoglycemia  - Administer ordered medications to maintain glucose within target range  - Assess nutritional intake and initiate nutrition service referral as needed  7/20/2023 1204 by Frieda Triplett RN  Outcome: Adequate for Discharge  7/20/2023 0758 by Frieda Triplett RN  Outcome: Progressing  7/20/2023 0758 by Frieda Triplett RN  Outcome: Progressing

## 2023-07-21 ENCOUNTER — OFFICE VISIT (OUTPATIENT)
Dept: URGENT CARE | Facility: CLINIC | Age: 53
End: 2023-07-21
Payer: COMMERCIAL

## 2023-07-21 ENCOUNTER — HOSPITAL ENCOUNTER (EMERGENCY)
Facility: HOSPITAL | Age: 53
Discharge: HOME | DRG: 383 | End: 2023-07-21
Attending: STUDENT IN AN ORGANIZED HEALTH CARE EDUCATION/TRAINING PROGRAM
Payer: COMMERCIAL

## 2023-07-21 ENCOUNTER — TRANSITIONAL CARE MANAGEMENT (OUTPATIENT)
Dept: FAMILY MEDICINE CLINIC | Facility: CLINIC | Age: 53
End: 2023-07-21

## 2023-07-21 VITALS
HEIGHT: 61 IN | RESPIRATION RATE: 18 BRPM | HEART RATE: 71 BPM | SYSTOLIC BLOOD PRESSURE: 131 MMHG | DIASTOLIC BLOOD PRESSURE: 69 MMHG | WEIGHT: 201 LBS | TEMPERATURE: 97.9 F | BODY MASS INDEX: 37.95 KG/M2 | OXYGEN SATURATION: 97 %

## 2023-07-21 VITALS
OXYGEN SATURATION: 99 % | BODY MASS INDEX: 37.95 KG/M2 | TEMPERATURE: 98.3 F | WEIGHT: 201 LBS | SYSTOLIC BLOOD PRESSURE: 120 MMHG | HEART RATE: 67 BPM | DIASTOLIC BLOOD PRESSURE: 56 MMHG | HEIGHT: 61 IN | RESPIRATION RATE: 18 BRPM

## 2023-07-21 DIAGNOSIS — L03.114 CELLULITIS OF LEFT ARM: Primary | ICD-10-CM

## 2023-07-21 DIAGNOSIS — T82.7XXA: ICD-10-CM

## 2023-07-21 DIAGNOSIS — I80.9 PHLEBITIS: ICD-10-CM

## 2023-07-21 DIAGNOSIS — L03.90 CELLULITIS: Primary | ICD-10-CM

## 2023-07-21 PROCEDURE — 99284 EMERGENCY DEPT VISIT MOD MDM: CPT | Performed by: STUDENT IN AN ORGANIZED HEALTH CARE EDUCATION/TRAINING PROGRAM

## 2023-07-21 PROCEDURE — 99282 EMERGENCY DEPT VISIT SF MDM: CPT

## 2023-07-21 PROCEDURE — 96372 THER/PROPH/DIAG INJ SC/IM: CPT

## 2023-07-21 PROCEDURE — 99213 OFFICE O/P EST LOW 20 MIN: CPT | Performed by: PHYSICIAN ASSISTANT

## 2023-07-21 RX ORDER — KETOROLAC TROMETHAMINE 30 MG/ML
30 INJECTION, SOLUTION INTRAMUSCULAR; INTRAVENOUS ONCE
Status: COMPLETED | OUTPATIENT
Start: 2023-07-21 | End: 2023-07-21

## 2023-07-21 RX ORDER — DICLOFENAC POTASSIUM 50 MG/1
50 TABLET, FILM COATED ORAL 3 TIMES DAILY
Qty: 20 TABLET | Refills: 0 | Status: SHIPPED | OUTPATIENT
Start: 2023-07-21 | End: 2023-08-01

## 2023-07-21 RX ORDER — SULFAMETHOXAZOLE AND TRIMETHOPRIM 800; 160 MG/1; MG/1
1 TABLET ORAL EVERY 12 HOURS SCHEDULED
Qty: 20 TABLET | Refills: 0 | Status: ON HOLD | OUTPATIENT
Start: 2023-07-21 | End: 2023-07-31

## 2023-07-21 RX ADMIN — KETOROLAC TROMETHAMINE 30 MG: 30 INJECTION, SOLUTION INTRAMUSCULAR at 21:12

## 2023-07-21 NOTE — PROGRESS NOTES
[facility]        NAME: Arnoldo Henry is a 46 y.o. female  : 1970    MRN: [id number]  DATE: 2023  TIME: 9:48 AM    Assessment and Plan   Cellulitis of left arm [L03.114]  1. Cellulitis of left arm  sulfamethoxazole-trimethoprim (BACTRIM DS) 800-160 mg per tablet      2. IV site infection, initial encounter ([facility])  sulfamethoxazole-trimethoprim (BACTRIM DS) 800-160 mg per tablet            Patient Instructions       Follow up with PCP in 3-5 days. Proceed to  ER if symptoms worsen. Chief Complaint     Chief Complaint   Patient presents with   • Arm Pain     Was discharged from the [facility] yesterday. At her IV site in left arm is having pain today. History of Present Illness       Patient is a 47 y/o/f presenting [facility] with pain to IV site. Patient was discharged from hospital yesterday and reports during hospital stay developed pain to left antecubital IV site. IV was removed yesterday and reports redness/swelling has worsened surrounding site. Patient put antibiotic ointment on site and bandage. Patient denies fever, chills or body aches. Arm Pain   Pertinent negatives include no chest pain. Review of Systems   Review of Systems   Constitutional: Negative for chills and fever. HENT: Negative for ear pain and sore throat. Eyes: Negative for pain and visual disturbance. Respiratory: Negative for cough and shortness of breath. Cardiovascular: Negative for chest pain and palpitations. Gastrointestinal: Negative for abdominal pain and vomiting. Genitourinary: Negative for dysuria and hematuria. Musculoskeletal: Negative for arthralgias and back pain. Skin: Positive for rash. Negative for color change. Neurological: Negative for seizures and syncope. All other systems reviewed and are negative.         Current Medications       Current Outpatient Medications:   •  albuterol (PROVENTIL HFA,VENTOLIN HFA) 90 mcg/act inhaler, INHALE ONE PUFF BY MOUTH AND INTO THE LUNGS EVERY 6 HOURS AS NEEDED FOR WHEEZING, Disp: 36 g, Rfl: 3  •  Alcohol Swabs (Pharmacist Choice Alcohol) PADS, USE AS DIRECTED, Disp: 100 each, Rfl: 1  •  amitriptyline (ELAVIL) 25 mg tablet, Take 1 tablet (25 mg total) by mouth daily at bedtime, Disp: 30 tablet, Rfl: 0  •  amLODIPine (NORVASC) 5 mg tablet, Take 1 tablet (5 mg total) by mouth daily Do not start before July 21, 2023., Disp: 30 tablet, Rfl: 0  •  Aspirin (ASPIR-81 PO), Take 81 mg by mouth, Disp: , Rfl:   •  atorvastatin (LIPITOR) 80 mg tablet, TAKE 1 TABLET (80 MG TOTAL) BY MOUTH DAILY AT BEDTIME, Disp: 30 tablet, Rfl: 6  •  B-D UF III MINI PEN NEEDLES 31G X 5 MM MISC, Pt uses 5 pen needles daily, Disp: 500 each, Rfl: 0  •  cholecalciferol (VITAMIN D3) 400 units tablet, TAKE ONE TABLET BY MOUTH DAILY, Disp: 90 tablet, Rfl: 1  •  citalopram (CeleXA) 40 mg tablet, Take 1 tablet (40 mg total) by mouth daily, Disp: 90 tablet, Rfl: 3  •  Continuous Blood Gluc  (FreeStyle Refugio 14 Day [place]) TEMITOPE, Use for continuous blood glucose monitoring, Disp: 1 each, Rfl: 0  •  Continuous Blood Gluc Sensor (FreeStyle Refugio 14 Day Sensor) MISC, Use one sensor every 14 days for continuous blood sugar monitoring., Disp: 6 each, Rfl: 3  •  docusate sodium (COLACE) 100 mg capsule, TAKE ONE CAPSULE BY MOUTH TWICE DAILY, Disp: 60 capsule, Rfl: 5  •  Empagliflozin (Jardiance) 25 MG TABS, Take 1 tablet (25 mg total) by mouth every morning, Disp: 90 tablet, Rfl: 1  •  ergocalciferol (VITAMIN D2) 50,000 units, Take 1 capsule (50,000 Units total) by mouth once a week, Disp: 8 capsule, Rfl: 1  •  famotidine (PEPCID) 40 MG tablet, Take 1 tablet (40 mg total) by mouth daily at bedtime Take in evening 2 hours prior to bed, Disp: 30 tablet, Rfl: 6  •  fenofibrate (TRICOR) 48 mg tablet, TAKE ONE TABLET BY MOUTH DAILY, Disp: 90 tablet, Rfl: 3  •  Insulin Glargine Solostar (Lantus SoloStar) 100 UNIT/ML SOPN, 52 units daily, Disp: 30 mL, Rfl: 1  •  insulin glulisine (Apidra SoloStar) 100 units/mL injection pen, Inject 12 units with breakfast and lunch, 14 units with dinner, with scale ISF 25 @ 150 as needed, Disp: 30 mL, Rfl: 1  •  Iron Heme Polypeptide 12 MG TABS, Take 1 tablet by mouth, Disp: , Rfl:   •  levETIRAcetam (KEPPRA) 500 mg tablet, TAKE ONE TABLET TWICE DAILY, Disp: 180 tablet, Rfl: 1  •  linaCLOtide (Linzess) 72 MCG CAPS, Take 1 capsule by mouth daily before breakfast, Disp: 30 capsule, Rfl: 0  •  magnesium Oxide (MAG-OX) 400 mg TABS, Take 1 tablet (400 mg total) by mouth daily, Disp: 30 tablet, Rfl: 0  •  meclizine (ANTIVERT) 25 mg tablet, Take 1 tablet (25 mg total) by mouth 3 (three) times a day as needed for dizziness, Disp: 30 tablet, Rfl: 0  •  metoprolol tartrate (LOPRESSOR) 25 mg tablet, Take 1 tablet (25 mg total) by mouth 2 (two) times a day, Disp: 180 tablet, Rfl: 1  •  multivitamin (THERAGRAN) TABS, Take 1 tablet by mouth every morning, Disp: , Rfl:   •  nystatin (MYCOSTATIN) cream, Apply topically 2 (two) times a day, Disp: 30 g, Rfl: 0  •  omeprazole (PriLOSEC) 40 MG capsule, Take 1 capsule (40 mg total) by mouth daily, Disp: 90 capsule, Rfl: 3  •  ondansetron (ZOFRAN) 4 mg tablet, Take 1 tablet (4 mg total) by mouth every 8 (eight) hours as needed for nausea or vomiting, Disp: 20 tablet, Rfl: 0  •  OneTouch Ultra test strip, USE 4 TIMES A DAY, Disp: 400 each, Rfl: 0  •  Pharmacist Choice Lancets MISC, USE AS DIRECTED, Disp: 100 each, Rfl: 1  •  pregabalin (LYRICA) 100 mg capsule, TAKE ONE CAPSULE BY MOUTH THREE TIMES A DAY, Disp: 270 capsule, Rfl: 0  •  Restasis 0.05 % ophthalmic emulsion, , Disp: , Rfl:   •  scopolamine (TRANSDERM-SCOP) 1 mg/3 days TD 72 hr patch, Place 1 patch on the skin over 72 hours every third day, Disp: 24 patch, Rfl: 1  •  sulfamethoxazole-trimethoprim (BACTRIM DS) 800-160 mg per tablet, Take 1 tablet by mouth every 12 (twelve) hours for 10 days, Disp: 20 tablet, Rfl: 0  •  dulaglutide (Trulicity) 3 MG/0.5ML injection, Inject 0.5 mL (3 mg total) under the skin once a week, Disp: 2 mL, Rfl: 3  No current facility-administered medications for this visit. Current Allergies     Allergies as of 07/21/2023 - Reviewed 07/21/2023   Allergen Reaction Noted   • Butorphanol Hallucinations 08/09/2016   • Benztropine  08/09/2016   • Penicillins  11/12/2015   • Zolpidem  11/12/2015   • Azithromycin Hives and GI Intolerance 07/13/2012            The following portions of the patient's history were reviewed and updated as appropriate: allergies, current medications, past family history, past medical history, past social history, past surgical history and problem list.     Past Medical History:   Diagnosis Date   • Asthma    • Atypical chest pain    • Depression    • Diabetes mellitus ([address])    • Gastroparesis    • GERD (gastroesophageal reflux disease)    • Hyperlipidemia    • Hypertension    • Psychiatric disorder    • Renal disorder    • Sciatica    • Seizure disorder ([address])        Past Surgical History:   Procedure Laterality Date   • APPENDECTOMY     • BREAST BIOPSY Left  benign   • CHOLECYSTECTOMY     • COLONOSCOPY     • HYSTERECTOMY     • MO LAPAROSCOPIC APPENDECTOMY N/A 03/24/2021    Procedure: APPENDECTOMY LAPAROSCOPIC;  Surgeon: Lesley Wesley MD;  Location: [address] [place] [address] OR;  Service: General   • MO LAPS ABD PRTM&OMENTUM DX W/WO SPEC BR/WA SPX N/A 03/24/2021    Procedure: LAPAROSCOPY DIAGNOSTIC, EXTENSIVE DESI;  Surgeon: Lesley Wesley MD;  Location: [address] [place] [address];  Service: General   • UPPER GASTROINTESTINAL ENDOSCOPY         Family History   Problem Relation Age of Onset   • No Known Problems Mother    • No Known Problems Father    • No Known Problems Daughter    • No Known Problems Maternal Grandmother    • No Known Problems Paternal Grandmother    • No Known Problems Paternal Aunt    • No Known Problems Paternal Aunt    • No Known Problems Paternal Aunt          Medications have been verified.         Objective /56   Pulse 67   Temp 98.3 °F (36.8 °C)   Resp 18   Ht 5' 1" (1.549 m)   Wt 91.2 kg (201 lb)   SpO2 99%   BMI 37.98 kg/m²   No LMP recorded. Patient has had a hysterectomy. Physical Exam     Physical Exam  Constitutional:       Appearance: Normal appearance. HENT:      Head: Normocephalic and atraumatic. Nose: Nose normal.      Mouth/Throat:      Mouth: Mucous membranes are moist.   Eyes:      Extraocular Movements: Extraocular movements intact. Conjunctiva/sclera: Conjunctivae normal.      Pupils: Pupils are equal, round, and reactive to light. Cardiovascular:      Rate and Rhythm: Normal rate. Pulmonary:      Effort: Pulmonary effort is normal.   Musculoskeletal:         General: Normal range of motion. Arms:       Cervical back: Normal range of motion and neck supple. Skin:     General: Skin is warm and dry. Capillary Refill: Capillary refill takes less than 2 seconds. Neurological:      General: No focal deficit present. Mental Status: She is alert and oriented to person, place, and time.    Psychiatric:         Mood and Affect: Mood normal.         Behavior: Behavior normal.

## 2023-07-21 NOTE — UTILIZATION REVIEW
NOTIFICATION OF ADMISSION DISCHARGE   This is a Notification of Discharge from [facility]. Please be advised that this patient has been discharge from our facility. Below you will find the admission and discharge date and time including the patient’s disposition. UTILIZATION REVIEW CONTACT:  Colton White  Utilization   Network Utilization Review Department  Phone: [phone] carefully listen to the prompts. All voicemails are confidential.  Email: [email or web address]     ADMISSION INFORMATION  PRESENTATION DATE: 7/14/2023 10:53 PM  OBERVATION ADMISSION DATE:   INPATIENT ADMISSION DATE: 7/15/23 10:35 AM   DISCHARGE DATE: 7/20/2023 12:41 PM   DISPOSITION:Home/Self Care    IMPORTANT INFORMATION:  Send all requests for admission clinical reviews, approved or denied determinations and any other requests to dedicated fax number below belonging to the campus where the patient is receiving treatment.  List of dedicated fax numbers:  [place] DENIALS (Administrative/Medical Necessity) [phone] 230[address] [facility] (Maternity/NICU/Pediatrics) [phone]   [facility] [phone]   [facility] [phone] 16[address] [facility] [phone]   [address] [place] [address] [phone]   [facility] [phone]   [address] [place] [address] [address] [place] [address] [phone]   [address] [facility] [phone]   [address] [facility] [phone] 27[address] [facility] [address] [facility] [phone]

## 2023-07-22 NOTE — ED PROVIDER NOTES
History  Chief Complaint   Patient presents with   • Skin Problem     Pt has an skin irritation on her left arm after an iv pt was told to come here by cinda. HPI is a 27-year-old female who presents to the emergency department for concerns of infection of the left antecubital space. Patient was recently discharged from hospital secondary to a migraine and states she started to notice some irritation at the IV insertion point. Patient presented to the urgent care earlier today who told her that she likely has phlebitis and possible cellulitis and started her on Bactrim. Patient states she came to the emergency department because the urgent care told her to. She denies any other issues other than some tenderness and erythema at the left antecubital space    Prior to Admission Medications   Prescriptions Last Dose Informant Patient Reported? Taking? Alcohol Swabs (Pharmacist Choice Alcohol) PADS  Self No No   Sig: USE AS DIRECTED   Aspirin (ASPIR-81 PO)  Self Yes No   Sig: Take 81 mg by mouth   B-D UF III MINI PEN NEEDLES 31G X 5 MM MISC  Self No No   Sig: Pt uses 5 pen needles daily   Continuous Blood Gluc  (FreeStyle Refugio 14 Day [place]) TEMITOPE  Self No No   Sig: Use for continuous blood glucose monitoring   Continuous Blood Gluc Sensor (FreeStyle Refugio 14 Day Sensor) MISC  Self No No   Sig: Use one sensor every 14 days for continuous blood sugar monitoring.    Empagliflozin (Jardiance) 25 MG TABS  Self No No   Sig: Take 1 tablet (25 mg total) by mouth every morning   Insulin Glargine Solostar (Lantus SoloStar) 100 UNIT/ML SOPN  Self No No   Si units daily   Iron Heme Polypeptide 12 MG TABS  Self Yes No   Sig: Take 1 tablet by mouth   OneTouch Ultra test strip  Self No No   Sig: USE 4 TIMES A DAY   Pharmacist Choice Lancets MISC  Self No No   Sig: USE AS DIRECTED   Restasis 0.05 % ophthalmic emulsion  Self Yes No   albuterol (PROVENTIL HFA,VENTOLIN HFA) 90 mcg/act inhaler  Self No No   Sig: INHALE ONE PUFF BY MOUTH AND INTO THE LUNGS EVERY 6 HOURS AS NEEDED FOR WHEEZING   amLODIPine (NORVASC) 5 mg tablet   No No   Sig: Take 1 tablet (5 mg total) by mouth daily Do not start before July 21, 2023.    amitriptyline (ELAVIL) 25 mg tablet   No No   Sig: Take 1 tablet (25 mg total) by mouth daily at bedtime   atorvastatin (LIPITOR) 80 mg tablet  Self No No   Sig: TAKE 1 TABLET (80 MG TOTAL) BY MOUTH DAILY AT BEDTIME   cholecalciferol (VITAMIN D3) 400 units tablet  Self No No   Sig: TAKE ONE TABLET BY MOUTH DAILY   citalopram (CeleXA) 40 mg tablet  Self No No   Sig: Take 1 tablet (40 mg total) by mouth daily   docusate sodium (COLACE) 100 mg capsule  Self No No   Sig: TAKE ONE CAPSULE BY MOUTH TWICE DAILY   dulaglutide (Trulicity) 3 VE/8.0FI injection  Self No No   Sig: Inject 0.5 mL (3 mg total) under the skin once a week   ergocalciferol (VITAMIN D2) 50,000 units  Self No No   Sig: Take 1 capsule (50,000 Units total) by mouth once a week   famotidine (PEPCID) 40 MG tablet   No No   Sig: Take 1 tablet (40 mg total) by mouth daily at bedtime Take in evening 2 hours prior to bed   fenofibrate (TRICOR) 48 mg tablet  Self No No   Sig: TAKE ONE TABLET BY MOUTH DAILY   insulin glulisine (Apidra SoloStar) 100 units/mL injection pen  Self No No   Sig: Inject 12 units with breakfast and lunch, 14 units with dinner, with scale ISF 25 @ 150 as needed   levETIRAcetam (KEPPRA) 500 mg tablet  Self No No   Sig: TAKE ONE TABLET TWICE DAILY   linaCLOtide (Linzess) 72 MCG CAPS  Self No No   Sig: Take 1 capsule by mouth daily before breakfast   magnesium Oxide (MAG-OX) 400 mg TABS   No No   Sig: Take 1 tablet (400 mg total) by mouth daily   meclizine (ANTIVERT) 25 mg tablet   No No   Sig: Take 1 tablet (25 mg total) by mouth 3 (three) times a day as needed for dizziness   metoprolol tartrate (LOPRESSOR) 25 mg tablet  Self No No   Sig: Take 1 tablet (25 mg total) by mouth 2 (two) times a day   multivitamin (THERAGRAN) TABS  Self Yes No   Sig: Take 1 tablet by mouth every morning   nystatin (MYCOSTATIN) cream   No No   Sig: Apply topically 2 (two) times a day   omeprazole (PriLOSEC) 40 MG capsule   No No   Sig: Take 1 capsule (40 mg total) by mouth daily   ondansetron (ZOFRAN) 4 mg tablet   No No   Sig: Take 1 tablet (4 mg total) by mouth every 8 (eight) hours as needed for nausea or vomiting   pregabalin (LYRICA) 100 mg capsule   No No   Sig: TAKE ONE CAPSULE BY MOUTH THREE TIMES A DAY   scopolamine (TRANSDERM-SCOP) 1 mg/3 days TD 72 hr patch  Self No No   Sig: Place 1 patch on the skin over 72 hours every third day   sulfamethoxazole-trimethoprim (BACTRIM DS) 800-160 mg per tablet   No No   Sig: Take 1 tablet by mouth every 12 (twelve) hours for 10 days      Facility-Administered Medications: None       Past Medical History:   Diagnosis Date   • Asthma    • Atypical chest pain    • Depression    • Diabetes mellitus (HCC)    • Gastroparesis    • GERD (gastroesophageal reflux disease)    • Hyperlipidemia    • Hypertension    • Psychiatric disorder    • Renal disorder    • Sciatica    • Seizure disorder ([address])        Past Surgical History:   Procedure Laterality Date   • APPENDECTOMY     • BREAST BIOPSY Left  benign   • CHOLECYSTECTOMY     • COLONOSCOPY     • HYSTERECTOMY     • VA LAPAROSCOPIC APPENDECTOMY N/A 03/24/2021    Procedure: APPENDECTOMY LAPAROSCOPIC;  Surgeon: Franki Hudson MD;  Location: [address] [place] [address] OR;  Service: General   • VA LAPS ABD PRTM&OMENTUM DX W/WO SPEC BR/WA SPX N/A 03/24/2021    Procedure: LAPAROSCOPY DIAGNOSTIC, EXTENSIVE DESI;  Surgeon: Franki Hudson MD;  Location: [address] [place] [address] OR;  Service: General   • UPPER GASTROINTESTINAL ENDOSCOPY         Family History   Problem Relation Age of Onset   • No Known Problems Mother    • No Known Problems Father    • No Known Problems Daughter    • No Known Problems Maternal Grandmother    • No Known Problems Paternal Grandmother    • No Known Problems Paternal Aunt    • No Known Problems Paternal Aunt    • No Known Problems Paternal Aunt      I have reviewed and agree with the history as documented. E-Cigarette/Vaping   • E-Cigarette Use Never User      E-Cigarette/Vaping Substances   • Nicotine No    • THC No    • CBD No    • Flavoring No    • Other No    • Unknown No      Social History     Tobacco Use   • Smoking status: Former     Types: Cigarettes     Quit date:      Years since quittin.5   • Smokeless tobacco: Never   Vaping Use   • Vaping Use: Never used   Substance Use Topics   • Alcohol use: Never   • Drug use: Never       Review of Systems   Constitutional: Negative for activity change, appetite change, chills, fatigue and fever. HENT: Negative for congestion, dental problem, drooling, ear discharge, ear pain, facial swelling, postnasal drip, rhinorrhea and sinus pain. Eyes: Negative for photophobia, pain, discharge and itching. Respiratory: Negative for apnea, cough, chest tightness and shortness of breath. Cardiovascular: Negative for chest pain and leg swelling. Gastrointestinal: Negative for abdominal distention, abdominal pain, anal bleeding, constipation, diarrhea and nausea. Endocrine: Negative for cold intolerance, heat intolerance and polydipsia. Genitourinary: Negative for difficulty urinating. Musculoskeletal: Negative for arthralgias, gait problem, joint swelling and myalgias. Pain left antecubital space   Skin: Negative for color change and pallor. Allergic/Immunologic: Negative for immunocompromised state. Neurological: Negative for dizziness, seizures, facial asymmetry, weakness, light-headedness, numbness and headaches. Psychiatric/Behavioral: Negative for agitation, behavioral problems, confusion, decreased concentration and dysphoric mood. All other systems reviewed and are negative. Physical Exam  Physical Exam  Vitals and nursing note reviewed. Constitutional:       General: She is not in acute distress. Appearance: She is well-developed. HENT:      Head: Normocephalic and atraumatic. Eyes:      Conjunctiva/sclera: Conjunctivae normal.   Cardiovascular:      Rate and Rhythm: Normal rate and regular rhythm. Heart sounds: No murmur heard. Pulmonary:      Effort: Pulmonary effort is normal. No respiratory distress. Breath sounds: Normal breath sounds. Abdominal:      Palpations: Abdomen is soft. Tenderness: There is no abdominal tenderness. Musculoskeletal:         General: No swelling. Cervical back: Neck supple. Comments: Left antecubital space: Small amount of purulent discharge at the previous left IV insertion point. There is no palpable fluctuance. There is also mild amount of superficial ecchymosis and phlebitis. Skin:     General: Skin is warm and dry. Capillary Refill: Capillary refill takes less than 2 seconds. Neurological:      Mental Status: She is alert. Psychiatric:         Mood and Affect: Mood normal.         Vital Signs  ED Triage Vitals [07/21/23 2034]   Temperature Pulse Respirations Blood Pressure SpO2   97.9 °F (36.6 °C) 71 18 131/69 98 %      Temp Source Heart Rate Source Patient Position - Orthostatic VS BP Location FiO2 (%)   Temporal Monitor Lying Right arm --      Pain Score       10 - Worst Possible Pain           Vitals:    07/21/23 2034   BP: 131/69   Pulse: 71   Patient Position - Orthostatic VS: Lying         Visual Acuity      ED Medications  Medications - No data to display    Diagnostic Studies  Results Reviewed     None                 No orders to display              Procedures  Procedures         ED Course                               SBIRT 20yo+    Flowsheet Row Most Recent Value   Initial Alcohol Screen: US AUDIT-C     1. How often do you have a drink containing alcohol? 0 Filed at: 07/21/2023 2037   2. How many drinks containing alcohol do you have on a typical day you are drinking? 0 Filed at: 07/21/2023 2037   3b.  FEMALE Any Age, or MALE 65+: How often do you have 4 or more drinks on one occassion? 0 Filed at: 07/21/2023 2037   Audit-C Score 0 Filed at: 07/21/2023 2037   LILIA: How many times in the past year have you. .. Used an illegal drug or used a prescription medication for non-medical reasons? Never Filed at: 07/21/2023 2037                    Medical Decision Making  Cellulitis: acute illness or injury  Phlebitis: acute illness or injury  Amount and/or Complexity of Data Reviewed  Labs:  Decision-making details documented in ED Course. Radiology:  Decision-making details documented in ED Course. ECG/medicine tests:  Decision-making details documented in ED Course. Risk  Prescription drug management. Disposition  Final diagnoses:   Cellulitis   Phlebitis     Time reflects when diagnosis was documented in both MDM as applicable and the Disposition within this note     Time User Action Codes Description Comment    7/21/2023  8:40 PM Luc Fenton Add [L03.90] Cellulitis     7/21/2023  8:41 PM Luc Fenton Add [I80.9] Phlebitis       ED Disposition     ED Disposition   Discharge    Condition   Stable    Date/Time   Fri Jul 21, 2023  8:41 PM    Comment   Norma Eastman discharge to home/self care. Follow-up Information     Follow up With Specialties Details Why [address] [facility], [address] [facility], Nurse Practitioner, Emergency Medicine   [address] [place] [address]  [phone]            Patient's Medications   Discharge Prescriptions    DICLOFENAC POTASSIUM (CATAFLAM) 50 MG TABLET    Take 1 tablet (50 mg total) by mouth 3 (three) times a day       Start Date: 7/21/2023 End Date: --       Order Dose: 50 mg       Quantity: 20 tablet    Refills: 0       No discharge procedures on file.     PDMP Review       Value Time User    PDMP Reviewed  Yes 7/15/2023  3:54 AM Griffin Solis PA-C          ED Provider  Electronically Signed by           Luc Fenton MD  07/21/23 2047

## 2023-07-24 ENCOUNTER — APPOINTMENT (EMERGENCY)
Dept: RADIOLOGY | Facility: HOSPITAL | Age: 53
DRG: 383 | End: 2023-07-24
Payer: COMMERCIAL

## 2023-07-24 ENCOUNTER — APPOINTMENT (EMERGENCY)
Dept: CT IMAGING | Facility: HOSPITAL | Age: 53
DRG: 383 | End: 2023-07-24
Payer: COMMERCIAL

## 2023-07-24 ENCOUNTER — HOSPITAL ENCOUNTER (INPATIENT)
Facility: HOSPITAL | Age: 53
LOS: 8 days | Discharge: HOME HEALTH | DRG: 383 | End: 2023-08-01
Attending: FAMILY MEDICINE | Admitting: HOSPITALIST
Payer: COMMERCIAL

## 2023-07-24 DIAGNOSIS — R07.9 CHEST PAIN: ICD-10-CM

## 2023-07-24 DIAGNOSIS — I80.8 PHLEBITIS OF LEFT UPPER EXTREMITY: ICD-10-CM

## 2023-07-24 DIAGNOSIS — L03.114 CELLULITIS OF LEFT UPPER EXTREMITY: Primary | ICD-10-CM

## 2023-07-24 LAB
2HR DELTA HS TROPONIN: -2 NG/L
ALBUMIN SERPL BCP-MCNC: 3.1 G/DL (ref 3.5–5)
ALP SERPL-CCNC: 140 U/L (ref 34–104)
ALT SERPL W P-5'-P-CCNC: 42 U/L (ref 7–52)
ANION GAP SERPL CALCULATED.3IONS-SCNC: 9 MMOL/L
APTT PPP: 33 SECONDS (ref 23–37)
AST SERPL W P-5'-P-CCNC: 23 U/L (ref 13–39)
BASOPHILS # BLD AUTO: 0.02 THOUSANDS/ÂΜL (ref 0–0.1)
BASOPHILS NFR BLD AUTO: 0 % (ref 0–1)
BILIRUB SERPL-MCNC: 0.41 MG/DL (ref 0.2–1)
BUN SERPL-MCNC: 24 MG/DL (ref 5–25)
CALCIUM ALBUM COR SERPL-MCNC: 8.8 MG/DL (ref 8.3–10.1)
CALCIUM SERPL-MCNC: 8.1 MG/DL (ref 8.4–10.2)
CARDIAC TROPONIN I PNL SERPL HS: 5 NG/L
CARDIAC TROPONIN I PNL SERPL HS: 7 NG/L
CHLORIDE SERPL-SCNC: 91 MMOL/L (ref 96–108)
CO2 SERPL-SCNC: 23 MMOL/L (ref 21–32)
CREAT SERPL-MCNC: 1.35 MG/DL (ref 0.6–1.3)
D DIMER PPP FEU-MCNC: 1.94 UG/ML FEU
EOSINOPHIL # BLD AUTO: 0.13 THOUSAND/ÂΜL (ref 0–0.61)
EOSINOPHIL NFR BLD AUTO: 1 % (ref 0–6)
ERYTHROCYTE [DISTWIDTH] IN BLOOD BY AUTOMATED COUNT: 15.9 % (ref 11.6–15.1)
GFR SERPL CREATININE-BSD FRML MDRD: 45 ML/MIN/1.73SQ M
GLUCOSE SERPL-MCNC: 430 MG/DL (ref 65–140)
GLUCOSE SERPL-MCNC: 820 MG/DL (ref 65–140)
GLUCOSE SERPL-MCNC: >500 MG/DL (ref 65–140)
HCT VFR BLD AUTO: 35.3 % (ref 34.8–46.1)
HGB BLD-MCNC: 10.9 G/DL (ref 11.5–15.4)
IMM GRANULOCYTES # BLD AUTO: 0.14 THOUSAND/UL (ref 0–0.2)
IMM GRANULOCYTES NFR BLD AUTO: 1 % (ref 0–2)
INR PPP: 0.96 (ref 0.84–1.19)
LACTATE SERPL-SCNC: 1.4 MMOL/L (ref 0.5–2)
LYMPHOCYTES # BLD AUTO: 1.25 THOUSANDS/ÂΜL (ref 0.6–4.47)
LYMPHOCYTES NFR BLD AUTO: 9 % (ref 14–44)
MCH RBC QN AUTO: 26.3 PG (ref 26.8–34.3)
MCHC RBC AUTO-ENTMCNC: 30.9 G/DL (ref 31.4–37.4)
MCV RBC AUTO: 85 FL (ref 82–98)
MONOCYTES # BLD AUTO: 0.96 THOUSAND/ÂΜL (ref 0.17–1.22)
MONOCYTES NFR BLD AUTO: 7 % (ref 4–12)
NEUTROPHILS # BLD AUTO: 10.75 THOUSANDS/ÂΜL (ref 1.85–7.62)
NEUTS SEG NFR BLD AUTO: 82 % (ref 43–75)
NRBC BLD AUTO-RTO: 0 /100 WBCS
PLATELET # BLD AUTO: 201 THOUSANDS/UL (ref 149–390)
PMV BLD AUTO: 11.9 FL (ref 8.9–12.7)
POTASSIUM SERPL-SCNC: 4.7 MMOL/L (ref 3.5–5.3)
PROCALCITONIN SERPL-MCNC: 0.42 NG/ML
PROT SERPL-MCNC: 6.3 G/DL (ref 6.4–8.4)
PROTHROMBIN TIME: 12.8 SECONDS (ref 11.6–14.5)
RBC # BLD AUTO: 4.14 MILLION/UL (ref 3.81–5.12)
SODIUM SERPL-SCNC: 123 MMOL/L (ref 135–147)
WBC # BLD AUTO: 13.25 THOUSAND/UL (ref 4.31–10.16)

## 2023-07-24 PROCEDURE — 83930 ASSAY OF BLOOD OSMOLALITY: CPT | Performed by: NURSE PRACTITIONER

## 2023-07-24 PROCEDURE — 87040 BLOOD CULTURE FOR BACTERIA: CPT

## 2023-07-24 PROCEDURE — 87186 SC STD MICRODIL/AGAR DIL: CPT

## 2023-07-24 PROCEDURE — 85379 FIBRIN DEGRADATION QUANT: CPT

## 2023-07-24 PROCEDURE — 87154 CUL TYP ID BLD PTHGN 6+ TRGT: CPT

## 2023-07-24 PROCEDURE — 82948 REAGENT STRIP/BLOOD GLUCOSE: CPT

## 2023-07-24 PROCEDURE — 93005 ELECTROCARDIOGRAM TRACING: CPT

## 2023-07-24 PROCEDURE — 85730 THROMBOPLASTIN TIME PARTIAL: CPT

## 2023-07-24 PROCEDURE — 84443 ASSAY THYROID STIM HORMONE: CPT | Performed by: NURSE PRACTITIONER

## 2023-07-24 PROCEDURE — 80053 COMPREHEN METABOLIC PANEL: CPT

## 2023-07-24 PROCEDURE — 99285 EMERGENCY DEPT VISIT HI MDM: CPT

## 2023-07-24 PROCEDURE — 36415 COLL VENOUS BLD VENIPUNCTURE: CPT

## 2023-07-24 PROCEDURE — 85025 COMPLETE CBC W/AUTO DIFF WBC: CPT

## 2023-07-24 PROCEDURE — 84145 PROCALCITONIN (PCT): CPT

## 2023-07-24 PROCEDURE — 83605 ASSAY OF LACTIC ACID: CPT

## 2023-07-24 PROCEDURE — 84484 ASSAY OF TROPONIN QUANT: CPT

## 2023-07-24 PROCEDURE — 71045 X-RAY EXAM CHEST 1 VIEW: CPT

## 2023-07-24 PROCEDURE — 85610 PROTHROMBIN TIME: CPT

## 2023-07-24 PROCEDURE — 71275 CT ANGIOGRAPHY CHEST: CPT

## 2023-07-24 RX ORDER — HYDROMORPHONE HCL/PF 1 MG/ML
0.5 SYRINGE (ML) INJECTION ONCE
Status: COMPLETED | OUTPATIENT
Start: 2023-07-24 | End: 2023-07-24

## 2023-07-24 RX ORDER — MORPHINE SULFATE 4 MG/ML
4 INJECTION, SOLUTION INTRAMUSCULAR; INTRAVENOUS ONCE
Status: COMPLETED | OUTPATIENT
Start: 2023-07-24 | End: 2023-07-24

## 2023-07-24 RX ORDER — VANCOMYCIN HYDROCHLORIDE 1 G/200ML
15 INJECTION, SOLUTION INTRAVENOUS ONCE
Status: COMPLETED | OUTPATIENT
Start: 2023-07-24 | End: 2023-07-24

## 2023-07-24 RX ORDER — CEFEPIME HYDROCHLORIDE 2 G/50ML
2000 INJECTION, SOLUTION INTRAVENOUS ONCE
Status: COMPLETED | OUTPATIENT
Start: 2023-07-24 | End: 2023-07-24

## 2023-07-24 RX ADMIN — CEFEPIME HYDROCHLORIDE 2000 MG: 2 INJECTION, SOLUTION INTRAVENOUS at 19:08

## 2023-07-24 RX ADMIN — SODIUM CHLORIDE 1000 ML: 0.9 INJECTION, SOLUTION INTRAVENOUS at 20:28

## 2023-07-24 RX ADMIN — MORPHINE SULFATE 4 MG: 4 INJECTION, SOLUTION INTRAMUSCULAR; INTRAVENOUS at 22:47

## 2023-07-24 RX ADMIN — IOHEXOL 85 ML: 350 INJECTION, SOLUTION INTRAVENOUS at 22:00

## 2023-07-24 RX ADMIN — HYDROMORPHONE HYDROCHLORIDE 0.5 MG: 1 INJECTION, SOLUTION INTRAMUSCULAR; INTRAVENOUS; SUBCUTANEOUS at 19:48

## 2023-07-24 RX ADMIN — INSULIN HUMAN 10 UNITS: 100 INJECTION, SOLUTION PARENTERAL at 20:33

## 2023-07-24 RX ADMIN — VANCOMYCIN HYDROCHLORIDE 1000 MG: 1 INJECTION, SOLUTION INTRAVENOUS at 21:23

## 2023-07-24 RX ADMIN — INSULIN HUMAN 10 UNITS: 100 INJECTION, SOLUTION PARENTERAL at 22:31

## 2023-07-24 NOTE — ED PROVIDER NOTES
History  Chief Complaint   Patient presents with   • Wound Check     Patient arrives with complaints of IV site wound and redness. Patient states has been on antibiotics since Friday and getting worse limiting left arm function. 51-year-old female with history of diabetes, hypertension, hyperlipidemia who presents to the emergency department for evaluation of left arm pain. Patient was admitted last week for dizziness when she had an IV placed in her left antecubital space that became infected several days later after she was discharged. Patient presented back to the emergency department after being sent from urgent care for further evaluation. Patient was discharged on Bactrim. She has been compliant with her medication however reports over the past several days that her pain and symptoms have worsened. She reports chills, generalized weakness, generalized malaise, chest pain, shortness of breath, lightheadedness and the inability to move her left arm without excruciating pain. Does not report any fevers at home. History provided by:  Patient   used: No        Prior to Admission Medications   Prescriptions Last Dose Informant Patient Reported? Taking? Alcohol Swabs (Pharmacist Choice Alcohol) PADS  Self No No   Sig: USE AS DIRECTED   Aspirin (ASPIR-81 PO)  Self Yes No   Sig: Take 81 mg by mouth   B-D UF III MINI PEN NEEDLES 31G X 5 MM MISC  Self No No   Sig: Pt uses 5 pen needles daily   Continuous Blood Gluc  (FreeStyle [place]ugio 14 Day [place]) TEMITOPE  Self No No   Sig: Use for continuous blood glucose monitoring   Continuous Blood Gluc Sensor (FreeStyle Refugio 14 Day Sensor) MISC  Self No No   Sig: Use one sensor every 14 days for continuous blood sugar monitoring.    Empagliflozin (Jardiance) 25 MG TABS  Self No No   Sig: Take 1 tablet (25 mg total) by mouth every morning   Insulin Glargine Solostar (Lantus SoloStar) 100 UNIT/ML SOPN  Self No No   Si units daily   Iron Heme Polypeptide 12 MG TABS  Self Yes No   Sig: Take 1 tablet by mouth   OneTouch Ultra test strip  Self No No   Sig: USE 4 TIMES A DAY   Pharmacist Choice Lancets MISC  Self No No   Sig: USE AS DIRECTED   Restasis 0.05 % ophthalmic emulsion  Self Yes No   albuterol (PROVENTIL HFA,VENTOLIN HFA) 90 mcg/act inhaler  Self No No   Sig: INHALE ONE PUFF BY MOUTH AND INTO THE LUNGS EVERY 6 HOURS AS NEEDED FOR WHEEZING   amLODIPine (NORVASC) 5 mg tablet   No No   Sig: Take 1 tablet (5 mg total) by mouth daily Do not start before July 21, 2023.    amitriptyline (ELAVIL) 25 mg tablet   No No   Sig: Take 1 tablet (25 mg total) by mouth daily at bedtime   atorvastatin (LIPITOR) 80 mg tablet  Self No No   Sig: TAKE 1 TABLET (80 MG TOTAL) BY MOUTH DAILY AT BEDTIME   cholecalciferol (VITAMIN D3) 400 units tablet  Self No No   Sig: TAKE ONE TABLET BY MOUTH DAILY   citalopram (CeleXA) 40 mg tablet  Self No No   Sig: Take 1 tablet (40 mg total) by mouth daily   diclofenac potassium (CATAFLAM) 50 mg tablet   No No   Sig: Take 1 tablet (50 mg total) by mouth 3 (three) times a day   docusate sodium (COLACE) 100 mg capsule  Self No No   Sig: TAKE ONE CAPSULE BY MOUTH TWICE DAILY   dulaglutide (Trulicity) 3 DO/5.8CX injection  Self No No   Sig: Inject 0.5 mL (3 mg total) under the skin once a week   ergocalciferol (VITAMIN D2) 50,000 units  Self No No   Sig: Take 1 capsule (50,000 Units total) by mouth once a week   famotidine (PEPCID) 40 MG tablet   No No   Sig: Take 1 tablet (40 mg total) by mouth daily at bedtime Take in evening 2 hours prior to bed   fenofibrate (TRICOR) 48 mg tablet  Self No No   Sig: TAKE ONE TABLET BY MOUTH DAILY   insulin glulisine (Apidra SoloStar) 100 units/mL injection pen  Self No No   Sig: Inject 12 units with breakfast and lunch, 14 units with dinner, with scale ISF 25 @ 150 as needed   levETIRAcetam (KEPPRA) 500 mg tablet  Self No No   Sig: TAKE ONE TABLET TWICE DAILY   linaCLOtide (Linzess) 72 MCG CAPS Self No No   Sig: Take 1 capsule by mouth daily before breakfast   magnesium Oxide (MAG-OX) 400 mg TABS   No No   Sig: Take 1 tablet (400 mg total) by mouth daily   meclizine (ANTIVERT) 25 mg tablet   No No   Sig: Take 1 tablet (25 mg total) by mouth 3 (three) times a day as needed for dizziness   metoprolol tartrate (LOPRESSOR) 25 mg tablet  Self No No   Sig: Take 1 tablet (25 mg total) by mouth 2 (two) times a day   multivitamin (THERAGRAN) TABS  Self Yes No   Sig: Take 1 tablet by mouth every morning   nystatin (MYCOSTATIN) cream   No No   Sig: Apply topically 2 (two) times a day   omeprazole (PriLOSEC) 40 MG capsule   No No   Sig: Take 1 capsule (40 mg total) by mouth daily   ondansetron (ZOFRAN) 4 mg tablet   No No   Sig: Take 1 tablet (4 mg total) by mouth every 8 (eight) hours as needed for nausea or vomiting   pregabalin (LYRICA) 100 mg capsule   No No   Sig: TAKE ONE CAPSULE BY MOUTH THREE TIMES A DAY   scopolamine (TRANSDERM-SCOP) 1 mg/3 days TD 72 hr patch  Self No No   Sig: Place 1 patch on the skin over 72 hours every third day   sulfamethoxazole-trimethoprim (BACTRIM DS) 800-160 mg per tablet   No No   Sig: Take 1 tablet by mouth every 12 (twelve) hours for 10 days      Facility-Administered Medications: None       Past Medical History:   Diagnosis Date   • Asthma    • Atypical chest pain    • Depression    • Diabetes mellitus (HCC)    • Gastroparesis    • GERD (gastroesophageal reflux disease)    • Hyperlipidemia    • Hypertension    • Psychiatric disorder    • Renal disorder    • Sciatica    • Seizure disorder ([address])        Past Surgical History:   Procedure Laterality Date   • APPENDECTOMY     • BREAST BIOPSY Left  benign   • CHOLECYSTECTOMY     • COLONOSCOPY     • HYSTERECTOMY     • NV LAPAROSCOPIC APPENDECTOMY N/A 03/24/2021    Procedure: APPENDECTOMY LAPAROSCOPIC;  Surgeon: Lizy Ford MD;  Location: [address] [place] [address] MAIN OR;  Service: General   • NV LAPS ABD PRTM&OMENTUM DX W/WO SPEC BR/WA SPX N/A 2021    Procedure: LAPAROSCOPY DIAGNOSTIC, EXTENSIVE DESI;  Surgeon: Najma Godinez MD;  Location: [address] [place] [address] MAIN OR;  Service: General   • UPPER GASTROINTESTINAL ENDOSCOPY         Family History   Problem Relation Age of Onset   • No Known Problems Mother    • No Known Problems Father    • No Known Problems Daughter    • No Known Problems Maternal Grandmother    • No Known Problems Paternal Grandmother    • No Known Problems Paternal Aunt    • No Known Problems Paternal Aunt    • No Known Problems Paternal Aunt      I have reviewed and agree with the history as documented. E-Cigarette/Vaping   • E-Cigarette Use Never User      E-Cigarette/Vaping Substances   • Nicotine No    • THC No    • CBD No    • Flavoring No    • Other No    • Unknown No      Social History     Tobacco Use   • Smoking status: Former     Types: Cigarettes     Quit date:      Years since quittin.5   • Smokeless tobacco: Never   Vaping Use   • Vaping Use: Never used   Substance Use Topics   • Alcohol use: Never   • Drug use: Never       Review of Systems   Constitutional: Positive for appetite change, chills, fatigue and fever. HENT: Negative for ear pain and sore throat. Eyes: Negative for pain and visual disturbance. Respiratory: Positive for shortness of breath. Negative for cough. Cardiovascular: Positive for chest pain. Negative for palpitations. Gastrointestinal: Negative for abdominal pain, nausea and vomiting. Genitourinary: Negative for dysuria and hematuria. Musculoskeletal: Negative for arthralgias and back pain. Skin: Positive for rash and wound. Negative for color change. Neurological: Positive for weakness (generalized), light-headedness and headaches. Negative for seizures and syncope. All other systems reviewed and are negative. Physical Exam  Physical Exam  Vitals and nursing note reviewed. Constitutional:       General: She is in acute distress. Appearance: Normal appearance.  She is well-developed and normal weight. She is ill-appearing. HENT:      Head: Normocephalic and atraumatic. Right Ear: Tympanic membrane and external ear normal.      Left Ear: Tympanic membrane and external ear normal.      Nose: Nose normal.      Mouth/Throat:      Mouth: Mucous membranes are moist.      Pharynx: Oropharynx is clear. No oropharyngeal exudate or posterior oropharyngeal erythema. Eyes:      General: No scleral icterus. Right eye: No discharge. Left eye: No discharge. Extraocular Movements: Extraocular movements intact. Conjunctiva/sclera: Conjunctivae normal.      Pupils: Pupils are equal, round, and reactive to light. Cardiovascular:      Rate and Rhythm: Normal rate and regular rhythm. Pulses: Normal pulses. Heart sounds: Normal heart sounds. No murmur heard. Pulmonary:      Effort: Pulmonary effort is normal. No respiratory distress. Breath sounds: Normal breath sounds. No wheezing or rales. Chest:      Chest wall: No tenderness. Abdominal:      General: Abdomen is flat. Bowel sounds are normal.      Palpations: Abdomen is soft. Tenderness: There is no abdominal tenderness. There is no right CVA tenderness or left CVA tenderness. Musculoskeletal:         General: Swelling and tenderness present. No signs of injury. Normal range of motion. Cervical back: Normal range of motion and neck supple. No tenderness. Right lower leg: No edema. Left lower leg: No edema. Skin:     General: Skin is warm and dry. Capillary Refill: Capillary refill takes less than 2 seconds. Findings: Erythema present. No rash. Neurological:      General: No focal deficit present. Mental Status: She is alert and oriented to person, place, and time. Mental status is at baseline. Motor: No weakness.       Gait: Gait normal.   Psychiatric:         Mood and Affect: Mood normal.         Behavior: Behavior normal.         Thought Content:  Thought content normal.         Vital Signs  ED Triage Vitals [07/24/23 1740]   Temperature Pulse Respirations Blood Pressure SpO2   98.2 °F (36.8 °C) 81 18 144/86 96 %      Temp Source Heart Rate Source Patient Position - Orthostatic VS BP Location FiO2 (%)   Temporal Monitor Sitting -- --      Pain Score       10 - Worst Possible Pain           Vitals:    07/24/23 1740   BP: 144/86   Pulse: 81   Patient Position - Orthostatic VS: Sitting         Visual Acuity      ED Medications  Medications   sodium chloride 0.9 % bolus 1,000 mL (1,000 mL Intravenous New Bag 7/24/23 2028)   vancomycin (VANCOCIN) IVPB (premix in dextrose) 1,000 mg 200 mL (has no administration in time range)   cefepime (MAXIPIME) IVPB (premix in dextrose) 2,000 mg 50 mL (0 mg Intravenous Stopped 7/24/23 1947)   HYDROmorphone (DILAUDID) injection 0.5 mg (0.5 mg Intravenous Given 7/24/23 1948)   insulin regular (HumuLIN R,NovoLIN R) injection 10 Units (10 Units Subcutaneous Given 7/24/23 2033)       Diagnostic Studies  Results Reviewed     Procedure Component Value Units Date/Time    HS Troponin I 4hr [[id number]]     Lab Status: No result Specimen: Blood     Comprehensive metabolic panel [[id number]]  (Abnormal) Collected: 07/24/23 1850    Lab Status: Final result Specimen: Blood from Arm, Right Updated: 07/24/23 2000     Sodium 123 mmol/L      Potassium 4.7 mmol/L      Chloride 91 mmol/L      CO2 23 mmol/L      ANION GAP 9 mmol/L      BUN 24 mg/dL      Creatinine 1.35 mg/dL      Glucose 820 mg/dL      Calcium 8.1 mg/dL      Corrected Calcium 8.8 mg/dL      AST 23 U/L      ALT 42 U/L      Alkaline Phosphatase 140 U/L      Total Protein 6.3 g/dL      Albumin 3.1 g/dL      Total Bilirubin 0.41 mg/dL      eGFR 45 ml/min/1.73sq m     Narrative:      [place] guidelines for Chronic Kidney Disease (CKD):   •  Stage 1 with normal or high GFR (GFR > 90 mL/min/1.73 square meters)  •  Stage 2 Mild CKD (GFR = 60-89 mL/min/1.73 square meters)  •  Stage 3A Moderate CKD (GFR = 45-59 mL/min/1.73 square meters)  •  Stage 3B Moderate CKD (GFR = 30-44 mL/min/1.73 square meters)  •  Stage 4 Severe CKD (GFR = 15-29 mL/min/1.73 square meters)  •  Stage 5 End Stage CKD (GFR <15 mL/min/1.73 square meters)  Note: GFR calculation is accurate only with a steady state creatinine    Procalcitonin [[id number]]  (Abnormal) Collected: 07/24/23 1850    Lab Status: Final result Specimen: Blood from Arm, Right Updated: 07/24/23 1935     Procalcitonin 0.42 ng/ml     HS Troponin 0hr (reflex protocol) [[id number]]  (Normal) Collected: 07/24/23 1850    Lab Status: Final result Specimen: Blood from Arm, Right Updated: 07/24/23 1932     hs TnI 0hr 7 ng/L     HS Troponin I 2hr [[id number]]     Lab Status: No result Specimen: Blood     D-Dimer [[id number]]  (Abnormal) Collected: 07/24/23 1850    Lab Status: Final result Specimen: Blood from Arm, Right Updated: 07/24/23 1924     D-Dimer, Quant 1.94 ug/ml FEU     Narrative: In the evaluation for possible pulmonary embolism, in the appropriate (Well's Score of 4 or less) patient, the age adjusted d-dimer cutoff for this patient can be calculated as:    Age x 0.01 (in ug/mL) for Age-adjusted D-dimer exclusion threshold for a patient over 50 years. Lactic acid [[id number]]  (Normal) Collected: 07/24/23 1850    Lab Status: Final result Specimen: Blood from Arm, Right Updated: 07/24/23 1923     LACTIC ACID 1.4 mmol/L     Narrative:      Result may be elevated if tourniquet was used during collection. Lynne Blevins [[id number]]  (Normal) Collected: 07/24/23 1850    Lab Status: Final result Specimen: Blood from Arm, Right Updated: 07/24/23 1919     Protime 12.8 seconds      INR 0.96    APTT [[id number]]  (Normal) Collected: 07/24/23 1850    Lab Status: Final result Specimen: Blood from Arm, Right Updated: 07/24/23 1919     PTT 33 seconds     Blood culture #1 [[id number]] Collected: 07/24/23 1907    Lab Status:  In process Specimen: Blood from Arm, Right Updated: 07/24/23 1914    CBC and differential [[id number]]  (Abnormal) Collected: 07/24/23 1850    Lab Status: Final result Specimen: Blood from Arm, Right Updated: 07/24/23 1900     WBC 13.25 Thousand/uL      RBC 4.14 Million/uL      Hemoglobin 10.9 g/dL      Hematocrit 35.3 %      MCV 85 fL      MCH 26.3 pg      MCHC 30.9 g/dL      RDW 15.9 %      MPV 11.9 fL      Platelets 674 Thousands/uL      nRBC 0 /100 WBCs      Neutrophils Relative 82 %      Immat GRANS % 1 %      Lymphocytes Relative 9 %      Monocytes Relative 7 %      Eosinophils Relative 1 %      Basophils Relative 0 %      Neutrophils Absolute 10.75 Thousands/µL      Immature Grans Absolute 0.14 Thousand/uL      Lymphocytes Absolute 1.25 Thousands/µL      Monocytes Absolute 0.96 Thousand/µL      Eosinophils Absolute 0.13 Thousand/µL      Basophils Absolute 0.02 Thousands/µL     Blood culture #2 [[id number]] Collected: 07/24/23 1850    Lab Status: In process Specimen: Blood from Arm, Right Updated: 07/24/23 1857                 XR chest 1 view portable   ED Interpretation by Rafael Enriquez PA-C (07/24 1832)   No acute cardiopulmonary process. CTA ED chest PE study    (Results Pending)              Procedures  ECG 12 Lead Documentation Only    Date/Time: 7/24/2023 7:28 PM    Performed by: Rafael Enriquez PA-C  Authorized by: Rafael Enriquez PA-C    Indications / Diagnosis:  Chest pain; metabolic derangement.    ECG reviewed by me, the ED Provider: yes    Patient location:  ED  Interpretation:     Interpretation: normal    Quality:     Tracing quality:  Limited by artifact  Rate:     ECG rate:  80    ECG rate assessment: normal    Rhythm:     Rhythm: sinus rhythm    Ectopy:     Ectopy: none    QRS:     QRS axis:  Normal    QRS intervals:  Normal  Conduction:     Conduction: normal    ST segments:     ST segments:  Normal  T waves:     T waves: normal               ED Course  ED Course as of 07/24/23 2049 Mon Jul 24, 2023   1824 Patient reports nonanaphylactic reaction to PCN when she was a child. Has had keflex in the past without issue. Will proceed with cefepime for coverage. Failed outpatient therapy infected phlebitis to L AC space. 1832 Bedside US to L arm shows concerns for infected phlebitis. No subcutaneous emphysema palpated concerning for gas   1905 Difficulty obtaining vascular access. US guided IV placed in R arm by me in sterile fashion. Second set of Blood cultures collected. 1911 WBC(!): 13.25   2008 Glucose, Random(!!): 820  10 units of insulin ordered. Not in DKA. Possibly related to solumedrol given during hospitalization. 2009 Sodium(!): 123   2011 Sign out to [facility] pending CTA results. Will need admission. HEART Risk Score    Flowsheet Row Most Recent Value   Heart Score Risk Calculator    History 0 Filed at: 07/24/2023 1934   ECG 0 Filed at: 07/24/2023 1934   Age 1 Filed at: 07/24/2023 1934   Risk Factors 1 Filed at: 07/24/2023 1934   Troponin 0 Filed at: 07/24/2023 1934   HEART Score 2 Filed at: 07/24/2023 1934                        SBIRT 20yo+    Flowsheet Row Most Recent Value   Initial Alcohol Screen: US AUDIT-C     1. How often do you have a drink containing alcohol? 0 Filed at: 07/24/2023 1741   2. How many drinks containing alcohol do you have on a typical day you are drinking? 0 Filed at: 07/24/2023 1741   3a. Male UNDER 65: How often do you have five or more drinks on one occasion? 0 Filed at: 07/24/2023 1741   3b. FEMALE Any Age, or MALE 65+: How often do you have 4 or more drinks on one occassion?  0 Filed at: 07/24/2023 1741   Audit-C Score 0 Filed at: 07/24/2023 1741          Wells' Criteria for PE    Flowsheet Row Most Recent Value   Wells' Criteria for PE    Clinical signs and symptoms of DVT 3 Filed at: 07/24/2023 1929   PE is primary diagnosis or equally likely 0 Filed at: 07/24/2023 1929   HR >100 0 Filed at: 07/24/2023 1927 Immobilization at least 3 days or Surgery in the previous 4 weeks 0 Filed at: 07/24/2023 1929   Previous, objectively diagnosed PE or DVT 0 Filed at: 07/24/2023 1929   Hemoptysis 0 Filed at: 07/24/2023 1929   Malignancy with treatment within 6 months or palliative 0 Filed at: 07/24/2023 Gage Gabriel' Criteria Total 3 Filed at: 07/24/2023 1929                Medical Decision Making  51-year-old female recently discharged from this facility who presents to the emergency department for reevaluation of cellulitis of left upper extremity with associated generalized weakness, chills and inability to move left arm without excruciating pain. Differential: Failure of outpatient therapy, considered sepsis. Physical exam evident for superficial phlebitis with associated infection. Considered bacteremia in this case. Chest pain concerning for ACS, pneumonia, PE. No trauma concerning for pneumothorax. Failure of outpatient therapy, will need IV abx. Blood cultures collected prior to giving cefepime and IV fluids. Dilaudid administered for pain control. CXR showing no acute cardiopulmonary process. ECG stable. Moderate leukocytosis concerning for systemic infection. Notable hyperglycemia. Not in DKA, could be from solumedrol during recent hospitalization. 10 units of insulin ordered. Incidental hyponatremia noted as well. Ddimer elevated. Will need CTA to rule out PE to explain chest pain. Patient signed out to Dr. Thanh Barros pending CTA. Will need admission for treatment given failure of outpatient abx. Cellulitis of left upper extremity: acute illness or injury  Chest pain: acute illness or injury  Hyponatremia: acute illness or injury  Phlebitis of left upper extremity: acute illness or injury  Amount and/or Complexity of Data Reviewed  Labs: ordered. Decision-making details documented in ED Course. Radiology: ordered and independent interpretation performed.   ECG/medicine tests: ordered and independent interpretation performed. Risk  OTC drugs. Prescription drug management. Disposition  Final diagnoses:   Cellulitis of left upper extremity   Phlebitis of left upper extremity   Chest pain   Hyponatremia     Time reflects when diagnosis was documented in both MDM as applicable and the Disposition within this note     Time User Action Codes Description Comment    7/24/2023  7:35 PM Eual Suarez Add [H69.625] Cellulitis of left upper extremity     7/24/2023  7:36 PM Eual Suarez Add [I80.8] Phlebitis of left upper extremity     7/24/2023  7:36 PM Eual Suarez Add [R07.9] Chest pain     7/24/2023  8:09 PM Eual Suarez Add [E87.1] Hyponatremia       ED Disposition     None      Follow-up Information    None         Patient's Medications   Discharge Prescriptions    No medications on file       No discharge procedures on file.     PDMP Review       Value Time User    PDMP Reviewed  Yes 7/15/2023  3:54 AM Maryana Vincent PA-C          ED Provider  Electronically Signed by           Rafael Enriquez PA-C  07/24/23 2049

## 2023-07-25 ENCOUNTER — APPOINTMENT (INPATIENT)
Dept: NON INVASIVE DIAGNOSTICS | Facility: HOSPITAL | Age: 53
DRG: 383 | End: 2023-07-25
Payer: COMMERCIAL

## 2023-07-25 ENCOUNTER — APPOINTMENT (INPATIENT)
Dept: ULTRASOUND IMAGING | Facility: HOSPITAL | Age: 53
DRG: 383 | End: 2023-07-25
Payer: COMMERCIAL

## 2023-07-25 ENCOUNTER — APPOINTMENT (INPATIENT)
Dept: CT IMAGING | Facility: HOSPITAL | Age: 53
DRG: 383 | End: 2023-07-25
Payer: COMMERCIAL

## 2023-07-25 PROBLEM — E66.9 OBESITY (BMI 35.0-39.9 WITHOUT COMORBIDITY): Status: ACTIVE | Noted: 2017-04-14

## 2023-07-25 PROBLEM — R60.0 LOWER EXTREMITY EDEMA: Status: ACTIVE | Noted: 2023-07-25

## 2023-07-25 PROBLEM — N17.9 ACUTE RENAL FAILURE SUPERIMPOSED ON STAGE 3A CHRONIC KIDNEY DISEASE (HCC): Status: ACTIVE | Noted: 2023-07-25

## 2023-07-25 PROBLEM — N18.31 ACUTE RENAL FAILURE SUPERIMPOSED ON STAGE 3A CHRONIC KIDNEY DISEASE (HCC): Status: ACTIVE | Noted: 2023-07-25

## 2023-07-25 PROBLEM — D72.829 LEUKOCYTOSIS: Status: ACTIVE | Noted: 2023-07-25

## 2023-07-25 PROBLEM — L03.114 CELLULITIS OF LEFT UPPER EXTREMITY: Status: ACTIVE | Noted: 2023-07-25

## 2023-07-25 LAB
ALBUMIN SERPL BCP-MCNC: 2.7 G/DL (ref 3.5–5)
ALP SERPL-CCNC: 112 U/L (ref 34–104)
ALT SERPL W P-5'-P-CCNC: 31 U/L (ref 7–52)
ANION GAP SERPL CALCULATED.3IONS-SCNC: 8 MMOL/L
ANION GAP SERPL CALCULATED.3IONS-SCNC: 8 MMOL/L
AST SERPL W P-5'-P-CCNC: 14 U/L (ref 13–39)
ATRIAL RATE: 76 BPM
ATRIAL RATE: 80 BPM
ATRIAL RATE: 81 BPM
BASE EX.OXY STD BLDV CALC-SCNC: 52.4 % (ref 60–80)
BASE EXCESS BLDV CALC-SCNC: -0.9 MMOL/L
BASOPHILS # BLD AUTO: 0.02 THOUSANDS/ÂΜL (ref 0–0.1)
BASOPHILS NFR BLD AUTO: 0 % (ref 0–1)
BETA-HYDROXYBUTYRATE: 0.1 MMOL/L
BILIRUB SERPL-MCNC: 0.36 MG/DL (ref 0.2–1)
BILIRUB UR QL STRIP: NEGATIVE
BUN SERPL-MCNC: 19 MG/DL (ref 5–25)
BUN SERPL-MCNC: 23 MG/DL (ref 5–25)
CA-I BLD-SCNC: 1.07 MMOL/L (ref 1.12–1.32)
CA-I BLD-SCNC: 1.1 MMOL/L (ref 1.12–1.32)
CALCIUM ALBUM COR SERPL-MCNC: 9.1 MG/DL (ref 8.3–10.1)
CALCIUM SERPL-MCNC: 8.1 MG/DL (ref 8.4–10.2)
CALCIUM SERPL-MCNC: 8.4 MG/DL (ref 8.4–10.2)
CHLORIDE SERPL-SCNC: 102 MMOL/L (ref 96–108)
CHLORIDE SERPL-SCNC: 99 MMOL/L (ref 96–108)
CK SERPL-CCNC: 124 U/L (ref 26–192)
CLARITY UR: CLEAR
CO2 SERPL-SCNC: 24 MMOL/L (ref 21–32)
CO2 SERPL-SCNC: 24 MMOL/L (ref 21–32)
COLOR UR: YELLOW
CREAT SERPL-MCNC: 1.03 MG/DL (ref 0.6–1.3)
CREAT SERPL-MCNC: 1.19 MG/DL (ref 0.6–1.3)
EOSINOPHIL # BLD AUTO: 0.19 THOUSAND/ÂΜL (ref 0–0.61)
EOSINOPHIL NFR BLD AUTO: 1 % (ref 0–6)
ERYTHROCYTE [DISTWIDTH] IN BLOOD BY AUTOMATED COUNT: 15.4 % (ref 11.6–15.1)
EST. AVERAGE GLUCOSE BLD GHB EST-MCNC: 318 MG/DL
FERRITIN SERPL-MCNC: 128 NG/ML (ref 11–307)
FOLATE SERPL-MCNC: 7.6 NG/ML
GFR SERPL CREATININE-BSD FRML MDRD: 52 ML/MIN/1.73SQ M
GFR SERPL CREATININE-BSD FRML MDRD: 62 ML/MIN/1.73SQ M
GLUCOSE SERPL-MCNC: 168 MG/DL (ref 65–140)
GLUCOSE SERPL-MCNC: 168 MG/DL (ref 65–140)
GLUCOSE SERPL-MCNC: 174 MG/DL (ref 65–140)
GLUCOSE SERPL-MCNC: 205 MG/DL (ref 65–140)
GLUCOSE SERPL-MCNC: 221 MG/DL (ref 65–140)
GLUCOSE SERPL-MCNC: 275 MG/DL (ref 65–140)
GLUCOSE SERPL-MCNC: 281 MG/DL (ref 65–140)
GLUCOSE SERPL-MCNC: 309 MG/DL (ref 65–140)
GLUCOSE SERPL-MCNC: 323 MG/DL (ref 65–140)
GLUCOSE SERPL-MCNC: 365 MG/DL (ref 65–140)
GLUCOSE SERPL-MCNC: 368 MG/DL (ref 65–140)
GLUCOSE SERPL-MCNC: 375 MG/DL (ref 65–140)
GLUCOSE UR STRIP-MCNC: ABNORMAL MG/DL
HBA1C MFR BLD: 12.7 %
HCO3 BLDV-SCNC: 24.7 MMOL/L (ref 24–30)
HCT VFR BLD AUTO: 30.1 % (ref 34.8–46.1)
HGB BLD-MCNC: 9.7 G/DL (ref 11.5–15.4)
HGB UR QL STRIP.AUTO: NEGATIVE
IMM GRANULOCYTES # BLD AUTO: 0.22 THOUSAND/UL (ref 0–0.2)
IMM GRANULOCYTES NFR BLD AUTO: 1 % (ref 0–2)
IRON SATN MFR SERPL: 11 % (ref 15–50)
IRON SERPL-MCNC: 22 UG/DL (ref 50–170)
KETONES UR STRIP-MCNC: NEGATIVE MG/DL
L PNEUMO1 AG UR QL IA.RAPID: NEGATIVE
LEUKOCYTE ESTERASE UR QL STRIP: NEGATIVE
LIPASE SERPL-CCNC: 76 U/L (ref 11–82)
LYMPHOCYTES # BLD AUTO: 1.85 THOUSANDS/ÂΜL (ref 0.6–4.47)
LYMPHOCYTES NFR BLD AUTO: 12 % (ref 14–44)
MAGNESIUM SERPL-MCNC: 2 MG/DL (ref 1.9–2.7)
MAGNESIUM SERPL-MCNC: 2 MG/DL (ref 1.9–2.7)
MCH RBC QN AUTO: 26.6 PG (ref 26.8–34.3)
MCHC RBC AUTO-ENTMCNC: 32.2 G/DL (ref 31.4–37.4)
MCV RBC AUTO: 83 FL (ref 82–98)
MONOCYTES # BLD AUTO: 1.2 THOUSAND/ÂΜL (ref 0.17–1.22)
MONOCYTES NFR BLD AUTO: 8 % (ref 4–12)
NEUTROPHILS # BLD AUTO: 11.81 THOUSANDS/ÂΜL (ref 1.85–7.62)
NEUTS SEG NFR BLD AUTO: 78 % (ref 43–75)
NITRITE UR QL STRIP: NEGATIVE
NON VENT ROOM AIR: ABNORMAL %
NRBC BLD AUTO-RTO: 0 /100 WBCS
O2 CT BLDV-SCNC: 8.4 ML/DL
OSMOLALITY UR/SERPL-RTO: 316 MMOL/KG (ref 282–298)
P AXIS: 29 DEGREES
P AXIS: 37 DEGREES
P AXIS: 42 DEGREES
PCO2 BLDV: 45 MM HG (ref 42–50)
PH BLDV: 7.36 [PH] (ref 7.3–7.4)
PH UR STRIP.AUTO: 6 [PH]
PHOSPHATE SERPL-MCNC: 2.8 MG/DL (ref 2.7–4.5)
PHOSPHATE SERPL-MCNC: 2.9 MG/DL (ref 2.7–4.5)
PLATELET # BLD AUTO: 206 THOUSANDS/UL (ref 149–390)
PMV BLD AUTO: 11.1 FL (ref 8.9–12.7)
PO2 BLDV: 29 MM HG (ref 35–45)
POTASSIUM SERPL-SCNC: 4 MMOL/L (ref 3.5–5.3)
POTASSIUM SERPL-SCNC: 4.3 MMOL/L (ref 3.5–5.3)
PR INTERVAL: 138 MS
PR INTERVAL: 148 MS
PR INTERVAL: 158 MS
PROCALCITONIN SERPL-MCNC: 0.65 NG/ML
PROT SERPL-MCNC: 5.6 G/DL (ref 6.4–8.4)
PROT UR STRIP-MCNC: NEGATIVE MG/DL
QRS AXIS: 10 DEGREES
QRS AXIS: 6 DEGREES
QRS AXIS: 6 DEGREES
QRSD INTERVAL: 70 MS
QRSD INTERVAL: 74 MS
QRSD INTERVAL: 80 MS
QT INTERVAL: 366 MS
QT INTERVAL: 378 MS
QT INTERVAL: 380 MS
QTC INTERVAL: 425 MS
QTC INTERVAL: 427 MS
QTC INTERVAL: 435 MS
RBC # BLD AUTO: 3.64 MILLION/UL (ref 3.81–5.12)
S PNEUM AG UR QL: NEGATIVE
SODIUM SERPL-SCNC: 131 MMOL/L (ref 135–147)
SODIUM SERPL-SCNC: 134 MMOL/L (ref 135–147)
SP GR UR STRIP.AUTO: <=1.005
T WAVE AXIS: 20 DEGREES
T WAVE AXIS: 39 DEGREES
T WAVE AXIS: 51 DEGREES
TIBC SERPL-MCNC: 205 UG/DL (ref 250–450)
TSH SERPL DL<=0.05 MIU/L-ACNC: 1.78 UIU/ML (ref 0.45–4.5)
UROBILINOGEN UR QL STRIP.AUTO: 0.2 E.U./DL
VANCOMYCIN SERPL-MCNC: 10.1 UG/ML (ref 10–20)
VENTRICULAR RATE: 76 BPM
VENTRICULAR RATE: 80 BPM
VENTRICULAR RATE: 81 BPM
VIT B12 SERPL-MCNC: 305 PG/ML (ref 180–914)
WBC # BLD AUTO: 15.29 THOUSAND/UL (ref 4.31–10.16)

## 2023-07-25 PROCEDURE — 84100 ASSAY OF PHOSPHORUS: CPT | Performed by: NURSE PRACTITIONER

## 2023-07-25 PROCEDURE — G1004 CDSM NDSC: HCPCS

## 2023-07-25 PROCEDURE — 83540 ASSAY OF IRON: CPT | Performed by: NURSE PRACTITIONER

## 2023-07-25 PROCEDURE — 82550 ASSAY OF CK (CPK): CPT | Performed by: NURSE PRACTITIONER

## 2023-07-25 PROCEDURE — 99255 IP/OBS CONSLTJ NEW/EST HI 80: CPT | Performed by: SURGERY

## 2023-07-25 PROCEDURE — 82948 REAGENT STRIP/BLOOD GLUCOSE: CPT

## 2023-07-25 PROCEDURE — 83036 HEMOGLOBIN GLYCOSYLATED A1C: CPT | Performed by: NURSE PRACTITIONER

## 2023-07-25 PROCEDURE — 83735 ASSAY OF MAGNESIUM: CPT | Performed by: NURSE PRACTITIONER

## 2023-07-25 PROCEDURE — 93970 EXTREMITY STUDY: CPT

## 2023-07-25 PROCEDURE — 82607 VITAMIN B-12: CPT | Performed by: NURSE PRACTITIONER

## 2023-07-25 PROCEDURE — 80048 BASIC METABOLIC PNL TOTAL CA: CPT | Performed by: NURSE PRACTITIONER

## 2023-07-25 PROCEDURE — 93005 ELECTROCARDIOGRAM TRACING: CPT

## 2023-07-25 PROCEDURE — 81003 URINALYSIS AUTO W/O SCOPE: CPT | Performed by: NURSE PRACTITIONER

## 2023-07-25 PROCEDURE — 93010 ELECTROCARDIOGRAM REPORT: CPT | Performed by: INTERNAL MEDICINE

## 2023-07-25 PROCEDURE — 83550 IRON BINDING TEST: CPT | Performed by: NURSE PRACTITIONER

## 2023-07-25 PROCEDURE — 93971 EXTREMITY STUDY: CPT

## 2023-07-25 PROCEDURE — 80202 ASSAY OF VANCOMYCIN: CPT | Performed by: NURSE PRACTITIONER

## 2023-07-25 PROCEDURE — 93971 EXTREMITY STUDY: CPT | Performed by: SURGERY

## 2023-07-25 PROCEDURE — 82010 KETONE BODYS QUAN: CPT | Performed by: NURSE PRACTITIONER

## 2023-07-25 PROCEDURE — 80053 COMPREHEN METABOLIC PANEL: CPT | Performed by: NURSE PRACTITIONER

## 2023-07-25 PROCEDURE — 93970 EXTREMITY STUDY: CPT | Performed by: SURGERY

## 2023-07-25 PROCEDURE — 87449 NOS EACH ORGANISM AG IA: CPT | Performed by: NURSE PRACTITIONER

## 2023-07-25 PROCEDURE — 99223 1ST HOSP IP/OBS HIGH 75: CPT | Performed by: HOSPITALIST

## 2023-07-25 PROCEDURE — 82746 ASSAY OF FOLIC ACID SERUM: CPT | Performed by: NURSE PRACTITIONER

## 2023-07-25 PROCEDURE — 84145 PROCALCITONIN (PCT): CPT | Performed by: NURSE PRACTITIONER

## 2023-07-25 PROCEDURE — 3046F HEMOGLOBIN A1C LEVEL >9.0%: CPT | Performed by: INTERNAL MEDICINE

## 2023-07-25 PROCEDURE — 82728 ASSAY OF FERRITIN: CPT | Performed by: NURSE PRACTITIONER

## 2023-07-25 PROCEDURE — 85025 COMPLETE CBC W/AUTO DIFF WBC: CPT | Performed by: NURSE PRACTITIONER

## 2023-07-25 PROCEDURE — 83690 ASSAY OF LIPASE: CPT | Performed by: NURSE PRACTITIONER

## 2023-07-25 PROCEDURE — 76882 US LMTD JT/FCL EVL NVASC XTR: CPT

## 2023-07-25 PROCEDURE — 99255 IP/OBS CONSLTJ NEW/EST HI 80: CPT | Performed by: STUDENT IN AN ORGANIZED HEALTH CARE EDUCATION/TRAINING PROGRAM

## 2023-07-25 PROCEDURE — 73200 CT UPPER EXTREMITY W/O DYE: CPT

## 2023-07-25 PROCEDURE — 82805 BLOOD GASES W/O2 SATURATION: CPT | Performed by: NURSE PRACTITIONER

## 2023-07-25 PROCEDURE — 82330 ASSAY OF CALCIUM: CPT | Performed by: NURSE PRACTITIONER

## 2023-07-25 RX ORDER — ASPIRIN 81 MG/1
81 TABLET, CHEWABLE ORAL DAILY
Status: DISCONTINUED | OUTPATIENT
Start: 2023-07-25 | End: 2023-08-01 | Stop reason: HOSPADM

## 2023-07-25 RX ORDER — INSULIN GLARGINE 100 [IU]/ML
55 INJECTION, SOLUTION SUBCUTANEOUS EVERY MORNING
Status: DISCONTINUED | OUTPATIENT
Start: 2023-07-25 | End: 2023-07-27

## 2023-07-25 RX ORDER — OMEGA-3S/DHA/EPA/FISH OIL/D3 300MG-1000
400 CAPSULE ORAL DAILY
Status: DISCONTINUED | OUTPATIENT
Start: 2023-07-25 | End: 2023-08-01 | Stop reason: HOSPADM

## 2023-07-25 RX ORDER — INSULIN LISPRO 100 [IU]/ML
1-6 INJECTION, SOLUTION INTRAVENOUS; SUBCUTANEOUS
Status: DISCONTINUED | OUTPATIENT
Start: 2023-07-25 | End: 2023-08-01 | Stop reason: HOSPADM

## 2023-07-25 RX ORDER — ATORVASTATIN CALCIUM 80 MG/1
80 TABLET, FILM COATED ORAL
Status: DISCONTINUED | OUTPATIENT
Start: 2023-07-25 | End: 2023-08-01 | Stop reason: HOSPADM

## 2023-07-25 RX ORDER — FENOFIBRATE 48 MG/1
48 TABLET, COATED ORAL DAILY
Status: DISCONTINUED | OUTPATIENT
Start: 2023-07-25 | End: 2023-08-01 | Stop reason: HOSPADM

## 2023-07-25 RX ORDER — SODIUM CHLORIDE, SODIUM GLUCONATE, SODIUM ACETATE, POTASSIUM CHLORIDE, MAGNESIUM CHLORIDE, SODIUM PHOSPHATE, DIBASIC, AND POTASSIUM PHOSPHATE .53; .5; .37; .037; .03; .012; .00082 G/100ML; G/100ML; G/100ML; G/100ML; G/100ML; G/100ML; G/100ML
1000 INJECTION, SOLUTION INTRAVENOUS ONCE
Status: COMPLETED | OUTPATIENT
Start: 2023-07-25 | End: 2023-07-25

## 2023-07-25 RX ORDER — SODIUM CHLORIDE, SODIUM GLUCONATE, SODIUM ACETATE, POTASSIUM CHLORIDE, MAGNESIUM CHLORIDE, SODIUM PHOSPHATE, DIBASIC, AND POTASSIUM PHOSPHATE .53; .5; .37; .037; .03; .012; .00082 G/100ML; G/100ML; G/100ML; G/100ML; G/100ML; G/100ML; G/100ML
100 INJECTION, SOLUTION INTRAVENOUS CONTINUOUS
Status: DISCONTINUED | OUTPATIENT
Start: 2023-07-25 | End: 2023-07-26

## 2023-07-25 RX ORDER — DOCUSATE SODIUM 100 MG/1
100 CAPSULE, LIQUID FILLED ORAL 2 TIMES DAILY
Status: DISCONTINUED | OUTPATIENT
Start: 2023-07-25 | End: 2023-08-01 | Stop reason: HOSPADM

## 2023-07-25 RX ORDER — HEPARIN SODIUM 5000 [USP'U]/ML
5000 INJECTION, SOLUTION INTRAVENOUS; SUBCUTANEOUS EVERY 8 HOURS SCHEDULED
Status: DISCONTINUED | OUTPATIENT
Start: 2023-07-25 | End: 2023-08-01 | Stop reason: HOSPADM

## 2023-07-25 RX ORDER — ACETAMINOPHEN 325 MG/1
975 TABLET ORAL EVERY 8 HOURS SCHEDULED
Status: DISCONTINUED | OUTPATIENT
Start: 2023-07-25 | End: 2023-08-01 | Stop reason: HOSPADM

## 2023-07-25 RX ORDER — AMITRIPTYLINE HYDROCHLORIDE 25 MG/1
25 TABLET, FILM COATED ORAL
Status: DISCONTINUED | OUTPATIENT
Start: 2023-07-25 | End: 2023-08-01 | Stop reason: HOSPADM

## 2023-07-25 RX ORDER — LANOLIN ALCOHOL/MO/W.PET/CERES
400 CREAM (GRAM) TOPICAL DAILY
Status: DISCONTINUED | OUTPATIENT
Start: 2023-07-25 | End: 2023-08-01 | Stop reason: HOSPADM

## 2023-07-25 RX ORDER — PREGABALIN 100 MG/1
100 CAPSULE ORAL 3 TIMES DAILY
Status: DISCONTINUED | OUTPATIENT
Start: 2023-07-25 | End: 2023-08-01 | Stop reason: HOSPADM

## 2023-07-25 RX ORDER — OXYCODONE HYDROCHLORIDE 5 MG/1
5 TABLET ORAL EVERY 6 HOURS PRN
Status: DISCONTINUED | OUTPATIENT
Start: 2023-07-25 | End: 2023-08-01 | Stop reason: HOSPADM

## 2023-07-25 RX ORDER — SENNOSIDES 8.6 MG
2 TABLET ORAL
Status: DISCONTINUED | OUTPATIENT
Start: 2023-07-25 | End: 2023-08-01 | Stop reason: HOSPADM

## 2023-07-25 RX ORDER — CITALOPRAM 20 MG/1
40 TABLET ORAL DAILY
Status: DISCONTINUED | OUTPATIENT
Start: 2023-07-25 | End: 2023-08-01 | Stop reason: HOSPADM

## 2023-07-25 RX ORDER — FAMOTIDINE 20 MG/1
20 TABLET, FILM COATED ORAL
Status: DISCONTINUED | OUTPATIENT
Start: 2023-07-25 | End: 2023-08-01 | Stop reason: HOSPADM

## 2023-07-25 RX ORDER — LEVETIRACETAM 500 MG/1
500 TABLET ORAL EVERY 12 HOURS SCHEDULED
Status: DISCONTINUED | OUTPATIENT
Start: 2023-07-25 | End: 2023-08-01 | Stop reason: HOSPADM

## 2023-07-25 RX ORDER — LUBIPROSTONE 24 UG/1
24 CAPSULE ORAL 2 TIMES DAILY
Status: DISCONTINUED | OUTPATIENT
Start: 2023-07-25 | End: 2023-08-01 | Stop reason: HOSPADM

## 2023-07-25 RX ADMIN — CITALOPRAM HYDROBROMIDE 40 MG: 20 TABLET ORAL at 08:47

## 2023-07-25 RX ADMIN — LEVETIRACETAM 500 MG: 500 TABLET, FILM COATED ORAL at 21:11

## 2023-07-25 RX ADMIN — INSULIN LISPRO 4 UNITS: 100 INJECTION, SOLUTION INTRAVENOUS; SUBCUTANEOUS at 22:37

## 2023-07-25 RX ADMIN — Medication 8 UNITS/HR: at 01:12

## 2023-07-25 RX ADMIN — PREGABALIN 100 MG: 100 CAPSULE ORAL at 21:12

## 2023-07-25 RX ADMIN — HEPARIN SODIUM 5000 UNITS: 5000 INJECTION INTRAVENOUS; SUBCUTANEOUS at 06:08

## 2023-07-25 RX ADMIN — ACETAMINOPHEN 975 MG: 325 TABLET ORAL at 13:22

## 2023-07-25 RX ADMIN — FAMOTIDINE 20 MG: 20 TABLET, FILM COATED ORAL at 21:18

## 2023-07-25 RX ADMIN — FAMOTIDINE 20 MG: 20 TABLET, FILM COATED ORAL at 01:56

## 2023-07-25 RX ADMIN — SODIUM CHLORIDE, SODIUM GLUCONATE, SODIUM ACETATE, POTASSIUM CHLORIDE, MAGNESIUM CHLORIDE, SODIUM PHOSPHATE, DIBASIC, AND POTASSIUM PHOSPHATE 100 ML/HR: .53; .5; .37; .037; .03; .012; .00082 INJECTION, SOLUTION INTRAVENOUS at 13:18

## 2023-07-25 RX ADMIN — SENNOSIDES 17.2 MG: 8.6 TABLET, FILM COATED ORAL at 21:11

## 2023-07-25 RX ADMIN — SODIUM CHLORIDE, SODIUM GLUCONATE, SODIUM ACETATE, POTASSIUM CHLORIDE, MAGNESIUM CHLORIDE, SODIUM PHOSPHATE, DIBASIC, AND POTASSIUM PHOSPHATE 1000 ML: .53; .5; .37; .037; .03; .012; .00082 INJECTION, SOLUTION INTRAVENOUS at 00:54

## 2023-07-25 RX ADMIN — Medication 5 MG: at 21:11

## 2023-07-25 RX ADMIN — LUBIPROSTONE 24 MCG: 24 CAPSULE, GELATIN COATED ORAL at 08:49

## 2023-07-25 RX ADMIN — Medication 5 MG: at 13:22

## 2023-07-25 RX ADMIN — VANCOMYCIN HYDROCHLORIDE 750 MG: 750 INJECTION, SOLUTION INTRAVENOUS at 09:41

## 2023-07-25 RX ADMIN — DOCUSATE SODIUM 100 MG: 100 CAPSULE, LIQUID FILLED ORAL at 18:12

## 2023-07-25 RX ADMIN — ACETAMINOPHEN 975 MG: 325 TABLET ORAL at 06:08

## 2023-07-25 RX ADMIN — DOCUSATE SODIUM 100 MG: 100 CAPSULE, LIQUID FILLED ORAL at 08:49

## 2023-07-25 RX ADMIN — Medication 5 MG: at 07:42

## 2023-07-25 RX ADMIN — LUBIPROSTONE 24 MCG: 24 CAPSULE, GELATIN COATED ORAL at 21:11

## 2023-07-25 RX ADMIN — AMITRIPTYLINE HYDROCHLORIDE 25 MG: 25 TABLET, FILM COATED ORAL at 21:11

## 2023-07-25 RX ADMIN — SODIUM CHLORIDE, SODIUM GLUCONATE, SODIUM ACETATE, POTASSIUM CHLORIDE, MAGNESIUM CHLORIDE, SODIUM PHOSPHATE, DIBASIC, AND POTASSIUM PHOSPHATE 100 ML/HR: .53; .5; .37; .037; .03; .012; .00082 INJECTION, SOLUTION INTRAVENOUS at 02:04

## 2023-07-25 RX ADMIN — INSULIN LISPRO 2 UNITS: 100 INJECTION, SOLUTION INTRAVENOUS; SUBCUTANEOUS at 11:40

## 2023-07-25 RX ADMIN — CHOLECALCIFEROL TAB 10 MCG (400 UNIT) 400 UNITS: 10 TAB at 08:49

## 2023-07-25 RX ADMIN — INSULIN GLARGINE 55 UNITS: 100 INJECTION, SOLUTION SUBCUTANEOUS at 09:40

## 2023-07-25 RX ADMIN — Medication 5 MG: at 02:02

## 2023-07-25 RX ADMIN — HEPARIN SODIUM 5000 UNITS: 5000 INJECTION INTRAVENOUS; SUBCUTANEOUS at 13:22

## 2023-07-25 RX ADMIN — LEVETIRACETAM 500 MG: 500 TABLET, FILM COATED ORAL at 01:56

## 2023-07-25 RX ADMIN — VANCOMYCIN HYDROCHLORIDE 750 MG: 750 INJECTION, SOLUTION INTRAVENOUS at 21:19

## 2023-07-25 RX ADMIN — PREGABALIN 100 MG: 100 CAPSULE ORAL at 08:47

## 2023-07-25 RX ADMIN — INSULIN LISPRO 5 UNITS: 100 INJECTION, SOLUTION INTRAVENOUS; SUBCUTANEOUS at 16:21

## 2023-07-25 RX ADMIN — ASPIRIN 81 MG 81 MG: 81 TABLET ORAL at 08:48

## 2023-07-25 RX ADMIN — METOPROLOL TARTRATE 25 MG: 25 TABLET, FILM COATED ORAL at 21:11

## 2023-07-25 RX ADMIN — B-COMPLEX W/ C & FOLIC ACID TAB 1 TABLET: TAB at 08:49

## 2023-07-25 RX ADMIN — Medication 400 MG: at 08:48

## 2023-07-25 RX ADMIN — ACETAMINOPHEN 975 MG: 325 TABLET ORAL at 21:11

## 2023-07-25 RX ADMIN — FENOFIBRATE 48 MG: 48 TABLET, FILM COATED ORAL at 08:49

## 2023-07-25 RX ADMIN — HEPARIN SODIUM 5000 UNITS: 5000 INJECTION INTRAVENOUS; SUBCUTANEOUS at 21:11

## 2023-07-25 RX ADMIN — ACETAMINOPHEN 975 MG: 325 TABLET ORAL at 01:56

## 2023-07-25 RX ADMIN — LEVETIRACETAM 500 MG: 500 TABLET, FILM COATED ORAL at 08:54

## 2023-07-25 RX ADMIN — ATORVASTATIN CALCIUM 80 MG: 80 TABLET, FILM COATED ORAL at 21:11

## 2023-07-25 RX ADMIN — PREGABALIN 100 MG: 100 CAPSULE ORAL at 16:20

## 2023-07-25 RX ADMIN — METOPROLOL TARTRATE 25 MG: 25 TABLET, FILM COATED ORAL at 08:48

## 2023-07-25 NOTE — ASSESSMENT & PLAN NOTE
· Presented to ED w/severe LUE pain, erythema, swelling, warmth - states that pain initially started related to IV placed during 07/14-07/20 admission   · Was on Bactrim as outpatient - started on 07/21 by Urgent Care   · Will obtain stat LUE imaging given significant pain and cellulitic appearance  · Blood cultures pending  · Obtain wound culture if able  · Received IV Vancomycin in ED - continue w/IV Vancomycin for now   · Consider surgical consult pending LUE imaging   · ID consulted - appreciate recommendations  · Pain control w/scheduled APAP and PRN Oxycodone 2.5 mg for moderate pain, 5 mg for severe pain Q6H  · Monitor for drainage

## 2023-07-25 NOTE — INCIDENTAL FINDINGS
The following findings require follow up:  Radiographic finding   Finding: Small focal patchy consolidation noted at the anterior left upper lobe suspicious for an infectious or inflammatory process. Correlation with the patient's symptoms is recommended. Consider a repeat CT chest in 6 to 8 weeks to assess for improvement/resolution and exclude underlying lesion.    Follow up required: YES   Follow up should be done within 6-8 week(s)    Please notify the following clinician to assist with the follow up:   Dr. Irina Ryan (PCP)

## 2023-07-25 NOTE — UTILIZATION REVIEW
Initial Clinical Review    Admission: Date/Time/Statement:   Admission Orders (From admission, onward)     Ordered        07/24/23 2335  INPATIENT ADMISSION  Once                      Orders Placed This Encounter   Procedures   • INPATIENT ADMISSION     Standing Status:   Standing     Number of Occurrences:   1     Order Specific Question:   Level of Care     Answer:   Med Surg [16]     Order Specific Question:   Estimated length of stay     Answer:   More than 2 Midnights     Order Specific Question:   Certification     Answer:   I certify that inpatient services are medically necessary for this patient for a duration of greater than two midnights. See H&P and MD Progress Notes for additional information about the patient's course of treatment. ED Arrival Information     Expected   -    Arrival   7/24/2023 16:58    Acuity   Urgent            Means of arrival   Walk-In    Escorted by   Spouse    Service   Hospitalist    Admission type   Emergency            Arrival complaint   IV site pain and swelling           Chief Complaint   Patient presents with   • Wound Check     Patient arrives with complaints of IV site wound and redness. Patient states has been on antibiotics since Friday and getting worse limiting left arm function. Initial Presentation: 46 y.o. female w/PMHx of HTN, HLD, CKD3A, R renal clear-cell carcinoma s/p partial nephrectomy in 2017, uncontrolled IDDM2, anemia, seizure disorder, diabetic polyneuropathy, obesity, GERD, constipation, bipolar disorder who presented last evening with persistent LUE pain, swelling, erythema, and warmth to touch. Patient states she first noted LUE pain at previous IV site from 07/14-07/20 admission. She presented to Urgent Care on 07/21 at which point she was started on Bactrim. Patient states that LUE pain continued to worsen and states that she "can barely move it now." She states that pain extends into her left chest and shoulder region.  She has noticed "puss-like drainage" from the site. She denies any fevers, however does endorse chills at home and endorses generalized body aches. Plan: Inpatient admission for evaluation and treatment of LUE cellulitis, leukocytosis, acute renal failure superimposed on stage 3a CKD with creatinine of 1.35 (baseline 0.8-1.2), DM, anemia, LLE edema, seizure disorder, GERD, hypercholesterolemia, HTN, diabetic polyneuropathy, Bipolar disorder: blood culture pending, wound culture if able, IV vanco, ID consult, pain control, IV fluids, trend procal, hold neli diclofenac, insulin drip, BLE venous doppler, continue home Keppra, statin, Tricor, lopressor, Lyrica, Celexa and Elavil, hold amlodipine. Date: 7/25   Day 2:     Internal medicine: continue IV vancomycin. Blood cultures x2 sets are pending. Abnormal CT chest- Small focal patchy consolidation noted at the anterior left upper lobe suspicious for an infectious or inflammatory. Patient denies any type of upper respiratory tract symptoms. We will follow-up on the urine testing for both Streptococcus pneumonia and Legionella antigen. We will restart Lantus, 55 units to be given now, will add Accu-Cheks before meals and at bedtime with sliding scale coverage. Discontinue insulin drip 30 to 60 minutes post Lantus dosing. Surgery consult: No emergent surgical intervention indicated at this time. May benefit from I&D pending surgeon evaluation. Follow up on US. Continue IV vanco. Carb controlled diet. ID consult: continue IV vanco, follow blood cultures, recommend upper extremity dopplers, check ultraound to assess for fluid collection.     ED Triage Vitals   Temperature Pulse Respirations Blood Pressure SpO2   07/24/23 1740 07/24/23 1740 07/24/23 1740 07/24/23 1740 07/24/23 1740   98.2 °F (36.8 °C) 81 18 144/86 96 %      Temp Source Heart Rate Source Patient Position - Orthostatic VS BP Location FiO2 (%)   07/24/23 1740 07/24/23 1740 07/24/23 1740 07/24/23 2125 --   Temporal Monitor Sitting Right arm       Pain Score       07/24/23 1740       10 - Worst Possible Pain          Wt Readings from Last 1 Encounters:   07/25/23 92.8 kg (204 lb 9.4 oz)     Additional Vital Signs:     Date/Time Temp Pulse Resp BP MAP (mmHg) SpO2 O2 Device   07/25/23 1400 -- 74 18 -- -- 95 % --   07/25/23 1322 -- 71 15 -- -- 94 % --   07/25/23 1300 -- 71 20 -- -- 94 % --   07/25/23 1100 -- 81 32 Abnormal  -- -- 95 % --   07/25/23 1000 -- 66 14 -- -- 95 % --   07/25/23 0900 -- 68 15 124/60 84 94 % --   07/25/23 0848 -- 73 21 124/60 84 95 % --   07/25/23 0800 -- 73 15 -- -- 95 % --   07/25/23 0700 97.6 °F (36.4 °C) 67 16 124/59 83 96 % Nasal cannula   07/25/23 0630 -- 69 14 127/66 84 92 % --   07/25/23 0600 -- 75 16 -- -- 90 % --   07/25/23 0500 -- 77 22 -- -- 90 % --   07/25/23 0400 -- 75 23 Abnormal  -- -- 90 % --   07/25/23 0300 -- 78 24 Abnormal  -- -- 90 % --   07/25/23 0200 -- 76 25 Abnormal  -- -- 96 % --   07/25/23 0100 -- 71 24 Abnormal  -- -- 96 % --   07/25/23 0020 97.7 °F (36.5 °C) 82 18 139/70 98 98 % None (Room air)   07/25/23 0000 -- 81 -- 108/60 79 93 % --   07/24/23 2330 -- 77 -- 103/55 73 92 % --   07/24/23 2254 -- -- -- 142/73 -- -- --   07/24/23 2230 -- 76 18 142/73 103 98 % --   07/24/23 2215 -- 77 20 171/77 Abnormal  110 96 % --   07/24/23 2125 -- 75 20 115/57 -- 93 % None (Room air)   07/24/23 1900 -- -- -- -- -- -- None (Room air)     Pertinent Labs/Diagnostic Test Results:      CT upper extremity wo contrast left   Final Result by Kaelyn Lopez MD (07/25 6048)      1. Edema surrounding the biceps muscle, may represent myositis   2. Additional scattered areas of nonspecific edema as described   3. Stable left lung findings         Workstation performed: [id number]         CTA ED chest PE study   Final Result by Marcie Marcial MD (07/24 9589)      No intraluminal filling defect to suggest an acute pulmonary embolus.       Small focal patchy consolidation noted at the anterior left upper lobe suspicious for an infectious or inflammatory process. Correlation with the patient's symptoms is recommended. Consider a repeat CT chest in 6 to 8 weeks to assess for    improvement/resolution and exclude underlying lesion. Workstation performed: [id number]         XR chest 1 view portable   ED Interpretation by Andrews Pimentel PA-C (07/24 1832)   No acute cardiopulmonary process. Final Result by Radha Davidson MD (09/08 5717)      New small focus of consolidation in the left upper lobe medially. This may represent atelectasis or pneumonia. Recommend follow-up chest x-ray to ensure resolution within 3 to 4 weeks. The study was marked in [facility] for immediate notification. Workstation performed: [id number]           7/25 US extremity soft tissue:  IMPRESSION:     Complex appearing hypoechoic region with surrounding edema noted in the subcutaneous tissue likely indicative of inflammatory phlegmon and/or cellulitis. No discrete abscess. .    7/25 VAS lower limb venous duplex:  Impression:  RIGHT LOWER LIMB:  No evidence of acute or chronic deep vein thrombosis. No evidence of superficial thrombophlebitis noted. Doppler evaluation shows a normal response to augmentation maneuvers. .  Popliteal, posterior tibial and anterior tibial arterial Doppler waveform's are  triphasic. LEFT LOWER LIMB:  No evidence of acute or chronic deep vein thrombosis. No evidence of superficial thrombophlebitis noted. Doppler evaluation shows a normal response to augmentation maneuvers. Popliteal, posterior tibial and anterior tibial arterial Doppler waveform's are  triphasic.       Results from last 7 days   Lab Units 07/25/23  0606 07/24/23  1850 07/20/23  0518 07/19/23  0541   WBC Thousand/uL 15.29* 13.25* 10.91* 11.87*   HEMOGLOBIN g/dL 9.7* 10.9* 12.3 13.1   HEMATOCRIT % 30.1* 35.3 38.8 42.0   PLATELETS Thousands/uL 206 201 270 301   NEUTROS ABS Thousands/µL 11.81* 10.75*  --   --          Results from last 7 days   Lab Units 07/25/23  0606 07/25/23  0052 07/24/23  1850 07/20/23  0518 07/19/23  0541   SODIUM mmol/L 134* 131* 123* 138 137   POTASSIUM mmol/L 4.0 4.3 4.7 4.1 4.0   CHLORIDE mmol/L 102 99 91* 104 102   CO2 mmol/L 24 24 23 26 28   ANION GAP mmol/L 8 8 9 8 7   BUN mg/dL 19 23 24 32* 28*   CREATININE mg/dL 1.03 1.19 1.35* 0.92 0.88   EGFR ml/min/1.73sq m 62 52 45 71 75   CALCIUM mg/dL 8.1* 8.4 8.1* 7.4* 8.1*   CALCIUM, IONIZED mmol/L 1.07* 1.10*  --   --   --    MAGNESIUM mg/dL 2.0 2.0  --   --   --    PHOSPHORUS mg/dL 2.9 2.8  --   --   --      Results from last 7 days   Lab Units 07/25/23  0606 07/24/23  1850   AST U/L 14 23   ALT U/L 31 42   ALK PHOS U/L 112* 140*   TOTAL PROTEIN g/dL 5.6* 6.3*   ALBUMIN g/dL 2.7* 3.1*   TOTAL BILIRUBIN mg/dL 0.36 0.41     Results from last 7 days   Lab Units 07/25/23  1100 07/25/23  0800 07/25/23  0605 07/25/23  0414 07/25/23  0211 07/25/23  0112 07/25/23  0021 07/24/23  2330 07/24/23  2131 07/20/23  1054 07/20/23  0720 07/19/23  2042   POC GLUCOSE mg/dl 221* 205* 168* 168* 309* 365* 375* 430* >500* 337* 303* 230*     Results from last 7 days   Lab Units 07/25/23  0606 07/25/23  0052 07/24/23  1850 07/20/23  0518 07/19/23  0541   GLUCOSE RANDOM mg/dL 174* 368* 820* 318* 239*     Results from last 7 days   Lab Units 07/24/23  2210   OSMOLALITY, SERUM mmol/*     Results from last 7 days   Lab Units 07/25/23 0052   HEMOGLOBIN A1C % 12.7*   EAG mg/dl 318     BETA-HYDROXYBUTYRATE   Date Value Ref Range Status   07/25/2023 0.1 <0.6 mmol/L Final   07/14/2023 0.1 <0.6 mmol/L Final   07/10/2023 0.9 (H) <0.6 mmol/L Final   02/03/2020 0.1 <0.6 mmol/L Final          Results from last 7 days   Lab Units 07/25/23 0052   PH GOGO  7.358   PCO2 GOGO mm Hg 45.0   PO2 GOGO mm Hg 29.0*   HCO3 GOGO mmol/L 24.7   BASE EXC GOGO mmol/L -0.9   O2 CONTENT GOGO ml/dL 8.4   O2 HGB, VENOUS % 52.4*         Results from last 7 days   Lab Units 07/25/23 0052 CK TOTAL U/L 124     Results from last 7 days   Lab Units 07/24/23  2210 07/24/23  1850   HS TNI 0HR ng/L  --  7   HS TNI 2HR ng/L 5  --    HSTNI D2 ng/L -2  --      Results from last 7 days   Lab Units 07/24/23  1850   D-DIMER QUANTITATIVE ug/ml FEU 1.94*     Results from last 7 days   Lab Units 07/24/23  1850   PROTIME seconds 12.8   INR  0.96   PTT seconds 33     Results from last 7 days   Lab Units 07/24/23  1850   TSH 3RD GENERATON uIU/mL 1.784     Results from last 7 days   Lab Units 07/25/23  0606 07/24/23  1850   PROCALCITONIN ng/ml 0.65* 0.42*     Results from last 7 days   Lab Units 07/24/23  1850   LACTIC ACID mmol/L 1.4                 Results from last 7 days   Lab Units 07/25/23  0052   FERRITIN ng/mL 128   IRON SATURATION % 11*   IRON ug/dL 22*   TIBC ug/dL 205*                 Results from last 7 days   Lab Units 07/25/23  0052   LIPASE u/L 76             Results from last 7 days   Lab Units 07/24/23  2210   OSMOLALITY, SERUM mmol/*     Results from last 7 days   Lab Units 07/25/23  0629   CLARITY UA  Clear   COLOR UA  Yellow   SPEC GRAV UA  <=1.005*   PH UA  6.0   GLUCOSE UA mg/dl 3+*   KETONES UA mg/dl Negative   BLOOD UA  Negative   PROTEIN UA mg/dl Negative   NITRITE UA  Negative   BILIRUBIN UA  Negative   UROBILINOGEN UA E.U./dl 0.2   LEUKOCYTES UA  Negative           Results from last 7 days   Lab Units 07/24/23  1907 07/24/23  1850   BLOOD CULTURE  Received in Microbiology Lab. Culture in Progress. Received in Microbiology Lab. Culture in Progress.              Results from last 7 days   Lab Units 07/25/23  0606   VANCOMYCIN RM ug/mL 10.1       ED Treatment:   Medication Administration from 07/24/2023 1658 to 07/25/2023 0022       Date/Time Order Dose Route Action     07/24/2023 1908 EDT cefepime (MAXIPIME) IVPB (premix in dextrose) 2,000 mg 50 mL 2,000 mg Intravenous New Bag     07/24/2023 1948 EDT HYDROmorphone (DILAUDID) injection 0.5 mg 0.5 mg Intravenous Given     07/24/2023 2028 EDT sodium chloride 0.9 % bolus 1,000 mL 1,000 mL Intravenous New Bag     07/24/2023 2033 EDT insulin regular (HumuLIN R,NovoLIN R) injection 10 Units 10 Units Subcutaneous Given     07/24/2023 2123 EDT vancomycin (VANCOCIN) IVPB (premix in dextrose) 1,000 mg 200 mL 1,000 mg Intravenous New Bag     07/24/2023 2200 EDT iohexol (OMNIPAQUE) 350 MG/ML injection (SINGLE-DOSE) 85 mL 85 mL Intravenous Given     07/24/2023 2231 EDT insulin regular (HumuLIN R,NovoLIN R) injection 10 Units 10 Units Intravenous Given     07/24/2023 2247 EDT morphine injection 4 mg 4 mg Intravenous Given        Past Medical History:   Diagnosis Date   • Asthma    • Atypical chest pain    • Depression    • Diabetes mellitus ([address])    • Gastroparesis    • GERD (gastroesophageal reflux disease)    • Hyperlipidemia    • Hypertension    • Psychiatric disorder    • Renal disorder    • Sciatica    • Seizure disorder ([address])      Present on Admission:  • Cellulitis of left upper extremity  • Leukocytosis  • Anemia  • GERD (gastroesophageal reflux disease)  • Acute renal failure superimposed on stage 3a chronic kidney disease (HCC)  • Lower extremity edema  • Bipolar disorder ([facility])  • Obesity (BMI 35.0-39.9 without comorbidity)  • Diabetic polyneuropathy associated with type 2 diabetes mellitus ([address])  • Seizure disorder ([facility])  • Slow transit constipation  • Hypercholesteremia  • Hypertension      Admitting Diagnosis: Chest pain [R07.9]  Phlebitis of left upper extremity [I80.8]  Cellulitis of left upper extremity [L03.114]  Visit for wound check [Z51.89]  Age/Sex: 46 y.o. female  Admission Orders:  Scheduled Medications:  acetaminophen, 975 mg, Oral, Q8H [facility]  amitriptyline, 25 mg, Oral, HS  aspirin, 81 mg, Oral, Daily  atorvastatin, 80 mg, Oral, HS  cholecalciferol, 400 Units, Oral, Daily  citalopram, 40 mg, Oral, Daily  docusate sodium, 100 mg, Oral, BID  famotidine, 20 mg, Oral, HS  fenofibrate, 48 mg, Oral, Daily  heparin (porcine), 5,000 Units, Subcutaneous, Q8H [address]  insulin glargine, 55 Units, Subcutaneous, QAM  insulin lispro, 1-6 Units, Subcutaneous, TID AC  insulin lispro, 1-6 Units, Subcutaneous, HS  levETIRAcetam, 500 mg, Oral, Q12H HOLDEN  lubiprostone, 24 mcg, Oral, BID  magnesium Oxide, 400 mg, Oral, Daily  metoprolol tartrate, 25 mg, Oral, BID  multivitamin stress formula, 1 tablet, Oral, QAM  pregabalin, 100 mg, Oral, TID  senna, 2 tablet, Oral, HS  vancomycin, 750 mg, Intravenous, Q12H      Continuous IV Infusions:  multi-electrolyte, 100 mL/hr, Intravenous, Continuous      PRN Meds:  oxyCODONE, 2.5 mg, Oral, Q6H PRN   Or  oxyCODONE, 5 mg, Oral, Q6H PRN        IP CONSULT TO PHARMACY  IP CONSULT TO INFECTIOUS DISEASES  IP CONSULT TO NUTRITION SERVICES  IP CONSULT TO ACUTE CARE SURGERY    Network Utilization Review Department  ATTENTION: Please call with any questions or concerns to [phone] and carefully listen to the prompts so that you are directed to the right person. All voicemails are confidential.  Liz Forbes all requests for admission clinical reviews, approved or denied determinations and any other requests to dedicated fax number below belonging to the campus where the patient is receiving treatment.  List of dedicated fax numbers for the Facilities:  [place] DENIALS (Administrative/Medical Necessity) [phone] [address] [facility] (Maternity/NICU/Pediatrics) [phone] [address] [phone]   [facility] [address] [facility] [phone]   [facility] [address] [place] [address] [id number] [place] [address] [phone]   [address] [phone] [address] [facility] [phone]

## 2023-07-25 NOTE — ASSESSMENT & PLAN NOTE
· Elton Albrecht as outpatient, however is not on hospital formulary - will utilize Amitiza while in hospital  · Continue home Miralax, Colace, and will add Senna

## 2023-07-25 NOTE — ASSESSMENT & PLAN NOTE
· Bilateral LE w/+1 edema, erythema, and calf pain - L > R  · CTA PE study negative for PE  · D-Dimer elevated at 1.94 - possibly in setting of clot vs infectious/inflammatory state  · Will check bilateral LE venous duplex  · Will start DVT ppx for now - follow-up duplex

## 2023-07-25 NOTE — CONSULTS
Consultation - General Surgery   Pro Hua 46 y.o. female MRN: [id number]  Unit/Bed#: ICU 09-01 Encounter: [id number]    Assessment:  46year old female with PMH significant for uncontrolled diabetes, HTN, HLD, CKD3, R renal cell carcinoma s/p nephrectomy 2017, seizure disorder, anemia, obesity, GERD presents for the evaluation of LUE cellulitis at previous IV site  · Afebrile, vitals stable   · WBC 15.29 (13.25)  · Hgb 9.7 (10.9)  · Na 134 (131)  · Alk phos 112  · CT upper extremity: "Edema surrounding biceps muscle, may present myositis."  · L antecubital fossa edematous, warm, erythematous, tender to touch. Limited ROM due to pain. Neurovascularly intact. Plan:  · No emergent surgical intervention indicated at this time. May benefit from I&D pending surgeon evaluation   · Follow up U/S   · Continue IV vancomycin   · Carb controlled diet   · Analgesia and antiemetics prn   · Will discuss with attending  · Rest of medical management per SLIM    History of Present Illness   Chief Complaint: LUE pain    HPI:  Pro Hua is a 46 y.o. female with past medical history significant for HTN, uncontrolled diabetes, HLD, CKD3, R renal cell carcinoma s/p nephrectomy 2017, anemia, seizure disorder, obesity, GERD, bipolar disorder who initially presented to [facility] ED with complaints of LUE pain, erythema, swelling, and warmth to touch. Patient states the LUE pain first began during her last admission 7/14-7/20. The pain is at the previous IV site. She went to urgent care 7/21 for worsening symptoms and was prescribed Bactrim. Patient notes she also applied neosporin to the site. She presented to the ED yesterday with worsening pain, swelling, and redness. She notes that the site was previously draining. Patient has limited ROM due to the pain. She endorses associated b/l leg pain and swelling. She denies fever, chills, sob, n/v, numbness, tingling. CT LUE shows edema surrounding biceps muscle.  LE duplex preliminary results negative for DVTs. Patient was admitted and started on IV vancomycin. The general surgery team was consulted for possible I&D of area. Previous surgeries include cholecystectomy, appendectomy, hysterectomy. Patient takes ASA. Consults    Review of Systems   Constitutional: Negative for chills and fever. HENT: Negative for congestion and sore throat. Respiratory: Negative for cough and shortness of breath. Cardiovascular: Positive for chest pain and leg swelling. Gastrointestinal: Negative for abdominal pain, diarrhea and vomiting. Endocrine: Negative for polydipsia and polyuria. Genitourinary: Negative for difficulty urinating, dysuria and urgency. Musculoskeletal: Negative for back pain and gait problem. Skin: Positive for wound. Neurological: Negative for dizziness and numbness.        Historical Information   Past Medical History:   Diagnosis Date   • Asthma    • Atypical chest pain    • Depression    • Diabetes mellitus ([address])    • Gastroparesis    • GERD (gastroesophageal reflux disease)    • Hyperlipidemia    • Hypertension    • Psychiatric disorder    • Renal disorder    • Sciatica    • Seizure disorder ([address])      Past Surgical History:   Procedure Laterality Date   • APPENDECTOMY     • BREAST BIOPSY Left  benign   • CHOLECYSTECTOMY     • COLONOSCOPY     • HYSTERECTOMY     • VT LAPAROSCOPIC APPENDECTOMY N/A 03/24/2021    Procedure: APPENDECTOMY LAPAROSCOPIC;  Surgeon: Franki Hudson MD;  Location: [address] [place] [address] OR;  Service: General   • VT LAPS ABD PRTM&OMENTUM DX W/WO SPEC BR/WA SPX N/A 03/24/2021    Procedure: LAPAROSCOPY DIAGNOSTIC, EXTENSIVE DESI;  Surgeon: Franki Hudson MD;  Location: [address] [place] [address];  Service: General   • UPPER GASTROINTESTINAL ENDOSCOPY       Social History   Social History     Substance and Sexual Activity   Alcohol Use Never     Social History     Substance and Sexual Activity   Drug Use Never     E-Cigarette/Vaping   • E-Cigarette Use Never User E-Cigarette/Vaping Substances   • Nicotine No    • THC No    • CBD No    • Flavoring No    • Other No    • Unknown No      Social History     Tobacco Use   Smoking Status Former   • Types: Cigarettes   • Quit date:    • Years since quittin.5   Smokeless Tobacco Never     Family History: non-contributory    Meds/Allergies   all current active meds have been reviewed and current meds:   Current Facility-Administered Medications   Medication Dose Route Frequency   • acetaminophen (TYLENOL) tablet 975 mg  975 mg Oral Q8H [address]   • amitriptyline (ELAVIL) tablet 25 mg  25 mg Oral HS   • aspirin chewable tablet 81 mg  81 mg Oral Daily   • atorvastatin (LIPITOR) tablet 80 mg  80 mg Oral HS   • cholecalciferol (VITAMIN D3) tablet 400 Units  400 Units Oral Daily   • citalopram (CeleXA) tablet 40 mg  40 mg Oral Daily   • docusate sodium (COLACE) capsule 100 mg  100 mg Oral BID   • famotidine (PEPCID) tablet 20 mg  20 mg Oral HS   • fenofibrate (TRICOR) tablet 48 mg  48 mg Oral Daily   • heparin (porcine) subcutaneous injection 5,000 Units  5,000 Units Subcutaneous Q8H [address]   • insulin glargine (LANTUS) subcutaneous injection 55 Units 0.55 mL  55 Units Subcutaneous QAM   • insulin lispro (HumaLOG) 100 units/mL subcutaneous injection 1-6 Units  1-6 Units Subcutaneous TID AC   • insulin lispro (HumaLOG) 100 units/mL subcutaneous injection 1-6 Units  1-6 Units Subcutaneous HS   • levETIRAcetam (KEPPRA) tablet 500 mg  500 mg Oral Q12H HOLDEN   • lubiprostone (AMITIZA) capsule 24 mcg  24 mcg Oral BID   • magnesium Oxide (MAG-OX) tablet 400 mg  400 mg Oral Daily   • metoprolol tartrate (LOPRESSOR) tablet 25 mg  25 mg Oral BID   • multi-electrolyte (PLASMALYTE-A/ISOLYTE-S PH 7.4) IV solution  100 mL/hr Intravenous Continuous   • multivitamin stress formula tablet 1 tablet  1 tablet Oral QAM   • oxyCODONE (ROXICODONE) split tablet 2.5 mg  2.5 mg Oral Q6H PRN    Or   • oxyCODONE (ROXICODONE) IR tablet 5 mg  5 mg Oral Q6H PRN   • pregabalin (LYRICA) capsule 100 mg  100 mg Oral TID   • senna (SENOKOT) tablet 17.2 mg  2 tablet Oral HS   • vancomycin (VANCOCIN) IVPB (premix in dextrose) 750 mg 150 mL  750 mg Intravenous Q12H     Allergies   Allergen Reactions   • Butorphanol Hallucinations   • Benztropine    • Penicillins    • Zolpidem    • Azithromycin Hives and GI Intolerance       Objective   First Vitals:   Blood Pressure: 144/86 (07/24/23 1740)  Pulse: 81 (07/24/23 1740)  Temperature: 98.2 °F (36.8 °C) (07/24/23 1740)  Temp Source: Temporal (07/24/23 1740)  Respirations: 18 (07/24/23 1740)  Height: 5' 1" (154.9 cm) (07/25/23 0020)  Weight - Scale: 91.2 kg (201 lb) (07/24/23 1740)  SpO2: 96 % (07/24/23 1740)    Current Vitals:   Blood Pressure: 124/60 (07/25/23 0848)  Pulse: 73 (07/25/23 0848)  Temperature: 97.6 °F (36.4 °C) (07/25/23 0700)  Temp Source: Tympanic (07/25/23 0700)  Respirations: 16 (07/25/23 0700)  Height: 5' 1" (154.9 cm) (07/25/23 0020)  Weight - Scale: 92.8 kg (204 lb 9.4 oz) (07/25/23 0600)  SpO2: 96 % (07/25/23 0700)      Intake/Output Summary (Last 24 hours) at 7/25/2023 1142  Last data filed at 7/25/2023 0600  Gross per 24 hour   Intake 1565.35 ml   Output 1000 ml   Net 565.35 ml       Invasive Devices     Peripheral Intravenous Line  Duration           Peripheral IV 07/24/23 Distal;Right;Upper;Ventral (anterior) Arm 1 day                Physical Exam  Vitals reviewed. Constitutional:       General: She is not in acute distress. Appearance: She is obese. HENT:      Head: Normocephalic and atraumatic. Cardiovascular:      Rate and Rhythm: Normal rate and regular rhythm. Heart sounds: Normal heart sounds. Pulmonary:      Effort: Pulmonary effort is normal. No respiratory distress. Abdominal:      General: There is no distension. Palpations: Abdomen is soft. Tenderness: There is no abdominal tenderness. There is no guarding. Musculoskeletal:         General: Tenderness present.       Right lower leg: Edema present. Left lower leg: Edema present. Skin:     General: Skin is warm and dry. Comments: L antecubital area edematous, erythematous, and warm to touch. Surrounding area is firm. Surrounding skin is sloughing. No active drainage. No palpable fluid collection. Limited ROM due to pain. 2+ radial pulse. Neurovascularly intact. Neurological:      General: No focal deficit present. Mental Status: She is alert. Mental status is at baseline. Psychiatric:         Mood and Affect: Mood normal.         Behavior: Behavior normal.         LUE antecubital fossa    Lab Results:   I have personally reviewed pertinent lab results. , CBC:   Lab Results   Component Value Date    WBC 15.29 (H) 07/25/2023    HGB 9.7 (L) 07/25/2023    HCT 30.1 (L) 07/25/2023    MCV 83 07/25/2023     07/25/2023    RBC 3.64 (L) 07/25/2023    MCH 26.6 (L) 07/25/2023    MCHC 32.2 07/25/2023    RDW 15.4 (H) 07/25/2023    MPV 11.1 07/25/2023    NRBC 0 07/25/2023   , CMP:   Lab Results   Component Value Date    SODIUM 134 (L) 07/25/2023    K 4.0 07/25/2023     07/25/2023    CO2 24 07/25/2023    BUN 19 07/25/2023    CREATININE 1.03 07/25/2023    CALCIUM 8.1 (L) 07/25/2023    AST 14 07/25/2023    ALT 31 07/25/2023    ALKPHOS 112 (H) 07/25/2023    EGFR 62 07/25/2023     Imaging: I have personally reviewed pertinent reports. EKG, Pathology, and Other Studies: I have personally reviewed pertinent reports. Code Status: Level 1 - Full Code  Advance Directive and Living Will:      Power of :    POLST:      Counseling / Coordination of Care  Total floor / unit time spent today 25 minutes. Greater than 50% of total time was spent with the patient and / or family counseling and / or coordination of care. A description of the counseling / coordination of care: evaluation of patient, review of diagnostic and laboratory studies, and discussion with attending.      David Hills PA-C

## 2023-07-25 NOTE — CONSULTS
Consultation - Infectious Disease   Danny Barkley 46 y.o. female MRN: [id number]  Unit/Bed#: ICU 09-01 Encounter: [id number]      IMPRESSION & RECOMMENDATIONS:     1. Left antecubital fossa thrombophlebitis. At prior pIV site. CT with possible myositis this was noncontrast study. Clinically, concern for developing superficial abscess in the left antecubital fossa with erythema, induration, purulent drainage. Duration for MRSA. The patient has mild leukocytosis but is afebrile and clinically stable. -Continue IV vancomycin, dosing by pharmacy   -Follow-up blood cultures   -Recommend upper extremity Dopplers to rule out DVT   -Check ultrasound to assess for fluid collection   -Recommend surgery consult for possible I&D   -Recommend arm elevation, warm compresses   -Monitor clinical exam    2. Leukocytosis. Present on admission. Due to #1 above. The patient is afebrile and hemodynamically stable.   -antibiotics as above   -follow up blood cultures   -monitor fever curve, WBC count    3. Poorly controlled type 2 diabetes mellitus with hyperglycemia. HbA1c 12.6%. Blood sugar was over 800 on admission and she was started on an insulin drip. This is a risk factor for infection   -Glycemic management per primary team    4. LINDY on CKD. Creatinine 1.35 on admission, slightly above baseline around 1. Likely prerenal due to infection and dehydration.   -Monitor creatinine   -IVF per primary    5. Obesity. BMI 36    I have discussed the above management plan in detail with the primary service patient. ID will follow.     I have performed an extensive review of the medical records in Epic including review of the notes, radiographs, and laboratory results     HISTORY OF PRESENT ILLNESS:  Reason for Consult: antecubital fossa abscess, thrombophlebitis   HPI: Danny Barkley is a 46y.o. year old female with a history of right renal clear cell carcinoma status post partial nephrectomy, poorly controlled type 2 diabetes mellitus, anemia, seizure disorder who presented to the [address] ED on 7/24/2023 with left upper extremity pain and swelling along with an area of erythema in the left antecubital fossa. Patient was recently admitted from 7/14 to 7/20/2023 with a migraine headache. He had peripheral IV in the left antecubital fossa. Patient reported some erythema around this at the time of discharge. After discharge she noted increased erythema and some purulence from the prior IV site. She was seen in urgent care and started on Bactrim 7/21/2023 but due to worsening symptoms presented back to the ED. She also reports some chills and weakness. On presentation the patient was afebrile with a white blood cell count of 13.25. Many dose of cefepime and then started on vancomycin. CT of the left upper extremity which was without contrast showed edema around the biceps muscle, possibly myositis. CT PE showed no PE, small patchy area of consolidation in the left upper lobe. ID is consulted for further evaluation. REVIEW OF SYSTEMS:  A complete review of systems is negative other than that noted in the HPI.     PAST MEDICAL HISTORY:  Past Medical History:   Diagnosis Date   • Asthma    • Atypical chest pain    • Depression    • Diabetes mellitus ([address])    • Gastroparesis    • GERD (gastroesophageal reflux disease)    • Hyperlipidemia    • Hypertension    • Psychiatric disorder    • Renal disorder    • Sciatica    • Seizure disorder [facility])      Past Surgical History:   Procedure Laterality Date   • APPENDECTOMY     • BREAST BIOPSY Left  benign   • CHOLECYSTECTOMY     • COLONOSCOPY     • HYSTERECTOMY     • HI LAPAROSCOPIC APPENDECTOMY N/A 03/24/2021    Procedure: APPENDECTOMY LAPAROSCOPIC;  Surgeon: Najma Godinez MD;  Location: [address] [place] [address] OR;  Service: General   • HI LAPS ABD PRTM&OMENTUM DX W/WO SPEC BR/WA SPX N/A 03/24/2021    Procedure: LAPAROSCOPY DIAGNOSTIC, EXTENSIVE DESI;  Surgeon: Najma Godinez MD;  Location: [address] [place] [address] MAIN OR;  Service: General   • UPPER GASTROINTESTINAL ENDOSCOPY         FAMILY HISTORY:  Non-contributory    SOCIAL HISTORY:  Social History   Social History     Substance and Sexual Activity   Alcohol Use Never     Social History     Substance and Sexual Activity   Drug Use Never     Social History     Tobacco Use   Smoking Status Former   • Types: Cigarettes   • Quit date:    • Years since quittin.5   Smokeless Tobacco Never       ALLERGIES:  Allergies   Allergen Reactions   • Butorphanol Hallucinations   • Benztropine    • Penicillins    • Zolpidem    • Azithromycin Hives and GI Intolerance       MEDICATIONS:  All current active medications have been reviewed.     PHYSICAL EXAM:  Temp:  [97.6 °F (36.4 °C)-98.2 °F (36.8 °C)] 97.6 °F (36.4 °C)  HR:  [67-82] 73  Resp:  [14-25] 16  BP: (103-171)/(55-86) 124/60  SpO2:  [90 %-98 %] 96 %  Temp (24hrs), Av.8 °F (36.6 °C), Min:97.6 °F (36.4 °C), Max:98.2 °F (36.8 °C)  Current: Temperature: 97.6 °F (36.4 °C)    Intake/Output Summary (Last 24 hours) at 2023 1131  Last data filed at 2023 0600  Gross per 24 hour   Intake 1565.35 ml   Output 1000 ml   Net 565.35 ml       General Appearance:  Appearing well, nontoxic, and in no distress   Head:  Normocephalic, without obvious abnormality, atraumatic   Eyes:  Conjunctiva pink and sclera anicteric, both eyes   Nose: Nares normal, mucosa normal, no drainage   Throat: Oropharynx moist without lesions   Neck: Supple, symmetrical, no adenopathy, no tenderness/mass/nodules   Back:   Symmetric, no curvature, ROM normal, no CVA tenderness   Lungs:   Clear to auscultation bilaterally, respirations unlabored   Chest Wall:  No tenderness or deformity   Heart:  RRR; no murmur, rub or gallop   Abdomen:   Soft, non-tender, non-distended, positive bowel sounds    Extremities: Left upper arm edema   Skin: Left antecubital fossa induration, erythema   Lymph nodes: Cervical, supraclavicular nodes normal Neurologic: Alert and oriented times 3, extremity strength 5/5 and symmetric       LABS, IMAGING, & OTHER STUDIES:  Lab Results:  I have personally reviewed pertinent labs. Results from last 7 days   Lab Units 07/25/23  0606 07/24/23  1850 07/20/23  0518   WBC Thousand/uL 15.29* 13.25* 10.91*   HEMOGLOBIN g/dL 9.7* 10.9* 12.3   PLATELETS Thousands/uL 206 201 270     Results from last 7 days   Lab Units 07/25/23  0606 07/25/23  0052 07/24/23  1850   SODIUM mmol/L 134* 131* 123*   POTASSIUM mmol/L 4.0 4.3 4.7   CHLORIDE mmol/L 102 99 91*   CO2 mmol/L 24 24 23   BUN mg/dL 19 23 24   CREATININE mg/dL 1.03 1.19 1.35*   EGFR ml/min/1.73sq m 62 52 45   CALCIUM mg/dL 8.1* 8.4 8.1*   AST U/L 14  --  23   ALT U/L 31  --  42   ALK PHOS U/L 112*  --  140*     Results from last 7 days   Lab Units 07/24/23  1907 07/24/23  1850   BLOOD CULTURE  Received in Microbiology Lab. Culture in Progress. Received in Microbiology Lab. Culture in Progress. Results from last 7 days   Lab Units 07/25/23  0606 07/24/23  1850   PROCALCITONIN ng/ml 0.65* 0.42*         Results from last 7 days   Lab Units 07/25/23  0052   FERRITIN ng/mL 128     Results from last 7 days   Lab Units 07/24/23  1850   D-DIMER QUANTITATIVE ug/ml FEU 1.94*       Imaging Studies:   I have personally reviewed pertinent imaging study reports and images in PACS.     CT left upper extremity:  Edema surrounding biceps muscle

## 2023-07-25 NOTE — ASSESSMENT & PLAN NOTE
Lab Results   Component Value Date    EGFR 52 07/25/2023    EGFR 45 07/24/2023    EGFR 71 07/20/2023    CREATININE 1.19 07/25/2023    CREATININE 1.35 (H) 07/24/2023    CREATININE 0.92 07/20/2023     · Creatinine 1.35 on admission  · Baseline creatinine appears to be around 0.8 - 1.2  · Suspect this is 2/2 LUE cellulitis, dehydration/hypovolemia, chronic NSAID use  · Received 1L IVF in ED - will give additional 1L now and start continuous IVF hydration  · Hold home diclofenac (NSAID)  · Avoid nephrotoxic agents and fluctuations in BP  · Trend renal indices  · Monitor I/O  · Daily weights

## 2023-07-25 NOTE — ASSESSMENT & PLAN NOTE
· Hgb 10.9 on admission  · Baseline appears to be around 11-13  · Suspect this is 2/2 chronic disease vs acute illness/infectious state vs YINKA  · Will send iron panel, T92, folic acid  · Takes iron heme polypeptide as outpatient - consider starting ferrous sulfate while here, however will hold as patient currently reporting constipation   · Trend Hgb and transfuse for Hgb < 7

## 2023-07-25 NOTE — PROGRESS NOTES
Yuko Cortez is a 46 y.o. female who is currently ordered Vancomycin IV with management by the Pharmacy Consult service. Relevant clinical data and objective / subjective history reviewed. Vancomycin Assessment:  Indication and Goal AUC/Trough: Bacteremia (goal -600, trough >10); Soft tissue (goal -600, trough >10), -600, trough >10  Clinical Status: stable  Micro:   pending  Renal Function:  SCr: 1.35 mg/dL  CrCl: 50.4 mL/min  Renal replacement: Not on dialysis  Days of Therapy: 1  Current Dose: 1000mg loading dose  Vancomycin Plan:  New Dosinmg Q12H  Estimated AUC: 475 mcg*hr/mL  Estimated Trough: 16.4 mcg/mL  Next Level: 23  Renal Function Monitoring: Daily BMP and UOP  Pharmacy will continue to follow closely for s/sx of nephrotoxicity, infusion reactions and appropriateness of therapy. BMP and CBC will be ordered per protocol. We will continue to follow the patient’s culture results and clinical progress daily.     Maxx Razo, Pharmacist

## 2023-07-25 NOTE — H&P
[address] [facility]  H&P  Name: Judge Delgado 46 y.o. female I MRN: [id number]  Unit/Bed#: ICU 09-01 I Date of Admission: 7/24/2023   Date of Service: 7/25/2023 I Hospital Day: 1      Assessment/Plan   Cellulitis of left upper extremity  Assessment & Plan  · Presented to ED w/severe LUE pain, erythema, swelling, warmth - states that pain initially started related to IV placed during 07/14-07/20 admission   · Was on Bactrim as outpatient - started on 07/21 by Urgent Care   · Will obtain stat LUE imaging given significant pain and cellulitic appearance  · Blood cultures pending  · Obtain wound culture if able  · Received IV Vancomycin in ED - continue w/IV Vancomycin for now   · Consider surgical consult pending LUE imaging   · ID consulted - appreciate recommendations  · Pain control w/scheduled APAP and PRN Oxycodone 2.5 mg for moderate pain, 5 mg for severe pain Q6H  · Monitor for drainage     Leukocytosis  Assessment & Plan  · WBC elevated at 13 on admission - currently afebrile and not tachycardic, however is on BB as outpatient  · Likely in setting of LUE cellulitis w/concern for bacteremia  · Blood cultures pending  · Will send UA as it appears urine culture from 07/13 was positive for Beta Hemolytic Strep Group B  · Obtain wound culture if able  · Lactic acid WNL at 1.4  · Procalcitonin 0.42  · Will continue w/IVF resuscitation  · Received IV Vancomycin in ED - will continue w/dosing recommendations per Pharmacy  · Monitor fever and WBC curve  · Trend procalcitonin and follow-up cultures     Acute renal failure superimposed on stage 3a chronic kidney disease [facility])  Assessment & Plan  Lab Results   Component Value Date    EGFR 52 07/25/2023    EGFR 45 07/24/2023    EGFR 71 07/20/2023    CREATININE 1.19 07/25/2023    CREATININE 1.35 (H) 07/24/2023    CREATININE 0.92 07/20/2023     · Creatinine 1.35 on admission  · Baseline creatinine appears to be around 0.8 - 1.2  · Suspect this is 2/2 LUE cellulitis, dehydration/hypovolemia, chronic NSAID use  · Received 1L IVF in ED - will give additional 1L now and start continuous IVF hydration  · Hold home diclofenac (NSAID)  · Avoid nephrotoxic agents and fluctuations in BP  · Trend renal indices  · Monitor I/O  · Daily weights    Type 2 diabetes mellitus with hyperglycemia, with long-term current use of insulin [facility])  Assessment & Plan  Lab Results   Component Value Date    HGBA1C 12.6 (H) 07/14/2023       Recent Labs     07/24/23  2131 07/24/23  2330 07/25/23  0021 07/25/23  0112   POCGLU >500* 430* 375* 365*       Blood Sugar Average: Last 72 hrs:  (P) 390     · Glucose 820 on admission w/out AG elevation, acidosis, ketosis, ketonuria  · Likely exacerbated in setting of acute illness/LUE cellulitis  · Appears to be poorly controlled at baseline per recent HA1C   · Home regimen: Lantus 52 units daily, mealtime insulin - 12 units w/breakfast and lunch, 14 units w/dinner plus SSI PRN, Jardiance   · Will start insulin gtt now given profound hyperglycemia   · Will continue w/IVF resuscitation  · Monitor fingersticks Q2H  · BS goal 140 - 180    Anemia  Assessment & Plan  · Hgb 10.9 on admission  · Baseline appears to be around 11-13  · Suspect this is 2/2 chronic disease vs acute illness/infectious state vs YINKA  · Will send iron panel, B93, folic acid  · Takes iron heme polypeptide as outpatient - consider starting ferrous sulfate while here, however will hold as patient currently reporting constipation   · Trend Hgb and transfuse for Hgb < 7    Lower extremity edema  Assessment & Plan  · Bilateral LE w/+1 edema, erythema, and calf pain - L > R  · CTA PE study negative for PE  · D-Dimer elevated at 1.94 - possibly in setting of clot vs infectious/inflammatory state  · Will check bilateral LE venous duplex  · Will start DVT ppx for now - follow-up duplex     Seizure disorder (HCC)  Assessment & Plan  · Continue home Keppra    Slow transit constipation  Assessment & Plan  · Citlali Bonner as outpatient, however is not on hospital formulary - will utilize Amitiza while in hospital  · Continue home Miralax, Colace, and will add Senna    GERD (gastroesophageal reflux disease)  Assessment & Plan  · Continue home Pepcid at renally-adjusted dose  · Patient states she is not taking PPI    Hypercholesteremia  Assessment & Plan  · Continue home statin and Tricor    Obesity (BMI 35.0-39.9 without comorbidity)  Assessment & Plan  · Encourage healthy lifestyle modifications   · Nutrition consult    Hypertension  Assessment & Plan  · Will restart home Lopressor, but hold home Amlodipine for now - can add Amlodipine if BP remains stable or becomes hypertensive    Diabetic polyneuropathy associated with type 2 diabetes mellitus (HCC)  Assessment & Plan  · Continue home Lyrica    Bipolar disorder (HCC)  Assessment & Plan  · Continue home Celexa and Elavil          VTE Prophylaxis: Heparin  / reason for no mechanical VTE prophylaxis rule out DVT   Code Status: FULL  POLST: POLST is not applicable to this patient  Discussion with family: Plan discussed w/patient at bedside    Anticipated Length of Stay:  Patient will be admitted on an Inpatient basis with an anticipated length of stay of  > 2 midnights. Justification for Hospital Stay: LUE cellulitis, leukocytosis, ARF, DM2 w/hyperglycemia    Total Time for Visit, including Counseling / Coordination of Care: 45 minutes. Greater than 50% of this total time spent on direct patient counseling and coordination of care. Chief Complaint:   LUE cellulitis    History of Present Illness:    Pablito Lakhani is a 46 y.o. female w/PMHx of HTN, HLD, CKD3A, R renal clear-cell carcinoma s/p partial nephrectomy in 2017, uncontrolled IDDM2, anemia, seizure disorder, diabetic polyneuropathy, obesity, GERD, constipation, bipolar disorder who presented last evening with persistent LUE pain, swelling, erythema, and warmth to touch.  Patient states she first noted LUE pain at previous IV site from 07/14-07/20 admission. She presented to Urgent Care on 07/21 at which point she was started on Bactrim. Patient states that LUE pain continued to worsen and states that she "can barely move it now." She states that pain extends into her left chest and shoulder region. She has noticed "puss-like drainage" from the site. She denies any fevers, however does endorse chills at home and endorses generalized body aches. She denies any alcohol/tobacco/ilicit drug use. In ED, patient was given IV Vancomycin and IVF. Labs were significant for leukocytosis, LINDY, and glucose of 820. She received total of 20 units regular insulin. D-Dimer was elevated, however subsequent CTA PE study was negative for PE. Patient will be admitted for management of LUE cellulitis, severe hyperglycemia, and LINDY. Review of Systems:    Review of Systems   Constitutional: Positive for chills and fatigue. Negative for fever. HENT: Negative for congestion. Eyes: Negative for visual disturbance. Respiratory: Negative for cough and shortness of breath. Cardiovascular: Positive for chest pain and leg swelling. Reproducible L-sided chest pain   Gastrointestinal: Positive for constipation. Negative for abdominal pain, diarrhea, nausea and vomiting. Genitourinary: Negative for difficulty urinating. Musculoskeletal: Negative for back pain. Generalized bodyaches   Skin: Positive for wound. LUE pain, erythema, swelling   Neurological: Negative for dizziness, light-headedness and headaches. Psychiatric/Behavioral: Negative for confusion.        Past Medical and Surgical History:     Past Medical History:   Diagnosis Date   • Asthma    • Atypical chest pain    • Depression    • Diabetes mellitus ([address])    • Gastroparesis    • GERD (gastroesophageal reflux disease)    • Hyperlipidemia    • Hypertension    • Psychiatric disorder    • Renal disorder    • Sciatica    • Seizure disorder [facility])        Past Surgical History:   Procedure Laterality Date   • APPENDECTOMY     • BREAST BIOPSY Left  benign   • CHOLECYSTECTOMY     • COLONOSCOPY     • HYSTERECTOMY     • MT LAPAROSCOPIC APPENDECTOMY N/A 03/24/2021    Procedure: APPENDECTOMY LAPAROSCOPIC;  Surgeon: Alberta Pineda MD;  Location: [facility] MAIN OR;  Service: General   • MT LAPS ABD PRTM&OMENTUM DX W/WO SPEC BR/WA SPX N/A 03/24/2021    Procedure: LAPAROSCOPY DIAGNOSTIC, EXTENSIVE DESI;  Surgeon: Alberta Pineda MD;  Location: [facility] MAIN OR;  Service: General   • UPPER GASTROINTESTINAL ENDOSCOPY         Meds/Allergies:    Prior to Admission medications    Medication Sig Start Date End Date Taking? Authorizing Provider   Alcohol Swabs (Pharmacist Choice Alcohol) PADS USE AS DIRECTED 1/23/23  Yes SYLWIA Larson   amitriptyline (ELAVIL) 25 mg tablet Take 1 tablet (25 mg total) by mouth daily at bedtime 7/20/23 8/19/23 Yes Roslyn Phipps PA-C   amLODIPine (NORVASC) 5 mg tablet Take 1 tablet (5 mg total) by mouth daily Do not start before July 21, 2023. 7/21/23 8/20/23 Yes Roslyn Phipps PA-C   Aspirin (ASPIR-81 PO) Take 81 mg by mouth 2/20/12  Yes Historical Provider, MD   atorvastatin (LIPITOR) 80 mg tablet TAKE 1 TABLET (80 MG TOTAL) BY MOUTH DAILY AT BEDTIME 1/16/23  Yes Torito Parish,    B-D UF III MINI PEN NEEDLES 31G X 5 MM MISC Pt uses 5 pen needles daily 12/16/22  Yes Magdalene Washington,    cholecalciferol (VITAMIN D3) 400 units tablet TAKE ONE TABLET BY MOUTH DAILY 1/11/23  Yes SYLWIA Larson   citalopram (CeleXA) 40 mg tablet Take 1 tablet (40 mg total) by mouth daily 5/11/23  Yes SYLWIA Fallon   Continuous Blood Gluc  (FreeStyle Refugio 14 Day [place]) TEMITOPE Use for continuous blood glucose monitoring 10/7/21  Yes SYLWIA Springer   Continuous Blood Gluc Sensor (FreeStyle Refugio 14 Day Sensor) MISC Use one sensor every 14 days for continuous blood sugar monitoring.  2/25/22  Yes [address] [place] [address],[address] diclofenac potassium (CATAFLAM) 50 mg tablet Take 1 tablet (50 mg total) by mouth 3 (three) times a day 7/21/23  Yes Ar Sheppard MD   docusate sodium (COLACE) 100 mg capsule TAKE ONE CAPSULE BY MOUTH TWICE DAILY 1/30/23  Yes SYLWIA Larson   ergocalciferol (VITAMIN D2) 50,000 units Take 1 capsule (50,000 Units total) by mouth once a week 10/26/22  Yes Wilber Castellanos PA-C   famotidine (PEPCID) 40 MG tablet Take 1 tablet (40 mg total) by mouth daily at bedtime Take in evening 2 hours prior to bed 6/16/23  Yes Lolis Simons DO   fenofibrate (TRICOR) 48 mg tablet TAKE ONE TABLET BY MOUTH DAILY 11/16/22  Yes SYLWIA Larson   insulin glulisine (Apidra SoloStar) 100 units/mL injection pen Inject 12 units with breakfast and lunch, 14 units with dinner, with scale ISF 25 @ 150 as needed 3/27/23  Yes Adriana So PA-C   Iron Heme Polypeptide 12 MG TABS Take 1 tablet by mouth   Yes Historical Provider, MD   levETIRAcetam (KEPPRA) 500 mg tablet TAKE ONE TABLET TWICE DAILY 1/12/23  Yes SYLWIA Larson   linaCLOtide (Linzess) 72 MCG CAPS Take 1 capsule by mouth daily before breakfast 2/24/22  Yes Gino Parnell,    magnesium Oxide (MAG-OX) 400 mg TABS Take 1 tablet (400 mg total) by mouth daily 7/20/23 8/19/23 Yes Stephanie Peña PA-C   meclizine (ANTIVERT) 25 mg tablet Take 1 tablet (25 mg total) by mouth 3 (three) times a day as needed for dizziness 7/10/23  Yes SYLWIA Larson   metoprolol tartrate (LOPRESSOR) 25 mg tablet Take 1 tablet (25 mg total) by mouth 2 (two) times a day 4/12/23  Yes SYLWIA Larson   multivitamin (THERAGRAN) TABS Take 1 tablet by mouth every morning   Yes Historical Provider, MD   nystatin (MYCOSTATIN) cream Apply topically 2 (two) times a day 7/13/23  Yes Joaquim Suraj, DO   omeprazole (PriLOSEC) 40 MG capsule Take 1 capsule (40 mg total) by mouth daily 7/13/23  Yes Gino Parnell, DO   OneTouch Ultra test strip USE 4 TIMES A DAY 1/30/23 Yes SYLWIA Serna   Pharmacist Choice Lancets MISC USE AS DIRECTED 3/13/23  Yes SYLWIA Larson   pregabalin (LYRICA) 100 mg capsule TAKE ONE CAPSULE BY MOUTH THREE TIMES A DAY 23  Yes SYLWIA Larson   Restasis 0.05 % ophthalmic emulsion  3/2/23  Yes Historical Provider, MD   sulfamethoxazole-trimethoprim (BACTRIM DS) 800-160 mg per tablet Take 1 tablet by mouth every 12 (twelve) hours for 10 days 23 Yes Stephanie Fisher PA-C   albuterol (PROVENTIL HFA,VENTOLIN HFA) 90 mcg/act inhaler INHALE ONE PUFF BY MOUTH AND INTO THE LUNGS EVERY 6 HOURS AS NEEDED FOR WHEEZING 22   SYLWIA Larson   dulaglutide (Trulicity) 3 KM/3.9DN injection Inject 0.5 mL (3 mg total) under the skin once a week 3/16/23   Adrinaa So PA-C   Empagliflozin (Jardiance) 25 MG TABS Take 1 tablet (25 mg total) by mouth every morning 22   Alejandra Morales PA-C   Insulin Glargine Solostar (Lantus SoloStar) 100 UNIT/ML SOPN 52 units daily 3/27/23   Adriana So PA-C   ondansetron (ZOFRAN) 4 mg tablet Take 1 tablet (4 mg total) by mouth every 8 (eight) hours as needed for nausea or vomiting 23   SYLWIA Anderson   scopolamine (TRANSDERM-SCOP) 1 mg/3 days TD 72 hr patch Place 1 patch on the skin over 72 hours every third day 23   SYLWIA Anderson     I have reviewed home medications with patient personally. Allergies:    Allergies   Allergen Reactions   • Butorphanol Hallucinations   • Benztropine    • Penicillins    • Zolpidem    • Azithromycin Hives and GI Intolerance       Social History:     Marital Status: /Civil Union     Substance Use History:   Social History     Substance and Sexual Activity   Alcohol Use Never     Social History     Tobacco Use   Smoking Status Former   • Types: Cigarettes   • Quit date:    • Years since quittin.5   Smokeless Tobacco Never     Social History     Substance and Sexual Activity   Drug Use Never       Family History:    Family History   Problem Relation Age of Onset   • No Known Problems Mother    • No Known Problems Father    • No Known Problems Daughter    • No Known Problems Maternal Grandmother    • No Known Problems Paternal Grandmother    • No Known Problems Paternal Aunt    • No Known Problems Paternal Aunt    • No Known Problems Paternal Aunt        Physical Exam:     Vitals:   Blood Pressure: 139/70 (23 0020)  Pulse: 82 (23)  Temperature: 97.7 °F (36.5 °C) (23)  Temp Source: Tympanic (23)  Respirations: 18 (23)  Height: 5' 1" (154.9 cm) (23)  Weight - Scale: 93.2 kg (205 lb 7.5 oz) (23)  SpO2: 98 % (23)    Physical Exam  Vitals and nursing note reviewed. Constitutional:       General: She is awake. Appearance: She is obese. She is ill-appearing. HENT:      Head: Normocephalic. Mouth/Throat:      Mouth: Mucous membranes are dry. Eyes:      Extraocular Movements: Extraocular movements intact. Pupils: Pupils are equal, round, and reactive to light. Cardiovascular:      Rate and Rhythm: Normal rate and regular rhythm. Pulses: Normal pulses. Heart sounds: Normal heart sounds. Pulmonary:      Effort: Pulmonary effort is normal.      Breath sounds: Examination of the right-lower field reveals decreased breath sounds. Examination of the left-lower field reveals decreased breath sounds. Decreased breath sounds present. No wheezing or rales. Abdominal:      General: Bowel sounds are normal. There is no distension. Palpations: Abdomen is soft. Tenderness: There is no abdominal tenderness. Musculoskeletal:      Cervical back: Neck supple. Right lower le+ Edema present. Left lower le+ Edema present. Skin:     General: Skin is warm and dry. Capillary Refill: Capillary refill takes less than 2 seconds. Neurological:      General: No focal deficit present. Mental Status: She is alert and oriented to person, place, and time. Psychiatric:         Behavior: Behavior is cooperative. Additional Data:     Lab Results: I have personally reviewed pertinent reports. and I have personally reviewed pertinent films in PACS    Results from last 7 days   Lab Units 07/24/23  1850   WBC Thousand/uL 13.25*   HEMOGLOBIN g/dL 10.9*   HEMATOCRIT % 35.3   PLATELETS Thousands/uL 201   NEUTROS PCT % 82*   LYMPHS PCT % 9*   MONOS PCT % 7   EOS PCT % 1     Results from last 7 days   Lab Units 07/25/23  0052 07/24/23  1850   SODIUM mmol/L 131* 123*   POTASSIUM mmol/L 4.3 4.7   CHLORIDE mmol/L 99 91*   CO2 mmol/L 24 23   BUN mg/dL 23 24   CREATININE mg/dL 1.19 1.35*   ANION GAP mmol/L 8 9   CALCIUM mg/dL 8.4 8.1*   ALBUMIN g/dL  --  3.1*   TOTAL BILIRUBIN mg/dL  --  0.41   ALK PHOS U/L  --  140*   ALT U/L  --  42   AST U/L  --  23   GLUCOSE RANDOM mg/dL 368* 820*     Results from last 7 days   Lab Units 07/24/23  1850   INR  0.96     Results from last 7 days   Lab Units 07/25/23  0211 07/25/23  0112 07/25/23  0021 07/24/23  2330 07/24/23  2131 07/20/23  1054 07/20/23  0720 07/19/23  2042 07/19/23  1554 07/19/23  1337 07/19/23  1104 07/19/23  0711   POC GLUCOSE mg/dl 309* 365* 375* 430* >500* 337* 303* 230* 180* 249* 277* 281*         Results from last 7 days   Lab Units 07/24/23  1850   LACTIC ACID mmol/L 1.4   PROCALCITONIN ng/ml 0.42*       Imaging: I have personally reviewed pertinent reports. and I have personally reviewed pertinent films in PACS    CTA ED chest PE study   Final Result by Lyndsey Easley MD (07/24 2259)      No intraluminal filling defect to suggest an acute pulmonary embolus. Small focal patchy consolidation noted at the anterior left upper lobe suspicious for an infectious or inflammatory process. Correlation with the patient's symptoms is recommended.  Consider a repeat CT chest in 6 to 8 weeks to assess for    improvement/resolution and exclude underlying lesion. Workstation performed: [id number]         XR chest 1 view portable   ED Interpretation by Andrews Pimentel PA-C (07/24 1832)   No acute cardiopulmonary process.        CT upper extremity wo contrast left    (Results Pending)   VAS lower limb venous duplex study, complete bilateral    (Results Pending)       EKG, Pathology, and Other Studies Reviewed on Admission:   · EKG: sinus rhythm    Allscripts / Epic Records Reviewed: Yes     ** Please Note: This note has been constructed using a voice recognition syste

## 2023-07-25 NOTE — UTILIZATION REVIEW
NOTIFICATION OF INPATIENT ADMISSION   AUTHORIZATION REQUEST   SERVICING FACILITY:   [address] [place] [address]  Tax ID: [id number]  NPI: [id number]   ATTENDING PROVIDER:  Attending Name and NPI#: Mykel Aviles [[id number]]  Address: [address] [place] [address]  Phone: [phone]     ADMISSION INFORMATION:  Place of Service: Inpatient [facility]  Place of Service Code: 21  Inpatient Admission Date/Time: 7/24/23 11:35 PM  Discharge Date/Time: No discharge date for patient encounter. Admitting Diagnosis Code/Description:  Chest pain [R07.9]  Phlebitis of left upper extremity [I80.8]  Cellulitis of left upper extremity [L03.114]  Visit for wound check [Z51.89]     UTILIZATION REVIEW CONTACT:  Lizbeth Izquierdo Utilization   Network Utilization Review Department  Phone: [phone]  Fax [phone]  Email: [email or web address]  Contact for approvals/pending authorizations, clinical reviews, and discharge. PHYSICIAN ADVISORY SERVICES:  Medical Necessity Denial & Iism-hu-Ddlt Review  Phone: [phone]  Fax: [phone]  Email: [email or web address]

## 2023-07-25 NOTE — CASE MANAGEMENT
Case Management Assessment & Discharge Planning Note    Patient name Norma Eastman  Location ICU 09/ICU 38-60 MRN [id number]  : 1970 Date 2023       Current Admission Date: 2023  Current Admission Diagnosis:Cellulitis of left upper extremity   Patient Active Problem List    Diagnosis Date Noted   • Cellulitis of left upper extremity 2023   • Leukocytosis 2023   • Acute renal failure superimposed on stage 3a chronic kidney disease ([address]) 2023   • Lower extremity edema 2023   • Migraine 07/15/2023   • Seizure disorder ([address]) 07/15/2023   • Generalized abdominal pain 2023   • Abnormal CT scan, stomach 2023   • Gastric distention 07/10/2023   • Flank pain 2023   • Secondary hyperparathyroidism ([address]) 2022   • History of kidney cancer 2022   • Vitamin D deficiency 2022   • Benign hypertension with CKD (chronic kidney disease) stage III ([address]) 2022   • GERD (gastroesophageal reflux disease) 2021   • History of colon polyps 2021   • Slow transit constipation 2021   • Anemia 2021   • Encounter for post surgical wound check 2021   • Depression with anxiety 2021   • Mild intermittent asthma without complication    • Hypercholesteremia 2021   • Hypertriglyceridemia 2021   • Iron deficiency anemia secondary to inadequate dietary iron intake 2021   • Internal hernia 2021   • Intra-abdominal adhesions 2021   • Radiculopathy, lumbar region 2020   • Anterior tibial tendonitis, right 2020   • Spondylolisthesis of lumbar region 04/10/2018   • Chronic low back pain with sciatica 2018   • Diabetic polyneuropathy associated with type 2 diabetes mellitus ([address]) 2018   • Clear cell carcinoma of kidney, right ([address]) 05/15/2017   • Obesity (BMI 35.0-39.9 without comorbidity) 2017   • Type 2 diabetes mellitus with hyperglycemia, with long-term current use of insulin ([address] [facility]) 02/25/2016   • Hypertension 02/25/2016   • Bipolar disorder ([address]) 11/12/2015   • Gastroparesis 11/12/2015      LOS (days): 1  Geometric Mean LOS (GMLOS) (days):   Days to GMLOS:     OBJECTIVE:  PATIENT READMITTED TO HOSPITAL  Risk of Unplanned Readmission Score: 24.95     Current admission status: Inpatient  Preferred Pharmacy:   [address] [place] [address], [address] [place] [address] [place]  Phone: [phone] Fax: [phone]    Primary Care Provider: SYLWIA Johnson    Primary Insurance: Elbert Stewart  Secondary Insurance:     ASSESSMENT:  [place] Proxies    There are no active Health Care Proxies on file. Readmission Root Cause  30 Day Readmission: No    Patient Information  Admitted from[de-identified] Home  Mental Status: Alert  During Assessment patient was accompanied by: Not accompanied during assessment  Assessment information provided by[de-identified] Patient  Primary Caregiver: Self  Support Systems: Spouse/significant other  [facility]: [facility] do you live in?: [address] entry access options.  Select all that apply.: Stairs  Number of steps to enter home.: 3  Do the steps have railings?: Yes  Type of Current Residence: [address] [place] home  Upon entering residence, is there a bedroom on the main floor (no further steps)?: No  A bedroom is located on the following floor levels of residence (select all that apply):: 2nd Floor  Upon entering residence, is there a bathroom on the main floor (no further steps)?: No  Indicate which floors of current residence have a bathroom (select all the apply):: 2nd Floor  Number of steps to 2nd floor from main floor: One Flight  In the last 12 months, was there a time when you were not able to pay the mortgage or rent on time?: No  In the last 12 months, how many places have you lived?: 1  In the last 12 months, was there a time when you did not have a steady place to sleep or slept in a shelter (including now)?: No  Homeless/housing insecurity resource given?: N/A  Living Arrangements: Lives w/ Spouse/significant other  Is patient a ?: No    Activities of Daily Living Prior to Admission  Functional Status: Independent  Completes ADLs independently?: Yes  Ambulates independently?: Yes  Does patient use assisted devices?: No  Does patient currently own DME?: Yes  What DME does the patient currently own?: [employer]oss  Does patient have a history of Outpatient Therapy (PT/OT)?: No  Does the patient have a history of Short-Term Rehab?: No  Does patient have a history of HHC?: No  Does patient currently have [address]?: No    Patient Information Continued  Income Source: Pension/long-term  Does patient have prescription coverage?: Yes  Within the past 12 months, you worried that your food would run out before you got the money to buy more.: Never true  Within the past 12 months, the food you bought just didn't last and you didn't have money to get more.: Never true  Food insecurity resource given?: N/A  Does patient receive dialysis treatments?: No  Does patient have a history of substance abuse?: No  Does patient have a history of Mental Health Diagnosis?: No    PHQ 2/9 Screening   Reviewed PHQ 2/9 Depression Screening Score?: No    Means of Transportation  Means of Transport to Appts[de-identified] Drives Self  In the past 12 months, has lack of transportation kept you from medical appointments or from getting medications?: No  In the past 12 months, has lack of transportation kept you from meetings, work, or from getting things needed for daily living?: No  Was application for public transport provided?: N/A  DISCHARGE DETAILS:    Discharge planning discussed with[de-identified] Pt    Contacts  Patient Contacts: Catarina Avalos spouse  Relationship to Patient[de-identified] Family  Contact Method: Phone  Phone Number: [phone]  Reason/Outcome: Emergency [address]         Is the patient interested in [address] at discharge?: No    DME Referral Provided  Referral made for DME?: No    Would you like to participate in our [address] [facility] [address] service program?  : No - Declined    Treatment Team Recommendation: Home  Discharge Destination Plan[de-identified] Home

## 2023-07-25 NOTE — ASSESSMENT & PLAN NOTE
· WBC elevated at 13 on admission - currently afebrile and not tachycardic, however is on BB as outpatient  · Likely in setting of LUE cellulitis w/concern for bacteremia  · Blood cultures pending  · Will send UA as it appears urine culture from 07/13 was positive for Beta Hemolytic Strep Group B  · Obtain wound culture if able  · Lactic acid WNL at 1.4  · Procalcitonin 0.42  · Will continue w/IVF resuscitation  · Received IV Vancomycin in ED - will continue w/dosing recommendations per Pharmacy  · Monitor fever and WBC curve  · Trend procalcitonin and follow-up cultures

## 2023-07-25 NOTE — ASSESSMENT & PLAN NOTE
· Will restart home Lopressor, but hold home Amlodipine for now - can add Amlodipine if BP remains stable or becomes hypertensive

## 2023-07-25 NOTE — ED NOTES
Patient ambulated to restroom and complaining of chest tightness and SOB. EKG performed and given to provider. Patient requesting pain medication provider aware.        Corrine Saleh RN  07/24/23 7933

## 2023-07-25 NOTE — NUTRITION
07/25/23 1506   Biochemical Data,Medical Tests, and Procedures   Biochemical Data/Medical Tests/Procedures Lab values reviewed; Meds reviewed   Labs (Comment) 7/25/23 glucose , HgA1c 12.7   Meds (Comment) atorvastatin, pepcid, insulin, magnesium oxide   Nutrition-Focused Physical Exam   Nutrition-Focused Physical Exam Findings RN skin assessment reviewed;Edema; Wound   Nutrition-Focused Physical Exam Findings +1 left UE edema, +1 BL LE edema, catheter entry/exit site at left arm, wound at left knee   Medical-Related Concerns type 2 diabetes, HTN, obesity, GERD, stage 3 CKD   Current PO Intake   Current Diet Order CCD2, thin liquids   Current Meal Intake Adequate   Estimated calorie intake compared to estimated need Nutrient needs are met. PES Statement   Biochemical (2) Altered nutrition-related laboratory values (specify) NC-2.2  (glucose)   Related to Other (Comment)  (noncompliance with carbohydrate controlled diet)   As evidenced by: Abnormal lab (Specify)  (7/25/23 glucose , HgA1c 12. 7)   Recommendations/Interventions   Malnutrition/BMI Present No   Summary Consult - nutrition assessment; Wound Care RN consulted. Presents for wound check of IV site. Past medical history significant for type 2 diabetes, HTN, obesity, GERD, stage 3 CKD. Weight history reviewed. No significant changes. +1 left UE edema, +1 BL LE edema, catheter entry/exit site at left arm, wound at left knee. Prescribed a CCD2 diet, thin liquids. Pt was seen by this RD on 7/18/23 and confirms the following information to be true at presents "Pt reports having an appetite that is "sometimes good." She has 2-3 meals daily. NKFA. She reports eating slowly as she sometimes experiences a "stuck feeling" in her throat with food intake. States she utilizes a liquid wash to clear effectively.  Also reports difficulty chewing due to lacking dentition.” RD provided and discussed “Diabetes Nutrition Label Reading Tips” and “Carbohydrate Counting for People with Diabetes” handouts at 7/18/23 nutrition assessment. She reports having an increased appetite at present, “I am really really hungry.” RD reviewed Carbohydrate diet education, discussed portion sizes, nutrition facts label reading and MyPlate method of food preparation. Gave OP MNT brochure. Continue diet as prescribed. RD to follow. Interventions/Recommendations Continue current diet order   Education Assessment   Education Education initiated/ completed   Education Notes RD provided and discussed “Diabetes Nutrition Label Reading Tips” and “Carbohydrate Counting for People with Diabetes” handouts at 7/18/23 nutrition assessment. She reports having an increased appetite at present, “I am really really hungry.” RD reviewed Carbohydrate diet education, discussed portion sizes, nutrition facts label reading and MyPlate method of food preparation. Gave OP MNT brochure. Patient Nutrition Goals   Goal Comprehend education;Glucose WNL; Improve to healthful diet

## 2023-07-25 NOTE — ASSESSMENT & PLAN NOTE
Lab Results   Component Value Date    HGBA1C 12.6 (H) 07/14/2023       Recent Labs     07/24/23  2131 07/24/23  2330 07/25/23  0021 07/25/23  0112   POCGLU >500* 430* 375* 365*       Blood Sugar Average: Last 72 hrs:  (P) 390     · Glucose 820 on admission w/out AG elevation, acidosis, ketosis, ketonuria  · Likely exacerbated in setting of acute illness/LUE cellulitis  · Appears to be poorly controlled at baseline per recent HA1C   · Home regimen: Lantus 52 units daily, mealtime insulin - 12 units w/breakfast and lunch, 14 units w/dinner plus SSI PRN, Jardiance   · Will start insulin gtt now given profound hyperglycemia   · Will continue w/IVF resuscitation  · Monitor fingersticks Q2H  · BS goal 140 - 180

## 2023-07-25 NOTE — PLAN OF CARE
Problem: METABOLIC, FLUID AND ELECTROLYTES - ADULT  Goal: Electrolytes maintained within normal limits  Description: INTERVENTIONS:  - Monitor labs and assess patient for signs and symptoms of electrolyte imbalances  - Administer electrolyte replacement as ordered  - Monitor response to electrolyte replacements, including repeat lab results as appropriate  - Instruct patient on fluid and nutrition as appropriate  Outcome: Progressing  Goal: Fluid balance maintained  Description: INTERVENTIONS:  - Monitor labs   - Monitor I/O and WT  - Instruct patient on fluid and nutrition as appropriate  - Assess for signs & symptoms of volume excess or deficit  Outcome: Progressing  Goal: Glucose maintained within target range  Description: INTERVENTIONS:  - Monitor Blood Glucose as ordered  - Assess for signs and symptoms of hyperglycemia and hypoglycemia  - Administer ordered medications to maintain glucose within target range  - Assess nutritional intake and initiate nutrition service referral as needed  Outcome: Progressing     Problem: SKIN/TISSUE INTEGRITY - ADULT  Goal: Skin Integrity remains intact(Skin Breakdown Prevention)  Description: Assess:  -Perform Chun assessment   -Clean and moisturize skin   -Inspect skin when repositioning, toileting, and assisting with ADLS  -Assess under medical devices  -Assess extremities for adequate circulation and sensation     Bed Management:  -Have minimal linens on bed & keep smooth, unwrinkled  -Change linens as needed when moist or perspiring  -Avoid sitting or lying in one position    Toileting:  -Offer bedside commode  -Assess for incontinence    Activity:  -Mobilize patient  -Encourage activity and walks on unit  -Encourage or provide ROM exercises   -Turn and reposition patient   -Use appropriate equipment to lift or move patient in bed  -Instruct/ Assist with weight shifting   -Consider limitation of chair time    Skin Care:  -Avoid use of baby powder, tape, friction and shearing, hot water or constrictive clothing  -Relieve pressure over bony prominences   -Do not massage red bony areas    Next Steps:  -Teach patient strategies to minimize risk  Outcome: Progressing  Goal: Incision(s), wounds(s) or drain site(s) healing without S/S of infection  Description: INTERVENTIONS  - Assess and document dressing, incision, wound bed, drain sites and surrounding tissue  - Provide patient and family education  - Perform skin care/dressing changes  Outcome: Progressing  Goal: Pressure injury heals and does not worsen  Description: Interventions:  - Implement low air loss mattress or specialty surface (Criteria met)  - Apply silicone foam dressing  - Instruct/assist with weight shifting   - Limit chair time  - Apply fecal or urinary incontinence   - Turn and reposition patient & offload bony prominences   - Utilize friction reducing device or surface for transfers   - Consider consults to  interdisciplinary teams  - Use incontinent care products after each incontinent episode  - Consider nutrition services referral as needed  Outcome: Progressing     Problem: PAIN - ADULT  Goal: Verbalizes/displays adequate comfort level or baseline comfort level  Description: Interventions:  - Encourage patient to monitor pain and request assistance  - Assess pain using appropriate pain scale  - Administer analgesics based on type and severity of pain and evaluate response  - Implement non-pharmacological measures as appropriate and evaluate response  - Consider cultural and social influences on pain and pain management  - Notify physician/advanced practitioner if interventions unsuccessful or patient reports new pain  Outcome: Progressing     Problem: INFECTION - ADULT  Goal: Absence or prevention of progression during hospitalization  Description: INTERVENTIONS:  - Assess and monitor for signs and symptoms of infection  - Monitor lab/diagnostic results  - Monitor all insertion sites, i.e. indwelling lines, tubes, and drains  - Monitor endotracheal if appropriate and nasal secretions for changes in amount and color  - [place] appropriate cooling/warming therapies per order  - Administer medications as ordered  - Instruct and encourage patient and family to use good hand hygiene technique  - Identify and instruct in appropriate isolation precautions for identified infection/condition  Outcome: Progressing  Goal: Absence of fever/infection during neutropenic period  Description: INTERVENTIONS:  - Monitor WBC    Outcome: Progressing     Problem: SAFETY ADULT  Goal: Patient will remain free of falls  Description: INTERVENTIONS:  - Educate patient/family on patient safety including physical limitations  - Instruct patient to call for assistance with activity   - Consult OT/PT to assist with strengthening/mobility   - Keep Call bell within reach  - Keep bed low and locked with side rails adjusted as appropriate  - Keep care items and personal belongings within reach  - Initiate and maintain comfort rounds  - Make Fall Risk Sign visible to staff  - Offer Toileting every   - Initiate/Maintain   - Obtain necessary fall risk management equipment  - Apply yellow socks and bracelet for high fall risk patients  - Consider moving patient to room near nurses station  Outcome: Progressing  Goal: Maintain or return to baseline ADL function  Description: INTERVENTIONS:  -  Assess patient's ability to carry out ADLs; assess patient's baseline for ADL function and identify physical deficits which impact ability to perform ADLs (bathing, care of mouth/teeth, toileting, grooming, dressing, etc.)  - Assess/evaluate cause of self-care deficits   - Assess range of motion  - Assess patient's mobility; develop plan if impaired  - Assess patient's need for assistive devices and provide as appropriate  - Encourage maximum independence but intervene and supervise when necessary  - Involve family in performance of ADLs  - Assess for home care needs following discharge   - Consider OT consult to assist with ADL evaluation and planning for discharge  - Provide patient education as appropriate  Outcome: Progressing  Goal: Maintains/Returns to pre admission functional level  Description: INTERVENTIONS:  - Perform BMAT or MOVE assessment daily.   - Set and communicate daily mobility goal to care team and patient/family/caregiver. - Collaborate with rehabilitation services on mobility goals if consulted  - Record patient progress and toleration of activity level   Outcome: Progressing     Problem: DISCHARGE PLANNING  Goal: Discharge to home or other facility with appropriate resources  Description: INTERVENTIONS:  - Identify barriers to discharge w/patient and caregiver  - Arrange for needed discharge resources and transportation as appropriate  - Identify discharge learning needs (meds, wound care, etc.)  - Arrange for interpretive services to assist at discharge as needed  - Refer to Case Management Department for coordinating discharge planning if the patient needs post-hospital services based on physician/advanced practitioner order or complex needs related to functional status, cognitive ability, or social support system  Outcome: Progressing     Problem: Knowledge Deficit  Goal: Patient/family/caregiver demonstrates understanding of disease process, treatment plan, medications, and discharge instructions  Description: Complete learning assessment and assess knowledge base.   Interventions:  - Provide teaching at level of understanding  - Provide teaching via preferred learning methods  Outcome: Progressing

## 2023-07-26 ENCOUNTER — APPOINTMENT (INPATIENT)
Dept: NON INVASIVE DIAGNOSTICS | Facility: HOSPITAL | Age: 53
DRG: 383 | End: 2023-07-26
Payer: COMMERCIAL

## 2023-07-26 LAB
ALBUMIN SERPL BCP-MCNC: 2.9 G/DL (ref 3.5–5)
ALP SERPL-CCNC: 155 U/L (ref 34–104)
ALT SERPL W P-5'-P-CCNC: 33 U/L (ref 7–52)
ANION GAP SERPL CALCULATED.3IONS-SCNC: 10 MMOL/L
AORTIC ROOT: 3.1 CM
AORTIC VALVE MEAN VELOCITY: 10.7 M/S
APICAL FOUR CHAMBER EJECTION FRACTION: 52 %
AST SERPL W P-5'-P-CCNC: 18 U/L (ref 13–39)
AV LVOT MEAN GRADIENT: 4 MMHG
AV LVOT PEAK GRADIENT: 6 MMHG
AV MEAN GRADIENT: 5 MMHG
AV PEAK GRADIENT: 11 MMHG
AV VELOCITY RATIO: 0.75
BASOPHILS # BLD AUTO: 0.04 THOUSANDS/ÂΜL (ref 0–0.1)
BASOPHILS NFR BLD AUTO: 0 % (ref 0–1)
BILIRUB SERPL-MCNC: 0.41 MG/DL (ref 0.2–1)
BUN SERPL-MCNC: 19 MG/DL (ref 5–25)
CALCIUM ALBUM COR SERPL-MCNC: 9.7 MG/DL (ref 8.3–10.1)
CALCIUM SERPL-MCNC: 8.8 MG/DL (ref 8.4–10.2)
CHLORIDE SERPL-SCNC: 100 MMOL/L (ref 96–108)
CO2 SERPL-SCNC: 27 MMOL/L (ref 21–32)
CREAT SERPL-MCNC: 1.22 MG/DL (ref 0.6–1.3)
DOP CALC AO PEAK VEL: 1.68 M/S
DOP CALC AO VTI: 36.93 CM
DOP CALC LVOT PEAK VEL VTI: 27.81 CM
DOP CALC LVOT PEAK VEL: 1.26 M/S
E WAVE DECELERATION TIME: 266 MS
EOSINOPHIL # BLD AUTO: 0.26 THOUSAND/ÂΜL (ref 0–0.61)
EOSINOPHIL NFR BLD AUTO: 2 % (ref 0–6)
ERYTHROCYTE [DISTWIDTH] IN BLOOD BY AUTOMATED COUNT: 15.9 % (ref 11.6–15.1)
FRACTIONAL SHORTENING: 37 (ref 28–44)
GFR SERPL CREATININE-BSD FRML MDRD: 51 ML/MIN/1.73SQ M
GLUCOSE SERPL-MCNC: 194 MG/DL (ref 65–140)
GLUCOSE SERPL-MCNC: 195 MG/DL (ref 65–140)
GLUCOSE SERPL-MCNC: 197 MG/DL (ref 65–140)
GLUCOSE SERPL-MCNC: 197 MG/DL (ref 65–140)
GLUCOSE SERPL-MCNC: 223 MG/DL (ref 65–140)
HCT VFR BLD AUTO: 33.1 % (ref 34.8–46.1)
HGB BLD-MCNC: 10.3 G/DL (ref 11.5–15.4)
IMM GRANULOCYTES # BLD AUTO: 0.25 THOUSAND/UL (ref 0–0.2)
IMM GRANULOCYTES NFR BLD AUTO: 2 % (ref 0–2)
INTERVENTRICULAR SEPTUM IN DIASTOLE (PARASTERNAL SHORT AXIS VIEW): 0.7 CM
INTERVENTRICULAR SEPTUM: 0.7 CM (ref 0.6–1.1)
LAAS-AP2: 18.6 CM2
LAAS-AP4: 13.6 CM2
LEFT ATRIUM SIZE: 3.3 CM
LEFT ATRIUM VOLUME (MOD BIPLANE): 45 ML
LEFT INTERNAL DIMENSION IN SYSTOLE: 2.9 CM (ref 2.1–4)
LEFT VENTRICULAR INTERNAL DIMENSION IN DIASTOLE: 4.6 CM (ref 3.5–6)
LEFT VENTRICULAR POSTERIOR WALL IN END DIASTOLE: 0.7 CM
LEFT VENTRICULAR STROKE VOLUME: 67 ML
LVSV (TEICH): 67 ML
LYMPHOCYTES # BLD AUTO: 2.06 THOUSANDS/ÂΜL (ref 0.6–4.47)
LYMPHOCYTES NFR BLD AUTO: 13 % (ref 14–44)
MCH RBC QN AUTO: 25.7 PG (ref 26.8–34.3)
MCHC RBC AUTO-ENTMCNC: 31.1 G/DL (ref 31.4–37.4)
MCV RBC AUTO: 83 FL (ref 82–98)
MONOCYTES # BLD AUTO: 1.14 THOUSAND/ÂΜL (ref 0.17–1.22)
MONOCYTES NFR BLD AUTO: 7 % (ref 4–12)
MV E'TISSUE VEL-SEP: 13 CM/S
MV PEAK A VEL: 0.74 M/S
MV PEAK E VEL: 87 CM/S
MV STENOSIS PRESSURE HALF TIME: 77 MS
MV VALVE AREA P 1/2 METHOD: 2.86
NEUTROPHILS # BLD AUTO: 12.15 THOUSANDS/ÂΜL (ref 1.85–7.62)
NEUTS SEG NFR BLD AUTO: 76 % (ref 43–75)
NRBC BLD AUTO-RTO: 0 /100 WBCS
PLATELET # BLD AUTO: 259 THOUSANDS/UL (ref 149–390)
PMV BLD AUTO: 11.3 FL (ref 8.9–12.7)
POTASSIUM SERPL-SCNC: 4.3 MMOL/L (ref 3.5–5.3)
PROT SERPL-MCNC: 5.9 G/DL (ref 6.4–8.4)
RBC # BLD AUTO: 4.01 MILLION/UL (ref 3.81–5.12)
RIGHT ATRIUM AREA SYSTOLE A4C: 10.3 CM2
RIGHT VENTRICLE ID DIMENSION: 2.3 CM
SL CV LEFT ATRIUM LENGTH A2C: 5.2 CM
SL CV LV EF: 60
SL CV PED ECHO LEFT VENTRICLE DIASTOLIC VOLUME (MOD BIPLANE) 2D: 99 ML
SL CV PED ECHO LEFT VENTRICLE SYSTOLIC VOLUME (MOD BIPLANE) 2D: 31 ML
SODIUM SERPL-SCNC: 137 MMOL/L (ref 135–147)
TRICUSPID ANNULAR PLANE SYSTOLIC EXCURSION: 2 CM
WBC # BLD AUTO: 15.9 THOUSAND/UL (ref 4.31–10.16)

## 2023-07-26 PROCEDURE — 87147 CULTURE TYPE IMMUNOLOGIC: CPT | Performed by: NURSE PRACTITIONER

## 2023-07-26 PROCEDURE — 99232 SBSQ HOSP IP/OBS MODERATE 35: CPT | Performed by: HOSPITALIST

## 2023-07-26 PROCEDURE — 87205 SMEAR GRAM STAIN: CPT | Performed by: NURSE PRACTITIONER

## 2023-07-26 PROCEDURE — 87070 CULTURE OTHR SPECIMN AEROBIC: CPT | Performed by: SURGERY

## 2023-07-26 PROCEDURE — 87186 SC STD MICRODIL/AGAR DIL: CPT | Performed by: SURGERY

## 2023-07-26 PROCEDURE — 87205 SMEAR GRAM STAIN: CPT | Performed by: SURGERY

## 2023-07-26 PROCEDURE — 87186 SC STD MICRODIL/AGAR DIL: CPT | Performed by: NURSE PRACTITIONER

## 2023-07-26 PROCEDURE — 80053 COMPREHEN METABOLIC PANEL: CPT | Performed by: HOSPITALIST

## 2023-07-26 PROCEDURE — 82948 REAGENT STRIP/BLOOD GLUCOSE: CPT

## 2023-07-26 PROCEDURE — 87070 CULTURE OTHR SPECIMN AEROBIC: CPT | Performed by: NURSE PRACTITIONER

## 2023-07-26 PROCEDURE — 0J9H0ZX DRAINAGE OF LEFT LOWER ARM SUBCUTANEOUS TISSUE AND FASCIA, OPEN APPROACH, DIAGNOSTIC: ICD-10-PCS | Performed by: SURGERY

## 2023-07-26 PROCEDURE — 93306 TTE W/DOPPLER COMPLETE: CPT

## 2023-07-26 PROCEDURE — 93306 TTE W/DOPPLER COMPLETE: CPT | Performed by: INTERNAL MEDICINE

## 2023-07-26 PROCEDURE — 87147 CULTURE TYPE IMMUNOLOGIC: CPT | Performed by: SURGERY

## 2023-07-26 PROCEDURE — 85025 COMPLETE CBC W/AUTO DIFF WBC: CPT | Performed by: HOSPITALIST

## 2023-07-26 PROCEDURE — 99232 SBSQ HOSP IP/OBS MODERATE 35: CPT | Performed by: SURGERY

## 2023-07-26 PROCEDURE — 87040 BLOOD CULTURE FOR BACTERIA: CPT | Performed by: STUDENT IN AN ORGANIZED HEALTH CARE EDUCATION/TRAINING PROGRAM

## 2023-07-26 PROCEDURE — 99232 SBSQ HOSP IP/OBS MODERATE 35: CPT | Performed by: STUDENT IN AN ORGANIZED HEALTH CARE EDUCATION/TRAINING PROGRAM

## 2023-07-26 RX ORDER — LIDOCAINE HYDROCHLORIDE AND EPINEPHRINE 10; 10 MG/ML; UG/ML
40 INJECTION, SOLUTION INFILTRATION; PERINEURAL ONCE
Status: COMPLETED | OUTPATIENT
Start: 2023-07-26 | End: 2023-07-26

## 2023-07-26 RX ORDER — LORAZEPAM 2 MG/ML
0.5 INJECTION INTRAMUSCULAR ONCE
Status: COMPLETED | OUTPATIENT
Start: 2023-07-26 | End: 2023-07-26

## 2023-07-26 RX ORDER — HYDROMORPHONE HCL/PF 1 MG/ML
0.5 SYRINGE (ML) INJECTION
Status: DISCONTINUED | OUTPATIENT
Start: 2023-07-26 | End: 2023-08-01 | Stop reason: HOSPADM

## 2023-07-26 RX ORDER — HYDROMORPHONE HCL/PF 1 MG/ML
0.5 SYRINGE (ML) INJECTION ONCE
Status: COMPLETED | OUTPATIENT
Start: 2023-07-26 | End: 2023-07-26

## 2023-07-26 RX ORDER — INSULIN LISPRO 100 [IU]/ML
14 INJECTION, SOLUTION INTRAVENOUS; SUBCUTANEOUS
Status: DISCONTINUED | OUTPATIENT
Start: 2023-07-26 | End: 2023-08-01 | Stop reason: HOSPADM

## 2023-07-26 RX ORDER — HYDROMORPHONE HCL/PF 1 MG/ML
0.5 SYRINGE (ML) INJECTION ONCE
Status: DISCONTINUED | OUTPATIENT
Start: 2023-07-26 | End: 2023-08-01 | Stop reason: HOSPADM

## 2023-07-26 RX ADMIN — VANCOMYCIN HYDROCHLORIDE 750 MG: 750 INJECTION, SOLUTION INTRAVENOUS at 21:33

## 2023-07-26 RX ADMIN — DOCUSATE SODIUM 100 MG: 100 CAPSULE, LIQUID FILLED ORAL at 08:45

## 2023-07-26 RX ADMIN — ACETAMINOPHEN 975 MG: 325 TABLET ORAL at 13:51

## 2023-07-26 RX ADMIN — INSULIN GLARGINE 55 UNITS: 100 INJECTION, SOLUTION SUBCUTANEOUS at 08:48

## 2023-07-26 RX ADMIN — SENNOSIDES 17.2 MG: 8.6 TABLET, FILM COATED ORAL at 21:24

## 2023-07-26 RX ADMIN — Medication 400 MG: at 08:47

## 2023-07-26 RX ADMIN — PREGABALIN 100 MG: 100 CAPSULE ORAL at 21:26

## 2023-07-26 RX ADMIN — LUBIPROSTONE 24 MCG: 24 CAPSULE, GELATIN COATED ORAL at 08:46

## 2023-07-26 RX ADMIN — PREGABALIN 100 MG: 100 CAPSULE ORAL at 08:45

## 2023-07-26 RX ADMIN — HYDROMORPHONE HYDROCHLORIDE 0.5 MG: 1 INJECTION, SOLUTION INTRAMUSCULAR; INTRAVENOUS; SUBCUTANEOUS at 15:23

## 2023-07-26 RX ADMIN — DOCUSATE SODIUM 100 MG: 100 CAPSULE, LIQUID FILLED ORAL at 17:47

## 2023-07-26 RX ADMIN — INSULIN LISPRO 14 UNITS: 100 INJECTION, SOLUTION INTRAVENOUS; SUBCUTANEOUS at 08:47

## 2023-07-26 RX ADMIN — AMITRIPTYLINE HYDROCHLORIDE 25 MG: 25 TABLET, FILM COATED ORAL at 21:26

## 2023-07-26 RX ADMIN — CHOLECALCIFEROL TAB 10 MCG (400 UNIT) 400 UNITS: 10 TAB at 08:45

## 2023-07-26 RX ADMIN — Medication 5 MG: at 05:18

## 2023-07-26 RX ADMIN — LORAZEPAM 0.5 MG: 2 INJECTION INTRAMUSCULAR; INTRAVENOUS at 15:03

## 2023-07-26 RX ADMIN — VANCOMYCIN HYDROCHLORIDE 750 MG: 750 INJECTION, SOLUTION INTRAVENOUS at 10:56

## 2023-07-26 RX ADMIN — CITALOPRAM HYDROBROMIDE 40 MG: 20 TABLET ORAL at 08:47

## 2023-07-26 RX ADMIN — LEVETIRACETAM 500 MG: 500 TABLET, FILM COATED ORAL at 08:47

## 2023-07-26 RX ADMIN — FENOFIBRATE 48 MG: 48 TABLET, FILM COATED ORAL at 08:45

## 2023-07-26 RX ADMIN — ACETAMINOPHEN 975 MG: 325 TABLET ORAL at 21:24

## 2023-07-26 RX ADMIN — INSULIN LISPRO 14 UNITS: 100 INJECTION, SOLUTION INTRAVENOUS; SUBCUTANEOUS at 12:31

## 2023-07-26 RX ADMIN — INSULIN LISPRO 2 UNITS: 100 INJECTION, SOLUTION INTRAVENOUS; SUBCUTANEOUS at 07:20

## 2023-07-26 RX ADMIN — HEPARIN SODIUM 5000 UNITS: 5000 INJECTION INTRAVENOUS; SUBCUTANEOUS at 21:35

## 2023-07-26 RX ADMIN — PREGABALIN 100 MG: 100 CAPSULE ORAL at 15:23

## 2023-07-26 RX ADMIN — HEPARIN SODIUM 5000 UNITS: 5000 INJECTION INTRAVENOUS; SUBCUTANEOUS at 13:50

## 2023-07-26 RX ADMIN — ACETAMINOPHEN 975 MG: 325 TABLET ORAL at 05:19

## 2023-07-26 RX ADMIN — LIDOCAINE HYDROCHLORIDE,EPINEPHRINE BITARTRATE 40 ML: 10; .01 INJECTION, SOLUTION INFILTRATION; PERINEURAL at 15:03

## 2023-07-26 RX ADMIN — INSULIN LISPRO 2 UNITS: 100 INJECTION, SOLUTION INTRAVENOUS; SUBCUTANEOUS at 21:27

## 2023-07-26 RX ADMIN — INSULIN LISPRO 2 UNITS: 100 INJECTION, SOLUTION INTRAVENOUS; SUBCUTANEOUS at 16:17

## 2023-07-26 RX ADMIN — FAMOTIDINE 20 MG: 20 TABLET, FILM COATED ORAL at 21:23

## 2023-07-26 RX ADMIN — LUBIPROSTONE 24 MCG: 24 CAPSULE, GELATIN COATED ORAL at 21:23

## 2023-07-26 RX ADMIN — B-COMPLEX W/ C & FOLIC ACID TAB 1 TABLET: TAB at 08:46

## 2023-07-26 RX ADMIN — INSULIN LISPRO 14 UNITS: 100 INJECTION, SOLUTION INTRAVENOUS; SUBCUTANEOUS at 16:18

## 2023-07-26 RX ADMIN — LEVETIRACETAM 500 MG: 500 TABLET, FILM COATED ORAL at 21:25

## 2023-07-26 RX ADMIN — Medication 5 MG: at 23:58

## 2023-07-26 RX ADMIN — METOPROLOL TARTRATE 25 MG: 25 TABLET, FILM COATED ORAL at 21:25

## 2023-07-26 RX ADMIN — ATORVASTATIN CALCIUM 80 MG: 80 TABLET, FILM COATED ORAL at 21:23

## 2023-07-26 RX ADMIN — ASPIRIN 81 MG 81 MG: 81 TABLET ORAL at 08:47

## 2023-07-26 RX ADMIN — HEPARIN SODIUM 5000 UNITS: 5000 INJECTION INTRAVENOUS; SUBCUTANEOUS at 05:19

## 2023-07-26 RX ADMIN — INSULIN LISPRO 2 UNITS: 100 INJECTION, SOLUTION INTRAVENOUS; SUBCUTANEOUS at 10:59

## 2023-07-26 RX ADMIN — HYDROMORPHONE HYDROCHLORIDE 0.5 MG: 1 INJECTION, SOLUTION INTRAMUSCULAR; INTRAVENOUS; SUBCUTANEOUS at 14:43

## 2023-07-26 RX ADMIN — Medication 2.5 MG: at 04:13

## 2023-07-26 NOTE — PROGRESS NOTES
[address]  Progress Note  Name: Quincy King  MRN: [id number]  Unit/Bed#: ICU 09-01 I Date of Admission: 7/24/2023   Date of Service: 7/26/2023 I Hospital Day: 2    Assessment/Plan   * Cellulitis of left upper extremity  Assessment & Plan  · Left upper extremity cellulitis has improved  · Inpatient imaging thus far:   · #1. CT scan left upper extremity without contrast-Edema surrounding the biceps muscle, may represent myositis   · #2. Ultrasound soft tissue left upper extremity-Complex appearing hypoechoic region with surrounding edema noted in the subcutaneous tissue likely indicative of inflammatory phlegmon and/or cellulitis. No discrete abscess. · #3.   Ultrasound to rule out DVT of the left upper extremity- negative for DVT  · Status post an ID evaluation and a general surgical evaluation  · 1 out of 2 blood cultures from 7/24/2023 revealed gram-positive cocci in clusters suggestive of MRSA  · Continue IV vancomycin day 3 -mild bump in white blood cell count noted  · We will add a PT and OT evaluation  · Discharge planning once cleared by ID    Type 2 diabetes mellitus with hyperglycemia, with long-term current use of insulin [facility])  Assessment & Plan  Lab Results   Component Value Date    HGBA1C 12.7 (H) 07/25/2023       Recent Labs     07/25/23  1553 07/25/23  2030 07/25/23  2237 07/26/23  0653   POCGLU 323* 275* 281* 223*       Blood Sugar Average: Last 72 hrs:  (P) [id number]     · Glucose 820 on admission w/out AG elevation, acidosis, ketosis, ketonuria  · Status post treatment with an insulin drip  · Insulin drip was discontinued yesterday on 7/25/2023  · Target blood sugar for the hospital is 140-180  · Continue Lantus, Accu-Cheks before meals and at bedtime, and sliding scale coverage  · We will continue to monitor glucose levels and optimize insulin therapy accordingly  · Diabetic neuropathy- Lyrica    Seizure disorder (HCC)  Assessment & Plan  · Continue Keppra 500 mg p.o. every 12 hours    Hypertension  Assessment & Plan  · Blood pressures controlled  · Continue metoprolol tartrate 25 mg p.o. twice daily    GERD (gastroesophageal reflux disease)  Assessment & Plan  · Continue Pepcid    Acute renal failure superimposed on stage 3a chronic kidney disease [facility])  Assessment & Plan  Lab Results   Component Value Date    EGFR 51 07/26/2023    EGFR 62 07/25/2023    EGFR 52 07/25/2023    CREATININE 1.22 07/26/2023    CREATININE 1.03 07/25/2023    CREATININE 1.19 07/25/2023     · Present on admission  · Has resolved  · Creatinine is at baseline  · Continue to avoid nephrotoxins  · Monitor BMPs    Bipolar disorder ([address])  Assessment & Plan  · Continue home Celexa and Elavil     Obesity (BMI 35.0-39.9 without comorbidity)  Assessment & Plan  · Actual BMI 39.16  · Dietary, weight loss, and lifestyle modification counseling provided    Hypercholesteremia  Assessment & Plan  · Mixed dyslipidemia-continue statin, continue Tricor    Anemia  Assessment & Plan  · Anemia of chronic disease  · H&H is stable    Lower extremity edema  Assessment & Plan  · Bilateral LE w/+1 edema, erythema, and calf pain - L > R  · CTA PE study negative for PE  · Bilateral lower extremity venous Doppler testing is negative for DVT  · No further testing             VTE Prophylaxis:  Heparin    Patient Centered Rounds: I have performed bedside rounds with nursing staff today.     Discussions with Specialists or Other Care Team Provider: Infectious disease, general surgery, case management, nursing  Education and Discussions with Family / Patient: Patient was brought up to par with the plan of care for today    Current Length of Stay: 2 day(s)    Current Patient Status: Inpatient   Certification Statement: The patient will continue to require additional inpatient hospital stay due to Admitted for continued IV antibiotics    Discharge Plan: Discharge planning once cleared by ID    Code Status: Level 1 - Full Code    Subjective:   Patient seen and examined, resting in bed, reports feeling better than yesterday, continues to have left elbow region pain but it is improved over the past 24 hours    Objective:     Vitals:   Temp (24hrs), Av.9 °F (36.6 °C), Min:97.5 °F (36.4 °C), Max:98.3 °F (36.8 °C)    Temp:  [97.5 °F (36.4 °C)-98.3 °F (36.8 °C)] 98.1 °F (36.7 °C)  HR:  [66-93] 93  Resp:  [13-32] 21  BP: (116-135)/(56-66) 135/62  SpO2:  [89 %-95 %] 91 %  Body mass index is 39.16 kg/m². Input and Output Summary (last 24 hours): Intake/Output Summary (Last 24 hours) at 2023 0827  Last data filed at 2023 0600  Gross per 24 hour   Intake 2440.48 ml   Output 1600 ml   Net 840.48 ml       Physical Exam:   Physical Exam  Constitutional:       General: She is not in acute distress. Appearance: Normal appearance. She is normal weight. She is not ill-appearing. HENT:      Head: Normocephalic and atraumatic. Nose: Nose normal.      Mouth/Throat:      Mouth: Mucous membranes are moist.   Eyes:      Extraocular Movements: Extraocular movements intact. Pupils: Pupils are equal, round, and reactive to light. Cardiovascular:      Rate and Rhythm: Normal rate and regular rhythm. Pulses: Normal pulses. Heart sounds: Normal heart sounds. No murmur heard. No friction rub. No gallop. Pulmonary:      Effort: Pulmonary effort is normal. No respiratory distress. Breath sounds: Normal breath sounds. No wheezing, rhonchi or rales. Abdominal:      General: There is no distension. Palpations: Abdomen is soft. There is no mass. Tenderness: There is no abdominal tenderness. Hernia: No hernia is present. Musculoskeletal:         General: No swelling or tenderness. Normal range of motion. Cervical back: Normal range of motion and neck supple. No rigidity. Right lower leg: No edema. Left lower leg: No edema.       Comments: Improved swelling of the left upper extremity, decreased erythema  Trace to 1+ bilateral lower extremity edema noted   Skin:     General: Skin is warm. Capillary Refill: Capillary refill takes less than 2 seconds. Findings: No erythema or rash. Neurological:      General: No focal deficit present. Mental Status: She is alert and oriented to person, place, and time. Mental status is at baseline. Cranial Nerves: No cranial nerve deficit. Motor: No weakness. Psychiatric:         Mood and Affect: Mood normal.         Behavior: Behavior normal.         Additional Data:     Labs:    Results from last 7 days   Lab Units 07/26/23  0423   WBC Thousand/uL 15.90*   HEMOGLOBIN g/dL 10.3*   HEMATOCRIT % 33.1*   PLATELETS Thousands/uL 259   NEUTROS PCT % 76*   LYMPHS PCT % 13*   MONOS PCT % 7   EOS PCT % 2     Results from last 7 days   Lab Units 07/26/23  0423   SODIUM mmol/L 137   POTASSIUM mmol/L 4.3   CHLORIDE mmol/L 100   CO2 mmol/L 27   BUN mg/dL 19   CREATININE mg/dL 1.22   CALCIUM mg/dL 8.8   ALK PHOS U/L 155*   ALT U/L 33   AST U/L 18     Results from last 7 days   Lab Units 07/24/23  1850   INR  0.96     Results from last 7 days   Lab Units 07/26/23  0653 07/25/23  2237 07/25/23  2030 07/25/23  1553 07/25/23  1100 07/25/23  0800 07/25/23  0605 07/25/23  0414 07/25/23  0211 07/25/23  0112 07/25/23  0021 07/24/23  2330   POC GLUCOSE mg/dl 223* 281* 275* 323* 221* 205* 168* 168* 309* 365* 375* 430*     Results from last 7 days   Lab Units 07/25/23  0052   HEMOGLOBIN A1C % 12.7*       * I Have Reviewed All Lab Data Listed Above. * Additional Pertinent Lab Tests Reviewed: [facility] Admission  Reviewed    Imaging:  Imaging Reports Reviewed Today Include: Left upper extremity soft tissue ultrasound, DVT study-see the results as outlined above    Recent Cultures (last 7 days):     Results from last 7 days   Lab Units 07/25/23  0628 07/24/23  1907 07/24/23  1850   BLOOD CULTURE   --  No Growth at 24 hrs.   -- GRAM STAIN RESULT   --   --  Gram positive cocci in clusters*   LEGIONELLA URINARY ANTIGEN  Negative  --   --        Last 24 Hours Medication List:   Current Facility-Administered Medications   Medication Dose Route Frequency Provider Last Rate   • acetaminophen  975 mg Oral Q8H [address] SYLWIA Rothman     • amitriptyline  25 mg Oral HS SYLWIA Rothman     • aspirin  81 mg Oral Daily SYLWIA Rothman     • atorvastatin  80 mg Oral HS SYLWIA Rothman     • cholecalciferol  400 Units Oral Daily SYLWIA Rothman     • citalopram  40 mg Oral Daily SYLWIA Rothman     • docusate sodium  100 mg Oral BID SYLWIA Rothman     • famotidine  20 mg Oral HS SYLWIA Rothman     • fenofibrate  48 mg Oral Daily SYLWIA Rothman     • heparin (porcine)  5,000 Units Subcutaneous Q8H [address] [facility] SYLWIA Solorzano     • insulin glargine  55 Units Subcutaneous QAM Gucci Fuentes MD     • insulin lispro  1-6 Units Subcutaneous TID [facility] Gucci Fuentes MD     • insulin lispro  1-6 Units Subcutaneous HS Gucci Fuentes MD     • levETIRAcetam  500 mg Oral Q12H [address] SYLWIA Rothman     • lubiprostone  24 mcg Oral BID SYLWIA Rothman     • magnesium Oxide  400 mg Oral Daily SYLWIA Rothman     • metoprolol tartrate  25 mg Oral BID SYLWIA Rothman     • multivitamin stress formula  1 tablet Oral QAM SYLWIA Rothman     • oxyCODONE  2.5 mg Oral Q6H PRN SYLWIA Rothman      Or   • oxyCODONE  5 mg Oral Q6H PRN SYLWIA Rothman     • pregabalin  100 mg Oral TID SYLWIA Rothman     • senna  2 tablet Oral HS SYLWIA Rothman     • vancomycin  750 mg Intravenous Q12H SYLWIA Rothman 750 mg (07/25/23 2119)        Today, Patient Was Seen By: Gucci Fuentes MD    ** Please Note: Dictation voice to text software may have been used in the creation of this document.  **

## 2023-07-26 NOTE — PROGRESS NOTES
Progress Note - General Surgery   Walter Beckford 46 y.o. female MRN: [id number]  Unit/Bed#: ICU 09-01 Encounter: [id number]    Assessment:  46year old female with PMH significant for uncontrolled diabetes, HTN, HLD, CKD3, R renal cell carcinoma s/p nephrectomy 2017, seizure disorder, anemia, obesity, GERD presents for the evaluation of LUE cellulitis at previous IV site   -1 out of 2 blood cultures positive for MRSA    -LUE US 7/25: "Focal ill-defined complex-appearing hypoechoic area in the antecubital subcutaneous tissues measuring 3.5 x 2.9 x 1.4 cm most likely reflecting edema and/or inflammatory phlegmon, with less complex appearing edema peripheral to this extending   into the subcutaneous fat. No discrete fluid collection identified to suggest an abscess."   -Upper extremity duplex negative for DVT   -L antecubital fossa erythematous, edematous with small amount of drainage, tender from midbicep to midforearm   -afebrile, VSS on room air   -WBC 15.9 (15.2)   -Alk phos 155 (112)     Plan:  -no surgical intervention indicated at this time, will discuss with attending about possible I&D  -wound culture obtained today, follow up results  -follow up repeat blood cultures  -continue IV vancomycin  -continue warm compresses and compression as needed  -pain and nausea meds prn  -appreciate ID recs  -rest of medical management per SLIM  -will discuss with Dr Lyndsey Baker    Subjective/Objective     Subjective: Patient complaining of LUE pain. She noticed there is now drainage from the antecubital fossa which is different from yesterday. She has been doing the warm compresses and believes they are helping. Denies fever, chills, nausea, vomiting, chest pain. Objective:     Blood pressure 103/62, pulse 79, temperature 98.1 °F (36.7 °C), temperature source Tympanic, resp. rate 21, height 5' 1" (1.549 m), weight 94 kg (207 lb 3.7 oz), SpO2 91 %, not currently breastfeeding. ,Body mass index is 39.16 kg/m².       Intake/Output Summary (Last 24 hours) at 7/26/2023 6342  Last data filed at 7/26/2023 0600  Gross per 24 hour   Intake 2440.48 ml   Output 1600 ml   Net 840.48 ml       Invasive Devices     Peripheral Intravenous Line  Duration           Peripheral IV 07/24/23 Distal;Right;Upper;Ventral (anterior) Arm 2 days              Physical Exam  Constitutional:       Appearance: Normal appearance. She is obese. HENT:      Head: Normocephalic and atraumatic. Nose: Nose normal.      Mouth/Throat:      Mouth: Mucous membranes are moist.      Pharynx: Oropharynx is clear. Eyes:      Conjunctiva/sclera: Conjunctivae normal.      Pupils: Pupils are equal, round, and reactive to light. Cardiovascular:      Rate and Rhythm: Normal rate and regular rhythm. Pulses: Normal pulses. Heart sounds: Normal heart sounds. Pulmonary:      Effort: Pulmonary effort is normal.      Breath sounds: Normal breath sounds. Abdominal:      General: Abdomen is flat. Bowel sounds are normal.      Palpations: Abdomen is soft. Musculoskeletal:      Comments: L antecubital fossa edema, erythema, warmth, with small amount of pus, tender to palpation   Skin:     General: Skin is warm and dry. Neurological:      General: No focal deficit present. Mental Status: She is alert.    Psychiatric:         Mood and Affect: Mood normal.         Lab, Imaging and other studies:  CBC:   Lab Results   Component Value Date    WBC 15.90 (H) 07/26/2023    HGB 10.3 (L) 07/26/2023    HCT 33.1 (L) 07/26/2023    MCV 83 07/26/2023     07/26/2023    RBC 4.01 07/26/2023    MCH 25.7 (L) 07/26/2023    MCHC 31.1 (L) 07/26/2023    RDW 15.9 (H) 07/26/2023    MPV 11.3 07/26/2023    NRBC 0 07/26/2023   , CMP:   Lab Results   Component Value Date    SODIUM 137 07/26/2023    K 4.3 07/26/2023     07/26/2023    CO2 27 07/26/2023    BUN 19 07/26/2023    CREATININE 1.22 07/26/2023    CALCIUM 8.8 07/26/2023    AST 18 07/26/2023    ALT 33 07/26/2023    ALKPHOS 155 (H) 07/26/2023    EGFR 51 07/26/2023     VTE Pharmacologic Prophylaxis: Heparin  VTE Mechanical Prophylaxis: sequential compression device     Pawel Mcmullen PA-C

## 2023-07-26 NOTE — ASSESSMENT & PLAN NOTE
· Left upper extremity cellulitis has improved  · Inpatient imaging thus far:   · #1. CT scan left upper extremity without contrast-Edema surrounding the biceps muscle, may represent myositis   · #2. Ultrasound soft tissue left upper extremity-Complex appearing hypoechoic region with surrounding edema noted in the subcutaneous tissue likely indicative of inflammatory phlegmon and/or cellulitis. No discrete abscess. · #3.   Ultrasound to rule out DVT of the left upper extremity- negative for DVT  · Status post an ID evaluation and a general surgical evaluation  · 1 out of 2 blood cultures from 7/24/2023 revealed gram-positive cocci in clusters suggestive of MRSA  · Continue IV vancomycin day 3 -mild bump in white blood cell count noted  · We will add a PT and OT evaluation  · Discharge planning once cleared by ID

## 2023-07-26 NOTE — DISCHARGE INSTR - OTHER ORDERS
Skin and Wound Care Plan:   1. Apply skin nourishing cream to the skin daily  2. Ehob offloading cushion to chair when OOB  3. Elevate heels off of bed with pillows to offload    4. Turn and reposition patient Q2 hours  5. Cleanse wound to posterior right knee with Remedy foaming cleanser, pat dry.  Apply small amount of Silvasorb gel to the wound bed, cover with Allevyn life silicone bordered foam dressing, sofi with T, date, change daily and PRN

## 2023-07-26 NOTE — ASSESSMENT & PLAN NOTE
· Bilateral LE w/+1 edema, erythema, and calf pain - L > R  · CTA PE study negative for PE  · Bilateral lower extremity venous Doppler testing is negative for DVT  · No further testing

## 2023-07-26 NOTE — PHYSICAL THERAPY NOTE
Physical Therapy Cancellation Note         07/26/23 0917   PT Last Visit   PT Visit Date 07/26/23   Note Type   Note type Evaluation   Cancel Reasons Patient off floor/test  (cardiac testing at bedside)       Lynne Patricia

## 2023-07-26 NOTE — PLAN OF CARE
Problem: METABOLIC, FLUID AND ELECTROLYTES - ADULT  Goal: Electrolytes maintained within normal limits  Description: INTERVENTIONS:  - Monitor labs and assess patient for signs and symptoms of electrolyte imbalances  - Administer electrolyte replacement as ordered  - Monitor response to electrolyte replacements, including repeat lab results as appropriate  - Instruct patient on fluid and nutrition as appropriate  Outcome: Progressing  Goal: Fluid balance maintained  Description: INTERVENTIONS:  - Monitor labs   - Monitor I/O and WT  - Instruct patient on fluid and nutrition as appropriate  - Assess for signs & symptoms of volume excess or deficit  Outcome: Progressing  Goal: Glucose maintained within target range  Description: INTERVENTIONS:  - Monitor Blood Glucose as ordered  - Assess for signs and symptoms of hyperglycemia and hypoglycemia  - Administer ordered medications to maintain glucose within target range  - Assess nutritional intake and initiate nutrition service referral as needed  Outcome: Progressing     Problem: SKIN/TISSUE INTEGRITY - ADULT  Goal: Skin Integrity remains intact(Skin Breakdown Prevention)  Description: Assess:  -Inspect skin when repositioning, toileting, and assisting with ADLS  -Assess extremities for adequate circulation and sensation     Bed Management:  -Have minimal linens on bed & keep smooth, unwrinkled  -Change linens as needed when moist or perspiring    Toileting:  -Offer bedside commode    Activity:  -Mobilize patient *** times a day  -Encourage activity and walks on unit  -Encourage or provide ROM exercises   -Turn and reposition patient every *** Hours  -Use appropriate equipment to lift or move patient in bed  -Instruct/ Assist with weight shifting every *** when out of bed in chair  -Consider limitation of chair time *** hour intervals    Skin Care:  -Avoid use of baby powder, tape, friction and shearing, hot water or constrictive clothing  -Relieve pressure over bony prominences using ***  -Do not massage red bony areas    Next Steps:  -Teach patient strategies to minimize risks such as ***   -Consider consults to  interdisciplinary teams such as ***  Outcome: Progressing  Goal: Incision(s), wounds(s) or drain site(s) healing without S/S of infection  Description: INTERVENTIONS  - Assess and document dressing, incision, wound bed, drain sites and surrounding tissue  - Provide patient and family education  - Perform skin care/dressing changes every ***  Outcome: Progressing  Goal: Pressure injury heals and does not worsen  Description: Interventions:  - Implement low air loss mattress or specialty surface (Criteria met)  - Apply silicone foam dressing  - Instruct/assist with weight shifting every *** minutes when in chair   - Limit chair time to *** hour intervals  - Use special pressure reducing interventions such as *** when in chair   - Apply fecal or urinary incontinence containment device   - Perform passive or active ROM every ***  - Turn and reposition patient & offload bony prominences every *** hours   - Utilize friction reducing device or surface for transfers   - Consider consults to  interdisciplinary teams such as ***  - Use incontinent care products after each incontinent episode such as ***  - Consider nutrition services referral as needed  Outcome: Progressing     Problem: PAIN - ADULT  Goal: Verbalizes/displays adequate comfort level or baseline comfort level  Description: Interventions:  - Encourage patient to monitor pain and request assistance  - Assess pain using appropriate pain scale  - Administer analgesics based on type and severity of pain and evaluate response  - Implement non-pharmacological measures as appropriate and evaluate response  - Consider cultural and social influences on pain and pain management  - Notify physician/advanced practitioner if interventions unsuccessful or patient reports new pain  Outcome: Progressing     Problem: INFECTION - ADULT  Goal: Absence or prevention of progression during hospitalization  Description: INTERVENTIONS:  - Assess and monitor for signs and symptoms of infection  - Monitor lab/diagnostic results  - Monitor all insertion sites, i.e. indwelling lines, tubes, and drains  - Monitor endotracheal if appropriate and nasal secretions for changes in amount and color  - [place] appropriate cooling/warming therapies per order  - Administer medications as ordered  - Instruct and encourage patient and family to use good hand hygiene technique  - Identify and instruct in appropriate isolation precautions for identified infection/condition  Outcome: Progressing  Goal: Absence of fever/infection during neutropenic period  Description: INTERVENTIONS:  - Monitor WBC    Outcome: Progressing     Problem: SAFETY ADULT  Goal: Patient will remain free of falls  Description: INTERVENTIONS:  - Educate patient/family on patient safety including physical limitations  - Instruct patient to call for assistance with activity   - Consult OT/PT to assist with strengthening/mobility   - Keep Call bell within reach  - Keep bed low and locked with side rails adjusted as appropriate  - Keep care items and personal belongings within reach  - Initiate and maintain comfort rounds  - Make Fall Risk Sign visible to staff  - Offer Toileting every *** Hours, in advance of need  - Initiate/Maintain ***alarm  - Obtain necessary fall risk management equipment: ***  - Apply yellow socks and bracelet for high fall risk patients  - Consider moving patient to room near nurses station  Outcome: Progressing  Goal: Maintain or return to baseline ADL function  Description: INTERVENTIONS:  -  Assess patient's ability to carry out ADLs; assess patient's baseline for ADL function and identify physical deficits which impact ability to perform ADLs (bathing, care of mouth/teeth, toileting, grooming, dressing, etc.)  - Assess/evaluate cause of self-care deficits   - Assess range of motion  - Assess patient's mobility; develop plan if impaired  - Assess patient's need for assistive devices and provide as appropriate  - Encourage maximum independence but intervene and supervise when necessary  - Involve family in performance of ADLs  - Assess for home care needs following discharge   - Consider OT consult to assist with ADL evaluation and planning for discharge  - Provide patient education as appropriate  Outcome: Progressing  Goal: Maintains/Returns to pre admission functional level  Description: INTERVENTIONS:  - Perform BMAT or MOVE assessment daily.   - Set and communicate daily mobility goal to care team and patient/family/caregiver. - Collaborate with rehabilitation services on mobility goals if consulted  - Perform Range of Motion *** times a day. - Reposition patient every *** hours.   - Dangle patient *** times a day  - Stand patient *** times a day  - Ambulate patient *** times a day  - Out of bed to chair *** times a day   - Out of bed for meals *** times a day  - Out of bed for toileting  - Record patient progress and toleration of activity level   Outcome: Progressing     Problem: DISCHARGE PLANNING  Goal: Discharge to home or other facility with appropriate resources  Description: INTERVENTIONS:  - Identify barriers to discharge w/patient and caregiver  - Arrange for needed discharge resources and transportation as appropriate  - Identify discharge learning needs (meds, wound care, etc.)  - Arrange for interpretive services to assist at discharge as needed  - Refer to Case Management Department for coordinating discharge planning if the patient needs post-hospital services based on physician/advanced practitioner order or complex needs related to functional status, cognitive ability, or social support system  Outcome: Progressing     Problem: Knowledge Deficit  Goal: Patient/family/caregiver demonstrates understanding of disease process, treatment plan, medications, and discharge instructions  Description: Complete learning assessment and assess knowledge base.   Interventions:  - Provide teaching at level of understanding  - Provide teaching via preferred learning methods  Outcome: Progressing     Problem: Prexisting or High Potential for Compromised Skin Integrity  Goal: Skin integrity is maintained or improved  Description: INTERVENTIONS:  - Identify patients at risk for skin breakdown  - Assess and monitor skin integrity  - Assess and monitor nutrition and hydration status  - Monitor labs   - Assess for incontinence   - Turn and reposition patient  - Assist with mobility/ambulation  - Relieve pressure over bony prominences  - Avoid friction and shearing  - Provide appropriate hygiene as needed including keeping skin clean and dry  - Evaluate need for skin moisturizer/barrier cream  - Collaborate with interdisciplinary team   - Patient/family teaching  - Consider wound care consult   Outcome: Progressing     Problem: MOBILITY - ADULT  Goal: Maintain or return to baseline ADL function  Description: INTERVENTIONS:  -  Assess patient's ability to carry out ADLs; assess patient's baseline for ADL function and identify physical deficits which impact ability to perform ADLs (bathing, care of mouth/teeth, toileting, grooming, dressing, etc.)  - Assess/evaluate cause of self-care deficits   - Assess range of motion  - Assess patient's mobility; develop plan if impaired  - Assess patient's need for assistive devices and provide as appropriate  - Encourage maximum independence but intervene and supervise when necessary  - Involve family in performance of ADLs  - Assess for home care needs following discharge   - Consider OT consult to assist with ADL evaluation and planning for discharge  - Provide patient education as appropriate  Outcome: Progressing  Goal: Maintains/Returns to pre admission functional level  Description: INTERVENTIONS:  - Perform BMAT or MOVE assessment daily.   - Set and communicate daily mobility goal to care team and patient/family/caregiver. - Collaborate with rehabilitation services on mobility goals if consulted  - Perform Range of Motion *** times a day. - Reposition patient every *** hours.   - Dangle patient *** times a day  - Stand patient *** times a day  - Ambulate patient *** times a day  - Out of bed to chair *** times a day   - Out of bed for meals *** times a day  - Out of bed for toileting  - Record patient progress and toleration of activity level   Outcome: Progressing

## 2023-07-26 NOTE — NURSING NOTE
I+D of Left arm abscess completed at bedside by Dr. Radha Berger; Patient medicated prior to procedure for comfort; Patient identifiers intact; Patient verbalized understanding of procedure; Wound irrigated and packed; with daily wound orders in Epic chart. Patient tolerated procedure well.

## 2023-07-26 NOTE — ASSESSMENT & PLAN NOTE
Lab Results   Component Value Date    HGBA1C 12.7 (H) 07/25/2023       Recent Labs     07/25/23  1553 07/25/23  2030 07/25/23  2237 07/26/23  0653   POCGLU 323* 275* 281* 223*       Blood Sugar Average: Last 72 hrs:  (P) [id number]     · Glucose 820 on admission w/out AG elevation, acidosis, ketosis, ketonuria  · Status post treatment with an insulin drip  · Insulin drip was discontinued yesterday on 7/25/2023  · Target blood sugar for the hospital is 140-180  · Continue Lantus, Accu-Cheks before meals and at bedtime, and sliding scale coverage  · We will continue to monitor glucose levels and optimize insulin therapy accordingly  · Diabetic neuropathy- Lyrica

## 2023-07-26 NOTE — PLAN OF CARE
Problem: METABOLIC, FLUID AND ELECTROLYTES - ADULT  Goal: Electrolytes maintained within normal limits  Description: INTERVENTIONS:  - Monitor labs and assess patient for signs and symptoms of electrolyte imbalances  - Administer electrolyte replacement as ordered  - Monitor response to electrolyte replacements, including repeat lab results as appropriate  - Instruct patient on fluid and nutrition as appropriate  Outcome: Progressing  Goal: Fluid balance maintained  Description: INTERVENTIONS:  - Monitor labs   - Monitor I/O and WT  - Instruct patient on fluid and nutrition as appropriate  - Assess for signs & symptoms of volume excess or deficit  Outcome: Progressing  Goal: Glucose maintained within target range  Description: INTERVENTIONS:  - Monitor Blood Glucose as ordered  - Assess for signs and symptoms of hyperglycemia and hypoglycemia  - Administer ordered medications to maintain glucose within target range  - Assess nutritional intake and initiate nutrition service referral as needed  Outcome: Progressing     Problem: PAIN - ADULT  Goal: Verbalizes/displays adequate comfort level or baseline comfort level  Description: Interventions:  - Encourage patient to monitor pain and request assistance  - Assess pain using appropriate pain scale  - Administer analgesics based on type and severity of pain and evaluate response  - Implement non-pharmacological measures as appropriate and evaluate response  - Consider cultural and social influences on pain and pain management  - Notify physician/advanced practitioner if interventions unsuccessful or patient reports new pain  Outcome: Progressing     Problem: SAFETY ADULT  Goal: Patient will remain free of falls  Description: INTERVENTIONS:  - Educate patient/family on patient safety including physical limitations  - Instruct patient to call for assistance with activity   - Consult OT/PT to assist with strengthening/mobility   - Keep Call bell within reach  - Keep bed low and locked with side rails adjusted as appropriate  - Keep care items and personal belongings within reach  - Initiate and maintain comfort rounds  - Make Fall Risk Sign visible to staff  - Offer Toileting every 2  Hours, in advance of need  - Initiate/Maintain bed alarm  - Obtain necessary fall risk management equipment: walker  - Apply yellow socks and bracelet for high fall risk patients  - Consider moving patient to room near nurses station  Outcome: Progressing  Goal: Maintain or return to baseline ADL function  Description: INTERVENTIONS:  -  Assess patient's ability to carry out ADLs; assess patient's baseline for ADL function and identify physical deficits which impact ability to perform ADLs (bathing, care of mouth/teeth, toileting, grooming, dressing, etc.)  - Assess/evaluate cause of self-care deficits   - Assess range of motion  - Assess patient's mobility; develop plan if impaired  - Assess patient's need for assistive devices and provide as appropriate  - Encourage maximum independence but intervene and supervise when necessary  - Involve family in performance of ADLs  - Assess for home care needs following discharge   - Consider OT consult to assist with ADL evaluation and planning for discharge  - Provide patient education as appropriate  Outcome: Progressing       Problem: Knowledge Deficit  Goal: Patient/family/caregiver demonstrates understanding of disease process, treatment plan, medications, and discharge instructions  Description: Complete learning assessment and assess knowledge base.   Interventions:  - Provide teaching at level of understanding  - Provide teaching via preferred learning methods  Outcome: Progressing     Problem: MOBILITY - ADULT  Goal: Maintain or return to baseline ADL function  Description: INTERVENTIONS:  -  Assess patient's ability to carry out ADLs; assess patient's baseline for ADL function and identify physical deficits which impact ability to perform ADLs (bathing, care of mouth/teeth, toileting, grooming, dressing, etc.)  - Assess/evaluate cause of self-care deficits   - Assess range of motion  - Assess patient's mobility; develop plan if impaired  - Assess patient's need for assistive devices and provide as appropriate  - Encourage maximum independence but intervene and supervise when necessary  - Involve family in performance of ADLs  - Assess for home care needs following discharge   - Consider OT consult to assist with ADL evaluation and planning for discharge  - Provide patient education as appropriate  Outcome: Progressing   level   Outcome: Progressing

## 2023-07-26 NOTE — PROGRESS NOTES
Rossana Eugene is a 46 y.o. female who is currently ordered Vancomycin IV with management by the Pharmacy Consult service. Relevant clinical data and objective / subjective history reviewed. Vancomycin Assessment:  Indication and Goal AUC/Trough: Bacteremia (goal -600, trough >10), -600, trough >10  Clinical Status: stable  Micro:     Renal Function:  SCr: 1.22 mg/dL  CrCl: 56.5 mL/min  Renal replacement: Not on dialysis  Days of Therapy: 2  Current Dose: 750 mg q12  Vancomycin Plan:  New Dosing: continue 750 mg q12  Estimated AUC: 485 mcg*hr/mL  Estimated Trough: 16 mcg/mL  Next Level: 7/27 6am  Renal Function Monitoring: Daily BMP and UOP  Pharmacy will continue to follow closely for s/sx of nephrotoxicity, infusion reactions and appropriateness of therapy. BMP and CBC will be ordered per protocol. We will continue to follow the patient’s culture results and clinical progress daily.     Vanessa Wolf, Pharmacist

## 2023-07-26 NOTE — ASSESSMENT & PLAN NOTE
Lab Results   Component Value Date    EGFR 51 07/26/2023    EGFR 62 07/25/2023    EGFR 52 07/25/2023    CREATININE 1.22 07/26/2023    CREATININE 1.03 07/25/2023    CREATININE 1.19 07/25/2023     · Present on admission  · Has resolved  · Creatinine is at baseline  · Continue to avoid nephrotoxins  · Monitor BMPs

## 2023-07-26 NOTE — PROGRESS NOTES
Progress Note - Infectious Disease   Cailin Stapleton 46 y.o. female MRN: [id number]  Unit/Bed#: ICU 09-01 Encounter: [id number]      Impression/Plan:    1. Left antecubital fossa abscess. At prior pIV site. CT with possible myositis this was noncontrast study. U/S with phlegmon present but clinically concern for developing superficial abscess in the left antecubital fossa. Dopplers without DVT. Complicated by MRSA bacteremia. The patient has mild leukocytosis but is afebrile and clinically stable. -Continue IV vancomycin, dosing by pharmacy              -Follow-up blood cultures, wound culture              -Surgery following, plan for I&D              -Recommend arm elevation, warm compresses              -Monitor clinical exam     2. MRSA bacteremia. 1/2 admission blood culture sets is positive. Due to #1 above. TTE no vegetations. No indwelling intravascular devices present.   -follow up blood cultures   -repeat blood cultures ordered   -await PICC placement until repeat blood cultures negative x 72 hours   -anticipate a 4 week course of IV antibiotics     3. Leukocytosis. Present on admission. Due to #1/2 above. The patient is afebrile and hemodynamically stable.              -antibiotics as above              -follow up blood cultures              -monitor fever curve, WBC count     3. Poorly controlled type 2 diabetes mellitus with hyperglycemia. HbA1c 12.6%. Blood sugar was over 800 on admission and she was started on an insulin drip. This is a risk factor for infection              -Glycemic management per primary team     4. LINDY on CKD. Creatinine 1.35 on admission, slightly above baseline around 1. Likely prerenal due to infection and dehydration.              -Monitor creatinine              -IVF per primary     5. Obesity. BMI 36     I have discussed the above management plan in detail with the primary service and patient. ID will follow.     Antibiotics:  Vancomycin day 3    Subjective: The patient reports ongoing pain in the left antecubital fossa. She denies fever, chills, nausea, diarrhea. Reports ongoing fatigue. Objective:  Vitals:  Temp:  [97.5 °F (36.4 °C)-98.3 °F (36.8 °C)] 98.1 °F (36.7 °C)  HR:  [66-93] 68  Resp:  [13-26] 21  BP: (103-135)/(56-66) 103/62  SpO2:  [89 %-95 %] 91 %  Temp (24hrs), Av.9 °F (36.6 °C), Min:97.5 °F (36.4 °C), Max:98.3 °F (36.8 °C)  Current: Temperature: 98.1 °F (36.7 °C)    Physical Exam:   General Appearance:  Alert, interactive, nontoxic, no acute distress. Throat: Oropharynx moist without lesions. Lungs:   Clear to auscultation bilaterally; no wheezes, rhonchi or rales; respirations unlabored   Heart:  RRR; no murmur, rub or gallop   Abdomen:   Soft, non-tender, non-distended, positive bowel sounds. Extremities: No clubbing, cyanosis or edema   Skin: Left antecubital fossa induration, erythema       Labs:    All pertinent labs and imaging studies were personally reviewed  Results from last 7 days   Lab Units 23  0423 23  0606 23  1850   WBC Thousand/uL 15.90* 15.29* 13.25*   HEMOGLOBIN g/dL 10.3* 9.7* 10.9*   PLATELETS Thousands/uL 259 206 201     Results from last 7 days   Lab Units 23  0423 23  0606 23  0052 23  1850   SODIUM mmol/L 137 134* 131* 123*   POTASSIUM mmol/L 4.3 4.0 4.3 4.7   CHLORIDE mmol/L 100 102 99 91*   CO2 mmol/L  24 23   BUN mg/dL 19  23 24   CREATININE mg/dL 1.22 1.03 1.19 1.35*   EGFR ml/min/1.73sq m 51 62 52 45   CALCIUM mg/dL 8.8 8.1* 8.4 8.1*   AST U/L 18 14  --  23   ALT U/L 33 31  --  42   ALK PHOS U/L 155* 112*  --  140*     Results from last 7 days   Lab Units 23  0606 23  1850   PROCALCITONIN ng/ml 0.65* 0.42*         Results from last 7 days   Lab Units 23  0052   FERRITIN ng/mL 128     Results from last 7 days   Lab Units 23  1850   D-DIMER QUANTITATIVE ug/ml FEU 1.94*       Micro:  Results from last 7 days   Lab Units 07/25/23  0628 07/24/23  1907 07/24/23  1850   BLOOD CULTURE   --  No Growth at 24 hrs. Methicillin Resistant Staphylococcus aureus*   GRAM STAIN RESULT   --   --  Gram positive cocci in clusters*   LEGIONELLA URINARY ANTIGEN  Negative  --   --        Imaging:  I have personally reviewed pertinent imaging study reports and images in PACS.      Soft tissue U/S: phlegmon of left antecubital fossa

## 2023-07-26 NOTE — WOUND OSTOMY CARE
Consult Note - Wound   Tasia Dunn 46 y.o. female MRN: [id number]  Unit/Bed#: ICU 09-01 Encounter: [id number]    History and Present Illness:  46year old female patient admitted with cellulitis of the left upper extremity. Wound care consulted for wounds. Patient is being followed by surgery for the left antecubital wound abscess. Patient history significant for bipolar disorder, DDM2, diabetic polyneuropathy, HTN, obesity with a BMI of 39.11, anemia, lower extremity edema, clear cell CA of the kidney, depression and flank pain. Assessment Findings:   Patient in bed for assessment she is on critical care bed. Primary RN present for assessment. Patient is able to turn with minimal assist to her side. Buttocks and sacrum intact per RN. She is not incontinent. She is able to feed herself, is able to ambulate with supervision and reposition in bed independently. Warm compress to the left antecubital area. 1. Per RN, patient has no other skin issues. 2. POA-Left posterior knee open wound bed, per RN this was present as a pustule and now has opened. Beefy red, no drainage, no erythema or induration, pink periwound. 3. Left anterior knee, small pustule noted, not open or drainage. Skin and Wound Care Plan:   1. Apply skin nourishing cream to the skin daily  2. [place] offloading cushion to chair when OOB  3. Elevate heels off of bed with pillows to offload    4. Turn and reposition patient Q2 hours  5. Cleanse wound to posterior right knee with Remedy foaming cleanser, pat dry. Apply small amount of Silvasorb gel to the wound bed, cover with Allevyn life silicone bordered foam dressing, sofi with T, date, change daily and PRN    Wounds:  Wound 07/24/23 Other (comment) Catheter entry/exit site Arm Left (Active)   Wound Description Edema; Beefy red;Drainage;Fragile; Swelling; White 07/26/23 1200   [place]-wound Assessment Clean;Dry; Intact;Fragile;Pink 07/26/23 1200   Wound Site Closure Other (Comment) 07/26/23 1200   Drainage Amount Small 07/26/23 1200   Drainage Description Serous 07/26/23 1200   Non-staged Wound Description Not applicable 09/17/06 0218   Treatments Cleansed;Site care;Elevated 07/26/23 0800   Dressing Dry dressing; Other (Comment) 07/26/23 1200       Wound 07/25/23 Knee Left;Posterior (Active)   Wound Image   07/26/23 1335   Wound Description Beefy red 07/26/23 1335   [place]-wound Assessment [place] 07/26/23 1335   Wound Length (cm) 0.5 cm 07/26/23 1335   Wound Width (cm) 0.6 cm 07/26/23 1335   Wound Depth (cm) 0.1 cm 07/26/23 1335   Wound Surface Area (cm^2) 0.3 cm^2 07/26/23 1335   Wound Volume (cm^3) 0.03 cm^3 07/26/23 1335   Calculated Wound Volume (cm^3) 0.03 cm^3 07/26/23 1335   Drainage Amount None 07/26/23 1335   Non-staged Wound Description Partial thickness 07/26/23 1335   Treatments Cleansed 07/26/23 1335   Dressing Foam, Silicon (eg. Allevyn, etc); Silvasorb gel 07/26/23 1335   Dressing Changed Changed 07/26/23 1335   Patient Tolerance Tolerated well 07/26/23 1335     Reviewed plan of care with primary RN Mik Florian  Recommendations written as orders  Wound care team to follow weekly while admitted  Questions or concerns [phone] [facility] BSN, RN, Janet Alas

## 2023-07-27 LAB
ANION GAP SERPL CALCULATED.3IONS-SCNC: 7 MMOL/L
BASOPHILS # BLD MANUAL: 0 THOUSAND/UL (ref 0–0.1)
BASOPHILS NFR MAR MANUAL: 0 % (ref 0–1)
BUN SERPL-MCNC: 21 MG/DL (ref 5–25)
CALCIUM SERPL-MCNC: 8.6 MG/DL (ref 8.4–10.2)
CHLORIDE SERPL-SCNC: 102 MMOL/L (ref 96–108)
CO2 SERPL-SCNC: 29 MMOL/L (ref 21–32)
CREAT SERPL-MCNC: 1.22 MG/DL (ref 0.6–1.3)
EOSINOPHIL # BLD MANUAL: 0.26 THOUSAND/UL (ref 0–0.4)
EOSINOPHIL NFR BLD MANUAL: 2 % (ref 0–6)
ERYTHROCYTE [DISTWIDTH] IN BLOOD BY AUTOMATED COUNT: 15.9 % (ref 11.6–15.1)
GFR SERPL CREATININE-BSD FRML MDRD: 51 ML/MIN/1.73SQ M
GLUCOSE SERPL-MCNC: 133 MG/DL (ref 65–140)
GLUCOSE SERPL-MCNC: 144 MG/DL (ref 65–140)
GLUCOSE SERPL-MCNC: 205 MG/DL (ref 65–140)
GLUCOSE SERPL-MCNC: 222 MG/DL (ref 65–140)
GLUCOSE SERPL-MCNC: 244 MG/DL (ref 65–140)
HCT VFR BLD AUTO: 31.4 % (ref 34.8–46.1)
HGB BLD-MCNC: 10 G/DL (ref 11.5–15.4)
LYMPHOCYTES # BLD AUTO: 2.55 THOUSAND/UL (ref 0.6–4.47)
LYMPHOCYTES # BLD AUTO: 20 % (ref 14–44)
MCH RBC QN AUTO: 26.6 PG (ref 26.8–34.3)
MCHC RBC AUTO-ENTMCNC: 31.8 G/DL (ref 31.4–37.4)
MCV RBC AUTO: 84 FL (ref 82–98)
MONOCYTES # BLD AUTO: 0.77 THOUSAND/UL (ref 0–1.22)
MONOCYTES NFR BLD: 6 % (ref 4–12)
NEUTROPHILS # BLD MANUAL: 9.19 THOUSAND/UL (ref 1.85–7.62)
NEUTS BAND NFR BLD MANUAL: 4 % (ref 0–8)
NEUTS SEG NFR BLD AUTO: 68 % (ref 43–75)
PLATELET # BLD AUTO: 258 THOUSANDS/UL (ref 149–390)
PLATELET BLD QL SMEAR: ADEQUATE
PMV BLD AUTO: 11.1 FL (ref 8.9–12.7)
POTASSIUM SERPL-SCNC: 4.6 MMOL/L (ref 3.5–5.3)
RBC # BLD AUTO: 3.76 MILLION/UL (ref 3.81–5.12)
RBC MORPH BLD: NORMAL
SODIUM SERPL-SCNC: 138 MMOL/L (ref 135–147)
VANCOMYCIN SERPL-MCNC: 22.4 UG/ML (ref 10–20)
WBC # BLD AUTO: 12.77 THOUSAND/UL (ref 4.31–10.16)

## 2023-07-27 PROCEDURE — 85007 BL SMEAR W/DIFF WBC COUNT: CPT | Performed by: HOSPITALIST

## 2023-07-27 PROCEDURE — 97163 PT EVAL HIGH COMPLEX 45 MIN: CPT

## 2023-07-27 PROCEDURE — 99232 SBSQ HOSP IP/OBS MODERATE 35: CPT | Performed by: HOSPITALIST

## 2023-07-27 PROCEDURE — 99232 SBSQ HOSP IP/OBS MODERATE 35: CPT | Performed by: STUDENT IN AN ORGANIZED HEALTH CARE EDUCATION/TRAINING PROGRAM

## 2023-07-27 PROCEDURE — 99232 SBSQ HOSP IP/OBS MODERATE 35: CPT | Performed by: SURGERY

## 2023-07-27 PROCEDURE — 82948 REAGENT STRIP/BLOOD GLUCOSE: CPT

## 2023-07-27 PROCEDURE — 80048 BASIC METABOLIC PNL TOTAL CA: CPT | Performed by: HOSPITALIST

## 2023-07-27 PROCEDURE — 80202 ASSAY OF VANCOMYCIN: CPT | Performed by: NURSE PRACTITIONER

## 2023-07-27 PROCEDURE — 85027 COMPLETE CBC AUTOMATED: CPT | Performed by: HOSPITALIST

## 2023-07-27 RX ORDER — INSULIN LISPRO 100 [IU]/ML
1-6 INJECTION, SOLUTION INTRAVENOUS; SUBCUTANEOUS
Status: CANCELLED | OUTPATIENT
Start: 2023-07-27

## 2023-07-27 RX ORDER — INSULIN GLARGINE 100 [IU]/ML
30 INJECTION, SOLUTION SUBCUTANEOUS EVERY 12 HOURS SCHEDULED
Status: DISCONTINUED | OUTPATIENT
Start: 2023-07-27 | End: 2023-07-29

## 2023-07-27 RX ADMIN — HYDROMORPHONE HYDROCHLORIDE 0.5 MG: 1 INJECTION, SOLUTION INTRAMUSCULAR; INTRAVENOUS; SUBCUTANEOUS at 11:40

## 2023-07-27 RX ADMIN — CHOLECALCIFEROL TAB 10 MCG (400 UNIT) 400 UNITS: 10 TAB at 09:10

## 2023-07-27 RX ADMIN — INSULIN LISPRO 14 UNITS: 100 INJECTION, SOLUTION INTRAVENOUS; SUBCUTANEOUS at 16:37

## 2023-07-27 RX ADMIN — INSULIN GLARGINE 55 UNITS: 100 INJECTION, SOLUTION SUBCUTANEOUS at 09:16

## 2023-07-27 RX ADMIN — Medication 5 MG: at 17:40

## 2023-07-27 RX ADMIN — HYDROMORPHONE HYDROCHLORIDE 0.5 MG: 1 INJECTION, SOLUTION INTRAMUSCULAR; INTRAVENOUS; SUBCUTANEOUS at 21:58

## 2023-07-27 RX ADMIN — FAMOTIDINE 20 MG: 20 TABLET, FILM COATED ORAL at 21:57

## 2023-07-27 RX ADMIN — LEVETIRACETAM 500 MG: 500 TABLET, FILM COATED ORAL at 09:09

## 2023-07-27 RX ADMIN — LEVETIRACETAM 500 MG: 500 TABLET, FILM COATED ORAL at 21:56

## 2023-07-27 RX ADMIN — INSULIN LISPRO 14 UNITS: 100 INJECTION, SOLUTION INTRAVENOUS; SUBCUTANEOUS at 07:45

## 2023-07-27 RX ADMIN — METOPROLOL TARTRATE 25 MG: 25 TABLET, FILM COATED ORAL at 09:18

## 2023-07-27 RX ADMIN — Medication 5 MG: at 09:00

## 2023-07-27 RX ADMIN — ASPIRIN 81 MG 81 MG: 81 TABLET ORAL at 09:09

## 2023-07-27 RX ADMIN — METOPROLOL TARTRATE 25 MG: 25 TABLET, FILM COATED ORAL at 21:59

## 2023-07-27 RX ADMIN — INSULIN LISPRO 2 UNITS: 100 INJECTION, SOLUTION INTRAVENOUS; SUBCUTANEOUS at 07:45

## 2023-07-27 RX ADMIN — LUBIPROSTONE 24 MCG: 24 CAPSULE, GELATIN COATED ORAL at 10:36

## 2023-07-27 RX ADMIN — ACETAMINOPHEN 975 MG: 325 TABLET ORAL at 05:10

## 2023-07-27 RX ADMIN — VANCOMYCIN HYDROCHLORIDE 1250 MG: 1 INJECTION, POWDER, LYOPHILIZED, FOR SOLUTION INTRAVENOUS at 11:43

## 2023-07-27 RX ADMIN — CITALOPRAM HYDROBROMIDE 40 MG: 20 TABLET ORAL at 09:10

## 2023-07-27 RX ADMIN — DOCUSATE SODIUM 100 MG: 100 CAPSULE, LIQUID FILLED ORAL at 17:36

## 2023-07-27 RX ADMIN — ATORVASTATIN CALCIUM 80 MG: 80 TABLET, FILM COATED ORAL at 21:57

## 2023-07-27 RX ADMIN — PREGABALIN 100 MG: 100 CAPSULE ORAL at 09:10

## 2023-07-27 RX ADMIN — DOCUSATE SODIUM 100 MG: 100 CAPSULE, LIQUID FILLED ORAL at 09:09

## 2023-07-27 RX ADMIN — ACETAMINOPHEN 975 MG: 325 TABLET ORAL at 21:56

## 2023-07-27 RX ADMIN — HEPARIN SODIUM 5000 UNITS: 5000 INJECTION INTRAVENOUS; SUBCUTANEOUS at 13:13

## 2023-07-27 RX ADMIN — INSULIN LISPRO 2 UNITS: 100 INJECTION, SOLUTION INTRAVENOUS; SUBCUTANEOUS at 11:47

## 2023-07-27 RX ADMIN — ACETAMINOPHEN 975 MG: 325 TABLET ORAL at 13:13

## 2023-07-27 RX ADMIN — FENOFIBRATE 48 MG: 48 TABLET, FILM COATED ORAL at 09:09

## 2023-07-27 RX ADMIN — PREGABALIN 100 MG: 100 CAPSULE ORAL at 21:55

## 2023-07-27 RX ADMIN — HEPARIN SODIUM 5000 UNITS: 5000 INJECTION INTRAVENOUS; SUBCUTANEOUS at 05:10

## 2023-07-27 RX ADMIN — B-COMPLEX W/ C & FOLIC ACID TAB 1 TABLET: TAB at 09:09

## 2023-07-27 RX ADMIN — LUBIPROSTONE 24 MCG: 24 CAPSULE, GELATIN COATED ORAL at 23:24

## 2023-07-27 RX ADMIN — AMITRIPTYLINE HYDROCHLORIDE 25 MG: 25 TABLET, FILM COATED ORAL at 21:55

## 2023-07-27 RX ADMIN — INSULIN LISPRO 14 UNITS: 100 INJECTION, SOLUTION INTRAVENOUS; SUBCUTANEOUS at 11:47

## 2023-07-27 RX ADMIN — Medication 400 MG: at 09:09

## 2023-07-27 RX ADMIN — INSULIN GLARGINE 30 UNITS: 100 INJECTION, SOLUTION SUBCUTANEOUS at 21:58

## 2023-07-27 RX ADMIN — PREGABALIN 100 MG: 100 CAPSULE ORAL at 16:42

## 2023-07-27 RX ADMIN — HEPARIN SODIUM 5000 UNITS: 5000 INJECTION INTRAVENOUS; SUBCUTANEOUS at 21:59

## 2023-07-27 RX ADMIN — SENNOSIDES 17.2 MG: 8.6 TABLET, FILM COATED ORAL at 21:55

## 2023-07-27 NOTE — PLAN OF CARE
Problem: METABOLIC, FLUID AND ELECTROLYTES - ADULT  Goal: Electrolytes maintained within normal limits  Description: INTERVENTIONS:  - Monitor labs and assess patient for signs and symptoms of electrolyte imbalances  - Administer electrolyte replacement as ordered  - Monitor response to electrolyte replacements, including repeat lab results as appropriate  - Instruct patient on fluid and nutrition as appropriate  Outcome: Progressing  Goal: Fluid balance maintained  Description: INTERVENTIONS:  - Monitor labs   - Monitor I/O and WT  - Instruct patient on fluid and nutrition as appropriate  - Assess for signs & symptoms of volume excess or deficit  Outcome: Progressing  Goal: Glucose maintained within target range  Description: INTERVENTIONS:  - Monitor Blood Glucose as ordered  - Assess for signs and symptoms of hyperglycemia and hypoglycemia  - Administer ordered medications to maintain glucose within target range  - Assess nutritional intake and initiate nutrition service referral as needed  Outcome: Progressing     Problem: SKIN/TISSUE INTEGRITY - ADULT  Goal: Skin Integrity remains intact(Skin Breakdown Prevention)  Description: Assess:  -Perform [place]un assessment every shift  -Clean and moisturize skin every   -Inspect skin when repositioning, toileting, and assisting with ADLS  -Assess under medical devices such as every   -Assess extremities for adequate circulation and sensation     Bed Management:  -Have minimal linens on bed & keep smooth, unwrinkled  -Change linens as needed when moist or perspiring  -Avoid sitting or lying in one position for more than 2 hours while in bed  -Keep HOB at 30 degrees     Toileting:  -Offer bedside commode  -Assess for incontinence every   -Use incontinent care products after each incontinent episode such as     Activity:  -Mobilize patient 3 times a day  -Encourage activity and walks on unit  -Encourage or provide ROM exercises   -Turn and reposition patient every 2 Hours  -Use appropriate equipment to lift or move patient in bed  -Instruct/ Assist with weight shifting every when out of bed in chair  -Consider limitation of chair time  hour intervals    Skin Care:  -Avoid use of baby powder, tape, friction and shearing, hot water or constrictive clothing  -Relieve pressure over bony prominences using foam dressings  -Do not massage red bony areas    Next Steps:  -Teach patient strategies to minimize risks such as   -Consider consults to  interdisciplinary teams such as wound  Outcome: Progressing  Goal: Incision(s), wounds(s) or drain site(s) healing without S/S of infection  Description: INTERVENTIONS  - Assess and document dressing, incision, wound bed, drain sites and surrounding tissue  - Provide patient and family education  - Perform skin care/dressing changes every shift/daily/as needed, per order  Outcome: Progressing  Goal: Pressure injury heals and does not worsen  Description: Interventions:  - Implement low air loss mattress or specialty surface (Criteria met)  - Apply silicone foam dressing  - Instruct/assist with weight shifting every  minutes when in chair   - Limit chair time to 2 hour intervals  - Use special pressure reducing interventions such as cushions when in chair   - Apply fecal or urinary incontinence containment device   - Perform passive or active ROM every shift  - Turn and reposition patient & offload bony prominences every 2 hours   - Utilize friction reducing device or surface for transfers   - Consider consults to  interdisciplinary teams such as wound care  - Use incontinent care products after each incontinent episode such as   - Consider nutrition services referral as needed  Outcome: Progressing     Problem: PAIN - ADULT  Goal: Verbalizes/displays adequate comfort level or baseline comfort level  Description: Interventions:  - Encourage patient to monitor pain and request assistance  - Assess pain using appropriate pain scale  - Administer analgesics based on type and severity of pain and evaluate response  - Implement non-pharmacological measures as appropriate and evaluate response  - Consider cultural and social influences on pain and pain management  - Notify physician/advanced practitioner if interventions unsuccessful or patient reports new pain  Outcome: Progressing     Problem: INFECTION - ADULT  Goal: Absence or prevention of progression during hospitalization  Description: INTERVENTIONS:  - Assess and monitor for signs and symptoms of infection  - Monitor lab/diagnostic results  - Monitor all insertion sites, i.e. indwelling lines, tubes, and drains  - Monitor endotracheal if appropriate and nasal secretions for changes in amount and color  - [place] appropriate cooling/warming therapies per order  - Administer medications as ordered  - Instruct and encourage patient and family to use good hand hygiene technique  - Identify and instruct in appropriate isolation precautions for identified infection/condition  Outcome: Progressing  Goal: Absence of fever/infection during neutropenic period  Description: INTERVENTIONS:  - Monitor WBC    Outcome: Progressing     Problem: SAFETY ADULT  Goal: Patient will remain free of falls  Description: INTERVENTIONS:  - Educate patient/family on patient safety including physical limitations  - Instruct patient to call for assistance with activity   - Consult OT/PT to assist with strengthening/mobility   - Keep Call bell within reach  - Keep bed low and locked with side rails adjusted as appropriate  - Keep care items and personal belongings within reach  - Initiate and maintain comfort rounds  - Make Fall Risk Sign visible to staff  - Offer Toileting every 2 Hours, in advance of need  - Initiate/Maintain bed alarm  - Obtain necessary fall risk management equipment:   - Apply yellow socks and bracelet for high fall risk patients  - Consider moving patient to room near nurses station  Outcome: Progressing  Goal: Maintain or return to baseline ADL function  Description: INTERVENTIONS:  -  Assess patient's ability to carry out ADLs; assess patient's baseline for ADL function and identify physical deficits which impact ability to perform ADLs (bathing, care of mouth/teeth, toileting, grooming, dressing, etc.)  - Assess/evaluate cause of self-care deficits   - Assess range of motion  - Assess patient's mobility; develop plan if impaired  - Assess patient's need for assistive devices and provide as appropriate  - Encourage maximum independence but intervene and supervise when necessary  - Involve family in performance of ADLs  - Assess for home care needs following discharge   - Consider OT consult to assist with ADL evaluation and planning for discharge  - Provide patient education as appropriate  Outcome: Progressing  Goal: Maintains/Returns to pre admission functional level  Description: INTERVENTIONS:  - Perform BMAT or MOVE assessment daily.   - Set and communicate daily mobility goal to care team and patient/family/caregiver. - Collaborate with rehabilitation services on mobility goals if consulted  - Perform Range of Motion 3 times a day. - Reposition patient every 2 hours.   - Dangle patient 3 times a day  - Stand patient 3 times a day  - Ambulate patient 3 times a day  - Out of bed to chair 3 times a day   - Out of bed for meals 3 times a day  - Out of bed for toileting  - Record patient progress and toleration of activity level   Outcome: Progressing     Problem: DISCHARGE PLANNING  Goal: Discharge to home or other facility with appropriate resources  Description: INTERVENTIONS:  - Identify barriers to discharge w/patient and caregiver  - Arrange for needed discharge resources and transportation as appropriate  - Identify discharge learning needs (meds, wound care, etc.)  - Arrange for interpretive services to assist at discharge as needed  - Refer to Case Management Department for coordinating discharge planning if the patient needs post-hospital services based on physician/advanced practitioner order or complex needs related to functional status, cognitive ability, or social support system  Outcome: Progressing     Problem: Knowledge Deficit  Goal: Patient/family/caregiver demonstrates understanding of disease process, treatment plan, medications, and discharge instructions  Description: Complete learning assessment and assess knowledge base.   Interventions:  - Provide teaching at level of understanding  - Provide teaching via preferred learning methods  Outcome: Progressing     Problem: Prexisting or High Potential for Compromised Skin Integrity  Goal: Skin integrity is maintained or improved  Description: INTERVENTIONS:  - Identify patients at risk for skin breakdown  - Assess and monitor skin integrity  - Assess and monitor nutrition and hydration status  - Monitor labs   - Assess for incontinence   - Turn and reposition patient  - Assist with mobility/ambulation  - Relieve pressure over bony prominences  - Avoid friction and shearing  - Provide appropriate hygiene as needed including keeping skin clean and dry  - Evaluate need for skin moisturizer/barrier cream  - Collaborate with interdisciplinary team   - Patient/family teaching  - Consider wound care consult   Outcome: Progressing     Problem: MOBILITY - ADULT  Goal: Maintain or return to baseline ADL function  Description: INTERVENTIONS:  -  Assess patient's ability to carry out ADLs; assess patient's baseline for ADL function and identify physical deficits which impact ability to perform ADLs (bathing, care of mouth/teeth, toileting, grooming, dressing, etc.)  - Assess/evaluate cause of self-care deficits   - Assess range of motion  - Assess patient's mobility; develop plan if impaired  - Assess patient's need for assistive devices and provide as appropriate  - Encourage maximum independence but intervene and supervise when necessary  - Involve family in performance of ADLs  - Assess for home care needs following discharge   - Consider OT consult to assist with ADL evaluation and planning for discharge  - Provide patient education as appropriate  Outcome: Progressing  Goal: Maintains/Returns to pre admission functional level  Description: INTERVENTIONS:  - Perform BMAT or MOVE assessment daily.   - Set and communicate daily mobility goal to care team and patient/family/caregiver. - Collaborate with rehabilitation services on mobility goals if consulted  - Perform Range of Motion 3 times a day. - Reposition patient every 2 hours.   - Dangle patient 3 times a day  - Stand patient 3 times a day  - Ambulate patient 3 times a day  - Out of bed to chair 3 times a day   - Out of bed for meals 3 times a day  - Out of bed for toileting  - Record patient progress and toleration of activity level   Outcome: Progressing

## 2023-07-27 NOTE — PHYSICAL THERAPY NOTE
Physical Therapy Evaluation     Patient's Name: Khris Lanza    Admitting Diagnosis  Chest pain [R07.9]  Phlebitis of left upper extremity [I80.8]  Cellulitis of left upper extremity [L03.114]  Visit for wound check [Z51.89]    Problem List  Patient Active Problem List   Diagnosis    Radiculopathy, lumbar region    Anterior tibial tendonitis, right    Bipolar disorder (HCC)    Chronic low back pain with sciatica    Type 2 diabetes mellitus with hyperglycemia, with long-term current use of insulin (HCC)    Diabetic polyneuropathy associated with type 2 diabetes mellitus (HCC)    Gastroparesis    Hypertension    Spondylolisthesis of lumbar region    Obesity (BMI 35.0-39.9 without comorbidity)    Clear cell carcinoma of kidney, right (HCC)    Internal hernia    Intra-abdominal adhesions    Depression with anxiety    Mild intermittent asthma without complication    Hypercholesteremia    Hypertriglyceridemia    Iron deficiency anemia secondary to inadequate dietary iron intake    Encounter for post surgical wound check    GERD (gastroesophageal reflux disease)    History of colon polyps    Slow transit constipation    Anemia    History of kidney cancer    Vitamin D deficiency    Benign hypertension with CKD (chronic kidney disease) stage III (HCC)    Secondary hyperparathyroidism (HCC)    Flank pain    Gastric distention    Generalized abdominal pain    Abnormal CT scan, stomach    Migraine    Seizure disorder (HCC)    Cellulitis of left upper extremity    Leukocytosis    Acute renal failure superimposed on stage 3a chronic kidney disease ([address])    Lower extremity edema       Past Medical History  Past Medical History:   Diagnosis Date    Asthma     Atypical chest pain     Depression     Diabetes mellitus (HCC)     Gastroparesis     GERD (gastroesophageal reflux disease)     Hyperlipidemia     Hypertension     Psychiatric disorder     Renal disorder     Sciatica     Seizure disorder [facility])        Past Surgical History  Past Surgical History:   Procedure Laterality Date    APPENDECTOMY      BREAST BIOPSY Left  benign    CHOLECYSTECTOMY      COLONOSCOPY      HYSTERECTOMY      KY LAPAROSCOPIC APPENDECTOMY N/A 03/24/2021    Procedure: APPENDECTOMY LAPAROSCOPIC;  Surgeon: Dev Kong MD;  Location: [address] [facility] MAIN OR;  Service: General    KY LAPS ABD PRTM&OMENTUM DX W/WO [facility] BR/WA SPX N/A 03/24/2021    Procedure: LAPAROSCOPY DIAGNOSTIC, EXTENSIVE DESI;  Surgeon: Dev Kong MD;  Location: [address] [facility] MAIN OR;  Service: General    UPPER GASTROINTESTINAL ENDOSCOPY          07/27/23 0807   PT Last Visit   PT Visit Date 07/27/23   Note Type   Note type Evaluation   Pain Assessment   Pain Assessment Tool 0-10   Pain Score 10 - Worst Possible Pain   Pain Location/Orientation Orientation: Left; Location: Arm   Pain Onset/Description Onset: Ongoing;Frequency: Constant/Continuous; Descriptor: Sore   Effect of Pain on Daily Activities yes   Patient's Stated Pain Goal No pain   Hospital Pain Intervention(s) Elevated; Emotional support; Environmental changes   Multiple Pain Sites Yes   Pain 2   Pain Score 2 5   Pain Location/Orientation 2 Orientation: Bilateral;Location: Leg   Pain Onset/Description 2 Onset: Ongoing;Frequency: Constant/Continuous; Descriptor: Aching   Patient's Stated Pain Goal 2 No pain   Hospital Pain Intervention(s) 2 Repositioned;Elevated   Restrictions/Precautions   Weight Bearing Precautions Per Order No   Other Precautions Multiple lines;Telemetry; Fall Risk;Pain  (obesity, decreased skin integrity L arm)   Home Living   Type of [address] [place] [address] Two level;Performs ADLs on one level; Able to live on main level with bedroom/bathroom;Stairs to enter with rails  (3 VARGAS)   Bathroom Shower/Tub Walk-in shower   Bathroom Toilet Raised   Bathroom Equipment Built-in shower seat   [address] Walker;Cane  (did not utilize prior to arrival)   Prior Function   Level of Walker Independent with ADLs; Independent with functional mobility; Independent with IADLS   Lives With Spouse; Family   Receives Help From Family  (prn)   IADLs Independent with driving; Independent with meal prep; Independent with medication management   Falls in the last 6 months 0  (denies)   Vocational Full time employment   General   Family/Caregiver Present No   Cognition   Overall Cognitive Status WFL   Arousal/Participation Alert   Orientation Level Oriented X4   Memory Within functional limits   Following Commands Follows one step commands with increased time or repetition   Comments Tod Jacobo was agreeable to PT assessment,pleasant. RLE Assessment   RLE Assessment X  (3+/5 gross musculature)   LLE Assessment   LLE Assessment X  (3+/5 gross musculature)   Vision-Basic Assessment   Current Vision Wears glasses all the time   Vestibular   Spontaneous Nystagmus (-) no evidence of nystagmus at rest in room light   Gaze Holding Nystagmus (-) no evidence of nystagmus   Coordination   Movements are Fluid and Coordinated 0   Coordination and Movement Description Incremental, antalgic mobility requiring increased time. Sharp/Dull   RLE Sharp/Dull Grossly intact   LLE Sharp/Dull Grossly intact   Bed Mobility   Additional Comments Bed mobility was not assessed as Tod Jacobo was sitting out of bed on the recliner prior and after assessment. Transfers   Sit to Stand   (CGA)   Additional items Assist x 1; Armrests; Increased time required;Verbal cues   Stand to Sit   (CGA)   Additional items Assist x 1; Armrests; Increased time required;Verbal cues   Stand pivot   (CGA)   Additional items Assist x 1; Increased time required;Verbal cues   Additional Comments Verbal cues for base of suport widening and environmental awareness with directional changes. Ambulation/Elevation   Gait pattern Improper Weight shift; Forward Flexion;Decreased foot clearance; Short stride;Narrow SON   Gait Assistance   (CGA)   Additional items Assist x 1;Verbal cues; Tactile cues   Assistive Device None   Distance 45 feet   Stair Management Assistance Not tested   Ambulation/Elevation Additional Comments Verbal cues for base of support widening, safety while turning, and bilateral pedal clearance with directional changes. Balance   Static Sitting Good   Dynamic Sitting Fair   Static Standing Fair   Dynamic Standing Fair -   Ambulatory Fair -   Endurance Deficit   Endurance Deficit Yes   Activity Tolerance   Activity Tolerance Patient limited by fatigue;Patient limited by pain   Medical Staff Made Aware Yes, CM was informed of d/c disposition recommendation. Nurse Made Aware yes, Rodger Snellen RN   Assessment   Prognosis Good   Problem List Decreased strength;Decreased endurance;Decreased mobility; Impaired balance;Decreased coordination;Obesity; Decreased skin integrity;Pain   Assessment Pt is 46 y.o. female seen for PT evaluation s/p admit to [address] [facility] on 7/24/2023 w/ Cellulitis of left upper extremity. PT consulted to assess pt's functional mobility and d/c needs. Order placed for PT eval and tx, w/ up and OOB as tolerated order. Comorbidities affecting pt's physical performance at time of assessment include: weakness, cellulitis of LUE,anemia, HTN,leukocytosis, LE edema, obesity,type 2 DM,seizure disorder . PTA, pt was independent w/ all functional mobility w/ o AD utilization. Personal factors affecting pt at time of IE include: stairs to enter home, inability to navigate community distances, unable to perform dynamic tasks in community, unable to perform physical activity, inability to perform IADLs and inability to perform ADLs. Please find objective findings from PT assessment regarding body systems outlined above with impairments and limitations including weakness, impaired balance, decreased endurance, impaired coordination, gait deviations, pain, decreased activity tolerance, decreased functional mobility tolerance, fall risk and decreased skin integrity.  From PT/mobility standpoint, recommendation at time of d/c would be home with outpatient rehabilitation pending progress in order to facilitate return to PLOF. Goals   Patient Goals to get better soon and to get home   LTG Expiration Date 08/06/23   Long Term Goal #1 Patient will complete transfers with supervision of 1 to decrease risk of falls, facilitate upright standing posture. BLE strength to greater than/equal to 4/5 gross musculature to increase ability to safely transfer, control descent to chair. Patient will exhibit increase dyanmic standing balance to Fair+ 2-3 minutes supervision of 1 to improve activity tolerance. Patient will exhibit increase dynamic ambulatory balance to Fair  feet w/ AD prn supervision of 1 to improve ability to mobilize to toilet, chair and decrease risk for additional medical complications. Patient will exhibit good self monitoring and ability to follow 2 step commands to increase complexity of tasks and resume ADL's without LOB. PT Treatment Day 0   Plan   Treatment/Interventions Functional transfer training;LE strengthening/ROM; Elevations; Therapeutic exercise; Endurance training;Patient/family training;Equipment eval/education;Gait training;Spoke to nursing;Spoke to case management   PT Frequency 3-5x/wk   Recommendation   PT Discharge Recommendation Home with outpatient rehabilitation   Additional Comments Upon conclusion, Riddhi Maloney was sitting out of bed on the recliner. The chair alarm was engaged and all needs within reach.    AM-PAC Basic Mobility Inpatient   Turning in Flat Bed Without Bedrails 4   Lying on Back to Sitting on Edge of Flat Bed Without Bedrails 3   Moving Bed to Chair 3   Standing Up From Chair Using Arms 3   Walk in Room 3   Climb 3-5 Stairs With Railing 3   Basic Mobility Inpatient Raw Score 19   Basic Mobility Standardized Score 42.48   Highest Level Of Mobility   JH-HLM Goal 6: Walk 10 steps or more   JH-HLM Achieved 7: Walk 25 feet or more History/Personal Factors/Comorbidities: weakness, cellulitis of LUE,anemia, HTN,leukocytosis, LE edema, obesity,type 2 DM,seizure disorder    # of body structures/limitations: muscle weakness, activity intolerance,decreased endurance, impaired balance, gait deviations,pain    Clinical presentation: unstable as seen in pain severity in more than one body region constant in nature, fall risk, decreased skin integrity, obesity,progressive symptoms prior to hospitalization    Initial Assessment Time: [id number]    Elena Blum, PT

## 2023-07-27 NOTE — ASSESSMENT & PLAN NOTE
· Status post a bedside incision and drainage by general surgery on 7/26/2023  · Patient reports an improvement in her symptoms overall today  · 1 out of 2 blood cultures from 7/24/2023 revealed gram-positive cocci in clusters suggestive of MRSA  · Wound cultures from 7/26/2023 are pending  · Repeat blood cultures from 7/26/2023 are pending  · Continue IV vancomycin day 4 -WBC is down to 12.77  · 2D echocardiogram completed-no evidence of any type of vegetation  · If the repeat blood cultures from 7/26/2023 remain negative at 48 to 72 hours-we will consult IR for PICC line placement since the patient will need a minimum of 4 weeks of IV antibiotics  · Formal PT and OT evaluation is pending  · Potential discharge planning for the week of 7/31/2023

## 2023-07-27 NOTE — PROGRESS NOTES
Progress Note - Infectious Disease   Yuliya Rooney 46 y.o. female MRN: [id number]  Unit/Bed#: -01 Encounter: [id number]      Impression/Plan:    1. Left antecubital fossa abscess. At prior pIV site. CT with possible myositis this was noncontrast study. U/S with phlegmon present but clinically concern for developing superficial abscess in the left antecubital fossa. Dopplers without DVT. Complicated by MRSA bacteremia. Now status post bedside I&D by surgery with drainage of purulence fluid. Wound culture growing staph aureus. The patient is afebrile, WBC count improving, exam improving.              -Continue IV vancomycin, dosing by pharmacy              -Follow-up follow up blood cultures, wound culture              -Surgery following   -continue local wound care              -Recommend arm elevation, warm compresses              -Monitor clinical exam     2. MRSA bacteremia. 1/2 admission blood culture sets is positive. Due to #1 above. TTE no vegetations. No indwelling intravascular devices present.   -follow up repeat blood cultures   -await PICC placement until repeat blood cultures negative x 72 hours   -anticipate a 4 week course of IV antibiotics from blood culture clearance    3. Leukocytosis. Present on admission. Due to #1/2 above. The patient is afebrile and hemodynamically stable. WBC count improving              -antibiotics as above              -follow up blood cultures              -monitor fever curve, WBC count     3. Poorly controlled type 2 diabetes mellitus with hyperglycemia. HbA1c 12.6%. Blood sugar was over 800 on admission and she was started on an insulin drip. This is a risk factor for infection              -Glycemic management per primary team     4. LINDY on CKD. Creatinine 1.35 on admission, slightly above baseline around 1. Likely prerenal due to infection and dehydration.              -Monitor creatinine              -IVF per primary     5. Obesity.  BMI 36     I have discussed the above management plan in detail with the primary service and patient. ID will follow. Antibiotics:  Vancomycin day 4    Subjective:  Status post bedside I&D by surgery of left antecubital fossa abscess. She continues to have pain but feels that this and the swelling are improving. She is very tired. Has no fever, chills, nausea, diarrhea. Objective:  Vitals:  Temp:  [96.8 °F (36 °C)-98.8 °F (37.1 °C)] 98.8 °F (37.1 °C)  HR:  [65-82] 65  Resp:  [14-27] 14  BP: ()/(56-85) 165/85  SpO2:  [90 %-95 %] 94 %  Temp (24hrs), Av.7 °F (36.5 °C), Min:96.8 °F (36 °C), Max:98.8 °F (37.1 °C)  Current: Temperature: 98.8 °F (37.1 °C)    Physical Exam:   General Appearance:  Alert, interactive, nontoxic, no acute distress. Throat: Oropharynx moist without lesions. Lungs:   Clear to auscultation bilaterally; no wheezes, rhonchi or rales; respirations unlabored   Heart:  RRR; no murmur, rub or gallop   Abdomen:   Soft, non-tender, non-distended, positive bowel sounds. Extremities: No clubbing, cyanosis or edema   Skin: Left antecubital fossa induration, erythema. Arm swelling improving       Labs:    All pertinent labs and imaging studies were personally reviewed  Results from last 7 days   Lab Units 23  0506 23  0423 23  0606   WBC Thousand/uL 12.77* 15.90* 15.29*   HEMOGLOBIN g/dL 10.0* 10.3* 9.7*   PLATELETS Thousands/uL 258 259 206     Results from last 7 days   Lab Units 23  0506 23  0423 23  0606 23  0052 23  1850   SODIUM mmol/L 138 137 134*   < > 123*   POTASSIUM mmol/L 4.6 4.3 4.0   < > 4.7   CHLORIDE mmol/L 102 100 102   < > 91*   CO2 mmol/L 29 27 24   < > 23   BUN mg/dL 21 19 19   < > 24   CREATININE mg/dL 1.22 1.22 1.03   < > 1.35*   EGFR ml/min/1.73sq m 51 51 62   < > 45   CALCIUM mg/dL 8.6 8.8 8.1*   < > 8.1*   AST U/L  --  18 14  --  23   ALT U/L  --  33 31  --  42   ALK PHOS U/L  --  155* 112*  --  140*    < > = values in this interval not displayed. Results from last 7 days   Lab Units 07/25/23  0606 07/24/23  1850   PROCALCITONIN ng/ml 0.65* 0.42*         Results from last 7 days   Lab Units 07/25/23  0052   FERRITIN ng/mL 128     Results from last 7 days   Lab Units 07/24/23  1850   D-DIMER QUANTITATIVE ug/ml FEU 1.94*       Micro:  Results from last 7 days   Lab Units 07/26/23  1623 07/26/23  1016 07/26/23  0852 07/25/23  0628 07/24/23  1907 07/24/23  1850   BLOOD CULTURE   --  No Growth at 24 hrs. No Growth at 24 hrs.  --   --  No Growth at 48 hrs. Methicillin Resistant Staphylococcus aureus*   GRAM STAIN RESULT  No polys seen*  3+ Gram positive cocci in clusters*  --  Rare Polys  No organisms seen  --   --  Gram positive cocci in clusters*   WOUND CULTURE  Culture too young- will reincubate  --  2+ Growth of Staphylococcus aureus*  --   --   --    LEGIONELLA URINARY ANTIGEN   --   --   --  Negative  --   --        Imaging:  I have personally reviewed pertinent imaging study reports and images in PACS.      Soft tissue U/S: phlegmon of left antecubital fossa

## 2023-07-27 NOTE — ASSESSMENT & PLAN NOTE
Lab Results   Component Value Date    EGFR 51 07/27/2023    EGFR 51 07/26/2023    EGFR 62 07/25/2023    CREATININE 1.22 07/27/2023    CREATININE 1.22 07/26/2023    CREATININE 1.03 07/25/2023     · Present on admission  · Has resolved  · Creatinine is at baseline  · Continue to avoid nephrotoxins  · Monitor BMPs

## 2023-07-27 NOTE — PROGRESS NOTES
[address]  Progress Note  Name: Gisele Harvey  MRN: [id number]  Unit/Bed#: ICU 09-01 I Date of Admission: 7/24/2023   Date of Service: 7/27/2023 I Hospital Day: 3    Assessment/Plan   * Cellulitis of left upper extremity  Assessment & Plan  · Status post a bedside incision and drainage by general surgery on 7/26/2023  · Patient reports an improvement in her symptoms overall today  · 1 out of 2 blood cultures from 7/24/2023 revealed gram-positive cocci in clusters suggestive of MRSA  · Wound cultures from 7/26/2023 are pending  · Repeat blood cultures from 7/26/2023 are pending  · Continue IV vancomycin day 4 -WBC is down to 12.77  · 2D echocardiogram completed-no evidence of any type of vegetation  · If the repeat blood cultures from 7/26/2023 remain negative at 48 to 72 hours-we will consult IR for PICC line placement since the patient will need a minimum of 4 weeks of IV antibiotics  · Formal PT and OT evaluation is pending  · Potential discharge planning for the week of 7/31/2023    Type 2 diabetes mellitus with hyperglycemia, with long-term current use of insulin [facility])  Assessment & Plan  Lab Results   Component Value Date    HGBA1C 12.7 (H) 07/25/2023       Recent Labs     07/26/23  1057 07/26/23  1615 07/26/23  2108 07/27/23  0714   POCGLU 194* 195* 197* 222*       Blood Sugar Average: Last 72 hrs:  (P) 259.4375     · Status post the use of an insulin drip at time of arrival  · Target blood sugar for the hospital is 140-180  · Continue Lantus, Accu-Cheks before meals and at bedtime, and sliding scale coverage  · We will continue to monitor glucose levels and optimize insulin therapy accordingly  · We will further optimize Lantus to twice daily therapy- 30 units twice daily  · Diabetic neuropathy- Lyrica  · Continue to monitor    Seizure disorder (HCC)  Assessment & Plan  · Continue Keppra 500 mg p.o. every 12 hours    Hypertension  Assessment & Plan  · Blood pressures controlled  · Continue metoprolol tartrate 25 mg p.o. twice daily    GERD (gastroesophageal reflux disease)  Assessment & Plan  · Continue Pepcid    Acute renal failure superimposed on stage 3a chronic kidney disease [facility])  Assessment & Plan  Lab Results   Component Value Date    EGFR 51 07/27/2023    EGFR 51 07/26/2023    EGFR 62 07/25/2023    CREATININE 1.22 07/27/2023    CREATININE 1.22 07/26/2023    CREATININE 1.03 07/25/2023     · Present on admission  · Has resolved  · Creatinine is at baseline  · Continue to avoid nephrotoxins  · Monitor BMPs    Bipolar disorder (HCC)  Assessment & Plan  · Continue home Celexa and Elavil     Obesity (BMI 35.0-39.9 without comorbidity)  Assessment & Plan  · Actual BMI 39.16  · Dietary, weight loss, and lifestyle modification counseling provided    Hypercholesteremia  Assessment & Plan  · Mixed dyslipidemia-continue statin, continue Tricor    Anemia  Assessment & Plan  · Anemia of chronic disease  · H&H is stable    Lower extremity edema  Assessment & Plan  · Bilateral LE w/+1 edema, erythema, and calf pain - L > R  · CTA PE study negative for PE  · Bilateral lower extremity venous Doppler testing is negative for DVT  · No further testing    Diabetic polyneuropathy associated with type 2 diabetes mellitus ([address])  Assessment & Plan  · Continue home Lyrica       Patient is okay to be downgraded from a level 2 stepdown unit to MedSurg      VTE Prophylaxis:  Heparin    Patient Centered Rounds: I have performed bedside rounds with nursing staff today.     Discussions with Specialists or Other Care Team Provider: Infectious disease, general surgery, case management, nursing, pharmacy  Education and Discussions with Family / Patient: Patient was brought up to par    Current Length of Stay: 3 day(s)    Current Patient Status: Inpatient   Certification Statement: The patient will continue to require additional inpatient hospital stay due to Need for continued IV antibiotics    Discharge Plan: Discharge planning for the week of 2023    Code Status: Level 1 - Full Code    Subjective:   Patient seen, resting in bed, reports feeling a little bit better, no new complaints, still has some residual soreness in her left elbow    Objective:     Vitals:   Temp (24hrs), Av.6 °F (36.4 °C), Min:96.8 °F (36 °C), Max:98.4 °F (36.9 °C)    Temp:  [96.8 °F (36 °C)-98.4 °F (36.9 °C)] 96.8 °F (36 °C)  HR:  [68-88] 74  Resp:  [15-29] 21  BP: ()/(51-80) 131/63  SpO2:  [90 %-95 %] 93 %  Body mass index is 38.99 kg/m². Input and Output Summary (last 24 hours): Intake/Output Summary (Last 24 hours) at 2023 7524  Last data filed at 2023 0669  Gross per 24 hour   Intake 1450 ml   Output 1800 ml   Net -350 ml       Physical Exam:   Physical Exam  Constitutional:       General: She is not in acute distress. Appearance: Normal appearance. She is normal weight. She is not ill-appearing. HENT:      Head: Normocephalic and atraumatic. Nose: Nose normal.      Mouth/Throat:      Mouth: Mucous membranes are moist.   Eyes:      Extraocular Movements: Extraocular movements intact. Pupils: Pupils are equal, round, and reactive to light. Cardiovascular:      Rate and Rhythm: Normal rate and regular rhythm. Pulses: Normal pulses. Heart sounds: Normal heart sounds. No murmur heard. No friction rub. No gallop. Pulmonary:      Effort: Pulmonary effort is normal. No respiratory distress. Breath sounds: Normal breath sounds. No wheezing, rhonchi or rales. Abdominal:      General: There is no distension. Palpations: Abdomen is soft. There is no mass. Tenderness: There is no abdominal tenderness. Hernia: No hernia is present. Musculoskeletal:         General: No swelling or tenderness. Normal range of motion. Cervical back: Normal range of motion and neck supple. No rigidity. Right lower leg: No edema. Left lower leg: No edema. Comments: Left upper extremity antecubital dressing is in place, dry, and intact   Skin:     General: Skin is warm. Capillary Refill: Capillary refill takes less than 2 seconds. Findings: No erythema or rash. Neurological:      General: No focal deficit present. Mental Status: She is alert and oriented to person, place, and time. Mental status is at baseline. Cranial Nerves: No cranial nerve deficit. Motor: No weakness. Psychiatric:         Mood and Affect: Mood normal.         Behavior: Behavior normal.         Additional Data:     Labs:    Results from last 7 days   Lab Units 07/27/23  0506 07/26/23  0423   WBC Thousand/uL 12.77* 15.90*   HEMOGLOBIN g/dL 10.0* 10.3*   HEMATOCRIT % 31.4* 33.1*   PLATELETS Thousands/uL 258 259   NEUTROS PCT %  --  76*   LYMPHS PCT %  --  13*   LYMPHO PCT % 20  --    MONOS PCT %  --  7   MONO PCT % 6  --    EOS PCT % 2 2     Results from last 7 days   Lab Units 07/27/23  0506 07/26/23  0423   SODIUM mmol/L 138 137   POTASSIUM mmol/L 4.6 4.3   CHLORIDE mmol/L 102 100   CO2 mmol/L 29 27   BUN mg/dL 21 19   CREATININE mg/dL 1.22 1.22   CALCIUM mg/dL 8.6 8.8   ALK PHOS U/L  --  155*   ALT U/L  --  33   AST U/L  --  18     Results from last 7 days   Lab Units 07/24/23  1850   INR  0.96     Results from last 7 days   Lab Units 07/27/23  0714 07/26/23  2108 07/26/23  1615 07/26/23  1057 07/26/23  0653 07/25/23  2237 07/25/23  2030 07/25/23  1553 07/25/23  1100 07/25/23  0800 07/25/23  0605 07/25/23  0414   POC GLUCOSE mg/dl 222* 197* 195* 194* 223* 281* 275* 323* 221* 205* 168* 168*     Results from last 7 days   Lab Units 07/25/23  0052   HEMOGLOBIN A1C % 12.7*       * I Have Reviewed All Lab Data Listed Above. * Additional Pertinent Lab Tests Reviewed:  [facility] Admission  Reviewed    Imaging:  Imaging Reports Reviewed Today Include: None    Recent Cultures (last 7 days):     Results from last 7 days   Lab Units 07/26/23  1016 07/26/23  0852 07/25/23  0628 07/24/23  1907 07/24/23  1850   BLOOD CULTURE  Received in Microbiology Lab. Culture in Progress. Received in Microbiology Lab. Culture in Progress. --   --  No Growth at 48 hrs.  Methicillin Resistant Staphylococcus aureus*   GRAM STAIN RESULT   --  Rare Polys  No organisms seen  --   --  Gram positive cocci in clusters*   LEGIONELLA URINARY ANTIGEN   --   --  Negative  --   --        Last 24 Hours Medication List:   Current Facility-Administered Medications   Medication Dose Route Frequency Provider Last Rate   • acetaminophen  975 mg Oral Q8H [address] SYLWIA Rothman     • amitriptyline  25 mg Oral HS SYLWIA Rothman     • aspirin  81 mg Oral Daily SYLWIA Rothman     • atorvastatin  80 mg Oral HS SYLWIA Rothman     • cholecalciferol  400 Units Oral Daily SYLWIA Rothman     • citalopram  40 mg Oral Daily SYLWIA Rothman     • docusate sodium  100 mg Oral BID SYLWIA Rothman     • famotidine  20 mg Oral HS SYLWIA Rothman     • fenofibrate  48 mg Oral Daily SYLWIA Rothman     • heparin (porcine)  5,000 Units Subcutaneous Q8H [address] SYLWIA Rothman     • HYDROmorphone  0.5 mg Intravenous Q3H PRN Heather Mruo MD     • HYDROmorphone  0.5 mg Intravenous Once Heather Muro MD     • insulin glargine  55 Units Subcutaneous QAM Trenton Thomas MD     • insulin lispro  1-6 Units Subcutaneous TID [facility] [place] Trenton Thomas MD     • insulin lispro  1-6 Units Subcutaneous HS Trenton Thomas MD     • insulin lispro  14 Units Subcutaneous TID With Meals Trenton Thomas MD     • levETIRAcetam  500 mg Oral Q12H [address] SYLWIA Rothman     • lubiprostone  24 mcg Oral BID SYLWIA Rothman     • magnesium Oxide  400 mg Oral Daily SYLWIA Rothman     • metoprolol tartrate  25 mg Oral BID SYLWIA Rothman     • multivitamin stress formula  1 tablet Oral QAM Darell Prado SYLWIA Solorzano     • oxyCODONE  2.5 mg Oral Q6H PRN SYLWIA Rothman      Or   • oxyCODONE  5 mg Oral Q6H PRN SYLWIA Rothman     • pregabalin  100 mg Oral TID SYLWIA Mcwilliams     • senna  2 tablet Oral HS SYLWIA Rothman     • vancomycin  1,250 mg Intravenous Q24H SYLWIA Rothman          Today, Patient Was Seen By: Mariposa Marcelo MD    ** Please Note: Dictation voice to text software may have been used in the creation of this document.  **

## 2023-07-27 NOTE — NUTRITION
07/27/23 1246   Biochemical Data,Medical Tests, and Procedures   Biochemical Data/Medical Tests/Procedures Lab values reviewed; Meds reviewed   Labs (Comment) 7/27/23 glucose , H&H 10.0/31.4   Meds (Comment) atorvastatin, vitamin D3, colace, pepcid, insulin, magnesium oxide, MVI   Nutrition-Focused Physical Exam   Nutrition-Focused Physical Exam Findings RN skin assessment reviewed;Edema   Nutrition-Focused Physical Exam Findings left UE non-pitting edema, +2 BL LE edema   Medical-Related Concerns PMH reviewed   Current PO Intake   Current Diet Order CCD2, thin liquids   Current Meal Intake %   Estimated calorie intake compared to estimated need Nutrient needs are met. PES Statement   Problem Continue previous diagnosis   Recommendations/Interventions   Malnutrition/BMI Present No   Summary Nutrition follow up assessment. 7/25/23 HgA1c 12. 7. Prescribed a CCD2 diet, thin liquids. Meal completions %. Nutrient needs are met. Met with pt at chairside. She reports feeling tired at present. States her appetite is okay. RD discussed most recent HgA1c lab. She asks for sectioned MyPlate to have for meal planning at home. RD provided “Plate Method for Diabetes” handout. RD continue following. Interventions/Recommendations Continue current diet order   Education Assessment   Education Education not indicated at this time   Patient Nutrition Goals   Goal Comprehend education;Glucose WNL; Improve to healthful diet

## 2023-07-27 NOTE — PLAN OF CARE
Problem: PHYSICAL THERAPY ADULT  Goal: Performs mobility at highest level of function for planned discharge setting. See evaluation for individualized goals. Description: Treatment/Interventions: Functional transfer training, LE strengthening/ROM, Elevations, Therapeutic exercise, Endurance training, Patient/family training, Equipment eval/education, Gait training, Spoke to nursing, Spoke to case management          See flowsheet documentation for full assessment, interventions and recommendations. Note: Prognosis: Good  Problem List: Decreased strength, Decreased endurance, Decreased mobility, Impaired balance, Decreased coordination, Obesity, Decreased skin integrity, Pain  Assessment: Pt is 46 y.o. female seen for PT evaluation s/p admit to [address] [place] [facility] [place] on 7/24/2023 w/ Cellulitis of left upper extremity. PT consulted to assess pt's functional mobility and d/c needs. Order placed for PT eval and tx, w/ up and OOB as tolerated order. Comorbidities affecting pt's physical performance at time of assessment include: weakness, cellulitis of LUE,anemia, HTN,leukocytosis, LE edema, obesity,type 2 DM,seizure disorder . PTA, pt was independent w/ all functional mobility w/ o AD utilization. Personal factors affecting pt at time of IE include: stairs to enter home, inability to navigate community distances, unable to perform dynamic tasks in community, unable to perform physical activity, inability to perform IADLs and inability to perform ADLs. Please find objective findings from PT assessment regarding body systems outlined above with impairments and limitations including weakness, impaired balance, decreased endurance, impaired coordination, gait deviations, pain, decreased activity tolerance, decreased functional mobility tolerance, fall risk and decreased skin integrity.  From PT/mobility standpoint, recommendation at time of d/c would be home with outpatient rehabilitation pending progress in order to facilitate return to PLOF. PT Discharge Recommendation: Home with outpatient rehabilitation    See flowsheet documentation for full assessment.

## 2023-07-27 NOTE — ASSESSMENT & PLAN NOTE
Lab Results   Component Value Date    HGBA1C 12.7 (H) 07/25/2023       Recent Labs     07/26/23  1057 07/26/23  1615 07/26/23  2108 07/27/23  0714   POCGLU 194* 195* 197* 222*       Blood Sugar Average: Last 72 hrs:  (P) 259.4375     · Status post the use of an insulin drip at time of arrival  · Target blood sugar for the hospital is 140-180  · Continue Lantus, Accu-Cheks before meals and at bedtime, and sliding scale coverage  · We will continue to monitor glucose levels and optimize insulin therapy accordingly  · We will further optimize Lantus to twice daily therapy- 30 units twice daily  · Diabetic neuropathy- Lyrica  · Continue to monitor

## 2023-07-27 NOTE — PROGRESS NOTES
Fred Bosch is a 46 y.o. female who is currently ordered Vancomycin IV with management by the Pharmacy Consult service. Relevant clinical data and objective / subjective history reviewed. Vancomycin Assessment:  Indication and Goal AUC/Trough: Bacteremia (goal -600, trough >10); Soft tissue (goal -600, trough >10), -600, trough >10  Clinical Status: stable  Micro:     Renal Function:  SCr: 1.22 mg/dL  CrCl: 56.3 mL/min  Renal replacement: Not on dialysis  Days of Therapy: 3  Current Dose: 750mg IV Q12H  Vancomycin Plan:  New Dosinmg IV Q24H  Estimated AUC: 501 mcg*hr/mL  Estimated Trough: 14.3 mcg/mL  Next Level: 8/3 with AM labs  Renal Function Monitoring: Daily BMP and UOP  Pharmacy will continue to follow closely for s/sx of nephrotoxicity, infusion reactions and appropriateness of therapy. BMP and CBC will be ordered per protocol. We will continue to follow the patient’s culture results and clinical progress daily.     Monica De Guzman, Pharmacist

## 2023-07-27 NOTE — PROGRESS NOTES
Progress Note - General Surgery   Ariela River 46 y.o. female MRN: [id number]  Unit/Bed#: ICU 09-01 Encounter: [id number]    Assessment:  46year old female with PMH significant for uncontrolled diabetes, HTN, HLD, CKD3, R renal cell carcinoma s/p nephrectomy 2017, seizure disorder, anemia, obesity presents for the evaluation of LUE cellulitis at previous IV site   · Afebrile, vitals stable  · WBC 12.77 (15.9)  · Cr 1.22  · Hgb 10.0  · 1 out of 2 blood cultures 7/24 growing MRSA   · L antecubital fossa wound with pus on packing. Surrounding area firm and erythematous. TTP     Plan:  · LUE dressing takedown down, one piece of packing removed, wound probed with Qtip and flushed with 30cc normal saline, repacked with 1/4 inch plain packing. Redressed with gauze, abd, and dixie  · Continue daily packing changes  · Continue IV Vancomycin   · Continue local wound care, wound care nurse consulted  · Follow up wound cultures   · Follow up repeat blood cultures  · ID following, likely recommends long term course of abx and picc placement  · Analgesia and antiemetics prn   · Rest of medical management per SLIM  · Will discuss with Dr. Luetta Bamberger     Subjective/Objective   Subjective: patient reports 10/10 pain in her L arm. She notes it has improved since yesterday, but her arm feels heavy. She denies fever, chills, chest pain, sob. She notes her leg pain has improved. Objective:   Blood pressure 131/63, pulse 74, temperature (!) 96.8 °F (36 °C), temperature source Tympanic, resp. rate 21, height 5' 1" (1.549 m), weight 93.6 kg (206 lb 5.6 oz), SpO2 93 %, not currently breastfeeding. ,Body mass index is 38.99 kg/m².       Intake/Output Summary (Last 24 hours) at 7/27/2023 1024  Last data filed at 7/27/2023 0807  Gross per 24 hour   Intake 1450 ml   Output 1800 ml   Net -350 ml       Invasive Devices     Peripheral Intravenous Line  Duration           Peripheral IV 07/24/23 Distal;Right;Upper;Ventral (anterior) Arm 3 days Peripheral IV 07/27/23 Right Hand <1 day                Physical Exam  Vitals reviewed. Constitutional:       General: She is not in acute distress. Appearance: She is obese. HENT:      Head: Normocephalic and atraumatic. Cardiovascular:      Rate and Rhythm: Normal rate and regular rhythm. Heart sounds: Normal heart sounds. Pulmonary:      Effort: Pulmonary effort is normal. No respiratory distress. Abdominal:      General: There is no distension. Palpations: Abdomen is soft. Tenderness: There is no abdominal tenderness. There is no guarding. Skin:     General: Skin is warm and dry. Comments: L antecubital fossa wound with pus at the opening. Surrounding skin erythematous and firm. TTP. Limited ROM due to pain. 2+ radial pulse. Strength intact. Neurological:      General: No focal deficit present. Mental Status: She is alert. Mental status is at baseline. Psychiatric:         Mood and Affect: Mood normal.         Behavior: Behavior normal.         Lab, Imaging and other studies:  I have personally reviewed pertinent lab results.   , CBC:   Lab Results   Component Value Date    WBC 12.77 (H) 07/27/2023    HGB 10.0 (L) 07/27/2023    HCT 31.4 (L) 07/27/2023    MCV 84 07/27/2023     07/27/2023    RBC 3.76 (L) 07/27/2023    MCH 26.6 (L) 07/27/2023    MCHC 31.8 07/27/2023    RDW 15.9 (H) 07/27/2023    MPV 11.1 07/27/2023   , CMP:   Lab Results   Component Value Date    SODIUM 138 07/27/2023    K 4.6 07/27/2023     07/27/2023    CO2 29 07/27/2023    BUN 21 07/27/2023    CREATININE 1.22 07/27/2023    CALCIUM 8.6 07/27/2023    EGFR 51 07/27/2023     VTE Pharmacologic Prophylaxis: Heparin  VTE Mechanical Prophylaxis: sequential compression device    Elizabeth Coe PA-C

## 2023-07-28 LAB
ANION GAP SERPL CALCULATED.3IONS-SCNC: 8 MMOL/L
BACTERIA BLD CULT: ABNORMAL
BACTERIA WND AEROBE CULT: ABNORMAL
BUN SERPL-MCNC: 22 MG/DL (ref 5–25)
CALCIUM SERPL-MCNC: 8.6 MG/DL (ref 8.4–10.2)
CHLORIDE SERPL-SCNC: 101 MMOL/L (ref 96–108)
CO2 SERPL-SCNC: 27 MMOL/L (ref 21–32)
CREAT SERPL-MCNC: 1.03 MG/DL (ref 0.6–1.3)
ERYTHROCYTE [DISTWIDTH] IN BLOOD BY AUTOMATED COUNT: 15.8 % (ref 11.6–15.1)
GFR SERPL CREATININE-BSD FRML MDRD: 62 ML/MIN/1.73SQ M
GLUCOSE SERPL-MCNC: 124 MG/DL (ref 65–140)
GLUCOSE SERPL-MCNC: 139 MG/DL (ref 65–140)
GLUCOSE SERPL-MCNC: 158 MG/DL (ref 65–140)
GLUCOSE SERPL-MCNC: 289 MG/DL (ref 65–140)
GLUCOSE SERPL-MCNC: 90 MG/DL (ref 65–140)
GRAM STN SPEC: ABNORMAL
HCT VFR BLD AUTO: 32.8 % (ref 34.8–46.1)
HGB BLD-MCNC: 9.9 G/DL (ref 11.5–15.4)
MCH RBC QN AUTO: 25.8 PG (ref 26.8–34.3)
MCHC RBC AUTO-ENTMCNC: 30.2 G/DL (ref 31.4–37.4)
MCV RBC AUTO: 86 FL (ref 82–98)
MECA+MECC+MREJ ISLT/SPM QL: DETECTED
PLATELET # BLD AUTO: 186 THOUSANDS/UL (ref 149–390)
PMV BLD AUTO: 10.9 FL (ref 8.9–12.7)
POTASSIUM SERPL-SCNC: 4.4 MMOL/L (ref 3.5–5.3)
RBC # BLD AUTO: 3.83 MILLION/UL (ref 3.81–5.12)
S AUREUS DNA BLD POS QL NAA+NON-PROBE: DETECTED
SODIUM SERPL-SCNC: 136 MMOL/L (ref 135–147)
WBC # BLD AUTO: 12.06 THOUSAND/UL (ref 4.31–10.16)

## 2023-07-28 PROCEDURE — 99232 SBSQ HOSP IP/OBS MODERATE 35: CPT | Performed by: HOSPITALIST

## 2023-07-28 PROCEDURE — 80048 BASIC METABOLIC PNL TOTAL CA: CPT | Performed by: HOSPITALIST

## 2023-07-28 PROCEDURE — 82948 REAGENT STRIP/BLOOD GLUCOSE: CPT

## 2023-07-28 PROCEDURE — 99232 SBSQ HOSP IP/OBS MODERATE 35: CPT | Performed by: STUDENT IN AN ORGANIZED HEALTH CARE EDUCATION/TRAINING PROGRAM

## 2023-07-28 PROCEDURE — 99232 SBSQ HOSP IP/OBS MODERATE 35: CPT | Performed by: SURGERY

## 2023-07-28 PROCEDURE — 85027 COMPLETE CBC AUTOMATED: CPT | Performed by: HOSPITALIST

## 2023-07-28 RX ADMIN — Medication 5 MG: at 00:53

## 2023-07-28 RX ADMIN — AMITRIPTYLINE HYDROCHLORIDE 25 MG: 25 TABLET, FILM COATED ORAL at 21:31

## 2023-07-28 RX ADMIN — HEPARIN SODIUM 5000 UNITS: 5000 INJECTION INTRAVENOUS; SUBCUTANEOUS at 21:33

## 2023-07-28 RX ADMIN — B-COMPLEX W/ C & FOLIC ACID TAB 1 TABLET: TAB at 09:00

## 2023-07-28 RX ADMIN — VANCOMYCIN HYDROCHLORIDE 1250 MG: 1 INJECTION, POWDER, LYOPHILIZED, FOR SOLUTION INTRAVENOUS at 12:36

## 2023-07-28 RX ADMIN — CHOLECALCIFEROL TAB 10 MCG (400 UNIT) 400 UNITS: 10 TAB at 09:00

## 2023-07-28 RX ADMIN — ASPIRIN 81 MG 81 MG: 81 TABLET ORAL at 09:00

## 2023-07-28 RX ADMIN — FAMOTIDINE 20 MG: 20 TABLET, FILM COATED ORAL at 21:32

## 2023-07-28 RX ADMIN — ATORVASTATIN CALCIUM 80 MG: 80 TABLET, FILM COATED ORAL at 21:31

## 2023-07-28 RX ADMIN — CITALOPRAM HYDROBROMIDE 40 MG: 20 TABLET ORAL at 09:00

## 2023-07-28 RX ADMIN — INSULIN LISPRO 4 UNITS: 100 INJECTION, SOLUTION INTRAVENOUS; SUBCUTANEOUS at 23:29

## 2023-07-28 RX ADMIN — LUBIPROSTONE 24 MCG: 24 CAPSULE, GELATIN COATED ORAL at 12:30

## 2023-07-28 RX ADMIN — INSULIN GLARGINE 30 UNITS: 100 INJECTION, SOLUTION SUBCUTANEOUS at 09:00

## 2023-07-28 RX ADMIN — MUPIROCIN: 20 OINTMENT TOPICAL at 12:44

## 2023-07-28 RX ADMIN — INSULIN GLARGINE 30 UNITS: 100 INJECTION, SOLUTION SUBCUTANEOUS at 23:29

## 2023-07-28 RX ADMIN — MUPIROCIN 1 APPLICATION: 20 OINTMENT TOPICAL at 21:33

## 2023-07-28 RX ADMIN — Medication 5 MG: at 23:28

## 2023-07-28 RX ADMIN — FENOFIBRATE 48 MG: 48 TABLET, FILM COATED ORAL at 09:00

## 2023-07-28 RX ADMIN — HEPARIN SODIUM 5000 UNITS: 5000 INJECTION INTRAVENOUS; SUBCUTANEOUS at 13:47

## 2023-07-28 RX ADMIN — HEPARIN SODIUM 5000 UNITS: 5000 INJECTION INTRAVENOUS; SUBCUTANEOUS at 06:01

## 2023-07-28 RX ADMIN — INSULIN LISPRO 14 UNITS: 100 INJECTION, SOLUTION INTRAVENOUS; SUBCUTANEOUS at 09:03

## 2023-07-28 RX ADMIN — PREGABALIN 100 MG: 100 CAPSULE ORAL at 09:00

## 2023-07-28 RX ADMIN — DOCUSATE SODIUM 100 MG: 100 CAPSULE, LIQUID FILLED ORAL at 17:18

## 2023-07-28 RX ADMIN — Medication 400 MG: at 09:00

## 2023-07-28 RX ADMIN — ACETAMINOPHEN 975 MG: 325 TABLET ORAL at 06:02

## 2023-07-28 RX ADMIN — LEVETIRACETAM 500 MG: 500 TABLET, FILM COATED ORAL at 21:32

## 2023-07-28 RX ADMIN — ACETAMINOPHEN 975 MG: 325 TABLET ORAL at 13:47

## 2023-07-28 RX ADMIN — DOCUSATE SODIUM 100 MG: 100 CAPSULE, LIQUID FILLED ORAL at 09:00

## 2023-07-28 RX ADMIN — ACETAMINOPHEN 975 MG: 325 TABLET ORAL at 21:30

## 2023-07-28 RX ADMIN — Medication 5 MG: at 17:18

## 2023-07-28 RX ADMIN — HYDROMORPHONE HYDROCHLORIDE 0.5 MG: 1 INJECTION, SOLUTION INTRAMUSCULAR; INTRAVENOUS; SUBCUTANEOUS at 21:29

## 2023-07-28 RX ADMIN — LUBIPROSTONE 24 MCG: 24 CAPSULE, GELATIN COATED ORAL at 23:28

## 2023-07-28 RX ADMIN — LEVETIRACETAM 500 MG: 500 TABLET, FILM COATED ORAL at 09:00

## 2023-07-28 RX ADMIN — INSULIN LISPRO 14 UNITS: 100 INJECTION, SOLUTION INTRAVENOUS; SUBCUTANEOUS at 12:33

## 2023-07-28 RX ADMIN — Medication 5 MG: at 09:07

## 2023-07-28 RX ADMIN — PREGABALIN 100 MG: 100 CAPSULE ORAL at 17:18

## 2023-07-28 RX ADMIN — METOPROLOL TARTRATE 25 MG: 25 TABLET, FILM COATED ORAL at 21:30

## 2023-07-28 RX ADMIN — PREGABALIN 100 MG: 100 CAPSULE ORAL at 21:32

## 2023-07-28 RX ADMIN — METOPROLOL TARTRATE 25 MG: 25 TABLET, FILM COATED ORAL at 09:00

## 2023-07-28 RX ADMIN — MUPIROCIN 1 APPLICATION: 20 OINTMENT TOPICAL at 09:33

## 2023-07-28 RX ADMIN — SENNOSIDES 17.2 MG: 8.6 TABLET, FILM COATED ORAL at 21:31

## 2023-07-28 NOTE — PROGRESS NOTES
Progress Note - Infectious Disease   Duard Spurling 46 y.o. female MRN: [id number]  Unit/Bed#: -01 Encounter: [id number]      Impression/Plan:    1. Left antecubital fossa abscess. At prior pIV site. CT with possible myositis this was noncontrast study. U/S with phlegmon present but clinically concern for developing superficial abscess in the left antecubital fossa. Dopplers without DVT. Complicated by MRSA bacteremia. Now status post bedside I&D by surgery with drainage of purulence fluid. Wound culture growing MRSA as well. The patient is afebrile, WBC count improving, exam improving.              -Continue IV vancomycin, dosing by pharmacy              -Follow-up follow up blood cultures, wound culture              -Surgery following   -continue local wound care              -Recommend arm elevation, warm compresses              -Monitor clinical exam     2. MRSA bacteremia. 1/2 admission blood culture sets is positive. Due to #1 above. TTE no vegetations. No indwelling intravascular devices present.   -follow up repeat blood cultures   -await PICC placement until repeat blood cultures negative x 72 hours (okay for Monday)   -anticipate a 4 week course of IV antibiotics from blood culture clearance    3. Leukocytosis. Present on admission. Due to #1/2 above. The patient is afebrile and hemodynamically stable. WBC count improving              -antibiotics as above              -follow up blood cultures              -monitor fever curve, WBC count     3. Poorly controlled type 2 diabetes mellitus with hyperglycemia. HbA1c 12.6%. Blood sugar was over 800 on admission and she was started on an insulin drip. This is a risk factor for infection              -Glycemic management per primary team     4. LINDY on CKD. Creatinine 1.35 on admission, slightly above baseline around 1. Likely prerenal due to infection and dehydration.              -Monitor creatinine              -IVF per primary     5. Obesity. BMI 36     I have discussed the above management plan in detail with the primary service and patient. ID will see again 23, please call with questions. Antibiotics:  Vancomycin day 5    Subjective: The patient is feeling better today after I&D of the left A/C fossa abscess. Pain and swelling improving in the left arm. She has no fever, chills. Still feeling very tired. Objective:  Vitals:  Temp:  [97.9 °F (36.6 °C)-98.3 °F (36.8 °C)] 98 °F (36.7 °C)  HR:  [71-78] 71  Resp:  [18] 18  BP: (127-152)/(63-78) 152/78  SpO2:  [92 %-95 %] 95 %  Temp (24hrs), Av.1 °F (36.7 °C), Min:97.9 °F (36.6 °C), Max:98.3 °F (36.8 °C)  Current: Temperature: 98 °F (36.7 °C)    Physical Exam:   General Appearance:  Alert, interactive, nontoxic, no acute distress. Throat: Oropharynx moist without lesions. Lungs:   Clear to auscultation bilaterally; no wheezes, rhonchi or rales; respirations unlabored   Heart:  RRR; no murmur, rub or gallop   Abdomen:   Soft, non-tender, non-distended, positive bowel sounds. Extremities: No clubbing, cyanosis or edema   Skin: Left antecubital fossa induration, erythema. Arm swelling improving       Labs:    All pertinent labs and imaging studies were personally reviewed  Results from last 7 days   Lab Units 23  0508 23  0506 23  0423   WBC Thousand/uL 12.06* 12.77* 15.90*   HEMOGLOBIN g/dL 9.9* 10.0* 10.3*   PLATELETS Thousands/uL 186 258 259     Results from last 7 days   Lab Units 23  0508 23  0506 23  0423 23  0606 23  0052 23  1850   SODIUM mmol/L 136 138 137 134*   < > 123*   POTASSIUM mmol/L 4.4 4.6 4.3 4.0   < > 4.7   CHLORIDE mmol/L 101 102 100 102   < > 91*   CO2 mmol/L 27 29 27 24   < > 23   BUN mg/dL 22 21 19 19   < > 24   CREATININE mg/dL 1.03 1.22 1.22 1.03   < > 1.35*   EGFR ml/min/1.73sq m 62 51 51 62   < > 45   CALCIUM mg/dL 8.6 8.6 8.8 8.1*   < > 8.1*   AST U/L  --   --  18 14  --  23   ALT U/L  --   -- 33 31  --  42   ALK PHOS U/L  --   --  155* 112*  --  140*    < > = values in this interval not displayed. Results from last 7 days   Lab Units 07/25/23  0606 07/24/23  1850   PROCALCITONIN ng/ml 0.65* 0.42*         Results from last 7 days   Lab Units 07/25/23  0052   FERRITIN ng/mL 128     Results from last 7 days   Lab Units 07/24/23  1850   D-DIMER QUANTITATIVE ug/ml FEU 1.94*       Micro:  Results from last 7 days   Lab Units 07/26/23  1623 07/26/23  1016 07/26/23  0852 07/25/23  0628 07/24/23  1907 07/24/23  1850   BLOOD CULTURE   --  No Growth at 48 hrs. No Growth at 48 hrs.  --   --  No Growth at 72 hrs. Methicillin Resistant Staphylococcus aureus*   GRAM STAIN RESULT  No polys seen*  3+ Gram positive cocci in clusters*  --  Rare Polys  No organisms seen  --   --  Gram positive cocci in clusters*   WOUND CULTURE  3+ Growth of Staphylococcus aureus*  --  2+ Growth of Methicillin Resistant Staphylococcus aureus*  --   --   --    LEGIONELLA URINARY ANTIGEN   --   --   --  Negative  --   --        Imaging:  I have personally reviewed pertinent imaging study reports and images in PACS.      Soft tissue U/S: phlegmon of left antecubital fossa

## 2023-07-28 NOTE — PROGRESS NOTES
[address] [facility]  Progress Note  Name: Shawn Knapp  MRN: [id number]  Unit/Bed#: -01 I Date of Admission: 7/24/2023   Date of Service: 7/28/2023 I Hospital Day: 4    Assessment/Plan   * Cellulitis of left upper extremity  Assessment & Plan  · Status post a bedside incision and drainage by general surgery on 7/26/2023  · Patient reports an improvement in her symptoms overall today  · Status post an ID evaluation, status post a general surgical evaluation-status post bedside incision and drainage on this admission  · Bl Cx X 2 sets (7/24) 1/2 + MRSA  · Bl Cx x 2 sets (7/26) no growth to date- negative at 48 hours  · Wound culture 7/26/2023-positive for Staph aureus-methicillin-resistant  · 2D echocardiogram completed-no evidence of any type of vegetation  · Continue IV vancomycin day 5  · Once the repeat blood cultures from 7/26 are negative at 48 to 72 hours we will proceed with a PICC line placement since the patient will need a minimum of 4 weeks of IV antibiotics  · Status post a PT OT evaluation, patient is okay to go home with outpatient rehab and home services  · Potential discharge planning for the week of 7/31/2023    Type 2 diabetes mellitus with hyperglycemia, with long-term current use of insulin [facility])  Assessment & Plan  Lab Results   Component Value Date    HGBA1C 12.7 (H) 07/25/2023       Recent Labs     07/27/23  1614 07/27/23  2135 07/28/23  0641 07/28/23  1112   POCGLU 144* 133 139 124       Blood Sugar Average: Last 72 hrs:  (P) 223.3     · Status post the use of an insulin drip at time of arrival  · Target blood sugar for the hospital is 140-180  · Continue Lantus, Accu-Cheks before meals and at bedtime, and sliding scale coverage  · Blood sugar control is much better now since the Lantus was increased to twice daily therapy at 30 units twice a day  · Diabetic neuropathy- continue Lyrica  · Continue to monitor    Seizure disorder [facility])  Assessment & Plan  · Continue Keppra 500 mg p.o. every 12 hours    Hypertension  Assessment & Plan  · Blood pressures controlled  · Continue metoprolol tartrate 25 mg p.o. twice daily    GERD (gastroesophageal reflux disease)  Assessment & Plan  · Continue Pepcid    Acute renal failure superimposed on stage 3a chronic kidney disease [facility])  Assessment & Plan  Lab Results   Component Value Date    EGFR 62 07/28/2023    EGFR 51 07/27/2023    EGFR 51 07/26/2023    CREATININE 1.03 07/28/2023    CREATININE 1.22 07/27/2023    CREATININE 1.22 07/26/2023     · Present on admission  · Has resolved  · Creatinine is at baseline  · Continue to avoid nephrotoxins  · Monitor BMPs    Bipolar disorder (HCC)  Assessment & Plan  · Continue home Celexa and Elavil     Obesity (BMI 35.0-39.9 without comorbidity)  Assessment & Plan  · Actual BMI 39.16  · Dietary, weight loss, and lifestyle modification counseling provided    Hypercholesteremia  Assessment & Plan  · Mixed dyslipidemia-continue statin, continue Tricor    Anemia  Assessment & Plan  · Anemia of chronic disease  · H&H is stable    Lower extremity edema  Assessment & Plan  · Bilateral LE w/+1 edema, erythema, and calf pain - L > R  · CTA PE study negative for PE  · Bilateral lower extremity venous Doppler testing is negative for DVT  · No further testing    Slow transit constipation  Assessment & Plan  · Takes Linzess as outpatient, however is not on hospital formulary - will utilize Amitiza while in hospital  · Continue home Miralax, Colace, and will add Senna           VTE Prophylaxis:  Heparin    Patient Centered Rounds: I have performed bedside rounds with nursing staff today.     Discussions with Specialists or Other Care Team Provider: General surgery, infectious disease, case management, nursing  Education and Discussions with Family / Patient: Patient was brought up to par, all questions answered to her satisfaction    Current Length of Stay: 4 day(s)    Current Patient Status: Inpatient Certification Statement: The patient will continue to require additional inpatient hospital stay due to Need for continued IV antibiotics    Discharge Plan: Hopeful discharge planning in the week of 2023    Code Status: Level 1 - Full Code    Subjective:   Patient seen, looks and feels better, continues to have soreness in her left antecubital region but overall it has improved    Objective:     Vitals:   Temp (24hrs), Av °F (36.7 °C), Min:97 °F (36.1 °C), Max:98.8 °F (37.1 °C)    Temp:  [97 °F (36.1 °C)-98.8 °F (37.1 °C)] 98 °F (36.7 °C)  HR:  [65-78] 71  Resp:  [14-18] 18  BP: (127-165)/(63-85) 152/78  SpO2:  [92 %-95 %] 95 %  Body mass index is 38.99 kg/m². Input and Output Summary (last 24 hours): Intake/Output Summary (Last 24 hours) at 2023 1406  Last data filed at 2023 1100  Gross per 24 hour   Intake 970 ml   Output 550 ml   Net 420 ml       Physical Exam:   Physical Exam    Additional Data:     Labs:    Results from last 7 days   Lab Units 23  0508 23  0506 23  0423   WBC Thousand/uL 12.06* 12.77* 15.90*   HEMOGLOBIN g/dL 9.9* 10.0* 10.3*   HEMATOCRIT % 32.8* 31.4* 33.1*   PLATELETS Thousands/uL 186 258 259   NEUTROS PCT %  --   --  76*   LYMPHS PCT %  --   --  13*   LYMPHO PCT %  --  20  --    MONOS PCT %  --   --  7   MONO PCT %  --  6  --    EOS PCT %  --  2 2     Results from last 7 days   Lab Units 23  0508 23  0506 23  0423   SODIUM mmol/L 136   < > 137   POTASSIUM mmol/L 4.4   < > 4.3   CHLORIDE mmol/L 101   < > 100   CO2 mmol/L 27   < > 27   BUN mg/dL 22   < > 19   CREATININE mg/dL 1.03   < > 1.22   CALCIUM mg/dL 8.6   < > 8.8   ALK PHOS U/L  --   --  155*   ALT U/L  --   --  33   AST U/L  --   --  18    < > = values in this interval not displayed.      Results from last 7 days   Lab Units 23  1850   INR  0.96     Results from last 7 days   Lab Units 23  1112 23  0641 23  2135 23  1614 23  1113 07/27/23  0714 07/26/23  2108 07/26/23  1615 07/26/23  1057 07/26/23  0653 07/25/23  2237 07/25/23  2030   POC GLUCOSE mg/dl 124 139 133 144* 205* 222* 197* 195* 194* 223* 281* 275*     Results from last 7 days   Lab Units 07/25/23  0052   HEMOGLOBIN A1C % 12.7*       * I Have Reviewed All Lab Data Listed Above. * Additional Pertinent Lab Tests Reviewed: [address] Admission  Reviewed    Imaging:  Imaging Reports Reviewed Today Include: None    Recent Cultures (last 7 days):     Results from last 7 days   Lab Units 07/26/23  1623 07/26/23  1016 07/26/23  0852 07/25/23  0628 07/24/23  1907 07/24/23  1850   BLOOD CULTURE   --  No Growth at 24 hrs. No Growth at 24 hrs.  --   --  No Growth at 72 hrs.  Methicillin Resistant Staphylococcus aureus*   GRAM STAIN RESULT  No polys seen*  3+ Gram positive cocci in clusters*  --  Rare Polys  No organisms seen  --   --  Gram positive cocci in clusters*   WOUND CULTURE  3+ Growth of Staphylococcus aureus*  --  2+ Growth of Methicillin Resistant Staphylococcus aureus*  --   --   --    LEGIONELLA URINARY ANTIGEN   --   --   --  Negative  --   --        Last 24 Hours Medication List:   Current Facility-Administered Medications   Medication Dose Route Frequency Provider Last Rate   • acetaminophen  975 mg Oral [facility] Trenton Thomas MD     • amitriptyline  25 mg Oral HS Trenton Thomas MD     • aspirin  81 mg Oral Daily Trenton Thomas MD     • atorvastatin  80 mg Oral HS Trenton Thomas MD     • cholecalciferol  400 Units Oral Daily Trenton Thomas MD     • citalopram  40 mg Oral Daily Trenton Thomas MD     • docusate sodium  100 mg Oral BID Trenton Thomas MD     • famotidine  20 mg Oral HS Trenton Thomas MD     • fenofibrate  48 mg Oral Daily Trenton Thomas MD     • heparin (porcine)  5,000 Units Subcutaneous [facility] Trenton Thomas MD     • HYDROmorphone  0.5 mg Intravenous Q3H PRN Loly Keith Bonilla Turner MD     • HYDROmorphone  0.5 mg Intravenous Once Kelly Lorenz MD     • insulin glargine  30 Units Subcutaneous Q12H [address], MD     • insulin lispro  1-6 Units Subcutaneous TID [facility] Kelly Lorenz MD     • insulin lispro  1-6 Units Subcutaneous HS Kelly Lorenz MD     • insulin lispro  14 Units Subcutaneous TID With Meals Kelly Lorenz MD     • levETIRAcetam  500 mg Oral Q12H [address], MD     • lubiprostone  24 mcg Oral BID Kelly Lorenz MD     • magnesium Oxide  400 mg Oral Daily Kelly Lorenz MD     • metoprolol tartrate  25 mg Oral BID Kelly Lorenz MD     • multivitamin stress formula  1 tablet Oral QAM Kelly Lorenz MD     • mupirocin   Nasal Q12H [facility] MARILYN Rahman     • mupirocin   Topical BID Gerardo Rahman PA-C     • oxyCODONE  2.5 mg Oral Q6H PRN Kelly Lorenz MD      Or   • oxyCODONE  5 mg Oral Q6H PRN Kelly Lorenz MD     • pregabalin  100 mg Oral TID Kelly Lorenz MD     • senna  2 tablet Oral HS Kelly Lorenz MD     • vancomycin  1,250 mg Intravenous Q24H Kelly Lorenz MD 1,250 mg (07/28/23 1236)        Today, Patient Was Seen By: Kelly Lorenz MD    ** Please Note: Dictation voice to text software may have been used in the creation of this document.  **

## 2023-07-28 NOTE — CASE MANAGEMENT
Case Management Discharge Planning Note    Patient name Yuko Cortez  Location /-17 MRN [id number]  : 1970 Date 2023       Current Admission Date: 2023  Current Admission Diagnosis:Cellulitis of left upper extremity   Patient Active Problem List    Diagnosis Date Noted   • Cellulitis of left upper extremity 2023   • Leukocytosis 2023   • Acute renal failure superimposed on stage 3a chronic kidney disease ([address]) 2023   • Lower extremity edema 2023   • Migraine 07/15/2023   • Seizure disorder ([address]) 07/15/2023   • Generalized abdominal pain 2023   • Abnormal CT scan, stomach 2023   • Gastric distention 07/10/2023   • Flank pain 2023   • Secondary hyperparathyroidism ([address]) 2022   • History of kidney cancer 2022   • Vitamin D deficiency 2022   • Benign hypertension with CKD (chronic kidney disease) stage III ([address]) 2022   • GERD (gastroesophageal reflux disease) 2021   • History of colon polyps 2021   • Slow transit constipation 2021   • Anemia 2021   • Encounter for post surgical wound check 2021   • Depression with anxiety 2021   • Mild intermittent asthma without complication 59/10/6793   • Hypercholesteremia 2021   • Hypertriglyceridemia 2021   • Iron deficiency anemia secondary to inadequate dietary iron intake 2021   • Internal hernia 2021   • Intra-abdominal adhesions 2021   • Radiculopathy, lumbar region 2020   • Anterior tibial tendonitis, right 2020   • Spondylolisthesis of lumbar region 04/10/2018   • Chronic low back pain with sciatica 2018   • Diabetic polyneuropathy associated with type 2 diabetes mellitus ([address]) 2018   • Clear cell carcinoma of kidney, right ([address]) 05/15/2017   • Obesity (BMI 35.0-39.9 without comorbidity) 2017   • Type 2 diabetes mellitus with hyperglycemia, with long-term current use of insulin ([facility]) 02/25/2016   • Hypertension 02/25/2016   • Bipolar disorder ([facility]) 11/12/2015   • Gastroparesis 11/12/2015      LOS (days): 4  Geometric Mean LOS (GMLOS) (days): 3.20  Days to GMLOS:-0.4     OBJECTIVE:  Risk of Unplanned Readmission Score: 20     Current admission status: Inpatient   Preferred Pharmacy:   [facility], [address] [place] [address]  [facility] [place]  Phone: [phone] Fax: [phone]    Primary Care Provider: SYLWIA Martinez    Primary Insurance: [facility]  Secondary Insurance:     DISCHARGE DETAILS:  Freedom of Choice: Yes  Comments - Freedom of Choice: Pt is agreeable to a blanket referral for [address], will need an RN to teach [employer]         Is the patient interested in [address] at discharge?: Yes  [facility] requested[de-identified] [facility] Name[de-identified] Shey Provider[de-identified] PCP  Home Health Services Needed[de-identified] Administration of IV, IM or SC Medications  Supporting Clincal Findings[de-identified] Fatigues Easliy in [employer]   Spoke to the pt and the spouse at the bedside about going home on long term IVABX. Pt made aware she will need abn RN to teach same. Pt agreeable to a blanket referral being made in [address] [facility] for same. Spouse at the bedside and will learn IVABX.

## 2023-07-28 NOTE — ASSESSMENT & PLAN NOTE
· Paco Malave as outpatient, however is not on hospital formulary - will utilize Amitiza while in hospital  · Continue home Miralax, Colace, and will add Senna

## 2023-07-28 NOTE — ASSESSMENT & PLAN NOTE
Assessment:  MRSA positive cellulitis and abscess of left AC IV site s/p I&D. Plan:  Onoging active cellulitis and cloudy purulent fluid. Similar apperance to photo taken 7/25. Afebrile and WBC trending down to 12.07. Could not tolerate significant probing of the wound bedside. Surrounding muscle compartments soft. Continue IV abx per ID, wound care with packing changes BID. LUE wrapped for compression. Decolonization protocol. Will update Dr. Lucas Koch.

## 2023-07-28 NOTE — PROGRESS NOTES
[address]  Progress Note  Name: Karthik Gross  MRN: [id number]  Unit/Bed#: -01 I Date of Admission: 7/24/2023   Date of Service: 7/28/2023 I Hospital Day: 4    Assessment/Plan   * Cellulitis of left upper extremity  Assessment & Plan  Assessment:  MRSA positive cellulitis and abscess of left AC IV site s/p I&D. Plan:  Onoging active cellulitis and cloudy purulent fluid. Similar apperance to photo taken 7/25. Afebrile and WBC trending down to 12.07. Could not tolerate significant probing of the wound bedside. Surrounding muscle compartments soft. Continue IV abx per ID, wound care with packing changes BID. LUE wrapped for compression. Decolonization protocol. Will update Dr. Darrin Hoang. Subjective/Objective   Chief Complaint: "heavy"    Subjective: reports entire LUE feels heavy, has it propped on pillow currently, ongoing pain with modest improvement since I&D, no fevers    Objective:     Blood pressure 127/67, pulse 71, temperature 97.9 °F (36.6 °C), resp. rate 18, height 5' 1" (1.549 m), weight 93.6 kg (206 lb 5.6 oz), SpO2 92 %, not currently breastfeeding. ,Body mass index is 38.99 kg/m². Intake/Output Summary (Last 24 hours) at 7/28/2023 0856  Last data filed at 7/28/2023 9757  Gross per 24 hour   Intake 730 ml   Output 1350 ml   Net -620 ml       Invasive Devices     Peripheral Intravenous Line  Duration           Peripheral IV 07/24/23 Distal;Right;Upper;Ventral (anterior) Arm 3 days    Peripheral IV 07/27/23 Right Hand 1 day                Physical Exam  Vitals and nursing note reviewed. Constitutional:       General: She is not in acute distress. Appearance: She is well-developed. She is not diaphoretic. HENT:      Head: Normocephalic and atraumatic. Eyes:      Conjunctiva/sclera: Conjunctivae normal.      Pupils: Pupils are equal, round, and reactive to light. Pulmonary:      Effort: No respiratory distress.    Musculoskeletal: General: Normal range of motion. Cervical back: Normal range of motion. Skin:     General: Skin is warm and dry. Capillary Refill: Capillary refill takes less than 2 seconds. Comments: 1 cm open wound in center of 3 cm fluctuant area and 5 cm erythematous/indurated area of the left AC, very tender to touch, cloudy discharge noted on packing/dressing. Gently probed, flushed with saline, repacked with plain 1/4 strip gauze. Dry dressing applied and ACE from hand to shoulder. Neurological:      Mental Status: She is alert and oriented to person, place, and time. Psychiatric:         Behavior: Behavior normal.           Lab, Imaging and other studies:  I have personally reviewed pertinent lab results.   , CBC:   Lab Results   Component Value Date    WBC 12.06 (H) 07/28/2023    HGB 9.9 (L) 07/28/2023    HCT 32.8 (L) 07/28/2023    MCV 86 07/28/2023     07/28/2023    RBC 3.83 07/28/2023    MCH 25.8 (L) 07/28/2023    MCHC 30.2 (L) 07/28/2023    RDW 15.8 (H) 07/28/2023    MPV 10.9 07/28/2023   , CMP:   Lab Results   Component Value Date    SODIUM 136 07/28/2023    K 4.4 07/28/2023     07/28/2023    CO2 27 07/28/2023    BUN 22 07/28/2023    CREATININE 1.03 07/28/2023    CALCIUM 8.6 07/28/2023    EGFR 62 07/28/2023     VTE Pharmacologic Prophylaxis: Heparin  VTE Mechanical Prophylaxis: sequential compression device

## 2023-07-28 NOTE — PROGRESS NOTES
Danny Barkley is a 46 y.o. female who is currently ordered Vancomycin IV with management by the Pharmacy Consult service. Relevant clinical data and objective / subjective history reviewed. Vancomycin Assessment:  Indication and Goal AUC/Trough: Bacteremia (goal -600, trough >10); Soft tissue (goal -600, trough >10), -600, trough >10  Clinical Status: stable  Micro:     Renal Function:  SCr: 1.03 mg/dL  CrCl: 66.7 mL/min  Renal replacement: Not on dialysis  Days of Therapy: 4  Current Dose: 1250mg IV Q24H  Vancomycin Plan:  New Dosinmg IV Q24H  Estimated AUC: 442 mcg*hr/mL  Estimated Trough: 12 mcg/mL  Next Level: 8/3 with AM labs  Renal Function Monitoring: Daily BMP and UOP  Pharmacy will continue to follow closely for s/sx of nephrotoxicity, infusion reactions and appropriateness of therapy. BMP and CBC will be ordered per protocol. We will continue to follow the patient’s culture results and clinical progress daily.     Alivia Porter, Pharmacist

## 2023-07-28 NOTE — PLAN OF CARE
Problem: METABOLIC, FLUID AND ELECTROLYTES - ADULT  Goal: Electrolytes maintained within normal limits  Description: INTERVENTIONS:  - Monitor labs and assess patient for signs and symptoms of electrolyte imbalances  - Administer electrolyte replacement as ordered  - Monitor response to electrolyte replacements, including repeat lab results as appropriate  - Instruct patient on fluid and nutrition as appropriate  Outcome: Not Progressing        Problem: PAIN - ADULT  Goal: Verbalizes/displays adequate comfort level or baseline comfort level  Description: Interventions:  - Encourage patient to monitor pain and request assistance  - Assess pain using appropriate pain scale  - Administer analgesics based on type and severity of pain and evaluate response  - Implement non-pharmacological measures as appropriate and evaluate response  - Consider cultural and social influences on pain and pain management  - Notify physician/advanced practitioner if interventions unsuccessful or patient reports new pain  Outcome: Not Progressing

## 2023-07-28 NOTE — ASSESSMENT & PLAN NOTE
· Status post a bedside incision and drainage by general surgery on 7/26/2023  · Patient reports an improvement in her symptoms overall today  · Status post an ID evaluation, status post a general surgical evaluation-status post bedside incision and drainage on this admission  · Bl Cx X 2 sets (7/24) 1/2 + MRSA  · Bl Cx x 2 sets (7/26) 1/2 + MRSA  · Bl Cx x 2 Sets (7/27) Pending  · Wound culture 7/26/2023-positive for Staph aureus  · 2D echocardiogram completed-no evidence of any type of vegetation  · Continue IV vancomycin day 5  · Once the blood cultures from 727 are negative at 48 to 72 hours we will proceed with a PICC line placement since the patient will need a minimum of 4 weeks of IV antibiotics  · Status post a PT OT evaluation, patient is okay to go home with outpatient rehab and home services  · Potential discharge planning for the week of 7/31/2023

## 2023-07-28 NOTE — PLAN OF CARE
Problem: METABOLIC, FLUID AND ELECTROLYTES - ADULT  Goal: Electrolytes maintained within normal limits  Description: INTERVENTIONS:  - Monitor labs and assess patient for signs and symptoms of electrolyte imbalances  - Administer electrolyte replacement as ordered  - Monitor response to electrolyte replacements, including repeat lab results as appropriate  - Instruct patient on fluid and nutrition as appropriate  Outcome: Progressing  Goal: Fluid balance maintained  Description: INTERVENTIONS:  - Monitor labs   - Monitor I/O and WT  - Instruct patient on fluid and nutrition as appropriate  - Assess for signs & symptoms of volume excess or deficit  Outcome: Progressing  Goal: Glucose maintained within target range  Description: INTERVENTIONS:  - Monitor Blood Glucose as ordered  - Assess for signs and symptoms of hyperglycemia and hypoglycemia  - Administer ordered medications to maintain glucose within target range  - Assess nutritional intake and initiate nutrition service referral as needed  Outcome: Progressing     Problem: SKIN/TISSUE INTEGRITY - ADULT  Goal: Skin Integrity remains intact(Skin Breakdown Prevention)  Description: Assess:  -Assess extremities for adequate circulation and sensation     Bed Management:  -Have minimal linens on bed & keep smooth, unwrinkled  -Change linens as needed when moist or perspiring    Toileting:  -Offer bedside commode    Activity:    -Encourage activity and walks on unit  -Encourage or provide ROM exercises     -Use appropriate equipment to lift or move patient in bed  Skin Care:  -Avoid use of baby powder, tape, friction and shearing, hot water or constrictive clothing      Next Steps:  Outcome: Progressing  Goal: Incision(s), wounds(s) or drain site(s) healing without S/S of infection  Description: INTERVENTIONS  - Assess and document dressing, incision, wound bed, drain sites and surrounding tissue  - Provide patient and family education  Outcome: Progressing  Goal: Pressure injury heals and does not worsen  Description: Interventions:  - Implement low air loss mattress or specialty surface (Criteria met)  - Apply silicone foam dressing    - Apply fecal or urinary incontinence containment device   - Consider nutrition services referral as needed  Outcome: Progressing     Problem: PAIN - ADULT  Goal: Verbalizes/displays adequate comfort level or baseline comfort level  Description: Interventions:  - Encourage patient to monitor pain and request assistance  - Assess pain using appropriate pain scale  - Administer analgesics based on type and severity of pain and evaluate response  - Implement non-pharmacological measures as appropriate and evaluate response  - Consider cultural and social influences on pain and pain management  - Notify physician/advanced practitioner if interventions unsuccessful or patient reports new pain  Outcome: Progressing     Problem: INFECTION - ADULT  Goal: Absence or prevention of progression during hospitalization  Description: INTERVENTIONS:  - Assess and monitor for signs and symptoms of infection  - Monitor lab/diagnostic results  - Monitor all insertion sites, i.e. indwelling lines, tubes, and drains  - Monitor endotracheal if appropriate and nasal secretions for changes in amount and color  - [place] appropriate cooling/warming therapies per order  - Administer medications as ordered  - Instruct and encourage patient and family to use good hand hygiene technique  - Identify and instruct in appropriate isolation precautions for identified infection/condition  Outcome: Progressing  Goal: Absence of fever/infection during neutropenic period  Description: INTERVENTIONS:  - Monitor WBC    Outcome: Progressing     Problem: SAFETY ADULT  Goal: Patient will remain free of falls  Description: INTERVENTIONS:  - Educate patient/family on patient safety including physical limitations  - Instruct patient to call for assistance with activity   - Consult OT/PT to assist with strengthening/mobility   - Keep Call bell within reach  - Keep bed low and locked with side rails adjusted as appropriate  - Keep care items and personal belongings within reach  - Initiate and maintain comfort rounds  - Make Fall Risk Sign visible to staff  - Consider moving patient to room near nurses station  Outcome: Progressing  Goal: Maintain or return to baseline ADL function  Description: INTERVENTIONS:  -  Assess patient's ability to carry out ADLs; assess patient's baseline for ADL function and identify physical deficits which impact ability to perform ADLs (bathing, care of mouth/teeth, toileting, grooming, dressing, etc.)  - Assess/evaluate cause of self-care deficits   - Assess range of motion  - Assess patient's mobility; develop plan if impaired  - Assess patient's need for assistive devices and provide as appropriate  - Encourage maximum independence but intervene and supervise when necessary  - Involve family in performance of ADLs  - Assess for home care needs following discharge   - Consider OT consult to assist with ADL evaluation and planning for discharge  - Provide patient education as appropriate  Outcome: Progressing  Goal: Maintains/Returns to pre admission functional level  Description: INTERVENTIONS:  - Perform BMAT or MOVE assessment daily.   - Set and communicate daily mobility goal to care team and patient/family/caregiver.    - Collaborate with rehabilitation services on mobility goals if consulted  - Out of bed for toileting  - Record patient progress and toleration of activity level   Outcome: Progressing     Problem: DISCHARGE PLANNING  Goal: Discharge to home or other facility with appropriate resources  Description: INTERVENTIONS:  - Identify barriers to discharge w/patient and caregiver  - Arrange for needed discharge resources and transportation as appropriate  - Identify discharge learning needs (meds, wound care, etc.)  - Arrange for interpretive services to assist at discharge as needed  - Refer to Case Management Department for coordinating discharge planning if the patient needs post-hospital services based on physician/advanced practitioner order or complex needs related to functional status, cognitive ability, or social support system  Outcome: Progressing     Problem: Knowledge Deficit  Goal: Patient/family/caregiver demonstrates understanding of disease process, treatment plan, medications, and discharge instructions  Description: Complete learning assessment and assess knowledge base.   Interventions:  - Provide teaching at level of understanding  - Provide teaching via preferred learning methods  Outcome: Progressing     Problem: Prexisting or High Potential for Compromised Skin Integrity  Goal: Skin integrity is maintained or improved  Description: INTERVENTIONS:  - Identify patients at risk for skin breakdown  - Assess and monitor skin integrity  - Assess and monitor nutrition and hydration status  - Monitor labs   - Assess for incontinence   - Turn and reposition patient  - Assist with mobility/ambulation  - Relieve pressure over bony prominences  - Avoid friction and shearing  - Provide appropriate hygiene as needed including keeping skin clean and dry  - Evaluate need for skin moisturizer/barrier cream  - Collaborate with interdisciplinary team   - Patient/family teaching  - Consider wound care consult   Outcome: Progressing     Problem: MOBILITY - ADULT  Goal: Maintain or return to baseline ADL function  Description: INTERVENTIONS:  -  Assess patient's ability to carry out ADLs; assess patient's baseline for ADL function and identify physical deficits which impact ability to perform ADLs (bathing, care of mouth/teeth, toileting, grooming, dressing, etc.)  - Assess/evaluate cause of self-care deficits   - Assess range of motion  - Assess patient's mobility; develop plan if impaired  - Assess patient's need for assistive devices and provide as appropriate  - Encourage maximum independence but intervene and supervise when necessary  - Involve family in performance of ADLs  - Assess for home care needs following discharge   - Consider OT consult to assist with ADL evaluation and planning for discharge  - Provide patient education as appropriate  Outcome: Progressing  Goal: Maintains/Returns to pre admission functional level  Description: INTERVENTIONS:  - Perform BMAT or MOVE assessment daily.   - Set and communicate daily mobility goal to care team and patient/family/caregiver.    - Collaborate with rehabilitation services on mobility goals if consulted  - Out of bed for toileting  - Record patient progress and toleration of activity level   Outcome: Progressing

## 2023-07-28 NOTE — ASSESSMENT & PLAN NOTE
Lab Results   Component Value Date    HGBA1C 12.7 (H) 07/25/2023       Recent Labs     07/27/23  1614 07/27/23  2135 07/28/23  0641 07/28/23  1112   POCGLU 144* 133 139 124       Blood Sugar Average: Last 72 hrs:  (P) 223.3     · Status post the use of an insulin drip at time of arrival  · Target blood sugar for the hospital is 140-180  · Continue Lantus, Accu-Cheks before meals and at bedtime, and sliding scale coverage  · Blood sugar control is much better now since the Lantus was increased to twice daily therapy at 30 units twice a day  · Diabetic neuropathy- continue Lyrica  · Continue to monitor

## 2023-07-28 NOTE — ASSESSMENT & PLAN NOTE
Lab Results   Component Value Date    EGFR 62 07/28/2023    EGFR 51 07/27/2023    EGFR 51 07/26/2023    CREATININE 1.03 07/28/2023    CREATININE 1.22 07/27/2023    CREATININE 1.22 07/26/2023     · Present on admission  · Has resolved  · Creatinine is at baseline  · Continue to avoid nephrotoxins  · Monitor BMPs

## 2023-07-29 LAB
ANION GAP SERPL CALCULATED.3IONS-SCNC: 9 MMOL/L
BACTERIA WND AEROBE CULT: ABNORMAL
BUN SERPL-MCNC: 21 MG/DL (ref 5–25)
CALCIUM SERPL-MCNC: 9.1 MG/DL (ref 8.4–10.2)
CHLORIDE SERPL-SCNC: 102 MMOL/L (ref 96–108)
CO2 SERPL-SCNC: 29 MMOL/L (ref 21–32)
CREAT SERPL-MCNC: 1.04 MG/DL (ref 0.6–1.3)
ERYTHROCYTE [DISTWIDTH] IN BLOOD BY AUTOMATED COUNT: 15.7 % (ref 11.6–15.1)
GFR SERPL CREATININE-BSD FRML MDRD: 61 ML/MIN/1.73SQ M
GLUCOSE SERPL-MCNC: 169 MG/DL (ref 65–140)
GLUCOSE SERPL-MCNC: 205 MG/DL (ref 65–140)
GLUCOSE SERPL-MCNC: 205 MG/DL (ref 65–140)
GLUCOSE SERPL-MCNC: 277 MG/DL (ref 65–140)
GLUCOSE SERPL-MCNC: 312 MG/DL (ref 65–140)
GRAM STN SPEC: ABNORMAL
GRAM STN SPEC: ABNORMAL
HCT VFR BLD AUTO: 33 % (ref 34.8–46.1)
HGB BLD-MCNC: 10.1 G/DL (ref 11.5–15.4)
MCH RBC QN AUTO: 25.7 PG (ref 26.8–34.3)
MCHC RBC AUTO-ENTMCNC: 30.6 G/DL (ref 31.4–37.4)
MCV RBC AUTO: 84 FL (ref 82–98)
PLATELET # BLD AUTO: 323 THOUSANDS/UL (ref 149–390)
PMV BLD AUTO: 10.2 FL (ref 8.9–12.7)
POTASSIUM SERPL-SCNC: 4.3 MMOL/L (ref 3.5–5.3)
RBC # BLD AUTO: 3.93 MILLION/UL (ref 3.81–5.12)
SODIUM SERPL-SCNC: 140 MMOL/L (ref 135–147)
WBC # BLD AUTO: 12.01 THOUSAND/UL (ref 4.31–10.16)

## 2023-07-29 PROCEDURE — 82948 REAGENT STRIP/BLOOD GLUCOSE: CPT

## 2023-07-29 PROCEDURE — 99233 SBSQ HOSP IP/OBS HIGH 50: CPT | Performed by: PHYSICIAN ASSISTANT

## 2023-07-29 PROCEDURE — 85027 COMPLETE CBC AUTOMATED: CPT | Performed by: HOSPITALIST

## 2023-07-29 PROCEDURE — 80048 BASIC METABOLIC PNL TOTAL CA: CPT | Performed by: HOSPITALIST

## 2023-07-29 PROCEDURE — 99232 SBSQ HOSP IP/OBS MODERATE 35: CPT | Performed by: HOSPITALIST

## 2023-07-29 RX ORDER — INSULIN GLARGINE 100 [IU]/ML
40 INJECTION, SOLUTION SUBCUTANEOUS EVERY 12 HOURS SCHEDULED
Status: DISCONTINUED | OUTPATIENT
Start: 2023-07-29 | End: 2023-07-29

## 2023-07-29 RX ORDER — INSULIN GLARGINE 100 [IU]/ML
35 INJECTION, SOLUTION SUBCUTANEOUS EVERY 12 HOURS SCHEDULED
Status: DISCONTINUED | OUTPATIENT
Start: 2023-07-29 | End: 2023-08-01 | Stop reason: HOSPADM

## 2023-07-29 RX ADMIN — INSULIN LISPRO 2 UNITS: 100 INJECTION, SOLUTION INTRAVENOUS; SUBCUTANEOUS at 08:30

## 2023-07-29 RX ADMIN — FENOFIBRATE 48 MG: 48 TABLET, FILM COATED ORAL at 08:31

## 2023-07-29 RX ADMIN — INSULIN LISPRO 14 UNITS: 100 INJECTION, SOLUTION INTRAVENOUS; SUBCUTANEOUS at 12:05

## 2023-07-29 RX ADMIN — LUBIPROSTONE 24 MCG: 24 CAPSULE, GELATIN COATED ORAL at 12:05

## 2023-07-29 RX ADMIN — CHOLECALCIFEROL TAB 10 MCG (400 UNIT) 400 UNITS: 10 TAB at 08:32

## 2023-07-29 RX ADMIN — Medication 5 MG: at 17:20

## 2023-07-29 RX ADMIN — DOCUSATE SODIUM 100 MG: 100 CAPSULE, LIQUID FILLED ORAL at 17:20

## 2023-07-29 RX ADMIN — PREGABALIN 100 MG: 100 CAPSULE ORAL at 16:48

## 2023-07-29 RX ADMIN — LUBIPROSTONE 24 MCG: 24 CAPSULE, GELATIN COATED ORAL at 23:33

## 2023-07-29 RX ADMIN — INSULIN LISPRO 14 UNITS: 100 INJECTION, SOLUTION INTRAVENOUS; SUBCUTANEOUS at 08:30

## 2023-07-29 RX ADMIN — PREGABALIN 100 MG: 100 CAPSULE ORAL at 08:32

## 2023-07-29 RX ADMIN — Medication 5 MG: at 08:31

## 2023-07-29 RX ADMIN — DOCUSATE SODIUM 100 MG: 100 CAPSULE, LIQUID FILLED ORAL at 08:32

## 2023-07-29 RX ADMIN — CITALOPRAM HYDROBROMIDE 40 MG: 20 TABLET ORAL at 08:31

## 2023-07-29 RX ADMIN — INSULIN GLARGINE 35 UNITS: 100 INJECTION, SOLUTION SUBCUTANEOUS at 22:10

## 2023-07-29 RX ADMIN — INSULIN LISPRO 2 UNITS: 100 INJECTION, SOLUTION INTRAVENOUS; SUBCUTANEOUS at 22:11

## 2023-07-29 RX ADMIN — SENNOSIDES 17.2 MG: 8.6 TABLET, FILM COATED ORAL at 22:09

## 2023-07-29 RX ADMIN — FAMOTIDINE 20 MG: 20 TABLET, FILM COATED ORAL at 22:09

## 2023-07-29 RX ADMIN — LEVETIRACETAM 500 MG: 500 TABLET, FILM COATED ORAL at 22:09

## 2023-07-29 RX ADMIN — METOPROLOL TARTRATE 25 MG: 25 TABLET, FILM COATED ORAL at 08:31

## 2023-07-29 RX ADMIN — AMITRIPTYLINE HYDROCHLORIDE 25 MG: 25 TABLET, FILM COATED ORAL at 22:09

## 2023-07-29 RX ADMIN — INSULIN LISPRO 5 UNITS: 100 INJECTION, SOLUTION INTRAVENOUS; SUBCUTANEOUS at 16:49

## 2023-07-29 RX ADMIN — B-COMPLEX W/ C & FOLIC ACID TAB 1 TABLET: TAB at 08:32

## 2023-07-29 RX ADMIN — MUPIROCIN: 20 OINTMENT TOPICAL at 10:00

## 2023-07-29 RX ADMIN — Medication 400 MG: at 08:31

## 2023-07-29 RX ADMIN — HEPARIN SODIUM 5000 UNITS: 5000 INJECTION INTRAVENOUS; SUBCUTANEOUS at 14:08

## 2023-07-29 RX ADMIN — Medication 5 MG: at 23:33

## 2023-07-29 RX ADMIN — ACETAMINOPHEN 975 MG: 325 TABLET ORAL at 22:08

## 2023-07-29 RX ADMIN — ATORVASTATIN CALCIUM 80 MG: 80 TABLET, FILM COATED ORAL at 22:09

## 2023-07-29 RX ADMIN — MUPIROCIN 1 APPLICATION: 20 OINTMENT TOPICAL at 22:10

## 2023-07-29 RX ADMIN — INSULIN LISPRO 14 UNITS: 100 INJECTION, SOLUTION INTRAVENOUS; SUBCUTANEOUS at 16:49

## 2023-07-29 RX ADMIN — HYDROMORPHONE HYDROCHLORIDE 0.5 MG: 1 INJECTION, SOLUTION INTRAMUSCULAR; INTRAVENOUS; SUBCUTANEOUS at 01:50

## 2023-07-29 RX ADMIN — METOPROLOL TARTRATE 25 MG: 25 TABLET, FILM COATED ORAL at 22:09

## 2023-07-29 RX ADMIN — HEPARIN SODIUM 5000 UNITS: 5000 INJECTION INTRAVENOUS; SUBCUTANEOUS at 22:10

## 2023-07-29 RX ADMIN — HEPARIN SODIUM 5000 UNITS: 5000 INJECTION INTRAVENOUS; SUBCUTANEOUS at 05:58

## 2023-07-29 RX ADMIN — ACETAMINOPHEN 975 MG: 325 TABLET ORAL at 14:08

## 2023-07-29 RX ADMIN — ACETAMINOPHEN 975 MG: 325 TABLET ORAL at 05:58

## 2023-07-29 RX ADMIN — INSULIN LISPRO 4 UNITS: 100 INJECTION, SOLUTION INTRAVENOUS; SUBCUTANEOUS at 12:06

## 2023-07-29 RX ADMIN — HYDROMORPHONE HYDROCHLORIDE 0.5 MG: 1 INJECTION, SOLUTION INTRAMUSCULAR; INTRAVENOUS; SUBCUTANEOUS at 19:42

## 2023-07-29 RX ADMIN — VANCOMYCIN HYDROCHLORIDE 1250 MG: 1 INJECTION, POWDER, LYOPHILIZED, FOR SOLUTION INTRAVENOUS at 12:04

## 2023-07-29 RX ADMIN — ASPIRIN 81 MG 81 MG: 81 TABLET ORAL at 08:31

## 2023-07-29 RX ADMIN — PREGABALIN 100 MG: 100 CAPSULE ORAL at 22:09

## 2023-07-29 RX ADMIN — INSULIN GLARGINE 30 UNITS: 100 INJECTION, SOLUTION SUBCUTANEOUS at 08:32

## 2023-07-29 RX ADMIN — LEVETIRACETAM 500 MG: 500 TABLET, FILM COATED ORAL at 08:32

## 2023-07-29 NOTE — ASSESSMENT & PLAN NOTE
Assessment:  MRSA positive cellulitis and abscess of left AC IV site s/p I&D. Plan:  Ongoing active cellulitis and purulent discharge. Continue to trend pain, fever, white count. As long as otherwise stable we will continue current plan for once daily dressing changes. Soap scrub to the wound, corner of 4 x 4 gauze with SilvaSorb gel applied into the wound, mupirocin ointment applied to the periwound, dry 4 x 4 ABD Kerlix and Ace wrap from hand to shoulder. We will reassess on 7/30/2023. Exam findings consistent with cellulitis and abscess, plus previously noted biceps myositis from CT, clinical picture not suggestive of necrotizing soft tissue infection at this time.

## 2023-07-29 NOTE — ASSESSMENT & PLAN NOTE
Lab Results   Component Value Date    EGFR 61 07/29/2023    EGFR 62 07/28/2023    EGFR 51 07/27/2023    CREATININE 1.04 07/29/2023    CREATININE 1.03 07/28/2023    CREATININE 1.22 07/27/2023     · Present on admission  · Has resolved  · Creatinine is at baseline  · Continue to avoid nephrotoxins  · Monitor BMPs

## 2023-07-29 NOTE — PROGRESS NOTES
[address]  Progress Note  Name: Dung Jenkins  MRN: [id number]  Unit/Bed#: -01 I Date of Admission: 7/24/2023   Date of Service: 7/29/2023 I Hospital Day: 5    Assessment/Plan   * Cellulitis of left upper extremity  Assessment & Plan  Assessment:  MRSA positive cellulitis and abscess of left AC IV site s/p I&D. Plan:  Ongoing active cellulitis and purulent discharge. Continue to trend pain, fever, white count. As long as otherwise stable we will continue current plan for once daily dressing changes. Soap scrub to the wound, corner of 4 x 4 gauze with SilvaSorb gel applied into the wound, mupirocin ointment applied to the periwound, dry 4 x 4 ABD Kerlix and Ace wrap from hand to shoulder. We will reassess on 7/30/2023. Exam findings consistent with cellulitis and abscess, plus previously noted biceps myositis from CT, clinical picture not suggestive of necrotizing soft tissue infection at this time. Subjective/Objective   Chief Complaint: pain    Subjective: Ongoing pain and swelling to the left arm without significant change from yesterday, no fevers or chills, fatigue. Objective:     Blood pressure 122/61, pulse 60, temperature 98.1 °F (36.7 °C), temperature source Oral, resp. rate 18, height 5' 1" (1.549 m), weight 91 kg (200 lb 9.9 oz), SpO2 93 %, not currently breastfeeding. ,Body mass index is 37.91 kg/m². Intake/Output Summary (Last 24 hours) at 7/29/2023 1151  Last data filed at 7/29/2023 0831  Gross per 24 hour   Intake 480 ml   Output --   Net 480 ml       Invasive Devices     Peripheral Intravenous Line  Duration           Peripheral IV 07/27/23 Right Hand 2 days                Physical Exam  Vitals and nursing note reviewed. Constitutional:       General: She is not in acute distress. Appearance: She is well-developed. She is not diaphoretic. HENT:      Head: Normocephalic and atraumatic.    Eyes:      Conjunctiva/sclera: Conjunctivae normal.      Pupils: Pupils are equal, round, and reactive to light. Pulmonary:      Effort: No respiratory distress. Musculoskeletal:         General: Normal range of motion. Cervical back: Normal range of motion. Skin:     General: Skin is warm and dry. Capillary Refill: Capillary refill takes less than 2 seconds. Comments: Left antecubital fossa with an open wound and surrounding cellulitis. There is active inflammation and purulent discharge from the wound. Packing was removed cavity flushed then repacked with SilvaSorb gel impregnated gauze, antibiotic ointment applied to the periwound and dry dressing and wrap applied. Tolerated with some difficulty due to pain despite premedication. Neurological:      Mental Status: She is alert and oriented to person, place, and time. Psychiatric:         Behavior: Behavior normal.           Lab, Imaging and other studies:  I have personally reviewed pertinent lab results.   , CBC:   Lab Results   Component Value Date    WBC 12.01 (H) 07/29/2023    HGB 10.1 (L) 07/29/2023    HCT 33.0 (L) 07/29/2023    MCV 84 07/29/2023     07/29/2023    RBC 3.93 07/29/2023    MCH 25.7 (L) 07/29/2023    MCHC 30.6 (L) 07/29/2023    RDW 15.7 (H) 07/29/2023    MPV 10.2 07/29/2023   , CMP:   Lab Results   Component Value Date    SODIUM 140 07/29/2023    K 4.3 07/29/2023     07/29/2023    CO2 29 07/29/2023    BUN 21 07/29/2023    CREATININE 1.04 07/29/2023    CALCIUM 9.1 07/29/2023    EGFR 61 07/29/2023     VTE Pharmacologic Prophylaxis: Heparin  VTE Mechanical Prophylaxis: sequential compression device

## 2023-07-29 NOTE — ASSESSMENT & PLAN NOTE
· Mesfin Arreguin as outpatient, however is not on hospital formulary - will utilize Amitiza while in hospital  · Continue home Miralax, Colace, and senna

## 2023-07-29 NOTE — PROGRESS NOTES
Wendy Crook is a 46 y.o. female who is currently ordered Vancomycin IV with management by the Pharmacy Consult service. Relevant clinical data and objective / subjective history reviewed. Vancomycin Assessment:  Indication and Goal AUC/Trough: Bacteremia (goal -600, trough >10); Soft tissue (goal -600, trough >10), -600, trough >10  Clinical Status: stable  Micro:       Renal Function:  SCr: 1.04 mg/dL  CrCl: 64.5 mL/min  Renal replacement: Not on dialysis  Days of Therapy: 5  Current Dose: 1250 mg iv q 24 hrs  Vancomycin Plan:  New Dosing: continue 1250 mg iv q 24 hrs  Estimated AUC: 448 mcg*hr/mL  Estimated Trough: 12.2 mcg/mL  Next Level: 8/3/23 @ 0600  Renal Function Monitoring: Daily BMP and [place] will continue to follow closely for s/sx of nephrotoxicity, infusion reactions and appropriateness of therapy. BMP and CBC will be ordered per protocol. We will continue to follow the patient’s culture results and clinical progress daily.     Bill Nelson, Pharmacist

## 2023-07-29 NOTE — PLAN OF CARE
Problem: METABOLIC, FLUID AND ELECTROLYTES - ADULT  Goal: Electrolytes maintained within normal limits  Description: INTERVENTIONS:  - Monitor labs and assess patient for signs and symptoms of electrolyte imbalances  - Administer electrolyte replacement as ordered  - Monitor response to electrolyte replacements, including repeat lab results as appropriate  - Instruct patient on fluid and nutrition as appropriate  Outcome: Progressing  Goal: Fluid balance maintained  Description: INTERVENTIONS:  - Monitor labs   - Monitor I/O and WT  - Instruct patient on fluid and nutrition as appropriate  - Assess for signs & symptoms of volume excess or deficit  Outcome: Progressing  Goal: Glucose maintained within target range  Description: INTERVENTIONS:  - Monitor Blood Glucose as ordered  - Assess for signs and symptoms of hyperglycemia and hypoglycemia  - Administer ordered medications to maintain glucose within target range  - Assess nutritional intake and initiate nutrition service referral as needed  Outcome: Progressing     Problem: SKIN/TISSUE INTEGRITY - ADULT  Goal: Skin Integrity remains intact(Skin Breakdown Prevention)  Description: Assess:  -Assess extremities for adequate circulation and sensation     Bed Management:  -Have minimal linens on bed & keep smooth, unwrinkled  -Change linens as needed when moist or perspiring    Toileting:  -Offer bedside commode  Activity:  -Encourage activity and walks on unit  -Encourage or provide ROM exercises   -Use appropriate equipment to lift or move patient in bed      Skin Care:  -Avoid use of baby powder, tape, friction and shearing, hot water or constrictive clothing    -Do not massage red bony areas    Next Steps:  -Outcome: Progressing  Goal: Incision(s), wounds(s) or drain site(s) healing without S/S of infection  Description: INTERVENTIONS  - Assess and document dressing, incision, wound bed, drain sites and surrounding tissue  - Provide patient and family education    Outcome: Progressing  Goal: Pressure injury heals and does not worsen  Description: Interventions:  - Implement low air loss mattress or specialty surface (Criteria met)  - Apply silicone foam dressing  - Consider nutrition services referral as needed  Outcome: Progressing     Problem: PAIN - ADULT  Goal: Verbalizes/displays adequate comfort level or baseline comfort level  Description: Interventions:  - Encourage patient to monitor pain and request assistance  - Assess pain using appropriate pain scale  - Administer analgesics based on type and severity of pain and evaluate response  - Implement non-pharmacological measures as appropriate and evaluate response  - Consider cultural and social influences on pain and pain management  - Notify physician/advanced practitioner if interventions unsuccessful or patient reports new pain  Outcome: Progressing     Problem: INFECTION - ADULT  Goal: Absence or prevention of progression during hospitalization  Description: INTERVENTIONS:  - Assess and monitor for signs and symptoms of infection  - Monitor lab/diagnostic results  - Monitor all insertion sites, i.e. indwelling lines, tubes, and drains  - Monitor endotracheal if appropriate and nasal secretions for changes in amount and color  - [place] appropriate cooling/warming therapies per order  - Administer medications as ordered  - Instruct and encourage patient and family to use good hand hygiene technique  - Identify and instruct in appropriate isolation precautions for identified infection/condition  Outcome: Progressing  Goal: Absence of fever/infection during neutropenic period  Description: INTERVENTIONS:  - Monitor WBC    Outcome: Progressing     Problem: SAFETY ADULT  Goal: Patient will remain free of falls  Description: INTERVENTIONS:  - Educate patient/family on patient safety including physical limitations  - Instruct patient to call for assistance with activity   - Consult OT/PT to assist with strengthening/mobility   - Keep Call bell within reach  - Keep bed low and locked with side rails adjusted as appropriate  - Keep care items and personal belongings within reach  - Initiate and maintain comfort rounds  - Make Fall Risk Sign visible to staff  - Apply yellow socks and bracelet for high fall risk patients  - Consider moving patient to room near nurses station  Outcome: Progressing  Goal: Maintain or return to baseline ADL function  Description: INTERVENTIONS:  -  Assess patient's ability to carry out ADLs; assess patient's baseline for ADL function and identify physical deficits which impact ability to perform ADLs (bathing, care of mouth/teeth, toileting, grooming, dressing, etc.)  - Assess/evaluate cause of self-care deficits   - Assess range of motion  - Assess patient's mobility; develop plan if impaired  - Assess patient's need for assistive devices and provide as appropriate  - Encourage maximum independence but intervene and supervise when necessary  - Involve family in performance of ADLs  - Assess for home care needs following discharge   - Consider OT consult to assist with ADL evaluation and planning for discharge  - Provide patient education as appropriate  Outcome: Progressing  Goal: Maintains/Returns to pre admission functional level  Description: INTERVENTIONS:  - Perform BMAT or MOVE assessment daily.   - Set and communicate daily mobility goal to care team and patient/family/caregiver.    - Collaborate with rehabilitation services on mobility goals if consulted  - Out of bed for toileting  - Record patient progress and toleration of activity level   Outcome: Progressing     Problem: DISCHARGE PLANNING  Goal: Discharge to home or other facility with appropriate resources  Description: INTERVENTIONS:  - Identify barriers to discharge w/patient and caregiver  - Arrange for needed discharge resources and transportation as appropriate  - Identify discharge learning needs (meds, wound care, etc.)  - Arrange for interpretive services to assist at discharge as needed  - Refer to Case Management Department for coordinating discharge planning if the patient needs post-hospital services based on physician/advanced practitioner order or complex needs related to functional status, cognitive ability, or social support system  Outcome: Progressing     Problem: Knowledge Deficit  Goal: Patient/family/caregiver demonstrates understanding of disease process, treatment plan, medications, and discharge instructions  Description: Complete learning assessment and assess knowledge base.   Interventions:  - Provide teaching at level of understanding  - Provide teaching via preferred learning methods  Outcome: Progressing     Problem: Prexisting or High Potential for Compromised Skin Integrity  Goal: Skin integrity is maintained or improved  Description: INTERVENTIONS:  - Identify patients at risk for skin breakdown  - Assess and monitor skin integrity  - Assess and monitor nutrition and hydration status  - Monitor labs   - Assess for incontinence   - Turn and reposition patient  - Assist with mobility/ambulation  - Relieve pressure over bony prominences  - Avoid friction and shearing  - Provide appropriate hygiene as needed including keeping skin clean and dry  - Evaluate need for skin moisturizer/barrier cream  - Collaborate with interdisciplinary team   - Patient/family teaching  - Consider wound care consult   Outcome: Progressing     Problem: MOBILITY - ADULT  Goal: Maintain or return to baseline ADL function  Description: INTERVENTIONS:  -  Assess patient's ability to carry out ADLs; assess patient's baseline for ADL function and identify physical deficits which impact ability to perform ADLs (bathing, care of mouth/teeth, toileting, grooming, dressing, etc.)  - Assess/evaluate cause of self-care deficits   - Assess range of motion  - Assess patient's mobility; develop plan if impaired  - Assess patient's need for assistive devices and provide as appropriate  - Encourage maximum independence but intervene and supervise when necessary  - Involve family in performance of ADLs  - Assess for home care needs following discharge   - Consider OT consult to assist with ADL evaluation and planning for discharge  - Provide patient education as appropriate  Outcome: Progressing  Goal: Maintains/Returns to pre admission functional level  Description: INTERVENTIONS:  - Perform BMAT or MOVE assessment daily.   - Set and communicate daily mobility goal to care team and patient/family/caregiver.    - Collaborate with rehabilitation services on mobility goals if consulted  - Out of bed for toileting  - Record patient progress and toleration of activity level   Outcome: Progressing

## 2023-07-29 NOTE — ASSESSMENT & PLAN NOTE
· Status post a bedside incision and drainage by general surgery on 7/26/2023  · Patient continues to feel better  · Status post an ID evaluation, status post a general surgical evaluation  · Bl Cx X 2 sets (7/24) 1/2 + MRSA  · Bl Cx x 2 sets (7/26)-no growth at 48 hours  · Wound cultures- also positive for MRSA  · 2D echocardiogram completed-no evidence of any type of vegetation  · Continue IV vancomycin day 6  · Potential PICC line placement for 7/31/2023 after which discharge planning can be started, patient will need 4 weeks worth of IV antibiotic  · Status post a PT OT evaluation, patient is okay to go home with outpatient rehab and home services  · Potential discharge planning for the week of 7/31/2023

## 2023-07-29 NOTE — ASSESSMENT & PLAN NOTE
Lab Results   Component Value Date    HGBA1C 12.7 (H) 07/25/2023       Recent Labs     07/28/23  1615 07/28/23  2116 07/29/23  0703 07/29/23  1108   POCGLU 90 289* 205* 277*       Blood Sugar Average: Last 72 hrs:  (P) [id number]     · Status post the use of an insulin drip at time of arrival  · Target blood sugar for the hospital is 140-180  · Continue Lantus, Accu-Cheks before meals and at bedtime, and sliding scale coverage  · Blood sugar control is better controlled, will further increase Lantus to 35 units twice a day  · Diabetic neuropathy- continue Lyrica  · Continue to monitor

## 2023-07-29 NOTE — PROGRESS NOTES
[address]  Progress Note  Name: Tacos Owens  MRN: [id number]  Unit/Bed#: -01 I Date of Admission: 7/24/2023   Date of Service: 7/29/2023 I Hospital Day: 5    Assessment/Plan   * Cellulitis of left upper extremity  Assessment & Plan  · Status post a bedside incision and drainage by general surgery on 7/26/2023  · Patient continues to feel better  · Status post an ID evaluation, status post a general surgical evaluation  · Bl Cx X 2 sets (7/24) 1/2 + MRSA  · Bl Cx x 2 sets (7/26)-no growth at 48 hours  · Wound cultures- also positive for MRSA  · 2D echocardiogram completed-no evidence of any type of vegetation  · Continue IV vancomycin day 6  · Potential PICC line placement for 7/31/2023 after which discharge planning can be started, patient will need 4 weeks worth of IV antibiotic  · Status post a PT OT evaluation, patient is okay to go home with outpatient rehab and home services  · Potential discharge planning for the week of 7/31/2023    Type 2 diabetes mellitus with hyperglycemia, with long-term current use of insulin [facility])  Assessment & Plan  Lab Results   Component Value Date    HGBA1C 12.7 (H) 07/25/2023       Recent Labs     07/28/23  1615 07/28/23  2116 07/29/23  0703 07/29/23  1108   POCGLU 90 289* 205* 277*       Blood Sugar Average: Last 72 hrs:  (P) [id number]     · Status post the use of an insulin drip at time of arrival  · Target blood sugar for the hospital is 140-180  · Continue Lantus, Accu-Cheks before meals and at bedtime, and sliding scale coverage  · Blood sugar control is better controlled, will further increase Lantus to 35 units twice a day  · Diabetic neuropathy- continue Lyrica  · Continue to monitor    Seizure disorder (HCC)  Assessment & Plan  · Continue Keppra 500 mg p.o. every 12 hours    Hypertension  Assessment & Plan  · Blood pressures controlled  · Continue metoprolol tartrate 25 mg p.o. twice daily    GERD (gastroesophageal reflux disease)  Assessment & Plan  · Continue Pepcid    Acute renal failure superimposed on stage 3a chronic kidney disease [facility])  Assessment & Plan  Lab Results   Component Value Date    EGFR 61 07/29/2023    EGFR 62 07/28/2023    EGFR 51 07/27/2023    CREATININE 1.04 07/29/2023    CREATININE 1.03 07/28/2023    CREATININE 1.22 07/27/2023     · Present on admission  · Has resolved  · Creatinine is at baseline  · Continue to avoid nephrotoxins  · Monitor BMPs    Bipolar disorder (HCC)  Assessment & Plan  · Continue home Celexa and Elavil     Obesity (BMI 35.0-39.9 without comorbidity)  Assessment & Plan  · Actual BMI 39.16  · Dietary, weight loss, and lifestyle modification counseling provided    Hypercholesteremia  Assessment & Plan  · Mixed dyslipidemia-continue statin, continue Tricor    Anemia  Assessment & Plan  · Anemia of chronic disease  · H&H is stable    Lower extremity edema  Assessment & Plan  · Bilateral LE w/+1 edema, erythema, and calf pain - L > R  · CTA PE study negative for PE  · Bilateral lower extremity venous Doppler testing is negative for DVT  · No further testing    Slow transit constipation  Assessment & Plan  · Takes Linzess as outpatient, however is not on hospital formulary - will utilize Amitiza while in hospital  · Continue home Miralax, Colace, and senna             VTE Prophylaxis:  Heparin    Patient Centered Rounds: I have performed bedside rounds with nursing staff today.     Discussions with Specialists or Other Care Team Provider: General surgery, nursing  Education and Discussions with Family / Patient: Patient was brought up to par    Current Length of Stay: 5 day(s)    Current Patient Status: Inpatient   Certification Statement: The patient will continue to require additional inpatient hospital stay due to Need for continued IV antibiotics    Discharge Plan: Hopeful discharge planning early in the week of 7/31/2023    Code Status: Level 1 - Full Code    Subjective:   Patient seen, sitting up in the chair, reports feeling a little bit better, is curious as to why all of this happened-all questions answered to her satisfaction    Objective:     Vitals:   Temp (24hrs), Av.1 °F (36.7 °C), Min:97.9 °F (36.6 °C), Max:98.4 °F (36.9 °C)    Temp:  [97.9 °F (36.6 °C)-98.4 °F (36.9 °C)] 98.1 °F (36.7 °C)  HR:  [60-72] 60  Resp:  [18] 18  BP: (119-129)/(59-64) 122/61  SpO2:  [91 %-94 %] 93 %  Body mass index is 37.91 kg/m². Input and Output Summary (last 24 hours): Intake/Output Summary (Last 24 hours) at 2023 1428  Last data filed at 2023 0831  Gross per 24 hour   Intake 480 ml   Output --   Net 480 ml       Physical Exam:   Physical Exam  Constitutional:       General: She is not in acute distress. Appearance: Normal appearance. She is normal weight. She is not ill-appearing. HENT:      Head: Normocephalic and atraumatic. Nose: Nose normal.      Mouth/Throat:      Mouth: Mucous membranes are moist.   Eyes:      Extraocular Movements: Extraocular movements intact. Pupils: Pupils are equal, round, and reactive to light. Cardiovascular:      Rate and Rhythm: Normal rate and regular rhythm. Pulses: Normal pulses. Heart sounds: Normal heart sounds. No murmur heard. No friction rub. No gallop. Pulmonary:      Effort: Pulmonary effort is normal. No respiratory distress. Breath sounds: Normal breath sounds. No wheezing, rhonchi or rales. Abdominal:      General: There is no distension. Palpations: Abdomen is soft. There is no mass. Tenderness: There is no abdominal tenderness. Hernia: No hernia is present. Musculoskeletal:         General: No swelling or tenderness. Normal range of motion. Cervical back: Normal range of motion and neck supple. No rigidity. Right lower leg: No edema. Left lower leg: No edema. Comments: Left antecubital dressing is in place, dry, and intact   Skin:     General: Skin is warm. Capillary Refill: Capillary refill takes less than 2 seconds. Findings: No erythema or rash. Neurological:      General: No focal deficit present. Mental Status: She is alert and oriented to person, place, and time. Mental status is at baseline. Cranial Nerves: No cranial nerve deficit. Motor: No weakness. Psychiatric:         Mood and Affect: Mood normal.         Behavior: Behavior normal.         Additional Data:     Labs:    Results from last 7 days   Lab Units 07/29/23  0519 07/28/23  0508 07/27/23  0506 07/26/23  0423   WBC Thousand/uL 12.01*   < > 12.77* 15.90*   HEMOGLOBIN g/dL 10.1*   < > 10.0* 10.3*   HEMATOCRIT % 33.0*   < > 31.4* 33.1*   PLATELETS Thousands/uL 323   < > 258 259   NEUTROS PCT %  --   --   --  76*   LYMPHS PCT %  --   --   --  13*   LYMPHO PCT %  --   --  20  --    MONOS PCT %  --   --   --  7   MONO PCT %  --   --  6  --    EOS PCT %  --   --  2 2    < > = values in this interval not displayed. Results from last 7 days   Lab Units 07/29/23  0519 07/27/23  0506 07/26/23  0423   SODIUM mmol/L 140   < > 137   POTASSIUM mmol/L 4.3   < > 4.3   CHLORIDE mmol/L 102   < > 100   CO2 mmol/L 29   < > 27   BUN mg/dL 21   < > 19   CREATININE mg/dL 1.04   < > 1.22   CALCIUM mg/dL 9.1   < > 8.8   ALK PHOS U/L  --   --  155*   ALT U/L  --   --  33   AST U/L  --   --  18    < > = values in this interval not displayed. Results from last 7 days   Lab Units 07/24/23  1850   INR  0.96     Results from last 7 days   Lab Units 07/29/23  1108 07/29/23  0703 07/28/23  2116 07/28/23  1615 07/28/23  1112 07/28/23  0641 07/27/23  2135 07/27/23  1614 07/27/23  1113 07/27/23  0714 07/26/23  2108 07/26/23  1615   POC GLUCOSE mg/dl 277* 205* 289* 90 124 139 133 144* 205* 222* 197* 195*     Results from last 7 days   Lab Units 07/25/23  0052   HEMOGLOBIN A1C % 12.7*       * I Have Reviewed All Lab Data Listed Above. * Additional Pertinent Lab Tests Reviewed:  [address] Admission  Reviewed    Imaging:  Imaging Reports Reviewed Today Include: None    Recent Cultures (last 7 days):     Results from last 7 days   Lab Units 07/26/23  1623 07/26/23  1016 07/26/23  0852 07/25/23  0628 07/24/23  1907 07/24/23  1850   BLOOD CULTURE   --  No Growth at 48 hrs. No Growth at 48 hrs.  --   --  No Growth After 4 Days.  Methicillin Resistant Staphylococcus aureus*   GRAM STAIN RESULT  No polys seen*  3+ Gram positive cocci in clusters*  --  Rare Polys  No organisms seen  --   --  Gram positive cocci in clusters*   WOUND CULTURE  3+ Growth of Methicillin Resistant Staphylococcus aureus*  --  2+ Growth of Methicillin Resistant Staphylococcus aureus*  --   --   --    LEGIONELLA URINARY ANTIGEN   --   --   --  Negative  --   --        Last 24 Hours Medication List:   Current Facility-Administered Medications   Medication Dose Route Frequency Provider Last Rate   • acetaminophen  975 mg Oral [facility] Silvia Mancia MD     • amitriptyline  25 mg Oral HS Silvia Mancia MD     • aspirin  81 mg Oral Daily Silvia Mancia MD     • atorvastatin  80 mg Oral HS Silvia Mancia MD     • cholecalciferol  400 Units Oral Daily Silvia Mancia MD     • citalopram  40 mg Oral Daily Silvia Mancia MD     • docusate sodium  100 mg Oral BID Silvia Mancia MD     • famotidine  20 mg Oral HS Silvia Mancia MD     • fenofibrate  48 mg Oral Daily Silvia Mancia MD     • heparin (porcine)  5,000 Units Subcutaneous [facility] Silvia Mancia MD     • HYDROmorphone  0.5 mg Intravenous Q3H PRN Silvia Mancia MD     • HYDROmorphone  0.5 mg Intravenous Once Silvia Mancia MD     • insulin glargine  35 Units Subcutaneous Q12H [facility], MD     • insulin lispro  1-6 Units Subcutaneous TID [facility] Silvia Mancia MD     • insulin lispro  1-6 Units Subcutaneous HS Silvia Mancia MD     • insulin lispro  14 Units Subcutaneous TID With [address], MD     • levETIRAcetam  500 mg Oral Q12H [address], MD     • lubiprostone  24 mcg Oral BID Andres Rodriguez MD     • magnesium Oxide  400 mg Oral Daily Andres Rodriguez MD     • metoprolol tartrate  25 mg Oral BID Andres Rodriguez MD     • multivitamin stress formula  1 tablet Oral QAM Andres Rodriguez MD     • mupirocin   Nasal Q12H [address] Gerardo Rahman PA-C     • mupirocin   Topical BID Gerardo Rahman PA-C     • oxyCODONE  2.5 mg Oral Q6H PRN Andres Rodriguez MD      Or   • oxyCODONE  5 mg Oral Q6H PRN Andres Rodriguez MD     • pregabalin  100 mg Oral TID Andres Rodriguez MD     • senna  2 tablet Oral HS Andres Rodriguez MD     • vancomycin  1,250 mg Intravenous Q24H Andres Rodriguez MD 1,250 mg (07/29/23 1204)        Today, Patient Was Seen By: Andres Rodriguez MD    ** Please Note: Dictation voice to text software may have been used in the creation of this document.  **

## 2023-07-29 NOTE — PLAN OF CARE
Problem: METABOLIC, FLUID AND ELECTROLYTES - ADULT  Goal: Electrolytes maintained within normal limits  Description: INTERVENTIONS:  - Monitor labs and assess patient for signs and symptoms of electrolyte imbalances  - Administer electrolyte replacement as ordered  - Monitor response to electrolyte replacements, including repeat lab results as appropriate  - Instruct patient on fluid and nutrition as appropriate  Outcome: Progressing     Problem: INFECTION - ADULT  Goal: Absence or prevention of progression during hospitalization  Description: INTERVENTIONS:  - Assess and monitor for signs and symptoms of infection  - Monitor lab/diagnostic results  - Monitor all insertion sites, i.e. indwelling lines, tubes, and drains  - Monitor endotracheal if appropriate and nasal secretions for changes in amount and color  - [place] appropriate cooling/warming therapies per order  - Administer medications as ordered  - Instruct and encourage patient and family to use good hand hygiene technique  - Identify and instruct in appropriate isolation precautions for identified infection/condition  Outcome: Progressing     Problem: SAFETY ADULT  Goal: Maintain or return to baseline ADL function  Description: INTERVENTIONS:  -  Assess patient's ability to carry out ADLs; assess patient's baseline for ADL function and identify physical deficits which impact ability to perform ADLs (bathing, care of mouth/teeth, toileting, grooming, dressing, etc.)  - Assess/evaluate cause of self-care deficits   - Assess range of motion  - Assess patient's mobility; develop plan if impaired  - Assess patient's need for assistive devices and provide as appropriate  - Encourage maximum independence but intervene and supervise when necessary  - Involve family in performance of ADLs  - Assess for home care needs following discharge   - Consider OT consult to assist with ADL evaluation and planning for discharge  - Provide patient education as appropriate  Outcome: Progressing     Problem: Knowledge Deficit  Goal: Patient/family/caregiver demonstrates understanding of disease process, treatment plan, medications, and discharge instructions  Description: Complete learning assessment and assess knowledge base.   Interventions:  - Provide teaching at level of understanding  - Provide teaching via preferred learning methods  Outcome: Progressing     Problem: MOBILITY - ADULT  Goal: Maintain or return to baseline ADL function  Description: INTERVENTIONS:  -  Assess patient's ability to carry out ADLs; assess patient's baseline for ADL function and identify physical deficits which impact ability to perform ADLs (bathing, care of mouth/teeth, toileting, grooming, dressing, etc.)  - Assess/evaluate cause of self-care deficits   - Assess range of motion  - Assess patient's mobility; develop plan if impaired  - Assess patient's need for assistive devices and provide as appropriate  - Encourage maximum independence but intervene and supervise when necessary  - Involve family in performance of ADLs  - Assess for home care needs following discharge   - Consider OT consult to assist with ADL evaluation and planning for discharge  - Provide patient education as appropriate  Outcome: Progressing

## 2023-07-30 LAB
BACTERIA BLD CULT: NORMAL
GLUCOSE SERPL-MCNC: 166 MG/DL (ref 65–140)
GLUCOSE SERPL-MCNC: 214 MG/DL (ref 65–140)
GLUCOSE SERPL-MCNC: 306 MG/DL (ref 65–140)
GLUCOSE SERPL-MCNC: 313 MG/DL (ref 65–140)

## 2023-07-30 PROCEDURE — 99233 SBSQ HOSP IP/OBS HIGH 50: CPT | Performed by: PHYSICIAN ASSISTANT

## 2023-07-30 PROCEDURE — 82948 REAGENT STRIP/BLOOD GLUCOSE: CPT

## 2023-07-30 PROCEDURE — 99232 SBSQ HOSP IP/OBS MODERATE 35: CPT | Performed by: HOSPITALIST

## 2023-07-30 RX ADMIN — INSULIN LISPRO 14 UNITS: 100 INJECTION, SOLUTION INTRAVENOUS; SUBCUTANEOUS at 07:52

## 2023-07-30 RX ADMIN — HEPARIN SODIUM 5000 UNITS: 5000 INJECTION INTRAVENOUS; SUBCUTANEOUS at 22:30

## 2023-07-30 RX ADMIN — ACETAMINOPHEN 975 MG: 325 TABLET ORAL at 22:12

## 2023-07-30 RX ADMIN — HEPARIN SODIUM 5000 UNITS: 5000 INJECTION INTRAVENOUS; SUBCUTANEOUS at 14:49

## 2023-07-30 RX ADMIN — ACETAMINOPHEN 975 MG: 325 TABLET ORAL at 14:49

## 2023-07-30 RX ADMIN — SENNOSIDES 17.2 MG: 8.6 TABLET, FILM COATED ORAL at 22:28

## 2023-07-30 RX ADMIN — ATORVASTATIN CALCIUM 80 MG: 80 TABLET, FILM COATED ORAL at 22:19

## 2023-07-30 RX ADMIN — AMITRIPTYLINE HYDROCHLORIDE 25 MG: 25 TABLET, FILM COATED ORAL at 22:12

## 2023-07-30 RX ADMIN — HYDROMORPHONE HYDROCHLORIDE 0.5 MG: 1 INJECTION, SOLUTION INTRAMUSCULAR; INTRAVENOUS; SUBCUTANEOUS at 12:25

## 2023-07-30 RX ADMIN — INSULIN GLARGINE 35 UNITS: 100 INJECTION, SOLUTION SUBCUTANEOUS at 08:53

## 2023-07-30 RX ADMIN — DOCUSATE SODIUM 100 MG: 100 CAPSULE, LIQUID FILLED ORAL at 18:12

## 2023-07-30 RX ADMIN — PREGABALIN 100 MG: 100 CAPSULE ORAL at 22:28

## 2023-07-30 RX ADMIN — LEVETIRACETAM 500 MG: 500 TABLET, FILM COATED ORAL at 08:53

## 2023-07-30 RX ADMIN — LEVETIRACETAM 500 MG: 500 TABLET, FILM COATED ORAL at 22:27

## 2023-07-30 RX ADMIN — ASPIRIN 81 MG 81 MG: 81 TABLET ORAL at 08:53

## 2023-07-30 RX ADMIN — ACETAMINOPHEN 975 MG: 325 TABLET ORAL at 06:18

## 2023-07-30 RX ADMIN — HYDROMORPHONE HYDROCHLORIDE 0.5 MG: 1 INJECTION, SOLUTION INTRAMUSCULAR; INTRAVENOUS; SUBCUTANEOUS at 22:15

## 2023-07-30 RX ADMIN — PREGABALIN 100 MG: 100 CAPSULE ORAL at 16:08

## 2023-07-30 RX ADMIN — Medication 5 MG: at 16:08

## 2023-07-30 RX ADMIN — DOCUSATE SODIUM 100 MG: 100 CAPSULE, LIQUID FILLED ORAL at 08:53

## 2023-07-30 RX ADMIN — INSULIN LISPRO 14 UNITS: 100 INJECTION, SOLUTION INTRAVENOUS; SUBCUTANEOUS at 16:50

## 2023-07-30 RX ADMIN — INSULIN LISPRO 5 UNITS: 100 INJECTION, SOLUTION INTRAVENOUS; SUBCUTANEOUS at 16:51

## 2023-07-30 RX ADMIN — MUPIROCIN: 20 OINTMENT TOPICAL at 12:25

## 2023-07-30 RX ADMIN — METOPROLOL TARTRATE 25 MG: 25 TABLET, FILM COATED ORAL at 22:28

## 2023-07-30 RX ADMIN — INSULIN LISPRO 1 UNITS: 100 INJECTION, SOLUTION INTRAVENOUS; SUBCUTANEOUS at 07:52

## 2023-07-30 RX ADMIN — INSULIN LISPRO 2 UNITS: 100 INJECTION, SOLUTION INTRAVENOUS; SUBCUTANEOUS at 11:57

## 2023-07-30 RX ADMIN — Medication 400 MG: at 08:53

## 2023-07-30 RX ADMIN — CITALOPRAM HYDROBROMIDE 40 MG: 20 TABLET ORAL at 08:53

## 2023-07-30 RX ADMIN — Medication 5 MG: at 08:00

## 2023-07-30 RX ADMIN — PREGABALIN 100 MG: 100 CAPSULE ORAL at 08:53

## 2023-07-30 RX ADMIN — LUBIPROSTONE 24 MCG: 24 CAPSULE, GELATIN COATED ORAL at 11:56

## 2023-07-30 RX ADMIN — B-COMPLEX W/ C & FOLIC ACID TAB 1 TABLET: TAB at 08:53

## 2023-07-30 RX ADMIN — FENOFIBRATE 48 MG: 48 TABLET, FILM COATED ORAL at 08:53

## 2023-07-30 RX ADMIN — FAMOTIDINE 20 MG: 20 TABLET, FILM COATED ORAL at 22:13

## 2023-07-30 RX ADMIN — CHOLECALCIFEROL TAB 10 MCG (400 UNIT) 400 UNITS: 10 TAB at 08:53

## 2023-07-30 RX ADMIN — INSULIN GLARGINE 35 UNITS: 100 INJECTION, SOLUTION SUBCUTANEOUS at 22:29

## 2023-07-30 RX ADMIN — INSULIN LISPRO 14 UNITS: 100 INJECTION, SOLUTION INTRAVENOUS; SUBCUTANEOUS at 11:57

## 2023-07-30 RX ADMIN — MUPIROCIN 1 APPLICATION: 20 OINTMENT TOPICAL at 22:29

## 2023-07-30 RX ADMIN — LUBIPROSTONE 24 MCG: 24 CAPSULE, GELATIN COATED ORAL at 23:16

## 2023-07-30 RX ADMIN — METOPROLOL TARTRATE 25 MG: 25 TABLET, FILM COATED ORAL at 08:53

## 2023-07-30 RX ADMIN — INSULIN LISPRO 4 UNITS: 100 INJECTION, SOLUTION INTRAVENOUS; SUBCUTANEOUS at 22:31

## 2023-07-30 RX ADMIN — VANCOMYCIN HYDROCHLORIDE 1250 MG: 1 INJECTION, POWDER, LYOPHILIZED, FOR SOLUTION INTRAVENOUS at 12:52

## 2023-07-30 RX ADMIN — HEPARIN SODIUM 5000 UNITS: 5000 INJECTION INTRAVENOUS; SUBCUTANEOUS at 06:19

## 2023-07-30 NOTE — ASSESSMENT & PLAN NOTE
Lab Results   Component Value Date    EGFR 61 07/29/2023    EGFR 62 07/28/2023    EGFR 51 07/27/2023    CREATININE 1.04 07/29/2023    CREATININE 1.03 07/28/2023    CREATININE 1.22 07/27/2023     · Present on admission  · Has resolved with IV fluid-this was prerenal  · Creatinine is at baseline  · Continue to avoid nephrotoxins  · Monitor BMPs

## 2023-07-30 NOTE — PROGRESS NOTES
[id number] [facility]  Progress Note  Name: Tali Zaman  MRN: [id number]  Unit/Bed#: -01 I Date of Admission: 7/24/2023   Date of Service: 7/30/2023 I Hospital Day: 6    Assessment/Plan   * Cellulitis of left upper extremity  Assessment & Plan  · Status post a bedside incision and drainage by general surgery on 7/26/2023  · Patient continues to feel better  · Status post an ID evaluation, status post a general surgical evaluation  · Bl Cx X 2 sets (7/24) 1/2 + MRSA  · Bl Cx x 2 sets (7/26)-no growth at 72 hours  · Wound cultures- also positive for MRSA  · 2D echocardiogram completed-no evidence of any type of vegetation  · Continue IV vancomycin day 7  · Now that the repeat blood cultures are negative at 72 hours, will consult IR for PICC line placement after which discharge planning can be started, patient will need 4 weeks worth of IV antibiotic  · Status post a PT OT evaluation, patient is okay to go home with outpatient rehab and home services  · Potential discharge planning for the week of 7/31/2023    Type 2 diabetes mellitus with hyperglycemia, with long-term current use of insulin [facility])  Assessment & Plan  Lab Results   Component Value Date    HGBA1C 12.7 (H) 07/25/2023       Recent Labs     07/29/23  1108 07/29/23  1634 07/29/23  2053 07/30/23  0738   POCGLU 277* 312* 205* 166*       Blood Sugar Average: Last 72 hrs:  (P) [id number]     · Status post the use of an insulin drip at time of arrival  · Target blood sugar for the hospital is 140-180  · Continue Lantus, Accu-Cheks before meals and at bedtime, and sliding scale coverage  · Lantus increased to 35 units twice a day on 7/29/2023-continue the same for now May need further insulin optimization  · Diabetic neuropathy- continue Lyrica  · Continue to monitor    Seizure disorder (HCC)  Assessment & Plan  · Continue Keppra 500 mg p.o. every 12 hours    Hypertension  Assessment & Plan  · Blood pressure is controlled  · Continue metoprolol tartrate 25 mg p.o. twice daily    GERD (gastroesophageal reflux disease)  Assessment & Plan  · Continue Pepcid    Acute renal failure superimposed on stage 3a chronic kidney disease [facility])  Assessment & Plan  Lab Results   Component Value Date    EGFR 61 07/29/2023    EGFR 62 07/28/2023    EGFR 51 07/27/2023    CREATININE 1.04 07/29/2023    CREATININE 1.03 07/28/2023    CREATININE 1.22 07/27/2023     · Present on admission  · Has resolved with IV fluid-this was prerenal  · Creatinine is at baseline  · Continue to avoid nephrotoxins  · Monitor BMPs    Bipolar disorder ([address])  Assessment & Plan  · Continue home Celexa and Elavil     Obesity (BMI 35.0-39.9 without comorbidity)  Assessment & Plan  · Actual BMI 37.74  · Dietary, weight loss, and lifestyle modification counseling provided    Hypercholesteremia  Assessment & Plan  · Mixed dyslipidemia-continue statin, continue Tricor    Anemia  Assessment & Plan  · Anemia of chronic disease  · H&H is stable    Lower extremity edema  Assessment & Plan  · Bilateral LE w/+1 edema, erythema, and calf pain - L > R  · CTA PE study negative for PE  · Bilateral lower extremity venous Doppler testing is negative for DVT  · No further testing    Slow transit constipation  Assessment & Plan  · Takes Linzess as outpatient, however is not on hospital formulary - will utilize Amitiza while in hospital  · Continue home Miralax, Colace, and senna             VTE Prophylaxis:  Heparin    Patient Centered Rounds: I have performed bedside rounds with nursing staff today.     Discussions with Specialists or Other Care Team Provider: Nursing  Education and Discussions with Family / Patient: Patient was brought up to [facility], once again she did not ask and want me to call anyone, she has been updating family members herself    Current Length of Stay: 6 day(s)    Current Patient Status: Inpatient   Certification Statement: The patient will continue to require additional inpatient hospital stay due to Need for continued IV antibiotics, and a PICC line placement    Discharge Plan: Hopeful discharge planning sometime this week once PICC line has been placed, and home antibiotics have been set up    Code Status: Level 1 - Full Code    Subjective:   Patient seen, no new complaints    Objective:     Vitals:   Temp (24hrs), Av.5 °F (36.9 °C), Min:98 °F (36.7 °C), Max:99 °F (37.2 °C)    Temp:  [98 °F (36.7 °C)-99 °F (37.2 °C)] 98.1 °F (36.7 °C)  HR:  [70-78] 70  Resp:  [18-20] 18  BP: (124-152)/(62-76) 131/68  SpO2:  [92 %-94 %] 92 %  Body mass index is 37.74 kg/m². Input and Output Summary (last 24 hours): Intake/Output Summary (Last 24 hours) at 2023 0753  Last data filed at 2023 0831  Gross per 24 hour   Intake 240 ml   Output --   Net 240 ml       Physical Exam:   Physical Exam  Constitutional:       General: She is not in acute distress. Appearance: Normal appearance. She is normal weight. She is not ill-appearing. HENT:      Head: Normocephalic and atraumatic. Nose: Nose normal.      Mouth/Throat:      Mouth: Mucous membranes are moist.   Eyes:      Extraocular Movements: Extraocular movements intact. Pupils: Pupils are equal, round, and reactive to light. Cardiovascular:      Rate and Rhythm: Normal rate and regular rhythm. Pulses: Normal pulses. Heart sounds: Normal heart sounds. No murmur heard. No friction rub. No gallop. Pulmonary:      Effort: Pulmonary effort is normal. No respiratory distress. Breath sounds: Normal breath sounds. No wheezing, rhonchi or rales. Abdominal:      General: There is no distension. Palpations: Abdomen is soft. There is no mass. Tenderness: There is no abdominal tenderness. Hernia: No hernia is present. Musculoskeletal:         General: No swelling or tenderness. Normal range of motion. Cervical back: Normal range of motion and neck supple. No rigidity. Right lower leg: No edema. Left lower leg: No edema. Comments: Left elbow dressings in place, dry, and intact   Skin:     General: Skin is warm. Capillary Refill: Capillary refill takes less than 2 seconds. Findings: No erythema or rash. Neurological:      General: No focal deficit present. Mental Status: She is alert and oriented to person, place, and time. Mental status is at baseline. Cranial Nerves: No cranial nerve deficit. Motor: No weakness. Psychiatric:         Mood and Affect: Mood normal.         Behavior: Behavior normal.         Additional Data:     Labs:    Results from last 7 days   Lab Units 07/29/23  0519 07/28/23  0508 07/27/23  0506 07/26/23  0423   WBC Thousand/uL 12.01*   < > 12.77* 15.90*   HEMOGLOBIN g/dL 10.1*   < > 10.0* 10.3*   HEMATOCRIT % 33.0*   < > 31.4* 33.1*   PLATELETS Thousands/uL 323   < > 258 259   NEUTROS PCT %  --   --   --  76*   LYMPHS PCT %  --   --   --  13*   LYMPHO PCT %  --   --  20  --    MONOS PCT %  --   --   --  7   MONO PCT %  --   --  6  --    EOS PCT %  --   --  2 2    < > = values in this interval not displayed. Results from last 7 days   Lab Units 07/29/23 0519 07/27/23  0506 07/26/23  0423   SODIUM mmol/L 140   < > 137   POTASSIUM mmol/L 4.3   < > 4.3   CHLORIDE mmol/L 102   < > 100   CO2 mmol/L 29   < > 27   BUN mg/dL 21   < > 19   CREATININE mg/dL 1.04   < > 1.22   CALCIUM mg/dL 9.1   < > 8.8   ALK PHOS U/L  --   --  155*   ALT U/L  --   --  33   AST U/L  --   --  18    < > = values in this interval not displayed.      Results from last 7 days   Lab Units 07/24/23  1850   INR  0.96     Results from last 7 days   Lab Units 07/30/23  0738 07/29/23  2053 07/29/23  1634 07/29/23  1108 07/29/23  0703 07/28/23  2116 07/28/23  1615 07/28/23  1112 07/28/23  0641 07/27/23  2135 07/27/23  1614 07/27/23  1113   POC GLUCOSE mg/dl 166* 205* 312* 277* 205* 289* 90 124 139 133 144* 205*     Results from last 7 days   Lab Units 07/25/23  0052   HEMOGLOBIN A1C % 12.7*       * I Have Reviewed All Lab Data Listed Above. * Additional Pertinent Lab Tests Reviewed: [address] Admission  Reviewed    Imaging:  Imaging Reports Reviewed Today Include: None    Recent Cultures (last 7 days):     Results from last 7 days   Lab Units 07/26/23  1623 07/26/23  1016 07/26/23  0852 07/25/23  0628 07/24/23  1907 07/24/23  1850   BLOOD CULTURE   --  No Growth at 72 hrs. No Growth at 72 hrs.  --   --  No Growth After 5 Days.  Methicillin Resistant Staphylococcus aureus*   GRAM STAIN RESULT  No polys seen*  3+ Gram positive cocci in clusters*  --  Rare Polys  No organisms seen  --   --  Gram positive cocci in clusters*   WOUND CULTURE  3+ Growth of Methicillin Resistant Staphylococcus aureus*  --  2+ Growth of Methicillin Resistant Staphylococcus aureus*  --   --   --    LEGIONELLA URINARY ANTIGEN   --   --   --  Negative  --   --        Last 24 Hours Medication List:   Current Facility-Administered Medications   Medication Dose Route Frequency Provider Last Rate   • acetaminophen  975 mg Oral [facility] Thaddeus Zambrano MD     • amitriptyline  25 mg Oral HS Thaddeus Zambrano MD     • aspirin  81 mg Oral Daily Thaddeus Zambrano MD     • atorvastatin  80 mg Oral HS Thaddeus Zambrano MD     • cholecalciferol  400 Units Oral Daily Thaddeus Zambrano MD     • citalopram  40 mg Oral Daily Thaddeus Zambrano MD     • docusate sodium  100 mg Oral BID Thaddeus Zambrano MD     • famotidine  20 mg Oral HS Thaddeus Zambrano MD     • fenofibrate  48 mg Oral Daily Thaddeus Zambrano MD     • heparin (porcine)  5,000 Units Subcutaneous [facility] Thaddeus Zambrano MD     • HYDROmorphone  0.5 mg Intravenous Q3H PRN Thaddeus Zambrano MD     • HYDROmorphone  0.5 mg Intravenous Once Thaddeus Zambrano MD     • insulin glargine  35 Units Subcutaneous Q12H [address], MD     • insulin lispro  1-6 Units Subcutaneous TID [facility] Maria Dolores Santos MD     • insulin lispro  1-6 Units Subcutaneous HS Maria Dolores Santos MD     • insulin lispro  14 Units Subcutaneous TID With Meals Maria Dolores Santos MD     • levETIRAcetam  500 mg Oral Q12H [facility] MD     • lubiprostone  24 mcg Oral BID Maria Dolores Santos MD     • magnesium Oxide  400 mg Oral Daily Maria Dolores Santos MD     • metoprolol tartrate  25 mg Oral BID Maria Dolores Santos MD     • multivitamin stress formula  1 tablet Oral QAM Maria Dolores Santos MD     • mupirocin   Nasal Q12H [address] Gerardo Rahman PA-C     • mupirocin   Topical BID Gerardo Rahman PA-C     • oxyCODONE  2.5 mg Oral Q6H PRN Maria Dolores Santos MD      Or   • oxyCODONE  5 mg Oral Q6H PRN Maria Dolores Santos MD     • pregabalin  100 mg Oral TID Maria Dolores Santos MD     • senna  2 tablet Oral HS Maria Dolores Santos MD     • vancomycin  1,250 mg Intravenous Q24H Maria Dolores Santos MD 1,250 mg (07/29/23 1204)        Today, Patient Was Seen By: Maria Dolores Santos MD    ** Please Note: Dictation voice to text software may have been used in the creation of this document.  **

## 2023-07-30 NOTE — ASSESSMENT & PLAN NOTE
Assessment:  MRSA positive cellulitis and abscess of left AC IV site s/p I&D. Plan:   Clinically hemodynamically stable with some improvement on exam today. Continue daily wound care with SilvaSorb gel and gauze to the wound, Bactroban ointment to the periwound, dry gauze dressing and arm wrap. We will reassess wound care plan closer to discharge, may be able to transition to no packing and no arm wrap.

## 2023-07-30 NOTE — PLAN OF CARE
Problem: METABOLIC, FLUID AND ELECTROLYTES - ADULT  Goal: Electrolytes maintained within normal limits  Description: INTERVENTIONS:  - Monitor labs and assess patient for signs and symptoms of electrolyte imbalances  - Administer electrolyte replacement as ordered  - Monitor response to electrolyte replacements, including repeat lab results as appropriate  - Instruct patient on fluid and nutrition as appropriate  Outcome: Progressing  Goal: Fluid balance maintained  Description: INTERVENTIONS:  - Monitor labs   - Monitor I/O and WT  - Instruct patient on fluid and nutrition as appropriate  - Assess for signs & symptoms of volume excess or deficit  Outcome: Progressing  Goal: Glucose maintained within target range  Description: INTERVENTIONS:  - Monitor Blood Glucose as ordered  - Assess for signs and symptoms of hyperglycemia and hypoglycemia  - Administer ordered medications to maintain glucose within target range  - Assess nutritional intake and initiate nutrition service referral as needed  Outcome: Progressing     Problem: SKIN/TISSUE INTEGRITY - ADULT  Goal: Skin Integrity remains intact(Skin Breakdown Prevention)  Description: Assess:  -Perform Chun assessment every   -Clean and moisturize skin every   -Inspect skin when repositioning, toileting, and assisting with ADLS  -Assess under medical devices such as  every   -Assess extremities for adequate circulation and sensation     Bed Management:  -Have minimal linens on bed & keep smooth, unwrinkled  -Change linens as needed when moist or perspiring  -Avoid sitting or lying in one position for more than  hours while in bed  -Keep HOB at degrees     Toileting:  -Offer bedside commode  -Assess for incontinence every   -Use incontinent care products after each incontinent episode such as     Activity:  -Mobilize patient  times a day  -Encourage activity and walks on unit  -Encourage or provide ROM exercises   -Turn and reposition patient every  Hours  -Use appropriate equipment to lift or move patient in bed  -Instruct/ Assist with weight shifting every  when out of bed in chair  -Consider limitation of chair time  hour intervals    Skin Care:  -Avoid use of baby powder, tape, friction and shearing, hot water or constrictive clothing  -Relieve pressure over bony prominences using   -Do not massage red bony areas    Next Steps:  -Teach patient strategies to minimize risks such as    -Consider consults to  interdisciplinary teams such as   Outcome: Progressing  Goal: Incision(s), wounds(s) or drain site(s) healing without S/S of infection  Description: INTERVENTIONS  - Assess and document dressing, incision, wound bed, drain sites and surrounding tissue  - Provide patient and family education  - Perform skin care/dressing changes every   Outcome: Progressing  Goal: Pressure injury heals and does not worsen  Description: Interventions:  - Implement low air loss mattress or specialty surface (Criteria met)  - Apply silicone foam dressing  - Instruct/assist with weight shifting every  minutes when in chair   - Limit chair time to  hour intervals  - Use special pressure reducing interventions such as  when in chair   - Apply fecal or urinary incontinence containment device   - Perform passive or active ROM every   - Turn and reposition patient & offload bony prominences every  hours   - Utilize friction reducing device or surface for transfers   - Consider consults to  interdisciplinary teams such as   - Use incontinent care products after each incontinent episode such as   - Consider nutrition services referral as needed  Outcome: Progressing     Problem: PAIN - ADULT  Goal: Verbalizes/displays adequate comfort level or baseline comfort level  Description: Interventions:  - Encourage patient to monitor pain and request assistance  - Assess pain using appropriate pain scale  - Administer analgesics based on type and severity of pain and evaluate response  - Implement non-pharmacological measures as appropriate and evaluate response  - Consider cultural and social influences on pain and pain management  - Notify physician/advanced practitioner if interventions unsuccessful or patient reports new pain  Outcome: Progressing     Problem: INFECTION - ADULT  Goal: Absence or prevention of progression during hospitalization  Description: INTERVENTIONS:  - Assess and monitor for signs and symptoms of infection  - Monitor lab/diagnostic results  - Monitor all insertion sites, i.e. indwelling lines, tubes, and drains  - Monitor endotracheal if appropriate and nasal secretions for changes in amount and color  - [place] appropriate cooling/warming therapies per order  - Administer medications as ordered  - Instruct and encourage patient and family to use good hand hygiene technique  - Identify and instruct in appropriate isolation precautions for identified infection/condition  Outcome: Progressing  Goal: Absence of fever/infection during neutropenic period  Description: INTERVENTIONS:  - Monitor WBC    Outcome: Progressing     Problem: SAFETY ADULT  Goal: Patient will remain free of falls  Description: INTERVENTIONS:  - Educate patient/family on patient safety including physical limitations  - Instruct patient to call for assistance with activity   - Consult OT/PT to assist with strengthening/mobility   - Keep Call bell within reach  - Keep bed low and locked with side rails adjusted as appropriate  - Keep care items and personal belongings within reach  - Initiate and maintain comfort rounds  - Make Fall Risk Sign visible to staff  - Offer Toileting every  Hours, in advance of need  - Initiate/Maintain alarm  - Obtain necessary fall risk management equipment:   - Apply yellow socks and bracelet for high fall risk patients  - Consider moving patient to room near nurses station  Outcome: Progressing  Goal: Maintain or return to baseline ADL function  Description: INTERVENTIONS:  -  Assess patient's ability to carry out ADLs; assess patient's baseline for ADL function and identify physical deficits which impact ability to perform ADLs (bathing, care of mouth/teeth, toileting, grooming, dressing, etc.)  - Assess/evaluate cause of self-care deficits   - Assess range of motion  - Assess patient's mobility; develop plan if impaired  - Assess patient's need for assistive devices and provide as appropriate  - Encourage maximum independence but intervene and supervise when necessary  - Involve family in performance of ADLs  - Assess for home care needs following discharge   - Consider OT consult to assist with ADL evaluation and planning for discharge  - Provide patient education as appropriate  Outcome: Progressing  Goal: Maintains/Returns to pre admission functional level  Description: INTERVENTIONS:  - Perform BMAT or MOVE assessment daily.   - Set and communicate daily mobility goal to care team and patient/family/caregiver. - Collaborate with rehabilitation services on mobility goals if consulted  - Perform Range of Motion  times a day. - Reposition patient every  hours.   - Dangle patient  times a day  - Stand patient  times a day  - Ambulate patient  times a day  - Out of bed to chair  times a day   - Out of bed for meals  times a day  - Out of bed for toileting  - Record patient progress and toleration of activity level   Outcome: Progressing     Problem: DISCHARGE PLANNING  Goal: Discharge to home or other facility with appropriate resources  Description: INTERVENTIONS:  - Identify barriers to discharge w/patient and caregiver  - Arrange for needed discharge resources and transportation as appropriate  - Identify discharge learning needs (meds, wound care, etc.)  - Arrange for interpretive services to assist at discharge as needed  - Refer to Case Management Department for coordinating discharge planning if the patient needs post-hospital services based on physician/advanced practitioner order or complex needs related to functional status, cognitive ability, or social support system  Outcome: Progressing     Problem: Knowledge Deficit  Goal: Patient/family/caregiver demonstrates understanding of disease process, treatment plan, medications, and discharge instructions  Description: Complete learning assessment and assess knowledge base.   Interventions:  - Provide teaching at level of understanding  - Provide teaching via preferred learning methods  Outcome: Progressing     Problem: Prexisting or High Potential for Compromised Skin Integrity  Goal: Skin integrity is maintained or improved  Description: INTERVENTIONS:  - Identify patients at risk for skin breakdown  - Assess and monitor skin integrity  - Assess and monitor nutrition and hydration status  - Monitor labs   - Assess for incontinence   - Turn and reposition patient  - Assist with mobility/ambulation  - Relieve pressure over bony prominences  - Avoid friction and shearing  - Provide appropriate hygiene as needed including keeping skin clean and dry  - Evaluate need for skin moisturizer/barrier cream  - Collaborate with interdisciplinary team   - Patient/family teaching  - Consider wound care consult   Outcome: Progressing     Problem: MOBILITY - ADULT  Goal: Maintain or return to baseline ADL function  Description: INTERVENTIONS:  -  Assess patient's ability to carry out ADLs; assess patient's baseline for ADL function and identify physical deficits which impact ability to perform ADLs (bathing, care of mouth/teeth, toileting, grooming, dressing, etc.)  - Assess/evaluate cause of self-care deficits   - Assess range of motion  - Assess patient's mobility; develop plan if impaired  - Assess patient's need for assistive devices and provide as appropriate  - Encourage maximum independence but intervene and supervise when necessary  - Involve family in performance of ADLs  - Assess for home care needs following discharge   - Consider OT consult to assist with ADL evaluation and planning for discharge  - Provide patient education as appropriate  Outcome: Progressing  Goal: Maintains/Returns to pre admission functional level  Description: INTERVENTIONS:  - Perform BMAT or MOVE assessment daily.   - Set and communicate daily mobility goal to care team and patient/family/caregiver. - Collaborate with rehabilitation services on mobility goals if consulted  - Perform Range of Motion  times a day. - Reposition patient every  hours.   - Dangle patient  times a day  - Stand patient  times a day  - Ambulate patient  times a day  - Out of bed to chair  times a day   - Out of bed for meals  times a day  - Out of bed for toileting  - Record patient progress and toleration of activity level   Outcome: Progressing

## 2023-07-30 NOTE — PROGRESS NOTES
[address] [facility]  Progress Note  Name: Mitchel Ponce  MRN: [id number]  Unit/Bed#: -01 I Date of Admission: 7/24/2023   Date of Service: 7/30/2023 I Hospital Day: 6    Assessment/Plan   * Cellulitis of left upper extremity  Assessment & Plan  Assessment:  MRSA positive cellulitis and abscess of left AC IV site s/p I&D. Plan:   Clinically hemodynamically stable with some improvement on exam today. Continue daily wound care with SilvaSorb gel and gauze to the wound, Bactroban ointment to the periwound, dry gauze dressing and arm wrap. We will reassess wound care plan closer to discharge, may be able to transition to no packing and no arm wrap. Subjective/Objective   Chief Complaint: none    Subjective: Ongoing modest daily improvement    Objective:     Blood pressure 131/68, pulse 70, temperature 98.1 °F (36.7 °C), temperature source Oral, resp. rate 18, height 5' 1" (1.549 m), weight 90.6 kg (199 lb 11.8 oz), SpO2 92 %, not currently breastfeeding. ,Body mass index is 37.74 kg/m². Intake/Output Summary (Last 24 hours) at 7/30/2023 1312  Last data filed at 7/30/2023 0900  Gross per 24 hour   Intake 240 ml   Output --   Net 240 ml       Invasive Devices     Peripheral Intravenous Line  Duration           Peripheral IV 07/27/23 Right Hand 3 days                Physical Exam  Vitals and nursing note reviewed. Constitutional:       General: She is not in acute distress. Appearance: She is well-developed. She is not diaphoretic. HENT:      Head: Normocephalic and atraumatic. Eyes:      Conjunctiva/sclera: Conjunctivae normal.      Pupils: Pupils are equal, round, and reactive to light. Pulmonary:      Effort: No respiratory distress. Musculoskeletal:         General: Normal range of motion. Cervical back: Normal range of motion. Skin:     General: Skin is warm and dry. Capillary Refill: Capillary refill takes less than 2 seconds.       Comments: Superficial open wound to the left [facility] [place] with some slough, surrounding skin inflammation is improving, surrounding deep soft tissue induration is noted but also improving, findings consistent with resolving cellulitis, cutaneous abscess, myositis, thrombophlebitis. Neurological:      Mental Status: She is alert and oriented to person, place, and time. Psychiatric:         Behavior: Behavior normal.           Lab, Imaging and other studies:I have personally reviewed pertinent lab results.     VTE Pharmacologic Prophylaxis: Heparin  VTE Mechanical Prophylaxis: sequential compression device

## 2023-07-30 NOTE — ASSESSMENT & PLAN NOTE
· Uri Garvey as outpatient, however is not on hospital formulary - will utilize Amitiza while in hospital  · Continue home Miralax, Colace, and senna

## 2023-07-30 NOTE — ASSESSMENT & PLAN NOTE
· Status post a bedside incision and drainage by general surgery on 7/26/2023  · Patient continues to feel better  · Status post an ID evaluation, status post a general surgical evaluation  · Bl Cx X 2 sets (7/24) 1/2 + MRSA  · Bl Cx x 2 sets (7/26)-no growth at 72 hours  · Wound cultures- also positive for MRSA  · 2D echocardiogram completed-no evidence of any type of vegetation  · Continue IV vancomycin day 7  · Now that the repeat blood cultures are negative at 72 hours, will consult IR for PICC line placement after which discharge planning can be started, patient will need 4 weeks worth of IV antibiotic  · Status post a PT OT evaluation, patient is okay to go home with outpatient rehab and home services  · Potential discharge planning for the week of 7/31/2023

## 2023-07-30 NOTE — PLAN OF CARE
Problem: METABOLIC, FLUID AND ELECTROLYTES - ADULT  Goal: Electrolytes maintained within normal limits  Description: INTERVENTIONS:  - Monitor labs and assess patient for signs and symptoms of electrolyte imbalances  - Administer electrolyte replacement as ordered  - Monitor response to electrolyte replacements, including repeat lab results as appropriate  - Instruct patient on fluid and nutrition as appropriate  Outcome: Progressing     Problem: SAFETY ADULT  Goal: Maintain or return to baseline ADL function  Description: INTERVENTIONS:  -  Assess patient's ability to carry out ADLs; assess patient's baseline for ADL function and identify physical deficits which impact ability to perform ADLs (bathing, care of mouth/teeth, toileting, grooming, dressing, etc.)  - Assess/evaluate cause of self-care deficits   - Assess range of motion  - Assess patient's mobility; develop plan if impaired  - Assess patient's need for assistive devices and provide as appropriate  - Encourage maximum independence but intervene and supervise when necessary  - Involve family in performance of ADLs  - Assess for home care needs following discharge   - Consider OT consult to assist with ADL evaluation and planning for discharge  - Provide patient education as appropriate  Outcome: Progressing     Problem: Knowledge Deficit  Goal: Patient/family/caregiver demonstrates understanding of disease process, treatment plan, medications, and discharge instructions  Description: Complete learning assessment and assess knowledge base.   Interventions:  - Provide teaching at level of understanding  - Provide teaching via preferred learning methods  Outcome: Progressing     Problem: Prexisting or High Potential for Compromised Skin Integrity  Goal: Skin integrity is maintained or improved  Description: INTERVENTIONS:  - Identify patients at risk for skin breakdown  - Assess and monitor skin integrity  - Assess and monitor nutrition and hydration status  - Monitor labs   - Assess for incontinence   - Turn and reposition patient  - Assist with mobility/ambulation  - Relieve pressure over bony prominences  - Avoid friction and shearing  - Provide appropriate hygiene as needed including keeping skin clean and dry  - Evaluate need for skin moisturizer/barrier cream  - Collaborate with interdisciplinary team   - Patient/family teaching  - Consider wound care consult   Outcome: Progressing     Problem: MOBILITY - ADULT  Goal: Maintain or return to baseline ADL function  Description: INTERVENTIONS:  -  Assess patient's ability to carry out ADLs; assess patient's baseline for ADL function and identify physical deficits which impact ability to perform ADLs (bathing, care of mouth/teeth, toileting, grooming, dressing, etc.)  - Assess/evaluate cause of self-care deficits   - Assess range of motion  - Assess patient's mobility; develop plan if impaired  - Assess patient's need for assistive devices and provide as appropriate  - Encourage maximum independence but intervene and supervise when necessary  - Involve family in performance of ADLs  - Assess for home care needs following discharge   - Consider OT consult to assist with ADL evaluation and planning for discharge  - Provide patient education as appropriate  Outcome: Progressing

## 2023-07-30 NOTE — PROGRESS NOTES
Chi Wells is a 46 y.o. female who is currently ordered Vancomycin IV with management by the Pharmacy Consult service. Relevant clinical data and objective / subjective history reviewed. Vancomycin Assessment:  Indication and Goal AUC/Trough: Bacteremia (goal -600, trough >10); Soft tissue (goal -600, trough >10), -600, trough >10  Clinical Status: stable  Micro:       Renal Function:  SCr: 1.04 mg/dL from 7/29  CrCl: 64.3 mL/min using Scr from 7/29  Renal replacement: Not on dialysis  Days of Therapy: 6  Current Dose: 1250 mg iv q 24 hrs  Vancomycin Plan:  New Dosing: continue 1250 mg iv q 24 hrs  Estimated AUC: 449 mcg*hr/mL  Estimated Trough: 12.2 mcg/mL  Next Level: 8/3/23 @ 0600  Renal Function Monitoring: Daily BMP and [place] will continue to follow closely for s/sx of nephrotoxicity, infusion reactions and appropriateness of therapy. BMP and CBC will be ordered per protocol. We will continue to follow the patient’s culture results and clinical progress daily.     Jayesh Bain, Pharmacist

## 2023-07-30 NOTE — ASSESSMENT & PLAN NOTE
Lab Results   Component Value Date    HGBA1C 12.7 (H) 07/25/2023       Recent Labs     07/29/23  1108 07/29/23  1634 07/29/23 2053 07/30/23  0738   POCGLU 277* 312* 205* 166*       Blood Sugar Average: Last 72 hrs:  (P) [id number]     · Status post the use of an insulin drip at time of arrival  · Target blood sugar for the hospital is 140-180  · Continue Lantus, Accu-Cheks before meals and at bedtime, and sliding scale coverage  · Lantus increased to 35 units twice a day on 7/29/2023-continue the same for now May need further insulin optimization  · Diabetic neuropathy- continue Lyrica  · Continue to monitor

## 2023-07-31 ENCOUNTER — HOME HEALTH ADMISSION (OUTPATIENT)
Dept: HOME HEALTH SERVICES | Facility: HOME HEALTHCARE | Age: 53
End: 2023-07-31
Payer: COMMERCIAL

## 2023-07-31 ENCOUNTER — APPOINTMENT (INPATIENT)
Dept: INTERVENTIONAL RADIOLOGY/VASCULAR | Facility: HOSPITAL | Age: 53
DRG: 383 | End: 2023-07-31
Attending: HOSPITALIST
Payer: COMMERCIAL

## 2023-07-31 LAB
ALBUMIN SERPL BCP-MCNC: 3 G/DL (ref 3.5–5)
ALP SERPL-CCNC: 188 U/L (ref 34–104)
ALT SERPL W P-5'-P-CCNC: 52 U/L (ref 7–52)
ANION GAP SERPL CALCULATED.3IONS-SCNC: 8 MMOL/L
AST SERPL W P-5'-P-CCNC: 46 U/L (ref 13–39)
BACTERIA BLD CULT: NORMAL
BACTERIA BLD CULT: NORMAL
BASOPHILS # BLD MANUAL: 0 THOUSAND/UL (ref 0–0.1)
BASOPHILS NFR MAR MANUAL: 0 % (ref 0–1)
BILIRUB SERPL-MCNC: 0.26 MG/DL (ref 0.2–1)
BUN SERPL-MCNC: 27 MG/DL (ref 5–25)
CALCIUM ALBUM COR SERPL-MCNC: 9.7 MG/DL (ref 8.3–10.1)
CALCIUM SERPL-MCNC: 8.9 MG/DL (ref 8.4–10.2)
CHLORIDE SERPL-SCNC: 102 MMOL/L (ref 96–108)
CO2 SERPL-SCNC: 27 MMOL/L (ref 21–32)
CREAT SERPL-MCNC: 0.99 MG/DL (ref 0.6–1.3)
EOSINOPHIL # BLD MANUAL: 0.22 THOUSAND/UL (ref 0–0.4)
EOSINOPHIL NFR BLD MANUAL: 2 % (ref 0–6)
ERYTHROCYTE [DISTWIDTH] IN BLOOD BY AUTOMATED COUNT: 15.8 % (ref 11.6–15.1)
GFR SERPL CREATININE-BSD FRML MDRD: 65 ML/MIN/1.73SQ M
GLUCOSE SERPL-MCNC: 186 MG/DL (ref 65–140)
GLUCOSE SERPL-MCNC: 189 MG/DL (ref 65–140)
GLUCOSE SERPL-MCNC: 191 MG/DL (ref 65–140)
GLUCOSE SERPL-MCNC: 205 MG/DL (ref 65–140)
GLUCOSE SERPL-MCNC: 329 MG/DL (ref 65–140)
HCT VFR BLD AUTO: 32 % (ref 34.8–46.1)
HGB BLD-MCNC: 10 G/DL (ref 11.5–15.4)
LYMPHOCYTES # BLD AUTO: 3.29 THOUSAND/UL (ref 0.6–4.47)
LYMPHOCYTES # BLD AUTO: 30 % (ref 14–44)
MCH RBC QN AUTO: 26.2 PG (ref 26.8–34.3)
MCHC RBC AUTO-ENTMCNC: 31.3 G/DL (ref 31.4–37.4)
MCV RBC AUTO: 84 FL (ref 82–98)
METAMYELOCYTES NFR BLD MANUAL: 1 % (ref 0–1)
MONOCYTES # BLD AUTO: 0.44 THOUSAND/UL (ref 0–1.22)
MONOCYTES NFR BLD: 4 % (ref 4–12)
MYELOCYTES NFR BLD MANUAL: 1 % (ref 0–1)
NEUTROPHILS # BLD MANUAL: 6.8 THOUSAND/UL (ref 1.85–7.62)
NEUTS BAND NFR BLD MANUAL: 2 % (ref 0–8)
NEUTS SEG NFR BLD AUTO: 60 % (ref 43–75)
PLATELET # BLD AUTO: 339 THOUSANDS/UL (ref 149–390)
PLATELET BLD QL SMEAR: ADEQUATE
PMV BLD AUTO: 10.1 FL (ref 8.9–12.7)
POTASSIUM SERPL-SCNC: 4.2 MMOL/L (ref 3.5–5.3)
PROT SERPL-MCNC: 6.2 G/DL (ref 6.4–8.4)
RBC # BLD AUTO: 3.82 MILLION/UL (ref 3.81–5.12)
RBC MORPH BLD: NORMAL
SODIUM SERPL-SCNC: 137 MMOL/L (ref 135–147)
WBC # BLD AUTO: 10.96 THOUSAND/UL (ref 4.31–10.16)

## 2023-07-31 PROCEDURE — 99233 SBSQ HOSP IP/OBS HIGH 50: CPT | Performed by: PHYSICIAN ASSISTANT

## 2023-07-31 PROCEDURE — 02HV33Z INSERTION OF INFUSION DEVICE INTO SUPERIOR VENA CAVA, PERCUTANEOUS APPROACH: ICD-10-PCS | Performed by: RADIOLOGY

## 2023-07-31 PROCEDURE — 99233 SBSQ HOSP IP/OBS HIGH 50: CPT

## 2023-07-31 PROCEDURE — 36573 INSJ PICC RS&I 5 YR+: CPT | Performed by: RADIOLOGY

## 2023-07-31 PROCEDURE — 82948 REAGENT STRIP/BLOOD GLUCOSE: CPT

## 2023-07-31 PROCEDURE — 99232 SBSQ HOSP IP/OBS MODERATE 35: CPT | Performed by: INTERNAL MEDICINE

## 2023-07-31 PROCEDURE — 85027 COMPLETE CBC AUTOMATED: CPT | Performed by: HOSPITALIST

## 2023-07-31 PROCEDURE — 85007 BL SMEAR W/DIFF WBC COUNT: CPT | Performed by: HOSPITALIST

## 2023-07-31 PROCEDURE — C1751 CATH, INF, PER/CENT/MIDLINE: HCPCS

## 2023-07-31 PROCEDURE — 80053 COMPREHEN METABOLIC PANEL: CPT | Performed by: HOSPITALIST

## 2023-07-31 PROCEDURE — 36573 INSJ PICC RS&I 5 YR+: CPT

## 2023-07-31 RX ORDER — LIDOCAINE HYDROCHLORIDE 10 MG/ML
INJECTION, SOLUTION EPIDURAL; INFILTRATION; INTRACAUDAL; PERINEURAL AS NEEDED
Status: COMPLETED | OUTPATIENT
Start: 2023-07-31 | End: 2023-07-31

## 2023-07-31 RX ADMIN — Medication 5 MG: at 20:54

## 2023-07-31 RX ADMIN — SENNOSIDES 17.2 MG: 8.6 TABLET, FILM COATED ORAL at 21:05

## 2023-07-31 RX ADMIN — LEVETIRACETAM 500 MG: 500 TABLET, FILM COATED ORAL at 20:53

## 2023-07-31 RX ADMIN — METOPROLOL TARTRATE 25 MG: 25 TABLET, FILM COATED ORAL at 11:39

## 2023-07-31 RX ADMIN — MUPIROCIN: 20 OINTMENT TOPICAL at 12:25

## 2023-07-31 RX ADMIN — ACETAMINOPHEN 975 MG: 325 TABLET ORAL at 05:05

## 2023-07-31 RX ADMIN — ACETAMINOPHEN 975 MG: 325 TABLET ORAL at 21:04

## 2023-07-31 RX ADMIN — Medication 400 MG: at 11:39

## 2023-07-31 RX ADMIN — HYDROMORPHONE HYDROCHLORIDE 0.5 MG: 1 INJECTION, SOLUTION INTRAMUSCULAR; INTRAVENOUS; SUBCUTANEOUS at 02:22

## 2023-07-31 RX ADMIN — HYDROMORPHONE HYDROCHLORIDE 0.5 MG: 1 INJECTION, SOLUTION INTRAMUSCULAR; INTRAVENOUS; SUBCUTANEOUS at 22:18

## 2023-07-31 RX ADMIN — ATORVASTATIN CALCIUM 80 MG: 80 TABLET, FILM COATED ORAL at 21:04

## 2023-07-31 RX ADMIN — ASPIRIN 81 MG 81 MG: 81 TABLET ORAL at 11:39

## 2023-07-31 RX ADMIN — HEPARIN SODIUM 5000 UNITS: 5000 INJECTION INTRAVENOUS; SUBCUTANEOUS at 15:19

## 2023-07-31 RX ADMIN — MUPIROCIN: 20 OINTMENT TOPICAL at 17:51

## 2023-07-31 RX ADMIN — LEVETIRACETAM 500 MG: 500 TABLET, FILM COATED ORAL at 11:39

## 2023-07-31 RX ADMIN — PREGABALIN 100 MG: 100 CAPSULE ORAL at 11:39

## 2023-07-31 RX ADMIN — HYDROMORPHONE HYDROCHLORIDE 0.5 MG: 1 INJECTION, SOLUTION INTRAMUSCULAR; INTRAVENOUS; SUBCUTANEOUS at 15:22

## 2023-07-31 RX ADMIN — PREGABALIN 100 MG: 100 CAPSULE ORAL at 20:53

## 2023-07-31 RX ADMIN — CITALOPRAM HYDROBROMIDE 40 MG: 20 TABLET ORAL at 11:39

## 2023-07-31 RX ADMIN — LIDOCAINE HYDROCHLORIDE 5 ML: 10 INJECTION, SOLUTION EPIDURAL; INFILTRATION; INTRACAUDAL; PERINEURAL at 14:57

## 2023-07-31 RX ADMIN — INSULIN LISPRO 2 UNITS: 100 INJECTION, SOLUTION INTRAVENOUS; SUBCUTANEOUS at 11:44

## 2023-07-31 RX ADMIN — ACETAMINOPHEN 975 MG: 325 TABLET ORAL at 15:19

## 2023-07-31 RX ADMIN — VANCOMYCIN HYDROCHLORIDE 1250 MG: 1 INJECTION, POWDER, LYOPHILIZED, FOR SOLUTION INTRAVENOUS at 12:21

## 2023-07-31 RX ADMIN — INSULIN LISPRO 14 UNITS: 100 INJECTION, SOLUTION INTRAVENOUS; SUBCUTANEOUS at 07:44

## 2023-07-31 RX ADMIN — Medication 5 MG: at 11:39

## 2023-07-31 RX ADMIN — INSULIN LISPRO 14 UNITS: 100 INJECTION, SOLUTION INTRAVENOUS; SUBCUTANEOUS at 11:44

## 2023-07-31 RX ADMIN — DOCUSATE SODIUM 100 MG: 100 CAPSULE, LIQUID FILLED ORAL at 17:45

## 2023-07-31 RX ADMIN — FENOFIBRATE 48 MG: 48 TABLET, FILM COATED ORAL at 11:39

## 2023-07-31 RX ADMIN — MUPIROCIN 1 APPLICATION: 20 OINTMENT TOPICAL at 20:55

## 2023-07-31 RX ADMIN — PREGABALIN 100 MG: 100 CAPSULE ORAL at 15:19

## 2023-07-31 RX ADMIN — HEPARIN SODIUM 5000 UNITS: 5000 INJECTION INTRAVENOUS; SUBCUTANEOUS at 05:06

## 2023-07-31 RX ADMIN — INSULIN LISPRO 14 UNITS: 100 INJECTION, SOLUTION INTRAVENOUS; SUBCUTANEOUS at 17:44

## 2023-07-31 RX ADMIN — B-COMPLEX W/ C & FOLIC ACID TAB 1 TABLET: TAB at 11:39

## 2023-07-31 RX ADMIN — INSULIN LISPRO 1 UNITS: 100 INJECTION, SOLUTION INTRAVENOUS; SUBCUTANEOUS at 17:44

## 2023-07-31 RX ADMIN — FAMOTIDINE 20 MG: 20 TABLET, FILM COATED ORAL at 21:04

## 2023-07-31 RX ADMIN — HEPARIN SODIUM 5000 UNITS: 5000 INJECTION INTRAVENOUS; SUBCUTANEOUS at 21:04

## 2023-07-31 RX ADMIN — Medication 5 MG: at 01:05

## 2023-07-31 RX ADMIN — INSULIN LISPRO 5 UNITS: 100 INJECTION, SOLUTION INTRAVENOUS; SUBCUTANEOUS at 21:04

## 2023-07-31 RX ADMIN — INSULIN GLARGINE 35 UNITS: 100 INJECTION, SOLUTION SUBCUTANEOUS at 21:04

## 2023-07-31 RX ADMIN — METOPROLOL TARTRATE 25 MG: 25 TABLET, FILM COATED ORAL at 20:54

## 2023-07-31 RX ADMIN — LUBIPROSTONE 24 MCG: 24 CAPSULE, GELATIN COATED ORAL at 12:21

## 2023-07-31 RX ADMIN — MUPIROCIN 1 APPLICATION: 20 OINTMENT TOPICAL at 11:45

## 2023-07-31 RX ADMIN — CHOLECALCIFEROL TAB 10 MCG (400 UNIT) 400 UNITS: 10 TAB at 11:39

## 2023-07-31 RX ADMIN — AMITRIPTYLINE HYDROCHLORIDE 25 MG: 25 TABLET, FILM COATED ORAL at 21:05

## 2023-07-31 RX ADMIN — LUBIPROSTONE 24 MCG: 24 CAPSULE, GELATIN COATED ORAL at 22:36

## 2023-07-31 RX ADMIN — INSULIN LISPRO 2 UNITS: 100 INJECTION, SOLUTION INTRAVENOUS; SUBCUTANEOUS at 07:43

## 2023-07-31 RX ADMIN — INSULIN GLARGINE 35 UNITS: 100 INJECTION, SOLUTION SUBCUTANEOUS at 11:44

## 2023-07-31 RX ADMIN — DOCUSATE SODIUM 100 MG: 100 CAPSULE, LIQUID FILLED ORAL at 11:39

## 2023-07-31 NOTE — PROGRESS NOTES
-- Patient:  -- MRN: [id number]  -- Aidin Request ID: [id number]  -- Level of care reserved: Laith Hickman  -- Partner Reserved: [facility], [place],  [address] [phone]  -- Clinical needs requested:  -- Geography searched: [phone]  -- Start of Service:  -- Request sent: 12:24pm EDT on 7/28/2023 by Kingsley San  -- Partner reserved: 7:59am EDT on 7/31/2023 by Kingsley San  -- Choice list shared: 7:58am EDT on 7/31/2023 by Kingsley San

## 2023-07-31 NOTE — DISCHARGE INSTRUCTIONS
[address] [facility]  Interventional Radiology  [phone]            Peripherally Inserted Central Catheter     WHAT YOU NEED TO KNOW:   A PICC is an IV placed into a large blood vessel near your heart. It is usually inserted through a blood vessel in your arm. Your PICC may have multiple ports. Ports are tubes where you can inject medicine. A PICC can stay in place for several weeks or months. You may need a PICC to get nutrition, medicine, or fluids. Blood samples can be removed from your PICC and sent to the lab for tests. DISCHARGE INSTRUCTIONS:    [place]aley patients,    Contact Interventional Radiology at [phone] PATIENTS: Contact Interventional Radiology at [phone]   [facility] PATIENTS: Contact Interventional Radiology at [phone] if:  Blood soaks through your bandage. Your arm or leg feels warm, tender, and painful. It may look swollen and red. You have trouble moving your arm. Your catheter falls out. You have a fever or swelling, redness, pain, or pus where the catheter was inserted. Persistent nausea or vomiting. You cannot flush your catheter, or you feel pain when you flush your catheter. You see a hole or crack in the tubing of your catheter. You see fluid leaking from the insertion site. You run out of supplies to care for your catheter. You have questions or concerns about your condition or care. PICC (Peripherally Inserted Central Catheter)   AMBULATORY CARE:   What you need to know about a peripherally inserted central catheter (PICC):  A PICC is a catheter (small tube) used to give treatments and to take blood. The catheter is inserted into an arm vein. These veins are called peripheral veins. The catheter is guided through the peripheral vein into a central vein near your heart. Why you may need a PICC:   You need to have your blood drawn often.     You will be given medicines often, or you need medicines that must work quickly. Your central venous pressure needs to be monitored. You need IV medicines after you leave the hospital. The PICC allows you to get your medicines at home. You cannot eat or drink anything by mouth. What you need to know about how a PICC is placed: You will get local anesthesia to numb the area. The catheter will be put into a vein. It will be guided up until the tip is in the vena cava. This is an area close to your heart. Ultrasound or x-ray pictures will be used to make sure the catheter is in the correct position. The other end of the catheter will stay outside your body. The catheter will be flushed with liquid. Heparin may be used to flush the line to prevent blood clots. Medicine will be placed at the insertion site (the place where it goes into your skin). The medicine helps prevent an infection at the site. The catheter will be secured to your skin with a dressing. The dressing holds it in place, keeps it clean, and helps prevent infection. The dressing will be clear so you can check the insertion site for signs of infection. Another x-ray helps make sure the catheter is in the right place and is ready for you to use. Healthcare providers will watch for problems during the PICC placement. You could have bleeding when the PICC is inserted. An infection could develop at the insertion site. An infection that enters your bloodstream can cause serious illness. Rarely, your lung could get pierced when the PICC is inserted. This can cause a collapsed lung.     What healthcare providers will teach you about the PICC:   Supplies you need to keep on hand to use, care for, and flush the PICC    How to use the PICC, and when to keep it clamped    How and when to flush and care for the PICC    Problems that may develop, such as a hole in the catheter, and what to do to fix the problems    How to bathe and do your daily activities with a PICC in place    How to prevent infections    Signs and symptoms of an infection to watch for and what to do if an infection develops    Call your local emergency number (911 in the [address]) for any of the following: You feel pain in your arm, neck, shoulder, or chest.    You cough up blood. Seek care immediately if:   The catheter site turns cold, changes color, or you cannot feel it. You see blood on your dressing and the amount is increasing. The veins in your neck or chest bulge. Call your doctor if:   You see signs of infection, such as redness, swelling, or pus, or you have a fever. The catheter site is red, warm, painful, or oozing fluid. You see blisters on the skin near the catheter site. You cannot flush your catheter, or you feel pain when you flush your catheter. You see that the catheter is getting shorter, or it falls out. Put pressure on the site with a clean towel before you call your doctor. You see a hole or a crack in your catheter. Clamp your catheter above the damage before you call your doctor. You have questions or concerns about your catheter. Medicines:   NSAIDs  help decrease swelling and pain or fever. This medicine is available with or without a doctor's order. NSAIDs can cause stomach bleeding or kidney problems in certain people. If you take blood thinner medicine, always ask your healthcare provider if NSAIDs are safe for you. Always read the medicine label and follow directions. Take your medicine as directed. Contact your healthcare provider if you think your medicine is not helping or if you have side effects. Tell your provider if you are allergic to any medicine. Keep a list of the medicines, vitamins, and herbs you take. Include the amounts, and when and why you take them. Bring the list or the pill bottles to follow-up visits. Carry your medicine list with you in case of an emergency. Self-care:   Plan to rest when you get home.   You should be able to go back to your normal activities the next day. Your healthcare provider will tell you which activities are okay for you. Apply a warm compress as directed. The area where the catheter was inserted may feel sore. A warm compress can help to decrease pain and swelling in your arm. Wet a small towel with warm water. Wring out the extra water. Wrap the cloth in plastic, and put it on the area. Use the compress 4 times a day, for 10 minutes each time. Prop your arm up on pillows when you are sitting or lying down. This will decrease swelling. Follow instructions on how to care for your insertion site. Your provider will tell you when it is okay to shower or bathe. This is usually after about 1 week. You will need to keep the area covered so it stays dry when you bathe. Prevent an infection:  The area around your catheter may get infected, or you may get an infection in your bloodstream. A bloodstream infection is called a central line-associated bloodstream infection (CLABSI). A CLABSI is caused by bacteria getting into your bloodstream through your catheter. This can lead to severe illness. The following are ways you can help prevent an infection:  Wash your hands often. Use soap or an alcohol-based hand rub to clean your hands. Clean your hands before and after you touch the catheter or the catheter site. Remind anyone who cares for your catheter to wash his or her hands. Limit contact with the catheter. Do not touch or handle your catheter unless you need to care for it. Do not pull, push on, or move the catheter when you clean your skin or change the dressing. Wear clean medical gloves when you touch your catheter or change dressings. Clean your skin as directed. Clean the skin around your catheter every day and before you change your dressing. Ask your healthcare provider what to use to clean your skin.  Check your skin every day for signs of infection, such as pain, redness, swelling, and oozing. Contact your healthcare provider if you see these signs. Change the dressing as directed. Keep a sterile dressing over the catheter site. Change the dressing as directed or when it is loose, wet, dirty, or falls off. Clean the skin under the dressing with the solution your healthcare provider suggests. Let the area dry before you put on the new dressing. Do not  blow on your skin to help it dry. Keep the area dry. Wrap your arm with plastic and seal it with medical tape before you bathe. Take showers instead of baths. Do not swim or soak in a hot tub. Follow up with your doctor as directed:  Write down your questions so you remember to ask them during your visits. © Copyright [facility] 2022 Information is for End User's use only and may not be sold, redistributed or otherwise used for commercial purposes. The above information is an  only. It is not intended as medical advice for individual conditions or treatments. Talk to your doctor, nurse or pharmacist before following any medical regimen to see if it is safe and effective for you.

## 2023-07-31 NOTE — PROGRESS NOTES
Vancomycin Discharge [address] is a good fit with the [employer] Vancomycin Bayesian model. A surrogate trough range at q24h dosing that correlates with an AUC range of 400-600 for this patient is 10.7-16. For questions regarding this range, or for recommendations for a surrogate trough range at an alternative dosing frequency, please contact the [facility] pharmacy team via [phone].      Clemens Leyden, PharmD, [address]  Infectious Diseases Clinical Pharmacy Specialist

## 2023-07-31 NOTE — CASE MANAGEMENT
Case Management Discharge Planning Note    Patient name Walter Beckford  Location /-33 MRN [id number]  : 1970 Date 2023       Current Admission Date: 2023  Current Admission Diagnosis:Cellulitis of left upper extremity   Patient Active Problem List    Diagnosis Date Noted   • Cellulitis of left upper extremity 2023   • Leukocytosis 2023   • Acute renal failure superimposed on stage 3a chronic kidney disease ([address]) 2023   • Lower extremity edema 2023   • Migraine 07/15/2023   • Seizure disorder ([address]) 07/15/2023   • Generalized abdominal pain 2023   • Abnormal CT scan, stomach 2023   • Gastric distention 07/10/2023   • Flank pain 2023   • Secondary hyperparathyroidism ([address]) 2022   • History of kidney cancer 2022   • Vitamin D deficiency 2022   • Benign hypertension with CKD (chronic kidney disease) stage III ([address]) 2022   • GERD (gastroesophageal reflux disease) 2021   • History of colon polyps 2021   • Slow transit constipation 2021   • Anemia 2021   • Encounter for post surgical wound check 2021   • Depression with anxiety 2021   • Mild intermittent asthma without complication 45/15/3346   • Hypercholesteremia 2021   • Hypertriglyceridemia 2021   • Iron deficiency anemia secondary to inadequate dietary iron intake 2021   • Internal hernia 2021   • Intra-abdominal adhesions 2021   • Radiculopathy, lumbar region 2020   • Anterior tibial tendonitis, right 2020   • Spondylolisthesis of lumbar region 04/10/2018   • Chronic low back pain with sciatica 2018   • Diabetic polyneuropathy associated with type 2 diabetes mellitus ([address]) 2018   • Clear cell carcinoma of kidney, right ([address]) 05/15/2017   • Obesity (BMI 35.0-39.9 without comorbidity) 2017   • Type 2 diabetes mellitus with hyperglycemia, with long-term current use of insulin ([address] [facility]) 02/25/2016   • Hypertension 02/25/2016   • Bipolar disorder ([address] ) 11/12/2015   • Gastroparesis 11/12/2015      LOS (days): 7  Geometric Mean LOS (GMLOS) (days): 3.20  Days to GMLOS:-3.2     OBJECTIVE:  Risk of Unplanned Readmission Score: 20.76     Current admission status: Inpatient   Preferred Pharmacy:   [address] [place] [address], [address] [place] [address]  Phone: [phone] Fax: [phone]    Primary Care Provider: SYLWIA Murphy    Primary Insurance: [employer]  Secondary Insurance:     DISCHARGE DETAILS:  Pt is d/t get a PICC today. Will need script for IVABX from ID. Has [facility]. Will need clearance also from surgery. Spoke to the pt at the bedside about same. Received script from [facility] for IVABX. Submitted to [facility] for same. Also downloaded the script for [id number].

## 2023-07-31 NOTE — CASE MANAGEMENT
Case Management Discharge Planning Note    Patient name Melissa Greene  Location /-16 MRN [id number]  : 1970 Date 2023       Current Admission Date: 2023  Current Admission Diagnosis:Cellulitis of left upper extremity   Patient Active Problem List    Diagnosis Date Noted   • Cellulitis of left upper extremity 2023   • Leukocytosis 2023   • Acute renal failure superimposed on stage 3a chronic kidney disease ([address]) 2023   • Lower extremity edema 2023   • Migraine 07/15/2023   • Seizure disorder ([address]) 07/15/2023   • Generalized abdominal pain 2023   • Abnormal CT scan, stomach 2023   • Gastric distention 07/10/2023   • Flank pain 2023   • Secondary hyperparathyroidism ([address]) 2022   • History of kidney cancer 2022   • Vitamin D deficiency 2022   • Benign hypertension with CKD (chronic kidney disease) stage III ([address]) 2022   • GERD (gastroesophageal reflux disease) 2021   • History of colon polyps 2021   • Slow transit constipation 2021   • Anemia 2021   • Encounter for post surgical wound check 2021   • Depression with anxiety 2021   • Mild intermittent asthma without complication    • Hypercholesteremia 2021   • Hypertriglyceridemia 2021   • Iron deficiency anemia secondary to inadequate dietary iron intake 2021   • Internal hernia 2021   • Intra-abdominal adhesions 2021   • Radiculopathy, lumbar region 2020   • Anterior tibial tendonitis, right 2020   • Spondylolisthesis of lumbar region 04/10/2018   • Chronic low back pain with sciatica 2018   • Diabetic polyneuropathy associated with type 2 diabetes mellitus ([address]) 2018   • Clear cell carcinoma of kidney, right ([address]) 05/15/2017   • Obesity (BMI 35.0-39.9 without comorbidity) 2017   • Type 2 diabetes mellitus with hyperglycemia, with long-term current use of insulin ([address] [facility]) 02/25/2016   • Hypertension 02/25/2016   • Bipolar disorder ([address] [facility]) 11/12/2015   • Gastroparesis 11/12/2015      LOS (days): 7  Geometric Mean LOS (GMLOS) (days): 3.20  Days to GMLOS:-3.5     OBJECTIVE:  Risk of Unplanned Readmission Score: 23.32     Current admission status: Inpatient   Preferred Pharmacy:   [address] [place] [address]  Phone: [phone] Fax: [phone]    Primary Care Provider: SYLWIA Napier    Primary Insurance: Vivien Bass  Secondary Insurance:     DISCHARGE DETAILS:  Received notification from [facility] the [address] [facility] [address] [facility] [address] will be delivered tomorrow. MIRIAN will [facility] on 8/2/23 at 1200. Notified  pt and SL[facility] of same.

## 2023-07-31 NOTE — ASSESSMENT & PLAN NOTE
Lab Results   Component Value Date    HGBA1C 12.7 (H) 07/25/2023       Recent Labs     07/30/23  2109 07/31/23  0726 07/31/23  1121 07/31/23  1604   POCGLU 306* 205* 191* 186*       Blood Sugar Average: Last 72 hrs:  (P) 214.8     · Status post the use of an insulin drip at time of arrival  · Target blood sugar for the hospital is 140-180  · Continue Lantus, Accu-Cheks before meals and at bedtime, and sliding scale coverage  · Lantus increased to 35 units twice a day on 7/29/2023-continue the same for now may need further insulin optimization  · Diabetic neuropathy- continue Lyrica  · Continue to monitor

## 2023-07-31 NOTE — PROGRESS NOTES
Christin Primrose is a 46 y.o. female who is currently ordered Vancomycin IV with management by the Pharmacy Consult service. Relevant clinical data and objective / subjective history reviewed. Vancomycin Assessment:  Indication and Goal AUC/Trough: Bacteremia (goal -600, trough >10); Soft tissue (goal -600, trough >10), -600, trough >10  Clinical Status: stable  Micro:     Renal Function:  SCr: 0.99 mg/dL  CrCl: 68 mL/min  Renal replacement: Not on dialysis  Days of Therapy: 7  Current Dose: 1250 mg q24  Vancomycin Plan:  New Dosing: continue 1250 mg q24  Estimated AUC: 433 mcg*hr/mL  Estimated Trough: 11.6 mcg/mL  Next Level: 8/3 6am  Renal Function Monitoring: Daily BMP and UOP  Pharmacy will continue to follow closely for s/sx of nephrotoxicity, infusion reactions and appropriateness of therapy. BMP and CBC will be ordered per protocol. We will continue to follow the patient’s culture results and clinical progress daily.     Timmothy Baumgarten, Pharmacist

## 2023-07-31 NOTE — ASSESSMENT & PLAN NOTE
· Katelyn Booth as outpatient, however is not on hospital formulary - will utilize Amitiza while in hospital  · Continue home Miralax, Colace, and senna

## 2023-07-31 NOTE — ASSESSMENT & PLAN NOTE
Lab Results   Component Value Date    EGFR 65 07/31/2023    EGFR 61 07/29/2023    EGFR 62 07/28/2023    CREATININE 0.99 07/31/2023    CREATININE 1.04 07/29/2023    CREATININE 1.03 07/28/2023     · Present on admission  · Has resolved with IV fluid-this was prerenal  · Creatinine is at baseline  · Continue to avoid nephrotoxins  · Monitor BMPs

## 2023-07-31 NOTE — ASSESSMENT & PLAN NOTE
· Status post a bedside incision and drainage by general surgery on 7/26/2023  · Patient continues to feel better  · Status post an ID evaluation, status post a general surgical evaluation  · Bl Cx X 2 sets (7/24) 1/2 + MRSA  · Bl Cx x 2 sets (7/26)-no growth to date  · Wound cultures- also positive for MRSA  · 2D echocardiogram completed-no evidence of any type of vegetation  · Continue IV vancomycin day 8  · Given that repeat blood cultures are negative to date, patient will have PICC line placed today  · Status post a PT OT evaluation, patient is okay to go home with outpatient rehab and home services  · Plan for discharge home on IV antibiotics tomorrow

## 2023-07-31 NOTE — PLAN OF CARE
Problem: METABOLIC, FLUID AND ELECTROLYTES - ADULT  Goal: Electrolytes maintained within normal limits  Description: INTERVENTIONS:  - Monitor labs and assess patient for signs and symptoms of electrolyte imbalances  - Administer electrolyte replacement as ordered  - Monitor response to electrolyte replacements, including repeat lab results as appropriate  - Instruct patient on fluid and nutrition as appropriate  Outcome: Progressing  Goal: Fluid balance maintained  Description: INTERVENTIONS:  - Monitor labs   - Monitor I/O and WT  - Instruct patient on fluid and nutrition as appropriate  - Assess for signs & symptoms of volume excess or deficit  Outcome: Progressing  Goal: Glucose maintained within target range  Description: INTERVENTIONS:  - Monitor Blood Glucose as ordered  - Assess for signs and symptoms of hyperglycemia and hypoglycemia  - Administer ordered medications to maintain glucose within target range  - Assess nutritional intake and initiate nutrition service referral as needed  Outcome: Progressing     Problem: SKIN/TISSUE INTEGRITY - ADULT  Goal: Skin Integrity remains intact(Skin Breakdown Prevention)  Description: Assess:  -Perform Chun assessment every shift  -Clean and moisturize skin every shift  -Inspect skin when repositioning, toileting, and assisting with ADLS  -Assess under medical devices such as Kanu every shift  -Assess extremities for adequate circulation and sensation     Bed Management:  -Have minimal linens on bed & keep smooth, unwrinkled  -Change linens as needed when moist or perspiring      Activity:  -Mobilize patient 2-3 times a day  -Encourage activity and walks on unit    Skin Care:  -Avoid use of baby powder, tape, friction and shearing, hot water or constrictive clothing        Outcome: Progressing  Goal: Incision(s), wounds(s) or drain site(s) healing without S/S of infection  Description: INTERVENTIONS  - Assess and document dressing, incision, wound bed, drain sites and surrounding tissue  - Provide patient and family education  - Perform skin care/dressing changes every day  Outcome: Progressing  Goal: Pressure injury heals and does not worsen  Description: Interventions:  - Implement low air loss mattress or specialty surface (Criteria met)  - Apply silicone foam dressing   - Utilize friction reducing device or surface for transfers   - Consider nutrition services referral as needed  Outcome: Progressing     Problem: PAIN - ADULT  Goal: Verbalizes/displays adequate comfort level or baseline comfort level  Description: Interventions:  - Encourage patient to monitor pain and request assistance  - Assess pain using appropriate pain scale  - Administer analgesics based on type and severity of pain and evaluate response  - Implement non-pharmacological measures as appropriate and evaluate response  - Consider cultural and social influences on pain and pain management  - Notify physician/advanced practitioner if interventions unsuccessful or patient reports new pain  Outcome: Progressing     Problem: INFECTION - ADULT  Goal: Absence or prevention of progression during hospitalization  Description: INTERVENTIONS:  - Assess and monitor for signs and symptoms of infection  - Monitor lab/diagnostic results  - Monitor all insertion sites, i.e. indwelling lines, tubes, and drains  - Monitor endotracheal if appropriate and nasal secretions for changes in amount and color  - [place] appropriate cooling/warming therapies per order  - Administer medications as ordered  - Instruct and encourage patient and family to use good hand hygiene technique  - Identify and instruct in appropriate isolation precautions for identified infection/condition  Outcome: Progressing  Goal: Absence of fever/infection during neutropenic period  Description: INTERVENTIONS:  - Monitor WBC    Outcome: Progressing     Problem: SAFETY ADULT  Goal: Patient will remain free of falls  Description: INTERVENTIONS:  - Educate patient/family on patient safety including physical limitations  - Instruct patient to call for assistance with activity   - Consult OT/PT to assist with strengthening/mobility   - Keep Call bell within reach  - Keep bed low and locked with side rails adjusted as appropriate  - Keep care items and personal belongings within reach  - Initiate and maintain comfort rounds  - Make Fall Risk Sign visible to staff  - Apply yellow socks and bracelet for high fall risk patients  - Consider moving patient to room near nurses station  Outcome: Progressing  Goal: Maintain or return to baseline ADL function  Description: INTERVENTIONS:  -  Assess patient's ability to carry out ADLs; assess patient's baseline for ADL function and identify physical deficits which impact ability to perform ADLs (bathing, care of mouth/teeth, toileting, grooming, dressing, etc.)  - Assess/evaluate cause of self-care deficits   - Assess range of motion  - Assess patient's mobility; develop plan if impaired  - Assess patient's need for assistive devices and provide as appropriate  - Encourage maximum independence but intervene and supervise when necessary  - Involve family in performance of ADLs  - Assess for home care needs following discharge   - Consider OT consult to assist with ADL evaluation and planning for discharge  - Provide patient education as appropriate  Outcome: Progressing  Goal: Maintains/Returns to pre admission functional level  Description: INTERVENTIONS:  - Perform BMAT or MOVE assessment daily.   - Set and communicate daily mobility goal to care team and patient/family/caregiver.    - Collaborate with rehabilitation services on mobility goals if consulted  - Out of bed for toileting  - Record patient progress and toleration of activity level   Outcome: Progressing     Problem: DISCHARGE PLANNING  Goal: Discharge to home or other facility with appropriate resources  Description: INTERVENTIONS:  - Identify barriers to discharge w/patient and caregiver  - Arrange for needed discharge resources and transportation as appropriate  - Identify discharge learning needs (meds, wound care, etc.)  - Arrange for interpretive services to assist at discharge as needed  - Refer to Case Management Department for coordinating discharge planning if the patient needs post-hospital services based on physician/advanced practitioner order or complex needs related to functional status, cognitive ability, or social support system  Outcome: Progressing     Problem: Knowledge Deficit  Goal: Patient/family/caregiver demonstrates understanding of disease process, treatment plan, medications, and discharge instructions  Description: Complete learning assessment and assess knowledge base.   Interventions:  - Provide teaching at level of understanding  - Provide teaching via preferred learning methods  Outcome: Progressing     Problem: Prexisting or High Potential for Compromised Skin Integrity  Goal: Skin integrity is maintained or improved  Description: INTERVENTIONS:  - Identify patients at risk for skin breakdown  - Assess and monitor skin integrity  - Assess and monitor nutrition and hydration status  - Monitor labs   - Assess for incontinence   - Turn and reposition patient  - Assist with mobility/ambulation  - Relieve pressure over bony prominences  - Avoid friction and shearing  - Provide appropriate hygiene as needed including keeping skin clean and dry  - Evaluate need for skin moisturizer/barrier cream  - Collaborate with interdisciplinary team   - Patient/family teaching  - Consider wound care consult   Outcome: Progressing     Problem: MOBILITY - ADULT  Goal: Maintain or return to baseline ADL function  Description: INTERVENTIONS:  -  Assess patient's ability to carry out ADLs; assess patient's baseline for ADL function and identify physical deficits which impact ability to perform ADLs (bathing, care of mouth/teeth, toileting, grooming, dressing, etc.)  - Assess/evaluate cause of self-care deficits   - Assess range of motion  - Assess patient's mobility; develop plan if impaired  - Assess patient's need for assistive devices and provide as appropriate  - Encourage maximum independence but intervene and supervise when necessary  - Involve family in performance of ADLs  - Assess for home care needs following discharge   - Consider OT consult to assist with ADL evaluation and planning for discharge  - Provide patient education as appropriate  Outcome: Progressing  Goal: Maintains/Returns to pre admission functional level  Description: INTERVENTIONS:  - Perform BMAT or MOVE assessment daily.   - Set and communicate daily mobility goal to care team and patient/family/caregiver.    - Collaborate with rehabilitation services on mobility goals if consulted  - Out of bed for toileting  - Record patient progress and toleration of activity level   Outcome: Progressing

## 2023-07-31 NOTE — PROGRESS NOTES
[address]  Progress Note  Name: Eloina Chacon  MRN: [id number]  Unit/Bed#: -01 I Date of Admission: 7/24/2023   Date of Service: 7/31/2023 I Hospital Day: 7    Assessment/Plan   * Cellulitis of left upper extremity  Assessment & Plan  · Status post a bedside incision and drainage by general surgery on 7/26/2023  · Patient continues to feel better  · Status post an ID evaluation, status post a general surgical evaluation  · Bl Cx X 2 sets (7/24) 1/2 + MRSA  · Bl Cx x 2 sets (7/26)-no growth to date  · Wound cultures- also positive for MRSA  · 2D echocardiogram completed-no evidence of any type of vegetation  · Continue IV vancomycin day 8  · Given that repeat blood cultures are negative to date, patient will have PICC line placed today  · Status post a PT OT evaluation, patient is okay to go home with outpatient rehab and home services  · Plan for discharge home on IV antibiotics tomorrow    Type 2 diabetes mellitus with hyperglycemia, with long-term current use of insulin [facility])  Assessment & Plan  Lab Results   Component Value Date    HGBA1C 12.7 (H) 07/25/2023       Recent Labs     07/30/23  2109 07/31/23  0726 07/31/23  1121 07/31/23  1604   POCGLU 306* 205* 191* 186*       Blood Sugar Average: Last 72 hrs:  (P) 214.8     · Status post the use of an insulin drip at time of arrival  · Target blood sugar for the hospital is 140-180  · Continue Lantus, Accu-Cheks before meals and at bedtime, and sliding scale coverage  · Lantus increased to 35 units twice a day on 7/29/2023-continue the same for now may need further insulin optimization  · Diabetic neuropathy- continue Lyrica  · Continue to monitor    Hypertension  Assessment & Plan  · Blood pressure is controlled  · Continue metoprolol tartrate 25 mg p.o. twice daily    Lower extremity edema  Assessment & Plan  · Bilateral LE w/+1 edema, erythema, and calf pain - L > R  · CTA PE study negative for PE  · Bilateral lower extremity venous Doppler testing is negative for DVT  · No further testing    Acute renal failure superimposed on stage 3a chronic kidney disease [facility])  Assessment & Plan  Lab Results   Component Value Date    EGFR 65 2023    EGFR 61 2023    EGFR 62 2023    CREATININE 0.99 2023    CREATININE 1.04 2023    CREATININE 1.03 2023     · Present on admission  · Has resolved with IV fluid-this was prerenal  · Creatinine is at baseline  · Continue to avoid nephrotoxins  · Monitor BMPs    Seizure disorder (HCC)  Assessment & Plan  · Continue Keppra 500 mg p.o. every 12 hours    Anemia  Assessment & Plan  · Anemia of chronic disease  · H&H is stable    Slow transit constipation  Assessment & Plan  · Thamas Sales as outpatient, however is not on hospital formulary - will utilize Amitiza while in hospital  · Continue home Miralax, Colace, and senna    GERD (gastroesophageal reflux disease)  Assessment & Plan  · Continue Pepcid    Hypercholesteremia  Assessment & Plan  · Mixed dyslipidemia-continue statin, continue Tricor    Obesity (BMI 35.0-39.9 without comorbidity)  Assessment & Plan  · Actual BMI 37.74  · Dietary, weight loss, and lifestyle modification counseling provided    Bipolar disorder ([address])  Assessment & Plan  · Continue home Celexa and Elavil          VTE Prophylaxis:  Heparin    Patient Centered Rounds: I have performed bedside rounds with nursing staff today. Discussions with Specialists or Other Care Team Provider: yes  Education and Discussions with Family / Patient: Updated patient regarding plan of care    Current Length of Stay: 7 day(s)    Current Patient Status: Inpatient   Certification Statement: The patient will continue to require additional inpatient hospital stay due to Bacteremia    Discharge Plan: Hopeful discharge home tomorrow    Code Status: Level 1 - Full Code    Subjective:   No overnight events noted.     Objective:     Vitals:   Temp (24hrs), Av.2 °F (36.8 °C), Min:97.8 °F (36.6 °C), Max:98.6 °F (37 °C)    Temp:  [97.8 °F (36.6 °C)-98.6 °F (37 °C)] 98.6 °F (37 °C)  HR:  [66-75] 75  Resp:  [18] 18  BP: (122-140)/(66-81) 122/66  SpO2:  [94 %-95 %] 95 %  Body mass index is 37.57 kg/m². Input and Output Summary (last 24 hours): Intake/Output Summary (Last 24 hours) at 7/31/2023 1721  Last data filed at 7/31/2023 1257  Gross per 24 hour   Intake 360 ml   Output --   Net 360 ml       Physical Exam:   Physical Exam  Constitutional:       General: She is not in acute distress. Appearance: She is obese. HENT:      Head: Normocephalic and atraumatic. Nose: Nose normal.      Mouth/Throat:      Mouth: Mucous membranes are moist.   Eyes:      Extraocular Movements: Extraocular movements intact. Conjunctiva/sclera: Conjunctivae normal.   Cardiovascular:      Rate and Rhythm: Normal rate and regular rhythm. Pulmonary:      Effort: Pulmonary effort is normal. No respiratory distress. Abdominal:      Palpations: Abdomen is soft. Tenderness: There is no abdominal tenderness. Musculoskeletal:         General: Normal range of motion. Cervical back: Normal range of motion and neck supple. Skin:     General: Skin is warm and dry. Comments: Left upper extremity with dressing in place   Neurological:      General: No focal deficit present. Mental Status: She is alert. Mental status is at baseline. Cranial Nerves: No cranial nerve deficit.    Psychiatric:         Mood and Affect: Mood normal.         Behavior: Behavior normal.         Additional Data:     Labs:    Results from last 7 days   Lab Units 07/31/23  0454 07/27/23  0506 07/26/23  0423   WBC Thousand/uL 10.96*   < > 15.90*   HEMOGLOBIN g/dL 10.0*   < > 10.3*   HEMATOCRIT % 32.0*   < > 33.1*   PLATELETS Thousands/uL 339   < > 259   NEUTROS PCT %  --   --  76*   LYMPHS PCT %  --   --  13*   LYMPHO PCT % 30   < >  --    MONOS PCT %  --   --  7   MONO PCT % 4   < >  --    EOS PCT % 2 < > 2    < > = values in this interval not displayed. Results from last 7 days   Lab Units 07/31/23  0454   SODIUM mmol/L 137   POTASSIUM mmol/L 4.2   CHLORIDE mmol/L 102   CO2 mmol/L 27   BUN mg/dL 27*   CREATININE mg/dL 0.99   CALCIUM mg/dL 8.9   ALK PHOS U/L 188*   ALT U/L 52   AST U/L 46*     Results from last 7 days   Lab Units 07/24/23  1850   INR  0.96     Results from last 7 days   Lab Units 07/31/23  1604 07/31/23  1121 07/31/23  0726 07/30/23  2109 07/30/23  1612 07/30/23  1126 07/30/23  0738 07/29/23  2053 07/29/23  1634 07/29/23  1108 07/29/23  0703 07/28/23  2116   POC GLUCOSE mg/dl 186* 191* 205* 306* 313* 214* 166* 205* 312* 277* 205* 289*     Results from last 7 days   Lab Units 07/25/23  0052   HEMOGLOBIN A1C % 12.7*       * I Have Reviewed All Lab Data Listed Above. * Additional Pertinent Lab Tests Reviewed: [address] Admission  Reviewed    Imaging:  Imaging Reports Reviewed Today Include: No new imaging    Recent Cultures (last 7 days):     Results from last 7 days   Lab Units 07/26/23  1623 07/26/23  1016 07/26/23  0852 07/25/23  0628 07/24/23  1907 07/24/23  1850   BLOOD CULTURE   --  No Growth After 4 Days. No Growth After 4 Days. --   --  No Growth After 5 Days.  Methicillin Resistant Staphylococcus aureus*   GRAM STAIN RESULT  No polys seen*  3+ Gram positive cocci in clusters*  --  Rare Polys  No organisms seen  --   --  Gram positive cocci in clusters*   WOUND CULTURE  3+ Growth of Methicillin Resistant Staphylococcus aureus*  --  2+ Growth of Methicillin Resistant Staphylococcus aureus*  --   --   --    LEGIONELLA URINARY ANTIGEN   --   --   --  Negative  --   --        Last 24 Hours Medication List:   Current Facility-Administered Medications   Medication Dose Route Frequency Provider Last Rate   • acetaminophen  975 mg Oral [facility] Vincent Hubbard MD     • amitriptyline  25 mg Oral HS Vincent Hubbard MD     • aspirin  81 mg Oral Daily [address]  Dutch Valverde MD     • atorvastatin  80 mg Oral HS Aura Inman MD     • cholecalciferol  400 Units Oral Daily Aura Inman MD     • citalopram  40 mg Oral Daily Aura Inman MD     • docusate sodium  100 mg Oral BID Aura Inman MD     • famotidine  20 mg Oral HS Aura Inman MD     • fenofibrate  48 mg Oral Daily Aura Inman MD     • heparin (porcine)  5,000 Units Subcutaneous [facility] Aura Inman MD     • HYDROmorphone  0.5 mg Intravenous Q3H PRN Aura Inman MD     • HYDROmorphone  0.5 mg Intravenous Once Aura Inman MD     • insulin glargine  35 Units Subcutaneous Q12H [facility], MD     • insulin lispro  1-6 Units Subcutaneous TID [facility] Aura Inman MD     • insulin lispro  1-6 Units Subcutaneous HS Aura Inman MD     • insulin lispro  14 Units Subcutaneous TID With Meals Aura Inman MD     • levETIRAcetam  500 mg Oral Q12H [address], MD     • lubiprostone  24 mcg Oral BID Aura Inman MD     • magnesium Oxide  400 mg Oral Daily Aura Inman MD     • metoprolol tartrate  25 mg Oral BID Aura Inman MD     • multivitamin stress formula  1 tablet Oral QAM Aura Inman MD     • mupirocin   Nasal Q12H [facility] Gerardo Rahman PA-C     • mupirocin   Topical BID Gerardo Rahman PA-C     • oxyCODONE  2.5 mg Oral Q6H PRN Aura Inman MD      Or   • oxyCODONE  5 mg Oral Q6H PRN Aura Inman MD     • pregabalin  100 mg Oral TID Aura Inman MD     • senna  2 tablet Oral HS Aura Inman MD     • vancomycin  1,250 mg Intravenous Q24H Aura Inman MD 1,250 mg (07/31/23 1221)        Today, Patient Was Seen By: Ana Yee MD    ** Please Note: Dictation voice to text software may have been used in the creation of this document.  **

## 2023-07-31 NOTE — PROGRESS NOTES
Progress Note - [facility] Infectious Disease   Taya Guadalupe 46 y.o. female MRN: [id number]  Unit/Bed#: -[facility] Encounter: [id number]      IMPRESSION & RECOMMENDATIONS:   1.  Left antecubital fossa abscess.  At prior pIV site.  CT noncon with possible myositis. U/S with phlegmon present but clinically concern for developing superficial abscess in the left antecubital fossa. Dopplers without DVT. Complicated by MRSA bacteremia. Now status post bedside I&D by surgery with drainage of purulence fluid. Wound culture growing MRSA as well. The patient is afebrile, WBC count improving, exam improving.              -Continue IV vancomycin, dosing by pharmacy              -Surgery following              -Continue local wound care              -Recommend arm elevation, warm compresses              -Monitor clinical exam     2.  MRSA bacteremia. 1/2 admission blood culture sets is positive. Due to #1 above. TTE no vegetations. No indwelling intravascular devices present. Repeat blood cultures are negative.   -Continue IV vancomycin 1250mg q24h with goal trough 10.7-16 ()  -OK for PICC placement today   -anticipate a 4 week course of IV antibiotics from blood culture clearance through 8/22/23  -pt will need outpatient ID follow up in 2 weeks   -weekly CBCd, Cr, and Vanco trough   -office notified of outpatient needs      3. Leukocytosis.  Present on admission.  Due to #1/2 above.  The patient is afebrile and hemodynamically stable. WBC count improving.              -antibiotics as above              -monitor fever curve, WBC count     3.  Poorly controlled type 2 diabetes mellitus with hyperglycemia. HbA1c 12.6%.  Blood sugar was over 800 on admission and she was started on an insulin drip.  This is a risk factor for infection.               -Glycemic management per primary team     4.  LINDY on CKD.  Creatinine 1.35 on admission, slightly above baseline around 1.  Likely prerenal due to infection and dehydration. Estimated Creatinine Clearance: 68 mL/min (by C-G formula based on SCr of 0.99 mg/dL).             -Monitor creatinine              -IVF per primary     5. Obesity. BMI 36    Antibiotics:  IV vancomycin D8    I have discussed the above management plan in detail with patient. I have discussed the above management plan in detail with patient's RN, and the primary service, SLIM.     24 Hour events:  Yesterday and overnight notes reviewed. No overnight events. Surgery following for wound care, will reassess when closer to d/c. Subjective:  Patient has no fever, chills, sweats; no nausea, vomiting, diarrhea; no cough, shortness of breath; reports some discomfort in her left antecub. No new symptoms. Tolerating abx. States she is going on a cruise . Objective:  Vitals:  Temp:  [97.8 °F (36.6 °C)-98.6 °F (37 °C)] 97.8 °F (36.6 °C)  HR:  [66-71] 66  Resp:  [18] 18  BP: (129-140)/(67-72) 133/72  SpO2:  [92 %-94 %] 94 %  Temp (24hrs), Av.2 °F (36.8 °C), Min:97.8 °F (36.6 °C), Max:98.6 °F (37 °C)  Current: Temperature: 97.8 °F (36.6 °C)    PHYSICAL EXAM:  General Appearance:  Appearing well, nontoxic, and in no distress. Sleeping in bedside recliner. HEENT: Normocephalic, without obvious abnormality, atraumatic. Conjunctiva pink and sclera anicteric. Oropharynx moist without lesions. Edentulous. Lungs:   Clear to auscultation bilaterally, respirations unlabored   Heart:  RRR; no murmur, rub or gallop   Abdomen:   Soft, non-tender, non-distended, positive bowel sounds    Extremities: No cyanosis, clubbing or edema   Musculoskeletal: Back symmetric without curvature, ROM normal.    Skin: No rashes or lesions. No draining wounds noted. Peripheral IV intact without evidence of erythema, warmth, or exudate. LABS, IMAGING, & OTHER STUDIES:  Extensive review of the medical records in epic including review of the notes, radiographs, and laboratory results were reviewed personally as below.      Lab Results:  I have personally reviewed pertinent labs. Results from last 7 days   Lab Units 07/31/23  0454 07/29/23  0519 07/28/23  0508   WBC Thousand/uL 10.96* 12.01* 12.06*   HEMOGLOBIN g/dL 10.0* 10.1* 9.9*   PLATELETS Thousands/uL 339 323 186     Results from last 7 days   Lab Units 07/31/23  0454 07/29/23  0519 07/28/23  0508 07/27/23  0506 07/26/23  0423 07/25/23  0606   SODIUM mmol/L 137 140 136   < > 137 134*   POTASSIUM mmol/L 4.2 4.3 4.4   < > 4.3 4.0   CHLORIDE mmol/L 102 102 101   < > 100 102   CO2 mmol/L 27 29 27   < > 27 24   BUN mg/dL 27* 21 22   < > 19 19   CREATININE mg/dL 0.99 1.04 1.03   < > 1.22 1.03   EGFR ml/min/1.73sq m 65 61 62   < > 51 62   CALCIUM mg/dL 8.9 9.1 8.6   < > 8.8 8.1*   AST U/L 46*  --   --   --  18 14   ALT U/L 52  --   --   --  33 31   ALK PHOS U/L 188*  --   --   --  155* 112*    < > = values in this interval not displayed. Results from last 7 days   Lab Units 07/26/23  1623 07/26/23  1016 07/26/23  0852 07/25/23  0628 07/24/23  1907 07/24/23  1850   BLOOD CULTURE   --  No Growth After 4 Days. No Growth After 4 Days. --   --  No Growth After 5 Days. Methicillin Resistant Staphylococcus aureus*   GRAM STAIN RESULT  No polys seen*  3+ Gram positive cocci in clusters*  --  Rare Polys  No organisms seen  --   --  Gram positive cocci in clusters*   WOUND CULTURE  3+ Growth of Methicillin Resistant Staphylococcus aureus*  --  2+ Growth of Methicillin Resistant Staphylococcus aureus*  --   --   --    LEGIONELLA URINARY ANTIGEN   --   --   --  Negative  --   --      Results from last 7 days   Lab Units 07/25/23  0606 07/24/23  1850   PROCALCITONIN ng/ml 0.65* 0.42*         Results from last 7 days   Lab Units 07/25/23  0052   FERRITIN ng/mL 128     Results from last 7 days   Lab Units 07/24/23  1850   D-DIMER QUANTITATIVE ug/ml FEU 1.94*       Imaging Studies:   I have personally reviewed pertinent imaging study reports and images in PACS.      Other Studies:   I have personally reviewed pertinent report including wound cx, blood cx.

## 2023-07-31 NOTE — PROGRESS NOTES
Progress Note - General Surgery   Nydia Fail 46 y.o. female MRN: [id number]  Unit/Bed#: -a[facility]ymond Encounter: [id number]    Assessment:  46year old female with PMH significant for uncontrolled diabetes, HTN, HLD, CKD3, R renal cell carcinoma s/p nephrectomy 2017, seizure disorder, anemia, obesity presents for the evaluation of LUE cellulitis at the L antecubital fossa  · Afebrile, vitals stable   · WBC 10.96 (12)  · Wound culture positive for MRSA  · Repeat blood cultures negative     Plan:  · Continue daily wound care with silvasorb gel and bactroban ointment. Will reassess tomorrow for wound care discharge plan  · PICC line placement today for longterm IV abx  · ID following, appreciate recommendations   · Analgesia and antiemetics prn   · Discussed with Dr. Eduardo Peña  · Rest of medical management per SLIM    Subjective/Objective   Subjective: Patient states her arm is sore, but the pain has improved. Better ROM of the L arm today. Denies fever, chills, chest pain, sob. Objective:   Blood pressure 140/81, pulse 68, temperature 97.8 °F (36.6 °C), resp. rate 18, height 5' 1" (1.549 m), weight 90.2 kg (198 lb 13.7 oz), SpO2 94 %, not currently breastfeeding. ,Body mass index is 37.57 kg/m². Intake/Output Summary (Last 24 hours) at 7/31/2023 1359  Last data filed at 7/31/2023 1257  Gross per 24 hour   Intake 600 ml   Output --   Net 600 ml       Invasive Devices     Peripheral Intravenous Line  Duration           Peripheral IV 07/27/23 Right Hand 4 days                Physical Exam  Vitals reviewed. Constitutional:       General: She is not in acute distress. Appearance: She is obese. HENT:      Head: Normocephalic and atraumatic. Cardiovascular:      Rate and Rhythm: Normal rate and regular rhythm. Heart sounds: Normal heart sounds. Pulmonary:      Effort: Pulmonary effort is normal. No respiratory distress. Abdominal:      General: There is no distension.       Palpations: Abdomen is soft.      Tenderness: There is no abdominal tenderness. There is no guarding. Musculoskeletal:      Right lower leg: No edema. Left lower leg: No edema. Skin:     General: Skin is warm and dry. Comments: L antecubital fossa wound erythema and swelling approving. Surrounding slough present. Cellulitis and superficial abscess improving   Neurological:      General: No focal deficit present. Mental Status: She is alert. Mental status is at baseline. Psychiatric:         Mood and Affect: Mood normal.         Behavior: Behavior normal.         Lab, Imaging and other studies:  I have personally reviewed pertinent lab results.   , CBC:   Lab Results   Component Value Date    WBC 10.96 (H) 07/31/2023    HGB 10.0 (L) 07/31/2023    HCT 32.0 (L) 07/31/2023    MCV 84 07/31/2023     07/31/2023    RBC 3.82 07/31/2023    MCH 26.2 (L) 07/31/2023    MCHC 31.3 (L) 07/31/2023    RDW 15.8 (H) 07/31/2023    MPV 10.1 07/31/2023   , CMP:   Lab Results   Component Value Date    SODIUM 137 07/31/2023    K 4.2 07/31/2023     07/31/2023    CO2 27 07/31/2023    BUN 27 (H) 07/31/2023    CREATININE 0.99 07/31/2023    CALCIUM 8.9 07/31/2023    AST 46 (H) 07/31/2023    ALT 52 07/31/2023    ALKPHOS 188 (H) 07/31/2023    EGFR 65 07/31/2023     VTE Pharmacologic Prophylaxis: Heparin  VTE Mechanical Prophylaxis: sequential compression device    Jade Yepez PA-C

## 2023-08-01 VITALS
OXYGEN SATURATION: 92 % | BODY MASS INDEX: 37.92 KG/M2 | TEMPERATURE: 97.9 F | WEIGHT: 200.84 LBS | RESPIRATION RATE: 17 BRPM | DIASTOLIC BLOOD PRESSURE: 62 MMHG | HEIGHT: 61 IN | SYSTOLIC BLOOD PRESSURE: 115 MMHG | HEART RATE: 74 BPM

## 2023-08-01 PROBLEM — R78.81 MRSA BACTEREMIA: Status: ACTIVE | Noted: 2023-08-01

## 2023-08-01 PROBLEM — B95.62 MRSA BACTEREMIA: Status: ACTIVE | Noted: 2023-08-01

## 2023-08-01 LAB
ANION GAP SERPL CALCULATED.3IONS-SCNC: 8 MMOL/L
BUN SERPL-MCNC: 30 MG/DL (ref 5–25)
CALCIUM SERPL-MCNC: 9 MG/DL (ref 8.4–10.2)
CHLORIDE SERPL-SCNC: 100 MMOL/L (ref 96–108)
CO2 SERPL-SCNC: 29 MMOL/L (ref 21–32)
CREAT SERPL-MCNC: 1.04 MG/DL (ref 0.6–1.3)
ERYTHROCYTE [DISTWIDTH] IN BLOOD BY AUTOMATED COUNT: 15.9 % (ref 11.6–15.1)
GFR SERPL CREATININE-BSD FRML MDRD: 61 ML/MIN/1.73SQ M
GLUCOSE SERPL-MCNC: 234 MG/DL (ref 65–140)
GLUCOSE SERPL-MCNC: 246 MG/DL (ref 65–140)
GLUCOSE SERPL-MCNC: 346 MG/DL (ref 65–140)
HCT VFR BLD AUTO: 33.2 % (ref 34.8–46.1)
HGB BLD-MCNC: 10.3 G/DL (ref 11.5–15.4)
MCH RBC QN AUTO: 26.2 PG (ref 26.8–34.3)
MCHC RBC AUTO-ENTMCNC: 31 G/DL (ref 31.4–37.4)
MCV RBC AUTO: 85 FL (ref 82–98)
PLATELET # BLD AUTO: 438 THOUSANDS/UL (ref 149–390)
PMV BLD AUTO: 10.1 FL (ref 8.9–12.7)
POTASSIUM SERPL-SCNC: 4.2 MMOL/L (ref 3.5–5.3)
RBC # BLD AUTO: 3.93 MILLION/UL (ref 3.81–5.12)
SODIUM SERPL-SCNC: 137 MMOL/L (ref 135–147)
WBC # BLD AUTO: 11.11 THOUSAND/UL (ref 4.31–10.16)

## 2023-08-01 PROCEDURE — 99233 SBSQ HOSP IP/OBS HIGH 50: CPT | Performed by: SURGERY

## 2023-08-01 PROCEDURE — 99239 HOSP IP/OBS DSCHRG MGMT >30: CPT | Performed by: INTERNAL MEDICINE

## 2023-08-01 PROCEDURE — 85027 COMPLETE CBC AUTOMATED: CPT | Performed by: INTERNAL MEDICINE

## 2023-08-01 PROCEDURE — 80048 BASIC METABOLIC PNL TOTAL CA: CPT | Performed by: INTERNAL MEDICINE

## 2023-08-01 PROCEDURE — 82948 REAGENT STRIP/BLOOD GLUCOSE: CPT

## 2023-08-01 PROCEDURE — 99233 SBSQ HOSP IP/OBS HIGH 50: CPT | Performed by: PHYSICIAN ASSISTANT

## 2023-08-01 RX ORDER — OXYCODONE HYDROCHLORIDE 5 MG/1
5 TABLET ORAL EVERY 6 HOURS PRN
Qty: 12 TABLET | Refills: 0 | Status: SHIPPED | OUTPATIENT
Start: 2023-08-01 | End: 2023-08-11

## 2023-08-01 RX ADMIN — LUBIPROSTONE 24 MCG: 24 CAPSULE, GELATIN COATED ORAL at 12:25

## 2023-08-01 RX ADMIN — Medication 5 MG: at 04:49

## 2023-08-01 RX ADMIN — INSULIN LISPRO 5 UNITS: 100 INJECTION, SOLUTION INTRAVENOUS; SUBCUTANEOUS at 12:26

## 2023-08-01 RX ADMIN — CHOLECALCIFEROL TAB 10 MCG (400 UNIT) 400 UNITS: 10 TAB at 09:48

## 2023-08-01 RX ADMIN — HEPARIN SODIUM 5000 UNITS: 5000 INJECTION INTRAVENOUS; SUBCUTANEOUS at 05:00

## 2023-08-01 RX ADMIN — MUPIROCIN 1 APPLICATION: 20 OINTMENT TOPICAL at 09:48

## 2023-08-01 RX ADMIN — INSULIN GLARGINE 35 UNITS: 100 INJECTION, SOLUTION SUBCUTANEOUS at 09:47

## 2023-08-01 RX ADMIN — HYDROMORPHONE HYDROCHLORIDE 0.5 MG: 1 INJECTION, SOLUTION INTRAMUSCULAR; INTRAVENOUS; SUBCUTANEOUS at 01:55

## 2023-08-01 RX ADMIN — MUPIROCIN 1 APPLICATION: 20 OINTMENT TOPICAL at 09:53

## 2023-08-01 RX ADMIN — METOPROLOL TARTRATE 25 MG: 25 TABLET, FILM COATED ORAL at 09:48

## 2023-08-01 RX ADMIN — LEVETIRACETAM 500 MG: 500 TABLET, FILM COATED ORAL at 09:48

## 2023-08-01 RX ADMIN — PREGABALIN 100 MG: 100 CAPSULE ORAL at 09:48

## 2023-08-01 RX ADMIN — DOCUSATE SODIUM 100 MG: 100 CAPSULE, LIQUID FILLED ORAL at 09:48

## 2023-08-01 RX ADMIN — CITALOPRAM HYDROBROMIDE 40 MG: 20 TABLET ORAL at 09:48

## 2023-08-01 RX ADMIN — INSULIN LISPRO 14 UNITS: 100 INJECTION, SOLUTION INTRAVENOUS; SUBCUTANEOUS at 08:07

## 2023-08-01 RX ADMIN — Medication 400 MG: at 09:48

## 2023-08-01 RX ADMIN — ASPIRIN 81 MG 81 MG: 81 TABLET ORAL at 09:48

## 2023-08-01 RX ADMIN — INSULIN LISPRO 3 UNITS: 100 INJECTION, SOLUTION INTRAVENOUS; SUBCUTANEOUS at 08:07

## 2023-08-01 RX ADMIN — B-COMPLEX W/ C & FOLIC ACID TAB 1 TABLET: TAB at 09:48

## 2023-08-01 RX ADMIN — VANCOMYCIN HYDROCHLORIDE 1250 MG: 1 INJECTION, POWDER, LYOPHILIZED, FOR SOLUTION INTRAVENOUS at 12:19

## 2023-08-01 RX ADMIN — ACETAMINOPHEN 975 MG: 325 TABLET ORAL at 05:00

## 2023-08-01 RX ADMIN — INSULIN LISPRO 14 UNITS: 100 INJECTION, SOLUTION INTRAVENOUS; SUBCUTANEOUS at 12:25

## 2023-08-01 RX ADMIN — FENOFIBRATE 48 MG: 48 TABLET, FILM COATED ORAL at 09:48

## 2023-08-01 RX ADMIN — Medication 5 MG: at 10:51

## 2023-08-01 NOTE — DISCHARGE SUMMARY
[address] [facility]  Discharge- Duard Spurling 1970, 46 y.o. female MRN: [id number]  Unit/Bed#: -[facility] Encounter: [id number]  Primary Care Provider: SYLWIA Womack   Date and time admitted to hospital: 7/24/2023  5:44 PM    * Cellulitis of left upper extremity  Assessment & Plan  · Status post a bedside incision and drainage by general surgery on 7/26/2023  · Patient continues to feel better  · Status post an ID evaluation, status post a general surgical evaluation  · Bl Cx X 2 sets (7/24) 1/2 + MRSA  · Bl Cx x 2 sets (7/26)-no growth to date  · Wound cultures- also positive for MRSA  · 2D echocardiogram completed-no evidence of any type of vegetation  · Continue IV vancomycin  · Given that repeat blood cultures are negative to date, patient will have PICC line placed today  · Status post a PT OT evaluation, patient is okay to go home with outpatient rehab and home services  · Plan for discharge home on IV antibiotics today. Patient will be on IV vancomycin daily with treatment to be completed through 8/22/2023  · Outpatient follow-up with infectious disease    Type 2 diabetes mellitus with hyperglycemia, with long-term current use of insulin [facility])  Assessment & Plan  Lab Results   Component Value Date    HGBA1C 12.7 (H) 07/25/2023       Recent Labs     07/31/23  1121 07/31/23  1604 07/31/23  2103 08/01/23  0711   POCGLU 191* 186* 329* 246*       Blood Sugar Average: Last 72 hrs:  (P) [id number]     · Status post the use of an insulin drip at time of arrival  · Target blood sugar for the hospital is 140-180  · Continue home diabetic regimen    MRSA bacteremia  Assessment & Plan  Secondary to left upper extremity cellulitis.   Continue treatment as above    Lower extremity edema  Assessment & Plan  · Bilateral LE w/+1 edema, erythema, and calf pain - L > R  · CTA PE study negative for PE  · Bilateral lower extremity venous Doppler testing is negative for DVT  · No further testing    Acute renal failure superimposed on stage 3a chronic kidney disease [facility])  Assessment & Plan  Lab Results   Component Value Date    EGFR 61 08/01/2023    EGFR 65 07/31/2023    EGFR 61 07/29/2023    CREATININE 1.04 08/01/2023    CREATININE 0.99 07/31/2023    CREATININE 1.04 07/29/2023     · Present on admission  · Has resolved with IV fluid-this was prerenal  · Creatinine is at baseline  · Continue to avoid nephrotoxins    Seizure disorder (HCC)  Assessment & Plan  · Continue Keppra 500 mg p.o. every 12 hours    Slow transit constipation  Assessment & Plan  · Darlene Butler as outpatient, however is not on hospital formulary - will utilize Amitiza while in hospital  · Continue home Miralax, Colace, and senna    GERD (gastroesophageal reflux disease)  Assessment & Plan  · Continue Pepcid    Obesity (BMI 35.0-39.9 without comorbidity)  Assessment & Plan  · Actual BMI 37.74  · Dietary, weight loss, and lifestyle modification counseling provided    Bipolar disorder [facility])  Assessment & Plan  · Continue home Celexa and Elavil       Discharging Physician / Practitioner: Fang Baires MD  PCP: Sariah Amhadi, [address] [place] [address]  Admission Date:   Admission Orders (From admission, onward)     Ordered        07/24/23 2335  INPATIENT ADMISSION  Once                      Discharge Date: 08/01/23    Medical Problems     Resolved Problems  Date Reviewed: 7/25/2023   None         Consultations During Hospital Stay:  · Infectious disease, surgery    Procedures Performed:   · Bedside I&D of left upper extremity    Significant Findings / Test Results:   · MRSA bacteremia    Incidental Findings:   · Lung lesion on CT imaging    Test Results Pending at Discharge (will require follow up):    · None     Outpatient Tests Requested:  · Routine labs with PCP as outpatient    Complications:    • none    Reason for Admission: MRSA bacteremia    Hospital Course:     Gretta Scott is a 46 y.o. female patient who originally presented to the hospital on 7/24/2023 due to left upper extremity cellulitis. Patient was started on IV antibiotics. Imaging studies concerning for underlying abscess. Infectious disease and surgical team were consulted. Patient had bedside debridement by surgical team.  Blood cultures positive for MRSA. Wound cultures eventually positive for MRSA as well. Patient maintained on IV vancomycin. Echo performed, no vegetations noted. Infectious disease recommended IV vancomycin through 8/22/2023. Repeat blood cultures negative to date. PICC line placed on 7/31/2023. Management has arranged for outpatient IV antibiotics and visiting nursing. Patient stable for discharge home with outpatient follow-up. Patient discharged on oxycodone for pain control. Patient provided with ambulatory referral to infectious disease. Regarding incidental finding on CT imaging of the lung lesion. This finding was discussed with patient. Advised her to have repeat imaging in 6 to 8 weeks, specifically CT scan to evaluate for resolution. Patient understands and was able to repeat back plan to me. Please see above list of diagnoses and related plan for additional information. Condition at Discharge: stable     Discharge Day Visit / Exam:     Subjective: No complaints at this time    Vitals: Blood Pressure: 115/62 (08/01/23 0708)  Pulse: 74 (08/01/23 0708)  Temperature: 97.9 °F (36.6 °C) (08/01/23 0708)  Temp Source: Temporal (07/31/23 2300)  Respirations: 17 (08/01/23 0708)  Height: 5' 1" (154.9 cm) (07/26/23 0931)  Weight - Scale: 91.1 kg (200 lb 13.4 oz) (08/01/23 0600)  SpO2: 92 % (08/01/23 0708)     Exam:   Physical Exam  Constitutional:       General: She is not in acute distress. Appearance: She is obese. HENT:      Head: Normocephalic and atraumatic. Nose: Nose normal.      Mouth/Throat:      Mouth: Mucous membranes are moist.   Eyes:      Extraocular Movements: Extraocular movements intact. Conjunctiva/sclera: Conjunctivae normal.   Cardiovascular:      Rate and Rhythm: Normal rate and regular rhythm. Pulmonary:      Effort: Pulmonary effort is normal. No respiratory distress. Abdominal:      Palpations: Abdomen is soft. Tenderness: There is no abdominal tenderness. Musculoskeletal:         General: Normal range of motion. Cervical back: Normal range of motion and neck supple. Skin:     General: Skin is warm and dry. Comments: Left upper extremity wound with dressing in place   Neurological:      General: No focal deficit present. Mental Status: She is alert. Mental status is at baseline. Cranial Nerves: No cranial nerve deficit. Psychiatric:         Mood and Affect: Mood normal.         Behavior: Behavior normal.         Discussion with Family: Poke with patient's  Johanna Chambers over the phone regarding discharge plan    Discharge instructions/Information to patient and family:   See after visit summary for information provided to patient and family. Provisions for Follow-Up Care:  See after visit summary for information related to follow-up care and any pertinent home health orders. Disposition:     Home with VNA Services (Reminder: Complete face to face encounter)      Planned Readmission:   • no     Discharge Statement:  I spent 35 minutes discharging the patient. This time was spent on the day of discharge. I had direct contact with the patient on the day of discharge. Greater than 50% of the total time was spent examining patient, answering all patient questions, arranging and discussing plan of care with patient as well as directly providing post-discharge instructions. Additional time then spent on discharge activities. Discharge Medications:  See after visit summary for reconciled discharge medications provided to patient and family.       ** Please Note: This note has been constructed using a voice recognition system **

## 2023-08-01 NOTE — PROGRESS NOTES
Norma Eastman is a 46 y.o. female who is currently ordered Vancomycin IV with management by the Pharmacy Consult service. Relevant clinical data and objective / subjective history reviewed. Vancomycin Assessment:  Indication and Goal AUC/Trough: Bacteremia (goal -600, trough >10); Soft tissue (goal -600, trough >10), -600, trough >10  Clinical Status: stable  Micro:   7/26 wound culture gram stain - MRSA+  7/24 BC 1/2 - MRSA +  7/24 BCID - Staph aureus, MECA +  Renal Function:  SCr: 1.04 mg/dL  CrCl: 65 mL/min  Renal replacement: Not on dialysis  Days of Therapy: 8  Current Dose: 1250 mg q24  Vancomycin Plan:  New Dosing: continue 1250 mg q24  Estimated AUC: 445 mcg*hr/mL  Estimated Trough: 12.1 mcg/mL  Next Level: 8/3 6am  Renal Function Monitoring: Daily BMP and UOP  Pharmacy will continue to follow closely for s/sx of nephrotoxicity, infusion reactions and appropriateness of therapy. BMP and CBC will be ordered per protocol. We will continue to follow the patient’s culture results and clinical progress daily.     Js Daley, Pharmacist

## 2023-08-01 NOTE — CASE MANAGEMENT
Case Management Discharge Planning Note    Patient name Nikia Gorman  Location /-66 MRN [id number]  : 1970 Date 2023       Current Admission Date: 2023  Current Admission Diagnosis:Cellulitis of left upper extremity   Patient Active Problem List    Diagnosis Date Noted   • MRSA bacteremia 2023   • Cellulitis of left upper extremity 2023   • Leukocytosis 2023   • Acute renal failure superimposed on stage 3a chronic kidney disease ([address]) 2023   • Lower extremity edema 2023   • Migraine 07/15/2023   • Seizure disorder ([address]) 07/15/2023   • Generalized abdominal pain 2023   • Abnormal CT scan, stomach 2023   • Gastric distention 07/10/2023   • Flank pain 2023   • Secondary hyperparathyroidism ([address]) 2022   • History of kidney cancer 2022   • Vitamin D deficiency 2022   • Benign hypertension with CKD (chronic kidney disease) stage III ([address]) 2022   • GERD (gastroesophageal reflux disease) 2021   • History of colon polyps 2021   • Slow transit constipation 2021   • Anemia 2021   • Encounter for post surgical wound check 2021   • Depression with anxiety 2021   • Mild intermittent asthma without complication    • Hypercholesteremia 2021   • Hypertriglyceridemia 2021   • Iron deficiency anemia secondary to inadequate dietary iron intake 2021   • Internal hernia 2021   • Intra-abdominal adhesions 2021   • Radiculopathy, lumbar region 2020   • Anterior tibial tendonitis, right 2020   • Spondylolisthesis of lumbar region 04/10/2018   • Chronic low back pain with sciatica 2018   • Diabetic polyneuropathy associated with type 2 diabetes mellitus ([address]) 2018   • Clear cell carcinoma of kidney, right ([address]) 05/15/2017   • Obesity (BMI 35.0-39.9 without comorbidity) 2017   • Type 2 diabetes mellitus with hyperglycemia, with long-term current use of insulin ([address]) 02/25/2016   • Hypertension 02/25/2016   • Bipolar disorder ([address]) 11/12/2015   • Gastroparesis 11/12/2015      LOS (days): 8  Geometric Mean LOS (GMLOS) (days): 3.20  Days to GMLOS:-4.4     OBJECTIVE:  Risk of Unplanned Readmission Score: 24.32     Current admission status: Inpatient   Preferred Pharmacy:   [address] [place] [address], [address] [place] [address]  Phone: [phone] Fax: [phone]    Primary Care Provider: SYLWIA Hsu    Primary Insurance: [employer]  Secondary Insurance:     DISCHARGE DETAILS:  IVABX have been delivered. Pt getting ehr IVABX now. Spouse at the bedside and will transport. PT and spouse aware of 1200 visit with [facility] [place] tomorrow.

## 2023-08-01 NOTE — ASSESSMENT & PLAN NOTE
· Eulalio Hsu as outpatient, however is not on hospital formulary - will utilize Amitiza while in hospital  · Continue home Miralax, Colace, and senna

## 2023-08-01 NOTE — DISCHARGE INSTR - AVS FIRST PAGE
Wound Care Instructions for L antecubital fossa:  - Please takedown dressings  - Cleanse wound with soap and water  - Place mupirocin ointment in and around the wound  - Redress with 4x4s, ABD, and Beatrice.  You may also just use 4x4s and tape

## 2023-08-01 NOTE — PROGRESS NOTES
Progress Note - [facility] Infectious Disease   Wendy Crook 46 y.o. female MRN: [id number]  Unit/Bed#: -01 Encounter: [id number]      IMPRESSION & RECOMMENDATIONS:   1.  Left antecubital fossa abscess.  At prior pIV site.  CT noncon with possible myositis. U/S with phlegmon present but clinically concern for developing superficial abscess in the left antecubital fossa. Dopplers without DVT. Complicated by MRSA bacteremia. Status post bedside I&D by surgery with drainage of purulence fluid. Wound culture growing MRSA as well. The patient is afebrile, WBC count improving, exam improving.              -Continue IV vancomycin               -Surgery following              -Continue local wound care              -Recommend arm elevation, warm compresses              -Monitor clinical exam     2.  MRSA bacteremia. 1/2 admission blood culture sets is positive. Due to #1 above. TTE no vegetations. No indwelling intravascular devices present. Repeat blood cultures are negative. Status post IR PICC placement 7/31 in RUE. Plan to pursue 4 week course of IV abx with vancomycin, however patient reports plan to go on cruise prior to completion of abx. We discussed that PICC will need to be removed prior to her departure and she will be transition to PO abx, possibly Linezolid or Bactrim to complete course. Risk of relapse discussed with pt and she expressed understanding. We will continue to discuss in follow up as scheduled.   -Continue IV vancomycin 1250mg q24h with goal trough 10.7-16 ()  -anticipate a 4 week course of IV antibiotics from blood culture clearance through 8/22/23  -outpatient ID follow up in 2 weeks arranged   -weekly CBCd, Cr, and Vanco trough   -office notified of outpatient needs      3. Leukocytosis.  Present on admission.  Due to #1/2 above.  The patient is afebrile and hemodynamically stable.  WBC count improving.              -antibiotics as above              -monitor fever curve, WBC count     3.  Poorly controlled type 2 diabetes mellitus with hyperglycemia. HbA1c 12.6%.  Blood sugar was over 800 on admission and she was started on an insulin drip.  This is a risk factor for infection.               -Glycemic management per primary team     4.  LINDY on CKD.  Creatinine 1.35 on admission, slightly above baseline around 1.  Likely prerenal due to infection and dehydration. Estimated Creatinine Clearance: 65 mL/min (by C-G formula based on SCr of 1.04 mg/dL).             -Monitor creatinine              -IVF per primary     5. Obesity. BMI 36    Antibiotics:  Vancomycin D9     I have discussed the above management plan in detail with patient. I have discussed the above management plan in detail with patient's RN, and the primary service, SLIM attending. 24 Hour events:  Yesterday and overnight notes reviewed. S/p picc placement. Waiting for delivery of medication with plan to start at home tmrw with visiting nurse. Subjective:  Patient has no fever, chills, sweats; no nausea, vomiting, diarrhea; no cough, shortness of breath; no pain. picc is functioning. Tolerating vancomycin. Objective:  Vitals:  Temp:  [97.9 °F (36.6 °C)-98.7 °F (37.1 °C)] 97.9 °F (36.6 °C)  HR:  [68-75] 74  Resp:  [17-18] 17  BP: (112-140)/(62-81) 115/62  SpO2:  [9 %-95 %] 92 %  Temp (24hrs), Av.3 °F (36.8 °C), Min:97.9 °F (36.6 °C), Max:98.7 °F (37.1 °C)  Current: Temperature: 97.9 °F (36.6 °C)    PHYSICAL EXAM:  General Appearance:  Appearing well, nontoxic, and in no distress sitting in bedside recliner    HEENT: Normocephalic, without obvious abnormality, atraumatic. Conjunctiva pink and sclera anicteric. Oropharynx moist without lesions. Edentulous.     Lungs:   Clear to auscultation bilaterally, respirations unlabored   Heart:  RRR; no murmur, rub or gallop   Abdomen:   Soft, non-tender, non-distended, positive bowel sounds    Extremities: No cyanosis, clubbing or edema   Musculoskeletal: Back symmetric without curvature, ROM normal.    Skin: No rashes or lesions. No draining wounds noted. Left antecub wound packed and dry dressing c/d/i. RUE PICC intact. Peripheral IV intact without evidence of erythema, warmth, or exudate. LABS, IMAGING, & OTHER STUDIES:  Extensive review of the medical records in epic including review of the notes, radiographs, and laboratory results were reviewed personally as below. Lab Results:  I have personally reviewed pertinent labs. Results from last 7 days   Lab Units 08/01/23  0451 07/31/23  0454 07/29/23  0519   WBC Thousand/uL 11.11* 10.96* 12.01*   HEMOGLOBIN g/dL 10.3* 10.0* 10.1*   PLATELETS Thousands/uL 438* 339 323     Results from last 7 days   Lab Units 08/01/23  0451 07/31/23  0454 07/29/23  0519 07/27/23  0506 07/26/23  0423   SODIUM mmol/L 137 137 140   < > 137   POTASSIUM mmol/L 4.2 4.2 4.3   < > 4.3   CHLORIDE mmol/L 100 102 102   < > 100   CO2 mmol/L 29 27 29   < > 27   BUN mg/dL 30* 27* 21   < > 19   CREATININE mg/dL 1.04 0.99 1.04   < > 1.22   EGFR ml/min/1.73sq m 61 65 61   < > 51   CALCIUM mg/dL 9.0 8.9 9.1   < > 8.8   AST U/L  --  46*  --   --  18   ALT U/L  --  52  --   --  33   ALK PHOS U/L  --  188*  --   --  155*    < > = values in this interval not displayed. Results from last 7 days   Lab Units 07/26/23  1623 07/26/23  1016 07/26/23  0852   BLOOD CULTURE   --  No Growth After 5 Days. No Growth After 5 Days. --    GRAM STAIN RESULT  No polys seen*  3+ Gram positive cocci in clusters*  --  Rare Polys  No organisms seen   WOUND CULTURE  3+ Growth of Methicillin Resistant Staphylococcus aureus*  --  2+ Growth of Methicillin Resistant Staphylococcus aureus*                       Imaging Studies:   I have personally reviewed pertinent imaging study reports and images in PACS. Other Studies:   I have personally reviewed pertinent report including repeat blood cx, PICC placement note.

## 2023-08-01 NOTE — NURSING NOTE
Patient discharged at this time in stable condition. Instructions including PICC line use and care were reviewed with the patient and her spouse. F/U appointments and medications were also reviewed. All questions were answered and both acknowledged understanding of all instructions.

## 2023-08-01 NOTE — ASSESSMENT & PLAN NOTE
· Status post a bedside incision and drainage by general surgery on 7/26/2023  · Patient continues to feel better  · Status post an ID evaluation, status post a general surgical evaluation  · Bl Cx X 2 sets (7/24) 1/2 + MRSA  · Bl Cx x 2 sets (7/26)-no growth to date  · Wound cultures- also positive for MRSA  · 2D echocardiogram completed-no evidence of any type of vegetation  · Continue IV vancomycin  · Given that repeat blood cultures are negative to date, patient will have PICC line placed today  · Status post a PT OT evaluation, patient is okay to go home with outpatient rehab and home services  · Plan for discharge home on IV antibiotics today.   Patient will be on IV vancomycin daily with treatment to be completed through 8/22/2023  · Outpatient follow-up with infectious disease

## 2023-08-01 NOTE — ASSESSMENT & PLAN NOTE
Lab Results   Component Value Date    EGFR 61 08/01/2023    EGFR 65 07/31/2023    EGFR 61 07/29/2023    CREATININE 1.04 08/01/2023    CREATININE 0.99 07/31/2023    CREATININE 1.04 07/29/2023     · Present on admission  · Has resolved with IV fluid-this was prerenal  · Creatinine is at baseline  · Continue to avoid nephrotoxins

## 2023-08-01 NOTE — PLAN OF CARE
Problem: METABOLIC, FLUID AND ELECTROLYTES - ADULT  Goal: Electrolytes maintained within normal limits  Description: INTERVENTIONS:  - Monitor labs and assess patient for signs and symptoms of electrolyte imbalances  - Administer electrolyte replacement as ordered  - Monitor response to electrolyte replacements, including repeat lab results as appropriate  - Instruct patient on fluid and nutrition as appropriate  Outcome: Adequate for Discharge  Goal: Fluid balance maintained  Description: INTERVENTIONS:  - Monitor labs   - Monitor I/O and WT  - Instruct patient on fluid and nutrition as appropriate  - Assess for signs & symptoms of volume excess or deficit  Outcome: Adequate for Discharge  Goal: Glucose maintained within target range  Description: INTERVENTIONS:  - Monitor Blood Glucose as ordered  - Assess for signs and symptoms of hyperglycemia and hypoglycemia  - Administer ordered medications to maintain glucose within target range  - Assess nutritional intake and initiate nutrition service referral as needed  Outcome: Adequate for Discharge     Problem: SKIN/TISSUE INTEGRITY - ADULT  Goal: Skin Integrity remains intact(Skin Breakdown Prevention)  Description: Assess:  -Perform Chun assessment every shft    -Assess extremities for adequate circulation and sensation     Outcome: Adequate for Discharge  Goal: Incision(s), wounds(s) or drain site(s) healing without S/S of infection  Description: INTERVENTIONS  - Assess and document dressing, incision, wound bed, drain sites and surrounding tissue  - Provide patient and family education  - Perform skin care/dressing changes every shift  Outcome: Adequate for Discharge  Goal: Pressure injury heals and does not worsen  Description: Interventions:  - Implement low air loss mattress or specialty surface (Criteria met)  - Apply silicone foam dressing    - Consider nutrition services referral as needed  Outcome: Adequate for Discharge     Problem: PAIN - ADULT  Goal: Verbalizes/displays adequate comfort level or baseline comfort level  Description: Interventions:  - Encourage patient to monitor pain and request assistance  - Assess pain using appropriate pain scale  - Administer analgesics based on type and severity of pain and evaluate response  - Implement non-pharmacological measures as appropriate and evaluate response  - Consider cultural and social influences on pain and pain management  - Notify physician/advanced practitioner if interventions unsuccessful or patient reports new pain  Outcome: Adequate for Discharge     Problem: INFECTION - ADULT  Goal: Absence or prevention of progression during hospitalization  Description: INTERVENTIONS:  - Assess and monitor for signs and symptoms of infection  - Monitor lab/diagnostic results  - Monitor all insertion sites, i.e. indwelling lines, tubes, and drains  - Monitor endotracheal if appropriate and nasal secretions for changes in amount and color  - [place] appropriate cooling/warming therapies per order  - Administer medications as ordered  - Instruct and encourage patient and family to use good hand hygiene technique  - Identify and instruct in appropriate isolation precautions for identified infection/condition  Outcome: Adequate for Discharge  Goal: Absence of fever/infection during neutropenic period  Description: INTERVENTIONS:  - Monitor WBC    Outcome: Adequate for Discharge     Problem: SAFETY ADULT  Goal: Patient will remain free of falls  Description: INTERVENTIONS:  - Educate patient/family on patient safety including physical limitations  - Instruct patient to call for assistance with activity   - Consult OT/PT to assist with strengthening/mobility   - Keep Call bell within reach  - Keep bed low and locked with side rails adjusted as appropriate  - Keep care items and personal belongings within reach  - Initiate and maintain comfort rounds  - Make Fall Risk Sign visible to staff  - Apply yellow socks and bracelet for high fall risk patients  - Consider moving patient to room near nurses station  Outcome: Adequate for Discharge  Goal: Maintain or return to baseline ADL function  Description: INTERVENTIONS:  -  Assess patient's ability to carry out ADLs; assess patient's baseline for ADL function and identify physical deficits which impact ability to perform ADLs (bathing, care of mouth/teeth, toileting, grooming, dressing, etc.)  - Assess/evaluate cause of self-care deficits   - Assess range of motion  - Assess patient's mobility; develop plan if impaired  - Assess patient's need for assistive devices and provide as appropriate  - Encourage maximum independence but intervene and supervise when necessary  - Involve family in performance of ADLs  - Assess for home care needs following discharge   - Consider OT consult to assist with ADL evaluation and planning for discharge  - Provide patient education as appropriate  Outcome: Adequate for Discharge  Goal: Maintains/Returns to pre admission functional level  Description: INTERVENTIONS:  - Perform BMAT or MOVE assessment daily.   - Set and communicate daily mobility goal to care team and patient/family/caregiver.    - Collaborate with rehabilitation services on mobility goals if consulted  - Out of bed for toileting  - Record patient progress and toleration of activity level   Outcome: Adequate for Discharge     Problem: DISCHARGE PLANNING  Goal: Discharge to home or other facility with appropriate resources  Description: INTERVENTIONS:  - Identify barriers to discharge w/patient and caregiver  - Arrange for needed discharge resources and transportation as appropriate  - Identify discharge learning needs (meds, wound care, etc.)  - Arrange for interpretive services to assist at discharge as needed  - Refer to Case Management Department for coordinating discharge planning if the patient needs post-hospital services based on physician/advanced practitioner order or complex needs related to functional status, cognitive ability, or social support system  Outcome: Adequate for Discharge     Problem: Knowledge Deficit  Goal: Patient/family/caregiver demonstrates understanding of disease process, treatment plan, medications, and discharge instructions  Description: Complete learning assessment and assess knowledge base.   Interventions:  - Provide teaching at level of understanding  - Provide teaching via preferred learning methods  Outcome: Adequate for Discharge     Problem: Prexisting or High Potential for Compromised Skin Integrity  Goal: Skin integrity is maintained or improved  Description: INTERVENTIONS:  - Identify patients at risk for skin breakdown  - Assess and monitor skin integrity  - Assess and monitor nutrition and hydration status  - Monitor labs   - Assess for incontinence   - Turn and reposition patient  - Assist with mobility/ambulation  - Relieve pressure over bony prominences  - Avoid friction and shearing  - Provide appropriate hygiene as needed including keeping skin clean and dry  - Evaluate need for skin moisturizer/barrier cream  - Collaborate with interdisciplinary team   - Patient/family teaching  - Consider wound care consult   Outcome: Adequate for Discharge     Problem: MOBILITY - ADULT  Goal: Maintain or return to baseline ADL function  Description: INTERVENTIONS:  -  Assess patient's ability to carry out ADLs; assess patient's baseline for ADL function and identify physical deficits which impact ability to perform ADLs (bathing, care of mouth/teeth, toileting, grooming, dressing, etc.)  - Assess/evaluate cause of self-care deficits   - Assess range of motion  - Assess patient's mobility; develop plan if impaired  - Assess patient's need for assistive devices and provide as appropriate  - Encourage maximum independence but intervene and supervise when necessary  - Involve family in performance of ADLs  - Assess for home care needs following discharge   - Consider OT consult to assist with ADL evaluation and planning for discharge  - Provide patient education as appropriate  Outcome: Adequate for Discharge  Goal: Maintains/Returns to pre admission functional level  Description: INTERVENTIONS:  - Perform BMAT or MOVE assessment daily.   - Set and communicate daily mobility goal to care team and patient/family/caregiver.    - Collaborate with rehabilitation services on mobility goals if consulted  - Out of bed for toileting  - Record patient progress and toleration of activity level   Outcome: Adequate for Discharge

## 2023-08-01 NOTE — PROGRESS NOTES
Progress Note - General Surgery   Rossana Eugene 46 y.o. female MRN: [id number]  Unit/Bed#: -01 Encounter: [id number]    Assessment:  46year old female with PMH significant for uncontrolled diabetes, HTN, HLD, CKD3, R renal cell carcinoma s/p nephrectomy 2017, obesity presents for the evaluation of L antecubital fossa cellulitis   · Afebrile, vitals stable   · WBC 11.11 (10.96)  · Hgb 10.3  · Cr 1.04  · Wound culture positive for MRSA  · Repeat blood cultures negative   · Dressings taken down, wound cleansed, mupirocin ointment applied, dressed with 4x4s and beatrice    Plan:  · Stable for discharge from a surgical perspective with outpatient follow up next week  · Continue daily wound care with mupirocin ointment. Dress with 4x4s and wrap with Beatrice   · PICC line placed 7/31 for longterm IV antibiotics  · ID following, appreciate recommendations   · Analgesia and antiemetics prn   · Rest of medical management per SLIM  · Will discuss with Dr. Charles Kebede    Subjective/Objective   Subjective: patient reports feeling better. No overnight complaints    Objective:   Blood pressure 115/62, pulse 74, temperature 97.9 °F (36.6 °C), resp. rate 17, height 5' 1" (1.549 m), weight 91.1 kg (200 lb 13.4 oz), SpO2 92 %, not currently breastfeeding. ,Body mass index is 37.95 kg/m². Intake/Output Summary (Last 24 hours) at 8/1/2023 1017  Last data filed at 8/1/2023 0843  Gross per 24 hour   Intake 600 ml   Output --   Net 600 ml       Invasive Devices     Peripherally Inserted Central Catheter Line  Duration           PICC Line 42/72/92 Right Basilic <1 day                Physical Exam  Vitals reviewed. Constitutional:       General: She is not in acute distress. Appearance: She is obese. HENT:      Head: Normocephalic and atraumatic. Cardiovascular:      Rate and Rhythm: Normal rate and regular rhythm. Heart sounds: Normal heart sounds.    Pulmonary:      Effort: Pulmonary effort is normal. No respiratory distress. Abdominal:      General: There is no distension. Palpations: Abdomen is soft. Tenderness: There is no abdominal tenderness. There is no guarding. Musculoskeletal:      Right lower leg: No edema. Left lower leg: No edema. Skin:     General: Skin is warm and dry. Comments: L antecubital fossa swelling and erythema improving. Minimal skin sloughing around opening. Pus at the entrance of wound. Cellulitis improving. ROM less painful    Neurological:      General: No focal deficit present. Mental Status: She is alert. Mental status is at baseline. Psychiatric:         Mood and Affect: Mood normal.         Behavior: Behavior normal.         Lab, Imaging and other studies:  I have personally reviewed pertinent lab results.   , CBC:   Lab Results   Component Value Date    WBC 11.11 (H) 08/01/2023    HGB 10.3 (L) 08/01/2023    HCT 33.2 (L) 08/01/2023    MCV 85 08/01/2023     (H) 08/01/2023    RBC 3.93 08/01/2023    MCH 26.2 (L) 08/01/2023    MCHC 31.0 (L) 08/01/2023    RDW 15.9 (H) 08/01/2023    MPV 10.1 08/01/2023   , CMP:   Lab Results   Component Value Date    SODIUM 137 08/01/2023    K 4.2 08/01/2023     08/01/2023    CO2 29 08/01/2023    BUN 30 (H) 08/01/2023    CREATININE 1.04 08/01/2023    CALCIUM 9.0 08/01/2023    EGFR 61 08/01/2023     VTE Pharmacologic Prophylaxis: Heparin  VTE Mechanical Prophylaxis: sequential compression device    Luis Raines PA-C

## 2023-08-01 NOTE — ASSESSMENT & PLAN NOTE
Lab Results   Component Value Date    HGBA1C 12.7 (H) 07/25/2023       Recent Labs     07/31/23  1121 07/31/23  1604 07/31/23  2103 08/01/23  0711   POCGLU 191* 186* 329* 246*       Blood Sugar Average: Last 72 hrs:  (P) [id number]     · Status post the use of an insulin drip at time of arrival  · Target blood sugar for the hospital is 140-180  · Continue home diabetic regimen

## 2023-08-02 ENCOUNTER — HOME CARE VISIT (OUTPATIENT)
Dept: HOME HEALTH SERVICES | Facility: HOME HEALTHCARE | Age: 53
End: 2023-08-02
Payer: COMMERCIAL

## 2023-08-02 VITALS
BODY MASS INDEX: 37.38 KG/M2 | DIASTOLIC BLOOD PRESSURE: 68 MMHG | WEIGHT: 198 LBS | TEMPERATURE: 97.4 F | HEART RATE: 68 BPM | SYSTOLIC BLOOD PRESSURE: 110 MMHG | RESPIRATION RATE: 18 BRPM | OXYGEN SATURATION: 96 % | HEIGHT: 61 IN

## 2023-08-02 DIAGNOSIS — L03.114 CELLULITIS OF LEFT UPPER EXTREMITY: Primary | ICD-10-CM

## 2023-08-02 PROCEDURE — 400013 VN SOC

## 2023-08-02 PROCEDURE — G0299 HHS/HOSPICE OF RN EA 15 MIN: HCPCS

## 2023-08-02 NOTE — PROGRESS NOTES
OPAT NOTE    Supervising/Discharge physician: Marcos Rosas    Diagnosis:Cellulitis of left upper extremity     Drug:Vancomycin     Dose/Frequency:1250 mg Q24     Labs/Frequency:CBCD, Cre, Vanco trough     End Date:8/22    Vanco Trough Range:10.7-16    Infusion/VNA contact:Tianna     Next appointment:8/8       MA assigned: Rafael Blackburn

## 2023-08-02 NOTE — UTILIZATION REVIEW
NOTIFICATION OF ADMISSION DISCHARGE   This is a Notification of Discharge from [facility]. Please be advised that this patient has been discharge from our facility. Below you will find the admission and discharge date and time including the patient’s disposition. UTILIZATION REVIEW CONTACT:  Monserrat Rooney  Utilization   Network Utilization Review Department  Phone: [phone] carefully listen to the prompts. All voicemails are confidential.  Email: [email or web address]     ADMISSION INFORMATION  PRESENTATION DATE: 7/24/2023  5:44 PM  OBERVATION ADMISSION DATE:   INPATIENT ADMISSION DATE: 7/24/23 11:35 PM   DISCHARGE DATE: 8/1/2023  2:41 PM   DISPOSITION:Home with [address] INFORMATION:  Send all requests for admission clinical reviews, approved or denied determinations and any other requests to dedicated fax number below belonging to the campus where the patient is receiving treatment.  List of dedicated fax numbers:  [place] DENIALS (Administrative/Medical Necessity) [phone] [address] [facility] (Maternity/NICU/Pediatrics) [phone]   [facility] [phone]   [facility] [phone] [address] [facility] [phone]   [address] [place] [address] [phone]   [facility] [phone]   [address] [place] [address] [address] [facility] [phone]   [address] [place] [address] [phone] [address] [facility] [phone] [address] [facility] [address] [facility] [phone]

## 2023-08-03 ENCOUNTER — HOME CARE VISIT (OUTPATIENT)
Dept: HOME HEALTH SERVICES | Facility: HOME HEALTHCARE | Age: 53
End: 2023-08-03
Payer: COMMERCIAL

## 2023-08-03 ENCOUNTER — TRANSITIONAL CARE MANAGEMENT (OUTPATIENT)
Dept: FAMILY MEDICINE CLINIC | Facility: CLINIC | Age: 53
End: 2023-08-03

## 2023-08-03 VITALS
OXYGEN SATURATION: 97 % | RESPIRATION RATE: 18 BRPM | DIASTOLIC BLOOD PRESSURE: 64 MMHG | HEART RATE: 67 BPM | TEMPERATURE: 97.6 F | SYSTOLIC BLOOD PRESSURE: 116 MMHG

## 2023-08-03 DIAGNOSIS — Z79.4 TYPE 2 DIABETES MELLITUS WITH DIABETIC AUTONOMIC NEUROPATHY, WITH LONG-TERM CURRENT USE OF INSULIN (HCC): ICD-10-CM

## 2023-08-03 DIAGNOSIS — E11.43 TYPE 2 DIABETES MELLITUS WITH DIABETIC AUTONOMIC NEUROPATHY, WITH LONG-TERM CURRENT USE OF INSULIN (HCC): ICD-10-CM

## 2023-08-03 PROCEDURE — G0299 HHS/HOSPICE OF RN EA 15 MIN: HCPCS

## 2023-08-03 NOTE — PROGRESS NOTES
Contacted peter and spoke to day to confirm fax of orders and abx plan were received.      [facility] confirms they have received them and have no further questions

## 2023-08-03 NOTE — PROGRESS NOTES
Contacted pt to follow up after being  discharged and to go over abx plan. Pt states she is doing well and picc liine looks good she seem to be tolerating abx well and she confirms nursing comes out every monday for dressing change  and blood work.    also confirmed upcoming appt on 8/8

## 2023-08-03 NOTE — PROGRESS NOTES
[facility] confirm they have received and reviewed fax of orders and abx plan.      Ewa Weston confirms orders and has no further questions

## 2023-08-04 RX ORDER — INSULIN GLARGINE 100 [IU]/ML
INJECTION, SOLUTION SUBCUTANEOUS
Qty: 60 ML | Refills: 0 | Status: SHIPPED | OUTPATIENT
Start: 2023-08-04

## 2023-08-07 ENCOUNTER — APPOINTMENT (OUTPATIENT)
Dept: LAB | Facility: CLINIC | Age: 53
End: 2023-08-07

## 2023-08-07 ENCOUNTER — HOME CARE VISIT (OUTPATIENT)
Dept: HOME HEALTH SERVICES | Facility: HOME HEALTHCARE | Age: 53
End: 2023-08-07
Payer: COMMERCIAL

## 2023-08-07 VITALS
DIASTOLIC BLOOD PRESSURE: 68 MMHG | SYSTOLIC BLOOD PRESSURE: 110 MMHG | RESPIRATION RATE: 18 BRPM | HEART RATE: 78 BPM | OXYGEN SATURATION: 96 % | TEMPERATURE: 97.5 F

## 2023-08-07 LAB
BASOPHILS # BLD AUTO: 0.06 THOUSANDS/ÂΜL (ref 0–0.1)
BASOPHILS NFR BLD AUTO: 1 % (ref 0–1)
CREAT SERPL-MCNC: 1.49 MG/DL (ref 0.6–1.3)
EOSINOPHIL # BLD AUTO: 0.14 THOUSAND/ÂΜL (ref 0–0.61)
EOSINOPHIL NFR BLD AUTO: 2 % (ref 0–6)
ERYTHROCYTE [DISTWIDTH] IN BLOOD BY AUTOMATED COUNT: 15.4 % (ref 11.6–15.1)
GFR SERPL CREATININE-BSD FRML MDRD: 40 ML/MIN/1.73SQ M
HCT VFR BLD AUTO: 34.2 % (ref 34.8–46.1)
HGB BLD-MCNC: 11 G/DL (ref 11.5–15.4)
IMM GRANULOCYTES # BLD AUTO: 0.02 THOUSAND/UL (ref 0–0.2)
IMM GRANULOCYTES NFR BLD AUTO: 0 % (ref 0–2)
LYMPHOCYTES # BLD AUTO: 1.86 THOUSANDS/ÂΜL (ref 0.6–4.47)
LYMPHOCYTES NFR BLD AUTO: 29 % (ref 14–44)
MCH RBC QN AUTO: 26.7 PG (ref 26.8–34.3)
MCHC RBC AUTO-ENTMCNC: 32.2 G/DL (ref 31.4–37.4)
MCV RBC AUTO: 83 FL (ref 82–98)
MONOCYTES # BLD AUTO: 0.55 THOUSAND/ÂΜL (ref 0.17–1.22)
MONOCYTES NFR BLD AUTO: 9 % (ref 4–12)
NEUTROPHILS # BLD AUTO: 3.7 THOUSANDS/ÂΜL (ref 1.85–7.62)
NEUTS SEG NFR BLD AUTO: 59 % (ref 43–75)
NRBC BLD AUTO-RTO: 0 /100 WBCS
PLATELET # BLD AUTO: 691 THOUSANDS/UL (ref 149–390)
PMV BLD AUTO: 10.4 FL (ref 8.9–12.7)
RBC # BLD AUTO: 4.12 MILLION/UL (ref 3.81–5.12)
VANCOMYCIN TROUGH SERPL-MCNC: 11.9 UG/ML (ref 10–20)
WBC # BLD AUTO: 6.33 THOUSAND/UL (ref 4.31–10.16)

## 2023-08-07 PROCEDURE — G0299 HHS/HOSPICE OF RN EA 15 MIN: HCPCS

## 2023-08-07 PROCEDURE — 80202 ASSAY OF VANCOMYCIN: CPT

## 2023-08-07 PROCEDURE — 36415 COLL VENOUS BLD VENIPUNCTURE: CPT

## 2023-08-07 PROCEDURE — 85025 COMPLETE CBC W/AUTO DIFF WBC: CPT

## 2023-08-07 PROCEDURE — 82565 ASSAY OF CREATININE: CPT

## 2023-08-08 ENCOUNTER — TELEMEDICINE (OUTPATIENT)
Dept: INFECTIOUS DISEASES | Facility: CLINIC | Age: 53
End: 2023-08-08
Payer: COMMERCIAL

## 2023-08-08 ENCOUNTER — TELEPHONE (OUTPATIENT)
Dept: INFECTIOUS DISEASES | Facility: CLINIC | Age: 53
End: 2023-08-08

## 2023-08-08 DIAGNOSIS — B95.62 MRSA BACTEREMIA: Primary | ICD-10-CM

## 2023-08-08 DIAGNOSIS — N17.9 AKI (ACUTE KIDNEY INJURY) (HCC): ICD-10-CM

## 2023-08-08 DIAGNOSIS — L03.114 CELLULITIS OF LEFT UPPER EXTREMITY: Primary | ICD-10-CM

## 2023-08-08 DIAGNOSIS — R78.81 MRSA BACTEREMIA: Primary | ICD-10-CM

## 2023-08-08 DIAGNOSIS — L03.114 CELLULITIS OF LEFT UPPER EXTREMITY: ICD-10-CM

## 2023-08-08 PROCEDURE — 99212 OFFICE O/P EST SF 10 MIN: CPT | Performed by: PHYSICIAN ASSISTANT

## 2023-08-08 PROCEDURE — G0180 MD CERTIFICATION HHA PATIENT: HCPCS | Performed by: STUDENT IN AN ORGANIZED HEALTH CARE EDUCATION/TRAINING PROGRAM

## 2023-08-08 RX ORDER — LINEZOLID 600 MG/1
600 TABLET, FILM COATED ORAL EVERY 12 HOURS SCHEDULED
Qty: 10 TABLET | Refills: 0 | Status: SHIPPED | OUTPATIENT
Start: 2023-08-08 | End: 2023-08-13

## 2023-08-08 NOTE — TELEPHONE ENCOUNTER
10/29/2020         RE: Aleena Ontiveros  [address]  [facility] [address]        Dear Colleague,    Thank you for referring your patient, Aleena Ontiveros, to the [facility] [place]. Please see a copy of my visit note below.    Spine Surgical Hx:  1974 - PISF T4-L4 with Carpio gavino instrumentation x 2 (Dr. Bandar Rose, [place]) for AIS.  10/01/2019 - 3-column osteotomy L5-S1 with sacral dome osteotomy; TLIF with Cunningham laminectomy L5-S1; bilateral PISF L2-pelvis using bilateral double S2AI screws; use of Infuse BMP Large kit (Christina/Rashid/Mara) for Gr 4 isthmic spondylolisthesis L5-S1 with severe sagittal malalignment.  [Implants:  Medtronic Solera and Ballast screw systems, with dual headed screws, reductions screws; 5.5 mm CoCr rods x 3].      Reason for Visit:  Chief Complaint   Patient presents with     Surgical Followup     Transforaminal Lumbar Interbody Fusion Three Column Osteotomy L5-S1, Posterior Spinal Fusion L2-Pelvis. Use of BMP bone graft       S>  62/f, 1 yr postop.  Lats seen at 6 mos, doing well.  Back much better than preop, but still with numbness and tingling in some areas.  Taking gabapentin 1.3 gm/day.  P> Continue PT.  RTC at 1 yr postop.    Accompanied by .  Per patient, her back is better than preop.  Still having pain starts mainly in the R buttock (feels like somebody kicked her in the R buttock), around R groin and thigh, down lateral calf (L5), down to foot.  Right generally worse than left.  However, Left foot also has numbness, and toes seem to point in the wrong direction.    Also complains of R foot plantarflexion weakness (S1).  Of note, after surgery last year, she saw Dr. Joselo Clifton of Neurology (12/10/2019).  She had an EMG done at that time, which showed R S1 radiculopathy.      Gabapentin 400 mg PO 5x/day (2 gm/day).  Ibuprofen 600 mg PO bid.  Tramadol 50 mg PO bid.  Tylenol prn.    Oswestry (EPI)  LM for patient to see if her appointment today in [facility] could be a virtual visit. Questionnaire    OSWESTRY DISABILITY INDEX 10/29/2020   Count 9   Sum 22   Oswestry Score (%) 48.89   Some recent data might be hidden      Preop EPI       70%  6 wks               72%  3 mos              35.56%  6 mos              33.33%  1 yr   48.89%        O>   Alert, oriented x 3, cooperative.  Not in CP distress.  There were no vitals taken for this visit.  Surgical incision well-healed, no sign of infection.  Ambulates independently.   Grossly neurologically intact.    Imaging:   EOS full spine AP lateral standing x-rays taken today show stable alignment and fixation compared to previous postoperative radiographs.  No evidence of fixation loosening or failure.    Lumbar CT obtained today shows solid interbody fusion across L5-S1, with residual spondylolisthesis, stable compared to previous images.  There is significant vacuum signal within the L4-5 disc, highly suggestive of ongoing motion and pseudoarthrosis.  There is no evidence of fixation loosening or failure.  Looking at the right side for possible nerve compression that may explain patient's residual symptoms, although there is residual foraminal stenosis at L5-S1, and the presence of solid arthrodesis, it is unlikely that this is still causing compression of the L5 nerve root.  On the other hand, because of the residual spondylolisthesis, it is possible that the right S1 nerve root is still irritated as it drapes over the posterior superior corner of the S1 vertebral body.    A>   1.  Pseudarthrosis L4-5, without evidence of fixation loosening/failure.  2.  Residual Right S1 radiculopathy.  3.  Residual R>L spinal stenosis L5-S1.  4.  1 yr s/p distal extension of T4-L4 fusion down to pelvis (10/1/2019).    P>    Had a good long discussion with patient and her .  She is now 1 year out from surgery.  Although she is improved compared to preoperative, she is still having significant right buttock and leg symptoms.  At first, I thought that this  was mainly in an L5 pattern, as she describes symptoms on the lateral aspect of her calf.  However, she does have a weakness of right foot plantarflexion, suggestive of S1 radiculopathy.  Studying her CT scan, it is possible that the right S1 nerve root is still irritated or stretched as it drapes over the posterior superior corner of the S1 vertebral body, owing to the significant residual lumbosacral spondylolisthesis.    I recommend a right S1 transforaminal ANA, mainly for diagnostic purposes.  If she has a positive response, the question is whether it would be worthwhile to go back in there to explore and decompress the nerve root.  This is not going to be an easier simple task, given the previous surgeries, significant scarring, presence of instrumentation and significant deformity.  Furthermore, there is no guarantee that even if we are able to successfully decompress the nerve, that her symptoms would improve.  On the other hand, she is really unhappy with her ongoing symptoms and would like to explore possible treatment options.    - Right S1 TFESI.  Advised to give us a call 1-2 days later to report pain response.    Virtual RTC after above, to review and discuss further options, including poss revision decompression R L5-S1, to decompress R S1 nerve root.  If so, may also consider revision fusion L4-5, either via OLIF or TLIF approach.    TT > 25 mins, > 50% CC.    Evangelist Vasquez MD    Orthopaedic Spine Surgery  Dept Orthopaedic Surgery, [facility]  [phone] office, [phone] pager  [email or web address]

## 2023-08-08 NOTE — PROGRESS NOTES
Virtual Outpatient Progress Note - [facility] Infectious Disease   Arnoldo Henry 46 y.o. female MRN: [id number]  Encounter: [id number]    Virtual Regular Visit  Verification of patient location: [place]  Patient is located in the following state in which I hold an active license [place]    Problem List Items Addressed This Visit        Other    Cellulitis of left upper extremity    MRSA bacteremia - Primary    Relevant Medications    linezolid (ZYVOX) 600 mg tablet    Other Relevant Orders    Discontinue PICC line    Creatinine, serum   Other Visit Diagnoses     LINDY (acute kidney injury) ([address] [facility])        Relevant Orders    Creatinine, serum        Encounter provider Savnanah Vela PA-C    Provider located at [address] [place] [address],[address]  [address] [facility] [address] [phone]    The patient was identified by name and date of birth. Arnoldo Henry was informed that this is a telemedicine visit and that the visit is being conducted through the [employer]. She agrees to proceed. My office door was closed. No one else was in the room. She acknowledged consent and understanding of privacy and security of the video platform. The patient has agreed to participate and understands they can discontinue the visit at any time. Patient is aware this is a billable service. Assessment/Plan:   1.  MRSA bacteremia. 1/2 admission blood culture sets is positive. Due to #2. TTE no vegetations. No indwelling intravascular devices present. Repeat blood cultures are negative. Status post IR PICC placement 7/31 in RUE. Plan to pursue 4 week course of IV abx with vancomycin, however patient reports plan to go on cruise prior to completion of abx. We discussed that PICC will need to be removed prior to her departure and she will be transitioned to PO abx, Linezolid, to complete course.  Risk of relapse discussed with pt and she expressed understanding. Recent labs reviewed showing bump in Cr, and vanco trough 11.9 which is in range.  -Continue IV vancomycin 1250mg q24h with goal trough 10.7-16    -initially planned a 4 week course of IV antibiotics from blood culture clearance through 8/22/23 however due to upcoming cruise, we will stop IV abx on 8/17 and pull PICC with plan to transition to PO Linezolid to complete course    -PO linezolid Rx sent to pharmacy   -no formal ID follow up required, call PRN  -weekly CBCd, Cr, and Vanco trough      2.  Left antecubital fossa abscess.  At prior pIV site.  CT noncon with possible myositis. U/S with phlegmon present but clinically concern for developing superficial abscess in the left antecubital fossa. Dopplers without DVT. Complicated by MRSA bacteremia. Status post bedside I&D by surgery with drainage of purulent fluid. Wound culture growing MRSA as well. She continues LWC and wound improving.               -Continue antibiotics as above                -Continue local wound care              -Recommend arm elevation, warm compresses    3.  Poorly controlled type 2 diabetes mellitus with hyperglycemia. HbA1c 12.6%.  Blood sugar was over 800 on admission and she was started on an insulin drip.  This is a risk factor for infection.      4.  LINDY on CKD.  Creatinine 1.49 on labs from 8/7 with baseline around 1. Her vanco trough is within range. We will continue current vano dose and check repeat Cr on Friday.               -Recheck creatinine Fri              -Increase oral hydration and avoid nephrotoxins      5. PICC Line. Because patient is going on a cruise prior to completing IV abx course, we will ask that this is removed 8/17 by VNA and she will start PO linezolid as above. Orders placed in Epic. Above assessment and plan discussed in detail with patient during examination.      Antibiotics:  IV vancomycin     Subjective:  Khris Lanza is a 46 y.o. female patient who presents to outpatient ID office virtually for management of MRSA bacteremia due to left AC abscess from pIV. Patient is tolerating the IV vancomycin. No F/C. No N/V/D. She states PICC is functioning well. No missed abx doses. She reports doing dressing changes of LUE. Patient states she is leaving for her cruise on 8/18. We discussed risk of stopping IV abx earlier including risk of relapsed infection. We will instead start her on PO Linezolid. She expressed understanding. We also discussed keeping left antecub wound c/d/i while on cruise and not submerge it in water. She will take extra wound care supplies with her on the cruise.      Past Medical History:   Diagnosis Date   • Asthma    • Atypical chest pain    • Depression    • Diabetes mellitus ([address])    • Gastroparesis    • GERD (gastroesophageal reflux disease)    • Hyperlipidemia    • Hypertension    • Psychiatric disorder    • Renal disorder    • Sciatica    • Seizure disorder [facility])        Past Surgical History:   Procedure Laterality Date   • APPENDECTOMY     • BREAST BIOPSY Left  benign   • CHOLECYSTECTOMY     • COLONOSCOPY     • HYSTERECTOMY     • IR PICC PLACEMENT SINGLE LUMEN  7/31/2023   • ME LAPAROSCOPIC APPENDECTOMY N/A 03/24/2021    Procedure: APPENDECTOMY LAPAROSCOPIC;  Surgeon: Rusty Alatorre MD;  Location: [facility] MAIN OR;  Service: General   • ME LAPS ABD PRTM&OMENTUM DX W/WO SPEC BR/WA SPX N/A 03/24/2021    Procedure: LAPAROSCOPY DIAGNOSTIC, EXTENSIVE DESI;  Surgeon: Rusty Alatorre MD;  Location: [facility] MAIN OR;  Service: General   • UPPER GASTROINTESTINAL ENDOSCOPY         Current Outpatient Medications   Medication Sig Dispense Refill   • albuterol (PROVENTIL HFA,VENTOLIN HFA) 90 mcg/act inhaler INHALE ONE PUFF BY MOUTH AND INTO THE LUNGS EVERY 6 HOURS AS NEEDED FOR WHEEZING 36 g 3   • Alcohol Swabs (Pharmacist Choice Alcohol) PADS USE AS DIRECTED 100 each 1   • amitriptyline (ELAVIL) 25 mg tablet Take 1 tablet (25 mg total) by mouth daily at bedtime 30 tablet 0   • Aspirin (ASPIR-81 PO) Take 81 mg by mouth     • atorvastatin (LIPITOR) 80 mg tablet TAKE 1 TABLET (80 MG TOTAL) BY MOUTH DAILY AT BEDTIME 30 tablet 6   • B-D UF III MINI PEN NEEDLES 31G X 5 MM MISC Pt uses 5 pen needles daily 500 each 0   • cholecalciferol (VITAMIN D3) 400 units tablet TAKE ONE TABLET BY MOUTH DAILY 90 tablet 1   • citalopram (CeleXA) 40 mg tablet Take 1 tablet (40 mg total) by mouth daily 90 tablet 3   • Continuous Blood Gluc  (FreeStyle Refugio 14 Day Benicia) [facility] Use for continuous blood glucose monitoring 1 each 0   • Continuous Blood Gluc Sensor (FreeStyle Refugio 14 Day Sensor) MISC Use one sensor every 14 days for continuous blood sugar monitoring.  6 each 3   • docusate sodium (COLACE) 100 mg capsule TAKE ONE CAPSULE BY MOUTH TWICE DAILY 60 capsule 5   • dulaglutide (Trulicity) 3 GT/1.3MB injection Inject 0.5 mL (3 mg total) under the skin once a week 2 mL 3   • Empagliflozin (Jardiance) 25 MG TABS Take 1 tablet (25 mg total) by mouth every morning 90 tablet 1   • ergocalciferol (VITAMIN D2) 50,000 units Take 1 capsule (50,000 Units total) by mouth once a week 8 capsule 1   • famotidine (PEPCID) 40 MG tablet Take 1 tablet (40 mg total) by mouth daily at bedtime Take in evening 2 hours prior to bed 30 tablet 6   • fenofibrate (TRICOR) 48 mg tablet TAKE ONE TABLET BY MOUTH DAILY 90 tablet 3   • Insulin Glargine Solostar (Lantus SoloStar) 100 UNIT/ML SOPN INJECT 45 UNITS EVERY TWELVE HOURS 60 mL 0   • insulin glulisine (Apidra SoloStar) 100 units/mL injection pen Inject 12 units with breakfast and lunch, 14 units with dinner, with scale ISF 25 @ 150 as needed 30 mL 1   • Iron Heme Polypeptide 12 MG TABS Take 1 tablet by mouth     • levETIRAcetam (KEPPRA) 500 mg tablet TAKE ONE TABLET TWICE DAILY 180 tablet 1   • linaCLOtide (Linzess) 72 MCG CAPS Take 1 capsule by mouth daily before breakfast 30 capsule 0   • magnesium Oxide (MAG-OX) 400 mg TABS Take 1 tablet (400 mg total) by mouth daily 30 tablet 0   • meclizine (ANTIVERT) 25 mg tablet Take 1 tablet (25 mg total) by mouth 3 (three) times a day as needed for dizziness 30 tablet 0   • metoprolol tartrate (LOPRESSOR) 25 mg tablet Take 1 tablet (25 mg total) by mouth 2 (two) times a day 180 tablet 1   • multivitamin (THERAGRAN) TABS Take 1 tablet by mouth every morning     • nystatin (MYCOSTATIN) cream Apply topically 2 (two) times a day 30 g 0   • omeprazole (PriLOSEC) 40 MG capsule Take 1 capsule (40 mg total) by mouth daily 90 capsule 3   • ondansetron (ZOFRAN) 4 mg tablet Take 1 tablet (4 mg total) by mouth every 8 (eight) hours as needed for nausea or vomiting 20 tablet 0   • OneTouch Ultra test strip USE 4 TIMES A  each 0   • oxyCODONE (ROXICODONE) 5 immediate release tablet Take 1 tablet (5 mg total) by mouth every 6 (six) hours as needed for severe pain or moderate pain for up to 10 days Max Daily Amount: 20 mg 12 tablet 0   • Pharmacist Choice Lancets MISC USE AS DIRECTED 100 each 1   • pregabalin (LYRICA) 100 mg capsule TAKE ONE CAPSULE BY MOUTH THREE TIMES A  capsule 0   • Restasis 0.05 % ophthalmic emulsion      • scopolamine (TRANSDERM-SCOP) 1 mg/3 days TD 72 hr patch Place 1 patch on the skin over 72 hours every third day 24 patch 1   • vancomycin 1,000 mg in sodium chloride 0.9 % 250 mL IVPB Inject 1,000 mg into a catheter in a vein over 90 minutes at 166.7 mL/hr every 24 hours for 21 days  0     No current facility-administered medications for this visit. Allergies   Allergen Reactions   • Butorphanol Hallucinations   • Benztropine    • Penicillins    • Zolpidem    • Azithromycin Hives and GI Intolerance       Video Exam  Exam limited due to virtual visit     There were no vitals filed for this visit. Physical Exam  Constitutional:       Appearance: Normal appearance. HENT:      Head: Normocephalic and atraumatic.       Mouth/Throat:      Mouth: Mucous membranes are moist.   Eyes:      Extraocular Movements: Extraocular movements intact. Musculoskeletal:         General: Normal range of motion. Cervical back: Normal range of motion and neck supple. Skin:     General: Skin is warm. Comments: Limited exam due to virtual nature of visit, however ENIO PICC appeared grossly normal, HAMLET AC wound reviewed better in clinical media tab    Neurological:      General: No focal deficit present. Mental Status: She is alert. Labs, Imaging, & Other studies:   All pertinent labs and imaging studies were personally reviewed by me from 8/7.     Results from last 7 days   Lab Units 08/07/23  1145   WBC Thousand/uL 6.33   HEMOGLOBIN g/dL 11.0*   PLATELETS Thousands/uL 691*     Results from last 7 days   Lab Units 08/07/23  1145   CREATININE mg/dL 1.49*   EGFR ml/min/1.73sq m 40                 Results from last 7 days   Lab Units 08/07/23  1145   VANCOMYCIN TR ug/mL 11.9       The following portions of the patient's history were reviewed and updated as appropriate: allergies, current medications, past family history, past medical history, past social history, past surgical history and problem list.    I spent 35 minutes with patient today in which greater than 50% of the time was spent in counseling/coordination of care regarding review of recent hospitalization, discussed ongoing abx plan in detail with transition to PO while she goes on cruise, answered questions to the best of my ability

## 2023-08-08 NOTE — TELEPHONE ENCOUNTER
Aurea calls from [facility] pt's labs resulted from yesterday pt's creatinine elevated.      Lab routed to [address] [place] [address] and Jonathan BARRERA via [email or web address]

## 2023-08-08 NOTE — PATIENT INSTRUCTIONS
Continue IV vancomycin at current dose   Repeat labs on Friday and Monday   Stop IV vanco and d/c PICC on 8/17   On 8/18 start PO Linezolid to continue through 8/22   Continue local wound care

## 2023-08-09 ENCOUNTER — HOME CARE VISIT (OUTPATIENT)
Dept: HOME HEALTH SERVICES | Facility: HOME HEALTHCARE | Age: 53
End: 2023-08-09
Payer: COMMERCIAL

## 2023-08-10 ENCOUNTER — OFFICE VISIT (OUTPATIENT)
Dept: SURGERY | Facility: CLINIC | Age: 53
End: 2023-08-10
Payer: COMMERCIAL

## 2023-08-10 VITALS
DIASTOLIC BLOOD PRESSURE: 68 MMHG | RESPIRATION RATE: 16 BRPM | HEART RATE: 73 BPM | BODY MASS INDEX: 35.57 KG/M2 | OXYGEN SATURATION: 96 % | SYSTOLIC BLOOD PRESSURE: 112 MMHG | HEIGHT: 61 IN | WEIGHT: 188.4 LBS | TEMPERATURE: 97.5 F

## 2023-08-10 DIAGNOSIS — L03.114 CELLULITIS OF LEFT UPPER EXTREMITY: ICD-10-CM

## 2023-08-10 DIAGNOSIS — R78.81 MRSA BACTEREMIA: ICD-10-CM

## 2023-08-10 DIAGNOSIS — Z48.89 ENCOUNTER FOR POST SURGICAL WOUND CHECK: Primary | ICD-10-CM

## 2023-08-10 DIAGNOSIS — B95.62 MRSA BACTEREMIA: ICD-10-CM

## 2023-08-10 DIAGNOSIS — Z98.890 STATUS POST INCISION AND DRAINAGE: ICD-10-CM

## 2023-08-10 PROCEDURE — 99212 OFFICE O/P EST SF 10 MIN: CPT | Performed by: SURGERY

## 2023-08-11 ENCOUNTER — TELEPHONE (OUTPATIENT)
Dept: ADMINISTRATIVE | Facility: OTHER | Age: 53
End: 2023-08-11

## 2023-08-11 ENCOUNTER — OFFICE VISIT (OUTPATIENT)
Dept: FAMILY MEDICINE CLINIC | Facility: CLINIC | Age: 53
End: 2023-08-11
Payer: COMMERCIAL

## 2023-08-11 VITALS
TEMPERATURE: 97.8 F | DIASTOLIC BLOOD PRESSURE: 76 MMHG | BODY MASS INDEX: 36.06 KG/M2 | HEIGHT: 61 IN | HEART RATE: 78 BPM | OXYGEN SATURATION: 98 % | SYSTOLIC BLOOD PRESSURE: 128 MMHG | WEIGHT: 191 LBS | RESPIRATION RATE: 20 BRPM

## 2023-08-11 DIAGNOSIS — E11.65 TYPE 2 DIABETES MELLITUS WITH HYPERGLYCEMIA, WITH LONG-TERM CURRENT USE OF INSULIN (HCC): Chronic | ICD-10-CM

## 2023-08-11 DIAGNOSIS — R91.8 ABNORMAL CT SCAN OF LUNG: ICD-10-CM

## 2023-08-11 DIAGNOSIS — R78.81 MRSA BACTEREMIA: Primary | ICD-10-CM

## 2023-08-11 DIAGNOSIS — E11.9 ENCOUNTER FOR DIABETIC FOOT EXAM (HCC): ICD-10-CM

## 2023-08-11 DIAGNOSIS — Z79.4 TYPE 2 DIABETES MELLITUS WITH HYPERGLYCEMIA, WITH LONG-TERM CURRENT USE OF INSULIN (HCC): Chronic | ICD-10-CM

## 2023-08-11 DIAGNOSIS — B95.62 MRSA BACTEREMIA: Primary | ICD-10-CM

## 2023-08-11 LAB
CREAT UR-MCNC: 16.4 MG/DL
MICROALBUMIN UR-MCNC: <5 MG/L (ref 0–20)
MICROALBUMIN/CREAT 24H UR: <30 MG/G CREATININE (ref 0–30)

## 2023-08-11 PROCEDURE — 99214 OFFICE O/P EST MOD 30 MIN: CPT | Performed by: NURSE PRACTITIONER

## 2023-08-11 PROCEDURE — 82570 ASSAY OF URINE CREATININE: CPT | Performed by: NURSE PRACTITIONER

## 2023-08-11 PROCEDURE — 82043 UR ALBUMIN QUANTITATIVE: CPT | Performed by: NURSE PRACTITIONER

## 2023-08-11 NOTE — PROGRESS NOTES
[facility]        NAME: Duard Spurling is a 46 y.o. female  : 1970    MRN: [id number]  DATE: 2023  TIME: 2:58 PM    Assessment and Plan   MRSA bacteremia [R78.81, B95.62]  1. MRSA bacteremia        2. Abnormal CT scan of lung  CT chest wo contrast      3. Encounter for diabetic foot exam ([address] [facility])        4. Type 2 diabetes mellitus with hyperglycemia, with long-term current use of insulin ([facility])  Albumin / creatinine urine ratio    Albumin / creatinine urine ratio            Patient Instructions     Patient Instructions   CT chest ordered- recommended to get done around 6-8 weeks  Schedule appointment with Endocrinology asap  Continue antibiotics as ordered  Urine sample collected today for albumin  Mammogram scheduled   Keep appointment here  or call sooner for problems/concerns          Chief Complaint     Chief Complaint   Patient presents with   • Follow-up         History of Present Illness       Date and time admitted to hospital: 2023  5:44 PM     * Cellulitis of left upper extremity  Assessment & Plan  ? Status post a bedside incision and drainage by general surgery on 2023  ? Patient continues to feel better  ? Status post an ID evaluation, status post a general surgical evaluation  ? Bl Cx X 2 sets () 1/2 + MRSA  ? Bl Cx x 2 sets ()-no growth to date  ? Wound cultures- also positive for MRSA  ? 2D echocardiogram completed-no evidence of any type of vegetation  ? Continue IV vancomycin  ? Given that repeat blood cultures are negative to date, patient will have PICC line placed today  ? Status post a PT OT evaluation, patient is okay to go home with outpatient rehab and home services  ? Plan for discharge home on IV antibiotics today. Patient will be on IV vancomycin daily with treatment to be completed through 2023  ?  Outpatient follow-up with infectious disease     Type 2 diabetes mellitus with hyperglycemia, with long-term current use of insulin [facility])  Assessment & Plan  Lab Results  Component Value Date    HGBA1C 12.7 (H) 07/25/2023        Recent Labs    07/31/23  1121 07/31/23  1604 07/31/23  2103 08/01/23  0711  POCGLU 191* 186* 329* 246*        Blood Sugar Average: Last 72 hrs:  (P) [id number]      ? Status post the use of an insulin drip at time of arrival  ? Target blood sugar for the hospital is 140-180  ? Continue home diabetic regimen     MRSA bacteremia  Assessment & Plan  Secondary to left upper extremity cellulitis. Continue treatment as above     Lower extremity edema  Assessment & Plan  ? Bilateral LE w/+1 edema, erythema, and calf pain - L > R  ? CTA PE study negative for PE  ? Bilateral lower extremity venous Doppler testing is negative for DVT  ? No further testing     Acute renal failure superimposed on stage 3a chronic kidney disease [facility])  Assessment & Plan  Lab Results  Component Value Date    EGFR 61 08/01/2023    EGFR 65 07/31/2023    EGFR 61 07/29/2023    CREATININE 1.04 08/01/2023    CREATININE 0.99 07/31/2023    CREATININE 1.04 07/29/2023     ? Present on admission  ? Has resolved with IV fluid-this was prerenal  ? Creatinine is at baseline  ? Continue to avoid nephrotoxins     Seizure disorder [facility])  Assessment & Plan  ? Continue Keppra 500 mg p.o. every 12 hours     Slow transit constipation  Assessment & Plan  ? Eulalio Hsu as outpatient, however is not on hospital formulary - will utilize Amitiza while in hospital  ? Continue home Miralax, Colace, and senna     GERD (gastroesophageal reflux disease)  Assessment & Plan  ? Continue Pepcid     Obesity (BMI 35.0-39.9 without comorbidity)  Assessment & Plan  ? Actual BMI 37.74  ? Dietary, weight loss, and lifestyle modification counseling provided     Bipolar disorder [facility])  Assessment & Plan  ?  Continue home Celexa and Elavil         Discharging Physician / Practitioner: Jacy Wilder MD  PCP: Cecilio Flores, [address] [place] [address]  Admission Date:   Admission Orders (From admission, onward)        Ordered        07/24/23 2335     INPATIENT ADMISSION  Once          Hospital Course:      Walter Beckford is a 46 y.o. female patient who originally presented to the hospital on 7/24/2023 due to left upper extremity cellulitis. Patient was started on IV antibiotics. Imaging studies concerning for underlying abscess. Infectious disease and surgical team were consulted. Patient had bedside debridement by surgical team.  Blood cultures positive for MRSA. Wound cultures eventually positive for MRSA as well. Patient maintained on IV vancomycin. Echo performed, no vegetations noted. Infectious disease recommended IV vancomycin through 8/22/2023. Repeat blood cultures negative to date. PICC line placed on 7/31/2023. Management has arranged for outpatient IV antibiotics and visiting nursing. Patient stable for discharge home with outpatient follow-up. Patient discharged on oxycodone for pain control. Patient provided with ambulatory referral to infectious disease.     Regarding incidental finding on CT imaging of the lung lesion. This finding was discussed with patient. Advised her to have repeat imaging in 6 to 8 weeks, specifically CT scan to evaluate for resolution.         Discharge Date: 08/01/23    TCM Call     Date and time call was made  8/3/2023  8:43 AM    Hospital care reviewed  Records reviewed    Patient was hospitialized at  [address] [facility]    Date of Admission  07/24/23    Date of discharge  08/01/23    Diagnosis  Cellulitis of left upper extremity    Disposition  Home    Current Symptoms  None    Right side arm pain severity  Mild    Arm pain, right side, onset  Ongoing  from IV      TCM Call     Post hospital issues  None    Should patient be enrolled in anticoag monitoring? No    Scheduled for follow up?   Yes    Did you obtain your prescribed medications  Yes    Why were you unable to obtain your medications  no change of medication    Do you need help managing your prescriptions or medications  No    Is transportation to your appointment needed  No    I have advised the patient to call PCP with any new or worsening symptoms    Kristal RASMUSSEN    Living Arrangements  Family members; Spouse or Significiant other    Support System  Family; Spouse    The type of support provided  Emotional; Physical; Other (comment)    Do you have social support  Yes, as much as I need    Counseling  Patient    Counseling topics  patient and family education            Review of Systems   Review of Systems   Constitutional: Negative for activity change, diaphoresis, fatigue and fever. HENT: Negative for congestion, facial swelling, hearing loss, rhinorrhea, sinus pressure, sinus pain, sneezing, sore throat and voice change. Eyes: Negative for discharge and visual disturbance. Respiratory: Negative for cough, choking, chest tightness, shortness of breath, wheezing and stridor. Cardiovascular: Negative for chest pain, palpitations and leg swelling. Gastrointestinal: Negative for abdominal distention, abdominal pain, constipation, diarrhea, nausea and vomiting. Endocrine: Negative for polydipsia, polyphagia and polyuria. Genitourinary: Negative for difficulty urinating, dysuria, frequency and urgency. Musculoskeletal: Negative for arthralgias, back pain, gait problem, joint swelling, myalgias, neck pain and neck stiffness. Skin: Positive for wound. Negative for color change. Neurological: Negative for dizziness, syncope, speech difficulty, weakness, light-headedness and headaches. Hematological: Negative for adenopathy. Does not bruise/bleed easily. Psychiatric/Behavioral: Negative for agitation, behavioral problems, confusion, hallucinations, sleep disturbance and suicidal ideas. The patient is not nervous/anxious.           Current Medications       Current Outpatient Medications:   •  albuterol (PROVENTIL HFA,VENTOLIN HFA) 90 mcg/act inhaler, INHALE ONE PUFF BY MOUTH AND INTO THE LUNGS EVERY 6 HOURS AS NEEDED FOR WHEEZING, Disp: 36 g, Rfl: 3  •  Alcohol Swabs (Pharmacist Choice Alcohol) PADS, USE AS DIRECTED, Disp: 100 each, Rfl: 1  •  amitriptyline (ELAVIL) 25 mg tablet, Take 1 tablet (25 mg total) by mouth daily at bedtime, Disp: 30 tablet, Rfl: 0  •  Aspirin (ASPIR-81 PO), Take 81 mg by mouth, Disp: , Rfl:   •  atorvastatin (LIPITOR) 80 mg tablet, TAKE 1 TABLET (80 MG TOTAL) BY MOUTH DAILY AT BEDTIME, Disp: 30 tablet, Rfl: 6  •  B-D UF III MINI PEN NEEDLES 31G X 5 MM MISC, Pt uses 5 pen needles daily, Disp: 500 each, Rfl: 0  •  cholecalciferol (VITAMIN D3) 400 units tablet, TAKE ONE TABLET BY MOUTH DAILY, Disp: 90 tablet, Rfl: 1  •  citalopram (CeleXA) 40 mg tablet, Take 1 tablet (40 mg total) by mouth daily, Disp: 90 tablet, Rfl: 3  •  Continuous Blood Gluc  (FreeStyle Refugio 14 Day [place]) TEMITOPE, Use for continuous blood glucose monitoring, Disp: 1 each, Rfl: 0  •  Continuous Blood Gluc Sensor (FreeStyle Refugio 14 Day Sensor) MISC, Use one sensor every 14 days for continuous blood sugar monitoring., Disp: 6 each, Rfl: 3  •  docusate sodium (COLACE) 100 mg capsule, TAKE ONE CAPSULE BY MOUTH TWICE DAILY, Disp: 60 capsule, Rfl: 5  •  dulaglutide (Trulicity) 3 LY/4.5JV injection, Inject 0.5 mL (3 mg total) under the skin once a week, Disp: 2 mL, Rfl: 3  •  Empagliflozin (Jardiance) 25 MG TABS, Take 1 tablet (25 mg total) by mouth every morning, Disp: 90 tablet, Rfl: 1  •  ergocalciferol (VITAMIN D2) 50,000 units, Take 1 capsule (50,000 Units total) by mouth once a week, Disp: 8 capsule, Rfl: 1  •  famotidine (PEPCID) 40 MG tablet, Take 1 tablet (40 mg total) by mouth daily at bedtime Take in evening 2 hours prior to bed, Disp: 30 tablet, Rfl: 6  •  fenofibrate (TRICOR) 48 mg tablet, TAKE ONE TABLET BY MOUTH DAILY, Disp: 90 tablet, Rfl: 3  •  Insulin Glargine Solostar (Lantus SoloStar) 100 UNIT/ML SOPN, INJECT 45 UNITS EVERY TWELVE HOURS, Disp: 60 mL, Rfl: 0  •  insulin glulisine (Apidra SoloStar) 100 units/mL injection pen, Inject 12 units with breakfast and lunch, 14 units with dinner, with scale ISF 25 @ 150 as needed, Disp: 30 mL, Rfl: 1  •  Iron Heme Polypeptide 12 MG TABS, Take 1 tablet by mouth, Disp: , Rfl:   •  levETIRAcetam (KEPPRA) 500 mg tablet, TAKE ONE TABLET TWICE DAILY, Disp: 180 tablet, Rfl: 1  •  linaCLOtide (Linzess) 72 MCG CAPS, Take 1 capsule by mouth daily before breakfast, Disp: 30 capsule, Rfl: 0  •  linezolid (ZYVOX) 600 mg tablet, Take 1 tablet (600 mg total) by mouth every 12 (twelve) hours for 5 days (Patient not taking: Reported on 8/10/2023), Disp: 10 tablet, Rfl: 0  •  magnesium Oxide (MAG-OX) 400 mg TABS, Take 1 tablet (400 mg total) by mouth daily, Disp: 30 tablet, Rfl: 0  •  meclizine (ANTIVERT) 25 mg tablet, Take 1 tablet (25 mg total) by mouth 3 (three) times a day as needed for dizziness, Disp: 30 tablet, Rfl: 0  •  metoprolol tartrate (LOPRESSOR) 25 mg tablet, Take 1 tablet (25 mg total) by mouth 2 (two) times a day, Disp: 180 tablet, Rfl: 1  •  multivitamin (THERAGRAN) TABS, Take 1 tablet by mouth every morning, Disp: , Rfl:   •  nystatin (MYCOSTATIN) cream, Apply topically 2 (two) times a day, Disp: 30 g, Rfl: 0  •  omeprazole (PriLOSEC) 40 MG capsule, Take 1 capsule (40 mg total) by mouth daily, Disp: 90 capsule, Rfl: 3  •  ondansetron (ZOFRAN) 4 mg tablet, Take 1 tablet (4 mg total) by mouth every 8 (eight) hours as needed for nausea or vomiting, Disp: 20 tablet, Rfl: 0  •  OneTouch Ultra test strip, USE 4 TIMES A DAY, Disp: 400 each, Rfl: 0  •  oxyCODONE (ROXICODONE) 5 immediate release tablet, Take 1 tablet (5 mg total) by mouth every 6 (six) hours as needed for severe pain or moderate pain for up to 10 days Max Daily Amount: 20 mg, Disp: 12 tablet, Rfl: 0  •  Pharmacist Choice Lancets MISC, USE AS DIRECTED, Disp: 100 each, Rfl: 1  •  pregabalin (LYRICA) 100 mg capsule, TAKE ONE CAPSULE BY MOUTH THREE TIMES A DAY, Disp: 270 capsule, Rfl: 0  •  Restasis 0.05 % ophthalmic emulsion, , Disp: , Rfl:   •  scopolamine (TRANSDERM-SCOP) 1 mg/3 days TD 72 hr patch, Place 1 patch on the skin over 72 hours every third day, Disp: 24 patch, Rfl: 1  •  vancomycin 1,000 mg in sodium chloride 0.9 % 250 mL IVPB, Inject 1,000 mg into a catheter in a vein over 90 minutes at 166.7 mL/hr every 24 hours for 21 days, Disp: , Rfl: 0    Current Allergies     Allergies as of 2023 - Reviewed 2023   Allergen Reaction Noted   • Butorphanol Hallucinations 2016   • Benztropine  2016   • Penicillins  2015   • Zolpidem  2015   • Azithromycin Hives and GI Intolerance 2012            The following portions of the patient's history were reviewed and updated as appropriate: allergies, current medications, past family history, past medical history, past social history, past surgical history and problem list.     Past Medical History:   Diagnosis Date   • Asthma    • Atypical chest pain    • Depression    • Diabetes mellitus ([address])    • Gastroparesis    • GERD (gastroesophageal reflux disease)    • Hyperlipidemia    • Hypertension    • Psychiatric disorder    • Renal disorder    • Sciatica    • Seizure disorder ([address])    • Stroke ([address])    • TIA (transient ischemic attack)        Past Surgical History:   Procedure Laterality Date   • APPENDECTOMY     • BREAST BIOPSY Left  benign   •  SECTION     • CHOLECYSTECTOMY     • COLONOSCOPY     • HYSTERECTOMY     • IR PICC PLACEMENT SINGLE LUMEN  2023   • MA LAPAROSCOPIC APPENDECTOMY N/A 2021    Procedure: APPENDECTOMY LAPAROSCOPIC;  Surgeon: Arlington Gitelman, MD;  Location: [address] [place] [address] OR;  Service: General   • MA LAPS ABD PRTM&OMENTUM DX W/WO SPEC BR/WA SPX N/A 2021    Procedure: LAPAROSCOPY DIAGNOSTIC, EXTENSIVE DESI;  Surgeon: Arlington Gitelman, MD;  Location: [address] [place] [address] OR;  Service: General   • TUBAL LIGATION     • UPPER GASTROINTESTINAL ENDOSCOPY         Family History Problem Relation Age of Onset   • No Known Problems Mother    • No Known Problems Father    • No Known Problems Daughter    • No Known Problems Maternal Grandmother    • No Known Problems Paternal Grandmother    • No Known Problems Paternal Aunt    • No Known Problems Paternal Aunt    • No Known Problems Paternal Aunt          Medications have been verified. Objective   /76   Pulse 78   Temp 97.8 °F (36.6 °C) (Tympanic)   Resp 20   Ht 5' 1" (1.549 m)   Wt 86.6 kg (191 lb)   SpO2 98%   BMI 36.09 kg/m²        Physical Exam     Physical Exam  Vitals and nursing note reviewed. Constitutional:       General: She is not in acute distress. Appearance: She is well-developed. She is not diaphoretic. Cardiovascular:      Rate and Rhythm: Normal rate and regular rhythm. Pulses: no weak pulses          Dorsalis pedis pulses are 2+ on the right side and 2+ on the left side. Heart sounds: Normal heart sounds. Pulmonary:      Effort: Pulmonary effort is normal. No respiratory distress. Breath sounds: Normal breath sounds. No wheezing. Musculoskeletal:         General: No tenderness or deformity. Normal range of motion. Feet:      Right foot:      Skin integrity: No ulcer, skin breakdown, erythema, warmth, callus or dry skin. Left foot:      Skin integrity: No ulcer, skin breakdown, erythema, warmth, callus or dry skin. Skin:     General: Skin is warm and dry. Neurological:      Mental Status: She is alert and oriented to person, place, and time. Psychiatric:         Mood and Affect: Affect is flat. Behavior: Behavior normal. Behavior is cooperative. Thought Content: Thought content normal.         Judgment: Judgment normal.           Patient's shoes and socks removed. Right Foot/Ankle   Right Foot Inspection  Skin Exam: skin normal and skin intact.  No dry skin, no warmth, no callus, no erythema, no maceration, no abnormal color, no pre-ulcer, no ulcer and no callus. Toe Exam: ROM and strength within normal limits. Sensory   Monofilament testing: intact    Vascular  Capillary refills: < 3 seconds  The right DP pulse is 2+. Left Foot/Ankle  Left Foot Inspection  Skin Exam: skin normal and skin intact. No dry skin, no warmth, no erythema, no maceration, normal color, no pre-ulcer, no ulcer and no callus. Toe Exam: ROM and strength within normal limits. Sensory   Monofilament testing: intact    Vascular  Capillary refills: < 3 seconds  The left DP pulse is 2+.      Assign Risk Category  No deformity present  No loss of protective sensation  No weak pulses  Risk: 0

## 2023-08-11 NOTE — PROGRESS NOTES
Progress Note - General Surgery   Gretta Scott 46 y.o. female MRN: [id number]  Encounter: [id number]    Assessment/Plan     52F here in follow up after recent hospitalization for MRSA bacteremia secondary to a severe left arm cellulitis and associated abscess related to a prior IV site. She required bedside incision and drainage. She is following closely with infectious diseases. She is about to transition from IV antibiotics via the PICC to oral antibiotics. Her left forearm wound site is well healed and no longer requiring packing. The site was covered with a dry dressing today. There is some ongoing induration and inflammatory change but her range of motion is much improved and her cellulitis and acute infection are resolved. She can follow up with the surgical service on an as needed basis. Subjective       Chief Complaint   Patient presents with   • Follow-up      Doing well since discharge, continuing IV antibiotics via PICC under ID guidance. Will be transitioning to PO antibiotics soon in anticipation of upcoming travel. Left forearm wound was requiring packing while inpatient and shortly after discharge and now it is fully healed. She is cleaning it daily and covering it and her symptoms are much improved. Review of Systems   Constitutional: Positive for activity change. Negative for fever. Skin:        Inflammatory changes around a site of recent infection and cellulitis   Hematological: Negative. All other systems reviewed and are negative. The following portions of the patient's history were reviewed and updated as appropriate: allergies, current medications, past family history, past medical history, past social history, past surgical history and problem list.    Objective      Blood pressure 112/68, pulse 73, temperature 97.5 °F (36.4 °C), temperature source Temporal, resp.  rate 16, height 5' 1" (1.549 m), weight 85.5 kg (188 lb 6.4 oz), SpO2 96 %, not currently breastfeeding. Physical Exam  Vitals reviewed. Constitutional:       General: She is not in acute distress. Appearance: She is not toxic-appearing. HENT:      Head: Normocephalic. Mouth/Throat:      Mouth: Mucous membranes are moist.   Eyes:      Pupils: Pupils are equal, round, and reactive to light. Cardiovascular:      Rate and Rhythm: Normal rate. Pulmonary:      Effort: Pulmonary effort is normal. No respiratory distress. Abdominal:      Palpations: Abdomen is soft. Musculoskeletal:         General: Normal range of motion. Cervical back: Normal range of motion. Skin:     General: Skin is warm. Capillary Refill: Capillary refill takes less than 2 seconds. Comments: Right PICC line in place    Left antecubital fossa with residual induration and firmness but resolved fluctuance, cellulitis, abscess, wound now entirely healed, area nontender, improved range of motion, still some mild edema and induration within the area as expected but much improved   Neurological:      General: No focal deficit present. Mental Status: She is alert. Psychiatric:         Mood and Affect: Mood normal.         Signature:   Tyree Childers MD  Date: 8/10/2023 Time: 9:12 PM

## 2023-08-11 NOTE — TELEPHONE ENCOUNTER
Upon review of the In Basket request we were able to note that no further action is required. The patient chart is up to date as a result of a previous request.      Any additional questions or concerns should be emailed to the Practice Liaisons via the appropriate education email address, please do not reply via In Basket.     Thank you  Gilberto Velasquez MA

## 2023-08-11 NOTE — PATIENT INSTRUCTIONS
CT chest ordered- recommended to get done around 6-8 weeks  Schedule appointment with Endocrinology asap  Continue antibiotics as ordered  Urine sample collected today for albumin  Mammogram scheduled 9/6  Keep appointment here 9/7 or call sooner for problems/concerns

## 2023-08-11 NOTE — TELEPHONE ENCOUNTER
----- Message from Edis Dougherty sent at 8/10/2023  3:39 PM EDT -----  Regarding: colonoscopy  08/10/23 3:39 PM    Hello, our patient Rossana Eugene has had CRC: Colonoscopy completed/performed. Please assist in updating the patient chart by pulling the Care Everywhere (CE) document. The date of service is 12/27/2019.      Thank you,  Edis Dougherty  [facility] [address]

## 2023-08-14 ENCOUNTER — TELEPHONE (OUTPATIENT)
Dept: FAMILY MEDICINE CLINIC | Facility: CLINIC | Age: 53
End: 2023-08-14

## 2023-08-14 ENCOUNTER — HOME CARE VISIT (OUTPATIENT)
Dept: HOME HEALTH SERVICES | Facility: HOME HEALTHCARE | Age: 53
End: 2023-08-14
Payer: COMMERCIAL

## 2023-08-14 VITALS
OXYGEN SATURATION: 98 % | HEART RATE: 66 BPM | RESPIRATION RATE: 16 BRPM | SYSTOLIC BLOOD PRESSURE: 112 MMHG | DIASTOLIC BLOOD PRESSURE: 66 MMHG | TEMPERATURE: 97.7 F

## 2023-08-14 DIAGNOSIS — N32.81 OAB (OVERACTIVE BLADDER): Primary | ICD-10-CM

## 2023-08-14 PROCEDURE — G0299 HHS/HOSPICE OF RN EA 15 MIN: HCPCS

## 2023-08-14 RX ORDER — SOLIFENACIN SUCCINATE 5 MG/1
5 TABLET, FILM COATED ORAL DAILY
Qty: 30 TABLET | Refills: 0 | Status: SHIPPED | OUTPATIENT
Start: 2023-08-14

## 2023-08-14 NOTE — TELEPHONE ENCOUNTER
Patient called office asking for new script of vesicare for urinary incontenence. Medication was stopped by Dr. Gabino Valles at visit on 7/13. States she cannot even walk up the stairs without wetting herself. She is completely out of medication. Please advise.

## 2023-08-15 ENCOUNTER — APPOINTMENT (OUTPATIENT)
Dept: LAB | Facility: CLINIC | Age: 53
End: 2023-08-15
Payer: COMMERCIAL

## 2023-08-15 ENCOUNTER — TELEPHONE (OUTPATIENT)
Dept: OTHER | Facility: OTHER | Age: 53
End: 2023-08-15

## 2023-08-15 DIAGNOSIS — B95.62 MRSA BACTEREMIA: ICD-10-CM

## 2023-08-15 DIAGNOSIS — N18.31 STAGE 3A CHRONIC KIDNEY DISEASE (HCC): ICD-10-CM

## 2023-08-15 DIAGNOSIS — N25.81 SECONDARY HYPERPARATHYROIDISM (HCC): ICD-10-CM

## 2023-08-15 DIAGNOSIS — D50.8 IRON DEFICIENCY ANEMIA SECONDARY TO INADEQUATE DIETARY IRON INTAKE: ICD-10-CM

## 2023-08-15 DIAGNOSIS — N17.9 AKI (ACUTE KIDNEY INJURY) (HCC): ICD-10-CM

## 2023-08-15 DIAGNOSIS — R78.81 MRSA BACTEREMIA: ICD-10-CM

## 2023-08-15 DIAGNOSIS — E55.9 VITAMIN D DEFICIENCY: ICD-10-CM

## 2023-08-15 LAB
25(OH)D3 SERPL-MCNC: 33.3 NG/ML (ref 30–100)
ALBUMIN SERPL BCP-MCNC: 3.5 G/DL (ref 3.5–5)
ALP SERPL-CCNC: 164 U/L (ref 46–116)
ALT SERPL W P-5'-P-CCNC: 30 U/L (ref 12–78)
ANION GAP SERPL CALCULATED.3IONS-SCNC: 9 MMOL/L
AST SERPL W P-5'-P-CCNC: 14 U/L (ref 5–45)
BASOPHILS # BLD AUTO: 0.05 THOUSANDS/ÂΜL (ref 0–0.1)
BASOPHILS NFR BLD AUTO: 1 % (ref 0–1)
BILIRUB SERPL-MCNC: 0.26 MG/DL (ref 0.2–1)
BUN SERPL-MCNC: 30 MG/DL (ref 5–25)
CALCIUM SERPL-MCNC: 9.5 MG/DL (ref 8.3–10.1)
CHLORIDE SERPL-SCNC: 100 MMOL/L (ref 96–108)
CO2 SERPL-SCNC: 22 MMOL/L (ref 21–32)
CREAT SERPL-MCNC: 1.46 MG/DL (ref 0.6–1.3)
EOSINOPHIL # BLD AUTO: 0.36 THOUSAND/ÂΜL (ref 0–0.61)
EOSINOPHIL NFR BLD AUTO: 4 % (ref 0–6)
ERYTHROCYTE [DISTWIDTH] IN BLOOD BY AUTOMATED COUNT: 14.7 % (ref 11.6–15.1)
FERRITIN SERPL-MCNC: 39 NG/ML (ref 11–307)
GFR SERPL CREATININE-BSD FRML MDRD: 41 ML/MIN/1.73SQ M
GLUCOSE P FAST SERPL-MCNC: 780 MG/DL (ref 65–99)
HCT VFR BLD AUTO: 37 % (ref 34.8–46.1)
HGB BLD-MCNC: 11.8 G/DL (ref 11.5–15.4)
IMM GRANULOCYTES # BLD AUTO: 0.03 THOUSAND/UL (ref 0–0.2)
IMM GRANULOCYTES NFR BLD AUTO: 0 % (ref 0–2)
IRON SATN MFR SERPL: 14 % (ref 15–50)
IRON SERPL-MCNC: 47 UG/DL (ref 50–170)
LYMPHOCYTES # BLD AUTO: 2.61 THOUSANDS/ÂΜL (ref 0.6–4.47)
LYMPHOCYTES NFR BLD AUTO: 31 % (ref 14–44)
MCH RBC QN AUTO: 25.8 PG (ref 26.8–34.3)
MCHC RBC AUTO-ENTMCNC: 31.9 G/DL (ref 31.4–37.4)
MCV RBC AUTO: 81 FL (ref 82–98)
MONOCYTES # BLD AUTO: 0.92 THOUSAND/ÂΜL (ref 0.17–1.22)
MONOCYTES NFR BLD AUTO: 11 % (ref 4–12)
NEUTROPHILS # BLD AUTO: 4.56 THOUSANDS/ÂΜL (ref 1.85–7.62)
NEUTS SEG NFR BLD AUTO: 53 % (ref 43–75)
NRBC BLD AUTO-RTO: 0 /100 WBCS
PHOSPHATE SERPL-MCNC: 4.5 MG/DL (ref 2.7–4.5)
PLATELET # BLD AUTO: 353 THOUSANDS/UL (ref 149–390)
PMV BLD AUTO: 11.3 FL (ref 8.9–12.7)
POTASSIUM SERPL-SCNC: 4.8 MMOL/L (ref 3.5–5.3)
PROT SERPL-MCNC: 7.2 G/DL (ref 6.4–8.4)
PTH-INTACT SERPL-MCNC: 41.7 PG/ML (ref 12–88)
RBC # BLD AUTO: 4.57 MILLION/UL (ref 3.81–5.12)
SODIUM SERPL-SCNC: 131 MMOL/L (ref 135–147)
TIBC SERPL-MCNC: 337 UG/DL (ref 250–450)
WBC # BLD AUTO: 8.53 THOUSAND/UL (ref 4.31–10.16)

## 2023-08-15 PROCEDURE — 85025 COMPLETE CBC W/AUTO DIFF WBC: CPT

## 2023-08-15 PROCEDURE — 82306 VITAMIN D 25 HYDROXY: CPT

## 2023-08-15 PROCEDURE — 83550 IRON BINDING TEST: CPT

## 2023-08-15 PROCEDURE — 36415 COLL VENOUS BLD VENIPUNCTURE: CPT

## 2023-08-15 PROCEDURE — 80053 COMPREHEN METABOLIC PANEL: CPT

## 2023-08-15 PROCEDURE — 82728 ASSAY OF FERRITIN: CPT

## 2023-08-15 PROCEDURE — 84100 ASSAY OF PHOSPHORUS: CPT

## 2023-08-15 PROCEDURE — 83540 ASSAY OF IRON: CPT

## 2023-08-15 PROCEDURE — 83970 ASSAY OF PARATHORMONE: CPT

## 2023-08-15 NOTE — TELEPHONE ENCOUNTER
PT: Payal Renee : 2830  Lab Result: Glucose 780   Date/Time Drawn: 8- @ 08:28   Ordering Provider: Amelia Mayfield Name: Dina Cameron [facility] [phone]       The following critical/stat result was read back to the lab as stated above and [employer] to the on-call provider. The provider confirmed receipt of the message.     Paged on call Provider via TT

## 2023-08-16 ENCOUNTER — HOME CARE VISIT (OUTPATIENT)
Dept: HOME HEALTH SERVICES | Facility: HOME HEALTHCARE | Age: 53
End: 2023-08-16
Payer: COMMERCIAL

## 2023-08-16 ENCOUNTER — APPOINTMENT (OUTPATIENT)
Dept: LAB | Facility: CLINIC | Age: 53
End: 2023-08-16
Payer: COMMERCIAL

## 2023-08-16 ENCOUNTER — TELEPHONE (OUTPATIENT)
Dept: ENDOCRINOLOGY | Facility: CLINIC | Age: 53
End: 2023-08-16

## 2023-08-16 ENCOUNTER — OFFICE VISIT (OUTPATIENT)
Dept: CARDIOLOGY CLINIC | Facility: CLINIC | Age: 53
End: 2023-08-16
Payer: COMMERCIAL

## 2023-08-16 ENCOUNTER — TELEPHONE (OUTPATIENT)
Dept: INFECTIOUS DISEASES | Facility: CLINIC | Age: 53
End: 2023-08-16

## 2023-08-16 VITALS
HEART RATE: 78 BPM | RESPIRATION RATE: 20 BRPM | OXYGEN SATURATION: 96 % | SYSTOLIC BLOOD PRESSURE: 112 MMHG | TEMPERATURE: 97.3 F | DIASTOLIC BLOOD PRESSURE: 58 MMHG

## 2023-08-16 VITALS
OXYGEN SATURATION: 97 % | TEMPERATURE: 98 F | HEART RATE: 72 BPM | RESPIRATION RATE: 20 BRPM | WEIGHT: 190.2 LBS | HEIGHT: 61 IN | BODY MASS INDEX: 35.91 KG/M2 | SYSTOLIC BLOOD PRESSURE: 126 MMHG | DIASTOLIC BLOOD PRESSURE: 80 MMHG

## 2023-08-16 DIAGNOSIS — N89.8 VAGINA ITCHING: ICD-10-CM

## 2023-08-16 DIAGNOSIS — I12.9 BENIGN HYPERTENSION WITH CKD (CHRONIC KIDNEY DISEASE) STAGE III (HCC): Primary | Chronic | ICD-10-CM

## 2023-08-16 DIAGNOSIS — E78.5 HYPERLIPIDEMIA, UNSPECIFIED HYPERLIPIDEMIA TYPE: ICD-10-CM

## 2023-08-16 DIAGNOSIS — N18.31 STAGE 3A CHRONIC KIDNEY DISEASE (HCC): ICD-10-CM

## 2023-08-16 DIAGNOSIS — I10 PRIMARY HYPERTENSION: Primary | ICD-10-CM

## 2023-08-16 DIAGNOSIS — N18.30 BENIGN HYPERTENSION WITH CKD (CHRONIC KIDNEY DISEASE) STAGE III (HCC): Primary | Chronic | ICD-10-CM

## 2023-08-16 DIAGNOSIS — E66.09 CLASS 2 OBESITY DUE TO EXCESS CALORIES WITH BODY MASS INDEX (BMI) OF 35.0 TO 35.9 IN ADULT, UNSPECIFIED WHETHER SERIOUS COMORBIDITY PRESENT: ICD-10-CM

## 2023-08-16 DIAGNOSIS — Z86.73 HISTORY OF CVA (CEREBROVASCULAR ACCIDENT): ICD-10-CM

## 2023-08-16 LAB
BACTERIA UR QL AUTO: ABNORMAL /HPF
BILIRUB UR QL STRIP: NEGATIVE
CLARITY UR: CLEAR
COLOR UR: ABNORMAL
CREAT UR-MCNC: 43.5 MG/DL
GLUCOSE UR STRIP-MCNC: ABNORMAL MG/DL
HGB UR QL STRIP.AUTO: NEGATIVE
KETONES UR STRIP-MCNC: NEGATIVE MG/DL
LEUKOCYTE ESTERASE UR QL STRIP: ABNORMAL
MICROALBUMIN UR-MCNC: 5.3 MG/L (ref 0–20)
MICROALBUMIN/CREAT 24H UR: 12 MG/G CREATININE (ref 0–30)
NITRITE UR QL STRIP: NEGATIVE
NON-SQ EPI CELLS URNS QL MICRO: ABNORMAL /HPF
PH UR STRIP.AUTO: 6.5 [PH]
PROT UR STRIP-MCNC: NEGATIVE MG/DL
RBC #/AREA URNS AUTO: ABNORMAL /HPF
SP GR UR STRIP.AUTO: 1.03 (ref 1–1.03)
UROBILINOGEN UR STRIP-ACNC: <2 MG/DL
WBC #/AREA URNS AUTO: ABNORMAL /HPF

## 2023-08-16 PROCEDURE — G0299 HHS/HOSPICE OF RN EA 15 MIN: HCPCS

## 2023-08-16 PROCEDURE — 87491 CHLMYD TRACH DNA AMP PROBE: CPT

## 2023-08-16 PROCEDURE — 87591 N.GONORRHOEAE DNA AMP PROB: CPT

## 2023-08-16 PROCEDURE — 81001 URINALYSIS AUTO W/SCOPE: CPT

## 2023-08-16 PROCEDURE — 99214 OFFICE O/P EST MOD 30 MIN: CPT | Performed by: INTERNAL MEDICINE

## 2023-08-16 PROCEDURE — 82570 ASSAY OF URINE CREATININE: CPT

## 2023-08-16 PROCEDURE — 82043 UR ALBUMIN QUANTITATIVE: CPT

## 2023-08-16 NOTE — PROGRESS NOTES
Cardiology Follow up  Melissa Greene  [id number]  1970  [facility] [address] [address] [phone]      1. Primary hypertension        2. Hyperlipidemia, unspecified hyperlipidemia type        3. Class 2 obesity due to excess calories with body mass index (BMI) of 35.0 to 35.9 in adult, unspecified whether serious comorbidity present        4. History of CVA (cerebrovascular accident)            Discussion/Summary:  1. Hypertension  2. Hyperlipidemia  3. History of CVA  4. Obesity    -Nuclear stress test 6/30/2022 showing no diagnostic evidence of ischemia on perfusion imaging.  -Transthoracic echocardiogram 7/26/2023 showing left-ventricular systolic function normal estimated LVEF 60% with mild mitral regurgitation and no vegetation seen with normal right ventricular systolic function  -Patient will continue to follow with nephrology for management of her CKD  -Blood pressure appears well controlled at this time and continue metoprolol tartrate 25 mg twice daily  -Patient will continue atorvastatin 80 mg daily and fenofibrate 40 mg daily along with aspirin therapy in the setting of history of CVA  -We will check fasting lipid panel in 3 months prior to next office visit  -Patient counseled on dietary and lifestyle modifications including following a low-salt, low-fat, heart healthy diet with weight reduction with goal BMI less than 30  -Patient counseled if she were to have any warning or alarm type symptoms she is to seek emergency medical care immediately.         History of Present Illness:  -Patient is a 60-year-old female with hypertension, CKD with history of renal cell carcinoma s/p resection in 2017, diabetes mellitus, history of stroke in 1990 along with obesity who initially presented to the office in April 2022 after being seen in emergency department in March 2022 for chest pain. The patient had noted at that time that the pain woke her up from sleep but about an hour prior to arrival in the ER and was sharp in nature radiating to her back lasting 5 minutes in duration and resolved on its own. Patient was recently hospitalized for sepsis and is completing IV antibiotic regimen under infectious disease care and overall feels much better.  -Patient denies any fevers or chills, chest pain, palpitations, lightness or dizziness, loss conscious, shortness of breath lower extremity edema, orthopnea or bendopnea. She states that overall blood pressures at home are well controlled and has no active complaints at this time.     Patient Active Problem List   Diagnosis   • Radiculopathy, lumbar region   • Anterior tibial tendonitis, right   • Bipolar disorder ([address])   • Chronic low back pain with sciatica   • Type 2 diabetes mellitus with hyperglycemia, with long-term current use of insulin ([facility])   • Diabetic polyneuropathy associated with type 2 diabetes mellitus ([address])   • Gastroparesis   • Hypertension   • Spondylolisthesis of lumbar region   • Obesity (BMI 35.0-39.9 without comorbidity)   • Clear cell carcinoma of kidney, right ([address])   • Internal hernia   • Intra-abdominal adhesions   • Depression with anxiety   • Mild intermittent asthma without complication   • Hypercholesteremia   • Hypertriglyceridemia   • Iron deficiency anemia secondary to inadequate dietary iron intake   • Encounter for post surgical wound check   • GERD (gastroesophageal reflux disease)   • History of colon polyps   • Slow transit constipation   • Anemia   • History of kidney cancer   • Vitamin D deficiency   • Benign hypertension with CKD (chronic kidney disease) stage III ([facility])   • Secondary hyperparathyroidism (HCC)   • Flank pain   • Gastric distention   • Generalized abdominal pain   • Abnormal CT scan, stomach   • Migraine   • Seizure disorder (HCC)   • Cellulitis of left upper extremity   • Leukocytosis   • Acute renal failure superimposed on stage 3a chronic kidney disease (HCC)   • Lower extremity edema   • MRSA bacteremia   • Status post incision and drainage     Past Medical History:   Diagnosis Date   • Asthma    • Atypical chest pain    • Depression    • Diabetes mellitus (HCC)    • Gastroparesis    • GERD (gastroesophageal reflux disease)    • Hyperlipidemia    • Hypertension    • Psychiatric disorder    • Renal disorder    • Sciatica    • Seizure disorder ([facility])    • Stroke ([address])    • TIA (transient ischemic attack)      Social History     Socioeconomic History   • Marital status: /Civil Union     Spouse name: Not on file   • Number of children: Not on file   • Years of education: Not on file   • Highest education level: Not on file   Occupational History   • Not on file   Tobacco Use   • Smoking status: Former     Types: Cigarettes     Quit date:      Years since quittin.6   • Smokeless tobacco: Never   Vaping Use   • Vaping Use: Never used   Substance and Sexual Activity   • Alcohol use: Never   • Drug use: Never   • Sexual activity: Not Currently     Partners: Male     Birth control/protection: None   Other Topics Concern   • Not on file   Social History Narrative   • Not on file     Social Determinants of Health     Financial Resource Strain: Not on file   Food Insecurity: No Food Insecurity (2023)    Hunger Vital Sign    • Worried About Running Out of Food in the Last Year: Never true    • Ran Out of Food in the Last Year: Never true   Transportation Needs: No Transportation Needs (2023)    PRAPARE - Transportation    • Lack of Transportation (Medical): No    • Lack of Transportation (Non-Medical):  No   Physical Activity: Not on file   Stress: Not on file   Social Connections: Not on file   Intimate Partner Violence: Not on file   Housing Stability: Low Risk  (2023)    Housing Stability Vital Sign    • Unable to Pay for Housing in the Last Year: No    • Number of Places Lived in the Last Year: 1    • Unstable Housing in the Last Year: No      Family History   Problem Relation Age of Onset   • No Known Problems Mother    • No Known Problems Father    • No Known Problems Daughter    • No Known Problems Maternal Grandmother    • No Known Problems Paternal Grandmother    • No Known Problems Paternal Aunt    • No Known Problems Paternal Aunt    • No Known Problems Paternal Aunt      Past Surgical History:   Procedure Laterality Date   • APPENDECTOMY     • BREAST BIOPSY Left  benign   •  SECTION     • CHOLECYSTECTOMY     • COLONOSCOPY     • HYSTERECTOMY     • IR PICC PLACEMENT SINGLE LUMEN  2023   • KS LAPAROSCOPIC APPENDECTOMY N/A 2021    Procedure: APPENDECTOMY LAPAROSCOPIC;  Surgeon: Adrian Vallejo MD;  Location: [facility] MAIN OR;  Service: General   • KS LAPS ABD PRTM&OMENTUM DX W/WO SPEC BR/WA SPX N/A 2021    Procedure: LAPAROSCOPY DIAGNOSTIC, EXTENSIVE DESI;  Surgeon: Adrian Vallejo MD;  Location: [facility] MAIN OR;  Service: General   • TUBAL LIGATION     • UPPER GASTROINTESTINAL ENDOSCOPY         Current Outpatient Medications:   •  albuterol (PROVENTIL HFA,VENTOLIN HFA) 90 mcg/act inhaler, INHALE ONE PUFF BY MOUTH AND INTO THE LUNGS EVERY 6 HOURS AS NEEDED FOR WHEEZING, Disp: 36 g, Rfl: 3  •  Alcohol Swabs (Pharmacist Choice Alcohol) PADS, USE AS DIRECTED, Disp: 100 each, Rfl: 1  •  amitriptyline (ELAVIL) 25 mg tablet, Take 1 tablet (25 mg total) by mouth daily at bedtime, Disp: 30 tablet, Rfl: 0  •  Aspirin (ASPIR-81 PO), Take 81 mg by mouth, Disp: , Rfl:   •  atorvastatin (LIPITOR) 80 mg tablet, TAKE 1 TABLET (80 MG TOTAL) BY MOUTH DAILY AT BEDTIME, Disp: 30 tablet, Rfl: 6  •  B-D UF III MINI PEN NEEDLES 31G X 5 MM MISC, Pt uses 5 pen needles daily, Disp: 500 each, Rfl: 0  •  cholecalciferol (VITAMIN D3) 400 units tablet, TAKE ONE TABLET BY MOUTH DAILY, Disp: 90 tablet, Rfl: 1  •  citalopram (CeleXA) 40 mg tablet, Take 1 tablet (40 mg total) by mouth daily, Disp: 90 tablet, Rfl: 3  •  Continuous Blood Gluc  (FreeStyle Refugio 14 Day [place]) TEMITOPE, Use for continuous blood glucose monitoring, Disp: 1 each, Rfl: 0  •  Continuous Blood Gluc Sensor (FreeStyle Refugio 14 Day Sensor) MISC, Use one sensor every 14 days for continuous blood sugar monitoring., Disp: 6 each, Rfl: 3  •  docusate sodium (COLACE) 100 mg capsule, TAKE ONE CAPSULE BY MOUTH TWICE DAILY, Disp: 60 capsule, Rfl: 5  •  dulaglutide (Trulicity) 3 NF/6.8UR injection, Inject 0.5 mL (3 mg total) under the skin once a week, Disp: 2 mL, Rfl: 3  •  Empagliflozin (Jardiance) 25 MG TABS, Take 1 tablet (25 mg total) by mouth every morning, Disp: 90 tablet, Rfl: 1  •  ergocalciferol (VITAMIN D2) 50,000 units, Take 1 capsule (50,000 Units total) by mouth once a week, Disp: 8 capsule, Rfl: 1  •  famotidine (PEPCID) 40 MG tablet, Take 1 tablet (40 mg total) by mouth daily at bedtime Take in evening 2 hours prior to bed, Disp: 30 tablet, Rfl: 6  •  fenofibrate (TRICOR) 48 mg tablet, TAKE ONE TABLET BY MOUTH DAILY, Disp: 90 tablet, Rfl: 3  •  Insulin Glargine Solostar (Lantus SoloStar) 100 UNIT/ML SOPN, INJECT 45 UNITS EVERY TWELVE HOURS, Disp: 60 mL, Rfl: 0  •  insulin glulisine (Apidra SoloStar) 100 units/mL injection pen, Inject 12 units with breakfast and lunch, 14 units with dinner, with scale ISF 25 @ 150 as needed, Disp: 30 mL, Rfl: 1  •  Iron Heme Polypeptide 12 MG TABS, Take 1 tablet by mouth, Disp: , Rfl:   •  levETIRAcetam (KEPPRA) 500 mg tablet, TAKE ONE TABLET TWICE DAILY, Disp: 180 tablet, Rfl: 1  •  linaCLOtide (Linzess) 72 MCG CAPS, Take 1 capsule by mouth daily before breakfast, Disp: 30 capsule, Rfl: 0  •  [START ON 8/18/2023] linezolid (ZYVOX) 600 mg tablet, Take 600 mg by mouth every 12 (twelve) hours.  Indications: Infection caused by Enterococcus, Disp: , Rfl:   •  magnesium Oxide (MAG-OX) 400 mg TABS, Take 1 tablet (400 mg total) by mouth daily, Disp: 30 tablet, Rfl: 0  •  meclizine (ANTIVERT) 25 mg tablet, Take 1 tablet (25 mg total) by mouth 3 (three) times a day as needed for dizziness, Disp: 30 tablet, Rfl: 0  •  metoprolol tartrate (LOPRESSOR) 25 mg tablet, Take 1 tablet (25 mg total) by mouth 2 (two) times a day, Disp: 180 tablet, Rfl: 1  •  multivitamin (THERAGRAN) TABS, Take 1 tablet by mouth every morning, Disp: , Rfl:   •  nystatin (MYCOSTATIN) cream, Apply topically 2 (two) times a day, Disp: 30 g, Rfl: 0  •  omeprazole (PriLOSEC) 40 MG capsule, Take 1 capsule (40 mg total) by mouth daily, Disp: 90 capsule, Rfl: 3  •  ondansetron (ZOFRAN) 4 mg tablet, Take 1 tablet (4 mg total) by mouth every 8 (eight) hours as needed for nausea or vomiting, Disp: 20 tablet, Rfl: 0  •  OneTouch Ultra test strip, USE 4 TIMES A DAY, Disp: 400 each, Rfl: 0  •  Pharmacist Choice Lancets MISC, USE AS DIRECTED, Disp: 100 each, Rfl: 1  •  pregabalin (LYRICA) 100 mg capsule, TAKE ONE CAPSULE BY MOUTH THREE TIMES A DAY, Disp: 270 capsule, Rfl: 0  •  Restasis 0.05 % ophthalmic emulsion, , Disp: , Rfl:   •  scopolamine (TRANSDERM-SCOP) 1 mg/3 days TD 72 hr patch, Place 1 patch on the skin over 72 hours every third day, Disp: 24 patch, Rfl: 1  •  solifenacin (VESICARE) 5 mg tablet, Take 1 tablet (5 mg total) by mouth daily, Disp: 30 tablet, Rfl: 0  •  vancomycin 1,000 mg in sodium chloride 0.9 % 250 mL IVPB, Inject 1,000 mg into a catheter in a vein over 90 minutes at 166.7 mL/hr every 24 hours for 21 days, Disp: , Rfl: 0  Allergies   Allergen Reactions   • Butorphanol Hallucinations   • Benztropine    • Penicillins    • Zolpidem    • Azithromycin Hives and GI Intolerance         Labs:  Appointment on 08/15/2023   Component Date Value   • Phosphorus 08/15/2023 4.5    • PTH 08/15/2023 41.7    • Vit D, 25-Hydroxy 08/15/2023 33.3    • Sodium 08/15/2023 131 (L)    • Potassium 08/15/2023 4.8    • Chloride 08/15/2023 100    • CO2 08/15/2023 22    • ANION GAP 08/15/2023 9    • BUN 08/15/2023 30 (H)    • Creatinine 08/15/2023 1.46 (H)    • Glucose, Fasting 08/15/2023 780 (HH)    • Calcium 08/15/2023 9.5    • AST 08/15/2023 14    • ALT 08/15/2023 30    • Alkaline Phosphatase 08/15/2023 164 (H)    • Total Protein 08/15/2023 7.2    • Albumin 08/15/2023 3.5    • Total Bilirubin 08/15/2023 0.26    • eGFR 08/15/2023 41    • WBC 08/15/2023 8.53    • RBC 08/15/2023 4.57    • Hemoglobin 08/15/2023 11.8    • Hematocrit 08/15/2023 37.0    • MCV 08/15/2023 81 (L)    • MCH 08/15/2023 25.8 (L)    • MCHC 08/15/2023 31.9    • RDW 08/15/2023 14.7    • MPV 08/15/2023 11.3    • Platelets 15/11/5836 353    • nRBC 08/15/2023 0    • Neutrophils Relative 08/15/2023 53    • Immat GRANS % 08/15/2023 0    • Lymphocytes Relative 08/15/2023 31    • Monocytes Relative 08/15/2023 11    • Eosinophils Relative 08/15/2023 4    • Basophils Relative 08/15/2023 1    • Neutrophils Absolute 08/15/2023 4.56    • Immature Grans Absolute 08/15/2023 0.03    • Lymphocytes Absolute 08/15/2023 2.61    • Monocytes Absolute 08/15/2023 0.92    • Eosinophils Absolute 08/15/2023 0.36    • Basophils Absolute 08/15/2023 0.05    • Iron Saturation 08/15/2023 14 (L)    • TIBC 08/15/2023 337    • Iron 08/15/2023 47 (L)    • Ferritin 08/15/2023 39    Office Visit on 08/11/2023   Component Date Value   • Creatinine, Ur 08/11/2023 16.4    • Albumin,U,Random 08/11/2023 <5.0    • Albumin Creat Ratio 08/11/2023 <30    Ancillary Orders on 08/11/2023   Component Date Value   • Vancomycin Tr 08/15/2023 18.3    Orders Only on 08/02/2023   Component Date Value   • WBC 08/07/2023 6.33    • RBC 08/07/2023 4.12    • Hemoglobin 08/07/2023 11.0 (L)    • Hematocrit 08/07/2023 34.2 (L)    • MCV 08/07/2023 83    • MCH 08/07/2023 26.7 (L)    • MCHC 08/07/2023 32.2    • RDW 08/07/2023 15.4 (H)    • MPV 08/07/2023 10.4    • Platelets 29/14/4076 691 (H)    • nRBC 08/07/2023 0    • Neutrophils Relative 08/07/2023 59    • Immat GRANS % 08/07/2023 0    • Lymphocytes Relative 08/07/2023 29    • Monocytes Relative 08/07/2023 9    • Eosinophils Relative 08/07/2023 2    • Basophils Relative 08/07/2023 1    • Neutrophils Absolute 08/07/2023 3.70    • Immature Grans Absolute 08/07/2023 0.02    • Lymphocytes Absolute 08/07/2023 1.86    • Monocytes Absolute 08/07/2023 0.55    • Eosinophils Absolute 08/07/2023 0.14    • Basophils Absolute 08/07/2023 0.06    • Creatinine 08/07/2023 1.49 (H)    • eGFR 08/07/2023 40    • Vancomycin Tr 08/07/2023 11.9    No results displayed because visit has over 200 results. No results displayed because visit has over 200 results. Office Visit on 07/13/2023   Component Date Value   • Candida Species 07/13/2023 Negative    • Gardnerella vaginalis 07/13/2023 Negative    • Trichomonas vaginalis 07/13/2023 Negative    • Color, UA 07/13/2023 Light Yellow    • Clarity, UA 07/13/2023 Clear    • Specific Gravity, UA 07/13/2023 1.032 (H)    • pH, UA 07/13/2023 5.5    • Leukocytes, UA 07/13/2023 Elevated glucose may cause decreased leukocyte values.  See urine microscopic for UWBC result (A)    • Nitrite, UA 07/13/2023 Negative    • Protein, UA 07/13/2023 Negative    • Glucose, UA 07/13/2023 >=1000 (1%) (A)    • Ketones, UA 07/13/2023 Negative    • Urobilinogen, UA 07/13/2023 <2.0    • Bilirubin, UA 07/13/2023 Negative    • Occult Blood, UA 07/13/2023 Negative    • Urine Culture 07/13/2023 20,000-29,000 cfu/ml Beta Hemolytic Streptococcus Group B (A)    • LEUKOCYTE ESTERASE,UA 07/13/2023 Negative    • NITRITE,UA 07/13/2023 Negative    • SL AMB POCT UROBILINOGEN 07/13/2023 0.2 E.U./dL    • POCT URINE PROTEIN 07/13/2023 Negative    •  PH,UA 07/13/2023 5.0    • BLOOD,UA 07/13/2023 Negative    • SPECIFIC GRAVITY,UA 07/13/2023 1.015    • KETONES,UA 07/13/2023 Negative    • BILIRUBIN,UA 07/13/2023 Negative    • GLUCOSE, UA 07/13/2023 >=1000 mg/dL    •  COLOR,UA 07/13/2023 Yellow    • CLARITY,UA 07/13/2023 Clear    • RBC, UA 07/13/2023 1-2    • WBC, UA 07/13/2023 1-2    • Epithelial Cells 07/13/2023 Occasional    • Bacteria, UA 07/13/2023 None Seen    Admission on 07/10/2023, Discharged on 07/10/2023   Component Date Value   • POC Glucose 07/10/2023 461 (H)    • WBC 07/10/2023 10.20 (H)    • RBC 07/10/2023 5.35 (H)    • Hemoglobin 07/10/2023 13.9    • Hematocrit 07/10/2023 43.2    • MCV 07/10/2023 81 (L)    • MCH 07/10/2023 26.0 (L)    • MCHC 07/10/2023 32.2    • RDW 07/10/2023 14.2    • MPV 07/10/2023 11.4    • Platelets 36/48/4102 347    • nRBC 07/10/2023 0    • Neutrophils Relative 07/10/2023 74    • Immat GRANS % 07/10/2023 0    • Lymphocytes Relative 07/10/2023 19    • Monocytes Relative 07/10/2023 6    • Eosinophils Relative 07/10/2023 1    • Basophils Relative 07/10/2023 0    • Neutrophils Absolute 07/10/2023 7.53    • Immature Grans Absolute 07/10/2023 0.03    • Lymphocytes Absolute 07/10/2023 1.91    • Monocytes Absolute 07/10/2023 0.56    • Eosinophils Absolute 07/10/2023 0.14    • Basophils Absolute 07/10/2023 0.03    • Sodium 07/10/2023 134 (L)    • Potassium 07/10/2023 4.2    • Chloride 07/10/2023 95 (L)    • CO2 07/10/2023 28    • ANION GAP 07/10/2023 11    • BUN 07/10/2023 27 (H)    • Creatinine 07/10/2023 1.16    • Glucose 07/10/2023 402 (H)    • Calcium 07/10/2023 10.3 (H)    • eGFR 07/10/2023 54    • BETA-HYDROXYBUTYRATE 07/10/2023 0.9 (H)    • pH, Ovidio 07/10/2023 7.400    • pCO2, Ovidio 07/10/2023 46.0    • pO2, Ovidio 07/10/2023 38.2    • HCO3, Ovidio 07/10/2023 27.9    • Base Excess, Ovidio 07/10/2023 2.4    • O2 Content, Ovidio 07/10/2023 15.1    • O2 HGB, VENOUS 07/10/2023 71.8    • Color, UA 07/10/2023 Yellow    • Clarity, UA 07/10/2023 Clear    • Specific Gravity, UA 07/10/2023 1.010    • pH, UA 07/10/2023 7.0    • Leukocytes, UA 07/10/2023 Negative    • Nitrite, UA 07/10/2023 Negative    • Protein, UA 07/10/2023 Negative    • Glucose, UA 07/10/2023 3+ (A)    • Ketones, UA 07/10/2023 Trace (A)    • Urobilinogen, UA 07/10/2023 0.2    • Bilirubin, UA 07/10/2023 Negative    • Occult Blood, UA 07/10/2023 Negative    • POC Glucose 07/10/2023 475 (H)    • POC Glucose 07/10/2023 347 (H)    • POC Glucose 07/10/2023 202 (H)         Imaging: IR PICC line placement single lumen (preferred for home antibiotics/medications)    Result Date: 7/31/2023  Narrative: PICC - CENTRAL VENOUS CATHETER PLACEMENT HISTORY: . . MRSA bacteremia PROCEDURE: The patient was brought to the Interventional Radiology Suite and identified verbally and by wrist band. All elements of maximal sterile barrier technique, cap and mask and sterile gown and sterile gloves and sterile full-body drape and hand hygiene and 2% chlorhexidine for cutaneous antisepsis. Sterile ultrasound technique with sterile gel and sterile probe covers was also utilized. The right arm was surveyed with ultrasound to assess for vessel patency. Under local anesthesia and direct sonographic guidance, a 21 gauge needle was placed into the basilic vein. A static sonographic image was saved. This was followed by a .018 guidewire and a sheath and dilator tapered to . 018. A 4 [place] single lumen central venous catheter was advanced under fluoroscopic guidance to the superior vena cava. The position was confirmed fluoroscopically. Blood could be freely aspirated. Patient experienced no untoward reactions. Refer to Epic documentation for additional details. Fluoroscopy time: 0.4minutes Images: 1     Impression: Successful placement of a peripherally placed 4 [place] single lumen central venous catheter with its tip placed in the superior vena cava under fluoroscopic guidance. Workstation performed: [id number]     Echo complete w/ contrast if indicated    Result Date: 7/26/2023  Narrative: •  Left Ventricle: Left ventricular cavity size is normal. Wall thickness is normal. The left ventricular ejection fraction is 60%. Systolic function is normal. Wall motion is normal. Diastolic function is normal. •  Mitral Valve: There is mild regurgitation. •  Prior study date: 7/17/2023.  No significant changes noted compared to the prior study. •  No vegetation is seen. Frann Ponce VAS upper limb venous duplex scan, unilateral/limited    Result Date: 7/25/2023  Narrative:  [facility] REPORT CLINICAL: Indications:  Patient with recent hospital discharge presents with left upper extremity pain and swelling. Lump and discharge at previous IV site. Operative History: No cardiovascular surgeries Risk Factors The patient has history of HTN, Diabetes and Hyperlipidemia. FINDINGS:  Left      Impression      Brachial  Not Visualized     CONCLUSION:  Impression RIGHT UPPER LIMB LIMITED: Evaluation shows no evidence of thrombus in the internal jugular vein, subclavian vein, and the innominate vein. LEFT UPPER LIMB: No evidence of acute or chronic deep vein thrombosis. No evidence of superficial thrombophlebitis noted. Doppler evaluation shows a normal response to augmentation maneuvers. Technically difficult/limited study due to patient pain tolerance, limited range of motion. SIGNATURE: Electronically Signed by: Malu Rueda on 2023-07-25 06:30:37 PM    US extremity soft tissue    Result Date: 7/25/2023  Narrative: ULTRASOUND INDICATION:   rule out abscess. COMPARISON: CT upper extremity 7/25/2023. TECHNIQUE:   Real-time ultrasound of the left upper arm/antecubital fossa was performed with a linear array transducer with both volumetric sweeps and still imaging techniques. FINDINGS: Focal ill-defined complex-appearing hypoechoic area in the antecubital subcutaneous tissues measuring 3.5 x 2.9 x 1.4 cm most likely reflecting edema and/or inflammatory phlegmon, with less complex appearing edema peripheral to this extending into the subcutaneous fat. No discrete fluid collection identified to suggest an abscess. .     Impression: Complex appearing hypoechoic region with surrounding edema noted in the subcutaneous tissue likely indicative of inflammatory phlegmon and/or cellulitis. No discrete abscess. Frann Ponce Resident: Katie Metz, the attending radiologist, have reviewed the images and agree with the final report above. Workstation performed: [id number]     VAS lower limb venous duplex study, complete bilateral    Result Date: 7/25/2023  Narrative:  [facility] REPORT CLINICAL: Indications: Patient presents with bilateral calf swelling and pain. Risk Factors The patient has history of HTN, Diabetes and Hyperlipidemia. CONCLUSION:  Impression: RIGHT LOWER LIMB: No evidence of acute or chronic deep vein thrombosis. No evidence of superficial thrombophlebitis noted. Doppler evaluation shows a normal response to augmentation maneuvers. . Popliteal, posterior tibial and anterior tibial arterial Doppler waveform's are triphasic. LEFT LOWER LIMB: No evidence of acute or chronic deep vein thrombosis. No evidence of superficial thrombophlebitis noted. Doppler evaluation shows a normal response to augmentation maneuvers. Popliteal, posterior tibial and anterior tibial arterial Doppler waveform's are triphasic. SIGNATURE: Electronically Signed by: Reinhold Buerger on 2023-07-25 01:26:54 PM    XR chest 1 view portable    Result Date: 7/25/2023  Narrative: CHEST INDICATION:   sob. COMPARISON: Chest x-ray 11/14/2022 EXAM PERFORMED/VIEWS:  XR CHEST PORTABLE FINDINGS: Cardiomediastinal silhouette appears unremarkable. New small focus of consolidation in the left upper lobe medially. No pneumothorax or pleural effusion. Osseous structures appear within normal limits for patient age. Impression: New small focus of consolidation in the left upper lobe medially. This may represent atelectasis or pneumonia. Recommend follow-up chest x-ray to ensure resolution within 3 to 4 weeks. The study was marked in [facility] for immediate notification.  Workstation performed: [id number]     CT upper extremity wo contrast left    Result Date: 7/25/2023  Narrative: CT left upper extremity without IV contrast INDICATION: severe pain, swelling, cellulitis. COMPARISON: None. TECHNIQUE: CT examination of the above was performed. This examination, like all CT scans performed in the [facility], was performed utilizing techniques to minimize radiation dose exposure, including the use of iterative reconstruction and automated exposure control software. Multiplanar 2D reformatted images were created from the source data. Rad dose  1783.57 mGy-cm FINDINGS: OSSEOUS STRUCTURES:  No fracture, dislocation or destructive osseous lesion. VISUALIZED MUSCULATURE: See below. SOFT TISSUES: Scattered subcutaneous edema is seen throughout the upper extremity, noted most focally along the volar aspect of the wrist. No organized collection despite unenhanced technique. Soft tissue edema is present surrounding the fascial borders of the upper arm, just superficial to the biceps as noted on series 3/70 OTHER PERTINENT FINDINGS: Airspace opacity in the left upper lobe, stable. Impression: 1. Edema surrounding the biceps muscle, may represent myositis 2. Additional scattered areas of nonspecific edema as described 3. Stable left lung findings Workstation performed: [id number]     CTA ED chest PE study    Result Date: 7/24/2023  Narrative: CTA - CHEST WITH IV CONTRAST - PULMONARY ANGIOGRAM INDICATION:   chest pain, clinical S/s of DVT. elevated d-dimer. COMPARISON: None. TECHNIQUE: CTA examination of the chest was performed using angiographic technique according to a protocol specifically tailored to evaluate for pulmonary embolism. Multiplanar 2D reformatted images were created from the source data. In addition, coronal 3D MIP postprocessing was performed on the acquisition scanner. Radiation dose length product (DLP) for this visit:  523.72 mGy-cm .   This examination, like all CT scans performed in the [facility], was performed utilizing techniques to minimize radiation dose exposure, including the use of iterative reconstruction and automated exposure control. IV Contrast:  85 mL of iohexol (OMNIPAQUE) FINDINGS: PULMONARY ARTERIAL TREE:  No pulmonary embolus is seen. LUNGS: There is a small patchy area of consolidation noted at the anterior left upper lobe suspicious for an infectious or inflammatory process. There is a poor inspiratory effort with bilateral atelectatic lung changes. There is no pleural effusion or pneumothorax. There is no tracheal or endobronchial lesion. PLEURA:  Unremarkable. HEART/GREAT VESSELS:  Unremarkable for patient's age. No thoracic aortic aneurysm. MEDIASTINUM AND GUERO:  Unremarkable. CHEST WALL AND LOWER NECK:   Unremarkable. VISUALIZED STRUCTURES IN THE UPPER ABDOMEN: The gallbladder is surgically absent. There is an exophytic right renal upper pole cyst measuring 15 mm. OSSEOUS STRUCTURES:  No acute fracture or destructive osseous lesion. Impression: No intraluminal filling defect to suggest an acute pulmonary embolus. Small focal patchy consolidation noted at the anterior left upper lobe suspicious for an infectious or inflammatory process. Correlation with the patient's symptoms is recommended. Consider a repeat CT chest in 6 to 8 weeks to assess for improvement/resolution and exclude underlying lesion. Workstation performed: [id number]       Review of Systems:  Review of Systems   Constitutional: Negative for chills, diaphoresis, fatigue and fever. HENT: Negative for trouble swallowing and voice change. Eyes: Negative for pain and redness. Respiratory: Negative for shortness of breath and wheezing. Cardiovascular: Negative for chest pain, palpitations and leg swelling. Gastrointestinal: Negative for abdominal pain, constipation, diarrhea, nausea and vomiting. Genitourinary: Negative for dysuria. Musculoskeletal: Positive for arthralgias. Negative for neck pain and neck stiffness. Neurological: Negative for dizziness, syncope, light-headedness and headaches. Psychiatric/Behavioral: Negative for agitation and confusion. All other systems reviewed and are negative. Vitals:    08/16/23 1258   BP: 126/80   BP Location: Left arm   Patient Position: Sitting   Cuff Size: Standard   Pulse: 72   Resp: 20   Temp: 98 °F (36.7 °C)   SpO2: 97%   Weight: 86.3 kg (190 lb 3.2 oz)   Height: 5' 1" (1.549 m)     Vitals:    08/16/23 1258   Weight: 86.3 kg (190 lb 3.2 oz)     Height: 5' 1" (154.9 cm)     Physical Exam:  Physical Exam  Vitals reviewed. Constitutional:       General: She is not in acute distress. Appearance: She is obese. She is not diaphoretic. HENT:      Head: Normocephalic and atraumatic. Eyes:      General:         Right eye: No discharge. Left eye: No discharge. Neck:      Comments: Trachea midline, no JVD present  Cardiovascular:      Rate and Rhythm: Normal rate and regular rhythm. Heart sounds: No friction rub. Pulmonary:      Effort: Pulmonary effort is normal. No respiratory distress. Breath sounds: No wheezing. Chest:      Chest wall: No tenderness. Abdominal:      General: Bowel sounds are normal.      Palpations: Abdomen is soft. Tenderness: There is no abdominal tenderness. There is no rebound. Musculoskeletal:      Right lower leg: No edema. Left lower leg: No edema. Comments: Right upper extremity PICC line in place. Bandage over left upper extremity wound which patient notes is healed   Skin:     General: Skin is warm and dry. Neurological:      Mental Status: She is alert. Comments: Awake, alert, able to answer questions appropriately, able to move extremities bilaterally.    Psychiatric:         Mood and Affect: Mood normal.         Behavior: Behavior normal.

## 2023-08-16 NOTE — TELEPHONE ENCOUNTER
Lynda Orona is home after recent hospital stay for MRSA bacteremia. Briefly discussed hyperglycemia trends, currently BG > 400, yesterday was > 700 on outpatient labs. She is asymptomatic from a BG perspective. Appetite good. Feels better compared to recent hospitalization. Ultimately recommended she come in for formal visit tomorrow 8/17 at 3:30pm.    Recommended increase in Apidra dosing from 14 units TID AC to 18 units TID AC plus continue correction scale as needed, ISF 25 @ 150. Will continue Lantus 45 units BID for now.

## 2023-08-16 NOTE — TELEPHONE ENCOUNTER
Contacted pt back and informed her per  she should hold tomorrows dose and she is done with Vancomycin and she will start PO abx linezolid  8/18.     Pt confirms that she picked up PO abx and will start 8/18    Pt states bottle says take for five days and is wondering if this is correct

## 2023-08-16 NOTE — TELEPHONE ENCOUNTER
Patient called and asked to speak to you about her lab results I told her they were for her cardiologist but she wants to speak to you

## 2023-08-16 NOTE — TELEPHONE ENCOUNTER
Contacted pt to inform her to hold vancomycin dose for today.  Pt states she already dosed around 11:30 12pm.     Informed her I will call her back with further advice

## 2023-08-17 ENCOUNTER — OFFICE VISIT (OUTPATIENT)
Dept: ENDOCRINOLOGY | Facility: CLINIC | Age: 53
End: 2023-08-17
Payer: COMMERCIAL

## 2023-08-17 VITALS
DIASTOLIC BLOOD PRESSURE: 84 MMHG | SYSTOLIC BLOOD PRESSURE: 138 MMHG | HEIGHT: 61 IN | HEART RATE: 85 BPM | WEIGHT: 194 LBS | BODY MASS INDEX: 36.63 KG/M2

## 2023-08-17 DIAGNOSIS — E11.65 TYPE 2 DIABETES MELLITUS WITH HYPERGLYCEMIA, WITH LONG-TERM CURRENT USE OF INSULIN (HCC): Primary | Chronic | ICD-10-CM

## 2023-08-17 DIAGNOSIS — Z79.4 TYPE 2 DIABETES MELLITUS WITH HYPERGLYCEMIA, WITH LONG-TERM CURRENT USE OF INSULIN (HCC): Primary | Chronic | ICD-10-CM

## 2023-08-17 LAB
C TRACH DNA SPEC QL NAA+PROBE: NEGATIVE
N GONORRHOEA DNA SPEC QL NAA+PROBE: NEGATIVE

## 2023-08-17 PROCEDURE — 99215 OFFICE O/P EST HI 40 MIN: CPT | Performed by: PHYSICIAN ASSISTANT

## 2023-08-17 NOTE — PROGRESS NOTES
Anita Martinez 46 y.o. female MRN: [id number]    Encounter: [id number]      Assessment/Plan   Problem List Items Addressed This Visit        Endocrine    Type 2 diabetes mellitus with hyperglycemia, with long-term current use of insulin ([address]) - Primary (Chronic)       Lab Results   Component Value Date    HGBA1C 12.7 (H) 07/25/2023 ·   Ongoing hyperglycemia since hospital discharge, but with subjective improvement. Unfortunately with limited time to improve BG prior to vacation tomorrow. · Discussed importance of striving for glycemic control to help aid in recovery from bacteremia. At risk for recurrent infection should BG remain uncontrolled. · Pending CMP from nephrology - encouraged to obtain ASAP, prior to departure for vacation to ensure improvement in BG, stability and hopeful improvement of renal function. · Pharmacotherapy adjustments - will focus on ongoing basal-bolus intensification today to include: increase in Lantus from 45 units BID to 50 units BID,  increase in Apidra from 18 to 22 units TID AC, with continuing scale coverage ISF 25 @ 150. · May consider intensification of GLP1 in near future, but to continue Trulicity at 1.1XD weekly dose for now, as well as Jardiance at 25mg daily. · Will follow up CGM report in 1 week (notification set). · Short interval follow up recommended - 6 weeks, to line up with return from vacations x 2.   · Confirmed she will be available via cell phone during cruises, and that she is to have a low threshold to call us for seeking advice if ongoing BG > 300. I have spent a total time of 50 minutes on 08/17/23 in caring for this patient including Diagnostic results, Instructions for management, Patient and family education, Importance of tx compliance, Counseling / Coordination of care, Documenting in the medical record, Reviewing / ordering tests, medicine, procedures   and Obtaining or reviewing history  .       CC: Diabetes  History of Present Illness     HPI:  Tod Jacobo is here uncontrolled type 2 DM follow up due to trouble with hyperglycemia recently. She had recent hospitalization for MRSA bacteremia due to RUE cellulitis, abscess 7/24 - 8/1/23. She was treated with IV vancomycin, recently transitioned to linezolid, to be continued for 2 more weeks. Regarding DM treatment during hospitalization, she was initially on an insulin drip, transitioned to basal-bolus insulin prior to discharge. It appears just prior to discharge she was receiving Lantus 35 units BID, as well as Novolog at doses of 15 - 19 units TID AC (including correction). BG varied from 186 - 346 in the 48 hours prior to discharge. Since discharge home she has struggled with ongoing hyperglycemia. She started taking Lantus 45 units BID (titrated herself) as well as Novolog 12 units TID AC plus scale ISF 25 @ 150. BG became worse over the past several days. On outpatient follow up labs 8/15/23, random serum BG was > 700, with fingerstick BG ranging between 300-400. She notified our office yesterday of difficult to manage BG, and we initially recommended intensification of her prandial insulin regimen. Apidra was increased from 12 units to 18 units TID AC plus scale ISF 25 @ 150. Confirmed she took 20 units with dinner yesterday evening, 20 units with breakfast this morning. Since insulin adjustment yesterday she has noticed some improvement in BG, reports BG fasting was 200 this morning. Fingerstick BG in office today however is 376. She feels well overall, denies N/V. No polydipsia, polyuria. Stable appetite. Unfortunately she ran out of [employer] 2 sensors several weeks ago, and has been checking BG via glucometer TID AC and HS since her hospital discharge. Tod Jacobo is leaving for a cruise early tomorrow morning, 2 weeks in duration, with another 2 week cruise planned in early September. Review of Systems   Constitutional: Negative for chills and fever.    HENT: Negative for ear pain and sore throat. Eyes: Negative for pain and visual disturbance. Respiratory: Negative for cough and shortness of breath. Cardiovascular: Negative for chest pain and palpitations. Gastrointestinal: Negative for abdominal pain and vomiting. Genitourinary: Negative for dysuria and hematuria. Musculoskeletal: Negative for arthralgias and back pain. Skin: Negative for color change and rash. Neurological: Negative for seizures and syncope. All other systems reviewed and are negative.       Historical Information   Past Medical History:   Diagnosis Date   • Asthma    • Atypical chest pain    • Depression    • Diabetes mellitus (HCC)    • Gastroparesis    • GERD (gastroesophageal reflux disease)    • Hyperlipidemia    • Hypertension    • Psychiatric disorder    • Renal disorder    • Sciatica    • Seizure disorder ([address])    • Stroke ([address])    • TIA (transient ischemic attack)      Past Surgical History:   Procedure Laterality Date   • APPENDECTOMY     • BREAST BIOPSY Left  benign   •  SECTION     • CHOLECYSTECTOMY     • COLONOSCOPY     • HYSTERECTOMY     • IR PICC PLACEMENT SINGLE LUMEN  2023   • [place] LAPAROSCOPIC APPENDECTOMY N/A 2021    Procedure: APPENDECTOMY LAPAROSCOPIC;  Surgeon: Marylen Delay, MD;  Location: [address] [place] [address] OR;  Service: General   • [place] LAPS ABD PRTM&OMENTUM DX W/WO SPEC BR/WA SPX N/A 2021    Procedure: LAPAROSCOPY DIAGNOSTIC, EXTENSIVE DESI;  Surgeon: Marylen Delay, MD;  Location: [address] [place] [address] OR;  Service: General   • TUBAL LIGATION     • UPPER GASTROINTESTINAL ENDOSCOPY       Social History   Social History     Substance and Sexual Activity   Alcohol Use Never     Social History     Substance and Sexual Activity   Drug Use Never     Social History     Tobacco Use   Smoking Status Former   • Types: Cigarettes   • Quit date:    • Years since quittin.6   Smokeless Tobacco Never     Family History:   Family History   Problem Relation Age of Onset   • No Known Problems Mother    • No Known Problems Father    • No Known Problems Daughter    • No Known Problems Maternal Grandmother    • No Known Problems Paternal Grandmother    • No Known Problems Paternal Aunt    • No Known Problems Paternal Aunt    • No Known Problems Paternal Aunt        Meds/Allergies   Current Outpatient Medications   Medication Sig Dispense Refill   • albuterol (PROVENTIL HFA,VENTOLIN HFA) 90 mcg/act inhaler INHALE ONE PUFF BY MOUTH AND INTO THE LUNGS EVERY 6 HOURS AS NEEDED FOR WHEEZING 36 g 3   • Alcohol Swabs (Pharmacist Choice Alcohol) PADS USE AS DIRECTED 100 each 1   • amitriptyline (ELAVIL) 25 mg tablet Take 1 tablet (25 mg total) by mouth daily at bedtime 30 tablet 0   • Aspirin (ASPIR-81 PO) Take 81 mg by mouth     • atorvastatin (LIPITOR) 80 mg tablet TAKE 1 TABLET (80 MG TOTAL) BY MOUTH DAILY AT BEDTIME 30 tablet 6   • B-D UF III MINI PEN NEEDLES 31G X 5 MM MISC Pt uses 5 pen needles daily 500 each 0   • cholecalciferol (VITAMIN D3) 400 units tablet TAKE ONE TABLET BY MOUTH DAILY 90 tablet 1   • citalopram (CeleXA) 40 mg tablet Take 1 tablet (40 mg total) by mouth daily 90 tablet 3   • Continuous Blood Gluc  (FreeStyle [place]ugio 14 Day [place]) TEMITOPE Use for continuous blood glucose monitoring 1 each 0   • Continuous Blood Gluc Sensor (FreeStyle Refugio 14 Day Sensor) MISC Use one sensor every 14 days for continuous blood sugar monitoring.  6 each 3   • docusate sodium (COLACE) 100 mg capsule TAKE ONE CAPSULE BY MOUTH TWICE DAILY 60 capsule 5   • dulaglutide (Trulicity) 3 UJ/2.5JU injection Inject 0.5 mL (3 mg total) under the skin once a week 2 mL 3   • Empagliflozin (Jardiance) 25 MG TABS Take 1 tablet (25 mg total) by mouth every morning 90 tablet 1   • ergocalciferol (VITAMIN D2) 50,000 units Take 1 capsule (50,000 Units total) by mouth once a week 8 capsule 1   • famotidine (PEPCID) 40 MG tablet Take 1 tablet (40 mg total) by mouth daily at bedtime Take in evening 2 hours prior to bed 30 tablet 6   • fenofibrate (TRICOR) 48 mg tablet TAKE ONE TABLET BY MOUTH DAILY 90 tablet 3   • Insulin Glargine Solostar (Lantus SoloStar) 100 UNIT/ML SOPN INJECT 45 UNITS EVERY TWELVE HOURS 60 mL 0   • insulin glulisine (Apidra SoloStar) 100 units/mL injection pen Inject 12 units with breakfast and lunch, 14 units with dinner, with scale ISF 25 @ 150 as needed (Patient taking differently: Inject 14 units with breakfast and lunch, 14 units with dinner, with scale ISF 25 @ 150 as needed) 30 mL 1   • Iron Heme Polypeptide 12 MG TABS Take 1 tablet by mouth     • levETIRAcetam (KEPPRA) 500 mg tablet TAKE ONE TABLET TWICE DAILY 180 tablet 1   • linaCLOtide (Linzess) 72 MCG CAPS Take 1 capsule by mouth daily before breakfast 30 capsule 0   • [START ON 8/18/2023] linezolid (ZYVOX) 600 mg tablet Take 600 mg by mouth every 12 (twelve) hours.  Indications: Infection caused by Enterococcus     • magnesium Oxide (MAG-OX) 400 mg TABS Take 1 tablet (400 mg total) by mouth daily 30 tablet 0   • meclizine (ANTIVERT) 25 mg tablet Take 1 tablet (25 mg total) by mouth 3 (three) times a day as needed for dizziness 30 tablet 0   • metoprolol tartrate (LOPRESSOR) 25 mg tablet Take 1 tablet (25 mg total) by mouth 2 (two) times a day 180 tablet 1   • multivitamin (THERAGRAN) TABS Take 1 tablet by mouth every morning     • nystatin (MYCOSTATIN) cream Apply topically 2 (two) times a day 30 g 0   • omeprazole (PriLOSEC) 40 MG capsule Take 1 capsule (40 mg total) by mouth daily 90 capsule 3   • ondansetron (ZOFRAN) 4 mg tablet Take 1 tablet (4 mg total) by mouth every 8 (eight) hours as needed for nausea or vomiting 20 tablet 0   • OneTouch Ultra test strip USE 4 TIMES A  each 0   • Pharmacist Choice Lancets MISC USE AS DIRECTED 100 each 1   • pregabalin (LYRICA) 100 mg capsule TAKE ONE CAPSULE BY MOUTH THREE TIMES A  capsule 0   • Restasis 0.05 % ophthalmic emulsion      • scopolamine (TRANSDERM-SCOP) 1 mg/3 days TD 72 hr patch Place 1 patch on the skin over 72 hours every third day 24 patch 1   • solifenacin (VESICARE) 5 mg tablet Take 1 tablet (5 mg total) by mouth daily 30 tablet 0   • vancomycin 1,000 mg in sodium chloride 0.9 % 250 mL IVPB Inject 1,000 mg into a catheter in a vein over 90 minutes at 166.7 mL/hr every 24 hours for 21 days  0     No current facility-administered medications for this visit. Allergies   Allergen Reactions   • Butorphanol Hallucinations   • Benztropine    • Penicillins    • Zolpidem    • Azithromycin Hives and GI Intolerance       Objective   Vitals: Blood pressure 138/84, pulse 85, height 5' 1" (1.549 m), weight 88 kg (194 lb), not currently breastfeeding. Physical Exam    The history was obtained from the review of the chart, patient.     Lab Results:   Lab Results   Component Value Date/Time    Hemoglobin A1C 12.7 (H) 07/25/2023 12:52 AM    Hemoglobin A1C 12.6 (H) 07/14/2023 11:22 PM    Hemoglobin A1C 12.5 (H) 03/25/2023 09:01 AM    WBC 8.53 08/15/2023 08:28 AM    WBC 6.33 08/07/2023 11:45 AM    WBC 11.11 (H) 08/01/2023 04:51 AM    Hemoglobin 11.8 08/15/2023 08:28 AM    Hemoglobin 11.0 (L) 08/07/2023 11:45 AM    Hemoglobin 10.3 (L) 08/01/2023 04:51 AM    Hematocrit 37.0 08/15/2023 08:28 AM    Hematocrit 34.2 (L) 08/07/2023 11:45 AM    Hematocrit 33.2 (L) 08/01/2023 04:51 AM    MCV 81 (L) 08/15/2023 08:28 AM    MCV 83 08/07/2023 11:45 AM    MCV 85 08/01/2023 04:51 AM    Platelets 815 80/02/9173 08:28 AM    Platelets 642 (H) 21/85/6273 11:45 AM    Platelets 746 (H) 83/13/1721 04:51 AM    BUN 30 (H) 08/15/2023 08:28 AM    BUN 30 (H) 08/01/2023 04:51 AM    BUN 27 (H) 07/31/2023 04:54 AM    Potassium 4.8 08/15/2023 08:28 AM    Potassium 4.2 08/01/2023 04:51 AM    Potassium 4.2 07/31/2023 04:54 AM    Chloride 100 08/15/2023 08:28 AM    Chloride 100 08/01/2023 04:51 AM    Chloride 102 07/31/2023 04:54 AM    CO2 22 08/15/2023 08:28 AM    CO2 29 08/01/2023 04:51 AM    CO2 27 07/31/2023 04:54 AM    Creatinine 1.46 (H) 08/15/2023 08:28 AM    Creatinine 1.49 (H) 08/07/2023 11:45 AM    Creatinine 1.04 08/01/2023 04:51 AM    AST 14 08/15/2023 08:28 AM    AST 46 (H) 07/31/2023 04:54 AM    AST 18 07/26/2023 04:23 AM    ALT 30 08/15/2023 08:28 AM    ALT 52 07/31/2023 04:54 AM    ALT 33 07/26/2023 04:23 AM    Total Protein 7.2 08/15/2023 08:28 AM    Total Protein 6.2 (L) 07/31/2023 04:54 AM    Total Protein 5.9 (L) 07/26/2023 04:23 AM    Albumin 3.5 08/15/2023 08:28 AM    Albumin 3.0 (L) 07/31/2023 04:54 AM    Albumin 2.9 (L) 07/26/2023 04:23 AM    HDL, Direct 47 (L) 07/14/2023 11:22 PM    HDL, Direct 46 (L) 03/25/2023 09:01 AM    HDL, Direct 49 (L) 08/30/2022 08:27 AM    Triglycerides 446 (H) 07/14/2023 11:22 PM    Triglycerides 311 (H) 03/25/2023 09:01 AM    Triglycerides 243 (H) 08/30/2022 08:27 AM           Imaging Studies: n/a    Portions of the record may have been created with voice recognition software. Occasional wrong word or "sound a like" substitutions may have occurred due to the inherent limitations of voice recognition software. Read the chart carefully and recognize, using context, where substitutions have occurred.

## 2023-08-18 NOTE — ASSESSMENT & PLAN NOTE
Lab Results   Component Value Date    HGBA1C 12.7 (H) 07/25/2023   · Ongoing hyperglycemia since hospital discharge, but with subjective improvement. Unfortunately with limited time to improve BG prior to vacation tomorrow. · Discussed importance of striving for glycemic control to help aid in recovery from bacteremia. At risk for recurrent infection should BG remain uncontrolled. · Pending CMP from nephrology - encouraged to obtain ASAP, prior to departure for vacation to ensure improvement in BG, stability and hopeful improvement of renal function. · Pharmacotherapy adjustments - will focus on ongoing basal-bolus intensification today to include: increase in Lantus from 45 units BID to 50 units BID,  increase in Apidra from 18 to 22 units TID AC, with continuing scale coverage ISF 25 @ 150. · May consider intensification of GLP1 in near future, but to continue Trulicity at 7.4NV weekly dose for now, as well as Jardiance at 25mg daily. · Will follow up CGM report in 1 week (notification set). · Short interval follow up recommended - 6 weeks, to line up with return from vacations x 2.   · Confirmed she will be available via cell phone during cruises, and that she is to have a low threshold to call us for seeking advice if ongoing BG > 300.

## 2023-08-26 DIAGNOSIS — E11.59 HYPERTENSION ASSOCIATED WITH DIABETES (HCC): ICD-10-CM

## 2023-08-26 DIAGNOSIS — I15.2 HYPERTENSION ASSOCIATED WITH DIABETES (HCC): ICD-10-CM

## 2023-08-31 ENCOUNTER — TELEPHONE (OUTPATIENT)
Dept: ENDOCRINOLOGY | Facility: CLINIC | Age: 53
End: 2023-08-31

## 2023-08-31 DIAGNOSIS — Z79.4 TYPE 2 DIABETES MELLITUS WITH DIABETIC AUTONOMIC NEUROPATHY, WITH LONG-TERM CURRENT USE OF INSULIN (HCC): ICD-10-CM

## 2023-08-31 DIAGNOSIS — E11.43 TYPE 2 DIABETES MELLITUS WITH DIABETIC AUTONOMIC NEUROPATHY, WITH LONG-TERM CURRENT USE OF INSULIN (HCC): ICD-10-CM

## 2023-08-31 RX ORDER — INSULIN GLULISINE 100 [IU]/ML
INJECTION, SOLUTION SUBCUTANEOUS
Qty: 30 ML | Refills: 1
Start: 2023-08-31

## 2023-08-31 NOTE — TELEPHONE ENCOUNTER
Shoaib Quijano is currently on her first cruise. CGM reports average BG of 238mg/dL (GMI 9.0%) which is an improvement compared to earlier this month, but still not at our goal. Her BG trends reflect hyperglycemia starting after breakfast, slowly increasing as day progresses. Fasting BG is better controlled, average around 160-180. Insulin doing review:  Lantus 50 units BID  Apidra 22 units TID AC    Recommended up-titration of Apidra from 22-22-22 to 22-24-26 to better reflect diet habits, with breakfast being smaller meal, followed by lunch, then dinner. She may also continue scale coverage ISF 25 @ 150. Will follow up CGM report in approximately 2 weeks.

## 2023-09-05 DIAGNOSIS — E11.65 TYPE 2 DIABETES MELLITUS WITH HYPERGLYCEMIA, WITH LONG-TERM CURRENT USE OF INSULIN (HCC): ICD-10-CM

## 2023-09-05 DIAGNOSIS — F31.9 BIPOLAR AFFECTIVE DISORDER, REMISSION STATUS UNSPECIFIED (HCC): ICD-10-CM

## 2023-09-05 DIAGNOSIS — N32.81 OAB (OVERACTIVE BLADDER): ICD-10-CM

## 2023-09-05 DIAGNOSIS — Z79.4 TYPE 2 DIABETES MELLITUS WITH HYPERGLYCEMIA, WITH LONG-TERM CURRENT USE OF INSULIN (HCC): ICD-10-CM

## 2023-09-05 DIAGNOSIS — K59.01 SLOW TRANSIT CONSTIPATION: ICD-10-CM

## 2023-09-05 DIAGNOSIS — E55.9 VITAMIN D DEFICIENCY: ICD-10-CM

## 2023-09-05 RX ORDER — LINACLOTIDE 72 UG/1
1 CAPSULE, GELATIN COATED ORAL
Qty: 30 CAPSULE | Refills: 0 | Status: SHIPPED | OUTPATIENT
Start: 2023-09-05

## 2023-09-05 RX ORDER — ISOPROPYL ALCOHOL 0.7 ML/ML
SWAB TOPICAL 4 TIMES DAILY
Qty: 300 EACH | Refills: 5 | Status: SHIPPED | OUTPATIENT
Start: 2023-09-05

## 2023-09-05 RX ORDER — CITALOPRAM 40 MG/1
40 TABLET ORAL DAILY
Qty: 90 TABLET | Refills: 0 | Status: SHIPPED | OUTPATIENT
Start: 2023-09-05

## 2023-09-05 RX ORDER — SOLIFENACIN SUCCINATE 5 MG/1
5 TABLET, FILM COATED ORAL DAILY
Qty: 90 TABLET | Refills: 1 | Status: SHIPPED | OUTPATIENT
Start: 2023-09-05

## 2023-09-05 RX ORDER — ERGOCALCIFEROL 1.25 MG/1
50000 CAPSULE ORAL WEEKLY
Qty: 8 CAPSULE | Refills: 0 | Status: CANCELLED | OUTPATIENT
Start: 2023-09-05

## 2023-09-05 RX ORDER — MELATONIN
2000 DAILY
Qty: 60 TABLET | Refills: 3 | Status: SHIPPED | OUTPATIENT
Start: 2023-09-05

## 2023-09-05 NOTE — TELEPHONE ENCOUNTER
Patient requesting refill(s) of:  solifenacin (VESICARE) 5 mg tablet   Last filled:8/14/23  Last appt:8/11/23  Next appt:9/7/23  Pharmacy: [address] [place] [address], [address] [place] [address]

## 2023-09-05 NOTE — TELEPHONE ENCOUNTER
Patient requesting refill(s) of:  Alcohol Swabs (Pharmacist Choice Alcohol) PADS   Last filled:1/23/23  Pharmacy: [address] [place] [address]    Patient requesting refill(s) of:  citalopram (CeleXA) 40 mg tablet   Last filled:5/11/23

## 2023-09-05 NOTE — TELEPHONE ENCOUNTER
Requested medication(s) are due for refill today: Yes  Patient has already received a courtesy refill: No  Other reason request has been forwarded to provider: do you still want her on high dose of vitamin D

## 2023-09-06 NOTE — TELEPHONE ENCOUNTER
She should be able to take maintenance dose at this point. Recommend Vitamin D3 2,000 units daily. Will send in Rx, but insurance may not cover as it is also available over the counter.

## 2023-09-07 ENCOUNTER — OFFICE VISIT (OUTPATIENT)
Dept: FAMILY MEDICINE CLINIC | Facility: CLINIC | Age: 53
End: 2023-09-07
Payer: COMMERCIAL

## 2023-09-07 VITALS
DIASTOLIC BLOOD PRESSURE: 74 MMHG | BODY MASS INDEX: 37 KG/M2 | RESPIRATION RATE: 20 BRPM | SYSTOLIC BLOOD PRESSURE: 128 MMHG | HEIGHT: 61 IN | TEMPERATURE: 98.4 F | WEIGHT: 196 LBS | HEART RATE: 82 BPM | OXYGEN SATURATION: 98 %

## 2023-09-07 DIAGNOSIS — F41.8 DEPRESSION WITH ANXIETY: Chronic | ICD-10-CM

## 2023-09-07 DIAGNOSIS — Z00.00 ANNUAL PHYSICAL EXAM: Primary | ICD-10-CM

## 2023-09-07 DIAGNOSIS — E11.65 TYPE 2 DIABETES MELLITUS WITH HYPERGLYCEMIA, WITH LONG-TERM CURRENT USE OF INSULIN (HCC): ICD-10-CM

## 2023-09-07 DIAGNOSIS — K21.9 GASTROESOPHAGEAL REFLUX DISEASE WITHOUT ESOPHAGITIS: Chronic | ICD-10-CM

## 2023-09-07 DIAGNOSIS — E55.9 VITAMIN D DEFICIENCY: ICD-10-CM

## 2023-09-07 DIAGNOSIS — E11.42 DIABETIC POLYNEUROPATHY ASSOCIATED WITH TYPE 2 DIABETES MELLITUS (HCC): ICD-10-CM

## 2023-09-07 DIAGNOSIS — Z79.4 TYPE 2 DIABETES MELLITUS WITH HYPERGLYCEMIA, WITH LONG-TERM CURRENT USE OF INSULIN (HCC): ICD-10-CM

## 2023-09-07 PROCEDURE — 99396 PREV VISIT EST AGE 40-64: CPT | Performed by: NURSE PRACTITIONER

## 2023-09-07 PROCEDURE — 99214 OFFICE O/P EST MOD 30 MIN: CPT | Performed by: NURSE PRACTITIONER

## 2023-09-07 RX ORDER — ERGOCALCIFEROL 1.25 MG/1
50000 CAPSULE ORAL WEEKLY
Qty: 12 CAPSULE | Refills: 3 | Status: SHIPPED | OUTPATIENT
Start: 2023-09-07

## 2023-09-07 NOTE — PATIENT INSTRUCTIONS
Continue same medications  Continue follow up with Endocrinology  Get mammogram done as scheduled  6 month medcheck or call sooner for problems/concerns

## 2023-09-07 NOTE — PROGRESS NOTES
[facility]        NAME: Laverne Morillo is a 46 y.o. female  : 1970    MRN: [id number]  DATE: 2023  TIME: 11:48 AM    Assessment and Plan   Type 2 diabetes mellitus with hyperglycemia, with long-term current use of insulin ([facility]) [E11.65, Z79.4]  1. Type 2 diabetes mellitus with hyperglycemia, with long-term current use of insulin ([facility])  HEMOGLOBIN A1C W/ EAG ESTIMATION    ergocalciferol (VITAMIN D2) 50,000 units      2. Vitamin D deficiency  ergocalciferol (VITAMIN D2) 50,000 units      3. Depression with anxiety        4. Gastroesophageal reflux disease without esophagitis        5. Diabetic polyneuropathy associated with type 2 diabetes mellitus [facility])              Patient Instructions     Patient Instructions   Continue same medications  Continue follow up with Endocrinology  Get mammogram done as scheduled  6 month medcheck or call sooner for problems/concerns          Chief Complaint     Chief Complaint   Patient presents with   • Follow-up         History of Present Illness       Here for 6 month medcheck-   Recently returned from a cruise and will be leaving again on   Here recent HgA1c was 12.7, is following with Endocrinology  She is overall feeling much better than her previous TCM visit with me for MRSA infection  Cancelled her mammogram- will reschedule in October      Review of Systems   Review of Systems   Constitutional: Negative for activity change, diaphoresis, fatigue and fever. HENT: Negative for congestion, facial swelling, hearing loss, rhinorrhea, sinus pressure, sinus pain, sneezing, sore throat and voice change. Eyes: Negative for discharge and visual disturbance. Respiratory: Negative for cough, choking, chest tightness, shortness of breath, wheezing and stridor. Cardiovascular: Negative for chest pain, palpitations and leg swelling.    Gastrointestinal: Negative for abdominal distention, abdominal pain, constipation, diarrhea, nausea and vomiting. Endocrine: Negative for polydipsia, polyphagia and polyuria. Genitourinary: Negative for difficulty urinating, dysuria, frequency and urgency. Musculoskeletal: Negative for arthralgias, back pain, gait problem, joint swelling, myalgias, neck pain and neck stiffness. Skin: Negative for color change, rash and wound. Neurological: Negative for dizziness, syncope, speech difficulty, weakness, light-headedness and headaches. Hematological: Negative for adenopathy. Does not bruise/bleed easily. Psychiatric/Behavioral: Negative for agitation, behavioral problems, confusion, hallucinations, sleep disturbance and suicidal ideas. The patient is not nervous/anxious.           Current Medications       Current Outpatient Medications:   •  Alcohol Swabs (Pharmacist Choice Alcohol) PADS, Apply topically 4 (four) times a day Use as directed, Disp: 300 each, Rfl: 5  •  Aspirin (ASPIR-81 PO), Take 81 mg by mouth, Disp: , Rfl:   •  atorvastatin (LIPITOR) 80 mg tablet, TAKE 1 TABLET (80 MG TOTAL) BY MOUTH DAILY AT BEDTIME, Disp: 30 tablet, Rfl: 6  •  B-D UF III MINI PEN NEEDLES 31G X 5 MM MISC, Pt uses 5 pen needles daily, Disp: 500 each, Rfl: 0  •  cholecalciferol (VITAMIN D3) 1,000 units tablet, Take 2 tablets (2,000 Units total) by mouth daily, Disp: 60 tablet, Rfl: 3  •  citalopram (CeleXA) 40 mg tablet, Take 1 tablet (40 mg total) by mouth daily, Disp: 90 tablet, Rfl: 0  •  Continuous Blood Gluc  (FreeStyle Refugio 14 Day [place]) TEMITOPE, Use for continuous blood glucose monitoring, Disp: 1 each, Rfl: 0  •  Continuous Blood Gluc Sensor (FreeStyle Refugio 14 Day Sensor) MISC, Use one sensor every 14 days for continuous blood sugar monitoring., Disp: 6 each, Rfl: 3  •  docusate sodium (COLACE) 100 mg capsule, TAKE ONE CAPSULE BY MOUTH TWICE DAILY, Disp: 60 capsule, Rfl: 5  •  dulaglutide (Trulicity) 3 LB/7.9GK injection, Inject 0.5 mL (3 mg total) under the skin once a week, Disp: 2 mL, Rfl: 3  • Empagliflozin (Jardiance) 25 MG TABS, Take 1 tablet (25 mg total) by mouth every morning, Disp: 90 tablet, Rfl: 1  •  ergocalciferol (VITAMIN D2) 50,000 units, Take 1 capsule (50,000 Units total) by mouth once a week, Disp: 12 capsule, Rfl: 3  •  famotidine (PEPCID) 40 MG tablet, Take 1 tablet (40 mg total) by mouth daily at bedtime Take in evening 2 hours prior to bed, Disp: 30 tablet, Rfl: 6  •  fenofibrate (TRICOR) 48 mg tablet, TAKE ONE TABLET BY MOUTH DAILY, Disp: 90 tablet, Rfl: 3  •  Insulin Glargine Solostar (Lantus SoloStar) 100 UNIT/ML SOPN, INJECT 45 UNITS EVERY TWELVE HOURS, Disp: 60 mL, Rfl: 0  •  insulin glulisine (Apidra SoloStar) 100 units/mL injection pen, Inject 22 with breakfast, 24 with lunch, 26 with dinner, plus scale as needed, TDD up to 100 units daily. , Disp: 30 mL, Rfl: 1  •  Iron Heme Polypeptide 12 MG TABS, Take 1 tablet by mouth, Disp: , Rfl:   •  levETIRAcetam (KEPPRA) 500 mg tablet, TAKE ONE TABLET TWICE DAILY, Disp: 180 tablet, Rfl: 1  •  linaCLOtide (Linzess) 72 MCG CAPS, Take 1 capsule by mouth daily before breakfast, Disp: 30 capsule, Rfl: 0  •  meclizine (ANTIVERT) 25 mg tablet, Take 1 tablet (25 mg total) by mouth 3 (three) times a day as needed for dizziness, Disp: 30 tablet, Rfl: 0  •  metoprolol tartrate (LOPRESSOR) 25 mg tablet, TAKE ONE TABLET BY MOUTH TWICE DAILY, Disp: 180 tablet, Rfl: 1  •  multivitamin (THERAGRAN) TABS, Take 1 tablet by mouth every morning, Disp: , Rfl:   •  nystatin (MYCOSTATIN) cream, Apply topically 2 (two) times a day, Disp: 30 g, Rfl: 0  •  omeprazole (PriLOSEC) 40 MG capsule, Take 1 capsule (40 mg total) by mouth daily, Disp: 90 capsule, Rfl: 3  •  ondansetron (ZOFRAN) 4 mg tablet, Take 1 tablet (4 mg total) by mouth every 8 (eight) hours as needed for nausea or vomiting, Disp: 20 tablet, Rfl: 0  •  OneTouch Ultra test strip, USE 4 TIMES A DAY, Disp: 400 each, Rfl: 0  •  pregabalin (LYRICA) 100 mg capsule, TAKE ONE CAPSULE BY MOUTH THREE TIMES A DAY, Disp: 270 capsule, Rfl: 0  •  scopolamine (TRANSDERM-SCOP) 1 mg/3 days TD 72 hr patch, Place 1 patch on the skin over 72 hours every third day, Disp: 24 patch, Rfl: 1  •  solifenacin (VESICARE) 5 mg tablet, Take 1 tablet (5 mg total) by mouth daily, Disp: 90 tablet, Rfl: 1  •  albuterol (PROVENTIL HFA,VENTOLIN HFA) 90 mcg/act inhaler, INHALE ONE PUFF BY MOUTH AND INTO THE LUNGS EVERY 6 HOURS AS NEEDED FOR WHEEZING, Disp: 36 g, Rfl: 3  •  amitriptyline (ELAVIL) 25 mg tablet, Take 1 tablet (25 mg total) by mouth daily at bedtime, Disp: 30 tablet, Rfl: 0  •  magnesium Oxide (MAG-OX) 400 mg TABS, Take 1 tablet (400 mg total) by mouth daily, Disp: 30 tablet, Rfl: 0  •  Pharmacist Choice Lancets MISC, USE AS DIRECTED, Disp: 100 each, Rfl: 1  •  Restasis 0.05 % ophthalmic emulsion, , Disp: , Rfl:     Current Allergies     Allergies as of 2023 - Reviewed 2023   Allergen Reaction Noted   • Butorphanol Hallucinations 2016   • Benztropine  2016   • Penicillins  2015   • Zolpidem  2015   • Azithromycin Hives and GI Intolerance 2012            The following portions of the patient's history were reviewed and updated as appropriate: allergies, current medications, past family history, past medical history, past social history, past surgical history and problem list.     Past Medical History:   Diagnosis Date   • Atypical chest pain    • Gastroparesis    • GERD (gastroesophageal reflux disease)    • Hypertension    • Psychiatric disorder    • Sciatica    • Seizure disorder [facility])        Past Surgical History:   Procedure Laterality Date   • APPENDECTOMY     • BREAST BIOPSY Left  benign   •  SECTION     • CHOLECYSTECTOMY     • COLONOSCOPY     • HYSTERECTOMY     • IR PICC PLACEMENT SINGLE LUMEN  2023   • OR LAPAROSCOPIC APPENDECTOMY N/A 2021    Procedure: APPENDECTOMY LAPAROSCOPIC;  Surgeon: Ryan Wallace MD;  Location: [facility] MAIN OR;  Service: General   • OR LAPS ABD PRTM&OMENTUM DX W/WO SPEC BR/WA SPX N/A 03/24/2021    Procedure: LAPAROSCOPY DIAGNOSTIC, EXTENSIVE DESI;  Surgeon: Cathie Dias MD;  Location: [address] [place] [address] MAIN OR;  Service: General   • TUBAL LIGATION     • UPPER GASTROINTESTINAL ENDOSCOPY         Family History   Problem Relation Age of Onset   • No Known Problems Mother    • No Known Problems Father    • No Known Problems Daughter    • No Known Problems Maternal Grandmother    • No Known Problems Paternal Grandmother    • No Known Problems Paternal Aunt    • No Known Problems Paternal Aunt    • No Known Problems Paternal Aunt          Medications have been verified. Objective   /74   Pulse 82   Temp 98.4 °F (36.9 °C) (Tympanic)   Resp 20   Ht 5' 1" (1.549 m)   Wt 88.9 kg (196 lb)   SpO2 98%   BMI 37.03 kg/m²        Physical Exam     Physical Exam  Vitals and nursing note reviewed. Constitutional:       General: She is not in acute distress. Appearance: She is well-developed. She is not diaphoretic. Neck:      Thyroid: No thyromegaly. Trachea: No tracheal deviation. Cardiovascular:      Rate and Rhythm: Normal rate and regular rhythm. Heart sounds: Normal heart sounds. No murmur heard. Pulmonary:      Effort: Pulmonary effort is normal. No respiratory distress. Breath sounds: Normal breath sounds. No wheezing. Musculoskeletal:         General: No tenderness or deformity. Normal range of motion. Cervical back: Normal range of motion and neck supple. Right lower leg: No edema. Left lower leg: No edema. Skin:     General: Skin is warm and dry. Findings: No erythema or rash. Neurological:      Mental Status: She is alert and oriented to person, place, and time. Psychiatric:         Mood and Affect: Mood normal.         Behavior: Behavior normal. Behavior is cooperative. Thought Content:  Thought content normal.         Judgment: Judgment normal.

## 2023-09-07 NOTE — PROGRESS NOTES
HPI:  Kris Webber is a 46 y.o. female here for her yearly health maintenance exam.   Patient Active Problem List   Diagnosis   • Radiculopathy, lumbar region   • Anterior tibial tendonitis, right   • Bipolar disorder ([address])   • Chronic low back pain with sciatica   • Type 2 diabetes mellitus with hyperglycemia, with long-term current use of insulin ([address])   • Diabetic polyneuropathy associated with type 2 diabetes mellitus ([address])   • Gastroparesis   • Hypertension   • Spondylolisthesis of lumbar region   • Obesity (BMI 35.0-39.9 without comorbidity)   • Clear cell carcinoma of kidney, right (HCC)   • Internal hernia   • Intra-abdominal adhesions   • Depression with anxiety   • Mild intermittent asthma without complication   • Hypercholesteremia   • Hypertriglyceridemia   • Iron deficiency anemia secondary to inadequate dietary iron intake   • Encounter for post surgical wound check   • GERD (gastroesophageal reflux disease)   • History of colon polyps   • Slow transit constipation   • Anemia   • History of kidney cancer   • Vitamin D deficiency   • Benign hypertension with CKD (chronic kidney disease) stage III (HCC)   • Secondary hyperparathyroidism (HCC)   • Flank pain   • Gastric distention   • Generalized abdominal pain   • Abnormal CT scan, stomach   • Migraine   • Seizure disorder (HCC)   • Cellulitis of left upper extremity   • Leukocytosis   • Acute renal failure superimposed on stage 3a chronic kidney disease (HCC)   • Lower extremity edema   • MRSA bacteremia   • Status post incision and drainage     Past Medical History:   Diagnosis Date   • Atypical chest pain    • Gastroparesis    • GERD (gastroesophageal reflux disease)    • Hypertension    • Psychiatric disorder    • Sciatica    • Seizure disorder (HCC)           PHQ-2/9 Depression Screening    Little interest or pleasure in doing things: 3 - nearly every day  Feeling down, depressed, or hopeless: 0 - not at all  Trouble falling or staying asleep, or sleeping too much: 3 - nearly every day  Feeling tired or having little energy: 2 - more than half the days  Poor appetite or overeatin - more than half the days  Feeling bad about yourself - or that you are a failure or have let yourself or your family down: 0 - not at all  Trouble concentrating on things, such as reading the newspaper or watching television: 0 - not at all  Moving or speaking so slowly that other people could have noticed. Or the opposite - being so fidgety or restless that you have been moving around a lot more than usual: 0 - not at all  Thoughts that you would be better off dead, or of hurting yourself in some way: 0 - not at all  PHQ-9 Score: 10   PHQ-9 Interpretation: Moderate depression            Current Outpatient Medications   Medication Sig Dispense Refill   • Alcohol Swabs (Pharmacist Choice Alcohol) PADS Apply topically 4 (four) times a day Use as directed 300 each 5   • Aspirin (ASPIR-81 PO) Take 81 mg by mouth     • atorvastatin (LIPITOR) 80 mg tablet TAKE 1 TABLET (80 MG TOTAL) BY MOUTH DAILY AT BEDTIME 30 tablet 6   • B-D UF III MINI PEN NEEDLES 31G X 5 MM MISC Pt uses 5 pen needles daily 500 each 0   • cholecalciferol (VITAMIN D3) 1,000 units tablet Take 2 tablets (2,000 Units total) by mouth daily 60 tablet 3   • citalopram (CeleXA) 40 mg tablet Take 1 tablet (40 mg total) by mouth daily 90 tablet 0   • Continuous Blood Gluc  (FreeStyle Refugio 14 Day [place]) TEMITOPE Use for continuous blood glucose monitoring 1 each 0   • Continuous Blood Gluc Sensor (FreeStyle Refugio 14 Day Sensor) MISC Use one sensor every 14 days for continuous blood sugar monitoring.  6 each 3   • docusate sodium (COLACE) 100 mg capsule TAKE ONE CAPSULE BY MOUTH TWICE DAILY 60 capsule 5   • dulaglutide (Trulicity) 3 JE/8.4IV injection Inject 0.5 mL (3 mg total) under the skin once a week 2 mL 3   • Empagliflozin (Jardiance) 25 MG TABS Take 1 tablet (25 mg total) by mouth every morning 90 tablet 1   • ergocalciferol (VITAMIN D2) 50,000 units Take 1 capsule (50,000 Units total) by mouth once a week 12 capsule 3   • famotidine (PEPCID) 40 MG tablet Take 1 tablet (40 mg total) by mouth daily at bedtime Take in evening 2 hours prior to bed 30 tablet 6   • fenofibrate (TRICOR) 48 mg tablet TAKE ONE TABLET BY MOUTH DAILY 90 tablet 3   • Insulin Glargine Solostar (Lantus SoloStar) 100 UNIT/ML SOPN INJECT 45 UNITS EVERY TWELVE HOURS 60 mL 0   • insulin glulisine (Apidra SoloStar) 100 units/mL injection pen Inject 22 with breakfast, 24 with lunch, 26 with dinner, plus scale as needed, TDD up to 100 units daily.  30 mL 1   • Iron Heme Polypeptide 12 MG TABS Take 1 tablet by mouth     • levETIRAcetam (KEPPRA) 500 mg tablet TAKE ONE TABLET TWICE DAILY 180 tablet 1   • linaCLOtide (Linzess) 72 MCG CAPS Take 1 capsule by mouth daily before breakfast 30 capsule 0   • meclizine (ANTIVERT) 25 mg tablet Take 1 tablet (25 mg total) by mouth 3 (three) times a day as needed for dizziness 30 tablet 0   • metoprolol tartrate (LOPRESSOR) 25 mg tablet TAKE ONE TABLET BY MOUTH TWICE DAILY 180 tablet 1   • multivitamin (THERAGRAN) TABS Take 1 tablet by mouth every morning     • nystatin (MYCOSTATIN) cream Apply topically 2 (two) times a day 30 g 0   • omeprazole (PriLOSEC) 40 MG capsule Take 1 capsule (40 mg total) by mouth daily 90 capsule 3   • ondansetron (ZOFRAN) 4 mg tablet Take 1 tablet (4 mg total) by mouth every 8 (eight) hours as needed for nausea or vomiting 20 tablet 0   • OneTouch Ultra test strip USE 4 TIMES A  each 0   • pregabalin (LYRICA) 100 mg capsule TAKE ONE CAPSULE BY MOUTH THREE TIMES A  capsule 0   • scopolamine (TRANSDERM-SCOP) 1 mg/3 days TD 72 hr patch Place 1 patch on the skin over 72 hours every third day 24 patch 1   • solifenacin (VESICARE) 5 mg tablet Take 1 tablet (5 mg total) by mouth daily 90 tablet 1   • albuterol (PROVENTIL HFA,VENTOLIN HFA) 90 mcg/act inhaler INHALE ONE PUFF BY MOUTH AND INTO THE LUNGS EVERY 6 HOURS AS NEEDED FOR WHEEZING 36 g 3   • amitriptyline (ELAVIL) 25 mg tablet Take 1 tablet (25 mg total) by mouth daily at bedtime 30 tablet 0   • magnesium Oxide (MAG-OX) 400 mg TABS Take 1 tablet (400 mg total) by mouth daily 30 tablet 0   • Pharmacist Choice Lancets MISC USE AS DIRECTED 100 each 1   • Restasis 0.05 % ophthalmic emulsion        No current facility-administered medications for this visit. Allergies   Allergen Reactions   • Butorphanol Hallucinations   • Benztropine    • Penicillins    • Zolpidem    • Azithromycin Hives and GI Intolerance     Immunization History   Administered Date(s) Administered   • INFLUENZA 10/13/2010, 11/28/2011, 10/28/2013, 09/06/2017, 03/01/2020   • Influenza Quadrivalent Preservative Free 3 years and older IM 01/04/2017   • Pneumococcal Polysaccharide PPV23 08/29/2010, 02/25/2016   • Tdap 03/27/2020   • Tuberculin Skin Test-PPD Intradermal 09/23/2019, 10/06/2020       Patient Care Team:  Olimpia Mitchell as PCP - General (Family Medicine)  Dorcus Diss, DO as PCP - [employer] (RTE)  Dorcus Diss, DO as PCP - PCP-[employer] (RTE)  Shasta Lafleur DO as PCP - Endocrinology (Endocrinology)  Pebbles Segovia DO (Gastroenterology)  Kaelyn Betancur PA-C as Physician Assistant (Endocrinology)  Antionette Yip DO (Cardiology)  Connor Mendiola as Nurse Practitioner (Nephrology)  Shawnee Sarabia PA-C as Physician Assistant (Physician Assistant)  Lex Courtney DO (Nephrology)  Celester Dandy, MD (General Surgery)    Review of Systems   Constitutional: Negative for activity change, diaphoresis, fatigue and fever. HENT: Negative for congestion, facial swelling, hearing loss, rhinorrhea, sinus pressure, sinus pain, sneezing, sore throat and voice change. Eyes: Negative for discharge and visual disturbance.    Respiratory: Negative for cough, choking, chest tightness, shortness of breath, wheezing and stridor. Cardiovascular: Negative for chest pain, palpitations and leg swelling. Gastrointestinal: Negative for abdominal distention, abdominal pain, constipation, diarrhea, nausea and vomiting. Endocrine: Negative for polydipsia, polyphagia and polyuria. Genitourinary: Negative for difficulty urinating, dysuria, frequency and urgency. Musculoskeletal: Negative for arthralgias, back pain, gait problem, joint swelling, myalgias, neck pain and neck stiffness. Skin: Negative for color change, rash and wound. Neurological: Negative for dizziness, syncope, speech difficulty, weakness, light-headedness and headaches. Hematological: Negative for adenopathy. Does not bruise/bleed easily. Psychiatric/Behavioral: Negative for agitation, behavioral problems, confusion, hallucinations, sleep disturbance and suicidal ideas. The patient is not nervous/anxious. Physical Exam :  Physical Exam  Vitals and nursing note reviewed. Constitutional:       General: She is not in acute distress. Appearance: She is well-developed. She is not diaphoretic. Neck:      Thyroid: No thyromegaly. Trachea: No tracheal deviation. Cardiovascular:      Rate and Rhythm: Normal rate and regular rhythm. Heart sounds: Normal heart sounds. No murmur heard. Pulmonary:      Effort: Pulmonary effort is normal. No respiratory distress. Breath sounds: Normal breath sounds. No wheezing. Musculoskeletal:         General: No tenderness or deformity. Normal range of motion. Cervical back: Normal range of motion and neck supple. Skin:     General: Skin is warm and dry. Findings: No erythema or rash. Neurological:      Mental Status: She is alert and oriented to person, place, and time. Psychiatric:         Behavior: Behavior normal. Behavior is cooperative. Thought Content:  Thought content normal.         Judgment: Judgment normal.           Reviewed Updated [facility] Visits:   Last Health Maintenance visit information was reviewed, patient interviewed , no change since last HM visit yes  Last  visit information was reviewed, patient interviewed and updates made to the record today yes    Assessment and Plan:  1. Annual physical exam        2. Type 2 diabetes mellitus with hyperglycemia, with long-term current use of insulin (HCC)  HEMOGLOBIN A1C W/ EAG ESTIMATION    ergocalciferol (VITAMIN D2) 50,000 units      3. Vitamin D deficiency  ergocalciferol (VITAMIN D2) 50,000 units      4. Depression with anxiety        5. Gastroesophageal reflux disease without esophagitis        6.  Diabetic polyneuropathy associated with type 2 diabetes mellitus [facility])            Health Maintenance Due   Topic Date Due   • Hepatitis C Screening  Never done   • COVID-19 Vaccine (1) Never done   • HIV Screening  Never done   • Cervical Cancer Screening  Never done   • Pneumococcal Vaccine: Pediatrics (0 to 5 Years) and At-Risk Patients (6 to 59 Years) (3 - PCV) 02/25/2017   • Osteoporosis Screening  Never done   • Breast Cancer Screening: Mammogram  05/10/2022   • Colorectal Cancer Screening  12/12/2022   • Annual Physical  08/23/2023   • Influenza Vaccine (1) 09/01/2023   • BMI: Followup Plan  02/21/2024

## 2023-09-15 ENCOUNTER — TELEPHONE (OUTPATIENT)
Dept: ENDOCRINOLOGY | Facility: CLINIC | Age: 53
End: 2023-09-15

## 2023-09-27 DIAGNOSIS — E11.65 TYPE 2 DIABETES MELLITUS WITH HYPERGLYCEMIA, WITH LONG-TERM CURRENT USE OF INSULIN (HCC): Primary | ICD-10-CM

## 2023-09-27 DIAGNOSIS — Z79.4 TYPE 2 DIABETES MELLITUS WITH HYPERGLYCEMIA, WITH LONG-TERM CURRENT USE OF INSULIN (HCC): Primary | ICD-10-CM

## 2023-09-27 RX ORDER — ASPIRIN 81 MG/1
81 TABLET ORAL DAILY
Qty: 30 TABLET | Refills: 1 | Status: SHIPPED | OUTPATIENT
Start: 2023-09-27

## 2023-10-03 DIAGNOSIS — Z79.4 TYPE 2 DIABETES MELLITUS WITH DIABETIC AUTONOMIC NEUROPATHY, WITH LONG-TERM CURRENT USE OF INSULIN (HCC): ICD-10-CM

## 2023-10-03 DIAGNOSIS — E11.43 TYPE 2 DIABETES MELLITUS WITH DIABETIC AUTONOMIC NEUROPATHY, WITH LONG-TERM CURRENT USE OF INSULIN (HCC): ICD-10-CM

## 2023-10-05 DIAGNOSIS — Z79.4 TYPE 2 DIABETES MELLITUS WITH DIABETIC AUTONOMIC NEUROPATHY, WITH LONG-TERM CURRENT USE OF INSULIN (HCC): ICD-10-CM

## 2023-10-05 DIAGNOSIS — E11.43 TYPE 2 DIABETES MELLITUS WITH DIABETIC AUTONOMIC NEUROPATHY, WITH LONG-TERM CURRENT USE OF INSULIN (HCC): ICD-10-CM

## 2023-10-05 RX ORDER — INSULIN GLARGINE 100 [IU]/ML
INJECTION, SOLUTION SUBCUTANEOUS
Qty: 60 ML | Refills: 0 | Status: SHIPPED | OUTPATIENT
Start: 2023-10-05

## 2023-10-07 ENCOUNTER — HOSPITAL ENCOUNTER (OUTPATIENT)
Dept: MAMMOGRAPHY | Facility: HOSPITAL | Age: 53
Discharge: HOME | End: 2023-10-07
Payer: COMMERCIAL

## 2023-10-07 VITALS — HEIGHT: 61 IN | WEIGHT: 196 LBS | BODY MASS INDEX: 37 KG/M2

## 2023-10-07 DIAGNOSIS — Z12.31 ENCOUNTER FOR SCREENING MAMMOGRAM FOR MALIGNANT NEOPLASM OF BREAST: ICD-10-CM

## 2023-10-07 PROCEDURE — 77067 SCR MAMMO BI INCL CAD: CPT

## 2023-10-07 PROCEDURE — 77063 BREAST TOMOSYNTHESIS BI: CPT

## 2023-10-11 ENCOUNTER — HOSPITAL ENCOUNTER (OUTPATIENT)
Dept: NUCLEAR MEDICINE | Facility: HOSPITAL | Age: 53
Discharge: HOME | End: 2023-10-11
Attending: INTERNAL MEDICINE
Payer: COMMERCIAL

## 2023-10-11 DIAGNOSIS — R11.0 NAUSEA: ICD-10-CM

## 2023-10-11 DIAGNOSIS — K31.84 GASTROPARESIS: ICD-10-CM

## 2023-10-11 PROCEDURE — 78264 GASTRIC EMPTYING IMG STUDY: CPT

## 2023-10-11 PROCEDURE — G1004 CDSM NDSC: HCPCS

## 2023-10-11 PROCEDURE — A9541 TC99M SULFUR COLLOID: HCPCS

## 2023-10-11 NOTE — RESULT ENCOUNTER NOTE
Please inform patient strict emptying scan revealed mildly delayed rate of gastric emptying. Normal at 4 hours is typically there then 90% emptied, she emptied 82% at 4 hours. Would recommend that she follow a low residue diet. Proceed with upper endoscopy next week.   Thank you

## 2023-10-16 ENCOUNTER — TELEPHONE (OUTPATIENT)
Dept: FAMILY MEDICINE CLINIC | Facility: CLINIC | Age: 53
End: 2023-10-16

## 2023-10-16 NOTE — TELEPHONE ENCOUNTER
Patient called the office asking if pcp would call something different in for her. Stated she is on vesicare and it does not seem to be working. Please advise. Pharmacy [facility]"" - [facility].

## 2023-10-17 NOTE — TELEPHONE ENCOUNTER
I would be cautious to give overactive bladder medication with a glucose level as high as her current average of 317. Her recent HgA1c was 12.7. With sugar this high she will be thirsty and therefore drink and urinate a lot. Vesicare and any other medication will not work if the bladder is full from drinking a lot. I'd have to see her in the office to discuss this. She is due for labs this week before Endocrinology appointment on Thursday- if her sugar levels are significantly improved I will reconsider changing OAB medication.

## 2023-10-17 NOTE — TELEPHONE ENCOUNTER
Spoke with patient, will get labs done tomorrow.  Scheduled for Friday at 1pm. Patient asking if her mother Angel John is able to come in and be seen as well with her on friday, reports she is just coming out of the hospital.

## 2023-10-17 NOTE — TELEPHONE ENCOUNTER
She can come with her, please put her in the 2:30 slot but she can come for 1. Ask them to come by 12:30 to get roomed and screened for 1pm. Thanks!

## 2023-10-19 ENCOUNTER — HOSPITAL ENCOUNTER (EMERGENCY)
Facility: HOSPITAL | Age: 53
Discharge: HOME | End: 2023-10-19
Attending: FAMILY MEDICINE | Admitting: FAMILY MEDICINE
Payer: COMMERCIAL

## 2023-10-19 ENCOUNTER — OFFICE VISIT (OUTPATIENT)
Dept: ENDOCRINOLOGY | Facility: CLINIC | Age: 53
End: 2023-10-19

## 2023-10-19 VITALS
SYSTOLIC BLOOD PRESSURE: 155 MMHG | TEMPERATURE: 96.5 F | RESPIRATION RATE: 19 BRPM | HEART RATE: 65 BPM | DIASTOLIC BLOOD PRESSURE: 74 MMHG | OXYGEN SATURATION: 98 %

## 2023-10-19 VITALS
WEIGHT: 190.8 LBS | HEIGHT: 61 IN | DIASTOLIC BLOOD PRESSURE: 80 MMHG | HEART RATE: 55 BPM | BODY MASS INDEX: 36.02 KG/M2 | OXYGEN SATURATION: 99 % | SYSTOLIC BLOOD PRESSURE: 120 MMHG

## 2023-10-19 DIAGNOSIS — R73.9 HYPERGLYCEMIA: Primary | ICD-10-CM

## 2023-10-19 DIAGNOSIS — E11.65 TYPE 2 DIABETES MELLITUS WITH HYPERGLYCEMIA, WITH LONG-TERM CURRENT USE OF INSULIN (HCC): Primary | ICD-10-CM

## 2023-10-19 DIAGNOSIS — Z79.4 TYPE 2 DIABETES MELLITUS WITH HYPERGLYCEMIA, WITH LONG-TERM CURRENT USE OF INSULIN (HCC): Primary | ICD-10-CM

## 2023-10-19 LAB
ANION GAP SERPL CALCULATED.3IONS-SCNC: 10 MMOL/L
BASOPHILS # BLD AUTO: 0.04 THOUSANDS/ÂΜL (ref 0–0.1)
BASOPHILS NFR BLD AUTO: 0 % (ref 0–1)
BILIRUB UR QL STRIP: NEGATIVE
BUN SERPL-MCNC: 28 MG/DL (ref 5–25)
CALCIUM SERPL-MCNC: 9.8 MG/DL (ref 8.4–10.2)
CHLORIDE SERPL-SCNC: 98 MMOL/L (ref 96–108)
CLARITY UR: CLEAR
CO2 SERPL-SCNC: 28 MMOL/L (ref 21–32)
COLOR UR: YELLOW
CREAT SERPL-MCNC: 1.13 MG/DL (ref 0.6–1.3)
EOSINOPHIL # BLD AUTO: 0.23 THOUSAND/ÂΜL (ref 0–0.61)
EOSINOPHIL NFR BLD AUTO: 2 % (ref 0–6)
ERYTHROCYTE [DISTWIDTH] IN BLOOD BY AUTOMATED COUNT: 14 % (ref 11.6–15.1)
GFR SERPL CREATININE-BSD FRML MDRD: 56 ML/MIN/1.73SQ M
GLUCOSE SERPL-MCNC: 255 MG/DL (ref 65–140)
GLUCOSE SERPL-MCNC: 339 MG/DL (ref 65–140)
GLUCOSE SERPL-MCNC: 347 MG/DL (ref 65–140)
GLUCOSE UR STRIP-MCNC: ABNORMAL MG/DL
HCT VFR BLD AUTO: 42.2 % (ref 34.8–46.1)
HGB BLD-MCNC: 13.2 G/DL (ref 11.5–15.4)
HGB UR QL STRIP.AUTO: NEGATIVE
IMM GRANULOCYTES # BLD AUTO: 0.02 THOUSAND/UL (ref 0–0.2)
IMM GRANULOCYTES NFR BLD AUTO: 0 % (ref 0–2)
KETONES UR STRIP-MCNC: NEGATIVE MG/DL
LEUKOCYTE ESTERASE UR QL STRIP: NEGATIVE
LYMPHOCYTES # BLD AUTO: 2.21 THOUSANDS/ÂΜL (ref 0.6–4.47)
LYMPHOCYTES NFR BLD AUTO: 20 % (ref 14–44)
MCH RBC QN AUTO: 25.6 PG (ref 26.8–34.3)
MCHC RBC AUTO-ENTMCNC: 31.3 G/DL (ref 31.4–37.4)
MCV RBC AUTO: 82 FL (ref 82–98)
MONOCYTES # BLD AUTO: 0.63 THOUSAND/ÂΜL (ref 0.17–1.22)
MONOCYTES NFR BLD AUTO: 6 % (ref 4–12)
NEUTROPHILS # BLD AUTO: 8.15 THOUSANDS/ÂΜL (ref 1.85–7.62)
NEUTS SEG NFR BLD AUTO: 72 % (ref 43–75)
NITRITE UR QL STRIP: NEGATIVE
NRBC BLD AUTO-RTO: 0 /100 WBCS
PH UR STRIP.AUTO: 6 [PH]
PLATELET # BLD AUTO: 381 THOUSANDS/UL (ref 149–390)
PMV BLD AUTO: 11.2 FL (ref 8.9–12.7)
POTASSIUM SERPL-SCNC: 3.6 MMOL/L (ref 3.5–5.3)
PROT UR STRIP-MCNC: NEGATIVE MG/DL
RBC # BLD AUTO: 5.15 MILLION/UL (ref 3.81–5.12)
SODIUM SERPL-SCNC: 136 MMOL/L (ref 135–147)
SP GR UR STRIP.AUTO: 1.01
UROBILINOGEN UR QL STRIP.AUTO: 0.2 E.U./DL
WBC # BLD AUTO: 11.28 THOUSAND/UL (ref 4.31–10.16)

## 2023-10-19 PROCEDURE — 80048 BASIC METABOLIC PNL TOTAL CA: CPT | Performed by: FAMILY MEDICINE

## 2023-10-19 PROCEDURE — 82948 REAGENT STRIP/BLOOD GLUCOSE: CPT

## 2023-10-19 PROCEDURE — 36415 COLL VENOUS BLD VENIPUNCTURE: CPT | Performed by: FAMILY MEDICINE

## 2023-10-19 PROCEDURE — 85025 COMPLETE CBC W/AUTO DIFF WBC: CPT | Performed by: FAMILY MEDICINE

## 2023-10-19 PROCEDURE — 81003 URINALYSIS AUTO W/O SCOPE: CPT | Performed by: FAMILY MEDICINE

## 2023-10-19 RX ORDER — INSULIN LISPRO 200 [IU]/ML
INJECTION, SOLUTION SUBCUTANEOUS
Qty: 18 ML | Refills: 2 | Status: SHIPPED | OUTPATIENT
Start: 2023-10-19

## 2023-10-19 RX ORDER — INSULIN GLARGINE 300 U/ML
100 INJECTION, SOLUTION SUBCUTANEOUS DAILY
Qty: 12 ML | Refills: 1 | Status: SHIPPED | OUTPATIENT
Start: 2023-10-19

## 2023-10-19 RX ORDER — DULAGLUTIDE 4.5 MG/.5ML
4.5 INJECTION, SOLUTION SUBCUTANEOUS
Qty: 6 ML | Refills: 1 | Status: SHIPPED | OUTPATIENT
Start: 2023-10-19

## 2023-10-19 RX ADMIN — SODIUM CHLORIDE 1000 ML: 0.9 INJECTION, SOLUTION INTRAVENOUS at 12:46

## 2023-10-19 NOTE — PROGRESS NOTES
Gianna Casper 46 y.o. female MRN: [id number]    Encounter: [id number]      Assessment/Plan   Problem List Items Addressed This Visit          Endocrine    Type 2 diabetes mellitus with diabetic autonomic neuropathy, with long-term current use of insulin ([address]) - Primary       Lab Results   Component Value Date    HGBA1C 12.7 (H) 07/25/2023   Short term - patient feeling symptomatic with uncontrolled hyperglycemia, appearing dry by exam. Recommend ER evaluation to address dehydration, severe hyperglycemia, rule out possible HHS / DKA. She is understanding treatment may involve ER visit with discharge or possible hospitalization / overnight observation. She will be transported to [facility] ER via private vehicle. Report given to ER attending, Dr. Sania Grey, via phone. In office treatment - advised Betty Paget to take her usual AM Lantus dose but will increase from 45 to 50 units. Also helped provide correctional bolus of Apidra at 12 units to correct BG > 400. Following ER visit / possible admission pending workup, recommend the following outpatient DM regimen: Change Lantus to Toujeo U300, increasing basal dose from 90 units to 100 units daily. Will also recommend change from Apidra to Humalog U200 - increase to 26-28-30 as base, continuing correction scale ISF 25 > 150. She is also tolerating Trulicity well, will attempt to maximize dose from 3 to 4.5mg weekly. This will hopefully improve her insulin sensitivity, while providing weight loss benefits. [place] Gabriel may also be continued at 25mg daily. Metformin has been discontinued due to CKD. Rx also provided for CGM - Refugio 3 sensors. Long term plan - will start process of helping Betty Paget obtain insulin pump - will place referral to CDE to start this process.           Relevant Medications    insulin glargine (Toujeo Max SoloStar) 300 units/mL CONCENTRATED U-300 injection pen (2-unit dial)    insuln lispro (HumaLOG KwikPen) 200 units/mL CONCENTRATED U-200 injection pen Continuous Blood Gluc Sensor (FreeStyle Refugio 2 Sensor) MISC    dulaglutide (Trulicity) 4.5 PS/5.3AV injection        CC: Diabetes    History of Present Illness     HPI:  Radha Garza presents for routine follow up for diabetes, which is type 2, uncontrolled. She has been feeling sick recently - URI symptoms - sinus and nasal congestion. She feels very dry, urinating a lot and very thirsty. She reports feeling dizzy, and chest pain intermittently as well. Appetite is diminished and She is also extremely fatigued. No vomiting however. Radha Garza has not been wearing her Refugio 2 CGM sensor over the past several days - reports CGM is not scanning properly or giving BG readings. She is checking BG via fingerstick before meals. She reports BG being 300-400 most of the time lately, at best it lowers into the high 200s. DM medication review:   Jardiance 20MR daily  Trulicity 3mg weekly   Lantus - 45 units BID (misses PM dose twice a week. Apidra - 22 -24 - 26 - very compliant with dosing. Also using correctional scale ISF 25 at 150, confirmed mealtime insulin dosing has been > 30 units recently. Radha Garza notes BG was > 350 this morning. For breakfast she had eggs and toast, as well as a small serving of juice. Notes she takes juice occasionally to help with constipation. BG via fingerstick currently is 448 in office. Review of Systems   Constitutional:  Positive for appetite change. Negative for chills and fever. HENT:  Positive for congestion, rhinorrhea and sinus pressure. Negative for ear pain and sore throat. Eyes:  Negative for pain and visual disturbance. Respiratory:  Positive for chest tightness. Negative for cough and shortness of breath. Cardiovascular:  Negative for chest pain and palpitations. Gastrointestinal:  Negative for abdominal pain and vomiting. Endocrine: Positive for polydipsia and polyuria. Genitourinary:  Negative for dysuria and hematuria.    Musculoskeletal:  Negative for arthralgias and back pain. Skin:  Negative for color change and rash. Neurological:  Positive for dizziness and light-headedness. Negative for seizures and syncope. All other systems reviewed and are negative.       Historical Information   Past Medical History:   Diagnosis Date    Atypical chest pain     Gastroparesis     GERD (gastroesophageal reflux disease)     Hypertension     Psychiatric disorder     Sciatica     Seizure disorder [facility])      Past Surgical History:   Procedure Laterality Date    APPENDECTOMY      BREAST BIOPSY Left  benign     SECTION      CHOLECYSTECTOMY      COLONOSCOPY      HYSTERECTOMY      IR PICC PLACEMENT SINGLE LUMEN  2023    RI LAPAROSCOPIC APPENDECTOMY N/A 2021    Procedure: APPENDECTOMY LAPAROSCOPIC;  Surgeon: Roseann Mclaughlin MD;  Location: [facility] MAIN OR;  Service: General    RI LAPS ABD PRTM&OMENTUM DX W/WO SPEC BR/WA SPX N/A 2021    Procedure: LAPAROSCOPY DIAGNOSTIC, EXTENSIVE DESI;  Surgeon: Roseann Mclaughlin MD;  Location: [facility] MAIN OR;  Service: General    TUBAL LIGATION      UPPER GASTROINTESTINAL ENDOSCOPY       Social History   Social History     Substance and Sexual Activity   Alcohol Use Never     Social History     Substance and Sexual Activity   Drug Use Never     Social History     Tobacco Use   Smoking Status Former    Types: Cigarettes    Quit date:     Years since quittin.8   Smokeless Tobacco Never     Family History:   Family History   Problem Relation Age of Onset    No Known Problems Mother     No Known Problems Father     No Known Problems Daughter     No Known Problems Maternal Grandmother     No Known Problems Paternal Grandmother     No Known Problems Paternal Aunt     No Known Problems Paternal Aunt     No Known Problems Paternal Aunt        Meds/Allergies   Current Outpatient Medications   Medication Sig Dispense Refill    Empagliflozin (Jardiance) 25 MG TABS Take 1 tablet (25 mg total) by mouth every morning 90 tablet 1    albuterol (PROVENTIL HFA,VENTOLIN HFA) 90 mcg/act inhaler INHALE ONE PUFF BY MOUTH AND INTO THE LUNGS EVERY 6 HOURS AS NEEDED FOR WHEEZING 36 g 3    Alcohol Swabs (Pharmacist Choice Alcohol) PADS Apply topically 4 (four) times a day Use as directed 300 each 5    amitriptyline (ELAVIL) 25 mg tablet Take 1 tablet (25 mg total) by mouth daily at bedtime 30 tablet 0    Aspirin (ASPIR-81 PO) Take 81 mg by mouth      aspirin (ECOTRIN LOW STRENGTH) 81 mg EC tablet TAKE ONE TABLET BY MOUTH ONCE DAILY 30 tablet 1    atorvastatin (LIPITOR) 80 mg tablet TAKE 1 TABLET (80 MG TOTAL) BY MOUTH DAILY AT BEDTIME 30 tablet 6    B-D UF III MINI PEN NEEDLES 31G X 5 MM MISC Pt uses 5 pen needles daily 500 each 0    cholecalciferol (VITAMIN D3) 1,000 units tablet Take 2 tablets (2,000 Units total) by mouth daily 60 tablet 3    citalopram (CeleXA) 40 mg tablet Take 1 tablet (40 mg total) by mouth daily 90 tablet 0    Continuous Blood Gluc  (FreeStyle Refugio 14 Day [place]) TEMITOPE Use for continuous blood glucose monitoring 1 each 0    Continuous Blood Gluc Sensor (FreeStyle Refugio 14 Day Sensor) MISC Use one sensor every 14 days for continuous blood sugar monitoring. 6 each 3    docusate sodium (COLACE) 100 mg capsule TAKE ONE CAPSULE BY MOUTH TWICE DAILY 60 capsule 5    dulaglutide (Trulicity) 3 BB/8.9FY injection Inject 0.5 mL (3 mg total) under the skin once a week 2 mL 3    ergocalciferol (VITAMIN D2) 50,000 units Take 1 capsule (50,000 Units total) by mouth once a week 12 capsule 3    famotidine (PEPCID) 40 MG tablet Take 1 tablet (40 mg total) by mouth daily at bedtime Take in evening 2 hours prior to bed 30 tablet 6    fenofibrate (TRICOR) 48 mg tablet TAKE ONE TABLET BY MOUTH DAILY 90 tablet 3    insulin glulisine (Apidra SoloStar) 100 units/mL injection pen Inject 22 with breakfast, 24 with lunch, 26 with dinner, plus scale as needed, TDD up to 100 units daily.  30 mL 1    Iron Heme Polypeptide 12 MG TABS Take 1 tablet by mouth      Lantus SoloStar 100 units/mL SOPN INJECT 45 UNITS EVERY TWELVE HOURS 60 mL 0    levETIRAcetam (KEPPRA) 500 mg tablet TAKE ONE TABLET TWICE DAILY 180 tablet 1    linaCLOtide (Linzess) 72 MCG CAPS Take 1 capsule by mouth daily before breakfast 30 capsule 0    magnesium Oxide (MAG-OX) 400 mg TABS TAKE ONE TABLET BY MOUTH ONCE DAILY 90 tablet 3    meclizine (ANTIVERT) 25 mg tablet Take 1 tablet (25 mg total) by mouth 3 (three) times a day as needed for dizziness 30 tablet 0    metoprolol tartrate (LOPRESSOR) 25 mg tablet TAKE ONE TABLET BY MOUTH TWICE DAILY 180 tablet 1    multivitamin (THERAGRAN) TABS Take 1 tablet by mouth every morning      nystatin (MYCOSTATIN) cream Apply topically 2 (two) times a day 30 g 0    omeprazole (PriLOSEC) 40 MG capsule Take 1 capsule (40 mg total) by mouth daily 90 capsule 3    ondansetron (ZOFRAN) 4 mg tablet Take 1 tablet (4 mg total) by mouth every 8 (eight) hours as needed for nausea or vomiting 20 tablet 0    OneTouch Ultra test strip USE 4 TIMES A  each 0    Pharmacist Choice Lancets MISC USE AS DIRECTED 100 each 1    pregabalin (LYRICA) 100 mg capsule TAKE ONE CAPSULE BY MOUTH THREE TIMES A  capsule 0    Restasis 0.05 % ophthalmic emulsion       scopolamine (TRANSDERM-SCOP) 1 mg/3 days TD 72 hr patch Place 1 patch on the skin over 72 hours every third day 24 patch 1    solifenacin (VESICARE) 5 mg tablet Take 1 tablet (5 mg total) by mouth daily 90 tablet 1     No current facility-administered medications for this visit. Allergies   Allergen Reactions    Butorphanol Hallucinations    Benztropine     Penicillins     Zolpidem     Azithromycin Hives and GI Intolerance       Objective   Vitals: Blood pressure 120/80, pulse 55, height 5' 1" (1.549 m), weight 86.5 kg (190 lb 12.8 oz), SpO2 99 %, not currently breastfeeding. Physical Exam    The history was obtained from the review of the chart, patient.     Lab Results:   Lab Results   Component Value Date/Time    Hemoglobin A1C 12.7 (H) 07/25/2023 12:52 AM    Hemoglobin A1C 12.6 (H) 07/14/2023 11:22 PM    Hemoglobin A1C 12.5 (H) 03/25/2023 09:01 AM    WBC 8.53 08/15/2023 08:28 AM    WBC 6.33 08/07/2023 11:45 AM    WBC 11.11 (H) 08/01/2023 04:51 AM    Hemoglobin 11.8 08/15/2023 08:28 AM    Hemoglobin 11.0 (L) 08/07/2023 11:45 AM    Hemoglobin 10.3 (L) 08/01/2023 04:51 AM    Hematocrit 37.0 08/15/2023 08:28 AM    Hematocrit 34.2 (L) 08/07/2023 11:45 AM    Hematocrit 33.2 (L) 08/01/2023 04:51 AM    MCV 81 (L) 08/15/2023 08:28 AM    MCV 83 08/07/2023 11:45 AM    MCV 85 08/01/2023 04:51 AM    Platelets 753 32/01/1462 08:28 AM    Platelets 478 (H) 26/53/7386 11:45 AM    Platelets 589 (H) 76/55/7115 04:51 AM    BUN 30 (H) 08/15/2023 08:28 AM    BUN 30 (H) 08/01/2023 04:51 AM    BUN 27 (H) 07/31/2023 04:54 AM    Potassium 4.8 08/15/2023 08:28 AM    Potassium 4.2 08/01/2023 04:51 AM    Potassium 4.2 07/31/2023 04:54 AM    Chloride 100 08/15/2023 08:28 AM    Chloride 100 08/01/2023 04:51 AM    Chloride 102 07/31/2023 04:54 AM    CO2 22 08/15/2023 08:28 AM    CO2 29 08/01/2023 04:51 AM    CO2 27 07/31/2023 04:54 AM    Creatinine 1.46 (H) 08/15/2023 08:28 AM    Creatinine 1.49 (H) 08/07/2023 11:45 AM    Creatinine 1.04 08/01/2023 04:51 AM    AST 14 08/15/2023 08:28 AM    AST 46 (H) 07/31/2023 04:54 AM    AST 18 07/26/2023 04:23 AM    ALT 30 08/15/2023 08:28 AM    ALT 52 07/31/2023 04:54 AM    ALT 33 07/26/2023 04:23 AM    Total Protein 7.2 08/15/2023 08:28 AM    Total Protein 6.2 (L) 07/31/2023 04:54 AM    Total Protein 5.9 (L) 07/26/2023 04:23 AM    Albumin 3.5 08/15/2023 08:28 AM    Albumin 3.0 (L) 07/31/2023 04:54 AM    Albumin 2.9 (L) 07/26/2023 04:23 AM    HDL, Direct 47 (L) 07/14/2023 11:22 PM    HDL, Direct 46 (L) 03/25/2023 09:01 AM    Triglycerides 446 (H) 07/14/2023 11:22 PM    Triglycerides 311 (H) 03/25/2023 09:01 AM           Imaging Studies: n/a    Portions of the record may have been created with voice recognition software. Occasional wrong word or "sound a like" substitutions may have occurred due to the inherent limitations of voice recognition software. Read the chart carefully and recognize, using context, where substitutions have occurred.

## 2023-10-19 NOTE — PATIENT INSTRUCTIONS
New basal insulin dose will be 100 units a day (can use Lantus at 50 units twice daily for now while waiting for Toujeo to be filled. New mealtime insulin dose will be 26 with breakfast, 28 with lunch, 30 with dinner plus scale as needed (can use your Apidra while waiting for Humalog U200 pen to be filled).

## 2023-10-19 NOTE — ED PROVIDER NOTES
History  Chief Complaint   Patient presents with    Medical Problem     Pt reports high blood sugar lately. Was at endocrine and was 440. Did take insulin before she came       Medical Problem  Associated symptoms: no abdominal pain, no chest pain, no cough, no diarrhea, no fever, no headaches, no myalgias, no nausea, no rash, no rhinorrhea, no shortness of breath, no sore throat and no vomiting    63-year-old female type II diabetic presented with complaint of elevated blood sugar. Patient states she was at the endocrine office and the blood sugar was over 400 recommended to come to the ED for hydration. Patient states that she took 50 units of insulin prior to arrival.  Denies any abdominal pain nausea vomiting or diarrhea. It did complain of a dysuria and requesting to get her urine checked. Patient denies any chest pain shortness of breath. Otherwise awake alert oriented x3 GCS 15. Prior to Admission Medications   Prescriptions Last Dose Informant Patient Reported? Taking?    Alcohol Swabs (Pharmacist Choice Alcohol) PADS   No No   Sig: Apply topically 4 (four) times a day Use as directed   Aspirin (ASPIR-81 PO)  Self Yes No   Sig: Take 81 mg by mouth   B-D UF III MINI PEN NEEDLES 31G X 5 MM MISC  Self No No   Sig: Pt uses 5 pen needles daily   Continuous Blood Gluc  (FreeStyle [place] 14 Day [place]) TEMITOPE  Self No No   Sig: Use for continuous blood glucose monitoring   Continuous Blood Gluc Sensor (FreeStyle [place] 2 Sensor) MISC   No No   Sig: Use 1 each every 14 (fourteen) days   Empagliflozin (Jardiance) 25 MG TABS   No No   Sig: Take 1 tablet (25 mg total) by mouth every morning   Iron Heme Polypeptide 12 MG TABS  Self Yes No   Sig: Take 1 tablet by mouth   Lantus SoloStar 100 units/mL SOPN   No No   Sig: INJECT 45 UNITS EVERY TWELVE HOURS   OneTouch Ultra test strip  Self No No   Sig: USE 4 TIMES A DAY   Pharmacist Choice Lancets MISC  Self No No   Sig: USE AS DIRECTED   Restasis 0.05 % ophthalmic emulsion  Self Yes No   albuterol (PROVENTIL HFA,VENTOLIN HFA) 90 mcg/act inhaler  Self No No   Sig: INHALE ONE PUFF BY MOUTH AND INTO THE LUNGS EVERY 6 HOURS AS NEEDED FOR WHEEZING   amitriptyline (ELAVIL) 25 mg tablet  Self No No   Sig: Take 1 tablet (25 mg total) by mouth daily at bedtime   aspirin (ECOTRIN LOW STRENGTH) 81 mg EC tablet   No No   Sig: TAKE ONE TABLET BY MOUTH ONCE DAILY   atorvastatin (LIPITOR) 80 mg tablet  Self No No   Sig: TAKE 1 TABLET (80 MG TOTAL) BY MOUTH DAILY AT BEDTIME   cholecalciferol (VITAMIN D3) 1,000 units tablet   No No   Sig: Take 2 tablets (2,000 Units total) by mouth daily   citalopram (CeleXA) 40 mg tablet   No No   Sig: Take 1 tablet (40 mg total) by mouth daily   docusate sodium (COLACE) 100 mg capsule  Self No No   Sig: TAKE ONE CAPSULE BY MOUTH TWICE DAILY   dulaglutide (Trulicity) 4.5 PT/4.8NK injection   No No   Sig: Inject 0.5 mL (4.5 mg total) under the skin every 7 days   ergocalciferol (VITAMIN D2) 50,000 units   No No   Sig: Take 1 capsule (50,000 Units total) by mouth once a week   famotidine (PEPCID) 40 MG tablet  Self No No   Sig: Take 1 tablet (40 mg total) by mouth daily at bedtime Take in evening 2 hours prior to bed   fenofibrate (TRICOR) 48 mg tablet  Self No No   Sig: TAKE ONE TABLET BY MOUTH DAILY   insulin glargine (Toujeo Max SoloStar) 300 units/mL CONCENTRATED U-300 injection pen (2-unit dial)   No No   Sig: Inject 100 Units under the skin daily   insulin glulisine (Apidra SoloStar) 100 units/mL injection pen   No No   Sig: Inject 22 with breakfast, 24 with lunch, 26 with dinner, plus scale as needed, TDD up to 100 units daily. insuln lispro (HumaLOG KwikPen) 200 units/mL CONCENTRATED U-200 injection pen   No No   Sig: Take 26 units with breakfast, 28 with lunch, 30 with dinner, or as directed with scale as needed, TDD up to 120 units.    levETIRAcetam (KEPPRA) 500 mg tablet  Self No No   Sig: TAKE ONE TABLET TWICE DAILY   linaCLOtide (Linzess) 72 MCG CAPS   No No   Sig: Take 1 capsule by mouth daily before breakfast   magnesium Oxide (MAG-OX) 400 mg TABS   No No   Sig: TAKE ONE TABLET BY MOUTH ONCE DAILY   meclizine (ANTIVERT) 25 mg tablet  Self No No   Sig: Take 1 tablet (25 mg total) by mouth 3 (three) times a day as needed for dizziness   metoprolol tartrate (LOPRESSOR) 25 mg tablet   No No   Sig: TAKE ONE TABLET BY MOUTH TWICE DAILY   multivitamin (THERAGRAN) TABS  Self Yes No   Sig: Take 1 tablet by mouth every morning   nystatin (MYCOSTATIN) cream  Self No No   Sig: Apply topically 2 (two) times a day   omeprazole (PriLOSEC) 40 MG capsule  Self No No   Sig: Take 1 capsule (40 mg total) by mouth daily   ondansetron (ZOFRAN) 4 mg tablet  Self No No   Sig: Take 1 tablet (4 mg total) by mouth every 8 (eight) hours as needed for nausea or vomiting   pregabalin (LYRICA) 100 mg capsule  Self No No   Sig: TAKE ONE CAPSULE BY MOUTH THREE TIMES A DAY   scopolamine (TRANSDERM-SCOP) 1 mg/3 days TD 72 hr patch  Self No No   Sig: Place 1 patch on the skin over 72 hours every third day   Patient not taking: Reported on 10/19/2023   solifenacin (VESICARE) 5 mg tablet   No No   Sig: Take 1 tablet (5 mg total) by mouth daily   Patient not taking: Reported on 10/19/2023      Facility-Administered Medications: None       Past Medical History:   Diagnosis Date    Atypical chest pain     Gastroparesis     GERD (gastroesophageal reflux disease)     Hypertension     Psychiatric disorder     Sciatica     Seizure disorder (HCC)        Past Surgical History:   Procedure Laterality Date    APPENDECTOMY      BREAST BIOPSY Left  benign     SECTION      CHOLECYSTECTOMY      COLONOSCOPY      HYSTERECTOMY      IR PICC PLACEMENT SINGLE LUMEN  2023    IN LAPAROSCOPIC APPENDECTOMY N/A 2021    Procedure: APPENDECTOMY LAPAROSCOPIC;  Surgeon: Dequan Hwang MD;  Location: [address] [place] [address];  Service: General    IN LAPS ABD PRTM&OMENTUM DX W/WO SPEC BR/WA SPX N/A 2021    Procedure: LAPAROSCOPY DIAGNOSTIC, EXTENSIVE DESI;  Surgeon: Mango Castellanos MD;  Location: [facility] MAIN OR;  Service: General    TUBAL LIGATION      UPPER GASTROINTESTINAL ENDOSCOPY         Family History   Problem Relation Age of Onset    No Known Problems Mother     No Known Problems Father     No Known Problems Daughter     No Known Problems Maternal Grandmother     No Known Problems Paternal Grandmother     No Known Problems Paternal Aunt     No Known Problems Paternal Aunt     No Known Problems Paternal Aunt      I have reviewed and agree with the history as documented. E-Cigarette/Vaping    E-Cigarette Use Never User      E-Cigarette/Vaping Substances    Nicotine No     THC No     CBD No     Flavoring No     Other No     Unknown No      Social History     Tobacco Use    Smoking status: Former     Types: Cigarettes     Quit date:      Years since quittin.8    Smokeless tobacco: Never   Vaping Use    Vaping Use: Never used   Substance Use Topics    Alcohol use: Never    Drug use: Never       Review of Systems   Constitutional:  Negative for chills and fever. HENT:  Negative for rhinorrhea and sore throat. Eyes:  Negative for visual disturbance. Respiratory:  Negative for cough and shortness of breath. Cardiovascular:  Negative for chest pain and leg swelling. Gastrointestinal:  Negative for abdominal pain, diarrhea, nausea and vomiting. Genitourinary:  Negative for dysuria. Musculoskeletal:  Negative for back pain and myalgias. Skin:  Negative for rash. Neurological:  Negative for dizziness and headaches. Psychiatric/Behavioral:  Negative for confusion. All other systems reviewed and are negative. Physical Exam  Physical Exam  Vitals and nursing note reviewed. Constitutional:       Appearance: She is well-developed. HENT:      Head: Normocephalic and atraumatic.       Right Ear: External ear normal.      Left Ear: External ear normal.      Nose: Nose normal.      Mouth/Throat:      Mouth: Mucous membranes are moist.      Pharynx: No oropharyngeal exudate. Eyes:      General: No scleral icterus. Right eye: No discharge. Left eye: No discharge. Conjunctiva/sclera: Conjunctivae normal.      Pupils: Pupils are equal, round, and reactive to light. Cardiovascular:      Rate and Rhythm: Normal rate and regular rhythm. Pulses: Normal pulses. Heart sounds: Normal heart sounds. Pulmonary:      Effort: Pulmonary effort is normal. No respiratory distress. Breath sounds: Normal breath sounds. No wheezing. Abdominal:      General: Bowel sounds are normal.      Palpations: Abdomen is soft. Musculoskeletal:         General: Normal range of motion. Cervical back: Normal range of motion and neck supple. Lymphadenopathy:      Cervical: No cervical adenopathy. Skin:     General: Skin is warm and dry. Capillary Refill: Capillary refill takes less than 2 seconds. Neurological:      General: No focal deficit present. Mental Status: She is alert and oriented to person, place, and time.    Psychiatric:         Mood and Affect: Mood normal.         Behavior: Behavior normal.         Vital Signs  ED Triage Vitals [10/19/23 1238]   Temperature Pulse Respirations Blood Pressure SpO2   (!) 96.5 °F (35.8 °C) 65 19 155/74 98 %      Temp Source Heart Rate Source Patient Position - Orthostatic VS BP Location FiO2 (%)   Temporal Monitor Lying Left arm --      Pain Score       No Pain           Vitals:    10/19/23 1238   BP: 155/74   Pulse: 65   Patient Position - Orthostatic VS: Lying         Visual Acuity      ED Medications  Medications   sodium chloride 0.9 % bolus 1,000 mL (0 mL Intravenous Stopped 10/19/23 1411)       Diagnostic Studies  Results Reviewed       Procedure Component Value Units Date/Time    Fingerstick Glucose (POCT) [[id number]]  (Abnormal) Collected: 10/19/23 7780    Lab Status: Final result Updated: 10/19/23 1400     POC Glucose 255 mg/dl     Basic metabolic panel [[id number]]  (Abnormal) Collected: 10/19/23 1245    Lab Status: Final result Specimen: Blood from Arm, Right Updated: 10/19/23 1313     Sodium 136 mmol/L      Potassium 3.6 mmol/L      Chloride 98 mmol/L      CO2 28 mmol/L      ANION GAP 10 mmol/L      BUN 28 mg/dL      Creatinine 1.13 mg/dL      Glucose 339 mg/dL      Calcium 9.8 mg/dL      eGFR 56 ml/min/1.73sq m     Narrative:      [place] guidelines for Chronic Kidney Disease (CKD):     Stage 1 with normal or high GFR (GFR > 90 mL/min/1.73 square meters)    Stage 2 Mild CKD (GFR = 60-89 mL/min/1.73 square meters)    Stage 3A Moderate CKD (GFR = 45-59 mL/min/1.73 square meters)    Stage 3B Moderate CKD (GFR = 30-44 mL/min/1.73 square meters)    Stage 4 Severe CKD (GFR = 15-29 mL/min/1.73 square meters)    Stage 5 End Stage CKD (GFR <15 mL/min/1.73 square meters)  Note: GFR calculation is accurate only with a steady state creatinine    UA w Reflex to Microscopic w Reflex to Culture [[id number]]  (Abnormal) Collected: 10/19/23 1245    Lab Status: Final result Specimen: Urine, Clean Catch Updated: 10/19/23 1259     Color, UA Yellow     Clarity, UA Clear     Specific Gravity, UA 1.010     pH, UA 6.0     Leukocytes, UA Negative     Nitrite, UA Negative     Protein, UA Negative mg/dl      Glucose, UA 3+ mg/dl      Ketones, UA Negative mg/dl      Urobilinogen, UA 0.2 E.U./dl      Bilirubin, UA Negative     Occult Blood, UA Negative    CBC and differential [[id number]]  (Abnormal) Collected: 10/19/23 1245    Lab Status: Final result Specimen: Blood from Arm, Right Updated: 10/19/23 1258     WBC 11.28 Thousand/uL      RBC 5.15 Million/uL      Hemoglobin 13.2 g/dL      Hematocrit 42.2 %      MCV 82 fL      MCH 25.6 pg      MCHC 31.3 g/dL      RDW 14.0 %      MPV 11.2 fL      Platelets 848 Thousands/uL      nRBC 0 /100 WBCs      Neutrophils Relative 72 %      Immat GRANS % 0 %      Lymphocytes Relative 20 %      Monocytes Relative 6 %      Eosinophils Relative 2 %      Basophils Relative 0 %      Neutrophils Absolute 8.15 Thousands/µL      Immature Grans Absolute 0.02 Thousand/uL      Lymphocytes Absolute 2.21 Thousands/µL      Monocytes Absolute 0.63 Thousand/µL      Eosinophils Absolute 0.23 Thousand/µL      Basophils Absolute 0.04 Thousands/µL     Fingerstick Glucose (POCT) [[id number]]  (Abnormal) Collected: 10/19/23 1243    Lab Status: Final result Updated: 10/19/23 1243     POC Glucose 347 mg/dl                    No orders to display              Procedures  Procedures         ED Course                                             Medical Decision Making  This a 29-year-old female presented to ED with the complaint of elevated blood sugar. Patient was at the endocrinology office checked her sugar which was above 400 recommended to come to the ED for hydration. History is taken from patient. Patient otherwise awake alert oriented times GCS 15. Differential diagnoses include HH NK S/diabetic ketoacidosis/electrolyte abnormality/hypoglycemia  Plan will obtain labs CBC BMP UA Accu-Chek which do showed blood sugar about 400. Patient states that she was given 50 units of insulin prior to arrival at the endocrinology office. We will continue to observe. Labs reviewed within normal limits. The patient was given IV fluid. Blood sugar was rechecked which was 255. Patient feels comfortable going home. Patient stated that her endocrinologist changed her insulin dosing since she has been having elevated blood sugar at home. Recommended to continue follow-up with the instruction given by endocrinology and follow-up with PCP. She states that she has an appointment with her PCP tomorrow. Recommend he continue hydration at home  Disposition discharge home with the strict precaution to return to the ED if symptom does not improve or worsen immediate return back to the ED.   Patient verbalized this and the plan discharge home. Amount and/or Complexity of Data Reviewed  Labs: ordered. Disposition  Final diagnoses:   Hyperglycemia     Time reflects when diagnosis was documented in both MDM as applicable and the Disposition within this note       Time User Action Codes Description Comment    10/19/2023  1:24 PM Nathen Wesley Clara [R73.9] Hyperglycemia           ED Disposition       ED Disposition   Discharge    Condition   Stable    Date/Time   Thu Oct 19, 2023  1:24 PM    Comment   Laverne Kuldeepleah discharge to home/self care. Follow-up Information       Follow up With Specialties Details Why [address] [facility], [address] [facility], Nurse Practitioner, Emergency Medicine Schedule an appointment as soon as possible for a visit in 2 days If symptoms worsen [address] [facility]  [phone]              Discharge Medication List as of 10/19/2023  1:32 PM        CONTINUE these medications which have NOT CHANGED    Details   albuterol (PROVENTIL HFA,VENTOLIN HFA) 90 mcg/act inhaler INHALE ONE PUFF BY MOUTH AND INTO THE LUNGS EVERY 6 HOURS AS NEEDED FOR WHEEZING, Normal      Alcohol Swabs (Pharmacist Choice Alcohol) PADS Apply topically 4 (four) times a day Use as directed, Starting Tue 9/5/2023, Normal      amitriptyline (ELAVIL) 25 mg tablet Take 1 tablet (25 mg total) by mouth daily at bedtime, Starting Thu 7/20/2023, Until Sat 8/19/2023, Normal      !!  Aspirin (ASPIR-81 PO) Take 81 mg by mouth, Starting Mon 2/20/2012, Historical Med      !! aspirin (ECOTRIN LOW STRENGTH) 81 mg EC tablet TAKE ONE TABLET BY MOUTH ONCE DAILY, Starting Wed 9/27/2023, Normal      atorvastatin (LIPITOR) 80 mg tablet TAKE 1 TABLET (80 MG TOTAL) BY MOUTH DAILY AT BEDTIME, Starting Mon 1/16/2023, Normal      B-D UF III MINI PEN NEEDLES 31G X 5 MM MISC Pt uses 5 pen needles daily, Normal      cholecalciferol (VITAMIN D3) 1,000 units tablet Take 2 tablets (2,000 Units total) by mouth daily, Starting Tue 9/5/2023, Normal      citalopram (CeleXA) 40 mg tablet Take 1 tablet (40 mg total) by mouth daily, Starting Tue 9/5/2023, Normal      Continuous Blood Gluc  (FreeStyle [place] 14 Day [place]) TEMITOPE Use for continuous blood glucose monitoring, Print      Continuous Blood Gluc Sensor (FreeStyle Refugio 2 Sensor) MISC Use 1 each every 14 (fourteen) days, Starting Thu 10/19/2023, Normal      docusate sodium (COLACE) 100 mg capsule TAKE ONE CAPSULE BY MOUTH TWICE DAILY, Normal      dulaglutide (Trulicity) 4.5 DO/2.1AQ injection Inject 0.5 mL (4.5 mg total) under the skin every 7 days, Starting Thu 10/19/2023, Normal      Empagliflozin (Jardiance) 25 MG TABS Take 1 tablet (25 mg total) by mouth every morning, Starting Tue 10/3/2023, Normal      ergocalciferol (VITAMIN D2) 50,000 units Take 1 capsule (50,000 Units total) by mouth once a week, Starting Thu 9/7/2023, Normal      famotidine (PEPCID) 40 MG tablet Take 1 tablet (40 mg total) by mouth daily at bedtime Take in evening 2 hours prior to bed, Starting Fri 6/16/2023, Normal      fenofibrate (TRICOR) 48 mg tablet TAKE ONE TABLET BY MOUTH DAILY, Normal      insulin glargine (Toujeo Max SoloStar) 300 units/mL CONCENTRATED U-300 injection pen (2-unit dial) Inject 100 Units under the skin daily, Starting Thu 10/19/2023, Normal      insulin glulisine (Apidra SoloStar) 100 units/mL injection pen Inject 22 with breakfast, 24 with lunch, 26 with dinner, plus scale as needed, TDD up to 100 units daily. , No Print      insuln lispro (HumaLOG KwikPen) 200 units/mL CONCENTRATED U-200 injection pen Take 26 units with breakfast, 28 with lunch, 30 with dinner, or as directed with scale as needed, TDD up to 120 units. , Normal      Iron Heme Polypeptide 12 MG TABS Take 1 tablet by mouth, Historical Med      Lantus SoloStar 100 units/mL SOPN INJECT 45 UNITS EVERY TWELVE HOURS, Normal      levETIRAcetam (KEPPRA) 500 mg tablet TAKE ONE TABLET TWICE DAILY, Normal      linaCLOtide (Linzess) 72 MCG CAPS Take 1 capsule by mouth daily before breakfast, Starting Tue 9/5/2023, Normal      magnesium Oxide (MAG-OX) 400 mg TABS TAKE ONE TABLET BY MOUTH ONCE DAILY, Starting Thu 10/5/2023, Normal      meclizine (ANTIVERT) 25 mg tablet Take 1 tablet (25 mg total) by mouth 3 (three) times a day as needed for dizziness, Starting Mon 7/10/2023, Normal      metoprolol tartrate (LOPRESSOR) 25 mg tablet TAKE ONE TABLET BY MOUTH TWICE DAILY, Starting Sat 8/26/2023, Normal      multivitamin (THERAGRAN) TABS Take 1 tablet by mouth every morning, Historical Med      nystatin (MYCOSTATIN) cream Apply topically 2 (two) times a day, Starting Thu 7/13/2023, Normal      omeprazole (PriLOSEC) 40 MG capsule Take 1 capsule (40 mg total) by mouth daily, Starting Thu 7/13/2023, Normal      ondansetron (ZOFRAN) 4 mg tablet Take 1 tablet (4 mg total) by mouth every 8 (eight) hours as needed for nausea or vomiting, Starting Mon 6/19/2023, Normal      OneTouch Ultra test strip USE 4 TIMES A DAY, Normal      Pharmacist Choice Lancets MISC USE AS DIRECTED, Normal      pregabalin (LYRICA) 100 mg capsule TAKE ONE CAPSULE BY MOUTH THREE TIMES A DAY, Normal      Restasis 0.05 % ophthalmic emulsion Starting Thu 3/2/2023, Historical Med      scopolamine (TRANSDERM-SCOP) 1 mg/3 days TD 72 hr patch Place 1 patch on the skin over 72 hours every third day, Starting Tue 2/7/2023, Normal      solifenacin (VESICARE) 5 mg tablet Take 1 tablet (5 mg total) by mouth daily, Starting Tue 9/5/2023, Normal       !! - Potential duplicate medications found. Please discuss with provider. No discharge procedures on file.     PDMP Review         Value Time User    PDMP Reviewed  Yes 7/15/2023  3:54 AM Simon Fries Gilford Bearded, PA-C            ED Provider  Electronically Signed by             Anne Munoz MD  10/19/23 1450

## 2023-10-19 NOTE — ASSESSMENT & PLAN NOTE
Lab Results   Component Value Date    HGBA1C 12.7 (H) 07/25/2023   Short term - patient feeling symptomatic with uncontrolled hyperglycemia, appearing dry by exam. Recommend ER evaluation to address dehydration, severe hyperglycemia, rule out possible HHS / DKA. She is understanding treatment may involve ER visit with discharge or possible hospitalization / overnight observation. She will be transported to [facility] ER via private vehicle. Report given to ER attending, Dr. Ankita Bedoya, via phone. In office treatment - advised Zeb Plummer to take her usual AM Lantus dose but will increase from 45 to 50 units. Also helped provide correctional bolus of Apidra at 12 units to correct BG > 400. Following ER visit / possible admission pending workup, recommend the following outpatient DM regimen: Change Lantus to Toujeo U300, increasing basal dose from 90 units to 100 units daily. Will also recommend change from Apidra to Humalog U200 - increase to 26-28-30 as base, continuing correction scale ISF 25 > 150. She is also tolerating Trulicity well, will attempt to maximize dose from 3 to 4.5mg weekly. This will hopefully improve her insulin sensitivity, while providing weight loss benefits. Karole [place] may also be continued at 25mg daily. Metformin has been discontinued due to CKD. Rx also provided for CGM - Refugio 3 sensors. Long term plan - will start process of helping Zeb Plummer obtain insulin pump - will place referral to CDE to start this process.

## 2023-10-20 ENCOUNTER — OFFICE VISIT (OUTPATIENT)
Dept: FAMILY MEDICINE CLINIC | Facility: CLINIC | Age: 53
End: 2023-10-20

## 2023-10-20 VITALS
HEIGHT: 61 IN | BODY MASS INDEX: 36.82 KG/M2 | HEART RATE: 58 BPM | DIASTOLIC BLOOD PRESSURE: 70 MMHG | WEIGHT: 195 LBS | TEMPERATURE: 96.7 F | OXYGEN SATURATION: 95 % | RESPIRATION RATE: 18 BRPM | SYSTOLIC BLOOD PRESSURE: 108 MMHG

## 2023-10-20 DIAGNOSIS — E11.43 TYPE 2 DIABETES MELLITUS WITH DIABETIC AUTONOMIC NEUROPATHY, WITH LONG-TERM CURRENT USE OF INSULIN (HCC): ICD-10-CM

## 2023-10-20 DIAGNOSIS — Z79.4 TYPE 2 DIABETES MELLITUS WITH DIABETIC AUTONOMIC NEUROPATHY, WITH LONG-TERM CURRENT USE OF INSULIN (HCC): ICD-10-CM

## 2023-10-20 DIAGNOSIS — N39.498 OTHER URINARY INCONTINENCE: Primary | ICD-10-CM

## 2023-10-20 NOTE — PATIENT INSTRUCTIONS
Stop Vesicare if not working  Kegel exercises 100x/day  Physical therapy referral given for pelvic floor therapy  Highly recommend insulin pump as recommend by Endocrinology  Call for problems/concerns

## 2023-10-20 NOTE — PROGRESS NOTES
[facility]        NAME: Alex Levin is a 46 y.o. female  : 1970    MRN: [id number]  DATE: 2023  TIME: 1:08 PM    Assessment and Plan   Other urinary incontinence [N39.498]  1. Other urinary incontinence  Ambulatory Referral to Physical Therapy      2. Type 2 diabetes mellitus with diabetic autonomic neuropathy, with long-term current use of insulin [facility])              Patient Instructions     Patient Instructions   Stop Vesicare if not working  Kegel exercises 100x/day  Physical therapy referral given for pelvic floor therapy  Highly recommend insulin pump as recommend by Endocrinology  Call for problems/concerns        Chief Complaint     Chief Complaint   Patient presents with    Medication Management     Pt would like to discuss OAB medication          History of Present Illness       Here for OAB symptoms, not think her Jewell Glover is working well- her glucose levels have been 400+, seen by Endocrinology yesterday and sent to the ER- given fluids and discharged home, she reports she doesn't drink a lot. Has issues getting to the bathroom in time. Is interested in pelvic floor physical therapy and starting Kegel exercises. Was recommended to get insulin pump- her  has been against this- she is unsure why but she believes it may be because she will lose insurance in 2024. She would like to pursue permanent disability. She will look into this process and discuss with Endocrinology if she qualifies. Review of Systems   Review of Systems   Constitutional:  Positive for fatigue. Endocrine: Positive for polyuria. Genitourinary:  Positive for frequency.         Urinary incontinence         Current Medications       Current Outpatient Medications:     albuterol (PROVENTIL HFA,VENTOLIN HFA) 90 mcg/act inhaler, INHALE ONE PUFF BY MOUTH AND INTO THE LUNGS EVERY 6 HOURS AS NEEDED FOR WHEEZING, Disp: 36 g, Rfl: 3    Alcohol Swabs (Pharmacist Choice Alcohol) PADS, Apply topically 4 (four) times a day Use as directed, Disp: 300 each, Rfl: 5    amitriptyline (ELAVIL) 25 mg tablet, Take 1 tablet (25 mg total) by mouth daily at bedtime, Disp: 30 tablet, Rfl: 0    Aspirin (ASPIR-81 PO), Take 81 mg by mouth, Disp: , Rfl:     aspirin (ECOTRIN LOW STRENGTH) 81 mg EC tablet, TAKE ONE TABLET BY MOUTH ONCE DAILY, Disp: 30 tablet, Rfl: 1    atorvastatin (LIPITOR) 80 mg tablet, TAKE 1 TABLET (80 MG TOTAL) BY MOUTH DAILY AT BEDTIME, Disp: 30 tablet, Rfl: 6    B-D UF III MINI PEN NEEDLES 31G X 5 MM MISC, Pt uses 5 pen needles daily, Disp: 500 each, Rfl: 0    cholecalciferol (VITAMIN D3) 1,000 units tablet, Take 2 tablets (2,000 Units total) by mouth daily, Disp: 60 tablet, Rfl: 3    citalopram (CeleXA) 40 mg tablet, Take 1 tablet (40 mg total) by mouth daily, Disp: 90 tablet, Rfl: 0    Continuous Blood Gluc  (FreeStyle Refugio 14 Day [place]) [facility], Use for continuous blood glucose monitoring, Disp: 1 each, Rfl: 0    Continuous Blood Gluc Sensor (FreeStyle Refugio 2 Sensor) MISC, Use 1 each every 14 (fourteen) days, Disp: 6 each, Rfl: 2    docusate sodium (COLACE) 100 mg capsule, TAKE ONE CAPSULE BY MOUTH TWICE DAILY, Disp: 60 capsule, Rfl: 5    dulaglutide (Trulicity) 4.5 AJ/1.6BB injection, Inject 0.5 mL (4.5 mg total) under the skin every 7 days, Disp: 6 mL, Rfl: 1    Empagliflozin (Jardiance) 25 MG TABS, Take 1 tablet (25 mg total) by mouth every morning, Disp: 90 tablet, Rfl: 1    ergocalciferol (VITAMIN D2) 50,000 units, Take 1 capsule (50,000 Units total) by mouth once a week, Disp: 12 capsule, Rfl: 3    famotidine (PEPCID) 40 MG tablet, Take 1 tablet (40 mg total) by mouth daily at bedtime Take in evening 2 hours prior to bed, Disp: 30 tablet, Rfl: 6    fenofibrate (TRICOR) 48 mg tablet, TAKE ONE TABLET BY MOUTH DAILY, Disp: 90 tablet, Rfl: 3    insulin glargine (Toujeo Max SoloStar) 300 units/mL CONCENTRATED U-300 injection pen (2-unit dial), Inject 100 Units under the skin daily, Disp: 12 mL, Rfl: 1    insulin glulisine (Apidra SoloStar) 100 units/mL injection pen, Inject 22 with breakfast, 24 with lunch, 26 with dinner, plus scale as needed, TDD up to 100 units daily. , Disp: 30 mL, Rfl: 1    insuln lispro (HumaLOG KwikPen) 200 units/mL CONCENTRATED U-200 injection pen, Take 26 units with breakfast, 28 with lunch, 30 with dinner, or as directed with scale as needed, TDD up to 120 units. , Disp: 18 mL, Rfl: 2    Iron Heme Polypeptide 12 MG TABS, Take 1 tablet by mouth, Disp: , Rfl:     levETIRAcetam (KEPPRA) 500 mg tablet, TAKE ONE TABLET TWICE DAILY, Disp: 180 tablet, Rfl: 1    linaCLOtide (Linzess) 72 MCG CAPS, Take 1 capsule by mouth daily before breakfast, Disp: 30 capsule, Rfl: 0    magnesium Oxide (MAG-OX) 400 mg TABS, TAKE ONE TABLET BY MOUTH ONCE DAILY, Disp: 90 tablet, Rfl: 3    meclizine (ANTIVERT) 25 mg tablet, Take 1 tablet (25 mg total) by mouth 3 (three) times a day as needed for dizziness, Disp: 30 tablet, Rfl: 0    metoprolol tartrate (LOPRESSOR) 25 mg tablet, TAKE ONE TABLET BY MOUTH TWICE DAILY, Disp: 180 tablet, Rfl: 1    multivitamin (THERAGRAN) TABS, Take 1 tablet by mouth every morning, Disp: , Rfl:     nystatin (MYCOSTATIN) cream, Apply topically 2 (two) times a day, Disp: 30 g, Rfl: 0    omeprazole (PriLOSEC) 40 MG capsule, Take 1 capsule (40 mg total) by mouth daily, Disp: 90 capsule, Rfl: 3    ondansetron (ZOFRAN) 4 mg tablet, Take 1 tablet (4 mg total) by mouth every 8 (eight) hours as needed for nausea or vomiting, Disp: 20 tablet, Rfl: 0    OneTouch Ultra test strip, USE 4 TIMES A DAY, Disp: 400 each, Rfl: 0    Pharmacist Choice Lancets MISC, USE AS DIRECTED, Disp: 100 each, Rfl: 1    pregabalin (LYRICA) 100 mg capsule, TAKE ONE CAPSULE BY MOUTH THREE TIMES A DAY, Disp: 270 capsule, Rfl: 0    Restasis 0.05 % ophthalmic emulsion, , Disp: , Rfl:     Lantus SoloStar 100 units/mL SOPN, INJECT 45 UNITS EVERY TWELVE HOURS (Patient not taking: Reported on 10/20/2023), Disp: 60 mL, Rfl: 0    scopolamine (TRANSDERM-SCOP) 1 mg/3 days TD 72 hr patch, Place 1 patch on the skin over 72 hours every third day (Patient not taking: Reported on 10/19/2023), Disp: 24 patch, Rfl: 1    solifenacin (VESICARE) 5 mg tablet, Take 1 tablet (5 mg total) by mouth daily (Patient not taking: Reported on 10/19/2023), Disp: 90 tablet, Rfl: 1  No current facility-administered medications for this visit.     Current Allergies     Allergies as of 10/20/2023 - Reviewed 10/20/2023   Allergen Reaction Noted    Butorphanol Hallucinations 2016    Benztropine  2016    Penicillins  2015    Zolpidem  2015    Azithromycin Hives and GI Intolerance 2012            The following portions of the patient's history were reviewed and updated as appropriate: allergies, current medications, past family history, past medical history, past social history, past surgical history and problem list.     Past Medical History:   Diagnosis Date    Atypical chest pain     Gastroparesis     GERD (gastroesophageal reflux disease)     Hypertension     Psychiatric disorder     Sciatica     Seizure disorder ([address])        Past Surgical History:   Procedure Laterality Date    APPENDECTOMY      BREAST BIOPSY Left  benign     SECTION      CHOLECYSTECTOMY      COLONOSCOPY      HYSTERECTOMY      IR PICC PLACEMENT SINGLE LUMEN  2023    CT LAPAROSCOPIC APPENDECTOMY N/A 2021    Procedure: APPENDECTOMY LAPAROSCOPIC;  Surgeon: Charlie Betancourt MD;  Location: [address] [place] [address] OR;  Service: General    CT LAPS ABD PRTM&OMENTUM DX W/WO SPEC BR/WA SPX N/A 2021    Procedure: LAPAROSCOPY DIAGNOSTIC, EXTENSIVE DESI;  Surgeon: Charlie Betancourt MD;  Location: [address] [place] [address] OR;  Service: General    TUBAL LIGATION      UPPER GASTROINTESTINAL ENDOSCOPY         Family History   Problem Relation Age of Onset    No Known Problems Mother     No Known Problems Father     No Known Problems Daughter     No Known Problems Maternal Grandmother     No Known Problems Paternal Grandmother     No Known Problems Paternal Aunt     No Known Problems Paternal Aunt     No Known Problems Paternal Aunt          Medications have been verified. Objective   /70   Pulse 58   Temp (!) 96.7 °F (35.9 °C) (Tympanic)   Resp 18   Ht 5' 1" (1.549 m)   Wt 88.5 kg (195 lb)   SpO2 95%   BMI 36.84 kg/m²        Physical Exam     Physical Exam  Vitals and nursing note reviewed. Constitutional:       General: She is not in acute distress. Appearance: She is well-developed. She is not diaphoretic. Cardiovascular:      Rate and Rhythm: Normal rate and regular rhythm. Heart sounds: Normal heart sounds. No murmur heard. Pulmonary:      Effort: Pulmonary effort is normal. No respiratory distress. Breath sounds: Normal breath sounds. No wheezing. Musculoskeletal:         General: No tenderness or deformity. Normal range of motion. Skin:     General: Skin is warm and dry. Findings: No erythema or rash. Neurological:      Mental Status: She is alert and oriented to person, place, and time. Psychiatric:         Mood and Affect: Mood normal.         Behavior: Behavior normal. Behavior is cooperative. Thought Content:  Thought content normal.         Judgment: Judgment normal.

## 2023-10-23 ENCOUNTER — TELEPHONE (OUTPATIENT)
Dept: ENDOCRINOLOGY | Facility: CLINIC | Age: 53
End: 2023-10-23

## 2023-10-23 NOTE — TELEPHONE ENCOUNTER
Called Cas Magaña to check in follow ER visit. Insulin regimen reviewed: Toujeo 100 units daily  Apidra - 26-28-30   Of note, Humalog U200 not available at her pharmacy due to prior authorization needed. Current BG trends - active with Refugio 2 CGM. Reports Still getting > 400s - after most meals. Gave example she ate pasta and meatballs for lunch - took Apidra 28 units at the time. Shortly after BG > 400. At low, BG has reached 180s - 190s, but nothing lower. Confirmed appetite is fair, increasing slightly. Encouraged she should continue to prioritize staying hydrated. Insulin adjustments - for ease of dosing recommend go ahead and up-titrate Apidra to 30 units TID AC as base, and encouraged to continue correctional scale ISF 25 > 150. Discussed long term goal of getting on insulin pump. Cas Magaña would like to try using insulin at multiple daily injections for now, focus on maximizing dosing. Cas Magaña and I also discussed possibility of approving short term disability due to uncontrolled type 2 DM. Our goal would be to continue improvement in her glycemic trends, ideally on pump, and hopefully helping her be able to maintain an active personal lifestyle, while not limiting her ability to maintain employment. Will follow up with Cas Magaña in 3-4 days, print CGM report for review at that time.

## 2023-10-25 DIAGNOSIS — E11.42 DIABETIC POLYNEUROPATHY ASSOCIATED WITH TYPE 2 DIABETES MELLITUS (HCC): ICD-10-CM

## 2023-10-25 RX ORDER — PREGABALIN 100 MG/1
CAPSULE ORAL
Qty: 270 CAPSULE | Refills: 1 | Status: SHIPPED | OUTPATIENT
Start: 2023-10-25

## 2023-10-25 NOTE — TELEPHONE ENCOUNTER
Medication:Pregabalin         [facility] protocol. Please forward to your office staff for further review as this medication was reviewed by a [facility] RN.

## 2023-10-27 ENCOUNTER — TELEPHONE (OUTPATIENT)
Dept: ENDOCRINOLOGY | Facility: CLINIC | Age: 53
End: 2023-10-27

## 2023-10-27 NOTE — TELEPHONE ENCOUNTER
Left voicemail - would like her to discuss recent CGM report as well as insulin habits, and how she is feeling overall.

## 2023-11-03 DIAGNOSIS — E78.1 HYPERTRIGLYCERIDEMIA: ICD-10-CM

## 2023-11-03 DIAGNOSIS — F31.9 BIPOLAR AFFECTIVE DISORDER, REMISSION STATUS UNSPECIFIED (HCC): ICD-10-CM

## 2023-11-03 RX ORDER — FENOFIBRATE 48 MG/1
TABLET, COATED ORAL
Qty: 90 TABLET | Refills: 3 | Status: SHIPPED | OUTPATIENT
Start: 2023-11-03

## 2023-11-03 RX ORDER — CITALOPRAM 40 MG/1
40 TABLET ORAL DAILY
Qty: 90 TABLET | Refills: 0 | Status: SHIPPED | OUTPATIENT
Start: 2023-11-03

## 2023-11-16 DIAGNOSIS — Z79.4 TYPE 2 DIABETES MELLITUS WITH HYPERGLYCEMIA, WITH LONG-TERM CURRENT USE OF INSULIN (HCC): ICD-10-CM

## 2023-11-16 DIAGNOSIS — E11.65 TYPE 2 DIABETES MELLITUS WITH HYPERGLYCEMIA, WITH LONG-TERM CURRENT USE OF INSULIN (HCC): ICD-10-CM

## 2023-11-16 DIAGNOSIS — K21.9 GASTROESOPHAGEAL REFLUX DISEASE, UNSPECIFIED WHETHER ESOPHAGITIS PRESENT: ICD-10-CM

## 2023-11-16 DIAGNOSIS — K21.9 GASTROESOPHAGEAL REFLUX DISEASE WITHOUT ESOPHAGITIS: ICD-10-CM

## 2023-11-16 RX ORDER — FAMOTIDINE 40 MG/1
TABLET, FILM COATED ORAL
Qty: 30 TABLET | Refills: 6 | Status: SHIPPED | OUTPATIENT
Start: 2023-11-16

## 2023-11-16 RX ORDER — ASPIRIN 81 MG/1
81 TABLET ORAL DAILY
Qty: 30 TABLET | Refills: 1 | Status: SHIPPED | OUTPATIENT
Start: 2023-11-16

## 2023-11-27 ENCOUNTER — OFFICE VISIT (OUTPATIENT)
Dept: NEPHROLOGY | Facility: CLINIC | Age: 53
End: 2023-11-27
Payer: COMMERCIAL

## 2023-11-27 VITALS
BODY MASS INDEX: 37.38 KG/M2 | HEIGHT: 61 IN | DIASTOLIC BLOOD PRESSURE: 64 MMHG | OXYGEN SATURATION: 95 % | WEIGHT: 198 LBS | HEART RATE: 73 BPM | SYSTOLIC BLOOD PRESSURE: 110 MMHG

## 2023-11-27 DIAGNOSIS — N18.30 BENIGN HYPERTENSION WITH CKD (CHRONIC KIDNEY DISEASE) STAGE III (HCC): Chronic | ICD-10-CM

## 2023-11-27 DIAGNOSIS — K21.9 GASTROESOPHAGEAL REFLUX DISEASE WITHOUT ESOPHAGITIS: Chronic | ICD-10-CM

## 2023-11-27 DIAGNOSIS — N18.31 STAGE 3A CHRONIC KIDNEY DISEASE (HCC): Primary | ICD-10-CM

## 2023-11-27 DIAGNOSIS — E83.42 HYPOMAGNESEMIA: ICD-10-CM

## 2023-11-27 DIAGNOSIS — I12.9 BENIGN HYPERTENSION WITH CKD (CHRONIC KIDNEY DISEASE) STAGE III (HCC): Chronic | ICD-10-CM

## 2023-11-27 DIAGNOSIS — E61.1 IRON DEFICIENCY: ICD-10-CM

## 2023-11-27 PROCEDURE — 99214 OFFICE O/P EST MOD 30 MIN: CPT | Performed by: INTERNAL MEDICINE

## 2023-11-27 NOTE — ASSESSMENT & PLAN NOTE
Restart magnesium supplementation at this time given discontinuation omeprazole. Follow-up labs in about 2 weeks.

## 2023-11-27 NOTE — ASSESSMENT & PLAN NOTE
Lab Results   Component Value Date    EGFR 56 10/19/2023    EGFR 41 08/15/2023    EGFR 40 08/07/2023    CREATININE 1.13 10/19/2023    CREATININE 1.46 (H) 08/15/2023    CREATININE 1.49 (H) 08/07/2023     Kidney function acceptable baseline between a creatinine of 1-1.1 mg/dL. Continue to optimize care and avoid potential for toxins.

## 2023-11-27 NOTE — PATIENT INSTRUCTIONS
Please stop omeprazole (Prilosec) and your magnesium supplement. Check labs in about 2 weeks for magnesium and other electrolytes. For iron supplement, please go on line and type "heme iron" into the search bar, get a pill with at least 10 mg of heme iron and take daily for now. We will recheck iron stores in about 4 months.

## 2023-11-27 NOTE — ASSESSMENT & PLAN NOTE
Patient has significant iron deficiency, recommend initiation of a heme iron supplement. Follow-up labs in about 4 months. Consider GI workup eventually progresses from a clinical standpoint.

## 2023-11-27 NOTE — ASSESSMENT & PLAN NOTE
Lab Results   Component Value Date    EGFR 56 10/19/2023    EGFR 41 08/15/2023    EGFR 40 08/07/2023    CREATININE 1.13 10/19/2023    CREATININE 1.46 (H) 08/15/2023    CREATININE 1.49 (H) 08/07/2023     Blood pressure is well-controlled at this time, continue current medications.

## 2023-11-27 NOTE — ASSESSMENT & PLAN NOTE
Patient has been stable on omeprazole 40 mg daily as well as famotidine 40 mg daily. Discontinue omeprazole, continue famotidine at this time. If the patient is unable to tolerate due to reflux symptoms, will need to be instituted daily omeprazole use. With discontinuation omeprazole, we may be able to remove magnesium supplementation.

## 2023-11-27 NOTE — PROGRESS NOTES
[facility] [address] [place] [address]    Name: Radha Garcia  YOB: 1970      Assessment/Plan:    Stage 3a chronic kidney disease [facility])  Lab Results   Component Value Date    EGFR 56 10/19/2023    EGFR 41 08/15/2023    EGFR 40 08/07/2023    CREATININE 1.13 10/19/2023    CREATININE 1.46 (H) 08/15/2023    CREATININE 1.49 (H) 08/07/2023     Kidney function acceptable baseline between a creatinine of 1-1.1 mg/dL. Continue to optimize care and avoid potential for toxins. Benign hypertension with CKD (chronic kidney disease) stage III [facility])  Lab Results   Component Value Date    EGFR 56 10/19/2023    EGFR 41 08/15/2023    EGFR 40 08/07/2023    CREATININE 1.13 10/19/2023    CREATININE 1.46 (H) 08/15/2023    CREATININE 1.49 (H) 08/07/2023     Blood pressure is well-controlled at this time, continue current medications. GERD (gastroesophageal reflux disease)  Patient has been stable on omeprazole 40 mg daily as well as famotidine 40 mg daily. Discontinue omeprazole, continue famotidine at this time. If the patient is unable to tolerate due to reflux symptoms, will need to be instituted daily omeprazole use. With discontinuation omeprazole, we may be able to remove magnesium supplementation. Hypomagnesemia  Restart magnesium supplementation at this time given discontinuation omeprazole. Follow-up labs in about 2 weeks. Iron deficiency  Patient has significant iron deficiency, recommend initiation of a heme iron supplement. Follow-up labs in about 4 months. Consider GI workup eventually progresses from a clinical standpoint. Problem List Items Addressed This Visit          Digestive    GERD (gastroesophageal reflux disease) (Chronic)     Patient has been stable on omeprazole 40 mg daily as well as famotidine 40 mg daily. Discontinue omeprazole, continue famotidine at this time.   If the patient is unable to tolerate due to reflux symptoms, will need to be instituted daily omeprazole use. With discontinuation omeprazole, we may be able to remove magnesium supplementation. Cardiovascular and Mediastinum    Benign hypertension with CKD (chronic kidney disease) stage III (HCC) (Chronic)     Lab Results   Component Value Date    EGFR 56 10/19/2023    EGFR 41 08/15/2023    EGFR 40 08/07/2023    CREATININE 1.13 10/19/2023    CREATININE 1.46 (H) 08/15/2023    CREATININE 1.49 (H) 08/07/2023   Blood pressure is well-controlled at this time, continue current medications. Genitourinary    Stage 3a chronic kidney disease [facility]) - Primary     Lab Results   Component Value Date    EGFR 56 10/19/2023    EGFR 41 08/15/2023    EGFR 40 08/07/2023    CREATININE 1.13 10/19/2023    CREATININE 1.46 (H) 08/15/2023    CREATININE 1.49 (H) 08/07/2023   Kidney function acceptable baseline between a creatinine of 1-1.1 mg/dL. Continue to optimize care and avoid potential for toxins. Relevant Orders    Basic metabolic panel    Comprehensive metabolic panel    Albumin / creatinine urine ratio    Urinalysis with microscopic    Magnesium    Phosphorus    PTH, intact    Vitamin D 25 hydroxy    CBC and differential       Other    Hypomagnesemia     Restart magnesium supplementation at this time given discontinuation omeprazole. Follow-up labs in about 2 weeks. Relevant Orders    Magnesium    Iron deficiency     Patient has significant iron deficiency, recommend initiation of a heme iron supplement. Follow-up labs in about 4 months. Consider GI workup eventually progresses from a clinical standpoint. Relevant Orders    Iron Panel (Includes Ferritin, Iron Sat%, Iron, and TIBC)       Patient stable from renal standpoint, as noted above we will follow-up iron studies for now. If the patient does not improve her iron stores on heme iron supplementation, will proceed with iron infusions.   Will also recheck her labs in about 2 weeks as we will see if the patient is able to tolerate discontinuation of omeprazole and magnesium supplement at this time. Subjective:      Patient ID: Kris Webber is a 48 y.o. female. Patient presents for follow-up. Reviewed patient's labs in detail, most recent creatinine is around 1.1 mg/dL which is close to her typical baseline. Other electrolytes were appropriate. She is taking all medications as prescribed. Patient had a side effect of a probable yeast infection, she was prescribed Diflucan for 3 days and most of her symptoms have resolved. Patient claims that since transition [facility] to [facility], her blood sugars have been better controlled. This morning's fasting sugar was around 140 mg/dL. She is followed by our endocrinology colleagues. The following portions of the patient's history were reviewed and updated as appropriate: allergies, current medications, past family history, past medical history, past social history, past surgical history and problem list.    Review of Systems   All other systems reviewed and are negative.         Social History     Socioeconomic History    Marital status: /Civil Union     Spouse name: None    Number of children: None    Years of education: None    Highest education level: None   Occupational History    None   Tobacco Use    Smoking status: Former     Types: Cigarettes     Quit date:      Years since quittin.9    Smokeless tobacco: Never   Vaping Use    Vaping Use: Never used   Substance and Sexual Activity    Alcohol use: Never    Drug use: Never    Sexual activity: Not Currently     Partners: Male     Birth control/protection: None   Other Topics Concern    None   Social History Narrative    None     Social Determinants of Health     Financial Resource Strain: Not on file   Food Insecurity: No Food Insecurity (2023)    Hunger Vital Sign     Worried About Running Out of Food in the Last Year: Never true     Ran Out of Food in the Last Year: Never true   Transportation Needs: No Transportation Needs (2023)    PRAPARE - Transportation     Lack of Transportation (Medical): No     Lack of Transportation (Non-Medical):  No   Physical Activity: Not on file   Stress: Not on file   Social Connections: Not on file   Intimate Partner Violence: Not on file   Housing Stability: Low Risk  (2023)    Housing Stability Vital Sign     Unable to Pay for Housing in the Last Year: No     Number of Places Lived in the Last Year: 1     Unstable Housing in the Last Year: No     Past Medical History:   Diagnosis Date    Atypical chest pain     Gastroparesis     GERD (gastroesophageal reflux disease)     Hypertension     Psychiatric disorder     Sciatica     Seizure disorder ([address])      Past Surgical History:   Procedure Laterality Date    APPENDECTOMY      BREAST BIOPSY Left  benign     SECTION      CHOLECYSTECTOMY      COLONOSCOPY      HYSTERECTOMY      IR PICC PLACEMENT SINGLE LUMEN  2023    NH LAPAROSCOPIC APPENDECTOMY N/A 2021    Procedure: APPENDECTOMY LAPAROSCOPIC;  Surgeon: Micky Montejo MD;  Location: [facility] OR;  Service: General    NH LAPS ABD PRTM&OMENTUM DX W/WO SPEC BR/WA SPX N/A 2021    Procedure: LAPAROSCOPY DIAGNOSTIC, EXTENSIVE DESI;  Surgeon: Micky Montejo MD;  Location: [facility] OR;  Service: General    TUBAL LIGATION      UPPER GASTROINTESTINAL ENDOSCOPY         Current Outpatient Medications:     albuterol (PROVENTIL HFA,VENTOLIN HFA) 90 mcg/act inhaler, INHALE ONE PUFF BY MOUTH AND INTO THE LUNGS EVERY 6 HOURS AS NEEDED FOR WHEEZING, Disp: 36 g, Rfl: 3    Alcohol Swabs (Pharmacist Choice Alcohol) PADS, Apply topically 4 (four) times a day Use as directed, Disp: 300 each, Rfl: 5    Aspirin (ASPIR-81 PO), Take 81 mg by mouth, Disp: , Rfl:     aspirin (ECOTRIN LOW STRENGTH) 81 mg EC tablet, TAKE ONE TABLET BY MOUTH ONCE DAILY, Disp: 30 tablet, Rfl: 1    atorvastatin (LIPITOR) 80 mg tablet, TAKE 1 TABLET (80 MG TOTAL) BY MOUTH DAILY AT BEDTIME, Disp: 30 tablet, Rfl: 6    B-D UF III MINI PEN NEEDLES 31G X 5 MM MISC, Pt uses 5 pen needles daily, Disp: 500 each, Rfl: 0    cholecalciferol (VITAMIN D3) 1,000 units tablet, Take 2 tablets (2,000 Units total) by mouth daily, Disp: 60 tablet, Rfl: 3    citalopram (CeleXA) 40 mg tablet, TAKE ONE TABLET BY MOUTH DAILY, Disp: 90 tablet, Rfl: 0    Continuous Blood Gluc  (FreeStyle Refugio 14 Day [place]) TEMITOPE, Use for continuous blood glucose monitoring, Disp: 1 each, Rfl: 0    Continuous Blood Gluc Sensor (FreeStyle Refugio 2 Sensor) MISC, Use 1 each every 14 (fourteen) days, Disp: 6 each, Rfl: 2    docusate sodium (COLACE) 100 mg capsule, TAKE ONE CAPSULE BY MOUTH TWICE DAILY, Disp: 60 capsule, Rfl: 5    Empagliflozin (Jardiance) 25 MG TABS, Take 1 tablet (25 mg total) by mouth every morning, Disp: 90 tablet, Rfl: 1    ergocalciferol (VITAMIN D2) 50,000 units, Take 1 capsule (50,000 Units total) by mouth once a week, Disp: 12 capsule, Rfl: 3    famotidine (PEPCID) 40 MG tablet, TAKE ONE TABLET BY MOUTH TWO HOURS PRIOR TO BEDTIME, Disp: 30 tablet, Rfl: 6    fenofibrate (TRICOR) 48 mg tablet, TAKE ONE TABLET BY MOUTH DAILY, Disp: 90 tablet, Rfl: 3    insulin glargine (Toujeo Max SoloStar) 300 units/mL CONCENTRATED U-300 injection pen (2-unit dial), Inject 100 Units under the skin daily, Disp: 12 mL, Rfl: 1    insulin glulisine (Apidra SoloStar) 100 units/mL injection pen, Inject 22 with breakfast, 24 with lunch, 26 with dinner, plus scale as needed, TDD up to 100 units daily. , Disp: 30 mL, Rfl: 1    Iron Heme Polypeptide 12 MG TABS, Take 1 tablet by mouth, Disp: , Rfl:     levETIRAcetam (KEPPRA) 500 mg tablet, TAKE ONE TABLET TWICE DAILY, Disp: 180 tablet, Rfl: 1    linaCLOtide (Linzess) 72 MCG CAPS, Take 1 capsule by mouth daily before breakfast, Disp: 30 capsule, Rfl: 0    meclizine (ANTIVERT) 25 mg tablet, Take 1 tablet (25 mg total) by mouth 3 (three) times a day as needed for dizziness, Disp: 30 tablet, Rfl: 0    metoprolol tartrate (LOPRESSOR) 25 mg tablet, TAKE ONE TABLET BY MOUTH TWICE DAILY, Disp: 180 tablet, Rfl: 1    multivitamin (THERAGRAN) TABS, Take 1 tablet by mouth every morning, Disp: , Rfl:     nystatin (MYCOSTATIN) cream, Apply topically 2 (two) times a day, Disp: 30 g, Rfl: 0    ondansetron (ZOFRAN) 4 mg tablet, Take 1 tablet (4 mg total) by mouth every 8 (eight) hours as needed for nausea or vomiting, Disp: 20 tablet, Rfl: 0    OneTouch Ultra test strip, USE 4 TIMES A DAY, Disp: 400 each, Rfl: 0    Pharmacist Choice Lancets MISC, USE AS DIRECTED, Disp: 100 each, Rfl: 1    pregabalin (LYRICA) 100 mg capsule, TAKE ONE CAPSULE BY MOUTH THREE TIMES A DAY, Disp: 270 capsule, Rfl: 1    Restasis 0.05 % ophthalmic emulsion, , Disp: , Rfl:     amitriptyline (ELAVIL) 25 mg tablet, Take 1 tablet (25 mg total) by mouth daily at bedtime, Disp: 30 tablet, Rfl: 0    dulaglutide (Trulicity) 4.5 OV/9.6ZQ injection, Inject 0.5 mL (4.5 mg total) under the skin every 7 days, Disp: 6 mL, Rfl: 1    Lab Results   Component Value Date    SODIUM 136 10/19/2023    K 3.6 10/19/2023    CL 98 10/19/2023    CO2 28 10/19/2023    AGAP 10 10/19/2023    BUN 28 (H) 10/19/2023    CREATININE 1.13 10/19/2023    GLUC 339 (H) 10/19/2023    GLUF 780 (HH) 08/15/2023    CALCIUM 9.8 10/19/2023    AST 14 08/15/2023    ALT 30 08/15/2023    ALKPHOS 164 (H) 08/15/2023    TP 7.2 08/15/2023    TBILI 0.26 08/15/2023    EGFR 56 10/19/2023     Lab Results   Component Value Date    WBC 11.28 (H) 10/19/2023    HGB 13.2 10/19/2023    HCT 42.2 10/19/2023    MCV 82 10/19/2023     10/19/2023     Lab Results   Component Value Date    CHOLESTEROL 240 (H) 07/14/2023    CHOLESTEROL 179 03/25/2023    CHOLESTEROL 176 08/30/2022     Lab Results   Component Value Date    HDL 47 (L) 07/14/2023    HDL 46 (L) 03/25/2023    HDL 49 (L) 08/30/2022     Lab Results   Component Value Date    [facility]  07/14/2023      Comment: Calculated LDL invalid, triglycerides >400 mg/dl    LDLCALC 71 03/25/2023    LDLCALC 78 08/30/2022     Lab Results   Component Value Date    TRIG 446 (H) 07/14/2023    TRIG 311 (H) 03/25/2023    TRIG 243 (H) 08/30/2022     No results found for: "CHOLHDL"  Lab Results   Component Value Date    [id number] 1.784 07/24/2023     Lab Results   Component Value Date    PTH 41.7 08/15/2023    CALCIUM 9.8 10/19/2023    PHOS 4.5 08/15/2023     No results found for: "SPEP", "UPEP"  No results found for: "Tanya Ioana", "[id number]"        Objective:      /64 (BP Location: Right arm, Patient Position: Sitting, Cuff Size: Standard)   Pulse 73   Ht 5' 1" (1.549 m)   Wt 89.8 kg (198 lb)   SpO2 95%   BMI 37.41 kg/m²          Physical Exam  Vitals reviewed. Constitutional:       General: She is not in acute distress. Appearance: Normal appearance. HENT:      Head: Normocephalic and atraumatic. Right Ear: External ear normal.      Left Ear: External ear normal.   Eyes:      Conjunctiva/sclera: Conjunctivae normal.   Cardiovascular:      Rate and Rhythm: Normal rate and regular rhythm. Heart sounds: Normal heart sounds. Pulmonary:      Effort: No respiratory distress. Breath sounds: No wheezing. Abdominal:      Palpations: Abdomen is soft. Musculoskeletal:      Right lower leg: Edema present. Left lower leg: Edema present. Skin:     General: Skin is warm and dry. Neurological:      General: No focal deficit present. Mental Status: She is alert and oriented to person, place, and time.    Psychiatric:         Mood and Affect: Mood normal.         Behavior: Behavior normal.

## 2023-12-11 ENCOUNTER — APPOINTMENT (OUTPATIENT)
Dept: LAB | Facility: CLINIC | Age: 53
End: 2023-12-11
Payer: COMMERCIAL

## 2023-12-11 DIAGNOSIS — Z79.4 TYPE 2 DIABETES MELLITUS WITH HYPERGLYCEMIA, WITH LONG-TERM CURRENT USE OF INSULIN (HCC): ICD-10-CM

## 2023-12-11 DIAGNOSIS — E83.42 HYPOMAGNESEMIA: ICD-10-CM

## 2023-12-11 DIAGNOSIS — N18.31 STAGE 3A CHRONIC KIDNEY DISEASE (HCC): ICD-10-CM

## 2023-12-11 DIAGNOSIS — E11.65 TYPE 2 DIABETES MELLITUS WITH HYPERGLYCEMIA, WITH LONG-TERM CURRENT USE OF INSULIN (HCC): ICD-10-CM

## 2023-12-11 DIAGNOSIS — R56.9 SEIZURES (HCC): ICD-10-CM

## 2023-12-11 DIAGNOSIS — E78.5 HYPERLIPIDEMIA, UNSPECIFIED HYPERLIPIDEMIA TYPE: ICD-10-CM

## 2023-12-11 LAB
ALBUMIN SERPL BCP-MCNC: 3.7 G/DL (ref 3.5–5)
ALP SERPL-CCNC: 98 U/L (ref 34–104)
ALT SERPL W P-5'-P-CCNC: 20 U/L (ref 7–52)
ANION GAP SERPL CALCULATED.3IONS-SCNC: 9 MMOL/L
AST SERPL W P-5'-P-CCNC: 18 U/L (ref 13–39)
BILIRUB SERPL-MCNC: 0.25 MG/DL (ref 0.2–1)
BUN SERPL-MCNC: 34 MG/DL (ref 5–25)
CALCIUM SERPL-MCNC: 9.1 MG/DL (ref 8.4–10.2)
CHLORIDE SERPL-SCNC: 102 MMOL/L (ref 96–108)
CHOLEST SERPL-MCNC: 212 MG/DL
CO2 SERPL-SCNC: 27 MMOL/L (ref 21–32)
CREAT SERPL-MCNC: 0.96 MG/DL (ref 0.6–1.3)
EST. AVERAGE GLUCOSE BLD GHB EST-MCNC: 364 MG/DL
GFR SERPL CREATININE-BSD FRML MDRD: 67 ML/MIN/1.73SQ M
GLUCOSE P FAST SERPL-MCNC: 232 MG/DL (ref 65–99)
HBA1C MFR BLD: 14.3 %
HDLC SERPL-MCNC: 57 MG/DL
LDLC SERPL CALC-MCNC: 103 MG/DL (ref 0–100)
MAGNESIUM SERPL-MCNC: 2.2 MG/DL (ref 1.9–2.7)
POTASSIUM SERPL-SCNC: 4.3 MMOL/L (ref 3.5–5.3)
PROT SERPL-MCNC: 6.4 G/DL (ref 6.4–8.4)
SODIUM SERPL-SCNC: 138 MMOL/L (ref 135–147)
TRIGL SERPL-MCNC: 262 MG/DL

## 2023-12-11 PROCEDURE — 80061 LIPID PANEL: CPT

## 2023-12-11 PROCEDURE — 80053 COMPREHEN METABOLIC PANEL: CPT

## 2023-12-11 PROCEDURE — 36415 COLL VENOUS BLD VENIPUNCTURE: CPT

## 2023-12-11 PROCEDURE — 83036 HEMOGLOBIN GLYCOSYLATED A1C: CPT

## 2023-12-11 PROCEDURE — 83735 ASSAY OF MAGNESIUM: CPT

## 2023-12-11 RX ORDER — LEVETIRACETAM 500 MG/1
500 TABLET ORAL 2 TIMES DAILY
Qty: 300 TABLET | Refills: 1 | Status: SHIPPED | OUTPATIENT
Start: 2023-12-11

## 2023-12-12 ENCOUNTER — TELEPHONE (OUTPATIENT)
Dept: CARDIOLOGY CLINIC | Facility: CLINIC | Age: 53
End: 2023-12-12

## 2023-12-12 NOTE — TELEPHONE ENCOUNTER
----- Message from Harry Gordon PA-C sent at 12/12/2023  9:16 AM EST -----  Cholesterol numbers are improved but still elevated   Continue current atorvastatin and fenofibrate  Continue lifestyle modifications with diet and exercise  Will discuss in detail at the next visit

## 2023-12-14 ENCOUNTER — OFFICE VISIT (OUTPATIENT)
Dept: ENDOCRINOLOGY | Facility: CLINIC | Age: 53
End: 2023-12-14
Payer: COMMERCIAL

## 2023-12-14 VITALS
HEART RATE: 70 BPM | BODY MASS INDEX: 37.95 KG/M2 | OXYGEN SATURATION: 98 % | SYSTOLIC BLOOD PRESSURE: 142 MMHG | WEIGHT: 201 LBS | HEIGHT: 61 IN | DIASTOLIC BLOOD PRESSURE: 90 MMHG

## 2023-12-14 DIAGNOSIS — E11.43 TYPE 2 DIABETES MELLITUS WITH DIABETIC AUTONOMIC NEUROPATHY, WITH LONG-TERM CURRENT USE OF INSULIN (HCC): Primary | ICD-10-CM

## 2023-12-14 DIAGNOSIS — Z79.4 TYPE 2 DIABETES MELLITUS WITH DIABETIC AUTONOMIC NEUROPATHY, WITH LONG-TERM CURRENT USE OF INSULIN (HCC): Primary | ICD-10-CM

## 2023-12-14 PROCEDURE — 95251 CONT GLUC MNTR ANALYSIS I&R: CPT | Performed by: PHYSICIAN ASSISTANT

## 2023-12-14 PROCEDURE — 99213 OFFICE O/P EST LOW 20 MIN: CPT | Performed by: PHYSICIAN ASSISTANT

## 2023-12-14 RX ORDER — INSULIN HUMAN 500 [IU]/ML
INJECTION, SOLUTION SUBCUTANEOUS
Qty: 15 ML | Refills: 2 | Status: SHIPPED | OUTPATIENT
Start: 2023-12-14

## 2023-12-14 NOTE — PROGRESS NOTES
Haylee Balbuena 53 y.o. female MRN: [id number]    Encounter: [id number]      Assessment/Plan   Problem List Items Addressed This Visit          Endocrine    Type 2 diabetes mellitus with diabetic autonomic neuropathy, with long-term current use of insulin ([facility]) - Primary       Lab Results   Component Value Date    HGBA1C 14.3 (H) 12/11/2023   Given ongoing poor control and high doses of current basal-bolus insulin regimen, recommend transition to Humulin U500. Current TDD insulin = 172. Will intensify TDD by 20%, start at  units - 85 units with breakfast, 65 units with both lunch and dinner.   Haylee is to contact the office sooner if BG remains consistently > 250, or more frequent values < 70, and we can make further adjustments as needed.  RTC - 1 month due to high risk status.         Relevant Medications    Insulin Regular Human, Conc, (HumuLIN R U-500 KwikPen) 500 units/mL CONCENTRATED U-500 injection pen        CC: Diabetes  History of Present Illness     HPI:  Haylee is here for routine follow up for uncontrolled type 2 DM.     She has had a lot of increased stress due to family health issues, change in routine with caring for her mom. This is improving, but definitely influenced her blood sugar trends over the past few months.    Haylee continues with chronic fatigue but feels more tired than usual. She also mentions left forearm swelling intermittently, but no fevers, increased warmth.     Haylee reports she has been very compliant with medication regimen, however does admit to having     DM medications reviewed with patient -   Trulicity 4.5mg weekly  Jardiance 25mg daily  Toujeo 100 units daily - taken at 10am   Apidra 22 units breakfast , 24 units with lunch, 26 with dinner   Reports very good compliance recently, no consistent skipped, missed, forgotten doses.    CGM report:  Haylee Balbuena   Device used [employer] 2  Home use     Indication   Type 2 Diabetes    More than 72 hours of data was  reviewed. Report to be scanned to chart.     Date Range: 23 - 23    Analysis of data:   % time CGM used: 64%  Average Glucose: 345mg/dL  Coefficient of Variation: 22.8%  GMI : 11.6%  Time in Target Range: 6%  Time Above Range: 8% high, 86% very high  Time Below Range: 0% low, 0% very low    Interpretation of data:  Very poor control continues with severe hyperglycemia 85% of time. inconsistent scanning.          Review of Systems   Constitutional:  Negative for chills and fever.   HENT:  Negative for ear pain and sore throat.    Eyes:  Negative for pain and visual disturbance.   Respiratory:  Negative for cough and shortness of breath.    Cardiovascular:  Negative for chest pain and palpitations.   Gastrointestinal:  Negative for abdominal pain and vomiting.   Endocrine: Positive for polydipsia and polyuria.   Genitourinary:  Negative for dysuria and hematuria.   Musculoskeletal:  Negative for arthralgias and back pain.   Skin:  Negative for color change and rash.   Neurological:  Negative for seizures and syncope.   All other systems reviewed and are negative.      Historical Information   Past Medical History:   Diagnosis Date    Atypical chest pain     Gastroparesis     GERD (gastroesophageal reflux disease)     Hypertension     Psychiatric disorder     Sciatica     Seizure disorder (HCC)      Past Surgical History:   Procedure Laterality Date    APPENDECTOMY      BREAST BIOPSY Left  benign     SECTION      CHOLECYSTECTOMY      COLONOSCOPY      HYSTERECTOMY      IR PICC PLACEMENT SINGLE LUMEN  2023    AR LAPAROSCOPIC APPENDECTOMY N/A 2021    Procedure: APPENDECTOMY LAPAROSCOPIC;  Surgeon: Onel Martin MD;  Location:  MAIN OR;  Service: General    AR LAPS ABD PRTM&OMENTUM DX W/WO SPEC BR/WA SPX N/A 2021    Procedure: LAPAROSCOPY DIAGNOSTIC, EXTENSIVE DESI;  Surgeon: Onel Martin MD;  Location:  MAIN OR;  Service: General    TUBAL LIGATION      UPPER  GASTROINTESTINAL ENDOSCOPY       Social History   Social History     Substance and Sexual Activity   Alcohol Use Never     Social History     Substance and Sexual Activity   Drug Use Never     Social History     Tobacco Use   Smoking Status Former    Current packs/day: 0.00    Types: Cigarettes    Quit date:     Years since quittin.9   Smokeless Tobacco Never     Family History:   Family History   Problem Relation Age of Onset    No Known Problems Mother     No Known Problems Father     No Known Problems Daughter     No Known Problems Maternal Grandmother     No Known Problems Paternal Grandmother     No Known Problems Paternal Aunt     No Known Problems Paternal Aunt     No Known Problems Paternal Aunt        Meds/Allergies   Current Outpatient Medications   Medication Sig Dispense Refill    albuterol (PROVENTIL HFA,VENTOLIN HFA) 90 mcg/act inhaler INHALE ONE PUFF BY MOUTH AND INTO THE LUNGS EVERY 6 HOURS AS NEEDED FOR WHEEZING 36 g 3    Alcohol Swabs (Pharmacist Choice Alcohol) PADS Apply topically 4 (four) times a day Use as directed 300 each 5    aspirin (ECOTRIN LOW STRENGTH) 81 mg EC tablet TAKE ONE TABLET BY MOUTH ONCE DAILY 30 tablet 1    atorvastatin (LIPITOR) 80 mg tablet TAKE 1 TABLET (80 MG TOTAL) BY MOUTH DAILY AT BEDTIME 30 tablet 6    B-D UF III MINI PEN NEEDLES 31G X 5 MM MISC Pt uses 5 pen needles daily 500 each 0    cholecalciferol (VITAMIN D3) 1,000 units tablet Take 2 tablets (2,000 Units total) by mouth daily 60 tablet 3    citalopram (CeleXA) 40 mg tablet TAKE ONE TABLET BY MOUTH DAILY 90 tablet 0    Continuous Blood Gluc  (FreeStyle Refugio 14 Day [place]) TEMITOPE Use for continuous blood glucose monitoring 1 each 0    Continuous Blood Gluc Sensor (FreeStyle Refugio 2 Sensor) MISC Use 1 each every 14 (fourteen) days 6 each 2    docusate sodium (COLACE) 100 mg capsule TAKE ONE CAPSULE BY MOUTH TWICE DAILY 60 capsule 5    dulaglutide (Trulicity) 4.5 MG/0.5ML injection Inject 0.5 mL (4.5  mg total) under the skin every 7 days 6 mL 1    Empagliflozin (Jardiance) 25 MG TABS Take 1 tablet (25 mg total) by mouth every morning 90 tablet 1    ergocalciferol (VITAMIN D2) 50,000 units Take 1 capsule (50,000 Units total) by mouth once a week 12 capsule 3    famotidine (PEPCID) 40 MG tablet TAKE ONE TABLET BY MOUTH TWO HOURS PRIOR TO BEDTIME 30 tablet 6    fenofibrate (TRICOR) 48 mg tablet TAKE ONE TABLET BY MOUTH DAILY 90 tablet 3    insulin glargine (Toujeo Max SoloStar) 300 units/mL CONCENTRATED U-300 injection pen (2-unit dial) Inject 100 Units under the skin daily 12 mL 1    insulin glulisine (Apidra SoloStar) 100 units/mL injection pen Inject 22 with breakfast, 24 with lunch, 26 with dinner, plus scale as needed, TDD up to 100 units daily. 30 mL 1    Iron Heme Polypeptide 12 MG TABS Take 1 tablet by mouth      levETIRAcetam (KEPPRA) 500 mg tablet TAKE ONE TABLET BY MOUTH TWICE DAILY 300 tablet 1    linaCLOtide (Linzess) 72 MCG CAPS Take 1 capsule by mouth daily before breakfast 30 capsule 0    meclizine (ANTIVERT) 25 mg tablet Take 1 tablet (25 mg total) by mouth 3 (three) times a day as needed for dizziness 30 tablet 0    metoprolol tartrate (LOPRESSOR) 25 mg tablet TAKE ONE TABLET BY MOUTH TWICE DAILY 180 tablet 1    multivitamin (THERAGRAN) TABS Take 1 tablet by mouth every morning      nystatin (MYCOSTATIN) cream Apply topically 2 (two) times a day 30 g 0    ondansetron (ZOFRAN) 4 mg tablet Take 1 tablet (4 mg total) by mouth every 8 (eight) hours as needed for nausea or vomiting 20 tablet 0    OneTouch Ultra test strip USE 4 TIMES A  each 0    Pharmacist Choice Lancets MISC USE AS DIRECTED 100 each 1    pregabalin (LYRICA) 100 mg capsule TAKE ONE CAPSULE BY MOUTH THREE TIMES A  capsule 1    Restasis 0.05 % ophthalmic emulsion       amitriptyline (ELAVIL) 25 mg tablet Take 1 tablet (25 mg total) by mouth daily at bedtime 30 tablet 0    Aspirin (ASPIR-81 PO) Take 81 mg by mouth       No  "current facility-administered medications for this visit.     Allergies   Allergen Reactions    Butorphanol Hallucinations    Benztropine     Penicillins     Zolpidem     Azithromycin Hives and GI Intolerance       Objective   Vitals: Blood pressure 142/90, pulse 70, height 5' 1\" (1.549 m), weight 91.2 kg (201 lb), SpO2 98%, not currently breastfeeding.    Physical Exam  Vitals reviewed.   Constitutional:       General: She is not in acute distress.     Appearance: She is not ill-appearing.   HENT:      Head: Normocephalic.      Mouth/Throat:      Mouth: Mucous membranes are moist.   Cardiovascular:      Rate and Rhythm: Normal rate and regular rhythm.   Pulmonary:      Effort: No respiratory distress.   Musculoskeletal:      Right lower leg: No edema.      Left lower leg: No edema.   Skin:     General: Skin is warm and dry.   Neurological:      Mental Status: She is alert.   Psychiatric:         Mood and Affect: Mood normal.         The history was obtained from the review of the chart, patient.    Lab Results:   Lab Results   Component Value Date/Time    Hemoglobin A1C 14.3 (H) 12/11/2023 12:36 PM    Hemoglobin A1C 12.7 (H) 07/25/2023 12:52 AM    Hemoglobin A1C 12.6 (H) 07/14/2023 11:22 PM    WBC 11.28 (H) 10/19/2023 12:45 PM    WBC 8.53 08/15/2023 08:28 AM    WBC 6.33 08/07/2023 11:45 AM    Hemoglobin 13.2 10/19/2023 12:45 PM    Hemoglobin 11.8 08/15/2023 08:28 AM    Hemoglobin 11.0 (L) 08/07/2023 11:45 AM    Hematocrit 42.2 10/19/2023 12:45 PM    Hematocrit 37.0 08/15/2023 08:28 AM    Hematocrit 34.2 (L) 08/07/2023 11:45 AM    MCV 82 10/19/2023 12:45 PM    MCV 81 (L) 08/15/2023 08:28 AM    MCV 83 08/07/2023 11:45 AM    Platelets 381 10/19/2023 12:45 PM    Platelets 353 08/15/2023 08:28 AM    Platelets 691 (H) 08/07/2023 11:45 AM    BUN 34 (H) 12/11/2023 12:36 PM    BUN 28 (H) 10/19/2023 12:45 PM    BUN 30 (H) 08/15/2023 08:28 AM    Potassium 4.3 12/11/2023 12:36 PM    Potassium 3.6 10/19/2023 12:45 PM    " "Potassium 4.8 08/15/2023 08:28 AM    Chloride 102 12/11/2023 12:36 PM    Chloride 98 10/19/2023 12:45 PM    Chloride 100 08/15/2023 08:28 AM    CO2 27 12/11/2023 12:36 PM    CO2 28 10/19/2023 12:45 PM    CO2 22 08/15/2023 08:28 AM    Creatinine 0.96 12/11/2023 12:36 PM    Creatinine 1.13 10/19/2023 12:45 PM    Creatinine 1.46 (H) 08/15/2023 08:28 AM    AST 18 12/11/2023 12:36 PM    AST 14 08/15/2023 08:28 AM    AST 46 (H) 07/31/2023 04:54 AM    ALT 20 12/11/2023 12:36 PM    ALT 30 08/15/2023 08:28 AM    ALT 52 07/31/2023 04:54 AM    Total Protein 6.4 12/11/2023 12:36 PM    Total Protein 7.2 08/15/2023 08:28 AM    Total Protein 6.2 (L) 07/31/2023 04:54 AM    Albumin 3.7 12/11/2023 12:36 PM    Albumin 3.5 08/15/2023 08:28 AM    Albumin 3.0 (L) 07/31/2023 04:54 AM    HDL, Direct 57 12/11/2023 12:36 PM    HDL, Direct 47 (L) 07/14/2023 11:22 PM    HDL, Direct 46 (L) 03/25/2023 09:01 AM    Triglycerides 262 (H) 12/11/2023 12:36 PM    Triglycerides 446 (H) 07/14/2023 11:22 PM    Triglycerides 311 (H) 03/25/2023 09:01 AM           Imaging Studies: n/a    Portions of the record may have been created with voice recognition software. Occasional wrong word or \"sound a like\" substitutions may have occurred due to the inherent limitations of voice recognition software. Read the chart carefully and recognize, using context, where substitutions have occurred.    "

## 2023-12-20 ENCOUNTER — APPOINTMENT (OUTPATIENT)
Dept: URGENT CARE | Facility: CLINIC | Age: 53
End: 2023-12-20

## 2023-12-21 ENCOUNTER — APPOINTMENT (EMERGENCY)
Dept: CT IMAGING | Facility: HOSPITAL | Age: 53
End: 2023-12-21
Payer: COMMERCIAL

## 2023-12-21 ENCOUNTER — HOSPITAL ENCOUNTER (EMERGENCY)
Facility: HOSPITAL | Age: 53
Discharge: HOME | End: 2023-12-21
Attending: EMERGENCY MEDICINE
Payer: COMMERCIAL

## 2023-12-21 VITALS
TEMPERATURE: 98.3 F | HEIGHT: 61 IN | BODY MASS INDEX: 37.57 KG/M2 | HEART RATE: 95 BPM | DIASTOLIC BLOOD PRESSURE: 80 MMHG | RESPIRATION RATE: 18 BRPM | WEIGHT: 199 LBS | OXYGEN SATURATION: 97 % | SYSTOLIC BLOOD PRESSURE: 145 MMHG

## 2023-12-21 DIAGNOSIS — U07.1 COVID: ICD-10-CM

## 2023-12-21 DIAGNOSIS — R10.9 ABDOMINAL PAIN: Primary | ICD-10-CM

## 2023-12-21 LAB
ALBUMIN SERPL BCP-MCNC: 4.2 G/DL (ref 3.5–5)
ALP SERPL-CCNC: 119 U/L (ref 34–104)
ALT SERPL W P-5'-P-CCNC: 32 U/L (ref 7–52)
ANION GAP SERPL CALCULATED.3IONS-SCNC: 18 MMOL/L
AST SERPL W P-5'-P-CCNC: 26 U/L (ref 13–39)
B-HCG SERPL-ACNC: 5 MIU/ML (ref 0–5)
BASOPHILS # BLD AUTO: 0.02 THOUSANDS/ÂΜL (ref 0–0.1)
BASOPHILS NFR BLD AUTO: 0 % (ref 0–1)
BILIRUB SERPL-MCNC: 0.53 MG/DL (ref 0.2–1)
BUN SERPL-MCNC: 27 MG/DL (ref 5–25)
CALCIUM SERPL-MCNC: 8.9 MG/DL (ref 8.4–10.2)
CHLORIDE SERPL-SCNC: 103 MMOL/L (ref 96–108)
CO2 SERPL-SCNC: 21 MMOL/L (ref 21–32)
CREAT SERPL-MCNC: 1.14 MG/DL (ref 0.6–1.3)
EOSINOPHIL # BLD AUTO: 0.13 THOUSAND/ÂΜL (ref 0–0.61)
EOSINOPHIL NFR BLD AUTO: 1 % (ref 0–6)
ERYTHROCYTE [DISTWIDTH] IN BLOOD BY AUTOMATED COUNT: 15.1 % (ref 11.6–15.1)
FLUAV RNA RESP QL NAA+PROBE: NEGATIVE
FLUBV RNA RESP QL NAA+PROBE: NEGATIVE
GFR SERPL CREATININE-BSD FRML MDRD: 55 ML/MIN/1.73SQ M
GLUCOSE SERPL-MCNC: 293 MG/DL (ref 65–140)
HCT VFR BLD AUTO: 42.7 % (ref 34.8–46.1)
HGB BLD-MCNC: 13.3 G/DL (ref 11.5–15.4)
IMM GRANULOCYTES # BLD AUTO: 0.04 THOUSAND/UL (ref 0–0.2)
IMM GRANULOCYTES NFR BLD AUTO: 0 % (ref 0–2)
LIPASE SERPL-CCNC: 19 U/L (ref 11–82)
LYMPHOCYTES # BLD AUTO: 1.37 THOUSANDS/ÂΜL (ref 0.6–4.47)
LYMPHOCYTES NFR BLD AUTO: 11 % (ref 14–44)
MCH RBC QN AUTO: 25.3 PG (ref 26.8–34.3)
MCHC RBC AUTO-ENTMCNC: 31.1 G/DL (ref 31.4–37.4)
MCV RBC AUTO: 81 FL (ref 82–98)
MONOCYTES # BLD AUTO: 1.04 THOUSAND/ÂΜL (ref 0.17–1.22)
MONOCYTES NFR BLD AUTO: 8 % (ref 4–12)
NEUTROPHILS # BLD AUTO: 10.2 THOUSANDS/ÂΜL (ref 1.85–7.62)
NEUTS SEG NFR BLD AUTO: 80 % (ref 43–75)
NRBC BLD AUTO-RTO: 0 /100 WBCS
PLATELET # BLD AUTO: 390 THOUSANDS/UL (ref 149–390)
PMV BLD AUTO: 10.4 FL (ref 8.9–12.7)
POTASSIUM SERPL-SCNC: 4.4 MMOL/L (ref 3.5–5.3)
PROT SERPL-MCNC: 6.8 G/DL (ref 6.4–8.4)
RBC # BLD AUTO: 5.25 MILLION/UL (ref 3.81–5.12)
RSV RNA RESP QL NAA+PROBE: NEGATIVE
SARS-COV-2 RNA RESP QL NAA+PROBE: POSITIVE
SODIUM SERPL-SCNC: 142 MMOL/L (ref 135–147)
WBC # BLD AUTO: 12.8 THOUSAND/UL (ref 4.31–10.16)

## 2023-12-21 PROCEDURE — 96361 HYDRATE IV INFUSION ADD-ON: CPT

## 2023-12-21 PROCEDURE — 80053 COMPREHEN METABOLIC PANEL: CPT | Performed by: EMERGENCY MEDICINE

## 2023-12-21 PROCEDURE — 0241U HB NFCT DS VIR RESP RNA 4 TRGT: CPT | Performed by: EMERGENCY MEDICINE

## 2023-12-21 PROCEDURE — 99284 EMERGENCY DEPT VISIT MOD MDM: CPT | Performed by: EMERGENCY MEDICINE

## 2023-12-21 PROCEDURE — 96375 TX/PRO/DX INJ NEW DRUG ADDON: CPT

## 2023-12-21 PROCEDURE — 85025 COMPLETE CBC W/AUTO DIFF WBC: CPT | Performed by: EMERGENCY MEDICINE

## 2023-12-21 PROCEDURE — 74177 CT ABD & PELVIS W/CONTRAST: CPT

## 2023-12-21 PROCEDURE — 99284 EMERGENCY DEPT VISIT MOD MDM: CPT

## 2023-12-21 PROCEDURE — 83690 ASSAY OF LIPASE: CPT | Performed by: EMERGENCY MEDICINE

## 2023-12-21 PROCEDURE — 96374 THER/PROPH/DIAG INJ IV PUSH: CPT

## 2023-12-21 PROCEDURE — 84702 CHORIONIC GONADOTROPIN TEST: CPT | Performed by: EMERGENCY MEDICINE

## 2023-12-21 PROCEDURE — 36415 COLL VENOUS BLD VENIPUNCTURE: CPT | Performed by: EMERGENCY MEDICINE

## 2023-12-21 RX ORDER — HYDROMORPHONE HCL IN WATER/PF 6 MG/30 ML
0.2 PATIENT CONTROLLED ANALGESIA SYRINGE INTRAVENOUS ONCE
Status: COMPLETED | OUTPATIENT
Start: 2023-12-21 | End: 2023-12-21

## 2023-12-21 RX ORDER — ONDANSETRON 2 MG/ML
4 INJECTION INTRAMUSCULAR; INTRAVENOUS ONCE
Status: COMPLETED | OUTPATIENT
Start: 2023-12-21 | End: 2023-12-21

## 2023-12-21 RX ORDER — ONDANSETRON 4 MG/1
4 TABLET, FILM COATED ORAL EVERY 6 HOURS
Qty: 12 TABLET | Refills: 0 | Status: SHIPPED | OUTPATIENT
Start: 2023-12-21

## 2023-12-21 RX ORDER — METOCLOPRAMIDE 10 MG/1
5 TABLET ORAL EVERY 6 HOURS
Qty: 30 TABLET | Refills: 0 | Status: SHIPPED | OUTPATIENT
Start: 2023-12-21

## 2023-12-21 RX ORDER — LORAZEPAM 2 MG/ML
0.5 INJECTION INTRAMUSCULAR ONCE
Status: COMPLETED | OUTPATIENT
Start: 2023-12-21 | End: 2023-12-21

## 2023-12-21 RX ORDER — METOCLOPRAMIDE HYDROCHLORIDE 5 MG/ML
10 INJECTION INTRAMUSCULAR; INTRAVENOUS ONCE
Status: COMPLETED | OUTPATIENT
Start: 2023-12-21 | End: 2023-12-21

## 2023-12-21 RX ADMIN — IOHEXOL 100 ML: 350 INJECTION, SOLUTION INTRAVENOUS at 20:18

## 2023-12-21 RX ADMIN — LORAZEPAM 0.5 MG: 2 INJECTION INTRAMUSCULAR; INTRAVENOUS at 19:03

## 2023-12-21 RX ADMIN — ONDANSETRON 4 MG: 2 INJECTION INTRAMUSCULAR; INTRAVENOUS at 18:55

## 2023-12-21 RX ADMIN — METOCLOPRAMIDE 10 MG: 5 INJECTION, SOLUTION INTRAMUSCULAR; INTRAVENOUS at 22:09

## 2023-12-21 RX ADMIN — SODIUM CHLORIDE 1000 ML: 0.9 INJECTION, SOLUTION INTRAVENOUS at 18:58

## 2023-12-21 RX ADMIN — HYDROMORPHONE HYDROCHLORIDE 0.2 MG: 0.2 INJECTION, SOLUTION INTRAMUSCULAR; INTRAVENOUS; SUBCUTANEOUS at 20:34

## 2023-12-21 RX ADMIN — SODIUM CHLORIDE 1000 ML: 0.9 INJECTION, SOLUTION INTRAVENOUS at 22:35

## 2023-12-21 NOTE — Clinical Note
Haylee Balbuena was seen and treated in our emergency department on 12/21/2023.                Diagnosis:     Haylee GARCIAS  may return to work on return date.    She may return on this date: 12/28/2023         If you have any questions or concerns, please don't hesitate to call.      Jarrett Jean-Baptiste MD    ______________________________           _______________          _______________  Hospital Representative                              Date                                Time

## 2023-12-22 NOTE — ED PROVIDER NOTES
History  Chief Complaint   Patient presents with    Nausea     Reports severe epigastric pain.  States this is unlike her previous gastroparesis.  Worse with palpation.  Nothing makes it feel better. No sick contacts. No fevers or chills started earlier today.         Prior to Admission Medications   Prescriptions Last Dose Informant Patient Reported? Taking?   Alcohol Swabs (Pharmacist Choice Alcohol) PADS  Self No No   Sig: Apply topically 4 (four) times a day Use as directed   Aspirin (ASPIR-81 PO)  Self Yes No   Sig: Take 81 mg by mouth   B-D UF III MINI PEN NEEDLES 31G X 5 MM MISC  Self No No   Sig: Pt uses 5 pen needles daily   Continuous Blood Gluc  (FreeStyle Refugio 14 Day [place]) TEMITOPE  Self No No   Sig: Use for continuous blood glucose monitoring   Continuous Blood Gluc Sensor (FreeStyle [place] 2 Sensor) MISC  Self No No   Sig: Use 1 each every 14 (fourteen) days   Empagliflozin (Jardiance) 25 MG TABS  Self No No   Sig: Take 1 tablet (25 mg total) by mouth every morning   Insulin Regular Human, Conc, (HumuLIN R U-500 KwikPen) 500 units/mL CONCENTRATED U-500 injection pen   No No   Sig: Take 85 units with breakfast, 65 units with lunch, 65 with dinner, or as directed TDD up to 250 units   Iron Heme Polypeptide 12 MG TABS  Self Yes No   Sig: Take 1 tablet by mouth   OneTouch Ultra test strip  Self No No   Sig: USE 4 TIMES A DAY   Pharmacist Choice Lancets MISC  Self No No   Sig: USE AS DIRECTED   Restasis 0.05 % ophthalmic emulsion  Self Yes No   albuterol (PROVENTIL HFA,VENTOLIN HFA) 90 mcg/act inhaler  Self No No   Sig: INHALE ONE PUFF BY MOUTH AND INTO THE LUNGS EVERY 6 HOURS AS NEEDED FOR WHEEZING   amitriptyline (ELAVIL) 25 mg tablet  Self No No   Sig: Take 1 tablet (25 mg total) by mouth daily at bedtime   aspirin (ECOTRIN LOW STRENGTH) 81 mg EC tablet  Self No No   Sig: TAKE ONE TABLET BY MOUTH ONCE DAILY   atorvastatin (LIPITOR) 80 mg tablet  Self No No   Sig: TAKE 1 TABLET (80 MG TOTAL) BY  MOUTH DAILY AT BEDTIME   cholecalciferol (VITAMIN D3) 1,000 units tablet  Self No No   Sig: Take 2 tablets (2,000 Units total) by mouth daily   citalopram (CeleXA) 40 mg tablet  Self No No   Sig: TAKE ONE TABLET BY MOUTH DAILY   docusate sodium (COLACE) 100 mg capsule  Self No No   Sig: TAKE ONE CAPSULE BY MOUTH TWICE DAILY   dulaglutide (Trulicity) 4.5 MG/0.5ML injection  Self No No   Sig: Inject 0.5 mL (4.5 mg total) under the skin every 7 days   ergocalciferol (VITAMIN D2) 50,000 units  Self No No   Sig: Take 1 capsule (50,000 Units total) by mouth once a week   famotidine (PEPCID) 40 MG tablet  Self No No   Sig: TAKE ONE TABLET BY MOUTH TWO HOURS PRIOR TO BEDTIME   fenofibrate (TRICOR) 48 mg tablet  Self No No   Sig: TAKE ONE TABLET BY MOUTH DAILY   insulin glargine (Toujeo Max SoloStar) 300 units/mL CONCENTRATED U-300 injection pen (2-unit dial)  Self No No   Sig: Inject 100 Units under the skin daily   insulin glulisine (Apidra SoloStar) 100 units/mL injection pen  Self No No   Sig: Inject 22 with breakfast, 24 with lunch, 26 with dinner, plus scale as needed, TDD up to 100 units daily.   levETIRAcetam (KEPPRA) 500 mg tablet   No No   Sig: TAKE ONE TABLET BY MOUTH TWICE DAILY   linaCLOtide (Linzess) 72 MCG CAPS  Self No No   Sig: Take 1 capsule by mouth daily before breakfast   meclizine (ANTIVERT) 25 mg tablet  Self No No   Sig: Take 1 tablet (25 mg total) by mouth 3 (three) times a day as needed for dizziness   metoprolol tartrate (LOPRESSOR) 25 mg tablet  Self No No   Sig: TAKE ONE TABLET BY MOUTH TWICE DAILY   multivitamin (THERAGRAN) TABS  Self Yes No   Sig: Take 1 tablet by mouth every morning   nystatin (MYCOSTATIN) cream  Self No No   Sig: Apply topically 2 (two) times a day   ondansetron (ZOFRAN) 4 mg tablet  Self No No   Sig: Take 1 tablet (4 mg total) by mouth every 8 (eight) hours as needed for nausea or vomiting   pregabalin (LYRICA) 100 mg capsule  Self No No   Sig: TAKE ONE CAPSULE BY MOUTH  THREE TIMES A DAY      Facility-Administered Medications: None       Past Medical History:   Diagnosis Date    Atypical chest pain     Gastroparesis     GERD (gastroesophageal reflux disease)     Hypertension     Psychiatric disorder     Sciatica     Seizure disorder (HCC)        Past Surgical History:   Procedure Laterality Date    APPENDECTOMY      BREAST BIOPSY Left  benign     SECTION      CHOLECYSTECTOMY      COLONOSCOPY      HYSTERECTOMY      IR PICC PLACEMENT SINGLE LUMEN  2023    FL LAPAROSCOPIC APPENDECTOMY N/A 2021    Procedure: APPENDECTOMY LAPAROSCOPIC;  Surgeon: Onel Martin MD;  Location:  MAIN OR;  Service: General    FL LAPS ABD PRTM&OMENTUM DX W/WO SPEC BR/WA SPX N/A 2021    Procedure: LAPAROSCOPY DIAGNOSTIC, EXTENSIVE DESI;  Surgeon: nOel Martin MD;  Location:  MAIN OR;  Service: General    TUBAL LIGATION      UPPER GASTROINTESTINAL ENDOSCOPY         Family History   Problem Relation Age of Onset    No Known Problems Mother     No Known Problems Father     No Known Problems Daughter     No Known Problems Maternal Grandmother     No Known Problems Paternal Grandmother     No Known Problems Paternal Aunt     No Known Problems Paternal Aunt     No Known Problems Paternal Aunt      I have reviewed and agree with the history as documented.    E-Cigarette/Vaping    E-Cigarette Use Never User      E-Cigarette/Vaping Substances    Nicotine No     THC No     CBD No     Flavoring No     Other No     Unknown No      Social History     Tobacco Use    Smoking status: Former     Current packs/day: 0.00     Types: Cigarettes     Quit date:      Years since quittin.9    Smokeless tobacco: Never   Vaping Use    Vaping status: Never Used   Substance Use Topics    Alcohol use: Never    Drug use: Never       Review of Systems   Constitutional:  Positive for activity change, chills and fatigue.   Respiratory:  Negative for cough and shortness of breath.     Gastrointestinal:  Positive for abdominal pain, nausea and vomiting. Negative for constipation and diarrhea.       Physical Exam  Physical Exam  Constitutional:       General: She is in acute distress.      Appearance: She is well-developed. She is not ill-appearing, toxic-appearing or diaphoretic.      Comments: Actively vomiting large amounts.  Appears very uncomfortable   HENT:      Head: Normocephalic and atraumatic.      Right Ear: External ear normal.      Left Ear: External ear normal.      Nose: Nose normal.      Mouth/Throat:      Mouth: Mucous membranes are moist.      Pharynx: Oropharynx is clear.   Eyes:      Conjunctiva/sclera: Conjunctivae normal.      Pupils: Pupils are equal, round, and reactive to light.   Cardiovascular:      Rate and Rhythm: Normal rate and regular rhythm.      Heart sounds: Normal heart sounds.   Pulmonary:      Effort: Pulmonary effort is normal. No respiratory distress.      Breath sounds: Normal breath sounds.   Abdominal:      General: Bowel sounds are normal. There is no distension.      Palpations: Abdomen is soft.      Tenderness: There is abdominal tenderness. There is no right CVA tenderness, left CVA tenderness, guarding or rebound.   Musculoskeletal:         General: Normal range of motion.      Cervical back: Normal range of motion and neck supple.   Skin:     General: Skin is warm and dry.      Capillary Refill: Capillary refill takes less than 2 seconds.   Neurological:      General: No focal deficit present.      Mental Status: She is alert and oriented to person, place, and time.   Psychiatric:         Behavior: Behavior normal.         Vital Signs  ED Triage Vitals [12/21/23 1818]   Temperature Pulse Respirations Blood Pressure SpO2   98.3 °F (36.8 °C) 95 18 145/80 97 %      Temp Source Heart Rate Source Patient Position - Orthostatic VS BP Location FiO2 (%)   Temporal Monitor Lying Left arm --      Pain Score       9           Vitals:    12/21/23 1818   BP:  145/80   Pulse: 95   Patient Position - Orthostatic VS: Lying         Visual Acuity      ED Medications  Medications   ondansetron (ZOFRAN) injection 4 mg (4 mg Intravenous Given 12/21/23 1855)   sodium chloride 0.9 % bolus 1,000 mL (0 mL Intravenous Stopped 12/21/23 2155)   LORazepam (ATIVAN) injection 0.5 mg (0.5 mg Intravenous Given 12/21/23 1903)   iohexol (OMNIPAQUE) 350 MG/ML injection (MULTI-DOSE) 100 mL (100 mL Intravenous Given 12/21/23 2018)   HYDROmorphone HCl (DILAUDID) injection 0.2 mg (0.2 mg Intravenous Given 12/21/23 2034)   metoclopramide (REGLAN) injection 10 mg (10 mg Intravenous Given 12/21/23 2209)   sodium chloride 0.9 % bolus 1,000 mL (0 mL Intravenous Stopped 12/21/23 2331)       Diagnostic Studies  Results Reviewed       Procedure Component Value Units Date/Time    Lipase [[id number]]  (Normal) Collected: 12/21/23 1842    Lab Status: Final result Specimen: Blood from Arm, Right Updated: 12/21/23 2005     Lipase 19 u/L     CMP [[id number]]  (Abnormal) Collected: 12/21/23 1842    Lab Status: Final result Specimen: Blood from Arm, Right Updated: 12/21/23 2005     Sodium 142 mmol/L      Potassium 4.4 mmol/L      Chloride 103 mmol/L      CO2 21 mmol/L      ANION GAP 18 mmol/L      BUN 27 mg/dL      Creatinine 1.14 mg/dL      Glucose 293 mg/dL      Calcium 8.9 mg/dL      AST 26 U/L      ALT 32 U/L      Alkaline Phosphatase 119 U/L      Total Protein 6.8 g/dL      Albumin 4.2 g/dL      Total Bilirubin 0.53 mg/dL      eGFR 55 ml/min/1.73sq m     Narrative:      National Kidney Disease Foundation guidelines for Chronic Kidney Disease (CKD):     Stage 1 with normal or high GFR (GFR > 90 mL/min/1.73 square meters)    Stage 2 Mild CKD (GFR = 60-89 mL/min/1.73 square meters)    Stage 3A Moderate CKD (GFR = 45-59 mL/min/1.73 square meters)    Stage 3B Moderate CKD (GFR = 30-44 mL/min/1.73 square meters)    Stage 4 Severe CKD (GFR = 15-29 mL/min/1.73 square meters)    Stage 5 End Stage CKD (GFR <15  mL/min/1.73 square meters)  Note: GFR calculation is accurate only with a steady state creatinine    FLU/RSV/COVID - if FLU/RSV clinically relevant [[id number]]  (Abnormal) Collected: 12/21/23 1842    Lab Status: Final result Specimen: Nares from Nose Updated: 12/21/23 1951     SARS-CoV-2 Positive     INFLUENZA A PCR Negative     INFLUENZA B PCR Negative     RSV PCR Negative    Narrative:      FOR PEDIATRIC PATIENTS - copy/paste COVID Guidelines URL to browser: [email or web address]    SARS-CoV-2 assay is a Nucleic Acid Amplification assay intended for the  qualitative detection of nucleic acid from SARS-CoV-2 in nasopharyngeal  swabs. Results are for the presumptive identification of SARS-CoV-2 RNA.    Positive results are indicative of infection with SARS-CoV-2, the virus  causing COVID-19, but do not rule out bacterial infection or co-infection  with other viruses. Laboratories within the [place] and its  territories are required to report all positive results to the appropriate  public health authorities. Negative results do not preclude SARS-CoV-2  infection and should not be used as the sole basis for treatment or other  patient management decisions. Negative results must be combined with  clinical observations, patient history, and epidemiological information.  This test has not been FDA cleared or approved.    This test has been authorized by FDA under an Emergency Use Authorization  (EUA). This test is only authorized for the duration of time the  declaration that circumstances exist justifying the authorization of the  emergency use of an in vitro diagnostic tests for detection of SARS-CoV-2  virus and/or diagnosis of COVID-19 infection under section 564(b)(1) of  the Act, 21 U.S.C. 360bbb-3(b)(1), unless the authorization is terminated  or revoked sooner. The test has been validated but independent review by FDA  and CLIA is pending.    Test  performed using [employer] GeneXpert: This RT-PCR assay targets N2,  a region unique to SARS-CoV-2. A conserved region in the E-gene was chosen  for pan-Sarbecovirus detection which includes SARS-CoV-2.    According to CMS-2020-01-R, this platform meets the definition of high-throughput technology.    Pregnancy, hCG, quantitative [[id number]]  (Normal) Collected: 12/21/23 1842    Lab Status: Final result Specimen: Blood from Arm, Right Updated: 12/21/23 1951     HCG, Quant 5 mIU/mL     Narrative:       Expected Ranges:    HCG results between 5 and 25 mIU/mL may be indicative of early pregnancy but should be interpreted in light of the total clinical presentation.    HCG can rise to detectable levels in tereza and post menopausal women (0-11.6 mIU/mL).     Approximate               Approximate HCG  Gestation age          Concentration ( mIU/mL)  _____________          ______________________   Weeks                      HCG values  0.2-1                       5-50  1-2                           2-3                         100-5000  3-4                         500-69515  4-5                         1000-01273  5-6                         [id number]  6-8                         [id number]  8-12                        [id number]      CBC and differential [[id number]]  (Abnormal) Collected: 12/21/23 1842    Lab Status: Final result Specimen: Blood from Arm, Right Updated: 12/21/23 1905     WBC 12.80 Thousand/uL      RBC 5.25 Million/uL      Hemoglobin 13.3 g/dL      Hematocrit 42.7 %      MCV 81 fL      MCH 25.3 pg      MCHC 31.1 g/dL      RDW 15.1 %      MPV 10.4 fL      Platelets 390 Thousands/uL      nRBC 0 /100 WBCs      Neutrophils Relative 80 %      Immat GRANS % 0 %      Lymphocytes Relative 11 %      Monocytes Relative 8 %      Eosinophils Relative 1 %      Basophils Relative 0 %      Neutrophils Absolute 10.20 Thousands/µL      Immature Grans Absolute 0.04 Thousand/uL      Lymphocytes Absolute 1.37  Thousands/µL      Monocytes Absolute 1.04 Thousand/µL      Eosinophils Absolute 0.13 Thousand/µL      Basophils Absolute 0.02 Thousands/µL                    CT abdomen pelvis with contrast   Final Result by Dom Randall DO (12/21 2218)   No CT explanation for epigastric pain.   Mild intrahepatic and extrahepatic biliary ductal prominence considered normal status post cholecystectomy and stable from previous. Normal-appearing pancreas. Stable right      Bilateral renal cysts. No hydronephrosis or renal colic.                  Workstation performed: [id number]                    Procedures  Procedures         ED Course                               SBIRT 20yo+      Flowsheet Row Most Recent Value   Initial Alcohol Screen: US AUDIT-C     1. How often do you have a drink containing alcohol? 0 Filed at: 12/21/2023 1948   2. How many drinks containing alcohol do you have on a typical day you are drinking?  0 Filed at: 12/21/2023 1948   3a. Male UNDER 65: How often do you have five or more drinks on one occasion? 0 Filed at: 12/21/2023 1948   3b. FEMALE Any Age, or MALE 65+: How often do you have 4 or more drinks on one occassion? 0 Filed at: 12/21/2023 1948   Audit-C Score 0 Filed at: 12/21/2023 1948   LILIA: How many times in the past year have you...    Used an illegal drug or used a prescription medication for non-medical reasons? Never Filed at: 12/21/2023 1948                      Medical Decision Making  Patient presented with a chief complaint of abdominal pain.  I think this is due to combination of her COVID and chronic gastroparesis.  Patient significantly improved with fluids and medications.  Patient eager to return home.  Oxygenating well on room air.  Labs and CT reassuring at this time.  Patient's symptoms significantly improved with treatment in the ED.  Vitals remained stable.  Patient is completely clinically nontoxic and tolerating p.o. without difficulty in the ED.  Repeat abdominal exam is  benign.  Had a lengthy discussion with the patient in regards to specific signs and symptoms to watch out for that warrant prompt return to the ED. I explained that if their symptoms change or worsen, they should return to the emergency department as certain diagnoses may take time to develop.  Patient was informed the risk of diagnostic uncertainty.  Patient was given specific instructions for symptomatic relief and follow-up recommendations as discussed in their after visit summary. All of their questions were answered and they were agreeable to plan.      Problems Addressed:  Abdominal pain: acute illness or injury  COVID: acute illness or injury    Amount and/or Complexity of Data Reviewed  Independent Historian: spouse  External Data Reviewed: notes.  Labs: ordered.  Radiology: ordered.    Risk  OTC drugs.  Prescription drug management.  Parenteral controlled substances.             Disposition  Final diagnoses:   Abdominal pain   COVID     Time reflects when diagnosis was documented in both MDM as applicable and the Disposition within this note       Time User Action Codes Description Comment    12/21/2023 10:36 PM Jarrett Jean-Baptiste [R10.9] Abdominal pain     12/21/2023 10:36 PM Jarrett Jean-Baptiste [U07.1] COVID           ED Disposition       ED Disposition   Discharge    Condition   Stable    Date/Time   Thu Dec 21, 2023 2235    Comment   Haylee Balbuena discharge to home/self care.                   Follow-up Information       Follow up With Specialties Details Why Contact Info    [facility], Nurse Practitioner, Emergency Medicine In 1 day  [address] [place] [address]  [address] [place] [id number]  [phone]              Discharge Medication List as of 12/21/2023 10:37 PM        START taking these medications    Details   metoclopramide (Reglan) 10 mg tablet Take 0.5 tablets (5 mg total) by mouth every 6 (six) hours, Starting Thu 12/21/2023, Normal      !! ondansetron (ZOFRAN) 4 mg tablet  Take 1 tablet (4 mg total) by mouth every 6 (six) hours, Starting Thu 12/21/2023, Normal       !! - Potential duplicate medications found. Please discuss with provider.        CONTINUE these medications which have NOT CHANGED    Details   albuterol (PROVENTIL HFA,VENTOLIN HFA) 90 mcg/act inhaler INHALE ONE PUFF BY MOUTH AND INTO THE LUNGS EVERY 6 HOURS AS NEEDED FOR WHEEZING, Normal      Alcohol Swabs (Pharmacist Choice Alcohol) PADS Apply topically 4 (four) times a day Use as directed, Starting Tue 9/5/2023, Normal      amitriptyline (ELAVIL) 25 mg tablet Take 1 tablet (25 mg total) by mouth daily at bedtime, Starting Thu 7/20/2023, Until Fri 10/20/2023, Normal      !! Aspirin (ASPIR-81 PO) Take 81 mg by mouth, Starting Mon 2/20/2012, Historical Med      !! aspirin (ECOTRIN LOW STRENGTH) 81 mg EC tablet TAKE ONE TABLET BY MOUTH ONCE DAILY, Starting Thu 11/16/2023, Normal      atorvastatin (LIPITOR) 80 mg tablet TAKE 1 TABLET (80 MG TOTAL) BY MOUTH DAILY AT BEDTIME, Starting Mon 1/16/2023, Normal      B-D UF III MINI PEN NEEDLES 31G X 5 MM MISC Pt uses 5 pen needles daily, Normal      cholecalciferol (VITAMIN D3) 1,000 units tablet Take 2 tablets (2,000 Units total) by mouth daily, Starting Tue 9/5/2023, Normal      citalopram (CeleXA) 40 mg tablet TAKE ONE TABLET BY MOUTH DAILY, Starting Fri 11/3/2023, Normal      Continuous Blood Gluc  (FreeStyle Refugio 14 Day [place]) TEMITOPE Use for continuous blood glucose monitoring, Print      Continuous Blood Gluc Sensor (FreeStyle [place] 2 Sensor) MISC Use 1 each every 14 (fourteen) days, Starting Thu 10/19/2023, Normal      docusate sodium (COLACE) 100 mg capsule TAKE ONE CAPSULE BY MOUTH TWICE DAILY, Normal      dulaglutide (Trulicity) 4.5 MG/0.5ML injection Inject 0.5 mL (4.5 mg total) under the skin every 7 days, Starting Thu 10/19/2023, Normal      Empagliflozin (Jardiance) 25 MG TABS Take 1 tablet (25 mg total) by mouth every morning, Starting Tue 10/3/2023, Normal       ergocalciferol (VITAMIN D2) 50,000 units Take 1 capsule (50,000 Units total) by mouth once a week, Starting Thu 9/7/2023, Normal      famotidine (PEPCID) 40 MG tablet TAKE ONE TABLET BY MOUTH TWO HOURS PRIOR TO BEDTIME, Normal      fenofibrate (TRICOR) 48 mg tablet TAKE ONE TABLET BY MOUTH DAILY, Normal      insulin glargine (Toujeo Max SoloStar) 300 units/mL CONCENTRATED U-300 injection pen (2-unit dial) Inject 100 Units under the skin daily, Starting Thu 10/19/2023, Normal      insulin glulisine (Apidra SoloStar) 100 units/mL injection pen Inject 22 with breakfast, 24 with lunch, 26 with dinner, plus scale as needed, TDD up to 100 units daily., No Print      Insulin Regular Human, Conc, (HumuLIN R U-500 KwikPen) 500 units/mL CONCENTRATED U-500 injection pen Take 85 units with breakfast, 65 units with lunch, 65 with dinner, or as directed TDD up to 250 units, Normal      Iron Heme Polypeptide 12 MG TABS Take 1 tablet by mouth, Historical Med      levETIRAcetam (KEPPRA) 500 mg tablet TAKE ONE TABLET BY MOUTH TWICE DAILY, Starting Mon 12/11/2023, Normal      linaCLOtide (Linzess) 72 MCG CAPS Take 1 capsule by mouth daily before breakfast, Starting Tue 9/5/2023, Normal      meclizine (ANTIVERT) 25 mg tablet Take 1 tablet (25 mg total) by mouth 3 (three) times a day as needed for dizziness, Starting Mon 7/10/2023, Normal      metoprolol tartrate (LOPRESSOR) 25 mg tablet TAKE ONE TABLET BY MOUTH TWICE DAILY, Starting Sat 8/26/2023, Normal      multivitamin (THERAGRAN) TABS Take 1 tablet by mouth every morning, Historical Med      nystatin (MYCOSTATIN) cream Apply topically 2 (two) times a day, Starting Thu 7/13/2023, Normal      !! ondansetron (ZOFRAN) 4 mg tablet Take 1 tablet (4 mg total) by mouth every 8 (eight) hours as needed for nausea or vomiting, Starting Mon 6/19/2023, Normal      OneTouch Ultra test strip USE 4 TIMES A DAY, Normal      Pharmacist Choice Lancets MISC USE AS DIRECTED, Normal      pregabalin  (LYRICA) 100 mg capsule TAKE ONE CAPSULE BY MOUTH THREE TIMES A DAY, Normal      Restasis 0.05 % ophthalmic emulsion Starting Thu 3/2/2023, Historical Med       !! - Potential duplicate medications found. Please discuss with provider.          No discharge procedures on file.    PDMP Review         Value Time User    PDMP Reviewed  Yes 7/15/2023  3:54 AM Mikaela Michael PA-C            ED Provider  Electronically Signed by             Jarrett Jean-Baptiste MD  12/21/23 7423

## 2023-12-26 ENCOUNTER — OFFICE VISIT (OUTPATIENT)
Dept: FAMILY MEDICINE CLINIC | Facility: CLINIC | Age: 53
End: 2023-12-26
Payer: COMMERCIAL

## 2023-12-26 ENCOUNTER — TELEPHONE (OUTPATIENT)
Dept: FAMILY MEDICINE CLINIC | Facility: CLINIC | Age: 53
End: 2023-12-26

## 2023-12-26 VITALS
DIASTOLIC BLOOD PRESSURE: 70 MMHG | HEIGHT: 61 IN | RESPIRATION RATE: 18 BRPM | TEMPERATURE: 97.5 F | WEIGHT: 199 LBS | OXYGEN SATURATION: 99 % | SYSTOLIC BLOOD PRESSURE: 138 MMHG | BODY MASS INDEX: 37.57 KG/M2 | HEART RATE: 65 BPM

## 2023-12-26 DIAGNOSIS — U07.1 COVID-19: Primary | ICD-10-CM

## 2023-12-26 PROCEDURE — 99213 OFFICE O/P EST LOW 20 MIN: CPT | Performed by: NURSE PRACTITIONER

## 2023-12-26 RX ORDER — BENZONATATE 200 MG/1
200 CAPSULE ORAL 3 TIMES DAILY PRN
Qty: 30 CAPSULE | Refills: 0 | Status: SHIPPED | OUTPATIENT
Start: 2023-12-26

## 2023-12-26 RX ORDER — AZITHROMYCIN 250 MG/1
TABLET, FILM COATED ORAL
Qty: 6 TABLET | Refills: 0 | Status: SHIPPED | OUTPATIENT
Start: 2023-12-26 | End: 2023-12-30

## 2023-12-26 NOTE — PROGRESS NOTES
"[facility]        NAME: Haylee Balbuena is a 53 y.o. female  : 1970    MRN: [id number]  DATE: 2023  TIME: 1:23 PM    Assessment and Plan   COVID-19 [U07.1]  1. COVID-19  azithromycin (ZITHROMAX) 250 mg tablet    benzonatate (TESSALON) 200 MG capsule            Patient Instructions     Patient Instructions   Zithromax and Tessalon Perles as directed  Call for continued symptoms/concerns        Chief Complaint     Chief Complaint   Patient presents with    Follow-up     Pt states she was at hospital with COVID and been getting worse ever since. Bad cough and heart seems to be racing and hard time breathing going up steps.    Dizziness           Cough         History of Present Illness       Here for worsening covid symptoms- was diagnosed at the ER on 23- was given RX for Zofrand Reglan.  She is on a new insulin and her glucose levels have been so much better- showed me her recent records. She did have a few results in the 60s when she was sick on . Otherwise most are under 150.   Her worsening cough has been the most problematic for her. She gets dizzy and lightheaded when she has a coughing fit. Denies fevers with this. Sinus congestion.   SOB with going up steps and \"heart racing\"  She reports she is staying hydrated. She has been drinking 1/2 water mixed with gatorade to avoid some sugar. Denies vomiting since being at the hospital. Denies diarrhea    Dizziness  Associated symptoms include congestion, coughing, fatigue, headaches, nausea and a sore throat. Pertinent negatives include no chest pain, chills, fever, myalgias, rash or vomiting.   Cough  Associated symptoms include headaches, postnasal drip, rhinorrhea, a sore throat and shortness of breath. Pertinent negatives include no chest pain, chills, eye redness, fever, myalgias, rash or wheezing.       Review of Systems   Review of Systems   Constitutional:  Positive for fatigue. Negative for activity " change, chills and fever.   HENT:  Positive for congestion, postnasal drip, rhinorrhea, sinus pressure and sore throat.    Eyes:  Negative for pain, discharge and redness.   Respiratory:  Positive for cough and shortness of breath. Negative for wheezing.    Cardiovascular:  Negative for chest pain.   Gastrointestinal:  Positive for nausea. Negative for constipation, diarrhea and vomiting.   Musculoskeletal:  Negative for myalgias.   Skin:  Negative for rash.   Neurological:  Positive for dizziness and headaches.         Current Medications       Current Outpatient Medications:     albuterol (PROVENTIL HFA,VENTOLIN HFA) 90 mcg/act inhaler, INHALE ONE PUFF BY MOUTH AND INTO THE LUNGS EVERY 6 HOURS AS NEEDED FOR WHEEZING, Disp: 36 g, Rfl: 3    Alcohol Swabs (Pharmacist Choice Alcohol) PADS, Apply topically 4 (four) times a day Use as directed, Disp: 300 each, Rfl: 5    aspirin (ECOTRIN LOW STRENGTH) 81 mg EC tablet, TAKE ONE TABLET BY MOUTH ONCE DAILY, Disp: 30 tablet, Rfl: 1    atorvastatin (LIPITOR) 80 mg tablet, TAKE 1 TABLET (80 MG TOTAL) BY MOUTH DAILY AT BEDTIME, Disp: 30 tablet, Rfl: 6    azithromycin (ZITHROMAX) 250 mg tablet, Take 2 tablets today then 1 tablet daily x 4 days, Disp: 6 tablet, Rfl: 0    B-D UF III MINI PEN NEEDLES 31G X 5 MM MISC, Pt uses 5 pen needles daily, Disp: 500 each, Rfl: 0    benzonatate (TESSALON) 200 MG capsule, Take 1 capsule (200 mg total) by mouth 3 (three) times a day as needed for cough, Disp: 30 capsule, Rfl: 0    cholecalciferol (VITAMIN D3) 1,000 units tablet, Take 2 tablets (2,000 Units total) by mouth daily, Disp: 60 tablet, Rfl: 3    citalopram (CeleXA) 40 mg tablet, TAKE ONE TABLET BY MOUTH DAILY, Disp: 90 tablet, Rfl: 0    Continuous Blood Gluc  (FreeStyle Refugio 14 Day Orange) TEMITOPE, Use for continuous blood glucose monitoring, Disp: 1 each, Rfl: 0    Continuous Blood Gluc Sensor (FreeStyle Refugio 2 Sensor) MISC, Use 1 each every 14 (fourteen) days, Disp: 6 each,  Rfl: 2    docusate sodium (COLACE) 100 mg capsule, TAKE ONE CAPSULE BY MOUTH TWICE DAILY, Disp: 60 capsule, Rfl: 5    dulaglutide (Trulicity) 4.5 MG/0.5ML injection, Inject 0.5 mL (4.5 mg total) under the skin every 7 days, Disp: 6 mL, Rfl: 1    Empagliflozin (Jardiance) 25 MG TABS, Take 1 tablet (25 mg total) by mouth every morning, Disp: 90 tablet, Rfl: 1    ergocalciferol (VITAMIN D2) 50,000 units, Take 1 capsule (50,000 Units total) by mouth once a week, Disp: 12 capsule, Rfl: 3    famotidine (PEPCID) 40 MG tablet, TAKE ONE TABLET BY MOUTH TWO HOURS PRIOR TO BEDTIME, Disp: 30 tablet, Rfl: 6    fenofibrate (TRICOR) 48 mg tablet, TAKE ONE TABLET BY MOUTH DAILY, Disp: 90 tablet, Rfl: 3    Insulin Regular Human, Conc, (HumuLIN R U-500 KwikPen) 500 units/mL CONCENTRATED U-500 injection pen, Take 85 units with breakfast, 65 units with lunch, 65 with dinner, or as directed TDD up to 250 units, Disp: 15 mL, Rfl: 2    Iron Heme Polypeptide 12 MG TABS, Take 1 tablet by mouth, Disp: , Rfl:     levETIRAcetam (KEPPRA) 500 mg tablet, TAKE ONE TABLET BY MOUTH TWICE DAILY, Disp: 300 tablet, Rfl: 1    linaCLOtide (Linzess) 72 MCG CAPS, Take 1 capsule by mouth daily before breakfast, Disp: 30 capsule, Rfl: 0    meclizine (ANTIVERT) 25 mg tablet, Take 1 tablet (25 mg total) by mouth 3 (three) times a day as needed for dizziness, Disp: 30 tablet, Rfl: 0    metoclopramide (Reglan) 10 mg tablet, Take 0.5 tablets (5 mg total) by mouth every 6 (six) hours, Disp: 30 tablet, Rfl: 0    metoprolol tartrate (LOPRESSOR) 25 mg tablet, TAKE ONE TABLET BY MOUTH TWICE DAILY, Disp: 180 tablet, Rfl: 1    multivitamin (THERAGRAN) TABS, Take 1 tablet by mouth every morning, Disp: , Rfl:     nystatin (MYCOSTATIN) cream, Apply topically 2 (two) times a day, Disp: 30 g, Rfl: 0    ondansetron (ZOFRAN) 4 mg tablet, Take 1 tablet (4 mg total) by mouth every 8 (eight) hours as needed for nausea or vomiting, Disp: 20 tablet, Rfl: 0    ondansetron (ZOFRAN) 4  mg tablet, Take 1 tablet (4 mg total) by mouth every 6 (six) hours, Disp: 12 tablet, Rfl: 0    OneTouch Ultra test strip, USE 4 TIMES A DAY, Disp: 400 each, Rfl: 0    Pharmacist Choice Lancets MISC, USE AS DIRECTED, Disp: 100 each, Rfl: 1    pregabalin (LYRICA) 100 mg capsule, TAKE ONE CAPSULE BY MOUTH THREE TIMES A DAY, Disp: 270 capsule, Rfl: 1    Restasis 0.05 % ophthalmic emulsion, , Disp: , Rfl:     amitriptyline (ELAVIL) 25 mg tablet, Take 1 tablet (25 mg total) by mouth daily at bedtime, Disp: 30 tablet, Rfl: 0    Current Allergies     Allergies as of 2023 - Reviewed 2023   Allergen Reaction Noted    Butorphanol Hallucinations 2016    Benztropine  2016    Penicillins  2015    Zolpidem  2015            The following portions of the patient's history were reviewed and updated as appropriate: allergies, current medications, past family history, past medical history, past social history, past surgical history and problem list.     Past Medical History:   Diagnosis Date    Atypical chest pain     Gastroparesis     GERD (gastroesophageal reflux disease)     Hypertension     Psychiatric disorder     Sciatica     Seizure disorder (HCC)        Past Surgical History:   Procedure Laterality Date    APPENDECTOMY      BREAST BIOPSY Left  benign     SECTION      CHOLECYSTECTOMY      COLONOSCOPY      HYSTERECTOMY      IR PICC PLACEMENT SINGLE LUMEN  2023    ND LAPAROSCOPIC APPENDECTOMY N/A 2021    Procedure: APPENDECTOMY LAPAROSCOPIC;  Surgeon: Onel Martin MD;  Location:  MAIN OR;  Service: General    ND LAPS ABD PRTM&OMENTUM DX W/WO SPEC BR/WA SPX N/A 2021    Procedure: LAPAROSCOPY DIAGNOSTIC, EXTENSIVE DESI;  Surgeon: Onel Martin MD;  Location:  MAIN OR;  Service: General    TUBAL LIGATION      UPPER GASTROINTESTINAL ENDOSCOPY         Family History   Problem Relation Age of Onset    No Known Problems Mother     No Known Problems Father     No  "Known Problems Daughter     No Known Problems Maternal Grandmother     No Known Problems Paternal Grandmother     No Known Problems Paternal Aunt     No Known Problems Paternal Aunt     No Known Problems Paternal Aunt          Medications have been verified.        Objective   /70   Pulse 65   Temp 97.5 °F (36.4 °C) (Tympanic)   Resp 18   Ht 5' 1\" (1.549 m)   Wt 90.3 kg (199 lb)   SpO2 99%   BMI 37.60 kg/m²        Physical Exam     Physical Exam  Vitals and nursing note reviewed.   Constitutional:       General: She is not in acute distress.     Appearance: She is well-developed. She is ill-appearing. She is not diaphoretic.   HENT:      Head: Normocephalic and atraumatic.      Right Ear: Tympanic membrane, ear canal and external ear normal.      Left Ear: Tympanic membrane, ear canal and external ear normal.      Nose: Congestion and rhinorrhea present.      Mouth/Throat:      Mouth: Mucous membranes are moist.      Pharynx: Oropharynx is clear. Uvula midline.   Eyes:      General:         Right eye: No discharge.         Left eye: No discharge.      Conjunctiva/sclera: Conjunctivae normal.   Neck:      Thyroid: No thyromegaly.   Cardiovascular:      Rate and Rhythm: Normal rate and regular rhythm.      Heart sounds: Normal heart sounds. No murmur heard.     No friction rub. No gallop.   Pulmonary:      Effort: Pulmonary effort is normal. No respiratory distress.      Breath sounds: Normal breath sounds. No wheezing or rales.      Comments: Loose cough noted in room  Musculoskeletal:         General: No tenderness or deformity. Normal range of motion.      Cervical back: Normal range of motion and neck supple.   Skin:     General: Skin is warm and dry.      Findings: No erythema or rash.   Neurological:      Mental Status: She is alert and oriented to person, place, and time.      Cranial Nerves: No cranial nerve deficit.      Coordination: Coordination normal.   Psychiatric:         Mood and Affect: " Mood normal.         Behavior: Behavior normal.         Thought Content: Thought content normal.         Judgment: Judgment normal.

## 2023-12-26 NOTE — TELEPHONE ENCOUNTER
She can have a note if she feels like she needs longer. Have her call 12/28 and let us know if she can't return to work [phone]

## 2023-12-26 NOTE — TELEPHONE ENCOUNTER
She forgot to ask you about a note she has one up to the 28th from the hospital wants to know if you want her to stay home longer    She said you can put the note in mychart

## 2023-12-26 NOTE — TELEPHONE ENCOUNTER
Spoke with pt, pt expressed understanding. Instructed to call on 12/28 if she feels she will need a note.

## 2023-12-28 ENCOUNTER — TELEPHONE (OUTPATIENT)
Dept: FAMILY MEDICINE CLINIC | Facility: CLINIC | Age: 53
End: 2023-12-28

## 2023-12-28 NOTE — TELEPHONE ENCOUNTER
Pt called and was questioning about getting work note excused for longer, unsure exactly how long you would be willing to go. States her symptoms don't seem to be getting any better. Was COVID Positive 12/21/2023 and ED return date for work is 12/28/2023. States vomiting, diarrhea, nausea still going on. Please advise.

## 2024-01-02 ENCOUNTER — APPOINTMENT (OUTPATIENT)
Dept: URGENT CARE | Facility: CLINIC | Age: 54
End: 2024-01-02

## 2024-01-02 NOTE — ASSESSMENT & PLAN NOTE
Lab Results   Component Value Date    HGBA1C 14.3 (H) 12/11/2023   Given ongoing poor control and high doses of current basal-bolus insulin regimen, recommend transition to Humulin U500. Current TDD insulin = 172. Will intensify TDD by 20%, start at  units - 85 units with breakfast, 65 units with both lunch and dinner.   Haylee is to contact the office sooner if BG remains consistently > 250, or more frequent values < 70, and we can make further adjustments as needed.  RTC - 1 month due to high risk status.

## 2024-01-04 ENCOUNTER — APPOINTMENT (EMERGENCY)
Dept: CT IMAGING | Facility: HOSPITAL | Age: 54
End: 2024-01-04
Payer: COMMERCIAL

## 2024-01-04 ENCOUNTER — HOSPITAL ENCOUNTER (INPATIENT)
Facility: HOSPITAL | Age: 54
LOS: 2 days | Discharge: HOME HEALTH | End: 2024-01-06
Attending: INTERNAL MEDICINE | Admitting: INTERNAL MEDICINE
Payer: COMMERCIAL

## 2024-01-04 ENCOUNTER — APPOINTMENT (INPATIENT)
Dept: RADIOLOGY | Facility: HOSPITAL | Age: 54
End: 2024-01-04
Payer: COMMERCIAL

## 2024-01-04 ENCOUNTER — APPOINTMENT (INPATIENT)
Dept: NON INVASIVE DIAGNOSTICS | Facility: HOSPITAL | Age: 54
End: 2024-01-04
Payer: COMMERCIAL

## 2024-01-04 ENCOUNTER — HOSPITAL ENCOUNTER (EMERGENCY)
Facility: HOSPITAL | Age: 54
End: 2024-01-04
Attending: EMERGENCY MEDICINE
Payer: COMMERCIAL

## 2024-01-04 VITALS
HEART RATE: 59 BPM | TEMPERATURE: 97.6 F | RESPIRATION RATE: 19 BRPM | DIASTOLIC BLOOD PRESSURE: 67 MMHG | SYSTOLIC BLOOD PRESSURE: 121 MMHG | OXYGEN SATURATION: 96 %

## 2024-01-04 DIAGNOSIS — R53.1 RIGHT SIDED WEAKNESS: ICD-10-CM

## 2024-01-04 DIAGNOSIS — R53.1 RIGHT SIDED WEAKNESS: Primary | ICD-10-CM

## 2024-01-04 LAB
2HR DELTA HS TROPONIN: 0 NG/L
ANION GAP SERPL CALCULATED.3IONS-SCNC: 8 MMOL/L
AORTIC ROOT: 3.1 CM
APICAL FOUR CHAMBER EJECTION FRACTION: 61 %
APTT PPP: 26 SECONDS (ref 23–37)
ATRIAL RATE: 58 BPM
ATRIAL RATE: 60 BPM
AV LVOT PEAK GRADIENT: 3 MMHG
AV PEAK GRADIENT: 7 MMHG
BUN SERPL-MCNC: 24 MG/DL (ref 5–25)
CALCIUM SERPL-MCNC: 9 MG/DL (ref 8.4–10.2)
CARDIAC TROPONIN I PNL SERPL HS: 3 NG/L
CARDIAC TROPONIN I PNL SERPL HS: 3 NG/L
CHLORIDE SERPL-SCNC: 101 MMOL/L (ref 96–108)
CO2 SERPL-SCNC: 27 MMOL/L (ref 21–32)
CREAT SERPL-MCNC: 1.22 MG/DL (ref 0.6–1.3)
E WAVE DECELERATION TIME: 222 MS
E/A RATIO: 1.15
ERYTHROCYTE [DISTWIDTH] IN BLOOD BY AUTOMATED COUNT: 15.1 % (ref 11.6–15.1)
FLUAV RNA RESP QL NAA+PROBE: NEGATIVE
FLUBV RNA RESP QL NAA+PROBE: NEGATIVE
FRACTIONAL SHORTENING: 37 (ref 28–44)
GFR SERPL CREATININE-BSD FRML MDRD: 50 ML/MIN/1.73SQ M
GLUCOSE SERPL-MCNC: 148 MG/DL (ref 65–140)
GLUCOSE SERPL-MCNC: 183 MG/DL (ref 65–140)
GLUCOSE SERPL-MCNC: 201 MG/DL (ref 65–140)
GLUCOSE SERPL-MCNC: 258 MG/DL (ref 65–140)
GLUCOSE SERPL-MCNC: 290 MG/DL (ref 65–140)
HCT VFR BLD AUTO: 38.9 % (ref 34.8–46.1)
HGB BLD-MCNC: 12.3 G/DL (ref 11.5–15.4)
INR PPP: 0.87 (ref 0.84–1.19)
INTERVENTRICULAR SEPTUM IN DIASTOLE (PARASTERNAL SHORT AXIS VIEW): 0.6 CM
INTERVENTRICULAR SEPTUM: 0.6 CM (ref 0.6–1.1)
LAAS-AP2: 21.4 CM2
LAAS-AP4: 14.4 CM2
LEFT ATRIUM SIZE: 3.1 CM
LEFT ATRIUM VOLUME (MOD BIPLANE): 53 ML
LEFT ATRIUM VOLUME INDEX (MOD BIPLANE): 28.2 ML/M2
LEFT INTERNAL DIMENSION IN SYSTOLE: 2.9 CM (ref 2.1–4)
LEFT VENTRICULAR INTERNAL DIMENSION IN DIASTOLE: 4.6 CM (ref 3.5–6)
LEFT VENTRICULAR POSTERIOR WALL IN END DIASTOLE: 0.6 CM
LEFT VENTRICULAR STROKE VOLUME: 66 ML
LVSV (TEICH): 66 ML
MCH RBC QN AUTO: 25.4 PG (ref 26.8–34.3)
MCHC RBC AUTO-ENTMCNC: 31.6 G/DL (ref 31.4–37.4)
MCV RBC AUTO: 80 FL (ref 82–98)
MV E'TISSUE VEL-LAT: 11 CM/S
MV E'TISSUE VEL-SEP: 9 CM/S
MV PEAK A VEL: 0.66 M/S
MV PEAK E VEL: 76 CM/S
MV STENOSIS PRESSURE HALF TIME: 64 MS
MV VALVE AREA P 1/2 METHOD: 3.44
P AXIS: 45 DEGREES
P AXIS: 51 DEGREES
PLATELET # BLD AUTO: 324 THOUSANDS/UL (ref 149–390)
PMV BLD AUTO: 9.9 FL (ref 8.9–12.7)
POTASSIUM SERPL-SCNC: 3.8 MMOL/L (ref 3.5–5.3)
PR INTERVAL: 168 MS
PR INTERVAL: 180 MS
PROTHROMBIN TIME: 12.1 SECONDS (ref 11.6–14.5)
QRS AXIS: 16 DEGREES
QRS AXIS: 22 DEGREES
QRSD INTERVAL: 78 MS
QRSD INTERVAL: 84 MS
QT INTERVAL: 448 MS
QT INTERVAL: 464 MS
QTC INTERVAL: 448 MS
QTC INTERVAL: 455 MS
RBC # BLD AUTO: 4.84 MILLION/UL (ref 3.81–5.12)
RIGHT ATRIUM AREA SYSTOLE A4C: 11.9 CM2
RIGHT VENTRICLE ID DIMENSION: 3.3 CM
RSV RNA RESP QL NAA+PROBE: NEGATIVE
SARS-COV-2 RNA RESP QL NAA+PROBE: NEGATIVE
SL CV LEFT ATRIUM LENGTH A2C: 4.7 CM
SL CV LV EF: 60
SL CV PED ECHO LEFT VENTRICLE DIASTOLIC VOLUME (MOD BIPLANE) 2D: 97 ML
SL CV PED ECHO LEFT VENTRICLE SYSTOLIC VOLUME (MOD BIPLANE) 2D: 32 ML
SODIUM SERPL-SCNC: 136 MMOL/L (ref 135–147)
T WAVE AXIS: 17 DEGREES
T WAVE AXIS: 31 DEGREES
TRICUSPID ANNULAR PLANE SYSTOLIC EXCURSION: 2 CM
VENTRICULAR RATE: 58 BPM
VENTRICULAR RATE: 60 BPM
WBC # BLD AUTO: 10.77 THOUSAND/UL (ref 4.31–10.16)

## 2024-01-04 PROCEDURE — 96375 TX/PRO/DX INJ NEW DRUG ADDON: CPT

## 2024-01-04 PROCEDURE — 93306 TTE W/DOPPLER COMPLETE: CPT

## 2024-01-04 PROCEDURE — 85730 THROMBOPLASTIN TIME PARTIAL: CPT | Performed by: EMERGENCY MEDICINE

## 2024-01-04 PROCEDURE — 82948 REAGENT STRIP/BLOOD GLUCOSE: CPT

## 2024-01-04 PROCEDURE — 93306 TTE W/DOPPLER COMPLETE: CPT | Performed by: INTERNAL MEDICINE

## 2024-01-04 PROCEDURE — 36415 COLL VENOUS BLD VENIPUNCTURE: CPT | Performed by: EMERGENCY MEDICINE

## 2024-01-04 PROCEDURE — 70496 CT ANGIOGRAPHY HEAD: CPT

## 2024-01-04 PROCEDURE — 93005 ELECTROCARDIOGRAM TRACING: CPT

## 2024-01-04 PROCEDURE — 87081 CULTURE SCREEN ONLY: CPT | Performed by: INTERNAL MEDICINE

## 2024-01-04 PROCEDURE — G1004 CDSM NDSC: HCPCS

## 2024-01-04 PROCEDURE — 99223 1ST HOSP IP/OBS HIGH 75: CPT | Performed by: INTERNAL MEDICINE

## 2024-01-04 PROCEDURE — 74174 CTA ABD&PLVS W/CONTRAST: CPT

## 2024-01-04 PROCEDURE — 99285 EMERGENCY DEPT VISIT HI MDM: CPT

## 2024-01-04 PROCEDURE — 80048 BASIC METABOLIC PNL TOTAL CA: CPT | Performed by: EMERGENCY MEDICINE

## 2024-01-04 PROCEDURE — 70498 CT ANGIOGRAPHY NECK: CPT

## 2024-01-04 PROCEDURE — 84484 ASSAY OF TROPONIN QUANT: CPT | Performed by: EMERGENCY MEDICINE

## 2024-01-04 PROCEDURE — 96374 THER/PROPH/DIAG INJ IV PUSH: CPT

## 2024-01-04 PROCEDURE — 71275 CT ANGIOGRAPHY CHEST: CPT

## 2024-01-04 PROCEDURE — 70450 CT HEAD/BRAIN W/O DYE: CPT

## 2024-01-04 PROCEDURE — 99285 EMERGENCY DEPT VISIT HI MDM: CPT | Performed by: EMERGENCY MEDICINE

## 2024-01-04 PROCEDURE — 70551 MRI BRAIN STEM W/O DYE: CPT

## 2024-01-04 PROCEDURE — 85610 PROTHROMBIN TIME: CPT | Performed by: EMERGENCY MEDICINE

## 2024-01-04 PROCEDURE — 0241U HB NFCT DS VIR RESP RNA 4 TRGT: CPT | Performed by: EMERGENCY MEDICINE

## 2024-01-04 PROCEDURE — 99254 IP/OBS CNSLTJ NEW/EST MOD 60: CPT | Performed by: PSYCHIATRY & NEUROLOGY

## 2024-01-04 PROCEDURE — 85027 COMPLETE CBC AUTOMATED: CPT | Performed by: EMERGENCY MEDICINE

## 2024-01-04 RX ORDER — INSULIN LISPRO 100 [IU]/ML
2-12 INJECTION, SOLUTION INTRAVENOUS; SUBCUTANEOUS
Status: DISCONTINUED | OUTPATIENT
Start: 2024-01-04 | End: 2024-01-06 | Stop reason: HOSPADM

## 2024-01-04 RX ORDER — ATORVASTATIN CALCIUM 40 MG/1
40 TABLET, FILM COATED ORAL
Status: DISCONTINUED | OUTPATIENT
Start: 2024-01-04 | End: 2024-01-06 | Stop reason: HOSPADM

## 2024-01-04 RX ORDER — ATORVASTATIN CALCIUM 40 MG/1
80 TABLET, FILM COATED ORAL
Status: DISCONTINUED | OUTPATIENT
Start: 2024-01-04 | End: 2024-01-04

## 2024-01-04 RX ORDER — ACETAMINOPHEN 10 MG/ML
1000 INJECTION, SOLUTION INTRAVENOUS ONCE
Status: COMPLETED | OUTPATIENT
Start: 2024-01-04 | End: 2024-01-04

## 2024-01-04 RX ORDER — METOCLOPRAMIDE HYDROCHLORIDE 5 MG/ML
10 INJECTION INTRAMUSCULAR; INTRAVENOUS ONCE
Status: COMPLETED | OUTPATIENT
Start: 2024-01-04 | End: 2024-01-04

## 2024-01-04 RX ORDER — CHLORHEXIDINE GLUCONATE ORAL RINSE 1.2 MG/ML
15 SOLUTION DENTAL EVERY 12 HOURS SCHEDULED
Status: DISCONTINUED | OUTPATIENT
Start: 2024-01-04 | End: 2024-01-06

## 2024-01-04 RX ORDER — ERGOCALCIFEROL 1.25 MG/1
50000 CAPSULE ORAL WEEKLY
Status: DISCONTINUED | OUTPATIENT
Start: 2024-01-04 | End: 2024-01-06 | Stop reason: HOSPADM

## 2024-01-04 RX ORDER — CITALOPRAM 20 MG/1
40 TABLET ORAL DAILY
Status: DISCONTINUED | OUTPATIENT
Start: 2024-01-04 | End: 2024-01-06 | Stop reason: HOSPADM

## 2024-01-04 RX ORDER — ACETAMINOPHEN 325 MG/1
650 TABLET ORAL EVERY 6 HOURS PRN
Status: DISCONTINUED | OUTPATIENT
Start: 2024-01-04 | End: 2024-01-06 | Stop reason: HOSPADM

## 2024-01-04 RX ORDER — DIPHENHYDRAMINE HYDROCHLORIDE 50 MG/ML
25 INJECTION INTRAMUSCULAR; INTRAVENOUS ONCE
Status: COMPLETED | OUTPATIENT
Start: 2024-01-04 | End: 2024-01-04

## 2024-01-04 RX ORDER — ACETAMINOPHEN 325 MG/1
975 TABLET ORAL ONCE
Status: DISCONTINUED | OUTPATIENT
Start: 2024-01-04 | End: 2024-01-04

## 2024-01-04 RX ORDER — LEVETIRACETAM 500 MG/1
500 TABLET ORAL 2 TIMES DAILY
Status: DISCONTINUED | OUTPATIENT
Start: 2024-01-04 | End: 2024-01-06 | Stop reason: HOSPADM

## 2024-01-04 RX ORDER — MELATONIN
2000 DAILY
Status: DISCONTINUED | OUTPATIENT
Start: 2024-01-04 | End: 2024-01-06 | Stop reason: HOSPADM

## 2024-01-04 RX ORDER — INSULIN GLARGINE 100 [IU]/ML
45 INJECTION, SOLUTION SUBCUTANEOUS EVERY 12 HOURS SCHEDULED
Status: DISCONTINUED | OUTPATIENT
Start: 2024-01-04 | End: 2024-01-06 | Stop reason: HOSPADM

## 2024-01-04 RX ADMIN — INSULIN LISPRO 6 UNITS: 100 INJECTION, SOLUTION INTRAVENOUS; SUBCUTANEOUS at 21:36

## 2024-01-04 RX ADMIN — IOHEXOL 1 ML: 350 INJECTION, SOLUTION INTRAVENOUS at 04:13

## 2024-01-04 RX ADMIN — CHLORHEXIDINE GLUCONATE 15 ML: 1.2 SOLUTION ORAL at 11:48

## 2024-01-04 RX ADMIN — ACETAMINOPHEN 650 MG: 325 TABLET ORAL at 17:04

## 2024-01-04 RX ADMIN — ACETAMINOPHEN 1000 MG: 10 INJECTION INTRAVENOUS at 05:29

## 2024-01-04 RX ADMIN — LEVETIRACETAM 500 MG: 500 TABLET, FILM COATED ORAL at 11:48

## 2024-01-04 RX ADMIN — B-COMPLEX W/ C & FOLIC ACID TAB 1 TABLET: TAB at 11:48

## 2024-01-04 RX ADMIN — INSULIN GLARGINE 45 UNITS: 100 INJECTION, SOLUTION SUBCUTANEOUS at 20:26

## 2024-01-04 RX ADMIN — LEVETIRACETAM 500 MG: 500 TABLET, FILM COATED ORAL at 17:11

## 2024-01-04 RX ADMIN — IOHEXOL 119 ML: 350 INJECTION, SOLUTION INTRAVENOUS at 04:12

## 2024-01-04 RX ADMIN — Medication 2000 UNITS: at 11:48

## 2024-01-04 RX ADMIN — ATORVASTATIN CALCIUM 40 MG: 40 TABLET, FILM COATED ORAL at 21:36

## 2024-01-04 RX ADMIN — DIPHENHYDRAMINE HYDROCHLORIDE 25 MG: 50 INJECTION, SOLUTION INTRAMUSCULAR; INTRAVENOUS at 07:05

## 2024-01-04 RX ADMIN — METOCLOPRAMIDE 10 MG: 5 INJECTION, SOLUTION INTRAMUSCULAR; INTRAVENOUS at 07:05

## 2024-01-04 RX ADMIN — INSULIN LISPRO 6 UNITS: 100 INJECTION, SOLUTION INTRAVENOUS; SUBCUTANEOUS at 16:42

## 2024-01-04 RX ADMIN — Medication 23 MG: at 04:47

## 2024-01-04 RX ADMIN — CITALOPRAM HYDROBROMIDE 40 MG: 20 TABLET ORAL at 11:48

## 2024-01-04 RX ADMIN — ERGOCALCIFEROL 50000 UNITS: 1.25 CAPSULE ORAL at 16:43

## 2024-01-04 NOTE — SPEECH THERAPY NOTE
Consult received and chart reviewed. Per chart review, and discussion with RN, pt passed RN dysphagia assessment and is tolerating regular diet with thin liquids with no s/s of aspiration. No dysarthria or language deficits noted or reported. Per Neurology note facial symmetric is WFL, there is some mild dysarthria however she was reported to be somewhat lethargic and noted to be hyperglycemic. Will f/u pending MRI results to determine if formal SLP assessment is indicated.    Xochitl Rudolph M.S., CCC-SLP  Speech Language Pathologist   Available via [employer]  [place] #[id number]  [place] #[id number]

## 2024-01-04 NOTE — QUICK NOTE
Stroke alert called at 3:45 am  Neurology response at immediate   Patient not seen, recommendation based on information provided by ED provider over the phone    Patient is 52 yo with  diabetes mellitus, hypertension, seizure who presented with right side weakness.  Facial droop 1, dysarthria 1, right arm weakness 2, right lower extremity weakness 2, LOC 1.  No blood thinners at home, take baby aspirin. No headache, no recent seizures, complaining of radiating chest pain at this time.    LKW: 2 am  NIHSS 6, Facial droop 1, dysarthria 1, right arm weakness 2, right lower extremity weakness 2, LOC 1. per ED exam  SBP  137/65 , Glu 183    CTH no hemorrhage   CTA wtihout LVO so not IAT candidate     TNK Decision: I  advised the emergency room team to have an appropriate discussion confirming with the patient risks benefits and alternatives and to administer tenecteplase as soon as possible.Pending CT chest dissection study    Please check any contraindications for thrombolysis?:  Hx of ICH/Aneurysm/SAH/Intracranial malignancy(not meningioma)?   AIStroke/Severe Head trauma/Intracranial_spinal surgery in 3 mo?  GI cancer or bleeding within the past 21 days?  Recent major Trauma/Surgery in 2 we?   aPTT>40, PT>15, Plt<100k?   Any anticoagulation use? if yes, Last dose >48hrs,   Renal/Liver function status?   Any Hx of IE or high suspicion of IE?   Aortic dissection on CTA Neck or Hx of aortic dissection?    Recommend administering IV  TNK  - goal SBP less than 185 prior to and after administering IV TNK  - repeat head CT 24 hours after administering  - no AP or AC prior to above-mentioned repeat head CT  - admit to stroke pathway

## 2024-01-04 NOTE — ASSESSMENT & PLAN NOTE
53 year old female with uncontrolled DM2, HTN, prior stroke (details unavailable), recent diagnosis of Covid-19, admitted this morning with acute onset of RUE and RLE weakness. Stroke alert activated on admission and per review of notes, NIHSS 6. CTH as well as CTA head and neck completed and did not demonstrate acute intracranial abnormality. She received IV TNK at 0447.     Of note, patient was transferred to [facility] due to critical care bed availability. Prior to transfer, she complained of HA and underwent repeat CTH which did not demonstrate any acute intracranial abnormality. She was given IV Tylenol, Reglan and Benadryl for headache prior to transfer. Suspect lethargy in setting of medication effect.     Plan:   - Recommend acute ischemic stroke s/p IV thrombolytics protocol.   - Check MRI brain without contrast.   - Check 24 hour post TNK CTH.   - Hold antiplatelet/anticoagulation at this time.  - If no hemorrhage noted on 24 hour post TNK CTH, recommend initiate ASA 81 mg QD. May need to consider alternative antiplatelet pending work-up.   - Check echocardiogram with bubble study.   - Monitor on telemetry.   - Check hemoglobin A1c, TSH, lipid panel.   - Recommend start atorvastatin 40 mg QPM.   - Goal normothermia and euglycemia.   - BP goal < 180/105. Treat with PRN antihypertensives.   - PT/OT/Speech therapy.   - Recommend avoid sedating medications.   - Stroke education.   - Monitor exam and notify with changes.

## 2024-01-04 NOTE — CONSULTS
Consultation - Neurology   Haylee Balbuena 53 y.o. female MRN: [id number]  Unit/Bed#: ICU 01 Encounter: [id number]      Assessment/Plan     * Acute right-sided weakness  Assessment & Plan  53 year old female with uncontrolled DM2, HTN, prior stroke (details unavailable), recent diagnosis of Covid-19, admitted this morning with acute onset of RUE and RLE weakness. Stroke alert activated on admission and per review of notes, NIHSS 6. CTH as well as CTA head and neck completed and did not demonstrate acute intracranial abnormality. She received IV TNK at 0447.     Of note, patient was transferred to [facility] due to critical care bed availability. Prior to transfer, she complained of HA and underwent repeat CTH which did not demonstrate any acute intracranial abnormality. She was given IV Tylenol, Reglan and Benadryl for headache prior to transfer. Suspect lethargy in setting of medication effect.     Plan:   - Recommend acute ischemic stroke s/p IV thrombolytics protocol.   - Check MRI brain without contrast.   - Check 24 hour post TNK CTH.   - Hold antiplatelet/anticoagulation at this time.  - If no hemorrhage noted on 24 hour post TNK CTH, recommend initiate ASA 81 mg QD. May need to consider alternative antiplatelet pending work-up.   - Check echocardiogram with bubble study.   - Monitor on telemetry.   - Check hemoglobin A1c, TSH, lipid panel.   - Recommend start atorvastatin 40 mg QPM.   - Goal normothermia and euglycemia.   - BP goal < 180/105. Treat with PRN antihypertensives.   - PT/OT/Speech therapy.   - Recommend avoid sedating medications.   - Stroke education.   - Monitor exam and notify with changes.    Type 2 diabetes mellitus with diabetic autonomic neuropathy, with long-term current use of insulin ([facility])  Assessment & Plan  Lab Results   Component Value Date    HGBA1C 14.3 (H) 12/11/2023       Recent Labs     01/04/24  0347 01/04/24  1123   POCGLU 183* 148*       Blood Sugar Average: Last  "72 hrs:  (P) 148    - Goal euglycemia.   - Management as per primary team.    Hypertension  Assessment & Plan  - BP goal <180/105.   - Treat with PRN antihypertensives.          Recommendations for outpatient neurological follow up have yet to be determined.    History of Present Illness     Reason for Consult / Principal Problem: Stroke  Hx and PE limited by: Patient lethargic  HPI: Haylee Balbuena is a 53 y.o. female with DM, HTN, prior stroke \"a long time ago\" who presented to the hospital for evaluation of right sided weakness. She initially presented to the [facility] with right sided weakness and slurred speech which she noticed while she was awake around 0200 watching television.     Stroke alert was activated and neurology response was immediate via telephone. Per review of notes, NIHSS noted to be 6. CTH completed and demonstrated no acute intracranial abnormality. CTA head and neck completed and demonstrated no large vessel occlusion or flow-limiting stenosis. After discussion of risks, benefits and alternatives, IV thrombolytics were administered. She was transferred to [facility] due to no critical care beds being available at [facility]. She underwent stat CTH prior to transfer due to complaint of headache and demonstrated small amount of residual contrast within the intracranial vessels but no acute intracranial abnormality identified. She also underwent CTA dissection protocol chest/abdomen/pelvis due to complaint of chest pain which demonstrated no acute thoracoabdominal aortic aneurysm or dissection..    Per review of chart, patient was evaluated by neurology team in July 2023 due to complaint of HA and vertigo. She underwent MRI brain at that time which demonstrated minimal nonspecific white matter changes primarily involving bifrontal subcortical white matter. She was ultimately treated for her migraine headache.     Patient follows with nephrology team for CKD3, " "HTN. She also follows with endocrinology for uncontrolled DM2. Patient was seen in the hospital 2023 and diagnosed with Covid-19.    Patient seen and examined today at bedside. History is limited as patient is lethargic and requires frequent stimulation to maintain wakefulness. Patient reports that this morning she was watching TV and was going to go to bed but realized that she was not able to move her RUE and RLE. She notes she has never had similar symptoms before in the past. She reports history of stroke \"a long time ago\" but is unable to provide any additional details regarding this. She notes she takes ASA 81 mg QD and has not missed any doses. She notes she is diabetic and blood sugars have not been well controlled and that she also is recovering from Covid-19.    Patient reports she continues to have R sided weakness and is not able to move her R arm or leg as she normally would. History is unfortunately limited secondary to lethargy. Of note, history of seizure disorder is documented in chart but patient denies this.      Inpatient consult to Neurology  Consult performed by: Merna Stanton PA-C  Consult ordered by: SYLWIA Galvan      Consult to Neurology  Consult performed by: Merna Stanton PA-C  Consult ordered by: SYLWIA Galvan          Review of Systems   Unable to perform ROS: Acuity of condition       Historical Information   Past Medical History:   Diagnosis Date    Atypical chest pain     Gastroparesis     GERD (gastroesophageal reflux disease)     Hypertension     Psychiatric disorder     Sciatica     Seizure disorder (HCC)      Past Surgical History:   Procedure Laterality Date    APPENDECTOMY      BREAST BIOPSY Left  benign     SECTION      CHOLECYSTECTOMY      COLONOSCOPY      HYSTERECTOMY      IR PICC PLACEMENT SINGLE LUMEN  2023    IN LAPAROSCOPIC APPENDECTOMY N/A 2021    Procedure: APPENDECTOMY LAPAROSCOPIC;  Surgeon: Onel" MD Mario;  Location:  MAIN OR;  Service: General    CA LAPS ABD PRTM&OMENTUM DX W/WO SPEC BR/WA SPX N/A 2021    Procedure: LAPAROSCOPY DIAGNOSTIC, EXTENSIVE DESI;  Surgeon: Onel Martin MD;  Location:  MAIN OR;  Service: General    TUBAL LIGATION      UPPER GASTROINTESTINAL ENDOSCOPY       Social History   Social History     Substance and Sexual Activity   Alcohol Use Never     Social History     Substance and Sexual Activity   Drug Use Never     E-Cigarette/Vaping    E-Cigarette Use Never User      E-Cigarette/Vaping Substances    Nicotine No     THC No     CBD No     Flavoring No     Other No     Unknown No      Social History     Tobacco Use   Smoking Status Former    Current packs/day: 0.00    Types: Cigarettes    Quit date:     Years since quittin.0   Smokeless Tobacco Never     Family History:   Family History   Problem Relation Age of Onset    No Known Problems Mother     No Known Problems Father     No Known Problems Daughter     No Known Problems Maternal Grandmother     No Known Problems Paternal Grandmother     No Known Problems Paternal Aunt     No Known Problems Paternal Aunt     No Known Problems Paternal Aunt        Review of previous medical records was completed. Please see HPI.    Meds/Allergies   Scheduled Meds:  Current Facility-Administered Medications   Medication Dose Route Frequency Provider Last Rate    atorvastatin  80 mg Oral HS SYLWIA Galvan      chlorhexidine  15 mL Mouth/Throat Q12H [facility] SYLWIA Galvan      cholecalciferol  2,000 Units Oral Daily SYLWIA Galvan      citalopram  40 mg Oral Daily TeriSYLWIA Clemons      ergocalciferol  50,000 Units Oral Weekly SYLWIA Galvan      insulin lispro  2-12 Units Subcutaneous TID AC SYLWIA Galvan      insulin lispro  2-12 Units Subcutaneous HS SYLWIA Galvan      levETIRAcetam  500 mg Oral BID Teri  "SYLWIA Alvarez      multivitamin stress formula  1 tablet Oral QAAPURVA Williamson SYLWIA Alvarez       Continuous Infusions:   PRN Meds:.      Allergies   Allergen Reactions    Butorphanol Hallucinations    Benztropine     Penicillins     Zolpidem        Objective   Vitals:Blood pressure 123/68, pulse (!) 54, temperature 98 °F (36.7 °C), resp. rate 20, height 5' 1\" (1.549 m), weight 89.9 kg (198 lb 3.1 oz), SpO2 98%, not currently breastfeeding.,Body mass index is 37.45 kg/m².  No intake or output data in the 24 hours ending 01/04/24 1105    Invasive Devices:   Invasive Devices       Peripheral Intravenous Line  Duration             Peripheral IV 01/04/24 Distal;Left;Upper;Ventral (anterior) Arm <1 day    Peripheral IV 01/04/24 Proximal;Right;Ventral (anterior) Forearm <1 day                    Physical Exam  Constitutional:       General: She is not in acute distress.     Appearance: She is obese. She is not ill-appearing, toxic-appearing or diaphoretic.      Comments: Lethargic but does awaken with stimulation   HENT:      Head: Normocephalic and atraumatic.      Mouth/Throat:      Mouth: Mucous membranes are moist.      Pharynx: Oropharynx is clear. No oropharyngeal exudate or posterior oropharyngeal erythema.   Eyes:      General: No scleral icterus.        Right eye: No discharge.         Left eye: No discharge.      Extraocular Movements: Extraocular movements intact.      Conjunctiva/sclera: Conjunctivae normal.      Pupils: Pupils are equal, round, and reactive to light.   Cardiovascular:      Rate and Rhythm: Normal rate and regular rhythm.   Pulmonary:      Effort: Pulmonary effort is normal. No respiratory distress.      Breath sounds: Normal breath sounds.   Abdominal:      General: There is no distension.      Palpations: Abdomen is soft.      Tenderness: There is no abdominal tenderness.   Musculoskeletal:         General: Normal range of motion.      Cervical back: Normal range of motion and " neck supple.      Right lower leg: No edema.      Left lower leg: No edema.   Skin:     General: Skin is warm and dry.      Findings: No erythema or rash.   Neurological:      Mental Status: She is oriented to person, place, and time.      Coordination: Finger-Nose-Finger Test abnormal (RUE secondary to weakness).      Deep Tendon Reflexes:      Reflex Scores:       Bicep reflexes are 1+ on the right side and 1+ on the left side.       Brachioradialis reflexes are 1+ on the right side and 1+ on the left side.       Patellar reflexes are 1+ on the right side and 1+ on the left side.       Achilles reflexes are 1+ on the right side and 1+ on the left side.  Psychiatric:         Speech: Speech is slurred.       Neurologic Exam     Mental Status   Oriented to person, place, and time.   Oriented to person.   Oriented to place.   Oriented to time.   Attention: decreased. Concentration: decreased.   Speech: slurred   Level of consciousness: arousable by verbal stimuli  Able to name object. Able to repeat. Normal comprehension.     Cranial Nerves     CN II   Right visual field deficit: Blink to threat intact.  Left visual field deficit: Blink to threat intact.    CN III, IV, VI   Pupils are equal, round, and reactive to light.  Right pupil: Size: 4 mm. Shape: regular. Reactivity: brisk.   Left pupil: Size: 4 mm. Shape: regular. Reactivity: brisk.   Nystagmus: none   Diplopia: none  Upgaze: normal  Downgaze: normal  Conjugate gaze: present    CN V   Right facial sensation deficit: none  Left facial sensation deficit: none    CN VII   Right facial weakness: none  Left facial weakness: none    CN VIII   Hearing: intact    CN XII   Tongue: not atrophic  Fasciculations: absent  Tongue deviation: none    Motor Exam   Muscle bulk: normal  Overall muscle tone: normal  Right arm tone: normal  Left arm tone: normal  Right leg tone: normal  Left leg tone: normalDrift noted with RUE and RLE, no drift LUE and LLE.     Sensory Exam    Right arm light touch: Diminished when compared to L.  Left arm light touch: normal  Right leg light touch: Diminished when compared to L.  Left leg light touch: normal  No extinction noted to double simultaneous stimuli.     Gait, Coordination, and Reflexes     Gait  Gait: (Deferred for safety)    Coordination   Finger to nose coordination: abnormal (RUE secondary to weakness)    Tremor   Resting tremor: absent  Intention tremor: absent  Action tremor: absent    Reflexes   Right brachioradialis: 1+  Left brachioradialis: 1+  Right biceps: 1+  Left biceps: 1+  Right patellar: 1+  Left patellar: 1+  Right achilles: 1+  Left achilles: 1+  Right plantar: normal  Left plantar: normal    NIHSS:    1a.Level of Consciousness: 1 = Not alert, but arousable with minimal stimulation   1b. LOC Questions: 0 = Answers both correctly   1c. LOC Commands: 0 = Obeys both correctly   2. Best Gaze: 0 = Normal   3. Visual: 0 = No visual field loss   4. Facial Palsy: 0=Normal symmetric movement   5a. Motor Right Arm: 2=Some effort against gravity, limb cannot get to or maintain (if cured) 90 (or 45) degrees, drifts down to bed, but has some effort against gravity   5b. Motor Left Arm: 0=No drift, limb holds 90 (or 45) degrees for full 10 seconds   6a. Motor Right Le=Some effort against gravity, limb cannot get to or maintain (if cured) 90 (or 45) degrees, drifts down to bed, but has some effort against gravity   6b. Motor Left Le=No drift, limb holds 90 (or 45) degrees for full 10 seconds   7. Limb Ataxia:  0=Absent   8. Sensory: 1=Mild to moderate sensory loss; patient feels pinprick is less sharp or is dull on the affected side; there is a loss of superficial pain with pinprick but patient is aware She is being touched   9. Best Language:  0=No aphasia, normal   10. Dysarthria: 1=Mild to moderate, patient slurs at least some words and at worst, can be understood with some difficulty   11. Extinction and Inattention (formerly  "Neglect): 0=No abnormality   Total Score: 7                 Lab Results:   Recent Results (from the past 24 hour(s))   Basic metabolic panel    Collection Time: 01/04/24  3:47 AM   Result Value Ref Range    Sodium 136 135 - 147 mmol/L    Potassium 3.8 3.5 - 5.3 mmol/L    Chloride 101 96 - 108 mmol/L    CO2 27 21 - 32 mmol/L    ANION GAP 8 mmol/L    BUN 24 5 - 25 mg/dL    Creatinine 1.22 0.60 - 1.30 mg/dL    Glucose 201 (H) 65 - 140 mg/dL    Calcium 9.0 8.4 - 10.2 mg/dL    eGFR 50 ml/min/1.73sq m   CBC and Platelet    Collection Time: 01/04/24  3:47 AM   Result Value Ref Range    WBC 10.77 (H) 4.31 - 10.16 Thousand/uL    RBC 4.84 3.81 - 5.12 Million/uL    Hemoglobin 12.3 11.5 - 15.4 g/dL    Hematocrit 38.9 34.8 - 46.1 %    MCV 80 (L) 82 - 98 fL    MCH 25.4 (L) 26.8 - 34.3 pg    MCHC 31.6 31.4 - 37.4 g/dL    RDW 15.1 11.6 - 15.1 %    Platelets 324 149 - 390 Thousands/uL    MPV 9.9 8.9 - 12.7 fL   Protime-INR    Collection Time: 01/04/24  3:47 AM   Result Value Ref Range    Protime 12.1 11.6 - 14.5 seconds    INR 0.87 0.84 - 1.19   APTT    Collection Time: 01/04/24  3:47 AM   Result Value Ref Range    PTT 26 23 - 37 seconds   HS Troponin 0hr (reflex protocol)    Collection Time: 01/04/24  3:47 AM   Result Value Ref Range    hs TnI 0hr 3 \"Refer to ACS Flowchart\"- see link ng/L   FLU/RSV/COVID - if FLU/RSV clinically relevant    Collection Time: 01/04/24  3:47 AM    Specimen: Nose; Nares   Result Value Ref Range    SARS-CoV-2 Negative Negative    INFLUENZA A PCR Negative Negative    INFLUENZA B PCR Negative Negative    RSV PCR Negative Negative   Fingerstick Glucose (POCT)    Collection Time: 01/04/24  3:47 AM   Result Value Ref Range    POC Glucose 183 (H) 65 - 140 mg/dl   HS Troponin I 2hr    Collection Time: 01/04/24  5:44 AM   Result Value Ref Range    hs TnI 2hr 3 \"Refer to ACS Flowchart\"- see link ng/L    Delta 2hr hsTnI 0 <20 ng/L       Imaging Studies: I have personally reviewed pertinent reports.   and I have " personally reviewed pertinent films in PACS. CTH- No acute intracranial abnormality. CTA head and neck with and without contrast- No evidence of flow-limiting stenosis or large vessel occlusive disease within the major vessels of the [place]. No significant stenosis of the cervical carotid or vertebral arteries. Stable CTA examination of the head and neck compared to the prior study dated 7/15/2023. CTH- Small amount of residual contrast within the intracranial vessels noted likely from prior CTA exam of the same day. No acute intracranial abnormality identified. No significant interval change compared to the prior exam of the same day.    VTE Prophylaxis: Reason for no pharmacologic prophylaxis s/p IV TNK

## 2024-01-04 NOTE — ASSESSMENT & PLAN NOTE
Lab Results   Component Value Date    HGBA1C 14.3 (H) 12/11/2023       Recent Labs     01/04/24  0347 01/04/24  1123   POCGLU 183* 148*       Blood Sugar Average: Last 72 hrs:  (P) 148    - Goal euglycemia.   - Management as per primary team.

## 2024-01-04 NOTE — ASSESSMENT & PLAN NOTE
Lab Results   Component Value Date    HGBA1C 14.3 (H) 12/11/2023       Recent Labs     01/04/24  0347 01/04/24  1123 01/04/24  1549   POCGLU 183* 148* 290*       Blood Sugar Average: Last 72 hrs:  (P) 219    Patient takes U500 TID with meals  Will start SSI with lantus for now while in patient  Patient can transition back to home dosing when discharged  Will start previous lantus dose of 45u BID with sliding scale insulin

## 2024-01-04 NOTE — EMTALA/ACUTE CARE TRANSFER
[facility] EMERGENCY DEPARTMENT  [address] [place] [address]  Dept: [phone]      EMTALA TRANSFER CONSENT    NAME Haylee ANDREWS 1970                              MRN [id number]    I have been informed of my rights regarding examination, treatment, and transfer   by Dr. Ronald To MD    Benefits: Specialized equipment and/or services available at the receiving facility (Include comment)________________________ (ICU)    Risks: Potential for delay in receiving treatment, Increased discomfort during transfer, Possible worsening of condition or death during transfer, Potential deterioration of medical condition, Loss of IV      Consent for Transfer:  I acknowledge that my medical condition has been evaluated and explained to me by the emergency department physician or other qualified medical person and/or my attending physician, who has recommended that I be transferred to the service of  Accepting Physician: [email or web address] at Accepting Facility Name, City & State : [place], [place]. The above potential benefits of such transfer, the potential risks associated with such transfer, and the probable risks of not being transferred have been explained to me, and I fully understand them.  The doctor has explained that, in my case, the benefits of transfer outweigh the risks.  I agree to be transferred.    I authorize the performance of emergency medical procedures and treatments upon me in both transit and upon arrival at the receiving facility.  Additionally, I authorize the release of any and all medical records to the receiving facility and request they be transported with me, if possible.  I understand that the safest mode of transportation during a medical emergency is an ambulance and that the Hospital advocates the use of this mode of transport. Risks of traveling to the receiving facility by car, including  absence of medical control, life sustaining equipment, such as oxygen, and medical personnel has been explained to me and I fully understand them.    (LEISA CORRECT BOX BELOW)  [  ]  I consent to the stated transfer and to be transported by ambulance/helicopter.  [  ]  I consent to the stated transfer, but refuse transportation by ambulance and accept full responsibility for my transportation by car.  I understand the risks of non-ambulance transfers and I exonerate the Hospital and its staff from any deterioration in my condition that results from this refusal.    X___________________________________________    DATE  24  TIME________  Signature of patient or legally responsible individual signing on patient behalf           RELATIONSHIP TO PATIENT_________________________          Provider Certification    NAME Haylee Balbuena                                         1970                              MRN [id number]    A medical screening exam was performed on the above named patient.  Based on the examination:    Condition Necessitating Transfer The encounter diagnosis was Right sided weakness.    Patient Condition: The patient has been stabilized such that within reasonable medical probability, no material deterioration of the patient condition or the condition of the unborn child(maria luz) is likely to result from the transfer    Reason for Transfer: No bed available at level of patient's needs    Transfer Requirements: Facility [place], [place]   Space available and qualified personnel available for treatment as acknowledged by    Agreed to accept transfer and to provide appropriate medical treatment as acknowledged by       [email or web address]  Appropriate medical records of the examination and treatment of the patient are provided at the time of transfer   STAFF INITIAL WHEN COMPLETED _______  Transfer will be performed by qualified personnel from    and appropriate transfer equipment as  required, including the use of necessary and appropriate life support measures.    Provider Certification: I have examined the patient and explained the following risks and benefits of being transferred/refusing transfer to the patient/family:  General risk, such as traffic hazards, adverse weather conditions, rough terrain or turbulence, possible failure of equipment (including vehicle or aircraft), or consequences of actions of persons outside the control of the transport personnel      Based on these reasonable risks and benefits to the patient and/or the unborn child(maria luz), and based upon the information available at the time of the patient’s examination, I certify that the medical benefits reasonably to be expected from the provision of appropriate medical treatments at another medical facility outweigh the increasing risks, if any, to the individual’s medical condition, and in the case of labor to the unborn child, from effecting the transfer.    X____________________________________________ DATE 01/04/24        TIME_______      ORIGINAL - SEND TO MEDICAL RECORDS   COPY - SEND WITH PATIENT DURING TRANSFER

## 2024-01-04 NOTE — H&P
[facility]  H&P  Name: Haylee Balbuena 53 y.o. female I MRN: [id number]  Unit/Bed#: ICU 01 I Date of Admission: 1/4/2024   Date of Service: 1/4/2024 I Hospital Day: 0      Assessment/Plan   * Acute right-sided weakness  Assessment & Plan  Patient presented with acute onset of right sided weakness. Initial NIH 6.. last known well   CT head/CTA head and neck unremarkable  TNK administered 1/4/24 at 0447    Plan:  Stroke pathway  Hold antiplatelet or AC for 24 hours  Repeat CT head at 24 hours  MRI  SBP <185  Echo with bubble study   HA1C/lipid panel   Neuro checks per routine     Acute renal failure superimposed on stage 3a chronic kidney disease (HCC)  Assessment & Plan  Lab Results   Component Value Date    EGFR 50 01/04/2024    EGFR 55 12/21/2023    EGFR 67 12/11/2023    CREATININE 1.22 01/04/2024    CREATININE 1.14 12/21/2023    CREATININE 0.96 12/11/2023     Basline creatinine around 1.0-1.2      Type 2 diabetes mellitus with diabetic autonomic neuropathy, with long-term current use of insulin ([facility]ly [facility])  Assessment & Plan  Lab Results   Component Value Date    HGBA1C 14.3 (H) 12/11/2023       Recent Labs     01/04/24  0347 01/04/24  1123 01/04/24  1549   POCGLU 183* 148* 290*       Blood Sugar Average: Last 72 hrs:  (P) 219    Patient takes U500 TID with meals  Will start SSI with lantus for now while in patient  Patient can transition back to home dosing when discharged  Will start previous lantus dose of 45u BID with sliding scale insulin       Seizure disorder (HCC)  Assessment & Plan  Continue home keppra  Seizure precautions     Hypertension  Assessment & Plan  Hold home metoprolol for now  Allow permissive hypertension up to systolic 185    Bipolar disorder (HCC)  Assessment & Plan  Continue home celexa           History of Present Illness     HPI: Haylee Balbuena is a 53 y.o. with past medical history of HTN, seizure disorder, DM2 with neuropathy and gastroparesis who presented to   [facility] with acute onset of right sided weakness early this AM. She states that she was getting ready to go to bed and noticed that she was unable to get up to go to the bathroom secondary to right sided weakness. Her  was able to assist her to his car and brought her to the ER for evaluation. Stroke alert was activated. CT head, CTA head and neck without acute findings. She was evaluated by Neurology with an initial NIH score of 6. She was then given TNK at 0447 this AM. She arrives to the ICU for post TNK monitoring.     History obtained from chart review and the patient.  Review of Systems   Respiratory:  Negative for shortness of breath.    Cardiovascular:  Negative for chest pain.   Neurological:  Positive for weakness. Negative for dizziness, seizures, syncope, speech difficulty and numbness.     Disposition: Critical care  Historical Information   Past Medical History:  No date: Atypical chest pain  No date: Gastroparesis  No date: GERD (gastroesophageal reflux disease)  No date: Hypertension  No date: Psychiatric disorder  No date: Sciatica  No date: Seizure disorder (HCC) Past Surgical History:  No date: APPENDECTOMY   benign: BREAST BIOPSY; Left  No date:  SECTION  No date: CHOLECYSTECTOMY  No date: COLONOSCOPY  No date: HYSTERECTOMY  2023: IR PICC PLACEMENT SINGLE LUMEN  2021: NH LAPAROSCOPIC APPENDECTOMY; N/A      Comment:  Procedure: APPENDECTOMY LAPAROSCOPIC;  Surgeon: Onel Martin MD;  Location:  MAIN OR;  Service: General  2021: NH LAPS ABD PRTM&OMENTUM DX W/WO SPEC BR/WA SPX; N/A      Comment:  Procedure: LAPAROSCOPY DIAGNOSTIC, EXTENSIVE DESI;                 Surgeon: Onel Martin MD;  Location: [facility] OR;                 Service: General  No date: TUBAL LIGATION  No date: UPPER GASTROINTESTINAL ENDOSCOPY   Current Outpatient Medications   Medication Instructions    albuterol (PROVENTIL HFA,VENTOLIN HFA) 90 mcg/act inhaler INHALE ONE PUFF  BY MOUTH AND INTO THE LUNGS EVERY 6 HOURS AS NEEDED FOR WHEEZING    Alcohol Swabs (Pharmacist Choice Alcohol) PADS Apply externally, 4 times daily, Use as directed    amitriptyline (ELAVIL) 25 mg, Oral, Daily at bedtime    aspirin (ECOTRIN LOW STRENGTH) 81 mg, Oral, Daily    atorvastatin (LIPITOR) 80 mg, Oral, Daily at bedtime    B-D UF III MINI PEN NEEDLES 31G X 5 MM MISC Pt uses 5 pen needles daily    benzonatate (TESSALON) 200 mg, Oral, 3 times daily PRN    cholecalciferol (VITAMIN D3) 2,000 Units, Oral, Daily    citalopram (CELEXA) 40 mg, Oral, Daily    Continuous Blood Gluc  (FreeStyle Refugio 14 Day Berkley) TEMITOPE Use for continuous blood glucose monitoring    Continuous Blood Gluc Sensor (FreeStyle Refugio 2 Sensor) MISC 1 each, Does not apply, Every 14 days    docusate sodium (COLACE) 100 mg capsule TAKE ONE CAPSULE BY MOUTH TWICE DAILY    Empagliflozin (JARDIANCE) 25 mg, Oral, Every morning    ergocalciferol (VITAMIN D2) 50,000 Units, Oral, Weekly    famotidine (PEPCID) 40 MG tablet TAKE ONE TABLET BY MOUTH TWO HOURS PRIOR TO BEDTIME    fenofibrate (TRICOR) 48 mg tablet TAKE ONE TABLET BY MOUTH DAILY    Insulin Regular Human, Conc, (HumuLIN R U-500 KwikPen) 500 units/mL CONCENTRATED U-500 injection pen Take 85 units with breakfast, 65 units with lunch, 65 with dinner, or as directed TDD up to 250 units    Iron Heme Polypeptide 12 MG TABS 1 tablet, Oral    levETIRAcetam (KEPPRA) 500 mg, Oral, 2 times daily    linaCLOtide (Linzess) 72 MCG CAPS 1 capsule, Oral, Daily before breakfast    meclizine (ANTIVERT) 25 mg, Oral, 3 times daily PRN    metoclopramide (REGLAN) 5 mg, Oral, Every 6 hours    metoprolol tartrate (LOPRESSOR) 25 mg, Oral, 2 times daily    multivitamin (THERAGRAN) TABS 1 tablet, Oral, Every morning    nystatin (MYCOSTATIN) cream Topical, 2 times daily    ondansetron (ZOFRAN) 4 mg, Oral, Every 8 hours PRN    ondansetron (ZOFRAN) 4 mg, Oral, Every 6 hours    OneTouch Ultra test strip USE 4 TIMES  A DAY    Pharmacist Choice Lancets MISC USE AS DIRECTED    pregabalin (LYRICA) 100 mg capsule TAKE ONE CAPSULE BY MOUTH THREE TIMES A DAY    Restasis 0.05 % ophthalmic emulsion No dose, route, or frequency recorded.    Trulicity 4.5 mg, Subcutaneous, Every 7 days    Allergies   Allergen Reactions    Butorphanol Hallucinations    Benztropine     Penicillins     Zolpidem       Social History     Tobacco Use    Smoking status: Former     Current packs/day: 0.00     Types: Cigarettes     Quit date:      Years since quittin.0    Smokeless tobacco: Never   Vaping Use    Vaping status: Never Used   Substance Use Topics    Alcohol use: Never    Drug use: Never    Family History   Problem Relation Age of Onset    No Known Problems Mother     No Known Problems Father     No Known Problems Daughter     No Known Problems Maternal Grandmother     No Known Problems Paternal Grandmother     No Known Problems Paternal Aunt     No Known Problems Paternal Aunt     No Known Problems Paternal Aunt           Objective                            Vitals I/O      Most Recent Min/Max in 24hrs   Temp (!) 97.2 °F (36.2 °C) Temp  Min: 96.8 °F (36 °C)  Max: 98.6 °F (37 °C)   Pulse 74 Pulse  Min: 52  Max: 74   Resp 19 Resp  Min: 15  Max: 31   /68 BP  Min: 115/68  Max: 165/81   O2 Sat 95 % SpO2  Min: 92 %  Max: 100 %      Intake/Output Summary (Last 24 hours) at 2024 1714  Last data filed at 2024 1400  Gross per 24 hour   Intake 50 ml   Output 100 ml   Net -50 ml       Diet Ricardo/CHO Controlled; Consistent Carbohydrate Diet Level 2 (5 carb servings/75 grams CHO/meal)    Invasive Monitoring           Physical Exam   Physical Exam  Eyes:      Extraocular Movements: Extraocular movements intact.      Pupils: Pupils are equal, round, and reactive to light.   Skin:     General: Skin is warm and dry.   HENT:      Head: Normocephalic and atraumatic.      Mouth/Throat:      Comments: Edentulous   Cardiovascular:      Rate and Rhythm:  Normal rate and regular rhythm.   Musculoskeletal:      Right lower leg: No edema.      Left lower leg: No edema.   Abdominal: General: Bowel sounds are normal.      Palpations: Abdomen is soft.   Constitutional:       Appearance: She is well-developed and well-nourished.   Pulmonary:      Effort: Pulmonary effort is normal.      Breath sounds: Normal breath sounds.   Neurological:      GCS: GCS eye subscore is 4. GCS verbal subscore is 5. GCS motor subscore is 6.      Motor: Weakness.      Comments: Has decreased  strength in the right hand. Unable to right arm or leg up against gravity. Smile is symmetric. No visual field deficits. No dysphagia              Diagnostic Studies      EKG: NSR  Imaging:  I have personally reviewed pertinent reports.   and I have personally reviewed pertinent films in PACS     Medications:  Scheduled PRN   atorvastatin, 40 mg, HS  chlorhexidine, 15 mL, Q12H HOLDEN  cholecalciferol, 2,000 Units, Daily  citalopram, 40 mg, Daily  ergocalciferol, 50,000 Units, Weekly  insulin glargine, 45 Units, Q12H HOLDEN  insulin lispro, 2-12 Units, TID AC  insulin lispro, 2-12 Units, HS  levETIRAcetam, 500 mg, BID  multivitamin stress formula, 1 tablet, QAM      acetaminophen, 650 mg, Q6H PRN       Continuous          Labs:    CBC    Recent Labs     01/04/24  0347   WBC 10.77*   HGB 12.3   HCT 38.9        BMP    Recent Labs     01/04/24  0347   SODIUM 136   K 3.8      CO2 27   AGAP 8   BUN 24   CREATININE 1.22   CALCIUM 9.0       Coags    Recent Labs     01/04/24  0347   INR 0.87   PTT 26        Additional Electrolytes  No recent results       Blood Gas    No recent results  No recent results LFTs  No recent results    Infectious  No recent results  Glucose  Recent Labs     01/04/24  0347   GLUC 201*               Anticipated Length of Stay is > 2 midnights  SYLWIA Galvan

## 2024-01-04 NOTE — ED PROVIDER NOTES
History  Chief Complaint   Patient presents with    CVA/TIA-like Symptoms     Patient is a 53-year-old female with history of diabetes mellitus, hypertension, seizure disorder that presents for evaluation of right-sided weakness.  She says her last known normal was 2 AM, she was awake watching television when she noticed that she was having right-sided weakness and slurred speech.  Patient says that symptoms been persistent since that time.  She denies any recent head trauma.  She denies headache nausea vomiting.  She does report some substernal chest pressure that started around the same time as the right-sided weakness.  She is on 81 mg aspirin but is not on any other blood thinners.    Patient with no absolute contraindications to TNK including no known history of CVA, recent head trauma, recent surgery, no GI bleeding, anticoagulation.            Prior to Admission Medications   Prescriptions Last Dose Informant Patient Reported? Taking?   Alcohol Swabs (Pharmacist Choice Alcohol) PADS  Self No No   Sig: Apply topically 4 (four) times a day Use as directed   B-D UF III MINI PEN NEEDLES 31G X 5 MM MISC  Self No No   Sig: Pt uses 5 pen needles daily   Continuous Blood Gluc  (FreeStyle [place]ugio 14 Day [place]) TEMITOPE  Self No No   Sig: Use for continuous blood glucose monitoring   Continuous Blood Gluc Sensor (FreeStyle [place] 2 Sensor) MISC  Self No No   Sig: Use 1 each every 14 (fourteen) days   Empagliflozin (Jardiance) 25 MG TABS  Self No No   Sig: Take 1 tablet (25 mg total) by mouth every morning   Insulin Regular Human, Conc, (HumuLIN R U-500 KwikPen) 500 units/mL CONCENTRATED U-500 injection pen   No No   Sig: Take 85 units with breakfast, 65 units with lunch, 65 with dinner, or as directed TDD up to 250 units   Iron Heme Polypeptide 12 MG TABS  Self Yes No   Sig: Take 1 tablet by mouth   OneTouch Ultra test strip  Self No No   Sig: USE 4 TIMES A DAY   Pharmacist Choice Lancets MISC  Self No No   Sig: USE  AS DIRECTED   Restasis 0.05 % ophthalmic emulsion  Self Yes No   albuterol (PROVENTIL HFA,VENTOLIN HFA) 90 mcg/act inhaler  Self No No   Sig: INHALE ONE PUFF BY MOUTH AND INTO THE LUNGS EVERY 6 HOURS AS NEEDED FOR WHEEZING   amitriptyline (ELAVIL) 25 mg tablet  Self No No   Sig: Take 1 tablet (25 mg total) by mouth daily at bedtime   aspirin (ECOTRIN LOW STRENGTH) 81 mg EC tablet  Self No No   Sig: TAKE ONE TABLET BY MOUTH ONCE DAILY   atorvastatin (LIPITOR) 80 mg tablet  Self No No   Sig: TAKE 1 TABLET (80 MG TOTAL) BY MOUTH DAILY AT BEDTIME   benzonatate (TESSALON) 200 MG capsule   No No   Sig: Take 1 capsule (200 mg total) by mouth 3 (three) times a day as needed for cough   cholecalciferol (VITAMIN D3) 1,000 units tablet  Self No No   Sig: Take 2 tablets (2,000 Units total) by mouth daily   citalopram (CeleXA) 40 mg tablet  Self No No   Sig: TAKE ONE TABLET BY MOUTH DAILY   docusate sodium (COLACE) 100 mg capsule  Self No No   Sig: TAKE ONE CAPSULE BY MOUTH TWICE DAILY   dulaglutide (Trulicity) 4.5 MG/0.5ML injection  Self No No   Sig: Inject 0.5 mL (4.5 mg total) under the skin every 7 days   ergocalciferol (VITAMIN D2) 50,000 units  Self No No   Sig: Take 1 capsule (50,000 Units total) by mouth once a week   famotidine (PEPCID) 40 MG tablet  Self No No   Sig: TAKE ONE TABLET BY MOUTH TWO HOURS PRIOR TO BEDTIME   fenofibrate (TRICOR) 48 mg tablet  Self No No   Sig: TAKE ONE TABLET BY MOUTH DAILY   levETIRAcetam (KEPPRA) 500 mg tablet   No No   Sig: TAKE ONE TABLET BY MOUTH TWICE DAILY   linaCLOtide (Linzess) 72 MCG CAPS  Self No No   Sig: Take 1 capsule by mouth daily before breakfast   meclizine (ANTIVERT) 25 mg tablet  Self No No   Sig: Take 1 tablet (25 mg total) by mouth 3 (three) times a day as needed for dizziness   metoclopramide (Reglan) 10 mg tablet   No No   Sig: Take 0.5 tablets (5 mg total) by mouth every 6 (six) hours   metoprolol tartrate (LOPRESSOR) 25 mg tablet  Self No No   Sig: TAKE ONE TABLET  BY MOUTH TWICE DAILY   multivitamin (THERAGRAN) TABS  Self Yes No   Sig: Take 1 tablet by mouth every morning   nystatin (MYCOSTATIN) cream  Self No No   Sig: Apply topically 2 (two) times a day   ondansetron (ZOFRAN) 4 mg tablet  Self No No   Sig: Take 1 tablet (4 mg total) by mouth every 8 (eight) hours as needed for nausea or vomiting   ondansetron (ZOFRAN) 4 mg tablet   No No   Sig: Take 1 tablet (4 mg total) by mouth every 6 (six) hours   pregabalin (LYRICA) 100 mg capsule  Self No No   Sig: TAKE ONE CAPSULE BY MOUTH THREE TIMES A DAY      Facility-Administered Medications: None       Past Medical History:   Diagnosis Date    Atypical chest pain     Gastroparesis     GERD (gastroesophageal reflux disease)     Hypertension     Psychiatric disorder     Sciatica     Seizure disorder (HCC)        Past Surgical History:   Procedure Laterality Date    APPENDECTOMY      BREAST BIOPSY Left  benign     SECTION      CHOLECYSTECTOMY      COLONOSCOPY      HYSTERECTOMY      IR PICC PLACEMENT SINGLE LUMEN  2023    DE LAPAROSCOPIC APPENDECTOMY N/A 2021    Procedure: APPENDECTOMY LAPAROSCOPIC;  Surgeon: Onel Martin MD;  Location: [facility] OR;  Service: General    DE LAPS ABD PRTM&OMENTUM DX W/WO SPEC BR/WA SPX N/A 2021    Procedure: LAPAROSCOPY DIAGNOSTIC, EXTENSIVE DESI;  Surgeon: Onel Martin MD;  Location: [facility] OR;  Service: General    TUBAL LIGATION      UPPER GASTROINTESTINAL ENDOSCOPY         Family History   Problem Relation Age of Onset    No Known Problems Mother     No Known Problems Father     No Known Problems Daughter     No Known Problems Maternal Grandmother     No Known Problems Paternal Grandmother     No Known Problems Paternal Aunt     No Known Problems Paternal Aunt     No Known Problems Paternal Aunt      I have reviewed and agree with the history as documented.    E-Cigarette/Vaping    E-Cigarette Use Never User      E-Cigarette/Vaping Substances    Nicotine No      THC No     CBD No     Flavoring No     Other No     Unknown No      Social History     Tobacco Use    Smoking status: Former     Current packs/day: 0.00     Types: Cigarettes     Quit date:      Years since quittin.0    Smokeless tobacco: Never   Vaping Use    Vaping status: Never Used   Substance Use Topics    Alcohol use: Never    Drug use: Never       Review of Systems   Constitutional:  Negative for fever.   HENT:  Negative for sore throat.    Respiratory:  Negative for shortness of breath.    Cardiovascular:  Negative for chest pain.   Gastrointestinal:  Negative for abdominal pain.   Genitourinary:  Negative for dysuria.   Musculoskeletal:  Negative for back pain.   Skin:  Negative for rash.   Neurological:  Positive for weakness. Negative for light-headedness.   Psychiatric/Behavioral:  Negative for agitation.    All other systems reviewed and are negative.      Physical Exam  Physical Exam  Vitals reviewed.   Constitutional:       General: She is not in acute distress.     Appearance: She is well-developed.   HENT:      Head: Normocephalic.   Eyes:      Pupils: Pupils are equal, round, and reactive to light.   Cardiovascular:      Rate and Rhythm: Normal rate and regular rhythm.      Heart sounds: Normal heart sounds.   Pulmonary:      Effort: Pulmonary effort is normal.      Breath sounds: Normal breath sounds.   Abdominal:      General: Bowel sounds are normal. There is no distension.      Palpations: Abdomen is soft.      Tenderness: There is no abdominal tenderness. There is no guarding.   Musculoskeletal:         General: No tenderness or deformity. Normal range of motion.      Cervical back: Normal range of motion and neck supple.   Skin:     General: Skin is warm and dry.      Capillary Refill: Capillary refill takes less than 2 seconds.   Neurological:      Mental Status: She is alert and oriented to person, place, and time.      Cranial Nerves: No cranial nerve deficit.      Sensory: No  sensory deficit.      Comments: Alert and oriented x 2.  Right mid and lower facial droop noted, cranial nerves otherwise intact.  Patient with strength 2 out of 5 in right upper extremity, 3-5 in right lower extremity.  Subjective numbness on the right upper and lower extremity.  Initial NIH was scale 7   Psychiatric:         Behavior: Behavior normal.         Thought Content: Thought content normal.         Judgment: Judgment normal.         Vital Signs  ED Triage Vitals   Temperature Pulse Respirations Blood Pressure SpO2   01/04/24 0342 01/04/24 0345 01/04/24 0342 01/04/24 0345 01/04/24 0345   97.7 °F (36.5 °C) 59 16 137/65 97 %      Temp Source Heart Rate Source Patient Position - Orthostatic VS BP Location FiO2 (%)   01/04/24 0342 01/04/24 0501 01/04/24 0501 01/04/24 0501 --   Tympanic Monitor Lying Left arm       Pain Score       01/04/24 0516       7           Vitals:    01/04/24 0700 01/04/24 0715 01/04/24 0730 01/04/24 0745   BP: 124/74 121/76 115/68 121/67   Pulse: 56 60 55 59   Patient Position - Orthostatic VS:             Visual Acuity  Visual Acuity      Flowsheet Row Most Recent Value   L Pupil Size (mm) 3   R Pupil Size (mm) 3            ED Medications  Medications   iohexol (OMNIPAQUE) 350 MG/ML injection (MULTI-DOSE) 119 mL (119 mL Intravenous Given 1/4/24 0412)   iohexol (OMNIPAQUE) 350 MG/ML injection (MULTI-DOSE) 1 mL (1 mL Intravenous Given 1/4/24 0413)   tenecteplase (TNKase) injection 23 mg (23 mg Intravenous Given 1/4/24 0447)   acetaminophen (Ofirmev) injection 1,000 mg (0 mg Intravenous Stopped 1/4/24 0544)   metoclopramide (REGLAN) injection 10 mg (10 mg Intravenous Given 1/4/24 0705)   diphenhydrAMINE (BENADRYL) injection 25 mg (25 mg Intravenous Given 1/4/24 0705)       Diagnostic Studies  Results Reviewed       Procedure Component Value Units Date/Time    HS Troponin I 2hr [[id number]]  (Normal) Collected: 01/04/24 0544    Lab Status: Final result Specimen: Blood from Line, Venous  Updated: 01/04/24 0648     hs TnI 2hr 3 ng/L      Delta 2hr hsTnI 0 ng/L     HS Troponin 0hr (reflex protocol) [[id number]]  (Normal) Collected: 01/04/24 0347    Lab Status: Final result Specimen: Blood from Arm, Right Updated: 01/04/24 0435     hs TnI 0hr 3 ng/L     FLU/RSV/COVID - if FLU/RSV clinically relevant [[id number]]  (Normal) Collected: 01/04/24 0347    Lab Status: Final result Specimen: Nares from Nose Updated: 01/04/24 0434     SARS-CoV-2 Negative     INFLUENZA A PCR Negative     INFLUENZA B PCR Negative     RSV PCR Negative    Narrative:      FOR PEDIATRIC PATIENTS - copy/paste COVID Guidelines URL to browser: [email or web address]    SARS-CoV-2 assay is a Nucleic Acid Amplification assay intended for the  qualitative detection of nucleic acid from SARS-CoV-2 in nasopharyngeal  swabs. Results are for the presumptive identification of SARS-CoV-2 RNA.    Positive results are indicative of infection with SARS-CoV-2, the virus  causing COVID-19, but do not rule out bacterial infection or co-infection  with other viruses. Laboratories within the [place] and its  territories are required to report all positive results to the appropriate  public health authorities. Negative results do not preclude SARS-CoV-2  infection and should not be used as the sole basis for treatment or other  patient management decisions. Negative results must be combined with  clinical observations, patient history, and epidemiological information.  This test has not been FDA cleared or approved.    This test has been authorized by FDA under an Emergency Use Authorization  (EUA). This test is only authorized for the duration of time the  declaration that circumstances exist justifying the authorization of the  emergency use of an in vitro diagnostic tests for detection of SARS-CoV-2  virus and/or diagnosis of COVID-19 infection under section 564(b)(1) of  the Act, 21 U.S.C.  360bbb-3(b)(1), unless the authorization is terminated  or revoked sooner. The test has been validated but independent review by FDA  and CLIA is pending.    Test performed using [employer]ision[employer]: This RT-PCR assay targets N2,  a region unique to SARS-CoV-2. A conserved region in the E-gene was chosen  for pan-Sarbecovirus detection which includes SARS-CoV-2.    According to CMS-2020-01-R, this platform meets the definition of high-throughput technology.    Basic metabolic panel [[id number]]  (Abnormal) Collected: 01/04/24 0347    Lab Status: Final result Specimen: Blood from Arm, Right Updated: 01/04/24 0430     Sodium 136 mmol/L      Potassium 3.8 mmol/L      Chloride 101 mmol/L      CO2 27 mmol/L      ANION GAP 8 mmol/L      BUN 24 mg/dL      Creatinine 1.22 mg/dL      Glucose 201 mg/dL      Calcium 9.0 mg/dL      eGFR 50 ml/min/1.73sq m     Narrative:      National Kidney Disease Foundation guidelines for Chronic Kidney Disease (CKD):     Stage 1 with normal or high GFR (GFR > 90 mL/min/1.73 square meters)    Stage 2 Mild CKD (GFR = 60-89 mL/min/1.73 square meters)    Stage 3A Moderate CKD (GFR = 45-59 mL/min/1.73 square meters)    Stage 3B Moderate CKD (GFR = 30-44 mL/min/1.73 square meters)    Stage 4 Severe CKD (GFR = 15-29 mL/min/1.73 square meters)    Stage 5 End Stage CKD (GFR <15 mL/min/1.73 square meters)  Note: GFR calculation is accurate only with a steady state creatinine    Protime-INR [[id number]]  (Normal) Collected: 01/04/24 0347    Lab Status: Final result Specimen: Blood from Arm, Right Updated: 01/04/24 0423     Protime 12.1 seconds      INR 0.87    APTT [[id number]]  (Normal) Collected: 01/04/24 0347    Lab Status: Final result Specimen: Blood from Arm, Right Updated: 01/04/24 0423     PTT 26 seconds     CBC and Platelet [[id number]]  (Abnormal) Collected: 01/04/24 0347    Lab Status: Final result Specimen: Blood from Arm, Right Updated: 01/04/24 0357     WBC 10.77 Thousand/uL      RBC 4.84  Million/uL      Hemoglobin 12.3 g/dL      Hematocrit 38.9 %      MCV 80 fL      MCH 25.4 pg      MCHC 31.6 g/dL      RDW 15.1 %      Platelets 324 Thousands/uL      MPV 9.9 fL     Fingerstick Glucose (POCT) [[id number]]  (Abnormal) Collected: 01/04/24 0347    Lab Status: Final result Updated: 01/04/24 0347     POC Glucose 183 mg/dl                    CT head without contrast   Final Result by Robi Kwong MD (01/04 0612)      Small amount of residual contrast within the intracranial vessels noted likely from prior CTA exam of the same day.  No acute intracranial abnormality identified.  No significant interval change compared to the prior exam of the same day.                  Workstation performed: [id number]         CTA dissection protocol chest/abdomen/pelvis   Final Result by Robi Kwong MD (01/04 0513)      No acute thoracoabdominal aortic aneurysm or dissection. No acute pulmonary embolism. No pneumothorax, lobar airspace consolidation, or pleural effusion.  Subtle groundglass opacities in the lower lobes bilaterally may reflect areas of subsegmental    atelectasis but cannot exclude early infectious/inflammatory process of the lung parenchyma. Clinical correlation recommended. No significant acute intra-abdominal/pelvic abnormalities noted with ancillary findings detailed above.         Workstation performed: [id number]         CTA stroke alert (head/neck)   Final Result by Basil Mariano MD (01/04 0442)      No evidence of flow-limiting stenosis or large vessel occlusive disease within the major vessels of the [place].      No significant stenosis of the cervical carotid or vertebral arteries.      Stable CTA examination of the head and neck compared to prior study dated 7/15/2023.      Findings were directly discussed with Dr. Whitehead at 4:28 a.m.                        Workstation performed: [id number]         CT stroke alert brain   Final Result by Basil Mariano MD (01/04 1962)      No acute  intracranial abnormality.      Findings were directly discussed with Dr. Whitehead at 4:28 a.m.      Workstation performed: [id number]                    Procedures  ECG 12 Lead Documentation Only    Date/Time: 1/4/2024 5:00 AM    Performed by: Ronald To MD  Authorized by: Ronald To MD    ECG reviewed by me, the ED Provider: yes    Patient location:  ED  Previous ECG:     Previous ECG:  Unavailable    Comparison to cardiac monitor: Yes    Interpretation:     Interpretation: normal    Rate:     ECG rate assessment: normal    Rhythm:     Rhythm: sinus rhythm    Ectopy:     Ectopy: none    QRS:     QRS axis:  Normal    QRS intervals:  Normal  Conduction:     Conduction: normal    ST segments:     ST segments:  Normal  T waves:     T waves: normal             ED Course  ED Course as of 01/05/24 1855   Thu Jan 04, 2024   0347 NIHSS 6   0428 I had an in-depth conversation with the patient and her .  I went over the risks and contraindications to TNK.  Advised that she could have 1 to 2% chance of death from the medication.  I also advised him that I do recommend giving TNK in the scenario because I think that with her significant right-sided weakness she will be likely debilitated for life if we do not give it.  I am awaiting CTA dissection study read before ordering TNK.   0542 Patient complaining of headache, 9/10.  Will perform CT head to rule out intracranial hemorrhage                  Stroke Assessment       Row Name 01/04/24 0456             NIH Stroke Scale    Interval Baseline      Level of Consciousness (1a.) 0      LOC Questions (1b.) 1      LOC Commands (1c.) 0      Best Gaze (2.) 0      Visual (3.) 0      Facial Palsy (4.) 1      Motor Arm, Left (5a.) 0      Motor Arm, Right (5b.) 2      Motor Leg, Left (6a.) 0      Motor Leg, Right (6b.) 1      Limb Ataxia (7.) 0      Sensory (8.) 1      Best Language (9.) 0      Dysarthria (10.) 1      Extinction and Inattention (11.) (Formerly Neglect) 0       Total 7                    Flowsheet Row Most Recent Value   Thrombolytic Decision Options    Thrombolytic Decision After a discussion of risks, benefits and alternatives (including best medical therapy excluding thrombolysis) reviewing inclusion and exclusion criteria the decision was made to proceed with thrombolytic therapy.   Consent obtained from the patient.   Consent obtained by provider: Yousuf DAVIS 20yo+      Flowsheet Row Most Recent Value   Initial Alcohol Screen: US AUDIT-C     1. How often do you have a drink containing alcohol? 0 Filed at: 01/04/2024 0500   2. How many drinks containing alcohol do you have on a typical day you are drinking?  0 Filed at: 01/04/2024 0500   3a. Male UNDER 65: How often do you have five or more drinks on one occasion? 0 Filed at: 01/04/2024 0500   3b. FEMALE Any Age, or MALE 65+: How often do you have 4 or more drinks on one occassion? 0 Filed at: 01/04/2024 0500   Audit-C Score 0 Filed at: 01/04/2024 0500   LILIA: How many times in the past year have you...    Used an illegal drug or used a prescription medication for non-medical reasons? Never Filed at: 01/04/2024 0500                      Medical Decision Making  Patient is a 53-year-old female who presents for evaluation of right-sided weakness and numbness.  Last known normal was 2 AM, patient is adamant that this was the time symptoms started.  Also complaining of some substernal chest discomfort.  Stroke alert called, CTA head and neck negative along with CTA dissection study negative per radiology.  Discussed risks of TNK and patient was agreeable to get the medication knowing that this could be a potentially debilitating stroke.  Patient otherwise blood work and EKG is unremarkable.  Pending transfer secondary to no critical care beds being available at [facility].    Amount and/or Complexity of Data Reviewed  Labs: ordered.  Radiology: ordered.    Risk  Prescription drug  management.             Disposition  Final diagnoses:   Right sided weakness     Time reflects when diagnosis was documented in both MDM as applicable and the Disposition within this note       Time User Action Codes Description Comment    1/4/2024  3:43 AM Ronald To Add [R53.1] Right sided weakness           ED Disposition       ED Disposition   Transfer to Another Facility-In Network    Condition   --    Date/Time   Thu Jan 4, 2024 5812    Comment   Haylee Balbuena should be transferred out to [facility].               MD Documentation      Flowsheet Row Most Recent Value   Patient Condition The patient has been stabilized such that within reasonable medical probability, no material deterioration of the patient condition or the condition of the unborn child(maria luz) is likely to result from the transfer   Reason for Transfer No bed available at level of patient's needs   Benefits of Transfer Specialized equipment and/or services available at the receiving facility (Include comment)________________________  [ICU]   Risks of Transfer Potential for delay in receiving treatment, Increased discomfort during transfer, Possible worsening of condition or death during transfer, Potential deterioration of medical condition, Loss of IV   Accepting Physician [email or web address]   Accepting Facility Name, [facility]   Sending MD Ronald To   Provider Certification General risk, such as traffic hazards, adverse weather conditions, rough terrain or turbulence, possible failure of equipment (including vehicle or aircraft), or consequences of actions of persons outside the control of the transport personnel          RN Documentation      Flowsheet Row Most Recent Value   Accepting Facility Name, [facility]   Report Given to Ricardo TORRES          Follow-up Information    None         Discharge Medication List as of 1/4/2024  8:24 AM        CONTINUE these  medications which have NOT CHANGED    Details   albuterol (PROVENTIL HFA,VENTOLIN HFA) 90 mcg/act inhaler INHALE ONE PUFF BY MOUTH AND INTO THE LUNGS EVERY 6 HOURS AS NEEDED FOR WHEEZING, Normal      Alcohol Swabs (Pharmacist Choice Alcohol) PADS Apply topically 4 (four) times a day Use as directed, Starting Tue 9/5/2023, Normal      amitriptyline (ELAVIL) 25 mg tablet Take 1 tablet (25 mg total) by mouth daily at bedtime, Starting Thu 7/20/2023, Until Fri 10/20/2023, Normal      aspirin (ECOTRIN LOW STRENGTH) 81 mg EC tablet TAKE ONE TABLET BY MOUTH ONCE DAILY, Starting Thu 11/16/2023, Normal      atorvastatin (LIPITOR) 80 mg tablet TAKE 1 TABLET (80 MG TOTAL) BY MOUTH DAILY AT BEDTIME, Starting Mon 1/16/2023, Normal      B-D UF III MINI PEN NEEDLES 31G X 5 MM MISC Pt uses 5 pen needles daily, Normal      benzonatate (TESSALON) 200 MG capsule Take 1 capsule (200 mg total) by mouth 3 (three) times a day as needed for cough, Starting Tue 12/26/2023, Normal      cholecalciferol (VITAMIN D3) 1,000 units tablet Take 2 tablets (2,000 Units total) by mouth daily, Starting Tue 9/5/2023, Normal      citalopram (CeleXA) 40 mg tablet TAKE ONE TABLET BY MOUTH DAILY, Starting Fri 11/3/2023, Normal      Continuous Blood Gluc  (FreeStyle Refugio 14 Day [place]) TEMITOPE Use for continuous blood glucose monitoring, Print      Continuous Blood Gluc Sensor (FreeStyle [place] 2 Sensor) MISC Use 1 each every 14 (fourteen) days, Starting Thu 10/19/2023, Normal      docusate sodium (COLACE) 100 mg capsule TAKE ONE CAPSULE BY MOUTH TWICE DAILY, Normal      dulaglutide (Trulicity) 4.5 MG/0.5ML injection Inject 0.5 mL (4.5 mg total) under the skin every 7 days, Starting Thu 10/19/2023, Normal      Empagliflozin (Jardiance) 25 MG TABS Take 1 tablet (25 mg total) by mouth every morning, Starting Tue 10/3/2023, Normal      ergocalciferol (VITAMIN D2) 50,000 units Take 1 capsule (50,000 Units total) by mouth once a week, Starting Thu 9/7/2023,  Normal      famotidine (PEPCID) 40 MG tablet TAKE ONE TABLET BY MOUTH TWO HOURS PRIOR TO BEDTIME, Normal      fenofibrate (TRICOR) 48 mg tablet TAKE ONE TABLET BY MOUTH DAILY, Normal      Insulin Regular Human, Conc, (HumuLIN R U-500 KwikPen) 500 units/mL CONCENTRATED U-500 injection pen Take 85 units with breakfast, 65 units with lunch, 65 with dinner, or as directed TDD up to 250 units, Normal      Iron Heme Polypeptide 12 MG TABS Take 1 tablet by mouth, Historical Med      levETIRAcetam (KEPPRA) 500 mg tablet TAKE ONE TABLET BY MOUTH TWICE DAILY, Starting Mon 12/11/2023, Normal      linaCLOtide (Linzess) 72 MCG CAPS Take 1 capsule by mouth daily before breakfast, Starting Tue 9/5/2023, Normal      meclizine (ANTIVERT) 25 mg tablet Take 1 tablet (25 mg total) by mouth 3 (three) times a day as needed for dizziness, Starting Mon 7/10/2023, Normal      metoclopramide (Reglan) 10 mg tablet Take 0.5 tablets (5 mg total) by mouth every 6 (six) hours, Starting Thu 12/21/2023, Normal      metoprolol tartrate (LOPRESSOR) 25 mg tablet TAKE ONE TABLET BY MOUTH TWICE DAILY, Starting Sat 8/26/2023, Normal      multivitamin (THERAGRAN) TABS Take 1 tablet by mouth every morning, Historical Med      nystatin (MYCOSTATIN) cream Apply topically 2 (two) times a day, Starting Thu 7/13/2023, Normal      !! ondansetron (ZOFRAN) 4 mg tablet Take 1 tablet (4 mg total) by mouth every 8 (eight) hours as needed for nausea or vomiting, Starting Mon 6/19/2023, Normal      !! ondansetron (ZOFRAN) 4 mg tablet Take 1 tablet (4 mg total) by mouth every 6 (six) hours, Starting Thu 12/21/2023, Normal      OneTouch Ultra test strip USE 4 TIMES A DAY, Normal      Pharmacist Choice Lancets MISC USE AS DIRECTED, Normal      pregabalin (LYRICA) 100 mg capsule TAKE ONE CAPSULE BY MOUTH THREE TIMES A DAY, Normal      Restasis 0.05 % ophthalmic emulsion Starting Thu 3/2/2023, Historical Med       !! - Potential duplicate medications found. Please discuss  with provider.          No discharge procedures on file.    PDMP Review         Value Time User    PDMP Reviewed  Yes 7/15/2023  3:54 AM Mikaela Michael PA-C            ED Provider  Electronically Signed by             Ronald To MD  01/05/24 3579

## 2024-01-04 NOTE — ASSESSMENT & PLAN NOTE
Patient presented with acute onset of right sided weakness. Initial NIH 6.. last known well   CT head/CTA head and neck unremarkable  TNK administered 1/4/24 at 0447    Plan:  Stroke pathway  Hold antiplatelet or AC for 24 hours  Repeat CT head at 24 hours  MRI  SBP <185  Echo with bubble study   HA1C/lipid panel   Neuro checks per routine

## 2024-01-04 NOTE — ASSESSMENT & PLAN NOTE
Lab Results   Component Value Date    EGFR 50 01/04/2024    EGFR 55 12/21/2023    EGFR 67 12/11/2023    CREATININE 1.22 01/04/2024    CREATININE 1.14 12/21/2023    CREATININE 0.96 12/11/2023     Basline creatinine around 1.0-1.2

## 2024-01-05 ENCOUNTER — APPOINTMENT (INPATIENT)
Dept: RADIOLOGY | Facility: HOSPITAL | Age: 54
End: 2024-01-05
Payer: COMMERCIAL

## 2024-01-05 LAB
ANION GAP SERPL CALCULATED.3IONS-SCNC: 7 MMOL/L
BUN SERPL-MCNC: 27 MG/DL (ref 5–25)
CALCIUM SERPL-MCNC: 8.7 MG/DL (ref 8.4–10.2)
CHLORIDE SERPL-SCNC: 105 MMOL/L (ref 96–108)
CHOLEST SERPL-MCNC: 191 MG/DL
CO2 SERPL-SCNC: 26 MMOL/L (ref 21–32)
CREAT SERPL-MCNC: 1.05 MG/DL (ref 0.6–1.3)
ERYTHROCYTE [DISTWIDTH] IN BLOOD BY AUTOMATED COUNT: 15.7 % (ref 11.6–15.1)
EST. AVERAGE GLUCOSE BLD GHB EST-MCNC: 309 MG/DL
GFR SERPL CREATININE-BSD FRML MDRD: 60 ML/MIN/1.73SQ M
GLUCOSE SERPL-MCNC: 147 MG/DL (ref 65–140)
GLUCOSE SERPL-MCNC: 156 MG/DL (ref 65–140)
GLUCOSE SERPL-MCNC: 201 MG/DL (ref 65–140)
GLUCOSE SERPL-MCNC: 289 MG/DL (ref 65–140)
GLUCOSE SERPL-MCNC: 301 MG/DL (ref 65–140)
HBA1C MFR BLD: 12.4 %
HCT VFR BLD AUTO: 37 % (ref 34.8–46.1)
HDLC SERPL-MCNC: 43 MG/DL
HGB BLD-MCNC: 12.2 G/DL (ref 11.5–15.4)
LDLC SERPL CALC-MCNC: 83 MG/DL (ref 0–100)
MAGNESIUM SERPL-MCNC: 2.2 MG/DL (ref 1.9–2.7)
MCH RBC QN AUTO: 25.6 PG (ref 26.8–34.3)
MCHC RBC AUTO-ENTMCNC: 33 G/DL (ref 31.4–37.4)
MCV RBC AUTO: 78 FL (ref 82–98)
MRSA NOSE QL CULT: NORMAL
PLATELET # BLD AUTO: 307 THOUSANDS/UL (ref 149–390)
PMV BLD AUTO: 10.2 FL (ref 8.9–12.7)
POTASSIUM SERPL-SCNC: 3.9 MMOL/L (ref 3.5–5.3)
RBC # BLD AUTO: 4.76 MILLION/UL (ref 3.81–5.12)
SODIUM SERPL-SCNC: 138 MMOL/L (ref 135–147)
TRIGL SERPL-MCNC: 327 MG/DL
WBC # BLD AUTO: 8.97 THOUSAND/UL (ref 4.31–10.16)

## 2024-01-05 PROCEDURE — 99232 SBSQ HOSP IP/OBS MODERATE 35: CPT | Performed by: INTERNAL MEDICINE

## 2024-01-05 PROCEDURE — 83036 HEMOGLOBIN GLYCOSYLATED A1C: CPT | Performed by: NURSE PRACTITIONER

## 2024-01-05 PROCEDURE — 80048 BASIC METABOLIC PNL TOTAL CA: CPT | Performed by: NURSE PRACTITIONER

## 2024-01-05 PROCEDURE — G1004 CDSM NDSC: HCPCS

## 2024-01-05 PROCEDURE — 82948 REAGENT STRIP/BLOOD GLUCOSE: CPT

## 2024-01-05 PROCEDURE — 85027 COMPLETE CBC AUTOMATED: CPT | Performed by: NURSE PRACTITIONER

## 2024-01-05 PROCEDURE — 83735 ASSAY OF MAGNESIUM: CPT | Performed by: NURSE PRACTITIONER

## 2024-01-05 PROCEDURE — 80061 LIPID PANEL: CPT | Performed by: NURSE PRACTITIONER

## 2024-01-05 PROCEDURE — 70450 CT HEAD/BRAIN W/O DYE: CPT

## 2024-01-05 RX ORDER — LANOLIN ALCOHOL/MO/W.PET/CERES
6 CREAM (GRAM) TOPICAL
Status: DISCONTINUED | OUTPATIENT
Start: 2024-01-05 | End: 2024-01-06 | Stop reason: HOSPADM

## 2024-01-05 RX ORDER — PREGABALIN 100 MG/1
100 CAPSULE ORAL 3 TIMES DAILY
Status: DISCONTINUED | OUTPATIENT
Start: 2024-01-05 | End: 2024-01-06 | Stop reason: HOSPADM

## 2024-01-05 RX ORDER — ASPIRIN 81 MG/1
81 TABLET, CHEWABLE ORAL DAILY
Status: DISCONTINUED | OUTPATIENT
Start: 2024-01-05 | End: 2024-01-06 | Stop reason: HOSPADM

## 2024-01-05 RX ADMIN — INSULIN GLARGINE 45 UNITS: 100 INJECTION, SOLUTION SUBCUTANEOUS at 09:03

## 2024-01-05 RX ADMIN — Medication 6 MG: at 23:40

## 2024-01-05 RX ADMIN — LEVETIRACETAM 500 MG: 500 TABLET, FILM COATED ORAL at 09:03

## 2024-01-05 RX ADMIN — ACETAMINOPHEN 650 MG: 325 TABLET ORAL at 18:46

## 2024-01-05 RX ADMIN — LEVETIRACETAM 500 MG: 500 TABLET, FILM COATED ORAL at 17:11

## 2024-01-05 RX ADMIN — B-COMPLEX W/ C & FOLIC ACID TAB 1 TABLET: TAB at 09:03

## 2024-01-05 RX ADMIN — CHLORHEXIDINE GLUCONATE 15 ML: 1.2 SOLUTION ORAL at 09:04

## 2024-01-05 RX ADMIN — CHLORHEXIDINE GLUCONATE 15 ML: 1.2 SOLUTION ORAL at 21:41

## 2024-01-05 RX ADMIN — ASPIRIN 81 MG CHEWABLE TABLET 81 MG: 81 TABLET CHEWABLE at 19:44

## 2024-01-05 RX ADMIN — INSULIN GLARGINE 45 UNITS: 100 INJECTION, SOLUTION SUBCUTANEOUS at 21:41

## 2024-01-05 RX ADMIN — CITALOPRAM HYDROBROMIDE 40 MG: 20 TABLET ORAL at 09:04

## 2024-01-05 RX ADMIN — Medication 2000 UNITS: at 09:04

## 2024-01-05 RX ADMIN — INSULIN LISPRO 6 UNITS: 100 INJECTION, SOLUTION INTRAVENOUS; SUBCUTANEOUS at 17:08

## 2024-01-05 RX ADMIN — ATORVASTATIN CALCIUM 40 MG: 40 TABLET, FILM COATED ORAL at 21:41

## 2024-01-05 RX ADMIN — PREGABALIN 100 MG: 100 CAPSULE ORAL at 21:41

## 2024-01-05 RX ADMIN — ACETAMINOPHEN 650 MG: 325 TABLET ORAL at 05:05

## 2024-01-05 RX ADMIN — INSULIN LISPRO 8 UNITS: 100 INJECTION, SOLUTION INTRAVENOUS; SUBCUTANEOUS at 21:42

## 2024-01-05 RX ADMIN — INSULIN LISPRO 4 UNITS: 100 INJECTION, SOLUTION INTRAVENOUS; SUBCUTANEOUS at 12:34

## 2024-01-05 NOTE — ASSESSMENT & PLAN NOTE
Patient presented with acute onset of right sided weakness. Initial NIH 6.. last known well   CT head/CTA head and neck unremarkable  TNK administered 1/4/24 at 0447    Plan:  Stroke pathway  Hold antiplatelet or AC for 24 hours  Repeat CT head at 24 hours  MRI pending  SBP <185  Echo with bubble study   HA1C/lipid panel   Neuro checks per routine

## 2024-01-05 NOTE — ASSESSMENT & PLAN NOTE
Lab Results   Component Value Date    HGBA1C 14.3 (H) 12/11/2023       Recent Labs     01/04/24  0347 01/04/24  1123 01/04/24  1549 01/04/24 2102   POCGLU 183* 148* 290* 258*         Blood Sugar Average: Last 72 hrs:  (P) 232    Patient takes U500 TID with meals  Continue SSI with lantus for now while inpatient  Patient can transition back to home dosing when discharged  continue lantus dose of 45u BID with sliding scale insulin

## 2024-01-05 NOTE — PROGRESS NOTES
[facility]  Progress Note  Name: Haylee Balbuena I  MRN: [id number]  Unit/Bed#: ICU 01 I Date of Admission: 1/4/2024   Date of Service: 1/5/2024 I Hospital Day: 1    Assessment/Plan   * Acute right-sided weakness  Assessment & Plan  Patient presented with acute onset of right sided weakness. Initial NIH 6.. last known well   CT head/CTA head and neck unremarkable  TNK administered 1/4/24 at 0447    Plan:  Stroke pathway  Hold antiplatelet or AC for 24 hours  Repeat CT head at 24 hours  MRI pending  SBP <185  Echo with bubble study   HA1C/lipid panel   Neuro checks per routine     Type 2 diabetes mellitus with diabetic autonomic neuropathy, with long-term current use of insulin (HCC)  Assessment & Plan  Lab Results   Component Value Date    HGBA1C 14.3 (H) 12/11/2023       Recent Labs     01/04/24  0347 01/04/24  1123 01/04/24  1549 01/04/24  2102   POCGLU 183* 148* 290* 258*         Blood Sugar Average: Last 72 hrs:  (P) 232    Patient takes U500 TID with meals  Continue SSI with lantus for now while inpatient  Patient can transition back to home dosing when discharged  continue lantus dose of 45u BID with sliding scale insulin       Bipolar disorder (HCC)  Assessment & Plan  Continue home celexa    Hypertension  Assessment & Plan  Hold home metoprolol for now  Allow permissive hypertension up to systolic 185    Seizure disorder (HCC)  Assessment & Plan  Continue home keppra  Seizure precautions              Disposition: Critical care    ICU Core Measures     A: Assess, Prevent, and Manage Pain Has pain been assessed? No  Need for changes to pain regimen? No   B: Both SAT/SAT  N/A   C: Choice of Sedation RASS Goal: N/A patient not on sedation  Need for changes to sedation or analgesia regimen? No   D: Delirium CAM-ICU: Negative   E: Early Mobility  Plan for early mobility? Yes   F: Family Engagement Plan for family engagement today? Yes         Prophylaxis:  VTE Contraindicated secondary to:  Status post TNK   Stress Ulcer  not orderedcovered byfamotidine (PEPCID) 40 MG tablet [[id number]] (Long-Term Med)         Significant 24hr Events     24hr events: [facility]ed [facility] on 1/4/2024 after receiving TNK for acute onset of right-sided weakness.  She remained stable in the ICU, with a mostly benign neuroexam except for dense right upper/lower extremity weakness however this weakness was noted to be intermittent.  Hemodynamically stable.  Initial CT head/MRI were both negative.  Echo showed EF 60%.  Will obtain 24-hour repeat head CT on 1/5 at 4 AM.     Subjective   Review of Systems   Constitutional:  Positive for fatigue.   HENT: Negative.     Eyes: Negative.    Respiratory: Negative.     Cardiovascular: Negative.    Gastrointestinal: Negative.    Endocrine: Negative.    Genitourinary: Negative.    Musculoskeletal: Negative.    Skin: Negative.    Allergic/Immunologic: Negative.    Neurological: Negative.    Hematological: Negative.    Psychiatric/Behavioral: Negative.        Objective                            Vitals I/O      Most Recent Min/Max in 24hrs   Temp (!) 97.3 °F (36.3 °C) Temp  Min: 96.8 °F (36 °C)  Max: 98.6 °F (37 °C)   Pulse 62 Pulse  Min: 52  Max: 76   Resp 14 Resp  Min: 14  Max: 31   /65 BP  Min: 115/68  Max: 164/73   O2 Sat 96 % SpO2  Min: 92 %  Max: 100 %      Intake/Output Summary (Last 24 hours) at 1/5/2024 0535  Last data filed at 1/5/2024 0501  Gross per 24 hour   Intake 150 ml   Output 1200 ml   Net -1050 ml       Diet Ricardo/CHO Controlled; Consistent Carbohydrate Diet Level 2 (5 carb servings/75 grams CHO/meal)    Invasive Monitoring           Physical Exam   Physical Exam  Vitals and nursing note reviewed.   Eyes:      General: Vision grossly intact.      Extraocular Movements: Extraocular movements intact.      Conjunctiva/sclera: Conjunctivae normal.      Pupils: Pupils are equal, round, and reactive to light.   Skin:     General: Skin is warm and dry.    HENT:      Head: Normocephalic and atraumatic.      Right Ear: Hearing and tympanic membrane normal.      Left Ear: Hearing and tympanic membrane normal.      Mouth/Throat:      Mouth: Mucous membranes are dry.   Cardiovascular:      Rate and Rhythm: Normal rate.      Pulses: Normal pulses.   Musculoskeletal:         General: Normal range of motion.      Right lower leg: Trace Edema present.      Left lower leg: Trace Edema present.   Abdominal: General: Bowel sounds are normal.      Palpations: Abdomen is soft.   Constitutional:       Appearance: She is ill-appearing and toxic-appearing.   Neurological:      Mental Status: She is alert and oriented to person, place and time. Mental status is at baseline.      Motor: gross motor function is at baseline for patient. Strength full and intact in all extremities.   Genitourinary/Anorectal:     Comments: Due to void           Diagnostic Studies      EKG: NSR alarms on   Imaging: MRI brain wo contrast    Result Date: 1/4/2024  Impression: No evidence of acute ischemia. Workstation performed: [id number]     CT head without contrast    Result Date: 1/4/2024  Impression: Small amount of residual contrast within the intracranial vessels noted likely from prior CTA exam of the same day.  No acute intracranial abnormality identified.  No significant interval change compared to the prior exam of the same day. Workstation performed: [id number]     CTA dissection protocol chest/abdomen/pelvis    Result Date: 1/4/2024  Impression: No acute thoracoabdominal aortic aneurysm or dissection. No acute pulmonary embolism. No pneumothorax, lobar airspace consolidation, or pleural effusion.  Subtle groundglass opacities in the lower lobes bilaterally may reflect areas of subsegmental atelectasis but cannot exclude early infectious/inflammatory process of the lung parenchyma. Clinical correlation recommended. No significant acute intra-abdominal/pelvic abnormalities noted with ancillary  findings detailed above. Workstation performed: [id number]     CTA stroke alert (head/neck)    Result Date: 1/4/2024  Impression: No evidence of flow-limiting stenosis or large vessel occlusive disease within the major vessels of the [place]. No significant stenosis of the cervical carotid or vertebral arteries. Stable CTA examination of the head and neck compared to prior study dated 7/15/2023. Findings were directly discussed with Dr. Whitehead at 4:28 a.m. Workstation performed: [id number]     CT stroke alert brain    Result Date: 1/4/2024  Impression: No acute intracranial abnormality. Findings were directly discussed with Dr. Whitehead at 4:28 a.m. Workstation performed: [id number]    I have personally reviewed pertinent reports.   and I have personally reviewed pertinent films in PACS     Medications:  Scheduled PRN   atorvastatin, 40 mg, HS  chlorhexidine, 15 mL, Q12H HOLDEN  cholecalciferol, 2,000 Units, Daily  citalopram, 40 mg, Daily  ergocalciferol, 50,000 Units, Weekly  insulin glargine, 45 Units, Q12H HOLDEN  insulin lispro, 2-12 Units, TID AC  insulin lispro, 2-12 Units, HS  levETIRAcetam, 500 mg, BID  multivitamin stress formula, 1 tablet, QAM      acetaminophen, 650 mg, Q6H PRN       Continuous          Labs:    CBC    Recent Labs     01/04/24 0347 01/05/24  0458   WBC 10.77* 8.97   HGB 12.3 12.2   HCT 38.9 37.0    307     BMP    Recent Labs     01/04/24 0347 01/05/24  0458   SODIUM 136 138   K 3.8 3.9    105   CO2 27 26   AGAP 8 7   BUN 24 27*   CREATININE 1.22 1.05   CALCIUM 9.0 8.7       Coags    Recent Labs     01/04/24  0347   INR 0.87   PTT 26        Additional Electrolytes  Recent Labs     01/05/24  0458   MG 2.2          Blood Gas    No recent results  No recent results LFTs  No recent results    Infectious  No recent results  Glucose  Recent Labs     01/04/24 0347 01/05/24  0458   GLUC 201* 156*                   SYLWIA Moreland

## 2024-01-05 NOTE — SPEECH THERAPY NOTE
Consult received and chart reviewed. Per chart review and discussion with RN, pt passed RN dysphagia assessment and is tolerating regular diet with thin liquids with no s/s of cy-pharyngeal dysphagia or aspiration. +H/O DM2 with neuropathy and gastroparesis. Speech/Language deficits also denied. MRI negative.  Formal speech/swallow evaluation no longer appears to be indicated at this time. If new concerns arise, please reconsult. Thank you.    Siena Mayen MS CCC-SLP  [place] License [id number]

## 2024-01-05 NOTE — UTILIZATION REVIEW
Initial Clinical Review    Admission: Date/Time/Statement:   Admission Orders (From admission, onward)       Ordered        01/04/24 1101  Inpatient Admission  Once                          Orders Placed This Encounter   Procedures    Inpatient Admission     Standing Status:   Standing     Number of Occurrences:   1     Order Specific Question:   Level of Care     Answer:   Critical Care [15]     Order Specific Question:   Estimated length of stay     Answer:   More than 2 Midnights     Order Specific Question:   Certification     Answer:   I certify that inpatient services are medically necessary for this patient for a duration of greater than two midnights. See H&P and MD Progress Notes for additional information about the patient's course of treatment.     Initial Presentation: 53 y.o. female , presented to the ED @ [facility], transferred to [facility], higher level of care, via EMS.    Admitted as Inpatient due to  Acute Right Sided Weakness.    Past medical history of HTN, seizure disorder, DM2 with neuropathy and gastroparesis   Date: 01/04/2024   Presented to [facility] with acute onset of right sided weakness early this AM. She states that she was getting ready to go to bed and noticed that she was unable to get up to go to the bathroom secondary to right sided weakness. Her  was able to assist her to his car and brought her to the ER for evaluation. Stroke alert was activated. CT head, CTA head and neck without acute findings. She was evaluated by Neurology with an initial NIH score of 6. She was then given TNK at 0447 this AM. She arrives to the ICU for post TNK monitoring.    Hold antiplatelet or AC for 24 hours.  Repeat CT head at 24 hours.  MRI.  SBP <185.  Echo with bubble study.  HA1C/lipid panel.  Neuro checks.    01/04/2024  Consult Neuro:  53 year old female with uncontrolled DM2, HTN, prior stroke (details unavailable), recent diagnosis of Covid-19, admitted this morning with acute onset of  RUE and RLE weakness. Stroke alert activated on admission and per review of notes, NIHSS 6. CTH as well as CTA head and neck completed and did not demonstrate acute intracranial abnormality. She received IV TNK at 0447.    Of note, patient was transferred to [facility] due to critical care bed availability. Prior to transfer, she complained of HA and underwent repeat CTH which did not demonstrate any acute intracranial abnormality. She was given IV Tylenol, Reglan and Benadryl for headache prior to transfer. Suspect lethargy in setting of medication effect.    Plan:   - Recommend acute ischemic stroke s/p IV thrombolytics protocol.   - Check MRI brain without contrast.   - Check 24 hour post TNK CTH.   - Hold antiplatelet/anticoagulation at this time.  - If no hemorrhage noted on 24 hour post TNK CTH, recommend initiate ASA 81 mg QD. May need to consider alternative antiplatelet pending work-up.   - Check echocardiogram with bubble study.   - Monitor on telemetry.   - Check hemoglobin A1c, TSH, lipid panel.   - Recommend start atorvastatin 40 mg QPM.   - Goal normothermia and euglycemia.   - BP goal < 180/105. Treat with PRN antihypertensives.   - PT/OT/Speech therapy.   - Recommend avoid sedating medications.   - Stroke education.   - Monitor exam and notify with changes.    Day 2: 01/05/2024  Continue Cardio-Pulmonary monitoring.  Hold antiplatelet or AC for 24 hours.  Follow up Repeat CT head at 24 hours.  Follow up MRI brain.  Allow permissive HTN SBP <185.  Follow up Echo with bubble study.  HA1C/lipid panel.  Neuro checks.      ED Triage Vitals [01/04/24 0934]   Temperature Pulse Respirations Blood Pressure SpO2   (!) 97.2 °F (36.2 °C) 59 16 141/81 100 %      Temp Source Heart Rate Source Patient Position - Orthostatic VS BP Location FiO2 (%)   Temporal Monitor Lying Left arm --      Pain Score       7          Wt Readings from Last 1 Encounters:   01/05/24 90.2 kg (198 lb 13.7 oz)     Additional  Vital Signs:   Date/Time Temp Pulse Resp BP MAP (mmHg) SpO2 O2 Device Patient Position - Orthostatic VS   01/05/24 1200 98 °F (36.7 °C) 56 19 158/76 109 95 % None (Room air) Lying   01/05/24 1000 -- 63 23 Abnormal  142/71 98 95 % -- --   01/05/24 0800 97.2 °F (36.2 °C) Abnormal  61 21 136/81 103 97 % None (Room air) Lying   01/05/24 0700 95.7 °F (35.4 °C) Abnormal  -- -- -- -- -- -- --   01/05/24 0400 -- 62 14 134/65 93 96 % -- --   01/05/24 0300 -- 58 19 135/69 95 94 % -- --   01/05/24 0100 -- 62 21 128/67 91 96 % -- --   01/05/24 0000 97.3 °F (36.3 °C) Abnormal  66 21 129/61 88 94 % None (Room air) Lying   01/04/24 2300 -- 63 16 124/61 86 96 % -- --   01/04/24 2200 -- 66 15 125/67 89 96 % -- --   01/04/24 2100 -- 76 23 Abnormal  164/73 105 93 % -- --   01/04/24 2000 -- 61 21 140/66 95 93 % None (Room air) Lying   01/04/24 1900 98.6 °F (37 °C) 67 25 Abnormal  130/70 88 93 % -- --   01/04/24 1815 98.4 °F (36.9 °C) 71 15 128/70 72 95 % -- --   01/04/24 1745 98.2 °F (36.8 °C) 65 18 124/61 76 94 % -- --   01/04/24 1715 98 °F (36.7 °C) 67 14 139/69 97 95 % -- --   01/04/24 1645 -- 74 19 131/68 92 95 % -- --   01/04/24 1615 -- 64 22 135/67 95 95 % -- --   01/04/24 1600 97.2 °F (36.2 °C) Abnormal  65 21 140/71 100 95 % None (Room air) Lying   01/04/24 1550 98.1 °F (36.7 °C) -- -- -- -- -- -- --   01/04/24 1545 97 °F (36.1 °C) Abnormal  63 18 136/71 97 97 % -- --   01/04/24 1515 98.6 °F (37 °C) 64 21 137/66 95 95 % -- --   01/04/24 1500 -- 65 21 140/67 96 93 % -- --   01/04/24 1445 98.5 °F (36.9 °C) 64 19 130/69 91 95 % -- --   01/04/24 1415 98.5 °F (36.9 °C) 61 22 119/66 87 93 % -- --   01/04/24 1345 98.5 °F (36.9 °C) 69 26 Abnormal  139/74 98 96 % -- --   01/04/24 1315 98.3 °F (36.8 °C) 66 19 131/71 94 98 % -- --   01/04/24 1245 97.2 °F (36.2 °C) Abnormal  55 20 125/66 89 94 % -- --   01/04/24 1215 96.8 °F (36 °C) Abnormal  56 19 133/70 95 95 % -- --   01/04/24 1200 97 °F (36.1 °C) Abnormal  68 31 Abnormal  117/66 88 99  % None (Room air) Lying   01/04/24 1145 96.8 °F (36 °C) Abnormal  52 Abnormal  20 127/65 90 97 % -- --   01/04/24 1130 -- 54 Abnormal  -- 123/68 -- -- -- --   01/04/24 1115 97.2 °F (36.2 °C) Abnormal  58 18 122/62 86 94 % -- --   01/04/24 1100 96.8 °F (36 °C) Abnormal  -- -- -- -- -- -- --   01/04/24 1045 96.9 °F (36.1 °C) Abnormal  53 Abnormal  19 123/57 86 97 % -- --   01/04/24 1015 97.8 °F (36.6 °C) 54 Abnormal  20 123/68 90 98 % -- --   01/04/24 0945 98 °F (36.7 °C) -- -- 124/85 86 -- -- Lying     Date and Time Eye Opening Best Verbal Response Best Motor Response Paula Coma Scale Score   01/05/24 0800 4 5 6 15   01/05/24 0400 4 5 6 15   01/05/24 0300 4 5 6 15   01/05/24 0200 4 5 6 15   01/05/24 0100 4 5 6 15   01/05/24 0000 4 5 6 15   01/04/24 2300 4 5 6 15   01/04/24 2200 4 5 6 15   01/04/24 2100 4 5 6 15   01/04/24 2000 4 5 6 15   01/04/24 1600 4 5 6 15   01/04/24 1200 4 5 6 15   01/04/24 0945 4 5 6 15   01/04/24 0749 4 5 6 15   01/04/24 0717 4 5 6 15   01/04/24 0700 4 4 6 14   01/04/24 0645 4 4 6 14   01/04/24 0630 4 4 6 14   01/04/24 0616 4 4 6 14   01/04/24 0602 4 4 6 14   01/04/24 0545 4 4 6 14   01/04/24 0531 4 4 6 14   01/04/24 0516 4 4 6 14   01/04/24 0511 4 4 6 14   01/04/24 0501 4 4 6 14   01/04/24 0455 4 4 6 14   01/04/24 0441 4 4 6 14   01/04/24 0418 4 4 6 14   01/04/24 0403 4 4 6 14   01/04/24 0345 4 4 6 14   01/04/24 0342 4 5 6 15     Pertinent Labs/Diagnostic Test Results:   CT head wo contrast   Final Result by Mathew Holloway MD (01/05 0902)   No acute intracranial hemorrhage. No large evolving territorial infarction.      MRI brain wo contrast   Final Result by Basil Mariano MD (01/04 2132)   No evidence of acute ischemia.        Results from last 7 days   Lab Units 01/04/24  0347   SARS-COV-2  Negative     Results from last 7 days   Lab Units 01/05/24  0458 01/04/24  0347   WBC Thousand/uL 8.97 10.77*   HEMOGLOBIN g/dL 12.2 12.3   HEMATOCRIT % 37.0 38.9   PLATELETS Thousands/uL 307 324      Results from last 7 days   Lab Units 01/05/24  0458 01/04/24  0347   SODIUM mmol/L 138 136   POTASSIUM mmol/L 3.9 3.8   CHLORIDE mmol/L 105 101   CO2 mmol/L 26 27   ANION GAP mmol/L 7 8   BUN mg/dL 27* 24   CREATININE mg/dL 1.05 1.22   EGFR ml/min/1.73sq m 60 50   CALCIUM mg/dL 8.7 9.0   MAGNESIUM mg/dL 2.2  --      Results from last 7 days   Lab Units 01/05/24  1133 01/05/24  0705 01/04/24  2102 01/04/24  1549 01/04/24  1123 01/04/24  0347   POC GLUCOSE mg/dl 201* 147* 258* 290* 148* 183*     Results from last 7 days   Lab Units 01/05/24  0458 01/04/24  0347   GLUCOSE RANDOM mg/dL 156* 201*     Results from last 7 days   Lab Units 01/05/24  0458   HEMOGLOBIN A1C % 12.4*   EAG mg/dl 309     BETA-HYDROXYBUTYRATE   Date Value Ref Range Status   07/25/2023 0.1 <0.6 mmol/L Final   07/14/2023 0.1 <0.6 mmol/L Final   07/10/2023 0.9 (H) <0.6 mmol/L Final   02/03/2020 0.1 <0.6 mmol/L Final      Results from last 7 days   Lab Units 01/04/24  0544 01/04/24  0347   HS TNI 0HR ng/L  --  3   HS TNI 2HR ng/L 3  --    HSTNI D2 ng/L 0  --      Results from last 7 days   Lab Units 01/04/24  0347   PROTIME seconds 12.1   INR  0.87   PTT seconds 26     Results from last 7 days   Lab Units 01/04/24  0347   INFLUENZA A PCR  Negative   INFLUENZA B PCR  Negative   RSV PCR  Negative       Past Medical History:   Diagnosis Date    Atypical chest pain     Gastroparesis     GERD (gastroesophageal reflux disease)     Hypertension     Psychiatric disorder     Sciatica     Seizure disorder (HCC)      Present on Admission:   Acute right-sided weakness   Hypertension   Bipolar disorder (HCC)   Seizure disorder (HCC)      Admitting Diagnosis: Right sided weakness [R53.1]  Age/Sex: 53 y.o. female  Admission Orders:  Dysphagia screen > Ricardo/CHO controlled  IS q1h while awake  Daily weight / I&O q2h  Neuro checks q4h  PT/OT  Jacques SCDs    Scheduled Medications:  atorvastatin, 40 mg, Oral, HS  chlorhexidine, 15 mL, Mouth/Throat, Q12H  HOLDEN  cholecalciferol, 2,000 Units, Oral, Daily  citalopram, 40 mg, Oral, Daily  ergocalciferol, 50,000 Units, Oral, Weekly  insulin glargine, 45 Units, Subcutaneous, Q12H HOLDEN  insulin lispro, 2-12 Units, Subcutaneous, TID AC  insulin lispro, 2-12 Units, Subcutaneous, HS  levETIRAcetam, 500 mg, Oral, BID  multivitamin stress formula, 1 tablet, Oral, QAM      Continuous IV Infusions:     PRN Meds:  acetaminophen, 650 mg, Oral, Q6H PRN        IP CONSULT TO NEUROLOGY  IP CONSULT TO CASE MANAGEMENT    Network Utilization Review Department  ATTENTION: Please call with any questions or concerns to [phone] and carefully listen to the prompts so that you are directed to the right person. All voicemails are confidential.   For Discharge needs, contact Care Management DC Support Team at [phone] opt. 2  Send all requests for admission clinical reviews, approved or denied determinations and any other requests to dedicated fax number below belonging to the [facility] where the patient is receiving treatment. List of dedicated fax numbers for the Facilities:  FACILITY NAME UR FAX NUMBER   ADMISSION DENIALS (Administrative/Medical Necessity) [phone]   DISCHARGE SUPPORT TEAM (NETWORK) [phone]   PARENT CHILD HEALTH (Maternity/NICU/Pediatrics) [phone]   [facility] [phone]   [facility] [phone]   [facility] [phone]   [facility] [phone]   [facility] [phone]   [facility] [phone]   [facility] [phone]   [facility] [phone]   [facility] [phone]   [facility] [phone]   [facility] [phone]

## 2024-01-06 DIAGNOSIS — E11.65 TYPE 2 DIABETES MELLITUS WITH HYPERGLYCEMIA, WITH LONG-TERM CURRENT USE OF INSULIN (HCC): ICD-10-CM

## 2024-01-06 DIAGNOSIS — Z79.4 TYPE 2 DIABETES MELLITUS WITH HYPERGLYCEMIA, WITH LONG-TERM CURRENT USE OF INSULIN (HCC): ICD-10-CM

## 2024-01-06 LAB
ANION GAP SERPL CALCULATED.3IONS-SCNC: 8 MMOL/L
BASOPHILS # BLD AUTO: 0.05 THOUSANDS/ÂΜL (ref 0–0.1)
BASOPHILS NFR BLD AUTO: 1 % (ref 0–1)
BUN SERPL-MCNC: 23 MG/DL (ref 5–25)
CALCIUM SERPL-MCNC: 8.7 MG/DL (ref 8.4–10.2)
CHLORIDE SERPL-SCNC: 105 MMOL/L (ref 96–108)
CO2 SERPL-SCNC: 25 MMOL/L (ref 21–32)
CREAT SERPL-MCNC: 0.84 MG/DL (ref 0.6–1.3)
DME PARACHUTE DELIVERY DATE REQUESTED: NORMAL
DME PARACHUTE ITEM DESCRIPTION: NORMAL
DME PARACHUTE ORDER STATUS: NORMAL
DME PARACHUTE SUPPLIER NAME: NORMAL
DME PARACHUTE SUPPLIER PHONE: NORMAL
EOSINOPHIL # BLD AUTO: 0.28 THOUSAND/ÂΜL (ref 0–0.61)
EOSINOPHIL NFR BLD AUTO: 3 % (ref 0–6)
ERYTHROCYTE [DISTWIDTH] IN BLOOD BY AUTOMATED COUNT: 15.5 % (ref 11.6–15.1)
GFR SERPL CREATININE-BSD FRML MDRD: 79 ML/MIN/1.73SQ M
GLUCOSE SERPL-MCNC: 141 MG/DL (ref 65–140)
GLUCOSE SERPL-MCNC: 143 MG/DL (ref 65–140)
GLUCOSE SERPL-MCNC: 212 MG/DL (ref 65–140)
HCT VFR BLD AUTO: 36.8 % (ref 34.8–46.1)
HGB BLD-MCNC: 12 G/DL (ref 11.5–15.4)
IMM GRANULOCYTES # BLD AUTO: 0.02 THOUSAND/UL (ref 0–0.2)
IMM GRANULOCYTES NFR BLD AUTO: 0 % (ref 0–2)
LYMPHOCYTES # BLD AUTO: 3.36 THOUSANDS/ÂΜL (ref 0.6–4.47)
LYMPHOCYTES NFR BLD AUTO: 37 % (ref 14–44)
MCH RBC QN AUTO: 25.4 PG (ref 26.8–34.3)
MCHC RBC AUTO-ENTMCNC: 32.6 G/DL (ref 31.4–37.4)
MCV RBC AUTO: 78 FL (ref 82–98)
MONOCYTES # BLD AUTO: 0.72 THOUSAND/ÂΜL (ref 0.17–1.22)
MONOCYTES NFR BLD AUTO: 8 % (ref 4–12)
NEUTROPHILS # BLD AUTO: 4.61 THOUSANDS/ÂΜL (ref 1.85–7.62)
NEUTS SEG NFR BLD AUTO: 51 % (ref 43–75)
NRBC BLD AUTO-RTO: 0 /100 WBCS
PLATELET # BLD AUTO: 283 THOUSANDS/UL (ref 149–390)
PMV BLD AUTO: 9.9 FL (ref 8.9–12.7)
POTASSIUM SERPL-SCNC: 3.6 MMOL/L (ref 3.5–5.3)
RBC # BLD AUTO: 4.73 MILLION/UL (ref 3.81–5.12)
SODIUM SERPL-SCNC: 138 MMOL/L (ref 135–147)
WBC # BLD AUTO: 9.04 THOUSAND/UL (ref 4.31–10.16)

## 2024-01-06 PROCEDURE — 99239 HOSP IP/OBS DSCHRG MGMT >30: CPT | Performed by: INTERNAL MEDICINE

## 2024-01-06 PROCEDURE — 80048 BASIC METABOLIC PNL TOTAL CA: CPT | Performed by: PHYSICIAN ASSISTANT

## 2024-01-06 PROCEDURE — 97163 PT EVAL HIGH COMPLEX 45 MIN: CPT

## 2024-01-06 PROCEDURE — 82948 REAGENT STRIP/BLOOD GLUCOSE: CPT

## 2024-01-06 PROCEDURE — 97535 SELF CARE MNGMENT TRAINING: CPT

## 2024-01-06 PROCEDURE — 97167 OT EVAL HIGH COMPLEX 60 MIN: CPT

## 2024-01-06 PROCEDURE — 85025 COMPLETE CBC W/AUTO DIFF WBC: CPT | Performed by: PHYSICIAN ASSISTANT

## 2024-01-06 PROCEDURE — 97530 THERAPEUTIC ACTIVITIES: CPT

## 2024-01-06 RX ADMIN — CITALOPRAM HYDROBROMIDE 40 MG: 20 TABLET ORAL at 09:12

## 2024-01-06 RX ADMIN — INSULIN LISPRO 4 UNITS: 100 INJECTION, SOLUTION INTRAVENOUS; SUBCUTANEOUS at 12:31

## 2024-01-06 RX ADMIN — ASPIRIN 81 MG CHEWABLE TABLET 81 MG: 81 TABLET CHEWABLE at 09:12

## 2024-01-06 RX ADMIN — ACETAMINOPHEN 650 MG: 325 TABLET ORAL at 05:19

## 2024-01-06 RX ADMIN — PREGABALIN 100 MG: 100 CAPSULE ORAL at 09:12

## 2024-01-06 RX ADMIN — INSULIN GLARGINE 45 UNITS: 100 INJECTION, SOLUTION SUBCUTANEOUS at 09:13

## 2024-01-06 RX ADMIN — Medication 2000 UNITS: at 09:12

## 2024-01-06 RX ADMIN — B-COMPLEX W/ C & FOLIC ACID TAB 1 TABLET: TAB at 09:12

## 2024-01-06 RX ADMIN — LEVETIRACETAM 500 MG: 500 TABLET, FILM COATED ORAL at 09:12

## 2024-01-06 NOTE — DISCHARGE SUMMARY
[facility]  Discharge- Haylee Balbuena 1970, 53 y.o. female MRN: [id number]  Unit/Bed#: [address] [place] [address] Encounter: [id number]  Primary Care Provider: SYLWIA Larson   Date and time admitted to hospital: 1/4/2024  9:16 AM    Discharge Diagnoses:  * Acute right-sided weakness  Assessment & Plan  Initially went to [facility] for acute right-sided weakness.    Given TNK and transferred here due to lack of critical care bed availability at facility.  Continue to have persistent right-sided weakness but slightly improved.  Subsequent MRI did not demonstrate acute stroke.  Seen by neurology, likely conversion disorder.  Discharging home with home services.    Seizure disorder (HCC)  Assessment & Plan  Continue Keppra    Hypertension  Assessment & Plan  Vital stable continue metoprolol    Type 2 diabetes mellitus with diabetic autonomic neuropathy, with long-term current use of insulin (HCC)  Assessment & Plan  Lab Results   Component Value Date    HGBA1C 12.4 (H) 01/05/2024     Recent Labs     01/05/24  1635 01/05/24  2053 01/06/24  0716 01/06/24  1213   POCGLU 289* 301* 143* 212*     Uncontrolled.  Does see endocrinology as an outpatient.  Continue U-500 insulin as previously taken.  Continue Jardiance.    Bipolar disorder (HCC)  Assessment & Plan  Mood stable continue citalopram      Medical Problems       Resolved Problems  Date Reviewed: 1/6/2024   None        Discharging Physician / Practitioner: Jose Muñoz DO  PCP: SYLWIA Larson  Admission Date:   Admission Orders (From admission, onward)       Ordered        01/04/24 1101  Inpatient Admission  Once                        Discharge Date: 01/06/24    Consultations During Hospital Stay:  IP CONSULT TO NEUROLOGY  IP CONSULT TO NEUROLOGY  IP CONSULT TO CASE MANAGEMENT     Procedures Performed:   Echo complete w/ contrast if indicated    Result Date: 1/4/2024  Narrative:   Left Ventricle: Left ventricular cavity size is  normal. Wall thickness is normal. The left ventricular ejection fraction is 60% by visual estimation. Systolic function is normal. Wall motion is normal. Diastolic function is normal for age.  Left atrial filling pressure is normal.   Atrial Septum: No patent foramen ovale detected, confirmed at rest using agitated saline contrast, confirmed by provocation with cough, using agitated saline contrast.   Mitral Valve: There is mild regurgitation.   Tricuspid Valve: Right ventricular systolic pressure could not be assessed.   Compared to previous exam, there is no signficant change.       Images:   CT head wo contrast    Result Date: 1/5/2024  Impression: No acute intracranial hemorrhage. No large evolving territorial infarction. Workstation performed: [id number]     MRI brain wo contrast    Result Date: 1/4/2024  Impression: No evidence of acute ischemia. Workstation performed: [id number]     CT head without contrast    Result Date: 1/4/2024  Impression: Small amount of residual contrast within the intracranial vessels noted likely from prior CTA exam of the same day.  No acute intracranial abnormality identified.  No significant interval change compared to the prior exam of the same day. Workstation performed: [id number]     CTA dissection protocol chest/abdomen/pelvis    Result Date: 1/4/2024  Impression: No acute thoracoabdominal aortic aneurysm or dissection. No acute pulmonary embolism. No pneumothorax, lobar airspace consolidation, or pleural effusion.  Subtle groundglass opacities in the lower lobes bilaterally may reflect areas of subsegmental atelectasis but cannot exclude early infectious/inflammatory process of the lung parenchyma. Clinical correlation recommended. No significant acute intra-abdominal/pelvic abnormalities noted with ancillary findings detailed above. Workstation performed: [id number]     CTA stroke alert (head/neck)    Result Date: 1/4/2024  Impression: No evidence of flow-limiting stenosis  or large vessel occlusive disease within the major vessels of the [place]. No significant stenosis of the cervical carotid or vertebral arteries. Stable CTA examination of the head and neck compared to prior study dated 7/15/2023. Findings were directly discussed with Dr. Whitehead at 4:28 a.m. Workstation performed: [id number]     CT stroke alert brain    Result Date: 1/4/2024  Impression: No acute intracranial abnormality. Findings were directly discussed with Dr. Whitehead at 4:28 a.m. Workstation performed: [id number]       Lab Results:   Results from last 7 days   Lab Units 01/06/24  0517 01/05/24 0458 01/04/24  0347   WBC Thousand/uL 9.04 8.97 10.77*   HEMOGLOBIN g/dL 12.0 12.2 12.3   HEMATOCRIT % 36.8 37.0 38.9   MCV fL 78* 78* 80*   PLATELETS Thousands/uL 283 307 324   INR   --   --  0.87     Results from last 7 days   Lab Units 01/06/24 0517 01/05/24 0458 01/04/24  0347   SODIUM mmol/L 138 138 136   POTASSIUM mmol/L 3.6 3.9 3.8   CHLORIDE mmol/L 105 105 101   CO2 mmol/L 25 26 27   BUN mg/dL 23 27* 24   CREATININE mg/dL 0.84 1.05 1.22   CALCIUM mg/dL 8.7 8.7 9.0   EGFR ml/min/1.73sq m 79 60 50   GLUCOSE RANDOM mg/dL 141* 156* 201*         Results from last 7 days   Lab Units 01/04/24  0544 01/04/24  0347   HS TNI 0HR ng/L  --  3   HS TNI 2HR ng/L 3  --               Results from last 7 days   Lab Units 01/06/24  1213 01/06/24  0716 01/05/24  2053 01/05/24  1635 01/05/24  1133 01/05/24  0705 01/04/24  2102 01/04/24  1549 01/04/24  1123 01/04/24  0347   POC GLUCOSE mg/dl 212* 143* 301* 289* 201* 147* 258* 290* 148* 183*     Results from last 7 days   Lab Units 01/05/24  0458   HEMOGLOBIN A1C % 12.4*             Results from last 7 days   Lab Units 01/05/24  0458   TRIGLYCERIDES mg/dL 327*   CHOLESTEROL mg/dL 191   LDL CALC mg/dL 83   HDL mg/dL 43*            Results from last 7 days   Lab Units 01/04/24  0347   INFLUENZA A PCR  Negative     Results from last 7 days   Lab Units 01/04/24  0347   SARS-COV-2   "Negative   INFLUENZA A PCR  Negative   INFLUENZA B PCR  Negative   RSV PCR  Negative       Incidental Findings:     Test Results Pending at Discharge (will require follow up):      Reason for Admission:   Right-sided weakness    Hospital Course:   Haylee Balbuena is a 53 y.o. female patient who originally presented to the hospital on 1/4/2024 due to right-sided weakness.  The patient initially went to [facility] for acute onset of right-sided weakness around 2 AM while watching television.  Her  had to assist her and she went to the ED where decision was made to give her TNK.  She did have a headache afterwards and subsequent imaging did not show any intracerebral hemorrhage.  The patient was transferred here for ICU level of care due to bed availability.  She was seen by neurology.  Subsequent MRI did not demonstrate any acute ischemic findings.  Differential diagnosis from neurology includes conversion disorder.  She was advised to continue aspirin and atorvastatin.  Her weakness slowly improved.  She has been cleared by PT/OT for discharge home with home services.      Please see above list of diagnoses and related plan for additional information.     Condition at Discharge: stable     Discharge Day Visit / Exam:   Subjective: Patient seen and examined.  Right-sided weakness improved.    Vitals: Blood Pressure: 128/77 (01/06/24 0817)  Pulse: 59 (01/06/24 0817)  Temperature: 97.6 °F (36.4 °C) (01/06/24 0817)  Temp Source: Tympanic (01/06/24 0700)  Respirations: 19 (01/06/24 0817)  Height: 5' 1\" (154.9 cm) (01/04/24 1130)  Weight - Scale: 90.2 kg (198 lb 13.7 oz) (01/05/24 0505)  SpO2: 96 % (01/06/24 0817)    Exam:   Physical Exam  Vitals reviewed.   Constitutional:       General: She is not in acute distress.     Appearance: Normal appearance. She is obese.   HENT:      Head: Atraumatic.   Eyes:      General: No scleral icterus.     Extraocular Movements: Extraocular movements intact.   Cardiovascular: "      Rate and Rhythm: Regular rhythm.   Pulmonary:      Breath sounds: Normal breath sounds. No wheezing.   Abdominal:      General: Bowel sounds are normal.      Palpations: Abdomen is soft.      Tenderness: There is no guarding.   Musculoskeletal:         General: No swelling.      Cervical back: Normal range of motion.   Skin:     General: Skin is warm.   Neurological:      Mental Status: She is alert.      Motor: Weakness (Right side) present.   Psychiatric:         Mood and Affect: Mood normal.       Discussion with Family: Spouse on telephone    Discharge instructions/Information to patient and family:   See after visit summary for information provided to patient and family.      Provisions for Follow-Up Care:  See after visit summary for information related to follow-up care and any pertinent home health orders.      Mobility at time of Discharge:  Basic Mobility Inpatient Raw Score: 24  JH-HLM Goal: 8: Walk 250 feet or more  JH-HLM Achieved: 7: Walk 25 feet or more  HLM Goal achieved. Continue to encourage appropriate mobility.    Disposition:   Home with [facility]NA Services (Reminder: Complete face to face encounter)    Planned Readmission: No     Discharge Statement:  I spent 35 minutes discharging the patient. This time was spent on the day of discharge. I had direct contact with the patient on the day of discharge. Greater than 50% of the total time was spent examining patient, answering all patient questions, arranging and discussing plan of care with patient as well as directly providing post-discharge instructions.  Additional time then spent on discharge activities.    Discharge Medications:  See after visit summary for reconciled discharge medications provided to patient and family.      ** Please Note: This note has been constructed using a voice recognition system **

## 2024-01-06 NOTE — ASSESSMENT & PLAN NOTE
Initially went to [facility] for acute right-sided weakness.    Given TNK and transferred here due to lack of critical care bed availability at facility.  Continue to have persistent right-sided weakness but slightly improved.  Subsequent MRI did not demonstrate acute stroke.  Seen by neurology, likely conversion disorder.  Discharging home with home services.

## 2024-01-06 NOTE — CASE MANAGEMENT
Case Management Assessment & Discharge Planning Note    Patient name Haylee Balbuena  Location [address]-* MRN [id number]  : 1970 Date 2024       Current Admission Date: 2024  Current Admission Diagnosis:Acute right-sided weakness   Patient Active Problem List    Diagnosis Date Noted    Acute right-sided weakness 2024    Stage 3a chronic kidney disease (HCC) 2023    Hypomagnesemia 2023    Iron deficiency 2023    Status post incision and drainage 08/10/2023    MRSA bacteremia 2023    Cellulitis of left upper extremity 2023    Leukocytosis 2023    Acute renal failure superimposed on stage 3a chronic kidney disease (HCC) 2023    Lower extremity edema 2023    Migraine 07/15/2023    Seizure disorder (HCC) 07/15/2023    Generalized abdominal pain 2023    Abnormal CT scan, stomach 2023    Gastric distention 07/10/2023    Flank pain 2023    Secondary hyperparathyroidism (HCC) 2022    History of kidney cancer 2022    Vitamin D deficiency 2022    Benign hypertension with CKD (chronic kidney disease) stage III (HCC) 2022    GERD (gastroesophageal reflux disease) 2021    History of colon polyps 2021    Slow transit constipation 2021    Anemia 2021    Encounter for post surgical wound check 2021    Depression with anxiety 2021    Mild intermittent asthma without complication 2021    Hypercholesteremia 2021    Hypertriglyceridemia 2021    Iron deficiency anemia secondary to inadequate dietary iron intake 2021    Internal hernia 2021    Intra-abdominal adhesions 2021    Radiculopathy, lumbar region 2020    Anterior tibial tendonitis, right 2020    Spondylolisthesis of lumbar region 04/10/2018    Chronic low back pain with sciatica 2018    Diabetic polyneuropathy associated with type 2 diabetes mellitus (HCC)  04/07/2018    Clear cell carcinoma of kidney, right (HCC) 05/15/2017    Obesity (BMI 35.0-39.9 without comorbidity) 04/14/2017    Type 2 diabetes mellitus with diabetic autonomic neuropathy, with long-term current use of insulin ([facility]) 02/25/2016    Hypertension 02/25/2016    Bipolar disorder ([facility]) 11/12/2015    Gastroparesis 11/12/2015      LOS (days): 2  Geometric Mean LOS (GMLOS) (days):   Days to GMLOS:     OBJECTIVE:    Risk of Unplanned Readmission Score: 24.5         Current admission status: Inpatient  Referral Reason: Stroke    Preferred Pharmacy:   [facility] - [place], [place] - [address]  [address] [facility] [address]  [place] [address]  Phone: [phone] Fax: [phone]    Primary Care Provider: SYLWIA Larson    Primary Insurance: [employer]  Secondary Insurance:     ASSESSMENT:    Readmission Root Cause  30 Day Readmission: No    Patient Information  Admitted from:: Home  Mental Status: Alert  During Assessment patient was accompanied by: Not accompanied during assessment  Assessment information provided by:: Patient  Primary Caregiver: Self  Support Systems: Spouse/significant other, Self  County of Residence: [place]  What city do you live in?: [place]  Home entry access options. Select all that apply.: Stairs  Number of steps to enter home.: 3  Do the steps have railings?: Yes  Type of Current Residence: [address] [place] [address] home  Upon entering residence, is there a bedroom on the main floor (no further steps)?: Yes  Upon entering residence, is there a bathroom on the main floor (no further steps)?: No  Indicate which floors of current residence have a bathroom (select all the apply):: 2nd Floor  Number of steps to 2nd floor from main floor: One Flight  Living Arrangements: Lives w/ Spouse/significant other  Is patient a ?: No    Activities of Daily Living Prior to Admission  Functional Status: Independent  Completes ADLs independently?: Yes  Ambulates independently?: Yes  Does  patient currently own DME?: Yes  What DME does the patient currently own?: Wheelchair, Crutches  Does patient have a history of Outpatient Therapy (PT/OT)?: Yes  Does the patient have a history of Short-Term Rehab?: No  Does patient have a history of HHC?: Yes (w/ [facility] for home infusion/ABX.)  Does patient currently have HHC?: No    Patient Information Continued  Income Source: Employed  Does patient have prescription coverage?: Yes  Does patient receive dialysis treatments?: No  Does patient have a history of substance abuse?: No  Does patient have a history of Mental Health Diagnosis?: Yes (Bipolar, Depression, Anxiety)  Is patient receiving treatment for mental health?: No. Patient declined treatment information.  Has patient received inpatient treatment related to mental health in the last 2 years?: No    PHQ 2/9 Screening   Reviewed PHQ 2/9 Depression Screening Score?: No    Means of Transportation  Means of Transport to Appts:: Family transport      Housing Stability: Low Risk  (1/6/2024)    Housing Stability Vital Sign     Unable to Pay for Housing in the Last Year: No     Number of Places Lived in the Last Year: 1     Unstable Housing in the Last Year: No   Food Insecurity: No Food Insecurity (1/6/2024)    Hunger Vital Sign     Worried About Running Out of Food in the Last Year: Never true     Ran Out of Food in the Last Year: Never true   Transportation Needs: No Transportation Needs (1/6/2024)    PRAPARE - Transportation     Lack of Transportation (Medical): No     Lack of Transportation (Non-Medical): No   Utilities: Not At Risk (1/6/2024)    C Utilities     Threatened with loss of utilities: No     DISCHARGE DETAILS:    Discharge planning discussed with:: Patient  Freedom of Choice: Yes  Comments - Freedom of Choice: Choice is for [employer] for DME provider. Patient stating choice is for first accepting HHC agency as aware HHC services are limited in her home area.  CM contacted family/caregiver?:  Yes  Were Treatment Team discharge recommendations reviewed with patient/caregiver?: Yes  Did patient/caregiver verbalize understanding of patient care needs?: Yes  Were patient/caregiver advised of the risks associated with not following Treatment Team discharge recommendations?: Yes    Contacts  Patient Contacts: Primo Balbuena  Relationship to Patient:: Family  Contact Method: Phone  Phone Number: [phone]  Reason/Outcome: Emergency Contact, Discharge Planning    Requested Home Health Care         Is the patient interested in HHC at discharge?: Yes  Home Health Discipline requested:: Nursing, Occupational Therapy, Physical Therapy  Home Health Follow-Up Provider:: PCP  Home Health Services Needed:: Diabetes Management, Evaluate Functional Status and Safety, Gait/ADL Training, Strengthening/Theraputic Exercises to Improve Function  Homebound Criteria Met:: Uses an Assist Device (i.e. cane, walker, etc)  Supporting Clincal Findings:: Limited Endurance, Fatigues Easliy in Short Distances    DME Referral Provided  Referral made for DME?: Yes  DME referral completed for the following items:: Walker  DME Supplier Name::  (DELIVERED BEDSIDE. RECEIPT SIGNED)    Other Referral/Resources/Interventions Provided:  Interventions: HHC  Referral Comments: HHC referrals opened via [employer].    Would you like to participate in our [facility] service program?  : No - Declined    Treatment Team Recommendation: Home with Home Health Care  Discharge Destination Plan:: Home with Home Health Care  Transport at Discharge : Ride Share  Dispatcher Contacted: Yes  Number/Name of Dispatcher: ROUNDTRIP  Transported by (Company and Unit #): [employer]  ETA of Transport (Date): 01/06/24  ETA of Transport (Time): 1430     Additional Comments: Spouse confirmed he is unable to provide transportation home today. Spouse confirmed he will be home to assist patient as needed. CM s/w patient who is agreeable to [facility] transport and is aware she will need  to be able to transfer/walk indepedently to use this type of transport for D/C home.    Casey Graham, LCSW, ACSW, ACM, CCM, CCTP  [place] KERENW: [id number]  [place] JUAREZW: [id number]  01/06/24 2:20 PM

## 2024-01-06 NOTE — PLAN OF CARE
Problem: PHYSICAL THERAPY ADULT  Goal: Performs mobility at highest level of function for planned discharge setting.  See evaluation for individualized goals.  Description: Treatment/Interventions: ADL retraining, Functional transfer training, LE strengthening/ROM, Elevations, Therapeutic exercise, Endurance training, Patient/family training, Equipment eval/education, Bed mobility, Gait training, Compensatory technique education, Spoke to case management, Spoke to nursing, OT  Equipment Recommended: Walker       See flowsheet documentation for full assessment, interventions and recommendations.  Note:    Problem List: Decreased strength, Decreased range of motion, Decreased endurance, Impaired balance, Decreased mobility, Decreased coordination, Decreased safety awareness, Impaired tone, Pain  Assessment: Patient seen for Physical Therapy evaluation. Patient admitted with Acute right-sided weakness.  Comorbidities affecting patient's physical performance include: DM 2, bipolar disorder, hypertension, seizure disorder, radiculopathy lumbar region, chronic low back pain with sciatica, diabetic polyneuropathy, gastroparesis, obesity, spondylolisthesis of lumbar region, clear-cell CA of kidney, asthma, anemia, CKD 3, migraine, lower extremity edema.  Personal factors affecting patient at time of initial evaluation include: lives in two story house, stairs to enter home, inability to navigate community distances, inability to navigate level surfaces without external assistance, and inability to perform dynamic tasks in community. Prior to admission, patient was independent with functional mobility without assistive device, independent with ADLS, independent with IADLS, living with spouse in a two level home with 3 steps to enter, ambulating household distance, and ambulating community distances.  Please find objective findings from Physical Therapy assessment regarding body systems outlined above with impairments and  limitations including weakness, decreased ROM, impaired balance, decreased endurance, impaired coordination, gait deviations, pain, decreased activity tolerance, decreased functional mobility tolerance, decreased safety awareness, fall risk, and impaired tone.  The Barthel Index was used as a functional outcome tool presenting with a score of Barthel Index Score: 50 today indicating marked limitations of functional mobility and ADLS.  Patient's clinical presentation is currently unstable/unpredictable as seen in patient's presentation of vital sign response, changing level of pain, increased fall risk, new onset of impairment of functional mobility, decreased endurance, and new onset of weakness. Pt would benefit from continued Physical Therapy treatment to address deficits as defined above and maximize level of functional mobility. As demonstrated by objective findings, the assigned level of complexity for this evaluation is high.The patient's AM-[facility] Basic Mobility Inpatient Short Form Raw Score is 17. A Raw score of greater than 16 suggests the patient may benefit from discharge to home. Please also refer to the recommendation of the Physical Therapist for safe discharge planning.        Rehab Resource Intensity Level, PT: III (Minimum Resource Intensity)    See flowsheet documentation for full assessment.

## 2024-01-06 NOTE — OCCUPATIONAL THERAPY NOTE
"Occupational Therapy Evaluation/Treatment       01/06/24 1405   OT Last Visit   OT Visit Date 01/06/24   Note Type   Note type Evaluation   Pain Assessment   Pain Assessment Tool 0-10   Pain Score 8   Pain Location/Orientation Orientation: Right;Location: Arm;Location: Leg   Pain Onset/Description Onset: Ongoing;Frequency: Constant/Continuous  (increases with movement)   Hospital Pain Intervention(s) Repositioned;Ambulation/increased activity;Emotional support;Rest   Restrictions/Precautions   Other Precautions Chair Alarm;Bed Alarm;Fall Risk;Pain  (R hemiparesis)   Home Living   Type of Home House   Home Layout Two level;Bed/bath upstairs   Bathroom Shower/Tub Walk-in shower   Bathroom Toilet Raised   Bathroom Equipment Built-in shower seat;Grab bars in shower;Commode   Bathroom Accessibility Accessible  (on second floor)   Home Equipment Wheelchair-manual;Cane;Crutches   Prior Function   Level of [place] Independent with ADLs;Independent with functional mobility;Independent with IADLS   Lives With Spouse   Receives Help From Family   IADLs Independent with driving;Independent with meal prep;Independent with medication management   Falls in the last 6 months 0   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   Subjective   Subjective \"I can't really lift my arm.\"   ADL   Eating Assistance 4  Minimal Assistance   Grooming Assistance 4  Minimal Assistance   UB Bathing Assistance 4  Minimal Assistance   LB Bathing Assistance 4  Minimal Assistance   UB Dressing Assistance 4  Minimal Assistance   LB Dressing Assistance 4  Minimal Assistance   Toileting Assistance  4  Minimal Assistance   Bed Mobility   Supine to Sit Unable to assess   Sit to Supine Unable to assess   Additional Comments pt received sitting EOB, left in recliner chair at conclusion of session, all needs in reach, chair arm activated   Transfers   Sit " "to Stand 4  Minimal assistance   Additional items Assist x 1;Verbal cues;Increased time required   Stand to Sit 4  Minimal assistance   Additional items Assist x 1;Verbal cues;Increased time required   Toilet transfer 4  Minimal assistance   Additional items Assist x 1;Verbal cues;Increased time required;Standard toilet  (+ grab bar)   Functional Mobility   Functional Mobility 4  Minimal assistance   Additional Comments engaged in short distance functional mobility using RW with min A   Additional items Rolling walker   Balance   Static Sitting Fair +   Dynamic Sitting Fair   Static Standing Fair   Dynamic Standing Fair -   Activity Tolerance   Activity Tolerance Patient limited by fatigue;Patient limited by pain;Treatment limited secondary to medical complications (Comment)  (R hemiparesis)   RUE Overall AROM   R Shoulder Flexion ~30 degrees   R Shoulder ABduction ~30 degrees   R Elbow Flexion ~100 degrees   R Elbow Extension WFL   R Elbow Supination WFl   R Elbow Pronation WFL   R Wrist Flexion WFL  (but limited by pain)   R Wrist Extension WFL  (but limited by pain)   R Mass Grasp WFL   RUE Overall PROM   R Shoulder Flexion limited by pain   R Shoulder ABduction limited by pain   R Elbow Flexion limited by pain   RUE Strength   R Shoulder Flexion 2+/5   R Shoulder ABduction 2+/5   R Elbow Flexion 2+/5   R Elbow Extension 3/5   R Forearm Pronation 2+/5   R Forearm Supination 2+/5   RUE Tone   RUE Tone WFL   Edema   RUE Edema Trace  (hand)   LUE Assessment   LUE Assessment WFL   Hand Function   Gross Motor Coordination Impaired   Fine Motor Coordination Impaired   Hand Function Comments R weak but functional grasp   Sensation   Light Touch   (reports feeling \"pins and needles\" in RUE)   Vision-Basic Assessment   Current Vision No visual deficits   Cognition   Overall Cognitive Status WFL   Attention Within functional limits   Orientation Level Oriented X4   Memory Within functional limits   Following Commands " Follows all commands and directions without difficulty   Assessment   Limitation Decreased ADL status;Decreased UE ROM;Decreased UE strength;Decreased endurance;Decreased self-care trans;Decreased high-level ADLs  (RUE impaired due to hemiparesis, decreased balance and mobility)   Prognosis Good   Assessment Patient evaluated by Occupational Therapy.  Patient admitted with Acute right-sided weakness.  The patients occupational profile, medical and therapy history includes a extensive additional review of physical, cognitive, or psychosocial history related to current functional performance.  Comorbidities affecting functional mobility and ADLS include: Bipolar disorder, CVA, diabetes, hypertension, and seizures.  Prior to admission, patient was independent with functional mobility without assistive device, independent with ADLS, and independent with IADLS.  The evaluation identifies the following performance deficits: weakness, decreased ROM, impaired balance, decreased endurance, decreased coordination, increased fall risk, new onset of impairment of functional mobility, decreased ADLS, decreased IADLS, pain, decreased activity tolerance, decreased sensation, and decreased strength, that result in activity limitations and/or participation restrictions. This evaluation requires clinical decision making of high complexity, because the patient presents with comorbidites that affect occupational performance and required significant modification of tasks or assistance with consideration of multiple treatment options.  The Barthel Index was used as a functional outcome tool presenting with a score of Barthel Index Score: 50, indicating marked limitations of functional mobility and ADLS.  The patient's raw score on the -PAC Daily Activity Inpatient Short Form is 18. A raw score of less than 19 suggests the patient may benefit from discharge to post-acute rehabilitation services however patient will have assistance from  . Please refer to the recommendation of the Occupational Therapist for safe discharge planning.  Patient will benefit from skilled Occupational Therapy services to address above deficits and facilitate a safe return to prior level of function.   Goals   Patient Goals to get better and go home   STG Time Frame   (1-7 days)   Short Term Goal  Goals established to promote Patient Goals: to get better and go home:  Eating: supervision; Grooming: supervision standing at sink; Bathing: supervision; Upper Body Dressing supervision; Lower Body Dressing: supervision; Toileting: supervision; Patient will increase ambulatory standard toilet transfer to supervision with rolling walker to increase performance and safety with ADLS and functional mobility; Patient will increase standing tolerance to 10 minutes during ADL task to decrease assistance level and decrease fall risk; Patient will increase bed mobility to supervision in preparation for ADLS and transfers; Patient will increase functional mobility to and from bathroom with rolling walker with supervision to increase performance with ADLS and to use a toilet; Patient will tolerate 10 minutes of UE ROM/strengthening to increase general activity tolerance and performance in ADLS/IADLS; Patient will improve functional activity tolerance to 10 minutes of sustained functional tasks to increase participation in basic self-care and decrease assistance level; Patient will increase dynamic sitting balance to fair+ to improve the ability to sit at edge of bed or on a chair for ADLS;  Patient will increase dynamic standing balance to fair to improve postural stability and decrease fall risk during standing ADLS and transfers.   LTG Time Frame   (8-14 days)   Long Term Goal Eating: independent; Grooming: independent standing at sink; Bathing: independent; Upper Body Dressing independent; Lower Body Dressing: independent; Toileting: independent; Patient will increase ambulatory  standard toilet transfer to independent with rolling walker to increase performance and safety with ADLS and functional mobility; Patient will increase standing tolerance to 20 minutes during ADL task to decrease assistance level and decrease fall risk; Patient will increase bed mobility to independent in preparation for ADLS and transfers; Patient will increase functional mobility to and from bathroom with rolling walker independently to increase performance with ADLS and to use a toilet; Patient will tolerate 20 minutes of UE ROM/strengthening to increase general activity tolerance and performance in ADLS/IADLS; Patient will improve functional activity tolerance to 20 minutes of sustained functional tasks to increase participation in basic self-care and decrease assistance level; Patient will increase dynamic sitting balance to good to improve the ability to sit at edge of bed or on a chair for ADLS;  Patient will increase dynamic standing balance to fair+ to improve postural stability and decrease fall risk during standing ADLS and transfers.  Pt will score >/= 22/24 on AM-PAC Daily Activity Inpatient scale to promote safe independence with ADLs and functional mobility; Pt will score >/= 80/100 on Barthel Index in order to decrease caregiver assistance needed and increase ability to perform ADLs and functional mobility.   Plan   Treatment Interventions ADL retraining;Functional transfer training;UE strengthening/ROM;Endurance training;Patient/family training;Equipment evaluation/education;Neuromuscular reeducation;Fine motor coordination activities;Compensatory technique education;Activityengagement   Goal Expiration Date 01/20/24   OT Frequency 5-7x/wk   Discharge Recommendation   Rehab Resource Intensity Level, OT III (Minimum Resource Intensity)   AM-PAC Daily Activity Inpatient   Lower Body Dressing 3   Bathing 3   Toileting 3   Upper Body Dressing 3   Grooming 3   Eating 3   Daily Activity Raw Score 18  "  Daily Activity Standardized Score (Calc for Raw Score >=11) 38.66   AM-PAC Applied Cognition Inpatient   Following a Speech/Presentation 4   Understanding Ordinary Conversation 4   Taking Medications 4   Remembering Where Things Are Placed or Put Away 4   Remembering List of 4-5 Errands 4   Taking Care of Complicated Tasks 4   Applied Cognition Raw Score 24   Applied Cognition Standardized Score 62.21   Barthel Index   Feeding 5   Bathing 0   Grooming Score 0   Dressing Score 5   Bladder Score 10   Bowels Score 10   Toilet Use Score 5   Transfers (Bed/Chair) Score 10   Mobility (Level Surface) Score 0   Stairs Score 5   Barthel Index Score 50   Additional Treatment Session   Start Time 1355   End Time 1405   Treatment Assessment S: \"Can I get dressed now so I'll be ready to go home?\" O: Completed UB and LB dressing both with min A, increased time to complete. Education provided regarding bo-dressing techniques and RUE positioning in chair and bed with patient verbalizing understanding. A: Pt tolerated treatment session well, limited by R hemiparesis and pain impacting functional performance. P: When medically cleared for d/c pt would benefit from level III minimum resource intensity.   End of Consult   Patient Position at End of Consult Bedside chair;Bed/Chair alarm activated;All needs within reach   Licensure   [place] License Number  Armida Lexus OTR/L [id number]     "

## 2024-01-06 NOTE — ASSESSMENT & PLAN NOTE
Lab Results   Component Value Date    HGBA1C 12.4 (H) 01/05/2024     Recent Labs     01/05/24  1635 01/05/24  2053 01/06/24  0716 01/06/24  1213   POCGLU 289* 301* 143* 212*     Uncontrolled.  Does see endocrinology as an outpatient.  Continue U-500 insulin as previously taken.  Continue Jardiance.

## 2024-01-06 NOTE — PHYSICAL THERAPY NOTE
PHYSICAL THERAPY EVALUATION/TREATMENT     01/06/24 1320   PT Last Visit   PT Visit Date 01/06/24   Note Type   Note type Evaluation   Pain Assessment   Pain Assessment Tool 0-10   Pain Score 8   Pain Location/Orientation Orientation: Right;Location: Arm;Location: Leg   Pain Onset/Description Onset: Ongoing;Frequency: Constant/Continuous  (Increases with weightbearing and mobility)   Effect of Pain on Daily Activities Limits comfort and mobility   Patient's Stated Pain Goal No pain   Hospital Pain Intervention(s) Repositioned;Ambulation/increased activity;Emotional support;Rest   Restrictions/Precautions   Other Precautions Pain;Fall Risk;Bed Alarm;Chair Alarm  (Right hemiparesis; Kanu on room air)   Home Living   Type of Home House   Home Layout Two level;Bed/bath upstairs;Stairs to enter without rails;Stairs to enter with rails  (3 steps to enter; 15 steps with a rail to the second floor)   Bathroom Shower/Tub Walk-in shower   Bathroom Toilet Raised   Bathroom Equipment Built-in shower seat;Grab bars in shower;Commode   Bathroom Accessibility Accessible  (But on the second floor)   Home Equipment Crutches;Cane;Wheelchair-manual   Prior Function   Level of [place] Independent with ADLs;Independent with functional mobility;Independent with IADLS   Lives With Spouse   Receives Help From Family   IADLs Independent with driving;Independent with meal prep;Independent with medication management   Falls in the last 6 months 0  (Denies)   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  TNK was administered 1/4/24.   Family/Caregiver Present No   Cognition   Overall Cognitive Status WFL   Arousal/Participation Cooperative   Orientation Level Oriented X4   Memory Within functional limits   Following Commands Follows all  commands and directions without difficulty   Comments At least 2 patient identifiers including name and date of birth   Subjective   Subjective Patient reporting right arm and leg are painful, difficult to move and feel heavy   RLE Assessment   RLE Assessment WFL  (Hip 2+ to 3 -/5, knee 2- to 2/5, ankle 2+ to 3 -/5)   LLE Assessment   LLE Assessment WFL  (Hip 3+ to 4 -/5, knee and ankle 4/5)   Coordination   Heel to Shin Impaired   Rapid Alternating Movements Impaired   Proprioception   RLE Proprioception Impaired   LLE Proprioception Grossly Intact   Bed Mobility   Supine to Sit 5  Supervision   Additional items Increased time required;HOB elevated;Bedrails  (Uses upper extremities to assist lower extremities)   Transfers   Sit to Stand 4  Minimal assistance   Additional items Assist x 1;Verbal cues;Increased time required   Stand to Sit 4  Minimal assistance   Additional items Assist x 1;Verbal cues;Increased time required   Ambulation/Elevation   Gait pattern Ataxia;Decreased foot clearance;Decreased heel strike;R Hemiparesis;Redundant gait at times;R Foot drag;Decreased R stance;Inconsistent jaida;Short stride;Narrow SON   Gait Assistance   (Mod/max assist x 1 step without an assistive device; min assist with roller walker)   Additional items Assist x 1;Verbal cues;Tactile cues   Assistive Device None;Rolling walker   Distance One-step with mod/max assist for balance and support without an assistive device; 20 feet with change in direction with roller walker and min assist   Balance   Static Sitting Fair +   Static Standing   (F-/fair with roller walker)   Ambulatory   (F-/fair with roller walker)   Activity Tolerance   Activity Tolerance Patient limited by fatigue;Patient limited by pain;Treatment limited secondary to medical complications (Comment)  (Weakness right upper and lower extremity)   Medical Staff Made Aware Care coordination with RIMMA Leung   Nurse Made Aware Yes, BRIAN Robles   Assessment   Problem  List Decreased strength;Decreased range of motion;Decreased endurance;Impaired balance;Decreased mobility;Decreased coordination;Decreased safety awareness;Impaired tone;Pain   Assessment Patient seen for Physical Therapy evaluation. Patient admitted with Acute right-sided weakness.  Comorbidities affecting patient's physical performance include: DM 2, bipolar disorder, hypertension, seizure disorder, radiculopathy lumbar region, chronic low back pain with sciatica, diabetic polyneuropathy, gastroparesis, obesity, spondylolisthesis of lumbar region, clear-cell CA of kidney, asthma, anemia, CKD 3, migraine, lower extremity edema.  Personal factors affecting patient at time of initial evaluation include: lives in two story house, stairs to enter home, inability to navigate community distances, inability to navigate level surfaces without external assistance, and inability to perform dynamic tasks in community. Prior to admission, patient was independent with functional mobility without assistive device, independent with ADLS, independent with IADLS, living with spouse in a two level home with 3 steps to enter, ambulating household distance, and ambulating community distances.  Please find objective findings from Physical Therapy assessment regarding body systems outlined above with impairments and limitations including weakness, decreased ROM, impaired balance, decreased endurance, impaired coordination, gait deviations, pain, decreased activity tolerance, decreased functional mobility tolerance, decreased safety awareness, fall risk, and impaired tone.  The Barthel Index was used as a functional outcome tool presenting with a score of Barthel Index Score: 50 today indicating marked limitations of functional mobility and ADLS.  Patient's clinical presentation is currently unstable/unpredictable as seen in patient's presentation of vital sign response, changing level of pain, increased fall risk, new onset of  "impairment of functional mobility, decreased endurance, and new onset of weakness. Pt would benefit from continued Physical Therapy treatment to address deficits as defined above and maximize level of functional mobility. As demonstrated by objective findings, the assigned level of complexity for this evaluation is high.    The patient's -[facility] Basic Mobility Inpatient Short Form Raw Score is 17. A Raw score of greater than 16 suggests the patient may benefit from discharge to home. Please also refer to the recommendation of the Physical Therapist for safe discharge planning.   Goals   Patient Goals \"To figure out what is going on, to be able to walk again, get stronger\"   STG Expiration Date 01/13/24   Short Term Goal #1 Patient will: Perform all bed mobility tasks independently to improve pt's independence w/ repositioning for decrease risk of skin breakdown, Perform all transfers w/ supervision consistently from various height surfaces in order to improve I w/ engagement w/ real-world environments/situations, Ambulate at least 100 ft. with least restrictive assistive device w/ supervision w/o LOB to facilitate return and engagement w/ previous living environment, Navigate 3 stairs w/ minx1 with unilateral handrail to either improve independence w/ entering home and/or so patient can fully access living areas in home, Tolerate at least 15 consecutive minutes of activity to demonstrate improved activity tolerance and endurance, and Tolerate 3 hr OOB to faciliate upright tolerance   LTG Expiration Date 01/20/24   Long Term Goal #1 Patient will: Increase R LE strength 1 grade to facilitate independent mobility, Perform all transfers independently consistently from various height surfaces in order to improve I w/ engagement w/ real-world environments/situations, Ambulate at least 250+ ft. with least restrictive assistive device independently w/o LOB to facilitate return and engagement w/ previous living environment, " Navigate flight of stairs modified independent with unilateral handrail to either improve independence w/ entering home and/or so patient can fully access living areas in home, and Increase all balance 1 grade to decrease risk for falls   Plan   Treatment/Interventions ADL retraining;Functional transfer training;LE strengthening/ROM;Elevations;Therapeutic exercise;Endurance training;Patient/family training;Equipment eval/education;Bed mobility;Gait training;Compensatory technique education;Spoke to case management;Spoke to nursing;OT   PT Frequency 5-7x/wk   Discharge Recommendation   Rehab Resource Intensity Level, PT III (Minimum Resource Intensity)   Equipment Recommended Walker   Walker Package Recommended Wheeled walker   Additional Comments Verbal discussion with patient about navigating 3 steps to enter her home and correct sequencing.  Also discussed with patient her staying on the first floor with use of commode for safety at this time.   AM-PAC Basic Mobility Inpatient   Turning in Flat Bed Without Bedrails 3   Lying on Back to Sitting on Edge of Flat Bed Without Bedrails 3   Moving Bed to Chair 3   Standing Up From Chair Using Arms 3   Walk in Room 3   Climb 3-5 Stairs With Railing 2   Basic Mobility Inpatient Raw Score 17   Basic Mobility Standardized Score 39.67   Highest Level Of Mobility   JH-HLM Goal 5: Stand one or more mins   JH-HLM Achieved 6: Walk 10 steps or more   Barthel Index   Feeding 5   Bathing 0   Grooming Score 0   Dressing Score 5   Bladder Score 10   Bowels Score 10   Toilet Use Score 5   Transfers (Bed/Chair) Score 10   Mobility (Level Surface) Score 0   Stairs Score 5   Barthel Index Score 50   Additional Treatment Session   Start Time 1320   End Time 1330   Treatment Assessment Patient agreeable to additional ambulation and use of bathroom.  Patient transfers sit to stand with min assist of 1 and cues for safe hand placement and ambulates with a roller walker 15 feet x 2 to/from the  bathroom with min assist.  Patient transfers to the toilet with minimal assistance and after urination needed min assist for toilet paper management due to weakness and pain right upper extremity.  Patient transfers sit to stand from toilet with min assist and ambulated about 5 to 6 feet to sink and was able to wash hands at the sink with close supervision, patient struggling with her right upper extremity to perform tasks of washing/drying hands.  Patient then ambulated back to the chair.   End of Consult   Patient Position at End of Consult Bed/Chair alarm activated;Bedside chair;All needs within reach   Licensure   [place] License Number  Rika L Arcelia, PT [id number]   Portions of the documentation may have been created using voice recognition software. Occasional wrong word or sound alike substitutions may have occurred due to the inherent limitation of the voice recognition software. Read the chart carefully and recognize, using context, where substitutions have occurred.

## 2024-01-07 NOTE — UTILIZATION REVIEW
NOTIFICATION OF INPATIENT ADMISSION   AUTHORIZATION REQUEST   SERVICING FACILITY:   [place] [address]  Tax ID: [id number]  NPI: [id number] ATTENDING PROVIDER:  Attending Name and NPI#: Jose Muñoz Do [[id number]]  Address: [address] [place] [address]  Phone: [phone]   ADMISSION INFORMATION:  Place of Service: Inpatient [facility]  Place of Service Code: 21  Inpatient Admission Date/Time: 1/4/24  9:16 AM  Discharge Date/Time: 1/6/2024  4:30 PM  Admitting Diagnosis Code/Description:  Right sided weakness [R53.1]     UTILIZATION REVIEW CONTACT:  Anabella Santos Utilization   Network Utilization Review Department  Phone: [phone]  Fax [phone]  Email: [email or web address]  Contact for approvals/pending authorizations, clinical reviews, and discharge.     PHYSICIAN ADVISORY SERVICES:  Medical Necessity Denial & Luyj-sc-Cxqg Review  Phone: [phone]  Fax: [phone]  Email: [email or web address]     DISCHARGE SUPPORT TEAM:  For Patients Discharge Needs & Updates  Phone: [phone] opt. 2 Fax: [phone]  Email: [email or web address]

## 2024-01-08 ENCOUNTER — TRANSITIONAL CARE MANAGEMENT (OUTPATIENT)
Dept: FAMILY MEDICINE CLINIC | Facility: CLINIC | Age: 54
End: 2024-01-08

## 2024-01-08 ENCOUNTER — PATIENT OUTREACH (OUTPATIENT)
Dept: FAMILY MEDICINE CLINIC | Facility: CLINIC | Age: 54
End: 2024-01-08

## 2024-01-08 VITALS
BODY MASS INDEX: 37.54 KG/M2 | TEMPERATURE: 97.6 F | DIASTOLIC BLOOD PRESSURE: 77 MMHG | SYSTOLIC BLOOD PRESSURE: 128 MMHG | HEART RATE: 59 BPM | OXYGEN SATURATION: 96 % | RESPIRATION RATE: 19 BRPM | WEIGHT: 198.85 LBS | HEIGHT: 61 IN

## 2024-01-08 DIAGNOSIS — Z71.89 COORDINATION OF COMPLEX CARE: Primary | ICD-10-CM

## 2024-01-08 RX ORDER — ASPIRIN 81 MG/1
81 TABLET ORAL DAILY
Qty: 30 TABLET | Refills: 1 | Status: SHIPPED | OUTPATIENT
Start: 2024-01-08

## 2024-01-08 NOTE — PROGRESS NOTES
Spoke with Haylee. She is still having right sided weakness using her walker. Reports still having a cough and medication for cough is not working. Gets dizzy when she coughs.  today. To see endocrinology on 1/12/24. Taking insulin differently Humulin R 40 units in am 30 units lunch and 30 units dinner. Blood sugar can drop to 53 in evening. To see PCP tomorrow Not sure of the home health agency that will be working with her.     Checked dominik [facility] to be working with Haylee called and informed her of this. She really wants [facility]     Spoke with [facility] in looking at chart there is not supporting diagnosis for them to start care. If she sees PCP tomorrow and a diagnosis that is approved for weakness is noted they could possibly open the referral. They were closed to her area and recently opened up.

## 2024-01-09 ENCOUNTER — APPOINTMENT (OUTPATIENT)
Dept: RADIOLOGY | Facility: CLINIC | Age: 54
End: 2024-01-09
Payer: COMMERCIAL

## 2024-01-09 ENCOUNTER — OFFICE VISIT (OUTPATIENT)
Dept: FAMILY MEDICINE CLINIC | Facility: CLINIC | Age: 54
End: 2024-01-09
Payer: COMMERCIAL

## 2024-01-09 VITALS
RESPIRATION RATE: 18 BRPM | WEIGHT: 202 LBS | BODY MASS INDEX: 38.14 KG/M2 | DIASTOLIC BLOOD PRESSURE: 64 MMHG | SYSTOLIC BLOOD PRESSURE: 120 MMHG | HEART RATE: 72 BPM | TEMPERATURE: 97.8 F | HEIGHT: 61 IN | OXYGEN SATURATION: 97 %

## 2024-01-09 DIAGNOSIS — E11.43 TYPE 2 DIABETES MELLITUS WITH DIABETIC AUTONOMIC NEUROPATHY, WITH LONG-TERM CURRENT USE OF INSULIN (HCC): ICD-10-CM

## 2024-01-09 DIAGNOSIS — Z79.4 TYPE 2 DIABETES MELLITUS WITH DIABETIC AUTONOMIC NEUROPATHY, WITH LONG-TERM CURRENT USE OF INSULIN (HCC): ICD-10-CM

## 2024-01-09 DIAGNOSIS — U07.1 COVID-19: ICD-10-CM

## 2024-01-09 DIAGNOSIS — R05.1 ACUTE COUGH: ICD-10-CM

## 2024-01-09 DIAGNOSIS — R53.1 ACUTE RIGHT-SIDED WEAKNESS: Primary | ICD-10-CM

## 2024-01-09 DIAGNOSIS — I10 PRIMARY HYPERTENSION: ICD-10-CM

## 2024-01-09 DIAGNOSIS — G40.909 SEIZURE DISORDER (HCC): Chronic | ICD-10-CM

## 2024-01-09 PROCEDURE — 99496 TRANSJ CARE MGMT HIGH F2F 7D: CPT | Performed by: NURSE PRACTITIONER

## 2024-01-09 PROCEDURE — 71046 X-RAY EXAM CHEST 2 VIEWS: CPT

## 2024-01-09 RX ORDER — ALBUTEROL SULFATE 2.5 MG/3ML
2.5 SOLUTION RESPIRATORY (INHALATION) EVERY 6 HOURS PRN
Qty: 180 ML | Refills: 5 | Status: SHIPPED | OUTPATIENT
Start: 2024-01-09

## 2024-01-09 NOTE — PROGRESS NOTES
Assessment & Plan     1. Acute right-sided weakness  -     Ambulatory Referral to Physical Therapy; Future    2. COVID-19  -     albuterol (2.5 mg/3 mL) 0.083 % nebulizer solution; Take 3 mL (2.5 mg total) by nebulization every 6 (six) hours as needed for wheezing or shortness of breath  -     XR chest pa & lateral; Future; Expected date: 01/09/2024    3. Acute cough  -     albuterol (2.5 mg/3 mL) 0.083 % nebulizer solution; Take 3 mL (2.5 mg total) by nebulization every 6 (six) hours as needed for wheezing or shortness of breath  -     XR chest pa & lateral; Future; Expected date: 01/09/2024    4. Type 2 diabetes mellitus with diabetic autonomic neuropathy, with long-term current use of insulin (HCC)    5. Primary hypertension    6. Seizure disorder (HCC)         Subjective     Transitional Care Management Review:   Haylee Balbuena is a 53 y.o. female here for TCM follow up.     During the TCM phone call patient stated:  TCM Call       Date and time call was made  1/8/2024 10:16 AM    Hospital care reviewed  Records reviewed    Patient was hospitialized at  [facility]    Date of Admission  01/04/24    Date of discharge  01/06/24    Diagnosis  Acute right side weakness    Disposition  Home    Current Symptoms  --  no change, right side weakness, has persistant cough and chest hurts and stomach also hurts from coughing    Right side arm pain severity  Mild    Arm pain, right side, onset  Ongoing  from IV          TCM Call       Post hospital issues  None    Should patient be enrolled in anticoag monitoring?  No    Scheduled for follow up?  Yes    Did you obtain your prescribed medications  No    Why were you unable to obtain your medications  no change in medication    Do you need help managing your prescriptions or medications  No    Is transportation to your appointment needed  No    I have advised the patient to call PCP with any new or worsening symptoms  Kristal Paredes MR    Living Arrangements  Spouse  or Significiant other    Support System  Spouse    The type of support provided  Emotional; Financial; Physical    Do you have social support  Yes, as much as I need    Counseling  Patient    Counseling topics  patient and family education          Date and time admitted to hospital: 1/4/2024  9:16 AM     Discharge Diagnoses:  * Acute right-sided weakness  Assessment & Plan  · Initially went to [facility] for acute right-sided weakness.    · Given TNK and transferred here due to lack of critical care bed availability at facility.  · Continue to have persistent right-sided weakness but slightly improved.  · Subsequent MRI did not demonstrate acute stroke.  · Seen by neurology, likely conversion disorder.  Discharging home with home services.     Seizure disorder (HCC)  Assessment & Plan  · Continue Keppra     Hypertension  Assessment & Plan  · Vital stable continue metoprolol     Type 2 diabetes mellitus with diabetic autonomic neuropathy, with long-term current use of insulin (HCC)  Assessment & Plan  Lab Results  Component Value Date    HGBA1C 12.4 (H) 01/05/2024     Recent Labs    01/05/24  1635 01/05/24  2053 01/06/24  0716 01/06/24  1213  POCGLU 289* 301* 143* 212*     · Uncontrolled.  Does see endocrinology as an outpatient.  Continue U-500 insulin as previously taken.  · Continue Jardiance.     Bipolar disorder (HCC)  Assessment & Plan  · Mood stable continue citalopram        Medical Problems          Resolved Problems  Date Reviewed: 1/6/2024  None        Discharging Physician / Practitioner: Jose Muñoz DO  PCP: SYLWIA Larson  Admission Date:   Admission Orders (From admission, onward)           Ordered        01/04/24 1101     Inpatient Admission  Once                         Discharge Date: 01/06/24     Consultations During Hospital Stay:  IP CONSULT TO NEUROLOGY  IP CONSULT TO NEUROLOGY  IP CONSULT TO CASE MANAGEMENT      Procedures Performed:   Echo complete w/ contrast if indicated      Result Date: 1/4/2024  Narrative: ·  Left Ventricle: Left ventricular cavity size is normal. Wall thickness is normal. The left ventricular ejection fraction is 60% by visual estimation. Systolic function is normal. Wall motion is normal. Diastolic function is normal for age.  Left atrial filling pressure is normal. ·  Atrial Septum: No patent foramen ovale detected, confirmed at rest using agitated saline contrast, confirmed by provocation with cough, using agitated saline contrast. ·  Mitral Valve: There is mild regurgitation. ·  Tricuspid Valve: Right ventricular systolic pressure could not be assessed. ·  Compared to previous exam, there is no signficant change.         Images:   CT head wo contrast     Result Date: 1/5/2024  Impression: No acute intracranial hemorrhage. No large evolving territorial infarction. Workstation performed: [id number]      MRI brain wo contrast     Result Date: 1/4/2024  Impression: No evidence of acute ischemia. Workstation performed: [id number]      CT head without contrast     Result Date: 1/4/2024  Impression: Small amount of residual contrast within the intracranial vessels noted likely from prior CTA exam of the same day.  No acute intracranial abnormality identified.  No significant interval change compared to the prior exam of the same day. Workstation performed: [id number]      CTA dissection protocol chest/abdomen/pelvis     Result Date: 1/4/2024  Impression: No acute thoracoabdominal aortic aneurysm or dissection. No acute pulmonary embolism. No pneumothorax, lobar airspace consolidation, or pleural effusion.  Subtle groundglass opacities in the lower lobes bilaterally may reflect areas of subsegmental atelectasis but cannot exclude early infectious/inflammatory process of the lung parenchyma. Clinical correlation recommended. No significant acute intra-abdominal/pelvic abnormalities noted with ancillary findings detailed above. Workstation performed: [id number]      CTA  stroke alert (head/neck)     Result Date: 1/4/2024  Impression: No evidence of flow-limiting stenosis or large vessel occlusive disease within the major vessels of the [place]. No significant stenosis of the cervical carotid or vertebral arteries. Stable CTA examination of the head and neck compared to prior study dated 7/15/2023. Findings were directly discussed with Dr. Whitehead at 4:28 a.m. Workstation performed: [id number]      CT stroke alert brain     Result Date: 1/4/2024  Impression: No acute intracranial abnormality. Findings were directly discussed with Dr. Whitehead at 4:28 a.m. Workstation performed: [id number]         Lab Results:   Results from last 7 days  Lab Units 01/06/24  0517 01/05/24 0458 01/04/24  0347  WBC Thousand/uL 9.04 8.97 10.77*  HEMOGLOBIN g/dL 12.0 12.2 12.3  HEMATOCRIT % 36.8 37.0 38.9  MCV fL 78* 78* 80*  PLATELETS Thousands/uL 283 307 324  INR    --   --  0.87     Results from last 7 days  Lab Units 01/06/24  0517 01/05/24  0458 01/04/24  0347  SODIUM mmol/L 138 138 136  POTASSIUM mmol/L 3.6 3.9 3.8  CHLORIDE mmol/L 105 105 101  CO2 mmol/L 25 26 27  BUN mg/dL 23 27* 24  CREATININE mg/dL 0.84 1.05 1.22  CALCIUM mg/dL 8.7 8.7 9.0  EGFR ml/min/1.73sq m 79 60 50  GLUCOSE RANDOM mg/dL 141* 156* 201*         Results from last 7 days  Lab Units 01/04/24  0544 01/04/24  0347  HS TNI 0HR ng/L  --  3  HS TNI 2HR ng/L 3  --               Results from last 7 days  Lab Units 01/06/24  1213 01/06/24  0716 01/05/24  2053 01/05/24  1635 01/05/24  1133 01/05/24  0705 01/04/24  2102 01/04/24  1549 01/04/24  1123 01/04/24  0347  POC GLUCOSE mg/dl 212* 143* 301* 289* 201* 147* 258* 290* 148* 183*     Results from last 7 days  Lab Units 01/05/24  0458  HEMOGLOBIN A1C % 12.4*             Results from last 7 days  Lab Units 01/05/24  0458  TRIGLYCERIDES mg/dL 327*  CHOLESTEROL mg/dL 191  LDL CALC mg/dL 83  HDL mg/dL 43*             Results from last 7 days  Lab Units 01/04/24  0347  INFLUENZA A PCR    Negative     Results from last 7 days  Lab Units 01/04/24  0347  SARS-COV-2   Negative  INFLUENZA A PCR   Negative  INFLUENZA B PCR   Negative  RSV PCR   Negative        Incidental Findings:      Test Results Pending at Discharge (will require follow up):      Reason for Admission:   Right-sided weakness     Hospital Course:   Haylee Balbuena is a 53 y.o. female patient who originally presented to the hospital on 1/4/2024 due to right-sided weakness.  The patient initially went to [facility] for acute onset of right-sided weakness around 2 AM while watching television.  Her  had to assist her and she went to the ED where decision was made to give her TNK.  She did have a headache afterwards and subsequent imaging did not show any intracerebral hemorrhage.  The patient was transferred here for ICU level of care due to bed availability.  She was seen by neurology.  Subsequent MRI did not demonstrate any acute ischemic findings.  Differential diagnosis from neurology includes conversion disorder.  She was advised to continue aspirin and atorvastatin.  Her weakness slowly improved.  She has been cleared by PT/OT for discharge home with home services.           Review of Systems   Constitutional:  Negative for activity change, diaphoresis, fatigue and fever.   HENT:  Negative for congestion, facial swelling, hearing loss, rhinorrhea, sinus pressure, sinus pain, sneezing, sore throat and voice change.    Eyes:  Negative for discharge and visual disturbance.   Respiratory:  Positive for cough (12/26 was put on Zpak and Tessalon perles for covid 19), shortness of breath and wheezing. Negative for choking, chest tightness and stridor.    Cardiovascular:  Negative for chest pain, palpitations and leg swelling.   Gastrointestinal:  Negative for abdominal distention, abdominal pain, constipation, diarrhea, nausea and vomiting.   Endocrine: Negative for polydipsia, polyphagia and polyuria.   Genitourinary:  Negative  "for difficulty urinating, dysuria, frequency and urgency.   Musculoskeletal:  Negative for arthralgias, back pain, gait problem, joint swelling, myalgias, neck pain and neck stiffness.   Skin:  Negative for color change, rash and wound.   Neurological:  Positive for dizziness, weakness and light-headedness (with coughing). Negative for syncope, speech difficulty and headaches.   Hematological:  Negative for adenopathy. Does not bruise/bleed easily.   Psychiatric/Behavioral:  Negative for agitation, behavioral problems, confusion, hallucinations, sleep disturbance and suicidal ideas. The patient is not nervous/anxious.        Objective     /64   Pulse 72   Temp 97.8 °F (36.6 °C) (Tympanic)   Resp 18   Ht 5' 1\" (1.549 m)   Wt 91.6 kg (202 lb)   SpO2 97%   BMI 38.17 kg/m²      Physical Exam  Vitals and nursing note reviewed.   Constitutional:       General: She is not in acute distress.     Appearance: She is well-developed. She is not diaphoretic.   Cardiovascular:      Rate and Rhythm: Normal rate and regular rhythm.      Heart sounds: Normal heart sounds.   Pulmonary:      Effort: Pulmonary effort is normal. No respiratory distress.      Breath sounds: Normal breath sounds. No wheezing.   Abdominal:      General: There is no distension.   Musculoskeletal:         General: No deformity.   Skin:     General: Skin is warm and dry.      Findings: No erythema or rash.   Neurological:      Mental Status: She is alert and oriented to person, place, and time.      Motor: Weakness (RUE and RLE) present.   Psychiatric:         Mood and Affect: Affect is flat.         Behavior: Behavior normal.         Thought Content: Thought content normal.         Judgment: Judgment normal.       Medications have been reviewed by provider in current encounter    SYLWIA Larson         "

## 2024-01-09 NOTE — PATIENT INSTRUCTIONS
PT referral given  Chest xray- if positive for pneumonia will give antibiotic  Continue same medications  Call for problems/concerns

## 2024-01-09 NOTE — UTILIZATION REVIEW
NOTIFICATION OF ADMISSION DISCHARGE   This is a Notification of Discharge from [facility]. Please be advised that this patient has been discharge from our facility. Below you will find the admission and discharge date and time including the patient’s disposition.   UTILIZATION REVIEW CONTACT:  Sheree Santos  Utilization   Network Utilization Review Department  Phone: [phone] x carefully listen to the prompts. All voicemails are confidential.  Email: [email or web address]     ADMISSION INFORMATION  PRESENTATION DATE: 1/4/2024  9:16 AM  OBERVATION ADMISSION DATE:   INPATIENT ADMISSION DATE: 1/4/24  9:16 AM   DISCHARGE DATE: 1/6/2024  4:30 PM   DISPOSITION:Home with Home Health Care    Network Utilization Review Department  ATTENTION: Please call with any questions or concerns to [phone] and carefully listen to the prompts so that you are directed to the right person. All voicemails are confidential.   For Discharge needs, contact Care Management DC Support Team at [phone] opt. 2  Send all requests for admission clinical reviews, approved or denied determinations and any other requests to dedicated fax number below belonging to the campus where the patient is receiving treatment. List of dedicated fax numbers for the Facilities:  FACILITY NAME UR FAX NUMBER   ADMISSION DENIALS (Administrative/Medical Necessity) [phone]   DISCHARGE SUPPORT TEAM ([facility]) [phone]   PARENT CHILD HEALTH (Maternity/NICU/Pediatrics) [phone]   [facility] [phone]   [facility] [phone]   [facility] [phone]   [facility] [phone]   [facility] [phone]   [facility] [phone]   [facility] [phone]   [facility]  [phone]   [facility] [phone]   [facility] [phone]   [facility] [phone]

## 2024-01-09 NOTE — PROGRESS NOTES
Thank you for the note. I just saw her in the office. She would like outpatient PT and OT. She only wants [facility], it looks like they only have Pediatric OT so she wants to just stick with PT for now, preferably the Aquatic therapy. I put the order in for this.

## 2024-01-10 DIAGNOSIS — E78.00 HYPERCHOLESTEREMIA: ICD-10-CM

## 2024-01-10 LAB
LEFT EYE DIABETIC RETINOPATHY: NORMAL
RIGHT EYE DIABETIC RETINOPATHY: NORMAL

## 2024-01-10 RX ORDER — ATORVASTATIN CALCIUM 80 MG/1
80 TABLET, FILM COATED ORAL
Qty: 180 TABLET | Refills: 3 | Status: SHIPPED | OUTPATIENT
Start: 2024-01-10

## 2024-01-10 NOTE — TELEPHONE ENCOUNTER
Patient requesting refill(s) of: Lipitor    Last filled: 1/16/23  Last appt: 1/9/24  Next appt: 3/7/24  Pharmacy: [facility]

## 2024-01-12 ENCOUNTER — OFFICE VISIT (OUTPATIENT)
Dept: ENDOCRINOLOGY | Facility: CLINIC | Age: 54
End: 2024-01-12
Payer: COMMERCIAL

## 2024-01-12 VITALS
BODY MASS INDEX: 38.71 KG/M2 | OXYGEN SATURATION: 98 % | HEART RATE: 74 BPM | DIASTOLIC BLOOD PRESSURE: 78 MMHG | SYSTOLIC BLOOD PRESSURE: 122 MMHG | HEIGHT: 61 IN | WEIGHT: 205 LBS

## 2024-01-12 DIAGNOSIS — I10 HYPERTENSION, UNSPECIFIED TYPE: ICD-10-CM

## 2024-01-12 DIAGNOSIS — E78.1 HYPERTRIGLYCERIDEMIA: ICD-10-CM

## 2024-01-12 DIAGNOSIS — E11.43 TYPE 2 DIABETES MELLITUS WITH DIABETIC AUTONOMIC NEUROPATHY, WITH LONG-TERM CURRENT USE OF INSULIN (HCC): Primary | ICD-10-CM

## 2024-01-12 DIAGNOSIS — N18.31 STAGE 3A CHRONIC KIDNEY DISEASE (HCC): ICD-10-CM

## 2024-01-12 DIAGNOSIS — Z79.4 TYPE 2 DIABETES MELLITUS WITH DIABETIC AUTONOMIC NEUROPATHY, WITH LONG-TERM CURRENT USE OF INSULIN (HCC): Primary | ICD-10-CM

## 2024-01-12 PROCEDURE — 99215 OFFICE O/P EST HI 40 MIN: CPT | Performed by: PHYSICIAN ASSISTANT

## 2024-01-12 RX ORDER — FENOFIBRATE 134 MG/1
134 CAPSULE ORAL
Qty: 90 CAPSULE | Refills: 1 | Status: SHIPPED | OUTPATIENT
Start: 2024-01-12

## 2024-01-12 NOTE — ASSESSMENT & PLAN NOTE
BP stable on current anti-hypertensive regimen.  ACE-I discontinued in recent past due to prior decline in renal function. Could consider cautious reintroduction in future.

## 2024-01-12 NOTE — PROGRESS NOTES
Haylee Balbuena 53 y.o. female MRN: [id number]    Encounter: [id number]      Assessment/Plan   Problem List Items Addressed This Visit          Endocrine    Type 2 diabetes mellitus with diabetic autonomic neuropathy, with long-term current use of insulin (HCC) - Primary       Lab Results   Component Value Date    HGBA1C 12.4 (H) 01/05/2024   DM control remains poor, but she is improving! A1c was 14.3 in 12/11/23, now most recently 12.4%  on 1/5/24. CGM report is even more reassuring, with GMI of 9.7% on 2 week Refugio report.   CGM report reviewed in detail with Haylee today.  Discussed complications associated with poorly controlled DM, including increased stroke risk.  Reviewed target BG goals () as well as A1c goal of < 7.0%.   BG monitoring - continues to benefit from CGM use. Encouarged frequent scanning to capture data. Provided sample Refugio 2 sensor today. Will check CGM report again in 2 weeks to monitor progress.  Pharmacotherapy discussion - Humulin U500 will be intensified today. We will focus on morning, mid-day dose only, as her evening dose seems to be adequate (FBG in target range). Recommend increase from 80-50-50 to new dosing of 90-60-50. Reviewed ideal timing of injection 20-30 minutes prior to meal. Continue GLP1, Jardiance at current maximal dosing. Will consider re-introduction of metformin in future given recovery of renal function, although will defer at this time.  Labs - reviewed most recent DM labs with patient in detail. No follow up labs needed. Up to date with urine microalbumin, lipid panel, recent BMP, A1c.  RTC - 6 weeks              Cardiovascular and Mediastinum    Hypertension     BP stable on current anti-hypertensive regimen.  ACE-I discontinued in recent past due to prior decline in renal function. Could consider cautious reintroduction in future.            Genitourinary    Stage 3a chronic kidney disease (HCC)     Lab Results   Component Value Date    EGFR 79 01/06/2024     EGFR 60 01/05/2024    EGFR 50 01/04/2024    CREATININE 0.84 01/06/2024    CREATININE 1.05 01/05/2024    CREATININE 1.22 01/04/2024   Follows routinely with nephrology.  On SGLT2i - Jardiance 25mg daily.  May consider cautious reintroduction of ACE-I in future.            Other    Hypertriglyceridemia    Relevant Medications    fenofibrate micronized (LOFIBRA) 134 MG capsule     I have spent a total time of 50 minutes on 01/12/24 in caring for this patient including Diagnostic results, Risks and benefits of tx options, Instructions for management, Patient and family education, Risk factor reductions, Counseling / Coordination of care, Documenting in the medical record, Obtaining or reviewing history  , and Communicating with other healthcare professionals     .   CC: Diabetes    History of Present Illness     HPI:  Haylee returns for close interval follow up due to recent hospitalization. She was admitted as a stroke alert with R sided weakness. CT head was negative. Received TNK and was admitted to ICU for close monitoring after. Thankfully, MRI brain was negative as well, and neurology is suspecting possible conversion disorder versus CVA. She still has residual R sided weakness, but plans to follow with outpatient PT.     Haylee reports her blood sugars have been slightly better than before since taking U500. She continues on Refugio 2 CGM. See summary CGM report below, and full report in media section.     DM medication regimen confirmed/reviewed:  Jardiance 25mg daily  Trulicity 4.5mg weekly  Humulin U500 85-65-65 was prescribed dose, however she down-titrated to 80-50-50 on her own recently due to concern for hypoglycemia.     Of note, metformin was previously discontinued due to decline in GFR.    Diet - Appetite remains variable, variable meal size and content. She is trying her best to make changes such as limiting pasta, bread. Also reducing coffee with creamer. She does still enjoy a small can (6-8oz) of  regular soda (coke) with dinner, however. Otherwise drinking water.     CGM report:  Haylee Balbuena   Device used : Refugio 2  Home use   Indication Type 2 Diabetes on multiple daily injections of insulin.    More than 72 hours of data was reviewed. Report to be scanned to chart.     Date Range: 12/29/23 - 1/11/24    Analysis of data:   % time CGM used: 67% (less than usual, sensor removed during hospitalization)  Average Glucose: 269mg/dL  Coefficient of Variation: 33.8%  GMI: 9.7%  Time in Target Range: 21% in range  Time Above Range: 17% high, 61% very high  Time Below Range: 1% low, 0% very low.    Interpretation of data: Fasting hypoglycemia is noted the early morning hours of 1/7/23, since resolved with reduced evening dose of Humulin U500 from 65 --> 50. Briefly reaches target range in morning, but this is usually followed by severe hyperglycemia after breakfast, which persists throughout evening.          Review of Systems   Constitutional:  Negative for chills and fever.   HENT:  Negative for ear pain and sore throat.    Eyes:  Negative for pain and visual disturbance.   Respiratory:  Negative for cough and shortness of breath.    Cardiovascular:  Negative for chest pain and palpitations.   Gastrointestinal:  Negative for abdominal pain and vomiting.   Endocrine: Negative for polydipsia and polyuria.   Genitourinary:  Negative for dysuria and hematuria.   Musculoskeletal:  Negative for arthralgias and back pain.   Skin:  Negative for color change and rash.   Neurological:  Positive for weakness. Negative for seizures and syncope.   All other systems reviewed and are negative.      Historical Information   Past Medical History:   Diagnosis Date    Atypical chest pain     Gastroparesis     GERD (gastroesophageal reflux disease)     Hypertension     Psychiatric disorder     Sciatica     Seizure disorder (HCC)      Past Surgical History:   Procedure Laterality Date    APPENDECTOMY      BREAST BIOPSY Left  benign      SECTION      CHOLECYSTECTOMY      COLONOSCOPY      HYSTERECTOMY      IR PICC PLACEMENT SINGLE LUMEN  2023    NV LAPAROSCOPIC APPENDECTOMY N/A 2021    Procedure: APPENDECTOMY LAPAROSCOPIC;  Surgeon: Onel Martin MD;  Location:  MAIN OR;  Service: General    NV LAPS ABD PRTM&OMENTUM DX W/WO SPEC BR/WA SPX N/A 2021    Procedure: LAPAROSCOPY DIAGNOSTIC, EXTENSIVE DESI;  Surgeon: Onel Martin MD;  Location:  MAIN OR;  Service: General    TUBAL LIGATION      UPPER GASTROINTESTINAL ENDOSCOPY       Social History   Social History     Substance and Sexual Activity   Alcohol Use Never     Social History     Substance and Sexual Activity   Drug Use Never     Social History     Tobacco Use   Smoking Status Former    Current packs/day: 0.00    Types: Cigarettes    Quit date:     Years since quittin.0   Smokeless Tobacco Never     Family History:   Family History   Problem Relation Age of Onset    No Known Problems Mother     No Known Problems Father     No Known Problems Daughter     No Known Problems Maternal Grandmother     No Known Problems Paternal Grandmother     No Known Problems Paternal Aunt     No Known Problems Paternal Aunt     No Known Problems Paternal Aunt        Meds/Allergies   Current Outpatient Medications   Medication Sig Dispense Refill    albuterol (2.5 mg/3 mL) 0.083 % nebulizer solution Take 3 mL (2.5 mg total) by nebulization every 6 (six) hours as needed for wheezing or shortness of breath 180 mL 5    albuterol (PROVENTIL HFA,VENTOLIN HFA) 90 mcg/act inhaler INHALE ONE PUFF BY MOUTH AND INTO THE LUNGS EVERY 6 HOURS AS NEEDED FOR WHEEZING 36 g 3    Alcohol Swabs (Pharmacist Choice Alcohol) PADS Apply topically 4 (four) times a day Use as directed 300 each 5    aspirin (ECOTRIN LOW STRENGTH) 81 mg EC tablet TAKE ONE TABLET BY MOUTH ONCE DAILY 30 tablet 1    atorvastatin (LIPITOR) 80 mg tablet TAKE ONE TABLET BY MOUTH DAILY AT BEDTIME 180 tablet 3    B-D  UF III MINI PEN NEEDLES 31G X 5 MM MISC Pt uses 5 pen needles daily 500 each 0    benzonatate (TESSALON) 200 MG capsule Take 1 capsule (200 mg total) by mouth 3 (three) times a day as needed for cough 30 capsule 0    cholecalciferol (VITAMIN D3) 1,000 units tablet Take 2 tablets (2,000 Units total) by mouth daily 60 tablet 3    citalopram (CeleXA) 40 mg tablet TAKE ONE TABLET BY MOUTH DAILY 90 tablet 0    Continuous Blood Gluc  (FreeStyle Refugio 14 Day [place]) TEMITOPE Use for continuous blood glucose monitoring 1 each 0    Continuous Blood Gluc Sensor (FreeStyle Refugio 2 Sensor) MISC Use 1 each every 14 (fourteen) days 6 each 2    docusate sodium (COLACE) 100 mg capsule TAKE ONE CAPSULE BY MOUTH TWICE DAILY 60 capsule 5    dulaglutide (Trulicity) 4.5 MG/0.5ML injection Inject 0.5 mL (4.5 mg total) under the skin every 7 days 6 mL 1    Empagliflozin (Jardiance) 25 MG TABS Take 1 tablet (25 mg total) by mouth every morning 90 tablet 1    ergocalciferol (VITAMIN D2) 50,000 units Take 1 capsule (50,000 Units total) by mouth once a week 12 capsule 3    famotidine (PEPCID) 40 MG tablet TAKE ONE TABLET BY MOUTH TWO HOURS PRIOR TO BEDTIME 30 tablet 6    fenofibrate (TRICOR) 48 mg tablet TAKE ONE TABLET BY MOUTH DAILY 90 tablet 3    Insulin Regular Human, Conc, (HumuLIN R U-500 KwikPen) 500 units/mL CONCENTRATED U-500 injection pen Take 85 units with breakfast, 65 units with lunch, 65 with dinner, or as directed TDD up to 250 units 15 mL 2    Iron Heme Polypeptide 12 MG TABS Take 1 tablet by mouth      levETIRAcetam (KEPPRA) 500 mg tablet TAKE ONE TABLET BY MOUTH TWICE DAILY 300 tablet 1    linaCLOtide (Linzess) 72 MCG CAPS Take 1 capsule by mouth daily before breakfast 30 capsule 0    meclizine (ANTIVERT) 25 mg tablet Take 1 tablet (25 mg total) by mouth 3 (three) times a day as needed for dizziness 30 tablet 0    metoclopramide (Reglan) 10 mg tablet Take 0.5 tablets (5 mg total) by mouth every 6 (six) hours 30 tablet 0  "   metoprolol tartrate (LOPRESSOR) 25 mg tablet TAKE ONE TABLET BY MOUTH TWICE DAILY 180 tablet 1    multivitamin (THERAGRAN) TABS Take 1 tablet by mouth every morning      nystatin (MYCOSTATIN) cream Apply topically 2 (two) times a day 30 g 0    ondansetron (ZOFRAN) 4 mg tablet Take 1 tablet (4 mg total) by mouth every 6 (six) hours 12 tablet 0    OneTouch Ultra test strip USE 4 TIMES A  each 0    Pharmacist Choice Lancets MISC USE AS DIRECTED 100 each 1    pregabalin (LYRICA) 100 mg capsule TAKE ONE CAPSULE BY MOUTH THREE TIMES A  capsule 1    Restasis 0.05 % ophthalmic emulsion        No current facility-administered medications for this visit.     Allergies   Allergen Reactions    Butorphanol Hallucinations    Benztropine     Penicillins     Zolpidem        Objective   Vitals: Blood pressure 122/78, pulse 74, height 5' 1\" (1.549 m), weight 93 kg (205 lb), SpO2 98%, not currently breastfeeding.    Physical Exam  Constitutional:       General: She is not in acute distress.     Appearance: She is not ill-appearing.   HENT:      Head: Normocephalic.      Nose: Nose normal.   Cardiovascular:      Rate and Rhythm: Normal rate and regular rhythm.      Heart sounds: Normal heart sounds.   Pulmonary:      Effort: Pulmonary effort is normal. No respiratory distress.      Breath sounds: Normal breath sounds.   Musculoskeletal:      Right lower leg: No edema.      Left lower leg: No edema.   Skin:     Coloration: Skin is pale.   Neurological:      Mental Status: She is alert.   Psychiatric:         Mood and Affect: Mood normal.         The history was obtained from the review of the chart, patient.    Lab Results:   Component      Latest Ref Rng 8/16/2023 1/5/2024   Sodium      135 - 147 mmol/L  138    Potassium      3.5 - 5.3 mmol/L  3.9    Chloride      96 - 108 mmol/L  105    CO2      21 - 32 mmol/L  26    Anion Gap      mmol/L  7    BUN      5 - 25 mg/dL  27 (H)    Creatinine      0.60 - 1.30 mg/dL  1.05  " "  Glucose, Random      65 - 140 mg/dL  156 (H)    Calcium      8.4 - 10.2 mg/dL  8.7    eGFR      ml/min/1.73sq m  60    Cholesterol      See Comment mg/dL  191    Triglycerides      See Comment mg/dL  327 (H)    HDL      >=50 mg/dL  43 (L)    LDL Calculated      0 - 100 mg/dL  83    EXT Creatinine Urine      mg/dL 43.5     Albumin,U,Random      0.0 - 20.0 mg/L 5.3     Albumin Creat Ratio      0 - 30 mg/g creatinine 12        Legend:  (H) High  (L) Low      Component      Latest Ref Rng 7/14/2023 12/11/2023 1/5/2024   Hemoglobin A1C      Normal 4.0-5.6%; PreDiabetic 5.7-6.4%; Diabetic >=6.5%; Glycemic control for adults with diabetes <7.0% % 12.6 (H)  14.3 (H)  12.4 (H)    eAG, EST AVG Glucose      mg/dl 315  364  309       Legend:  (H) High    Imaging Studies: n/a    Portions of the record may have been created with voice recognition software. Occasional wrong word or \"sound a like\" substitutions may have occurred due to the inherent limitations of voice recognition software. Read the chart carefully and recognize, using context, where substitutions have occurred.    "

## 2024-01-12 NOTE — ASSESSMENT & PLAN NOTE
Lab Results   Component Value Date    HGBA1C 12.4 (H) 01/05/2024   DM control remains poor, but she is improving! A1c was 14.3 in 12/11/23, now most recently 12.4%  on 1/5/24. CGM report is even more reassuring, with GMI of 9.7% on 2 week Refugio report.   CGM report reviewed in detail with Haylee today.  Discussed complications associated with poorly controlled DM, including increased stroke risk.  Reviewed target BG goals () as well as A1c goal of < 7.0%.   BG monitoring - continues to benefit from CGM use. Encouarged frequent scanning to capture data. Provided sample Refugio 2 sensor today. Will check CGM report again in 2 weeks to monitor progress.  Pharmacotherapy discussion - Humulin U500 will be intensified today. We will focus on morning, mid-day dose only, as her evening dose seems to be adequate (FBG in target range). Recommend increase from 80-50-50 to new dosing of 90-60-50. Reviewed ideal timing of injection 20-30 minutes prior to meal. Continue GLP1, Jardiance at current maximal dosing. Will consider re-introduction of metformin in future given recovery of renal function, although will defer at this time.  Labs - reviewed most recent DM labs with patient in detail. No follow up labs needed. Up to date with urine microalbumin, lipid panel, recent BMP, A1c.  RTC - 6 weeks

## 2024-01-12 NOTE — ASSESSMENT & PLAN NOTE
Mixed hyperlipidemia with recent LDL of 83, which is above our target of < 70.   Triglycerides also remain elevated, 327!  Recommend intensification of fibrate - increase fenofibrate from 48 --> 134mg.   Will plan for repeat lipid panel in 3-6 months.

## 2024-01-12 NOTE — ASSESSMENT & PLAN NOTE
Lab Results   Component Value Date    EGFR 79 01/06/2024    EGFR 60 01/05/2024    EGFR 50 01/04/2024    CREATININE 0.84 01/06/2024    CREATININE 1.05 01/05/2024    CREATININE 1.22 01/04/2024   Follows routinely with nephrology.  On SGLT2i - Jardiance 25mg daily.  May consider cautious reintroduction of ACE-I in future.

## 2024-01-15 ENCOUNTER — PATIENT OUTREACH (OUTPATIENT)
Dept: FAMILY MEDICINE CLINIC | Facility: CLINIC | Age: 54
End: 2024-01-15

## 2024-01-15 ENCOUNTER — TELEPHONE (OUTPATIENT)
Dept: FAMILY MEDICINE CLINIC | Facility: CLINIC | Age: 54
End: 2024-01-15

## 2024-01-15 DIAGNOSIS — Z78.9 NEEDS ASSISTANCE WITH COMMUNITY RESOURCES: Primary | ICD-10-CM

## 2024-01-15 NOTE — PROGRESS NOTES
Spoke with Haylee she is doing okay. Informed her that letter is in her chart  for her to return to work on 1/17/24. She is interested in educational material on food choices and beverages to drink. I will mail her information. She will start PT and aqua therapy on 1/26/24. I will check back in two weeks. She also mentioned she may lose her health insurance again. She did give permission to look into this I explained I work with  and community health worker.

## 2024-01-15 NOTE — TELEPHONE ENCOUNTER
SYLWIA Larson   to Haylee      1/15/24  7:03 AM  I'm fine with that if you feel like you're capable of working and they are fine with your deficits. Let me know if you need a note for today-  Kaylan Reina called following up on this. Would like note to return to work on Wednesday. Would like it sent through [employer].

## 2024-01-17 DIAGNOSIS — F31.9 BIPOLAR AFFECTIVE DISORDER, REMISSION STATUS UNSPECIFIED (HCC): ICD-10-CM

## 2024-01-17 RX ORDER — CITALOPRAM 40 MG/1
40 TABLET ORAL DAILY
Qty: 90 TABLET | Refills: 3 | Status: SHIPPED | OUTPATIENT
Start: 2024-01-17

## 2024-01-17 NOTE — TELEPHONE ENCOUNTER
Patient requesting refill(s) of: Celexa 40 mg     Last filled: 11/03/23  Last appt: 01/01/24  Next appt: 03/07/24  Pharmacy: [facility]

## 2024-01-18 ENCOUNTER — PATIENT OUTREACH (OUTPATIENT)
Dept: FAMILY MEDICINE CLINIC | Facility: CLINIC | Age: 54
End: 2024-01-18

## 2024-01-18 NOTE — PROGRESS NOTES
CMOC received a referral from CM RN Lashawn Ingram to assist patient with health insurance.     CMOC attempted to contact Haylee to discuss the referral. No answer. Left message on voicemail with reason for call, this writer's contact information, and a request for a call back to assist.     Will outreach in one week if no contact prior.

## 2024-01-19 DIAGNOSIS — N32.89 BLADDER SPASMS: ICD-10-CM

## 2024-01-19 RX ORDER — SOLIFENACIN SUCCINATE 5 MG/1
5 TABLET, FILM COATED ORAL DAILY
Qty: 90 TABLET | Refills: 1 | Status: SHIPPED | OUTPATIENT
Start: 2024-01-19

## 2024-01-22 DIAGNOSIS — I15.2 HYPERTENSION ASSOCIATED WITH DIABETES: ICD-10-CM

## 2024-01-22 DIAGNOSIS — E11.59 HYPERTENSION ASSOCIATED WITH DIABETES: ICD-10-CM

## 2024-01-22 NOTE — TELEPHONE ENCOUNTER
Patient requesting refill(s) of: Lopressor 25 mg     Last filled: 08/26/23  Last appt: 1/09/24  Next appt: 03/07/24  Pharmacy: [facility]

## 2024-01-25 DIAGNOSIS — K59.00 CONSTIPATION, UNSPECIFIED CONSTIPATION TYPE: ICD-10-CM

## 2024-01-25 DIAGNOSIS — E11.59 HYPERTENSION ASSOCIATED WITH DIABETES: ICD-10-CM

## 2024-01-25 DIAGNOSIS — I15.2 HYPERTENSION ASSOCIATED WITH DIABETES: ICD-10-CM

## 2024-01-25 DIAGNOSIS — R10.9 ABDOMINAL PAIN: ICD-10-CM

## 2024-01-25 RX ORDER — DOCUSATE SODIUM 100 MG/1
CAPSULE, LIQUID FILLED ORAL
Qty: 240 CAPSULE | Refills: 3 | Status: SHIPPED | OUTPATIENT
Start: 2024-01-25

## 2024-01-25 NOTE — TELEPHONE ENCOUNTER
Patient requesting refill(s) of: Colace    Last filled: 1/30/23  Last appt: 1/9/24  Next appt: 3/7/24  [facility]: [facility]

## 2024-01-29 ENCOUNTER — TELEPHONE (OUTPATIENT)
Dept: ENDOCRINOLOGY | Facility: CLINIC | Age: 54
End: 2024-01-29

## 2024-01-29 ENCOUNTER — PATIENT OUTREACH (OUTPATIENT)
Dept: FAMILY MEDICINE CLINIC | Facility: CLINIC | Age: 54
End: 2024-01-29

## 2024-01-29 NOTE — PROGRESS NOTES
Spoke with Haylee. Reports she is still coughing. Has PCP appointment on 21/24. Not sure if she has bronchitis now. Encouraged her to call office for sooner appointment. Reports blood sugars are high at times can be over 350 she will drink water and give herself 20 units of insulin. Feels she has elevated readings due to her coffee creamer. Recently saw eye doctor and is waiting for new glasses to come in. Would like further outreaches. I will check back in two weeks.

## 2024-01-29 NOTE — TELEPHONE ENCOUNTER
CGM report initially appears worse, possibly in setting of URI / cough?   Data from this past week, 1/23 - 1/28 appears improved, however, although data is variable, difficult to make insulin dosing decisions / adjustments.    Please confirm current U500 dosing with Haylee. Should be 85 units with breakfast, 65 units with lunch and dinner. Please encourage compliance with regimen and will follow up CGM report again in 1 week. Encourage to keep scanning, remain active with sensors.

## 2024-02-01 ENCOUNTER — OFFICE VISIT (OUTPATIENT)
Dept: FAMILY MEDICINE CLINIC | Facility: CLINIC | Age: 54
End: 2024-02-01
Payer: COMMERCIAL

## 2024-02-01 VITALS
HEART RATE: 69 BPM | BODY MASS INDEX: 39.08 KG/M2 | WEIGHT: 207 LBS | RESPIRATION RATE: 18 BRPM | DIASTOLIC BLOOD PRESSURE: 66 MMHG | TEMPERATURE: 97.5 F | OXYGEN SATURATION: 97 % | HEIGHT: 61 IN | SYSTOLIC BLOOD PRESSURE: 118 MMHG

## 2024-02-01 DIAGNOSIS — B37.31 VAGINAL YEAST INFECTION: ICD-10-CM

## 2024-02-01 DIAGNOSIS — H66.004 RECURRENT ACUTE SUPPURATIVE OTITIS MEDIA OF RIGHT EAR WITHOUT SPONTANEOUS RUPTURE OF TYMPANIC MEMBRANE: Primary | ICD-10-CM

## 2024-02-01 DIAGNOSIS — E11.43 TYPE 2 DIABETES MELLITUS WITH DIABETIC AUTONOMIC NEUROPATHY, WITH LONG-TERM CURRENT USE OF INSULIN (HCC): ICD-10-CM

## 2024-02-01 DIAGNOSIS — Z79.4 TYPE 2 DIABETES MELLITUS WITH DIABETIC AUTONOMIC NEUROPATHY, WITH LONG-TERM CURRENT USE OF INSULIN (HCC): ICD-10-CM

## 2024-02-01 DIAGNOSIS — J06.9 URI, ACUTE: ICD-10-CM

## 2024-02-01 PROCEDURE — 99213 OFFICE O/P EST LOW 20 MIN: CPT | Performed by: NURSE PRACTITIONER

## 2024-02-01 RX ORDER — GUAIFENESIN 600 MG/1
600 TABLET, EXTENDED RELEASE ORAL EVERY 12 HOURS SCHEDULED
Qty: 60 TABLET | Refills: 1 | Status: SHIPPED | OUTPATIENT
Start: 2024-02-01

## 2024-02-01 RX ORDER — DOXYCYCLINE 100 MG/1
100 CAPSULE ORAL 2 TIMES DAILY
Qty: 14 CAPSULE | Refills: 0 | Status: SHIPPED | OUTPATIENT
Start: 2024-02-01 | End: 2024-02-08

## 2024-02-01 RX ORDER — FLUCONAZOLE 150 MG/1
150 TABLET ORAL ONCE
Qty: 1 TABLET | Refills: 0 | Status: SHIPPED | OUTPATIENT
Start: 2024-02-01 | End: 2024-02-01

## 2024-02-01 RX ORDER — FLUTICASONE PROPIONATE AND SALMETEROL 250; 50 UG/1; UG/1
1 POWDER RESPIRATORY (INHALATION) 2 TIMES DAILY
Qty: 60 BLISTER | Refills: 1 | Status: SHIPPED | OUTPATIENT
Start: 2024-02-01

## 2024-02-01 NOTE — PROGRESS NOTES
Name: Haylee Balbuena      : 1970      MRN: [id number]  Encounter Provider: SYLWIA Larson  Encounter Date: 2024   Encounter department: [facility] PRIMARY CARE    Assessment & Plan     1. Recurrent acute suppurative otitis media of right ear without spontaneous rupture of tympanic membrane  -     doxycycline monohydrate (MONODOX) 100 mg capsule; Take 1 capsule (100 mg total) by mouth 2 (two) times a day for 7 days    2. URI, acute  -     Fluticasone-Salmeterol (Advair Diskus) 250-50 mcg/dose inhaler; Inhale 1 puff 2 (two) times a day Rinse mouth after use.  -     guaiFENesin (MUCINEX) 600 mg 12 hr tablet; Take 1 tablet (600 mg total) by mouth every 12 (twelve) hours    3. Vaginal yeast infection  -     fluconazole (DIFLUCAN) 150 mg tablet; Take 1 tablet (150 mg total) by mouth once for 1 dose           Subjective      Here for continued sickness- continues with cough worse at night, SOB/hard to catch breath, bilateral ear pain since yesterday. Has had the cough since [facility]- Had Covid at [facility]. Last had Zpak when had Covid, no other antibiotics      Review of Systems   Constitutional:  Negative for activity change, chills, diaphoresis, fatigue and fever.   HENT:  Positive for ear pain and postnasal drip. Negative for congestion, facial swelling, hearing loss, rhinorrhea, sinus pressure, sinus pain, sneezing, sore throat and voice change.    Eyes:  Negative for pain, discharge, redness and visual disturbance.   Respiratory:  Positive for cough. Negative for choking, chest tightness, shortness of breath, wheezing and stridor.    Cardiovascular:  Negative for chest pain, palpitations and leg swelling.   Gastrointestinal:  Negative for abdominal distention, abdominal pain, constipation, diarrhea, nausea and vomiting.   Endocrine: Negative for polydipsia, polyphagia and polyuria.   Genitourinary:  Negative for difficulty urinating, dysuria, frequency and urgency.   Musculoskeletal:   Negative for arthralgias, back pain, gait problem, joint swelling, myalgias, neck pain and neck stiffness.   Skin:  Negative for color change, rash and wound.   Neurological:  Negative for dizziness, syncope, speech difficulty, weakness, light-headedness and headaches.   Hematological:  Negative for adenopathy. Does not bruise/bleed easily.   Psychiatric/Behavioral:  Negative for agitation, behavioral problems, confusion, hallucinations, sleep disturbance and suicidal ideas. The patient is not nervous/anxious.        Current Outpatient Medications on File Prior to Visit   Medication Sig    albuterol (2.5 mg/3 mL) 0.083 % nebulizer solution Take 3 mL (2.5 mg total) by nebulization every 6 (six) hours as needed for wheezing or shortness of breath    albuterol (PROVENTIL HFA,VENTOLIN HFA) 90 mcg/act inhaler INHALE ONE PUFF BY MOUTH AND INTO THE LUNGS EVERY 6 HOURS AS NEEDED FOR WHEEZING    Alcohol Swabs (Pharmacist Choice Alcohol) PADS Apply topically 4 (four) times a day Use as directed    aspirin (ECOTRIN LOW STRENGTH) 81 mg EC tablet TAKE ONE TABLET BY MOUTH ONCE DAILY    atorvastatin (LIPITOR) 80 mg tablet TAKE ONE TABLET BY MOUTH DAILY AT BEDTIME    B-D UF III MINI PEN NEEDLES 31G X 5 MM MISC Pt uses 5 pen needles daily    benzonatate (TESSALON) 200 MG capsule Take 1 capsule (200 mg total) by mouth 3 (three) times a day as needed for cough    cholecalciferol (VITAMIN D3) 1,000 units tablet Take 2 tablets (2,000 Units total) by mouth daily    citalopram (CeleXA) 40 mg tablet TAKE ONE TABLET BY MOUTH DAILY    Continuous Blood Gluc Sensor (FreeStyle Refugio 2 Sensor) MISC Use 1 each every 14 (fourteen) days    docusate sodium (COLACE) 100 mg capsule TAKE ONE CAPSULE BY MOUTH TWICE DAILY    dulaglutide (Trulicity) 4.5 MG/0.5ML injection Inject 0.5 mL (4.5 mg total) under the skin every 7 days    ergocalciferol (VITAMIN D2) 50,000 units Take 1 capsule (50,000 Units total) by mouth once a week    famotidine (PEPCID) 40 MG  "tablet TAKE ONE TABLET BY MOUTH TWO HOURS PRIOR TO BEDTIME    fenofibrate micronized (LOFIBRA) 134 MG capsule Take 1 capsule (134 mg total) by mouth daily with breakfast    Insulin Regular Human, Conc, (HumuLIN R U-500 KwikPen) 500 units/mL CONCENTRATED U-500 injection pen Take 85 units with breakfast, 65 units with lunch, 65 with dinner, or as directed TDD up to 250 units    Iron Heme Polypeptide 12 MG TABS Take 1 tablet by mouth    levETIRAcetam (KEPPRA) 500 mg tablet TAKE ONE TABLET BY MOUTH TWICE DAILY    linaCLOtide (Linzess) 72 MCG CAPS Take 1 capsule by mouth daily before breakfast    meclizine (ANTIVERT) 25 mg tablet Take 1 tablet (25 mg total) by mouth 3 (three) times a day as needed for dizziness    metoclopramide (Reglan) 10 mg tablet Take 0.5 tablets (5 mg total) by mouth every 6 (six) hours    metoprolol tartrate (LOPRESSOR) 25 mg tablet TAKE ONE TABLET BY MOUTH TWICE DAILY    multivitamin (THERAGRAN) TABS Take 1 tablet by mouth every morning    nystatin (MYCOSTATIN) cream Apply topically 2 (two) times a day    ondansetron (ZOFRAN) 4 mg tablet Take 1 tablet (4 mg total) by mouth every 6 (six) hours    OneTouch Ultra test strip USE 4 TIMES A DAY    Pharmacist Choice Lancets MISC USE AS DIRECTED    pregabalin (LYRICA) 100 mg capsule TAKE ONE CAPSULE BY MOUTH THREE TIMES A DAY    Restasis 0.05 % ophthalmic emulsion     solifenacin (VESIcare) 5 mg tablet Take 1 tablet (5 mg total) by mouth daily    [DISCONTINUED] Empagliflozin (Jardiance) 25 MG TABS Take 1 tablet (25 mg total) by mouth every morning    Continuous Blood Gluc  (FreeStyle Refugio 14 Day [place]) TEMITOPE Use for continuous blood glucose monitoring (Patient not taking: Reported on 1/12/2024)       Objective     /66   Pulse 69   Temp 97.5 °F (36.4 °C)   Resp 18   Ht 5' 1\" (1.549 m)   Wt 93.9 kg (207 lb)   SpO2 97%   BMI 39.11 kg/m²     Physical Exam  Vitals and nursing note reviewed.   Constitutional:       General: She is not in " acute distress.     Appearance: She is well-developed. She is not diaphoretic.   HENT:      Head: Normocephalic and atraumatic.      Right Ear: External ear normal. There is impacted cerumen. A PE tube is present.      Left Ear: Tympanic membrane, ear canal and external ear normal. A PE tube is present.      Ears:      Comments: Right ear- blocked with blue tube and blackish dried blood/cerumen     Nose: Congestion and rhinorrhea present.      Mouth/Throat:      Mouth: Mucous membranes are moist.      Pharynx: Oropharynx is clear. Uvula midline.   Eyes:      General:         Right eye: No discharge.         Left eye: No discharge.      Conjunctiva/sclera: Conjunctivae normal.   Cardiovascular:      Rate and Rhythm: Normal rate and regular rhythm.      Heart sounds: Normal heart sounds. No murmur heard.     No friction rub. No gallop.   Pulmonary:      Effort: Pulmonary effort is normal. No respiratory distress.      Breath sounds: Normal breath sounds. No wheezing or rales.   Neurological:      Mental Status: She is alert and oriented to person, place, and time.      Cranial Nerves: No cranial nerve deficit.      Coordination: Coordination normal.   Psychiatric:         Mood and Affect: Mood normal.         Behavior: Behavior normal.         Thought Content: Thought content normal.         Judgment: Judgment normal.       SYLWIA Larson

## 2024-02-01 NOTE — PATIENT INSTRUCTIONS
Diflucan per patient request for antibiotic use  Doxycycline as directed  Advair inhaler as directed  Call ENT to discuss wax/tube blockage in right ear  Mucinex as directed

## 2024-02-02 ENCOUNTER — EVALUATION (OUTPATIENT)
Dept: PHYSICAL THERAPY | Age: 54
End: 2024-02-02
Payer: COMMERCIAL

## 2024-02-02 DIAGNOSIS — R53.1 ACUTE RIGHT-SIDED WEAKNESS: Primary | ICD-10-CM

## 2024-02-02 PROCEDURE — 97162 PT EVAL MOD COMPLEX 30 MIN: CPT | Performed by: PHYSICAL THERAPIST

## 2024-02-02 PROCEDURE — 97113 AQUATIC THERAPY/EXERCISES: CPT | Performed by: PHYSICAL THERAPIST

## 2024-02-02 NOTE — PROGRESS NOTES
PT Evaluation     Today's date: 2024  Patient name: Haylee Balbuena  : 1970  MRN: [id number]  Referring provider: Kaylan Guzmán CRNP  Dx:   Encounter Diagnosis     ICD-10-CM    1. Acute right-sided weakness  R53.1           Start Time: 1415  Stop Time: 1515  Total time in clinic (min): 60 minutes    Assessment  Assessment details: Haylee Balbuena is a 53 y.o. female who presents with pain, decreased strength, decreased ROM, and balance dysfunction. Due to these impairments, Patient has difficulty performing a/iadls and recreational activities. Patient's clinical presentation is consistent with their referring diagnosis of R sided weakness, cause appears to be conversion disorder. PT evaluation and initiated aquatic session with good tolerance. She did have PT in the past in the water and feels comfortable in the water. Patient would benefit from skilled physical therapy to address their aforementioned impairments, improve their level of function and to improve their overall quality of life.  Impairments: abnormal or restricted ROM, activity intolerance, impaired balance, impaired physical strength, lacks appropriate home exercise program, pain with function, poor posture  and poor body mechanics    Symptom irritability: moderateUnderstanding of Dx/Px/POC: good   Prognosis: good    Goals  ST-4 WEEKS  1.  Decrease LB pain < 8/10 on VAS at its worst.  2.  Increase ROM by > 5 deg in all deficients planes.  3.  Increase RUE, RLE and core strength by 1/2 MMT grade.  4. Increase tolerance to activity and pool therapy > 45 min.     LT-6 WEEKS  1. Patient to be independent with a/iadls.  2. Increase functional activities for leisure and home activities to previous LOF.  3. Independent with HEP and/or fitness program.  4.improve ambulation with least AD on level surfaces for home and community distances.     Plan  Patient would benefit from: skilled physical therapy  Planned modality interventions:  cryotherapy, thermotherapy: hydrocollator packs and unattended electrical stimulation  Planned therapy interventions: activity modification, behavior modification, body mechanics training, aquatic therapy, flexibility, functional ROM exercises, home exercise program, IADL retraining, joint mobilization, manual therapy, neuromuscular re-education, patient education, postural training, strengthening, stretching, therapeutic activities, therapeutic exercise and abdominal trunk stabilization  Frequency: 2-3x week.  Duration in weeks: 12  Plan of Care beginning date: 2024  Plan of Care expiration date: 2024  Treatment plan discussed with: patient      Subjective Evaluation    History of Present Illness  Date of onset: 2024  Mechanism of injury: Patient reports she developed right sided weakness and unable to move the beginning of January. She went to ED and was admitted after a transfer to one with opening. She was hospitalized for several days. R/o stroke and diagnosed with conversion disorder as the cause. She states since she is out of the hospital she is slowly making gains. She is able to walk now but only 5-10 min. She states her right arm is much better. She uses SPC for longer distances. She is referred for aquatic therapy.   Patient Goals  Patient goals for therapy: decreased pain, increased motion, increased strength, improved balance and independence with ADLs/IADLs  Patient goal: walk farther, and better  Pain  Current pain ratin  At best pain ratin  At worst pain rating: 10  Location: R LE mostly with walking, standing  Quality: needle-like, sharp and throbbing  Relieving factors: rest  Aggravating factors: standing and walking  Progression: improved    Social Support  Steps to enter house: yes  3  Stairs in house: yes   Lives in: multiple-level home  Lives with: spouse    Employment status: not working  Hand dominance: right      Diagnostic Tests  X-ray: normal    FCE comments: Hospital  1/4/24 for several days and r/o CVA      Objective     Static Posture     Thoracic Spine  Hyperkyphosis.    Comments  BP: 111/63 HR 70    Postural Observations  Seated posture: fair  Standing posture: fair      Palpation   Left   Hypertonic in the erector spinae and lumbar paraspinals.   Tenderness of the erector spinae and lumbar paraspinals.     Right   Hypertonic in the erector spinae, lumbar paraspinals and quadratus lumborum.   Tenderness of the erector spinae, lumbar paraspinals and quadratus lumborum.     Tenderness     Left Hip   Tenderness in the PSIS.     Right Hip   Tenderness in the PSIS.     Neurological Testing     Sensation     Lumbar   Left   Intact: light touch    Right   Intact: light touch    Additional Neurological Details  Patient c/o tingling in Ue's and LE's occasionally and not regular.    Active Range of Motion     Lumbar   Flexion: Active lumbar flexion: tips to proximal shins.  with pain Restriction level: moderate  Extension: 12 degrees  with pain Restriction level: minimal  Left lateral flexion:  with pain Restriction level: minimal  Right lateral flexion:  with pain Restriction level: minimal    Strength/Myotome Testing     Left Hip   Planes of Motion   Flexion: 4+  Extension: 4+  Abduction: 4+  Adduction: 4+    Right Hip   Planes of Motion   Flexion: 4-  Extension: 4-  Abduction: 4-  Adduction: 4    Left Knee   Flexion: 4+  Extension: 4+    Right Knee   Flexion: 4-  Extension: 4-    Left Ankle/Foot   Dorsiflexion: 4  Plantar flexion: 4    Right Ankle/Foot   Dorsiflexion: 4-  Plantar flexion: 4-    Additional Strength Details  CORE is 3+/5    Tests     Lumbar   Negative sacroiliac distraction.     Left   Negative passive SLR and slump test.     Right   Positive passive SLR.   Negative slump test.     Left Hip   Negative FRANK and FADIR.     Right Hip   Negative FRANK and FADIR.     Ambulation   Weight-Bearing Status   Assistive device used: none    Additional Weight-Bearing Status  Details  Uses SPC for longer distances >5-10 minutes    Observational Gait   Gait: antalgic and asymmetric   Decreased walking speed, stride length and right stance time.     Quality of Movement During Gait     Additional Quality of Movement During Gait Details  Decreased floor clearance.    Functional Assessment        Single Leg Stance   Left: 3 seconds  Right: 1 seconds    Comments  TU seconds with no AD    General Comments:      Hip Comments   General decondition             POC expires Unit limit Auth Expiration date PT/OT + Visit Limit?   24 na na 24                           Visit/Unit Tracking  AUTH Status:  Date               Auth needed after visit 24 Used 1               Remaining  23               FOTO                    Precautions: DM. HTN, kidney CA 2018?, seizures,     Daily Treatment Diary     Date           Water Walk (FW, BW, side) x10’  5' forw w/N          LE work at wall               Hip FF/ext swing  2            Toe/heel  1            Hip ABD/ADD  2                        Knee flexion & extension             Squats 1          UE noodle x3             Push/pull              Rotate              Row forward & back              OH side bend            UE/Core (AROM, paddles, MB)  AROM            ABD/ADD  1            Horizontal Pec Fly  1            Alt. arm swing (shld flex/ext)  1            Push pull              Single paddle rotation           Shoulder shrugs, back rolls 1          Aqua Step (FW, lat, eccentric)            Core work:              MF press (red, blue, blk)             Kickboard press (blue, red)              Row/ext w/ tubing (red, blue, blk)           Seated on bench             ankle DF/PF              March              ABD/ADD              Knee flexion/extension            DW TX - hang in the deep water              DW ABD/ADD              DW Bicycle              DW Scissor hip flexion/extension            Stretches              HS/calf               Wall stretches             Other:            Hot Tub - 10 minutes only  5

## 2024-02-05 ENCOUNTER — PATIENT OUTREACH (OUTPATIENT)
Dept: FAMILY MEDICINE CLINIC | Facility: CLINIC | Age: 54
End: 2024-02-05

## 2024-02-05 DIAGNOSIS — E78.00 HYPERCHOLESTEREMIA: ICD-10-CM

## 2024-02-05 RX ORDER — ATORVASTATIN CALCIUM 80 MG/1
80 TABLET, FILM COATED ORAL
Qty: 180 TABLET | Refills: 3 | Status: SHIPPED | OUTPATIENT
Start: 2024-02-05

## 2024-02-05 NOTE — TELEPHONE ENCOUNTER
Patient requesting refill(s) of: Lipitor    Last filled: 1/10/24  Last appt: 2/1/24  Next appt: None  Pharmacy: [facility]

## 2024-02-05 NOTE — PROGRESS NOTES
CMOC attempted to contact Haylee to discuss the referral for assistance with health insurance.     No answer. Left message on voicemail with reason for call, this writer's contact information, and a request for a call back to assist.     Will outreach again in two weeks if no contact prior.

## 2024-02-06 ENCOUNTER — OFFICE VISIT (OUTPATIENT)
Dept: PHYSICAL THERAPY | Age: 54
End: 2024-02-06
Payer: COMMERCIAL

## 2024-02-06 ENCOUNTER — APPOINTMENT (OUTPATIENT)
Dept: URGENT CARE | Facility: CLINIC | Age: 54
End: 2024-02-06

## 2024-02-06 ENCOUNTER — TELEPHONE (OUTPATIENT)
Dept: ENDOCRINOLOGY | Facility: CLINIC | Age: 54
End: 2024-02-06

## 2024-02-06 ENCOUNTER — TELEPHONE (OUTPATIENT)
Dept: NEUROLOGY | Facility: CLINIC | Age: 54
End: 2024-02-06

## 2024-02-06 DIAGNOSIS — R53.1 ACUTE RIGHT-SIDED WEAKNESS: Primary | ICD-10-CM

## 2024-02-06 PROCEDURE — 97113 AQUATIC THERAPY/EXERCISES: CPT

## 2024-02-06 PROCEDURE — 97150 GROUP THERAPEUTIC PROCEDURES: CPT

## 2024-02-06 NOTE — TELEPHONE ENCOUNTER
Hello,     Can you please advise which Speciality/Team and if patient can be seen by an Resident, AP and or Attending  only?    Thank you for your time,     Leny COOPERU/ CARLOS STEVENS/ Acute right-sided weakness     DC- HOME - 1/6/2024

## 2024-02-06 NOTE — TELEPHONE ENCOUNTER
Refugio 3 CGM report from 1/23 - 2/5/24 reviewed.  Data is inconsistently captured and with variable control, but still only achieving BG target range 24% of time, with extreme hyperglycemia most of the time, 55%. Noted 1-2 days with FBG < 100.    Recommendations:  Is she taking her Humulin U500 insulin as prescribed? No missed, skipped forgotten doses?  We have documented Humulin U500 is 85 units with breakfast, 65 with lunch, 65 with dinner. If this is correct, recommend increase to 95 units with breakfast 75 units with lunch, while continuing 65 units with dinner.     Also recommend scan Refugio 2 CGM more frequently to capture more data points.

## 2024-02-06 NOTE — PROGRESS NOTES
Daily Note     Today's date: 2024  Patient name: Haylee Balbuena  : 1970  MRN: [id number]  Referring provider: Kaylan Guzmán CRNP  Dx:   Encounter Diagnosis     ICD-10-CM    1. Acute right-sided weakness  R53.1           Start Time: 1000  Stop Time: 1100  Total time in clinic (min): 60 minutes    Subjective: pt notes R leg was sore after initial session. She c/o sig weakness to R UE/LE. She notes she is able to lift R UE a little more now.       Objective: See treatment diary below    Pt seen 1:1 for 30 minutes and group therapy for remainder  Assessment: Tolerated treatment fair. Patient would benefit from continued PT. Slow and steady w/ movements in the water. Added step ups and green paddles today for strengthening.       Plan: Continue per plan of care.      POC expires Unit limit Auth Expiration date PT/OT + Visit Limit?   24 na na 24                           Visit/Unit Tracking  AUTH Status:  Date              Auth needed after visit 24 Used 1               FOTO                    Precautions: DM. HTN, kidney CA ?, seizures,     Daily Treatment Diary     Date          Water Walk (FW, BW, side) x10’  5' forw w/N 10         LE work at wall               Hip FF/ext swing  2 2           Toe/heel  1 1           Hip ABD/ADD  2  1           Knee flexion & extension  1           Squats 1 1         UE noodle x3             Push/pull              Rotate              Row forward & back              OH side bend            UE/Core (AROM, paddles, MB)  AROM green           ABD/ADD  1 1           Horizontal Pec Fly  1 1           Alt. arm swing (shld flex/ext)  1 1           Push pull              Single paddle rotation           Shoulder shrugs, back rolls 1 hep         Aqua Step (FW, lat, eccentric)   2' forward         Core work:              MF press (red, blue, blk)             Kickboard press (blue, red)              Row/ext w/  tubing (red, blue, blk)           Seated on bench             ankle DF/PF   1 March 1           ABD/ADD   1           Knee flexion/extension   1         DW TX - hang in the deep water   5           DW ABD/ADD   1           DW Bicycle   5           DW Scissor hip flexion/extension            Stretches              HS/calf   2           Wall stretches             Other:            Hot Tub - 10 minutes only  5 10

## 2024-02-08 LAB
DME PARACHUTE DELIVERY DATE ACTUAL: NORMAL
DME PARACHUTE DELIVERY DATE REQUESTED: NORMAL
DME PARACHUTE ITEM DESCRIPTION: NORMAL
DME PARACHUTE ORDER STATUS: NORMAL
DME PARACHUTE SUPPLIER NAME: NORMAL
DME PARACHUTE SUPPLIER PHONE: NORMAL

## 2024-02-09 ENCOUNTER — OFFICE VISIT (OUTPATIENT)
Dept: PHYSICAL THERAPY | Age: 54
End: 2024-02-09
Payer: COMMERCIAL

## 2024-02-09 DIAGNOSIS — R53.1 ACUTE RIGHT-SIDED WEAKNESS: Primary | ICD-10-CM

## 2024-02-09 PROCEDURE — 97113 AQUATIC THERAPY/EXERCISES: CPT

## 2024-02-09 NOTE — PROGRESS NOTES
Daily Note     Today's date: 2024  Patient name: Haylee Balbuena  : 1970  MRN: [id number]  Referring provider: Kaylan Guzmán CRNP  Dx:   Encounter Diagnosis     ICD-10-CM    1. Acute right-sided weakness  R53.1           Start Time: 0800  Stop Time: 0900  Total time in clinic (min): 60 minutes    Subjective: pt notes R leg cramps at night post last session.       Objective: See treatment diary below  Assessment: Tolerated treatment fairly well. Improved movements w/ all ex's today. R LE is still weak. Patient would benefit from continued PT.     Plan: Continue per plan of care.      POC expires Unit limit Auth Expiration date PT/OT + Visit Limit?   24 na na 24                           Visit/Unit Tracking  AUTH Status:  Date             Auth needed after visit 24 Used 1 2 3             Remaining  23 22 21             FOTO                    Precautions: DM. HTN, kidney CA ?, seizures,     Daily Treatment Diary     Date         Water Walk (FW, BW, side) x10’  5' forw w/N 10 10 no N        LE work at wall               Hip FF/ext swing  2 2 2          Toe/heel  1 1 1          Hip ABD/ADD  2 2 2           1 1          Knee flexion & extension  1 1          Squats 1 1 1        UE noodle x3             Push/pull              Rotate              Row forward & back              OH side bend            UE/Core (AROM, paddles, MB)  AROM green g          ABD/ADD  1 1 1          Horizontal Pec Fly  1 1 1          Alt. arm swing (shld flex/ext)  1 1 1          Push pull    1          Single paddle rotation           Shoulder shrugs, back rolls 1 hep         Aqua Step (FW, lat, eccentric)   2' forward 2        Core work:              MF press (red, blue, blk)             Kickboard press (blue, red)              Row/ext w/ tubing (red, blue, blk)           Seated on bench             ankle DF/PF   1  1          ABD/ADD   1 1          Knee flexion/extension    1 1        DW TX - hang in the deep water   5 8          DW ABD/ADD   1 1          DW Bicycle   5 5          DW Scissor hip flexion/extension    1        Stretches              HS/calf   2 2          Wall stretches             Other:            Hot Tub - 10 minutes only  5 10 10

## 2024-02-12 ENCOUNTER — OFFICE VISIT (OUTPATIENT)
Dept: PHYSICAL THERAPY | Age: 54
End: 2024-02-12
Payer: COMMERCIAL

## 2024-02-12 ENCOUNTER — PATIENT OUTREACH (OUTPATIENT)
Dept: FAMILY MEDICINE CLINIC | Facility: CLINIC | Age: 54
End: 2024-02-12

## 2024-02-12 DIAGNOSIS — R53.1 ACUTE RIGHT-SIDED WEAKNESS: Primary | ICD-10-CM

## 2024-02-12 PROCEDURE — 97113 AQUATIC THERAPY/EXERCISES: CPT

## 2024-02-12 NOTE — PROGRESS NOTES
Daily Note     Today's date: 2024  Patient name: Haylee Balbuena  : 1970  MRN: [id number]  Referring provider: Kaylan Guzmán CRNP  Dx:   Encounter Diagnosis     ICD-10-CM    1. Acute right-sided weakness  R53.1           Start Time: 1100  Stop Time: 1200  Total time in clinic (min): 60 minutes    Subjective: Patient offers no new complaints today.       Objective: See treatment diary below      Assessment: Tolerated treatment fairly well, cues for ex technique and posture. No c/o pain with TE. Patient exhibited good technique with therapeutic exercises and would benefit from continued PT      Plan: Continue per plan of care.      POC expires Unit limit Auth Expiration date PT/OT + Visit Limit?   24 na na 24                           Visit/Unit Tracking  AUTH Status:  Date            Auth needed after visit 24 Used 1 2 3 4            Remaining  23 22 21 20            FOTO                    Precautions: DM. HTN, kidney CA ?, seizures,     Daily Treatment Diary     Date        Water Walk (FW, BW, side) x10’  5' forw w/N 10 10 no N 10       LE work at wall               Hip FF/ext swing  2 2 2 2         Toe/heel  1 1 1 1         Hip ABD/ADD  2 2 2 2         March 1 1 1 1         Knee flexion & extension  1 1 1         Squats 1 1 1 1       UE noodle x3             Push/pull              Rotate              Row forward & back              OH side bend            UE/Core (AROM, paddles, MB)  AROM green g grn         ABD/ADD  1 1 1 1         Horizontal Pec Fly  1 1 1 1         Alt. arm swing (shld flex/ext)  1 1 1 1         Push pull    1 1         Single paddle rotation           Shoulder shrugs, back rolls 1 hep         Aqua Step (FW, lat, eccentric)   2' forward 2 2' F       Core work:              MF press (red, blue, blk)             Kickboard press (blue, red)              Row/ext w/ tubing (red, blue, blk)           Seated on bench             ankle DF/PF   1  1 1 March   1 1 1         ABD/ADD   1 1 1         Knee flexion/extension   1 1 1       DW TX - hang in the deep water   5 8 8         DW ABD/ADD   1 1 1         DW Bicycle   5 5 5         DW Scissor hip flexion/extension    1 1       Stretches              HS/calf   2 2 2         Wall stretches             Other:            Hot Tub - 10 minutes only  5 10 10 10

## 2024-02-13 ENCOUNTER — APPOINTMENT (OUTPATIENT)
Dept: PHYSICAL THERAPY | Age: 54
End: 2024-02-13
Payer: COMMERCIAL

## 2024-02-15 ENCOUNTER — APPOINTMENT (OUTPATIENT)
Dept: PHYSICAL THERAPY | Age: 54
End: 2024-02-15
Payer: COMMERCIAL

## 2024-02-16 ENCOUNTER — PATIENT OUTREACH (OUTPATIENT)
Dept: FAMILY MEDICINE CLINIC | Facility: CLINIC | Age: 54
End: 2024-02-16

## 2024-02-16 NOTE — PROGRESS NOTES
Spoke with Haylee. Reports she feels run down. Falls asleep easily during the day.Does not sleep well at night. States she has trouble remembering things at time.   Blood sugars can be 100's-300' More often her readings are in the 100's-200's. Feels she is more sluggish when blood sugar are lower in the 100's. Taking insulin as ordered by endocrinology   Haylee would like an appointment with Kaylan to go over these symptoms.

## 2024-02-16 NOTE — PROGRESS NOTES
I'm overbooked for today and away next week. Can you give her the regular appointment open with Dr. Metz on 2/21 at 9am? If worse before then, she can go to the ER for labs/treatment

## 2024-02-16 NOTE — PROGRESS NOTES
Called and spoke with pt, declined visit with dr mabry on Wednesday at this time as she has therapy that day. Is going to try and move therapy and call office back.

## 2024-02-20 ENCOUNTER — PATIENT OUTREACH (OUTPATIENT)
Dept: FAMILY MEDICINE CLINIC | Facility: CLINIC | Age: 54
End: 2024-02-20

## 2024-02-20 NOTE — PROGRESS NOTES
CMOC contacted Haylee to assist with health insurance.    Per Haylee, she has [employer] medical assistance until 3/31/2024. She states this insurance will be discontinued d/t their household income exceeding the income limits for medicaid. She has also recently started working 16-20 hours/week.     CMOC informed Haylee of the [employer] website for [place] health insurance options and that she may be eligible for an Advance Premium Tax Credit discount. Haylee agreed to have the website information sent to her email and plans to review the available plans later as she is not currently at home.     Will outreach in 2 weeks for a status update and to assist as needed.

## 2024-02-21 ENCOUNTER — OFFICE VISIT (OUTPATIENT)
Dept: PHYSICAL THERAPY | Age: 54
End: 2024-02-21
Payer: COMMERCIAL

## 2024-02-21 DIAGNOSIS — Z79.4 TYPE 2 DIABETES MELLITUS WITH HYPERGLYCEMIA, WITH LONG-TERM CURRENT USE OF INSULIN (HCC): ICD-10-CM

## 2024-02-21 DIAGNOSIS — E11.65 TYPE 2 DIABETES MELLITUS WITH HYPERGLYCEMIA, WITH LONG-TERM CURRENT USE OF INSULIN (HCC): ICD-10-CM

## 2024-02-21 DIAGNOSIS — R53.1 ACUTE RIGHT-SIDED WEAKNESS: Primary | ICD-10-CM

## 2024-02-21 PROCEDURE — 97113 AQUATIC THERAPY/EXERCISES: CPT

## 2024-02-21 RX ORDER — ASPIRIN 81 MG/1
81 TABLET ORAL DAILY
Qty: 30 TABLET | Refills: 1 | Status: SHIPPED | OUTPATIENT
Start: 2024-02-21

## 2024-02-21 NOTE — PROGRESS NOTES
Daily Note     Today's date: 2024  Patient name: Haylee Balbuena  : 1970  MRN: [id number]  Referring provider: Kaylan Guzmán CRNP  Dx:   Encounter Diagnosis     ICD-10-CM    1. Acute right-sided weakness  R53.1           Start Time: 1000  Stop Time: 1100  Total time in clinic (min): 60 minutes    Subjective: Patient reports some Pain in her R LE today.       Objective: See treatment diary below      Assessment: Tolerated treatment well, no c/o increased pain in the water today, cues for proper ex technique. Patient exhibited good technique with therapeutic exercises and would benefit from continued PT      Plan: Continue per plan of care.      POC expires Unit limit Auth Expiration date PT/OT + Visit Limit?   24 na na 24                           Visit/Unit Tracking  AUTH Status:  Date           Auth needed after visit 24 Used 1 2 3 4 5           Remaining  23 22 21 20 19           FOTO                    Precautions: DM. HTN, kidney CA ?, seizures,     Daily Treatment Diary     Date       Water Walk (FW, BW, side) x10’  5' forw w/N 10 10 no N 10 10      LE work at wall               Hip FF/ext swing  2 2 2 2 2        Toe/heel  1 1 1 1 1        Hip ABD/ADD  2 2 2 2 2        March 1 1 1 1 1        Knee flexion & extension  1 1 1 1        Squats 1 1 1 1 1      UE noodle x3             Push/pull              Rotate              Row forward & back              OH side bend            UE/Core (AROM, paddles, MB)  AROM green g grn grn        ABD/ADD  1 1 1 1 1        Horizontal Pec Fly  1 1 1 1 1        Alt. arm swing (shld flex/ext)  1 1 1 1 1        Push pull    1 1 1        Single paddle rotation           Shoulder shrugs, back rolls 1 hep         Aqua Step (FW, lat, eccentric)   2' forward 2 2' F 2'      Core work:              MF press (red, blue, blk)             Kickboard press (blue, red)              Row/ext w/ tubing (red, blue, blk)            Seated on bench             ankle DF/PF   1 1 1 1        March   1 1 1 1        ABD/ADD   1 1 1 1        Knee flexion/extension   1 1 1 1      DW TX - hang in the deep water   5 8 8 8        DW ABD/ADD   1 1 1 1        DW Bicycle   5 5 5 5        DW Scissor hip flexion/extension    1 1 1      Stretches              HS/calf   2 2 2 2        Wall stretches             Other:            Hot Tub - 10 minutes only  5 10 10 10 10

## 2024-02-22 ENCOUNTER — OFFICE VISIT (OUTPATIENT)
Dept: PHYSICAL THERAPY | Age: 54
End: 2024-02-22
Payer: COMMERCIAL

## 2024-02-22 DIAGNOSIS — R53.1 ACUTE RIGHT-SIDED WEAKNESS: Primary | ICD-10-CM

## 2024-02-22 PROCEDURE — 97113 AQUATIC THERAPY/EXERCISES: CPT

## 2024-02-22 NOTE — PROGRESS NOTES
Daily Note     Today's date: 2024  Patient name: Haylee Balbuena  : 1970  MRN: [id number]  Referring provider: Kaylan Guzmán CRNP  Dx:   Encounter Diagnosis     ICD-10-CM    1. Acute right-sided weakness  R53.1           Start Time: 1000  Stop Time: 1100  Total time in clinic (min): 60 minutes    Subjective: Patient reports that she is having some pain in her R leg today. She notes that she exercises at home between therapy sessions.       Objective: See treatment diary below      Assessment: Tolerated treatment well with ex program today, added lat step ups today and increased DW exercise time. No c/o pain with TE. Patient exhibited good technique with therapeutic exercises and would benefit from continued PT      Plan: Continue per plan of care.      POC expires Unit limit Auth Expiration date PT/OT + Visit Limit?   24 na na 24                           Visit/Unit Tracking  AUTH Status:  Date          Auth needed after visit 24 Used 1 2 3 4 5 6          Remaining   18          FOTO                    Precautions: DM. HTN, kidney CA ?, seizures,     Daily Treatment Diary     Date      Water Walk (FW, BW, side) x10’  5' forw w/N 10 10 no N 10 10 10     LE work at wall               Hip FF/ext swing  2 2 2 2 2 2       Toe/heel  1 1 1 1 1 1       Hip ABD/ADD  2 2 2 2 2 2       March 1 1 1 1 1 1       Knee flexion & extension  1 1 1 1 1       Squats 1 1 1 1 1 1     UE noodle x3             Push/pull              Rotate              Row forward & back              OH side bend            UE/Core (AROM, paddles, MB)  AROM green g grn grn grn       ABD/ADD  1 1 1 1 1 1       Horizontal Pec Fly  1 1 1 1 1 1       Alt. arm swing (shld flex/ext)  1 1 1 1 1 1       Push pull    1 1 1 1       Single paddle rotation           Shoulder shrugs, back rolls 1 hep         Aqua Step (FW, lat, eccentric)   2' forward 2 2' F 2' 4'     Core work:               MF press (red, blue, blk)             Kickboard press (blue, red)              Row/ext w/ tubing (red, blue, blk)           Seated on bench             ankle DF/PF   1 1 1 1 1       March   1 1 1 1 1       ABD/ADD   1 1 1 1 1       Knee flexion/extension   1 1 1 1 2     DW TX - hang in the deep water   5 8 8 8 8       DW ABD/ADD   1 1 1 1 2       DW Bicycle   5 5 5 5 6       DW Scissor hip flexion/extension    1 1 1 2     Stretches              HS/calf   2 2 2 2 2       Wall stretches             Other:            Hot Tub - 10 minutes only  5 10 10 10 10 10

## 2024-02-27 ENCOUNTER — OFFICE VISIT (OUTPATIENT)
Dept: PHYSICAL THERAPY | Age: 54
End: 2024-02-27
Payer: COMMERCIAL

## 2024-02-27 DIAGNOSIS — R53.1 ACUTE RIGHT-SIDED WEAKNESS: Primary | ICD-10-CM

## 2024-02-27 PROCEDURE — 97113 AQUATIC THERAPY/EXERCISES: CPT

## 2024-02-27 NOTE — PROGRESS NOTES
Daily Note     Today's date: 2024  Patient name: Haylee Balbuena  : 1970  MRN: [id number]  Referring provider: Kaylan Guzmán CRNP  Dx:   Encounter Diagnosis     ICD-10-CM    1. Acute right-sided weakness  R53.1           Start Time: 1000  Stop Time: 1100  Total time in clinic (min): 60 minutes    Subjective: pt notes pain level today 7/10 w/ sig radicular pain down R LE. She states she was a bday party this weekend and over did it.     Objective: See treatment diary below      Assessment: Tolerated treatment fair. Patient would benefit from continued PT. Pt able to complete all ex's as per flowsheet today. No c/o increased pain. Slow gains w/ LE strength.         Plan: Continue per plan of care.      POC expires Unit limit Auth Expiration date PT/OT + Visit Limit?   24 na na 24                           Visit/Unit Tracking  AUTH Status:  Date         Auth needed after visit 24 Used 1 2 3 4 5 6 7         Remaining   18 17         FOTO                    Precautions: DM. HTN, kidney CA ?, seizures,     Daily Treatment Diary     Date     Water Walk (FW, BW, side) x10’  5' forw w/N 10 10 no N 10 10 10 10    LE work at wall               Hip FF/ext swing  2 2 2 2 2 2 2      Toe/heel  1 1 1 1 1 1 1      Hip ABD/ADD  2 2 2 2 2 2 2      March 1 1 1 1 1 1 1      Knee flexion & extension  1 1 1 1 1 1      Squats 1 1 1 1 1 1 1    UE noodle x3             Push/pull              Rotate              Row forward & back              OH side bend            UE/Core (AROM, paddles, MB)  AROM green g grn grn grn gr      ABD/ADD  1 1 1 1 1 1 1      Horizontal Pec Fly  1 1 1 1 1 1 1      Alt. arm swing (shld flex/ext)  1 1 1 1 1 1 1      Push pull    1 1 1 1 1      Single paddle rotation           Shoulder shrugs, back rolls 1 hep         Aqua Step (FW, lat, eccentric)   2' forward 2 2' F 2' 4' 4    Core work:              MF press (red,  blue, blk)             Kickboard press (blue, red)              Row/ext w/ tubing (red, blue, blk)           Seated on bench             ankle DF/PF   1 1 1 1 1 1      March   1 1 1 1 1 1      ABD/ADD   1 1 1 1 1 1      Knee flexion/extension   1 1 1 1 2 2    DW TX - hang in the deep water   5 8 8 8 8 5      DW ABD/ADD   1 1 1 1 2 2      DW Bicycle   5 5 5 5 6 6      DW Scissor hip flexion/extension    1 1 1 2 2    Stretches              HS/calf   2 2 2 2 2 2      Wall stretches             Other:            Hot Tub - 10 minutes only  5 10 10 10 10 10 10

## 2024-02-28 ENCOUNTER — PATIENT OUTREACH (OUTPATIENT)
Dept: FAMILY MEDICINE CLINIC | Facility: CLINIC | Age: 54
End: 2024-02-28

## 2024-02-28 NOTE — PROGRESS NOTES
Spoke with Emily. Will see PCP on 3/4/24. Remains very tired. Working with PT for her leg and knee. Will consider using her knee brace again since her knee gives out at times.  because she is stressed and in pain. Discussed following some of the food choices I sent her. Along with using sugar free creamer. Working with [facility] for insurance help. She would like call back in one month. Explained Cassandra will be new care manager and provided emily with her number.

## 2024-02-29 ENCOUNTER — APPOINTMENT (OUTPATIENT)
Dept: PHYSICAL THERAPY | Age: 54
End: 2024-02-29
Payer: COMMERCIAL

## 2024-02-29 DIAGNOSIS — R53.1 ACUTE RIGHT-SIDED WEAKNESS: Primary | ICD-10-CM

## 2024-02-29 NOTE — PROGRESS NOTES
Daily Note     Today's date: 2024  Patient name: Haylee Balbuena  : 1970  MRN: [id number]  Referring provider: Kaylan Guzmán CRNP  Dx:   Encounter Diagnosis     ICD-10-CM    1. Acute right-sided weakness  R53.1                      Subjective: ***      Objective: See treatment diary below      Assessment: Tolerated treatment {Tolerated treatment :0246229258}. Patient {assessment:7156098748}      Plan: {PLAN:5364708222}     POC expires Unit limit Auth Expiration date PT/OT + Visit Limit?   24 na na 24                           Visit/Unit Tracking  AUTH Status:  Date         Auth needed after visit 24 Used 1 2 3 4 5 6 7         Remaining   19 18 17         FOTO                    Precautions: DM. HTN, kidney CA ?, seizures,     Daily Treatment Diary     Date     Water Walk (FW, BW, side) x10’  5' forw w/N 10 10 no N 10 10 10 10    LE work at wall               Hip FF/ext swing  2 2 2 2 2 2 2      Toe/heel  1 1 1 1 1 1 1      Hip ABD/ADD  2 2 2 2 2 2 2      March 1 1 1 1 1 1 1      Knee flexion & extension  1 1 1 1 1 1      Squats 1 1 1 1 1 1 1    UE noodle x3             Push/pull              Rotate              Row forward & back              OH side bend            UE/Core (AROM, paddles, MB)  AROM green g grn grn grn gr      ABD/ADD  1 1 1 1 1 1 1      Horizontal Pec Fly  1 1 1 1 1 1 1      Alt. arm swing (shld flex/ext)  1 1 1 1 1 1 1      Push pull    1 1 1 1 1      Single paddle rotation           Shoulder shrugs, back rolls 1 hep         Aqua Step (FW, lat, eccentric)   2' forward 2 2' F 2' 4' 4    Core work:              MF press (red, blue, blk)             Kickboard press (blue, red)              Row/ext w/ tubing (red, blue, blk)           Seated on bench             ankle DF/PF   1 1 1 1 1 1      March   1 1 1 1 1 1      ABD/ADD   1 1 1 1 1 1      Knee flexion/extension   1 1 1 1 2 2    DW TX - hang in the  deep water   5 8 8 8 8 5      DW ABD/ADD   1 1 1 1 2 2      DW Bicycle   5 5 5 5 6 6      DW Scissor hip flexion/extension    1 1 1 2 2    Stretches              HS/calf   2 2 2 2 2 2      Wall stretches             Other:            Hot Tub - 10 minutes only  5 10 10 10 10 10 10

## 2024-03-04 ENCOUNTER — OFFICE VISIT (OUTPATIENT)
Dept: ENDOCRINOLOGY | Facility: CLINIC | Age: 54
End: 2024-03-04
Payer: COMMERCIAL

## 2024-03-04 ENCOUNTER — TELEPHONE (OUTPATIENT)
Dept: FAMILY MEDICINE CLINIC | Facility: CLINIC | Age: 54
End: 2024-03-04

## 2024-03-04 ENCOUNTER — OFFICE VISIT (OUTPATIENT)
Dept: FAMILY MEDICINE CLINIC | Facility: CLINIC | Age: 54
End: 2024-03-04
Payer: COMMERCIAL

## 2024-03-04 VITALS
WEIGHT: 208 LBS | DIASTOLIC BLOOD PRESSURE: 68 MMHG | OXYGEN SATURATION: 96 % | SYSTOLIC BLOOD PRESSURE: 114 MMHG | HEART RATE: 76 BPM | HEIGHT: 61 IN | BODY MASS INDEX: 39.27 KG/M2

## 2024-03-04 VITALS
BODY MASS INDEX: 39.27 KG/M2 | WEIGHT: 208 LBS | OXYGEN SATURATION: 95 % | HEIGHT: 61 IN | DIASTOLIC BLOOD PRESSURE: 70 MMHG | TEMPERATURE: 97.5 F | HEART RATE: 65 BPM | SYSTOLIC BLOOD PRESSURE: 120 MMHG | RESPIRATION RATE: 18 BRPM

## 2024-03-04 DIAGNOSIS — M79.662 BILATERAL CALF PAIN: ICD-10-CM

## 2024-03-04 DIAGNOSIS — G40.909 SEIZURE DISORDER (HCC): Chronic | ICD-10-CM

## 2024-03-04 DIAGNOSIS — E11.43 TYPE 2 DIABETES MELLITUS WITH DIABETIC AUTONOMIC NEUROPATHY, WITH LONG-TERM CURRENT USE OF INSULIN (HCC): ICD-10-CM

## 2024-03-04 DIAGNOSIS — N32.81 OAB (OVERACTIVE BLADDER): ICD-10-CM

## 2024-03-04 DIAGNOSIS — Z79.4 TYPE 2 DIABETES MELLITUS WITH DIABETIC AUTONOMIC NEUROPATHY, WITH LONG-TERM CURRENT USE OF INSULIN (HCC): ICD-10-CM

## 2024-03-04 DIAGNOSIS — I15.2 HYPERTENSION ASSOCIATED WITH DIABETES: Primary | ICD-10-CM

## 2024-03-04 DIAGNOSIS — E11.65 TYPE 2 DIABETES MELLITUS WITH HYPERGLYCEMIA, WITH LONG-TERM CURRENT USE OF INSULIN (HCC): Primary | ICD-10-CM

## 2024-03-04 DIAGNOSIS — Z79.4 TYPE 2 DIABETES MELLITUS WITH HYPERGLYCEMIA, WITH LONG-TERM CURRENT USE OF INSULIN (HCC): Primary | ICD-10-CM

## 2024-03-04 DIAGNOSIS — N18.31 STAGE 3A CHRONIC KIDNEY DISEASE (HCC): ICD-10-CM

## 2024-03-04 DIAGNOSIS — E78.00 HYPERCHOLESTEREMIA: ICD-10-CM

## 2024-03-04 DIAGNOSIS — E11.59 HYPERTENSION ASSOCIATED WITH DIABETES: Primary | ICD-10-CM

## 2024-03-04 DIAGNOSIS — M79.661 BILATERAL CALF PAIN: ICD-10-CM

## 2024-03-04 PROCEDURE — 99214 OFFICE O/P EST MOD 30 MIN: CPT | Performed by: PHYSICIAN ASSISTANT

## 2024-03-04 PROCEDURE — 99214 OFFICE O/P EST MOD 30 MIN: CPT | Performed by: NURSE PRACTITIONER

## 2024-03-04 PROCEDURE — 95251 CONT GLUC MNTR ANALYSIS I&R: CPT | Performed by: PHYSICIAN ASSISTANT

## 2024-03-04 RX ORDER — OXYBUTYNIN CHLORIDE 10 MG/1
10 TABLET, EXTENDED RELEASE ORAL
Qty: 90 TABLET | Refills: 1 | Status: SHIPPED | OUTPATIENT
Start: 2024-03-04

## 2024-03-04 NOTE — PROGRESS NOTES
Name: Haylee Balbuena      : 1970      MRN: [id number]  Encounter Provider: SYLWIA Larson  Encounter Date: 3/4/2024   Encounter department: [facility] PRIMARY [facility]ARE    Assessment & Plan     1. Hypertension associated with diabetes   -     CBC and differential; Future  -     Comprehensive metabolic panel; Future    2. OAB (overactive bladder)  -     oxybutynin (DITROPAN-XL) 10 MG 24 hr tablet; Take 1 tablet (10 mg total) by mouth daily at bedtime    3. Hypercholesteremia  -     Lipid panel; Future    4. Type 2 diabetes mellitus with diabetic autonomic neuropathy, with long-term current use of insulin (HCC)    5. Seizure disorder (HCC)           Subjective      Here for 6 month follow up     -Would like to stop Vesicare to try something else. States it is not working. Reports she goes very often with a lot of urine but does report drinking a lot, due to diabetes. Discussed Oxybutynin. Patient agreeable to this. She will measure urine to determine if frequency is from drinking a lot or from OAB- if 400ml then this is normal, if 100ml then OAB. If from drinking due to high sugars than she can expect that Oxybutynin will not work either.    - A1C 12.4 from 14.3. Managed by endocrinology. Has an appointment later on today.     - Taking other medications as directed. No refills needed.     - Reviewed lab work. All questions answered.     Fall 3 weeks ago- reports she continued with left shin pain and muscle swelling, is in PT for right leg      Review of Systems   Constitutional:  Negative for activity change, diaphoresis, fatigue and fever.   HENT:  Negative for congestion, facial swelling, hearing loss, rhinorrhea, sinus pressure, sinus pain, sneezing, sore throat and voice change.    Eyes:  Negative for discharge and visual disturbance.   Respiratory:  Negative for cough, choking, chest tightness, shortness of breath, wheezing and stridor.    Cardiovascular:  Negative for chest pain,  palpitations and leg swelling.   Gastrointestinal:  Negative for abdominal distention, abdominal pain, constipation, diarrhea, nausea and vomiting.   Endocrine: Negative for polydipsia, polyphagia and polyuria.   Genitourinary:  Positive for frequency. Negative for difficulty urinating, dysuria and urgency.   Musculoskeletal:  Positive for arthralgias. Negative for back pain, neck pain and neck stiffness.   Skin:  Negative for color change, rash and wound.   Neurological:  Negative for dizziness, syncope, speech difficulty, weakness, light-headedness and headaches.   Hematological:  Negative for adenopathy. Does not bruise/bleed easily.   Psychiatric/Behavioral:  Negative for agitation, behavioral problems, confusion, hallucinations, sleep disturbance and suicidal ideas. The patient is not nervous/anxious.    All other systems reviewed and are negative.      Current Outpatient Medications on File Prior to Visit   Medication Sig    albuterol (2.5 mg/3 mL) 0.083 % nebulizer solution Take 3 mL (2.5 mg total) by nebulization every 6 (six) hours as needed for wheezing or shortness of breath    albuterol (PROVENTIL HFA,VENTOLIN HFA) 90 mcg/act inhaler INHALE ONE PUFF BY MOUTH AND INTO THE LUNGS EVERY 6 HOURS AS NEEDED FOR WHEEZING    Alcohol Swabs (Pharmacist Choice Alcohol) PADS Apply topically 4 (four) times a day Use as directed    aspirin (ECOTRIN LOW STRENGTH) 81 mg EC tablet TAKE ONE TABLET BY MOUTH ONCE DAILY    atorvastatin (LIPITOR) 80 mg tablet TAKE ONE TABLET BY MOUTH DAILY AT BEDTIME    cholecalciferol (VITAMIN D3) 1,000 units tablet Take 2 tablets (2,000 Units total) by mouth daily    citalopram (CeleXA) 40 mg tablet TAKE ONE TABLET BY MOUTH DAILY    Continuous Blood Gluc Sensor (FreeStyle Refugio 2 Sensor) MISC Use 1 each every 14 (fourteen) days    docusate sodium (COLACE) 100 mg capsule TAKE ONE CAPSULE BY MOUTH TWICE DAILY    dulaglutide (Trulicity) 4.5 MG/0.5ML injection Inject 0.5 mL (4.5 mg total) under  the skin every 7 days    Empagliflozin (Jardiance) 25 MG TABS Take 1 tablet (25 mg total) by mouth every morning    ergocalciferol (VITAMIN D2) 50,000 units Take 1 capsule (50,000 Units total) by mouth once a week    famotidine (PEPCID) 40 MG tablet TAKE ONE TABLET BY MOUTH TWO HOURS PRIOR TO BEDTIME    fenofibrate micronized (LOFIBRA) 134 MG capsule Take 1 capsule (134 mg total) by mouth daily with breakfast    Fluticasone-Salmeterol (Advair Diskus) 250-50 mcg/dose inhaler Inhale 1 puff 2 (two) times a day Rinse mouth after use.    guaiFENesin (MUCINEX) 600 mg 12 hr tablet Take 1 tablet (600 mg total) by mouth every 12 (twelve) hours    Insulin Regular Human, Conc, (HumuLIN R U-500 KwikPen) 500 units/mL CONCENTRATED U-500 injection pen Take 85 units with breakfast, 65 units with lunch, 65 with dinner, or as directed TDD up to 250 units    Iron Heme Polypeptide 12 MG TABS Take 1 tablet by mouth    levETIRAcetam (KEPPRA) 500 mg tablet TAKE ONE TABLET BY MOUTH TWICE DAILY    linaCLOtide (Linzess) 72 MCG CAPS Take 1 capsule by mouth daily before breakfast    meclizine (ANTIVERT) 25 mg tablet Take 1 tablet (25 mg total) by mouth 3 (three) times a day as needed for dizziness    metoclopramide (Reglan) 10 mg tablet Take 0.5 tablets (5 mg total) by mouth every 6 (six) hours    metoprolol tartrate (LOPRESSOR) 25 mg tablet TAKE ONE TABLET BY MOUTH TWICE DAILY    multivitamin (THERAGRAN) TABS Take 1 tablet by mouth every morning    nystatin (MYCOSTATIN) cream Apply topically 2 (two) times a day    ondansetron (ZOFRAN) 4 mg tablet Take 1 tablet (4 mg total) by mouth every 6 (six) hours    OneTouch Ultra test strip USE 4 TIMES A DAY    Pharmacist Choice Lancets MISC USE AS DIRECTED    pregabalin (LYRICA) 100 mg capsule TAKE ONE CAPSULE BY MOUTH THREE TIMES A DAY    Restasis 0.05 % ophthalmic emulsion     [DISCONTINUED] solifenacin (VESIcare) 5 mg tablet Take 1 tablet (5 mg total) by mouth daily    B-D UF III MINI PEN NEEDLES  "31G X 5 MM MISC Pt uses 5 pen needles daily    benzonatate (TESSALON) 200 MG capsule Take 1 capsule (200 mg total) by mouth 3 (three) times a day as needed for cough (Patient not taking: Reported on 3/4/2024)    Continuous Blood Gluc  (FreeStyle Refugio 14 Day Lipan) TEMITOPE Use for continuous blood glucose monitoring (Patient not taking: Reported on 1/12/2024)       Objective     /70   Pulse 65   Temp 97.5 °F (36.4 °C)   Resp 18   Ht 5' 1\" (1.549 m)   Wt 94.3 kg (208 lb)   SpO2 95%   BMI 39.30 kg/m²     Physical Exam  Vitals and nursing note reviewed. Exam conducted with a chaperone present (mother in law- Cari).   Constitutional:       General: She is not in acute distress.     Appearance: She is well-developed. She is not diaphoretic.   Neck:      Thyroid: No thyromegaly.      Trachea: No tracheal deviation.   Cardiovascular:      Rate and Rhythm: Normal rate and regular rhythm.      Heart sounds: Normal heart sounds. No murmur heard.  Pulmonary:      Effort: Pulmonary effort is normal. No respiratory distress.      Breath sounds: Normal breath sounds. No wheezing.   Musculoskeletal:         General: Swelling present. No tenderness or deformity. Normal range of motion.      Cervical back: Normal range of motion and neck supple.      Comments: Left shin     Skin:     General: Skin is warm and dry.      Findings: No erythema or rash.   Neurological:      Mental Status: She is alert and oriented to person, place, and time.   Psychiatric:         Mood and Affect: Mood normal.         Behavior: Behavior normal. Behavior is cooperative.         Thought Content: Thought content normal.         Judgment: Judgment normal.       SYLWIA Larson    "

## 2024-03-04 NOTE — PATIENT INSTRUCTIONS
Oxybutynin as prescribed- Use urine hat to measure how much volume you are voiding   Continue medications as prescribed   Lab work prior to next visit   Colonoscopy as scheduled   Return with issues/concerns

## 2024-03-04 NOTE — TELEPHONE ENCOUNTER
Vesicare is discontinued, Oxybutinin 10mg is the new prescription. Patient reports Vesicare did not work

## 2024-03-04 NOTE — TELEPHONE ENCOUNTER
Danelle, my name is Christi. I'm a pharmacy technician at the [facility]. Our number here is [phone]. I'm calling in regard to patient Haylee Balbuena. YOB: 1970. It's in regard to her script for oxybutynin chloride 10. It's being rejected because she does take VESA care as well. So it's saying it's a duplicate prescription. If you could give us a call back in regard to this, it's asking for a prior auth. So if you can give us a call back, it would be greatly appreciated again. And her doctor's Kaylan Guzmán again, the number here is [phone]. Thank you.

## 2024-03-04 NOTE — PROGRESS NOTES
Haylee Balbuena 53 y.o. female MRN: [id number]    Encounter: [id number]      Assessment/Plan   Problem List Items Addressed This Visit          Endocrine    Type 2 diabetes mellitus with hyperglycemia, with long-term current use of insulin (HCC) - Primary       Lab Results   Component Value Date    HGBA1C 12.4 (H) 01/05/2024   Diabetes unfortunately remains severely uncontrolled.  Reviewed potential complications of poorly controlled diabetes including increased risk of heart attack, stroke, worsening kidney function, peripheral vascular disease.  Pharmacotherapy discussion - Lack of insulin compliance remains one of Haylee's largest barriers to achieving glycemic target. She is routinely forgetting 1-2 doses of insulin daily. She also carries a fear of hypoglycemia which influences her insulin dosing habits (gives less than recommended dosing due to fear of over-correction). I had an honest conversation with Haylee that it is imperative to improve compliance with insulin - recommended she injects all 3 doses daily. Having said this, no significant Humulin U500 dosing adjustments are recommended today - but compliance with 3 doses is paramount! (Current U500 dosing regimen - 90-70-50) At the same time, Trulicity does not seem to be effective  on BG control or desired weight loss. Ozempic was not well tolerated to to side effects. Due to failure of GLP1 x 2 different alternatives, would strongly benefit from transition to Mounjaro at this time. To continue Jardiance at 25mg daily in setting of CKD. Of note, now that kidney function has recovered, may wish to consider re-introduction of metformin at low dose to help with insulin resistance.  Exercise - briefly discussed, encouraged routine physical activity as important part of DM control.   Diet - willing to see dietician again, referral placed for ASAP consult if possible.   BG monitoring - congtinues to benefit from CGM use due to MDI insulin use. Will upgrade to  Refugio 3 CGM. Haylee given sample to try, should be compatible with her phone - if so, will need Rx for Refugio 3 sensors only, if NOT, will require Refugio 3 reader and sensors.   Return to office in 4-6 weeks due to high risk status.         Relevant Orders    Ambulatory referral to Diabetic Education    Hemoglobin A1C       Genitourinary    Stage 3a chronic kidney disease (HCC)     Lab Results   Component Value Date    EGFR 79 01/06/2024    EGFR 60 01/05/2024    EGFR 50 01/04/2024    CREATININE 0.84 01/06/2024    CREATININE 1.05 01/05/2024    CREATININE 1.22 01/04/2024   Renal function continues to improve, recovering.   Has upcoming follow up visit with nephrology.            Other    Bilateral calf pain     Haylee c/o bilateral calf pain, L > R, with worsening edema as well.   She has a h/o local trauma to the L calf following a fall.   Also with more sedentary habits recently.   Reasonable to r/o DVT in this scenario - venous doppler BLE ordered STAT - Haylee is to call to coordinate US ASAP.         Relevant Orders     VAS VENOUS DUPLEX - LOWER LIMB BILATERAL      I have spent a total time of 35 minutes on 03/05/24 in caring for this patient including Diagnostic results, Prognosis, Importance of tx compliance, Counseling / Coordination of care, Documenting in the medical record, and Obtaining or reviewing history  .     CC: Diabetes    History of Present Illness     HPI:  Haylee returns for close interval follow-up visit for uncontrolled type 2 diabetes with chronic insulin use.  Diabetes complications include gastroparesis, neuropathy.     Haylee admits to a lot of stressors recently related to recent house cast as well as struggles with losing her insurance coverage again at the end of the month.  She has not had any illnesses or recent hospitalizations however.   She is looking forward to upcoming [employer] cruise, leaving later this week.    She also mentions ongoing worsening bilateral calf pain, left greater  than right, with some increase in edema as well.    She is also excessive polydipsia and polyuria which is worsened with hyperglycemia.    Weight loss remains a goal for Haylee.  She quotes a goal weight of 150 pounds.  Unfortunately, she has been slowly gaining weight despite maximum Trulicity dose.  Ozempic was unfortunately not well-tolerated due to GI adverse effects.    Current DM medication review:   Trulicity 4.5mg weekly on Wednesdays  Jardiance 25mg daily  Humulin U500 -currently taking 90 units with breakfast, 70 units with lunch, ranging between 50-70 in evening -reduce his dose at times due to hypoglycemia fear.  Of note, usually only taking insulin 1-2 times a day -usually skipping lunch in particular.    Diet -still drinking regular soda, creamer in coffee, but trying toavoid.     Blood glucose monitoring is via Refugio 2 CGM.  See below for CGM summary report.    Dietician visit -none recently, in 2022 she had a MNT visit with CDE but at the time felt she was being scolded and this has led her to avoid future dietitian visits.    CGM summary report.  Haylee Balbuena   Device used - Refugio 2, Home use     Indication: Type 2 Diabetes    More than 72 hours of data was reviewed. Report to be scanned to chart.     Date Range: 2/19/24 - 3/2/24    Analysis of data:   % time CGM used: 47%  Average Glucose: 363mg/dL  Coefficient of Variation: 16.7%   GMI - 12.0%  Time in Target Range: 1%   Time Above Range: 10% high, 89% very high   Time Below Range: 0% low, 0% very low    Interpretation of data:  Frequent extreme hyperglycemia with inconsistent CGM use. Patterns suggest variable compliance with insulin regimen.        Review of Systems   Constitutional:  Positive for fatigue. Negative for chills and fever.   HENT:  Negative for ear pain and sore throat.    Eyes:  Negative for pain and visual disturbance.   Respiratory:  Negative for cough and shortness of breath.    Cardiovascular:  Negative for chest pain and  palpitations.   Gastrointestinal:  Negative for abdominal pain and vomiting.   Endocrine: Positive for polydipsia and polyuria.   Genitourinary:  Negative for dysuria and hematuria.   Musculoskeletal:  Negative for arthralgias and back pain.   Skin:  Negative for color change and rash.   Neurological:  Negative for seizures and syncope.   All other systems reviewed and are negative.      Historical Information   Past Medical History:   Diagnosis Date    Atypical chest pain     Gastroparesis     GERD (gastroesophageal reflux disease)     Hypertension     Psychiatric disorder     Sciatica     Seizure disorder (HCC)      Past Surgical History:   Procedure Laterality Date    APPENDECTOMY      BREAST BIOPSY Left  benign     SECTION      CHOLECYSTECTOMY      COLONOSCOPY      HYSTERECTOMY      IR PICC PLACEMENT SINGLE LUMEN  2023    ME LAPAROSCOPIC APPENDECTOMY N/A 2021    Procedure: APPENDECTOMY LAPAROSCOPIC;  Surgeon: Onel Martin MD;  Location:  MAIN OR;  Service: General    ME LAPS ABD PRTM&OMENTUM DX W/WO SPEC BR/WA SPX N/A 2021    Procedure: LAPAROSCOPY DIAGNOSTIC, EXTENSIVE DESI;  Surgeon: Onel Martin MD;  Location:  MAIN OR;  Service: General    TUBAL LIGATION      UPPER GASTROINTESTINAL ENDOSCOPY       Social History   Social History     Substance and Sexual Activity   Alcohol Use Never     Social History     Substance and Sexual Activity   Drug Use Never     Social History     Tobacco Use   Smoking Status Former    Current packs/day: 0.00    Types: Cigarettes    Quit date:     Years since quittin.1   Smokeless Tobacco Never     Family History:   Family History   Problem Relation Age of Onset    No Known Problems Mother     No Known Problems Father     No Known Problems Daughter     No Known Problems Maternal Grandmother     No Known Problems Paternal Grandmother     No Known Problems Paternal Aunt     No Known Problems Paternal Aunt     No Known Problems Paternal  Aunt        Meds/Allergies   Current Outpatient Medications   Medication Sig Dispense Refill    albuterol (2.5 mg/3 mL) 0.083 % nebulizer solution Take 3 mL (2.5 mg total) by nebulization every 6 (six) hours as needed for wheezing or shortness of breath 180 mL 5    albuterol (PROVENTIL HFA,VENTOLIN HFA) 90 mcg/act inhaler INHALE ONE PUFF BY MOUTH AND INTO THE LUNGS EVERY 6 HOURS AS NEEDED FOR WHEEZING 36 g 3    Alcohol Swabs (Pharmacist Choice Alcohol) PADS Apply topically 4 (four) times a day Use as directed 300 each 5    aspirin (ECOTRIN LOW STRENGTH) 81 mg EC tablet TAKE ONE TABLET BY MOUTH ONCE DAILY 30 tablet 1    atorvastatin (LIPITOR) 80 mg tablet TAKE ONE TABLET BY MOUTH DAILY AT BEDTIME 180 tablet 3    B-D UF III MINI PEN NEEDLES 31G X 5 MM MISC Pt uses 5 pen needles daily 500 each 0    benzonatate (TESSALON) 200 MG capsule Take 1 capsule (200 mg total) by mouth 3 (three) times a day as needed for cough (Patient not taking: Reported on 3/4/2024) 30 capsule 0    cholecalciferol (VITAMIN D3) 1,000 units tablet Take 2 tablets (2,000 Units total) by mouth daily 60 tablet 3    citalopram (CeleXA) 40 mg tablet TAKE ONE TABLET BY MOUTH DAILY 90 tablet 3    Continuous Blood Gluc  (FreeStyle Refugio 14 Day Collins) TEMITOPE Use for continuous blood glucose monitoring (Patient not taking: Reported on 1/12/2024) 1 each 0    Continuous Blood Gluc Sensor (FreeStyle Refugio 2 Sensor) MISC Use 1 each every 14 (fourteen) days 6 each 2    docusate sodium (COLACE) 100 mg capsule TAKE ONE CAPSULE BY MOUTH TWICE DAILY 240 capsule 3    dulaglutide (Trulicity) 4.5 MG/0.5ML injection Inject 0.5 mL (4.5 mg total) under the skin every 7 days 6 mL 1    Empagliflozin (Jardiance) 25 MG TABS Take 1 tablet (25 mg total) by mouth every morning 90 tablet 1    ergocalciferol (VITAMIN D2) 50,000 units Take 1 capsule (50,000 Units total) by mouth once a week 12 capsule 3    famotidine (PEPCID) 40 MG tablet TAKE ONE TABLET BY MOUTH TWO HOURS  PRIOR TO BEDTIME 30 tablet 6    fenofibrate micronized (LOFIBRA) 134 MG capsule Take 1 capsule (134 mg total) by mouth daily with breakfast 90 capsule 1    Fluticasone-Salmeterol (Advair Diskus) 250-50 mcg/dose inhaler Inhale 1 puff 2 (two) times a day Rinse mouth after use. 60 blister 1    guaiFENesin (MUCINEX) 600 mg 12 hr tablet Take 1 tablet (600 mg total) by mouth every 12 (twelve) hours 60 tablet 1    Insulin Regular Human, Conc, (HumuLIN R U-500 KwikPen) 500 units/mL CONCENTRATED U-500 injection pen Take 85 units with breakfast, 65 units with lunch, 65 with dinner, or as directed TDD up to 250 units 15 mL 2    Iron Heme Polypeptide 12 MG TABS Take 1 tablet by mouth      levETIRAcetam (KEPPRA) 500 mg tablet TAKE ONE TABLET BY MOUTH TWICE DAILY 300 tablet 1    linaCLOtide (Linzess) 72 MCG CAPS Take 1 capsule by mouth daily before breakfast 30 capsule 0    meclizine (ANTIVERT) 25 mg tablet Take 1 tablet (25 mg total) by mouth 3 (three) times a day as needed for dizziness 30 tablet 0    metoclopramide (Reglan) 10 mg tablet Take 0.5 tablets (5 mg total) by mouth every 6 (six) hours 30 tablet 0    metoprolol tartrate (LOPRESSOR) 25 mg tablet TAKE ONE TABLET BY MOUTH TWICE DAILY 180 tablet 1    multivitamin (THERAGRAN) TABS Take 1 tablet by mouth every morning      nystatin (MYCOSTATIN) cream Apply topically 2 (two) times a day 30 g 0    ondansetron (ZOFRAN) 4 mg tablet Take 1 tablet (4 mg total) by mouth every 6 (six) hours 12 tablet 0    OneTouch Ultra test strip USE 4 TIMES A  each 0    oxybutynin (DITROPAN-XL) 10 MG 24 hr tablet Take 1 tablet (10 mg total) by mouth daily at bedtime 90 tablet 1    Pharmacist Choice Lancets MISC USE AS DIRECTED 100 each 1    pregabalin (LYRICA) 100 mg capsule TAKE ONE CAPSULE BY MOUTH THREE TIMES A  capsule 1    Restasis 0.05 % ophthalmic emulsion        No current facility-administered medications for this visit.     Allergies   Allergen Reactions    Butorphanol  "Hallucinations    Benztropine     Penicillins     Zolpidem        Objective   Vitals: Blood pressure 114/68, pulse 76, height 5' 1\" (1.549 m), weight 94.3 kg (208 lb), SpO2 96%, not currently breastfeeding.    Physical Exam  Vitals reviewed.   Constitutional:       General: She is not in acute distress.     Appearance: She is obese. She is not ill-appearing.   HENT:      Head: Normocephalic.      Mouth/Throat:      Mouth: Mucous membranes are moist.   Cardiovascular:      Rate and Rhythm: Normal rate.      Heart sounds: Normal heart sounds.   Pulmonary:      Effort: Pulmonary effort is normal.      Breath sounds: Normal breath sounds.   Musculoskeletal:         General: Tenderness (tenderness medial surface of LLE. No palpable cord.) present.   Skin:     General: Skin is warm and dry.   Neurological:      Mental Status: She is alert and oriented to person, place, and time.   Psychiatric:         Mood and Affect: Mood normal.         The history was obtained from the review of the chart, patient.    Lab Results:   Component      Latest Ref Rng 8/11/2023 1/5/2024 1/6/2024   Sodium      135 - 147 mmol/L   138    Potassium      3.5 - 5.3 mmol/L   3.6    Chloride      96 - 108 mmol/L   105    Carbon Dioxide      21 - 32 mmol/L   25    ANION GAP      mmol/L   8    BUN      5 - 25 mg/dL   23    Creatinine      0.60 - 1.30 mg/dL   0.84    GLUCOSE      65 - 140 mg/dL   141 (H)    Calcium      8.4 - 10.2 mg/dL   8.7    GFR, Calculated      ml/min/1.73sq m   79    Cholesterol      See Comment mg/dL  191     Triglycerides      See Comment mg/dL  327 (H)     HDL      >=50 mg/dL  43 (L)     LDL Calculated      0 - 100 mg/dL  83     EXT Creatinine Urine      mg/dL 16.4      Albumin,U,Random      0.0 - 20.0 mg/L <5.0      Albumin Creat Ratio      0 - 30 mg/g creatinine <30      Hemoglobin A1C      Normal 4.0-5.6%; PreDiabetic 5.7-6.4%; Diabetic >=6.5%; Glycemic control for adults with diabetes <7.0% %  12.4 (H)     eAG, EST AVG " "Glucose      mg/dl  309        Legend:  (H) High  (L) Low    Imaging Studies: n/a    Portions of the record may have been created with voice recognition software. Occasional wrong word or \"sound a like\" substitutions may have occurred due to the inherent limitations of voice recognition software. Read the chart carefully and recognize, using context, where substitutions have occurred.    "

## 2024-03-05 ENCOUNTER — EVALUATION (OUTPATIENT)
Dept: PHYSICAL THERAPY | Age: 54
End: 2024-03-05
Payer: COMMERCIAL

## 2024-03-05 DIAGNOSIS — R53.1 ACUTE RIGHT-SIDED WEAKNESS: Primary | ICD-10-CM

## 2024-03-05 PROBLEM — M79.662 BILATERAL CALF PAIN: Status: ACTIVE | Noted: 2024-03-05

## 2024-03-05 PROBLEM — M79.661 BILATERAL CALF PAIN: Status: ACTIVE | Noted: 2024-03-05

## 2024-03-05 PROCEDURE — 97113 AQUATIC THERAPY/EXERCISES: CPT | Performed by: PHYSICAL THERAPIST

## 2024-03-05 NOTE — ASSESSMENT & PLAN NOTE
Haylee c/o bilateral calf pain, L > R, with worsening edema as well.   She has a h/o local trauma to the L calf following a fall.   Also with more sedentary habits recently.   Reasonable to r/o DVT in this scenario - venous doppler BLE ordered STAT - Haylee is to call to coordinate US ASAP.

## 2024-03-05 NOTE — ASSESSMENT & PLAN NOTE
Lab Results   Component Value Date    EGFR 79 01/06/2024    EGFR 60 01/05/2024    EGFR 50 01/04/2024    CREATININE 0.84 01/06/2024    CREATININE 1.05 01/05/2024    CREATININE 1.22 01/04/2024   Renal function continues to improve, recovering.   Has upcoming follow up visit with nephrology.

## 2024-03-05 NOTE — ASSESSMENT & PLAN NOTE
Lab Results   Component Value Date    HGBA1C 12.4 (H) 01/05/2024   Diabetes unfortunately remains severely uncontrolled.  Reviewed potential complications of poorly controlled diabetes including increased risk of heart attack, stroke, worsening kidney function, peripheral vascular disease.  Pharmacotherapy discussion - Lack of insulin compliance remains one of Haylee's largest barriers to achieving glycemic target. She is routinely forgetting 1-2 doses of insulin daily. She also carries a fear of hypoglycemia which influences her insulin dosing habits (gives less than recommended dosing due to fear of over-correction). I had an honest conversation with Haylee that it is imperative to improve compliance with insulin - recommended she injects all 3 doses daily. Having said this, no significant Humulin U500 dosing adjustments are recommended today - but compliance with 3 doses is paramount! (Current U500 dosing regimen - 90-70-50) At the same time, Trulicity does not seem to be effective  on BG control or desired weight loss. Ozempic was not well tolerated to to side effects. Due to failure of GLP1 x 2 different alternatives, would strongly benefit from transition to Mounjaro at this time. To continue Jardiance at 25mg daily in setting of CKD. Of note, now that kidney function has recovered, may wish to consider re-introduction of metformin at low dose to help with insulin resistance.  Exercise - briefly discussed, encouraged routine physical activity as important part of DM control.   Diet - willing to see dietician again, referral placed for ASAP consult if possible.   BG monitoring - congtinues to benefit from CGM use due to MDI insulin use. Will upgrade to Refugio 3 CGM. Haylee given sample to try, should be compatible with her phone - if so, will need Rx for Refugio 3 sensors only, if NOT, will require Refugio 3 reader and sensors.   Return to office in 4-6 weeks due to high risk status.

## 2024-03-05 NOTE — PROGRESS NOTES
PT Re-Evaluation     Today's date: 3/5/2024  Patient name: Haylee Balbuena  : 1970  MRN: [id number]  Referring provider: Kaylan Guzmán CRNP  Dx:   Encounter Diagnosis     ICD-10-CM    1. Acute right-sided weakness  R53.1           Start Time: 945  Stop Time: 1045  Total time in clinic (min): 60 minutes    Assessment  Assessment details: Haylee Balbuena is a 53 y.o. female who presents with pain, decreased strength, decreased ROM, and balance dysfunction. Due to these impairments, Patient has difficulty performing a/iadls and recreational activities. Patient's clinical presentation is consistent with their referring diagnosis of R sided weakness, cause appears to be conversion disorder. Patient has been having aquatic therapy and making slow gains in PT.  Patient would benefit from skilled physical therapy to address their aforementioned impairments, improve their level of function and to improve their overall quality of life.  Impairments: abnormal or restricted ROM, activity intolerance, impaired balance, impaired physical strength, lacks appropriate home exercise program, pain with function, poor posture  and poor body mechanics    Symptom irritability: moderateUnderstanding of Dx/Px/POC: good   Prognosis: good    Goals  ST-4 WEEKS  1.  Decrease LB pain < 8/10 on VAS at its worst. Progressing towards. Ongoing goal.   2.  Increase ROM by > 5 deg in all deficients planes. Progressing towards. Ongoing goal.   3.  Increase RUE, RLE and core strength by 1/2 MMT grade. Progressing towards. Ongoing goal.   4. Increase tolerance to activity and pool therapy > 45 min. Progressing towards. Ongoing goal.     LT-6 WEEKS  1. Patient to be independent with a/iadls. Ongoing goal.   2. Increase functional activities for leisure and home activities to previous LOF. Ongoing goal.   3. Independent with HEP and/or fitness program. Ongoing goal.   4.improve ambulation with least AD on level surfaces for home and  community distances. Progressing towards. Ongoing goal.     Plan  Patient would benefit from: skilled physical therapy  Planned modality interventions: cryotherapy, thermotherapy: hydrocollator packs and unattended electrical stimulation  Planned therapy interventions: activity modification, behavior modification, body mechanics training, aquatic therapy, flexibility, functional ROM exercises, home exercise program, IADL retraining, joint mobilization, manual therapy, neuromuscular re-education, patient education, postural training, strengthening, stretching, therapeutic activities, therapeutic exercise and abdominal trunk stabilization  Frequency: 2-3x week.  Duration in weeks: 8  Plan of Care beginning date: 2024  Plan of Care expiration date: 2024  Treatment plan discussed with: patient        Subjective Evaluation    History of Present Illness  Date of onset: 2024  Mechanism of injury: Patient reports she developed right sided weakness and unable to move the beginning of January. She went to ED and was admitted after a transfer to one with opening. She was hospitalized for several days. R/o stroke and diagnosed with conversion disorder as the cause. She states since she is out of the hospital she is slowly making gains. She is able to walk now but only 5-10 min. She states her right arm is much better. She uses SPC for longer distances. She is referred for aquatic therapy.   3/5/24: Patient has no more episodes but still c/o pain in legs 7/10. She is using SPC for outdoors yet.   Patient Goals  Patient goals for therapy: decreased pain, increased motion, increased strength, improved balance and independence with ADLs/IADLs  Patient goal: walk farther, and better  Pain  Current pain ratin  At best pain ratin  At worst pain rating: 10  Location: R LE mostly with walking, standing  Quality: needle-like, sharp and throbbing  Relieving factors: rest  Aggravating factors: standing and  walking  Progression: improved    Social Support  Steps to enter house: yes  3  Stairs in house: yes   Lives in: multiple-level home  Lives with: spouse    Employment status: not working  Hand dominance: right      Diagnostic Tests  X-ray: normal    FCE comments: Hospital 1/4/24 for several days and r/o CVA      Objective     Static Posture     Thoracic Spine  Hyperkyphosis.    Postural Observations  Seated posture: fair  Standing posture: fair      Palpation   Left   Hypertonic in the erector spinae and lumbar paraspinals.   Tenderness of the erector spinae and lumbar paraspinals.     Right   Hypertonic in the erector spinae, lumbar paraspinals and quadratus lumborum.   Tenderness of the erector spinae, lumbar paraspinals and quadratus lumborum.     Tenderness     Left Hip   Tenderness in the PSIS.     Right Hip   Tenderness in the PSIS.     Neurological Testing     Sensation     Lumbar   Left   Intact: light touch    Right   Intact: light touch    Additional Neurological Details  Patient c/o tingling  LE's occasionally and not regular. She feels it may be due to diabetes    Active Range of Motion     Lumbar   Flexion: Active lumbar flexion: tips to proximal shins.  with pain Restriction level: moderate  Extension: 12 degrees  with pain Restriction level: minimal  Left lateral flexion:  with pain Restriction level: minimal  Right lateral flexion:  with pain Restriction level: minimal    Strength/Myotome Testing     Left Hip   Planes of Motion   Flexion: 4+  Extension: 4+  Abduction: 4+  Adduction: 4+    Right Hip   Planes of Motion   Flexion: 4-  Extension: 4-  Abduction: 4-  Adduction: 4    Left Knee   Flexion: 4+  Extension: 4+    Right Knee   Flexion: 4-  Extension: 4-    Left Ankle/Foot   Dorsiflexion: 4  Plantar flexion: 4    Right Ankle/Foot   Dorsiflexion: 4-  Plantar flexion: 4-    Additional Strength Details  CORE is 3+/5    Tests     Lumbar   Negative sacroiliac distraction.     Left   Negative passive  SLR and slump test.     Right   Positive passive SLR.   Negative slump test.     Left Hip   Negative FRANK and FADIR.     Right Hip   Negative FRANK and FADIR.     Ambulation   Weight-Bearing Status   Assistive device used: none    Additional Weight-Bearing Status Details  Uses SPC for longer distances >10-15 minutes    Observational Gait   Gait: antalgic and asymmetric   Decreased walking speed, stride length and right stance time.     Quality of Movement During Gait     Additional Quality of Movement During Gait Details  Decreased floor clearance.    Functional Assessment        Single Leg Stance   Left: 3 seconds  Right: 1 seconds    Comments  TU seconds with no AD    General Comments:      Hip Comments   General deconditioning             POC expires Unit limit Auth Expiration date PT/OT + Visit Limit?   24 na na 24                           Visit/Unit Tracking  AUTH Status:  Date 2/2 2/6 2/9 2/12 2/21 2/22 2/27 3/5       Auth needed after visit 24 Used 1 2 3 4 5 6 7 8       Re-eval         SG        Remaining   22 21 20 19 18 17 16        FOTO                    Precautions: DM. HTN, kidney CA 2018?, seizures,     Daily Treatment Diary     Date  3   Water Walk (FW, BW, side) x10’  5' forw w/N 10 10 no N 10 10 10 10 10   LE work at wall               Hip FF/ext swing  2 2 2 2 2 2 2 2     Toe/heel  1 1 1 1 1 1 1 1     Hip ABD/ADD  2 2 2 2 2 2 2 2     March 1 1 1 1 1 1 1 1     Knee flexion & extension  1 1 1 1 1 1 1     Squats 1 1 1 1 1 1 1 1   UE noodle x3             Push/pull              Rotate              Row forward & back              OH side bend            UE/Core (AROM, paddles, MB)  AROM green g grn grn grn gr green     ABD/ADD  1 1 1 1 1 1 1 1     Horizontal Pec Fly  1 1 1 1 1 1 1 1     Alt. arm swing (shld flex/ext)  1 1 1 1 1 1 1 1     Push pull    1 1 1 1 1 1     Single paddle rotation           Shoulder shrugs, back rolls 1 hep         Aqua Step (FW,  lat, eccentric)   2' forward 2 2' F 2' 4' 4 4   Core work:              MF press (red, blue, blk)             Kickboard press (blue, red)              Row/ext w/ tubing (red, blue, blk)           Seated on bench             ankle DF/PF   1 1 1 1 1 1 D/c     March   1 1 1 1 1 1 D/c     ABD/ADD   1 1 1 1 1 1 D/c     Knee flexion/extension   1 1 1 1 2 2 D/c   DW TX - hang in the deep water   5 8 8 8 8 5 6     DW ABD/ADD   1 1 1 1 2 2 2     DW Bicycle   5 5 5 5 6 6 6     DW Scissor hip flexion/extension    1 1 1 2 2 2   Stretches              HS/calf   2 2 2 2 2 2 2     Wall stretches             Other:            Hot Tub - 10 minutes only  5 10 10 10 10 10 10 10

## 2024-03-06 ENCOUNTER — TELEPHONE (OUTPATIENT)
Dept: ENDOCRINOLOGY | Facility: CLINIC | Age: 54
End: 2024-03-06

## 2024-03-07 ENCOUNTER — TELEPHONE (OUTPATIENT)
Dept: ENDOCRINOLOGY | Facility: CLINIC | Age: 54
End: 2024-03-07

## 2024-03-07 DIAGNOSIS — E11.65 TYPE 2 DIABETES MELLITUS WITH HYPERGLYCEMIA, WITH LONG-TERM CURRENT USE OF INSULIN (HCC): ICD-10-CM

## 2024-03-07 DIAGNOSIS — Z79.4 TYPE 2 DIABETES MELLITUS WITH HYPERGLYCEMIA, WITH LONG-TERM CURRENT USE OF INSULIN (HCC): ICD-10-CM

## 2024-03-07 DIAGNOSIS — E11.43 TYPE 2 DIABETES MELLITUS WITH DIABETIC AUTONOMIC NEUROPATHY, WITH LONG-TERM CURRENT USE OF INSULIN (HCC): ICD-10-CM

## 2024-03-07 DIAGNOSIS — E11.65 TYPE 2 DIABETES MELLITUS WITH HYPERGLYCEMIA, WITH LONG-TERM CURRENT USE OF INSULIN (HCC): Primary | ICD-10-CM

## 2024-03-07 DIAGNOSIS — Z79.4 TYPE 2 DIABETES MELLITUS WITH DIABETIC AUTONOMIC NEUROPATHY, WITH LONG-TERM CURRENT USE OF INSULIN (HCC): ICD-10-CM

## 2024-03-07 DIAGNOSIS — Z79.4 TYPE 2 DIABETES MELLITUS WITH HYPERGLYCEMIA, WITH LONG-TERM CURRENT USE OF INSULIN (HCC): Primary | ICD-10-CM

## 2024-03-07 RX ORDER — DULAGLUTIDE 4.5 MG/.5ML
4.5 INJECTION, SOLUTION SUBCUTANEOUS
Qty: 6 ML | Refills: 1 | Status: SHIPPED | OUTPATIENT
Start: 2024-03-07

## 2024-03-07 RX ORDER — INSULIN HUMAN 500 [IU]/ML
INJECTION, SOLUTION SUBCUTANEOUS
Qty: 15 ML | Refills: 2 | Status: SHIPPED | OUTPATIENT
Start: 2024-03-07

## 2024-03-07 NOTE — TELEPHONE ENCOUNTER
Haylee is having ongoing hyperglycemia into the 400s at times in between meals. She confirms she is taking U500 TID dosing. She is having snacks in between meals. Not drinking soda but is still having creamer in her coffee. Also eating canned fruit as snack routinely.    Encouraged her to maintain balanced carbohydrates between meals and be VERY mindful of dietary choices overall.  Recommended increase U500 from 85-65-65 to  95 -75-75.     Also inquired about Mounjaro Rx - although documented in note, Rx not formally sent - will send now.

## 2024-03-08 ENCOUNTER — OFFICE VISIT (OUTPATIENT)
Dept: PHYSICAL THERAPY | Age: 54
End: 2024-03-08
Payer: COMMERCIAL

## 2024-03-08 DIAGNOSIS — R53.1 ACUTE RIGHT-SIDED WEAKNESS: Primary | ICD-10-CM

## 2024-03-08 PROCEDURE — 97113 AQUATIC THERAPY/EXERCISES: CPT

## 2024-03-08 NOTE — PROGRESS NOTES
Daily Note     Today's date: 3/8/2024  Patient name: Haylee Blabuena  : 1970  MRN: [id number]  Referring provider: Kaylan Guzmán CRNP  Dx:   Encounter Diagnosis     ICD-10-CM    1. Acute right-sided weakness  R53.1           Start Time: 0800  Stop Time: 0900  Total time in clinic (min): 60 minutes    Subjective: Patient reports she is getting stronger with therapy, c/o R LE pain today.       Objective: See treatment diary below      Assessment: Tolerated treatment well, good form with exercises, no c/o pain with TE.  Patient exhibited good technique with therapeutic exercises and would benefit from continued PT    Patient seen 1:1 for 30 minutes and rest of time group setting.          Plan: Continue per plan of care.      POC expires Unit limit Auth Expiration date PT/OT + Visit Limit?   24 na na 24                           Visit/Unit Tracking  AUTH Status:  Date  3/5 3/8      Auth needed after visit 24 Used 1 2 3 4 5 6 7 8 9      Re-eval         SG        Remaining   20 19 18 17 16        FOTO                    Precautions: DM. HTN, kidney CA ?, seizures,     Daily Treatment Diary     Date 3/8  2/9 2/12 2/21 2/22 2/27 3   Water Walk (FW, BW, side) x10’  10' 10 10 no N 10 10 10 10 10   LE work at wall               Hip FF/ext swing  2 2 2 2 2 2 2 2     Toe/heel  1 1 1 1 1 1 1 1     Hip ABD/ADD  2 2 2 2 2 2 2 2      1 1 1 1 1 1 1     Knee flexion & extension 1 1 1 1 1 1 1 1     Squats 1 1 1 1 1 1 1 1   UE noodle x3             Push/pull              Rotate              Row forward & back              OH side bend            UE/Core (AROM, paddles, MB)  blk green g grn grn grn gr green     ABD/ADD  1 1 1 1 1 1 1 1     Horizontal Pec Fly  1 1 1 1 1 1 1 1     Alt. arm swing (shld flex/ext)  1 1 1 1 1 1 1 1     Push pull  1  1 1 1 1 1 1     Single paddle rotation           Shoulder shrugs, back rolls  hep         Aqua Step (FW, lat, eccentric)  4' 2'  forward 2 2' F 2' 4' 4 4   Core work:              MF press (red, blue, blk)             Kickboard press (blue, red)              Row/ext w/ tubing (red, blue, blk)           Seated on bench             ankle DF/PF   1 1 1 1 1 1 D/c     March   1 1 1 1 1 1 D/c     ABD/ADD   1 1 1 1 1 1 D/c     Knee flexion/extension   1 1 1 1 2 2 D/c   DW TX - hang in the deep water  5 5 8 8 8 8 5 6     DW ABD/ADD  2 1 1 1 1 2 2 2     DW Bicycle  6 5 5 5 5 6 6 6     DW Scissor hip flexion/extension  2  1 1 1 2 2 2   Stretches  dktc 1'            HS/calf  2 2 2 2 2 2 2 2     Wall stretches             Other:            Hot Tub - 10 minutes only  10 10 10 10 10 10 10 10

## 2024-03-11 ENCOUNTER — PATIENT OUTREACH (OUTPATIENT)
Dept: FAMILY MEDICINE CLINIC | Facility: CLINIC | Age: 54
End: 2024-03-11

## 2024-03-11 NOTE — PROGRESS NOTES
CMOC contacted Haylee for a status update on her insurance coverage. On last outreach, this writer provided information for the [email or web address] website and explained that she may be eligible for the [facility] discount to lower the monthly premium costs for the various insurance options on the site.     Haylee states she hasn't had time to log on to [employer] to review the available insurance plans d/t being sick and working. 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   Haylee asked for the website information again and states she will go on and look when she has time.     CMOC confirmed there are no other needs at this time and informed Haylee the referral will be closed. She may contact this writer, the CM SW or RN, or her PCP for any future needs. Haylee expressed understanding and thanks the care management team for their assistance.     No further outreaches will be made.

## 2024-03-12 ENCOUNTER — PREP FOR PROCEDURE (OUTPATIENT)
Dept: GASTROENTEROLOGY | Facility: CLINIC | Age: 54
End: 2024-03-12

## 2024-03-12 DIAGNOSIS — Z12.11 SCREENING FOR COLON CANCER: Primary | ICD-10-CM

## 2024-03-12 NOTE — TELEPHONE ENCOUNTER
Referring Provider Kaylan Guzmán     Pre- Screening:     There is no height or weight on file to calculate BMI.  Has patient been referred for a routine screening Colonoscopy? yes  Is the patient between 45-75 years old? yes       Previous Colonoscopy yes  If yes:    Date: 2019    Facility: [facility]    Reason: Screening        Does the patient want to see a Gastroenterologist prior to their procedure OR are they having any GI symptoms? no     Has the patient been hospitalized or had abdominal surgery in the past 6 months? no     Does the patient use supplemental oxygen? no     Does the patient take Coumadin, Lovenox, Plavix, Elliquis, Xarelto, or other blood thinning medication? no     Has the patient had a stroke, cardiac event, or stent placed in the past year? no    OA colonoscopy scheduled for 4/25 at [facility] with Dr. Parnell. Prep instructions reviewed with patient. Sent to patient via [employer] along with diabetic instructions. Sutab sent to [facility].

## 2024-03-18 ENCOUNTER — HOSPITAL ENCOUNTER (OUTPATIENT)
Dept: NON INVASIVE DIAGNOSTICS | Facility: HOSPITAL | Age: 54
Discharge: HOME | End: 2024-03-18
Payer: COMMERCIAL

## 2024-03-18 ENCOUNTER — TELEPHONE (OUTPATIENT)
Age: 54
End: 2024-03-18

## 2024-03-18 ENCOUNTER — TELEPHONE (OUTPATIENT)
Dept: ENDOCRINOLOGY | Facility: CLINIC | Age: 54
End: 2024-03-18

## 2024-03-18 ENCOUNTER — OFFICE VISIT (OUTPATIENT)
Dept: URGENT CARE | Facility: CLINIC | Age: 54
End: 2024-03-18
Payer: COMMERCIAL

## 2024-03-18 ENCOUNTER — APPOINTMENT (OUTPATIENT)
Dept: PHYSICAL THERAPY | Age: 54
End: 2024-03-18
Payer: COMMERCIAL

## 2024-03-18 VITALS
DIASTOLIC BLOOD PRESSURE: 76 MMHG | HEIGHT: 61 IN | HEART RATE: 70 BPM | TEMPERATURE: 98.1 F | RESPIRATION RATE: 20 BRPM | BODY MASS INDEX: 40.52 KG/M2 | OXYGEN SATURATION: 96 % | WEIGHT: 214.6 LBS | SYSTOLIC BLOOD PRESSURE: 118 MMHG

## 2024-03-18 DIAGNOSIS — M79.662 BILATERAL CALF PAIN: ICD-10-CM

## 2024-03-18 DIAGNOSIS — R22.43 LOCALIZED SWELLING OF BOTH LOWER LEGS: Primary | ICD-10-CM

## 2024-03-18 DIAGNOSIS — M79.661 BILATERAL CALF PAIN: ICD-10-CM

## 2024-03-18 PROCEDURE — 93970 EXTREMITY STUDY: CPT | Performed by: SURGERY

## 2024-03-18 PROCEDURE — 99213 OFFICE O/P EST LOW 20 MIN: CPT | Performed by: STUDENT IN AN ORGANIZED HEALTH CARE EDUCATION/TRAINING PROGRAM

## 2024-03-18 PROCEDURE — 93970 EXTREMITY STUDY: CPT

## 2024-03-18 NOTE — TELEPHONE ENCOUNTER
Patient called in requesting to speak to BRUCE Wright in regard to her ultrasound. She was told to call when she had the ultrasound done. I called the office and spoke to Ira and was told she would tell BRUCE Wright to give pt a call back

## 2024-03-18 NOTE — TELEPHONE ENCOUNTER
Haylee has not yet received word on Mounjaro approval. ? If Prior auth started?   Can you please look into this?

## 2024-03-18 NOTE — PROGRESS NOTES
[facility]        NAME: Haylee Balbuena is a 53 y.o. female  : 1970    MRN: [id number]  DATE: 2024  TIME: 1:41 PM    Assessment and Orders   Localized swelling of both lower legs [R22.43]  1. Localized swelling of both lower legs              Plan and Discussion      Patient referred to outpatient testing site to have bilateral venous duplex done as ordered by her endocrinologist. Lungs are clear. Last echo was in  of this year with an EF of 60%. No erythema. Vitals are normal. Lower legs are diffusely tender and equally swollen. I suspect gravity dependent edema, but DVT and vascular compromise should be evaluated with duplex as already ordered.       Discussed ED precautions including (but not limited to)  Difficultly breathing or shortness of breath  Chest pain  Acutely worsening symptoms.     Risks and benefits discussed. Patient understands and agrees with the plan.     PATIENT INSTRUCTIONS    If tests have been performed at [facility], our office will contact you with results if changes need to be made to the care plan discussed with you at the visit.  You can review your full results on [facility] MyChart.    Follow up with PCP.     Chief Complaint     Chief Complaint   Patient presents with    Leg Pain     Pt c/o bilateral leg pain, starting in the R leg and moving over to the left. Feels tender, started about 2 months ago.         History of Present Illness       Patient is a 52 yo F who presents with bilateral lower leg pain and swelling that started 2 months ago. States that lower legs are increasingly tender. She mentioned this problem to her endocrinologist a few weeks ago who ordered a bilateral venous doppler US. Patients states that she is having some new SOB with exertion. Last echo was done in  and EF was 60%.         Review of Systems   Review of Systems   Respiratory:  Positive for shortness of breath.    Cardiovascular:  Positive for leg swelling. Negative for  chest pain.         Current Medications       Current Outpatient Medications:     albuterol (2.5 mg/3 mL) 0.083 % nebulizer solution, Take 3 mL (2.5 mg total) by nebulization every 6 (six) hours as needed for wheezing or shortness of breath, Disp: 180 mL, Rfl: 5    albuterol (PROVENTIL HFA,VENTOLIN HFA) 90 mcg/act inhaler, INHALE ONE PUFF BY MOUTH AND INTO THE LUNGS EVERY 6 HOURS AS NEEDED FOR WHEEZING, Disp: 36 g, Rfl: 3    aspirin (ECOTRIN LOW STRENGTH) 81 mg EC tablet, TAKE ONE TABLET BY MOUTH ONCE DAILY, Disp: 30 tablet, Rfl: 1    atorvastatin (LIPITOR) 80 mg tablet, TAKE ONE TABLET BY MOUTH DAILY AT BEDTIME, Disp: 180 tablet, Rfl: 3    cholecalciferol (VITAMIN D3) 1,000 units tablet, Take 2 tablets (2,000 Units total) by mouth daily, Disp: 60 tablet, Rfl: 3    citalopram (CeleXA) 40 mg tablet, TAKE ONE TABLET BY MOUTH DAILY, Disp: 90 tablet, Rfl: 3    Continuous Blood Gluc Sensor (FreeStyle Refugio 2 Sensor) MISC, Use 1 each every 14 (fourteen) days, Disp: 6 each, Rfl: 2    docusate sodium (COLACE) 100 mg capsule, TAKE ONE CAPSULE BY MOUTH TWICE DAILY, Disp: 240 capsule, Rfl: 3    dulaglutide (Trulicity) 4.5 MG/0.5ML injection, Inject 0.5 mL (4.5 mg total) under the skin every 7 days, Disp: 6 mL, Rfl: 1    Empagliflozin (Jardiance) 25 MG TABS, Take 1 tablet (25 mg total) by mouth every morning, Disp: 90 tablet, Rfl: 1    ergocalciferol (VITAMIN D2) 50,000 units, Take 1 capsule (50,000 Units total) by mouth once a week, Disp: 12 capsule, Rfl: 3    famotidine (PEPCID) 40 MG tablet, TAKE ONE TABLET BY MOUTH TWO HOURS PRIOR TO BEDTIME, Disp: 30 tablet, Rfl: 6    fenofibrate micronized (LOFIBRA) 134 MG capsule, Take 1 capsule (134 mg total) by mouth daily with breakfast, Disp: 90 capsule, Rfl: 1    Fluticasone-Salmeterol (Advair Diskus) 250-50 mcg/dose inhaler, Inhale 1 puff 2 (two) times a day Rinse mouth after use., Disp: 60 blister, Rfl: 1    guaiFENesin (MUCINEX) 600 mg 12 hr tablet, Take 1 tablet (600 mg total)  by mouth every 12 (twelve) hours, Disp: 60 tablet, Rfl: 1    Insulin Regular Human, Conc, (HumuLIN R U-500 KwikPen) 500 units/mL CONCENTRATED U-500 injection pen, Take 95 units with breakfast, 75 units with lunch, 75 with dinner, or as directed TDD up to 250 units, Disp: 15 mL, Rfl: 2    Iron Heme Polypeptide 12 MG TABS, Take 1 tablet by mouth, Disp: , Rfl:     levETIRAcetam (KEPPRA) 500 mg tablet, TAKE ONE TABLET BY MOUTH TWICE DAILY, Disp: 300 tablet, Rfl: 1    linaCLOtide (Linzess) 72 MCG CAPS, Take 1 capsule by mouth daily before breakfast, Disp: 30 capsule, Rfl: 0    meclizine (ANTIVERT) 25 mg tablet, Take 1 tablet (25 mg total) by mouth 3 (three) times a day as needed for dizziness, Disp: 30 tablet, Rfl: 0    metoclopramide (Reglan) 10 mg tablet, Take 0.5 tablets (5 mg total) by mouth every 6 (six) hours, Disp: 30 tablet, Rfl: 0    metoprolol tartrate (LOPRESSOR) 25 mg tablet, TAKE ONE TABLET BY MOUTH TWICE DAILY, Disp: 180 tablet, Rfl: 1    multivitamin (THERAGRAN) TABS, Take 1 tablet by mouth every morning, Disp: , Rfl:     nystatin (MYCOSTATIN) cream, Apply topically 2 (two) times a day, Disp: 30 g, Rfl: 0    ondansetron (ZOFRAN) 4 mg tablet, Take 1 tablet (4 mg total) by mouth every 6 (six) hours, Disp: 12 tablet, Rfl: 0    OneTouch Ultra test strip, USE 4 TIMES A DAY, Disp: 400 each, Rfl: 0    oxybutynin (DITROPAN-XL) 10 MG 24 hr tablet, Take 1 tablet (10 mg total) by mouth daily at bedtime, Disp: 90 tablet, Rfl: 1    Pharmacist Choice Lancets MISC, USE AS DIRECTED, Disp: 100 each, Rfl: 1    pregabalin (LYRICA) 100 mg capsule, TAKE ONE CAPSULE BY MOUTH THREE TIMES A DAY, Disp: 270 capsule, Rfl: 1    Restasis 0.05 % ophthalmic emulsion, , Disp: , Rfl:     Sodium Sulfate-Mag Sulfate-KCl 0314-927-840 MG TABS, Take 12 tablets at 5 pm the night before procedure, then 6 hours prior to procedure, take another 12 tablets per instruction sheet provided., Disp: 24 tablet, Rfl: 0    tirzepatide (Mounjaro) 5  MG/0.5ML, Inject 0.5 mL (5 mg total) under the skin every 7 days, Disp: 2 mL, Rfl: 3    Alcohol Swabs (Pharmacist Choice Alcohol) PADS, Apply topically 4 (four) times a day Use as directed, Disp: 300 each, Rfl: 5    B-D UF III MINI PEN NEEDLES 31G X 5 MM MISC, Pt uses 5 pen needles daily, Disp: 500 each, Rfl: 0    benzonatate (TESSALON) 200 MG capsule, Take 1 capsule (200 mg total) by mouth 3 (three) times a day as needed for cough (Patient not taking: Reported on 3/4/2024), Disp: 30 capsule, Rfl: 0    Continuous Blood Gluc  (FreeStyle Refugio 14 Day [place]) TEMITOPE, Use for continuous blood glucose monitoring (Patient not taking: Reported on 2024), Disp: 1 each, Rfl: 0    Current Allergies     Allergies as of 2024 - Reviewed 2024   Allergen Reaction Noted    Butorphanol Hallucinations 2016    Benztropine  2016    Penicillins  2015    Zolpidem  2015            The following portions of the patient's history were reviewed and updated as appropriate: allergies, current medications, past family history, past medical history, past social history, past surgical history and problem list.     Past Medical History:   Diagnosis Date    Atypical chest pain     Gastroparesis     GERD (gastroesophageal reflux disease)     Hypertension     Psychiatric disorder     Sciatica     Seizure disorder (HCC)        Past Surgical History:   Procedure Laterality Date    APPENDECTOMY      BREAST BIOPSY Left  benign     SECTION      CHOLECYSTECTOMY      COLONOSCOPY      HYSTERECTOMY      IR PICC PLACEMENT SINGLE LUMEN  2023    OH LAPAROSCOPIC APPENDECTOMY N/A 2021    Procedure: APPENDECTOMY LAPAROSCOPIC;  Surgeon: Onel Martin MD;  Location:  MAIN OR;  Service: General    OH LAPS ABD PRTM&OMENTUM DX W/WO SPEC BR/WA SPX N/A 2021    Procedure: LAPAROSCOPY DIAGNOSTIC, EXTENSIVE DESI;  Surgeon: Onel Martin MD;  Location:  MAIN OR;  Service: General    TUBAL  "LIGATION      UPPER GASTROINTESTINAL ENDOSCOPY         Family History   Problem Relation Age of Onset    No Known Problems Mother     No Known Problems Father     No Known Problems Daughter     No Known Problems Maternal Grandmother     No Known Problems Paternal Grandmother     No Known Problems Paternal Aunt     No Known Problems Paternal Aunt     No Known Problems Paternal Aunt          Medications have been verified.        Objective   /76   Pulse 70   Temp 98.1 °F (36.7 °C) (Temporal)   Resp 20   Ht 5' 1\" (1.549 m)   Wt 97.3 kg (214 lb 9.6 oz)   SpO2 96%   BMI 40.55 kg/m²   No LMP recorded. Patient has had a hysterectomy.       Physical Exam     Physical Exam  Constitutional:       General: She is not in acute distress.     Appearance: She is not toxic-appearing.   Cardiovascular:      Rate and Rhythm: Normal rate and regular rhythm.   Pulmonary:      Effort: Pulmonary effort is normal. No respiratory distress.      Breath sounds: No wheezing, rhonchi or rales.   Musculoskeletal:      Right lower leg: Swelling and tenderness present. No deformity, lacerations or bony tenderness. 1+ Edema present.      Left lower leg: Swelling and tenderness present. No deformity, lacerations or bony tenderness. 1+ Edema present.   Neurological:      General: No focal deficit present.      Mental Status: She is alert and oriented to person, place, and time.   Psychiatric:         Mood and Affect: Mood normal.         Behavior: Behavior normal.               Lashawn Messer DO"

## 2024-03-19 NOTE — TELEPHONE ENCOUNTER
PA for Mounjaro    Submitted via    [x]CMM-[facility] [id number]  []Surescripts-Case ID #    []Faxed to plan   []Other website    []Phone call Case ID #      Office notes sent, clinical questions answered. Awaiting determination    Turnaround time for your insurance to make a decision on your Prior Authorization can take 7-21 business days.

## 2024-03-20 ENCOUNTER — APPOINTMENT (OUTPATIENT)
Dept: PHYSICAL THERAPY | Age: 54
End: 2024-03-20
Payer: COMMERCIAL

## 2024-03-20 NOTE — TELEPHONE ENCOUNTER
PA for Mounjaro Approved   Date(s) approved 03/19/2024-03/19/25    Patient advised by [x] [employer] Message                      [x] Phone call       Pharmacy advised by [x]Fax                                     []Phone call    Approval letter scanned into [employer] Yes

## 2024-03-21 ENCOUNTER — APPOINTMENT (OUTPATIENT)
Dept: PHYSICAL THERAPY | Age: 54
End: 2024-03-21
Payer: COMMERCIAL

## 2024-03-22 DIAGNOSIS — J06.9 URI, ACUTE: ICD-10-CM

## 2024-03-22 RX ORDER — FLUTICASONE PROPIONATE AND SALMETEROL 250; 50 UG/1; UG/1
POWDER RESPIRATORY (INHALATION)
Qty: 60 BLISTER | Refills: 11 | Status: SHIPPED | OUTPATIENT
Start: 2024-03-22

## 2024-03-22 NOTE — TELEPHONE ENCOUNTER
Patient requesting refill(s) of: Advair 250-50 mcg/dose inhaler    Last filled: 2/1/24  Last appt: 3/4//24  Next appt: 9/12/24  Pharmacy: [facility]

## 2024-03-23 ENCOUNTER — HOSPITAL ENCOUNTER (EMERGENCY)
Facility: HOSPITAL | Age: 54
Discharge: HOME | End: 2024-03-23
Attending: EMERGENCY MEDICINE | Admitting: STUDENT IN AN ORGANIZED HEALTH CARE EDUCATION/TRAINING PROGRAM
Payer: COMMERCIAL

## 2024-03-23 ENCOUNTER — APPOINTMENT (EMERGENCY)
Dept: CT IMAGING | Facility: HOSPITAL | Age: 54
End: 2024-03-23
Payer: COMMERCIAL

## 2024-03-23 VITALS
RESPIRATION RATE: 19 BRPM | DIASTOLIC BLOOD PRESSURE: 56 MMHG | OXYGEN SATURATION: 92 % | TEMPERATURE: 97.7 F | SYSTOLIC BLOOD PRESSURE: 113 MMHG | HEART RATE: 67 BPM

## 2024-03-23 DIAGNOSIS — R53.83 FATIGUE: ICD-10-CM

## 2024-03-23 DIAGNOSIS — E16.2 HYPOGLYCEMIA: Primary | ICD-10-CM

## 2024-03-23 LAB
ALBUMIN SERPL BCP-MCNC: 4 G/DL (ref 3.5–5)
ALP SERPL-CCNC: 88 U/L (ref 34–104)
ALT SERPL W P-5'-P-CCNC: 24 U/L (ref 7–52)
ANION GAP SERPL CALCULATED.3IONS-SCNC: 9 MMOL/L (ref 4–13)
AST SERPL W P-5'-P-CCNC: 21 U/L (ref 13–39)
ATRIAL RATE: 62 BPM
ATRIAL RATE: 63 BPM
BASE EX.OXY STD BLDV CALC-SCNC: 79.6 % (ref 60–80)
BASE EXCESS BLDV CALC-SCNC: -4.3 MMOL/L
BASOPHILS # BLD AUTO: 0.02 THOUSANDS/ÂΜL (ref 0–0.1)
BASOPHILS NFR BLD AUTO: 0 % (ref 0–1)
BETA-HYDROXYBUTYRATE: 0 MMOL/L
BILIRUB SERPL-MCNC: 0.31 MG/DL (ref 0.2–1)
BUN SERPL-MCNC: 19 MG/DL (ref 5–25)
CALCIUM SERPL-MCNC: 9.3 MG/DL (ref 8.4–10.2)
CARDIAC TROPONIN I PNL SERPL HS: 3 NG/L
CHLORIDE SERPL-SCNC: 103 MMOL/L (ref 96–108)
CO2 SERPL-SCNC: 26 MMOL/L (ref 21–32)
CREAT SERPL-MCNC: 1.05 MG/DL (ref 0.6–1.3)
EOSINOPHIL # BLD AUTO: 0.34 THOUSAND/ÂΜL (ref 0–0.61)
EOSINOPHIL NFR BLD AUTO: 3 % (ref 0–6)
ERYTHROCYTE [DISTWIDTH] IN BLOOD BY AUTOMATED COUNT: 14.8 % (ref 11.6–15.1)
FLUAV RNA RESP QL NAA+PROBE: NEGATIVE
FLUBV RNA RESP QL NAA+PROBE: NEGATIVE
GFR SERPL CREATININE-BSD FRML MDRD: 60 ML/MIN/1.73SQ M
GLUCOSE SERPL-MCNC: 77 MG/DL (ref 65–140)
GLUCOSE SERPL-MCNC: 83 MG/DL (ref 65–140)
GLUCOSE SERPL-MCNC: 99 MG/DL (ref 65–140)
HCO3 BLDV-SCNC: 21.4 MMOL/L (ref 24–30)
HCT VFR BLD AUTO: 41.8 % (ref 34.8–46.1)
HGB BLD-MCNC: 13.2 G/DL (ref 11.5–15.4)
IMM GRANULOCYTES # BLD AUTO: 0.05 THOUSAND/UL (ref 0–0.2)
IMM GRANULOCYTES NFR BLD AUTO: 1 % (ref 0–2)
LIPASE SERPL-CCNC: 42 U/L (ref 11–82)
LYMPHOCYTES # BLD AUTO: 3.75 THOUSANDS/ÂΜL (ref 0.6–4.47)
LYMPHOCYTES NFR BLD AUTO: 38 % (ref 14–44)
MCH RBC QN AUTO: 26.2 PG (ref 26.8–34.3)
MCHC RBC AUTO-ENTMCNC: 31.6 G/DL (ref 31.4–37.4)
MCV RBC AUTO: 83 FL (ref 82–98)
MONOCYTES # BLD AUTO: 1.26 THOUSAND/ÂΜL (ref 0.17–1.22)
MONOCYTES NFR BLD AUTO: 13 % (ref 4–12)
NEUTROPHILS # BLD AUTO: 4.57 THOUSANDS/ÂΜL (ref 1.85–7.62)
NEUTS SEG NFR BLD AUTO: 45 % (ref 43–75)
NRBC BLD AUTO-RTO: 0 /100 WBCS
O2 CT BLDV-SCNC: 15 ML/DL
P AXIS: 127 DEGREES
P AXIS: 54 DEGREES
PCO2 BLDV: 41.6 MM HG (ref 42–50)
PH BLDV: 7.33 [PH] (ref 7.3–7.4)
PLATELET # BLD AUTO: 369 THOUSANDS/UL (ref 149–390)
PMV BLD AUTO: 10 FL (ref 8.9–12.7)
PO2 BLDV: 47.4 MM HG (ref 35–45)
POTASSIUM SERPL-SCNC: 2.7 MMOL/L (ref 3.5–5.3)
PR INTERVAL: 188 MS
PR INTERVAL: 190 MS
PROT SERPL-MCNC: 7 G/DL (ref 6.4–8.4)
QRS AXIS: 159 DEGREES
QRS AXIS: 20 DEGREES
QRSD INTERVAL: 94 MS
QRSD INTERVAL: 96 MS
QT INTERVAL: 432 MS
QT INTERVAL: 452 MS
QTC INTERVAL: 442 MS
QTC INTERVAL: 458 MS
RBC # BLD AUTO: 5.04 MILLION/UL (ref 3.81–5.12)
RSV RNA RESP QL NAA+PROBE: NEGATIVE
SARS-COV-2 RNA RESP QL NAA+PROBE: NEGATIVE
SODIUM SERPL-SCNC: 138 MMOL/L (ref 135–147)
T WAVE AXIS: 157 DEGREES
T WAVE AXIS: 22 DEGREES
VENTRICULAR RATE: 62 BPM
VENTRICULAR RATE: 63 BPM
WBC # BLD AUTO: 9.99 THOUSAND/UL (ref 4.31–10.16)

## 2024-03-23 PROCEDURE — 99285 EMERGENCY DEPT VISIT HI MDM: CPT

## 2024-03-23 PROCEDURE — 80053 COMPREHEN METABOLIC PANEL: CPT | Performed by: EMERGENCY MEDICINE

## 2024-03-23 PROCEDURE — 96361 HYDRATE IV INFUSION ADD-ON: CPT

## 2024-03-23 PROCEDURE — 93005 ELECTROCARDIOGRAM TRACING: CPT

## 2024-03-23 PROCEDURE — 74177 CT ABD & PELVIS W/CONTRAST: CPT

## 2024-03-23 PROCEDURE — 83690 ASSAY OF LIPASE: CPT | Performed by: EMERGENCY MEDICINE

## 2024-03-23 PROCEDURE — 99285 EMERGENCY DEPT VISIT HI MDM: CPT | Performed by: EMERGENCY MEDICINE

## 2024-03-23 PROCEDURE — 82948 REAGENT STRIP/BLOOD GLUCOSE: CPT

## 2024-03-23 PROCEDURE — 93010 ELECTROCARDIOGRAM REPORT: CPT | Performed by: INTERNAL MEDICINE

## 2024-03-23 PROCEDURE — 82010 KETONE BODYS QUAN: CPT | Performed by: EMERGENCY MEDICINE

## 2024-03-23 PROCEDURE — 96374 THER/PROPH/DIAG INJ IV PUSH: CPT

## 2024-03-23 PROCEDURE — 36415 COLL VENOUS BLD VENIPUNCTURE: CPT | Performed by: EMERGENCY MEDICINE

## 2024-03-23 PROCEDURE — 0241U HB NFCT DS VIR RESP RNA 4 TRGT: CPT | Performed by: EMERGENCY MEDICINE

## 2024-03-23 PROCEDURE — 84484 ASSAY OF TROPONIN QUANT: CPT | Performed by: EMERGENCY MEDICINE

## 2024-03-23 PROCEDURE — 96375 TX/PRO/DX INJ NEW DRUG ADDON: CPT

## 2024-03-23 PROCEDURE — 82805 BLOOD GASES W/O2 SATURATION: CPT | Performed by: EMERGENCY MEDICINE

## 2024-03-23 PROCEDURE — 85025 COMPLETE CBC W/AUTO DIFF WBC: CPT | Performed by: EMERGENCY MEDICINE

## 2024-03-23 RX ORDER — POTASSIUM CHLORIDE 20 MEQ/1
40 TABLET, EXTENDED RELEASE ORAL ONCE
Status: COMPLETED | OUTPATIENT
Start: 2024-03-23 | End: 2024-03-23

## 2024-03-23 RX ORDER — FENTANYL CITRATE 50 UG/ML
50 INJECTION, SOLUTION INTRAMUSCULAR; INTRAVENOUS ONCE
Status: COMPLETED | OUTPATIENT
Start: 2024-03-23 | End: 2024-03-23

## 2024-03-23 RX ORDER — DEXTROSE MONOHYDRATE 25 G/50ML
25 INJECTION, SOLUTION INTRAVENOUS ONCE
Status: COMPLETED | OUTPATIENT
Start: 2024-03-23 | End: 2024-03-23

## 2024-03-23 RX ORDER — ONDANSETRON 2 MG/ML
4 INJECTION INTRAMUSCULAR; INTRAVENOUS ONCE
Status: COMPLETED | OUTPATIENT
Start: 2024-03-23 | End: 2024-03-23

## 2024-03-23 RX ADMIN — DEXTROSE MONOHYDRATE 25 ML: 25 INJECTION, SOLUTION INTRAVENOUS at 05:19

## 2024-03-23 RX ADMIN — SODIUM CHLORIDE 1000 ML: 0.9 INJECTION, SOLUTION INTRAVENOUS at 05:24

## 2024-03-23 RX ADMIN — POTASSIUM CHLORIDE 40 MEQ: 1500 TABLET, EXTENDED RELEASE ORAL at 06:02

## 2024-03-23 RX ADMIN — IOHEXOL 100 ML: 350 INJECTION, SOLUTION INTRAVENOUS at 06:18

## 2024-03-23 RX ADMIN — ONDANSETRON 4 MG: 2 INJECTION INTRAMUSCULAR; INTRAVENOUS at 05:21

## 2024-03-23 RX ADMIN — FENTANYL CITRATE 50 MCG: 50 INJECTION INTRAMUSCULAR; INTRAVENOUS at 05:21

## 2024-03-23 NOTE — ED PROVIDER NOTES
History  Chief Complaint   Patient presents with    Hypoglycemia - Symptomatic     Pt family reports pts glucose was 54 at home. Family also notes recent illness and decrease in appetite.      54 yo female with hx of IDDM presenting with SO for reports of not feeling well for about 5 days. Reported persistent and worsening abdominal pain, N/V/D now with sugar at about 53 at home. Per SO whenever patient gets sick it always messes with her sugar and takes her longer to recovery than most people. Reportedly jsut returned from a cruise to the [facility]. No reported fevers, sore throat, cough, urinary symptoms. No noted sick contacts.         Prior to Admission Medications   Prescriptions Last Dose Informant Patient Reported? Taking?   Alcohol Swabs (Pharmacist Choice Alcohol) PADS  Self No No   Sig: Apply topically 4 (four) times a day Use as directed   B-D UF III MINI PEN NEEDLES 31G X 5 MM MISC  Self No No   Sig: Pt uses 5 pen needles daily   Continuous Blood Gluc  (FreeStyle Refugio 14 Day [place]) TEMITOPE  Self No No   Sig: Use for continuous blood glucose monitoring   Patient not taking: Reported on 1/12/2024   Continuous Blood Gluc Sensor (FreeStyle Refugio 2 Sensor) MISC  Self No No   Sig: Use 1 each every 14 (fourteen) days   Empagliflozin (Jardiance) 25 MG TABS   No No   Sig: Take 1 tablet (25 mg total) by mouth every morning   Fluticasone-Salmeterol (Advair) 250-50 mcg/dose inhaler   No No   Sig: INHALE 1 PUFF BY MOUTH 2 (TWO) TIMES A DAY RINSE MOUTH AFTER USE.   Insulin Regular Human, Conc, (HumuLIN R U-500 KwikPen) 500 units/mL CONCENTRATED U-500 injection pen   No No   Sig: Take 95 units with breakfast, 75 units with lunch, 75 with dinner, or as directed TDD up to 250 units   Iron Heme Polypeptide 12 MG TABS  Self Yes No   Sig: Take 1 tablet by mouth   OneTouch Ultra test strip  Self No No   Sig: USE 4 TIMES A DAY   Pharmacist Choice Lancets MISC  Self No No   Sig: USE AS DIRECTED   Restasis 0.05 %  ophthalmic emulsion  Self Yes No   Sodium Sulfate-Mag Sulfate-KCl 7358-184-215 MG TABS   No No   Sig: Take 12 tablets at 5 pm the night before procedure, then 6 hours prior to procedure, take another 12 tablets per instruction sheet provided.   albuterol (2.5 mg/3 mL) 0.083 % nebulizer solution   No No   Sig: Take 3 mL (2.5 mg total) by nebulization every 6 (six) hours as needed for wheezing or shortness of breath   albuterol (PROVENTIL HFA,VENTOLIN HFA) 90 mcg/act inhaler  Self No No   Sig: INHALE ONE PUFF BY MOUTH AND INTO THE LUNGS EVERY 6 HOURS AS NEEDED FOR WHEEZING   aspirin (ECOTRIN LOW STRENGTH) 81 mg EC tablet   No No   Sig: TAKE ONE TABLET BY MOUTH ONCE DAILY   atorvastatin (LIPITOR) 80 mg tablet   No No   Sig: TAKE ONE TABLET BY MOUTH DAILY AT BEDTIME   benzonatate (TESSALON) 200 MG capsule   No No   Sig: Take 1 capsule (200 mg total) by mouth 3 (three) times a day as needed for cough   Patient not taking: Reported on 3/4/2024   cholecalciferol (VITAMIN D3) 1,000 units tablet  Self No No   Sig: Take 2 tablets (2,000 Units total) by mouth daily   citalopram (CeleXA) 40 mg tablet   No No   Sig: TAKE ONE TABLET BY MOUTH DAILY   docusate sodium (COLACE) 100 mg capsule   No No   Sig: TAKE ONE CAPSULE BY MOUTH TWICE DAILY   dulaglutide (Trulicity) 4.5 MG/0.5ML injection   No No   Sig: Inject 0.5 mL (4.5 mg total) under the skin every 7 days   ergocalciferol (VITAMIN D2) 50,000 units  Self No No   Sig: Take 1 capsule (50,000 Units total) by mouth once a week   famotidine (PEPCID) 40 MG tablet  Self No No   Sig: TAKE ONE TABLET BY MOUTH TWO HOURS PRIOR TO BEDTIME   fenofibrate micronized (LOFIBRA) 134 MG capsule   No No   Sig: Take 1 capsule (134 mg total) by mouth daily with breakfast   guaiFENesin (MUCINEX) 600 mg 12 hr tablet   No No   Sig: Take 1 tablet (600 mg total) by mouth every 12 (twelve) hours   levETIRAcetam (KEPPRA) 500 mg tablet   No No   Sig: TAKE ONE TABLET BY MOUTH TWICE DAILY   linaCLOtide  (Linzess) 72 MCG CAPS  Self No No   Sig: Take 1 capsule by mouth daily before breakfast   meclizine (ANTIVERT) 25 mg tablet  Self No No   Sig: Take 1 tablet (25 mg total) by mouth 3 (three) times a day as needed for dizziness   metoclopramide (Reglan) 10 mg tablet   No No   Sig: Take 0.5 tablets (5 mg total) by mouth every 6 (six) hours   metoprolol tartrate (LOPRESSOR) 25 mg tablet   No No   Sig: TAKE ONE TABLET BY MOUTH TWICE DAILY   multivitamin (THERAGRAN) TABS  Self Yes No   Sig: Take 1 tablet by mouth every morning   nystatin (MYCOSTATIN) cream  Self No No   Sig: Apply topically 2 (two) times a day   ondansetron (ZOFRAN) 4 mg tablet   No No   Sig: Take 1 tablet (4 mg total) by mouth every 6 (six) hours   oxybutynin (DITROPAN-XL) 10 MG 24 hr tablet   No No   Sig: Take 1 tablet (10 mg total) by mouth daily at bedtime   pregabalin (LYRICA) 100 mg capsule  Self No No   Sig: TAKE ONE CAPSULE BY MOUTH THREE TIMES A DAY   tirzepatide (Mounjaro) 5 MG/0.5ML   No No   Sig: Inject 0.5 mL (5 mg total) under the skin every 7 days      Facility-Administered Medications: None       Past Medical History:   Diagnosis Date    Atypical chest pain     Gastroparesis     GERD (gastroesophageal reflux disease)     Hypertension     Psychiatric disorder     Sciatica     Seizure disorder (HCC)        Past Surgical History:   Procedure Laterality Date    APPENDECTOMY      BREAST BIOPSY Left  benign     SECTION      CHOLECYSTECTOMY      COLONOSCOPY      HYSTERECTOMY      IR PICC PLACEMENT SINGLE LUMEN  2023    NH LAPAROSCOPIC APPENDECTOMY N/A 2021    Procedure: APPENDECTOMY LAPAROSCOPIC;  Surgeon: Onel Martin MD;  Location:  MAIN OR;  Service: General    NH LAPS ABD PRTM&OMENTUM DX W/WO SPEC BR/WA SPX N/A 2021    Procedure: LAPAROSCOPY DIAGNOSTIC, EXTENSIVE DESI;  Surgeon: Onel Martin MD;  Location:  MAIN OR;  Service: General    TUBAL LIGATION      UPPER GASTROINTESTINAL ENDOSCOPY         Family  History   Problem Relation Age of Onset    No Known Problems Mother     No Known Problems Father     No Known Problems Daughter     No Known Problems Maternal Grandmother     No Known Problems Paternal Grandmother     No Known Problems Paternal Aunt     No Known Problems Paternal Aunt     No Known Problems Paternal Aunt      I have reviewed and agree with the history as documented.    E-Cigarette/Vaping    E-Cigarette Use Never User      E-Cigarette/Vaping Substances    Nicotine No     THC No     CBD No     Flavoring No     Other No     Unknown No      Social History     Tobacco Use    Smoking status: Former     Current packs/day: 0.00     Types: Cigarettes     Quit date:      Years since quittin.2    Smokeless tobacco: Never   Vaping Use    Vaping status: Never Used   Substance Use Topics    Alcohol use: Never    Drug use: Never       Review of Systems   Gastrointestinal:  Positive for abdominal pain, diarrhea, nausea and vomiting.   Neurological:  Positive for weakness.   All other systems reviewed and are negative.      Physical Exam  Physical Exam  Vitals and nursing note reviewed.   Constitutional:       General: She is not in acute distress.     Appearance: She is well-developed. She is not diaphoretic.   HENT:      Head: Normocephalic and atraumatic.      Right Ear: External ear normal.      Left Ear: External ear normal.      Nose: Nose normal.   Eyes:      General: No scleral icterus.        Right eye: No discharge.         Left eye: No discharge.      Conjunctiva/sclera: Conjunctivae normal.      Pupils: Pupils are equal, round, and reactive to light.   Neck:      Vascular: No JVD.      Trachea: No tracheal deviation.   Cardiovascular:      Rate and Rhythm: Normal rate and regular rhythm.      Heart sounds: Normal heart sounds. No murmur heard.     No friction rub. No gallop.   Pulmonary:      Effort: Pulmonary effort is normal. No respiratory distress.      Breath sounds: Normal breath sounds. No  stridor. No wheezing or rales.   Abdominal:      General: Bowel sounds are normal. There is no distension.      Palpations: Abdomen is soft. There is no mass.      Tenderness: There is no abdominal tenderness. There is no guarding.   Musculoskeletal:         General: No tenderness or deformity. Normal range of motion.      Cervical back: Normal range of motion and neck supple.   Skin:     General: Skin is warm and dry.      Coloration: Skin is not pale.      Findings: No erythema or rash.   Neurological:      General: No focal deficit present.      Mental Status: She is alert and oriented to person, place, and time. Mental status is at baseline.      Cranial Nerves: No cranial nerve deficit.      Sensory: No sensory deficit.      Motor: No abnormal muscle tone.   Psychiatric:         Behavior: Behavior normal.         Thought Content: Thought content normal.         Judgment: Judgment normal.         Vital Signs  ED Triage Vitals [03/23/24 0503]   Temperature Pulse Respirations Blood Pressure SpO2   97.7 °F (36.5 °C) 67 16 135/64 94 %      Temp src Heart Rate Source Patient Position - Orthostatic VS BP Location FiO2 (%)   -- Monitor -- Left arm --      Pain Score       No Pain           Vitals:    03/23/24 0503   BP: 135/64   Pulse: 67         Visual Acuity      ED Medications  Medications   sodium chloride 0.9 % bolus 1,000 mL (1,000 mL Intravenous New Bag 3/23/24 0524)   dextrose 50 % IV solution 25 mL (25 mL Intravenous Given 3/23/24 0519)   ondansetron (ZOFRAN) injection 4 mg (4 mg Intravenous Given 3/23/24 0521)   fentaNYL injection 50 mcg (50 mcg Intravenous Given 3/23/24 0521)   potassium chloride (Klor-Con M20) CR tablet 40 mEq (40 mEq Oral Given 3/23/24 0602)   iohexol (OMNIPAQUE) 350 MG/ML injection (MULTI-DOSE) 100 mL (100 mL Intravenous Given 3/23/24 0618)       Diagnostic Studies  Results Reviewed       Procedure Component Value Units Date/Time    FLU/RSV/COVID - if FLU/RSV clinically relevant  [[id number]]  (Normal) Collected: 03/23/24 0516    Lab Status: Final result Specimen: Nares from Nose Updated: 03/23/24 0611     SARS-CoV-2 Negative     INFLUENZA A PCR Negative     INFLUENZA B PCR Negative     RSV PCR Negative    Narrative:      FOR PEDIATRIC PATIENTS - copy/paste COVID Guidelines URL to browser: [email or web address]    SARS-CoV-2 assay is a Nucleic Acid Amplification assay intended for the  qualitative detection of nucleic acid from SARS-CoV-2 in nasopharyngeal  swabs. Results are for the presumptive identification of SARS-CoV-2 RNA.    Positive results are indicative of infection with SARS-CoV-2, the virus  causing COVID-19, but do not rule out bacterial infection or co-infection  with other viruses. Laboratories within the [place] and its  territories are required to report all positive results to the appropriate  public health authorities. Negative results do not preclude SARS-CoV-2  infection and should not be used as the sole basis for treatment or other  patient management decisions. Negative results must be combined with  clinical observations, patient history, and epidemiological information.  This test has not been FDA cleared or approved.    This test has been authorized by FDA under an Emergency Use Authorization  (EUA). This test is only authorized for the duration of time the  declaration that circumstances exist justifying the authorization of the  emergency use of an in vitro diagnostic tests for detection of SARS-CoV-2  virus and/or diagnosis of COVID-19 infection under section 564(b)(1) of  the Act, 21 U.S.C. 360bbb-3(b)(1), unless the authorization is terminated  or revoked sooner. The test has been validated but independent review by FDA  and CLIA is pending.    Test performed using [employer] GeneXpert: This RT-PCR assay targets N2,  a region unique to SARS-CoV-2. A conserved region in the E-gene was chosen  for  pan-Sarbecovirus detection which includes SARS-CoV-2.    According to CMS-2020-01-R, this platform meets the definition of high-throughput technology.    Fingerstick Glucose (POCT) [[id number]]  (Normal) Collected: 03/23/24 0601    Lab Status: Final result Specimen: Blood Updated: 03/23/24 0605     POC Glucose 99 mg/dl     HS Troponin 0hr (reflex protocol) [[id number]]  (Normal) Collected: 03/23/24 0516    Lab Status: Final result Specimen: Blood from Arm, Right Updated: 03/23/24 0556     hs TnI 0hr 3 ng/L     Comprehensive metabolic panel [[id number]]  (Abnormal) Collected: 03/23/24 0516    Lab Status: Final result Specimen: Blood from Arm, Right Updated: 03/23/24 0551     Sodium 138 mmol/L      Potassium 2.7 mmol/L      Chloride 103 mmol/L      CO2 26 mmol/L      ANION GAP 9 mmol/L      BUN 19 mg/dL      Creatinine 1.05 mg/dL      Glucose 77 mg/dL      Calcium 9.3 mg/dL      AST 21 U/L      ALT 24 U/L      Alkaline Phosphatase 88 U/L      Total Protein 7.0 g/dL      Albumin 4.0 g/dL      Total Bilirubin 0.31 mg/dL      eGFR 60 ml/min/1.73sq m     Narrative:      National Kidney Disease Foundation guidelines for Chronic Kidney Disease (CKD):     Stage 1 with normal or high GFR (GFR > 90 mL/min/1.73 square meters)    Stage 2 Mild CKD (GFR = 60-89 mL/min/1.73 square meters)    Stage 3A Moderate CKD (GFR = 45-59 mL/min/1.73 square meters)    Stage 3B Moderate CKD (GFR = 30-44 mL/min/1.73 square meters)    Stage 4 Severe CKD (GFR = 15-29 mL/min/1.73 square meters)    Stage 5 End Stage CKD (GFR <15 mL/min/1.73 square meters)  Note: GFR calculation is accurate only with a steady state creatinine    Lipase [[id number]]  (Normal) Collected: 03/23/24 0516    Lab Status: Final result Specimen: Blood from Arm, Right Updated: 03/23/24 0549     Lipase 42 u/L     Beta Hydroxybutyrate [[id number]]  (Normal) Collected: 03/23/24 0516    Lab Status: Final result Specimen: Blood from Arm, Right Updated: 03/23/24 0536      BETA-HYDROXYBUTYRATE 0.0 mmol/L     Blood gas, venous [[id number]]  (Abnormal) Collected: 03/23/24 0516    Lab Status: Final result Specimen: Blood from Arm, Right Updated: 03/23/24 0534     pH, Ovidio 7.329     pCO2, Ovidio 41.6 mm Hg      pO2, Ovidio 47.4 mm Hg      HCO3, Ovidio 21.4 mmol/L      Base Excess, Ovidio -4.3 mmol/L      O2 Content, Ovidio 15.0 ml/dL      O2 HGB, VENOUS 79.6 %     CBC and differential [[id number]]  (Abnormal) Collected: 03/23/24 0516    Lab Status: Final result Specimen: Blood from Arm, Right Updated: 03/23/24 0534     WBC 9.99 Thousand/uL      RBC 5.04 Million/uL      Hemoglobin 13.2 g/dL      Hematocrit 41.8 %      MCV 83 fL      MCH 26.2 pg      MCHC 31.6 g/dL      RDW 14.8 %      MPV 10.0 fL      Platelets 369 Thousands/uL      nRBC 0 /100 WBCs      Neutrophils Relative 45 %      Immature Grans % 1 %      Lymphocytes Relative 38 %      Monocytes Relative 13 %      Eosinophils Relative 3 %      Basophils Relative 0 %      Neutrophils Absolute 4.57 Thousands/µL      Absolute Immature Grans 0.05 Thousand/uL      Absolute Lymphocytes 3.75 Thousands/µL      Absolute Monocytes 1.26 Thousand/µL      Eosinophils Absolute 0.34 Thousand/µL      Basophils Absolute 0.02 Thousands/µL     Fingerstick Glucose (POCT) [[id number]]  (Normal) Collected: 03/23/24 0504    Lab Status: Final result Specimen: Blood Updated: 03/23/24 0507     POC Glucose 83 mg/dl                    CT abdomen pelvis with contrast    (Results Pending)              Procedures  ECG 12 Lead Documentation Only    Date/Time: 3/23/2024 5:30 AM    Performed by: Jarrett Denton DO  Authorized by: Jarrett Denton DO    Interpretation:     Interpretation: normal    Rate:     ECG rate:  62    ECG rate assessment: normal    Rhythm:     Rhythm: sinus rhythm    Ectopy:     Ectopy: none    QRS:     QRS axis:  Normal    QRS intervals:  Normal  Conduction:     Conduction: normal    ST segments:     ST segments:  Normal  T waves:     T waves: normal              ED Course  ED Course as of 03/23/24 0655   Sat Mar 23, 2024   0509 Patient ambulatory back to exam room without requiring assistance    0601 Potassium(!!): 2.7  40 meq ordered             HEART Risk Score      Flowsheet Row Most Recent Value   Heart Score Risk Calculator    History 0 Filed at: 03/23/2024 0608   ECG 0 Filed at: 03/23/2024 0608   Age 1 Filed at: 03/23/2024 0608   Risk Factors 2 Filed at: 03/23/2024 0608   Troponin 0 Filed at: 03/23/2024 0608   HEART Score 3 Filed at: 03/23/2024 0608                          SBIRT 22yo+      Flowsheet Row Most Recent Value   Initial Alcohol Screen: US AUDIT-C     1. How often do you have a drink containing alcohol? 0 Filed at: 03/23/2024 0533   2. How many drinks containing alcohol do you have on a typical day you are drinking?  0 Filed at: 03/23/2024 0533   3a. Male UNDER 65: How often do you have five or more drinks on one occasion? 0 Filed at: 03/23/2024 0533   3b. FEMALE Any Age, or MALE 65+: How often do you have 4 or more drinks on one occassion? 0 Filed at: 03/23/2024 0533   Audit-C Score 0 Filed at: 03/23/2024 0533   LILIA: How many times in the past year have you...    Used an illegal drug or used a prescription medication for non-medical reasons? Never Filed at: 03/23/2024 0533                      Medical Decision Making  54 yo F w/ IDDM coming in for reported decreased sugars at home after being sick for a few days with N/V/D.   Check bloodwork, CT abdomen/pelvis, give IVF, glucose.     Signed our to Dr. Gaspar pending CT scan.    Amount and/or Complexity of Data Reviewed  Labs: ordered. Decision-making details documented in ED Course.  Radiology: ordered.    Risk  Prescription drug management.             Disposition  Final diagnoses:   None     ED Disposition       None          Follow-up Information    None         Patient's Medications   Discharge Prescriptions    No medications on file       No discharge procedures on file.    PDMP Review          Value Time User    PDMP Reviewed  Yes 7/15/2023  3:54 AM Mikaela Michael PA-C            ED Provider  Electronically Signed by             Jarrett Denton DO  03/23/24 0655

## 2024-03-25 ENCOUNTER — TELEPHONE (OUTPATIENT)
Age: 54
End: 2024-03-25

## 2024-03-25 ENCOUNTER — PATIENT OUTREACH (OUTPATIENT)
Dept: FAMILY MEDICINE CLINIC | Facility: CLINIC | Age: 54
End: 2024-03-25

## 2024-03-25 ENCOUNTER — APPOINTMENT (OUTPATIENT)
Dept: PHYSICAL THERAPY | Age: 54
End: 2024-03-25
Payer: COMMERCIAL

## 2024-03-25 DIAGNOSIS — Z78.9 NEED FOR FOLLOW-UP BY SOCIAL WORKER: Primary | ICD-10-CM

## 2024-03-25 DIAGNOSIS — R53.1 ACUTE RIGHT-SIDED WEAKNESS: Primary | ICD-10-CM

## 2024-03-25 NOTE — PROGRESS NOTES
Outpatient Care Management Note:    ADT Alert received- ED Discharge.  Chart reviewed.  Patient was seen in the ED at [facility] on 3/23/24 for hypoglycemia. Discharged to home.    OP RN CM placed outreach call to patient with no answer.  Left voicemail message and included care  information with request to return call.    CM will schedule patient for 2nd outreach attempt this week, if no return call prior.

## 2024-03-25 NOTE — TELEPHONE ENCOUNTER
Patient called to make provider aware she was in the Emergency Department for low BS of  54. Patient reports feeling nauseated,very weak, confusion, headache. Feeling much better now.

## 2024-03-25 NOTE — PROGRESS NOTES
Daily Note     Today's date: 3/25/2024  Patient name: Haylee Balbuena  : 1970  MRN: [id number]  Referring provider: Kaylan Guzmán CRNP  Dx:   Encounter Diagnosis     ICD-10-CM    1. Acute right-sided weakness  R53.1                      Subjective: ***      Objective: See treatment diary below      Assessment: Tolerated treatment {Tolerated treatment :1645512491}. Patient {assessment:6069317784}      Plan: {PLAN:1927342217}     POC expires Unit limit Auth Expiration date PT/OT + Visit Limit?   24 na na 24                           Visit/Unit Tracking  AUTH Status:  Date 2/2 2/6 2/9 2/12 2/21 2/22 2/27 3/5 3/8      Auth needed after visit 24 Used 1 2 3 4 5 6 7 8 9      Re-eval         SG        Remaining   20 19 18 17 16        FOTO                    Precautions: DM. HTN, kidney CA ?, seizures,     Daily Treatment Diary     Date 3/8  2/9 2/12 2/21 2/22 2/27 3/5   Water Walk (FW, BW, side) x10’  10' 10 10 no N 10 10 10 10 10   LE work at wall               Hip FF/ext swing  2 2 2 2 2 2 2 2     Toe/heel  1 1 1 1 1 1 1 1     Hip ABD/ADD  2 2 2 2 2 2 2 2     March 1 1 1 1 1 1 1 1     Knee flexion & extension 1 1 1 1 1 1 1 1     Squats 1 1 1 1 1 1 1 1   UE noodle x3             Push/pull              Rotate              Row forward & back              OH side bend            UE/Core (AROM, paddles, MB)  blk green g grn grn grn gr green     ABD/ADD  1 1 1 1 1 1 1 1     Horizontal Pec Fly  1 1 1 1 1 1 1 1     Alt. arm swing (shld flex/ext)  1 1 1 1 1 1 1 1     Push pull  1  1 1 1 1 1 1     Single paddle rotation           Shoulder shrugs, back rolls  hep         Aqua Step (FW, lat, eccentric)  4' 2' forward 2 2' F 2' 4' 4 4   Core work:              MF press (red, blue, blk)             Kickboard press (blue, red)              Row/ext w/ tubing (red, blue, blk)           Seated on bench             ankle DF/PF   1 1 1 1 1 1 D/c     March   1 1 1 1 1 1 D/c     ABD/ADD   1 1 1 1 1 1 D/c     Knee  flexion/extension   1 1 1 1 2 2 D/c   DW TX - hang in the deep water  5 5 8 8 8 8 5 6     DW ABD/ADD  2 1 1 1 1 2 2 2     DW Bicycle  6 5 5 5 5 6 6 6     DW Scissor hip flexion/extension  2  1 1 1 2 2 2   Stretches  dktc 1'            HS/calf  2 2 2 2 2 2 2 2     Wall stretches             Other:            Hot Tub - 10 minutes only  10 10 10 10 10 10 10 10

## 2024-03-25 NOTE — PROGRESS NOTES
Outpatient Care Manager Note:    OP RN CM received return call from patient.     Patient was seen in ED at [facility] on 3/23/24 for hypoglycemia.    Patient reports that since discharge from ED on 3/23/24 her blood sugars have continued to go up and down. She states that they have been as high as 400+ and then as low as 54. When her blood sugars are low she is symptomatic with dizziness, some confusion, and feels sick to her stomach.  She checked her blood sugar while on call with this care manager and states that her blood sugar is currently 226.  Patient reports that she has not been able to eat very much. She states that she has had some toast and is drinking water.    Patient did not have AVS available to review but was able to report that she is taking the following medications for her diabetes.  Jardiance 25 mg, 1 tablet every morning  Trulicity 4.5 mg (0.5 ml) every 7 days  Humalin R- patient states that what is listed on the AVS was changed.  CM noted per AVS patient is to take  Humulin R 75 units at breakfast, 75 units at lunch, and 75 units at dinner or as directed.  Patient states that she is no longer taking Metformin and has not received  the Mounjaro that is listed on the AVS.    Patient states that her diabetes is followed by the Endocrinologist Adriana So PA-C. She confirms that she has an appointment scheduled with Endocrine on 4/5/24.    Instructed patient to contact Endocrine now and inform them that her blood sugars have not been stable since discharge that they continue to go up and down as they may want to recommend adjustments to her medications and/or provide other recommendations-patient verbalized understanding and will call.  CM will also route this note to Endocrinology office and provider.    Patient is at home with spouse and states that they are aware of the steps to take if patient's blood sugars drop too low.    Separately, patient voiced a concern regarding need to  put in for disability and a concern that she may not qualify.  Patient states that due to her health issues, she is unable to work consistently. She states that she believes she will lose her insurance at the end of March and states that she will not be able to afford her insulin medications.  Care manager offered referral to ambulatory  and patient agreed.  CM will enter referral to Ambulatory  and request that she reach out to patient to assist as able.    Patient is agreeable to further care .  CM will schedule outreach to patient for one week.  Patient was also provided with care  information.

## 2024-03-27 ENCOUNTER — APPOINTMENT (OUTPATIENT)
Dept: PHYSICAL THERAPY | Age: 54
End: 2024-03-27
Payer: COMMERCIAL

## 2024-03-27 ENCOUNTER — TELEPHONE (OUTPATIENT)
Dept: ENDOCRINOLOGY | Facility: CLINIC | Age: 54
End: 2024-03-27

## 2024-03-27 NOTE — TELEPHONE ENCOUNTER
Due to emergence of hypoglycemia, recommend slight reduction in Humulin U500 insulin from 75 with breakfast lunch and dinner to 65 units with breakfast, lunch, dinner for now.   Recommend she try her best to eat at regular intervals, keep carbohydrate content consistent between meals.     Also recommend follow up appointment ASAP - please help coordinate scheduling.

## 2024-03-29 ENCOUNTER — TELEPHONE (OUTPATIENT)
Age: 54
End: 2024-03-29

## 2024-03-29 NOTE — TELEPHONE ENCOUNTER
Patient called to let the doctor know she got her Mounjaro, and she wanted to know if she should stop taking ht Trulicity now that she has the mounjar. Please let patient know. Thank you.

## 2024-03-29 NOTE — TELEPHONE ENCOUNTER
Yes that is correct. STOP Trulicity if she is starting Mounjaro.  So ideally she should time the Mounjaro dose when her next Trulicity injection would have been. So if usually taking Trulicity on Sundays, start Mounjaro on Sunday.

## 2024-04-02 ENCOUNTER — PATIENT OUTREACH (OUTPATIENT)
Dept: FAMILY MEDICINE CLINIC | Facility: CLINIC | Age: 54
End: 2024-04-02

## 2024-04-02 NOTE — PROGRESS NOTES
Referral received from OP RNTONE Pabon for Outpatient Social Work Care Manager (OP SWCM) to outreach patient and assist with disability, insurance, and medication costs.  Per chart review, patient has [employer] for insurance coverage but patient shared her insurance may end at the end of March.    Call placed to patient to further assess needs.  Patient confirmed she may have lost MA due to income guidelines.  States her  took out a loan to fix their furnace and because of this, [employer] sent letter stating they may lose MA due to being over income guidelines.  Patient unsure if MA is now inactive.  Encouraged her to call [facility] Assistance Office ([facility]ICK) and speak with  regarding this.  Patient states she can show receipts for furnace repairs to show where funds went.    Patient asked about alternate insurance coverage options.  Discussed [employer] and cost associated with these plans.  Patient states they are over income for SNAP and LIHEAP as well.  Patient worried about cost of medications if her MA is inactive; if active, encouraged her to refill needed medications.    Patient states she has paperwork for SSI application but not SSDI application.  Email sent to patient per patient's approval with information on SSDI, checklist for application, and application process.  Will follow-up at later date to check status of conversation with [employer].    Patient denied any additional needs.  Will follow.

## 2024-04-03 ENCOUNTER — TELEPHONE (OUTPATIENT)
Dept: ENDOCRINOLOGY | Facility: CLINIC | Age: 54
End: 2024-04-03

## 2024-04-03 DIAGNOSIS — E11.42 DIABETIC POLYNEUROPATHY ASSOCIATED WITH TYPE 2 DIABETES MELLITUS (HCC): ICD-10-CM

## 2024-04-03 NOTE — TELEPHONE ENCOUNTER
Patient requesting refill(s) of: Lyrica     Last filled: 10/25/23 #270 x1  Last appt: 3/4/24  Next appt: 4/8/24  [email or web address]

## 2024-04-03 NOTE — TELEPHONE ENCOUNTER
Voicemail, My chart message left for patient re: Confirmation of insulin dosing / timing.     CGM report showing mild fasting hypoglycemia but also with extreme afternoon to evening hyperglycemia. Suspect she may possibly be skipping AM insulin dose, but taking later in evening, possibly late dosing?  To confirm details with Haylee and provide recommendations to follow.

## 2024-04-04 RX ORDER — PREGABALIN 100 MG/1
CAPSULE ORAL
Qty: 90 CAPSULE | Refills: 3 | Status: SHIPPED | OUTPATIENT
Start: 2024-04-04 | End: 2024-04-09 | Stop reason: SDUPTHER

## 2024-04-04 NOTE — TELEPHONE ENCOUNTER
Called patient 2 times and calls got dropped she does have a follow up apt tomorrow will discuss blood sugars then

## 2024-04-05 ENCOUNTER — OFFICE VISIT (OUTPATIENT)
Dept: ENDOCRINOLOGY | Facility: CLINIC | Age: 54
End: 2024-04-05
Payer: COMMERCIAL

## 2024-04-05 VITALS
HEART RATE: 97 BPM | DIASTOLIC BLOOD PRESSURE: 80 MMHG | BODY MASS INDEX: 39.27 KG/M2 | SYSTOLIC BLOOD PRESSURE: 146 MMHG | WEIGHT: 208 LBS | OXYGEN SATURATION: 96 % | HEIGHT: 61 IN

## 2024-04-05 DIAGNOSIS — Z79.4 TYPE 2 DIABETES MELLITUS WITH HYPERGLYCEMIA, WITH LONG-TERM CURRENT USE OF INSULIN (HCC): Primary | ICD-10-CM

## 2024-04-05 DIAGNOSIS — E11.65 TYPE 2 DIABETES MELLITUS WITH HYPERGLYCEMIA, WITH LONG-TERM CURRENT USE OF INSULIN (HCC): Primary | ICD-10-CM

## 2024-04-05 PROCEDURE — 99214 OFFICE O/P EST MOD 30 MIN: CPT | Performed by: PHYSICIAN ASSISTANT

## 2024-04-05 NOTE — PROGRESS NOTES
*/////-*-*-- Haylee Balbuena 53 y.o. female MRN: [id number]    Encounter: [id number]      Assessment/Plan   Problem List Items Addressed This Visit          Endocrine    Type 2 diabetes mellitus with hyperglycemia, with long-term current use of insulin (HCC) - Primary       Lab Results   Component Value Date    HGBA1C 12.4 (H) 01/05/2024   Summary: ongoing poor DM control, although less extreme hyperglycemia.  BG monitoring: Continue Refugio 2 CGM for now. May consider upgrade to Refugio 3 soon once insurance coverage secured.  Diet: recommend referral to CDE for MNT, however will defer at this time.  Pharmacotherapy discussion: Will reduce Humulin U500 due to fasting hypoglycemia - reduce dinner U500 dose from 65 to 50 units. At the same time, will increase Mounjaro from 5mg --> 7.5mg weekly due to good tolerability, need for better BG control as well as weight loss benefits.  Return to office: 4/18/23 to review BG trends again in person.     Preoperative recommendations for planned colonoscopy 4/25/24-   Recommend reduction in Humulin U500 by 50% once clear liquid diet initiated. Also recommend to keep both sugar free and regular clear liquid options on hand to help control BG at home based on CGM values. To hold Jardiance 4 days prior to procedure. Also stop Moujaro 7 days prior to procedure.            Relevant Medications    tirzepatide (Mounjaro) 7.5 MG/0.5ML        CC: Diabetes    History of Present Illness     HPI:  Haylee Balbuena is a 53 y.o. female here for close interval follow up visit due to poor DM control.   Diabetes is complicated by: CKD stage 3, gastroparesis, neuropathy  Last endocrinology visit: 3/4/24    Haylee has been taking Mounjaro at 5mg weekly x 2 weeks now. She is tolerating this dose very well without any side effects at all. Has not noticed much improvement in weight or blood sugar yet, however.    BG monitoring continues via Refugio 2 CGM.   See below for summary report, media section for  full report.     Reviewed typical daily dietary habits with Haylee in detail, which revealed a big area for improvement being omitting coffee creamer in AM! Dietary habits could also be better overall, although biggest influence on abrupt morning BG spike seems to be coffee creamer. She does admit to some indiscretion with consuming fast food about once a week.     She endorses the need for hypoglycemia treatment many nights recently, and has been keeping [place] kisses at bedside to be able to consume a 2-3 pieces when CGM alarms in middle of night. This seems to keep her BG stable overnight.     End on month will lose medical insurance coverage. She is working with case management to help maintain insurance coverage, but is concerned about the possibility of interruption in DM treatment regimen.     Current DM medication review:   Humulin U500 95 units with breakfast, 65 units with lunch, 65 units with dinner  Confirmed she takes U500 insulin about 5 minutes prior to each meal. Very compmliant with dosing, no skipped, missed, forgotten doses recently.   Mounjaro 5mg weekly   Jardiance 25mg daily      CGM summary report:  Haylee Balbuena   Device used: [employer] 2, Home use     Indication: Type 2 Diabetes  More than 72 hours of data was reviewed. Report to be scanned to chart.   Date Range: 3/23/24 - 4/5/24    Analysis of data:   % time CGM used: 79%  Average Glucose: 235mg/dL  Coefficient of Variation: 42.0%  GMI 8.9%  Time in Target Range: 31%  Time Above Range: 17% high, 49% very high  Time Below Range: 3% low, 0% very low    Interpretation of data: Haylee has hyperglycemia throughout most of daytime hours on most days, with occasional fasting mild hypoglycemia.          Review of Systems   Constitutional:  Positive for fatigue. Negative for chills and fever.   HENT:  Negative for ear pain and sore throat.    Eyes:  Negative for pain and visual disturbance.   Respiratory:  Negative for cough and shortness of breath.     Cardiovascular:  Negative for chest pain and palpitations.   Gastrointestinal:  Negative for abdominal pain and vomiting.   Endocrine: Negative for polydipsia and polyuria.   Genitourinary:  Negative for dysuria and hematuria.   Musculoskeletal:  Negative for arthralgias and back pain.   Skin:  Negative for color change and rash.   Neurological:  Negative for seizures and syncope.   All other systems reviewed and are negative.      Historical Information   Past Medical History:   Diagnosis Date    Atypical chest pain     Gastroparesis     GERD (gastroesophageal reflux disease)     Hypertension     Psychiatric disorder     Sciatica     Seizure disorder (HCC)      Past Surgical History:   Procedure Laterality Date    APPENDECTOMY      BREAST BIOPSY Left  benign     SECTION      CHOLECYSTECTOMY      COLONOSCOPY      HYSTERECTOMY      IR PICC PLACEMENT SINGLE LUMEN  2023    WI LAPAROSCOPIC APPENDECTOMY N/A 2021    Procedure: APPENDECTOMY LAPAROSCOPIC;  Surgeon: Onel Martin MD;  Location:  MAIN OR;  Service: General    WI LAPS ABD PRTM&OMENTUM DX W/WO SPEC BR/WA SPX N/A 2021    Procedure: LAPAROSCOPY DIAGNOSTIC, EXTENSIVE DESI;  Surgeon: Onel Martin MD;  Location:  MAIN OR;  Service: General    TUBAL LIGATION      UPPER GASTROINTESTINAL ENDOSCOPY       Social History   Social History     Substance and Sexual Activity   Alcohol Use Never     Social History     Substance and Sexual Activity   Drug Use Never     Social History     Tobacco Use   Smoking Status Former    Current packs/day: 0.00    Types: Cigarettes    Quit date:     Years since quittin.2   Smokeless Tobacco Never     Family History:   Family History   Problem Relation Age of Onset    No Known Problems Mother     No Known Problems Father     No Known Problems Daughter     No Known Problems Maternal Grandmother     No Known Problems Paternal Grandmother     No Known Problems Paternal Aunt     No Known  Problems Paternal Aunt     No Known Problems Paternal Aunt        Meds/Allergies   Current Outpatient Medications   Medication Sig Dispense Refill    albuterol (2.5 mg/3 mL) 0.083 % nebulizer solution Take 3 mL (2.5 mg total) by nebulization every 6 (six) hours as needed for wheezing or shortness of breath 180 mL 5    albuterol (PROVENTIL HFA,VENTOLIN HFA) 90 mcg/act inhaler INHALE ONE PUFF BY MOUTH AND INTO THE LUNGS EVERY 6 HOURS AS NEEDED FOR WHEEZING 36 g 3    Alcohol Swabs (Pharmacist Choice Alcohol) PADS Apply topically 4 (four) times a day Use as directed 300 each 5    aspirin (ECOTRIN LOW STRENGTH) 81 mg EC tablet TAKE ONE TABLET BY MOUTH ONCE DAILY 30 tablet 1    atorvastatin (LIPITOR) 80 mg tablet TAKE ONE TABLET BY MOUTH DAILY AT BEDTIME 180 tablet 3    B-D UF III MINI PEN NEEDLES 31G X 5 MM MISC Pt uses 5 pen needles daily 500 each 0    cholecalciferol (VITAMIN D3) 1,000 units tablet Take 2 tablets (2,000 Units total) by mouth daily 60 tablet 3    citalopram (CeleXA) 40 mg tablet TAKE ONE TABLET BY MOUTH DAILY 90 tablet 3    Continuous Blood Gluc Sensor (FreeStyle Refugio 2 Sensor) MISC Use 1 each every 14 (fourteen) days 6 each 2    docusate sodium (COLACE) 100 mg capsule TAKE ONE CAPSULE BY MOUTH TWICE DAILY 240 capsule 3    Empagliflozin (Jardiance) 25 MG TABS Take 1 tablet (25 mg total) by mouth every morning 90 tablet 1    ergocalciferol (VITAMIN D2) 50,000 units Take 1 capsule (50,000 Units total) by mouth once a week 12 capsule 3    famotidine (PEPCID) 40 MG tablet TAKE ONE TABLET BY MOUTH TWO HOURS PRIOR TO BEDTIME 30 tablet 6    fenofibrate micronized (LOFIBRA) 134 MG capsule Take 1 capsule (134 mg total) by mouth daily with breakfast 90 capsule 1    Fluticasone-Salmeterol (Advair) 250-50 mcg/dose inhaler INHALE 1 PUFF BY MOUTH 2 (TWO) TIMES A DAY RINSE MOUTH AFTER USE. 60 blister 11    guaiFENesin (MUCINEX) 600 mg 12 hr tablet Take 1 tablet (600 mg total) by mouth every 12 (twelve) hours 60  tablet 1    Insulin Regular Human, Conc, (HumuLIN R U-500 KwikPen) 500 units/mL CONCENTRATED U-500 injection pen Take 95 units with breakfast, 75 units with lunch, 75 with dinner, or as directed TDD up to 250 units 15 mL 2    Iron Heme Polypeptide 12 MG TABS Take 1 tablet by mouth      levETIRAcetam (KEPPRA) 500 mg tablet TAKE ONE TABLET BY MOUTH TWICE DAILY 300 tablet 1    linaCLOtide (Linzess) 72 MCG CAPS Take 1 capsule by mouth daily before breakfast 30 capsule 0    meclizine (ANTIVERT) 25 mg tablet Take 1 tablet (25 mg total) by mouth 3 (three) times a day as needed for dizziness 30 tablet 0    metoclopramide (Reglan) 10 mg tablet Take 0.5 tablets (5 mg total) by mouth every 6 (six) hours 30 tablet 0    metoprolol tartrate (LOPRESSOR) 25 mg tablet TAKE ONE TABLET BY MOUTH TWICE DAILY 180 tablet 1    multivitamin (THERAGRAN) TABS Take 1 tablet by mouth every morning      nystatin (MYCOSTATIN) cream Apply topically 2 (two) times a day 30 g 0    ondansetron (ZOFRAN) 4 mg tablet Take 1 tablet (4 mg total) by mouth every 6 (six) hours 12 tablet 0    OneTouch Ultra test strip USE 4 TIMES A  each 0    oxybutynin (DITROPAN-XL) 10 MG 24 hr tablet Take 1 tablet (10 mg total) by mouth daily at bedtime 90 tablet 1    Pharmacist Choice Lancets MISC USE AS DIRECTED 100 each 1    pregabalin (LYRICA) 100 mg capsule TAKE ONE CAPSULE BY MOUTH THREE TIMES A DAY 90 capsule 3    Restasis 0.05 % ophthalmic emulsion       Sodium Sulfate-Mag Sulfate-KCl 2388-755-260 MG TABS Take 12 tablets at 5 pm the night before procedure, then 6 hours prior to procedure, take another 12 tablets per instruction sheet provided. 24 tablet 0    tirzepatide (Mounjaro) 5 MG/0.5ML Inject 0.5 mL (5 mg total) under the skin every 7 days 2 mL 3    benzonatate (TESSALON) 200 MG capsule Take 1 capsule (200 mg total) by mouth 3 (three) times a day as needed for cough (Patient not taking: Reported on 3/4/2024) 30 capsule 0    Continuous Blood Gluc   "(FreeStyle Refugio 14 Day [place]) TEMITOPE Use for continuous blood glucose monitoring (Patient not taking: Reported on 1/12/2024) 1 each 0    dulaglutide (Trulicity) 4.5 MG/0.5ML injection Inject 0.5 mL (4.5 mg total) under the skin every 7 days 6 mL 1     No current facility-administered medications for this visit.     Allergies   Allergen Reactions    Butorphanol Hallucinations    Benztropine     Penicillins     Zolpidem        Objective   Vitals: Blood pressure 146/80, pulse 97, height 5' 1\" (1.549 m), weight 94.3 kg (208 lb), SpO2 96%, not currently breastfeeding.    Physical Exam  Vitals reviewed.   Constitutional:       Appearance: She is obese. She is not ill-appearing.   Cardiovascular:      Rate and Rhythm: Normal rate and regular rhythm.   Musculoskeletal:      Right lower leg: No edema.      Left lower leg: No edema.   Skin:     General: Skin is warm and dry.   Neurological:      Mental Status: She is alert.   Psychiatric:         Mood and Affect: Mood normal.         The history was obtained from the review of the chart, patient.    Lab Results:   Lab Results   Component Value Date    HGBA1C 12.4 (H) 01/05/2024    HGBA1C 14.3 (H) 12/11/2023    HGBA1C 12.7 (H) 07/25/2023     Component      Latest Ref Rng 8/16/2023 1/5/2024 3/23/2024   Sodium      135 - 147 mmol/L  138     Potassium      3.5 - 5.3 mmol/L  3.9     Chloride      96 - 108 mmol/L  105     Carbon Dioxide      21 - 32 mmol/L  26     ANION GAP      mmol/L  7     BUN      5 - 25 mg/dL  27 (H)     Creatinine      0.60 - 1.30 mg/dL  1.05     GLUCOSE      65 - 140 mg/dL  156 (H)     Calcium      8.4 - 10.2 mg/dL  8.7     GFR, Calculated      ml/min/1.73sq m  60     Cholesterol      See Comment mg/dL  191     Triglycerides      See Comment mg/dL  327 (H)     HDL      >=50 mg/dL  43 (L)     LDL Calculated      0 - 100 mg/dL  83     EXT Creatinine Urine      mg/dL 43.5      Albumin,U,Random      0.0 - 20.0 mg/L 5.3      Albumin Creat Ratio      0 - 30 mg/g " "creatinine 12      Beta- Hydroxybutyrate      <0.6 mmol/L   0.0       Legend:  (H) High  (L) Low    Imaging Studies: n/a    Portions of the record may have been created with voice recognition software. Occasional wrong word or \"sound a like\" substitutions may have occurred due to the inherent limitations of voice recognition software. Read the chart carefully and recognize, using context, where substitutions have occurred.  "

## 2024-04-08 NOTE — ASSESSMENT & PLAN NOTE
Lab Results   Component Value Date    HGBA1C 12.4 (H) 01/05/2024   Summary: ongoing poor DM control, although less extreme hyperglycemia.  BG monitoring: Continue Refugio 2 CGM for now. May consider upgrade to Refugio 3 soon once insurance coverage secured.  Diet: recommend referral to CDE for MNT, however will defer at this time.  Pharmacotherapy discussion: Will reduce Humulin U500 due to fasting hypoglycemia - reduce dinner U500 dose from 65 to 50 units. At the same time, will increase Mounjaro from 5mg --> 7.5mg weekly due to good tolerability, need for better BG control as well as weight loss benefits.  Return to office: 4/18/23 to review BG trends again in person.     Preoperative recommendations for planned colonoscopy 4/25/24-   Recommend reduction in Humulin U500 by 50% once clear liquid diet initiated. Also recommend to keep both sugar free and regular clear liquid options on hand to help control BG at home based on CGM values. To hold Jardiance 4 days prior to procedure. Also stop Moujaro 7 days prior to procedure.

## 2024-04-09 ENCOUNTER — OFFICE VISIT (OUTPATIENT)
Dept: FAMILY MEDICINE CLINIC | Facility: CLINIC | Age: 54
End: 2024-04-09
Payer: COMMERCIAL

## 2024-04-09 ENCOUNTER — PATIENT OUTREACH (OUTPATIENT)
Dept: FAMILY MEDICINE CLINIC | Facility: CLINIC | Age: 54
End: 2024-04-09

## 2024-04-09 ENCOUNTER — TELEPHONE (OUTPATIENT)
Dept: ENDOCRINOLOGY | Facility: CLINIC | Age: 54
End: 2024-04-09

## 2024-04-09 VITALS
RESPIRATION RATE: 18 BRPM | DIASTOLIC BLOOD PRESSURE: 76 MMHG | SYSTOLIC BLOOD PRESSURE: 138 MMHG | TEMPERATURE: 97.6 F | HEART RATE: 58 BPM | OXYGEN SATURATION: 98 % | WEIGHT: 208 LBS | HEIGHT: 61 IN | BODY MASS INDEX: 39.27 KG/M2

## 2024-04-09 DIAGNOSIS — I10 PRIMARY HYPERTENSION: ICD-10-CM

## 2024-04-09 DIAGNOSIS — Z79.4 TYPE 2 DIABETES MELLITUS WITH HYPERGLYCEMIA, WITH LONG-TERM CURRENT USE OF INSULIN (HCC): Primary | ICD-10-CM

## 2024-04-09 DIAGNOSIS — F41.8 DEPRESSION WITH ANXIETY: Chronic | ICD-10-CM

## 2024-04-09 DIAGNOSIS — E55.9 VITAMIN D DEFICIENCY: ICD-10-CM

## 2024-04-09 DIAGNOSIS — E11.65 TYPE 2 DIABETES MELLITUS WITH HYPERGLYCEMIA, WITH LONG-TERM CURRENT USE OF INSULIN (HCC): Primary | ICD-10-CM

## 2024-04-09 DIAGNOSIS — E78.1 HYPERTRIGLYCERIDEMIA: ICD-10-CM

## 2024-04-09 DIAGNOSIS — E11.42 DIABETIC POLYNEUROPATHY ASSOCIATED WITH TYPE 2 DIABETES MELLITUS (HCC): ICD-10-CM

## 2024-04-09 LAB
GLUCOSE SERPL-MCNC: 223 MG/DL (ref 65–140)
SL AMB POCT HEMOGLOBIN AIC: 11.6 (ref ?–6.5)

## 2024-04-09 PROCEDURE — 99214 OFFICE O/P EST MOD 30 MIN: CPT | Performed by: NURSE PRACTITIONER

## 2024-04-09 PROCEDURE — 83036 HEMOGLOBIN GLYCOSYLATED A1C: CPT | Performed by: NURSE PRACTITIONER

## 2024-04-09 RX ORDER — PREGABALIN 100 MG/1
CAPSULE ORAL
Qty: 120 CAPSULE | Refills: 5 | Status: SHIPPED | OUTPATIENT
Start: 2024-04-09

## 2024-04-09 NOTE — PATIENT INSTRUCTIONS
Lyrica 100 mg in the AM, 100 mg in the afternoon, and 200 mg at bedtime for neuropathic pain   Discussed that her lack of appetite and nausea could be her recent start on Mounjaro- has Endo appointment in April- will discuss with her Endocrinologist  Return with issues/concerns

## 2024-04-09 NOTE — PROGRESS NOTES
Call placed to patient to check status, follow-up on MA vs [employer] insurance, and discuss SSDI application process.    Spoke with patient who confirmed with [employer] that she is ineligible for MA and was referred to [employer] insurance.  Patient reviewed insurance options through [employer] and will be signing up for plan.  Encouraged patient to note effective date for new plan and ID# in order to provide to PCP office once known.  Patient understood.    Patient states she was informed by SS office that she is ineligible for Social Security benefits due to household income.  Provided patient information for [employer] ( Advocacy group) and encouraged her to outreach if she has further questions.    No other needs.  Will close case at this time.  Update routed to OP RNTONE Pabon.

## 2024-04-09 NOTE — PROGRESS NOTES
"Name: Haylee Balbuena      : 1970      MRN: [id number]  Encounter Provider: SYLWIA Larson  Encounter Date: 2024   Encounter department: [facility] PRIMARY CARE    Assessment & Plan     1. Type 2 diabetes mellitus with hyperglycemia, with long-term current use of insulin (HCC)  -     Fingerstick Glucose (POCT)  -     POCT hemoglobin A1c    2. Diabetic polyneuropathy associated with type 2 diabetes mellitus (HCC)  -     pregabalin (LYRICA) 100 mg capsule; 100 mg in AM, 100 mg in afternoon, and 200 mg at bedtime    3. Depression with anxiety    4. Primary hypertension    5. Vitamin D deficiency    6. Hypertriglyceridemia           Subjective      Patient is a 52 y/o female, presenting for leg pain     - started about 1 month ago and seems to be getting worse. Thinks it is her neuropathy and is on Lyrica for this. Describes it as aching pain and sometimes she can't touch her legs because of it. Taking tylenol for pain control, without much relief. Discussed increasing dose of Lyrica. Patient agreeable to this.      - Reports nausea off and on for two weeks. Doesn't know if this is due to her sugars. She used to be in the 300's and is now in the 100's. Denies recent infections. Discussed recent Mounjaro prescription. This may cause GI symptoms. Patient expressed understanding.     - Reports not hungry lately. Just started the Mounjaro recently. \"Used to eat two grilled cheeses and now only eats a half of one\". Discussed how this medication works and eating much less is to be expected.     Patient expected to lose her insurance 2024    According to patients CGM, average BS-164. A1C in the office today is  11.6      Review of Systems   Constitutional:  Positive for appetite change. Negative for activity change, diaphoresis, fatigue and fever.   HENT:  Negative for congestion, facial swelling, hearing loss, rhinorrhea, sinus pressure, sinus pain, sneezing, sore throat and voice change.  "   Eyes:  Negative for discharge and visual disturbance.   Respiratory:  Negative for cough, choking, chest tightness, shortness of breath, wheezing and stridor.    Cardiovascular:  Negative for chest pain, palpitations and leg swelling.   Gastrointestinal:  Positive for nausea. Negative for abdominal distention, abdominal pain, constipation, diarrhea and vomiting.   Endocrine: Negative for polydipsia, polyphagia and polyuria.   Genitourinary:  Negative for difficulty urinating, dysuria, frequency and urgency.   Musculoskeletal:  Positive for arthralgias and myalgias. Negative for back pain, gait problem, joint swelling, neck pain and neck stiffness.        Bilateral leg pain   Skin:  Negative for color change, rash and wound.   Neurological:  Negative for dizziness, syncope, speech difficulty, weakness, light-headedness and headaches.   Hematological:  Negative for adenopathy. Does not bruise/bleed easily.   Psychiatric/Behavioral:  Negative for agitation, behavioral problems, confusion, hallucinations, sleep disturbance and suicidal ideas. The patient is not nervous/anxious.    All other systems reviewed and are negative.      Current Outpatient Medications on File Prior to Visit   Medication Sig    albuterol (2.5 mg/3 mL) 0.083 % nebulizer solution Take 3 mL (2.5 mg total) by nebulization every 6 (six) hours as needed for wheezing or shortness of breath    albuterol (PROVENTIL HFA,VENTOLIN HFA) 90 mcg/act inhaler INHALE ONE PUFF BY MOUTH AND INTO THE LUNGS EVERY 6 HOURS AS NEEDED FOR WHEEZING    Alcohol Swabs (Pharmacist Choice Alcohol) PADS Apply topically 4 (four) times a day Use as directed    aspirin (ECOTRIN LOW STRENGTH) 81 mg EC tablet TAKE ONE TABLET BY MOUTH ONCE DAILY    atorvastatin (LIPITOR) 80 mg tablet TAKE ONE TABLET BY MOUTH DAILY AT BEDTIME    B-D UF III MINI PEN NEEDLES 31G X 5 MM MISC Pt uses 5 pen needles daily    cholecalciferol (VITAMIN D3) 1,000 units tablet Take 2 tablets (2,000 Units  total) by mouth daily    citalopram (CeleXA) 40 mg tablet TAKE ONE TABLET BY MOUTH DAILY    Continuous Blood Gluc Sensor (FreeStyle Refugio 2 Sensor) MISC Use 1 each every 14 (fourteen) days    docusate sodium (COLACE) 100 mg capsule TAKE ONE CAPSULE BY MOUTH TWICE DAILY    Empagliflozin (Jardiance) 25 MG TABS Take 1 tablet (25 mg total) by mouth every morning    ergocalciferol (VITAMIN D2) 50,000 units Take 1 capsule (50,000 Units total) by mouth once a week    famotidine (PEPCID) 40 MG tablet TAKE ONE TABLET BY MOUTH TWO HOURS PRIOR TO BEDTIME    fenofibrate micronized (LOFIBRA) 134 MG capsule Take 1 capsule (134 mg total) by mouth daily with breakfast    Fluticasone-Salmeterol (Advair) 250-50 mcg/dose inhaler INHALE 1 PUFF BY MOUTH 2 (TWO) TIMES A DAY RINSE MOUTH AFTER USE.    guaiFENesin (MUCINEX) 600 mg 12 hr tablet Take 1 tablet (600 mg total) by mouth every 12 (twelve) hours    Insulin Regular Human, Conc, (HumuLIN R U-500 KwikPen) 500 units/mL CONCENTRATED U-500 injection pen Take 95 units with breakfast, 75 units with lunch, 75 with dinner, or as directed TDD up to 250 units    Iron Heme Polypeptide 12 MG TABS Take 1 tablet by mouth    levETIRAcetam (KEPPRA) 500 mg tablet TAKE ONE TABLET BY MOUTH TWICE DAILY    linaCLOtide (Linzess) 72 MCG CAPS Take 1 capsule by mouth daily before breakfast    meclizine (ANTIVERT) 25 mg tablet Take 1 tablet (25 mg total) by mouth 3 (three) times a day as needed for dizziness    metoclopramide (Reglan) 10 mg tablet Take 0.5 tablets (5 mg total) by mouth every 6 (six) hours    metoprolol tartrate (LOPRESSOR) 25 mg tablet TAKE ONE TABLET BY MOUTH TWICE DAILY    multivitamin (THERAGRAN) TABS Take 1 tablet by mouth every morning    nystatin (MYCOSTATIN) cream Apply topically 2 (two) times a day    ondansetron (ZOFRAN) 4 mg tablet Take 1 tablet (4 mg total) by mouth every 6 (six) hours    OneTouch Ultra test strip USE 4 TIMES A DAY    oxybutynin (DITROPAN-XL) 10 MG 24 hr tablet  "Take 1 tablet (10 mg total) by mouth daily at bedtime    Pharmacist Choice Lancets MISC USE AS DIRECTED    Restasis 0.05 % ophthalmic emulsion     tirzepatide (Mounjaro) 7.5 MG/0.5ML Inject 0.5 mL (7.5 mg total) under the skin every 7 days    [DISCONTINUED] pregabalin (LYRICA) 100 mg capsule TAKE ONE CAPSULE BY MOUTH THREE TIMES A DAY    benzonatate (TESSALON) 200 MG capsule Take 1 capsule (200 mg total) by mouth 3 (three) times a day as needed for cough (Patient not taking: Reported on 3/4/2024)    Continuous Blood Gluc  (FreeStyle Refugio 14 Day [place]) TEMITOPE Use for continuous blood glucose monitoring (Patient not taking: Reported on 1/12/2024)    Sodium Sulfate-Mag Sulfate-KCl 7205-630-295 MG TABS Take 12 tablets at 5 pm the night before procedure, then 6 hours prior to procedure, take another 12 tablets per instruction sheet provided.       Objective     /76   Pulse 58   Temp 97.6 °F (36.4 °C)   Resp 18   Ht 5' 1\" (1.549 m)   Wt 94.3 kg (208 lb)   SpO2 98%   BMI 39.30 kg/m²     Physical Exam  Vitals and nursing note reviewed.   Constitutional:       General: She is not in acute distress.     Appearance: She is well-developed. She is not diaphoretic.   Cardiovascular:      Rate and Rhythm: Normal rate and regular rhythm.      Heart sounds: Normal heart sounds.   Pulmonary:      Effort: Pulmonary effort is normal. No respiratory distress.      Breath sounds: Normal breath sounds. No wheezing.   Musculoskeletal:         General: No swelling or deformity.      Right lower leg: No edema.      Left lower leg: No edema.   Skin:     General: Skin is warm and dry.      Findings: No erythema or rash.   Neurological:      Mental Status: She is alert and oriented to person, place, and time.   Psychiatric:         Mood and Affect: Mood normal.         Behavior: Behavior normal. Behavior is cooperative.         Thought Content: Thought content normal.         Judgment: Judgment normal.       Kaylan Guzmán, " CRNP

## 2024-04-10 ENCOUNTER — TELEPHONE (OUTPATIENT)
Age: 54
End: 2024-04-10

## 2024-04-10 NOTE — TELEPHONE ENCOUNTER
PA for Mounjaro    Submitted via    []CMM-KEY   []Surescripts-Case ID # [id number]   []Faxed to plan   []Other website   []Phone call Case ID #     Office notes sent, clinical questions answered. Awaiting determination    Turnaround time for your insurance to make a decision on your Prior Authorization can take 7-21 business days.

## 2024-04-11 ENCOUNTER — TELEPHONE (OUTPATIENT)
Dept: FAMILY MEDICINE CLINIC | Facility: CLINIC | Age: 54
End: 2024-04-11

## 2024-04-11 DIAGNOSIS — Z01.818 PREOPERATIVE CLEARANCE: Primary | ICD-10-CM

## 2024-04-12 ENCOUNTER — APPOINTMENT (OUTPATIENT)
Dept: LAB | Facility: CLINIC | Age: 54
End: 2024-04-12
Payer: COMMERCIAL

## 2024-04-12 ENCOUNTER — OFFICE VISIT (OUTPATIENT)
Dept: LAB | Facility: HOSPITAL | Age: 54
End: 2024-04-12
Payer: COMMERCIAL

## 2024-04-12 ENCOUNTER — TELEPHONE (OUTPATIENT)
Dept: FAMILY MEDICINE CLINIC | Facility: CLINIC | Age: 54
End: 2024-04-12

## 2024-04-12 DIAGNOSIS — Z79.4 TYPE 2 DIABETES MELLITUS WITH HYPERGLYCEMIA, WITH LONG-TERM CURRENT USE OF INSULIN (HCC): ICD-10-CM

## 2024-04-12 DIAGNOSIS — N18.30 BENIGN HYPERTENSION WITH CKD (CHRONIC KIDNEY DISEASE) STAGE III (HCC): Chronic | ICD-10-CM

## 2024-04-12 DIAGNOSIS — Z01.818 PREOPERATIVE CLEARANCE: Primary | ICD-10-CM

## 2024-04-12 DIAGNOSIS — Z79.4 TYPE 2 DIABETES MELLITUS WITH DIABETIC AUTONOMIC NEUROPATHY, WITH LONG-TERM CURRENT USE OF INSULIN (HCC): ICD-10-CM

## 2024-04-12 DIAGNOSIS — Z01.818 PREOPERATIVE CLEARANCE: ICD-10-CM

## 2024-04-12 DIAGNOSIS — I12.9 BENIGN HYPERTENSION WITH CKD (CHRONIC KIDNEY DISEASE) STAGE III (HCC): Chronic | ICD-10-CM

## 2024-04-12 DIAGNOSIS — E11.59 HYPERTENSION ASSOCIATED WITH DIABETES  (HCC): ICD-10-CM

## 2024-04-12 DIAGNOSIS — N18.31 STAGE 3A CHRONIC KIDNEY DISEASE (HCC): ICD-10-CM

## 2024-04-12 DIAGNOSIS — E78.00 HYPERCHOLESTEREMIA: ICD-10-CM

## 2024-04-12 DIAGNOSIS — E11.43 TYPE 2 DIABETES MELLITUS WITH DIABETIC AUTONOMIC NEUROPATHY, WITH LONG-TERM CURRENT USE OF INSULIN (HCC): ICD-10-CM

## 2024-04-12 DIAGNOSIS — E11.65 TYPE 2 DIABETES MELLITUS WITH HYPERGLYCEMIA, WITH LONG-TERM CURRENT USE OF INSULIN (HCC): ICD-10-CM

## 2024-04-12 DIAGNOSIS — E61.1 IRON DEFICIENCY: ICD-10-CM

## 2024-04-12 DIAGNOSIS — I15.2 HYPERTENSION ASSOCIATED WITH DIABETES  (HCC): ICD-10-CM

## 2024-04-12 LAB
25(OH)D3 SERPL-MCNC: 47.9 NG/ML (ref 30–100)
ATRIAL RATE: 64 BPM
BASOPHILS # BLD AUTO: 0.04 THOUSANDS/ÂΜL (ref 0–0.1)
BASOPHILS NFR BLD AUTO: 1 % (ref 0–1)
EOSINOPHIL # BLD AUTO: 0.18 THOUSAND/ÂΜL (ref 0–0.61)
EOSINOPHIL NFR BLD AUTO: 2 % (ref 0–6)
ERYTHROCYTE [DISTWIDTH] IN BLOOD BY AUTOMATED COUNT: 14.5 % (ref 11.6–15.1)
EST. AVERAGE GLUCOSE BLD GHB EST-MCNC: 306 MG/DL
FERRITIN SERPL-MCNC: 21 NG/ML (ref 11–307)
HBA1C MFR BLD: 12.3 %
HCT VFR BLD AUTO: 38.1 % (ref 34.8–46.1)
HGB BLD-MCNC: 11.7 G/DL (ref 11.5–15.4)
IMM GRANULOCYTES # BLD AUTO: 0.02 THOUSAND/UL (ref 0–0.2)
IMM GRANULOCYTES NFR BLD AUTO: 0 % (ref 0–2)
LYMPHOCYTES # BLD AUTO: 2.4 THOUSANDS/ÂΜL (ref 0.6–4.47)
LYMPHOCYTES NFR BLD AUTO: 32 % (ref 14–44)
MCH RBC QN AUTO: 25.6 PG (ref 26.8–34.3)
MCHC RBC AUTO-ENTMCNC: 30.7 G/DL (ref 31.4–37.4)
MCV RBC AUTO: 83 FL (ref 82–98)
MONOCYTES # BLD AUTO: 0.76 THOUSAND/ÂΜL (ref 0.17–1.22)
MONOCYTES NFR BLD AUTO: 10 % (ref 4–12)
NEUTROPHILS # BLD AUTO: 4.11 THOUSANDS/ÂΜL (ref 1.85–7.62)
NEUTS SEG NFR BLD AUTO: 55 % (ref 43–75)
NRBC BLD AUTO-RTO: 0 /100 WBCS
P AXIS: 31 DEGREES
PLATELET # BLD AUTO: 372 THOUSANDS/UL (ref 149–390)
PMV BLD AUTO: 11.3 FL (ref 8.9–12.7)
PR INTERVAL: 174 MS
PTH-INTACT SERPL-MCNC: 62.5 PG/ML (ref 12–88)
QRS AXIS: 4 DEGREES
QRSD INTERVAL: 88 MS
QT INTERVAL: 426 MS
QTC INTERVAL: 439 MS
RBC # BLD AUTO: 4.57 MILLION/UL (ref 3.81–5.12)
T WAVE AXIS: 26 DEGREES
VENTRICULAR RATE: 64 BPM
WBC # BLD AUTO: 7.51 THOUSAND/UL (ref 4.31–10.16)

## 2024-04-12 PROCEDURE — 83540 ASSAY OF IRON: CPT

## 2024-04-12 PROCEDURE — 80053 COMPREHEN METABOLIC PANEL: CPT

## 2024-04-12 PROCEDURE — 82306 VITAMIN D 25 HYDROXY: CPT

## 2024-04-12 PROCEDURE — 83550 IRON BINDING TEST: CPT

## 2024-04-12 PROCEDURE — 84100 ASSAY OF PHOSPHORUS: CPT

## 2024-04-12 PROCEDURE — 83970 ASSAY OF PARATHORMONE: CPT

## 2024-04-12 PROCEDURE — 83036 HEMOGLOBIN GLYCOSYLATED A1C: CPT

## 2024-04-12 PROCEDURE — 93005 ELECTROCARDIOGRAM TRACING: CPT

## 2024-04-12 PROCEDURE — 83735 ASSAY OF MAGNESIUM: CPT

## 2024-04-12 PROCEDURE — 85025 COMPLETE CBC W/AUTO DIFF WBC: CPT

## 2024-04-12 PROCEDURE — 36415 COLL VENOUS BLD VENIPUNCTURE: CPT

## 2024-04-12 PROCEDURE — 82728 ASSAY OF FERRITIN: CPT

## 2024-04-12 PROCEDURE — 80061 LIPID PANEL: CPT

## 2024-04-12 NOTE — TELEPHONE ENCOUNTER
Left detailed message informing patient that EKG needs to be done today and also requested a call back with any questions/concerns.

## 2024-04-12 NOTE — TELEPHONE ENCOUNTER
Kaylan ordered EKG for patient to complete. Form is on Kaylan's desk to sign once EKG is final.    See previous phone encounter for details.

## 2024-04-12 NOTE — TELEPHONE ENCOUNTER
It does not look like she went for the EKG- let her know this needs to be done today so a cardiologist reads it before clearance can be completed before Wednesday

## 2024-04-12 NOTE — TELEPHONE ENCOUNTER
can you call Haylee -I was just filling out her form. Her last Potassium was way too low about 3 weeks ago. I put in orders for bloodwork for her clearance- does not need to be fasting. she can do the bloodwork when she gets her EKG- which is not yet done. That will have to be completed no later than tomorrow so Cardiology can read it        I called and left a message with details for the patient.

## 2024-04-13 ENCOUNTER — APPOINTMENT (OUTPATIENT)
Dept: LAB | Facility: CLINIC | Age: 54
End: 2024-04-13
Payer: COMMERCIAL

## 2024-04-13 LAB
ALBUMIN SERPL BCP-MCNC: 3.9 G/DL (ref 3.5–5)
ALP SERPL-CCNC: 68 U/L (ref 34–104)
ALT SERPL W P-5'-P-CCNC: 21 U/L (ref 7–52)
ANION GAP SERPL CALCULATED.3IONS-SCNC: 8 MMOL/L (ref 4–13)
AST SERPL W P-5'-P-CCNC: 18 U/L (ref 13–39)
BACTERIA UR QL AUTO: ABNORMAL /HPF
BILIRUB SERPL-MCNC: 0.26 MG/DL (ref 0.2–1)
BILIRUB UR QL STRIP: NEGATIVE
BUN SERPL-MCNC: 28 MG/DL (ref 5–25)
CALCIUM SERPL-MCNC: 8.9 MG/DL (ref 8.4–10.2)
CHLORIDE SERPL-SCNC: 105 MMOL/L (ref 96–108)
CHOLEST SERPL-MCNC: 135 MG/DL
CLARITY UR: CLEAR
CO2 SERPL-SCNC: 27 MMOL/L (ref 21–32)
COLOR UR: COLORLESS
CREAT SERPL-MCNC: 1.14 MG/DL (ref 0.6–1.3)
CREAT UR-MCNC: 24.2 MG/DL
GFR SERPL CREATININE-BSD FRML MDRD: 55 ML/MIN/1.73SQ M
GLUCOSE P FAST SERPL-MCNC: 106 MG/DL (ref 65–99)
GLUCOSE UR STRIP-MCNC: ABNORMAL MG/DL
HDLC SERPL-MCNC: 55 MG/DL
HGB UR QL STRIP.AUTO: NEGATIVE
KETONES UR STRIP-MCNC: NEGATIVE MG/DL
LDLC SERPL CALC-MCNC: 38 MG/DL (ref 0–100)
LEUKOCYTE ESTERASE UR QL STRIP: ABNORMAL
MAGNESIUM SERPL-MCNC: 2 MG/DL (ref 1.9–2.7)
MICROALBUMIN UR-MCNC: <7 MG/L
MICROALBUMIN/CREAT 24H UR: <29 MG/G CREATININE (ref 0–30)
NITRITE UR QL STRIP: NEGATIVE
NON-SQ EPI CELLS URNS QL MICRO: ABNORMAL /HPF
NONHDLC SERPL-MCNC: 80 MG/DL
PH UR STRIP.AUTO: 6 [PH]
PHOSPHATE SERPL-MCNC: 4.4 MG/DL (ref 2.7–4.5)
POTASSIUM SERPL-SCNC: 4.4 MMOL/L (ref 3.5–5.3)
PROT SERPL-MCNC: 6.3 G/DL (ref 6.4–8.4)
PROT UR STRIP-MCNC: NEGATIVE MG/DL
RBC #/AREA URNS AUTO: ABNORMAL /HPF
SODIUM SERPL-SCNC: 140 MMOL/L (ref 135–147)
SP GR UR STRIP.AUTO: 1.01 (ref 1–1.03)
TRIGL SERPL-MCNC: 210 MG/DL
UROBILINOGEN UR STRIP-ACNC: <2 MG/DL
WBC #/AREA URNS AUTO: ABNORMAL /HPF

## 2024-04-13 PROCEDURE — 82570 ASSAY OF URINE CREATININE: CPT

## 2024-04-13 PROCEDURE — 82043 UR ALBUMIN QUANTITATIVE: CPT

## 2024-04-13 PROCEDURE — 81001 URINALYSIS AUTO W/SCOPE: CPT

## 2024-04-14 LAB
IRON SATN MFR SERPL: 15 % (ref 15–50)
IRON SERPL-MCNC: 59 UG/DL (ref 50–212)
TIBC SERPL-MCNC: 403 UG/DL (ref 250–450)
UIBC SERPL-MCNC: 344 UG/DL (ref 155–355)

## 2024-04-14 NOTE — TELEPHONE ENCOUNTER
PA for Mounjaro 7.5    Submitted via    [x]CMM-KEY [id number]  []SurescriLimk-Case ID #    []Faxed to plan   []Other website    []Phone call Case ID #      Office notes sent, clinical questions answered. Awaiting determination    Turnaround time for your insurance to make a decision on your Prior Authorization can take 7-21 business days.

## 2024-04-15 ENCOUNTER — TELEPHONE (OUTPATIENT)
Dept: NEPHROLOGY | Facility: CLINIC | Age: 54
End: 2024-04-15

## 2024-04-15 NOTE — TELEPHONE ENCOUNTER
Patient returned our call and is aware of her lab results. Patient asked what she can do to get her kidney function back up to where it should be. Please advise.

## 2024-04-15 NOTE — TELEPHONE ENCOUNTER
I called and left a message for patient to call back regarding lab results.       ----- Message from Cyrus Yancey DO sent at 4/13/2024 12:40 PM EDT -----  Labs reviewed, kidney fn slightly lower compared to last check, but her potassium level is much better   Continue with current care, will review in detail at appt on 4/22

## 2024-04-16 NOTE — TELEPHONE ENCOUNTER
Continue to avoid all NSAIDs, we will discuss further at her appointment next week.  I will be rechecking her labs but it is too soon to check them right now

## 2024-04-18 ENCOUNTER — OFFICE VISIT (OUTPATIENT)
Dept: ENDOCRINOLOGY | Facility: CLINIC | Age: 54
End: 2024-04-18
Payer: COMMERCIAL

## 2024-04-18 VITALS
WEIGHT: 208.4 LBS | BODY MASS INDEX: 39.35 KG/M2 | HEIGHT: 61 IN | DIASTOLIC BLOOD PRESSURE: 76 MMHG | SYSTOLIC BLOOD PRESSURE: 128 MMHG | HEART RATE: 78 BPM

## 2024-04-18 DIAGNOSIS — Z79.4 TYPE 2 DIABETES MELLITUS WITH DIABETIC AUTONOMIC NEUROPATHY, WITH LONG-TERM CURRENT USE OF INSULIN (HCC): ICD-10-CM

## 2024-04-18 DIAGNOSIS — E11.43 TYPE 2 DIABETES MELLITUS WITH DIABETIC AUTONOMIC NEUROPATHY, WITH LONG-TERM CURRENT USE OF INSULIN (HCC): ICD-10-CM

## 2024-04-18 PROCEDURE — 99213 OFFICE O/P EST LOW 20 MIN: CPT | Performed by: PHYSICIAN ASSISTANT

## 2024-04-18 RX ORDER — INSULIN HUMAN 500 [IU]/ML
INJECTION, SOLUTION SUBCUTANEOUS
Qty: 15 ML | Status: SHIPPED
Start: 2024-04-18

## 2024-04-18 NOTE — PROGRESS NOTES
Haylee Balbuena 53 y.o. female MRN: [id number]    Encounter: [id number]      Assessment/Plan   Problem List Items Addressed This Visit          Endocrine    Type 2 diabetes mellitus with diabetic autonomic neuropathy, with long-term current use of insulin ([facility])       Lab Results   Component Value Date    HGBA1C 12.3 (H) 04/12/2024   Haylee has ongoing poor control although it is improving with current GMI 8.5% on 2-week CGM report.  We also happy to see that her fasting hypoglycemia has improved with redistribution of insulin.    Reviewed blood glucose targets, time in target range goal of 70%.     To continue CGM monitoring.  She continues to benefit from CGM use due to MDI insulin use.    Compliance with insulin dosing remains paramount as well as dietary compliance.  This was reviewed with Haylee.  We recommended referral back to CDE for MNT ASAP.    Recommended reinitiation of Mounjaro which was temporarily held for colonoscopy, which has now been canceled.     Will also further adjust U-500 insulin dosing to better reflect her current habits as well as nocturnal hypoglycemia fear: 95 units before breakfast, 50 units with lunch, 35 units with dinner    Recommend follow-up in office in 2 months.  Will plan to collect A1c in office at that time.         Relevant Medications    Insulin Regular Human, Conc, (HumuLIN R U-500 KwikPen) 500 units/mL CONCENTRATED U-500 injection pen    Other Relevant Orders    Ambulatory referral to Diabetic Education        CC: Diabetes    History of Present Illness     HPI:  Haylee Balbuena is a 53 y.o. female here for close interval follow up visit due to poor DM control.   Diabetes is complicated by: CKD stage 3, gastroparesis, neuropathy  Last endocrinology visit: 4/5/24    At that time we redistributed her insulin, increasing insulin dosing in the morning, meanwhile reducing her evening dose due to fasting hypoglycemia. Recommended U500 dosing was as follows: 95 units with  breakfast, 75 units with lunch, 75 units with dinner.    Haylee reports ongoing fear of hypoglycemia which is driving her actual insulin dosing - she typically reduces her lunch dose between 50 to 60 units, giving 40 to 50 units at dinner.  She confirms she is consistent with taking 95 units before breakfast.  She does sometimes skip either lunch or dinner insulin altogether again due to hypoglycemia fear    Of note, unfortunately Haylee loses her insurance coverage soon.    She is scheduled to have a colonoscopy this month, however decided to cancel due to more pressing health and personal issues.    Current DM medications reviewed.  Of note, her Mounjaro dose was held due to anticipated colonoscopy.  Last Mounjaro dose was 4/3/2024.  She does feel that Mounjaro is helping with blood sugar control.  She has chronic nausea but does not feel GLP-1 is increasing the symptoms.    Haylee Balbuena   Device used:  Refugio 2  Home use     Indication   Type 2 Diabetes    More than 72 hours of data was reviewed. Report to be scanned to chart.     Date Range: April 3, 2024 through April 16, 2024    Analysis of data:   % time CGM used: 95%  Average Glucose: 219 mg/dL  Coefficient of Variation: 36.2%  GMI 8.5%  Time in Target Range: 34% in range  Time Above Range: 31% high, 35% very high  Time Below Range: 0% low, 0% very low    Interpretation of data: Extremes of hyperglycemia seem to be improving with time in the very high range at 35%, as compared to 49% a few weeks ago.  She is also significantly reduced her frequency of fasting hypoglycemia although it is notable that near hypoglycemia, mild hypoglycemia is still present some mornings.         Review of Systems   All other systems reviewed and are negative.      Historical Information   Past Medical History:   Diagnosis Date    Atypical chest pain     Gastroparesis     GERD (gastroesophageal reflux disease)     Hypertension     Psychiatric disorder     Sciatica     Seizure  disorder (HCC)      Past Surgical History:   Procedure Laterality Date    APPENDECTOMY      BREAST BIOPSY Left  benign     SECTION      CHOLECYSTECTOMY      COLONOSCOPY      HYSTERECTOMY      IR PICC PLACEMENT SINGLE LUMEN  2023    VT LAPAROSCOPIC APPENDECTOMY N/A 2021    Procedure: APPENDECTOMY LAPAROSCOPIC;  Surgeon: Onel Martin MD;  Location:  MAIN OR;  Service: General    VT LAPS ABD PRTM&OMENTUM DX W/WO SPEC BR/WA SPX N/A 2021    Procedure: LAPAROSCOPY DIAGNOSTIC, EXTENSIVE DESI;  Surgeon: Onel Martin MD;  Location:  MAIN OR;  Service: General    TUBAL LIGATION      UPPER GASTROINTESTINAL ENDOSCOPY       Social History   Social History     Substance and Sexual Activity   Alcohol Use Never     Social History     Substance and Sexual Activity   Drug Use Never     Social History     Tobacco Use   Smoking Status Former    Current packs/day: 0.00    Types: Cigarettes    Quit date:     Years since quittin.3   Smokeless Tobacco Never     Family History:   Family History   Problem Relation Age of Onset    No Known Problems Mother     No Known Problems Father     No Known Problems Daughter     No Known Problems Maternal Grandmother     No Known Problems Paternal Grandmother     No Known Problems Paternal Aunt     No Known Problems Paternal Aunt     No Known Problems Paternal Aunt        Meds/Allergies   Current Outpatient Medications   Medication Sig Dispense Refill    albuterol (2.5 mg/3 mL) 0.083 % nebulizer solution Take 3 mL (2.5 mg total) by nebulization every 6 (six) hours as needed for wheezing or shortness of breath 180 mL 5    albuterol (PROVENTIL HFA,VENTOLIN HFA) 90 mcg/act inhaler INHALE ONE PUFF BY MOUTH AND INTO THE LUNGS EVERY 6 HOURS AS NEEDED FOR WHEEZING 36 g 3    Alcohol Swabs (Pharmacist Choice Alcohol) PADS Apply topically 4 (four) times a day Use as directed 300 each 5    aspirin (ECOTRIN LOW STRENGTH) 81 mg EC tablet TAKE ONE TABLET BY MOUTH ONCE  DAILY 30 tablet 1    atorvastatin (LIPITOR) 80 mg tablet TAKE ONE TABLET BY MOUTH DAILY AT BEDTIME 180 tablet 3    B-D UF III MINI PEN NEEDLES 31G X 5 MM MISC Pt uses 5 pen needles daily 500 each 0    cholecalciferol (VITAMIN D3) 1,000 units tablet Take 2 tablets (2,000 Units total) by mouth daily 60 tablet 3    citalopram (CeleXA) 40 mg tablet TAKE ONE TABLET BY MOUTH DAILY 90 tablet 3    Continuous Blood Gluc Sensor (FreeStyle Refugio 2 Sensor) MISC Use 1 each every 14 (fourteen) days 6 each 2    docusate sodium (COLACE) 100 mg capsule TAKE ONE CAPSULE BY MOUTH TWICE DAILY 240 capsule 3    Empagliflozin (Jardiance) 25 MG TABS Take 1 tablet (25 mg total) by mouth every morning 90 tablet 1    ergocalciferol (VITAMIN D2) 50,000 units Take 1 capsule (50,000 Units total) by mouth once a week 12 capsule 3    famotidine (PEPCID) 40 MG tablet TAKE ONE TABLET BY MOUTH TWO HOURS PRIOR TO BEDTIME 30 tablet 6    fenofibrate micronized (LOFIBRA) 134 MG capsule Take 1 capsule (134 mg total) by mouth daily with breakfast 90 capsule 1    Insulin Regular Human, Conc, (HumuLIN R U-500 KwikPen) 500 units/mL CONCENTRATED U-500 injection pen Take 95 units with breakfast, 75 units with lunch, 75 with dinner, or as directed TDD up to 250 units 15 mL 2    Iron Heme Polypeptide 12 MG TABS Take 1 tablet by mouth      levETIRAcetam (KEPPRA) 500 mg tablet TAKE ONE TABLET BY MOUTH TWICE DAILY 300 tablet 1    linaCLOtide (Linzess) 72 MCG CAPS Take 1 capsule by mouth daily before breakfast 30 capsule 0    meclizine (ANTIVERT) 25 mg tablet Take 1 tablet (25 mg total) by mouth 3 (three) times a day as needed for dizziness 30 tablet 0    metoclopramide (Reglan) 10 mg tablet Take 0.5 tablets (5 mg total) by mouth every 6 (six) hours 30 tablet 0    metoprolol tartrate (LOPRESSOR) 25 mg tablet TAKE ONE TABLET BY MOUTH TWICE DAILY 180 tablet 1    multivitamin (THERAGRAN) TABS Take 1 tablet by mouth every morning      nystatin (MYCOSTATIN) cream Apply  "topically 2 (two) times a day 30 g 0    ondansetron (ZOFRAN) 4 mg tablet Take 1 tablet (4 mg total) by mouth every 6 (six) hours 12 tablet 0    OneTouch Ultra test strip USE 4 TIMES A  each 0    oxybutynin (DITROPAN-XL) 10 MG 24 hr tablet Take 1 tablet (10 mg total) by mouth daily at bedtime 90 tablet 1    Pharmacist Choice Lancets MISC USE AS DIRECTED 100 each 1    pregabalin (LYRICA) 100 mg capsule 100 mg in AM, 100 mg in afternoon, and 200 mg at bedtime 120 capsule 5    Restasis 0.05 % ophthalmic emulsion       tirzepatide (Mounjaro) 7.5 MG/0.5ML Inject 0.5 mL (7.5 mg total) under the skin every 7 days 2 mL 1    benzonatate (TESSALON) 200 MG capsule Take 1 capsule (200 mg total) by mouth 3 (three) times a day as needed for cough (Patient not taking: Reported on 3/4/2024) 30 capsule 0    Continuous Blood Gluc  (FreeStyle Refugio 14 Day [place]) TEMITOPE Use for continuous blood glucose monitoring (Patient not taking: Reported on 1/12/2024) 1 each 0    Fluticasone-Salmeterol (Advair) 250-50 mcg/dose inhaler INHALE 1 PUFF BY MOUTH 2 (TWO) TIMES A DAY RINSE MOUTH AFTER USE. (Patient not taking: Reported on 4/18/2024) 60 blister 11    guaiFENesin (MUCINEX) 600 mg 12 hr tablet Take 1 tablet (600 mg total) by mouth every 12 (twelve) hours (Patient not taking: Reported on 4/18/2024) 60 tablet 1    Sodium Sulfate-Mag Sulfate-KCl 2264-438-525 MG TABS Take 12 tablets at 5 pm the night before procedure, then 6 hours prior to procedure, take another 12 tablets per instruction sheet provided. 24 tablet 0     No current facility-administered medications for this visit.     Allergies   Allergen Reactions    Butorphanol Hallucinations    Benztropine     Penicillins     Zolpidem        Objective   Vitals: Blood pressure 128/76, pulse 78, height 5' 1\" (1.549 m), weight 94.5 kg (208 lb 6.4 oz), not currently breastfeeding.    Physical Exam  Vitals reviewed.   Pulmonary:      Effort: No respiratory distress.   Neurological: " "     Mental Status: She is alert.   Psychiatric:         Mood and Affect: Mood normal.         The history was obtained from the review of the chart, patient    Lab Results:   Lab Results   Component Value Date    HGBA1C 12.3 (H) 04/12/2024    HGBA1C 11.6 (A) 04/09/2024    HGBA1C 12.4 (H) 01/05/2024     Lab Results   Component Value Date    GLUF 106 (H) 04/12/2024    LDLCALC 38 04/12/2024    CREATININE 1.14 04/12/2024       Lab Results   Component Value Date    SODIUM 140 04/12/2024    K 4.4 04/12/2024     04/12/2024    CO2 27 04/12/2024    AGAP 8 04/12/2024    BUN 28 (H) 04/12/2024    CREATININE 1.14 04/12/2024    GLUC 77 03/23/2024    GLUF 106 (H) 04/12/2024    CALCIUM 8.9 04/12/2024    AST 18 04/12/2024    ALT 21 04/12/2024    ALKPHOS 68 04/12/2024    TP 6.3 (L) 04/12/2024    TBILI 0.26 04/12/2024    EGFR 55 04/12/2024     Lab Results   Component Value Date    SODIUM 140 04/12/2024    K 4.4 04/12/2024     04/12/2024    CO2 27 04/12/2024    BUN 28 (H) 04/12/2024    CREATININE 1.14 04/12/2024    GLUC 77 03/23/2024    CALCIUM 8.9 04/12/2024     No results found for: \"LABPROT\"    No results for input(s): \"CREATININEUR\", \"LABMICR\", \"MICROALBCRE\" in the last 72 hours.  Lab Results   Component Value Date    CHOLESTEROL 135 04/12/2024    CHOLESTEROL 191 01/05/2024    CHOLESTEROL 212 (H) 12/11/2023     Lab Results   Component Value Date    HDL 55 04/12/2024    HDL 43 (L) 01/05/2024    HDL 57 12/11/2023     Lab Results   Component Value Date    TRIG 210 (H) 04/12/2024    TRIG 327 (H) 01/05/2024    TRIG 262 (H) 12/11/2023     Lab Results   Component Value Date    NONHDLC 80 04/12/2024    [facility] 127 08/30/2022    [facility] 128 12/01/2021          Imaging Studies: n/a    Portions of the record may have been created with voice recognition software. Occasional wrong word or \"sound a like\" substitutions may have occurred due to the inherent limitations of voice recognition software. Read the chart carefully and " recognize, using context, where substitutions have occurred.

## 2024-04-18 NOTE — ASSESSMENT & PLAN NOTE
Lab Results   Component Value Date    HGBA1C 12.3 (H) 04/12/2024   Haylee has ongoing poor control although it is improving with current GMI 8.5% on 2-week CGM report.  We also happy to see that her fasting hypoglycemia has improved with redistribution of insulin.    Reviewed blood glucose targets, time in target range goal of 70%.     To continue CGM monitoring.  She continues to benefit from CGM use due to MDI insulin use.    Compliance with insulin dosing remains paramount as well as dietary compliance.  This was reviewed with Haylee.  We recommended referral back to CDE for MNT ASAP.    Recommended reinitiation of Mounjaro which was temporarily held for colonoscopy, which has now been canceled.     Will also further adjust U-500 insulin dosing to better reflect her current habits as well as nocturnal hypoglycemia fear: 95 units before breakfast, 50 units with lunch, 35 units with dinner    Recommend follow-up in office in 2 months.  Will plan to collect A1c in office at that time.

## 2024-04-19 ENCOUNTER — APPOINTMENT (EMERGENCY)
Dept: RADIOLOGY | Facility: HOSPITAL | Age: 54
End: 2024-04-19
Payer: COMMERCIAL

## 2024-04-19 ENCOUNTER — PATIENT OUTREACH (OUTPATIENT)
Dept: FAMILY MEDICINE CLINIC | Facility: CLINIC | Age: 54
End: 2024-04-19

## 2024-04-19 ENCOUNTER — HOSPITAL ENCOUNTER (EMERGENCY)
Facility: HOSPITAL | Age: 54
Discharge: HOME | End: 2024-04-19
Attending: EMERGENCY MEDICINE
Payer: COMMERCIAL

## 2024-04-19 VITALS
HEIGHT: 61 IN | TEMPERATURE: 97.7 F | HEART RATE: 64 BPM | SYSTOLIC BLOOD PRESSURE: 149 MMHG | BODY MASS INDEX: 39.27 KG/M2 | WEIGHT: 208 LBS | OXYGEN SATURATION: 95 % | DIASTOLIC BLOOD PRESSURE: 67 MMHG | RESPIRATION RATE: 20 BRPM

## 2024-04-19 DIAGNOSIS — S80.00XA KNEE CONTUSION: Primary | ICD-10-CM

## 2024-04-19 PROCEDURE — 73552 X-RAY EXAM OF FEMUR 2/>: CPT

## 2024-04-19 PROCEDURE — 99284 EMERGENCY DEPT VISIT MOD MDM: CPT | Performed by: EMERGENCY MEDICINE

## 2024-04-19 PROCEDURE — 99284 EMERGENCY DEPT VISIT MOD MDM: CPT

## 2024-04-19 PROCEDURE — 96372 THER/PROPH/DIAG INJ SC/IM: CPT

## 2024-04-19 PROCEDURE — 73502 X-RAY EXAM HIP UNI 2-3 VIEWS: CPT

## 2024-04-19 PROCEDURE — 73564 X-RAY EXAM KNEE 4 OR MORE: CPT

## 2024-04-19 RX ORDER — KETOROLAC TROMETHAMINE 30 MG/ML
30 INJECTION, SOLUTION INTRAMUSCULAR; INTRAVENOUS ONCE
Status: COMPLETED | OUTPATIENT
Start: 2024-04-19 | End: 2024-04-19

## 2024-04-19 RX ORDER — OXYCODONE HYDROCHLORIDE 5 MG/1
5 TABLET ORAL ONCE
Status: COMPLETED | OUTPATIENT
Start: 2024-04-19 | End: 2024-04-19

## 2024-04-19 RX ADMIN — KETOROLAC TROMETHAMINE 30 MG: 30 INJECTION, SOLUTION INTRAMUSCULAR; INTRAVENOUS at 06:35

## 2024-04-19 RX ADMIN — OXYCODONE HYDROCHLORIDE 5 MG: 5 TABLET ORAL at 06:35

## 2024-04-19 NOTE — PROGRESS NOTES
Outpatient Care Management Note    ADT alert received. Chart reviewed.  Patient was seen in the ED at [facility] for a knee contusion, status post fall.    OPCM RN placed outreach call to patient with no answer. OPCM left voicemail message and included contact information.    OPCM RN will scheduled second outreach attempt for next week if no response from patient prior to next outreach.

## 2024-04-22 ENCOUNTER — OFFICE VISIT (OUTPATIENT)
Dept: NEPHROLOGY | Facility: CLINIC | Age: 54
End: 2024-04-22
Payer: COMMERCIAL

## 2024-04-22 ENCOUNTER — PATIENT OUTREACH (OUTPATIENT)
Dept: FAMILY MEDICINE CLINIC | Facility: CLINIC | Age: 54
End: 2024-04-22

## 2024-04-22 VITALS
OXYGEN SATURATION: 96 % | WEIGHT: 208 LBS | HEIGHT: 61 IN | HEART RATE: 70 BPM | DIASTOLIC BLOOD PRESSURE: 78 MMHG | SYSTOLIC BLOOD PRESSURE: 118 MMHG | BODY MASS INDEX: 39.27 KG/M2

## 2024-04-22 DIAGNOSIS — D50.8 IRON DEFICIENCY ANEMIA SECONDARY TO INADEQUATE DIETARY IRON INTAKE: ICD-10-CM

## 2024-04-22 DIAGNOSIS — N18.31 STAGE 3A CHRONIC KIDNEY DISEASE (HCC): Primary | ICD-10-CM

## 2024-04-22 PROCEDURE — 99214 OFFICE O/P EST MOD 30 MIN: CPT | Performed by: INTERNAL MEDICINE

## 2024-04-22 NOTE — LETTER
Date: 04/22/24    Dear Haylee Balbuena,   My name is Cassandra Pabon; I am a registered nurse care manager working with [address] [address]  [phone].     I have not been able to reach you and would like to set a time that I can talk with you over the phone.  My work is to help patients that have complex medical conditions get the care they need. This includes patients who may have been in the hospital or emergency room.    Please call me with any questions you may have. I look forward to speaking with you.  Sincerely,  Cassandra Pabon  [phone]  Outpatient Care Manager

## 2024-04-22 NOTE — PROGRESS NOTES
Outpatient Care Management Note    ADT alert follow up. Chart reviewed. Patient was seen in the ED at [facility] on 4/19/24 for knee contusion, status post fall.    First OPCM RN outreach made on 4/19/24. There was no patient answer. Voicemail message was left and included OPCM contact information.    OPCM RN second outreach attempt today. No patient answer received. Voicemail message was left and included care  information.  Unable to reach letter sent via [employer].  OPCM RN will monitor for patient response.

## 2024-04-22 NOTE — PROGRESS NOTES
[facility]  Cyrus Yancey DO    Name: Haylee Balbuena  YOB: 1970      Assessment/Plan:    Stage 3a chronic kidney disease (HCC)  Lab Results   Component Value Date    EGFR 55 04/12/2024    EGFR 60 03/23/2024    EGFR 79 01/06/2024    CREATININE 1.14 04/12/2024    CREATININE 1.05 03/23/2024    CREATININE 0.84 01/06/2024   Kidney function slightly lower than usual, at 1.1 mg/dL.  Check kidney ultrasound to confirm renal anatomy is appropriate.  Follow-up blood work at regular intervals, continue to avoid nephrotoxins.    Iron deficiency anemia secondary to inadequate dietary iron intake  Continue iron supplementation, follow-up iron stores from time to time.         Problem List Items Addressed This Visit          Genitourinary    Stage 3a chronic kidney disease (HCC) - Primary     Lab Results   Component Value Date    EGFR 55 04/12/2024    EGFR 60 03/23/2024    EGFR 79 01/06/2024    CREATININE 1.14 04/12/2024    CREATININE 1.05 03/23/2024    CREATININE 0.84 01/06/2024   Kidney function slightly lower than usual, at 1.1 mg/dL.  Check kidney ultrasound to confirm renal anatomy is appropriate.  Follow-up blood work at regular intervals, continue to avoid nephrotoxins.         Relevant Orders    Basic metabolic panel    US kidney and bladder (Completed)    Comprehensive metabolic panel    CBC and differential    Albumin / creatinine urine ratio    Urinalysis with microscopic    Magnesium    Phosphorus    PTH, intact    Vitamin D 25 hydroxy       Blood    Iron deficiency anemia secondary to inadequate dietary iron intake     Continue iron supplementation, follow-up iron stores from time to time.            Patient is stable for the renal standpoint, we will see her back for regular appointment in 1 year.  All questions were answered during this visit.    Total time required for this office visit was 35 minutes.    Subjective:      Patient ID: Haylee Balbuena is  a 53 y.o. female.    Patient presents for follow up.    We reviewed labs in detail, most recent creatinine noted to be 1.14 mg/dL, places estimate GFR at 55 mL/min.    There were no significant electrolyte abnormalities noted.  Patient is taking all medications as prescribed with no specific side effects and denies use of nonsteroid anti-inflammatory medications.              The following portions of the patient's history were reviewed and updated as appropriate: allergies, current medications, past family history, past medical history, past social history, past surgical history and problem list.    Review of Systems   All other systems reviewed and are negative.        Social History     Socioeconomic History    Marital status: /Civil Union     Spouse name: None    Number of children: None    Years of education: None    Highest education level: None   Occupational History    None   Tobacco Use    Smoking status: Former     Current packs/day: 0.00     Types: Cigarettes     Quit date:      Years since quittin.3    Smokeless tobacco: Never   Vaping Use    Vaping status: Never Used   Substance and Sexual Activity    Alcohol use: Never     Comment: occ    Drug use: Never    Sexual activity: Not Currently     Partners: Male     Birth control/protection: None   Other Topics Concern    None   Social History Narrative    None     Social Determinants of Health     Financial Resource Strain: Not on file   Food Insecurity: No Food Insecurity (2024)    Hunger Vital Sign     Worried About Running Out of Food in the Last Year: Never true     Ran Out of Food in the Last Year: Never true   Transportation Needs: No Transportation Needs (2024)    PRAPARE - Transportation     Lack of Transportation (Medical): No     Lack of Transportation (Non-Medical): No   Physical Activity: Not on file   Stress: Not on file   Social Connections: Not on file   Intimate Partner Violence: Not on file   Housing Stability: Low  Risk  (2024)    Housing Stability Vital Sign     Unable to Pay for Housing in the Last Year: No     Number of Places Lived in the Last Year: 1     Unstable Housing in the Last Year: No     Past Medical History:   Diagnosis Date    Atypical chest pain     Gastroparesis     GERD (gastroesophageal reflux disease)     Hypertension     Psychiatric disorder     Sciatica     Seizure disorder (HCC)      Past Surgical History:   Procedure Laterality Date    APPENDECTOMY      BREAST BIOPSY Left  benign     SECTION      CHOLECYSTECTOMY      COLONOSCOPY      HYSTERECTOMY      IR PICC PLACEMENT SINGLE LUMEN  2023    CA LAPAROSCOPIC APPENDECTOMY N/A 2021    Procedure: APPENDECTOMY LAPAROSCOPIC;  Surgeon: Onel Martin MD;  Location:  MAIN OR;  Service: General    CA LAPS ABD PRTM&OMENTUM DX W/WO SPEC BR/WA SPX N/A 2021    Procedure: LAPAROSCOPY DIAGNOSTIC, EXTENSIVE DESI;  Surgeon: Onel Martin MD;  Location:  MAIN OR;  Service: General    TUBAL LIGATION      TYMPANOSTOMY TUBE PLACEMENT Bilateral 2024    UPPER GASTROINTESTINAL ENDOSCOPY         Current Outpatient Medications:     albuterol (2.5 mg/3 mL) 0.083 % nebulizer solution, Take 3 mL (2.5 mg total) by nebulization every 6 (six) hours as needed for wheezing or shortness of breath, Disp: 180 mL, Rfl: 5    albuterol (PROVENTIL HFA,VENTOLIN HFA) 90 mcg/act inhaler, INHALE ONE PUFF BY MOUTH AND INTO THE LUNGS EVERY 6 HOURS AS NEEDED FOR WHEEZING, Disp: 36 g, Rfl: 3    Alcohol Swabs (Pharmacist Choice Alcohol) PADS, Apply topically 4 (four) times a day Use as directed, Disp: 300 each, Rfl: 5    aspirin (ECOTRIN LOW STRENGTH) 81 mg EC tablet, TAKE ONE TABLET BY MOUTH ONCE DAILY, Disp: 30 tablet, Rfl: 1    atorvastatin (LIPITOR) 80 mg tablet, TAKE ONE TABLET BY MOUTH DAILY AT BEDTIME, Disp: 180 tablet, Rfl: 3    B-D UF III MINI PEN NEEDLES 31G X 5 MM MISC, Pt uses 5 pen needles daily, Disp: 500 each, Rfl: 0    cholecalciferol  (VITAMIN D3) 1,000 units tablet, Take 2 tablets (2,000 Units total) by mouth daily, Disp: 60 tablet, Rfl: 3    citalopram (CeleXA) 40 mg tablet, TAKE ONE TABLET BY MOUTH DAILY, Disp: 90 tablet, Rfl: 3    Continuous Blood Gluc Sensor (FreeStyle Refugio 2 Sensor) MISC, Use 1 each every 14 (fourteen) days, Disp: 6 each, Rfl: 2    docusate sodium (COLACE) 100 mg capsule, TAKE ONE CAPSULE BY MOUTH TWICE DAILY, Disp: 240 capsule, Rfl: 3    Empagliflozin (Jardiance) 25 MG TABS, Take 1 tablet (25 mg total) by mouth every morning, Disp: 90 tablet, Rfl: 1    ergocalciferol (VITAMIN D2) 50,000 units, Take 1 capsule (50,000 Units total) by mouth once a week, Disp: 12 capsule, Rfl: 3    famotidine (PEPCID) 40 MG tablet, TAKE ONE TABLET BY MOUTH TWO HOURS PRIOR TO BEDTIME, Disp: 30 tablet, Rfl: 6    fenofibrate micronized (LOFIBRA) 134 MG capsule, Take 1 capsule (134 mg total) by mouth daily with breakfast, Disp: 90 capsule, Rfl: 1    Insulin Regular Human, Conc, (HumuLIN R U-500 KwikPen) 500 units/mL CONCENTRATED U-500 injection pen, Take 95 units with breakfast, 50 units with lunch, 35 with dinner, or as directed TDD up to 200 units, Disp: 15 mL, Rfl:     Iron Heme Polypeptide 12 MG TABS, Take 1 tablet by mouth, Disp: , Rfl:     levETIRAcetam (KEPPRA) 500 mg tablet, TAKE ONE TABLET BY MOUTH TWICE DAILY, Disp: 300 tablet, Rfl: 1    linaCLOtide (Linzess) 72 MCG CAPS, Take 1 capsule by mouth daily before breakfast, Disp: 30 capsule, Rfl: 0    meclizine (ANTIVERT) 25 mg tablet, Take 1 tablet (25 mg total) by mouth 3 (three) times a day as needed for dizziness, Disp: 30 tablet, Rfl: 0    metoclopramide (Reglan) 10 mg tablet, Take 0.5 tablets (5 mg total) by mouth every 6 (six) hours, Disp: 30 tablet, Rfl: 0    metoprolol tartrate (LOPRESSOR) 25 mg tablet, TAKE ONE TABLET BY MOUTH TWICE DAILY, Disp: 180 tablet, Rfl: 1    multivitamin (THERAGRAN) TABS, Take 1 tablet by mouth every morning, Disp: , Rfl:     nystatin (MYCOSTATIN) cream,  Apply topically 2 (two) times a day, Disp: 30 g, Rfl: 0    ondansetron (ZOFRAN) 4 mg tablet, Take 1 tablet (4 mg total) by mouth every 6 (six) hours, Disp: 12 tablet, Rfl: 0    OneTouch Ultra test strip, USE 4 TIMES A DAY, Disp: 400 each, Rfl: 0    oxybutynin (DITROPAN-XL) 10 MG 24 hr tablet, Take 1 tablet (10 mg total) by mouth daily at bedtime (Patient not taking: Reported on 5/13/2024), Disp: 90 tablet, Rfl: 1    Pharmacist Choice Lancets MISC, USE AS DIRECTED, Disp: 100 each, Rfl: 1    pregabalin (LYRICA) 100 mg capsule, 100 mg in AM, 100 mg in afternoon, and 200 mg at bedtime, Disp: 120 capsule, Rfl: 5    Restasis 0.05 % ophthalmic emulsion, , Disp: , Rfl:     tirzepatide (Mounjaro) 7.5 MG/0.5ML, Inject 0.5 mL (7.5 mg total) under the skin every 7 days, Disp: 2 mL, Rfl: 1    methocarbamol (ROBAXIN) 500 mg tablet, Take 1 tablet (500 mg total) by mouth 3 (three) times a day as needed for muscle spasms, Disp: 90 tablet, Rfl: 0    Lab Results   Component Value Date    SODIUM 140 04/12/2024    K 4.4 04/12/2024     04/12/2024    CO2 27 04/12/2024    AGAP 8 04/12/2024    BUN 28 (H) 04/12/2024    CREATININE 1.14 04/12/2024    GLUC 77 03/23/2024    GLUF 106 (H) 04/12/2024    CALCIUM 8.9 04/12/2024    AST 18 04/12/2024    ALT 21 04/12/2024    ALKPHOS 68 04/12/2024    TP 6.3 (L) 04/12/2024    TBILI 0.26 04/12/2024    EGFR 55 04/12/2024     Lab Results   Component Value Date    WBC 7.51 04/12/2024    HGB 11.7 04/12/2024    HCT 38.1 04/12/2024    MCV 83 04/12/2024     04/12/2024     Lab Results   Component Value Date    CHOLESTEROL 135 04/12/2024    CHOLESTEROL 191 01/05/2024    CHOLESTEROL 212 (H) 12/11/2023     Lab Results   Component Value Date    HDL 55 04/12/2024    HDL 43 (L) 01/05/2024    HDL 57 12/11/2023     Lab Results   Component Value Date    LDLCALC 38 04/12/2024    LDLCALC 83 01/05/2024    LDLCALC 103 (H) 12/11/2023     Lab Results   Component Value Date    TRIG 210 (H) 04/12/2024    TRIG 327 (H)  "01/05/2024    TRIG 262 (H) 12/11/2023     No results found for: \"CHOLHDL\"  Lab Results   Component Value Date    YJV6FESGWTXY 1.784 07/24/2023    TSH 1.51 01/11/2020     Lab Results   Component Value Date    PTH 62.5 04/12/2024    CALCIUM 8.9 04/12/2024    PHOS 4.4 04/12/2024     No results found for: \"SPEP\", \"UPEP\"  No results found for: \"MICROALBUR\", \"[id number]\"        Objective:      /78 (BP Location: Right arm, Patient Position: Sitting, Cuff Size: Large)   Pulse 70   Ht 5' 1\" (1.549 m)   Wt 94.3 kg (208 lb)   SpO2 96%   BMI 39.30 kg/m²          Physical Exam  Vitals reviewed.   Constitutional:       General: She is not in acute distress.     Appearance: Normal appearance.   HENT:      Head: Normocephalic and atraumatic.      Right Ear: External ear normal.      Left Ear: External ear normal.   Eyes:      Conjunctiva/sclera: Conjunctivae normal.   Cardiovascular:      Rate and Rhythm: Normal rate and regular rhythm.      Heart sounds: Normal heart sounds.   Pulmonary:      Effort: No respiratory distress.      Breath sounds: No wheezing.   Abdominal:      Palpations: Abdomen is soft.   Skin:     General: Skin is warm and dry.   Neurological:      General: No focal deficit present.      Mental Status: She is alert and oriented to person, place, and time.   Psychiatric:         Mood and Affect: Mood normal.         Behavior: Behavior normal.         "

## 2024-04-22 NOTE — ED PROVIDER NOTES
History  Chief Complaint   Patient presents with    Fall     [place] the park, went down on left knee. Pain has not gone away since. Took Tylenol with no relief. 9/10 pain, radiates from knee to left hip.      Patient is a 53-year-old female without pertinent medical history presents for evaluation of knee pain.  She says she fell directly on her left knee after mechanical trip 1 week ago.  She says that she has been able to walk on it but has had pain.  Says the pain radiates up into her left hip.  She is taken Tylenol without much relief.  Patient otherwise denies symptoms.        Prior to Admission Medications   Prescriptions Last Dose Informant Patient Reported? Taking?   Alcohol Swabs (Pharmacist Choice Alcohol) PADS  Self No No   Sig: Apply topically 4 (four) times a day Use as directed   B-D UF III MINI PEN NEEDLES 31G X 5 MM MISC  Self No No   Sig: Pt uses 5 pen needles daily   Continuous Blood Gluc  (FreeStyle [place]ug[place] 14 Day [place]) TEMITOPE  Self No No   Sig: Use for continuous blood glucose monitoring   Patient not taking: Reported on 1/12/2024   Continuous Blood Gluc Sensor (FreeStyle [place]ugio 2 Sensor) MISC  Self No No   Sig: Use 1 each every 14 (fourteen) days   Empagliflozin (Jardiance) 25 MG TABS   No No   Sig: Take 1 tablet (25 mg total) by mouth every morning   Fluticasone-Salmeterol (Advair) 250-50 mcg/dose inhaler   No No   Sig: INHALE 1 PUFF BY MOUTH 2 (TWO) TIMES A DAY RINSE MOUTH AFTER USE.   Patient not taking: Reported on 4/18/2024   Insulin Regular Human, Conc, (HumuLIN R U-500 KwikPen) 500 units/mL CONCENTRATED U-500 injection pen   No No   Sig: Take 95 units with breakfast, 50 units with lunch, 35 with dinner, or as directed TDD up to 200 units   Iron Heme Polypeptide 12 MG TABS  Self Yes No   Sig: Take 1 tablet by mouth   OneTouch Ultra test strip  Self No No   Sig: USE 4 TIMES A DAY   Pharmacist Choice Lancets MISC  Self No No   Sig: USE AS DIRECTED   Restasis 0.05 % ophthalmic  emulsion  Self Yes No   Sodium Sulfate-Mag Sulfate-KCl [phone] MG TABS   No No   Sig: Take 12 tablets at 5 pm the night before procedure, then 6 hours prior to procedure, take another 12 tablets per instruction sheet provided.   albuterol (2.5 mg/3 mL) 0.083 % nebulizer solution   No No   Sig: Take 3 mL (2.5 mg total) by nebulization every 6 (six) hours as needed for wheezing or shortness of breath   albuterol (PROVENTIL HFA,VENTOLIN HFA) 90 mcg/act inhaler  Self No No   Sig: INHALE ONE PUFF BY MOUTH AND INTO THE LUNGS EVERY 6 HOURS AS NEEDED FOR WHEEZING   aspirin (ECOTRIN LOW STRENGTH) 81 mg EC tablet   No No   Sig: TAKE ONE TABLET BY MOUTH ONCE DAILY   atorvastatin (LIPITOR) 80 mg tablet   No No   Sig: TAKE ONE TABLET BY MOUTH DAILY AT BEDTIME   benzonatate (TESSALON) 200 MG capsule   No No   Sig: Take 1 capsule (200 mg total) by mouth 3 (three) times a day as needed for cough   Patient not taking: Reported on 3/4/2024   cholecalciferol (VITAMIN D3) 1,000 units tablet  Self No No   Sig: Take 2 tablets (2,000 Units total) by mouth daily   citalopram (CeleXA) 40 mg tablet   No No   Sig: TAKE ONE TABLET BY MOUTH DAILY   docusate sodium (COLACE) 100 mg capsule   No No   Sig: TAKE ONE CAPSULE BY MOUTH TWICE DAILY   ergocalciferol (VITAMIN D2) 50,000 units  Self No No   Sig: Take 1 capsule (50,000 Units total) by mouth once a week   famotidine (PEPCID) 40 MG tablet  Self No No   Sig: TAKE ONE TABLET BY MOUTH TWO HOURS PRIOR TO BEDTIME   fenofibrate micronized (LOFIBRA) 134 MG capsule   No No   Sig: Take 1 capsule (134 mg total) by mouth daily with breakfast   guaiFENesin (MUCINEX) 600 mg 12 hr tablet   No No   Sig: Take 1 tablet (600 mg total) by mouth every 12 (twelve) hours   Patient not taking: Reported on 4/18/2024   levETIRAcetam (KEPPRA) 500 mg tablet   No No   Sig: TAKE ONE TABLET BY MOUTH TWICE DAILY   linaCLOtide (Linzess) 72 MCG CAPS  Self No No   Sig: Take 1 capsule by mouth daily before breakfast    meclizine (ANTIVERT) 25 mg tablet  Self No No   Sig: Take 1 tablet (25 mg total) by mouth 3 (three) times a day as needed for dizziness   metoclopramide (Reglan) 10 mg tablet   No No   Sig: Take 0.5 tablets (5 mg total) by mouth every 6 (six) hours   metoprolol tartrate (LOPRESSOR) 25 mg tablet   No No   Sig: TAKE ONE TABLET BY MOUTH TWICE DAILY   multivitamin (THERAGRAN) TABS  Self Yes No   Sig: Take 1 tablet by mouth every morning   nystatin (MYCOSTATIN) cream  Self No No   Sig: Apply topically 2 (two) times a day   ondansetron (ZOFRAN) 4 mg tablet   No No   Sig: Take 1 tablet (4 mg total) by mouth every 6 (six) hours   oxybutynin (DITROPAN-XL) 10 MG 24 hr tablet   No No   Sig: Take 1 tablet (10 mg total) by mouth daily at bedtime   pregabalin (LYRICA) 100 mg capsule   No No   Si mg in AM, 100 mg in afternoon, and 200 mg at bedtime   tirzepatide (Mounjaro) 7.5 MG/0.5ML   No No   Sig: Inject 0.5 mL (7.5 mg total) under the skin every 7 days      Facility-Administered Medications: None       Past Medical History:   Diagnosis Date    Atypical chest pain     Gastroparesis     GERD (gastroesophageal reflux disease)     Hypertension     Psychiatric disorder     Sciatica     Seizure disorder (HCC)        Past Surgical History:   Procedure Laterality Date    APPENDECTOMY      BREAST BIOPSY Left  benign     SECTION      CHOLECYSTECTOMY      COLONOSCOPY      HYSTERECTOMY      IR PICC PLACEMENT SINGLE LUMEN  2023    WA LAPAROSCOPIC APPENDECTOMY N/A 2021    Procedure: APPENDECTOMY LAPAROSCOPIC;  Surgeon: Onel Martin MD;  Location:  MAIN OR;  Service: General    WA LAPS ABD PRTM&OMENTUM DX W/WO SPEC BR/WA SPX N/A 2021    Procedure: LAPAROSCOPY DIAGNOSTIC, EXTENSIVE DESI;  Surgeon: Onel Martin MD;  Location:  MAIN OR;  Service: General    TUBAL LIGATION      TYMPANOSTOMY TUBE PLACEMENT Bilateral 2024    UPPER GASTROINTESTINAL ENDOSCOPY         Family History   Problem  Relation Age of Onset    No Known Problems Mother     No Known Problems Father     No Known Problems Daughter     No Known Problems Maternal Grandmother     No Known Problems Paternal Grandmother     No Known Problems Paternal Aunt     No Known Problems Paternal Aunt     No Known Problems Paternal Aunt      I have reviewed and agree with the history as documented.    E-Cigarette/Vaping    E-Cigarette Use Never User      E-Cigarette/Vaping Substances    Nicotine No     THC No     CBD No     Flavoring No     Other No     Unknown No      Social History     Tobacco Use    Smoking status: Former     Current packs/day: 0.00     Types: Cigarettes     Quit date:      Years since quittin.3    Smokeless tobacco: Never   Vaping Use    Vaping status: Never Used   Substance Use Topics    Alcohol use: Never    Drug use: Never       Review of Systems   Constitutional:  Negative for fever.   HENT:  Negative for sore throat.    Respiratory:  Negative for shortness of breath.    Cardiovascular:  Negative for chest pain.   Gastrointestinal:  Negative for abdominal pain.   Genitourinary:  Negative for dysuria.   Musculoskeletal:  Negative for back pain.        Left knee pain/hip pain   Skin:  Negative for rash.   Neurological:  Negative for light-headedness.   Psychiatric/Behavioral:  Negative for agitation.    All other systems reviewed and are negative.      Physical Exam  Physical Exam  Vitals reviewed.   Constitutional:       General: She is not in acute distress.     Appearance: She is well-developed.   HENT:      Head: Normocephalic.   Eyes:      Pupils: Pupils are equal, round, and reactive to light.   Cardiovascular:      Rate and Rhythm: Normal rate and regular rhythm.      Heart sounds: Normal heart sounds.   Pulmonary:      Effort: Pulmonary effort is normal.      Breath sounds: Normal breath sounds.   Abdominal:      General: Bowel sounds are normal. There is no distension.      Palpations: Abdomen is soft.       Tenderness: There is no abdominal tenderness. There is no guarding.   Musculoskeletal:         General: Tenderness present. No deformity. Normal range of motion.      Cervical back: Normal range of motion and neck supple.      Comments: Patient with full range of motion of left knee and left hip.  No knee effusion noted.  Distal neurovascular intact.  Mild tenderness throughout the left knee    Skin:     General: Skin is warm and dry.      Capillary Refill: Capillary refill takes less than 2 seconds.   Neurological:      Mental Status: She is alert and oriented to person, place, and time.      Cranial Nerves: No cranial nerve deficit.      Sensory: No sensory deficit.   Psychiatric:         Behavior: Behavior normal.         Thought Content: Thought content normal.         Judgment: Judgment normal.         Vital Signs  ED Triage Vitals [04/19/24 0621]   Temperature Pulse Respirations Blood Pressure SpO2   97.7 °F (36.5 °C) 64 20 149/67 95 %      Temp Source Heart Rate Source Patient Position - Orthostatic VS BP Location FiO2 (%)   Temporal Monitor Sitting Left arm --      Pain Score       9           Vitals:    04/19/24 0621   BP: 149/67   Pulse: 64   Patient Position - Orthostatic VS: Sitting         Visual Acuity      ED Medications  Medications   ketorolac (TORADOL) injection 30 mg (30 mg Intramuscular Given 4/19/24 0635)   oxyCODONE (ROXICODONE) IR tablet 5 mg (5 mg Oral Given 4/19/24 0635)       Diagnostic Studies  Results Reviewed       None                   XR femur 2 views LEFT   Final Result by Bradley Landon Kocher, MD (04/19 0940)      No acute osseous abnormality.      Resident: SUJIT GOLD I, the attending radiologist, have reviewed the images and agree with the final report above.      Workstation performed: [id number]         XR hip/pelv 2-3 vws left if performed   Final Result by Bradley Landon Kocher, MD (04/19 1013)      No acute osseous abnormality.            Resident: SUJIT  ALLA HARRINGTON, the attending radiologist, have reviewed the images and agree with the final report above.      Workstation performed: [id number]         XR knee 4+ vw left injury   Final Result by Bradley Landon Kocher, MD (04/19 1029)      No acute osseous abnormality.            Resident: SUJIT GOLD I, the attending radiologist, have reviewed the images and agree with the final report above.      Workstation performed: [id number]                    Procedures  Procedures         ED Course                               SBIRT 20yo+      Flowsheet Row Most Recent Value   Initial Alcohol Screen: US AUDIT-C     1. How often do you have a drink containing alcohol? 0 Filed at: 04/19/2024 0623   2. How many drinks containing alcohol do you have on a typical day you are drinking?  0 Filed at: 04/19/2024 0623   3a. Male UNDER 65: How often do you have five or more drinks on one occasion? 0 Filed at: 04/19/2024 0623   3b. FEMALE Any Age, or MALE 65+: How often do you have 4 or more drinks on one occassion? 0 Filed at: 04/19/2024 0623   Audit-C Score 0 Filed at: 04/19/2024 0623   LILIA: How many times in the past year have you...    Used an illegal drug or used a prescription medication for non-medical reasons? Never Filed at: 04/19/2024 0623                      Medical Decision Making  Patient is a 53-year-old female who presents for evaluation of knee pain after direct trauma.  Imaging pending at the time of signout to Dr. Freeman.  Likely knee contusion.    Amount and/or Complexity of Data Reviewed  Radiology: ordered.    Risk  Prescription drug management.             Disposition  Final diagnoses:   Knee contusion     Time reflects when diagnosis was documented in both MDM as applicable and the Disposition within this note       Time User Action Codes Description Comment    4/19/2024  6:53 AM Ronald To Add [S80.00XA] Knee contusion           ED Disposition       ED Disposition   Discharge     Condition   Stable    Date/Time   Fri Apr 19, 2024 0726    Comment   Haylee Balbuena discharge to home/self care.                   Follow-up Information       Follow up With Specialties Details Why Contact Info Additional Information    [facility] Emergency Department Emergency Medicine  If symptoms worsen [address] [place] [address] [phone] [facility] Emergency Department, [address], [place], [place] [address]    Enrique Phoenix,  Orthopedic Surgery   [address]  [address]  [facility] [address]  [phone]               Discharge Medication List as of 4/19/2024  7:26 AM        CONTINUE these medications which have NOT CHANGED    Details   albuterol (2.5 mg/3 mL) 0.083 % nebulizer solution Take 3 mL (2.5 mg total) by nebulization every 6 (six) hours as needed for wheezing or shortness of breath, Starting Tue 1/9/2024, Normal      albuterol (PROVENTIL HFA,VENTOLIN HFA) 90 mcg/act inhaler INHALE ONE PUFF BY MOUTH AND INTO THE LUNGS EVERY 6 HOURS AS NEEDED FOR WHEEZING, Normal      Alcohol Swabs (Pharmacist Choice Alcohol) PADS Apply topically 4 (four) times a day Use as directed, Starting Tue 9/5/2023, Normal      aspirin (ECOTRIN LOW STRENGTH) 81 mg EC tablet TAKE ONE TABLET BY MOUTH ONCE DAILY, Starting Wed 2/21/2024, Normal      atorvastatin (LIPITOR) 80 mg tablet TAKE ONE TABLET BY MOUTH DAILY AT BEDTIME, Starting Mon 2/5/2024, Normal      B-D UF III MINI PEN NEEDLES 31G X 5 MM MISC Pt uses 5 pen needles daily, Normal      benzonatate (TESSALON) 200 MG capsule Take 1 capsule (200 mg total) by mouth 3 (three) times a day as needed for cough, Starting Tue 12/26/2023, Normal      cholecalciferol (VITAMIN D3) 1,000 units tablet Take 2 tablets (2,000 Units total) by mouth daily, Starting Tue 9/5/2023, Normal      citalopram (CeleXA) 40 mg tablet TAKE ONE TABLET BY MOUTH DAILY, Starting Wed 1/17/2024, Normal      Continuous Blood  Gluc  (FreeStyle Refugio 14 Day [place]) TEMITOPE Use for continuous blood glucose monitoring, Print      Continuous Blood Gluc Sensor (FreeStyle Refugio 2 Sensor) MISC Use 1 each every 14 (fourteen) days, Starting Thu 10/19/2023, Normal      docusate sodium (COLACE) 100 mg capsule TAKE ONE CAPSULE BY MOUTH TWICE DAILY, Normal      Empagliflozin (Jardiance) 25 MG TABS Take 1 tablet (25 mg total) by mouth every morning, Starting Thu 2/1/2024, Normal      ergocalciferol (VITAMIN D2) 50,000 units Take 1 capsule (50,000 Units total) by mouth once a week, Starting Thu 9/7/2023, Normal      famotidine (PEPCID) 40 MG tablet TAKE ONE TABLET BY MOUTH TWO HOURS PRIOR TO BEDTIME, Normal      fenofibrate micronized (LOFIBRA) 134 MG capsule Take 1 capsule (134 mg total) by mouth daily with breakfast, Starting Fri 1/12/2024, Normal      Fluticasone-Salmeterol (Advair) 250-50 mcg/dose inhaler INHALE 1 PUFF BY MOUTH 2 (TWO) TIMES A DAY RINSE MOUTH AFTER USE., Normal      guaiFENesin (MUCINEX) 600 mg 12 hr tablet Take 1 tablet (600 mg total) by mouth every 12 (twelve) hours, Starting Thu 2/1/2024, Normal      Insulin Regular Human, Conc, (HumuLIN R U-500 KwikPen) 500 units/mL CONCENTRATED U-500 injection pen Take 95 units with breakfast, 50 units with lunch, 35 with dinner, or as directed TDD up to 200 units, No Print      Iron Heme Polypeptide 12 MG TABS Take 1 tablet by mouth, Historical Med      levETIRAcetam (KEPPRA) 500 mg tablet TAKE ONE TABLET BY MOUTH TWICE DAILY, Starting Mon 12/11/2023, Normal      linaCLOtide (Linzess) 72 MCG CAPS Take 1 capsule by mouth daily before breakfast, Starting Tue 9/5/2023, Normal      meclizine (ANTIVERT) 25 mg tablet Take 1 tablet (25 mg total) by mouth 3 (three) times a day as needed for dizziness, Starting Mon 7/10/2023, Normal      metoclopramide (Reglan) 10 mg tablet Take 0.5 tablets (5 mg total) by mouth every 6 (six) hours, Starting Thu 12/21/2023, Normal      metoprolol tartrate  (LOPRESSOR) 25 mg tablet TAKE ONE TABLET BY MOUTH TWICE DAILY, Starting Mon 1/22/2024, Normal      multivitamin (THERAGRAN) TABS Take 1 tablet by mouth every morning, Historical Med      nystatin (MYCOSTATIN) cream Apply topically 2 (two) times a day, Starting Thu 7/13/2023, Normal      ondansetron (ZOFRAN) 4 mg tablet Take 1 tablet (4 mg total) by mouth every 6 (six) hours, Starting Thu 12/21/2023, Normal      OneTouch Ultra test strip USE 4 TIMES A DAY, Normal      oxybutynin (DITROPAN-XL) 10 MG 24 hr tablet Take 1 tablet (10 mg total) by mouth daily at bedtime, Starting Mon 3/4/2024, Normal      Pharmacist Choice Lancets MISC USE AS DIRECTED, Normal      pregabalin (LYRICA) 100 mg capsule 100 mg in AM, 100 mg in afternoon, and 200 mg at bedtime, Normal      Restasis 0.05 % ophthalmic emulsion Starting Thu 3/2/2023, Historical Med      Sodium Sulfate-Mag Sulfate-KCl [id number] MG TABS Take 12 tablets at 5 pm the night before procedure, then 6 hours prior to procedure, take another 12 tablets per instruction sheet provided., Normal      tirzepatide (Mounjaro) 7.5 MG/0.5ML Inject 0.5 mL (7.5 mg total) under the skin every 7 days, Starting Mon 4/8/2024, Normal             No discharge procedures on file.    PDMP Review         Value Time User    PDMP Reviewed  Yes 7/15/2023  3:54 AM Mikaela Michael PA-C            ED Provider  Electronically Signed by             Ronald To MD  04/22/24 0717

## 2024-04-25 ENCOUNTER — TELEPHONE (OUTPATIENT)
Age: 54
End: 2024-04-25

## 2024-04-29 ENCOUNTER — OFFICE VISIT (OUTPATIENT)
Dept: OBGYN CLINIC | Facility: CLINIC | Age: 54
End: 2024-04-29
Payer: COMMERCIAL

## 2024-04-29 ENCOUNTER — APPOINTMENT (OUTPATIENT)
Dept: RADIOLOGY | Facility: CLINIC | Age: 54
End: 2024-04-29
Payer: COMMERCIAL

## 2024-04-29 ENCOUNTER — HOSPITAL ENCOUNTER (EMERGENCY)
Facility: HOSPITAL | Age: 54
Discharge: HOME | End: 2024-04-29
Attending: EMERGENCY MEDICINE
Payer: COMMERCIAL

## 2024-04-29 ENCOUNTER — PATIENT OUTREACH (OUTPATIENT)
Dept: FAMILY MEDICINE CLINIC | Facility: CLINIC | Age: 54
End: 2024-04-29

## 2024-04-29 ENCOUNTER — HOSPITAL ENCOUNTER (OUTPATIENT)
Dept: ULTRASOUND IMAGING | Facility: HOSPITAL | Age: 54
Discharge: HOME | End: 2024-04-29
Attending: INTERNAL MEDICINE
Payer: COMMERCIAL

## 2024-04-29 VITALS
HEIGHT: 61 IN | BODY MASS INDEX: 38.51 KG/M2 | DIASTOLIC BLOOD PRESSURE: 74 MMHG | SYSTOLIC BLOOD PRESSURE: 108 MMHG | HEART RATE: 67 BPM | WEIGHT: 204 LBS

## 2024-04-29 VITALS
HEIGHT: 61 IN | TEMPERATURE: 98.1 F | WEIGHT: 204 LBS | DIASTOLIC BLOOD PRESSURE: 67 MMHG | HEART RATE: 68 BPM | OXYGEN SATURATION: 95 % | SYSTOLIC BLOOD PRESSURE: 146 MMHG | RESPIRATION RATE: 16 BRPM | BODY MASS INDEX: 38.51 KG/M2

## 2024-04-29 DIAGNOSIS — Z01.89 ENCOUNTER FOR LOWER EXTREMITY COMPARISON IMAGING STUDY: ICD-10-CM

## 2024-04-29 DIAGNOSIS — S80.02XA CONTUSION OF LEFT KNEE, INITIAL ENCOUNTER: Primary | ICD-10-CM

## 2024-04-29 DIAGNOSIS — N18.31 STAGE 3A CHRONIC KIDNEY DISEASE (HCC): ICD-10-CM

## 2024-04-29 DIAGNOSIS — S89.92XA KNEE INJURY, LEFT, INITIAL ENCOUNTER: ICD-10-CM

## 2024-04-29 DIAGNOSIS — M25.562 LEFT KNEE PAIN, UNSPECIFIED CHRONICITY: ICD-10-CM

## 2024-04-29 DIAGNOSIS — M25.562 LEFT KNEE PAIN, UNSPECIFIED CHRONICITY: Primary | ICD-10-CM

## 2024-04-29 PROCEDURE — 73560 X-RAY EXAM OF KNEE 1 OR 2: CPT

## 2024-04-29 PROCEDURE — 73562 X-RAY EXAM OF KNEE 3: CPT

## 2024-04-29 PROCEDURE — 3078F DIAST BP <80 MM HG: CPT | Performed by: STUDENT IN AN ORGANIZED HEALTH CARE EDUCATION/TRAINING PROGRAM

## 2024-04-29 PROCEDURE — 96372 THER/PROPH/DIAG INJ SC/IM: CPT

## 2024-04-29 PROCEDURE — 99283 EMERGENCY DEPT VISIT LOW MDM: CPT

## 2024-04-29 PROCEDURE — 99284 EMERGENCY DEPT VISIT MOD MDM: CPT | Performed by: EMERGENCY MEDICINE

## 2024-04-29 PROCEDURE — 99204 OFFICE O/P NEW MOD 45 MIN: CPT | Performed by: STUDENT IN AN ORGANIZED HEALTH CARE EDUCATION/TRAINING PROGRAM

## 2024-04-29 PROCEDURE — 3074F SYST BP LT 130 MM HG: CPT | Performed by: STUDENT IN AN ORGANIZED HEALTH CARE EDUCATION/TRAINING PROGRAM

## 2024-04-29 PROCEDURE — 76775 US EXAM ABDO BACK WALL LIM: CPT

## 2024-04-29 RX ORDER — KETOROLAC TROMETHAMINE 30 MG/ML
30 INJECTION, SOLUTION INTRAMUSCULAR; INTRAVENOUS ONCE
Status: COMPLETED | OUTPATIENT
Start: 2024-04-29 | End: 2024-04-29

## 2024-04-29 RX ORDER — KETOROLAC TROMETHAMINE 10 MG/1
10 TABLET, FILM COATED ORAL EVERY 6 HOURS PRN
Qty: 8 TABLET | Refills: 0 | Status: SHIPPED | OUTPATIENT
Start: 2024-04-29 | End: 2024-05-13

## 2024-04-29 RX ORDER — OXYCODONE HYDROCHLORIDE AND ACETAMINOPHEN 5; 325 MG/1; MG/1
1 TABLET ORAL EVERY 6 HOURS PRN
Qty: 8 TABLET | Refills: 0 | Status: SHIPPED | OUTPATIENT
Start: 2024-04-29 | End: 2024-05-13

## 2024-04-29 RX ADMIN — KETOROLAC TROMETHAMINE 30 MG: 30 INJECTION, SOLUTION INTRAMUSCULAR at 05:20

## 2024-04-29 NOTE — PROGRESS NOTES
Outpatient Care Management Note    ADT alert received. Chart reviewed.  Patient was seen in the ED on 4/29/24 for contusion left knee.  Patient was seen last week for same following a fall.   Patient presented with ongoing pain in left knee that radiates to left hip.    OP RN CM placed outreach call to patient. No answer received. Left voicemail message and included OP CM contact information.    OP RN CM will schedule second outreach attempt for later this week, if no patient response received prior.

## 2024-04-29 NOTE — ED PROVIDER NOTES
History  Chief Complaint   Patient presents with    Leg Pain     Seen last week for for fall. Left leg pain from knee up.     53-year-old female presents emergency room complaining of ongoing pain in the left knee that radiates up to the left hip.  This occurred after she tripped falling directly onto her kneecap.  The patient notes that she was referred to orthopedics but does not want to see Dr. Phoenix, and wants to see [facility], and states that she has to have an insurance change before that can happen.  The patient has then decided to return to the ER for another evaluation.  The patient denies any new trauma or fever.  The patient has a knee immobilizer but is not wearing it because it falls off her leg.        Prior to Admission Medications   Prescriptions Last Dose Informant Patient Reported? Taking?   Alcohol Swabs (Pharmacist Choice Alcohol) PADS  Self No No   Sig: Apply topically 4 (four) times a day Use as directed   B-D UF III MINI PEN NEEDLES 31G X 5 MM MISC  Self No No   Sig: Pt uses 5 pen needles daily   Continuous Blood Gluc Sensor (FreeStyle Refugio 2 Sensor) MISC  Self No No   Sig: Use 1 each every 14 (fourteen) days   Empagliflozin (Jardiance) 25 MG TABS  Self No No   Sig: Take 1 tablet (25 mg total) by mouth every morning   Insulin Regular Human, Conc, (HumuLIN R U-500 KwikPen) 500 units/mL CONCENTRATED U-500 injection pen  Self No No   Sig: Take 95 units with breakfast, 50 units with lunch, 35 with dinner, or as directed TDD up to 200 units   Iron Heme Polypeptide 12 MG TABS  Self Yes No   Sig: Take 1 tablet by mouth   OneTouch Ultra test strip  Self No No   Sig: USE 4 TIMES A DAY   Pharmacist Choice Lancets MISC  Self No No   Sig: USE AS DIRECTED   Restasis 0.05 % ophthalmic emulsion  Self Yes No   albuterol (2.5 mg/3 mL) 0.083 % nebulizer solution  Self No No   Sig: Take 3 mL (2.5 mg total) by nebulization every 6 (six) hours as needed for wheezing or shortness of breath   albuterol (PROVENTIL  HFA,VENTOLIN HFA) 90 mcg/act inhaler  Self No No   Sig: INHALE ONE PUFF BY MOUTH AND INTO THE LUNGS EVERY 6 HOURS AS NEEDED FOR WHEEZING   aspirin (ECOTRIN LOW STRENGTH) 81 mg EC tablet  Self No No   Sig: TAKE ONE TABLET BY MOUTH ONCE DAILY   atorvastatin (LIPITOR) 80 mg tablet  Self No No   Sig: TAKE ONE TABLET BY MOUTH DAILY AT BEDTIME   cholecalciferol (VITAMIN D3) 1,000 units tablet  Self No No   Sig: Take 2 tablets (2,000 Units total) by mouth daily   citalopram (CeleXA) 40 mg tablet  Self No No   Sig: TAKE ONE TABLET BY MOUTH DAILY   docusate sodium (COLACE) 100 mg capsule  Self No No   Sig: TAKE ONE CAPSULE BY MOUTH TWICE DAILY   ergocalciferol (VITAMIN D2) 50,000 units  Self No No   Sig: Take 1 capsule (50,000 Units total) by mouth once a week   famotidine (PEPCID) 40 MG tablet  Self No No   Sig: TAKE ONE TABLET BY MOUTH TWO HOURS PRIOR TO BEDTIME   fenofibrate micronized (LOFIBRA) 134 MG capsule  Self No No   Sig: Take 1 capsule (134 mg total) by mouth daily with breakfast   levETIRAcetam (KEPPRA) 500 mg tablet  Self No No   Sig: TAKE ONE TABLET BY MOUTH TWICE DAILY   linaCLOtide (Linzess) 72 MCG CAPS  Self No No   Sig: Take 1 capsule by mouth daily before breakfast   meclizine (ANTIVERT) 25 mg tablet  Self No No   Sig: Take 1 tablet (25 mg total) by mouth 3 (three) times a day as needed for dizziness   metoclopramide (Reglan) 10 mg tablet  Self No No   Sig: Take 0.5 tablets (5 mg total) by mouth every 6 (six) hours   metoprolol tartrate (LOPRESSOR) 25 mg tablet  Self No No   Sig: TAKE ONE TABLET BY MOUTH TWICE DAILY   multivitamin (THERAGRAN) TABS  Self Yes No   Sig: Take 1 tablet by mouth every morning   nystatin (MYCOSTATIN) cream  Self No No   Sig: Apply topically 2 (two) times a day   ondansetron (ZOFRAN) 4 mg tablet  Self No No   Sig: Take 1 tablet (4 mg total) by mouth every 6 (six) hours   oxybutynin (DITROPAN-XL) 10 MG 24 hr tablet  Self No No   Sig: Take 1 tablet (10 mg total) by mouth daily at  bedtime   pregabalin (LYRICA) 100 mg capsule  Self No No   Si mg in AM, 100 mg in afternoon, and 200 mg at bedtime   tirzepatide (Mounjaro) 7.5 MG/0.5ML  Self No No   Sig: Inject 0.5 mL (7.5 mg total) under the skin every 7 days      Facility-Administered Medications: None       Past Medical History:   Diagnosis Date    Atypical chest pain     Gastroparesis     GERD (gastroesophageal reflux disease)     Hypertension     Psychiatric disorder     Sciatica     Seizure disorder (HCC)        Past Surgical History:   Procedure Laterality Date    APPENDECTOMY      BREAST BIOPSY Left  benign     SECTION      CHOLECYSTECTOMY      COLONOSCOPY      HYSTERECTOMY      IR PICC PLACEMENT SINGLE LUMEN  2023    FL LAPAROSCOPIC APPENDECTOMY N/A 2021    Procedure: APPENDECTOMY LAPAROSCOPIC;  Surgeon: Onel Martin MD;  Location:  MAIN OR;  Service: General    FL LAPS ABD PRTM&OMENTUM DX W/WO SPEC BR/WA SPX N/A 2021    Procedure: LAPAROSCOPY DIAGNOSTIC, EXTENSIVE DESI;  Surgeon: Onel Martin MD;  Location:  MAIN OR;  Service: General    TUBAL LIGATION      TYMPANOSTOMY TUBE PLACEMENT Bilateral 2024    UPPER GASTROINTESTINAL ENDOSCOPY         Family History   Problem Relation Age of Onset    No Known Problems Mother     No Known Problems Father     No Known Problems Daughter     No Known Problems Maternal Grandmother     No Known Problems Paternal Grandmother     No Known Problems Paternal Aunt     No Known Problems Paternal Aunt     No Known Problems Paternal Aunt      I have reviewed and agree with the history as documented.    E-Cigarette/Vaping    E-Cigarette Use Never User      E-Cigarette/Vaping Substances    Nicotine No     THC No     CBD No     Flavoring No     Other No     Unknown No      Social History     Tobacco Use    Smoking status: Former     Current packs/day: 0.00     Types: Cigarettes     Quit date:      Years since quittin.3    Smokeless tobacco: Never    Vaping Use    Vaping status: Never Used   Substance Use Topics    Alcohol use: Never    Drug use: Never       Review of Systems   Constitutional:  Positive for activity change. Negative for chills and fever.   HENT:  Negative for ear pain and sore throat.    Eyes:  Negative for pain and visual disturbance.   Respiratory:  Negative for cough and shortness of breath.    Cardiovascular:  Negative for chest pain and palpitations.   Gastrointestinal:  Negative for abdominal pain and vomiting.   Genitourinary:  Negative for dysuria and hematuria.   Musculoskeletal:  Positive for arthralgias and myalgias. Negative for back pain.   Skin:  Negative for color change and rash.   All other systems reviewed and are negative.      Physical Exam  Physical Exam  Vitals and nursing note reviewed.   Constitutional:       General: She is not in acute distress.     Appearance: Normal appearance. She is well-developed.   HENT:      Head: Normocephalic and atraumatic.      Right Ear: External ear normal.      Left Ear: External ear normal.      Nose: Nose normal.      Mouth/Throat:      Mouth: Mucous membranes are moist.   Eyes:      Conjunctiva/sclera: Conjunctivae normal.   Cardiovascular:      Rate and Rhythm: Normal rate and regular rhythm.      Pulses: Normal pulses.      Heart sounds: Normal heart sounds. No murmur heard.  Pulmonary:      Effort: Pulmonary effort is normal. No respiratory distress.      Breath sounds: Normal breath sounds.   Abdominal:      General: Bowel sounds are normal.      Palpations: Abdomen is soft.      Tenderness: There is no abdominal tenderness.   Musculoskeletal:         General: Tenderness present. No swelling or deformity.      Cervical back: Neck supple.      Comments: Patient with tenderness to the knee With light palpation.  There is mild crepitance with patellar blotting suggesting a mild amount of edema noted.  There is no definitive effusion.  There is no anterior posterior drawer.  There is  pain with any movement of the tibia, as well as the left hip.  Patient's leg is not shortened or externally rotated.  Pulses are 2/4 in the dorsalis pedis and posterior tibial regions.  There is no evidence of erythema or bruising.   Skin:     General: Skin is warm and dry.      Capillary Refill: Capillary refill takes less than 2 seconds.   Neurological:      General: No focal deficit present.      Mental Status: She is alert and oriented to person, place, and time. Mental status is at baseline.         Vital Signs  ED Triage Vitals [04/29/24 0458]   Temperature Pulse Respirations Blood Pressure SpO2   98.1 °F (36.7 °C) 68 16 146/67 95 %      Temp Source Heart Rate Source Patient Position - Orthostatic VS BP Location FiO2 (%)   Tympanic Monitor Lying Left arm --      Pain Score       10 - Worst Possible Pain           Vitals:    04/29/24 0458   BP: 146/67   Pulse: 68   Patient Position - Orthostatic VS: Lying         Visual Acuity      ED Medications  Medications   ketorolac (TORADOL) injection 30 mg (30 mg Intramuscular Given 4/29/24 0520)       Diagnostic Studies  Results Reviewed       None                   No orders to display              Procedures  Procedures         ED Course  ED Course as of 04/29/24 0533 Mon Apr 29, 2024 0520 I reviewed the patient's old x-rays and concur that there is no evidence of fracture.                               SBIRT 20yo+      Flowsheet Row Most Recent Value   Initial Alcohol Screen: US AUDIT-C     1. How often do you have a drink containing alcohol? 0 Filed at: 04/29/2024 0459   2. How many drinks containing alcohol do you have on a typical day you are drinking?  0 Filed at: 04/29/2024 0459   3a. Male UNDER 65: How often do you have five or more drinks on one occasion? 0 Filed at: 04/29/2024 0459   3b. FEMALE Any Age, or MALE 65+: How often do you have 4 or more drinks on one occassion? 0 Filed at: 04/29/2024 0459   Audit-C Score 0 Filed at: 04/29/2024 0459   LILIA: How  many times in the past year have you...    Used an illegal drug or used a prescription medication for non-medical reasons? Never Filed at: 04/29/2024 0459                      Medical Decision Making  53-year-old female presents emergency room complaining of left knee pain after a fall which occurred roughly 10 days ago.  The patient notes that she is still having significant pain, and is having difficulty ambulating.  The patient notes that she is not wearing her knee immobilizer appropriately because it does not fit well, but is using crutches.  The patient notes that she did not follow-up with orthopedics because she would rather follow-up with [facility] and her insurance will need to change before that can happen.  Patient denies any new injury, or numbness or weakness.  Differential diagnoses based on my evaluation is fracture contusion or joint effusion.  Exam reveals no evidence of ligamentous instability, there is mild patellar crepitance to palpation likely secondary to inflammation behind the patella.  The patient however has no evidence of ecchymosis joint effusion or leg length discrepancy.  Patient's x-rays were reviewed which show no evidence of fracture.  Patient was encouraged to consider following up with Dr. Phoenix as her insurance can cover this visit, and that a higher level care may be needed.  Patient will be placed on Toradol for just a few days due to the patient's slightly low GFR.  The patient also will be given oxycodone for pain.  The patient will be instructed on how to appropriately wear her knee immobilizer.  Patient will be discharged.    Risk  Prescription drug management.             Disposition  Final diagnoses:   Contusion of left knee, initial encounter     Time reflects when diagnosis was documented in both MDM as applicable and the Disposition within this note       Time User Action Codes Description Comment    4/29/2024  5:15 AM Jake Draper Add [S80.02XA] Contusion of left knee,  initial encounter           ED Disposition       ED Disposition   Discharge    Condition   Stable    Date/Time   Mon Apr 29, 2024 0515    Comment   Haylee Balbuena discharge to home/self care.                   Follow-up Information       Follow up With Specialties Details Why Contact Clare Phoenix, DO Orthopedic Surgery On 5/3/2024  [address]  [facility] [address]  [phone]              Discharge Medication List as of 4/29/2024  5:24 AM        START taking these medications    Details   ketorolac (TORADOL) 10 mg tablet Take 1 tablet (10 mg total) by mouth every 6 (six) hours as needed for moderate pain, Starting Mon 4/29/2024, Normal      oxyCODONE-acetaminophen (Percocet) 5-325 mg per tablet Take 1 tablet by mouth every 6 (six) hours as needed for moderate pain for up to 10 doses Max Daily Amount: 4 tablets, Starting Mon 4/29/2024, Normal           CONTINUE these medications which have NOT CHANGED    Details   albuterol (2.5 mg/3 mL) 0.083 % nebulizer solution Take 3 mL (2.5 mg total) by nebulization every 6 (six) hours as needed for wheezing or shortness of breath, Starting Tue 1/9/2024, Normal      albuterol (PROVENTIL HFA,VENTOLIN HFA) 90 mcg/act inhaler INHALE ONE PUFF BY MOUTH AND INTO THE LUNGS EVERY 6 HOURS AS NEEDED FOR WHEEZING, Normal      Alcohol Swabs (Pharmacist Choice Alcohol) PADS Apply topically 4 (four) times a day Use as directed, Starting Tue 9/5/2023, Normal      aspirin (ECOTRIN LOW STRENGTH) 81 mg EC tablet TAKE ONE TABLET BY MOUTH ONCE DAILY, Starting Wed 2/21/2024, Normal      atorvastatin (LIPITOR) 80 mg tablet TAKE ONE TABLET BY MOUTH DAILY AT BEDTIME, Starting Mon 2/5/2024, Normal      B-D UF III MINI PEN NEEDLES 31G X 5 MM MISC Pt uses 5 pen needles daily, Normal      cholecalciferol (VITAMIN D3) 1,000 units tablet Take 2 tablets (2,000 Units total) by mouth daily, Starting Tue 9/5/2023, Normal      citalopram (CeleXA) 40 mg tablet TAKE ONE TABLET BY  MOUTH DAILY, Starting Wed 1/17/2024, Normal      Continuous Blood Gluc Sensor (FreeStyle Refugoi 2 Sensor) MISC Use 1 each every 14 (fourteen) days, Starting Thu 10/19/2023, Normal      docusate sodium (COLACE) 100 mg capsule TAKE ONE CAPSULE BY MOUTH TWICE DAILY, Normal      Empagliflozin (Jardiance) 25 MG TABS Take 1 tablet (25 mg total) by mouth every morning, Starting Thu 2/1/2024, Normal      ergocalciferol (VITAMIN D2) 50,000 units Take 1 capsule (50,000 Units total) by mouth once a week, Starting Thu 9/7/2023, Normal      famotidine (PEPCID) 40 MG tablet TAKE ONE TABLET BY MOUTH TWO HOURS PRIOR TO BEDTIME, Normal      fenofibrate micronized (LOFIBRA) 134 MG capsule Take 1 capsule (134 mg total) by mouth daily with breakfast, Starting Fri 1/12/2024, Normal      Insulin Regular Human, Conc, (HumuLIN R U-500 KwikPen) 500 units/mL CONCENTRATED U-500 injection pen Take 95 units with breakfast, 50 units with lunch, 35 with dinner, or as directed TDD up to 200 units, No Print      Iron Heme Polypeptide 12 MG TABS Take 1 tablet by mouth, Historical Med      levETIRAcetam (KEPPRA) 500 mg tablet TAKE ONE TABLET BY MOUTH TWICE DAILY, Starting Mon 12/11/2023, Normal      linaCLOtide (Linzess) 72 MCG CAPS Take 1 capsule by mouth daily before breakfast, Starting Tue 9/5/2023, Normal      meclizine (ANTIVERT) 25 mg tablet Take 1 tablet (25 mg total) by mouth 3 (three) times a day as needed for dizziness, Starting Mon 7/10/2023, Normal      metoclopramide (Reglan) 10 mg tablet Take 0.5 tablets (5 mg total) by mouth every 6 (six) hours, Starting Thu 12/21/2023, Normal      metoprolol tartrate (LOPRESSOR) 25 mg tablet TAKE ONE TABLET BY MOUTH TWICE DAILY, Starting Mon 1/22/2024, Normal      multivitamin (THERAGRAN) TABS Take 1 tablet by mouth every morning, Historical Med      nystatin (MYCOSTATIN) cream Apply topically 2 (two) times a day, Starting Thu 7/13/2023, Normal      ondansetron (ZOFRAN) 4 mg tablet Take 1 tablet (4 mg  total) by mouth every 6 (six) hours, Starting Thu 12/21/2023, Normal      OneTouch Ultra test strip USE 4 TIMES A DAY, Normal      oxybutynin (DITROPAN-XL) 10 MG 24 hr tablet Take 1 tablet (10 mg total) by mouth daily at bedtime, Starting Mon 3/4/2024, Normal      Pharmacist Choice Lancets MISC USE AS DIRECTED, Normal      pregabalin (LYRICA) 100 mg capsule 100 mg in AM, 100 mg in afternoon, and 200 mg at bedtime, Normal      Restasis 0.05 % ophthalmic emulsion Starting u 3/2/2023, Historical Med      tirzepatide (Mounjaro) 7.5 MG/0.5ML Inject 0.5 mL (7.5 mg total) under the skin every 7 days, Starting Mon 4/8/2024, Normal             No discharge procedures on file.    PDMP Review         Value Time User    PDMP Reviewed  Yes 7/15/2023  3:54 AM Mikaela Michael PA-C            ED Provider  Electronically Signed by             Jake Draper Jr.,   04/29/24 [id number]

## 2024-04-29 NOTE — PROGRESS NOTES
Ortho Sports Medicine Knee New Patient Visit     Assesment:   53 y.o. female coming after a fall 1.5 weeks ago with significant pain and limited range of motion on exam today    Plan:  I reviewed the history, exam, and imaging with the patient in clinic today.  I did review the patient's x-rays with the.  However, on exam patient has significant pain globally throughout the knee with limited range of motion secondary to pain.  Remainder of exam is difficult due to the amount of pain that she is in.  I did discuss that given the amount of pain that she is secondary to a traumatic injury that I am concerned for possible occult fracture that may not be evident on the x-rays.  I discussed that the best way to determine if she has an occult fractures with an MRI.  I discussed that further treatment will depend on the results of the MRI.  Patient was amenable to this plan.  All of her questions were answered.  I will see her back after medicine results and further treatment options.  She can reach out to clinic with any question concerns anytime.    Conservative treatment:  Ice to knee for 20 minutes at least 1-2 times daily.  OTC NSAIDS prn for pain.  Tylenol for pain.  Let pain guide gradual return activities.  Continue with crutches.    Imaging:  All imaging from today was reviewed by myself and explained to the patient.   MRI ordered to evaluate for possible occult fracture.    Injection:  No Injection planned at this time.    Surgery:   No surgery is recommended at this point, continue with conservative treatment plan as noted.    Follow up:  Return for MRI discussion.        Chief Complaint   Patient presents with    Left Knee - Pain       History of Present Illness:  The patient is a 53 y.o. female seen in clinic for left knee pain. The pain started 1.5 week ago. The mechanism of injury was fall with direct impact onto the knee. Patient felt immediate pain and swelling. Patient did hear a pop at the time of the  injury. The patient was evaluated in the ED and diagnosed with a contusion. She was given Toradol and oxycodone. The pain is now located anteriorly and is associated with clicking and instability. The patient characterizes the intensity of pain as a 10 out of 10 at worst. Symptoms are aggravated by weightbearing, movement and palpation. The patient has tried rest, bracing and medications. Symptoms have stayed the same since the injury. Patient has no history of prior injury, surgery. She has numbness/tingling in feet secondary to diabetic neuropathy.      Knee Surgical History:  None    Past Medical, Social and Family History:  Past Medical History:   Diagnosis Date    Atypical chest pain     Gastroparesis     GERD (gastroesophageal reflux disease)     Hypertension     Psychiatric disorder     Sciatica     Seizure disorder (HCC)      Past Surgical History:   Procedure Laterality Date    APPENDECTOMY      BREAST BIOPSY Left  benign     SECTION      CHOLECYSTECTOMY      COLONOSCOPY      HYSTERECTOMY      IR PICC PLACEMENT SINGLE LUMEN  2023    MO LAPAROSCOPIC APPENDECTOMY N/A 2021    Procedure: APPENDECTOMY LAPAROSCOPIC;  Surgeon: Onel Martin MD;  Location:  MAIN OR;  Service: General    MO LAPS ABD PRTM&OMENTUM DX W/WO SPEC BR/WA SPX N/A 2021    Procedure: LAPAROSCOPY DIAGNOSTIC, EXTENSIVE DESI;  Surgeon: Onel Martin MD;  Location: [facility] OR;  Service: General    TUBAL LIGATION      TYMPANOSTOMY TUBE PLACEMENT Bilateral 2024    UPPER GASTROINTESTINAL ENDOSCOPY       Allergies   Allergen Reactions    Butorphanol Hallucinations    Benztropine     Penicillins     Zolpidem     Medical Tape Rash     Ok with paper tape     Current Outpatient Medications on File Prior to Visit   Medication Sig Dispense Refill    albuterol (2.5 mg/3 mL) 0.083 % nebulizer solution Take 3 mL (2.5 mg total) by nebulization every 6 (six) hours as needed for wheezing or shortness of breath 180 mL 5     albuterol (PROVENTIL HFA,VENTOLIN HFA) 90 mcg/act inhaler INHALE ONE PUFF BY MOUTH AND INTO THE LUNGS EVERY 6 HOURS AS NEEDED FOR WHEEZING 36 g 3    Alcohol Swabs (Pharmacist Choice Alcohol) PADS Apply topically 4 (four) times a day Use as directed 300 each 5    aspirin (ECOTRIN LOW STRENGTH) 81 mg EC tablet TAKE ONE TABLET BY MOUTH ONCE DAILY 30 tablet 1    atorvastatin (LIPITOR) 80 mg tablet TAKE ONE TABLET BY MOUTH DAILY AT BEDTIME 180 tablet 3    B-D UF III MINI PEN NEEDLES 31G X 5 MM MISC Pt uses 5 pen needles daily 500 each 0    cholecalciferol (VITAMIN D3) 1,000 units tablet Take 2 tablets (2,000 Units total) by mouth daily 60 tablet 3    citalopram (CeleXA) 40 mg tablet TAKE ONE TABLET BY MOUTH DAILY 90 tablet 3    Continuous Blood Gluc Sensor (FreeStyle Refugio 2 Sensor) MISC Use 1 each every 14 (fourteen) days 6 each 2    docusate sodium (COLACE) 100 mg capsule TAKE ONE CAPSULE BY MOUTH TWICE DAILY 240 capsule 3    Empagliflozin (Jardiance) 25 MG TABS Take 1 tablet (25 mg total) by mouth every morning 90 tablet 1    ergocalciferol (VITAMIN D2) 50,000 units Take 1 capsule (50,000 Units total) by mouth once a week 12 capsule 3    famotidine (PEPCID) 40 MG tablet TAKE ONE TABLET BY MOUTH TWO HOURS PRIOR TO BEDTIME 30 tablet 6    fenofibrate micronized (LOFIBRA) 134 MG capsule Take 1 capsule (134 mg total) by mouth daily with breakfast 90 capsule 1    Insulin Regular Human, Conc, (HumuLIN R U-500 KwikPen) 500 units/mL CONCENTRATED U-500 injection pen Take 95 units with breakfast, 50 units with lunch, 35 with dinner, or as directed TDD up to 200 units 15 mL     Iron Heme Polypeptide 12 MG TABS Take 1 tablet by mouth      ketorolac (TORADOL) 10 mg tablet Take 1 tablet (10 mg total) by mouth every 6 (six) hours as needed for moderate pain 8 tablet 0    levETIRAcetam (KEPPRA) 500 mg tablet TAKE ONE TABLET BY MOUTH TWICE DAILY 300 tablet 1    linaCLOtide (Linzess) 72 MCG CAPS Take 1 capsule by mouth daily before  breakfast 30 capsule 0    meclizine (ANTIVERT) 25 mg tablet Take 1 tablet (25 mg total) by mouth 3 (three) times a day as needed for dizziness 30 tablet 0    metoclopramide (Reglan) 10 mg tablet Take 0.5 tablets (5 mg total) by mouth every 6 (six) hours 30 tablet 0    metoprolol tartrate (LOPRESSOR) 25 mg tablet TAKE ONE TABLET BY MOUTH TWICE DAILY 180 tablet 1    multivitamin (THERAGRAN) TABS Take 1 tablet by mouth every morning      nystatin (MYCOSTATIN) cream Apply topically 2 (two) times a day 30 g 0    ondansetron (ZOFRAN) 4 mg tablet Take 1 tablet (4 mg total) by mouth every 6 (six) hours 12 tablet 0    OneTouch Ultra test strip USE 4 TIMES A  each 0    oxybutynin (DITROPAN-XL) 10 MG 24 hr tablet Take 1 tablet (10 mg total) by mouth daily at bedtime 90 tablet 1    oxyCODONE-acetaminophen (Percocet) 5-325 mg per tablet Take 1 tablet by mouth every 6 (six) hours as needed for moderate pain for up to 10 doses Max Daily Amount: 4 tablets 8 tablet 0    Pharmacist Choice Lancets MISC USE AS DIRECTED 100 each 1    pregabalin (LYRICA) 100 mg capsule 100 mg in AM, 100 mg in afternoon, and 200 mg at bedtime 120 capsule 5    Restasis 0.05 % ophthalmic emulsion       tirzepatide (Mounjaro) 7.5 MG/0.5ML Inject 0.5 mL (7.5 mg total) under the skin every 7 days 2 mL 1     No current facility-administered medications on file prior to visit.     Social History     Socioeconomic History    Marital status: /Civil Union     Spouse name: Not on file    Number of children: Not on file    Years of education: Not on file    Highest education level: Not on file   Occupational History    Not on file   Tobacco Use    Smoking status: Former     Current packs/day: 0.00     Types: Cigarettes     Quit date:      Years since quittin.3    Smokeless tobacco: Never   Vaping Use    Vaping status: Never Used   Substance and Sexual Activity    Alcohol use: Never    Drug use: Never    Sexual activity: Not Currently      "Partners: Male     Birth control/protection: None   Other Topics Concern    Not on file   Social History Narrative    Not on file     Social Determinants of Health     Financial Resource Strain: Not on file   Food Insecurity: No Food Insecurity (1/6/2024)    Hunger Vital Sign     Worried About Running Out of Food in the Last Year: Never true     Ran Out of Food in the Last Year: Never true   Transportation Needs: No Transportation Needs (1/6/2024)    PRAPARE - Transportation     Lack of Transportation (Medical): No     Lack of Transportation (Non-Medical): No   Physical Activity: Not on file   Stress: Not on file   Social Connections: Not on file   Intimate Partner Violence: Not on file   Housing Stability: Low Risk  (1/6/2024)    Housing Stability Vital Sign     Unable to Pay for Housing in the Last Year: No     Number of Places Lived in the Last Year: 1     Unstable Housing in the Last Year: No         I have reviewed the past medical, surgical, social and family history, medications and allergies as documented in the EMR.    Review of systems: ROS is negative other than that noted in the HPI.  Constitutional: Negative for fatigue and fever.   HENT: Negative for sore throat.    Respiratory: Negative for shortness of breath.    Cardiovascular: Negative for chest pain.   Gastrointestinal: Negative for abdominal pain.   Endocrine: Negative for cold intolerance and heat intolerance.   Genitourinary: Negative for flank pain.   Musculoskeletal: Negative for back pain.   Skin: Negative for rash.   Allergic/Immunologic: Negative for immunocompromised state.   Neurological: Negative for dizziness.   Psychiatric/Behavioral: Negative for agitation.      Physical Exam:    Blood pressure 108/74, pulse 67, height 5' 1\" (1.549 m), weight 92.5 kg (204 lb), not currently breastfeeding.    General/Constitutional: NAD, well developed, well nourished  HENT: Normocephalic, atraumatic  CV: Intact distal pulses, regular rate  Resp: No " respiratory distress or labored breathing  Neuro: Alert and Oriented x 3  Psych: Normal mood, normal affect, normal judgement, normal behavior  Skin: Warm, dry, no rashes, no erythema      Focused left knee exam:  Patient presents in a wheelchair today.    Skin is intact. No evidence of ecchymosis, erythema, gross deformity or previous surgical incisions. negative effusion.     Palpation of the knee demonstrates diffuse tenderness over the medial patellar facet, lateral patellar facet, tibial tubercle or patellar tendon.  There is no tenderness over the pes anserine bursa.  There is no tenderness over Gerdy's tubercle. There is no tenderness in the popliteal fossa.  There is no tenderness over the medial or lateral epicondyle.  There is no tenderness with palpation over the posterior calf without palpable cords.    Range of motion testing demonstrates that the patient is able to achieve 0 degrees of extension and 80 degrees of flexion. There is no pain with hyperflexion or hyperextension.  There is negative patellar crepitus. The patient is able to do a straight leg raise.      Strength testing demonstrates 5/5 strength with hip flexion, 5/5 knee extension, 5/5 knee flexion, and 5/5 dorsiflexion and plantarflexion.    Provocation testing demonstrates  Mensicus- negative medial joint line tenderness, negative lateral joint line tenderness, negative Santana's, negative flexion compression.  Collateral ligaments- negative varus laxity at full extension and in 30° of knee flexion, negative valgus laxity at full extension and in 30° of knee flexion.  Cruciate ligament- 1A Lachman examination, negative pivot shift test, 1A anterior drawer, 1A posterior drawer, negative posterior sag.  Patella- negative apprehension with lateral patellar translation (able to sublux 2 quadrant with firm endpoint), negative apprehension with medial patellar translation (able to sublux 2 quadrant with firm endpoint), negative J-sign,  negative patellar grind test, negative tight lateral patellar tilt.    Range of motion testing of the hip and ankle are within normal limits.    No calf tenderness to palpation bilaterally    LE NV Exam: +2 DP/PT pulses bilaterally  Sensation intact to light touch L2-S1 bilaterally, SPN/DPN/TA motor intact       Knee Imaging    X-rays of the left knee were obtained on 4/29/24 and reviewed with the patient. Per my independent review, the imaging shows mild lateral tilting patella      Scribe Attestation      I,:  Sabino Vasquez PA-C am acting as a scribe while in the presence of the attending physician.:       I,:  Hai Sexton MD personally performed the services described in this documentation    as scribed in my presence.:

## 2024-04-29 NOTE — DISCHARGE INSTRUCTIONS
To the knee 4-6 times a day for 10 to 15 minutes at a time.  Do not bear weight on the knee for at least 3-5 more days.    Use Toradol 1 pill every 6 hours as needed.  Take with food.  Drink plenty of fluids while on this medication. Do not take Advil, Motrin, ibuprofen, Aleve, Naprosyn, or aspirin while on Toradol.  You may take Tylenol if necessary.     Use oxycodone 1 pill every 6 hours as needed for pain.  Do not drive or operate heavy machinery on this medicine.    You really need to follow-up with a specialist regarding this injury.  Please contact Dr. Phoenix at least for an initial evaluation because you can with your insurance plan.  More testing through orthopedics may be necessary.

## 2024-04-30 ENCOUNTER — TELEPHONE (OUTPATIENT)
Age: 54
End: 2024-04-30

## 2024-04-30 ENCOUNTER — PATIENT OUTREACH (OUTPATIENT)
Dept: FAMILY MEDICINE CLINIC | Facility: CLINIC | Age: 54
End: 2024-04-30

## 2024-04-30 NOTE — TELEPHONE ENCOUNTER
Patient called bc she had new insurance. It was already added but it is e rejecting. Patient said it is not active until tomorrow

## 2024-04-30 NOTE — LETTER
Date: 04/30/24    Dear Haylee Balbuena,   My name is Cassandra Pabon; I am a registered nurse care manager working with [address] [address]  [phone].     I have not been able to reach you and would like to set a time that I can talk with you over the phone.  My work is to help patients that have complex medical conditions get the care they need. This includes patients who may have been in the hospital or emergency room.    Please call me with any questions you may have. I look forward to speaking with you.  Sincerely,  Cassandra Pabon  [phone]  Outpatient Care Manager

## 2024-04-30 NOTE — PROGRESS NOTES
Outpatient Care Management Note    ADT alert-second outreach attempt. Chart reviewed. Patient was seen in the ED at [facility] on 4/29/24 for contusion of left knee, status post fall. Was seen last week for the same.   Presented with ongoing pain in left knee that radiates to left hip.    OP RN CM first outreach attempt 4/29/24 with no patient answer. Message left.    Second outreach attempt now with no patient answer. Left voicemail message and RN CM contact information.    Unable to reach letter sent to patient via [employer].

## 2024-05-06 ENCOUNTER — HOSPITAL ENCOUNTER (OUTPATIENT)
Dept: MRI IMAGING | Facility: HOSPITAL | Age: 54
Discharge: HOME | End: 2024-05-06
Payer: COMMERCIAL

## 2024-05-06 DIAGNOSIS — S89.92XA KNEE INJURY, LEFT, INITIAL ENCOUNTER: ICD-10-CM

## 2024-05-06 DIAGNOSIS — M25.562 LEFT KNEE PAIN, UNSPECIFIED CHRONICITY: ICD-10-CM

## 2024-05-06 PROCEDURE — 73721 MRI JNT OF LWR EXTRE W/O DYE: CPT

## 2024-05-07 ENCOUNTER — TELEPHONE (OUTPATIENT)
Dept: NEPHROLOGY | Facility: CLINIC | Age: 54
End: 2024-05-07

## 2024-05-07 DIAGNOSIS — N28.1 KIDNEY CYST, ACQUIRED: Primary | ICD-10-CM

## 2024-05-07 NOTE — TELEPHONE ENCOUNTER
I called and left a message for patient to call office to obtain results.     ----- Message from SYLWIA Villa sent at 5/7/2024  1:37 PM EDT -----  Kidney ultrasound reviewed. Right kidney simple cyst is seen and increased in size compared to previous study. Will repeat ultrasound 6 months- please assist with scheduling   She was seen by [facility]. They would be the ones clearing for work

## 2024-05-08 NOTE — TELEPHONE ENCOUNTER
I called and spoke with Haylee. She is scheduled for a US November 4th at 730am at [facility].

## 2024-05-08 NOTE — TELEPHONE ENCOUNTER
Patient was scheduled for 6/6/24 with Mamie Dunn. If this appointment is not needed, Please call patient to cancel. She was updated on her kidney ultrasound and She needs to have her ultrasound scheduled out for 6 months. Please call patient to assist with this.

## 2024-05-10 ENCOUNTER — PATIENT OUTREACH (OUTPATIENT)
Dept: FAMILY MEDICINE CLINIC | Facility: CLINIC | Age: 54
End: 2024-05-10

## 2024-05-10 NOTE — PROGRESS NOTES
Outpatient Care Management Note    Chart reviewed.    No patient response to previously attempted telephone outreaches by  OP RN CM.      OP RN CM sent unable to reach letter to patient via [employer] on 4/30/24 with no patient response.    OP RN CM will remove self from care team and close episode at this time.    OP RN CM will remain available to provide future care management support if needed.    No further outreach calls will be made at this time.

## 2024-05-13 ENCOUNTER — OFFICE VISIT (OUTPATIENT)
Dept: OBGYN CLINIC | Facility: CLINIC | Age: 54
End: 2024-05-13
Payer: COMMERCIAL

## 2024-05-13 ENCOUNTER — APPOINTMENT (OUTPATIENT)
Dept: RADIOLOGY | Facility: CLINIC | Age: 54
End: 2024-05-13
Payer: COMMERCIAL

## 2024-05-13 VITALS
BODY MASS INDEX: 37.57 KG/M2 | WEIGHT: 199 LBS | SYSTOLIC BLOOD PRESSURE: 108 MMHG | HEART RATE: 85 BPM | HEIGHT: 61 IN | DIASTOLIC BLOOD PRESSURE: 75 MMHG

## 2024-05-13 DIAGNOSIS — M54.16 RADICULOPATHY, LUMBAR REGION: ICD-10-CM

## 2024-05-13 DIAGNOSIS — M25.552 PAIN IN LEFT HIP: ICD-10-CM

## 2024-05-13 DIAGNOSIS — M25.552 PAIN IN LEFT HIP: Primary | ICD-10-CM

## 2024-05-13 DIAGNOSIS — S89.92XA KNEE INJURY, LEFT, INITIAL ENCOUNTER: ICD-10-CM

## 2024-05-13 PROCEDURE — 73502 X-RAY EXAM HIP UNI 2-3 VIEWS: CPT

## 2024-05-13 PROCEDURE — 99214 OFFICE O/P EST MOD 30 MIN: CPT | Performed by: STUDENT IN AN ORGANIZED HEALTH CARE EDUCATION/TRAINING PROGRAM

## 2024-05-13 NOTE — PROGRESS NOTES
Ortho Sports Medicine Knee New Patient Visit     Assesment:   53 y.o. female coming after a fall 3.5 weeks ago with MRI of the left knee and x-rays of the left hip not revealing any significant findings. Exam concerning for lumbar radiculopathy.    Plan:  I reviewed the history and exam with the patient in clinic today.  I did review the patient's MRI with her which showed no evidence of a meniscus tear, ACL or PCL tear, or significant chondral injury.  I discussed that this point I recommend that she discontinue use of the knee brace and start working with physical therapy on range of motion and strengthening.  Patient also endorsed pain in her hip and lower back.  We did get x-rays of the left hip which showed mild degenerative changes.  I discussed that I recommend physical therapy for the hip as well.  I discussed icing and pain medications.  The patient is been taking Tylenol and Aleve and would prefer to continue with those rather than getting a new prescription.  Also discussed that her pain may be coming from her lumbar spine.  The patient states she has had lower back issues in the past that she was evaluated for but that was several years ago.  She is interested in seeing a new provider about her lumbar spine.  I provided a referral to our spine pain team for evaluation of lumbar radiculopathy.  Recommend the patient follow-up in 6 weeks for repeat evaluation.  I discussed that she could wean out of the crutches as able.  Patient demonstrated understanding discussion was agreed the plan.  All her questions were answered.  She can reach out to clinic with any question concerns anytime.    Conservative treatment:  Ice to knee for 20 minutes at least 1-2 times daily.  OTC NSAIDS prn for pain.  Tylenol for pain.  Let pain guide activities.  Discontinue the knee immobilizer, wean off of crutches as able.  Referral provided to PT for the knee/hip.  Referral provided to [facility].    Imaging:  All  imaging from today was reviewed by myself and explained to the patient.     Injection:  No Injection planned at this time.    Surgery:   No surgery is recommended at this point, continue with conservative treatment plan as noted.    Follow up:  Return in about 6 weeks (around 6/24/2024).        Chief Complaint   Patient presents with    Left Knee - Pain, Follow-up       History of Present Illness:  The patient is a 53 y.o. female seen in clinic for a 2 week follow up to review the MRI of her left knee. The pain originally started 3.5 week ago. The mechanism of injury was fall with direct impact onto the knee. Patient felt immediate pain and swelling. Patient did hear a pop at the time of the injury. The patient was evaluated in the ED and diagnosed with a contusion. She was given Toradol and oxycodone.     Since the patient was last seen, she had discontinued the use of these medication due to GI upset. She is taking an OTC NSAID with Tylenol and this does help the pain slightly. She continues to ambulate with crutches and she remains in the knee immobilizer. The pain is now located anteriorly and laterally and radiates up the leg into the back, particularly when laying at night. Pain in the knee is associated with clicking and instability. The patient characterizes the intensity of pain as a 10 out of 10 at worst. Symptoms are aggravated by weightbearing, movement and palpation. The patient has tried rest, bracing and medications. Symptoms have stayed the same since the injury. Patient has no history of prior injury, surgery. She has numbness/tingling in feet secondary to diabetic neuropathy.      Knee Surgical History:  None    Past Medical, Social and Family History:  Past Medical History:   Diagnosis Date    Atypical chest pain     Gastroparesis     GERD (gastroesophageal reflux disease)     Hypertension     Psychiatric disorder     Sciatica     Seizure disorder (HCC)      Past Surgical History:   Procedure  Laterality Date    APPENDECTOMY      BREAST BIOPSY Left  benign     SECTION      CHOLECYSTECTOMY      COLONOSCOPY      HYSTERECTOMY      IR PICC PLACEMENT SINGLE LUMEN  2023    [facility] LAPAROSCOPIC APPENDECTOMY N/A 2021    Procedure: APPENDECTOMY LAPAROSCOPIC;  Surgeon: Onel Martin MD;  Location:  MAIN OR;  Service: General    [facility] LAPS ABD PRTM&OMENTUM DX W/WO SPEC BR/WA SPX N/A 2021    Procedure: LAPAROSCOPY DIAGNOSTIC, EXTENSIVE DESI;  Surgeon: Onel Martin MD;  Location:  MAIN OR;  Service: General    TUBAL LIGATION      TYMPANOSTOMY TUBE PLACEMENT Bilateral 2024    UPPER GASTROINTESTINAL ENDOSCOPY       Allergies   Allergen Reactions    Butorphanol Hallucinations    Toradol [Ketorolac Tromethamine] GI Intolerance    Oxycodone GI Intolerance    Benztropine     Penicillins     Zolpidem     Medical Tape Rash     Ok with paper tape     Current Outpatient Medications on File Prior to Visit   Medication Sig Dispense Refill    albuterol (2.5 mg/3 mL) 0.083 % nebulizer solution Take 3 mL (2.5 mg total) by nebulization every 6 (six) hours as needed for wheezing or shortness of breath 180 mL 5    albuterol (PROVENTIL HFA,VENTOLIN HFA) 90 mcg/act inhaler INHALE ONE PUFF BY MOUTH AND INTO THE LUNGS EVERY 6 HOURS AS NEEDED FOR WHEEZING 36 g 3    Alcohol Swabs (Pharmacist Choice Alcohol) PADS Apply topically 4 (four) times a day Use as directed 300 each 5    aspirin (ECOTRIN LOW STRENGTH) 81 mg EC tablet TAKE ONE TABLET BY MOUTH ONCE DAILY 30 tablet 1    atorvastatin (LIPITOR) 80 mg tablet TAKE ONE TABLET BY MOUTH DAILY AT BEDTIME 180 tablet 3    B-D UF III MINI PEN NEEDLES 31G X 5 MM MISC Pt uses 5 pen needles daily 500 each 0    cholecalciferol (VITAMIN D3) 1,000 units tablet Take 2 tablets (2,000 Units total) by mouth daily 60 tablet 3    citalopram (CeleXA) 40 mg tablet TAKE ONE TABLET BY MOUTH DAILY 90 tablet 3    Continuous Blood Gluc Sensor (FreeStyle Refugio 2 Sensor) MISC Use 1  each every 14 (fourteen) days 6 each 2    docusate sodium (COLACE) 100 mg capsule TAKE ONE CAPSULE BY MOUTH TWICE DAILY 240 capsule 3    Empagliflozin (Jardiance) 25 MG TABS Take 1 tablet (25 mg total) by mouth every morning 90 tablet 1    ergocalciferol (VITAMIN D2) 50,000 units Take 1 capsule (50,000 Units total) by mouth once a week 12 capsule 3    famotidine (PEPCID) 40 MG tablet TAKE ONE TABLET BY MOUTH TWO HOURS PRIOR TO BEDTIME 30 tablet 6    fenofibrate micronized (LOFIBRA) 134 MG capsule Take 1 capsule (134 mg total) by mouth daily with breakfast 90 capsule 1    Insulin Regular Human, Conc, (HumuLIN R U-500 KwikPen) 500 units/mL CONCENTRATED U-500 injection pen Take 95 units with breakfast, 50 units with lunch, 35 with dinner, or as directed TDD up to 200 units 15 mL     Iron Heme Polypeptide 12 MG TABS Take 1 tablet by mouth      levETIRAcetam (KEPPRA) 500 mg tablet TAKE ONE TABLET BY MOUTH TWICE DAILY 300 tablet 1    linaCLOtide (Linzess) 72 MCG CAPS Take 1 capsule by mouth daily before breakfast 30 capsule 0    meclizine (ANTIVERT) 25 mg tablet Take 1 tablet (25 mg total) by mouth 3 (three) times a day as needed for dizziness 30 tablet 0    metoclopramide (Reglan) 10 mg tablet Take 0.5 tablets (5 mg total) by mouth every 6 (six) hours 30 tablet 0    metoprolol tartrate (LOPRESSOR) 25 mg tablet TAKE ONE TABLET BY MOUTH TWICE DAILY 180 tablet 1    multivitamin (THERAGRAN) TABS Take 1 tablet by mouth every morning      nystatin (MYCOSTATIN) cream Apply topically 2 (two) times a day 30 g 0    ondansetron (ZOFRAN) 4 mg tablet Take 1 tablet (4 mg total) by mouth every 6 (six) hours 12 tablet 0    OneTouch Ultra test strip USE 4 TIMES A  each 0    Pharmacist Choice Lancets MISC USE AS DIRECTED 100 each 1    pregabalin (LYRICA) 100 mg capsule 100 mg in AM, 100 mg in afternoon, and 200 mg at bedtime 120 capsule 5    Restasis 0.05 % ophthalmic emulsion       tirzepatide (Mounjaro) 7.5 MG/0.5ML Inject 0.5 mL  (7.5 mg total) under the skin every 7 days 2 mL 1    oxybutynin (DITROPAN-XL) 10 MG 24 hr tablet Take 1 tablet (10 mg total) by mouth daily at bedtime (Patient not taking: Reported on 2024) 90 tablet 1    [DISCONTINUED] ketorolac (TORADOL) 10 mg tablet Take 1 tablet (10 mg total) by mouth every 6 (six) hours as needed for moderate pain (Patient not taking: Reported on 2024) 8 tablet 0    [DISCONTINUED] oxyCODONE-acetaminophen (Percocet) 5-325 mg per tablet Take 1 tablet by mouth every 6 (six) hours as needed for moderate pain for up to 10 doses Max Daily Amount: 4 tablets (Patient not taking: Reported on 2024) 8 tablet 0     No current facility-administered medications on file prior to visit.     Social History     Socioeconomic History    Marital status: /Civil Union     Spouse name: Not on file    Number of children: Not on file    Years of education: Not on file    Highest education level: Not on file   Occupational History    Not on file   Tobacco Use    Smoking status: Former     Current packs/day: 0.00     Types: Cigarettes     Quit date:      Years since quittin.3    Smokeless tobacco: Never   Vaping Use    Vaping status: Never Used   Substance and Sexual Activity    Alcohol use: Never    Drug use: Never    Sexual activity: Not Currently     Partners: Male     Birth control/protection: None   Other Topics Concern    Not on file   Social History Narrative    Not on file     Social Determinants of Health     Financial Resource Strain: Not on file   Food Insecurity: No Food Insecurity (2024)    Hunger Vital Sign     Worried About Running Out of Food in the Last Year: Never true     Ran Out of Food in the Last Year: Never true   Transportation Needs: No Transportation Needs (2024)    PRAPARE - Transportation     Lack of Transportation (Medical): No     Lack of Transportation (Non-Medical): No   Physical Activity: Not on file   Stress: Not on file   Social Connections: Not on  "file   Intimate Partner Violence: Not on file   Housing Stability: Low Risk  (1/6/2024)    Housing Stability Vital Sign     Unable to Pay for Housing in the Last Year: No     Number of Places Lived in the Last Year: 1     Unstable Housing in the Last Year: No         I have reviewed the past medical, surgical, social and family history, medications and allergies as documented in the EMR.    Review of systems: ROS is negative other than that noted in the HPI.  Psychiatric/Behavioral: Negative for agitation.      Physical Exam:    Blood pressure 108/75, pulse 85, height 5' 1\" (1.549 m), weight 90.3 kg (199 lb), not currently breastfeeding.    General/Constitutional: NAD, well developed, well nourished  HENT: Normocephalic, atraumatic  CV: Intact distal pulses, regular rate  Resp: No respiratory distress or labored breathing  Neuro: Alert and Oriented x 3  Psych: Normal mood, normal affect, normal judgement, normal behavior  Skin: Warm, dry, no rashes, no erythema      Focused left knee exam:  Patient presents in a wheelchair today.    Skin is intact. No evidence of ecchymosis, erythema, gross deformity or previous surgical incisions. negative effusion.     Palpation of the knee demonstrates diffuse tenderness over the medial patellar facet, lateral patellar facet, tibial tubercle ans patellar tendon.  There is tenderness along the anterior hip, thigh and along the IT band.  There is no tenderness over the pes anserine bursa.  There is no tenderness over Gerdy's tubercle. There is no tenderness in the popliteal fossa.  There is no tenderness over the medial or lateral epicondyle.  There is no tenderness with palpation over the posterior calf without palpable cords.    Range of motion testing demonstrates that the patient is able to achieve 0 degrees of extension and 110 degrees of flexion. There is no pain with hyperflexion or hyperextension.  There is negative patellar crepitus. The patient is able to do a straight " leg raise.      Strength testing demonstrates 5/5 strength with hip flexion, 5/5 knee extension, 5/5 knee flexion, and 5/5 dorsiflexion and plantarflexion.    Provocation testing demonstrates  Mensicus- negative medial joint line tenderness, negative lateral joint line tenderness, negative Santana's, negative flexion compression.  Collateral ligaments- negative varus laxity at full extension and in 30° of knee flexion, negative valgus laxity at full extension and in 30° of knee flexion.  Cruciate ligament- 1A Lachman examination, negative pivot shift test, 1A anterior drawer, 1A posterior drawer, negative posterior sag.  Patella- negative apprehension with lateral patellar translation (able to sublux 2 quadrant with firm endpoint), negative apprehension with medial patellar translation (able to sublux 2 quadrant with firm endpoint), negative J-sign, negative patellar grind test, negative tight lateral patellar tilt.    Range of motion testing of the hip and ankle are within normal limits.    No calf tenderness to palpation bilaterally    LE NV Exam: +2 DP/PT pulses bilaterally  Sensation intact to light touch L2-S1 bilaterally, SPN/DPN/TA motor intact       Knee Imaging    X-rays of the left knee were obtained on 4/29/24 and reviewed with the patient. Per my independent review, the imaging shows mild lateral tilting patella.    MRI of the left knee demonstrated no acute internal derangement.     X-rays of the left hip obtained on 5/13/24 and revealed mild osteoarthritic changes of the left hip with degenerative changes of the visualized lumbar spine.      Scribe Attestation      I,:  Sabino Vasquez PA-C am acting as a scribe while in the presence of the attending physician.:       I,:  Hai Sexton MD personally performed the services described in this documentation    as scribed in my presence.:

## 2024-05-14 ENCOUNTER — APPOINTMENT (OUTPATIENT)
Dept: RADIOLOGY | Facility: CLINIC | Age: 54
End: 2024-05-14
Payer: COMMERCIAL

## 2024-05-14 ENCOUNTER — CONSULT (OUTPATIENT)
Dept: PAIN MEDICINE | Facility: CLINIC | Age: 54
End: 2024-05-14
Payer: COMMERCIAL

## 2024-05-14 VITALS
WEIGHT: 199 LBS | DIASTOLIC BLOOD PRESSURE: 80 MMHG | BODY MASS INDEX: 37.57 KG/M2 | SYSTOLIC BLOOD PRESSURE: 117 MMHG | HEART RATE: 67 BPM | HEIGHT: 61 IN

## 2024-05-14 DIAGNOSIS — M54.42 ACUTE LEFT-SIDED LOW BACK PAIN WITH LEFT-SIDED SCIATICA: Primary | ICD-10-CM

## 2024-05-14 DIAGNOSIS — M79.18 MYOFASCIAL PAIN: ICD-10-CM

## 2024-05-14 DIAGNOSIS — M54.42 ACUTE LEFT-SIDED LOW BACK PAIN WITH LEFT-SIDED SCIATICA: ICD-10-CM

## 2024-05-14 PROCEDURE — 72114 X-RAY EXAM L-S SPINE BENDING: CPT

## 2024-05-14 PROCEDURE — 99204 OFFICE O/P NEW MOD 45 MIN: CPT | Performed by: STUDENT IN AN ORGANIZED HEALTH CARE EDUCATION/TRAINING PROGRAM

## 2024-05-14 RX ORDER — METHOCARBAMOL 500 MG/1
500 TABLET, FILM COATED ORAL 3 TIMES DAILY PRN
Qty: 90 TABLET | Refills: 0 | Status: SHIPPED | OUTPATIENT
Start: 2024-05-14

## 2024-05-14 NOTE — ASSESSMENT & PLAN NOTE
Lab Results   Component Value Date    EGFR 55 04/12/2024    EGFR 60 03/23/2024    EGFR 79 01/06/2024    CREATININE 1.14 04/12/2024    CREATININE 1.05 03/23/2024    CREATININE 0.84 01/06/2024   Kidney function slightly lower than usual, at 1.1 mg/dL.  Check kidney ultrasound to confirm renal anatomy is appropriate.  Follow-up blood work at regular intervals, continue to avoid nephrotoxins.

## 2024-05-14 NOTE — PATIENT INSTRUCTIONS
Start physical therapy.  Start Robaxin 500 mg - 1 tablet up to three times a day as needed. This medicine can make you drowsy, caution with driving or operating heavy machinery.  Obtain low back xrays.  Can take up to 3000 mg (max dose) of tylenol in a day.

## 2024-05-14 NOTE — PROGRESS NOTES
"Assessment:  1. Acute left-sided low back pain with left-sided sciatica    2. Myofascial pain      Portions of the record may have been created with voice recognition software. Occasional wrong word or \"sound a like\" substitutions may have occurred due to the inherent limitations of voice recognition software. Read the chart carefully and recognize, using context, where substitutions have occurred. Contact me with any questions.       Plan:  53-year-old female with past medical history of, seizure disorder, kidney ca, depression/anxiety, HTN, CKD, migraines, GERD, gastroparesis, DM2 (A1c 12.3 on 4/12/24), referred by Dr. Sexton, here for initial evaluation of acute left-sided low back pain with radiation into left L4 distribution after patient tripped and fell in a playground and landed on her left knee.  She presented to the ER on 4/29/2024 where knee x-ray did not show significant findings.  Subsequently underwent an MRI of left knee which did not show significant findings.  She also undergone a left hip x-ray without significant findings.  She had been wearing a left knee brace due to pain and swelling in the area and started using a cane for ambulation.  She has recently discontinued the brace and notes she will be starting physical therapy.  Symptoms likely due to left greater trochanteric bursitis versus lumbar radiculitis versus myofascial pain.    Recommend completing course of physical therapy for core strengthening, LE flexibility, addressing gait/balance.    Obtain lumbar spine x-ray for further evaluation.    Initiate Robaxin 500 mg 3 times daily.  Risk and benefit discussed.  Patient expresses understanding.    Not a candidate for interventional treatment at this time due to significantly elevated A1c.  Patient encouraged to continue efforts to optimize glucose control.      History of Present Illness:    The patient is a 53 y.o. female who presents for consultation in regards to Hip Pain, Leg Pain " (Left side), Knee Pain, and Back Pain.  Symptoms have been present for 1 month. Symptoms began after a fall. Pain is reported to be 9 on the numeric rating scale.  Symptoms are felt constantly and worst in the no typical pattern.  Symptoms are characterized as shooting and dull/aching.  Symptoms are associated with no weakness.  Aggravating factors include lying down, standing, bending, sitting, walking, relaxation, and bowel movements.  Relieving factors include nothing.  She notes pain limited difficulty with ambulation, denies progressive weakness, saddle anesthesia, bowel/bladder incontinence.    She is taking Lyrica 100/100/200 mg for diabetic peripheral neuropathy.    Review of Systems:    Review of Systems   Constitutional:  Negative for chills and fever.   HENT:  Negative for ear pain, sinus pressure and sore throat.    Eyes:  Negative for pain and redness.   Respiratory:  Negative for cough and wheezing.    Cardiovascular:  Negative for leg swelling.   Gastrointestinal:  Negative for abdominal pain and nausea.   Endocrine: Negative for polydipsia and polyuria.   Genitourinary:  Negative for difficulty urinating and frequency.   Musculoskeletal:  Positive for arthralgias and gait problem. Negative for myalgias.        Pain in extremity- leg to hip   Skin:  Negative for pallor and wound.   Neurological:  Positive for dizziness. Negative for seizures, weakness and headaches.   Psychiatric/Behavioral:  Negative for decreased concentration and sleep disturbance.            Past Medical History:   Diagnosis Date    Atypical chest pain     Gastroparesis     GERD (gastroesophageal reflux disease)     Hypertension     Psychiatric disorder     Sciatica     Seizure disorder (HCC)        Past Surgical History:   Procedure Laterality Date    APPENDECTOMY      BREAST BIOPSY Left  benign     SECTION      CHOLECYSTECTOMY      COLONOSCOPY      HYSTERECTOMY      IR PICC PLACEMENT SINGLE LUMEN  2023    [place]  LAPAROSCOPIC APPENDECTOMY N/A 2021    Procedure: APPENDECTOMY LAPAROSCOPIC;  Surgeon: Onel Martin MD;  Location:  MAIN OR;  Service: General    CO LAPS ABD PRTM&OMENTUM DX W/WO SPEC BR/WA SPX N/A 2021    Procedure: LAPAROSCOPY DIAGNOSTIC, EXTENSIVE DESI;  Surgeon: Onel Martin MD;  Location:  MAIN OR;  Service: General    TUBAL LIGATION      TYMPANOSTOMY TUBE PLACEMENT Bilateral 2024    UPPER GASTROINTESTINAL ENDOSCOPY         Family History   Problem Relation Age of Onset    No Known Problems Mother     No Known Problems Father     No Known Problems Daughter     No Known Problems Maternal Grandmother     No Known Problems Paternal Grandmother     No Known Problems Paternal Aunt     No Known Problems Paternal Aunt     No Known Problems Paternal Aunt        Social History     Occupational History    Not on file   Tobacco Use    Smoking status: Former     Current packs/day: 0.00     Types: Cigarettes     Quit date:      Years since quittin.3    Smokeless tobacco: Never   Vaping Use    Vaping status: Never Used   Substance and Sexual Activity    Alcohol use: Never     Comment: occ    Drug use: Never    Sexual activity: Not Currently     Partners: Male     Birth control/protection: None         Current Outpatient Medications:     albuterol (2.5 mg/3 mL) 0.083 % nebulizer solution, Take 3 mL (2.5 mg total) by nebulization every 6 (six) hours as needed for wheezing or shortness of breath, Disp: 180 mL, Rfl: 5    albuterol (PROVENTIL HFA,VENTOLIN HFA) 90 mcg/act inhaler, INHALE ONE PUFF BY MOUTH AND INTO THE LUNGS EVERY 6 HOURS AS NEEDED FOR WHEEZING, Disp: 36 g, Rfl: 3    Alcohol Swabs (Pharmacist Choice Alcohol) PADS, Apply topically 4 (four) times a day Use as directed, Disp: 300 each, Rfl: 5    aspirin (ECOTRIN LOW STRENGTH) 81 mg EC tablet, TAKE ONE TABLET BY MOUTH ONCE DAILY, Disp: 30 tablet, Rfl: 1    atorvastatin (LIPITOR) 80 mg tablet, TAKE ONE TABLET BY MOUTH DAILY AT  BEDTIME, Disp: 180 tablet, Rfl: 3    B-D UF III MINI PEN NEEDLES 31G X 5 MM MISC, Pt uses 5 pen needles daily, Disp: 500 each, Rfl: 0    cholecalciferol (VITAMIN D3) 1,000 units tablet, Take 2 tablets (2,000 Units total) by mouth daily, Disp: 60 tablet, Rfl: 3    citalopram (CeleXA) 40 mg tablet, TAKE ONE TABLET BY MOUTH DAILY, Disp: 90 tablet, Rfl: 3    Continuous Blood Gluc Sensor (FreeStyle Refugio 2 Sensor) MISC, Use 1 each every 14 (fourteen) days, Disp: 6 each, Rfl: 2    docusate sodium (COLACE) 100 mg capsule, TAKE ONE CAPSULE BY MOUTH TWICE DAILY, Disp: 240 capsule, Rfl: 3    Empagliflozin (Jardiance) 25 MG TABS, Take 1 tablet (25 mg total) by mouth every morning, Disp: 90 tablet, Rfl: 1    ergocalciferol (VITAMIN D2) 50,000 units, Take 1 capsule (50,000 Units total) by mouth once a week, Disp: 12 capsule, Rfl: 3    famotidine (PEPCID) 40 MG tablet, TAKE ONE TABLET BY MOUTH TWO HOURS PRIOR TO BEDTIME, Disp: 30 tablet, Rfl: 6    fenofibrate micronized (LOFIBRA) 134 MG capsule, Take 1 capsule (134 mg total) by mouth daily with breakfast, Disp: 90 capsule, Rfl: 1    Insulin Regular Human, Conc, (HumuLIN R U-500 KwikPen) 500 units/mL CONCENTRATED U-500 injection pen, Take 95 units with breakfast, 50 units with lunch, 35 with dinner, or as directed TDD up to 200 units, Disp: 15 mL, Rfl:     Iron Heme Polypeptide 12 MG TABS, Take 1 tablet by mouth, Disp: , Rfl:     levETIRAcetam (KEPPRA) 500 mg tablet, TAKE ONE TABLET BY MOUTH TWICE DAILY, Disp: 300 tablet, Rfl: 1    linaCLOtide (Linzess) 72 MCG CAPS, Take 1 capsule by mouth daily before breakfast, Disp: 30 capsule, Rfl: 0    meclizine (ANTIVERT) 25 mg tablet, Take 1 tablet (25 mg total) by mouth 3 (three) times a day as needed for dizziness, Disp: 30 tablet, Rfl: 0    methocarbamol (ROBAXIN) 500 mg tablet, Take 1 tablet (500 mg total) by mouth 3 (three) times a day as needed for muscle spasms, Disp: 90 tablet, Rfl: 0    metoclopramide (Reglan) 10 mg tablet, Take  "0.5 tablets (5 mg total) by mouth every 6 (six) hours, Disp: 30 tablet, Rfl: 0    metoprolol tartrate (LOPRESSOR) 25 mg tablet, TAKE ONE TABLET BY MOUTH TWICE DAILY, Disp: 180 tablet, Rfl: 1    multivitamin (THERAGRAN) TABS, Take 1 tablet by mouth every morning, Disp: , Rfl:     nystatin (MYCOSTATIN) cream, Apply topically 2 (two) times a day, Disp: 30 g, Rfl: 0    ondansetron (ZOFRAN) 4 mg tablet, Take 1 tablet (4 mg total) by mouth every 6 (six) hours, Disp: 12 tablet, Rfl: 0    OneTouch Ultra test strip, USE 4 TIMES A DAY, Disp: 400 each, Rfl: 0    Pharmacist Choice Lancets MISC, USE AS DIRECTED, Disp: 100 each, Rfl: 1    pregabalin (LYRICA) 100 mg capsule, 100 mg in AM, 100 mg in afternoon, and 200 mg at bedtime, Disp: 120 capsule, Rfl: 5    Restasis 0.05 % ophthalmic emulsion, , Disp: , Rfl:     tirzepatide (Mounjaro) 7.5 MG/0.5ML, Inject 0.5 mL (7.5 mg total) under the skin every 7 days, Disp: 2 mL, Rfl: 1    oxybutynin (DITROPAN-XL) 10 MG 24 hr tablet, Take 1 tablet (10 mg total) by mouth daily at bedtime (Patient not taking: Reported on 5/13/2024), Disp: 90 tablet, Rfl: 1    Allergies   Allergen Reactions    Butorphanol Hallucinations    Toradol [Ketorolac Tromethamine] GI Intolerance    Oxycodone GI Intolerance    Benztropine Hallucinations     Was seeing things that were not right    Penicillins     Zolpidem     Medical Tape Rash     Ok with paper tape       Physical Exam:    /80   Pulse 67   Ht 5' 1\" (1.549 m)   Wt 90.3 kg (199 lb)   BMI 37.60 kg/m²     Constitutional: normal, well developed, well nourished, alert, in no distress and non-toxic and no overt pain behavior.  Eyes: anicteric  HEENT: grossly intact  Neck: supple, symmetric, trachea midline and no masses   Pulmonary:even and unlabored  Cardiovascular:No edema or pitting edema present  Skin:Normal without rashes or lesions and well hydrated  Psychiatric:Mood and affect appropriate  Neurologic:Cranial Nerves II-XII grossly " intact  Musculoskeletal:antalgic gait, pain to palpation over L GTB, grossly intact strength and sensation to light touch over BLE      Imaging    LEFT HIP     INDICATION:   Pain in left hip.      COMPARISON:  None.     VIEWS:  XR HIP/PELV 2-3 VWS LEFT  W PELVIS IF PERFORMED      FINDINGS:     There is no acute fracture or dislocation.     No significant hip degenerative changes.     No lytic or blastic osseous lesion.     Soft tissues are unremarkable.     Degenerative changes pubic symphysis and visualized lower lumbar spine.     IMPRESSION:        No acute osseous abnormality of the left hip.  Mild degenerative change of the lower lumbar spine.  Some narrowing and irregularity of the pubic symphysis.  No fractures      MRI LEFT KNEE     INDICATION:   M25.562: Pain in left knee  S89.92XA: Unspecified injury of left lower leg, initial encounter.     COMPARISON: Left knee radiograph 4/29/2024     TECHNIQUE:    Multiplanar/multisequence MR of the left knee was performed.        FINDINGS:     SUBCUTANEOUS TISSUES: Normal     JOINT EFFUSION: None.     BAKER'S CYST: None.     MENISCI: Intact.     CRUCIATE LIGAMENTS: Intact.     EXTENSOR APPARATUS: Intact.     COLLATERAL LIGAMENTS: Intact.     ARTICULAR SURFACES: Normal. Dorsal defect of the patella incidentally noted.     BONES: Normal.     MUSCULATURE: Intact.        IMPRESSION:     No acute internal derangement.    Orders Placed This Encounter   Procedures    X-ray lumbar spine complete with bending    Ambulatory referral to Physical Therapy

## 2024-05-15 ENCOUNTER — TELEPHONE (OUTPATIENT)
Dept: PAIN MEDICINE | Facility: CLINIC | Age: 54
End: 2024-05-15

## 2024-05-15 NOTE — TELEPHONE ENCOUNTER
----- Message from Mirlande House MD sent at 5/15/2024  9:39 AM EDT -----  XR of lumbar spine did not show significant findings

## 2024-05-15 NOTE — TELEPHONE ENCOUNTER
Left detailed message as per GUILLE advising pt of same. RN advised pt to sched eval w/ PT due to poss future MRI. RN advised pt trial Robaxin as advised at consult to assess relief gained from med. CB# provided for any questions.

## 2024-05-20 ENCOUNTER — TELEPHONE (OUTPATIENT)
Age: 54
End: 2024-05-20

## 2024-05-20 NOTE — TELEPHONE ENCOUNTER
Caller: Patient    Doctor: Darshan    Reason for call: Patient is calling stating her knee pain hasn't changed. She stated the MRI didn't show anything but its still hard to out any weight on it while using her cane. She would like advice on what she can do, or what her next steps are    Call back#: [phone]

## 2024-05-20 NOTE — TELEPHONE ENCOUNTER
Reviewed chart and see pt has seen [facility]PA and was put on Robaxin and Xray done.  Pt has PT appt 5/30/24. CLM for pt to return call to get more information.

## 2024-05-22 DIAGNOSIS — E11.65 TYPE 2 DIABETES MELLITUS WITH HYPERGLYCEMIA, WITH LONG-TERM CURRENT USE OF INSULIN (HCC): ICD-10-CM

## 2024-05-22 DIAGNOSIS — K21.9 GASTROESOPHAGEAL REFLUX DISEASE, UNSPECIFIED WHETHER ESOPHAGITIS PRESENT: ICD-10-CM

## 2024-05-22 DIAGNOSIS — K21.9 GASTROESOPHAGEAL REFLUX DISEASE WITHOUT ESOPHAGITIS: ICD-10-CM

## 2024-05-22 DIAGNOSIS — Z79.4 TYPE 2 DIABETES MELLITUS WITH HYPERGLYCEMIA, WITH LONG-TERM CURRENT USE OF INSULIN (HCC): ICD-10-CM

## 2024-05-22 RX ORDER — FAMOTIDINE 40 MG/1
TABLET, FILM COATED ORAL
Qty: 30 TABLET | Refills: 5 | Status: SHIPPED | OUTPATIENT
Start: 2024-05-22

## 2024-05-22 RX ORDER — ASPIRIN 81 MG/1
81 TABLET ORAL DAILY
Qty: 30 TABLET | Refills: 5 | Status: SHIPPED | OUTPATIENT
Start: 2024-05-22

## 2024-05-29 ENCOUNTER — TELEPHONE (OUTPATIENT)
Dept: FAMILY MEDICINE CLINIC | Facility: CLINIC | Age: 54
End: 2024-05-29

## 2024-05-29 DIAGNOSIS — N18.31 STAGE 3A CHRONIC KIDNEY DISEASE (HCC): Primary | ICD-10-CM

## 2024-05-30 ENCOUNTER — TELEPHONE (OUTPATIENT)
Age: 54
End: 2024-05-30

## 2024-05-30 ENCOUNTER — TELEPHONE (OUTPATIENT)
Dept: NEPHROLOGY | Facility: CLINIC | Age: 54
End: 2024-05-30

## 2024-05-30 NOTE — TELEPHONE ENCOUNTER
Pt rescheduled her appt with Renetta for 11/08/2024. Pt also wabted to know if the providers want to see her before or after her US. I spoke to Rylee in office who stated that she will ask and call pt back when she gets an answer. Pt is aware and confirmed.

## 2024-05-30 NOTE — TELEPHONE ENCOUNTER
Patient called asking if she needs to still come in to the office on 6/6. She stated she has an ultrasound scheduled for 11/4 and has an appointment scheduled on 10/18.

## 2024-05-31 NOTE — TELEPHONE ENCOUNTER
I called and left a detailed message for patient stating she only has to come in for her 1 year follow up.

## 2024-06-04 DIAGNOSIS — Z79.4 TYPE 2 DIABETES MELLITUS WITH HYPERGLYCEMIA, WITH LONG-TERM CURRENT USE OF INSULIN (HCC): ICD-10-CM

## 2024-06-04 DIAGNOSIS — E11.65 TYPE 2 DIABETES MELLITUS WITH HYPERGLYCEMIA, WITH LONG-TERM CURRENT USE OF INSULIN (HCC): ICD-10-CM

## 2024-06-04 DIAGNOSIS — E55.9 VITAMIN D DEFICIENCY: ICD-10-CM

## 2024-06-04 NOTE — TELEPHONE ENCOUNTER
PA for pregabalin (LYRICA) 100 mg capsule     Submitted via    [x]CMM-KEY [id number]  []Surescripts-Case ID #   []Faxed to plan   []Other website   []Phone call Case ID #     Office notes sent, clinical questions answered. Awaiting determination    Turnaround time for your insurance to make a decision on your Prior Authorization can take 7-21 business days.           
PA for pregabalin (LYRICA) 100 mg capsule  Approved   Date(s) approved 5/1/24-6/3/25  Case #[id number]    Patient advised by [x] [employer] Message                      [x] Phone call       Pharmacy advised by [x]Fax                                     []Phone call    Approval letter scanned into Media Yes  
PA needed for Pregabalin see media tab  
Consult...

## 2024-06-05 RX ORDER — ERGOCALCIFEROL 1.25 MG/1
50000 CAPSULE ORAL WEEKLY
Qty: 12 CAPSULE | Refills: 3 | Status: SHIPPED | OUTPATIENT
Start: 2024-06-05

## 2024-06-10 ENCOUNTER — TELEPHONE (OUTPATIENT)
Age: 54
End: 2024-06-10

## 2024-06-10 NOTE — TELEPHONE ENCOUNTER
Phone call from patient scheduled follow up for 7/23/24. Please contact patient, and let her know if labs are needed prior to appt. Lab orders in chart for nephrology. However, did not see anything from Endo.

## 2024-06-10 NOTE — TELEPHONE ENCOUNTER
From endocrine standpoint all we will need is an A1c, which can be obtained in office at her next visit.

## 2024-06-14 DIAGNOSIS — E11.43 TYPE 2 DIABETES MELLITUS WITH DIABETIC AUTONOMIC NEUROPATHY, WITH LONG-TERM CURRENT USE OF INSULIN (HCC): ICD-10-CM

## 2024-06-14 DIAGNOSIS — Z79.4 TYPE 2 DIABETES MELLITUS WITH DIABETIC AUTONOMIC NEUROPATHY, WITH LONG-TERM CURRENT USE OF INSULIN (HCC): ICD-10-CM

## 2024-06-14 RX ORDER — EMPAGLIFLOZIN 25 MG/1
25 TABLET, FILM COATED ORAL DAILY
Qty: 90 TABLET | Refills: 1 | Status: SHIPPED | OUTPATIENT
Start: 2024-06-14

## 2024-06-21 DIAGNOSIS — U07.1 COVID: ICD-10-CM

## 2024-06-21 DIAGNOSIS — R10.9 ABDOMINAL PAIN: ICD-10-CM

## 2024-06-21 RX ORDER — ONDANSETRON 4 MG/1
4 TABLET, FILM COATED ORAL EVERY 6 HOURS
Qty: 12 TABLET | Refills: 0 | Status: SHIPPED | OUTPATIENT
Start: 2024-06-21

## 2024-06-21 NOTE — TELEPHONE ENCOUNTER
Medication was previously prescribed WHILE inpatient   PATIENT STATES SHE HAS BEEN EXPERIENCING NAUSEA OFF/ON  LAST EPISODE WAS LAST WEEKEND-PT WOULD LIKE TO HAVE ON HAND WHEN NEEDED    Reason for call:   [x] Refill   [] Prior Auth  [] Other:     Office:   [x] PCP/Provider - SYLWIA Larson - [facility]   [] Specialty/Provider -     Medication:     ondansetron (ZOFRAN) 4 mg tablet-  Take 1 tablet (4 mg total) by mouth every 6 (six) hours               Pharmacy: [facility] [address]    Does the patient have enough for 3 days?   [] Yes   [x] No - Send as HP to POD

## 2024-07-03 DIAGNOSIS — I15.2 HYPERTENSION ASSOCIATED WITH DIABETES  (HCC): ICD-10-CM

## 2024-07-03 DIAGNOSIS — E11.59 HYPERTENSION ASSOCIATED WITH DIABETES  (HCC): ICD-10-CM

## 2024-07-10 DIAGNOSIS — N32.81 OAB (OVERACTIVE BLADDER): ICD-10-CM

## 2024-07-10 RX ORDER — OXYBUTYNIN CHLORIDE 10 MG/1
10 TABLET, EXTENDED RELEASE ORAL
Qty: 100 TABLET | Refills: 1 | Status: SHIPPED | OUTPATIENT
Start: 2024-07-10

## 2024-07-11 ENCOUNTER — NURSE TRIAGE (OUTPATIENT)
Age: 54
End: 2024-07-11

## 2024-07-11 DIAGNOSIS — A08.4 VIRAL GASTROENTERITIS: Primary | ICD-10-CM

## 2024-07-11 RX ORDER — ONDANSETRON 4 MG/1
4 TABLET, FILM COATED ORAL EVERY 8 HOURS PRN
Qty: 30 TABLET | Refills: 1 | Status: SHIPPED | OUTPATIENT
Start: 2024-07-11

## 2024-07-11 NOTE — TELEPHONE ENCOUNTER
"Patient called in and reports R sided flank pain for about a week, states that it did stop for a few days then came back.  See assessment questions.  Advised ER evaluation per protocol. Patient agreeable and said she will go to [facility] but states she is not sure what time she will be able to get a ride.  Asked patient if she feels like she needs 911 at this time?  Patient declines and states she will get a ride, but may not be for a few hours  Advised patient if her pain of symptoms worsen and cannot get a ride should contact EMS to transport her to ED.  Patient verbalized understanding         Reason for Disposition   SEVERE pain (e.g., excruciating, scale 8-10) and present > 1 hour    Answer Assessment - Initial Assessment Questions  1. LOCATION: \"Where does it hurt?\" (e.g., left, right)      Right side   2. ONSET: \"When did the pain start?\"      Last week  3. SEVERITY: \"How bad is the pain?\" (e.g., Scale 1-10; mild, moderate, or severe)    - MILD (1-3): doesn't interfere with normal activities     - MODERATE (4-7): interferes with normal activities or awakens from sleep     - SEVERE (8-10): excruciating pain and patient unable to do normal activities (stays in bed)        \"8.5, sometimes worse\"  4. PATTERN: \"Does the pain come and go, or is it constant?\"       Constant in varying intensity  5. CAUSE: \"What do you think is causing the pain?\"      Not sure but said that she was told she has a mass on her kidney  6. OTHER SYMPTOMS:  \"Do you have any other symptoms?\" (e.g., fever, abdominal pain, vomiting, leg weakness, burning with urination, blood in urine)      Nausea, vomiting, diarrhea    Protocols used: Flank Pain-ADULT-[place]    "

## 2024-07-11 NOTE — TELEPHONE ENCOUNTER
I agree, not sure what this could be but she should go to the emergency room for further evaluation.

## 2024-07-17 DIAGNOSIS — Z79.4 TYPE 2 DIABETES MELLITUS WITH DIABETIC AUTONOMIC NEUROPATHY, WITH LONG-TERM CURRENT USE OF INSULIN (HCC): ICD-10-CM

## 2024-07-17 DIAGNOSIS — E11.43 TYPE 2 DIABETES MELLITUS WITH DIABETIC AUTONOMIC NEUROPATHY, WITH LONG-TERM CURRENT USE OF INSULIN (HCC): ICD-10-CM

## 2024-07-17 RX ORDER — INSULIN HUMAN 500 [IU]/ML
INJECTION, SOLUTION SUBCUTANEOUS
Qty: 18 ML | Refills: 5 | Status: SHIPPED | OUTPATIENT
Start: 2024-07-17 | End: 2024-07-23

## 2024-07-23 ENCOUNTER — OFFICE VISIT (OUTPATIENT)
Dept: ENDOCRINOLOGY | Facility: CLINIC | Age: 54
End: 2024-07-23
Payer: COMMERCIAL

## 2024-07-23 VITALS
DIASTOLIC BLOOD PRESSURE: 78 MMHG | TEMPERATURE: 97.2 F | WEIGHT: 179 LBS | HEIGHT: 61 IN | SYSTOLIC BLOOD PRESSURE: 112 MMHG | BODY MASS INDEX: 33.79 KG/M2 | HEART RATE: 63 BPM

## 2024-07-23 DIAGNOSIS — E11.43 TYPE 2 DIABETES MELLITUS WITH DIABETIC AUTONOMIC NEUROPATHY, WITH LONG-TERM CURRENT USE OF INSULIN (HCC): Primary | ICD-10-CM

## 2024-07-23 DIAGNOSIS — Z79.4 TYPE 2 DIABETES MELLITUS WITH DIABETIC AUTONOMIC NEUROPATHY, WITH LONG-TERM CURRENT USE OF INSULIN (HCC): Primary | ICD-10-CM

## 2024-07-23 DIAGNOSIS — N18.31 STAGE 3A CHRONIC KIDNEY DISEASE (HCC): ICD-10-CM

## 2024-07-23 DIAGNOSIS — I10 HYPERTENSION, UNSPECIFIED TYPE: ICD-10-CM

## 2024-07-23 DIAGNOSIS — E78.00 HYPERCHOLESTEREMIA: ICD-10-CM

## 2024-07-23 PROCEDURE — 99214 OFFICE O/P EST MOD 30 MIN: CPT | Performed by: STUDENT IN AN ORGANIZED HEALTH CARE EDUCATION/TRAINING PROGRAM

## 2024-07-23 RX ORDER — INSULIN HUMAN 500 [IU]/ML
INJECTION, SOLUTION SUBCUTANEOUS
Qty: 18 ML | Refills: 5 | Status: SHIPPED | OUTPATIENT
Start: 2024-07-23

## 2024-07-23 RX ORDER — BLOOD-GLUCOSE SENSOR
EACH MISCELLANEOUS
Qty: 2 EACH | Refills: 5 | Status: SHIPPED | OUTPATIENT
Start: 2024-07-23

## 2024-07-23 NOTE — PROGRESS NOTES
Haylee Balbuena 53 y.o. female MRN: [id number]    Encounter: [id number]      Assessment & Plan     Problem List Items Addressed This Visit     Type 2 diabetes mellitus with diabetic autonomic neuropathy, with long-term current use of insulin (HCC) - Primary     Uncontrolled.  Limited data available today to make informed changes regarding insulin dosing.  Would prefer strategy that relies less on insulin as complexity of treatment regimen may challenging.  Recommend increase Mounjaro 10 mg weekly, reduce Humulin R 85-45-35 TID AC, continue jardiance. Will Rx refugio 3 for rt-CGM readings, as patient struggles with refugio 2 scanning. Will arrange 3-mo f/u with labs prior         Relevant Medications    Insulin Regular Human, Conc, (HumuLIN R U-500 KwikPen) 500 units/mL CONCENTRATED U-500 injection pen    Continuous Glucose Sensor (FreeStyle Refugio 3 Sensor) MISC    Other Relevant Orders    Basic metabolic panel    Hemoglobin A1C    Hypertension     Good control. May continue present Rx         Hypercholesteremia     Continue statin         Stage 3a chronic kidney disease (HCC)     RTC 3-mo    CC: Diabetes    History of Present Illness     HPI:    Haylee returns today in follow up of T2DM. T2D is cb CKD3, gastroparesis, DPN, fear of hypoglycemia. Last visit with Adriana 4.18.24.     She has been enjoying clinically significant weight loss. She is without symptoms of hyperglycemia. Current therapy plan includes humulin R u500 95-50-35 units TID AC, mounjaro 7.5 mg weekly, and jardiance 25 mg daily. She is on CGM with refugio 2. For hyperlipidemia she takes lipitor 80 mg daily.     Review of Systems   Constitutional:  Negative for diaphoresis and unexpected weight change.   Gastrointestinal:  Negative for nausea and vomiting.   Endocrine: Negative for polydipsia and polyuria.   All other systems reviewed and are negative.      Historical Information   Past Medical History:   Diagnosis Date   • Atypical chest pain    •  Gastroparesis    • GERD (gastroesophageal reflux disease)    • Hypertension    • Psychiatric disorder    • Sciatica    • Seizure disorder (HCC)      Past Surgical History:   Procedure Laterality Date   • APPENDECTOMY     • BREAST BIOPSY Left  benign   •  SECTION     • CHOLECYSTECTOMY     • COLONOSCOPY     • HYSTERECTOMY     • IR PICC PLACEMENT SINGLE LUMEN  2023   • DC LAPAROSCOPIC APPENDECTOMY N/A 2021    Procedure: APPENDECTOMY LAPAROSCOPIC;  Surgeon: Onel Martin MD;  Location:  MAIN OR;  Service: General   • DC LAPS ABD PRTM&OMENTUM DX W/WO SPEC BR/WA SPX N/A 2021    Procedure: LAPAROSCOPY DIAGNOSTIC, EXTENSIVE DESI;  Surgeon: Onel Martin MD;  Location:  MAIN OR;  Service: General   • TUBAL LIGATION     • TYMPANOSTOMY TUBE PLACEMENT Bilateral 2024   • UPPER GASTROINTESTINAL ENDOSCOPY       Social History   Social History     Substance and Sexual Activity   Alcohol Use Never    Comment: occ     Social History     Substance and Sexual Activity   Drug Use Never     Social History     Tobacco Use   Smoking Status Former   • Current packs/day: 0.00   • Types: Cigarettes   • Quit date:    • Years since quittin.5   Smokeless Tobacco Never     Family History:   Family History   Problem Relation Age of Onset   • No Known Problems Mother    • No Known Problems Father    • No Known Problems Daughter    • No Known Problems Maternal Grandmother    • No Known Problems Paternal Grandmother    • No Known Problems Paternal Aunt    • No Known Problems Paternal Aunt    • No Known Problems Paternal Aunt        Meds/Allergies   Current Outpatient Medications   Medication Sig Dispense Refill   • albuterol (2.5 mg/3 mL) 0.083 % nebulizer solution Take 3 mL (2.5 mg total) by nebulization every 6 (six) hours as needed for wheezing or shortness of breath 180 mL 5   • albuterol (PROVENTIL HFA,VENTOLIN HFA) 90 mcg/act inhaler INHALE ONE PUFF BY MOUTH AND INTO THE LUNGS EVERY 6 HOURS AS  NEEDED FOR WHEEZING 36 g 3   • Alcohol Swabs (Pharmacist Choice Alcohol) PADS Apply topically 4 (four) times a day Use as directed 300 each 5   • aspirin (ECOTRIN LOW STRENGTH) 81 mg EC tablet TAKE ONE TABLET BY MOUTH ONCE DAILY 30 tablet 5   • atorvastatin (LIPITOR) 80 mg tablet TAKE ONE TABLET BY MOUTH DAILY AT BEDTIME 180 tablet 3   • B-D UF III MINI PEN NEEDLES 31G X 5 MM MISC Pt uses 5 pen needles daily 500 each 0   • cholecalciferol (VITAMIN D3) 1,000 units tablet Take 2 tablets (2,000 Units total) by mouth daily 60 tablet 3   • citalopram (CeleXA) 40 mg tablet TAKE ONE TABLET BY MOUTH DAILY 90 tablet 3   • Continuous Glucose Sensor (FreeStyle Refugio 3 Sensor) MISC E11.65 replace sensor every 14 days 2 each 5   • docusate sodium (COLACE) 100 mg capsule TAKE ONE CAPSULE BY MOUTH TWICE DAILY 240 capsule 3   • ergocalciferol (VITAMIN D2) 50,000 units TAKE ONE CAPSULE BY MOUTH ONCE A WEEK 12 capsule 3   • famotidine (PEPCID) 40 MG tablet TAKE ONE TABLET BY MOUTH TWO HOURS PRIOR TO BEDTIME 30 tablet 5   • fenofibrate micronized (LOFIBRA) 134 MG capsule Take 1 capsule (134 mg total) by mouth daily with breakfast 90 capsule 1   • Insulin Regular Human, Conc, (HumuLIN R U-500 KwikPen) 500 units/mL CONCENTRATED U-500 injection pen E11.65 inject 85 units with breakfast, 45 units with lunch, 35 with dinner, or as directed TDD up to 200 units 18 mL 5   • Iron Heme Polypeptide 12 MG TABS Take 1 tablet by mouth     • Jardiance 25 MG TABS TAKE ONE TABLET BY MOUTH EVERY MORNING 90 tablet 1   • levETIRAcetam (KEPPRA) 500 mg tablet TAKE ONE TABLET BY MOUTH TWICE DAILY 300 tablet 1   • linaCLOtide (Linzess) 72 MCG CAPS Take 1 capsule by mouth daily before breakfast 30 capsule 0   • meclizine (ANTIVERT) 25 mg tablet Take 1 tablet (25 mg total) by mouth 3 (three) times a day as needed for dizziness 30 tablet 0   • methocarbamol (ROBAXIN) 500 mg tablet Take 1 tablet (500 mg total) by mouth 3 (three) times a day as needed for muscle  "spasms 90 tablet 0   • metoclopramide (Reglan) 10 mg tablet Take 0.5 tablets (5 mg total) by mouth every 6 (six) hours 30 tablet 0   • metoprolol tartrate (LOPRESSOR) 25 mg tablet TAKE ONE TABLET BY MOUTH TWICE DAILY 180 tablet 1   • multivitamin (THERAGRAN) TABS Take 1 tablet by mouth every morning     • nystatin (MYCOSTATIN) cream Apply topically 2 (two) times a day 30 g 0   • ondansetron (ZOFRAN) 4 mg tablet Take 1 tablet (4 mg total) by mouth every 8 (eight) hours as needed for nausea or vomiting 30 tablet 1   • OneTouch Ultra test strip USE 4 TIMES A  each 0   • oxybutynin (DITROPAN-XL) 10 MG 24 hr tablet TAKE ONE TABLET BY MOUTH DAILY AT BEDTIME 100 tablet 1   • Pharmacist Choice Lancets MISC USE AS DIRECTED 100 each 1   • pregabalin (LYRICA) 100 mg capsule 100 mg in AM, 100 mg in afternoon, and 200 mg at bedtime 120 capsule 5   • Restasis 0.05 % ophthalmic emulsion        No current facility-administered medications for this visit.     Allergies   Allergen Reactions   • Butorphanol Hallucinations   • Toradol [Ketorolac Tromethamine] GI Intolerance   • Oxycodone GI Intolerance   • Benztropine Hallucinations     Was seeing things that were not right   • Penicillins    • Zolpidem    • Medical Tape Rash     Ok with paper tape       Objective   Vitals: Blood pressure 112/78, pulse 63, temperature (!) 97.2 °F (36.2 °C), height 5' 1\" (1.549 m), weight 81.2 kg (179 lb), not currently breastfeeding.    Physical Exam  Vitals reviewed.   Constitutional:       General: She is not in acute distress.  HENT:      Head: Normocephalic and atraumatic.      Nose: Nose normal.   Eyes:      General: No scleral icterus.     Conjunctiva/sclera: Conjunctivae normal.   Pulmonary:      Effort: Pulmonary effort is normal. No respiratory distress.   Abdominal:      Palpations: Abdomen is soft.      Tenderness: There is no abdominal tenderness.   Musculoskeletal:         General: No deformity.   Skin:     General: Skin is warm and " dry.   Neurological:      General: No focal deficit present.      Mental Status: She is alert.   Psychiatric:         Mood and Affect: Mood normal.         Behavior: Behavior normal.         The history was obtained from the review of the chart, patient.    Lab Results:   Lab Results   Component Value Date/Time    Hemoglobin A1C 12.3 (H) 04/12/2024 03:55 PM    Hemoglobin A1C 11.6 (A) 04/09/2024 02:02 PM    Hemoglobin A1C 12.4 (H) 01/05/2024 04:58 AM    Hemoglobin A1C 14.3 (H) 12/11/2023 12:36 PM    WBC 7.51 04/12/2024 03:55 PM    WBC 9.99 03/23/2024 05:16 AM    WBC 9.04 01/06/2024 05:17 AM    Hemoglobin 11.7 04/12/2024 03:55 PM    Hemoglobin 13.2 03/23/2024 05:16 AM    Hemoglobin 12.0 01/06/2024 05:17 AM    Hematocrit 38.1 04/12/2024 03:55 PM    Hematocrit 41.8 03/23/2024 05:16 AM    Hematocrit 36.8 01/06/2024 05:17 AM    MCV 83 04/12/2024 03:55 PM    MCV 83 03/23/2024 05:16 AM    MCV 78 (L) 01/06/2024 05:17 AM    Platelets 372 04/12/2024 03:55 PM    Platelets 369 03/23/2024 05:16 AM    Platelets 283 01/06/2024 05:17 AM    BUN 28 (H) 04/12/2024 03:55 PM    BUN 19 03/23/2024 05:16 AM    BUN 23 01/06/2024 05:17 AM    Potassium 4.4 04/12/2024 03:55 PM    Potassium 2.7 (LL) 03/23/2024 05:16 AM    Potassium 3.6 01/06/2024 05:17 AM    Chloride 105 04/12/2024 03:55 PM    Chloride 103 03/23/2024 05:16 AM    Chloride 105 01/06/2024 05:17 AM    CO2 27 04/12/2024 03:55 PM    CO2 26 03/23/2024 05:16 AM    CO2 25 01/06/2024 05:17 AM    Creatinine 1.14 04/12/2024 03:55 PM    Creatinine 1.05 03/23/2024 05:16 AM    Creatinine 0.84 01/06/2024 05:17 AM    AST 18 04/12/2024 03:55 PM    AST 21 03/23/2024 05:16 AM    AST 26 12/21/2023 06:42 PM    ALT 21 04/12/2024 03:55 PM    ALT 24 03/23/2024 05:16 AM    ALT 32 12/21/2023 06:42 PM    Total Protein 6.3 (L) 04/12/2024 03:55 PM    Total Protein 7.0 03/23/2024 05:16 AM    Total Protein 6.8 12/21/2023 06:42 PM    Albumin 3.9 04/12/2024 03:55 PM    Albumin 4.0 03/23/2024 05:16 AM    Albumin  "4.2 12/21/2023 06:42 PM    HDL, Direct 55 04/12/2024 03:55 PM    HDL, Direct 43 (L) 01/05/2024 04:58 AM    HDL, Direct 57 12/11/2023 12:36 PM    Triglycerides 210 (H) 04/12/2024 03:55 PM    Triglycerides 327 (H) 01/05/2024 04:58 AM    Triglycerides 262 (H) 12/11/2023 12:36 PM     Haylee Balbuena   Device used [employer]  Home use   Professional Use- Physician Provided Equipment    Indication   Type 2 Diabetes    More than 72 hours of data was reviewed. Report to be scanned to chart.     Date Range: July 10 - July 23, 2024    Analysis of data:   % time CGM used:  Average Glucose: 262 mg/dL  Coefficient of Variation: 33.4%     Time in Target Range: 16%   Time Above Range: 84% (43% very high)   Time Below Range: 0%     Interpretation of data: very limited data available to make interpretations. Patient scans infrequently.         Imaging Studies: I have personally reviewed pertinent reports.      Portions of the record may have been created with voice recognition software. Occasional wrong word or \"sound a like\" substitutions may have occurred due to the inherent limitations of voice recognition software. Read the chart carefully and recognize, using context, where substitutions have occurred.    "

## 2024-07-23 NOTE — ASSESSMENT & PLAN NOTE
Uncontrolled.  Limited data available today to make informed changes regarding insulin dosing.  Would prefer strategy that relies less on insulin as complexity of treatment regimen may challenging.  Recommend increase Mounjaro 10 mg weekly, reduce Humulin R 85-45-35 TID AC, continue jardiance. Will Rx carri 3 for rt-CGM readings, as patient struggles with carri 2 scanning. Will arrange 3-mo f/u with labs prior

## 2024-07-23 NOTE — PATIENT INSTRUCTIONS
Jardiance is a drug called SGLT2i. This is a diabetes drug that can also help reduce kidney disease and heart failure. Other examples of medications like jardiance include: FARXIGA, INVOKANA, STEGLATRO, and BRENZAVVY (this is available through online pharmacy called [facility] for ~$50/mo without insurance).

## 2024-08-05 DIAGNOSIS — E11.43 TYPE 2 DIABETES MELLITUS WITH DIABETIC AUTONOMIC NEUROPATHY, WITH LONG-TERM CURRENT USE OF INSULIN (HCC): Primary | ICD-10-CM

## 2024-08-05 DIAGNOSIS — Z79.4 TYPE 2 DIABETES MELLITUS WITH DIABETIC AUTONOMIC NEUROPATHY, WITH LONG-TERM CURRENT USE OF INSULIN (HCC): Primary | ICD-10-CM

## 2024-08-08 RX ORDER — AMPICILLIN TRIHYDRATE 250 MG
500 CAPSULE ORAL DAILY
Qty: 90 CAPSULE | Refills: 1 | Status: SHIPPED | OUTPATIENT
Start: 2024-08-08

## 2024-08-16 ENCOUNTER — TELEPHONE (OUTPATIENT)
Age: 54
End: 2024-08-16

## 2024-08-16 DIAGNOSIS — E11.65 TYPE 2 DIABETES MELLITUS WITH HYPERGLYCEMIA, WITH LONG-TERM CURRENT USE OF INSULIN (HCC): Primary | ICD-10-CM

## 2024-08-16 DIAGNOSIS — Z79.4 TYPE 2 DIABETES MELLITUS WITH HYPERGLYCEMIA, WITH LONG-TERM CURRENT USE OF INSULIN (HCC): Primary | ICD-10-CM

## 2024-08-16 NOTE — TELEPHONE ENCOUNTER
Patient spoke to her insurance company and they stated she is covered for Dexcom 7. Please send a prescription in to the [facility].

## 2024-08-16 NOTE — TELEPHONE ENCOUNTER
Patient noticed mounjaro 7.5mcg was taken off her medication list. She said she is due for a dose increase. Can you order the new dose to the [facility]? Thank you

## 2024-08-19 RX ORDER — ACYCLOVIR 400 MG/1
1 TABLET ORAL
Qty: 3 EACH | Refills: 3 | Status: SHIPPED | OUTPATIENT
Start: 2024-08-19

## 2024-08-22 DIAGNOSIS — R56.9 SEIZURES (HCC): ICD-10-CM

## 2024-08-22 RX ORDER — LEVETIRACETAM 500 MG/1
500 TABLET ORAL 2 TIMES DAILY
Qty: 180 TABLET | Refills: 1 | Status: SHIPPED | OUTPATIENT
Start: 2024-08-22

## 2024-09-04 ENCOUNTER — TELEPHONE (OUTPATIENT)
Age: 54
End: 2024-09-04

## 2024-09-04 DIAGNOSIS — Z79.4 TYPE 2 DIABETES MELLITUS WITH DIABETIC AUTONOMIC NEUROPATHY, WITH LONG-TERM CURRENT USE OF INSULIN (HCC): Primary | ICD-10-CM

## 2024-09-04 DIAGNOSIS — E11.43 TYPE 2 DIABETES MELLITUS WITH DIABETIC AUTONOMIC NEUROPATHY, WITH LONG-TERM CURRENT USE OF INSULIN (HCC): Primary | ICD-10-CM

## 2024-09-04 RX ORDER — BLOOD-GLUCOSE SENSOR
EACH MISCELLANEOUS
Qty: 6 EACH | Refills: 1 | Status: SHIPPED | OUTPATIENT
Start: 2024-09-04

## 2024-09-04 NOTE — TELEPHONE ENCOUNTER
Pt called  she got the dexcom she doesn't like it because the first strip didn't work and she called dexcom. She had put in the 2nd strip and it has signal then it doesn't,  its not working.  So she is asking if she can go back to the carri  and now get the carri 3. She asked what is she supposed to do till she gets another one.

## 2024-09-04 NOTE — TELEPHONE ENCOUNTER
I just sent Rx carri 3 to [facility]. If not immediately available, she can pursue fingerstick testing before meals. Does she have a glucometer at home?

## 2024-09-04 NOTE — TELEPHONE ENCOUNTER
Left message for pt that carri 3 was sent to pharmacy if can't get right away dr dia would like her to use a meter and check with finger sticks if she needs one to let us know

## 2024-09-05 NOTE — TELEPHONE ENCOUNTER
Patient called back to see if she was able to get the Refugio 3 instead. I advised that it was called into her pharmacy. Patient was wondering if it was going to be covered by insurance. I advised to check with the pharmacy or her insurance co.

## 2024-09-12 ENCOUNTER — OFFICE VISIT (OUTPATIENT)
Dept: FAMILY MEDICINE CLINIC | Facility: CLINIC | Age: 54
End: 2024-09-12
Payer: COMMERCIAL

## 2024-09-12 VITALS
HEIGHT: 61 IN | RESPIRATION RATE: 16 BRPM | BODY MASS INDEX: 33.82 KG/M2 | SYSTOLIC BLOOD PRESSURE: 118 MMHG | TEMPERATURE: 97.7 F | DIASTOLIC BLOOD PRESSURE: 66 MMHG | HEART RATE: 70 BPM | OXYGEN SATURATION: 96 %

## 2024-09-12 DIAGNOSIS — E11.43 TYPE 2 DIABETES MELLITUS WITH DIABETIC AUTONOMIC NEUROPATHY, WITH LONG-TERM CURRENT USE OF INSULIN (HCC): ICD-10-CM

## 2024-09-12 DIAGNOSIS — Z86.39 HX OF DIABETIC GASTROPARESIS: ICD-10-CM

## 2024-09-12 DIAGNOSIS — E78.00 HYPERCHOLESTEREMIA: ICD-10-CM

## 2024-09-12 DIAGNOSIS — F41.8 DEPRESSION WITH ANXIETY: Chronic | ICD-10-CM

## 2024-09-12 DIAGNOSIS — Z00.00 ANNUAL PHYSICAL EXAM: Primary | ICD-10-CM

## 2024-09-12 DIAGNOSIS — E78.1 HYPERTRIGLYCERIDEMIA: ICD-10-CM

## 2024-09-12 DIAGNOSIS — K59.01 SLOW TRANSIT CONSTIPATION: ICD-10-CM

## 2024-09-12 DIAGNOSIS — E83.42 HYPOMAGNESEMIA: ICD-10-CM

## 2024-09-12 DIAGNOSIS — E55.9 VITAMIN D DEFICIENCY: ICD-10-CM

## 2024-09-12 DIAGNOSIS — Z79.4 TYPE 2 DIABETES MELLITUS WITH DIABETIC AUTONOMIC NEUROPATHY, WITH LONG-TERM CURRENT USE OF INSULIN (HCC): ICD-10-CM

## 2024-09-12 PROBLEM — K59.09 OTHER CONSTIPATION: Status: ACTIVE | Noted: 2021-08-12

## 2024-09-12 PROBLEM — C64.1 CLEAR CELL CARCINOMA OF KIDNEY, RIGHT (HCC): Status: RESOLVED | Noted: 2017-05-15 | Resolved: 2024-09-12

## 2024-09-12 LAB — SL AMB POCT HEMOGLOBIN AIC: 11.3 (ref ?–6.5)

## 2024-09-12 PROCEDURE — 99396 PREV VISIT EST AGE 40-64: CPT | Performed by: NURSE PRACTITIONER

## 2024-09-12 PROCEDURE — 83036 HEMOGLOBIN GLYCOSYLATED A1C: CPT | Performed by: NURSE PRACTITIONER

## 2024-09-12 PROCEDURE — 99214 OFFICE O/P EST MOD 30 MIN: CPT | Performed by: NURSE PRACTITIONER

## 2024-09-12 RX ORDER — ONDANSETRON 4 MG/1
4 TABLET, FILM COATED ORAL EVERY 8 HOURS PRN
Qty: 30 TABLET | Refills: 1 | Status: SHIPPED | OUTPATIENT
Start: 2024-09-12

## 2024-09-12 NOTE — ASSESSMENT & PLAN NOTE
Lab Results   Component Value Date    HGBA1C 11.3 (A) 09/12/2024       Orders:    POCT hemoglobin A1c

## 2024-09-12 NOTE — ASSESSMENT & PLAN NOTE
Orders:    ondansetron (ZOFRAN) 4 mg tablet; Take 1 tablet (4 mg total) by mouth every 8 (eight) hours as needed for nausea or vomiting

## 2024-09-12 NOTE — PROGRESS NOTES
"Adult Annual Physical  Name: Haylee Balbuena      : 1970      MRN: [id number]  Encounter Provider: SYLWIA Larson  Encounter Date: 2024   Encounter department: [facility]    Assessment & Plan  Hypertriglyceridemia    Orders:    Lipid panel; Future    Hx of diabetic gastroparesis    Orders:    ondansetron (ZOFRAN) 4 mg tablet; Take 1 tablet (4 mg total) by mouth every 8 (eight) hours as needed for nausea or vomiting    Slow transit constipation    Orders:    Magnesium Citrate 200 MG TABS; Take 1 tablet by mouth 2 (two) times a day    Type 2 diabetes mellitus with diabetic autonomic neuropathy, with long-term current use of insulin ([facility])    Lab Results   Component Value Date    HGBA1C 11.3 (A) 2024       Orders:    POCT hemoglobin A1c    Annual physical exam         Hypomagnesemia         Hypercholesteremia         Vitamin D deficiency         Depression with anxiety           Immunizations and preventive care screenings were discussed with patient today. Appropriate education was printed on patient's after visit summary.             History of Present Illness     Adult Annual Physical:  Patient presents for annual physical. Here for annual physical-   Diabetic foot exam due  Has Endo appointment in October. Last HgA1c 12.4 in January, will get POCT today. (11.3)  Mounjaro dose is still 7.5, did not get 10mg dose yet- was sent to pharmacy   C/o nausea- \"it's ok right now, it's only once in a while\" Zofran helps, needs refill  Reports constipation- does not have follow up with GI scheduled. Is on Linzees with no improvement. Will trial Magnesium- last Mag level 2.0, has another level ordered for October.     Depression Screening:    - PHQ-9 Score: 0    Review of Systems   Constitutional:  Negative for activity change, diaphoresis, fatigue and fever.   HENT:  Negative for congestion, facial swelling, hearing loss, rhinorrhea, sinus pressure, sinus pain, sneezing, " "sore throat and voice change.    Eyes:  Negative for discharge and visual disturbance.   Respiratory:  Negative for cough, choking, chest tightness, shortness of breath, wheezing and stridor.    Cardiovascular:  Negative for chest pain, palpitations and leg swelling.   Gastrointestinal:  Positive for nausea. Negative for abdominal distention, abdominal pain, constipation, diarrhea and vomiting.   Endocrine: Negative for polydipsia, polyphagia and polyuria.   Genitourinary:  Negative for difficulty urinating, dysuria, frequency and urgency.   Musculoskeletal:  Negative for arthralgias, back pain, gait problem, joint swelling, myalgias, neck pain and neck stiffness.   Skin:  Negative for color change, rash and wound.   Neurological:  Negative for dizziness, syncope, speech difficulty, weakness, light-headedness and headaches.   Hematological:  Negative for adenopathy. Does not bruise/bleed easily.   Psychiatric/Behavioral:  Negative for agitation, behavioral problems, confusion, hallucinations, sleep disturbance and suicidal ideas. The patient is not nervous/anxious.      Medical History Reviewed by provider this encounter:  Tobacco  Allergies  Meds  Problems  Med Hx  Surg Hx  Fam Hx         Objective     /66   Pulse 70   Temp 97.7 °F (36.5 °C) (Temporal)   Resp 16   Ht 5' 1\" (1.549 m)   SpO2 96%   BMI 33.82 kg/m²     Physical Exam  Vitals and nursing note reviewed. Exam conducted with a chaperone present (mother in law- Cari).   Constitutional:       General: She is not in acute distress.     Appearance: She is well-developed. She is not diaphoretic.   Neck:      Thyroid: No thyromegaly.      Trachea: No tracheal deviation.   Cardiovascular:      Rate and Rhythm: Normal rate and regular rhythm.      Pulses: no weak pulses.           Dorsalis pedis pulses are 2+ on the right side and 2+ on the left side.      Heart sounds: Normal heart sounds. No murmur heard.  Pulmonary:      Effort: Pulmonary " effort is normal. No respiratory distress.      Breath sounds: Normal breath sounds. No wheezing.   Musculoskeletal:         General: No tenderness or deformity. Normal range of motion.      Cervical back: Normal range of motion and neck supple.      Right lower leg: No edema.      Left lower leg: No edema.   Feet:      Right foot:      Skin integrity: No ulcer, skin breakdown, erythema, warmth, callus or dry skin.      Left foot:      Skin integrity: No ulcer, skin breakdown, erythema, warmth, callus or dry skin.   Skin:     General: Skin is warm and dry.      Coloration: Skin is pale.      Findings: No erythema or rash.   Neurological:      Mental Status: She is alert and oriented to person, place, and time.   Psychiatric:         Mood and Affect: Mood normal.         Behavior: Behavior normal. Behavior is cooperative.         Thought Content: Thought content normal.         Judgment: Judgment normal.         Patient's shoes and socks removed.    Right Foot/Ankle   Right Foot Inspection  Skin Exam: skin normal. Skin not intact, no dry skin, no warmth, no callus, no erythema, no maceration, no abnormal color, no pre-ulcer, no ulcer and no callus.     Toe Exam: ROM and strength within normal limits.     Sensory   Monofilament testing: intact    Vascular  Capillary refills: < 3 seconds  The right DP pulse is 2+.     Left Foot/Ankle  Left Foot Inspection  Skin Exam: skin normal. Skin not intact, no dry skin, no warmth, no erythema, no maceration, normal color, no pre-ulcer, no ulcer and no callus.     Toe Exam: ROM and strength within normal limits.     Sensory   Monofilament testing: intact    Vascular  Capillary refills: < 3 seconds  The left DP pulse is 2+.     Assign Risk Category  No deformity present  No loss of protective sensation  No weak pulses  Risk: 0

## 2024-09-20 ENCOUNTER — APPOINTMENT (EMERGENCY)
Dept: RADIOLOGY | Facility: HOSPITAL | Age: 54
End: 2024-09-20
Payer: COMMERCIAL

## 2024-09-20 ENCOUNTER — HOSPITAL ENCOUNTER (EMERGENCY)
Facility: HOSPITAL | Age: 54
Discharge: HOME | End: 2024-09-20
Attending: INTERNAL MEDICINE
Payer: COMMERCIAL

## 2024-09-20 VITALS
HEART RATE: 70 BPM | SYSTOLIC BLOOD PRESSURE: 132 MMHG | DIASTOLIC BLOOD PRESSURE: 84 MMHG | TEMPERATURE: 98.6 F | RESPIRATION RATE: 18 BRPM | OXYGEN SATURATION: 96 %

## 2024-09-20 DIAGNOSIS — M19.049 HAND ARTHRITIS: Primary | ICD-10-CM

## 2024-09-20 LAB
ANION GAP SERPL CALCULATED.3IONS-SCNC: 8 MMOL/L (ref 4–13)
B BURGDOR IGG+IGM SER QL IA: NEGATIVE
BASOPHILS # BLD AUTO: 0.03 THOUSANDS/ΜL (ref 0–0.1)
BASOPHILS NFR BLD AUTO: 0 % (ref 0–1)
BUN SERPL-MCNC: 19 MG/DL (ref 5–25)
CALCIUM SERPL-MCNC: 9.1 MG/DL (ref 8.4–10.2)
CHLORIDE SERPL-SCNC: 102 MMOL/L (ref 96–108)
CO2 SERPL-SCNC: 27 MMOL/L (ref 21–32)
CREAT SERPL-MCNC: 1.14 MG/DL (ref 0.6–1.3)
CRP SERPL QL: 4.6 MG/L
EOSINOPHIL # BLD AUTO: 0.18 THOUSAND/ΜL (ref 0–0.61)
EOSINOPHIL NFR BLD AUTO: 2 % (ref 0–6)
ERYTHROCYTE [DISTWIDTH] IN BLOOD BY AUTOMATED COUNT: 14 % (ref 11.6–15.1)
GFR SERPL CREATININE-BSD FRML MDRD: 55 ML/MIN/1.73SQ M
GLUCOSE SERPL-MCNC: 249 MG/DL (ref 65–140)
HCT VFR BLD AUTO: 37.3 % (ref 34.8–46.1)
HGB BLD-MCNC: 12.1 G/DL (ref 11.5–15.4)
IMM GRANULOCYTES # BLD AUTO: 0.07 THOUSAND/UL (ref 0–0.2)
IMM GRANULOCYTES NFR BLD AUTO: 1 % (ref 0–2)
LYMPHOCYTES # BLD AUTO: 2.88 THOUSANDS/ΜL (ref 0.6–4.47)
LYMPHOCYTES NFR BLD AUTO: 35 % (ref 14–44)
MCH RBC QN AUTO: 26.5 PG (ref 26.8–34.3)
MCHC RBC AUTO-ENTMCNC: 32.4 G/DL (ref 31.4–37.4)
MCV RBC AUTO: 82 FL (ref 82–98)
MONOCYTES # BLD AUTO: 0.77 THOUSAND/ΜL (ref 0.17–1.22)
MONOCYTES NFR BLD AUTO: 9 % (ref 4–12)
NEUTROPHILS # BLD AUTO: 4.41 THOUSANDS/ΜL (ref 1.85–7.62)
NEUTS SEG NFR BLD AUTO: 53 % (ref 43–75)
NRBC BLD AUTO-RTO: 0 /100 WBCS
PLATELET # BLD AUTO: 341 THOUSANDS/UL (ref 149–390)
PMV BLD AUTO: 10.8 FL (ref 8.9–12.7)
POTASSIUM SERPL-SCNC: 3.7 MMOL/L (ref 3.5–5.3)
RBC # BLD AUTO: 4.56 MILLION/UL (ref 3.81–5.12)
SODIUM SERPL-SCNC: 137 MMOL/L (ref 135–147)
URATE SERPL-MCNC: 5.6 MG/DL (ref 2–7.5)
WBC # BLD AUTO: 8.34 THOUSAND/UL (ref 4.31–10.16)

## 2024-09-20 PROCEDURE — 73130 X-RAY EXAM OF HAND: CPT

## 2024-09-20 PROCEDURE — 96374 THER/PROPH/DIAG INJ IV PUSH: CPT

## 2024-09-20 PROCEDURE — 84550 ASSAY OF BLOOD/URIC ACID: CPT | Performed by: INTERNAL MEDICINE

## 2024-09-20 PROCEDURE — 36415 COLL VENOUS BLD VENIPUNCTURE: CPT | Performed by: INTERNAL MEDICINE

## 2024-09-20 PROCEDURE — 85025 COMPLETE CBC W/AUTO DIFF WBC: CPT | Performed by: INTERNAL MEDICINE

## 2024-09-20 PROCEDURE — 99284 EMERGENCY DEPT VISIT MOD MDM: CPT | Performed by: INTERNAL MEDICINE

## 2024-09-20 PROCEDURE — 86430 RHEUMATOID FACTOR TEST QUAL: CPT | Performed by: INTERNAL MEDICINE

## 2024-09-20 PROCEDURE — 80048 BASIC METABOLIC PNL TOTAL CA: CPT | Performed by: INTERNAL MEDICINE

## 2024-09-20 PROCEDURE — 86140 C-REACTIVE PROTEIN: CPT | Performed by: INTERNAL MEDICINE

## 2024-09-20 PROCEDURE — 86618 LYME DISEASE ANTIBODY: CPT | Performed by: INTERNAL MEDICINE

## 2024-09-20 PROCEDURE — 96372 THER/PROPH/DIAG INJ SC/IM: CPT

## 2024-09-20 PROCEDURE — 99284 EMERGENCY DEPT VISIT MOD MDM: CPT

## 2024-09-20 RX ORDER — HYDROCODONE BITARTRATE AND ACETAMINOPHEN 5; 325 MG/1; MG/1
1 TABLET ORAL EVERY 6 HOURS PRN
Qty: 5 TABLET | Refills: 0 | Status: SHIPPED | OUTPATIENT
Start: 2024-09-20 | End: 2024-09-30

## 2024-09-20 RX ORDER — METHYLPREDNISOLONE SODIUM SUCCINATE 40 MG/ML
40 INJECTION, POWDER, LYOPHILIZED, FOR SOLUTION INTRAMUSCULAR; INTRAVENOUS ONCE
Status: COMPLETED | OUTPATIENT
Start: 2024-09-20 | End: 2024-09-20

## 2024-09-20 RX ORDER — HYDROCODONE BITARTRATE AND ACETAMINOPHEN 5; 325 MG/1; MG/1
1 TABLET ORAL ONCE
Status: COMPLETED | OUTPATIENT
Start: 2024-09-20 | End: 2024-09-20

## 2024-09-20 RX ORDER — PREDNISONE 10 MG/1
TABLET ORAL
Qty: 20 TABLET | Refills: 0 | Status: SHIPPED | OUTPATIENT
Start: 2024-09-20

## 2024-09-20 RX ORDER — INSULIN LISPRO 100 [IU]/ML
5 INJECTION, SOLUTION INTRAVENOUS; SUBCUTANEOUS ONCE
Status: COMPLETED | OUTPATIENT
Start: 2024-09-20 | End: 2024-09-20

## 2024-09-20 RX ADMIN — METHYLPREDNISOLONE SODIUM SUCCINATE 40 MG: 40 INJECTION, POWDER, FOR SOLUTION INTRAMUSCULAR; INTRAVENOUS at 21:19

## 2024-09-20 RX ADMIN — HYDROCODONE BITARTRATE AND ACETAMINOPHEN 1 TABLET: 5; 325 TABLET ORAL at 19:49

## 2024-09-20 RX ADMIN — INSULIN LISPRO 5 UNITS: 100 INJECTION, SOLUTION INTRAVENOUS; SUBCUTANEOUS at 21:24

## 2024-09-20 NOTE — ED PROVIDER NOTES
1. Hand arthritis      ED Disposition       ED Disposition   Discharge    Condition   Stable    Date/Time   Fri Sep 20, 2024  9:08 PM    Comment   Haylee GARCIAS  discharge to home/self care.                   Assessment & Plan       Medical Decision Making  Patient started last night with this discomfort in her left hand.  Seem to get worse.  She was to be swollen, difficulty flexing the hand.  No recent trauma to the hand she is aware of.  She does not think she hit it on anything the night before.  Had MRSA several months ago in that same arm and had similar pain, but she has no open sores this time.  No fever no chills no sweats.  No bee sting, or insect bite.  He is diabetic and hypertensive.    Concern for acute arthritis noted.  She had MRSA in that arm, does not appear to be infectious.  She had no injury, so concern for Lyme, rheumatoid arthritis, primary gout or pseudogout.  She has significant renal disease, unable to treat her appropriately with anti-inflammatories.    Amount and/or Complexity of Data Reviewed  Labs: ordered.     Details: Labs all appear normal, no evidence for infection, uric acid within normal limits.  Radiology: ordered.     Details: X-ray failed related any fractures.    Risk  Prescription drug management.  Risk Details: Suspect this is an acute monoarticular arthritis.  Mostly of the wrist and digit.  Consider gout or pseudogout is a possibility.  Lyme and rheumatoid labs still pending.  Will treat with steroids given intolerance to nonsteroidals and renal function.  She knows she will need to increase her insulin.  Poorly controlled diabetic as well.                     Medications   HYDROcodone-acetaminophen (NORCO) 5-325 mg per tablet 1 tablet (1 tablet Oral Given 9/20/24 1949)   methylPREDNISolone sodium succinate (Solu-MEDROL) injection 40 mg (40 mg Intravenous Given 9/20/24 2119)   insulin lispro (HumALOG/ADMELOG) 100 units/mL subcutaneous injection 5 Units (5 Units  Subcutaneous Given 9/20/24 2124)       History of Present Illness       Patient started last night with this discomfort in her left hand.  Seem to get worse.  She was to be swollen, difficulty flexing the hand.  No recent trauma to the hand she is aware of.  She does not think she hit it on anything the night before.  Had MRSA several months ago in that same arm and had similar pain, but she has no open sores this time.  No fever no chills no sweats.  No bee sting, or insect bite.  He is diabetic and hypertensive.        Review of Systems   Constitutional:  Negative for chills and fever.   Eyes:  Negative for visual disturbance.   Respiratory:  Negative for cough and shortness of breath.    Cardiovascular:  Negative for chest pain and leg swelling.   Gastrointestinal:  Positive for vomiting. Negative for abdominal pain, diarrhea and nausea.   Genitourinary:  Negative for dysuria.   Musculoskeletal:  Positive for arthralgias and joint swelling. Negative for back pain and myalgias.   Skin:  Negative for rash.   Neurological:  Negative for dizziness and headaches.   All other systems reviewed and are negative.          Objective     ED Triage Vitals [09/20/24 1933]   Temperature Pulse Blood Pressure Respirations SpO2 Patient Position - Orthostatic VS   98.6 °F (37 °C) 66 147/87 18 96 % Sitting      Temp Source Heart Rate Source BP Location FiO2 (%) Pain Score    Oral Monitor Right arm -- 10 - Worst Possible Pain        Physical Exam  Constitutional:       Appearance: Normal appearance.   Cardiovascular:      Pulses: Normal pulses.      Heart sounds: Normal heart sounds.   Pulmonary:      Effort: Pulmonary effort is normal.      Breath sounds: Normal breath sounds.   Musculoskeletal:         General: Swelling and tenderness present.      Cervical back: Rigidity present.      Comments: Left hand tender and swollen, moderate.  No real warmth.  No active cellulitis appreciated.   Skin:     General: Skin is warm and dry.    Neurological:      General: No focal deficit present.      Mental Status: She is alert and oriented to person, place, and time.         Labs Reviewed   CBC AND DIFFERENTIAL - Abnormal       Result Value    WBC 8.34      RBC 4.56      Hemoglobin 12.1      Hematocrit 37.3      MCV 82      MCH 26.5 (*)     MCHC 32.4      RDW 14.0      MPV 10.8      Platelets 341      nRBC 0      Segmented % 53      Immature Grans % 1      Lymphocytes % 35      Monocytes % 9      Eosinophils Relative 2      Basophils Relative 0      Absolute Neutrophils 4.41      Absolute Immature Grans 0.07      Absolute Lymphocytes 2.88      Absolute Monocytes 0.77      Eosinophils Absolute 0.18      Basophils Absolute 0.03     BASIC METABOLIC PANEL - Abnormal    Sodium 137      Potassium 3.7      Chloride 102      CO2 27      ANION GAP 8      BUN 19      Creatinine 1.14      Glucose 249 (*)     Calcium 9.1      eGFR 55      Narrative:     [employer] guidelines for Chronic Kidney Disease (CKD):     Stage 1 with normal or high GFR (GFR > 90 mL/min/1.73 square meters)    Stage 2 Mild CKD (GFR = 60-89 mL/min/1.73 square meters)    Stage 3A Moderate CKD (GFR = 45-59 mL/min/1.73 square meters)    Stage 3B Moderate CKD (GFR = 30-44 mL/min/1.73 square meters)    Stage 4 Severe CKD (GFR = 15-29 mL/min/1.73 square meters)    Stage 5 End Stage CKD (GFR <15 mL/min/1.73 square meters)  Note: GFR calculation is accurate only with a steady state creatinine   C-REACTIVE PROTEIN - Abnormal    CRP 4.6 (*)    URIC ACID - Normal    Uric Acid 5.6      Narrative:     N-acetyl-p-benzoquinone imine (metabolite of Acetaminophen) will generate erroneously low results in samples for patients that have taken an overdose of Acetaminophen.   LYME TOTAL AB W REFLEX TO IGM/IGG    Narrative:     The following orders were created for panel order LYME TOTAL AB W REFLEX TO IGM/IGG.  Procedure                               Abnormality         Status                      ---------                               -----------         ------                     Lyme Total AB W Reflex t...[[id number]]                      In process                   Please view results for these tests on the individual orders.   RF SCREEN W/ REFLEX TO TITER   LYME TOTAL AB W REFLEX TO IGM/IGG     XR hand 3+ views LEFT    (Results Pending)       Procedures    ED Medication and Procedure Management   Prior to Admission Medications   Prescriptions Last Dose Informant Patient Reported? Taking?   Alcohol Swabs (Pharmacist Choice Alcohol) PADS  Self No No   Sig: Apply topically 4 (four) times a day Use as directed   B-D UF III MINI PEN NEEDLES 31G X 5 MM MISC  Self No No   Sig: Pt uses 5 pen needles daily   Cinnamon 500 MG capsule   No No   Sig: TAKE ONE CAPSULE BY MOUTH ONCE DAILY   Continuous Glucose Sensor (FreeStyle Refugio 3 Sensor) MISC   No No   Sig: E11.65 replace sensor every 14 days   Insulin Regular Human, Conc, (HumuLIN R U-500 KwikPen) 500 units/mL CONCENTRATED U-500 injection pen   No No   Sig: E11.65 inject 85 units with breakfast, 45 units with lunch, 35 with dinner, or as directed TDD up to 200 units   Iron Heme Polypeptide 12 MG TABS  Self Yes No   Sig: Take 1 tablet by mouth   Jardiance 25 MG TABS   No No   Sig: TAKE ONE TABLET BY MOUTH EVERY MORNING   Magnesium Citrate 200 MG TABS   No No   Sig: Take 1 tablet by mouth 2 (two) times a day   OneTouch Ultra test strip  Self No No   Sig: USE 4 TIMES A DAY   Pharmacist Choice Lancets MISC  Self No No   Sig: USE AS DIRECTED   Restasis 0.05 % ophthalmic emulsion  Self Yes No   albuterol (2.5 mg/3 mL) 0.083 % nebulizer solution  Self No No   Sig: Take 3 mL (2.5 mg total) by nebulization every 6 (six) hours as needed for wheezing or shortness of breath   albuterol (PROVENTIL HFA,VENTOLIN HFA) 90 mcg/act inhaler  Self No No   Sig: INHALE ONE PUFF BY MOUTH AND INTO THE LUNGS EVERY 6 HOURS AS NEEDED FOR WHEEZING   aspirin (ECOTRIN LOW STRENGTH) 81  mg EC tablet   No No   Sig: TAKE ONE TABLET BY MOUTH ONCE DAILY   atorvastatin (LIPITOR) 80 mg tablet  Self No No   Sig: TAKE ONE TABLET BY MOUTH DAILY AT BEDTIME   cholecalciferol (VITAMIN D3) 1,000 units tablet  Self No No   Sig: Take 2 tablets (2,000 Units total) by mouth daily   citalopram (CeleXA) 40 mg tablet  Self No No   Sig: TAKE ONE TABLET BY MOUTH DAILY   docusate sodium (COLACE) 100 mg capsule  Self No No   Sig: TAKE ONE CAPSULE BY MOUTH TWICE DAILY   ergocalciferol (VITAMIN D2) 50,000 units   No No   Sig: TAKE ONE CAPSULE BY MOUTH ONCE A WEEK   famotidine (PEPCID) 40 MG tablet   No No   Sig: TAKE ONE TABLET BY MOUTH TWO HOURS PRIOR TO BEDTIME   fenofibrate micronized (LOFIBRA) 134 MG capsule  Self No No   Sig: Take 1 capsule (134 mg total) by mouth daily with breakfast   levETIRAcetam (KEPPRA) 500 mg tablet   No No   Sig: TAKE ONE TABLET BY MOUTH TWICE DAILY   linaCLOtide (Linzess) 72 MCG CAPS  Self No No   Sig: Take 1 capsule by mouth daily before breakfast   meclizine (ANTIVERT) 25 mg tablet  Self No No   Sig: Take 1 tablet (25 mg total) by mouth 3 (three) times a day as needed for dizziness   methocarbamol (ROBAXIN) 500 mg tablet   No No   Sig: Take 1 tablet (500 mg total) by mouth 3 (three) times a day as needed for muscle spasms   metoclopramide (Reglan) 10 mg tablet  Self No No   Sig: Take 0.5 tablets (5 mg total) by mouth every 6 (six) hours   metoprolol tartrate (LOPRESSOR) 25 mg tablet   No No   Sig: TAKE ONE TABLET BY MOUTH TWICE DAILY   multivitamin (THERAGRAN) TABS  Self Yes No   Sig: Take 1 tablet by mouth every morning   nystatin (MYCOSTATIN) cream  Self No No   Sig: Apply topically 2 (two) times a day   ondansetron (ZOFRAN) 4 mg tablet   No No   Sig: Take 1 tablet (4 mg total) by mouth every 8 (eight) hours as needed for nausea or vomiting   oxybutynin (DITROPAN-XL) 10 MG 24 hr tablet   No No   Sig: TAKE ONE TABLET BY MOUTH DAILY AT BEDTIME   pregabalin (LYRICA) 100 mg capsule  Self No No    Si mg in AM, 100 mg in afternoon, and 200 mg at bedtime   tirzepatide (Mounjaro) 10 MG/0.5ML   No No   Sig: Inject 0.5 mL (10 mg total) under the skin every 7 days      Facility-Administered Medications: None     Discharge Medication List as of 2024  9:20 PM        START taking these medications    Details   HYDROcodone-acetaminophen (Norco) 5-325 mg per tablet Take 1 tablet by mouth every 6 (six) hours as needed for pain for up to 10 days Max Daily Amount: 4 tablets, Starting 2024, Until 2024 at 2359, Normal      predniSONE 10 mg tablet 4 pills for 2 days, then 3 pills for 2 days, then 2 pills for 2 days then 1 pill for 2 days., Normal           CONTINUE these medications which have NOT CHANGED    Details   albuterol (2.5 mg/3 mL) 0.083 % nebulizer solution Take 3 mL (2.5 mg total) by nebulization every 6 (six) hours as needed for wheezing or shortness of breath, Starting 2024, Normal      albuterol (PROVENTIL HFA,VENTOLIN HFA) 90 mcg/act inhaler INHALE ONE PUFF BY MOUTH AND INTO THE LUNGS EVERY 6 HOURS AS NEEDED FOR WHEEZING, Normal      Alcohol Swabs (Pharmacist Choice Alcohol) PADS Apply topically 4 (four) times a day Use as directed, Starting 2023, Normal      aspirin (ECOTRIN LOW STRENGTH) 81 mg EC tablet TAKE ONE TABLET BY MOUTH ONCE DAILY, Starting 2024, Normal      atorvastatin (LIPITOR) 80 mg tablet TAKE ONE TABLET BY MOUTH DAILY AT BEDTIME, Starting 2024, Normal      B-D UF III MINI PEN NEEDLES 31G X 5 MM MISC Pt uses 5 pen needles daily, Normal      cholecalciferol (VITAMIN D3) 1,000 units tablet Take 2 tablets (2,000 Units total) by mouth daily, Starting 2023, Normal      Cinnamon 500 MG capsule TAKE ONE CAPSULE BY MOUTH ONCE DAILY, Starting 2024, Normal      citalopram (CeleXA) 40 mg tablet TAKE ONE TABLET BY MOUTH DAILY, Starting 2024, Normal      Continuous Glucose Sensor (FreeStyle Refugio 3 Sensor) MISC E11.65  replace sensor every 14 days, Normal      docusate sodium (COLACE) 100 mg capsule TAKE ONE CAPSULE BY MOUTH TWICE DAILY, Normal      ergocalciferol (VITAMIN D2) 50,000 units TAKE ONE CAPSULE BY MOUTH ONCE A WEEK, Starting Wed 6/5/2024, Normal      famotidine (PEPCID) 40 MG tablet TAKE ONE TABLET BY MOUTH TWO HOURS PRIOR TO BEDTIME, Normal      fenofibrate micronized (LOFIBRA) 134 MG capsule Take 1 capsule (134 mg total) by mouth daily with breakfast, Starting Fri 1/12/2024, Normal      Insulin Regular Human, Conc, (HumuLIN R U-500 KwikPen) 500 units/mL CONCENTRATED U-500 injection pen E11.65 inject 85 units with breakfast, 45 units with lunch, 35 with dinner, or as directed TDD up to 200 units, Fill Later      Iron Heme Polypeptide 12 MG TABS Take 1 tablet by mouth, Historical Med      Jardiance 25 MG TABS TAKE ONE TABLET BY MOUTH EVERY MORNING, Starting Fri 6/14/2024, Normal      levETIRAcetam (KEPPRA) 500 mg tablet TAKE ONE TABLET BY MOUTH TWICE DAILY, Starting Thu 8/22/2024, Normal      linaCLOtide (Linzess) 72 MCG CAPS Take 1 capsule by mouth daily before breakfast, Starting Tue 9/5/2023, Normal      Magnesium Citrate 200 MG TABS Take 1 tablet by mouth 2 (two) times a day, Starting Thu 9/12/2024, Normal      meclizine (ANTIVERT) 25 mg tablet Take 1 tablet (25 mg total) by mouth 3 (three) times a day as needed for dizziness, Starting Mon 7/10/2023, Normal      methocarbamol (ROBAXIN) 500 mg tablet Take 1 tablet (500 mg total) by mouth 3 (three) times a day as needed for muscle spasms, Starting Tue 5/14/2024, Normal      metoclopramide (Reglan) 10 mg tablet Take 0.5 tablets (5 mg total) by mouth every 6 (six) hours, Starting Thu 12/21/2023, Normal      metoprolol tartrate (LOPRESSOR) 25 mg tablet TAKE ONE TABLET BY MOUTH TWICE DAILY, Starting Wed 7/3/2024, Normal      multivitamin (THERAGRAN) TABS Take 1 tablet by mouth every morning, Historical Med      nystatin (MYCOSTATIN) cream Apply topically 2 (two) times a  day, Starting Thu 7/13/2023, Normal      ondansetron (ZOFRAN) 4 mg tablet Take 1 tablet (4 mg total) by mouth every 8 (eight) hours as needed for nausea or vomiting, Starting Thu 9/12/2024, Normal      OneTouch Ultra test strip USE 4 TIMES A DAY, Normal      oxybutynin (DITROPAN-XL) 10 MG 24 hr tablet TAKE ONE TABLET BY MOUTH DAILY AT BEDTIME, Starting Wed 7/10/2024, Normal      Pharmacist Choice Lancets MISC USE AS DIRECTED, Normal      pregabalin (LYRICA) 100 mg capsule 100 mg in AM, 100 mg in afternoon, and 200 mg at bedtime, Normal      Restasis 0.05 % ophthalmic emulsion Starting Thu 3/2/2023, Historical Med      tirzepatide (Mounjaro) 10 MG/0.5ML Inject 0.5 mL (10 mg total) under the skin every 7 days, Starting Mon 8/19/2024, Normal           No discharge procedures on file.     Blayne Zepeda, DO  09/20/24 9743

## 2024-09-21 LAB — RHEUMATOID FACT SER QL LA: NEGATIVE

## 2024-09-21 NOTE — DISCHARGE INSTRUCTIONS
Take prednisone as directed.  Hydrocodone for severe pain.  Splint for pain.  Follow-up with primary care.

## 2024-09-21 NOTE — ED NOTES
Pt states she is hungry   given diet gingerale and turkey sandwich      Joanne Walker, BRIAN  09/20/24 [id number]

## 2024-10-03 DIAGNOSIS — Z86.39 HX OF DIABETIC GASTROPARESIS: ICD-10-CM

## 2024-10-03 RX ORDER — ONDANSETRON 4 MG/1
4 TABLET, FILM COATED ORAL EVERY 8 HOURS PRN
Qty: 30 TABLET | Refills: 1 | Status: SHIPPED | OUTPATIENT
Start: 2024-10-03

## 2024-10-07 ENCOUNTER — TELEPHONE (OUTPATIENT)
Dept: FAMILY MEDICINE CLINIC | Facility: CLINIC | Age: 54
End: 2024-10-07

## 2024-10-08 ENCOUNTER — TELEPHONE (OUTPATIENT)
Dept: FAMILY MEDICINE CLINIC | Facility: CLINIC | Age: 54
End: 2024-10-08

## 2024-10-08 NOTE — TELEPHONE ENCOUNTER
Called patient to attempt to schedule but no answer. Left message requesting patient call the office back.

## 2024-10-08 NOTE — TELEPHONE ENCOUNTER
Pt called in returning missed call from [facility].     Attempted to schedule, but there was nothing available.     Warm transferred to Анна for further assistance.

## 2024-10-08 NOTE — TELEPHONE ENCOUNTER
Spoke with Kaylan about Haylee. Informed her that she will send in Rectal compazine to the pharmacy and her work note will be in my chart

## 2024-10-09 ENCOUNTER — TELEPHONE (OUTPATIENT)
Age: 54
End: 2024-10-09

## 2024-10-09 NOTE — TELEPHONE ENCOUNTER
Patient calling requesting note for work to be sent through [facility].      Please review.  Thank you

## 2024-10-09 NOTE — TELEPHONE ENCOUNTER
Tried reaching patient, no answer. LVM for patient to complete lab/urine studies for upcoming appt.

## 2024-10-10 ENCOUNTER — TELEPHONE (OUTPATIENT)
Age: 54
End: 2024-10-10

## 2024-10-10 NOTE — TELEPHONE ENCOUNTER
Patient called for an appointment but the soonest appointment is October 28th.  She is complaining of diarrhea even with imodium on and off for 2 weeks now.      Are there any sooner appointments?    Please advise and notify.    Thank you.

## 2024-10-11 NOTE — TELEPHONE ENCOUNTER
Pt called in to returned miss call from office. Pt wanted to schedule same day as office staff stated and this appt wasn't available on triagers end. Soonest appt was Oct 17th. Triage nurse warm tx pt to the office for sooner appt. [facility] took the call and offered an appt on oct 14th.  PCP please be aware.  thanks

## 2024-10-12 ENCOUNTER — APPOINTMENT (OUTPATIENT)
Dept: LAB | Facility: CLINIC | Age: 54
End: 2024-10-12
Payer: COMMERCIAL

## 2024-10-12 DIAGNOSIS — N18.31 STAGE 3A CHRONIC KIDNEY DISEASE (HCC): ICD-10-CM

## 2024-10-12 DIAGNOSIS — E78.1 HYPERTRIGLYCERIDEMIA: ICD-10-CM

## 2024-10-12 DIAGNOSIS — Z79.4 TYPE 2 DIABETES MELLITUS WITH DIABETIC AUTONOMIC NEUROPATHY, WITH LONG-TERM CURRENT USE OF INSULIN (HCC): ICD-10-CM

## 2024-10-12 DIAGNOSIS — E11.43 TYPE 2 DIABETES MELLITUS WITH DIABETIC AUTONOMIC NEUROPATHY, WITH LONG-TERM CURRENT USE OF INSULIN (HCC): ICD-10-CM

## 2024-10-12 LAB
25(OH)D3 SERPL-MCNC: 64.8 NG/ML (ref 30–100)
ALBUMIN SERPL BCG-MCNC: 3.8 G/DL (ref 3.5–5)
ALP SERPL-CCNC: 64 U/L (ref 34–104)
ALT SERPL W P-5'-P-CCNC: 13 U/L (ref 7–52)
ANION GAP SERPL CALCULATED.3IONS-SCNC: 7 MMOL/L (ref 4–13)
AST SERPL W P-5'-P-CCNC: 15 U/L (ref 13–39)
BACTERIA UR QL AUTO: ABNORMAL /HPF
BASOPHILS # BLD AUTO: 0.07 THOUSANDS/ΜL (ref 0–0.1)
BASOPHILS NFR BLD AUTO: 1 % (ref 0–1)
BILIRUB SERPL-MCNC: 0.29 MG/DL (ref 0.2–1)
BILIRUB UR QL STRIP: NEGATIVE
BUN SERPL-MCNC: 21 MG/DL (ref 5–25)
CALCIUM SERPL-MCNC: 9 MG/DL (ref 8.4–10.2)
CHLORIDE SERPL-SCNC: 101 MMOL/L (ref 96–108)
CHOLEST SERPL-MCNC: 146 MG/DL
CLARITY UR: CLEAR
CO2 SERPL-SCNC: 30 MMOL/L (ref 21–32)
COLOR UR: COLORLESS
CREAT SERPL-MCNC: 1.07 MG/DL (ref 0.6–1.3)
CREAT UR-MCNC: 39.6 MG/DL
EOSINOPHIL # BLD AUTO: 0.58 THOUSAND/ΜL (ref 0–0.61)
EOSINOPHIL NFR BLD AUTO: 8 % (ref 0–6)
ERYTHROCYTE [DISTWIDTH] IN BLOOD BY AUTOMATED COUNT: 14.3 % (ref 11.6–15.1)
EST. AVERAGE GLUCOSE BLD GHB EST-MCNC: 283 MG/DL
GFR SERPL CREATININE-BSD FRML MDRD: 59 ML/MIN/1.73SQ M
GLUCOSE P FAST SERPL-MCNC: 302 MG/DL (ref 65–99)
GLUCOSE UR STRIP-MCNC: ABNORMAL MG/DL
HBA1C MFR BLD: 11.5 %
HCT VFR BLD AUTO: 39.6 % (ref 34.8–46.1)
HDLC SERPL-MCNC: 37 MG/DL
HGB BLD-MCNC: 12.4 G/DL (ref 11.5–15.4)
HGB UR QL STRIP.AUTO: NEGATIVE
IMM GRANULOCYTES # BLD AUTO: 0.02 THOUSAND/UL (ref 0–0.2)
IMM GRANULOCYTES NFR BLD AUTO: 0 % (ref 0–2)
KETONES UR STRIP-MCNC: NEGATIVE MG/DL
LDLC SERPL CALC-MCNC: 62 MG/DL (ref 0–100)
LEUKOCYTE ESTERASE UR QL STRIP: ABNORMAL
LYMPHOCYTES # BLD AUTO: 2.88 THOUSANDS/ΜL (ref 0.6–4.47)
LYMPHOCYTES NFR BLD AUTO: 40 % (ref 14–44)
MAGNESIUM SERPL-MCNC: 2.1 MG/DL (ref 1.9–2.7)
MCH RBC QN AUTO: 26.3 PG (ref 26.8–34.3)
MCHC RBC AUTO-ENTMCNC: 31.3 G/DL (ref 31.4–37.4)
MCV RBC AUTO: 84 FL (ref 82–98)
MICROALBUMIN UR-MCNC: <7 MG/L
MONOCYTES # BLD AUTO: 0.55 THOUSAND/ΜL (ref 0.17–1.22)
MONOCYTES NFR BLD AUTO: 8 % (ref 4–12)
NEUTROPHILS # BLD AUTO: 3.03 THOUSANDS/ΜL (ref 1.85–7.62)
NEUTS SEG NFR BLD AUTO: 43 % (ref 43–75)
NITRITE UR QL STRIP: NEGATIVE
NON-SQ EPI CELLS URNS QL MICRO: ABNORMAL /HPF
NONHDLC SERPL-MCNC: 109 MG/DL
NRBC BLD AUTO-RTO: 0 /100 WBCS
PH UR STRIP.AUTO: 6.5 [PH]
PHOSPHATE SERPL-MCNC: 4.9 MG/DL (ref 2.7–4.5)
PLATELET # BLD AUTO: 364 THOUSANDS/UL (ref 149–390)
PMV BLD AUTO: 12.1 FL (ref 8.9–12.7)
POTASSIUM SERPL-SCNC: 4.4 MMOL/L (ref 3.5–5.3)
PROT SERPL-MCNC: 6.2 G/DL (ref 6.4–8.4)
PROT UR STRIP-MCNC: NEGATIVE MG/DL
PTH-INTACT SERPL-MCNC: 37.9 PG/ML (ref 12–88)
RBC # BLD AUTO: 4.72 MILLION/UL (ref 3.81–5.12)
RBC #/AREA URNS AUTO: ABNORMAL /HPF
SODIUM SERPL-SCNC: 138 MMOL/L (ref 135–147)
SP GR UR STRIP.AUTO: 1.03 (ref 1–1.03)
TRIGL SERPL-MCNC: 236 MG/DL
UROBILINOGEN UR STRIP-ACNC: <2 MG/DL
WBC # BLD AUTO: 7.13 THOUSAND/UL (ref 4.31–10.16)
WBC #/AREA URNS AUTO: ABNORMAL /HPF

## 2024-10-12 PROCEDURE — 82570 ASSAY OF URINE CREATININE: CPT

## 2024-10-12 PROCEDURE — 80061 LIPID PANEL: CPT

## 2024-10-12 PROCEDURE — 83036 HEMOGLOBIN GLYCOSYLATED A1C: CPT

## 2024-10-12 PROCEDURE — 83970 ASSAY OF PARATHORMONE: CPT

## 2024-10-12 PROCEDURE — 81001 URINALYSIS AUTO W/SCOPE: CPT

## 2024-10-12 PROCEDURE — 36415 COLL VENOUS BLD VENIPUNCTURE: CPT

## 2024-10-12 PROCEDURE — 82306 VITAMIN D 25 HYDROXY: CPT

## 2024-10-12 PROCEDURE — 85025 COMPLETE CBC W/AUTO DIFF WBC: CPT

## 2024-10-12 PROCEDURE — 82043 UR ALBUMIN QUANTITATIVE: CPT

## 2024-10-12 PROCEDURE — 80053 COMPREHEN METABOLIC PANEL: CPT

## 2024-10-12 PROCEDURE — 83735 ASSAY OF MAGNESIUM: CPT

## 2024-10-12 PROCEDURE — 84100 ASSAY OF PHOSPHORUS: CPT

## 2024-10-14 ENCOUNTER — OFFICE VISIT (OUTPATIENT)
Dept: FAMILY MEDICINE CLINIC | Facility: CLINIC | Age: 54
End: 2024-10-14
Payer: COMMERCIAL

## 2024-10-14 ENCOUNTER — OFFICE VISIT (OUTPATIENT)
Age: 54
End: 2024-10-14
Payer: COMMERCIAL

## 2024-10-14 VITALS
SYSTOLIC BLOOD PRESSURE: 126 MMHG | OXYGEN SATURATION: 99 % | HEIGHT: 61 IN | HEART RATE: 64 BPM | BODY MASS INDEX: 33.61 KG/M2 | RESPIRATION RATE: 18 BRPM | DIASTOLIC BLOOD PRESSURE: 68 MMHG | WEIGHT: 178 LBS

## 2024-10-14 VITALS
TEMPERATURE: 98 F | WEIGHT: 178 LBS | HEART RATE: 71 BPM | DIASTOLIC BLOOD PRESSURE: 90 MMHG | HEIGHT: 61 IN | SYSTOLIC BLOOD PRESSURE: 150 MMHG | OXYGEN SATURATION: 96 % | BODY MASS INDEX: 33.61 KG/M2

## 2024-10-14 DIAGNOSIS — K58.2 IRRITABLE BOWEL SYNDROME WITH BOTH CONSTIPATION AND DIARRHEA: Primary | ICD-10-CM

## 2024-10-14 DIAGNOSIS — N18.31 STAGE 3A CHRONIC KIDNEY DISEASE (HCC): Primary | ICD-10-CM

## 2024-10-14 DIAGNOSIS — I10 PRIMARY HYPERTENSION: ICD-10-CM

## 2024-10-14 DIAGNOSIS — E61.1 IRON DEFICIENCY: ICD-10-CM

## 2024-10-14 DIAGNOSIS — N28.1 CYST OF KIDNEY, ACQUIRED: ICD-10-CM

## 2024-10-14 DIAGNOSIS — E83.42 HYPOMAGNESEMIA: ICD-10-CM

## 2024-10-14 DIAGNOSIS — E55.9 VITAMIN D DEFICIENCY: ICD-10-CM

## 2024-10-14 PROBLEM — B95.62 MRSA BACTEREMIA: Status: RESOLVED | Noted: 2023-08-01 | Resolved: 2024-10-14

## 2024-10-14 PROBLEM — R78.81 MRSA BACTEREMIA: Status: RESOLVED | Noted: 2023-08-01 | Resolved: 2024-10-14

## 2024-10-14 PROBLEM — M79.661 BILATERAL CALF PAIN: Status: RESOLVED | Noted: 2024-03-05 | Resolved: 2024-10-14

## 2024-10-14 PROBLEM — M79.662 BILATERAL CALF PAIN: Status: RESOLVED | Noted: 2024-03-05 | Resolved: 2024-10-14

## 2024-10-14 PROCEDURE — 99214 OFFICE O/P EST MOD 30 MIN: CPT | Performed by: INTERNAL MEDICINE

## 2024-10-14 PROCEDURE — 99213 OFFICE O/P EST LOW 20 MIN: CPT | Performed by: NURSE PRACTITIONER

## 2024-10-14 RX ORDER — DICYCLOMINE HCL 20 MG
TABLET ORAL
Qty: 90 TABLET | Refills: 0 | Status: SHIPPED | OUTPATIENT
Start: 2024-10-14

## 2024-10-14 NOTE — ASSESSMENT & PLAN NOTE
Lab Results   Component Value Date    EGFR 59 10/12/2024    EGFR 55 09/20/2024    EGFR 55 04/12/2024    CREATININE 1.07 10/12/2024    CREATININE 1.14 09/20/2024    CREATININE 1.14 04/12/2024     Kidney function stable at this time, continue with current care and management.  Patient is aware that blood sugars need to be improved upon, she has been trying to focus on this.

## 2024-10-14 NOTE — PROGRESS NOTES
Ambulatory Visit  Name: Haylee Balbuena      : 1970      MRN: [id number]  Encounter Provider: SYLWIA Larson  Encounter Date: 10/14/2024   Encounter department: [facility] PRIMARY CARE    Assessment & Plan  Irritable bowel syndrome with both constipation and diarrhea    Orders:    dicyclomine (BENTYL) 20 mg tablet; Take twice daily (am and before bedtime), may take additional 2 doses per day, max 4x/day (every 6 hours if needed)         History of Present Illness     Here for diarrhea on/off - comes and goes- wondering if this is related to stress. Has days she feels constipated- hard to go. Was constipated until this morning.   Patient is aware she is due for colonoscopy- reports she is trying to get a new job with better insurance. 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       Review of Systems   Constitutional:  Negative for activity change, diaphoresis, fatigue and fever.   HENT:  Negative for congestion, facial swelling, hearing loss, rhinorrhea, sinus pressure, sinus pain, sneezing, sore throat and voice change.    Eyes:  Negative for discharge and visual disturbance.   Respiratory:  Negative for cough, choking, chest tightness, shortness of breath, wheezing and stridor.    Cardiovascular:  Negative for chest pain, palpitations and leg swelling.   Gastrointestinal:  Positive for abdominal pain (no pain today, now resolved), constipation and diarrhea. Negative for abdominal distention, nausea and vomiting.   Endocrine: Negative for polydipsia, polyphagia and polyuria.   Genitourinary:  Negative for difficulty urinating, dysuria, frequency and urgency.   Musculoskeletal:  Negative for arthralgias, back pain, gait problem, joint swelling, myalgias, neck pain and neck stiffness.   Skin:  Negative for color change, rash and wound.   Neurological:  Negative for dizziness, syncope, speech difficulty, weakness, light-headedness and headaches.   Hematological:   Negative for adenopathy. Does not bruise/bleed easily.   Psychiatric/Behavioral:  Negative for agitation, behavioral problems, confusion, hallucinations, sleep disturbance and suicidal ideas. The patient is nervous/anxious.      Past Medical History:   Diagnosis Date    Atypical chest pain     Bilateral calf pain 2024    Diabetes mellitus (HCC)     Gastroparesis     GERD (gastroesophageal reflux disease)     Hyperlipidemia     Hypertension     MRSA bacteremia 2023    Psychiatric disorder     Sciatica     Seizure disorder (HCC)      Past Surgical History:   Procedure Laterality Date    APPENDECTOMY      BREAST BIOPSY Left  benign     SECTION      CHOLECYSTECTOMY      COLONOSCOPY      HYSTERECTOMY      IR PICC PLACEMENT SINGLE LUMEN  2023    MO LAPAROSCOPIC APPENDECTOMY N/A 2021    Procedure: APPENDECTOMY LAPAROSCOPIC;  Surgeon: Onel Martin MD;  Location:  MAIN OR;  Service: General    MO LAPS ABD PRTM&OMENTUM DX W/WO SPEC BR/WA SPX N/A 2021    Procedure: LAPAROSCOPY DIAGNOSTIC, EXTENSIVE DESI;  Surgeon: Onel Martin MD;  Location:  MAIN OR;  Service: General    TUBAL LIGATION      TYMPANOSTOMY TUBE PLACEMENT Bilateral 2024    UPPER GASTROINTESTINAL ENDOSCOPY       Family History   Problem Relation Age of Onset    No Known Problems Mother     No Known Problems Father     No Known Problems Daughter     No Known Problems Maternal Grandmother     No Known Problems Paternal Grandmother     No Known Problems Paternal Aunt     No Known Problems Paternal Aunt     No Known Problems Paternal Aunt      Social History     Tobacco Use    Smoking status: Former     Current packs/day: 0.00     Types: Cigarettes     Quit date:      Years since quittin.8    Smokeless tobacco: Never   Vaping Use    Vaping status: Never Used   Substance and Sexual Activity    Alcohol use: Never     Comment: occ    Drug use: Never    Sexual activity: Not Currently     Partners: Male      Birth control/protection: None     Current Outpatient Medications on File Prior to Visit   Medication Sig    albuterol (2.5 mg/3 mL) 0.083 % nebulizer solution Take 3 mL (2.5 mg total) by nebulization every 6 (six) hours as needed for wheezing or shortness of breath    albuterol (PROVENTIL HFA,VENTOLIN HFA) 90 mcg/act inhaler INHALE ONE PUFF BY MOUTH AND INTO THE LUNGS EVERY 6 HOURS AS NEEDED FOR WHEEZING    Alcohol Swabs (Pharmacist Choice Alcohol) PADS Apply topically 4 (four) times a day Use as directed    aspirin (ECOTRIN LOW STRENGTH) 81 mg EC tablet TAKE ONE TABLET BY MOUTH ONCE DAILY    atorvastatin (LIPITOR) 80 mg tablet TAKE ONE TABLET BY MOUTH DAILY AT BEDTIME    B-D UF III MINI PEN NEEDLES 31G X 5 MM MISC Pt uses 5 pen needles daily    cholecalciferol (VITAMIN D3) 1,000 units tablet Take 2 tablets (2,000 Units total) by mouth daily    citalopram (CeleXA) 40 mg tablet TAKE ONE TABLET BY MOUTH DAILY    Continuous Glucose Sensor (FreeStyle Refugio 3 Sensor) MISC E11.65 replace sensor every 14 days    docusate sodium (COLACE) 100 mg capsule TAKE ONE CAPSULE BY MOUTH TWICE DAILY    ergocalciferol (VITAMIN D2) 50,000 units TAKE ONE CAPSULE BY MOUTH ONCE A WEEK    famotidine (PEPCID) 40 MG tablet TAKE ONE TABLET BY MOUTH TWO HOURS PRIOR TO BEDTIME    fenofibrate micronized (LOFIBRA) 134 MG capsule Take 1 capsule (134 mg total) by mouth daily with breakfast    Insulin Regular Human, Conc, (HumuLIN R U-500 KwikPen) 500 units/mL CONCENTRATED U-500 injection pen E11.65 inject 85 units with breakfast, 45 units with lunch, 35 with dinner, or as directed TDD up to 200 units    Iron Heme Polypeptide 12 MG TABS Take 1 tablet by mouth    Jardiance 25 MG TABS TAKE ONE TABLET BY MOUTH EVERY MORNING    levETIRAcetam (KEPPRA) 500 mg tablet TAKE ONE TABLET BY MOUTH TWICE DAILY    linaCLOtide (Linzess) 72 MCG CAPS Take 1 capsule by mouth daily before breakfast    Magnesium Citrate 200 MG TABS Take 1 tablet by mouth 2 (two) times  a day    meclizine (ANTIVERT) 25 mg tablet Take 1 tablet (25 mg total) by mouth 3 (three) times a day as needed for dizziness    methocarbamol (ROBAXIN) 500 mg tablet Take 1 tablet (500 mg total) by mouth 3 (three) times a day as needed for muscle spasms    metoclopramide (Reglan) 10 mg tablet Take 0.5 tablets (5 mg total) by mouth every 6 (six) hours    metoprolol tartrate (LOPRESSOR) 25 mg tablet TAKE ONE TABLET BY MOUTH TWICE DAILY    multivitamin (THERAGRAN) TABS Take 1 tablet by mouth every morning    nystatin (MYCOSTATIN) cream Apply topically 2 (two) times a day    ondansetron (ZOFRAN) 4 mg tablet TAKE 1 TABLET (4 MG TOTAL) BY MOUTH EVERY 8 (EIGHT) HOURS AS NEEDED FOR NAUSEA OR VOMITING    OneTouch Ultra test strip USE 4 TIMES A DAY    oxybutynin (DITROPAN-XL) 10 MG 24 hr tablet TAKE ONE TABLET BY MOUTH DAILY AT BEDTIME    Pharmacist Choice Lancets MISC USE AS DIRECTED    pregabalin (LYRICA) 100 mg capsule 100 mg in AM, 100 mg in afternoon, and 200 mg at bedtime    Restasis 0.05 % ophthalmic emulsion     tirzepatide (Mounjaro) 10 MG/0.5ML Inject 0.5 mL (10 mg total) under the skin every 7 days    Cinnamon 500 MG capsule TAKE ONE CAPSULE BY MOUTH ONCE DAILY (Patient not taking: Reported on 10/14/2024)    [DISCONTINUED] predniSONE 10 mg tablet 4 pills for 2 days, then 3 pills for 2 days, then 2 pills for 2 days then 1 pill for 2 days.     Allergies   Allergen Reactions    Butorphanol Hallucinations    Toradol [Ketorolac Tromethamine] GI Intolerance    Oxycodone GI Intolerance    Benztropine Hallucinations     Was seeing things that were not right    Penicillins     Zolpidem     Medical Tape Rash     Ok with paper tape     Immunization History   Administered Date(s) Administered    COVID-19 PFIZER VACCINE 0.3 ML IM 02/02/2021, 03/04/2021, 11/03/2021    INFLUENZA 10/13/2010, 11/28/2011, 10/28/2013, 09/06/2017, 03/01/2020    Influenza Quadrivalent Preservative Free 3 years and older IM 01/04/2017    Pneumococcal  "Polysaccharide PPV23 08/29/2010, 02/25/2016    Tdap 03/27/2020    Tuberculin Skin Test-PPD Intradermal 09/23/2019, 10/06/2020     Objective     /68   Pulse 64   Resp 18   Ht 5' 1\" (1.549 m)   Wt 80.7 kg (178 lb)   SpO2 99%   BMI 33.63 kg/m²     Physical Exam  Vitals and nursing note reviewed.   Constitutional:       General: She is not in acute distress.     Appearance: She is well-developed. She is not diaphoretic.   Neck:      Thyroid: No thyromegaly.      Trachea: No tracheal deviation.   Cardiovascular:      Rate and Rhythm: Normal rate and regular rhythm.      Heart sounds: Normal heart sounds.   Pulmonary:      Effort: Pulmonary effort is normal. No respiratory distress.      Breath sounds: Normal breath sounds. No wheezing.   Abdominal:      General: Abdomen is flat. Bowel sounds are normal. There is no distension.      Palpations: Abdomen is soft.      Tenderness: There is no abdominal tenderness.   Musculoskeletal:         General: No tenderness or deformity. Normal range of motion.      Cervical back: Normal range of motion and neck supple.   Skin:     General: Skin is warm and dry.      Findings: No erythema or rash.   Neurological:      Mental Status: She is alert and oriented to person, place, and time.   Psychiatric:         Mood and Affect: Mood normal.         Behavior: Behavior normal. Behavior is cooperative.         Thought Content: Thought content normal.         Judgment: Judgment normal.         "

## 2024-10-14 NOTE — PATIENT INSTRUCTIONS
Please try to eat about 50 - 60 gr of protein a day.  Your total blood protein numbers are too low.      Consider taking Gas-X (simethicone) up to 4 times a day to help with gas pain in the belly.

## 2024-10-14 NOTE — LETTER
October 14, 2024     Patient: Haylee Balbuena  YOB: 1970  Date of Visit: 10/14/2024      To Whom it May Concern:    Haylee Balbuena is under my professional care. Haylee GARCIAS was seen in my office on 10/14/2024. Haylee GARCIAS may return to work on 8/15/24 .    If you have any questions or concerns, please don't hesitate to call.         Sincerely,          SYLWIA Larson        CC: No Recipients  
History of appendectomy

## 2024-10-14 NOTE — PROGRESS NOTES
[facility]  Cyrus Yancey DO    Name: Haylee Balbuena  YOB: 1970      Assessment/Plan:    Stage 3a chronic kidney disease (HCC)  Lab Results   Component Value Date    EGFR 59 10/12/2024    EGFR 55 09/20/2024    EGFR 55 04/12/2024    CREATININE 1.07 10/12/2024    CREATININE 1.14 09/20/2024    CREATININE 1.14 04/12/2024     Kidney function stable at this time, continue with current care and management.  Patient is aware that blood sugars need to be improved upon, she has been trying to focus on this.    Cyst of kidney, acquired  Patient's simple cyst has increased in size, will reassess in about 1 month.  May need to continue to monitor every 6 months until we know that the cyst is no longer growing in size.    Hypertension  Blood pressure is elevated, however, typically better controlled.  Reassess at home.  Goal blood pressures to be at or consistently below 130/70 mmHg.    Iron deficiency  Continue with iron supplementation, follow-up iron stores with next of labs.    Vitamin D deficiency  Continue vitamin D supplementation, follow vitamin D levels with next set of labs.         Problem List Items Addressed This Visit          Cardiovascular and Mediastinum    Hypertension     Blood pressure is elevated, however, typically better controlled.  Reassess at home.  Goal blood pressures to be at or consistently below 130/70 mmHg.            Genitourinary    Stage 3a chronic kidney disease (HCC) - Primary     Lab Results   Component Value Date    EGFR 59 10/12/2024    EGFR 55 09/20/2024    EGFR 55 04/12/2024    CREATININE 1.07 10/12/2024    CREATININE 1.14 09/20/2024    CREATININE 1.14 04/12/2024     Kidney function stable at this time, continue with current care and management.  Patient is aware that blood sugars need to be improved upon, she has been trying to focus on this.         Relevant Orders    Albumin / creatinine urine ratio    Urinalysis with  microscopic    Comprehensive metabolic panel    CBC and differential    Magnesium    Phosphorus    PTH, intact    Cyst of kidney, acquired     Patient's simple cyst has increased in size, will reassess in about 1 month.  May need to continue to monitor every 6 months until we know that the cyst is no longer growing in size.            Other    Vitamin D deficiency     Continue vitamin D supplementation, follow vitamin D levels with next set of labs.         Relevant Orders    Vitamin D 25 hydroxy    Hypomagnesemia    Iron deficiency     Continue with iron supplementation, follow-up iron stores with next of labs.            Patient is stable from renal standpoint.  We will see her back for regular appointment in 6 months with blood work to be done prior that appointment provided during this visit.    Subjective:      Patient ID: Haylee Balbuena is a 53 y.o. female.    Patient presents for follow up.    We reviewed labs in detail, most recent creatinine noted to be 1.07 mg/dL, estimate GFR is 59 mL/min.    There were no significant electrolyte abnormalities noted.  Patient is taking all medications as prescribed with no specific side effects and denies use of nonsteroid anti-inflammatory medications.              The following portions of the patient's history were reviewed and updated as appropriate: allergies, current medications, past family history, past medical history, past social history, past surgical history and problem list.    Review of Systems   All other systems reviewed and are negative.        Social History     Socioeconomic History    Marital status: /Civil Union     Spouse name: None    Number of children: None    Years of education: None    Highest education level: None   Occupational History    None   Tobacco Use    Smoking status: Former     Current packs/day: 0.00     Types: Cigarettes     Quit date:      Years since quittin.8    Smokeless tobacco: Never   Vaping Use    Vaping  status: Never Used   Substance and Sexual Activity    Alcohol use: Never     Comment: occ    Drug use: Never    Sexual activity: Not Currently     Partners: Male     Birth control/protection: None   Other Topics Concern    None   Social History Narrative    None     Social Determinants of Health     Financial Resource Strain: Not on file   Food Insecurity: No Food Insecurity (2024)    Hunger Vital Sign     Worried About Running Out of Food in the Last Year: Never true     Ran Out of Food in the Last Year: Never true   Transportation Needs: No Transportation Needs (2024)    PRAPARE - Transportation     Lack of Transportation (Medical): No     Lack of Transportation (Non-Medical): No   Physical Activity: Not on file   Stress: Not on file   Social Connections: Not on file   Intimate Partner Violence: Not on file   Housing Stability: Low Risk  (2024)    Housing Stability Vital Sign     Unable to Pay for Housing in the Last Year: No     Number of Times Moved in the Last Year: 1     Homeless in the Last Year: No     Past Medical History:   Diagnosis Date    Atypical chest pain     Bilateral calf pain 2024    Diabetes mellitus (HCC)     Gastroparesis     GERD (gastroesophageal reflux disease)     Hyperlipidemia     Hypertension     MRSA bacteremia 2023    Psychiatric disorder     Sciatica     Seizure disorder (HCC)      Past Surgical History:   Procedure Laterality Date    APPENDECTOMY      BREAST BIOPSY Left  benign     SECTION      CHOLECYSTECTOMY      COLONOSCOPY      HYSTERECTOMY      IR PICC PLACEMENT SINGLE LUMEN  2023    CO LAPAROSCOPIC APPENDECTOMY N/A 2021    Procedure: APPENDECTOMY LAPAROSCOPIC;  Surgeon: Onel Martin MD;  Location:  MAIN OR;  Service: General    CO LAPS ABD PRTM&OMENTUM DX W/WO SPEC BR/WA SPX N/A 2021    Procedure: LAPAROSCOPY DIAGNOSTIC, EXTENSIVE DESI;  Surgeon: Onel Martin MD;  Location:  MAIN OR;  Service: General    TUBAL  LIGATION      TYMPANOSTOMY TUBE PLACEMENT Bilateral 04/17/2024    UPPER GASTROINTESTINAL ENDOSCOPY         Current Outpatient Medications:     albuterol (2.5 mg/3 mL) 0.083 % nebulizer solution, Take 3 mL (2.5 mg total) by nebulization every 6 (six) hours as needed for wheezing or shortness of breath, Disp: 180 mL, Rfl: 5    albuterol (PROVENTIL HFA,VENTOLIN HFA) 90 mcg/act inhaler, INHALE ONE PUFF BY MOUTH AND INTO THE LUNGS EVERY 6 HOURS AS NEEDED FOR WHEEZING, Disp: 36 g, Rfl: 3    Alcohol Swabs (Pharmacist Choice Alcohol) PADS, Apply topically 4 (four) times a day Use as directed, Disp: 300 each, Rfl: 5    aspirin (ECOTRIN LOW STRENGTH) 81 mg EC tablet, TAKE ONE TABLET BY MOUTH ONCE DAILY, Disp: 30 tablet, Rfl: 5    atorvastatin (LIPITOR) 80 mg tablet, TAKE ONE TABLET BY MOUTH DAILY AT BEDTIME, Disp: 180 tablet, Rfl: 3    B-D UF III MINI PEN NEEDLES 31G X 5 MM MISC, Pt uses 5 pen needles daily, Disp: 500 each, Rfl: 0    cholecalciferol (VITAMIN D3) 1,000 units tablet, Take 2 tablets (2,000 Units total) by mouth daily, Disp: 60 tablet, Rfl: 3    citalopram (CeleXA) 40 mg tablet, TAKE ONE TABLET BY MOUTH DAILY, Disp: 90 tablet, Rfl: 3    Continuous Glucose Sensor (FreeStyle Refugio 3 Sensor) MISC, E11.65 replace sensor every 14 days, Disp: 6 each, Rfl: 1    dicyclomine (BENTYL) 20 mg tablet, Take twice daily (am and before bedtime), may take additional 2 doses per day, max 4x/day (every 6 hours if needed), Disp: 90 tablet, Rfl: 0    docusate sodium (COLACE) 100 mg capsule, TAKE ONE CAPSULE BY MOUTH TWICE DAILY, Disp: 240 capsule, Rfl: 3    ergocalciferol (VITAMIN D2) 50,000 units, TAKE ONE CAPSULE BY MOUTH ONCE A WEEK, Disp: 12 capsule, Rfl: 3    famotidine (PEPCID) 40 MG tablet, TAKE ONE TABLET BY MOUTH TWO HOURS PRIOR TO BEDTIME, Disp: 30 tablet, Rfl: 5    fenofibrate micronized (LOFIBRA) 134 MG capsule, Take 1 capsule (134 mg total) by mouth daily with breakfast, Disp: 90 capsule, Rfl: 1    Insulin Regular Human,  Conc, (HumuLIN R U-500 KwikPen) 500 units/mL CONCENTRATED U-500 injection pen, E11.65 inject 85 units with breakfast, 45 units with lunch, 35 with dinner, or as directed TDD up to 200 units, Disp: 18 mL, Rfl: 5    Iron Heme Polypeptide 12 MG TABS, Take 1 tablet by mouth, Disp: , Rfl:     Jardiance 25 MG TABS, TAKE ONE TABLET BY MOUTH EVERY MORNING, Disp: 90 tablet, Rfl: 1    levETIRAcetam (KEPPRA) 500 mg tablet, TAKE ONE TABLET BY MOUTH TWICE DAILY, Disp: 180 tablet, Rfl: 1    linaCLOtide (Linzess) 72 MCG CAPS, Take 1 capsule by mouth daily before breakfast, Disp: 30 capsule, Rfl: 0    Magnesium Citrate 200 MG TABS, Take 1 tablet by mouth 2 (two) times a day, Disp: 180 tablet, Rfl: 1    meclizine (ANTIVERT) 25 mg tablet, Take 1 tablet (25 mg total) by mouth 3 (three) times a day as needed for dizziness, Disp: 30 tablet, Rfl: 0    methocarbamol (ROBAXIN) 500 mg tablet, Take 1 tablet (500 mg total) by mouth 3 (three) times a day as needed for muscle spasms, Disp: 90 tablet, Rfl: 0    metoclopramide (Reglan) 10 mg tablet, Take 0.5 tablets (5 mg total) by mouth every 6 (six) hours, Disp: 30 tablet, Rfl: 0    metoprolol tartrate (LOPRESSOR) 25 mg tablet, TAKE ONE TABLET BY MOUTH TWICE DAILY, Disp: 180 tablet, Rfl: 1    multivitamin (THERAGRAN) TABS, Take 1 tablet by mouth every morning, Disp: , Rfl:     nystatin (MYCOSTATIN) cream, Apply topically 2 (two) times a day, Disp: 30 g, Rfl: 0    ondansetron (ZOFRAN) 4 mg tablet, TAKE 1 TABLET (4 MG TOTAL) BY MOUTH EVERY 8 (EIGHT) HOURS AS NEEDED FOR NAUSEA OR VOMITING, Disp: 30 tablet, Rfl: 1    OneTouch Ultra test strip, USE 4 TIMES A DAY, Disp: 400 each, Rfl: 0    oxybutynin (DITROPAN-XL) 10 MG 24 hr tablet, TAKE ONE TABLET BY MOUTH DAILY AT BEDTIME, Disp: 100 tablet, Rfl: 1    Pharmacist Choice Lancets MISC, USE AS DIRECTED, Disp: 100 each, Rfl: 1    pregabalin (LYRICA) 100 mg capsule, 100 mg in AM, 100 mg in afternoon, and 200 mg at bedtime, Disp: 120 capsule, Rfl: 5     "Restasis 0.05 % ophthalmic emulsion, , Disp: , Rfl:     tirzepatide (Mounjaro) 10 MG/0.5ML, Inject 0.5 mL (10 mg total) under the skin every 7 days, Disp: 2 mL, Rfl: 3    Cinnamon 500 MG capsule, TAKE ONE CAPSULE BY MOUTH ONCE DAILY (Patient not taking: Reported on 10/14/2024), Disp: 90 capsule, Rfl: 1    Lab Results   Component Value Date    SODIUM 138 10/12/2024    K 4.4 10/12/2024     10/12/2024    CO2 30 10/12/2024    AGAP 7 10/12/2024    BUN 21 10/12/2024    CREATININE 1.07 10/12/2024    GLUC 249 (H) 09/20/2024    GLUF 302 (H) 10/12/2024    CALCIUM 9.0 10/12/2024    AST 15 10/12/2024    ALT 13 10/12/2024    ALKPHOS 64 10/12/2024    TP 6.2 (L) 10/12/2024    TBILI 0.29 10/12/2024    EGFR 59 10/12/2024     Lab Results   Component Value Date    WBC 7.13 10/12/2024    HGB 12.4 10/12/2024    HCT 39.6 10/12/2024    MCV 84 10/12/2024     10/12/2024     Lab Results   Component Value Date    CHOLESTEROL 146 10/12/2024    CHOLESTEROL 135 04/12/2024    CHOLESTEROL 191 01/05/2024     Lab Results   Component Value Date    HDL 37 (L) 10/12/2024    HDL 55 04/12/2024    HDL 43 (L) 01/05/2024     Lab Results   Component Value Date    LDLCALC 62 10/12/2024    LDLCALC 38 04/12/2024    LDLCALC 83 01/05/2024     Lab Results   Component Value Date    TRIG 236 (H) 10/12/2024    TRIG 210 (H) 04/12/2024    TRIG 327 (H) 01/05/2024     No results found for: \"CHOLHDL\"  Lab Results   Component Value Date    ISY3ANZNQFNE 1.784 07/24/2023    TSH 1.51 01/11/2020     Lab Results   Component Value Date    PTH 37.9 10/12/2024    CALCIUM 9.0 10/12/2024    PHOS 4.9 (H) 10/12/2024     No results found for: \"SPEP\", \"UPEP\"  No results found for: \"MICROALBUR\", \"[id number]\"        Objective:      /90 (BP Location: Right arm, Patient Position: Sitting, Cuff Size: Standard)   Pulse 71   Temp 98 °F (36.7 °C)   Ht 5' 1\" (1.549 m)   Wt 80.7 kg (178 lb)   SpO2 96%   BMI 33.63 kg/m²          Physical Exam  Vitals reviewed. "   Constitutional:       General: She is not in acute distress.     Appearance: Normal appearance.   HENT:      Head: Normocephalic and atraumatic.      Right Ear: External ear normal.      Left Ear: External ear normal.   Eyes:      Conjunctiva/sclera: Conjunctivae normal.   Cardiovascular:      Rate and Rhythm: Normal rate and regular rhythm.      Heart sounds: Normal heart sounds.   Pulmonary:      Effort: No respiratory distress.      Breath sounds: No wheezing.   Abdominal:      Palpations: Abdomen is soft.   Skin:     General: Skin is warm and dry.   Neurological:      General: No focal deficit present.      Mental Status: She is alert and oriented to person, place, and time.   Psychiatric:         Mood and Affect: Mood normal.         Behavior: Behavior normal.

## 2024-10-14 NOTE — ASSESSMENT & PLAN NOTE
Orders:    dicyclomine (BENTYL) 20 mg tablet; Take twice daily (am and before bedtime), may take additional 2 doses per day, max 4x/day (every 6 hours if needed)

## 2024-10-14 NOTE — ASSESSMENT & PLAN NOTE
Blood pressure is elevated, however, typically better controlled.  Reassess at home.  Goal blood pressures to be at or consistently below 130/70 mmHg.

## 2024-10-14 NOTE — ASSESSMENT & PLAN NOTE
Patient's simple cyst has increased in size, will reassess in about 1 month.  May need to continue to monitor every 6 months until we know that the cyst is no longer growing in size.

## 2024-10-16 DIAGNOSIS — E11.42 DIABETIC POLYNEUROPATHY ASSOCIATED WITH TYPE 2 DIABETES MELLITUS (HCC): ICD-10-CM

## 2024-10-16 RX ORDER — PREGABALIN 100 MG/1
CAPSULE ORAL
Qty: 120 CAPSULE | Refills: 1 | Status: SHIPPED | OUTPATIENT
Start: 2024-10-16

## 2024-10-17 DIAGNOSIS — E55.9 VITAMIN D DEFICIENCY: ICD-10-CM

## 2024-10-17 RX ORDER — CHOLECALCIFEROL (VITAMIN D3) 25 MCG
2000 TABLET ORAL DAILY
Qty: 60 TABLET | Refills: 3 | Status: SHIPPED | OUTPATIENT
Start: 2024-10-17

## 2024-10-21 DIAGNOSIS — E11.65 TYPE 2 DIABETES MELLITUS WITH HYPERGLYCEMIA, WITH LONG-TERM CURRENT USE OF INSULIN (HCC): ICD-10-CM

## 2024-10-21 DIAGNOSIS — K21.9 GASTROESOPHAGEAL REFLUX DISEASE WITHOUT ESOPHAGITIS: ICD-10-CM

## 2024-10-21 DIAGNOSIS — Z79.4 TYPE 2 DIABETES MELLITUS WITH HYPERGLYCEMIA, WITH LONG-TERM CURRENT USE OF INSULIN (HCC): ICD-10-CM

## 2024-10-21 DIAGNOSIS — K21.9 GASTROESOPHAGEAL REFLUX DISEASE, UNSPECIFIED WHETHER ESOPHAGITIS PRESENT: ICD-10-CM

## 2024-10-22 RX ORDER — ASPIRIN 81 MG/1
81 TABLET ORAL DAILY
Qty: 30 TABLET | Refills: 5 | Status: SHIPPED | OUTPATIENT
Start: 2024-10-22

## 2024-10-22 RX ORDER — FAMOTIDINE 40 MG/1
TABLET, FILM COATED ORAL
Qty: 30 TABLET | Refills: 5 | Status: SHIPPED | OUTPATIENT
Start: 2024-10-22

## 2024-10-28 ENCOUNTER — OFFICE VISIT (OUTPATIENT)
Dept: ENDOCRINOLOGY | Facility: CLINIC | Age: 54
End: 2024-10-28
Payer: COMMERCIAL

## 2024-10-28 VITALS
SYSTOLIC BLOOD PRESSURE: 132 MMHG | DIASTOLIC BLOOD PRESSURE: 78 MMHG | BODY MASS INDEX: 33.42 KG/M2 | TEMPERATURE: 97.3 F | HEIGHT: 61 IN | WEIGHT: 177 LBS

## 2024-10-28 DIAGNOSIS — E11.43 TYPE 2 DIABETES MELLITUS WITH DIABETIC AUTONOMIC NEUROPATHY, WITH LONG-TERM CURRENT USE OF INSULIN (HCC): Primary | ICD-10-CM

## 2024-10-28 DIAGNOSIS — Z79.4 TYPE 2 DIABETES MELLITUS WITH DIABETIC AUTONOMIC NEUROPATHY, WITH LONG-TERM CURRENT USE OF INSULIN (HCC): Primary | ICD-10-CM

## 2024-10-28 PROCEDURE — 99215 OFFICE O/P EST HI 40 MIN: CPT | Performed by: PHYSICIAN ASSISTANT

## 2024-10-28 RX ORDER — ACYCLOVIR 400 MG/1
1 TABLET ORAL
Qty: 9 EACH | Refills: 3 | Status: SHIPPED | OUTPATIENT
Start: 2024-10-28

## 2024-10-28 NOTE — PATIENT INSTRUCTIONS
Please call [phone] to schedule pre-pump assessment.    Download Dexcom G7 Sigrid as well as Dexcom Clarity Sigrid on phone.   Clinic share code for clarity is: Patrick

## 2024-10-28 NOTE — PROGRESS NOTES
Ambulatory Visit  Name: Haylee Balbuena      : 1970      MRN: [id number]  Encounter Provider: Adriana So PA-C  Encounter Date: 10/28/2024   Encounter department: [facility]    Assessment & Plan  Type 2 diabetes mellitus with diabetic autonomic neuropathy, with long-term current use of insulin (HCC)    Lab Results   Component Value Date    HGBA1C 11.5 (H) 10/12/2024   Haylee is interested in starting an AID system/pump therapy. This is not unreasonable, but with insulin resistance /  high TDD insulin needs (uncontrolled on Humulin U500 TDD > 130 units currently) would require frequent set changes, may require additional SC basal insulin coverage in addition to basal rate to help reduce needs.   BG monitoring - to change to Dexcom G7 given issues with apta.me 3 compatibility with phone, questionable accuracy of sensors. Provided sample Dexcom G7 reader.     Pharmacotherapy adjustments - just recnetly able to increase Mounjaro dosing, to continue 10mg dose x 4 weeks, consider additional dose increase pending experience/tolerability. Also recommend increase Humulin R U500 to 95-35-25. Sailaja would like to go about insulin adjustments very cautiously due to fear of hypoglycemia.    To send in BG log in 2 weeks for review. Follow up in office in 3-4 weeks for hopefully more plentiful CGM data to review.    Orders:    Ambulatory referral to Diabetic Education; Future    Continuous Glucose Sensor (Dexcom G7 Sensor); Use 1 Device every 10 days      I have spent a total time of 45 minutes in caring for this patient on the day of the visit/encounter including Diagnostic results, Instructions for management, Importance of tx compliance, Impressions, Counseling / Coordination of care, Documenting in the medical record, Obtaining or reviewing history  , and CGM report review.  .   History of Present Illness     Haylee Balbuena is a 53 y.o. female who presents for routine  "follow up for type 2 DM. T2D is cb CKD3, gastroparesis, DPN, fear of hypoglycemia.   Most recently endocrinology visit was 7/23/24 with Dr. Washington - note reviewed. At that time we recommended intensified Mounjaro dosing, reduced Humulin R U500 insulin dosing, upgrade CGM to Refugio 3.     Sailaja has had a lot of stress recently related to work, she is grieving multiple recent patient deaths. Feels excessive fatigue, generally weak.     Unfortunately did not start Mounjaro increased dose due to supply issues. Just started 10mg weekly dose this past week.   Going well so far, no adverse effects reports.   Appetite is variable, but reports \"not eating much\".  Weight loss since April 31lbs weight loss, however is having more polyuria in setting more more extremes of hyperglycemia.    Refugio 3 upgrade has been challenging - phone appears not compatible and not all BG readings are communicating with phone, limiting use. Also questioning accuracy of sensors recently. Would like to switch to Dexcom G7 CGM, if possible.    She is also inquiring about the possibility of starting AID system / insulin pump. She is tired of MDI insulin use, feels defeated and burned out in her current DM care.     DM medication review:   Currently taking Humulin R U500 - 85-25-25, however most recent prescribed dose was 85-45-35. She has self-reduced lunch and dinner dose due to significant fear of hypoglycemia (\"I can't go any higher in the evening or I bottom out\"). She is currently compliant with TID dosing, however has skipped / missed doses in past due to fear of hypoglycemia.   Jardiance 25mg daily  Mounjaro 10mg weekly  Previously on metformin - discontinued due to renal function, GI side effects.     She is looking forward to going on a cruise in November.     2 week CGM summary report:  Haylee Balbuena   Device used: Refugio 3  Home use   Indication: Type 2 Diabetes  More than 72 hours of data was reviewed. Report to be scanned to chart. "   Date Range: October 12, 2024 - October 25, 2024.     Analysis of data:   % time CGM used: 55%  Average Glucose: 329mg/dL  Coefficient of Variation: 20.4%  GMI: 11.2%  Time in Target Range: 1%  Time Above Range: 17% high, 82% very high  Time Below Range: 0% low, 0% very low    Interpretation of data:  Severe hyperglycemia, > 300 majority of time, > 80%. Fasting BG usually 200-250. CGM use is also limited, active only 55% of time.      History obtained from : patient  Review of Systems   Constitutional:  Positive for fatigue.   All other systems reviewed and are negative.    Medical History Reviewed by provider this encounter:  Tobacco  Allergies  Meds  Problems  Med Hx  Surg Hx  Fam Hx       Current Outpatient Medications on File Prior to Visit   Medication Sig Dispense Refill    albuterol (2.5 mg/3 mL) 0.083 % nebulizer solution Take 3 mL (2.5 mg total) by nebulization every 6 (six) hours as needed for wheezing or shortness of breath 180 mL 5    albuterol (PROVENTIL HFA,VENTOLIN HFA) 90 mcg/act inhaler INHALE ONE PUFF BY MOUTH AND INTO THE LUNGS EVERY 6 HOURS AS NEEDED FOR WHEEZING 36 g 3    Alcohol Swabs (Pharmacist Choice Alcohol) PADS Apply topically 4 (four) times a day Use as directed 300 each 5    aspirin (ECOTRIN LOW STRENGTH) 81 mg EC tablet TAKE ONE TABLET BY MOUTH ONCE DAILY 30 tablet 5    atorvastatin (LIPITOR) 80 mg tablet TAKE ONE TABLET BY MOUTH DAILY AT BEDTIME 180 tablet 3    B-D UF III MINI PEN NEEDLES 31G X 5 MM MISC Pt uses 5 pen needles daily 500 each 0    cholecalciferol (VITAMIN D3) 1,000 units tablet Take 2 tablets (2,000 Units total) by mouth daily 60 tablet 3    citalopram (CeleXA) 40 mg tablet TAKE ONE TABLET BY MOUTH DAILY 90 tablet 3    Continuous Glucose Sensor (FreeStyle Refugio 3 Sensor) MISC E11.65 replace sensor every 14 days 6 each 1    dicyclomine (BENTYL) 20 mg tablet Take twice daily (am and before bedtime), may take additional 2 doses per day, max 4x/day (every 6 hours if  needed) 90 tablet 0    docusate sodium (COLACE) 100 mg capsule TAKE ONE CAPSULE BY MOUTH TWICE DAILY 240 capsule 3    ergocalciferol (VITAMIN D2) 50,000 units TAKE ONE CAPSULE BY MOUTH ONCE A WEEK 12 capsule 3    famotidine (PEPCID) 40 MG tablet TAKE ONE TABLET BY MOUTH TWO HOURS PRIOR TO BEDTIME 30 tablet 5    fenofibrate micronized (LOFIBRA) 134 MG capsule Take 1 capsule (134 mg total) by mouth daily with breakfast 90 capsule 1    Insulin Regular Human, Conc, (HumuLIN R U-500 KwikPen) 500 units/mL CONCENTRATED U-500 injection pen E11.65 inject 85 units with breakfast, 45 units with lunch, 35 with dinner, or as directed TDD up to 200 units 18 mL 5    Iron Heme Polypeptide 12 MG TABS Take 1 tablet by mouth      Jardiance 25 MG TABS TAKE ONE TABLET BY MOUTH EVERY MORNING 90 tablet 1    levETIRAcetam (KEPPRA) 500 mg tablet TAKE ONE TABLET BY MOUTH TWICE DAILY 180 tablet 1    linaCLOtide (Linzess) 72 MCG CAPS Take 1 capsule by mouth daily before breakfast 30 capsule 0    Magnesium Citrate 200 MG TABS Take 1 tablet by mouth 2 (two) times a day 180 tablet 1    meclizine (ANTIVERT) 25 mg tablet Take 1 tablet (25 mg total) by mouth 3 (three) times a day as needed for dizziness 30 tablet 0    methocarbamol (ROBAXIN) 500 mg tablet Take 1 tablet (500 mg total) by mouth 3 (three) times a day as needed for muscle spasms 90 tablet 0    metoclopramide (Reglan) 10 mg tablet Take 0.5 tablets (5 mg total) by mouth every 6 (six) hours 30 tablet 0    metoprolol tartrate (LOPRESSOR) 25 mg tablet TAKE ONE TABLET BY MOUTH TWICE DAILY 180 tablet 1    multivitamin (THERAGRAN) TABS Take 1 tablet by mouth every morning      ondansetron (ZOFRAN) 4 mg tablet TAKE 1 TABLET (4 MG TOTAL) BY MOUTH EVERY 8 (EIGHT) HOURS AS NEEDED FOR NAUSEA OR VOMITING 30 tablet 1    OneTouch Ultra test strip USE 4 TIMES A  each 0    oxybutynin (DITROPAN-XL) 10 MG 24 hr tablet TAKE ONE TABLET BY MOUTH DAILY AT BEDTIME 100 tablet 1    Pharmacist Choice  "Lancets MISC USE AS DIRECTED 100 each 1    pregabalin (LYRICA) 100 mg capsule TAKE ONE CAPSULE BY MOUTH THREE TIMES A  capsule 1    Restasis 0.05 % ophthalmic emulsion       tirzepatide (Mounjaro) 10 MG/0.5ML Inject 0.5 mL (10 mg total) under the skin every 7 days 2 mL 3    Cinnamon 500 MG capsule TAKE ONE CAPSULE BY MOUTH ONCE DAILY (Patient not taking: Reported on 10/14/2024) 90 capsule 1    nystatin (MYCOSTATIN) cream Apply topically 2 (two) times a day 30 g 0     No current facility-administered medications on file prior to visit.      Social History     Tobacco Use    Smoking status: Former     Current packs/day: 0.00     Types: Cigarettes     Quit date:      Years since quittin.8    Smokeless tobacco: Never   Vaping Use    Vaping status: Never Used   Substance and Sexual Activity    Alcohol use: Never     Comment: occ    Drug use: Never    Sexual activity: Not Currently     Partners: Male     Birth control/protection: None         Objective     /78   Temp (!) 97.3 °F (36.3 °C)   Ht 5' 1\" (1.549 m)   Wt 80.3 kg (177 lb)   BMI 33.44 kg/m²     Physical Exam  Vitals reviewed.   Constitutional:       General: She is not in acute distress.  HENT:      Head: Normocephalic.   Pulmonary:      Effort: No respiratory distress.   Neurological:      Mental Status: She is oriented to person, place, and time.   Psychiatric:         Mood and Affect: Mood normal.         Behavior: Behavior normal.         Lab results:   Component      Latest Ref Rng 10/12/2024   WBC      4.31 - 10.16 Thousand/uL 7.13    RBC      3.81 - 5.12 Million/uL 4.72    Hemoglobin      11.5 - 15.4 g/dL 12.4    Hematocrit      34.8 - 46.1 % 39.6    MCV      82 - 98 fL 84    MCH      26.8 - 34.3 pg 26.3 (L)    MCHC      31.4 - 37.4 g/dL 31.3 (L)    RDW      11.6 - 15.1 % 14.3    MPV      8.9 - 12.7 fL 12.1    Platelet Count      149 - 390 Thousands/uL 364    nRBC      /100 WBCs 0    Segmented %      43 - 75 % 43    Immature Grans " %      0 - 2 % 0    Lymphocytes %      14 - 44 % 40    Monocytes %      4 - 12 % 8    Eosinophils %      0 - 6 % 8 (H)    Basophils %      0 - 1 % 1    Absolute Neutrophils      1.85 - 7.62 Thousands/µL 3.03    Absolute Immature Grans      0.00 - 0.20 Thousand/uL 0.02    Absolute Lymphocytes      0.60 - 4.47 Thousands/µL 2.88    Absolute Monocytes      0.17 - 1.22 Thousand/µL 0.55    Absolute Eosinophils      0.00 - 0.61 Thousand/µL 0.58    Absolute Basophils      0.00 - 0.10 Thousands/µL 0.07    Sodium      135 - 147 mmol/L 138    Potassium      3.5 - 5.3 mmol/L 4.4    Chloride      96 - 108 mmol/L 101    Carbon Dioxide      21 - 32 mmol/L 30    ANION GAP      4 - 13 mmol/L 7    BUN      5 - 25 mg/dL 21    Creatinine      0.60 - 1.30 mg/dL 1.07    GLUCOSE, FASTING      65 - 99 mg/dL 302 (H)    Calcium      8.4 - 10.2 mg/dL 9.0    AST      13 - 39 U/L 15    ALT      7 - 52 U/L 13    ALK PHOS      34 - 104 U/L 64    Total Protein      6.4 - 8.4 g/dL 6.2 (L)    Albumin      3.5 - 5.0 g/dL 3.8    Total Bilirubin      0.20 - 1.00 mg/dL 0.29    GFR, Calculated      ml/min/1.73sq m 59    Color, UA Colorless    Clarity, UA Clear    SL AMB SPECIFIC GRAVITY_URINE      1.003 - 1.030  1.026    pH, UA      4.5, 5.0, 5.5, 6.0, 6.5, 7.0, 7.5, 8.0  6.5    Leukocytes, UA      Negative  Elevated glucose may cause decreased leukocyte values. See urine microscopic for UWBC result !    Nitrite, UA      Negative  Negative    POCT URINE PROTEIN      Negative mg/dl Negative    Glucose, UA      Negative mg/dl >=1000 (1%) !    Ketones, UA      Negative mg/dl Negative    Urobilinogen, UA      <2.0 mg/dl mg/dl <2.0    Bilirubin Urine      Negative  Negative    Blood, UA      Negative  Negative    RBC Urine      None Seen, 1-2 /hpf None Seen    WBC, UA      None Seen, 1-2 /hpf None Seen    Epithelial Cells      None Seen, Occasional /hpf None Seen    Bacteria, UA      None Seen, Occasional /hpf None Seen    Cholesterol      See Comment mg/dL  146    Triglycerides      See Comment mg/dL 236 (H)    HDL      >=50 mg/dL 37 (L)    LDL Calculated      0 - 100 mg/dL 62    Non-HDL Cholesterol      mg/dl 109    EXT Creatinine Urine      Reference range not established. mg/dL 39.6    Albumin,U,Random      <20.0 mg/L <7.0    Albumin Creat Ratio --    Hemoglobin A1C      Normal 4.0-5.6%; PreDiabetic 5.7-6.4%; Diabetic >=6.5%; Glycemic control for adults with diabetes <7.0% % 11.5 (H)    eAG, EST AVG Glucose      mg/dl 283    MAGNESIUM      1.9 - 2.7 mg/dL 2.1    Phosphorus      2.7 - 4.5 mg/dL 4.9 (H)    PTH      12.0 - 88.0 pg/mL 37.9    VITAMIN D, 25-HYDROXY      30.0 - 100.0 ng/mL 64.8       Legend:  (L) Low  (H) High  ! Abnormal

## 2024-10-28 NOTE — ASSESSMENT & PLAN NOTE
Lab Results   Component Value Date    HGBA1C 11.5 (H) 10/12/2024   Haylee is interested in starting an AID system/pump therapy. This is not unreasonable, but with insulin resistance /  high TDD insulin needs (uncontrolled on Humulin U500 TDD > 130 units currently) would require frequent set changes, may require additional SC basal insulin coverage in addition to basal rate to help reduce needs.   BG monitoring - to change to Dexcom G7 given issues with Refugio 3 compatibility with phone, questionable accuracy of sensors. Provided sample Dexcom G7 reader.     Pharmacotherapy adjustments - just recnetly able to increase Mounjaro dosing, to continue 10mg dose x 4 weeks, consider additional dose increase pending experience/tolerability. Also recommend increase Humulin R U500 to 95-35-25. Sailaja would like to go about insulin adjustments very cautiously due to fear of hypoglycemia.    To send in BG log in 2 weeks for review. Follow up in office in 3-4 weeks for hopefully more plentiful CGM data to review.    Orders:    Ambulatory referral to Diabetic Education; Future    Continuous Glucose Sensor (Dexcom G7 Sensor); Use 1 Device every 10 days

## 2024-11-04 ENCOUNTER — HOSPITAL ENCOUNTER (OUTPATIENT)
Dept: ULTRASOUND IMAGING | Facility: HOSPITAL | Age: 54
Discharge: HOME | End: 2024-11-04
Payer: COMMERCIAL

## 2024-11-04 DIAGNOSIS — N28.1 KIDNEY CYST, ACQUIRED: ICD-10-CM

## 2024-11-04 PROCEDURE — 76775 US EXAM ABDO BACK WALL LIM: CPT

## 2024-11-06 ENCOUNTER — TELEPHONE (OUTPATIENT)
Dept: NEPHROLOGY | Facility: CLINIC | Age: 54
End: 2024-11-06

## 2024-11-07 ENCOUNTER — TELEPHONE (OUTPATIENT)
Age: 54
End: 2024-11-07

## 2024-11-07 NOTE — TELEPHONE ENCOUNTER
Haylee called back into the office regarding her kidney US results. She wants to know what are her next steps regarding her results? She states she would the cyst removed before it becomes troublesome.       Please advise.

## 2024-11-07 NOTE — TELEPHONE ENCOUNTER
I called and left a detailed VM for Haylee with the following:      ----- Message from Sammy Valentine PA-C sent at 11/7/2024  3:11 PM EST -----  Kidney ultrasound stable with cyst around 2.3 cm. No other issues noted     I requested a call back if she has any questions or concerns.

## 2024-11-11 ENCOUNTER — NURSE TRIAGE (OUTPATIENT)
Age: 54
End: 2024-11-11

## 2024-11-11 ENCOUNTER — TELEPHONE (OUTPATIENT)
Age: 54
End: 2024-11-11

## 2024-11-11 ENCOUNTER — TELEPHONE (OUTPATIENT)
Dept: GASTROENTEROLOGY | Facility: CLINIC | Age: 54
End: 2024-11-11

## 2024-11-11 NOTE — TELEPHONE ENCOUNTER
Patient contacted office with complaints of trouble swallowing. Throat feels very swollen. Patient states she did visit with ENT and they would like her to be seen by GI. Call transferred to nurse triage due to symptoms.

## 2024-11-11 NOTE — TELEPHONE ENCOUNTER
Patient stopped in the office after seeing the ENT's for trouble swolling even liquids, The ENT did a scope in the office  and they found swollen near her vocal cords, Please advise as to what to do sooner for patient, Call Patient at [phone]

## 2024-11-11 NOTE — TELEPHONE ENCOUNTER
"SPOKE WITH PT, HX GASTROPARESIS, GERD, CONSTIPATION. LAST OV 7/13/23. PT C/O DYSPHAGIA, SOLIDS AND LIQUIDS FOR MONTHS, WORSE OVER THE PAST 2 WEEKS. PT COUGHING AND CHOKING DAILY, PAINFUL AT TIMES. PT DENIES FEVER, CHILLS, N/V, BLACK OR BLOODY STOOL. PT SAW ENT TODAY, REFERRED TO GI. PT IS AVAILABLE  TODAY FOR APPOINTMENT- REQUESTING TO BE SEEN TODAY IF POSSIBLE.             Answer Assessment - Initial Assessment Questions  1. DESCRIPTION: \"Tell me more about this problem.\" \"Are you  having trouble swallowing liquids, solids, or both?\" \"Any trouble with swallowing saliva (spit)?\"      SOLIDS AND LIQUIDS  2. SEVERITY: \"How bad is the swallowing difficulty?\"  (Scale 1-10; or mild, moderate, severe)      MODERATE  3. ONSET: \"When did the swallowing problems begin?\"       ONGOING, WORSENING OVER THE PAST 1-2 WEEKS  4. CAUSE: \"What do you think is causing the problem?\"  (e.g., dry mouth, food or pill stuck in throat, mouth pain, sore throat, progression of disease process such as dementia or Parkinson's disease).       UNSURE  5. CHRONIC or RECURRENT: \"Is this a new problem for you?\"  If No, ask: \"How long have you had this problem?\" (e.g., days, weeks, months)       WORSE OVER THE PAST 2 WEEKS  6. OTHER SYMPTOMS: \"Do you have any other symptoms?\" (e.g., chest pain, difficulty breathing, mouth sores, sore throat, swollen tongue, chest pain)      CHOKING, SOB    Protocols used: Swallowing Difficulty-Adult-[place]    "

## 2024-11-11 NOTE — TELEPHONE ENCOUNTER
Patient called stating she has pain on her right side that is constant, but the pain fluctuates. She said it can be as painful as a 9/10. She wants to know if the cyst can be causing this. Please advise and call patient with update.

## 2024-11-12 NOTE — TELEPHONE ENCOUNTER
Patient has been scheduled for 11/21/24 with Dr. Hidalgo in [facility]

## 2024-11-12 NOTE — TELEPHONE ENCOUNTER
Patient called back to get an update on her message from yesterday. I informed her that it was sent to the provider and we will give her a call back as soon as we hear back from him.

## 2024-11-14 ENCOUNTER — OFFICE VISIT (OUTPATIENT)
Dept: ENDOCRINOLOGY | Facility: CLINIC | Age: 54
End: 2024-11-14
Payer: COMMERCIAL

## 2024-11-14 VITALS
DIASTOLIC BLOOD PRESSURE: 90 MMHG | SYSTOLIC BLOOD PRESSURE: 136 MMHG | WEIGHT: 175.2 LBS | HEART RATE: 70 BPM | OXYGEN SATURATION: 97 % | BODY MASS INDEX: 33.08 KG/M2 | TEMPERATURE: 98 F | HEIGHT: 61 IN

## 2024-11-14 DIAGNOSIS — Z79.4 TYPE 2 DIABETES MELLITUS WITH DIABETIC AUTONOMIC NEUROPATHY, WITH LONG-TERM CURRENT USE OF INSULIN (HCC): ICD-10-CM

## 2024-11-14 DIAGNOSIS — E11.43 TYPE 2 DIABETES MELLITUS WITH DIABETIC AUTONOMIC NEUROPATHY, WITH LONG-TERM CURRENT USE OF INSULIN (HCC): ICD-10-CM

## 2024-11-14 PROCEDURE — 99215 OFFICE O/P EST HI 40 MIN: CPT | Performed by: PHYSICIAN ASSISTANT

## 2024-11-14 RX ORDER — INSULIN HUMAN 500 [IU]/ML
INJECTION, SOLUTION SUBCUTANEOUS
Status: SHIPPED
Start: 2024-11-14

## 2024-11-14 RX ORDER — GLUCAGON 3 MG/1
1 POWDER NASAL AS NEEDED
Qty: 1 EACH | Refills: 0 | Status: SHIPPED | OUTPATIENT
Start: 2024-11-14

## 2024-11-14 NOTE — TELEPHONE ENCOUNTER
She said the pain has been on with Tylenol so she would like to make an appt. Patient has been scheduled with Sammy for 11/18.

## 2024-11-14 NOTE — ASSESSMENT & PLAN NOTE
Lab Results   Component Value Date    HGBA1C 11.5 (H) 10/12/2024   Very poorly controlled DM in the setting of poor dietary and medication compliance coupled with increased stress - physical and emotional.   Had an honest discussion with Haylee gordy: poor DM control, and her increased risk of multiple DM complications including loss of limb, CAD, CVA, retinopathy. Although her fear of hypoglycemia is legitimate and understandable, her ongoing severe hyperglycemia also puts her at risk of acute complication of DKA. Reviewed signs/symptoms of DKA, and encouraged her to seek medical care immediately if this occurs.    Also recommended intensification and increased compliance of U500. Instead of taking at 75 - 0 - 25, recommend take 85 - 25- - 25.  Continue Mounjaro 10mg weekly, Jardiance 25mg daily. Cannot take metformin due to GI intolerance.    BG monitoring - to continue CGM use - assisted her with pairing Dexcom G7 sensor with reader today. She continues to benefit from CGM use due to MDI insulin use.    Hypoglycemia plan discussed. Baqsimi nasal spray prescribed for PRN use.     Will plan to further pursue AID system education including introduction to pump, carbohydrate counting, flexible insulin therapy to follow.    Follow up - 1 month due to high risk status, but to stop by office for CGM report download in 1 week, possible insulin adjustments to follow.    Orders:    Insulin Regular Human, Conc, (HumuLIN R U-500 KwikPen) 500 units/mL CONCENTRATED U-500 injection pen; E11.65 inject 85 units with breakfast, 25 units with lunch 25 with dinner, or as directed TDD up to 200 units    Glucagon (Baqsimi One Pack) 3 MG/DOSE POWD; 1 each into each nostril if needed (PRN for hypoglycemia emergency)

## 2024-11-14 NOTE — PATIENT INSTRUCTIONS
Try your best to eat 3 regularly timed meals a day with consistent carbohydrate content each meal.     New insulin dosing:  Humulin U500 85-25-25 - be sure to take all 3 doses daily to help maintain better BG control, prevent DM complications.

## 2024-11-14 NOTE — PROGRESS NOTES
Name: Halyee Balbuena      : 1970      MRN: [id number]  Encounter Provider: Adriana So PA-C  Encounter Date: 2024   Encounter department: [facility]  :  Assessment & Plan  Type 2 diabetes mellitus with diabetic autonomic neuropathy, with long-term current use of insulin (HCC)    Lab Results   Component Value Date    HGBA1C 11.5 (H) 10/12/2024   Very poorly controlled DM in the setting of poor dietary and medication compliance coupled with increased stress - physical and emotional.   Had an honest discussion with Haylee kaminski: poor DM control, and her increased risk of multiple DM complications including loss of limb, CAD, CVA, retinopathy. Although her fear of hypoglycemia is legitimate and understandable, her ongoing severe hyperglycemia also puts her at risk of acute complication of DKA. Reviewed signs/symptoms of DKA, and encouraged her to seek medical care immediately if this occurs.    Also recommended intensification and increased compliance of U500. Instead of taking at 75 - 0 - 25, recommend take 85 - 25- - 25.  Continue Mounjaro 10mg weekly, Jardiance 25mg daily. Cannot take metformin due to GI intolerance.    BG monitoring - to continue CGM use - assisted her with pairing Dexcom G7 sensor with reader today. She continues to benefit from CGM use due to MDI insulin use.    Hypoglycemia plan discussed. Baqsimi nasal spray prescribed for PRN use.     Will plan to further pursue AID system education including introduction to pump, carbohydrate counting, flexible insulin therapy to follow.    Follow up - 1 month due to high risk status, but to stop by office for CGM report download in 1 week, possible insulin adjustments to follow.    Orders:    Insulin Regular Human, Conc, (HumuLIN R U-500 KwikPen) 500 units/mL CONCENTRATED U-500 injection pen; E11.65 inject 85 units with breakfast, 25 units with lunch 25 with dinner, or as directed TDD up to 200  units    Glucagon (Baqsimi One Pack) 3 MG/DOSE POWD; 1 each into each nostril if needed (PRN for hypoglycemia emergency)      I have spent a total time of 45 minutes in caring for this patient on the day of the visit/encounter including Prognosis, Instructions for management, Importance of tx compliance, Impressions, Counseling / Coordination of care, Obtaining or reviewing history  , and CGM report review. .     History of Present Illness   HPI  Haylee Balbuena is a 53 y.o. female who presents for close interval follow up in the setting of uncontrolled type 2 DM.  DM complications: CKD3, gastroparesis, DPN, fear of hypoglycemia.    Most recent endocrinology office visit was 10/28/24. At that time we initiated referral for pre-pump education. We also recommended intensification of her Humulin R U500 dosing.   She was also having some issues with Refugio 3 CGM, so we decided together to switch to Dexcom G7.     Hyperglycemia is unfortunately worsening over the past 2 weeks. She is experiencing more polydipsia, polyuria lately with increasing BG. Notes a lot of emotional stress related to her mom's care, as well as relationship with her son.   Also having more pain than usual - sore throat and dysphagia is prompting both ENT and GI evaluations.  She is also missing / skipping lunchtime U500 insulin rather frequently. She has also self adjusted her AM U500 dose from 95 units (prescribed at last visit) to 75 units, which is lower than the prior 85 units she was taking.     Haylee reports Mounjaro is going ok - no N/V, no diarrhea, constipation. No increase in GERD symptoms.   Weight loss, 2lb weight loss recorded since last visit.    Has glucometer and supplies available to use as backup.   Hypoglycemia plan - keeps glucose drink on hand at all times, does not have Gvoke or Baqsimi available for hypoglycemia emergency.  She does have hypoglycemia awareness.    BG monitoring - Dexcom G7.   Report reviewed, see media tab  "for full report.   In summary, Haylee is in the \"very high\" range (> 250) 100% of the time on this limited report (43% active with CGM).    Dietary habits - consuming 3 meals a day but timing of meals and carbohydrate content is variable day to day. Snacks - usually 2-3x / day, but also variable with timing and carbohydrate content.     Current medication use reviewed:  Humulin R U500 - currently taking 75 - 25 - 25 - but again reduced her prescribed AM dose from 95 to 75 units, skips lunchtime U500 most days of the week due to fear of hypoglycemia.  Mounjaro 10mg weekly. Just recently started this dose, 2 weeks ago.   Jardiance 25mg daily    Haylee is looking forward to upcoming vacation - [place] for 7 night cruise 11/24.     DM eye exams, foot exams both up to date at this time.            History obtained from: patient  Review of Systems   All other systems reviewed and are negative.    Medical History Reviewed by provider this encounter:  Tobacco  Allergies  Meds  Problems  Med Hx  Surg Hx  Fam Hx     .  Current Outpatient Medications on File Prior to Visit   Medication Sig Dispense Refill    albuterol (2.5 mg/3 mL) 0.083 % nebulizer solution Take 3 mL (2.5 mg total) by nebulization every 6 (six) hours as needed for wheezing or shortness of breath 180 mL 5    albuterol (PROVENTIL HFA,VENTOLIN HFA) 90 mcg/act inhaler INHALE ONE PUFF BY MOUTH AND INTO THE LUNGS EVERY 6 HOURS AS NEEDED FOR WHEEZING 36 g 3    Alcohol Swabs (Pharmacist Choice Alcohol) PADS Apply topically 4 (four) times a day Use as directed 300 each 5    aspirin (ECOTRIN LOW STRENGTH) 81 mg EC tablet TAKE ONE TABLET BY MOUTH ONCE DAILY 30 tablet 5    atorvastatin (LIPITOR) 80 mg tablet TAKE ONE TABLET BY MOUTH DAILY AT BEDTIME 180 tablet 3    B-D UF III MINI PEN NEEDLES 31G X 5 MM MISC Pt uses 5 pen needles daily 500 each 0    cholecalciferol (VITAMIN D3) 1,000 units tablet Take 2 tablets (2,000 Units total) by mouth daily 60 tablet 3    " citalopram (CeleXA) 40 mg tablet TAKE ONE TABLET BY MOUTH DAILY 90 tablet 3    Continuous Glucose Sensor (Dexcom G7 Sensor) Use 1 Device every 10 days 9 each 3    dicyclomine (BENTYL) 20 mg tablet Take twice daily (am and before bedtime), may take additional 2 doses per day, max 4x/day (every 6 hours if needed) 90 tablet 0    docusate sodium (COLACE) 100 mg capsule TAKE ONE CAPSULE BY MOUTH TWICE DAILY 240 capsule 3    ergocalciferol (VITAMIN D2) 50,000 units TAKE ONE CAPSULE BY MOUTH ONCE A WEEK 12 capsule 3    famotidine (PEPCID) 40 MG tablet TAKE ONE TABLET BY MOUTH TWO HOURS PRIOR TO BEDTIME 30 tablet 5    fenofibrate micronized (LOFIBRA) 134 MG capsule Take 1 capsule (134 mg total) by mouth daily with breakfast 90 capsule 1    Insulin Regular Human, Conc, (HumuLIN R U-500 KwikPen) 500 units/mL CONCENTRATED U-500 injection pen E11.65 inject 85 units with breakfast, 45 units with lunch, 35 with dinner, or as directed TDD up to 200 units 18 mL 5    Iron Heme Polypeptide 12 MG TABS Take 1 tablet by mouth      Jardiance 25 MG TABS TAKE ONE TABLET BY MOUTH EVERY MORNING 90 tablet 1    levETIRAcetam (KEPPRA) 500 mg tablet TAKE ONE TABLET BY MOUTH TWICE DAILY 180 tablet 1    linaCLOtide (Linzess) 72 MCG CAPS Take 1 capsule by mouth daily before breakfast 30 capsule 0    Magnesium Citrate 200 MG TABS Take 1 tablet by mouth 2 (two) times a day 180 tablet 1    meclizine (ANTIVERT) 25 mg tablet Take 1 tablet (25 mg total) by mouth 3 (three) times a day as needed for dizziness 30 tablet 0    methocarbamol (ROBAXIN) 500 mg tablet Take 1 tablet (500 mg total) by mouth 3 (three) times a day as needed for muscle spasms 90 tablet 0    metoclopramide (Reglan) 10 mg tablet Take 0.5 tablets (5 mg total) by mouth every 6 (six) hours 30 tablet 0    metoprolol tartrate (LOPRESSOR) 25 mg tablet TAKE ONE TABLET BY MOUTH TWICE DAILY 180 tablet 1    multivitamin (THERAGRAN) TABS Take 1 tablet by mouth every morning      ondansetron  "(ZOFRAN) 4 mg tablet TAKE 1 TABLET (4 MG TOTAL) BY MOUTH EVERY 8 (EIGHT) HOURS AS NEEDED FOR NAUSEA OR VOMITING 30 tablet 1    OneTouch Ultra test strip USE 4 TIMES A  each 0    oxybutynin (DITROPAN-XL) 10 MG 24 hr tablet TAKE ONE TABLET BY MOUTH DAILY AT BEDTIME 100 tablet 1    Pharmacist Choice Lancets MISC USE AS DIRECTED 100 each 1    pregabalin (LYRICA) 100 mg capsule TAKE ONE CAPSULE BY MOUTH THREE TIMES A  capsule 1    Restasis 0.05 % ophthalmic emulsion       tirzepatide (Mounjaro) 10 MG/0.5ML Inject 0.5 mL (10 mg total) under the skin every 7 days 2 mL 3     No current facility-administered medications on file prior to visit.         Objective   /90 (BP Location: Left arm, Patient Position: Sitting)   Pulse 70   Temp 98 °F (36.7 °C)   Ht 5' 1\" (1.549 m)   Wt 79.5 kg (175 lb 3.2 oz)   SpO2 97%   BMI 33.10 kg/m²      Physical Exam  Vitals reviewed.   Constitutional:       Appearance: She is not ill-appearing.   HENT:      Head: Normocephalic.   Cardiovascular:      Rate and Rhythm: Normal rate.      Heart sounds: Normal heart sounds.   Pulmonary:      Effort: Pulmonary effort is normal. No respiratory distress.   Skin:     Coloration: Skin is pale.   Neurological:      Mental Status: She is alert and oriented to person, place, and time. Mental status is at baseline.   Psychiatric:         Mood and Affect: Mood normal.       Lab results:  Component      Latest Ref [facility] 10/12/2024   WBC      4.31 - 10.16 Thousand/uL 7.13    RBC      3.81 - 5.12 Million/uL 4.72    Hemoglobin      11.5 - 15.4 g/dL 12.4    Hematocrit      34.8 - 46.1 % 39.6    MCV      82 - 98 fL 84    MCH      26.8 - 34.3 pg 26.3 (L)    MCHC      31.4 - 37.4 g/dL 31.3 (L)    RDW      11.6 - 15.1 % 14.3    MPV      8.9 - 12.7 fL 12.1    Platelet Count      149 - 390 Thousands/uL 364    nRBC      /100 WBCs 0    Segmented %      43 - 75 % 43    Immature Grans %      0 - 2 % 0    Lymphocytes %      14 - 44 % 40    Monocytes " %      4 - 12 % 8    Eosinophils %      0 - 6 % 8 (H)    Basophils %      0 - 1 % 1    Absolute Neutrophils      1.85 - 7.62 Thousands/µL 3.03    Absolute Immature Grans      0.00 - 0.20 Thousand/uL 0.02    Absolute Lymphocytes      0.60 - 4.47 Thousands/µL 2.88    Absolute Monocytes      0.17 - 1.22 Thousand/µL 0.55    Absolute Eosinophils      0.00 - 0.61 Thousand/µL 0.58    Absolute Basophils      0.00 - 0.10 Thousands/µL 0.07    Sodium      135 - 147 mmol/L 138    Potassium      3.5 - 5.3 mmol/L 4.4    Chloride      96 - 108 mmol/L 101    Carbon Dioxide      21 - 32 mmol/L 30    ANION GAP      4 - 13 mmol/L 7    BUN      5 - 25 mg/dL 21    Creatinine      0.60 - 1.30 mg/dL 1.07    GLUCOSE, FASTING      65 - 99 mg/dL 302 (H)    Calcium      8.4 - 10.2 mg/dL 9.0    AST      13 - 39 U/L 15    ALT      7 - 52 U/L 13    ALK PHOS      34 - 104 U/L 64    Total Protein      6.4 - 8.4 g/dL 6.2 (L)    Albumin      3.5 - 5.0 g/dL 3.8    Total Bilirubin      0.20 - 1.00 mg/dL 0.29    GFR, Calculated      ml/min/1.73sq m 59    Color, UA Colorless    Clarity, UA Clear    SL AMB SPECIFIC GRAVITY_URINE      1.003 - 1.030  1.026    pH, UA      4.5, 5.0, 5.5, 6.0, 6.5, 7.0, 7.5, 8.0  6.5    Leukocytes, UA      Negative  Elevated glucose may cause decreased leukocyte values. See urine microscopic for UWBC result !    Nitrite, UA      Negative  Negative    POCT URINE PROTEIN      Negative mg/dl Negative    Glucose, UA      Negative mg/dl >=1000 (1%) !    Ketones, UA      Negative mg/dl Negative    Urobilinogen, UA      <2.0 mg/dl mg/dl <2.0    Bilirubin Urine      Negative  Negative    Blood, UA      Negative  Negative    RBC Urine      None Seen, 1-2 /hpf None Seen    WBC, UA      None Seen, 1-2 /hpf None Seen    Epithelial Cells      None Seen, Occasional /hpf None Seen    Bacteria, UA      None Seen, Occasional /hpf None Seen    Cholesterol      See Comment mg/dL 146    Triglycerides      See Comment mg/dL 236 (H)    HDL       >=50 mg/dL 37 (L)    LDL Calculated      0 - 100 mg/dL 62    Non-HDL Cholesterol      mg/dl 109    EXT Creatinine Urine      Reference range not established. mg/dL 39.6    Albumin,U,Random      <20.0 mg/L <7.0    Albumin Creat Ratio --    Hemoglobin A1C      Normal 4.0-5.6%; PreDiabetic 5.7-6.4%; Diabetic >=6.5%; Glycemic control for adults with diabetes <7.0% % 11.5 (H)    eAG, EST AVG Glucose      mg/dl 283    MAGNESIUM      1.9 - 2.7 mg/dL 2.1    Phosphorus      2.7 - 4.5 mg/dL 4.9 (H)    PTH      12.0 - 88.0 pg/mL 37.9    VITAMIN D, 25-HYDROXY      30.0 - 100.0 ng/mL 64.8       Legend:  (L) Low  (H) High  ! Abnormal

## 2024-11-15 DIAGNOSIS — E11.43 TYPE 2 DIABETES MELLITUS WITH DIABETIC AUTONOMIC NEUROPATHY, WITH LONG-TERM CURRENT USE OF INSULIN (HCC): ICD-10-CM

## 2024-11-15 DIAGNOSIS — F31.9 BIPOLAR AFFECTIVE DISORDER, REMISSION STATUS UNSPECIFIED (HCC): ICD-10-CM

## 2024-11-15 DIAGNOSIS — Z79.4 TYPE 2 DIABETES MELLITUS WITH DIABETIC AUTONOMIC NEUROPATHY, WITH LONG-TERM CURRENT USE OF INSULIN (HCC): ICD-10-CM

## 2024-11-15 RX ORDER — CITALOPRAM HYDROBROMIDE 40 MG/1
40 TABLET ORAL DAILY
Qty: 90 TABLET | Refills: 1 | Status: SHIPPED | OUTPATIENT
Start: 2024-11-15

## 2024-11-15 RX ORDER — EMPAGLIFLOZIN 25 MG/1
25 TABLET, FILM COATED ORAL DAILY
Qty: 90 TABLET | Refills: 1 | Status: SHIPPED | OUTPATIENT
Start: 2024-11-15

## 2024-11-18 ENCOUNTER — OFFICE VISIT (OUTPATIENT)
Age: 54
End: 2024-11-18
Payer: COMMERCIAL

## 2024-11-18 ENCOUNTER — TELEPHONE (OUTPATIENT)
Age: 54
End: 2024-11-18

## 2024-11-18 VITALS
WEIGHT: 174 LBS | HEIGHT: 61 IN | BODY MASS INDEX: 32.85 KG/M2 | HEART RATE: 62 BPM | OXYGEN SATURATION: 97 % | SYSTOLIC BLOOD PRESSURE: 120 MMHG | TEMPERATURE: 97.5 F | DIASTOLIC BLOOD PRESSURE: 84 MMHG

## 2024-11-18 DIAGNOSIS — N28.1 CYST OF KIDNEY, ACQUIRED: Primary | ICD-10-CM

## 2024-11-18 PROCEDURE — 99214 OFFICE O/P EST MOD 30 MIN: CPT

## 2024-11-18 NOTE — PROGRESS NOTES
OFFICE FOLLOW UP - Nephrology   Haylee Balbuena 53 y.o. female MRN: [id number]         Interim HPI:   Haylee Balbuena has a PMH of type 2 diabetes, CAD, CVA, RCC of right kidney s/p right nephrectomy and returns today in the renal clinic for the continued management of CKD. Patient was last seen on 10/14/24  with Dr Yancey  and was stable from renal standpoint. Since the last visit, there has been no ER visits or hospitilizations. Patient at this time is complaining of worsening right sided flank pain. Patient denies any hematuria, chest pain, shortness of breath or swelling. The last blood work was done on 10/12/24  which we have reviewed together.        ASSESSMENT and PLAN:  Haylee GARCIAS was seen today for follow-up.    Diagnoses and all orders for this visit:    Cyst of kidney, acquired  -     Ambulatory Referral to Urology; Future        Flank pain  Most recent ultrasound reviewed, there is a stable 2.3 cm cyst on the right kidney.  The kidneys are symmetric and normal in size.  There is normal echogenicity.  There is no hydronephrosis calculi or perinephric fluid collections.  She has a history of renal cell carcinoma status post partial right nephrectomy in 2017.  Will refer to urology, see if the cyst is causing any symptoms/issues, also with history of renal cell carcinoma.    Chronic kidney disease stage IIIa:  Noted, most recent labs reviewed with patient at previous office visit one month ago. Follow up office visit scheduled for 6 months with labs in chart    Hypertension:  Current /64  Blood pressure stable at this time continue with current therapy      Age related screening:   Your primary caregiver may do yearly screening for colorectal cancer. It is recommended in all men and women over 45 years old. You may have screening earlier if you have colon disease or a family history of colorectal cancer.         ROS:   Review of Systems   Constitutional:  Negative for chills and fever.    Respiratory:  Negative for cough and shortness of breath.    Cardiovascular:  Negative for chest pain and leg swelling.   Gastrointestinal:  Negative for abdominal pain, diarrhea, nausea and vomiting.   Genitourinary:  Negative for dysuria and hematuria.   Neurological:  Negative for dizziness and headaches.        Allergies: Butorphanol, Toradol [ketorolac tromethamine], Oxycodone, Benztropine, Penicillins, Zolpidem, and Medical tape    Medications:   Current Outpatient Medications:     albuterol (2.5 mg/3 mL) 0.083 % nebulizer solution, Take 3 mL (2.5 mg total) by nebulization every 6 (six) hours as needed for wheezing or shortness of breath, Disp: 180 mL, Rfl: 5    albuterol (PROVENTIL HFA,VENTOLIN HFA) 90 mcg/act inhaler, INHALE ONE PUFF BY MOUTH AND INTO THE LUNGS EVERY 6 HOURS AS NEEDED FOR WHEEZING, Disp: 36 g, Rfl: 3    Alcohol Swabs (Pharmacist Choice Alcohol) PADS, Apply topically 4 (four) times a day Use as directed, Disp: 300 each, Rfl: 5    aspirin (ECOTRIN LOW STRENGTH) 81 mg EC tablet, TAKE ONE TABLET BY MOUTH ONCE DAILY, Disp: 30 tablet, Rfl: 5    atorvastatin (LIPITOR) 80 mg tablet, TAKE ONE TABLET BY MOUTH DAILY AT BEDTIME, Disp: 180 tablet, Rfl: 3    B-D UF III MINI PEN NEEDLES 31G X 5 MM MISC, Pt uses 5 pen needles daily, Disp: 500 each, Rfl: 0    cholecalciferol (VITAMIN D3) 1,000 units tablet, Take 2 tablets (2,000 Units total) by mouth daily, Disp: 60 tablet, Rfl: 3    citalopram (CeleXA) 40 mg tablet, TAKE ONE TABLET BY MOUTH DAILY, Disp: 90 tablet, Rfl: 1    Continuous Glucose Sensor (Dexcom G7 Sensor), Use 1 Device every 10 days, Disp: 9 each, Rfl: 3    dicyclomine (BENTYL) 20 mg tablet, Take twice daily (am and before bedtime), may take additional 2 doses per day, max 4x/day (every 6 hours if needed), Disp: 90 tablet, Rfl: 0    docusate sodium (COLACE) 100 mg capsule, TAKE ONE CAPSULE BY MOUTH TWICE DAILY, Disp: 240 capsule, Rfl: 3    ergocalciferol (VITAMIN D2) 50,000 units, TAKE ONE  CAPSULE BY MOUTH ONCE A WEEK, Disp: 12 capsule, Rfl: 3    famotidine (PEPCID) 40 MG tablet, TAKE ONE TABLET BY MOUTH TWO HOURS PRIOR TO BEDTIME, Disp: 30 tablet, Rfl: 5    fenofibrate micronized (LOFIBRA) 134 MG capsule, Take 1 capsule (134 mg total) by mouth daily with breakfast, Disp: 90 capsule, Rfl: 1    Glucagon (Baqsimi One Pack) 3 MG/DOSE POWD, 1 each into each nostril if needed (PRN for hypoglycemia emergency), Disp: 1 each, Rfl: 0    Insulin Regular Human, Conc, (HumuLIN R U-500 KwikPen) 500 units/mL CONCENTRATED U-500 injection pen, E11.65 inject 85 units with breakfast, 25 units with lunch 25 with dinner, or as directed TDD up to 200 units, Disp: , Rfl:     Iron Heme Polypeptide 12 MG TABS, Take 1 tablet by mouth, Disp: , Rfl:     Jardiance 25 MG TABS, TAKE ONE TABLET BY MOUTH EVERY MORNING, Disp: 90 tablet, Rfl: 1    levETIRAcetam (KEPPRA) 500 mg tablet, TAKE ONE TABLET BY MOUTH TWICE DAILY, Disp: 180 tablet, Rfl: 1    linaCLOtide (Linzess) 72 MCG CAPS, Take 1 capsule by mouth daily before breakfast, Disp: 30 capsule, Rfl: 0    Magnesium Citrate 200 MG TABS, Take 1 tablet by mouth 2 (two) times a day, Disp: 180 tablet, Rfl: 1    meclizine (ANTIVERT) 25 mg tablet, Take 1 tablet (25 mg total) by mouth 3 (three) times a day as needed for dizziness, Disp: 30 tablet, Rfl: 0    methocarbamol (ROBAXIN) 500 mg tablet, Take 1 tablet (500 mg total) by mouth 3 (three) times a day as needed for muscle spasms, Disp: 90 tablet, Rfl: 0    metoclopramide (Reglan) 10 mg tablet, Take 0.5 tablets (5 mg total) by mouth every 6 (six) hours, Disp: 30 tablet, Rfl: 0    metoprolol tartrate (LOPRESSOR) 25 mg tablet, TAKE ONE TABLET BY MOUTH TWICE DAILY, Disp: 180 tablet, Rfl: 1    multivitamin (THERAGRAN) TABS, Take 1 tablet by mouth every morning, Disp: , Rfl:     ondansetron (ZOFRAN) 4 mg tablet, TAKE 1 TABLET (4 MG TOTAL) BY MOUTH EVERY 8 (EIGHT) HOURS AS NEEDED FOR NAUSEA OR VOMITING, Disp: 30 tablet, Rfl: 1    OneTouch  Ultra test strip, USE 4 TIMES A DAY, Disp: 400 each, Rfl: 0    oxybutynin (DITROPAN-XL) 10 MG 24 hr tablet, TAKE ONE TABLET BY MOUTH DAILY AT BEDTIME, Disp: 100 tablet, Rfl: 1    Pharmacist Choice Lancets MISC, USE AS DIRECTED, Disp: 100 each, Rfl: 1    pregabalin (LYRICA) 100 mg capsule, TAKE ONE CAPSULE BY MOUTH THREE TIMES A DAY, Disp: 120 capsule, Rfl: 1    Restasis 0.05 % ophthalmic emulsion, , Disp: , Rfl:     tirzepatide (Mounjaro) 10 MG/0.5ML, Inject 0.5 mL (10 mg total) under the skin every 7 days, Disp: 2 mL, Rfl: 3    Past Medical History:   Diagnosis Date    Atypical chest pain     Bilateral calf pain 2024    Diabetes mellitus (HCC)     Gastroparesis     GERD (gastroesophageal reflux disease)     Hyperlipidemia     Hypertension     MRSA bacteremia 2023    Psychiatric disorder     Sciatica     Seizure disorder (HCC)      Past Surgical History:   Procedure Laterality Date    APPENDECTOMY      BREAST BIOPSY Left  benign     SECTION      CHOLECYSTECTOMY      COLONOSCOPY      HYSTERECTOMY      IR PICC PLACEMENT SINGLE LUMEN  2023    MI LAPAROSCOPIC APPENDECTOMY N/A 2021    Procedure: APPENDECTOMY LAPAROSCOPIC;  Surgeon: Onel Martin MD;  Location:  MAIN OR;  Service: General    MI LAPS ABD PRTM&OMENTUM DX W/WO SPEC BR/WA SPX N/A 2021    Procedure: LAPAROSCOPY DIAGNOSTIC, EXTENSIVE DESI;  Surgeon: Onel Martin MD;  Location:  MAIN OR;  Service: General    TUBAL LIGATION      TYMPANOSTOMY TUBE PLACEMENT Bilateral 2024    UPPER GASTROINTESTINAL ENDOSCOPY       Family History   Problem Relation Age of Onset    No Known Problems Mother     No Known Problems Father     No Known Problems Daughter     No Known Problems Maternal Grandmother     No Known Problems Paternal Grandmother     No Known Problems Paternal Aunt     No Known Problems Paternal Aunt     No Known Problems Paternal Aunt       reports that she quit smoking about 34 years ago. Her smoking use  "included cigarettes. She has never used smokeless tobacco. She reports that she does not drink alcohol and does not use drugs.      Physical Exam:   Vitals:    11/18/24 0955   BP: 120/84   BP Location: Left arm   Patient Position: Sitting   Cuff Size: Standard   Pulse: 62   Temp: 97.5 °F (36.4 °C)   TempSrc: Tympanic   SpO2: 97%   Weight: 78.9 kg (174 lb)   Height: 5' 1\" (1.549 m)     Body mass index is 32.88 kg/m².  Physical Exam  Constitutional:       General: She is not in acute distress.  HENT:      Head: Normocephalic and atraumatic.   Cardiovascular:      Rate and Rhythm: Normal rate and regular rhythm.      Pulses: Normal pulses.      Heart sounds: No murmur heard.  Pulmonary:      Effort: Pulmonary effort is normal. No respiratory distress.      Breath sounds: Normal breath sounds.   Abdominal:      General: Bowel sounds are normal.      Palpations: Abdomen is soft.      Tenderness: There is no abdominal tenderness. There is right CVA tenderness. There is no left CVA tenderness.   Musculoskeletal:      Right lower leg: No edema.      Left lower leg: No edema.   Skin:     General: Skin is warm and dry.   Neurological:      General: No focal deficit present.      Mental Status: She is alert.   Psychiatric:         Mood and Affect: Mood normal.         Behavior: Behavior normal.            Lab Results:  Results for orders placed or performed in visit on 10/12/24   Comprehensive metabolic panel    Collection Time: 10/12/24  8:10 AM   Result Value Ref Range    Sodium 138 135 - 147 mmol/L    Potassium 4.4 3.5 - 5.3 mmol/L    Chloride 101 96 - 108 mmol/L    CO2 30 21 - 32 mmol/L    ANION GAP 7 4 - 13 mmol/L    BUN 21 5 - 25 mg/dL    Creatinine 1.07 0.60 - 1.30 mg/dL    Glucose, Fasting 302 (H) 65 - 99 mg/dL    Calcium 9.0 8.4 - 10.2 mg/dL    AST 15 13 - 39 U/L    ALT 13 7 - 52 U/L    Alkaline Phosphatase 64 34 - 104 U/L    Total Protein 6.2 (L) 6.4 - 8.4 g/dL    Albumin 3.8 3.5 - 5.0 g/dL    Total Bilirubin 0.29 " 0.20 - 1.00 mg/dL    eGFR 59 ml/min/1.73sq m   CBC and differential    Collection Time: 10/12/24  8:10 AM   Result Value Ref Range    WBC 7.13 4.31 - 10.16 Thousand/uL    RBC 4.72 3.81 - 5.12 Million/uL    Hemoglobin 12.4 11.5 - 15.4 g/dL    Hematocrit 39.6 34.8 - 46.1 %    MCV 84 82 - 98 fL    MCH 26.3 (L) 26.8 - 34.3 pg    MCHC 31.3 (L) 31.4 - 37.4 g/dL    RDW 14.3 11.6 - 15.1 %    MPV 12.1 8.9 - 12.7 fL    Platelets 364 149 - 390 Thousands/uL    nRBC 0 /100 WBCs    Segmented % 43 43 - 75 %    Immature Grans % 0 0 - 2 %    Lymphocytes % 40 14 - 44 %    Monocytes % 8 4 - 12 %    Eosinophils Relative 8 (H) 0 - 6 %    Basophils Relative 1 0 - 1 %    Absolute Neutrophils 3.03 1.85 - 7.62 Thousands/µL    Absolute Immature Grans 0.02 0.00 - 0.20 Thousand/uL    Absolute Lymphocytes 2.88 0.60 - 4.47 Thousands/µL    Absolute Monocytes 0.55 0.17 - 1.22 Thousand/µL    Eosinophils Absolute 0.58 0.00 - 0.61 Thousand/µL    Basophils Absolute 0.07 0.00 - 0.10 Thousands/µL   Albumin / creatinine urine ratio    Collection Time: 10/12/24  8:10 AM   Result Value Ref Range    Creatinine, Ur 39.6 Reference range not established. mg/dL    Albumin,U,Random <7.0 <20.0 mg/L    Albumin Creat Ratio     Urinalysis with microscopic    Collection Time: 10/12/24  8:10 AM   Result Value Ref Range    Color, UA Colorless     Clarity, UA Clear     Specific Gravity, UA 1.026 1.003 - 1.030    pH, UA 6.5 4.5, 5.0, 5.5, 6.0, 6.5, 7.0, 7.5, 8.0    Leukocytes, UA (A) Negative     Elevated glucose may cause decreased leukocyte values. See urine microscopic for UWBC result    Nitrite, UA Negative Negative    Protein, UA Negative Negative mg/dl    Glucose, UA >=1000 (1%) (A) Negative mg/dl    Ketones, UA Negative Negative mg/dl    Urobilinogen, UA <2.0 <2.0 mg/dl mg/dl    Bilirubin, UA Negative Negative    Occult Blood, UA Negative Negative    RBC, UA None Seen None Seen, 1-2 /hpf    WBC, UA None Seen None Seen, 1-2 /hpf    Epithelial Cells None Seen None  "Seen, Occasional /hpf    Bacteria, UA None Seen None Seen, Occasional /hpf   Magnesium    Collection Time: 10/12/24  8:10 AM   Result Value Ref Range    Magnesium 2.1 1.9 - 2.7 mg/dL   Phosphorus    Collection Time: 10/12/24  8:10 AM   Result Value Ref Range    Phosphorus 4.9 (H) 2.7 - 4.5 mg/dL   PTH, intact    Collection Time: 10/12/24  8:10 AM   Result Value Ref Range    PTH 37.9 12.0 - 88.0 pg/mL   Vitamin D 25 hydroxy    Collection Time: 10/12/24  8:10 AM   Result Value Ref Range    Vit D, 25-Hydroxy 64.8 30.0 - 100.0 ng/mL   Lipid panel    Collection Time: 10/12/24  8:10 AM   Result Value Ref Range    Cholesterol 146 See Comment mg/dL    Triglycerides 236 (H) See Comment mg/dL    HDL, Direct 37 (L) >=50 mg/dL    LDL Calculated 62 0 - 100 mg/dL    Non-HDL-Chol (CHOL-HDL) 109 mg/dl     *Note: Due to a large number of results and/or encounters for the requested time period, some results have not been displayed. A complete set of results can be found in Results Review.             Invalid input(s): \"ALBUMIN\"        Portions of the record may have been created with voice recognition software. Occasional wrong word or \"sound a like\" substitutions may have occurred due to the inherent limitations of voice recognition software. Read the chart carefully and recognize,    "

## 2024-11-21 ENCOUNTER — OFFICE VISIT (OUTPATIENT)
Age: 54
End: 2024-11-21
Payer: COMMERCIAL

## 2024-11-21 VITALS
OXYGEN SATURATION: 95 % | HEART RATE: 67 BPM | BODY MASS INDEX: 33.04 KG/M2 | TEMPERATURE: 98 F | WEIGHT: 175 LBS | HEIGHT: 61 IN

## 2024-11-21 DIAGNOSIS — K21.9 GASTROESOPHAGEAL REFLUX DISEASE WITHOUT ESOPHAGITIS: Chronic | ICD-10-CM

## 2024-11-21 DIAGNOSIS — R13.12 OROPHARYNGEAL DYSPHAGIA: Primary | ICD-10-CM

## 2024-11-21 DIAGNOSIS — K31.84 GASTROPARESIS: Chronic | ICD-10-CM

## 2024-11-21 PROCEDURE — 99214 OFFICE O/P EST MOD 30 MIN: CPT | Performed by: STUDENT IN AN ORGANIZED HEALTH CARE EDUCATION/TRAINING PROGRAM

## 2024-11-21 RX ORDER — SODIUM CHLORIDE, SODIUM LACTATE, POTASSIUM CHLORIDE, CALCIUM CHLORIDE 600; 310; 30; 20 MG/100ML; MG/100ML; MG/100ML; MG/100ML
125 INJECTION, SOLUTION INTRAVENOUS CONTINUOUS
OUTPATIENT
Start: 2024-11-21

## 2024-11-21 RX ORDER — PANTOPRAZOLE SODIUM 40 MG/1
40 TABLET, DELAYED RELEASE ORAL DAILY
Qty: 30 TABLET | Refills: 1 | Status: SHIPPED | OUTPATIENT
Start: 2024-11-21

## 2024-11-21 NOTE — PATIENT INSTRUCTIONS
We will schedule you for upper endoscopy (EGD)  We will start Protonix (pantoprazole) 40 mg daily  You may take the Pepcid 1-2 times a day AS NEEDED  You will need to hold the Moujaro AT LEAST 7 days

## 2024-11-22 NOTE — ASSESSMENT & PLAN NOTE
Has history of reflux although she denies any overt heartburn symptoms now.  ENT performed laryngoscopy which showed evidence of inflammation in the arytenoids.  This may be indicative of reflux and LPR.    Will schedule her for EGD for evaluation  Start Protonix 40 mg daily  May use Pepcid 1-2 times a day as needed    Orders:    pantoprazole (PROTONIX) 40 mg tablet; Take 1 tablet (40 mg total) by mouth daily

## 2024-11-22 NOTE — ASSESSMENT & PLAN NOTE
As history of gastroparesis which could certainly contribute to her symptoms although this would not explain overt dysphagia symptoms.    Continue management  Gastroparesis diet  Plan for EGD  Follow-up in the office

## 2024-11-23 DIAGNOSIS — R13.12 OROPHARYNGEAL DYSPHAGIA: ICD-10-CM

## 2024-11-23 DIAGNOSIS — K21.9 GASTROESOPHAGEAL REFLUX DISEASE WITHOUT ESOPHAGITIS: Chronic | ICD-10-CM

## 2024-11-25 RX ORDER — PANTOPRAZOLE SODIUM 40 MG/1
40 TABLET, DELAYED RELEASE ORAL 2 TIMES DAILY
Qty: 30 TABLET | Refills: 1 | Status: SHIPPED | OUTPATIENT
Start: 2024-11-25

## 2024-11-26 DIAGNOSIS — E11.42 DIABETIC POLYNEUROPATHY ASSOCIATED WITH TYPE 2 DIABETES MELLITUS (HCC): ICD-10-CM

## 2024-11-26 RX ORDER — PREGABALIN 100 MG/1
100 CAPSULE ORAL 3 TIMES DAILY
Qty: 90 CAPSULE | Refills: 5 | Status: SHIPPED | OUTPATIENT
Start: 2024-11-26

## 2024-11-27 DIAGNOSIS — I15.2 HYPERTENSION ASSOCIATED WITH DIABETES  (HCC): ICD-10-CM

## 2024-11-27 DIAGNOSIS — E11.59 HYPERTENSION ASSOCIATED WITH DIABETES  (HCC): ICD-10-CM

## 2024-11-27 RX ORDER — METOPROLOL TARTRATE 25 MG/1
25 TABLET, FILM COATED ORAL 2 TIMES DAILY
Qty: 180 TABLET | Refills: 1 | Status: SHIPPED | OUTPATIENT
Start: 2024-11-27

## 2024-12-06 DIAGNOSIS — Z79.4 TYPE 2 DIABETES MELLITUS WITH HYPERGLYCEMIA, WITH LONG-TERM CURRENT USE OF INSULIN (HCC): ICD-10-CM

## 2024-12-06 DIAGNOSIS — E11.65 TYPE 2 DIABETES MELLITUS WITH HYPERGLYCEMIA, WITH LONG-TERM CURRENT USE OF INSULIN (HCC): ICD-10-CM

## 2024-12-09 ENCOUNTER — TELEPHONE (OUTPATIENT)
Age: 54
End: 2024-12-09

## 2024-12-09 ENCOUNTER — OFFICE VISIT (OUTPATIENT)
Dept: ENDOCRINOLOGY | Facility: CLINIC | Age: 54
End: 2024-12-09
Payer: COMMERCIAL

## 2024-12-09 VITALS
HEART RATE: 71 BPM | HEIGHT: 61 IN | OXYGEN SATURATION: 94 % | WEIGHT: 175 LBS | TEMPERATURE: 98 F | BODY MASS INDEX: 33.04 KG/M2 | DIASTOLIC BLOOD PRESSURE: 72 MMHG | SYSTOLIC BLOOD PRESSURE: 104 MMHG

## 2024-12-09 DIAGNOSIS — Z79.4 TYPE 2 DIABETES MELLITUS WITH HYPERGLYCEMIA, WITH LONG-TERM CURRENT USE OF INSULIN (HCC): ICD-10-CM

## 2024-12-09 DIAGNOSIS — E11.65 TYPE 2 DIABETES MELLITUS WITH HYPERGLYCEMIA, WITH LONG-TERM CURRENT USE OF INSULIN (HCC): ICD-10-CM

## 2024-12-09 PROCEDURE — 99214 OFFICE O/P EST MOD 30 MIN: CPT | Performed by: PHYSICIAN ASSISTANT

## 2024-12-09 NOTE — PATIENT INSTRUCTIONS
Please call to coordinate dietitian appointment ASAP:   [phone]   This is for pre-pump assessment visit.

## 2024-12-09 NOTE — PROGRESS NOTES
Name: Haylee Balbuena      : 1970      MRN: [id number]  Encounter Provider: Adriana So PA-C  Encounter Date: 2024   Encounter department: [facility]  :  Assessment & Plan  Type 2 diabetes mellitus with hyperglycemia, with long-term current use of insulin (HCC)    Lab Results   Component Value Date    HGBA1C 11.5 (H) 10/12/2024   Improving BG control noted on CGM report - GMI 9.6% with Avg BG 264mg/dL    Recommend increase U500 from 35-25-25 to 45-30-30.   Continue Mounjaro and Jardiance as taking.     Will follow up CGM report again in 2 weeks for review.     EGD instructions - already instructed to STOP GLP1, SGLT2i prior to procedure. May continue U500 dosing as taking the night before procedure. Also recommended she HOLD AM dose of U500 the morning of procedure while NPO, to resume taking as scheduled at next meal.     Encouraged her to prioritize scheduling pre-pump assessment. May benefit from AID system given significant ongoing hyperglycemia on MDI insulin regimen.    Orders:    Tirzepatide 10 MG/0.5ML SOAJ; Inject 10 mg under the skin once a week      I have spent a total time of 35 minutes in caring for this patient on the day of the visit/encounter including Prognosis, Risks and benefits of tx options, Instructions for management, Impressions, Counseling / Coordination of care, Obtaining or reviewing history  , and CGM report review .     History of Present Illness   HPI  Haylee Balbuena is a 54 y.o. female who presents for close interval follow up for type 2 DM, which has been difficult to control on Novolin U500.   History obtained from: patient  DM complications:  CKD3, gastroparesis, DPN, fear of hypoglycemia   Most recent endocrinology visit: 24    Haylee is doing well overall. Just returned from [place] on 24.     She reports needing to lower her AM U500 dose due to fear of hypoglycemia.   Today she took Novolin U500  35 units before breakfast, 25 units before lunch and dinner.   She reports if FBG is in the 100s, however she will reduce AM dose to as low as 5-10 units.     Haylee experiences relative hypoglycemia symptoms if BG is around 100.     She continues on Mounjaro 10mg weekly - losing weight slowly, tolerating well so far without nausea, vomiting, diarrhea, constipation.     Haylee will have an EGD on 12/16/24 for evaluation of new dysphagia symptoms.    Haylee Balbuena   Device used: Dexcom - G7, Home use     Indication: Type 2 Diabetes  More than 72 hours of data was reviewed. Report to be scanned to chart.   Date Range: 11/26/24 - 12/9/24    Analysis of data:   % time CGM used: 93%  Average Glucose: 264mg/dL  GMI: 9.6%  SD : 88mg/dL  Time in Target Range: 17% in range  Time Above Range: 31% high, 52% very high  Time Below Range: 0% low, 0% very low    Interpretation of data:  improving hyperglycemia severity with more time in range compared to last report, although still very poorly controlled, post-prandial BG > 300 frequently.       Review of Systems   Gastrointestinal:         Dysphagia.   All other systems reviewed and are negative.    Medical History Reviewed by provider this encounter:  Tobacco  Allergies  Meds  Problems  Med Hx  Surg Hx  Fam Hx     .  Current Outpatient Medications on File Prior to Visit   Medication Sig Dispense Refill    albuterol (2.5 mg/3 mL) 0.083 % nebulizer solution Take 3 mL (2.5 mg total) by nebulization every 6 (six) hours as needed for wheezing or shortness of breath 180 mL 5    albuterol (PROVENTIL HFA,VENTOLIN HFA) 90 mcg/act inhaler INHALE ONE PUFF BY MOUTH AND INTO THE LUNGS EVERY 6 HOURS AS NEEDED FOR WHEEZING 36 g 3    Alcohol Swabs (Pharmacist Choice Alcohol) PADS Apply topically 4 (four) times a day Use as directed 300 each 5    aspirin (ECOTRIN LOW STRENGTH) 81 mg EC tablet TAKE ONE TABLET BY MOUTH ONCE DAILY 30 tablet 5    atorvastatin (LIPITOR) 80 mg tablet TAKE ONE  TABLET BY MOUTH DAILY AT BEDTIME 180 tablet 3    B-D UF III MINI PEN NEEDLES 31G X 5 MM MISC Pt uses 5 pen needles daily 500 each 0    cholecalciferol (VITAMIN D3) 1,000 units tablet Take 2 tablets (2,000 Units total) by mouth daily 60 tablet 3    citalopram (CeleXA) 40 mg tablet TAKE ONE TABLET BY MOUTH DAILY 90 tablet 1    Continuous Glucose Sensor (Dexcom G7 Sensor) Use 1 Device every 10 days 9 each 3    dicyclomine (BENTYL) 20 mg tablet Take twice daily (am and before bedtime), may take additional 2 doses per day, max 4x/day (every 6 hours if needed) 90 tablet 0    docusate sodium (COLACE) 100 mg capsule TAKE ONE CAPSULE BY MOUTH TWICE DAILY 240 capsule 3    ergocalciferol (VITAMIN D2) 50,000 units TAKE ONE CAPSULE BY MOUTH ONCE A WEEK 12 capsule 3    famotidine (PEPCID) 40 MG tablet TAKE ONE TABLET BY MOUTH TWO HOURS PRIOR TO BEDTIME 30 tablet 5    fenofibrate micronized (LOFIBRA) 134 MG capsule Take 1 capsule (134 mg total) by mouth daily with breakfast 90 capsule 1    Glucagon (Baqsimi One Pack) 3 MG/DOSE POWD 1 each into each nostril if needed (PRN for hypoglycemia emergency) 1 each 0    Insulin Regular Human, Conc, (HumuLIN R U-500 KwikPen) 500 units/mL CONCENTRATED U-500 injection pen E11.65 inject 85 units with breakfast, 25 units with lunch 25 with dinner, or as directed TDD up to 200 units      Iron Heme Polypeptide 12 MG TABS Take 1 tablet by mouth      Jardiance 25 MG TABS TAKE ONE TABLET BY MOUTH EVERY MORNING 90 tablet 1    levETIRAcetam (KEPPRA) 500 mg tablet TAKE ONE TABLET BY MOUTH TWICE DAILY 180 tablet 1    linaCLOtide (Linzess) 72 MCG CAPS Take 1 capsule by mouth daily before breakfast 30 capsule 0    Magnesium Citrate 200 MG TABS Take 1 tablet by mouth 2 (two) times a day 180 tablet 1    meclizine (ANTIVERT) 25 mg tablet Take 1 tablet (25 mg total) by mouth 3 (three) times a day as needed for dizziness 30 tablet 0    methocarbamol (ROBAXIN) 500 mg tablet Take 1 tablet (500 mg total) by mouth  "3 (three) times a day as needed for muscle spasms 90 tablet 0    metoclopramide (Reglan) 10 mg tablet Take 0.5 tablets (5 mg total) by mouth every 6 (six) hours 30 tablet 0    metoprolol tartrate (LOPRESSOR) 25 mg tablet TAKE ONE TABLET BY MOUTH TWICE DAILY 180 tablet 1    multivitamin (THERAGRAN) TABS Take 1 tablet by mouth every morning      ondansetron (ZOFRAN) 4 mg tablet TAKE 1 TABLET (4 MG TOTAL) BY MOUTH EVERY 8 (EIGHT) HOURS AS NEEDED FOR NAUSEA OR VOMITING 30 tablet 1    OneTouch Ultra test strip USE 4 TIMES A  each 0    oxybutynin (DITROPAN-XL) 10 MG 24 hr tablet TAKE ONE TABLET BY MOUTH DAILY AT BEDTIME 100 tablet 1    pantoprazole (PROTONIX) 40 mg tablet TAKE ONE TABLET BY MOUTH TWICE DAILY 30 tablet 1    Pharmacist Choice Lancets MISC USE AS DIRECTED 100 each 1    pregabalin (LYRICA) 100 mg capsule TAKE ONE CAPSULE BY MOUTH THREE TIMES A DAY 90 capsule 5    Restasis 0.05 % ophthalmic emulsion       [DISCONTINUED] Tirzepatide 10 MG/0.5ML SOAJ Inject 10 mg under the skin once a week 2 mL 2     No current facility-administered medications on file prior to visit.         Objective   /72 (BP Location: Left arm, Patient Position: Sitting)   Pulse 71   Temp 98 °F (36.7 °C)   Ht 5' 1\" (1.549 m)   Wt 79.4 kg (175 lb)   SpO2 94%   BMI 33.07 kg/m²      Physical Exam  Vitals reviewed.   HENT:      Head: Normocephalic.   Pulmonary:      Effort: No respiratory distress.   Neurological:      Mental Status: She is oriented to person, place, and time. Mental status is at baseline.   Psychiatric:         Mood and Affect: Mood normal.         Behavior: Behavior normal.         "

## 2024-12-09 NOTE — TELEPHONE ENCOUNTER
PA for Tirzepatide 10 MG/0.5ML SOAJ SUBMITTED to [employer]    via      [x][employer]-Auth ID # [id number]  NDC # [id number]      []PA sent as URGENT    All office notes, labs and other pertaining documents and studies sent. Clinical questions answered. Awaiting determination from insurance company.     Turnaround time for your insurance to make a decision on your Prior Authorization can take 7-21 business days.

## 2024-12-10 NOTE — TELEPHONE ENCOUNTER
PA for Tirzepatide 10 MG/0.5ML SOAJ  CANCELLED due to     []Approval on file-dates approved   [x]Medication already on Formulary  []Brand Name Preferred  []Patient no longer covered by insurance    Patient advised by     []My Chart Message  []Phone call    Message sent to office clinical pool   No    Scanned into Media  yes

## 2024-12-16 ENCOUNTER — ANESTHESIA (OUTPATIENT)
Dept: GASTROENTEROLOGY | Facility: HOSPITAL | Age: 54
End: 2024-12-16
Payer: COMMERCIAL

## 2024-12-16 ENCOUNTER — PREP FOR PROCEDURE (OUTPATIENT)
Age: 54
End: 2024-12-16

## 2024-12-16 ENCOUNTER — HOSPITAL ENCOUNTER (OUTPATIENT)
Dept: GASTROENTEROLOGY | Facility: HOSPITAL | Age: 54
Setting detail: OUTPATIENT SURGERY
Discharge: HOME | End: 2024-12-16
Attending: STUDENT IN AN ORGANIZED HEALTH CARE EDUCATION/TRAINING PROGRAM
Payer: COMMERCIAL

## 2024-12-16 ENCOUNTER — TELEPHONE (OUTPATIENT)
Age: 54
End: 2024-12-16

## 2024-12-16 ENCOUNTER — ANESTHESIA EVENT (OUTPATIENT)
Dept: GASTROENTEROLOGY | Facility: HOSPITAL | Age: 54
End: 2024-12-16
Payer: COMMERCIAL

## 2024-12-16 VITALS
RESPIRATION RATE: 15 BRPM | DIASTOLIC BLOOD PRESSURE: 58 MMHG | OXYGEN SATURATION: 95 % | HEART RATE: 73 BPM | BODY MASS INDEX: 33.04 KG/M2 | TEMPERATURE: 97.1 F | WEIGHT: 175 LBS | SYSTOLIC BLOOD PRESSURE: 115 MMHG | HEIGHT: 61 IN

## 2024-12-16 DIAGNOSIS — R13.12 OROPHARYNGEAL DYSPHAGIA: ICD-10-CM

## 2024-12-16 DIAGNOSIS — K21.9 GASTROESOPHAGEAL REFLUX DISEASE WITHOUT ESOPHAGITIS: Chronic | ICD-10-CM

## 2024-12-16 DIAGNOSIS — K31.84 GASTROPARESIS: Primary | Chronic | ICD-10-CM

## 2024-12-16 DIAGNOSIS — K21.9 GASTROESOPHAGEAL REFLUX DISEASE WITHOUT ESOPHAGITIS: ICD-10-CM

## 2024-12-16 LAB
GLUCOSE SERPL-MCNC: 436 MG/DL (ref 65–140)
GLUCOSE SERPL-MCNC: 469 MG/DL (ref 65–140)

## 2024-12-16 PROCEDURE — 43235 EGD DIAGNOSTIC BRUSH WASH: CPT | Performed by: STUDENT IN AN ORGANIZED HEALTH CARE EDUCATION/TRAINING PROGRAM

## 2024-12-16 PROCEDURE — 82948 REAGENT STRIP/BLOOD GLUCOSE: CPT

## 2024-12-16 RX ORDER — LIDOCAINE HYDROCHLORIDE 20 MG/ML
INJECTION, SOLUTION EPIDURAL; INFILTRATION; INTRACAUDAL; PERINEURAL AS NEEDED
Status: DISCONTINUED | OUTPATIENT
Start: 2024-12-16 | End: 2024-12-16

## 2024-12-16 RX ORDER — SODIUM CHLORIDE, SODIUM LACTATE, POTASSIUM CHLORIDE, CALCIUM CHLORIDE 600; 310; 30; 20 MG/100ML; MG/100ML; MG/100ML; MG/100ML
125 INJECTION, SOLUTION INTRAVENOUS CONTINUOUS
Status: DISCONTINUED | OUTPATIENT
Start: 2024-12-16 | End: 2024-12-16

## 2024-12-16 RX ORDER — PROPOFOL 10 MG/ML
INJECTION, EMULSION INTRAVENOUS AS NEEDED
Status: DISCONTINUED | OUTPATIENT
Start: 2024-12-16 | End: 2024-12-16

## 2024-12-16 RX ORDER — SODIUM CHLORIDE, SODIUM LACTATE, POTASSIUM CHLORIDE, CALCIUM CHLORIDE 600; 310; 30; 20 MG/100ML; MG/100ML; MG/100ML; MG/100ML
125 INJECTION, SOLUTION INTRAVENOUS CONTINUOUS
OUTPATIENT
Start: 2024-12-16

## 2024-12-16 RX ORDER — PANTOPRAZOLE SODIUM 40 MG/1
40 TABLET, DELAYED RELEASE ORAL 2 TIMES DAILY
Qty: 60 TABLET | Refills: 2 | Status: SHIPPED | OUTPATIENT
Start: 2024-12-16

## 2024-12-16 RX ORDER — GLYCOPYRROLATE 0.2 MG/ML
INJECTION INTRAMUSCULAR; INTRAVENOUS AS NEEDED
Status: DISCONTINUED | OUTPATIENT
Start: 2024-12-16 | End: 2024-12-16

## 2024-12-16 RX ADMIN — SODIUM CHLORIDE, SODIUM LACTATE, POTASSIUM CHLORIDE, AND CALCIUM CHLORIDE: .6; .31; .03; .02 INJECTION, SOLUTION INTRAVENOUS at 07:04

## 2024-12-16 RX ADMIN — GLYCOPYRROLATE 0.2 MG: 0.2 INJECTION, SOLUTION INTRAMUSCULAR; INTRAVENOUS at 07:23

## 2024-12-16 RX ADMIN — LIDOCAINE HYDROCHLORIDE 60 MG: 20 INJECTION, SOLUTION EPIDURAL; INFILTRATION; INTRACAUDAL; PERINEURAL at 07:27

## 2024-12-16 RX ADMIN — PROPOFOL 100 MG: 10 INJECTION, EMULSION INTRAVENOUS at 07:27

## 2024-12-16 NOTE — ANESTHESIA PREPROCEDURE EVALUATION
Procedure:  EGD     this am, baseline hemoglobin A1c 11.5-12.5 - correlated with BS in the 300s    Patient asx, HD stable. Took her insulin last night. Will proceed with EGD today given that it is a low risk procedure and under sedation    NPO appropriate  Relevant Problems   CARDIO   (+) Hypercholesteremia   (+) Hypertension   (+) Hypertriglyceridemia   (+) Migraine      ENDO   (+) Type 2 diabetes mellitus with diabetic autonomic neuropathy, with long-term current use of insulin (HCC)      GI/HEPATIC   (+) GERD (gastroesophageal reflux disease)      /RENAL   (+) Benign hypertension with CKD (chronic kidney disease) stage III (HCC)   (+) Cyst of kidney, acquired   (+) Stage 3a chronic kidney disease (HCC)      HEMATOLOGY   (+) Anemia   (+) Iron deficiency anemia secondary to inadequate dietary iron intake      MUSCULOSKELETAL   (+) Chronic low back pain with sciatica      NEURO/PSYCH   (+) Chronic low back pain with sciatica   (+) Depression with anxiety   (+) Diabetic polyneuropathy associated with type 2 diabetes mellitus (HCC)   (+) Migraine   (+) Seizure disorder (HCC)   (+) Type 2 diabetes mellitus with diabetic autonomic neuropathy, with long-term current use of insulin (HCC)      PULMONARY   (+) Mild intermittent asthma without complication        Left Ventricle: Left ventricular cavity size is normal. Wall thickness is normal. The left ventricular ejection fraction is 60% by visual estimation. Systolic function is normal. Wall motion is normal. Diastolic function is normal for age.  Left atrial filling pressure is normal.    Atrial Septum: No patent foramen ovale detected, confirmed at rest using agitated saline contrast, confirmed by provocation with cough, using agitated saline contrast.    Mitral Valve: There is mild regurgitation.    Tricuspid Valve: Right ventricular systolic pressure could not be assessed.    Compared to previous exam, there is no signficant change.  Physical  Exam    Airway    Mallampati score: III  TM Distance: >3 FB       Dental        Cardiovascular  Cardiovascular exam normal    Pulmonary  Pulmonary exam normal     Other Findings  post-pubertal.      Anesthesia Plan  ASA Score- 3     Anesthesia Type- IV sedation with anesthesia with ASA Monitors.         Additional Monitors:     Airway Plan:            Plan Factors-    Chart reviewed. EKG reviewed.  Existing labs reviewed. Patient summary reviewed.    Patient is not a current smoker.              Induction- intravenous.    Postoperative Plan-         Informed Consent- Anesthetic plan and risks discussed with patient.  I personally reviewed this patient with the CRNA. Discussed and agreed on the Anesthesia Plan with the CRNA..

## 2024-12-16 NOTE — TELEPHONE ENCOUNTER
----- Message from Denny Hidalgo DO sent at 12/16/2024  7:31 AM EST -----  Regarding: Repeat EGD  Hi,    Please schedule repeat EGD in 3 months.    Thanks.    Denny Hidalgo D.O.  [facility]  Division of Gastroenterology & Hepatology  Available on [employer] secure chat  [email or web address]

## 2024-12-16 NOTE — ANESTHESIA POSTPROCEDURE EVALUATION
Post-Op Assessment Note    CV Status:  Stable  Pain Score: 0    Pain management: adequate       Mental Status:  Arousable   Hydration Status:  Stable   PONV Controlled:  None   Airway Patency:  Patent     Post Op Vitals Reviewed: Yes    No anethesia notable event occurred.    Staff: CRNA           Last Filed PACU Vitals:  Vitals Value Taken Time   Temp 97.1 °F (36.2 °C) 12/16/24 0736   Pulse 75 12/16/24 0736   /59 12/16/24 0736   Resp 20 12/16/24 0736   SpO2 94 % 12/16/24 0736       Modified Coral:  No data recorded

## 2024-12-16 NOTE — H&P
[facility]  History & Physical     PATIENT INFO     Name: Haylee Balbuena  YOB: 1970   Age: 54 y.o.   Sex: female   MRN: [id number]     HISTORY OF PRESENT ILLNESS     Haylee Balbuena is a 54 y.o. year old female who presents for EGD for dysphagia. No antithrombotics or anticoagulants.     REVIEW OF SYSTEMS     Per the HPI, and otherwise unremarkable.    Historical Information   Past Medical History:   Diagnosis Date    Atypical chest pain     Bilateral calf pain 2024    Diabetes mellitus (HCC)     Gastroparesis     GERD (gastroesophageal reflux disease)     Hyperlipidemia     Hypertension     MRSA bacteremia 2023    Psychiatric disorder     Sciatica     Seizure disorder (HCC)      Past Surgical History:   Procedure Laterality Date    APPENDECTOMY      BREAST BIOPSY Left  benign     SECTION      CHOLECYSTECTOMY      COLONOSCOPY      HYSTERECTOMY      IR PICC PLACEMENT SINGLE LUMEN  2023    VA LAPAROSCOPIC APPENDECTOMY N/A 2021    Procedure: APPENDECTOMY LAPAROSCOPIC;  Surgeon: Onel Martin MD;  Location:  MAIN OR;  Service: General    VA LAPS ABD PRTM&OMENTUM DX W/WO SPEC BR/WA SPX N/A 2021    Procedure: LAPAROSCOPY DIAGNOSTIC, EXTENSIVE DESI;  Surgeon: Onel Martin MD;  Location:  MAIN OR;  Service: General    TUBAL LIGATION      TYMPANOSTOMY TUBE PLACEMENT Bilateral 2024    UPPER GASTROINTESTINAL ENDOSCOPY       Social History   Social History     Substance and Sexual Activity   Alcohol Use Never    Comment: occ     Social History     Substance and Sexual Activity   Drug Use Never     Social History     Tobacco Use   Smoking Status Former    Current packs/day: 0.00    Types: Cigarettes    Quit date:     Years since quittin.9   Smokeless Tobacco Never     Family History   Problem Relation Age of Onset    No Known Problems Mother     No Known Problems Father     No Known Problems Daughter     No Known  Problems Maternal Grandmother     No Known Problems Paternal Grandmother     No Known Problems Paternal Aunt     No Known Problems Paternal Aunt     No Known Problems Paternal Aunt         MEDICATIONS & ALLERGIES     Current Outpatient Medications   Medication Instructions    albuterol (PROVENTIL HFA,VENTOLIN HFA) 90 mcg/act inhaler INHALE ONE PUFF BY MOUTH AND INTO THE LUNGS EVERY 6 HOURS AS NEEDED FOR WHEEZING    albuterol 2.5 mg, Nebulization, Every 6 hours PRN    Alcohol Swabs (Pharmacist Choice Alcohol) PADS Apply externally, 4 times daily, Use as directed    aspirin (ECOTRIN LOW STRENGTH) 81 mg, Oral, Daily    atorvastatin (LIPITOR) 80 mg, Oral, Daily at bedtime    B-D UF III MINI PEN NEEDLES 31G X 5 MM MISC Pt uses 5 pen needles daily    cholecalciferol (VITAMIN D3) 2,000 Units, Oral, Daily    citalopram (CELEXA) 40 mg, Oral, Daily    Continuous Glucose Sensor (Dexcom G7 Sensor) 1 Device, Does not apply, Every 10 days    dicyclomine (BENTYL) 20 mg tablet Take twice daily (am and before bedtime), may take additional 2 doses per day, max 4x/day (every 6 hours if needed)    docusate sodium (COLACE) 100 mg capsule TAKE ONE CAPSULE BY MOUTH TWICE DAILY    ergocalciferol (VITAMIN D2) 50,000 Units, Oral, Weekly    famotidine (PEPCID) 40 MG tablet TAKE ONE TABLET BY MOUTH TWO HOURS PRIOR TO BEDTIME    fenofibrate micronized (LOFIBRA) 134 mg, Oral, Daily with breakfast    Glucagon (Baqsimi One Pack) 3 MG/DOSE POWD 1 each, Nasal, As needed    Insulin Regular Human, Conc, (HumuLIN R U-500 KwikPen) 500 units/mL CONCENTRATED U-500 injection pen E11.65 inject 85 units with breakfast, 25 units with lunch 25 with dinner, or as directed TDD up to 200 units    Iron Heme Polypeptide 12 MG TABS 1 tablet    Jardiance 25 mg, Oral, Daily    levETIRAcetam (KEPPRA) 500 mg, Oral, 2 times daily    linaCLOtide (Linzess) 72 MCG CAPS 1 capsule, Oral, Daily before breakfast    Magnesium Citrate 200 MG TABS 1 tablet, Oral, 2 times daily     "meclizine (ANTIVERT) 25 mg, Oral, 3 times daily PRN    methocarbamol (ROBAXIN) 500 mg, Oral, 3 times daily PRN    metoclopramide (REGLAN) 5 mg, Oral, Every 6 hours    metoprolol tartrate (LOPRESSOR) 25 mg, Oral, 2 times daily    multivitamin (THERAGRAN) TABS 1 tablet, Every morning    ondansetron (ZOFRAN) 4 mg, Oral, Every 8 hours PRN    OneTouch Ultra test strip USE 4 TIMES A DAY    oxybutynin (DITROPAN-XL) 10 mg, Oral, Daily at bedtime    pantoprazole (PROTONIX) 40 mg, Oral, 2 times daily    Pharmacist Choice Lancets MISC USE AS DIRECTED    pregabalin (LYRICA) 100 mg, Oral, 3 times daily    Restasis 0.05 % ophthalmic emulsion No dose, route, or frequency recorded.    Tirzepatide 10 mg, Subcutaneous, Weekly     Allergies   Allergen Reactions    Butorphanol Hallucinations    Toradol [Ketorolac Tromethamine] GI Intolerance    Oxycodone GI Intolerance    Benztropine Hallucinations     Was seeing things that were not right    Penicillins     Zolpidem     Medical Tape Rash     Ok with paper tape        PHYSICAL EXAM      Objective   Blood pressure 138/63, pulse 63, temperature 97.8 °F (36.6 °C), temperature source Temporal, resp. rate 16, height 5' 1\" (1.549 m), weight 79.4 kg (175 lb), SpO2 95%, not currently breastfeeding. Body mass index is 33.07 kg/m².    General Appearance:   Alert, cooperative, no distress   Lungs:   Equal chest rise, respirations unlabored    Heart:   Regular rate and rhythm   Abdomen:   Soft, non-tender, non-distended; normal bowel sounds; no masses, no organomegaly    Extremities:   No edema       ASSESSMENT & PLAN     This is a 54 y.o. year old female here for EGD, and she is stable and optimized for her procedure.      Denny Hidalgo D.O.  [facility]  Division of Gastroenterology & Hepatology  Available on TigerText  [email or web address]    ** Please Note: This note is constructed using a voice recognition dictation system. **  "

## 2025-01-03 RX ORDER — FAMOTIDINE 20 MG/1
20 TABLET, FILM COATED ORAL DAILY
COMMUNITY
Start: 2024-11-11

## 2025-01-06 ENCOUNTER — CONSULT (OUTPATIENT)
Dept: UROLOGY | Facility: CLINIC | Age: 55
End: 2025-01-06
Payer: COMMERCIAL

## 2025-01-06 VITALS
BODY MASS INDEX: 34.36 KG/M2 | DIASTOLIC BLOOD PRESSURE: 70 MMHG | OXYGEN SATURATION: 98 % | SYSTOLIC BLOOD PRESSURE: 130 MMHG | WEIGHT: 182 LBS | RESPIRATION RATE: 17 BRPM | HEIGHT: 61 IN | HEART RATE: 75 BPM | TEMPERATURE: 97.8 F

## 2025-01-06 DIAGNOSIS — N28.1 CYST OF KIDNEY, ACQUIRED: Primary | ICD-10-CM

## 2025-01-06 DIAGNOSIS — E78.00 HYPERCHOLESTEREMIA: ICD-10-CM

## 2025-01-06 DIAGNOSIS — Z85.528 HISTORY OF RENAL CELL CARCINOMA: ICD-10-CM

## 2025-01-06 PROCEDURE — 99203 OFFICE O/P NEW LOW 30 MIN: CPT

## 2025-01-06 NOTE — ASSESSMENT & PLAN NOTE
Patient has a history of right renal cell carcinoma status post right partial nephrectomy in 2017.  She follows with nephrology and had a renal ultrasound completed in April 2024 which showed concern for interval increase size of a simple right upper pole renal cyst.  Ultrasound from April 2024 measured at 4.0 cm previously measuring 2.3 cm in July 2023.  Repeat ultrasound in November 2024 showed stable size of a 2.3 cm right upper pole of simple appearing renal cyst.  She has an additional 1.1 cm left lower pole simple cyst as well.  I reassured the patient that her ultrasound findings are benign and do not require further workup.  She does experience right upper quadrant abdominal pain with radiation to right flank at times.  Based on US findings with stable size of right upper pole renal cyst this would not account for her pain. This is a small cyst and without rapid growth over  short interval course this would not account for her pain. US imaging in April was likely overestimated size measurement and repeat US shows stable size.  Due to her history of RCC we will plan to repeat a Renal US in about 4 months to again ensure size stability. If this is stable no further workup or imaging is necessary.   Follow-up in 4 months.     Orders:    Ambulatory Referral to Urology    US kidney and bladder; Future

## 2025-01-06 NOTE — PROGRESS NOTES
Name: Haylee Balbuena      : 1970      MRN: [id number]  Encounter Provider: SYLWIA Vicente  Encounter Date: 2025   Encounter department: [facility] [place]    :  Assessment & Plan  Cyst of kidney, acquired  Patient has a history of right renal cell carcinoma status post right partial nephrectomy in .  She follows with nephrology and had a renal ultrasound completed in 2024 which showed concern for interval increase size of a simple right upper pole renal cyst.  Ultrasound from 2024 measured at 4.0 cm previously measuring 2.3 cm in 2023.  Repeat ultrasound in 2024 showed stable size of a 2.3 cm right upper pole of simple appearing renal cyst.  She has an additional 1.1 cm left lower pole simple cyst as well.  I reassured the patient that her ultrasound findings are benign and do not require further workup.  She does experience right upper quadrant abdominal pain with radiation to right flank at times.  Based on US findings with stable size of right upper pole renal cyst this would not account for her pain. This is a small cyst and without rapid growth over  short interval course this would not account for her pain. US imaging in April was likely overestimated size measurement and repeat US shows stable size.  Due to her history of RCC we will plan to repeat a Renal US in about 4 months to again ensure size stability. If this is stable no further workup or imaging is necessary.   Follow-up in 4 months.     Orders:    Ambulatory Referral to Urology    US kidney and bladder; Future    History of renal cell carcinoma  History of Right RCC s/p right partial nephrectomy in 2017  Renal ultrasound from 2024 does not show any evidence of recurrence or new solid renal masses.  Orders:    US kidney and bladder; Future        History of Present Illness     New patient to office with PMHx significant for hypertension, CKD, GERD, gastroparesis, T2DM,  seizure disorder, chronic back pain, history of renal cell carcinoma, hyperlipidemia    Patient has a history of renal cell carcinoma status post partial right nephrectomy in 2017 through [facility] urology.  She follows with nephrology for CKD stage IIIa.  She had a renal ultrasound in April 2024 which showed increased size of a right upper pole simple renal cyst.  Ultrasound that time measured at that 4.0 cm previously measuring 2.4 cm.  She would have a repeat renal ultrasound completed on 11/4/2024 which showed stable size of the right upper pole renal cyst measuring 2.3 cm. Previous measurement of 4.0 cm in April was presumed to be overestimated. Additional small 1.1 cm left simple renal cyst in the lower pole. No hydronephrosis bilaterally.               Review of Systems   Constitutional:  Negative for chills and fever.   HENT:  Negative for ear pain and sore throat.    Eyes:  Negative for pain and visual disturbance.   Respiratory:  Negative for cough and shortness of breath.    Cardiovascular:  Negative for chest pain and palpitations.   Gastrointestinal:  Negative for abdominal pain and vomiting.        Intermittent RUQ pain   Genitourinary:  Negative for dysuria and hematuria.   Musculoskeletal:  Negative for arthralgias and back pain.   Skin:  Negative for color change and rash.   Neurological:  Negative for seizures and syncope.   All other systems reviewed and are negative.    Objective   There were no vitals taken for this visit.    Physical Exam  Vitals and nursing note reviewed.   Constitutional:       General: She is not in acute distress.     Appearance: She is well-developed.   HENT:      Head: Normocephalic and atraumatic.   Eyes:      Conjunctiva/sclera: Conjunctivae normal.   Cardiovascular:      Rate and Rhythm: Normal rate and regular rhythm.      Heart sounds: No murmur heard.  Pulmonary:      Effort: Pulmonary effort is normal. No respiratory distress.      Breath sounds: Normal breath sounds.    Abdominal:      Palpations: Abdomen is soft.      Tenderness: There is no abdominal tenderness.   Musculoskeletal:         General: No swelling.      Cervical back: Neck supple.   Skin:     General: Skin is warm and dry.      Capillary Refill: Capillary refill takes less than 2 seconds.   Neurological:      Mental Status: She is alert.   Psychiatric:         Mood and Affect: Mood normal.           Imagin2024    RENAL ULTRASOUND     INDICATION: N28.1: Cyst of kidney, acquired.     COMPARISON: 2024; CT from 3/23/2024; 2023     TECHNIQUE: Ultrasound of the retroperitoneum was performed with a curvilinear transducer utilizing volumetric sweeps and still imaging techniques.     FINDINGS:     KIDNEYS:  Symmetric and normal size.  Right kidney: 9.6 x 4.8 x 3.7 cm. Volume 89.2 mL  Left kidney: 11.4 x 5.2 x 4.3 cm. Volume 133.4 mL     Right kidney  Normal echogenicity and contour.  No mass is identified. 2 x 2.3 cm cyst with simple cyst upper pole right kidney. Previously 4.0 cm in March (probably overestimated) and 2.3 cm in July.  No hydronephrosis.  No shadowing calculi.  No perinephric fluid collections.     Left kidney  Normal echogenicity and contour.  No mass is identified. 1.1 cm simple cyst lower pole left kidney.  No hydronephrosis.  No shadowing calculi.  No perinephric fluid collections.     URETERS:  Nonvisualized.     BLADDER:  Normally distended.  No focal thickening or mass lesions.  Ureteral jets not detected.        IMPRESSION:     2.3 cm right renal cyst without significant change.     Smaller simple cyst on the left is seen.            2024    RENAL ULTRASOUND     INDICATION: N18.31: Chronic kidney disease, stage 3a.     COMPARISON: 2023     TECHNIQUE: Ultrasound of the retroperitoneum was performed with a curvilinear transducer utilizing volumetric sweeps and still imaging techniques.     FINDINGS:     KIDNEYS:  Symmetric and normal size.  Right kidney: 9.6 x 5.0 x 6.2  cm. Volume 154.2 mL  Left kidney: 11.3 x 4.5 x 3.3 cm. Volume 88.6 mL     Right kidney  Normal echogenicity and contour.  No mass is identified. 2.1 x 4.0 x 1.8 cm upper pole simple renal cyst is seen, increased in size compared to the prior study.  No hydronephrosis.  No shadowing calculi.  No perinephric fluid collections.     Left kidney  Normal echogenicity and contour.  No mass is identified.  No hydronephrosis.  No shadowing calculi.  No perinephric fluid collections.     URETERS:  Nonvisualized.     BLADDER:  Normally distended.  No focal thickening or mass lesions.  Bilateral ureteral jets detected.        IMPRESSION:     4.0 cm right upper pole renal cyst has increased in size since the prior study. No nephrolithiasis or hydronephrosis bilaterally.          3/23/2024    CT ABDOMEN AND PELVIS WITH IV CONTRAST     INDICATION: Abdominal pain with nausea, vomiting, diarrhea, generalized weakness. Hypoglycemia. Patient recently returned from a cruise. History of diabetes.     COMPARISON: January 4, 2024, December 21, 2023, October 20, 2019.     TECHNIQUE: CT examination of the abdomen and pelvis was performed. Multiplanar 2D reformatted images were created from the source data.     This examination, like all CT scans performed in the [facility], was performed utilizing techniques to minimize radiation dose exposure, including the use of iterative reconstruction and automated exposure control. Radiation dose length   product (DLP) for this visit: 1037 mGy-cm     IV Contrast: 100 mL of iohexol (OMNIPAQUE)  Enteric Contrast: Not administered.     FINDINGS:     ABDOMEN     LOWER CHEST: Hypoventilatory changes.     LIVER/BILIARY TREE: The liver is enlarged, measuring approximately 22 cm craniocaudal dimension. There is mild hepatic steatosis. There is an approximately 3.4 cm low-density lesion within the anteromedial aspect of the right lobe of the liver (series 2   image 62, series 601 image 42);  this appears similar dating back to October 20, 2019. This may represent a hemangioma.     GALLBLADDER: Post cholecystectomy.     SPLEEN: Unremarkable.     PANCREAS: Unremarkable.     ADRENAL GLANDS: Unremarkable.     KIDNEYS/URETERS: Right parapelvic cysts. Small bilateral cysts and subcentimeter hypodensities too small to characterize. No hydronephrosis.     STOMACH AND BOWEL: No bowel obstruction. No pericolonic inflammatory change.     APPENDIX: Surgically absent.     ABDOMINOPELVIC CAVITY: No ascites. No pneumoperitoneum. No lymphadenopathy.     VESSELS: Mild atherosclerosis. No abdominal aortic aneurysm.     PELVIS     REPRODUCTIVE ORGANS: Prior hysterectomy.     URINARY BLADDER: The urinary bladder wall appears thickened.     ABDOMINAL WALL/INGUINAL REGIONS: Small fat-containing umbilical hernia.     BONES: No acute fracture or suspicious osseous lesion.     IMPRESSION:     The urinary bladder wall appears thickened. Correlation with urinalysis and urine culture and sensitivity is recommended.     Hepatomegaly. Mild hepatic steatosis. An approximately 3.4 cm low-density lesion within the right lobe of the liver appears similar dating back to 2019.     Other nonemergent findings as above.              Please Note:  Voice dictation software has been used to create this document. There may be inadvertent transcriptions errors.     SYLWIA Vicente 01/06/25

## 2025-01-07 DIAGNOSIS — K59.00 CONSTIPATION, UNSPECIFIED CONSTIPATION TYPE: ICD-10-CM

## 2025-01-07 RX ORDER — ATORVASTATIN CALCIUM 80 MG/1
80 TABLET, FILM COATED ORAL
Qty: 90 TABLET | Refills: 1 | Status: SHIPPED | OUTPATIENT
Start: 2025-01-07

## 2025-01-08 RX ORDER — DOCUSATE SODIUM 100 MG/1
100 CAPSULE, LIQUID FILLED ORAL 2 TIMES DAILY
Qty: 180 CAPSULE | Refills: 1 | Status: SHIPPED | OUTPATIENT
Start: 2025-01-08

## 2025-01-24 ENCOUNTER — TELEPHONE (OUTPATIENT)
Age: 55
End: 2025-01-24

## 2025-01-24 DIAGNOSIS — R56.9 SEIZURES (HCC): ICD-10-CM

## 2025-01-24 DIAGNOSIS — E11.65 TYPE 2 DIABETES MELLITUS WITH HYPERGLYCEMIA, WITH LONG-TERM CURRENT USE OF INSULIN (HCC): ICD-10-CM

## 2025-01-24 DIAGNOSIS — Z79.4 TYPE 2 DIABETES MELLITUS WITH HYPERGLYCEMIA, WITH LONG-TERM CURRENT USE OF INSULIN (HCC): ICD-10-CM

## 2025-01-24 RX ORDER — LEVETIRACETAM 500 MG/1
500 TABLET ORAL 2 TIMES DAILY
Qty: 180 TABLET | Refills: 1 | Status: SHIPPED | OUTPATIENT
Start: 2025-01-24

## 2025-01-24 NOTE — TELEPHONE ENCOUNTER
Patient said the pharmacy said she do not have another refill left    Reason for call:   [x] Refill   [] Prior Auth  [] Other:     Office:   [] PCP/Provider -   [x] Specialty/Provider - Endo    Medication: Tirzepatide 10 MG/0.5ML SOAJ     Dose/Frequency:  Inject 10 mg under the skin once a week,     Quantity: 2 ml    Pharmacy: [facility] [address]     Does the patient have enough for 3 days?   [] Yes   [x] No - Send as HP to POD

## 2025-01-24 NOTE — TELEPHONE ENCOUNTER
Pt wanted to know if there were any extra sensors in office for her Dexcom. Please call pt Monday morning to inform. Thank you

## 2025-01-31 DIAGNOSIS — Z79.4 TYPE 2 DIABETES MELLITUS WITH HYPERGLYCEMIA, WITH LONG-TERM CURRENT USE OF INSULIN (HCC): ICD-10-CM

## 2025-01-31 DIAGNOSIS — E11.65 TYPE 2 DIABETES MELLITUS WITH HYPERGLYCEMIA, WITH LONG-TERM CURRENT USE OF INSULIN (HCC): ICD-10-CM

## 2025-01-31 RX ORDER — ASPIRIN 81 MG/1
81 TABLET ORAL DAILY
Qty: 30 TABLET | Refills: 5 | Status: SHIPPED | OUTPATIENT
Start: 2025-01-31

## 2025-02-04 ENCOUNTER — OFFICE VISIT (OUTPATIENT)
Dept: ENDOCRINOLOGY | Facility: CLINIC | Age: 55
End: 2025-02-04
Payer: COMMERCIAL

## 2025-02-04 VITALS
TEMPERATURE: 97.2 F | SYSTOLIC BLOOD PRESSURE: 112 MMHG | BODY MASS INDEX: 33.79 KG/M2 | HEART RATE: 78 BPM | WEIGHT: 179 LBS | DIASTOLIC BLOOD PRESSURE: 72 MMHG | HEIGHT: 61 IN

## 2025-02-04 DIAGNOSIS — E11.65 TYPE 2 DIABETES MELLITUS WITH HYPERGLYCEMIA, WITH LONG-TERM CURRENT USE OF INSULIN (HCC): Primary | ICD-10-CM

## 2025-02-04 DIAGNOSIS — I10 PRIMARY HYPERTENSION: ICD-10-CM

## 2025-02-04 DIAGNOSIS — E66.9 OBESITY (BMI 35.0-39.9 WITHOUT COMORBIDITY): ICD-10-CM

## 2025-02-04 DIAGNOSIS — E11.43 TYPE 2 DIABETES MELLITUS WITH DIABETIC AUTONOMIC NEUROPATHY, WITH LONG-TERM CURRENT USE OF INSULIN (HCC): ICD-10-CM

## 2025-02-04 DIAGNOSIS — N18.31 STAGE 3A CHRONIC KIDNEY DISEASE (HCC): ICD-10-CM

## 2025-02-04 DIAGNOSIS — Z79.4 TYPE 2 DIABETES MELLITUS WITH HYPERGLYCEMIA, WITH LONG-TERM CURRENT USE OF INSULIN (HCC): Primary | ICD-10-CM

## 2025-02-04 DIAGNOSIS — Z79.4 TYPE 2 DIABETES MELLITUS WITH DIABETIC AUTONOMIC NEUROPATHY, WITH LONG-TERM CURRENT USE OF INSULIN (HCC): ICD-10-CM

## 2025-02-04 PROCEDURE — 99214 OFFICE O/P EST MOD 30 MIN: CPT | Performed by: STUDENT IN AN ORGANIZED HEALTH CARE EDUCATION/TRAINING PROGRAM

## 2025-02-04 PROCEDURE — 95251 CONT GLUC MNTR ANALYSIS I&R: CPT | Performed by: STUDENT IN AN ORGANIZED HEALTH CARE EDUCATION/TRAINING PROGRAM

## 2025-02-04 RX ORDER — INSULIN HUMAN 500 [IU]/ML
INJECTION, SOLUTION SUBCUTANEOUS
Qty: 6 ML | Refills: 1 | Status: SHIPPED | OUTPATIENT
Start: 2025-02-04

## 2025-02-04 NOTE — PROGRESS NOTES
Name: Haylee Balbuena      : 1970      MRN: [id number]  Encounter Provider: Anders Washington DO  Encounter Date: 2025   Encounter department: [facility]  :  Assessment & Plan  Type 2 diabetes mellitus with hyperglycemia, with long-term current use of insulin (HCC)  Chronically uncontrolled. GMI 9.4% on CGM. Hyperglycemia across the board. Increase u500 20% to 55-36-36 TID STEPHANY, c/w jardiance, hold mounjaro until post-EGD. Will refer to CDE for pump assessment. Pt may be interested in OP5 pump. She is on dexcom. She may be benefited by AID pump, but she will need education for carb counting/flex insulin first. Based on current insulin utilization, she is Rx 120 - 130 u/d of insulin. As such, she may either be considered for u100 vs u500 insulin in pump. RTC 2-mo     Lab Results   Component Value Date    HGBA1C 11.5 (H) 10/12/2024       Orders:  •  Ambulatory referral to Diabetic Education; Future    Obesity (BMI 35.0-39.9 without comorbidity)         Primary hypertension  Good control       Stage 3a chronic kidney disease (HCC)  Lab Results   Component Value Date    EGFR 59 10/12/2024    EGFR 55 2024    EGFR 55 2024    CREATININE 1.07 10/12/2024    CREATININE 1.14 2024    CREATININE 1.14 2024          Type 2 diabetes mellitus with diabetic autonomic neuropathy, with long-term current use of insulin (HCC)    Lab Results   Component Value Date    HGBA1C 11.5 (H) 10/12/2024     Orders:  •  Insulin Regular Human, Conc, (HumuLIN R U-500 KwikPen) 500 units/mL CONCENTRATED U-500 injection pen; E11.65 inject 55 units with breakfast, 36 units with lunch and dinner, or as directed TDD up to 200 units      RTC 8-weeks    History of Present Illness   HPI  Haylee Balbuena is a 54 y.o. female who presents in follow up of T2D. T2D c/b DPN, CKD3a (also history of RCC s/p partial right nephrectomy).     For DM, she is taking humulin R u500 45-30-30  "units TID AC, jardiance 25 mg daily, and mounjaro 10 mg weekly. She recently had EGD, but will need repeat. Mounjaro has been on hold. She mentions no symptoms, but rather frustration with frequent high blood sugar values. She is considering insulin pump.     For HLD she is on lipitor 80 mg daily and fenofibrate 134 mg daily.     History obtained from: patient    Review of Systems   All other systems reviewed and are negative.         Objective   /72 (Patient Position: Sitting, Cuff Size: Standard)   Pulse 78   Temp (!) 97.2 °F (36.2 °C) (Temporal)   Ht 5' 1\" (1.549 m)   Wt 81.2 kg (179 lb)   BMI 33.82 kg/m²      Physical Exam  Vitals reviewed.   Constitutional:       General: She is not in acute distress.     Appearance: Normal appearance.   HENT:      Head: Normocephalic and atraumatic.   Eyes:      General: No scleral icterus.     Conjunctiva/sclera: Conjunctivae normal.   Pulmonary:      Effort: Pulmonary effort is normal. No respiratory distress.   Musculoskeletal:         General: No deformity.      Cervical back: Normal range of motion.   Neurological:      General: No focal deficit present.      Mental Status: She is alert.   Psychiatric:         Mood and Affect: Mood normal.         Behavior: Behavior normal.         Lab Results   Component Value Date    SODIUM 138 10/12/2024    K 4.4 10/12/2024     10/12/2024    CO2 30 10/12/2024    AGAP 7 10/12/2024    BUN 21 10/12/2024    CREATININE 1.07 10/12/2024    GLUC 249 (H) 09/20/2024    GLUF 302 (H) 10/12/2024    CALCIUM 9.0 10/12/2024    AST 15 10/12/2024    ALT 13 10/12/2024    ALKPHOS 64 10/12/2024    TP 6.2 (L) 10/12/2024    TBILI 0.29 10/12/2024    EGFR 59 10/12/2024     Lab Results   Component Value Date    HGBA1C 11.5 (H) 10/12/2024     Lab Results   Component Value Date    CHOLESTEROL 146 10/12/2024    CHOLESTEROL 135 04/12/2024    CHOLESTEROL 191 01/05/2024     Lab Results   Component Value Date    HDL 37 (L) 10/12/2024    HDL 55 " 04/12/2024    HDL 43 (L) 01/05/2024     Lab Results   Component Value Date    TRIG 236 (H) 10/12/2024    TRIG 210 (H) 04/12/2024    TRIG 327 (H) 01/05/2024     Lab Results   Component Value Date    NONHDLC 109 10/12/2024    NONHDLC 80 04/12/2024    NONHDLC 127 08/30/2022     Lab Results   Component Value Date    LDLCALC 62 10/12/2024         Haylee Balbuena   Device used DEXCOM  Home use     Indication   Type 2 Diabetes    More than 72 hours of data was reviewed. Report to be scanned to chart.     Date Range: Jan 22 - Feb 4, 2025    Analysis of data:   % time CGM used: 90%  Average Glucose: 253 mg/dL  Coefficient of Variation: 23%   Time in Target Range: 12%   Time Above Range: 88% (52% very high)  Time Below Range: 0%     Interpretation of data: uncontrolled hyperglycemia throughout all parts of day. Intensification of insulin across the board is recommended.

## 2025-02-04 NOTE — ASSESSMENT & PLAN NOTE
Lab Results   Component Value Date    HGBA1C 11.5 (H) 10/12/2024     Orders:  •  Insulin Regular Human, Conc, (HumuLIN R U-500 KwikPen) 500 units/mL CONCENTRATED U-500 injection pen; E11.65 inject 55 units with breakfast, 36 units with lunch and dinner, or as directed TDD up to 200 units

## 2025-02-04 NOTE — ASSESSMENT & PLAN NOTE
Lab Results   Component Value Date    EGFR 59 10/12/2024    EGFR 55 09/20/2024    EGFR 55 04/12/2024    CREATININE 1.07 10/12/2024    CREATININE 1.14 09/20/2024    CREATININE 1.14 04/12/2024

## 2025-02-05 DIAGNOSIS — K21.9 GASTROESOPHAGEAL REFLUX DISEASE WITHOUT ESOPHAGITIS: Chronic | ICD-10-CM

## 2025-02-05 DIAGNOSIS — R13.12 OROPHARYNGEAL DYSPHAGIA: ICD-10-CM

## 2025-02-05 RX ORDER — PANTOPRAZOLE SODIUM 40 MG/1
40 TABLET, DELAYED RELEASE ORAL 2 TIMES DAILY
Qty: 60 TABLET | Refills: 5 | Status: SHIPPED | OUTPATIENT
Start: 2025-02-05

## 2025-02-11 ENCOUNTER — TELEPHONE (OUTPATIENT)
Age: 55
End: 2025-02-11

## 2025-02-12 ENCOUNTER — ANESTHESIA EVENT (OUTPATIENT)
Dept: GASTROENTEROLOGY | Facility: HOSPITAL | Age: 55
End: 2025-02-12
Payer: COMMERCIAL

## 2025-02-12 ENCOUNTER — HOSPITAL ENCOUNTER (OUTPATIENT)
Dept: GASTROENTEROLOGY | Facility: HOSPITAL | Age: 55
Setting detail: OUTPATIENT SURGERY
Discharge: HOME | End: 2025-02-12
Attending: STUDENT IN AN ORGANIZED HEALTH CARE EDUCATION/TRAINING PROGRAM
Payer: COMMERCIAL

## 2025-02-12 ENCOUNTER — ANESTHESIA (OUTPATIENT)
Dept: GASTROENTEROLOGY | Facility: HOSPITAL | Age: 55
End: 2025-02-12
Payer: COMMERCIAL

## 2025-02-12 VITALS
HEART RATE: 55 BPM | WEIGHT: 179 LBS | TEMPERATURE: 97.7 F | SYSTOLIC BLOOD PRESSURE: 107 MMHG | OXYGEN SATURATION: 95 % | DIASTOLIC BLOOD PRESSURE: 57 MMHG | HEIGHT: 61 IN | BODY MASS INDEX: 33.79 KG/M2 | RESPIRATION RATE: 16 BRPM

## 2025-02-12 DIAGNOSIS — K31.84 GASTROPARESIS: ICD-10-CM

## 2025-02-12 DIAGNOSIS — R13.12 OROPHARYNGEAL DYSPHAGIA: ICD-10-CM

## 2025-02-12 DIAGNOSIS — K21.9 GASTROESOPHAGEAL REFLUX DISEASE WITHOUT ESOPHAGITIS: ICD-10-CM

## 2025-02-12 LAB — GLUCOSE SERPL-MCNC: 132 MG/DL (ref 65–140)

## 2025-02-12 PROCEDURE — 43239 EGD BIOPSY SINGLE/MULTIPLE: CPT | Performed by: STUDENT IN AN ORGANIZED HEALTH CARE EDUCATION/TRAINING PROGRAM

## 2025-02-12 PROCEDURE — 82948 REAGENT STRIP/BLOOD GLUCOSE: CPT

## 2025-02-12 PROCEDURE — 88305 TISSUE EXAM BY PATHOLOGIST: CPT | Performed by: STUDENT IN AN ORGANIZED HEALTH CARE EDUCATION/TRAINING PROGRAM

## 2025-02-12 RX ORDER — SODIUM CHLORIDE, SODIUM LACTATE, POTASSIUM CHLORIDE, CALCIUM CHLORIDE 600; 310; 30; 20 MG/100ML; MG/100ML; MG/100ML; MG/100ML
125 INJECTION, SOLUTION INTRAVENOUS CONTINUOUS
Status: DISCONTINUED | OUTPATIENT
Start: 2025-02-12 | End: 2025-02-16 | Stop reason: HOSPADM

## 2025-02-12 RX ORDER — LIDOCAINE HYDROCHLORIDE 20 MG/ML
INJECTION, SOLUTION EPIDURAL; INFILTRATION; INTRACAUDAL; PERINEURAL AS NEEDED
Status: DISCONTINUED | OUTPATIENT
Start: 2025-02-12 | End: 2025-02-12

## 2025-02-12 RX ORDER — PROPOFOL 10 MG/ML
INJECTION, EMULSION INTRAVENOUS AS NEEDED
Status: DISCONTINUED | OUTPATIENT
Start: 2025-02-12 | End: 2025-02-12

## 2025-02-12 RX ADMIN — PROPOFOL 120 MG: 10 INJECTION, EMULSION INTRAVENOUS at 07:28

## 2025-02-12 RX ADMIN — PROPOFOL 30 MG: 10 INJECTION, EMULSION INTRAVENOUS at 07:33

## 2025-02-12 RX ADMIN — SODIUM CHLORIDE, SODIUM LACTATE, POTASSIUM CHLORIDE, AND CALCIUM CHLORIDE 125 ML/HR: .6; .31; .03; .02 INJECTION, SOLUTION INTRAVENOUS at 06:54

## 2025-02-12 RX ADMIN — LIDOCAINE HYDROCHLORIDE 100 MG: 20 INJECTION, SOLUTION EPIDURAL; INFILTRATION; INTRACAUDAL at 07:28

## 2025-02-12 RX ADMIN — PROPOFOL 30 MG: 10 INJECTION, EMULSION INTRAVENOUS at 07:31

## 2025-02-12 NOTE — ANESTHESIA POSTPROCEDURE EVALUATION
Post-Op Assessment Note    CV Status:  Stable    Pain management: adequate       Mental Status:  Sleepy   Hydration Status:  Euvolemic   PONV Controlled:  Controlled   Airway Patency:  Patent  Two or more mitigation strategies used for obstructive sleep apnea   Post Op Vitals Reviewed: Yes    No anethesia notable event occurred.    Staff: Anesthesiologist, CRNA           Last Filed PACU Vitals:  Vitals Value Taken Time   Temp 97    Pulse 56    /60    Resp 18    spo2 100

## 2025-02-12 NOTE — H&P
[facility]  History & Physical     PATIENT INFO     Name: Haylee Balbuena  YOB: 1970   Age: 54 y.o.   Sex: female   MRN: [id number]     HISTORY OF PRESENT ILLNESS     Haylee Balbuena is a 54 y.o. year old female who presents for EGD for dysphagia. No antithrombotics or anticoagulants.     REVIEW OF SYSTEMS     Per the HPI, and otherwise unremarkable.    Historical Information   Past Medical History:   Diagnosis Date    Atypical chest pain     Bilateral calf pain 2024    Diabetes mellitus (HCC)     Gastroparesis     GERD (gastroesophageal reflux disease)     Hyperlipidemia     Hypertension     MRSA bacteremia 2023    Psychiatric disorder     Sciatica     Seizure disorder (HCC)      Past Surgical History:   Procedure Laterality Date    APPENDECTOMY      BREAST BIOPSY Left  benign     SECTION      CHOLECYSTECTOMY      COLONOSCOPY      HYSTERECTOMY      IR PICC PLACEMENT SINGLE LUMEN  2023    UT LAPAROSCOPIC APPENDECTOMY N/A 2021    Procedure: APPENDECTOMY LAPAROSCOPIC;  Surgeon: Onel Martin MD;  Location:  MAIN OR;  Service: General    UT LAPS ABD PRTM&OMENTUM DX W/WO SPEC BR/WA SPX N/A 2021    Procedure: LAPAROSCOPY DIAGNOSTIC, EXTENSIVE DESI;  Surgeon: Onel Martin MD;  Location:  MAIN OR;  Service: General    TUBAL LIGATION      TYMPANOSTOMY TUBE PLACEMENT Bilateral 2024    UPPER GASTROINTESTINAL ENDOSCOPY       Social History   Social History     Substance and Sexual Activity   Alcohol Use Never    Comment: occ     Social History     Substance and Sexual Activity   Drug Use Never     Social History     Tobacco Use   Smoking Status Former    Current packs/day: 0.00    Types: Cigarettes    Quit date:     Years since quittin.1   Smokeless Tobacco Never     Family History   Problem Relation Age of Onset    No Known Problems Mother     No Known Problems Father     No Known Problems Daughter     No Known  Problems Maternal Grandmother     No Known Problems Paternal Grandmother     No Known Problems Paternal Aunt     No Known Problems Paternal Aunt     No Known Problems Paternal Aunt         MEDICATIONS & ALLERGIES     Current Outpatient Medications   Medication Instructions    albuterol (PROVENTIL HFA,VENTOLIN HFA) 90 mcg/act inhaler INHALE ONE PUFF BY MOUTH AND INTO THE LUNGS EVERY 6 HOURS AS NEEDED FOR WHEEZING    albuterol 2.5 mg, Nebulization, Every 6 hours PRN    Alcohol Swabs (Pharmacist Choice Alcohol) PADS Apply externally, 4 times daily, Use as directed    aspirin (ECOTRIN LOW STRENGTH) 81 mg, Oral, Daily    atorvastatin (LIPITOR) 80 mg, Oral, Daily at bedtime    B-D UF III MINI PEN NEEDLES 31G X 5 MM MISC Pt uses 5 pen needles daily    cholecalciferol (VITAMIN D3) 2,000 Units, Oral, Daily    citalopram (CELEXA) 40 mg, Oral, Daily    Continuous Glucose Sensor (Dexcom G7 Sensor) 1 Device, Does not apply, Every 10 days    dicyclomine (BENTYL) 20 mg tablet Take twice daily (am and before bedtime), may take additional 2 doses per day, max 4x/day (every 6 hours if needed)    docusate sodium (COLACE) 100 mg, Oral, 2 times daily    ergocalciferol (VITAMIN D2) 50,000 Units, Oral, Weekly    famotidine (PEPCID) 40 MG tablet TAKE ONE TABLET BY MOUTH TWO HOURS PRIOR TO BEDTIME    famotidine (PEPCID) 20 mg, Daily    fenofibrate micronized (LOFIBRA) 134 mg, Oral, Daily with breakfast    Glucagon (Baqsimi One Pack) 3 MG/DOSE POWD 1 each, Nasal, As needed    Insulin Regular Human, Conc, (HumuLIN R U-500 KwikPen) 500 units/mL CONCENTRATED U-500 injection pen E11.65 inject 55 units with breakfast, 36 units with lunch and dinner, or as directed TDD up to 200 units    Iron Heme Polypeptide 12 MG TABS 1 tablet    Jardiance 25 mg, Oral, Daily    levETIRAcetam (KEPPRA) 500 mg, Oral, 2 times daily    linaCLOtide (Linzess) 72 MCG CAPS 1 capsule, Oral, Daily before breakfast    Magnesium Citrate 200 MG TABS 1 tablet, Oral, 2 times  "daily    meclizine (ANTIVERT) 25 mg, Oral, 3 times daily PRN    methocarbamol (ROBAXIN) 500 mg, Oral, 3 times daily PRN    metoclopramide (REGLAN) 5 mg, Oral, Every 6 hours    metoprolol tartrate (LOPRESSOR) 25 mg, Oral, 2 times daily    multivitamin (THERAGRAN) TABS 1 tablet, Every morning    ondansetron (ZOFRAN) 4 mg, Oral, Every 8 hours PRN    OneTouch Ultra test strip USE 4 TIMES A DAY    oxybutynin (DITROPAN-XL) 10 mg, Oral, Daily at bedtime    pantoprazole (PROTONIX) 40 mg, Oral, 2 times daily    Pharmacist Choice Lancets MISC USE AS DIRECTED    pregabalin (LYRICA) 100 mg, Oral, 3 times daily    Restasis 0.05 % ophthalmic emulsion No dose, route, or frequency recorded.    Tirzepatide 10 mg, Subcutaneous, Weekly     Allergies   Allergen Reactions    Butorphanol Hallucinations    Toradol [Ketorolac Tromethamine] GI Intolerance    Oxycodone GI Intolerance    Benztropine Hallucinations     Was seeing things that were not right    Penicillins     Zolpidem     Medical Tape Rash     Ok with paper tape        PHYSICAL EXAM      Objective   Blood pressure 136/60, pulse (!) 51, temperature (!) 97.4 °F (36.3 °C), temperature source Temporal, resp. rate 16, height 5' 1\" (1.549 m), weight 81.2 kg (179 lb), SpO2 98%, not currently breastfeeding. Body mass index is 33.82 kg/m².    General Appearance:   Alert, cooperative, no distress   Lungs:   Equal chest rise, respirations unlabored    Heart:   Regular rate and rhythm   Abdomen:   Soft, non-tender, non-distended; normal bowel sounds; no masses, no organomegaly    Extremities:   No edema       ASSESSMENT & PLAN     This is a 54 y.o. year old female here for EGD, and she is stable and optimized for her procedure.      Denny Hidalgo D.O.  [facility]  Division of Gastroenterology & Hepatology  Available on Tiger[employer]  [email or web address]    ** Please Note: This note is constructed using a voice recognition dictation system. **  "

## 2025-02-12 NOTE — ANESTHESIA PREPROCEDURE EVALUATION
Procedure:  EGD    Relevant Problems   CARDIO   (+) Hypercholesteremia   (+) Hypertension   (+) Hypertriglyceridemia   (+) Migraine      ENDO   (+) Type 2 diabetes mellitus with diabetic autonomic neuropathy, with long-term current use of insulin (HCC)      GI/HEPATIC   (+) GERD (gastroesophageal reflux disease)      /RENAL   (+) Benign hypertension with CKD (chronic kidney disease) stage III (HCC)   (+) Cyst of kidney, acquired   (+) Stage 3a chronic kidney disease (HCC)      HEMATOLOGY   (+) Anemia   (+) Iron deficiency anemia secondary to inadequate dietary iron intake      MUSCULOSKELETAL   (+) Chronic low back pain with sciatica      NEURO/PSYCH   (+) Chronic low back pain with sciatica   (+) Depression with anxiety   (+) Diabetic polyneuropathy associated with type 2 diabetes mellitus (HCC)   (+) Migraine   (+) Seizure disorder (HCC)   (+) Type 2 diabetes mellitus with diabetic autonomic neuropathy, with long-term current use of insulin (HCC)      PULMONARY   (+) Mild intermittent asthma without complication        Physical Exam    Airway    Mallampati score: II  TM Distance: >3 FB  Neck ROM: full     Dental    upper dentures and lower dentures    Cardiovascular      Pulmonary      Other Findings  post-pubertal.      Anesthesia Plan  ASA Score- 3     Anesthesia Type- IV sedation with anesthesia with ASA Monitors.         Additional Monitors:     Airway Plan:            Plan Factors-Exercise tolerance (METS): >4 METS.    Chart reviewed. EKG reviewed.   Patient summary reviewed.    Patient is not a current smoker.      There is medical exclusion for perioperative obstructive sleep apnea risk education.        Induction- intravenous.    Postoperative Plan-         Informed Consent- Anesthetic plan and risks discussed with patient.  I personally reviewed this patient with the CRNA. Discussed and agreed on the Anesthesia Plan with the CRNA..      NPO Status:  Vitals Value Taken Time   Date of last liquid 02/11/25  02/12/25 0640   Time of last liquid 1700 02/12/25 0640   Date of last solid 02/11/25 02/12/25 0640   Time of last solid 1700 02/12/25 0640

## 2025-02-14 PROCEDURE — 88305 TISSUE EXAM BY PATHOLOGIST: CPT | Performed by: STUDENT IN AN ORGANIZED HEALTH CARE EDUCATION/TRAINING PROGRAM

## 2025-02-16 ENCOUNTER — HOSPITAL ENCOUNTER (EMERGENCY)
Facility: HOSPITAL | Age: 55
Discharge: HOME | End: 2025-02-16
Attending: EMERGENCY MEDICINE | Admitting: EMERGENCY MEDICINE
Payer: COMMERCIAL

## 2025-02-16 ENCOUNTER — APPOINTMENT (EMERGENCY)
Dept: NON INVASIVE DIAGNOSTICS | Facility: HOSPITAL | Age: 55
End: 2025-02-16
Payer: COMMERCIAL

## 2025-02-16 VITALS
DIASTOLIC BLOOD PRESSURE: 69 MMHG | SYSTOLIC BLOOD PRESSURE: 139 MMHG | TEMPERATURE: 98.7 F | HEART RATE: 50 BPM | RESPIRATION RATE: 16 BRPM | OXYGEN SATURATION: 96 %

## 2025-02-16 DIAGNOSIS — I80.8 SUPERFICIAL THROMBOPHLEBITIS OF RIGHT UPPER EXTREMITY: Primary | ICD-10-CM

## 2025-02-16 PROCEDURE — 99284 EMERGENCY DEPT VISIT MOD MDM: CPT

## 2025-02-16 PROCEDURE — 93971 EXTREMITY STUDY: CPT | Performed by: SURGERY

## 2025-02-16 PROCEDURE — 93971 EXTREMITY STUDY: CPT

## 2025-02-16 RX ORDER — OXYCODONE HYDROCHLORIDE 5 MG/1
5 TABLET ORAL ONCE
Refills: 0 | Status: COMPLETED | OUTPATIENT
Start: 2025-02-16 | End: 2025-02-16

## 2025-02-16 RX ORDER — ACETAMINOPHEN 325 MG/1
975 TABLET ORAL ONCE
Status: COMPLETED | OUTPATIENT
Start: 2025-02-16 | End: 2025-02-16

## 2025-02-16 RX ORDER — DOXYCYCLINE 100 MG/1
100 CAPSULE ORAL 2 TIMES DAILY
Qty: 14 CAPSULE | Refills: 0 | Status: SHIPPED | OUTPATIENT
Start: 2025-02-16 | End: 2025-02-23

## 2025-02-16 RX ADMIN — ACETAMINOPHEN 975 MG: 325 TABLET ORAL at 08:28

## 2025-02-16 RX ADMIN — OXYCODONE 5 MG: 5 TABLET ORAL at 10:13

## 2025-02-16 NOTE — ED PROVIDER NOTES
Time reflects when diagnosis was documented in both MDM as applicable and the Disposition within this note       Time User Action Codes Description Comment    2/16/2025  9:33 AM Ady Rubio Add [I80.8] Superficial thrombophlebitis of right upper extremity           ED Disposition       ED Disposition   Discharge    Condition   Stable    Date/Time   Sun Feb 16, 2025  9:56 AM    Comment   Haylee Balbuena discharge to home/self care.                   Assessment & Plan       Medical Decision Making  Patient is a 54 y.o F with a PMH of DM, HTN, HLD, bacteremia presenting to the ER complaining of right hand/forearm pain that started 6 days ago after she had an IV her right hand for an EGD. VS stable, patient in no acute distress. Physical exam revealing mild bruising to right hand at IV site with minimal swelling noted to right forearm; no erythema, tortuous veins, or significant swelling is noted. ROM/sensation/pulses normal.     Based on history/physical exam, suspect patient experiencing superficial thrombophlebitis from IV site. Will order duplex to rule out presence of dvt. Will give tylenol for pain as patient has allergy to toradol. Wells score 0, will hold off on dimer.    Patient is negative for dvt. She is still reporting pain; will give oxycodone 5 mg. Patient received this in the past and states her reaction is GI upset; patient would like to try oxycodone at this time. Recommended patient continue to take Tylenol, apply warm compresses, and elevate extremity for pain relief.  Patient is concerned for MRSA infection as she has had a MRSA infection from a previous IV site. Although no signs of infection/cellulitis today, will prescribe doxycycline to be taken if patient develops redness, increased swelling, or warmth to affected area. Recommended patient only take this antibiotic if she develops these symptoms. Patient reevaluated and is feeling better; tolerated oxycodone well. Recommended patient follow up  with PCP for re-evaluation. Discussed strict return precautions with patient. Patient is agreeable with plan and is stable at discharge.     Risk  OTC drugs.  Prescription drug management.        ED Course as of 25 1050   Sun 2025   09 Negative dvt study of right arm       Medications   acetaminophen (TYLENOL) tablet 975 mg (975 mg Oral Given 25 0828)   oxyCODONE (ROXICODONE) IR tablet 5 mg (5 mg Oral Given 25 1013)       ED Risk Strat Scores                                Wells' Criteria for PE      Flowsheet Row Most Recent Value   Wells' Criteria for PE    Clinical signs and symptoms of DVT 0 Filed at: 2025 0931   PE is primary diagnosis or equally likely 0 Filed at: 2025 09   HR >100 0 Filed at: 2025 09   Immobilization at least 3 days or Surgery in the previous 4 weeks 0 Filed at: 2025 09   Previous, objectively diagnosed PE or DVT 0 Filed at: 2025 09   Hemoptysis 0 Filed at: 2025 09   Malignancy with treatment within 6 months or palliative 0 Filed at: 2025   Wells' Criteria Total 0 Filed at: 2025 0931                        History of Present Illness       Chief Complaint   Patient presents with    Medical Problem     Right hand swelling and pain at IV site from procedure on tuesday       Past Medical History:   Diagnosis Date    Atypical chest pain     Bilateral calf pain 2024    Diabetes mellitus (HCC)     Gastroparesis     GERD (gastroesophageal reflux disease)     Hyperlipidemia     Hypertension     MRSA bacteremia 2023    Psychiatric disorder     Sciatica     Seizure disorder (HCC)       Past Surgical History:   Procedure Laterality Date    APPENDECTOMY      BREAST BIOPSY Left  benign     SECTION      CHOLECYSTECTOMY      COLONOSCOPY      HYSTERECTOMY      IR PICC PLACEMENT SINGLE LUMEN  2023    MA LAPAROSCOPIC APPENDECTOMY N/A 2021    Procedure: APPENDECTOMY LAPAROSCOPIC;  Surgeon:  Onel Martin MD;  Location:  MAIN OR;  Service: General    [place] LAPS ABD PRTM&OMENTUM DX W/WO SPEC BR/WA SPX N/A 2021    Procedure: LAPAROSCOPY DIAGNOSTIC, EXTENSIVE DESI;  Surgeon: Onel Martin MD;  Location:  MAIN OR;  Service: General    TUBAL LIGATION      TYMPANOSTOMY TUBE PLACEMENT Bilateral 2024    UPPER GASTROINTESTINAL ENDOSCOPY        Family History   Problem Relation Age of Onset    No Known Problems Mother     No Known Problems Father     No Known Problems Daughter     No Known Problems Maternal Grandmother     No Known Problems Paternal Grandmother     No Known Problems Paternal Aunt     No Known Problems Paternal Aunt     No Known Problems Paternal Aunt       Social History     Tobacco Use    Smoking status: Former     Current packs/day: 0.00     Types: Cigarettes     Quit date:      Years since quittin.    Smokeless tobacco: Never   Vaping Use    Vaping status: Never Used   Substance Use Topics    Alcohol use: Never     Comment: occ    Drug use: Never      E-Cigarette/Vaping    E-Cigarette Use Never User       E-Cigarette/Vaping Substances    Nicotine No     THC No     CBD No     Flavoring No     Other No     Unknown No       I have reviewed and agree with the history as documented.     Patient is a 54 y.o F with a PMH of DM, HTN, HLD, bacteremia presenting to the ER complaining of right hand/forearm pain that started 6 days ago after she had an IV her right hand for an EGD.  Patient states since then she has progressively worsening pain in her right hand that is now spreading up to her forearm.  Patient states she tried warm compresses and Tylenol at home which provided no relief.  Patient states pain is constant and progressively worsening.  Patient denies falls/trauma to the area. Patient denies fevers, chills, abdominal pain, N/V/D, chest pain, difficulty breathing, palpitations, leg swelling.  Patient denies recent travel, surgery, immobilization, exogenous  estrogen use, cancer treatment.      Medical Problem  Associated symptoms: no abdominal pain, no chest pain, no congestion, no cough, no fever, no headaches, no nausea, no shortness of breath, no vomiting and no wheezing        Review of Systems   Constitutional:  Negative for chills and fever.   HENT:  Negative for congestion and sinus pain.    Respiratory:  Negative for cough, shortness of breath and wheezing.    Cardiovascular:  Negative for chest pain and leg swelling.   Gastrointestinal:  Negative for abdominal pain, nausea and vomiting.   Skin:  Negative for wound.   Neurological:  Negative for dizziness, syncope, weakness, light-headedness and headaches.   All other systems reviewed and are negative.          Objective       ED Triage Vitals [02/16/25 0758]   Temperature Pulse Blood Pressure Respirations SpO2 Patient Position - Orthostatic VS   98.7 °F (37.1 °C) 60 135/83 16 98 % Sitting      Temp Source Heart Rate Source BP Location FiO2 (%) Pain Score    Tympanic Monitor Left arm -- 8      Vitals      Date and Time Temp Pulse SpO2 Resp BP Pain Score FACES Pain Rating User   02/16/25 1021 -- 50 96 % -- 139/69 -- --    02/16/25 1013 -- -- -- -- -- 9 --    02/16/25 1000 -- 56 92 % 16 123/64 -- --    02/16/25 0900 -- 57 97 % -- 123/65 -- --    02/16/25 0828 -- -- -- -- -- 9 --    02/16/25 0815 -- 59 93 % 16 129/73 -- --    02/16/25 0758 98.7 °F (37.1 °C) 60 98 % 16 135/83 8 -- NAD            Physical Exam  Vitals and nursing note reviewed.   Constitutional:       General: She is not in acute distress.     Appearance: Normal appearance. She is well-developed. She is not ill-appearing, toxic-appearing or diaphoretic.   HENT:      Head: Normocephalic and atraumatic.      Right Ear: Tympanic membrane and external ear normal.      Left Ear: Tympanic membrane and external ear normal.      Nose: Nose normal.      Mouth/Throat:      Mouth: Mucous membranes are moist.      Pharynx: Oropharynx is clear. No  oropharyngeal exudate or posterior oropharyngeal erythema.   Eyes:      Conjunctiva/sclera: Conjunctivae normal.   Cardiovascular:      Rate and Rhythm: Normal rate and regular rhythm.      Pulses: Normal pulses.      Heart sounds: Normal heart sounds. No murmur heard.     No friction rub. No gallop.   Pulmonary:      Effort: Pulmonary effort is normal. No respiratory distress.      Breath sounds: Normal breath sounds. No stridor. No wheezing, rhonchi or rales.   Abdominal:      General: Abdomen is flat. Bowel sounds are normal. There is no distension.      Palpations: Abdomen is soft.      Tenderness: There is no abdominal tenderness. There is no guarding.   Musculoskeletal:         General: No swelling.      Right forearm: Tenderness present. No swelling, edema, deformity or lacerations.      Right wrist: Tenderness present. No swelling or deformity. Normal pulse.      Right hand: Swelling and tenderness present. No deformity. Decreased range of motion (Secondary to pain). Normal sensation. Normal pulse.      Cervical back: Neck supple.      Comments: Mild bruising to right hand at IV site with minimal swelling noted to right forearm; no erythema, tortuous veins, or significant swelling is noted. ROM/sensation/pulses normal   Skin:     General: Skin is warm and dry.      Capillary Refill: Capillary refill takes less than 2 seconds.   Neurological:      Mental Status: She is alert and oriented to person, place, and time.   Psychiatric:         Mood and Affect: Mood normal.         Behavior: Behavior normal.         Thought Content: Thought content normal.         Judgment: Judgment normal.         Results Reviewed       None            VAS upper limb venous duplex scan, unilateral/limited   Final Interpretation by Abisai Yusuf DO (02/16 0365)          Procedures    ED Medication and Procedure Management   Prior to Admission Medications   Prescriptions Last Dose Informant Patient Reported? Taking?   Alcohol Swabs  (Pharmacist Choice Alcohol) PADS  Self No No   Sig: Apply topically 4 (four) times a day Use as directed   B-D UF III MINI PEN NEEDLES 31G X 5 MM MISC  Self No No   Sig: Pt uses 5 pen needles daily   Continuous Glucose Sensor (Dexcom G7 Sensor)   No No   Sig: Use 1 Device every 10 days   Glucagon (Baqsimi One Pack) 3 MG/DOSE POWD   No No   Si each into each nostril if needed (PRN for hypoglycemia emergency)   Insulin Regular Human, Conc, (HumuLIN R U-500 KwikPen) 500 units/mL CONCENTRATED U-500 injection pen   No No   Sig: E11.65 inject 55 units with breakfast, 36 units with lunch and dinner, or as directed TDD up to 200 units   Iron Heme Polypeptide 12 MG TABS  Self Yes No   Sig: Take 1 tablet by mouth   Jardiance 25 MG TABS   No No   Sig: TAKE ONE TABLET BY MOUTH EVERY MORNING   Magnesium Citrate 200 MG TABS   No No   Sig: Take 1 tablet by mouth 2 (two) times a day   OneTouch Ultra test strip  Self No No   Sig: USE 4 TIMES A DAY   Pharmacist Choice Lancets MISC  Self No No   Sig: USE AS DIRECTED   Restasis 0.05 % ophthalmic emulsion  Self Yes No   Tirzepatide 10 MG/0.5ML SOAJ   No No   Sig: Inject 10 mg under the skin once a week   albuterol (2.5 mg/3 mL) 0.083 % nebulizer solution  Self No No   Sig: Take 3 mL (2.5 mg total) by nebulization every 6 (six) hours as needed for wheezing or shortness of breath   albuterol (PROVENTIL HFA,VENTOLIN HFA) 90 mcg/act inhaler  Self No No   Sig: INHALE ONE PUFF BY MOUTH AND INTO THE LUNGS EVERY 6 HOURS AS NEEDED FOR WHEEZING   aspirin (ECOTRIN LOW STRENGTH) 81 mg EC tablet   No No   Sig: TAKE ONE TABLET BY MOUTH ONCE DAILY   atorvastatin (LIPITOR) 80 mg tablet   No No   Sig: TAKE ONE TABLET BY MOUTH DAILY AT BEDTIME   cholecalciferol (VITAMIN D3) 1,000 units tablet   No No   Sig: Take 2 tablets (2,000 Units total) by mouth daily   citalopram (CeleXA) 40 mg tablet   No No   Sig: TAKE ONE TABLET BY MOUTH DAILY   dicyclomine (BENTYL) 20 mg tablet   No No   Sig: Take twice  daily (am and before bedtime), may take additional 2 doses per day, max 4x/day (every 6 hours if needed)   docusate sodium (COLACE) 100 mg capsule   No No   Sig: TAKE ONE CAPSULE BY MOUTH TWICE DAILY   ergocalciferol (VITAMIN D2) 50,000 units   No No   Sig: TAKE ONE CAPSULE BY MOUTH ONCE A WEEK   Patient not taking: Reported on 2/4/2025   famotidine (PEPCID) 20 mg tablet   Yes No   Sig: Take 20 mg by mouth daily   Patient not taking: Reported on 2/4/2025   famotidine (PEPCID) 40 MG tablet   No No   Sig: TAKE ONE TABLET BY MOUTH TWO HOURS PRIOR TO BEDTIME   fenofibrate micronized (LOFIBRA) 134 MG capsule  Self No No   Sig: Take 1 capsule (134 mg total) by mouth daily with breakfast   levETIRAcetam (KEPPRA) 500 mg tablet   No No   Sig: TAKE ONE TABLET BY MOUTH TWICE DAILY   linaCLOtide (Linzess) 72 MCG CAPS  Self No No   Sig: Take 1 capsule by mouth daily before breakfast   meclizine (ANTIVERT) 25 mg tablet  Self No No   Sig: Take 1 tablet (25 mg total) by mouth 3 (three) times a day as needed for dizziness   methocarbamol (ROBAXIN) 500 mg tablet   No No   Sig: Take 1 tablet (500 mg total) by mouth 3 (three) times a day as needed for muscle spasms   metoclopramide (Reglan) 10 mg tablet  Self No No   Sig: Take 0.5 tablets (5 mg total) by mouth every 6 (six) hours   metoprolol tartrate (LOPRESSOR) 25 mg tablet   No No   Sig: TAKE ONE TABLET BY MOUTH TWICE DAILY   multivitamin (THERAGRAN) TABS  Self Yes No   Sig: Take 1 tablet by mouth every morning   ondansetron (ZOFRAN) 4 mg tablet   No No   Sig: TAKE 1 TABLET (4 MG TOTAL) BY MOUTH EVERY 8 (EIGHT) HOURS AS NEEDED FOR NAUSEA OR VOMITING   oxybutynin (DITROPAN-XL) 10 MG 24 hr tablet   No No   Sig: TAKE ONE TABLET BY MOUTH DAILY AT BEDTIME   pantoprazole (PROTONIX) 40 mg tablet   No No   Sig: TAKE ONE TABLET BY MOUTH TWICE DAILY   pregabalin (LYRICA) 100 mg capsule   No No   Sig: TAKE ONE CAPSULE BY MOUTH THREE TIMES A DAY      Facility-Administered Medications: None      Discharge Medication List as of 2/16/2025 10:16 AM        START taking these medications    Details   doxycycline hyclate (VIBRAMYCIN) 100 mg capsule Take 1 capsule (100 mg total) by mouth 2 (two) times a day for 7 days, Starting Sun 2/16/2025, Until Sun 2/23/2025, Normal           CONTINUE these medications which have NOT CHANGED    Details   albuterol (2.5 mg/3 mL) 0.083 % nebulizer solution Take 3 mL (2.5 mg total) by nebulization every 6 (six) hours as needed for wheezing or shortness of breath, Starting Tue 1/9/2024, Normal      albuterol (PROVENTIL HFA,VENTOLIN HFA) 90 mcg/act inhaler INHALE ONE PUFF BY MOUTH AND INTO THE LUNGS EVERY 6 HOURS AS NEEDED FOR WHEEZING, Normal      Alcohol Swabs (Pharmacist Choice Alcohol) PADS Apply topically 4 (four) times a day Use as directed, Starting Tue 9/5/2023, Normal      aspirin (ECOTRIN LOW STRENGTH) 81 mg EC tablet TAKE ONE TABLET BY MOUTH ONCE DAILY, Starting Fri 1/31/2025, Normal      atorvastatin (LIPITOR) 80 mg tablet TAKE ONE TABLET BY MOUTH DAILY AT BEDTIME, Starting Tue 1/7/2025, Normal      B-D UF III MINI PEN NEEDLES 31G X 5 MM MISC Pt uses 5 pen needles daily, Normal      cholecalciferol (VITAMIN D3) 1,000 units tablet Take 2 tablets (2,000 Units total) by mouth daily, Starting Thu 10/17/2024, Normal      citalopram (CeleXA) 40 mg tablet TAKE ONE TABLET BY MOUTH DAILY, Starting Fri 11/15/2024, Normal      Continuous Glucose Sensor (Dexcom G7 Sensor) Use 1 Device every 10 days, Starting Mon 10/28/2024, Normal      dicyclomine (BENTYL) 20 mg tablet Take twice daily (am and before bedtime), may take additional 2 doses per day, max 4x/day (every 6 hours if needed), Normal      docusate sodium (COLACE) 100 mg capsule TAKE ONE CAPSULE BY MOUTH TWICE DAILY, Starting Wed 1/8/2025, Normal      ergocalciferol (VITAMIN D2) 50,000 units TAKE ONE CAPSULE BY MOUTH ONCE A WEEK, Starting Wed 6/5/2024, Normal      !! famotidine (PEPCID) 20 mg tablet Take 20 mg by mouth  daily, Starting Mon 11/11/2024, Historical Med      !! famotidine (PEPCID) 40 MG tablet TAKE ONE TABLET BY MOUTH TWO HOURS PRIOR TO BEDTIME, Normal      fenofibrate micronized (LOFIBRA) 134 MG capsule Take 1 capsule (134 mg total) by mouth daily with breakfast, Starting Fri 1/12/2024, Normal      Glucagon (Baqsimi One Pack) 3 MG/DOSE POWD 1 each into each nostril if needed (PRN for hypoglycemia emergency), Starting Thu 11/14/2024, Normal      Insulin Regular Human, Conc, (HumuLIN R U-500 KwikPen) 500 units/mL CONCENTRATED U-500 injection pen E11.65 inject 55 units with breakfast, 36 units with lunch and dinner, or as directed TDD up to 200 units, Fill Later      Iron Heme Polypeptide 12 MG TABS Take 1 tablet by mouth, Historical Med      Jardiance 25 MG TABS TAKE ONE TABLET BY MOUTH EVERY MORNING, Starting Fri 11/15/2024, Normal      levETIRAcetam (KEPPRA) 500 mg tablet TAKE ONE TABLET BY MOUTH TWICE DAILY, Starting Fri 1/24/2025, Normal      linaCLOtide (Linzess) 72 MCG CAPS Take 1 capsule by mouth daily before breakfast, Starting Tue 9/5/2023, Normal      Magnesium Citrate 200 MG TABS Take 1 tablet by mouth 2 (two) times a day, Starting Thu 9/12/2024, Normal      meclizine (ANTIVERT) 25 mg tablet Take 1 tablet (25 mg total) by mouth 3 (three) times a day as needed for dizziness, Starting Mon 7/10/2023, Normal      methocarbamol (ROBAXIN) 500 mg tablet Take 1 tablet (500 mg total) by mouth 3 (three) times a day as needed for muscle spasms, Starting Tue 5/14/2024, Normal      metoclopramide (Reglan) 10 mg tablet Take 0.5 tablets (5 mg total) by mouth every 6 (six) hours, Starting Thu 12/21/2023, Normal      metoprolol tartrate (LOPRESSOR) 25 mg tablet TAKE ONE TABLET BY MOUTH TWICE DAILY, Starting Wed 11/27/2024, Normal      multivitamin (THERAGRAN) TABS Take 1 tablet by mouth every morning, Historical Med      ondansetron (ZOFRAN) 4 mg tablet TAKE 1 TABLET (4 MG TOTAL) BY MOUTH EVERY 8 (EIGHT) HOURS AS NEEDED FOR  NAUSEA OR VOMITING, Starting Thu 10/3/2024, Normal      OneTouch Ultra test strip USE 4 TIMES A DAY, Normal      oxybutynin (DITROPAN-XL) 10 MG 24 hr tablet TAKE ONE TABLET BY MOUTH DAILY AT BEDTIME, Starting Wed 7/10/2024, Normal      pantoprazole (PROTONIX) 40 mg tablet TAKE ONE TABLET BY MOUTH TWICE DAILY, Starting Wed 2/5/2025, Normal      Pharmacist Choice Lancets MISC USE AS DIRECTED, Normal      pregabalin (LYRICA) 100 mg capsule TAKE ONE CAPSULE BY MOUTH THREE TIMES A DAY, Starting Tue 11/26/2024, Normal      Restasis 0.05 % ophthalmic emulsion Starting Thu 3/2/2023, Historical Med      Tirzepatide 10 MG/0.5ML SOAJ Inject 10 mg under the skin once a week, Starting Mon 1/27/2025, Normal       !! - Potential duplicate medications found. Please discuss with provider.        No discharge procedures on file.  ED SEPSIS DOCUMENTATION   Time reflects when diagnosis was documented in both MDM as applicable and the Disposition within this note       Time User Action Codes Description Comment    2/16/2025  9:33 AM Ady Rubio Add [I80.8] Superficial thrombophlebitis of right upper extremity                  Ady Rubio PA-C  02/16/25 1406

## 2025-02-16 NOTE — DISCHARGE INSTRUCTIONS
Please continue to apply warm compresses, elevate extremity, and take tylenol as needed for symptoms. Please follow up with PCP for re-evaluation. Please take doxycycline as prescribed if you develop warmth, redness, or increased swelling to affected area.

## 2025-02-20 NOTE — TELEPHONE ENCOUNTER
Chief Complaint   Patient presents with    RECHECK       There were no vitals filed for this visit.    Patient MyChart Active? No    Juan Pablo Gonzales  February 20, 2025   Pt called office today looking for appt with PCP. Reports nausea, vomiting and dizziness. zofran is not helping.      Do you want to fit pt in?

## 2025-02-21 ENCOUNTER — RESULTS FOLLOW-UP (OUTPATIENT)
Age: 55
End: 2025-02-21

## 2025-02-21 ENCOUNTER — HOSPITAL ENCOUNTER (EMERGENCY)
Facility: HOSPITAL | Age: 55
Discharge: HOME | End: 2025-02-21
Attending: EMERGENCY MEDICINE
Payer: COMMERCIAL

## 2025-02-21 ENCOUNTER — NURSE TRIAGE (OUTPATIENT)
Age: 55
End: 2025-02-21

## 2025-02-21 VITALS
OXYGEN SATURATION: 98 % | DIASTOLIC BLOOD PRESSURE: 69 MMHG | WEIGHT: 185.19 LBS | TEMPERATURE: 98.3 F | BODY MASS INDEX: 34.99 KG/M2 | HEART RATE: 54 BPM | SYSTOLIC BLOOD PRESSURE: 140 MMHG | RESPIRATION RATE: 18 BRPM

## 2025-02-21 DIAGNOSIS — R73.9 HYPERGLYCEMIA: Primary | ICD-10-CM

## 2025-02-21 LAB
ALBUMIN SERPL BCG-MCNC: 4.1 G/DL (ref 3.5–5)
ALP SERPL-CCNC: 70 U/L (ref 34–104)
ALT SERPL W P-5'-P-CCNC: 32 U/L (ref 7–52)
ANION GAP SERPL CALCULATED.3IONS-SCNC: 8 MMOL/L (ref 4–13)
AST SERPL W P-5'-P-CCNC: 23 U/L (ref 13–39)
B-OH-BUTYR SERPL-MCNC: 0.06 MMOL/L (ref 0.02–0.27)
BASE EX.OXY STD BLDV CALC-SCNC: 94.4 % (ref 60–80)
BASE EXCESS BLDV CALC-SCNC: -0.2 MMOL/L
BILIRUB SERPL-MCNC: 0.26 MG/DL (ref 0.2–1)
BUN SERPL-MCNC: 36 MG/DL (ref 5–25)
CALCIUM SERPL-MCNC: 9.3 MG/DL (ref 8.4–10.2)
CHLORIDE SERPL-SCNC: 96 MMOL/L (ref 96–108)
CO2 SERPL-SCNC: 26 MMOL/L (ref 21–32)
CREAT SERPL-MCNC: 1.23 MG/DL (ref 0.6–1.3)
ERYTHROCYTE [DISTWIDTH] IN BLOOD BY AUTOMATED COUNT: 13.7 % (ref 11.6–15.1)
GFR SERPL CREATININE-BSD FRML MDRD: 49 ML/MIN/1.73SQ M
GLUCOSE SERPL-MCNC: 313 MG/DL (ref 65–140)
GLUCOSE SERPL-MCNC: 457 MG/DL (ref 65–140)
GLUCOSE SERPL-MCNC: 494 MG/DL (ref 65–140)
HCO3 BLDV-SCNC: 24.4 MMOL/L (ref 24–30)
HCT VFR BLD AUTO: 36.4 % (ref 34.8–46.1)
HGB BLD-MCNC: 12 G/DL (ref 11.5–15.4)
MCH RBC QN AUTO: 26.5 PG (ref 26.8–34.3)
MCHC RBC AUTO-ENTMCNC: 33 G/DL (ref 31.4–37.4)
MCV RBC AUTO: 80 FL (ref 82–98)
O2 CT BLDV-SCNC: 17.3 ML/DL
PCO2 BLDV: 39.5 MM HG (ref 42–50)
PH BLDV: 7.41 [PH] (ref 7.3–7.4)
PLATELET # BLD AUTO: 304 THOUSANDS/UL (ref 149–390)
PMV BLD AUTO: 11.4 FL (ref 8.9–12.7)
PO2 BLDV: 106.9 MM HG (ref 35–45)
POTASSIUM SERPL-SCNC: 4.1 MMOL/L (ref 3.5–5.3)
PROT SERPL-MCNC: 6.4 G/DL (ref 6.4–8.4)
RBC # BLD AUTO: 4.53 MILLION/UL (ref 3.81–5.12)
SODIUM SERPL-SCNC: 130 MMOL/L (ref 135–147)
WBC # BLD AUTO: 7.31 THOUSAND/UL (ref 4.31–10.16)

## 2025-02-21 PROCEDURE — 82948 REAGENT STRIP/BLOOD GLUCOSE: CPT

## 2025-02-21 PROCEDURE — 96365 THER/PROPH/DIAG IV INF INIT: CPT

## 2025-02-21 PROCEDURE — 99285 EMERGENCY DEPT VISIT HI MDM: CPT

## 2025-02-21 PROCEDURE — 36415 COLL VENOUS BLD VENIPUNCTURE: CPT | Performed by: EMERGENCY MEDICINE

## 2025-02-21 PROCEDURE — 96375 TX/PRO/DX INJ NEW DRUG ADDON: CPT

## 2025-02-21 PROCEDURE — 93005 ELECTROCARDIOGRAM TRACING: CPT

## 2025-02-21 PROCEDURE — 82805 BLOOD GASES W/O2 SATURATION: CPT | Performed by: EMERGENCY MEDICINE

## 2025-02-21 PROCEDURE — 96361 HYDRATE IV INFUSION ADD-ON: CPT

## 2025-02-21 PROCEDURE — 82010 KETONE BODYS QUAN: CPT | Performed by: EMERGENCY MEDICINE

## 2025-02-21 PROCEDURE — 85027 COMPLETE CBC AUTOMATED: CPT | Performed by: EMERGENCY MEDICINE

## 2025-02-21 PROCEDURE — 96372 THER/PROPH/DIAG INJ SC/IM: CPT

## 2025-02-21 PROCEDURE — 80053 COMPREHEN METABOLIC PANEL: CPT | Performed by: EMERGENCY MEDICINE

## 2025-02-21 PROCEDURE — 99285 EMERGENCY DEPT VISIT HI MDM: CPT | Performed by: EMERGENCY MEDICINE

## 2025-02-21 RX ORDER — DIPHENHYDRAMINE HYDROCHLORIDE 50 MG/ML
25 INJECTION INTRAMUSCULAR; INTRAVENOUS ONCE
Status: COMPLETED | OUTPATIENT
Start: 2025-02-21 | End: 2025-02-21

## 2025-02-21 RX ORDER — METOCLOPRAMIDE HYDROCHLORIDE 5 MG/ML
10 INJECTION INTRAMUSCULAR; INTRAVENOUS ONCE
Status: COMPLETED | OUTPATIENT
Start: 2025-02-21 | End: 2025-02-21

## 2025-02-21 RX ORDER — MAGNESIUM SULFATE HEPTAHYDRATE 40 MG/ML
2 INJECTION, SOLUTION INTRAVENOUS ONCE
Status: COMPLETED | OUTPATIENT
Start: 2025-02-21 | End: 2025-02-21

## 2025-02-21 RX ORDER — SODIUM CHLORIDE 9 MG/ML
3 INJECTION INTRAVENOUS
Status: DISCONTINUED | OUTPATIENT
Start: 2025-02-21 | End: 2025-02-21 | Stop reason: HOSPADM

## 2025-02-21 RX ADMIN — MAGNESIUM SULFATE HEPTAHYDRATE 2 G: 40 INJECTION, SOLUTION INTRAVENOUS at 20:24

## 2025-02-21 RX ADMIN — METOCLOPRAMIDE 10 MG: 5 INJECTION, SOLUTION INTRAMUSCULAR; INTRAVENOUS at 20:11

## 2025-02-21 RX ADMIN — INSULIN HUMAN 36 UNITS: 100 INJECTION, SOLUTION PARENTERAL at 20:09

## 2025-02-21 RX ADMIN — SODIUM CHLORIDE 1000 ML: 0.9 INJECTION, SOLUTION INTRAVENOUS at 18:46

## 2025-02-21 RX ADMIN — SODIUM CHLORIDE 1000 ML: 0.9 INJECTION, SOLUTION INTRAVENOUS at 20:28

## 2025-02-21 RX ADMIN — DIPHENHYDRAMINE HYDROCHLORIDE 25 MG: 50 INJECTION, SOLUTION INTRAMUSCULAR; INTRAVENOUS at 20:13

## 2025-02-21 NOTE — TELEPHONE ENCOUNTER
Agree with triage RN assessment and plan - best to go to ER for evaluation given severe hyperglycemia and symptoms.

## 2025-02-21 NOTE — TELEPHONE ENCOUNTER
"Patient called in, states that for the last 2 weeks or so has been having high blood sugars. Has Dexcom and said we should be able to download.  When asked how high her blood sugars have been, patient states \"over 400\", states the meter sometimes reads in the 400s and most of the time it has just been reading \"HI\". She as not rechecked her blood glucose with a glucometer.  See assessment questions below.  Patient reports she has been taking her diabetic medications as prescribed.  Patient reports she has been having bad headaches, fatigue, dizziness, and frequent urination.  Advised patient since meter is reading HI and she is unable to check with glucometer at this time and she is symptomatic, advised her to have someone  drive her to nearest ED. She is agreeable but said she is at work, not sure if she can leave, but she said she ill try.            Reason for Disposition   Patient sounds very sick or weak to the triager    Answer Assessment - Initial Assessment Questions  1. BLOOD GLUCOSE: \"What is your blood glucose level?\"      \"HI\"  2. ONSET: \"When did you check the blood glucose?\"      Ongoing for 2 weeks  3. USUAL RANGE: \"What is your glucose level usually?\" (e.g., usual fasting morning value, usual evening value)      200-300   4. KETONES: \"Do you check for ketones (urine or blood test strips)?\" If Yes, ask: \"What does the test show now?\"       No  5. TYPE 1 or 2:  \"Do you know what type of diabetes you have?\"  (e.g., Type 1, Type 2, Gestational; doesn't know)       Type 2  6. INSULIN: \"Do you take insulin?\" \"What type of insulin(s) do you use? What is the mode of delivery? (syringe, pen; injection or pump)?\"       Mounjaro 10 weekly, Humalog R 500 TID Pen (she is not sure of dos eright now  7. DIABETES PILLS: \"Do you take any pills for your diabetes?\" If Yes, ask: \"Have you missed taking any pills recently?\"      Jardiance   8. OTHER SYMPTOMS: \"Do you have any symptoms?\" (e.g., fever, frequent urination, " difficulty breathing, dizziness, weakness, vomiting)      Headache, Fatigue, dizziness, frequent urination    Protocols used: Diabetes - High Blood Sugar-Adult-[place]

## 2025-02-22 LAB
ATRIAL RATE: 56 BPM
P AXIS: 30 DEGREES
PR INTERVAL: 170 MS
QRS AXIS: 10 DEGREES
QRSD INTERVAL: 82 MS
QT INTERVAL: 466 MS
QTC INTERVAL: 449 MS
T WAVE AXIS: 14 DEGREES
VENTRICULAR RATE: 56 BPM

## 2025-02-22 PROCEDURE — 93010 ELECTROCARDIOGRAM REPORT: CPT | Performed by: INTERNAL MEDICINE

## 2025-02-22 NOTE — DISCHARGE INSTRUCTIONS
-Please follow-up with your endocrinologist regarding your blood sugars.  Please take your insulin as prescribed and return to care develop any new or worsening symptoms

## 2025-02-22 NOTE — ED PROVIDER NOTES
Time reflects when diagnosis was documented in both MDM as applicable and the Disposition within this note       Time User Action Codes Description Comment    2/21/2025  8:58 PM Ronald To Add [R73.9] Hyperglycemia           ED Disposition       ED Disposition   Discharge    Condition   Stable    Date/Time   Fri Feb 21, 2025  8:58 PM    Comment   Haylee Balbuena discharge to home/self care.                   Assessment & Plan       Medical Decision Making  Patient is a 54-year-old female with history of diabetes mellitus presents for evaluation of hyperglycemia.  Seems to be poorly compliant with her insulin regimen at home.  Stressed the importance of insulin.  Noted to be hyperglycemic here, given her home insulin along with IV fluids with improvement into the 300s.  No evidence of DKA/acidosis noted.  Patient also with a typical migraine which improved after migraine cocktail.  Patient requires outpatient follow-up and strict return precautions were given.    Amount and/or Complexity of Data Reviewed  Labs: ordered.    Risk  OTC drugs.  Prescription drug management.        ED Course as of 02/21/25 2158 Fri Feb 21, 2025   1921 50 units this morning         Medications   sodium chloride (PF) 0.9 % injection 3 mL (has no administration in time range)   sodium chloride 0.9 % bolus 1,000 mL (0 mL Intravenous Stopped 2/21/25 2106)   metoclopramide (REGLAN) injection 10 mg (10 mg Intravenous Given 2/21/25 2011)   diphenhydrAMINE (BENADRYL) injection 25 mg (25 mg Intravenous Given 2/21/25 2013)   magnesium sulfate 2 g/50 mL IVPB (premix) 2 g (0 g Intravenous Stopped 2/21/25 2124)   sodium chloride 0.9 % bolus 1,000 mL (0 mL Intravenous Stopped 2/21/25 2129)   insulin regular (HumuLIN R,NovoLIN R) injection 36 Units (36 Units Subcutaneous Given 2/21/25 2009)       ED Risk Strat Scores                            SBIRT 22yo+      Flowsheet Row Most Recent Value   Initial Alcohol Screen: US AUDIT-C     1. How often  "do you have a drink containing alcohol? 0 Filed at: 2025   2. How many drinks containing alcohol do you have on a typical day you are drinking?  0 Filed at: 2025   3a. Male UNDER 65: How often do you have five or more drinks on one occasion? 0 Filed at: 2025   3b. FEMALE Any Age, or MALE 65+: How often do you have 4 or more drinks on one occassion? 0 Filed at: 2025   Audit-C Score 0 Filed at: 2025   LILIA: How many times in the past year have you...    Used an illegal drug or used a prescription medication for non-medical reasons? Never Filed at: 2025                            History of Present Illness       Chief Complaint   Patient presents with    Hyperglycemia - Symptomatic     BG readings of \"high\" at home. Reports dizziness and fatigue for the past few days.        Past Medical History:   Diagnosis Date    Atypical chest pain     Bilateral calf pain 2024    Diabetes mellitus (HCC)     Gastroparesis     GERD (gastroesophageal reflux disease)     Hyperlipidemia     Hypertension     MRSA bacteremia 2023    Psychiatric disorder     Sciatica     Seizure disorder (HCC)       Past Surgical History:   Procedure Laterality Date    APPENDECTOMY      BREAST BIOPSY Left  benign     SECTION      CHOLECYSTECTOMY      COLONOSCOPY      HYSTERECTOMY      IR PICC PLACEMENT SINGLE LUMEN  2023    MN LAPAROSCOPIC APPENDECTOMY N/A 2021    Procedure: APPENDECTOMY LAPAROSCOPIC;  Surgeon: Onel Martin MD;  Location:  MAIN OR;  Service: General    MN LAPS ABD PRTM&OMENTUM DX W/WO SPEC BR/WA SPX N/A 2021    Procedure: LAPAROSCOPY DIAGNOSTIC, EXTENSIVE DESI;  Surgeon: Onel Martin MD;  Location:  MAIN OR;  Service: General    TUBAL LIGATION      TYMPANOSTOMY TUBE PLACEMENT Bilateral 2024    UPPER GASTROINTESTINAL ENDOSCOPY        Family History   Problem Relation Age of Onset    No Known Problems Mother     No Known " Problems Father     No Known Problems Daughter     No Known Problems Maternal Grandmother     No Known Problems Paternal Grandmother     No Known Problems Paternal Aunt     No Known Problems Paternal Aunt     No Known Problems Paternal Aunt       Social History     Tobacco Use    Smoking status: Former     Current packs/day: 0.00     Types: Cigarettes     Quit date:      Years since quittin.1    Smokeless tobacco: Never   Vaping Use    Vaping status: Never Used   Substance Use Topics    Alcohol use: Never     Comment: occ    Drug use: Never      E-Cigarette/Vaping    E-Cigarette Use Never User       E-Cigarette/Vaping Substances    Nicotine No     THC No     CBD No     Flavoring No     Other No     Unknown No       I have reviewed and agree with the history as documented.     Patient is a 54-year-old female with history of poorly controlled diabetes mellitus that presents for evaluation of hyperglycemia.  She has multiple complaints.  She says that she has been feeling lightheaded, generally fatigued, generally weak, nauseous at times.  She is also had some floaters in both eyes for the past couple of weeks intermittently.  Patient has been taking her insulin intermittently because she does not want a bottom herself out.  She denies any chest pain dyspnea abdominal pain currently.  No recent fevers or cough.  Patient also complains one of her typical migraines over the past couple hours.        Review of Systems   Constitutional:  Positive for fatigue. Negative for fever.   HENT:  Negative for sore throat.    Respiratory:  Negative for shortness of breath.    Cardiovascular:  Negative for chest pain.   Gastrointestinal:  Positive for nausea. Negative for abdominal pain.   Genitourinary:  Negative for dysuria.   Musculoskeletal:  Negative for back pain.   Skin:  Negative for rash.   Neurological:  Positive for headaches. Negative for light-headedness.   Psychiatric/Behavioral:  Negative for agitation.    All  other systems reviewed and are negative.          Objective       ED Triage Vitals [02/21/25 1836]   Temperature Pulse Blood Pressure Respirations SpO2 Patient Position - Orthostatic VS   98 °F (36.7 °C) 58 140/75 16 96 % Sitting      Temp Source Heart Rate Source BP Location FiO2 (%) Pain Score    Temporal Monitor Left arm -- 10 - Worst Possible Pain      Vitals      Date and Time Temp Pulse SpO2 Resp BP Pain Score FACES Pain Rating User   02/21/25 2129 98.3 °F (36.8 °C) 54 98 % 18 140/69 7 -- BEBETO   02/21/25 2100 -- 51 94 % 16 139/63 -- -- BEBETO   02/21/25 2019 -- 58 95 % 18 169/80 10 - Worst Possible Pain -- BEBETO   02/21/25 1836 98 °F (36.7 °C) 58 96 % 16 140/75 10 - Worst Possible Pain -- SG            Physical Exam  Vitals reviewed.   Constitutional:       General: She is not in acute distress.     Appearance: She is well-developed.   HENT:      Head: Normocephalic.   Eyes:      Pupils: Pupils are equal, round, and reactive to light.   Cardiovascular:      Rate and Rhythm: Normal rate and regular rhythm.      Heart sounds: Normal heart sounds.   Pulmonary:      Effort: Pulmonary effort is normal.      Breath sounds: Normal breath sounds.   Abdominal:      General: Bowel sounds are normal. There is no distension.      Palpations: Abdomen is soft.      Tenderness: There is no abdominal tenderness. There is no guarding.   Musculoskeletal:         General: No tenderness or deformity. Normal range of motion.      Cervical back: Normal range of motion and neck supple.   Skin:     General: Skin is warm and dry.      Capillary Refill: Capillary refill takes less than 2 seconds.   Neurological:      Mental Status: She is alert and oriented to person, place, and time.      Cranial Nerves: No cranial nerve deficit.      Sensory: No sensory deficit.   Psychiatric:         Behavior: Behavior normal.         Thought Content: Thought content normal.         Judgment: Judgment normal.         Results Reviewed       Procedure  Component Value Units Date/Time    Fingerstick Glucose (POCT) [[id number]]  (Abnormal) Collected: 02/21/25 2124    Lab Status: Final result Specimen: Blood Updated: 02/21/25 2125     POC Glucose 313 mg/dl     Comprehensive metabolic panel [[id number]]  (Abnormal) Collected: 02/21/25 1847    Lab Status: Final result Specimen: Blood from Arm, Right Updated: 02/21/25 1926     Sodium 130 mmol/L      Potassium 4.1 mmol/L      Chloride 96 mmol/L      CO2 26 mmol/L      ANION GAP 8 mmol/L      BUN 36 mg/dL      Creatinine 1.23 mg/dL      Glucose 494 mg/dL      Calcium 9.3 mg/dL      AST 23 U/L      ALT 32 U/L      Alkaline Phosphatase 70 U/L      Total Protein 6.4 g/dL      Albumin 4.1 g/dL      Total Bilirubin 0.26 mg/dL      eGFR 49 ml/min/1.73sq m     Narrative:      [employer] guidelines for Chronic Kidney Disease (CKD):     Stage 1 with normal or high GFR (GFR > 90 mL/min/1.73 square meters)    Stage 2 Mild CKD (GFR = 60-89 mL/min/1.73 square meters)    Stage 3A Moderate CKD (GFR = 45-59 mL/min/1.73 square meters)    Stage 3B Moderate CKD (GFR = 30-44 mL/min/1.73 square meters)    Stage 4 Severe CKD (GFR = 15-29 mL/min/1.73 square meters)    Stage 5 End Stage CKD (GFR <15 mL/min/1.73 square meters)  Note: GFR calculation is accurate only with a steady state creatinine    Beta Hydroxybutyrate [[id number]]  (Normal) Collected: 02/21/25 1847    Lab Status: Final result Specimen: Blood from Arm, Right Updated: 02/21/25 1924     Beta- Hydroxybutyrate 0.06 mmol/L     Blood gas, venous [[id number]]  (Abnormal) Collected: 02/21/25 1847    Lab Status: Final result Specimen: Blood from Arm, Right Updated: 02/21/25 1907     pH, Ovidio 7.408     pCO2, Ovidio 39.5 mm Hg      pO2, Ovidio 106.9 mm Hg      HCO3, Ovidio 24.4 mmol/L      Base Excess, Ovidio -0.2 mmol/L      O2 Content, Ovidio 17.3 ml/dL      O2 HGB, VENOUS 94.4 %     CBC [[id number]]  (Abnormal) Collected: 02/21/25 1847    Lab Status: Final result Specimen: Blood from  Arm, Right Updated: 25     WBC 7.31 Thousand/uL      RBC 4.53 Million/uL      Hemoglobin 12.0 g/dL      Hematocrit 36.4 %      MCV 80 fL      MCH 26.5 pg      MCHC 33.0 g/dL      RDW 13.7 %      Platelets 304 Thousands/uL      MPV 11.4 fL     Fingerstick Glucose (POCT) [[id number]]  (Abnormal) Collected: 25    Lab Status: Final result Specimen: Blood Updated: 25     POC Glucose 457 mg/dl             No orders to display       ECG 12 Lead Documentation Only    Date/Time: 2025 10:01 PM    Performed by: Ronald To MD  Authorized by: Ronald To MD    ECG reviewed by me, the ED Provider: yes    Patient location:  ED  Previous ECG:     Previous ECG:  Unavailable    Comparison to cardiac monitor: Yes    Interpretation:     Interpretation: non-specific    Rate:     ECG rate assessment: bradycardic    Rhythm:     Rhythm: sinus bradycardia    Ectopy:     Ectopy: PAC    QRS:     QRS axis:  Normal    QRS intervals:  Normal  Conduction:     Conduction: normal    ST segments:     ST segments:  Normal  T waves:     T waves: normal        ED Medication and Procedure Management   Prior to Admission Medications   Prescriptions Last Dose Informant Patient Reported? Taking?   Alcohol Swabs (Pharmacist Choice Alcohol) PADS  Self No No   Sig: Apply topically 4 (four) times a day Use as directed   B-D UF III MINI PEN NEEDLES 31G X 5 MM MISC  Self No No   Sig: Pt uses 5 pen needles daily   Continuous Glucose Sensor (Dexcom G7 Sensor)   No No   Sig: Use 1 Device every 10 days   Glucagon (Baqsimi One Pack) 3 MG/DOSE POWD   No No   Si each into each nostril if needed (PRN for hypoglycemia emergency)   Insulin Regular Human, Conc, (HumuLIN R U-500 KwikPen) 500 units/mL CONCENTRATED U-500 injection pen   No No   Sig: E11.65 inject 55 units with breakfast, 36 units with lunch and dinner, or as directed TDD up to 200 units   Iron Heme Polypeptide 12 MG TABS  Self Yes No   Sig: Take 1 tablet  by mouth   Jardiance 25 MG TABS   No No   Sig: TAKE ONE TABLET BY MOUTH EVERY MORNING   Magnesium Citrate 200 MG TABS   No No   Sig: Take 1 tablet by mouth 2 (two) times a day   OneTouch Ultra test strip  Self No No   Sig: USE 4 TIMES A DAY   Pharmacist Choice Lancets MISC  Self No No   Sig: USE AS DIRECTED   Restasis 0.05 % ophthalmic emulsion  Self Yes No   Tirzepatide 10 MG/0.5ML SOAJ   No No   Sig: Inject 10 mg under the skin once a week   albuterol (2.5 mg/3 mL) 0.083 % nebulizer solution  Self No No   Sig: Take 3 mL (2.5 mg total) by nebulization every 6 (six) hours as needed for wheezing or shortness of breath   albuterol (PROVENTIL HFA,VENTOLIN HFA) 90 mcg/act inhaler  Self No No   Sig: INHALE ONE PUFF BY MOUTH AND INTO THE LUNGS EVERY 6 HOURS AS NEEDED FOR WHEEZING   aspirin (ECOTRIN LOW STRENGTH) 81 mg EC tablet   No No   Sig: TAKE ONE TABLET BY MOUTH ONCE DAILY   atorvastatin (LIPITOR) 80 mg tablet   No No   Sig: TAKE ONE TABLET BY MOUTH DAILY AT BEDTIME   cholecalciferol (VITAMIN D3) 1,000 units tablet   No No   Sig: Take 2 tablets (2,000 Units total) by mouth daily   citalopram (CeleXA) 40 mg tablet   No No   Sig: TAKE ONE TABLET BY MOUTH DAILY   dicyclomine (BENTYL) 20 mg tablet   No No   Sig: Take twice daily (am and before bedtime), may take additional 2 doses per day, max 4x/day (every 6 hours if needed)   docusate sodium (COLACE) 100 mg capsule   No No   Sig: TAKE ONE CAPSULE BY MOUTH TWICE DAILY   doxycycline hyclate (VIBRAMYCIN) 100 mg capsule   No No   Sig: Take 1 capsule (100 mg total) by mouth 2 (two) times a day for 7 days   ergocalciferol (VITAMIN D2) 50,000 units   No No   Sig: TAKE ONE CAPSULE BY MOUTH ONCE A WEEK   Patient not taking: Reported on 2/4/2025   famotidine (PEPCID) 20 mg tablet   Yes No   Sig: Take 20 mg by mouth daily   Patient not taking: Reported on 2/4/2025   famotidine (PEPCID) 40 MG tablet   No No   Sig: TAKE ONE TABLET BY MOUTH TWO HOURS PRIOR TO BEDTIME   fenofibrate  micronized (LOFIBRA) 134 MG capsule  Self No No   Sig: Take 1 capsule (134 mg total) by mouth daily with breakfast   levETIRAcetam (KEPPRA) 500 mg tablet   No No   Sig: TAKE ONE TABLET BY MOUTH TWICE DAILY   linaCLOtide (Linzess) 72 MCG CAPS  Self No No   Sig: Take 1 capsule by mouth daily before breakfast   meclizine (ANTIVERT) 25 mg tablet  Self No No   Sig: Take 1 tablet (25 mg total) by mouth 3 (three) times a day as needed for dizziness   methocarbamol (ROBAXIN) 500 mg tablet   No No   Sig: Take 1 tablet (500 mg total) by mouth 3 (three) times a day as needed for muscle spasms   metoclopramide (Reglan) 10 mg tablet  Self No No   Sig: Take 0.5 tablets (5 mg total) by mouth every 6 (six) hours   metoprolol tartrate (LOPRESSOR) 25 mg tablet   No No   Sig: TAKE ONE TABLET BY MOUTH TWICE DAILY   multivitamin (THERAGRAN) TABS  Self Yes No   Sig: Take 1 tablet by mouth every morning   ondansetron (ZOFRAN) 4 mg tablet   No No   Sig: TAKE 1 TABLET (4 MG TOTAL) BY MOUTH EVERY 8 (EIGHT) HOURS AS NEEDED FOR NAUSEA OR VOMITING   oxybutynin (DITROPAN-XL) 10 MG 24 hr tablet   No No   Sig: TAKE ONE TABLET BY MOUTH DAILY AT BEDTIME   pantoprazole (PROTONIX) 40 mg tablet   No No   Sig: TAKE ONE TABLET BY MOUTH TWICE DAILY   pregabalin (LYRICA) 100 mg capsule   No No   Sig: TAKE ONE CAPSULE BY MOUTH THREE TIMES A DAY      Facility-Administered Medications: None     Discharge Medication List as of 2/21/2025  9:26 PM        CONTINUE these medications which have NOT CHANGED    Details   albuterol (2.5 mg/3 mL) 0.083 % nebulizer solution Take 3 mL (2.5 mg total) by nebulization every 6 (six) hours as needed for wheezing or shortness of breath, Starting Tue 1/9/2024, Normal      albuterol (PROVENTIL HFA,VENTOLIN HFA) 90 mcg/act inhaler INHALE ONE PUFF BY MOUTH AND INTO THE LUNGS EVERY 6 HOURS AS NEEDED FOR WHEEZING, Normal      Alcohol Swabs (Pharmacist Choice Alcohol) PADS Apply topically 4 (four) times a day Use as directed,  Starting Tue 9/5/2023, Normal      aspirin (ECOTRIN LOW STRENGTH) 81 mg EC tablet TAKE ONE TABLET BY MOUTH ONCE DAILY, Starting Fri 1/31/2025, Normal      atorvastatin (LIPITOR) 80 mg tablet TAKE ONE TABLET BY MOUTH DAILY AT BEDTIME, Starting Tue 1/7/2025, Normal      B-D UF III MINI PEN NEEDLES 31G X 5 MM MISC Pt uses 5 pen needles daily, Normal      cholecalciferol (VITAMIN D3) 1,000 units tablet Take 2 tablets (2,000 Units total) by mouth daily, Starting Thu 10/17/2024, Normal      citalopram (CeleXA) 40 mg tablet TAKE ONE TABLET BY MOUTH DAILY, Starting Fri 11/15/2024, Normal      Continuous Glucose Sensor (Dexcom G7 Sensor) Use 1 Device every 10 days, Starting Mon 10/28/2024, Normal      dicyclomine (BENTYL) 20 mg tablet Take twice daily (am and before bedtime), may take additional 2 doses per day, max 4x/day (every 6 hours if needed), Normal      docusate sodium (COLACE) 100 mg capsule TAKE ONE CAPSULE BY MOUTH TWICE DAILY, Starting Wed 1/8/2025, Normal      doxycycline hyclate (VIBRAMYCIN) 100 mg capsule Take 1 capsule (100 mg total) by mouth 2 (two) times a day for 7 days, Starting Sun 2/16/2025, Until Sun 2/23/2025, Normal      ergocalciferol (VITAMIN D2) 50,000 units TAKE ONE CAPSULE BY MOUTH ONCE A WEEK, Starting Wed 6/5/2024, Normal      !! famotidine (PEPCID) 20 mg tablet Take 20 mg by mouth daily, Starting Mon 11/11/2024, Historical Med      !! famotidine (PEPCID) 40 MG tablet TAKE ONE TABLET BY MOUTH TWO HOURS PRIOR TO BEDTIME, Normal      fenofibrate micronized (LOFIBRA) 134 MG capsule Take 1 capsule (134 mg total) by mouth daily with breakfast, Starting Fri 1/12/2024, Normal      Glucagon (Baqsimi One Pack) 3 MG/DOSE POWD 1 each into each nostril if needed (PRN for hypoglycemia emergency), Starting Thu 11/14/2024, Normal      Insulin Regular Human, Conc, (HumuLIN R U-500 KwikPen) 500 units/mL CONCENTRATED U-500 injection pen E11.65 inject 55 units with breakfast, 36 units with lunch and dinner, or  as directed TDD up to 200 units, Fill Later      Iron Heme Polypeptide 12 MG TABS Take 1 tablet by mouth, Historical Med      Jardiance 25 MG TABS TAKE ONE TABLET BY MOUTH EVERY MORNING, Starting Fri 11/15/2024, Normal      levETIRAcetam (KEPPRA) 500 mg tablet TAKE ONE TABLET BY MOUTH TWICE DAILY, Starting Fri 1/24/2025, Normal      linaCLOtide (Linzess) 72 MCG CAPS Take 1 capsule by mouth daily before breakfast, Starting Tue 9/5/2023, Normal      Magnesium Citrate 200 MG TABS Take 1 tablet by mouth 2 (two) times a day, Starting Thu 9/12/2024, Normal      meclizine (ANTIVERT) 25 mg tablet Take 1 tablet (25 mg total) by mouth 3 (three) times a day as needed for dizziness, Starting Mon 7/10/2023, Normal      methocarbamol (ROBAXIN) 500 mg tablet Take 1 tablet (500 mg total) by mouth 3 (three) times a day as needed for muscle spasms, Starting Tue 5/14/2024, Normal      metoclopramide (Reglan) 10 mg tablet Take 0.5 tablets (5 mg total) by mouth every 6 (six) hours, Starting Thu 12/21/2023, Normal      metoprolol tartrate (LOPRESSOR) 25 mg tablet TAKE ONE TABLET BY MOUTH TWICE DAILY, Starting Wed 11/27/2024, Normal      multivitamin (THERAGRAN) TABS Take 1 tablet by mouth every morning, Historical Med      ondansetron (ZOFRAN) 4 mg tablet TAKE 1 TABLET (4 MG TOTAL) BY MOUTH EVERY 8 (EIGHT) HOURS AS NEEDED FOR NAUSEA OR VOMITING, Starting Thu 10/3/2024, Normal      OneTouch Ultra test strip USE 4 TIMES A DAY, Normal      oxybutynin (DITROPAN-XL) 10 MG 24 hr tablet TAKE ONE TABLET BY MOUTH DAILY AT BEDTIME, Starting Wed 7/10/2024, Normal      pantoprazole (PROTONIX) 40 mg tablet TAKE ONE TABLET BY MOUTH TWICE DAILY, Starting Wed 2/5/2025, Normal      Pharmacist Choice Lancets MISC USE AS DIRECTED, Normal      pregabalin (LYRICA) 100 mg capsule TAKE ONE CAPSULE BY MOUTH THREE TIMES A DAY, Starting Tue 11/26/2024, Normal      Restasis 0.05 % ophthalmic emulsion Starting Thu 3/2/2023, Historical Med      Tirzepatide 10  MG/0.5ML SOAJ Inject 10 mg under the skin once a week, Starting Mon 1/27/2025, Normal       !! - Potential duplicate medications found. Please discuss with provider.        No discharge procedures on file.  ED SEPSIS DOCUMENTATION   Time reflects when diagnosis was documented in both MDM as applicable and the Disposition within this note       Time User Action Codes Description Comment    2/21/2025  8:58 PM Ronald To Add [R73.9] Hyperglycemia                  Ronald To MD  02/21/25 9137

## 2025-02-28 DIAGNOSIS — K21.9 GASTROESOPHAGEAL REFLUX DISEASE WITHOUT ESOPHAGITIS: ICD-10-CM

## 2025-02-28 DIAGNOSIS — K21.9 GASTROESOPHAGEAL REFLUX DISEASE, UNSPECIFIED WHETHER ESOPHAGITIS PRESENT: ICD-10-CM

## 2025-02-28 RX ORDER — FAMOTIDINE 40 MG/1
TABLET, FILM COATED ORAL
Qty: 30 TABLET | Refills: 5 | Status: SHIPPED | OUTPATIENT
Start: 2025-02-28

## 2025-03-02 ENCOUNTER — HOSPITAL ENCOUNTER (INPATIENT)
Facility: HOSPITAL | Age: 55
LOS: 3 days | Discharge: HOME | End: 2025-03-05
Attending: EMERGENCY MEDICINE | Admitting: ANESTHESIOLOGY
Payer: COMMERCIAL

## 2025-03-02 DIAGNOSIS — Z79.4 TYPE 2 DIABETES MELLITUS WITH DIABETIC AUTONOMIC NEUROPATHY, WITH LONG-TERM CURRENT USE OF INSULIN (HCC): ICD-10-CM

## 2025-03-02 DIAGNOSIS — E11.43 TYPE 2 DIABETES MELLITUS WITH DIABETIC AUTONOMIC NEUROPATHY, WITH LONG-TERM CURRENT USE OF INSULIN (HCC): ICD-10-CM

## 2025-03-02 DIAGNOSIS — E11.42 DIABETIC POLYNEUROPATHY ASSOCIATED WITH TYPE 2 DIABETES MELLITUS (HCC): ICD-10-CM

## 2025-03-02 DIAGNOSIS — E11.10 DKA (DIABETIC KETOACIDOSIS) (HCC): Primary | ICD-10-CM

## 2025-03-02 DIAGNOSIS — N17.9 AKI (ACUTE KIDNEY INJURY) (HCC): ICD-10-CM

## 2025-03-02 PROBLEM — R19.7 NAUSEA, VOMITING, AND DIARRHEA: Status: ACTIVE | Noted: 2025-03-02

## 2025-03-02 PROBLEM — R73.9 HYPERGLYCEMIA: Status: ACTIVE | Noted: 2025-03-02

## 2025-03-02 PROBLEM — C64.9 RENAL CELL CARCINOMA (HCC): Status: ACTIVE | Noted: 2024-10-14
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
## 2025-03-02 PROBLEM — R19.7 DIARRHEA: Status: RESOLVED | Noted: 2025-03-02 | Resolved: 2025-03-02

## 2025-03-02 PROBLEM — R11.2 NAUSEA AND VOMITING: Status: ACTIVE | Noted: 2025-03-02

## 2025-03-02 PROBLEM — E87.20 METABOLIC ACIDOSIS: Status: ACTIVE | Noted: 2025-03-02

## 2025-03-02 PROBLEM — E11.9 DM (DIABETES MELLITUS) (HCC): Status: ACTIVE | Noted: 2025-03-02

## 2025-03-02 PROBLEM — E66.9 OBESITY (BMI 30-39.9): Status: ACTIVE | Noted: 2025-03-02

## 2025-03-02 PROBLEM — R19.7 DIARRHEA: Status: ACTIVE | Noted: 2025-03-02

## 2025-03-02 PROBLEM — E11.65 HYPERGLYCEMIA DUE TO DIABETES MELLITUS (HCC): Status: ACTIVE | Noted: 2025-03-02

## 2025-03-02 LAB
2HR DELTA HS TROPONIN: 1 NG/L
4HR DELTA HS TROPONIN: 1 NG/L
ALBUMIN SERPL BCG-MCNC: 4.3 G/DL (ref 3.5–5)
ALP SERPL-CCNC: 68 U/L (ref 34–104)
ALT SERPL W P-5'-P-CCNC: 21 U/L (ref 7–52)
ANION GAP SERPL CALCULATED.3IONS-SCNC: 10 MMOL/L (ref 4–13)
ANION GAP SERPL CALCULATED.3IONS-SCNC: 13 MMOL/L (ref 4–13)
ANION GAP SERPL CALCULATED.3IONS-SCNC: 6 MMOL/L (ref 4–13)
ANION GAP SERPL CALCULATED.3IONS-SCNC: 8 MMOL/L (ref 4–13)
AST SERPL W P-5'-P-CCNC: 14 U/L (ref 13–39)
B-OH-BUTYR SERPL-MCNC: 0.99 MMOL/L (ref 0.02–0.27)
BASE EX.OXY STD BLDV CALC-SCNC: 61.5 % (ref 60–80)
BASE EX.OXY STD BLDV CALC-SCNC: 83.5 % (ref 60–80)
BASE EX.OXY STD BLDV CALC-SCNC: 84.2 % (ref 60–80)
BASE EXCESS BLDV CALC-SCNC: -11.3 MMOL/L
BASE EXCESS BLDV CALC-SCNC: -12.7 MMOL/L
BASE EXCESS BLDV CALC-SCNC: -9.8 MMOL/L
BILIRUB SERPL-MCNC: 0.52 MG/DL (ref 0.2–1)
BNP SERPL-MCNC: 34 PG/ML (ref 0–100)
BUN SERPL-MCNC: 37 MG/DL (ref 5–25)
BUN SERPL-MCNC: 51 MG/DL (ref 5–25)
BUN SERPL-MCNC: 62 MG/DL (ref 5–25)
BUN SERPL-MCNC: 64 MG/DL (ref 5–25)
CA-I BLD-SCNC: 1.13 MMOL/L (ref 1.12–1.32)
CA-I BLD-SCNC: 1.18 MMOL/L (ref 1.12–1.32)
CALCIUM SERPL-MCNC: 7.1 MG/DL (ref 8.4–10.2)
CALCIUM SERPL-MCNC: 8 MG/DL (ref 8.4–10.2)
CALCIUM SERPL-MCNC: 8.1 MG/DL (ref 8.4–10.2)
CALCIUM SERPL-MCNC: 8.7 MG/DL (ref 8.4–10.2)
CARDIAC TROPONIN I PNL SERPL HS: 3 NG/L (ref ?–50)
CARDIAC TROPONIN I PNL SERPL HS: 4 NG/L (ref ?–50)
CARDIAC TROPONIN I PNL SERPL HS: 4 NG/L (ref ?–50)
CHLORIDE SERPL-SCNC: 103 MMOL/L (ref 96–108)
CHLORIDE SERPL-SCNC: 106 MMOL/L (ref 96–108)
CHLORIDE SERPL-SCNC: 113 MMOL/L (ref 96–108)
CHLORIDE SERPL-SCNC: 116 MMOL/L (ref 96–108)
CO2 SERPL-SCNC: 17 MMOL/L (ref 21–32)
CO2 SERPL-SCNC: 17 MMOL/L (ref 21–32)
CO2 SERPL-SCNC: 18 MMOL/L (ref 21–32)
CO2 SERPL-SCNC: 18 MMOL/L (ref 21–32)
CREAT SERPL-MCNC: 1.16 MG/DL (ref 0.6–1.3)
CREAT SERPL-MCNC: 1.51 MG/DL (ref 0.6–1.3)
CREAT SERPL-MCNC: 1.99 MG/DL (ref 0.6–1.3)
CREAT SERPL-MCNC: 2.26 MG/DL (ref 0.6–1.3)
ERYTHROCYTE [DISTWIDTH] IN BLOOD BY AUTOMATED COUNT: 14.4 % (ref 11.6–15.1)
EST. AVERAGE GLUCOSE BLD GHB EST-MCNC: 298 MG/DL
GFR SERPL CREATININE-BSD FRML MDRD: 23 ML/MIN/1.73SQ M
GFR SERPL CREATININE-BSD FRML MDRD: 27 ML/MIN/1.73SQ M
GFR SERPL CREATININE-BSD FRML MDRD: 38 ML/MIN/1.73SQ M
GFR SERPL CREATININE-BSD FRML MDRD: 53 ML/MIN/1.73SQ M
GLUCOSE SERPL-MCNC: 105 MG/DL (ref 65–140)
GLUCOSE SERPL-MCNC: 105 MG/DL (ref 65–140)
GLUCOSE SERPL-MCNC: 125 MG/DL (ref 65–140)
GLUCOSE SERPL-MCNC: 129 MG/DL (ref 65–140)
GLUCOSE SERPL-MCNC: 136 MG/DL (ref 65–140)
GLUCOSE SERPL-MCNC: 136 MG/DL (ref 65–140)
GLUCOSE SERPL-MCNC: 218 MG/DL (ref 65–140)
GLUCOSE SERPL-MCNC: 253 MG/DL (ref 65–140)
GLUCOSE SERPL-MCNC: 272 MG/DL (ref 65–140)
GLUCOSE SERPL-MCNC: 272 MG/DL (ref 65–140)
GLUCOSE SERPL-MCNC: 286 MG/DL (ref 65–140)
GLUCOSE SERPL-MCNC: 296 MG/DL (ref 65–140)
GLUCOSE SERPL-MCNC: 93 MG/DL (ref 65–140)
GLUCOSE SERPL-MCNC: 97 MG/DL (ref 65–140)
HBA1C MFR BLD: 12 %
HCO3 BLDV-SCNC: 14.4 MMOL/L (ref 24–30)
HCO3 BLDV-SCNC: 15.5 MMOL/L (ref 24–30)
HCO3 BLDV-SCNC: 16.6 MMOL/L (ref 24–30)
HCT VFR BLD AUTO: 44.4 % (ref 34.8–46.1)
HGB BLD-MCNC: 13.9 G/DL (ref 11.5–15.4)
LACTATE SERPL-SCNC: 0.8 MMOL/L (ref 0.5–2)
LIPASE SERPL-CCNC: 22 U/L (ref 11–82)
LIPASE SERPL-CCNC: 25 U/L (ref 11–82)
MAGNESIUM SERPL-MCNC: 1.7 MG/DL (ref 1.9–2.7)
MAGNESIUM SERPL-MCNC: 1.9 MG/DL (ref 1.9–2.7)
MAGNESIUM SERPL-MCNC: 2.3 MG/DL (ref 1.9–2.7)
MCH RBC QN AUTO: 26.1 PG (ref 26.8–34.3)
MCHC RBC AUTO-ENTMCNC: 31.3 G/DL (ref 31.4–37.4)
MCV RBC AUTO: 83 FL (ref 82–98)
O2 CT BLDV-SCNC: 12.6 ML/DL
O2 CT BLDV-SCNC: 13.7 ML/DL
O2 CT BLDV-SCNC: 16.3 ML/DL
PCO2 BLDV: 37.4 MM HG (ref 42–50)
PCO2 BLDV: 37.9 MM HG (ref 42–50)
PCO2 BLDV: 38.1 MM HG (ref 42–50)
PH BLDV: 7.2 [PH] (ref 7.3–7.4)
PH BLDV: 7.23 [PH] (ref 7.3–7.4)
PH BLDV: 7.26 [PH] (ref 7.3–7.4)
PHOSPHATE SERPL-MCNC: 1.7 MG/DL (ref 2.7–4.5)
PHOSPHATE SERPL-MCNC: 2.7 MG/DL (ref 2.7–4.5)
PHOSPHATE SERPL-MCNC: 3.4 MG/DL (ref 2.7–4.5)
PLATELET # BLD AUTO: 343 THOUSANDS/UL (ref 149–390)
PLATELET # BLD AUTO: 377 THOUSANDS/UL (ref 149–390)
PMV BLD AUTO: 11 FL (ref 8.9–12.7)
PMV BLD AUTO: 11.1 FL (ref 8.9–12.7)
PO2 BLDV: 35.5 MM HG (ref 35–45)
PO2 BLDV: 53.6 MM HG (ref 35–45)
PO2 BLDV: 61.5 MM HG (ref 35–45)
POTASSIUM SERPL-SCNC: 3.3 MMOL/L (ref 3.5–5.3)
POTASSIUM SERPL-SCNC: 3.6 MMOL/L (ref 3.5–5.3)
POTASSIUM SERPL-SCNC: 3.7 MMOL/L (ref 3.5–5.3)
POTASSIUM SERPL-SCNC: 3.8 MMOL/L (ref 3.5–5.3)
PROT SERPL-MCNC: 7.4 G/DL (ref 6.4–8.4)
RBC # BLD AUTO: 5.33 MILLION/UL (ref 3.81–5.12)
SODIUM SERPL-SCNC: 134 MMOL/L (ref 135–147)
SODIUM SERPL-SCNC: 134 MMOL/L (ref 135–147)
SODIUM SERPL-SCNC: 138 MMOL/L (ref 135–147)
SODIUM SERPL-SCNC: 139 MMOL/L (ref 135–147)
TSH SERPL DL<=0.05 MIU/L-ACNC: 1.44 UIU/ML (ref 0.45–4.5)
WBC # BLD AUTO: 7.9 THOUSAND/UL (ref 4.31–10.16)

## 2025-03-02 PROCEDURE — 82948 REAGENT STRIP/BLOOD GLUCOSE: CPT

## 2025-03-02 PROCEDURE — 80048 BASIC METABOLIC PNL TOTAL CA: CPT | Performed by: NURSE PRACTITIONER

## 2025-03-02 PROCEDURE — 83735 ASSAY OF MAGNESIUM: CPT | Performed by: EMERGENCY MEDICINE

## 2025-03-02 PROCEDURE — 93005 ELECTROCARDIOGRAM TRACING: CPT

## 2025-03-02 PROCEDURE — 82010 KETONE BODYS QUAN: CPT | Performed by: EMERGENCY MEDICINE

## 2025-03-02 PROCEDURE — 84484 ASSAY OF TROPONIN QUANT: CPT

## 2025-03-02 PROCEDURE — 83605 ASSAY OF LACTIC ACID: CPT

## 2025-03-02 PROCEDURE — 36415 COLL VENOUS BLD VENIPUNCTURE: CPT

## 2025-03-02 PROCEDURE — 94664 DEMO&/EVAL PT USE INHALER: CPT

## 2025-03-02 PROCEDURE — 85027 COMPLETE CBC AUTOMATED: CPT | Performed by: EMERGENCY MEDICINE

## 2025-03-02 PROCEDURE — 80053 COMPREHEN METABOLIC PANEL: CPT | Performed by: EMERGENCY MEDICINE

## 2025-03-02 PROCEDURE — 83735 ASSAY OF MAGNESIUM: CPT | Performed by: NURSE PRACTITIONER

## 2025-03-02 PROCEDURE — 82330 ASSAY OF CALCIUM: CPT | Performed by: NURSE PRACTITIONER

## 2025-03-02 PROCEDURE — 83880 ASSAY OF NATRIURETIC PEPTIDE: CPT

## 2025-03-02 PROCEDURE — 80048 BASIC METABOLIC PNL TOTAL CA: CPT | Performed by: EMERGENCY MEDICINE

## 2025-03-02 PROCEDURE — 84100 ASSAY OF PHOSPHORUS: CPT | Performed by: EMERGENCY MEDICINE

## 2025-03-02 PROCEDURE — 80048 BASIC METABOLIC PNL TOTAL CA: CPT

## 2025-03-02 PROCEDURE — 96375 TX/PRO/DX INJ NEW DRUG ADDON: CPT

## 2025-03-02 PROCEDURE — 96374 THER/PROPH/DIAG INJ IV PUSH: CPT

## 2025-03-02 PROCEDURE — 84100 ASSAY OF PHOSPHORUS: CPT | Performed by: NURSE PRACTITIONER

## 2025-03-02 PROCEDURE — 99223 1ST HOSP IP/OBS HIGH 75: CPT | Performed by: ANESTHESIOLOGY

## 2025-03-02 PROCEDURE — 83036 HEMOGLOBIN GLYCOSYLATED A1C: CPT

## 2025-03-02 PROCEDURE — 83735 ASSAY OF MAGNESIUM: CPT

## 2025-03-02 PROCEDURE — 84100 ASSAY OF PHOSPHORUS: CPT

## 2025-03-02 PROCEDURE — 99285 EMERGENCY DEPT VISIT HI MDM: CPT

## 2025-03-02 PROCEDURE — 82805 BLOOD GASES W/O2 SATURATION: CPT

## 2025-03-02 PROCEDURE — 85049 AUTOMATED PLATELET COUNT: CPT

## 2025-03-02 PROCEDURE — 83690 ASSAY OF LIPASE: CPT | Performed by: EMERGENCY MEDICINE

## 2025-03-02 PROCEDURE — 84443 ASSAY THYROID STIM HORMONE: CPT

## 2025-03-02 PROCEDURE — 83690 ASSAY OF LIPASE: CPT

## 2025-03-02 PROCEDURE — 82330 ASSAY OF CALCIUM: CPT

## 2025-03-02 PROCEDURE — 82805 BLOOD GASES W/O2 SATURATION: CPT | Performed by: EMERGENCY MEDICINE

## 2025-03-02 PROCEDURE — 99291 CRITICAL CARE FIRST HOUR: CPT | Performed by: EMERGENCY MEDICINE

## 2025-03-02 PROCEDURE — 94760 N-INVAS EAR/PLS OXIMETRY 1: CPT

## 2025-03-02 RX ORDER — PANTOPRAZOLE SODIUM 40 MG/10ML
40 INJECTION, POWDER, LYOPHILIZED, FOR SOLUTION INTRAVENOUS
Status: DISCONTINUED | OUTPATIENT
Start: 2025-03-02 | End: 2025-03-02

## 2025-03-02 RX ORDER — PREGABALIN 100 MG/1
100 CAPSULE ORAL 3 TIMES DAILY
Status: DISCONTINUED | OUTPATIENT
Start: 2025-03-02 | End: 2025-03-05 | Stop reason: HOSPADM

## 2025-03-02 RX ORDER — CHLORHEXIDINE GLUCONATE ORAL RINSE 1.2 MG/ML
15 SOLUTION DENTAL EVERY 12 HOURS SCHEDULED
Status: DISCONTINUED | OUTPATIENT
Start: 2025-03-02 | End: 2025-03-05 | Stop reason: HOSPADM

## 2025-03-02 RX ORDER — FENOFIBRATE 145 MG/1
145 TABLET, COATED ORAL DAILY
Status: DISCONTINUED | OUTPATIENT
Start: 2025-03-03 | End: 2025-03-05 | Stop reason: HOSPADM

## 2025-03-02 RX ORDER — SODIUM CHLORIDE 9 MG/ML
3 INJECTION INTRAVENOUS
Status: DISCONTINUED | OUTPATIENT
Start: 2025-03-02 | End: 2025-03-02

## 2025-03-02 RX ORDER — METOPROLOL TARTRATE 25 MG/1
25 TABLET, FILM COATED ORAL 2 TIMES DAILY
Status: DISCONTINUED | OUTPATIENT
Start: 2025-03-02 | End: 2025-03-05 | Stop reason: HOSPADM

## 2025-03-02 RX ORDER — CALCIUM GLUCONATE 20 MG/ML
2 INJECTION, SOLUTION INTRAVENOUS ONCE
Status: COMPLETED | OUTPATIENT
Start: 2025-03-02 | End: 2025-03-02

## 2025-03-02 RX ORDER — METOCLOPRAMIDE HYDROCHLORIDE 5 MG/ML
10 INJECTION INTRAMUSCULAR; INTRAVENOUS EVERY 6 HOURS PRN
Status: DISCONTINUED | OUTPATIENT
Start: 2025-03-02 | End: 2025-03-03

## 2025-03-02 RX ORDER — POTASSIUM CHLORIDE 1500 MG/1
40 TABLET, EXTENDED RELEASE ORAL ONCE
Status: COMPLETED | OUTPATIENT
Start: 2025-03-02 | End: 2025-03-02

## 2025-03-02 RX ORDER — PANTOPRAZOLE SODIUM 40 MG/1
40 TABLET, DELAYED RELEASE ORAL 2 TIMES DAILY
Status: DISCONTINUED | OUTPATIENT
Start: 2025-03-02 | End: 2025-03-05 | Stop reason: HOSPADM

## 2025-03-02 RX ORDER — SODIUM CHLORIDE, SODIUM GLUCONATE, SODIUM ACETATE, POTASSIUM CHLORIDE, MAGNESIUM CHLORIDE, SODIUM PHOSPHATE, DIBASIC, AND POTASSIUM PHOSPHATE .53; .5; .37; .037; .03; .012; .00082 G/100ML; G/100ML; G/100ML; G/100ML; G/100ML; G/100ML; G/100ML
250 INJECTION, SOLUTION INTRAVENOUS CONTINUOUS
Status: DISCONTINUED | OUTPATIENT
Start: 2025-03-02 | End: 2025-03-02

## 2025-03-02 RX ORDER — FAMOTIDINE 20 MG/1
20 TABLET, FILM COATED ORAL
Status: DISCONTINUED | OUTPATIENT
Start: 2025-03-02 | End: 2025-03-05 | Stop reason: HOSPADM

## 2025-03-02 RX ORDER — HYDROMORPHONE HCL/PF 1 MG/ML
0.5 SYRINGE (ML) INJECTION ONCE
Refills: 0 | Status: COMPLETED | OUTPATIENT
Start: 2025-03-02 | End: 2025-03-02

## 2025-03-02 RX ORDER — HEPARIN SODIUM 5000 [USP'U]/ML
5000 INJECTION, SOLUTION INTRAVENOUS; SUBCUTANEOUS EVERY 8 HOURS SCHEDULED
Status: DISCONTINUED | OUTPATIENT
Start: 2025-03-02 | End: 2025-03-05 | Stop reason: HOSPADM

## 2025-03-02 RX ORDER — ASPIRIN 81 MG/1
81 TABLET ORAL DAILY
Status: DISCONTINUED | OUTPATIENT
Start: 2025-03-03 | End: 2025-03-05 | Stop reason: HOSPADM

## 2025-03-02 RX ORDER — SODIUM CHLORIDE, SODIUM GLUCONATE, SODIUM ACETATE, POTASSIUM CHLORIDE, MAGNESIUM CHLORIDE, SODIUM PHOSPHATE, DIBASIC, AND POTASSIUM PHOSPHATE .53; .5; .37; .037; .03; .012; .00082 G/100ML; G/100ML; G/100ML; G/100ML; G/100ML; G/100ML; G/100ML
75 INJECTION, SOLUTION INTRAVENOUS CONTINUOUS
Status: DISCONTINUED | OUTPATIENT
Start: 2025-03-02 | End: 2025-03-02

## 2025-03-02 RX ORDER — ACETAMINOPHEN 325 MG/1
650 TABLET ORAL EVERY 6 HOURS PRN
Status: DISCONTINUED | OUTPATIENT
Start: 2025-03-02 | End: 2025-03-05 | Stop reason: HOSPADM

## 2025-03-02 RX ORDER — SODIUM CHLORIDE, SODIUM GLUCONATE, SODIUM ACETATE, POTASSIUM CHLORIDE, MAGNESIUM CHLORIDE, SODIUM PHOSPHATE, DIBASIC, AND POTASSIUM PHOSPHATE .53; .5; .37; .037; .03; .012; .00082 G/100ML; G/100ML; G/100ML; G/100ML; G/100ML; G/100ML; G/100ML
500 INJECTION, SOLUTION INTRAVENOUS CONTINUOUS
Status: DISCONTINUED | OUTPATIENT
Start: 2025-03-02 | End: 2025-03-02

## 2025-03-02 RX ORDER — ONDANSETRON 2 MG/ML
4 INJECTION INTRAMUSCULAR; INTRAVENOUS ONCE
Status: COMPLETED | OUTPATIENT
Start: 2025-03-02 | End: 2025-03-02

## 2025-03-02 RX ORDER — DOCUSATE SODIUM 100 MG/1
100 CAPSULE, LIQUID FILLED ORAL 2 TIMES DAILY
Status: DISCONTINUED | OUTPATIENT
Start: 2025-03-02 | End: 2025-03-02

## 2025-03-02 RX ORDER — DEXTROSE MONOHYDRATE AND SODIUM CHLORIDE 5; .9 G/100ML; G/100ML
250 INJECTION, SOLUTION INTRAVENOUS CONTINUOUS PRN
Status: DISCONTINUED | OUTPATIENT
Start: 2025-03-02 | End: 2025-03-02

## 2025-03-02 RX ORDER — DEXTROSE, SODIUM CHLORIDE, SODIUM LACTATE, POTASSIUM CHLORIDE, AND CALCIUM CHLORIDE 5; .6; .31; .03; .02 G/100ML; G/100ML; G/100ML; G/100ML; G/100ML
75 INJECTION, SOLUTION INTRAVENOUS CONTINUOUS
Status: DISCONTINUED | OUTPATIENT
Start: 2025-03-02 | End: 2025-03-03

## 2025-03-02 RX ORDER — LEVETIRACETAM 500 MG/1
500 TABLET ORAL 2 TIMES DAILY
Status: DISCONTINUED | OUTPATIENT
Start: 2025-03-02 | End: 2025-03-05 | Stop reason: HOSPADM

## 2025-03-02 RX ORDER — INDOMETHACIN 25 MG/1
50 CAPSULE ORAL ONCE
Status: COMPLETED | OUTPATIENT
Start: 2025-03-02 | End: 2025-03-02

## 2025-03-02 RX ORDER — CITALOPRAM HYDROBROMIDE 20 MG/1
40 TABLET ORAL DAILY
Status: DISCONTINUED | OUTPATIENT
Start: 2025-03-02 | End: 2025-03-05 | Stop reason: HOSPADM

## 2025-03-02 RX ORDER — ATORVASTATIN CALCIUM 80 MG/1
80 TABLET, FILM COATED ORAL
Status: DISCONTINUED | OUTPATIENT
Start: 2025-03-02 | End: 2025-03-05 | Stop reason: HOSPADM

## 2025-03-02 RX ORDER — ALBUTEROL SULFATE 90 UG/1
1 INHALANT RESPIRATORY (INHALATION) EVERY 6 HOURS PRN
Status: DISCONTINUED | OUTPATIENT
Start: 2025-03-02 | End: 2025-03-05 | Stop reason: HOSPADM

## 2025-03-02 RX ORDER — MAGNESIUM SULFATE HEPTAHYDRATE 40 MG/ML
2 INJECTION, SOLUTION INTRAVENOUS ONCE
Status: COMPLETED | OUTPATIENT
Start: 2025-03-02 | End: 2025-03-02

## 2025-03-02 RX ORDER — SODIUM CHLORIDE, SODIUM GLUCONATE, SODIUM ACETATE, POTASSIUM CHLORIDE, MAGNESIUM CHLORIDE, SODIUM PHOSPHATE, DIBASIC, AND POTASSIUM PHOSPHATE .53; .5; .37; .037; .03; .012; .00082 G/100ML; G/100ML; G/100ML; G/100ML; G/100ML; G/100ML; G/100ML
1000 INJECTION, SOLUTION INTRAVENOUS ONCE
Status: COMPLETED | OUTPATIENT
Start: 2025-03-02 | End: 2025-03-02

## 2025-03-02 RX ORDER — ALBUTEROL SULFATE 0.83 MG/ML
2.5 SOLUTION RESPIRATORY (INHALATION) EVERY 6 HOURS PRN
Status: DISCONTINUED | OUTPATIENT
Start: 2025-03-02 | End: 2025-03-05 | Stop reason: HOSPADM

## 2025-03-02 RX ORDER — PANTOPRAZOLE SODIUM 40 MG/1
40 TABLET, DELAYED RELEASE ORAL
Status: DISCONTINUED | OUTPATIENT
Start: 2025-03-03 | End: 2025-03-02

## 2025-03-02 RX ADMIN — PANTOPRAZOLE SODIUM 40 MG: 40 INJECTION, POWDER, FOR SOLUTION INTRAVENOUS at 09:40

## 2025-03-02 RX ADMIN — METOPROLOL TARTRATE 25 MG: 25 TABLET, FILM COATED ORAL at 10:58

## 2025-03-02 RX ADMIN — CALCIUM GLUCONATE 2 G: 20 INJECTION, SOLUTION INTRAVENOUS at 15:43

## 2025-03-02 RX ADMIN — FAMOTIDINE 20 MG: 20 TABLET, FILM COATED ORAL at 22:28

## 2025-03-02 RX ADMIN — ATORVASTATIN CALCIUM 80 MG: 80 TABLET, FILM COATED ORAL at 22:28

## 2025-03-02 RX ADMIN — SODIUM CHLORIDE 7.9 UNITS/HR: 9 INJECTION, SOLUTION INTRAVENOUS at 09:22

## 2025-03-02 RX ADMIN — HEPARIN SODIUM 5000 UNITS: 5000 INJECTION INTRAVENOUS; SUBCUTANEOUS at 22:28

## 2025-03-02 RX ADMIN — LEVETIRACETAM 500 MG: 500 TABLET, FILM COATED ORAL at 10:58

## 2025-03-02 RX ADMIN — CHOLECALCIFEROL TAB 25 MCG (1000 UNIT) 2000 UNITS: 25 TAB at 10:58

## 2025-03-02 RX ADMIN — SODIUM CHLORIDE, SODIUM GLUCONATE, SODIUM ACETATE, POTASSIUM CHLORIDE, MAGNESIUM CHLORIDE, SODIUM PHOSPHATE, DIBASIC, AND POTASSIUM PHOSPHATE 500 ML/HR: .53; .5; .37; .037; .03; .012; .00082 INJECTION, SOLUTION INTRAVENOUS at 08:44

## 2025-03-02 RX ADMIN — SODIUM CHLORIDE 1000 ML: 0.9 INJECTION, SOLUTION INTRAVENOUS at 07:58

## 2025-03-02 RX ADMIN — SODIUM CHLORIDE 1000 ML: 0.9 INJECTION, SOLUTION INTRAVENOUS at 08:43

## 2025-03-02 RX ADMIN — HYDROMORPHONE HYDROCHLORIDE 0.5 MG: 1 INJECTION, SOLUTION INTRAMUSCULAR; INTRAVENOUS; SUBCUTANEOUS at 07:59

## 2025-03-02 RX ADMIN — LEVETIRACETAM 500 MG: 500 TABLET, FILM COATED ORAL at 18:05

## 2025-03-02 RX ADMIN — SODIUM BICARBONATE 50 MEQ: 84 INJECTION, SOLUTION INTRAVENOUS at 15:43

## 2025-03-02 RX ADMIN — CHLORHEXIDINE GLUCONATE 15 ML: 1.2 SOLUTION ORAL at 22:27

## 2025-03-02 RX ADMIN — CHLORHEXIDINE GLUCONATE 15 ML: 1.2 SOLUTION ORAL at 09:40

## 2025-03-02 RX ADMIN — ACETAMINOPHEN 650 MG: 325 TABLET ORAL at 18:21

## 2025-03-02 RX ADMIN — PREGABALIN 100 MG: 100 CAPSULE ORAL at 22:27

## 2025-03-02 RX ADMIN — CALCIUM GLUCONATE 2 G: 20 INJECTION, SOLUTION INTRAVENOUS at 10:57

## 2025-03-02 RX ADMIN — CITALOPRAM HYDROBROMIDE 40 MG: 20 TABLET ORAL at 10:58

## 2025-03-02 RX ADMIN — MAGNESIUM SULFATE HEPTAHYDRATE 2 G: 40 INJECTION, SOLUTION INTRAVENOUS at 10:57

## 2025-03-02 RX ADMIN — POTASSIUM CHLORIDE 40 MEQ: 1500 TABLET, EXTENDED RELEASE ORAL at 08:39

## 2025-03-02 RX ADMIN — HEPARIN SODIUM 5000 UNITS: 5000 INJECTION INTRAVENOUS; SUBCUTANEOUS at 09:40

## 2025-03-02 RX ADMIN — ONDANSETRON 4 MG: 2 INJECTION INTRAMUSCULAR; INTRAVENOUS at 07:59

## 2025-03-02 RX ADMIN — B-COMPLEX W/ C & FOLIC ACID TAB 1 TABLET: TAB at 13:17

## 2025-03-02 RX ADMIN — POTASSIUM CHLORIDE 40 MEQ: 1500 TABLET, EXTENDED RELEASE ORAL at 10:58

## 2025-03-02 RX ADMIN — POTASSIUM PHOSPHATE 12 MMOL: 236; 224 INJECTION, SOLUTION INTRAVENOUS at 23:04

## 2025-03-02 RX ADMIN — SODIUM CHLORIDE, SODIUM GLUCONATE, SODIUM ACETATE, POTASSIUM CHLORIDE, MAGNESIUM CHLORIDE, SODIUM PHOSPHATE, DIBASIC, AND POTASSIUM PHOSPHATE 1000 ML: .53; .5; .37; .037; .03; .012; .00082 INJECTION, SOLUTION INTRAVENOUS at 15:28

## 2025-03-02 RX ADMIN — DEXTROSE, SODIUM CHLORIDE, SODIUM LACTATE, POTASSIUM CHLORIDE, AND CALCIUM CHLORIDE 75 ML/HR: 5; .6; .31; .03; .02 INJECTION, SOLUTION INTRAVENOUS at 15:26

## 2025-03-02 RX ADMIN — METOPROLOL TARTRATE 25 MG: 25 TABLET, FILM COATED ORAL at 18:05

## 2025-03-02 RX ADMIN — PANTOPRAZOLE SODIUM 40 MG: 40 TABLET, DELAYED RELEASE ORAL at 18:05

## 2025-03-02 RX ADMIN — PREGABALIN 100 MG: 100 CAPSULE ORAL at 10:58

## 2025-03-02 NOTE — ASSESSMENT & PLAN NOTE
Lab Results   Component Value Date    EGFR 27 03/02/2025    EGFR 23 03/02/2025    EGFR 49 02/21/2025    CREATININE 1.99 (H) 03/02/2025    CREATININE 2.26 (H) 03/02/2025    CREATININE 1.23 02/21/2025     Baseline appears to be 1-1.1  Today prerenal LINDY due to severe dehydration  Monitor kidney function on repeat labs  Monitor urine output closely

## 2025-03-02 NOTE — ASSESSMENT & PLAN NOTE
Noted in patient's history  Patient has a history of right renal cell carcinoma status post partial nephrectomy in 2017

## 2025-03-02 NOTE — ED PROVIDER NOTES
Time reflects when diagnosis was documented in both MDM as applicable and the Disposition within this note       Time User Action Codes Description Comment    3/2/2025  8:24 AM Ronald To Add [E11.10] DKA (diabetic ketoacidosis) (HCC)     3/2/2025  8:24 AM Ronald To Add [N17.9] LINDY (acute kidney injury) ([facility])           ED Disposition       ED Disposition   Admit    Condition   Stable    Date/Time   Sun Mar 2, 2025  8:24 AM    Comment   Case was discussed with ASHLEY and the patient's admission status was agreed to be Admission Status: inpatient status to the service of Dr. Guo .               Assessment & Plan       Medical Decision Making  Patient is a 54-year-old female who presents for evaluation of vomiting diarrhea and abdominal discomfort.  Patient in mild DKA based on acidosis, hyperglycemia, ketosis.  Patient resuscitated here with IV fluids and started on insulin drip.  Repleted potassium orally.  Patient was discussed with critical care accepted the patient to their service.  GCS 15, awake and alert throughout her time in the emergency department.  Hemodynamically stable at the time of admission.  Also noted to have significant LINDY Julio prerenal in nature.    Critical Care Time Statement: Upon my evaluation, this patient had a high probability of imminent or life-threatening deterioration due to DKA, which required my direct attention, intervention, and personal management.  I spent a total of 40 minutes directly providing critical care services, including interpretation of complex medical databases, evaluating for the presence of life-threatening injuries or illnesses, and complex medical decision making (to support/prevent further life-threatening deterioration).. This time is exclusive of procedures, teaching, treating other patients, family meetings, and any prior time recorded by providers other than myself.        Amount and/or Complexity of Data Reviewed  Labs:  ordered.    Risk  Prescription drug management.  Decision regarding hospitalization.             Medications   sodium chloride (PF) 0.9 % injection 3 mL (has no administration in time range)   sodium chloride 0.9 % bolus 1,000 mL (1,000 mL Intravenous New Bag 3/2/25 0758)   sodium chloride 0.9 % bolus 1,000 mL (has no administration in time range)   insulin regular (HumuLIN R,NovoLIN R) 1 Units/mL in sodium chloride 0.9 % 100 mL infusion (has no administration in time range)   dextrose 5 % and sodium chloride 0.9 % infusion (has no administration in time range)   potassium chloride (Klor-Con M20) CR tablet 40 mEq (has no administration in time range)   multi-electrolyte (ISOLYTE-S PH 7.4 equivalent) IV solution (has no administration in time range)     Followed by   multi-electrolyte (ISOLYTE-S PH 7.4 equivalent) IV solution (has no administration in time range)   chlorhexidine (PERIDEX) 0.12 % oral rinse 15 mL (has no administration in time range)   heparin (porcine) subcutaneous injection 5,000 Units (has no administration in time range)   HYDROmorphone (DILAUDID) injection 0.5 mg (0.5 mg Intravenous Given 3/2/25 0759)   ondansetron (ZOFRAN) injection 4 mg (4 mg Intravenous Given 3/2/25 0759)       ED Risk Strat Scores                            SBIRT 22yo+      Flowsheet Row Most Recent Value   Initial Alcohol Screen: US AUDIT-C     1. How often do you have a drink containing alcohol? 0 Filed at: 03/02/2025 0735   2. How many drinks containing alcohol do you have on a typical day you are drinking?  0 Filed at: 03/02/2025 0735   3b. FEMALE Any Age, or MALE 65+: How often do you have 4 or more drinks on one occassion? 0 Filed at: 03/02/2025 0735   Audit-C Score 0 Filed at: 03/02/2025 0735   LILIA: How many times in the past year have you...    Used an illegal drug or used a prescription medication for non-medical reasons? Never Filed at: 03/02/2025 0735                            History of Present Illness  "      Chief Complaint   Patient presents with    Vomiting     Pt reports vomiting since Friday, states she \"passed out 3 times last night\", pt states she has not taken her medications due to vomiting       Past Medical History:   Diagnosis Date    Atypical chest pain     Bilateral calf pain 2024    Diabetes mellitus (HCC)     Gastroparesis     GERD (gastroesophageal reflux disease)     Hyperlipidemia     Hypertension     MRSA bacteremia 2023    Psychiatric disorder     Sciatica     Seizure disorder (HCC)       Past Surgical History:   Procedure Laterality Date    APPENDECTOMY      BREAST BIOPSY Left  benign     SECTION      CHOLECYSTECTOMY      COLONOSCOPY      HYSTERECTOMY      IR PICC PLACEMENT SINGLE LUMEN  2023    GA LAPAROSCOPIC APPENDECTOMY N/A 2021    Procedure: APPENDECTOMY LAPAROSCOPIC;  Surgeon: Onel Martin MD;  Location:  MAIN OR;  Service: General    GA LAPS ABD PRTM&OMENTUM DX W/WO SPEC BR/WA SPX N/A 2021    Procedure: LAPAROSCOPY DIAGNOSTIC, EXTENSIVE DESI;  Surgeon: Onel Martin MD;  Location:  MAIN OR;  Service: General    TUBAL LIGATION      TYMPANOSTOMY TUBE PLACEMENT Bilateral 2024    UPPER GASTROINTESTINAL ENDOSCOPY        Family History   Problem Relation Age of Onset    No Known Problems Mother     No Known Problems Father     No Known Problems Daughter     No Known Problems Maternal Grandmother     No Known Problems Paternal Grandmother     No Known Problems Paternal Aunt     No Known Problems Paternal Aunt     No Known Problems Paternal Aunt       Social History     Tobacco Use    Smoking status: Former     Current packs/day: 0.00     Types: Cigarettes     Quit date:      Years since quittin.1    Smokeless tobacco: Never   Vaping Use    Vaping status: Never Used   Substance Use Topics    Alcohol use: Never     Comment: occ    Drug use: Never      E-Cigarette/Vaping    E-Cigarette Use Never User       E-Cigarette/Vaping " Substances    Nicotine No     THC No     CBD No     Flavoring No     Other No     Unknown No       I have reviewed and agree with the history as documented.     Patient is a 54-year-old female with poorly controlled diabetes mellitus that presents for evaluation of vomiting and diarrhea.  Patient says that over the past 2 to 3 days she has had vomiting and diarrhea.  Multiple episodes per day.  Also some moderate diffuse abdominal discomfort.  She stopped taking her insulin because she felt sick.  Patient denies chest pain or dyspnea.  No known fevers.  No urinary complaints.        Review of Systems   Constitutional:  Positive for fatigue. Negative for fever.   HENT:  Negative for sore throat.    Respiratory:  Negative for shortness of breath.    Cardiovascular:  Negative for chest pain.   Gastrointestinal:  Positive for abdominal pain, diarrhea, nausea and vomiting.   Genitourinary:  Negative for dysuria.   Musculoskeletal:  Negative for back pain.   Skin:  Negative for rash.   Neurological:  Negative for light-headedness.   Psychiatric/Behavioral:  Negative for agitation.    All other systems reviewed and are negative.          Objective       ED Triage Vitals   Temperature Pulse Blood Pressure Respirations SpO2 Patient Position - Orthostatic VS   03/02/25 0736 03/02/25 0736 03/02/25 0736 03/02/25 0736 03/02/25 0736 03/02/25 0736   98.1 °F (36.7 °C) 93 125/68 20 95 % Lying      Temp Source Heart Rate Source BP Location FiO2 (%) Pain Score    03/02/25 0736 03/02/25 0736 03/02/25 0736 -- 03/02/25 0759    Temporal Monitor Left arm  10 - Worst Possible Pain      Vitals      Date and Time Temp Pulse SpO2 Resp BP Pain Score FACES Pain Rating User   03/02/25 0759 -- -- -- -- -- 10 - Worst Possible Pain -- JW   03/02/25 0736 98.1 °F (36.7 °C) 93 95 % 20 125/68 -- --             Physical Exam  Vitals reviewed.   Constitutional:       Appearance: She is well-developed. She is ill-appearing.   HENT:      Head:  Normocephalic.   Eyes:      Pupils: Pupils are equal, round, and reactive to light.   Cardiovascular:      Rate and Rhythm: Normal rate and regular rhythm.      Heart sounds: Normal heart sounds.   Pulmonary:      Effort: Pulmonary effort is normal.      Breath sounds: Normal breath sounds.   Abdominal:      General: Bowel sounds are normal. There is no distension.      Palpations: Abdomen is soft.      Tenderness: There is abdominal tenderness. There is no guarding.      Comments: Mild diffuse tenderness   Musculoskeletal:         General: No tenderness or deformity. Normal range of motion.      Cervical back: Normal range of motion and neck supple.   Skin:     General: Skin is warm and dry.      Capillary Refill: Capillary refill takes less than 2 seconds.   Neurological:      Mental Status: She is alert and oriented to person, place, and time.      Cranial Nerves: No cranial nerve deficit.      Sensory: No sensory deficit.   Psychiatric:         Behavior: Behavior normal.         Thought Content: Thought content normal.         Judgment: Judgment normal.         Results Reviewed       Procedure Component Value Units Date/Time    Blood gas, venous [[id number]]     Lab Status: No result Specimen: Blood     Lactic acid, plasma (w/reflex if result > 2.0) [[id number]]     Lab Status: No result Specimen: Blood     TSH WITH REFLEX TO FREE T4 [[id number]]     Lab Status: No result Specimen: Blood     HS Troponin 0hr (reflex protocol) [[id number]]     Lab Status: No result Specimen: Blood     B-Type Natriuretic Peptide(BNP) [[id number]]     Lab Status: No result Specimen: Blood     Lipase [[id number]]     Lab Status: No result Specimen: Blood     Platelet count [[id number]]     Lab Status: No result Specimen: Blood     Basic metabolic panel [[id number]]     Lab Status: No result Specimen: Blood     Magnesium [[id number]]     Lab Status: No result Specimen: Blood     Phosphorus [[id number]]     Lab Status: No result Specimen: Blood      Comprehensive metabolic panel [[id number]]  (Abnormal) Collected: 03/02/25 0750    Lab Status: Final result Specimen: Blood from Arm, Right Updated: 03/02/25 0811     Sodium 134 mmol/L      Potassium 3.6 mmol/L      Chloride 103 mmol/L      CO2 18 mmol/L      ANION GAP 13 mmol/L      BUN 64 mg/dL      Creatinine 2.26 mg/dL      Glucose 296 mg/dL      Calcium 8.7 mg/dL      AST 14 U/L      ALT 21 U/L      Alkaline Phosphatase 68 U/L      Total Protein 7.4 g/dL      Albumin 4.3 g/dL      Total Bilirubin 0.52 mg/dL      eGFR 23 ml/min/1.73sq m     Narrative:      National Kid[employer] Foundation guidelines for Chronic Kidney Disease (CKD):     Stage 1 with normal or high GFR (GFR > 90 mL/min/1.73 square meters)    Stage 2 Mild CKD (GFR = 60-89 mL/min/1.73 square meters)    Stage 3A Moderate CKD (GFR = 45-59 mL/min/1.73 square meters)    Stage 3B Moderate CKD (GFR = 30-44 mL/min/1.73 square meters)    Stage 4 Severe CKD (GFR = 15-29 mL/min/1.73 square meters)    Stage 5 End Stage CKD (GFR <15 mL/min/1.73 square meters)  Note: GFR calculation is accurate only with a steady state creatinine    Beta Hydroxybutyrate [[id number]]  (Abnormal) Collected: 03/02/25 0750    Lab Status: Final result Specimen: Blood from Arm, Right Updated: 03/02/25 0811     Beta- Hydroxybutyrate 0.99 mmol/L     Lipase [[id number]]  (Normal) Collected: 03/02/25 0750    Lab Status: Final result Specimen: Blood from Arm, Right Updated: 03/02/25 0811     Lipase 25 u/L     Blood gas, venous [[id number]]  (Abnormal) Collected: 03/02/25 0750    Lab Status: Final result Specimen: Blood from Arm, Right Updated: 03/02/25 0757     pH, Ovidio 7.229     pCO2, Ovidio 37.9 mm Hg      pO2, Ovidio 35.5 mm Hg      HCO3, Ovidio 15.5 mmol/L      Base Excess, Ovidio -11.3 mmol/L      O2 Content, Ovidio 12.6 ml/dL      O2 HGB, VENOUS 61.5 %     CBC [[id number]]  (Abnormal) Collected: 03/02/25 8013    Lab Status: Final result Specimen: Blood from Arm, Right Updated: 03/02/25 6688     WBC  7.90 Thousand/uL      RBC 5.33 Million/uL      Hemoglobin 13.9 g/dL      Hematocrit 44.4 %      MCV 83 fL      MCH 26.1 pg      MCHC 31.3 g/dL      RDW 14.4 %      Platelets 377 Thousands/uL      MPV 11.0 fL     Fingerstick Glucose (POCT) [[id number]]  (Abnormal) Collected: 25    Lab Status: Final result Specimen: Blood Updated: 25     POC Glucose 272 mg/dl     UA w Reflex to Microscopic w Reflex to Culture [[id number]]     Lab Status: No result Specimen: Urine             No orders to display       ECG 12 Lead Documentation Only    Date/Time: 3/2/2025 8:33 AM    Performed by: Ronald To MD  Authorized by: Ronald To MD    ECG reviewed by me, the ED Provider: yes    Patient location:  ED  Previous ECG:     Previous ECG:  Compared to current    Comparison to cardiac monitor: Yes    Interpretation:     Interpretation: normal    Rate:     ECG rate assessment: normal    Rhythm:     Rhythm: sinus rhythm    Ectopy:     Ectopy: none    QRS:     QRS axis:  Normal    QRS intervals:  Normal  Conduction:     Conduction: normal    ST segments:     ST segments:  Normal  T waves:     T waves: normal        ED Medication and Procedure Management   Prior to Admission Medications   Prescriptions Last Dose Informant Patient Reported? Taking?   Alcohol Swabs (Pharmacist Choice Alcohol) PADS  Self No No   Sig: Apply topically 4 (four) times a day Use as directed   B-D UF III MINI PEN NEEDLES 31G X 5 MM MISC  Self No No   Sig: Pt uses 5 pen needles daily   Continuous Glucose Sensor (Dexcom G7 Sensor)   No No   Sig: Use 1 Device every 10 days   Glucagon (Baqsimi One Pack) 3 MG/DOSE POWD   No No   Si each into each nostril if needed (PRN for hypoglycemia emergency)   Insulin Regular Human, Conc, (HumuLIN R U-500 KwikPen) 500 units/mL CONCENTRATED U-500 injection pen   No No   Sig: E11.65 inject 55 units with breakfast, 36 units with lunch and dinner, or as directed TDD up to 200 units   Iron Heme  Polypeptide 12 MG TABS  Self Yes No   Sig: Take 1 tablet by mouth   Jardiance 25 MG TABS   No No   Sig: TAKE ONE TABLET BY MOUTH EVERY MORNING   Magnesium Citrate 200 MG TABS   No No   Sig: Take 1 tablet by mouth 2 (two) times a day   OneTouch Ultra test strip  Self No No   Sig: USE 4 TIMES A DAY   Pharmacist Choice Lancets MISC  Self No No   Sig: USE AS DIRECTED   Restasis 0.05 % ophthalmic emulsion  Self Yes No   Tirzepatide 10 MG/0.5ML SOAJ   No No   Sig: Inject 10 mg under the skin once a week   albuterol (2.5 mg/3 mL) 0.083 % nebulizer solution  Self No No   Sig: Take 3 mL (2.5 mg total) by nebulization every 6 (six) hours as needed for wheezing or shortness of breath   albuterol (PROVENTIL HFA,VENTOLIN HFA) 90 mcg/act inhaler  Self No No   Sig: INHALE ONE PUFF BY MOUTH AND INTO THE LUNGS EVERY 6 HOURS AS NEEDED FOR WHEEZING   aspirin (ECOTRIN LOW STRENGTH) 81 mg EC tablet   No No   Sig: TAKE ONE TABLET BY MOUTH ONCE DAILY   atorvastatin (LIPITOR) 80 mg tablet   No No   Sig: TAKE ONE TABLET BY MOUTH DAILY AT BEDTIME   cholecalciferol (VITAMIN D3) 1,000 units tablet   No No   Sig: Take 2 tablets (2,000 Units total) by mouth daily   citalopram (CeleXA) 40 mg tablet   No No   Sig: TAKE ONE TABLET BY MOUTH DAILY   dicyclomine (BENTYL) 20 mg tablet   No No   Sig: Take twice daily (am and before bedtime), may take additional 2 doses per day, max 4x/day (every 6 hours if needed)   docusate sodium (COLACE) 100 mg capsule   No No   Sig: TAKE ONE CAPSULE BY MOUTH TWICE DAILY   ergocalciferol (VITAMIN D2) 50,000 units   No No   Sig: TAKE ONE CAPSULE BY MOUTH ONCE A WEEK   Patient not taking: Reported on 2/4/2025   famotidine (PEPCID) 20 mg tablet   Yes No   Sig: Take 20 mg by mouth daily   Patient not taking: Reported on 2/4/2025   famotidine (PEPCID) 40 MG tablet   No No   Sig: TAKE ONE TABLET BY MOUTH TWO HOURS PRIOR TO BEDTIME   fenofibrate micronized (LOFIBRA) 134 MG capsule  Self No No   Sig: Take 1 capsule (134 mg  total) by mouth daily with breakfast   levETIRAcetam (KEPPRA) 500 mg tablet   No No   Sig: TAKE ONE TABLET BY MOUTH TWICE DAILY   linaCLOtide (Linzess) 72 MCG CAPS  Self No No   Sig: Take 1 capsule by mouth daily before breakfast   meclizine (ANTIVERT) 25 mg tablet  Self No No   Sig: Take 1 tablet (25 mg total) by mouth 3 (three) times a day as needed for dizziness   methocarbamol (ROBAXIN) 500 mg tablet   No No   Sig: Take 1 tablet (500 mg total) by mouth 3 (three) times a day as needed for muscle spasms   metoclopramide (Reglan) 10 mg tablet  Self No No   Sig: Take 0.5 tablets (5 mg total) by mouth every 6 (six) hours   metoprolol tartrate (LOPRESSOR) 25 mg tablet   No No   Sig: TAKE ONE TABLET BY MOUTH TWICE DAILY   multivitamin (THERAGRAN) TABS  Self Yes No   Sig: Take 1 tablet by mouth every morning   ondansetron (ZOFRAN) 4 mg tablet   No No   Sig: TAKE 1 TABLET (4 MG TOTAL) BY MOUTH EVERY 8 (EIGHT) HOURS AS NEEDED FOR NAUSEA OR VOMITING   oxybutynin (DITROPAN-XL) 10 MG 24 hr tablet   No No   Sig: TAKE ONE TABLET BY MOUTH DAILY AT BEDTIME   pantoprazole (PROTONIX) 40 mg tablet   No No   Sig: TAKE ONE TABLET BY MOUTH TWICE DAILY   pregabalin (LYRICA) 100 mg capsule   No No   Sig: TAKE ONE CAPSULE BY MOUTH THREE TIMES A DAY      Facility-Administered Medications: None     Patient's Medications   Discharge Prescriptions    No medications on file     No discharge procedures on file.  ED SEPSIS DOCUMENTATION   Time reflects when diagnosis was documented in both MDM as applicable and the Disposition within this note       Time User Action Codes Description Comment    3/2/2025  8:24 AM Ronald To Add [E11.10] DKA (diabetic ketoacidosis) ([facility])     3/2/2025  8:24 AM Ronald To [N17.9] LINDY (acute kidney injury) ([facility])                  Ronald To MD  03/02/25 0861

## 2025-03-02 NOTE — ASSESSMENT & PLAN NOTE
Patient had not taken her insulin for the past 3 days due to nausea, vomiting, poor oral intake  Does have a history of gastroparesis and chronic abdominal pain/discomfort  Started on DKA protocol, but did not have an anion gap therefore we will switch to regular insulin drip  Continue regular insulin drip for now  Trial carb controlled diet if patient already oral intake  Continue fluid hydration

## 2025-03-02 NOTE — ASSESSMENT & PLAN NOTE
Lab Results   Component Value Date    HGBA1C 11.5 (H) 10/12/2024       Recent Labs     03/02/25  0741 03/02/25  0856 03/02/25  0924 03/02/25  1021   POCGLU 272* 272* 253* 218*       Blood Sugar Average: Last 72 hrs:  (P) 253.75    Restart home [facility]

## 2025-03-02 NOTE — H&P
H&P - Critical Care/ICU   Name: Haylee Balbuena 54 y.o. female I MRN: [id number]  Unit/Bed#: ICU 02-01 I Date of Admission: 3/2/2025   Date of Service: 3/2/2025 I Hospital Day: 0       Assessment & Plan  Hyperglycemia  Patient had not taken her insulin for the past 3 days due to nausea, vomiting, poor oral intake  Does have a history of gastroparesis and chronic abdominal pain/discomfort  Started on DKA protocol, but did not have an anion gap therefore we will switch to regular insulin drip  Continue regular insulin drip for now  Trial carb controlled diet if patient already oral intake  Continue fluid hydration  Nausea and vomiting  Patient reports nausea and vomiting x 3 days  Continue IV fluids for dehydration  Hypertension  Restart home metoprolol  Seizure disorder (HCC)  Resume home Keppra  Chronic low back pain with sciatica  Offer lidocaine patch  Diabetic polyneuropathy associated with type 2 diabetes mellitus (HCC)  Lab Results   Component Value Date    HGBA1C 11.5 (H) 10/12/2024       Recent Labs     03/02/25  0741 03/02/25  0856 03/02/25  0924 03/02/25  1021   POCGLU 272* 272* 253* 218*       Blood Sugar Average: Last 72 hrs:  (P) 253.75    Restart home Lyrica  Gastroparesis  Reglan as needed  Depression with anxiety  Continue home Celexa  Mild intermittent asthma without complication  Continue home inhalers  Hypercholesteremia  Continue statin  Iron deficiency anemia secondary to inadequate dietary iron intake  Holding home iron supplements  GERD (gastroesophageal reflux disease)  Continue PPI  Vitamin D deficiency  Restart supplement  Stage 3a chronic kidney disease (HCC)  Lab Results   Component Value Date    EGFR 27 03/02/2025    EGFR 23 03/02/2025    EGFR 49 02/21/2025    CREATININE 1.99 (H) 03/02/2025    CREATININE 2.26 (H) 03/02/2025    CREATININE 1.23 02/21/2025     Baseline appears to be 1-1.1  Today prerenal LINDY due to severe dehydration  Monitor kidney function on repeat labs  Monitor urine  output closely  Cyst of kidney, acquired  Noted in patient's history  Patient has a history of right renal cell carcinoma status post partial nephrectomy in 2017  Irritable bowel syndrome with both constipation and diarrhea  Holding home Linzess  Obesity (BMI 30-39.9)  Encourage healthy lifestyle and weight loss  Fall  Patient reported 3 falls at home without head strike  Patient denies complaints of injury  Low threshold for trauma workup  DM (diabetes mellitus) (HCC)  Lab Results   Component Value Date    HGBA1C 11.5 (H) 10/12/2024       Recent Labs     03/02/25  0741 03/02/25  0856 03/02/25  0924 03/02/25  1021   POCGLU 272* 272* 253* 218*       Blood Sugar Average: Last 72 hrs:  (P) 253.75    A1c from October 11.5  Repeat A1c pending  Continue insulin drip  Consider endocrine consult  Metabolic acidosis  In the setting of severe dehydration from vomiting and diarrhea as well as poor oral intake  Received 2 L fluid bolus in the ED  Continue gentle fluid hydration    LINDY (acute kidney injury) (HCC)  Prerenal in the setting of poor oral intake and large volume loss through vomiting and diarrhea  Baseline appears to be 1-1.1  Initial labs today with creatinine of 2.2  Continue IV hydration  Monitor kidney function on repeat labs  Diarrhea  Patient does have a history of IBS on Linzess  Obtained a sample if able  Disposition: Stepdown Level 1    History of Present Illness   Haylee Balbuena is a 54 y.o. who presents with past medical history hyperlipidemia, hypertension, uncontrolled diabetes, IBS, chronic back pain, neuropathy, GERD, CKD, gastroparesis, seizure disorder.  Presents today with nausea and vomiting and poor oral intake x 3 days.  Patient reports not taking her insulin due to fear of hypoglycemia.  Patient reported 3 falls at home due to weakness. In the ED noted to be hyperglycemic with a metabolic acidosis.  Patient did not have an anion gap, but was started on DKA insulin drip with fluid resuscitation.   Patient switched to a regular insulin drip with IV fluids and until patient able to take oral.  She is admitted to critical care services stepdown 1 for further management    History obtained from chart review.  Review of Systems: Review of Systems   Constitutional:  Positive for fatigue.   Gastrointestinal:  Positive for abdominal pain, diarrhea, nausea and vomiting.   Genitourinary:  Positive for frequency.   Musculoskeletal:  Positive for back pain.   Neurological:  Positive for weakness.   Psychiatric/Behavioral: Negative.         Historical Information   Past Medical History:  No date: Atypical chest pain  2024: Bilateral calf pain  No date: Diabetes mellitus (HCC)  No date: Gastroparesis  No date: GERD (gastroesophageal reflux disease)  No date: Hyperlipidemia  No date: Hypertension  2023: MRSA bacteremia  No date: Psychiatric disorder  No date: Sciatica  No date: Seizure disorder (HCC) Past Surgical History:  No date: APPENDECTOMY   benign: BREAST BIOPSY; Left  No date:  SECTION  No date: CHOLECYSTECTOMY  No date: COLONOSCOPY  No date: HYSTERECTOMY  2023: IR PICC PLACEMENT SINGLE LUMEN  2021: TX LAPAROSCOPIC APPENDECTOMY; N/A      Comment:  Procedure: APPENDECTOMY LAPAROSCOPIC;  Surgeon: Onel Martin MD;  Location: [facility];  Service: General  2021: TX LAPS ABD PRTM&OMENTUM DX W/WO SPEC BR/WA SPX; N/A      Comment:  Procedure: LAPAROSCOPY DIAGNOSTIC, EXTENSIVE DESI;                 Surgeon: Onel Martin MD;  Location: [facility];                 Service: General  No date: TUBAL LIGATION  2024: TYMPANOSTOMY TUBE PLACEMENT; Bilateral  No date: UPPER GASTROINTESTINAL ENDOSCOPY   Current Outpatient Medications   Medication Instructions    albuterol (PROVENTIL HFA,VENTOLIN HFA) 90 mcg/act inhaler INHALE ONE PUFF BY MOUTH AND INTO THE LUNGS EVERY 6 HOURS AS NEEDED FOR WHEEZING    albuterol 2.5 mg, Nebulization, Every 6 hours PRN    Alcohol Swabs  (Pharmacist Choice Alcohol) PADS Apply externally, 4 times daily, Use as directed    aspirin (ECOTRIN LOW STRENGTH) 81 mg, Oral, Daily    atorvastatin (LIPITOR) 80 mg, Oral, Daily at bedtime    B-D UF III MINI PEN NEEDLES 31G X 5 MM MISC Pt uses 5 pen needles daily    cholecalciferol (VITAMIN D3) 2,000 Units, Oral, Daily    citalopram (CELEXA) 40 mg, Oral, Daily    Continuous Glucose Sensor (Dexcom G7 Sensor) 1 Device, Does not apply, Every 10 days    dicyclomine (BENTYL) 20 mg tablet Take twice daily (am and before bedtime), may take additional 2 doses per day, max 4x/day (every 6 hours if needed)    docusate sodium (COLACE) 100 mg, Oral, 2 times daily    ergocalciferol (VITAMIN D2) 50,000 Units, Oral, Weekly    famotidine (PEPCID) 40 MG tablet TAKE ONE TABLET BY MOUTH TWO HOURS PRIOR TO BEDTIME    famotidine (PEPCID) 20 mg, Daily    fenofibrate micronized (LOFIBRA) 134 mg, Oral, Daily with breakfast    Glucagon (Baqsimi One Pack) 3 MG/DOSE POWD 1 each, Nasal, As needed    Insulin Regular Human, Conc, (HumuLIN R U-500 KwikPen) 500 units/mL CONCENTRATED U-500 injection pen E11.65 inject 55 units with breakfast, 36 units with lunch and dinner, or as directed TDD up to 200 units    Iron Heme Polypeptide 12 MG TABS 1 tablet    Jardiance 25 mg, Oral, Daily    levETIRAcetam (KEPPRA) 500 mg, Oral, 2 times daily    linaCLOtide (Linzess) 72 MCG CAPS 1 capsule, Oral, Daily before breakfast    Magnesium Citrate 200 MG TABS 1 tablet, Oral, 2 times daily    meclizine (ANTIVERT) 25 mg, Oral, 3 times daily PRN    methocarbamol (ROBAXIN) 500 mg, Oral, 3 times daily PRN    metoclopramide (REGLAN) 5 mg, Oral, Every 6 hours    metoprolol tartrate (LOPRESSOR) 25 mg, Oral, 2 times daily    multivitamin (THERAGRAN) TABS 1 tablet, Every morning    ondansetron (ZOFRAN) 4 mg, Oral, Every 8 hours PRN    OneTouch Ultra test strip USE 4 TIMES A DAY    oxybutynin (DITROPAN-XL) 10 mg, Oral, Daily at bedtime    pantoprazole (PROTONIX) 40 mg,  Oral, 2 times daily    Pharmacist Choice Lancets MISC USE AS DIRECTED    pregabalin (LYRICA) 100 mg, Oral, 3 times daily    Restasis 0.05 % ophthalmic emulsion No dose, route, or frequency recorded.    Tirzepatide 10 mg, Subcutaneous, Weekly    Allergies   Allergen Reactions    Butorphanol Hallucinations    Toradol [Ketorolac Tromethamine] GI Intolerance    Oxycodone GI Intolerance    Benztropine Hallucinations     Was seeing things that were not right    Penicillins     Zolpidem     Medical Tape Rash     Ok with paper tape      Social History     Tobacco Use    Smoking status: Former     Current packs/day: 0.00     Types: Cigarettes     Quit date:      Years since quittin.1    Smokeless tobacco: Never   Vaping Use    Vaping status: Never Used   Substance Use Topics    Alcohol use: Never     Comment: occ    Drug use: Never    Family History   Problem Relation Age of Onset    No Known Problems Mother     No Known Problems Father     No Known Problems Daughter     No Known Problems Maternal Grandmother     No Known Problems Paternal Grandmother     No Known Problems Paternal Aunt     No Known Problems Paternal Aunt     No Known Problems Paternal Aunt           Objective :                   Vitals I/O      Most Recent Min/Max in 24hrs   Temp 98.2 °F (36.8 °C) Temp  Min: 98.1 °F (36.7 °C)  Max: 98.2 °F (36.8 °C)   Pulse 82 Pulse  Min: 82  Max: 93   Resp (!) 26 Resp  Min: 20  Max: 26   /85 BP  Min: 125/85  Max: 125/85   O2 Sat 97 % SpO2  Min: 95 %  Max: 97 %    No intake or output data in the 24 hours ending 25 1101    Diet Ricardo/CHO Controlled; Consistent Carbohydrate Diet Level 2 (5 carb servings/75 grams CHO/meal)    Invasive Monitoring           Physical Exam   Physical Exam  Vitals and nursing note reviewed.   Eyes:      Pupils: Pupils are equal, round, and reactive to light.   Skin:     General: Skin is warm and dry.      Capillary Refill: Capillary refill takes less than 2 seconds.   HENT:       Head: Normocephalic.      Mouth/Throat:      Mouth: Mucous membranes are dry.   Cardiovascular:      Rate and Rhythm: Normal rate and regular rhythm.      Pulses: Normal pulses.      Heart sounds: Normal heart sounds.   Abdominal:      Palpations: Abdomen is soft.      Tenderness: There is abdominal tenderness.   Constitutional:       General: She is not in acute distress.     Appearance: She is well-developed.   Pulmonary:      Effort: Pulmonary effort is normal.      Breath sounds: Normal breath sounds.   Psychiatric:         Mood and Affect: Mood and affect normal.   Neurological:      General: No focal deficit present.      Mental Status: She is alert and oriented to person, place and time. Mental status is at baseline. She is calm.      Sensory: Sensation is intact.      Motor: gross motor function is at baseline for patient. Strength full and intact in all extremities.          Diagnostic Studies        Lab Results: I have reviewed the following results:     Medications:  Scheduled PRN   atorvastatin, 80 mg, HS  calcium gluconate, 2 g, Once  chlorhexidine, 15 mL, Q12H HOLDEN  cholecalciferol, 2,000 Units, Daily  citalopram, 40 mg, Daily  heparin (porcine), 5,000 Units, Q8H HOLDEN  levETIRAcetam, 500 mg, BID  magnesium sulfate, 2 g, Once  metoprolol tartrate, 25 mg, BID  pantoprazole, 40 mg, BID  pregabalin, 100 mg, TID  stress formula, 1 tablet, QAM      albuterol, 1 puff, Q6H PRN  albuterol, 2.5 mg, Q6H PRN  metoclopramide, 10 mg, Q6H PRN  sodium chloride (PF), 3 mL, Q1H PRN       Continuous    insulin regular (HumuLIN R,NovoLIN R) 1 Units/mL in sodium chloride 0.9 % 100 mL infusion, 0.3-21 Units/hr  multi-electrolyte, 100 mL/hr         Labs:   CBC    Recent Labs     03/02/25  0750 03/02/25  0856   WBC 7.90  --    HGB 13.9  --    HCT 44.4  --     343     BMP    Recent Labs     03/02/25  0750 03/02/25  0904   SODIUM 134* 134*   K 3.6 3.3*    106   CO2 18* 18*   AGAP 13 10   BUN 64* 62*   CREATININE  2.26* 1.99*   CALCIUM 8.7 8.1*       Coags    No recent results     Additional Electrolytes  Recent Labs     03/02/25  0904   MG 1.7*   PHOS 3.4          Blood Gas    No recent results  Recent Labs     03/02/25  0856   PHVEN 7.204*   YAG2EUQ 37.4*   PO2VEN 61.5*   [id number] 14.4*   BEVEN -12.7   [id number] 84.2*    LFTs  Recent Labs     03/02/25  0750   ALT 21   AST 14   ALKPHOS 68   ALB 4.3   TBILI 0.52       Infectious  No recent results  Glucose  Recent Labs     03/02/25  0750 03/02/25  0904   GLUC 296* 286*        Administrative Statements

## 2025-03-02 NOTE — ASSESSMENT & PLAN NOTE
Lab Results   Component Value Date    HGBA1C 11.5 (H) 10/12/2024       Recent Labs     03/02/25  0741 03/02/25  0856 03/02/25  0924 03/02/25  1021   POCGLU 272* 272* 253* 218*       Blood Sugar Average: Last 72 hrs:  (P) 253.75    A1c from October 11.5  Repeat A1c pending  Continue insulin drip  Consider endocrine consult

## 2025-03-02 NOTE — LETTER
[facility]  [address] [place] [id number]  No information on file.    March 5, 2025     Patient: Haylee Balbuena   YOB: 1970   Date of Visit: 3/2/2025       To Whom it May Concern:    Haylee Balbuena is under my professional care. She was seen in the hospital from 3/2/2025 to 03/05/25. She  not return to work until cleared by her PCP .    If you have any questions or concerns, please don't hesitate to call.         Sincerely,          SYLWIA Armstrong

## 2025-03-02 NOTE — ASSESSMENT & PLAN NOTE
Patient reported 3 falls at home without head strike  Patient denies complaints of injury  Low threshold for trauma workup

## 2025-03-02 NOTE — ASSESSMENT & PLAN NOTE
In the setting of severe dehydration from vomiting and diarrhea as well as poor oral intake  Received 2 L fluid bolus in the ED  Continue gentle fluid hydration

## 2025-03-02 NOTE — RESPIRATORY THERAPY NOTE
RT Protocol Note  Haylee Balbuena 54 y.o. female MRN: [id number]  Unit/Bed#: ICU  Encounter: [id number]    Assessment    Principal Problem:    Hyperglycemia  Active Problems:    Chronic low back pain with sciatica    Diabetic polyneuropathy associated with type 2 diabetes mellitus (HCC)    Gastroparesis    Hypertension    Depression with anxiety    Mild intermittent asthma without complication    Hypercholesteremia    Iron deficiency anemia secondary to inadequate dietary iron intake    GERD (gastroesophageal reflux disease)    Vitamin D deficiency    Seizure disorder (HCC)    Stage 3a chronic kidney disease (HCC)    Cyst of kidney, acquired    Irritable bowel syndrome with both constipation and diarrhea    Nausea and vomiting    Obesity (BMI 30-39.9)    Fall    DM (diabetes mellitus) (HCC)    Metabolic acidosis      Home Pulmonary Medications:  Albuterol MDI PRN   Albuterol Neb PRN     Home Devices/Therapy: Other (Comment) (None)    Past Medical History:   Diagnosis Date    Atypical chest pain     Bilateral calf pain 2024    Diabetes mellitus (HCC)     Gastroparesis     GERD (gastroesophageal reflux disease)     Hyperlipidemia     Hypertension     MRSA bacteremia 2023    Psychiatric disorder     Sciatica     Seizure disorder (HCC)      Social History     Socioeconomic History    Marital status: /Civil Union     Spouse name: None    Number of children: None    Years of education: None    Highest education level: None   Occupational History    None   Tobacco Use    Smoking status: Former     Current packs/day: 0.00     Types: Cigarettes     Quit date:      Years since quittin.1    Smokeless tobacco: Never   Vaping Use    Vaping status: Never Used   Substance and Sexual Activity    Alcohol use: Never     Comment: occ    Drug use: Never    Sexual activity: Not Currently     Partners: Male     Birth control/protection: None   Other Topics Concern    None   Social History Narrative     "None     Social Drivers of Health     Financial Resource Strain: Not on file   Food Insecurity: No Food Insecurity (1/6/2024)    Nursing - Inadequate Food Risk Classification     Worried About Running Out of Food in the Last Year: Never true     Ran Out of Food in the Last Year: Never true     Ran Out of Food in the Last Year: Not on file   Transportation Needs: No Transportation Needs (1/6/2024)    PRAPARE - Transportation     Lack of Transportation (Medical): No     Lack of Transportation (Non-Medical): No   Physical Activity: Not on file   Stress: Not on file   Social Connections: Not on file   Intimate Partner Violence: Not on file   Housing Stability: Low Risk  (1/6/2024)    Housing Stability Vital Sign     Unable to Pay for Housing in the Last Year: No     Number of Times Moved in the Last Year: 1     Homeless in the Last Year: No       Subjective         Objective    Physical Exam:   Assessment Type: Assess only  General Appearance: Alert, Awake  Respiratory Pattern: Normal  Chest Assessment: Chest expansion symmetrical  Bilateral Breath Sounds: Clear  Cough: None    Vitals:  Blood pressure 125/85, pulse 82, temperature 98.2 °F (36.8 °C), temperature source Temporal, resp. rate (!) 26, height 5' 2\" (1.575 m), weight 82.2 kg (181 lb 3.5 oz), SpO2 97%, not currently breastfeeding.          Imaging and other studies:           Plan    Respiratory Plan: Home Bronchodilator Patient pathway        Resp Comments: Patient is ordered on her home bronchodilators by Critical care. No SOB or distress noted. Will continue to montior the patient.   "

## 2025-03-02 NOTE — ASSESSMENT & PLAN NOTE
Prerenal in the setting of poor oral intake and large volume loss through vomiting and diarrhea  Baseline appears to be 1-1.1  Initial labs today with creatinine of 2.2  Continue IV hydration  Monitor kidney function on repeat labs   OB/GYN

## 2025-03-02 NOTE — PLAN OF CARE
Problem: PAIN - ADULT  Goal: Verbalizes/displays adequate comfort level or baseline comfort level  Description: Interventions:  - Encourage patient to monitor pain and request assistance  - Assess pain using appropriate pain scale  - Administer analgesics based on type and severity of pain and evaluate response  - Implement non-pharmacological measures as appropriate and evaluate response  - Consider cultural and social influences on pain and pain management  - Notify physician/advanced practitioner if interventions unsuccessful or patient reports new pain  Outcome: Progressing     Problem: INFECTION - ADULT  Goal: Absence or prevention of progression during hospitalization  Description: INTERVENTIONS:  - Assess and monitor for signs and symptoms of infection  - Monitor lab/diagnostic results  - Monitor all insertion sites, i.e. indwelling lines, tubes, and drains  - Monitor endotracheal if appropriate and nasal secretions for changes in amount and color  - [place] appropriate cooling/warming therapies per order  - Administer medications as ordered  - Instruct and encourage patient and family to use good hand hygiene technique  - Identify and instruct in appropriate isolation precautions for identified infection/condition  Outcome: Progressing

## 2025-03-03 LAB
ALBUMIN SERPL BCG-MCNC: 2.9 G/DL (ref 3.5–5)
ALP SERPL-CCNC: 43 U/L (ref 34–104)
ALT SERPL W P-5'-P-CCNC: 13 U/L (ref 7–52)
ANION GAP SERPL CALCULATED.3IONS-SCNC: 4 MMOL/L (ref 4–13)
ANION GAP SERPL CALCULATED.3IONS-SCNC: 6 MMOL/L (ref 4–13)
AST SERPL W P-5'-P-CCNC: 11 U/L (ref 13–39)
BASOPHILS # BLD AUTO: 0.02 THOUSANDS/ÂΜL (ref 0–0.1)
BASOPHILS NFR BLD AUTO: 0 % (ref 0–1)
BILIRUB SERPL-MCNC: 0.26 MG/DL (ref 0.2–1)
BUN SERPL-MCNC: 23 MG/DL (ref 5–25)
BUN SERPL-MCNC: 32 MG/DL (ref 5–25)
CA-I BLD-SCNC: 1.14 MMOL/L (ref 1.12–1.32)
CA-I BLD-SCNC: 1.19 MMOL/L (ref 1.12–1.32)
CALCIUM ALBUM COR SERPL-MCNC: 8.7 MG/DL (ref 8.3–10.1)
CALCIUM SERPL-MCNC: 7.8 MG/DL (ref 8.4–10.2)
CALCIUM SERPL-MCNC: 8.1 MG/DL (ref 8.4–10.2)
CHLORIDE SERPL-SCNC: 110 MMOL/L (ref 96–108)
CHLORIDE SERPL-SCNC: 114 MMOL/L (ref 96–108)
CO2 SERPL-SCNC: 22 MMOL/L (ref 21–32)
CO2 SERPL-SCNC: 23 MMOL/L (ref 21–32)
CREAT SERPL-MCNC: 1.12 MG/DL (ref 0.6–1.3)
CREAT SERPL-MCNC: 1.15 MG/DL (ref 0.6–1.3)
EOSINOPHIL # BLD AUTO: 0.17 THOUSAND/ÂΜL (ref 0–0.61)
EOSINOPHIL NFR BLD AUTO: 3 % (ref 0–6)
ERYTHROCYTE [DISTWIDTH] IN BLOOD BY AUTOMATED COUNT: 14.6 % (ref 11.6–15.1)
GFR SERPL CREATININE-BSD FRML MDRD: 54 ML/MIN/1.73SQ M
GFR SERPL CREATININE-BSD FRML MDRD: 55 ML/MIN/1.73SQ M
GLUCOSE SERPL-MCNC: 104 MG/DL (ref 65–140)
GLUCOSE SERPL-MCNC: 110 MG/DL (ref 65–140)
GLUCOSE SERPL-MCNC: 134 MG/DL (ref 65–140)
GLUCOSE SERPL-MCNC: 140 MG/DL (ref 65–140)
GLUCOSE SERPL-MCNC: 149 MG/DL (ref 65–140)
GLUCOSE SERPL-MCNC: 211 MG/DL (ref 65–140)
GLUCOSE SERPL-MCNC: 222 MG/DL (ref 65–140)
GLUCOSE SERPL-MCNC: 224 MG/DL (ref 65–140)
GLUCOSE SERPL-MCNC: 72 MG/DL (ref 65–140)
HCT VFR BLD AUTO: 31.7 % (ref 34.8–46.1)
HGB BLD-MCNC: 10.1 G/DL (ref 11.5–15.4)
IMM GRANULOCYTES # BLD AUTO: 0.04 THOUSAND/UL (ref 0–0.2)
IMM GRANULOCYTES NFR BLD AUTO: 1 % (ref 0–2)
LYMPHOCYTES # BLD AUTO: 2.29 THOUSANDS/ÂΜL (ref 0.6–4.47)
LYMPHOCYTES NFR BLD AUTO: 33 % (ref 14–44)
MAGNESIUM SERPL-MCNC: 2 MG/DL (ref 1.9–2.7)
MAGNESIUM SERPL-MCNC: 2 MG/DL (ref 1.9–2.7)
MCH RBC QN AUTO: 26.6 PG (ref 26.8–34.3)
MCHC RBC AUTO-ENTMCNC: 31.9 G/DL (ref 31.4–37.4)
MCV RBC AUTO: 84 FL (ref 82–98)
MONOCYTES # BLD AUTO: 1.17 THOUSAND/ÂΜL (ref 0.17–1.22)
MONOCYTES NFR BLD AUTO: 17 % (ref 4–12)
NEUTROPHILS # BLD AUTO: 3.16 THOUSANDS/ÂΜL (ref 1.85–7.62)
NEUTS SEG NFR BLD AUTO: 46 % (ref 43–75)
NRBC BLD AUTO-RTO: 0 /100 WBCS
PHOSPHATE SERPL-MCNC: 1.7 MG/DL (ref 2.7–4.5)
PHOSPHATE SERPL-MCNC: 3.2 MG/DL (ref 2.7–4.5)
PLATELET # BLD AUTO: 282 THOUSANDS/UL (ref 149–390)
PMV BLD AUTO: 10.9 FL (ref 8.9–12.7)
POTASSIUM SERPL-SCNC: 3.5 MMOL/L (ref 3.5–5.3)
POTASSIUM SERPL-SCNC: 4 MMOL/L (ref 3.5–5.3)
PROT SERPL-MCNC: 5 G/DL (ref 6.4–8.4)
RBC # BLD AUTO: 3.79 MILLION/UL (ref 3.81–5.12)
SODIUM SERPL-SCNC: 139 MMOL/L (ref 135–147)
SODIUM SERPL-SCNC: 140 MMOL/L (ref 135–147)
WBC # BLD AUTO: 6.85 THOUSAND/UL (ref 4.31–10.16)

## 2025-03-03 PROCEDURE — 80053 COMPREHEN METABOLIC PANEL: CPT

## 2025-03-03 PROCEDURE — 99233 SBSQ HOSP IP/OBS HIGH 50: CPT | Performed by: INTERNAL MEDICINE

## 2025-03-03 PROCEDURE — 84100 ASSAY OF PHOSPHORUS: CPT | Performed by: NURSE PRACTITIONER

## 2025-03-03 PROCEDURE — 82948 REAGENT STRIP/BLOOD GLUCOSE: CPT

## 2025-03-03 PROCEDURE — 82330 ASSAY OF CALCIUM: CPT | Performed by: NURSE PRACTITIONER

## 2025-03-03 PROCEDURE — 83735 ASSAY OF MAGNESIUM: CPT

## 2025-03-03 PROCEDURE — 85025 COMPLETE CBC W/AUTO DIFF WBC: CPT

## 2025-03-03 PROCEDURE — 80048 BASIC METABOLIC PNL TOTAL CA: CPT | Performed by: NURSE PRACTITIONER

## 2025-03-03 PROCEDURE — 82330 ASSAY OF CALCIUM: CPT

## 2025-03-03 PROCEDURE — 83735 ASSAY OF MAGNESIUM: CPT | Performed by: NURSE PRACTITIONER

## 2025-03-03 PROCEDURE — 94760 N-INVAS EAR/PLS OXIMETRY 1: CPT

## 2025-03-03 PROCEDURE — 94664 DEMO&/EVAL PT USE INHALER: CPT

## 2025-03-03 PROCEDURE — 84100 ASSAY OF PHOSPHORUS: CPT

## 2025-03-03 RX ORDER — METOCLOPRAMIDE HYDROCHLORIDE 5 MG/ML
10 INJECTION INTRAMUSCULAR; INTRAVENOUS EVERY 6 HOURS PRN
Status: DISCONTINUED | OUTPATIENT
Start: 2025-03-03 | End: 2025-03-05 | Stop reason: HOSPADM

## 2025-03-03 RX ORDER — INSULIN LISPRO 100 [IU]/ML
1-6 INJECTION, SOLUTION INTRAVENOUS; SUBCUTANEOUS
Status: DISCONTINUED | OUTPATIENT
Start: 2025-03-03 | End: 2025-03-04

## 2025-03-03 RX ORDER — SIMETHICONE 40MG/0.6ML
40 SUSPENSION, DROPS(FINAL DOSAGE FORM)(ML) ORAL EVERY 6 HOURS PRN
Status: DISCONTINUED | OUTPATIENT
Start: 2025-03-03 | End: 2025-03-05 | Stop reason: HOSPADM

## 2025-03-03 RX ORDER — ONDANSETRON 4 MG/1
4 TABLET, ORALLY DISINTEGRATING ORAL EVERY 6 HOURS PRN
Status: DISCONTINUED | OUTPATIENT
Start: 2025-03-03 | End: 2025-03-05 | Stop reason: HOSPADM

## 2025-03-03 RX ADMIN — HEPARIN SODIUM 5000 UNITS: 5000 INJECTION INTRAVENOUS; SUBCUTANEOUS at 14:18

## 2025-03-03 RX ADMIN — B-COMPLEX W/ C & FOLIC ACID TAB 1 TABLET: TAB at 10:35

## 2025-03-03 RX ADMIN — ACETAMINOPHEN 650 MG: 325 TABLET ORAL at 21:22

## 2025-03-03 RX ADMIN — FENOFIBRATE 145 MG: 145 TABLET, FILM COATED ORAL at 10:35

## 2025-03-03 RX ADMIN — POTASSIUM PHOSPHATE, MONOBASIC AND POTASSIUM PHOSPHATE, DIBASIC 30 MMOL: 224; 236 INJECTION, SOLUTION, CONCENTRATE INTRAVENOUS at 07:33

## 2025-03-03 RX ADMIN — HEPARIN SODIUM 5000 UNITS: 5000 INJECTION INTRAVENOUS; SUBCUTANEOUS at 21:24

## 2025-03-03 RX ADMIN — LEVETIRACETAM 500 MG: 500 TABLET, FILM COATED ORAL at 10:35

## 2025-03-03 RX ADMIN — INSULIN LISPRO 2 UNITS: 100 INJECTION, SOLUTION INTRAVENOUS; SUBCUTANEOUS at 17:00

## 2025-03-03 RX ADMIN — CITALOPRAM HYDROBROMIDE 40 MG: 20 TABLET ORAL at 10:35

## 2025-03-03 RX ADMIN — PREGABALIN 100 MG: 100 CAPSULE ORAL at 21:22

## 2025-03-03 RX ADMIN — ASPIRIN 81 MG: 81 TABLET, COATED ORAL at 10:35

## 2025-03-03 RX ADMIN — ACETAMINOPHEN 650 MG: 325 TABLET ORAL at 11:41

## 2025-03-03 RX ADMIN — FAMOTIDINE 20 MG: 20 TABLET, FILM COATED ORAL at 21:22

## 2025-03-03 RX ADMIN — PREGABALIN 100 MG: 100 CAPSULE ORAL at 17:00

## 2025-03-03 RX ADMIN — CHLORHEXIDINE GLUCONATE 15 ML: 1.2 SOLUTION ORAL at 21:22

## 2025-03-03 RX ADMIN — WITCH HAZEL 1 PAD: 500 SOLUTION RECTAL; TOPICAL at 21:28

## 2025-03-03 RX ADMIN — HEPARIN SODIUM 5000 UNITS: 5000 INJECTION INTRAVENOUS; SUBCUTANEOUS at 06:05

## 2025-03-03 RX ADMIN — WITCH HAZEL 1 PAD: 500 SOLUTION RECTAL; TOPICAL at 21:23

## 2025-03-03 RX ADMIN — INSULIN LISPRO 3 UNITS: 100 INJECTION, SOLUTION INTRAVENOUS; SUBCUTANEOUS at 21:33

## 2025-03-03 RX ADMIN — PANTOPRAZOLE SODIUM 40 MG: 40 TABLET, DELAYED RELEASE ORAL at 17:00

## 2025-03-03 RX ADMIN — METOPROLOL TARTRATE 25 MG: 25 TABLET, FILM COATED ORAL at 10:35

## 2025-03-03 RX ADMIN — LEVETIRACETAM 500 MG: 500 TABLET, FILM COATED ORAL at 17:00

## 2025-03-03 RX ADMIN — CHOLECALCIFEROL TAB 25 MCG (1000 UNIT) 2000 UNITS: 25 TAB at 10:35

## 2025-03-03 RX ADMIN — METOPROLOL TARTRATE 25 MG: 25 TABLET, FILM COATED ORAL at 17:00

## 2025-03-03 RX ADMIN — DEXTROSE, SODIUM CHLORIDE, SODIUM LACTATE, POTASSIUM CHLORIDE, AND CALCIUM CHLORIDE 75 ML/HR: 5; .6; .31; .03; .02 INJECTION, SOLUTION INTRAVENOUS at 04:53

## 2025-03-03 RX ADMIN — PREGABALIN 100 MG: 100 CAPSULE ORAL at 10:35

## 2025-03-03 RX ADMIN — CHLORHEXIDINE GLUCONATE 15 ML: 1.2 SOLUTION ORAL at 10:35

## 2025-03-03 RX ADMIN — ONDANSETRON 4 MG: 4 TABLET, ORALLY DISINTEGRATING ORAL at 06:05

## 2025-03-03 RX ADMIN — ATORVASTATIN CALCIUM 80 MG: 80 TABLET, FILM COATED ORAL at 21:22

## 2025-03-03 RX ADMIN — PANTOPRAZOLE SODIUM 40 MG: 40 TABLET, DELAYED RELEASE ORAL at 10:35

## 2025-03-03 NOTE — ASSESSMENT & PLAN NOTE
Reported fall x3 at home without head strike  Denies any injury and/or wounds  Low threshold for trauma work-up  Fall precautions

## 2025-03-03 NOTE — CASE MANAGEMENT
Case Management Assessment & Discharge Planning Note    Patient name Haylee Balbuena  Location ICU 02/ICU  MRN [id number]  : 1970 Date 3/3/2025       Current Admission Date: 3/2/2025  Current Admission Diagnosis:Nausea, vomiting, and diarrhea   Patient Active Problem List    Diagnosis Date Noted Date Diagnosed    Diabetic ketoacidosis without coma associated with type 2 diabetes mellitus (HCC) 2025     Nausea, vomiting, and diarrhea 2025     Obesity (BMI 30-39.9) 2025     Fall 2025     Metabolic acidosis 2025     Hyperglycemia due to diabetes mellitus (HCC) 2025     Renal cell carcinoma (HCC) 10/14/2024     Irritable bowel syndrome with both constipation and diarrhea 10/14/2024     Hx of diabetic gastroparesis 2024     Acute right-sided weakness 2024     Acute renal failure superimposed on stage 3a chronic kidney disease (HCC) 2023     Hypomagnesemia 2023     Iron deficiency 2023     Status post incision and drainage 08/10/2023     Cellulitis of left upper extremity 2023     Leukocytosis 2023     Lower extremity edema 2023     Migraine 07/15/2023     Seizure disorder (HCC) 07/15/2023     Generalized abdominal pain 2023     Abnormal CT scan, stomach 2023     Gastric distention 07/10/2023     Flank pain 2023     History of kidney cancer 2022     Vitamin D deficiency 2022     Benign hypertension with CKD (chronic kidney disease) stage III (HCC) 2022     GERD (gastroesophageal reflux disease) 2021     History of colon polyps 2021     Other constipation 2021     Anemia 2021     Encounter for post surgical wound check 2021     Depression with anxiety 2021     Mild intermittent asthma without complication 2021     Hypercholesteremia 2021     Hypertriglyceridemia 2021     Iron deficiency anemia secondary to inadequate dietary iron  intake 04/12/2021     Internal hernia 03/24/2021     Intra-abdominal adhesions 03/24/2021     Radiculopathy, lumbar region 05/14/2020     Anterior tibial tendonitis, right 05/14/2020     Spondylolisthesis of lumbar region 04/10/2018     Chronic low back pain with sciatica 04/07/2018     Diabetic polyneuropathy associated with type 2 diabetes mellitus (HCC) 04/07/2018     Obesity (BMI 35.0-39.9 without comorbidity) 04/14/2017     Type 2 diabetes mellitus with diabetic autonomic neuropathy, with long-term current use of insulin ([facility]) 02/25/2016     Hypertension 02/25/2016     Bipolar disorder ([facility]) 11/12/2015     Gastroparesis 11/12/2015       LOS (days): 1  Geometric Mean LOS (GMLOS) (days):   Days to GMLOS:     OBJECTIVE:    Risk of Unplanned Readmission Score: 32.2     Current admission status: Inpatient    Preferred Pharmacy:   [facility] [address] [address]  Phone: [phone] Fax: [phone]    Primary Care Provider: SYLWIA Larson    Primary Insurance: [facility]  Secondary Insurance:     ASSESSMENT:  Active Health Care Proxies    There are no active Health Care Proxies on file.       Advance Directives  Does patient have a Health Care POA?: No  Was patient offered paperwork?: Yes (paperwork provided)  Does patient currently have a Health Care decision maker?: Yes, please see Health Care Proxy section  Does patient have Advance Directives?: No  Was patient offered paperwork?: Yes (Paerwork provided)  Primary Contact: Primo Balbuena spouse    Readmission Root Cause  30 Day Readmission: No    Patient Information  Admitted from:: Home  Mental Status: Alert  During Assessment patient was accompanied by: Not accompanied during assessment  Assessment information provided by:: Patient  Primary Caregiver: Self  Support Systems: Spouse/significant other  County of Residence: [place]  What city do you live in?: [facility] entry access  options. Select all that apply.: Stairs  Number of steps to enter home.: 3  Do the steps have railings?: Yes  Type of Current Residence: 2 story home  Upon entering residence, is there a bedroom on the main floor (no further steps)?: No  A bedroom is located on the following floor levels of residence (select all that apply):: 2nd Floor  Upon entering residence, is there a bathroom on the main floor (no further steps)?: No  Indicate which floors of current residence have a bathroom (select all the apply):: 2nd Floor  Number of steps to 2nd floor from main floor: One Flight  Living Arrangements: Lives w/ Spouse/significant other  Is patient a ?: No    Activities of Daily Living Prior to Admission  Functional Status: Independent  Completes ADLs independently?: Yes  Ambulates independently?: Yes  Does patient use assisted devices?: No  Does patient currently own DME?: No  Does patient have a history of Outpatient Therapy (PT/OT)?: Yes  Does the patient have a history of Short-Term Rehab?: No  Does patient have a history of HHC?: Yes ([facility])  Does patient currently have HHC?: No    Patient Information Continued  Income Source: Unemployed  Does patient have prescription coverage?: Yes  Does patient receive dialysis treatments?: No  Does patient have a history of substance abuse?: No  Does patient have a history of Mental Health Diagnosis?: No  Means of Transportation  Means of Transport to Appts:: Drives Self    DISCHARGE DETAILS:    Discharge planning discussed with:: Pt    Contacts  Patient Contacts: Primo Balbuena  Contact Method: Phone  Phone Number: [phone]  Reason/Outcome: Emergency Contact  Pt indicated she is IPA and drives.  Pt states she uses the glucometer.  Will follow for care needs.  Spouse will transport upon DC.

## 2025-03-03 NOTE — ASSESSMENT & PLAN NOTE
AG normal, but bicarb remains low  Suspect this is multifactorial in setting of dehydration in setting of GI illness vs mild DKA vs bicarb losses in setting of diarrhea vs hyperchloremia  LA WNL  Continue w/aggressive volume resuscitation, however limit chloride containing solutions  Continue dextrose-containing infusion until able to consistently tolerate PO intake  Continue non-DKA insulin gtt for now  Monitor for worsening acidosis      Type and Screen

## 2025-03-03 NOTE — ASSESSMENT & PLAN NOTE
Lab Results   Component Value Date    EGFR 53 03/02/2025    EGFR 38 03/02/2025    EGFR 27 03/02/2025    CREATININE 1.16 03/02/2025    CREATININE 1.51 (H) 03/02/2025    CREATININE 1.99 (H) 03/02/2025     Creatinine 2.26 on admission  Baseline creatinine appears to be around 0.9 - 1.1  Suspect this is 2/2 severe dehydration in setting of GI illness w/metabolic acidosis as noted above  Continue w/aggressive volume resuscitation  Avoid nephrotoxic agents and hypotension  Trend renal indices  Strict I/O  Daily weights

## 2025-03-03 NOTE — UTILIZATION REVIEW
Initial Clinical Review    Admission: Date/Time/Statement:   Admission Orders (From admission, onward)       Ordered        03/02/25 0912  INPATIENT ADMISSION  Once            03/02/25 0825  INPATIENT ADMISSION  Once            03/02/25 0827  Inpatient Admission  Once                          Orders Placed This Encounter   Procedures    INPATIENT ADMISSION     Standing Status:   Standing     Number of Occurrences:   1     Level of Care:   Level 1 Stepdown [13]     Estimated length of stay:   More than 2 Midnights     Certification:   I certify that inpatient services are medically necessary for this patient for a duration of greater than two midnights. See H&P and MD Progress Notes for additional information about the patient's course of treatment.    Inpatient Admission     Standing Status:   Standing     Number of Occurrences:   1     Level of Care:   Level 1 Stepdown [13]     Estimated length of stay:   More than 2 Midnights     Certification:   I certify that inpatient services are medically necessary for this patient for a duration of greater than two midnights. See H&P and MD Progress Notes for additional information about the patient's course of treatment.    INPATIENT ADMISSION     Standing Status:   Standing     Number of Occurrences:   1     Level of Care:   Level 1 Stepdown [13]     Estimated length of stay:   More than 2 Midnights     Certification:   I certify that inpatient services are medically necessary for this patient for a duration of greater than two midnights. See H&P and MD Progress Notes for additional information about the patient's course of treatment.     ED Arrival Information       Expected   -    Arrival   3/2/2025 07:15    Acuity   Urgent              Means of arrival   Wheelchair    Escorted by   Spouse    Service   Hospitalist    Admission type   Emergency              Arrival complaint   abd pain vomiting             Chief Complaint   Patient presents with    Vomiting     Pt reports  "vomiting since Friday, states she \"passed out 3 times last night\", pt states she has not taken her medications due to vomiting       3/2/25 Initial Presentation: 54 y.o. female w/ PMH of DMII, Seizures, HTN, HLD, GERD, Gastroparesis who presents with multiple days of N/V/diarrhea. Unable to take insulin during that time. found to have hyperglycemia, acidosis, and mild Beta-hydroxybutyrate elevation though no anion gap. Admitted to SD1 for hyperglycemia management. Feels weak and dehydrated. Pt also reported 3 falls at home due to weakness. Exam: GCS 15, ill appearing. Mild diffuse abdominal tenderness , 10/10 pain. Abnormal labs/imaging: Glucose 296, , Bun 34, creat 2.26, Beta Hydrox 0.99, VBG PH 7.229, Pco2 37.9, Hco3 15.5. In the ED pt given IVF's of NS x2 liters, IV dilaudid, IV zofran, PO KCL  and IVF's plasmalyte 500 ml bolus. Plan: admit to inpatient to stepdown level 1 for Hyperglycemia, IVF's, insulin drip, frequent fingersticks,trend renal function, electrolytes, antiemetic's pain control prn, telemetry monitoring.         Date: 3/3/25   Day 2: Critical Care/ICU: stool panel, for CDIFF pending. Isolyte IVF's stopped secondary to hyperchloremia, remains on D5 LR, no other acute events. BS ranges 110-149 so far today. Isulin drip stopped. Transitioned to SSI, Pt reports mild abdominal pain, some diarrhea and nausea, increased bowels sounds, decreased breath sounds, trace LE edema. Zofran x 1 dose thus far this AM given.  Phos 1.7, HGB dropped from 13.9 to 10.1 today, K 3.5, BUN improved from 62 to 32 today and creat 1.12 today from 2.26.  KPHOS given today.  UOP 1400 ml, Temp 96.5F, HR 77, /59, RR 28, Spo2 94% on carb controlled diet. Continue Q AC meals and HS fingersticks, monitor BS off insulin drip, repeat labs, daily weights, I/O's., D5 lr IVF's.     ED Treatment-Medication Administration from 03/02/2025 0715 to 03/02/2025 0917         Date/Time Order Dose Route Action     03/02/2025 0758 " sodium chloride 0.9 % bolus 1,000 mL 1,000 mL Intravenous New Bag     03/02/2025 0759 HYDROmorphone (DILAUDID) injection 0.5 mg 0.5 mg Intravenous Given     03/02/2025 0759 ondansetron (ZOFRAN) injection 4 mg 4 mg Intravenous Given     03/02/2025 0843 sodium chloride 0.9 % bolus 1,000 mL 1,000 mL Intravenous New Bag     03/02/2025 0839 potassium chloride (Klor-Con M20) CR tablet 40 mEq 40 mEq Oral Given     03/02/2025 0844 multi-electrolyte (ISOLYTE-S PH 7.4 equivalent) IV solution 500 mL/hr Intravenous New Bag            Scheduled Medications:  aspirin, 81 mg, Oral, Daily  atorvastatin, 80 mg, Oral, HS  chlorhexidine, 15 mL, Mouth/Throat, Q12H HOLDEN  cholecalciferol, 2,000 Units, Oral, Daily  citalopram, 40 mg, Oral, Daily  famotidine, 20 mg, Oral, HS  fenofibrate, 145 mg, Oral, Daily  heparin (porcine), 5,000 Units, Subcutaneous, Q8H HOLDEN  insulin lispro, 1-6 Units, Subcutaneous, TID AC  insulin lispro, 1-6 Units, Subcutaneous, HS  levETIRAcetam, 500 mg, Oral, BID  metoprolol tartrate, 25 mg, Oral, BID  multivitamin stress formula, 1 tablet, Oral, QAM  pantoprazole, 40 mg, Oral, BID  potassium phosphate, 30 mmol, Intravenous, Once  pregabalin, 100 mg, Oral, TID      Continuous IV Infusions:  dextrose 5% lactated ringer's, 75 mL/hr, Intravenous, Continuous      PRN Meds:  acetaminophen, 650 mg, Oral, Q6H PRN  albuterol, 1 puff, Inhalation, Q6H PRN  albuterol, 2.5 mg, Nebulization, Q6H PRN  metoclopramide, 10 mg, Intravenous, Q6H PRN  ondansetron, 4 mg, Oral, Q6H PRN x1 dose 3/3       ED Triage Vitals   Temperature Pulse Respirations Blood Pressure SpO2 Pain Score   03/02/25 0736 03/02/25 0736 03/02/25 0736 03/02/25 0736 03/02/25 0736 03/02/25 0759   98.1 °F (36.7 °C) 93 20 125/68 95 % 10 - Worst Possible Pain     Weight (last 2 days)       Date/Time Weight    03/03/25 0500 84.2 (185.63)    Comment rows:    OBSERV: OOB to commode at 03/03/25 0500    03/03/25 0000 --    Comment rows:    OBSERV: OOB to commode at  03/03/25 0000 03/02/25 2000 --    Comment rows:    OBSERV: OOB to the chair at 03/02/25 2000 03/02/25 0931 82.2 (181.22)    03/02/25 0739 79.4 (175)            Vital Signs (last 3 days)       Date/Time Temp Pulse Resp BP MAP (mmHg) SpO2 O2 Device Patient Position - Orthostatic VS [place] Coma Scale Score Pain    03/03/25 0819 -- -- -- -- -- 100 % None (Room air) -- -- --    03/03/25 0800 -- -- -- -- -- -- -- -- 15 --    03/03/25 0744 97.7 °F (36.5 °C) 71 24 130/63 90 96 % -- -- -- No Pain    03/03/25 0600 -- 78 19 -- -- 94 % None (Room air) Lying -- --    03/03/25 0500 -- 67 13 -- -- -- None (Room air) Lying -- --    OBSERV: OOB to commode at 03/03/25 0500    03/03/25 0440 -- 70 20 107/63 80 95 % None (Room air) Lying -- --    03/03/25 0400 97 °F (36.1 °C) 73 18 107/63 80 95 % None (Room air) Lying 15 No Pain    03/03/25 0300 -- 77 18 -- -- 94 % None (Room air) Lying -- --    03/03/25 0000 96.5 °F (35.8 °C) 77 28 104/59 77 94 % None (Room air) Lying 15 No Pain    OBSERV: OOB to commode at 03/03/25 0000    03/02/25 2300 -- 72 13 102/55 72 96 % -- -- -- --    03/02/25 2200 -- 74 19 102/56 74 94 % -- -- -- --    03/02/25 2100 -- 74 15 103/57 75 96 % -- -- -- --    03/02/25 2000 98.2 °F (36.8 °C) 75 25 123/60 85 94 % None (Room air) Lying 15 No Pain    OBSERV: OOB to the chair at 03/02/25 2000 03/02/25 1900 -- 77 21 115/61 81 95 % -- -- -- No Pain    03/02/25 1805 -- 79 15 118/63 86 99 % -- -- -- --    03/02/25 1700 -- 80 19 110/56 77 97 % -- -- -- --    03/02/25 1600 -- 81 19 102/64 78 97 % -- -- 15 --    03/02/25 1500 98.3 °F (36.8 °C) -- -- -- -- -- None (Room air) Lying -- --    03/02/25 1300 -- 76 25 100/62 76 96 % -- -- -- --    03/02/25 1200 -- 72 20 103/58 75 94 % -- -- -- --    03/02/25 1100 98.3 °F (36.8 °C) 75 21 111/67 85 95 % None (Room air) Lying 15 --    03/02/25 1036 -- -- -- -- -- -- None (Room air) -- -- --    03/02/25 0931 98.2 °F (36.8 °C) 82 26 125/85 97 97 % -- Lying -- 8    03/02/25 0759  -- -- -- -- -- -- -- -- -- 10 - Worst Possible Pain    03/02/25 0736 98.1 °F (36.7 °C) 93 20 125/68 91 95 % -- Lying -- --                      Results from last 7 days   Lab Units 03/03/25  0450 03/02/25  0856 03/02/25  0750   WBC Thousand/uL 6.85  --  7.90   HEMOGLOBIN g/dL 10.1*  --  13.9   HEMATOCRIT % 31.7*  --  44.4   PLATELETS Thousands/uL 282 343 377   TOTAL NEUT ABS Thousands/µL 3.16  --   --          Results from last 7 days   Lab Units 03/03/25 0450 03/02/25  2232 03/02/25 2006 03/02/25  1413 03/02/25  0904 03/02/25  0750   SODIUM mmol/L 140  --  139 138 134* 134*   POTASSIUM mmol/L 3.5  --  3.7 3.8 3.3* 3.6   CHLORIDE mmol/L 114*  --  116* 113* 106 103   CO2 mmol/L 22  --  17* 17* 18* 18*   ANION GAP mmol/L 4  --  6 8 10 13   BUN mg/dL 32*  --  37* 51* 62* 64*   CREATININE mg/dL 1.12  --  1.16 1.51* 1.99* 2.26*   EGFR ml/min/1.73sq m 55  --  53 38 27 23   CALCIUM mg/dL 7.8*  --  7.1* 8.0* 8.1* 8.7   CALCIUM, IONIZED mmol/L 1.19 1.13  --  1.18  --   --    MAGNESIUM mg/dL 2.0  --  1.9 2.3 1.7*  --    PHOSPHORUS mg/dL 1.7*  --  1.7* 2.7 3.4  --      Results from last 7 days   Lab Units 03/03/25  0450 03/02/25  0750   AST U/L 11* 14   ALT U/L 13 21   ALK PHOS U/L 43 68   TOTAL PROTEIN g/dL 5.0* 7.4   ALBUMIN g/dL 2.9* 4.3   TOTAL BILIRUBIN mg/dL 0.26 0.52     Results from last 7 days   Lab Units 03/03/25  0732 03/03/25  0534 03/03/25  0356 03/03/25  0206 03/03/25  0027 03/02/25  2225 03/02/25 2005 03/02/25  1810 03/02/25  1604 03/02/25  1413 03/02/25  1205 03/02/25  1021   POC GLUCOSE mg/dl 104 72 110 134 149* 136 125 136 93 105 105 218*     Results from last 7 days   Lab Units 03/03/25  0450 03/02/25 2006 03/02/25  1413 03/02/25  0904 03/02/25  0750   GLUCOSE RANDOM mg/dL 140 129 97 286* 296*         Results from last 7 days   Lab Units 03/02/25  0856   HEMOGLOBIN A1C % 12.0*   EAG mg/dl 298     Beta- Hydroxybutyrate   Date Value Ref Range Status   03/02/2025 0.99 (H) 0.02 - 0.27 mmol/L Final    02/21/2025 0.06 0.02 - 0.27 mmol/L Final     BETA-HYDROXYBUTYRATE   Date Value Ref Range Status   03/23/2024 0.0 <0.6 mmol/L Final   07/25/2023 0.1 <0.6 mmol/L Final   07/14/2023 0.1 <0.6 mmol/L Final          Results from last 7 days   Lab Units 03/02/25  1413 03/02/25  0856 03/02/25  0750   PH GOGO  7.256* 7.204* 7.229*   PCO2 GOGO mm Hg 38.1* 37.4* 37.9*   PO2 GOGO mm Hg 53.6* 61.5* 35.5   HCO3 GOGO mmol/L 16.6* 14.4* 15.5*   BASE EXC GOGO mmol/L -9.8 -12.7 -11.3   O2 CONTENT GOGO ml/dL 13.7 16.3 12.6   O2 HGB, VENOUS % 83.5* 84.2* 61.5             Results from last 7 days   Lab Units 03/02/25  1229 03/02/25  1030 03/02/25  0856   HS TNI 0HR ng/L  --   --  3   HS TNI 2HR ng/L  --  4  --    HSTNI D2 ng/L  --  1  --    HS TNI 4HR ng/L 4  --   --    HSTNI D4 ng/L 1  --   --              Results from last 7 days   Lab Units 03/02/25  1038   TSH 3RD GENERATON uIU/mL 1.440         Results from last 7 days   Lab Units 03/02/25  0856   LACTIC ACID mmol/L 0.8             Results from last 7 days   Lab Units 03/02/25  0856   BNP pg/mL 34                     Results from last 7 days   Lab Units 03/02/25  0856 03/02/25  0750   LIPASE u/L 22 25                   Past Medical History:   Diagnosis Date    Atypical chest pain     Bilateral calf pain 03/05/2024    Diabetes mellitus (HCC)     Gastroparesis     GERD (gastroesophageal reflux disease)     Hyperlipidemia     Hypertension     MRSA bacteremia 08/01/2023    Psychiatric disorder     Sciatica     Seizure disorder (HCC)      Present on Admission:   Seizure disorder (HCC)   Hypertension   Diabetic polyneuropathy associated with type 2 diabetes mellitus (HCC)   Gastroparesis   Depression with anxiety   Mild intermittent asthma without complication   Hypercholesteremia   Iron deficiency anemia secondary to inadequate dietary iron intake   GERD (gastroesophageal reflux disease)   Vitamin D deficiency   Acute renal failure superimposed on stage 3a chronic kidney disease (HCC)    Renal cell carcinoma (HCC)   Nausea, vomiting, and diarrhea   Obesity (BMI 30-39.9)   Fall   Metabolic acidosis   Hyperglycemia due to diabetes mellitus (HCC)      Admitting Diagnosis: Vomiting [R11.10]  Abdominal pain [R10.9]  DKA (diabetic ketoacidosis) (HCC) [E11.10]  LINDY (acute kidney injury) (HCC) [N17.9]  Age/Sex: 54 y.o. female    Network Utilization Review Department  ATTENTION: Please call with any questions or concerns to [phone] and carefully listen to the prompts so that you are directed to the right person. All voicemails are confidential.   For Discharge needs, contact Care Management DC Support Team at [phone] opt. 2  Send all requests for admission clinical reviews, approved or denied determinations and any other requests to dedicated fax number below belonging to the campus where the patient is receiving treatment. List of dedicated fax numbers for the Facilities:  FACILITY NAME UR FAX NUMBER   ADMISSION DENIALS (Administrative/Medical Necessity) [phone]   DISCHARGE SUPPORT TEAM (NETWORK) [phone]   PARENT CHILD HEALTH (Maternity/NICU/Pediatrics) [phone]   [facility] [phone]   [facility] [phone]   [facility] [phone]   [facility] [phone]   [facility] [phone]   [facility] [phone]   [facility] [phone]   [facility] [phone]   [facility] [phone]   [facility] [phone]   [facility] [phone]   [facility] [phone]

## 2025-03-03 NOTE — ASSESSMENT & PLAN NOTE
AG normal, but bicarb remains low  Suspect this is multifactorial in setting of dehydration in setting of GI illness vs mild DKA vs bicarb losses in setting of diarrhea vs hyperchloremia  LA WNL  Continue w/aggressive volume resuscitation, however limit chloride containing solutions  Continue dextrose-containing infusion until able to consistently tolerate PO intake  Continue non-DKA insulin gtt for now  Monitor for worsening acidosis

## 2025-03-03 NOTE — ASSESSMENT & PLAN NOTE
Reports n/v/d x3D   Possibly initially in setting of GI illness followed by ? mild DKA vs dehydration/metabolic acidosis  Enteric stool panel and CDIF pending  Continue w/aggressive volume resuscitation  PRN antiemetics for n/v  Diabetic diet as tolerated

## 2025-03-03 NOTE — ASSESSMENT & PLAN NOTE
Lab Results   Component Value Date    HGBA1C 12.0 (H) 03/02/2025       Recent Labs     03/02/25  2225 03/03/25  0027 03/03/25  0206 03/03/25  0356   POCGLU 136 149* 134 110       Blood Sugar Average: Last 72 hrs:  (P) [id number]    Non-gapped metabolic acidosis w/? component of mild DKA - gluc 296, AG 13 w/bicarb 18, pH 7.2, BHB 0.99  Holding home U-500 Insulin 55 units w/breakfast, 36 units w/lunch and dinner while requiring insulin gtt  Holding home Mounjaro while here  Continue non-DKA insulin for now until consistently tolerating PO and bicarb improves  Continue Q2H fingersticks  Diabetic diet  Goal  - 180

## 2025-03-03 NOTE — PROGRESS NOTES
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 Plan: No Filamentary seen today. Will continue to monitor. Progress Note - Critical Care/ICU   Name: Haylee Balbuena 54 y.o. female I MRN: [id number]  Unit/Bed#: ICU 02-01 I Date of Admission: 3/2/2025   Date of Service: 3/3/2025 I Hospital Day: 1      Assessment & Plan  Nausea, vomiting, and diarrhea  Reports n/v/d x3D   Possibly initially in setting of GI illness followed by ? mild DKA vs dehydration/metabolic acidosis  Enteric stool panel and CDIF pending  Continue w/aggressive volume resuscitation  PRN antiemetics for n/v  Diabetic diet as tolerated  Metabolic acidosis  AG normal, but bicarb remains low  Suspect this is multifactorial in setting of dehydration in setting of GI illness vs mild DKA vs bicarb losses in setting of diarrhea vs hyperchloremia  LA WNL  Continue w/aggressive volume resuscitation, however limit chloride containing solutions  Continue dextrose-containing infusion until able to consistently tolerate PO intake  Continue non-DKA insulin gtt for now  Monitor for worsening acidosis     Acute renal failure superimposed on stage 3a chronic kidney disease (HCC)  Lab Results   Component Value Date    EGFR 53 03/02/2025    EGFR 38 03/02/2025    EGFR 27 03/02/2025    CREATININE 1.16 03/02/2025    CREATININE 1.51 (H) 03/02/2025    CREATININE 1.99 (H) 03/02/2025     Creatinine 2.26 on admission  Baseline creatinine appears to be around 0.9 - 1.1  Suspect this is 2/2 severe dehydration in setting of GI illness w/metabolic acidosis as noted above  Continue w/aggressive volume resuscitation  Avoid nephrotoxic agents and hypotension  Trend renal indices  Strict I/O  Daily weights  Hyperglycemia due to diabetes mellitus (HCC)  Lab Results   Component Value Date    HGBA1C 12.0 (H) 03/02/2025       Recent Labs     03/02/25  2225 03/03/25  0027 03/03/25  0206 03/03/25  0356   POCGLU 136 149* 134 110       Blood Sugar Average: Last 72 hrs:  (P) [id number]    Non-gapped metabolic acidosis w/? component of mild DKA - gluc 296, AG 13 w/bicarb 18, pH 7.2, BHB  0.99  Holding home U-500 Insulin 55 units w/breakfast, 36 units w/lunch and dinner while requiring insulin gtt  Holding home Mounjaro while here  Continue non-DKA insulin for now until consistently tolerating PO and bicarb improves  Continue Q2H fingersticks  Diabetic diet  Goal  - 180  Fall  Reported fall x3 at home without head strike  Denies any injury and/or wounds  Low threshold for trauma work-up  Fall precautions  Hypertension  Continue home Lopressor  Seizure disorder (HCC)  Continue home Keppra  Diabetic polyneuropathy associated with type 2 diabetes mellitus (HCC)  Continue home Lyrica  Gastroparesis  Previously on Reglan as OP, however does not appear to be taking currently - Continue PRN for n/v  Depression with anxiety  Continue home Celexa  Mild intermittent asthma without complication  Continue home PRN Albuterol inhaler/nebs  Aggressive pulmonary hygiene  Hypercholesteremia  Continue home statin and Fenofibrate  Iron deficiency anemia secondary to inadequate dietary iron intake  Home iron supplementation not on hospital formulary - will discuss w/patient to see if she would like to bring in from home  GERD (gastroesophageal reflux disease)  Continue home PPI and Pepcid  Vitamin D deficiency  Continue home vitamin D supplement  Renal cell carcinoma (HCC)  Is s/p R partial nephrectomy in 2017  Follows w/Nephrology as OP  Obesity (BMI 30-39.9)  Encourage healthy lifestyle modifications when appropriate    Disposition: Stepdown Level 1    ICU Core Measures     A: Assess, Prevent, and Manage Pain Has pain been assessed? Yes  Need for changes to pain regimen? No   B: Both SAT/SAT  N/A   C: Choice of Sedation RASS Goal: 0 Alert and Calm  Need for changes to sedation or analgesia regimen? No   D: Delirium CAM-ICU: Negative   E: Early Mobility  Plan for early mobility? Yes   F: Family Engagement Plan for family engagement today? Yes         Prophylaxis:  VTE VTE covered by:  heparin (porcine),  Subcutaneous, 5,000 Units at 03/02/25 2228       Stress Ulcer  covered byfamotidine (PEPCID) 20 mg tablet [[id number]] (Long-Term Med), famotidine (PEPCID) 40 MG tablet [[id number]] (Long-Term Med), famotidine (PEPCID) tablet 20 mg [[id number]], pantoprazole (PROTONIX) 40 mg tablet [[id number]] (Long-Term Med), pantoprazole (PROTONIX) EC tablet 40 mg [[id number]]         24 Hour Events :     Isolyte gtt stopped 2/2 hyperchloremia - remains on D5LR infusion  No other acute events overnight      Subjective   Review of Systems: Review of Systems   Constitutional:  Negative for chills and fever.   HENT:  Negative for congestion.    Eyes:  Negative for visual disturbance.   Respiratory:  Negative for shortness of breath.    Cardiovascular:  Negative for chest pain.   Gastrointestinal:  Positive for abdominal pain, diarrhea and nausea.   Genitourinary:  Negative for difficulty urinating.   Musculoskeletal:  Negative for back pain.   Neurological:  Negative for dizziness, light-headedness and headaches.   Psychiatric/Behavioral:  Negative for confusion.        Objective :                   Vitals I/O      Most Recent Min/Max in 24hrs   Temp (!) 96.5 °F (35.8 °C) Temp  Min: 96.5 °F (35.8 °C)  Max: 98.3 °F (36.8 °C)   Pulse 77 Pulse  Min: 72  Max: 93   Resp (!) 28 Resp  Min: 13  Max: 28   /59 BP  Min: 100/62  Max: 125/85   O2 Sat 94 % SpO2  Min: 94 %  Max: 99 %      Intake/Output Summary (Last 24 hours) at 3/3/2025 0412  Last data filed at 3/3/2025 0028  Gross per 24 hour   Intake 2041.13 ml   Output 1400 ml   Net 641.13 ml       Diet Ricardo/CHO Controlled; Consistent Carbohydrate Diet Level 2 (5 carb servings/75 grams CHO/meal)    Invasive Monitoring           Physical Exam   Physical Exam  Vitals and nursing note reviewed.   Eyes:      Extraocular Movements: Extraocular movements intact.      Pupils: Pupils are equal, round, and reactive to light.   Skin:     General: Skin is warm and dry.      Capillary Refill: Capillary  refill takes less than 2 seconds.      Coloration: Skin is pale.   Cardiovascular:      Rate and Rhythm: Normal rate and regular rhythm.      Pulses: Normal pulses.      Heart sounds: Normal heart sounds.   Musculoskeletal:      Right lower leg: Trace Edema present.      Left lower leg: Trace Edema present.   Abdominal: General: Bowel sounds are increased. There is no distension.      Palpations: Abdomen is soft.      Tenderness: There is abdominal tenderness.   Constitutional:       General: She is awake. She is not in acute distress.  Pulmonary:      Effort: Pulmonary effort is normal.      Breath sounds: Decreased breath sounds present.   Psychiatric:         Behavior: Behavior is cooperative.   Neurological:      General: No focal deficit present.      Mental Status: She is alert and oriented to person, place and time.          Diagnostic Studies        Lab Results: I have reviewed the following results:     Medications:  Scheduled PRN   aspirin, 81 mg, Daily  atorvastatin, 80 mg, HS  chlorhexidine, 15 mL, Q12H HOLDEN  cholecalciferol, 2,000 Units, Daily  citalopram, 40 mg, Daily  famotidine, 20 mg, HS  fenofibrate, 145 mg, Daily  heparin (porcine), 5,000 Units, Q8H HOLDEN  levETIRAcetam, 500 mg, BID  metoprolol tartrate, 25 mg, BID  multivitamin stress formula, 1 tablet, QAM  pantoprazole, 40 mg, BID  pregabalin, 100 mg, TID      acetaminophen, 650 mg, Q6H PRN  albuterol, 1 puff, Q6H PRN  albuterol, 2.5 mg, Q6H PRN  metoclopramide, 10 mg, Q6H PRN       Continuous    dextrose 5% lactated ringer's, 75 mL/hr, Last Rate: 75 mL/hr (03/02/25 1526)  insulin regular (HumuLIN R,NovoLIN R) 1 Units/mL in sodium chloride 0.9 % 100 mL infusion, 0.3-21 Units/hr, Last Rate: 1.5 Units/hr (03/03/25 0407)         Labs:   CBC    Recent Labs     03/02/25  0750 03/02/25  0856   WBC 7.90  --    HGB 13.9  --    HCT 44.4  --     343     BMP    Recent Labs     03/02/25  1413 03/02/25 2006   SODIUM 138 139   K 3.8 3.7   * 116*    CO2 17* 17*   AGAP 8 6   BUN 51* 37*   CREATININE 1.51* 1.16   CALCIUM 8.0* 7.1*       Coags    No recent results     Additional Electrolytes  Recent Labs     03/02/25  1413 03/02/25 2006 03/02/25  2232   MG 2.3 1.9  --    PHOS 2.7 1.7*  --    CAIONIZED 1.18  --  1.13          Blood Gas    No recent results  Recent Labs     03/02/25  1413   PHVEN 7.256*   [id number] 38.1*   PO2VEN 53.6*   YGP7GAR 16.6*   BEVEN -9.8   [id number] 83.5*    LFTs  Recent Labs     03/02/25  0750   ALT 21   AST 14   ALKPHOS 68   ALB 4.3   TBILI 0.52       Infectious  No recent results  Glucose  Recent Labs     03/02/25  0750 03/02/25  0904 03/02/25  1413 03/02/25 2006   GLUC 296* 286* 97 129

## 2025-03-03 NOTE — ASSESSMENT & PLAN NOTE
Lab Results   Component Value Date    HGBA1C 12.0 (H) 03/02/2025       Recent Labs     03/03/25  0732 03/03/25  1700 03/03/25  2133 03/04/25  0139   POCGLU 104 222* 211* 128       Blood Sugar Average: Last 72 hrs:  (P) [id number]    Non-gapped metabolic acidosis w/? component of mild DKA - gluc 296, AG 13 w/bicarb 18, pH 7.2, BHB 0.99  Holding home U-500 Insulin 55 units w/breakfast, 36 units w/lunch and dinner while requiring insulin gtt  Holding home Mounjaro while here  Continue non-DKA insulin for now until consistently tolerating PO and bicarb improves  Endocrine consult   Continue Q2H fingersticks  Diabetic diet  Goal  - 180

## 2025-03-03 NOTE — NUTRITION
03/03/25 1339   Biochemical Data,Medical Tests, and Procedures   Biochemical Data/Medical Tests/Procedures Lab values reviewed;Meds reviewed   Labs (Comment) 3/3/2025 chloride 114, BUN 32, calcium 7.8, AST 11, phosphorus 1.7   Meds (Comment) Atorvastatin, vitamin D3, Pepcid, heparin, insulin, Keppra, Lopressor, MVI, Protonix, D5 and lactated Ringer's infusion   Nutrition-Focused Physical Exam   Nutrition-Focused Physical Exam Findings RN skin assessment reviewed;No edema documented;No skin issues documented   Medical-Related Concerns gastroparesis, type 2 diabetes, iron deficiency anemia, GERD, vitamin D deficiency, renal cell carcinoma, obesity, lower extremity edema, IBS   Adequacy of Intake   Nutrition Modality PO   Feeding Route   PO Independent   Current PO Intake   Current Diet Order CCD 2 diet, thin liquids   Current Meal Intake 50-75%   Estimated calorie intake compared to estimated need Nutrient needs are met over time   PES Statement   Problem Behavioral-Environmental   Knowledge and Beliefs (1) Food- and nutrition-related knowledge deficit NB-1.1   Related to Other (Comment)  (Management of type 2 diabetes)   As evidenced by: Per patient/family interview;Abnormal lab (Specify)  (Hemoglobin A1c 12)   Recommendations/Interventions   Malnutrition/BMI Present No   Summary Hemoglobin A1c 12.  Presents with vomiting, diarrhea, abdominal discomfort.  Past medical history significant for gastroparesis, type 2 diabetes, iron deficiency anemia, GERD, vitamin D deficiency, renal cell carcinoma, obesity, lower extremity edema, IBS.  Weight history reviewed.  No significant changes.  No edema.  No pressure areas.  Prescribed a CCD 2 diet, thin liquids.  Meal completion 50%.  Patient reports having not much of an appetite. Usually has 3 meals daily with snacks. Follows no special diet. NKFA. Endorses difficulty chewing with no teeth.  Utilizes liquid wash as needed.  Avoids dry and hard foods.  Patient and her  " cook and grocery shop.  Reports intentional weight loss.  States her blood sugar has been high for a couple weeks with stress and feeling sick.  Takes insulin and PO diabetes medications as prescribed. RD provided and discussed \"Carbohydrate Counting for People with Diabetes,\" \"Diabetes Label Reading Tips,\" \"Plate Method for Diabetes\" handouts. Discussed carbohydrate counting as related to current diet prescription; 1 carbohydrate serving = 15 g carbohydrates. Discussed what 15 g carbohydrate looks like. Discussed nutrition label reading; serving size and total carbohydrate. Discussed meal planning; ½ plate non-starchy carbohydrate foods, ¼ plate carbohydrate-rich foods, ¼ protein foods. Discouraged intake of sugar-sweetened beverages. Encouraged intake of water or non-sugar sweetened beverage options. Pt verbalizes understanding to all. RD to follow and address education needs PRN.   Interventions/Recommendations Continue current diet order   Education Assessment   Education Education not indicated at this time   Education Notes RD provided and discussed \"Carbohydrate Counting for People with Diabetes,\" \"Diabetes Label Reading Tips,\" \"Plate Method for Diabetes\" handouts. Discussed carbohydrate counting as related to current diet prescription; 1 carbohydrate serving = 15 g carbohydrates. Discussed what 15 g carbohydrate looks like. Discussed nutrition label reading; serving size and total carbohydrate. Discussed meal planning; ½ plate non-starchy carbohydrate foods, ¼ plate carbohydrate-rich foods, ¼ protein foods. Discouraged intake of sugar-sweetened beverages. Encouraged intake of water or non-sugar sweetened beverage options. Pt verbalizes understanding to all. RD to follow and address education needs PRN.   Patient Nutrition Goals   Goal Comprehend education;Improve to healthful diet;Glucose WNL   Goal Status Initiated   Timeframe to complete goal by next f/u   Nutrition Complexity Risk   Nutrition " complexity level Low risk   Follow up date 03/10/25

## 2025-03-03 NOTE — ASSESSMENT & PLAN NOTE
Lab Results   Component Value Date    EGFR 54 03/03/2025    EGFR 55 03/03/2025    EGFR 53 03/02/2025    CREATININE 1.15 03/03/2025    CREATININE 1.12 03/03/2025    CREATININE 1.16 03/02/2025     Creatinine 2.26 on admission  Baseline creatinine appears to be around 0.9 - 1.1  Suspect this is 2/2 severe dehydration in setting of GI illness w/metabolic acidosis as noted above  Avoid nephrotoxic agents and hypotension  Trend renal indices  Strict I/O  Daily weights

## 2025-03-03 NOTE — PLAN OF CARE
Problem: PAIN - ADULT  Goal: Verbalizes/displays adequate comfort level or baseline comfort level  Description: Interventions:  - Encourage patient to monitor pain and request assistance  - Assess pain using appropriate pain scale  - Administer analgesics based on type and severity of pain and evaluate response  - Implement non-pharmacological measures as appropriate and evaluate response  - Consider cultural and social influences on pain and pain management  - Notify physician/advanced practitioner if interventions unsuccessful or patient reports new pain  Outcome: Progressing     Problem: INFECTION - ADULT  Goal: Absence or prevention of progression during hospitalization  Description: INTERVENTIONS:  - Assess and monitor for signs and symptoms of infection  - Monitor lab/diagnostic results  - Monitor all insertion sites, i.e. indwelling lines, tubes, and drains  - Monitor endotracheal if appropriate and nasal secretions for changes in amount and color  - [place] appropriate cooling/warming therapies per order  - Administer medications as ordered  - Instruct and encourage patient and family to use good hand hygiene technique  - Identify and instruct in appropriate isolation precautions for identified infection/condition  Outcome: Progressing     Problem: SAFETY ADULT  Goal: Patient will remain free of falls  Description: INTERVENTIONS:  - Educate patient/family on patient safety including physical limitations  - Instruct patient to call for assistance with activity   - Consult OT/PT to assist with strengthening/mobility   - Keep Call bell within reach  - Keep bed low and locked with side rails adjusted as appropriate  - Keep care items and personal belongings within reach  - Initiate and maintain comfort rounds  - Make Fall Risk Sign visible to staff  - Offer Toileting every 2 Hours, in advance of need  - Initiate/Maintain bed alarm  - Obtain necessary fall risk management equipment:   - Apply yellow socks and  bracelet for high fall risk patients  - Consider moving patient to room near nurses station  Outcome: Progressing  Goal: Maintain or return to baseline ADL function  Description: INTERVENTIONS:  -  Assess patient's ability to carry out ADLs; assess patient's baseline for ADL function and identify physical deficits which impact ability to perform ADLs (bathing, care of mouth/teeth, toileting, grooming, dressing, etc.)  - Assess/evaluate cause of self-care deficits   - Assess range of motion  - Assess patient's mobility; develop plan if impaired  - Assess patient's need for assistive devices and provide as appropriate  - Encourage maximum independence but intervene and supervise when necessary  - Involve family in performance of ADLs  - Assess for home care needs following discharge   - Consider OT consult to assist with ADL evaluation and planning for discharge  - Provide patient education as appropriate  Outcome: Progressing  Goal: Maintains/Returns to pre admission functional level  Description: INTERVENTIONS:  - Perform AM-PAC 6 Click Basic Mobility/ Daily Activity assessment daily.  - Set and communicate daily mobility goal to care team and patient/family/caregiver.   - Collaborate with rehabilitation services on mobility goals if consulted  - Perform Range of Motion 3 times a day.  - Reposition patient every 2 hours.  - Dangle patient 3 times a day  - Stand patient 3 times a day  - Ambulate patient 3 times a day  - Out of bed to chair 3 times a day   - Out of bed for meals 3 times a day  - Out of bed for toileting  - Record patient progress and toleration of activity level   Outcome: Progressing     Problem: DISCHARGE PLANNING  Goal: Discharge to home or other facility with appropriate resources  Description: INTERVENTIONS:  - Identify barriers to discharge w/patient and caregiver  - Arrange for needed discharge resources and transportation as appropriate  - Identify discharge learning needs (meds, wound care,  etc.)  - Arrange for interpretive services to assist at discharge as needed  - Refer to Case Management Department for coordinating discharge planning if the patient needs post-hospital services based on physician/advanced practitioner order or complex needs related to functional status, cognitive ability, or social support system  Outcome: Progressing     Problem: Knowledge Deficit  Goal: Patient/family/caregiver demonstrates understanding of disease process, treatment plan, medications, and discharge instructions  Description: Complete learning assessment and assess knowledge base.  Interventions:  - Provide teaching at level of understanding  - Provide teaching via preferred learning methods  Outcome: Progressing     Problem: GASTROINTESTINAL - ADULT  Goal: Minimal or absence of nausea and/or vomiting  Description: INTERVENTIONS:  - Administer IV fluids if ordered to ensure adequate hydration  - Maintain NPO status until nausea and vomiting are resolved  - Nasogastric tube if ordered  - Administer ordered antiemetic medications as needed  - Provide nonpharmacologic comfort measures as appropriate  - Advance diet as tolerated, if ordered  - Consider nutrition services referral to assist patient with adequate nutrition and appropriate food choices  Outcome: Progressing  Goal: Maintains or returns to baseline bowel function  Description: INTERVENTIONS:  - Assess bowel function  - Encourage oral fluids to ensure adequate hydration  - Administer IV fluids if ordered to ensure adequate hydration  - Administer ordered medications as needed  - Encourage mobilization and activity  - Consider nutritional services referral to assist patient with adequate nutrition and appropriate food choices  Outcome: Progressing     Problem: SKIN/TISSUE INTEGRITY - ADULT  Goal: Skin Integrity remains intact(Skin Breakdown Prevention)  Description: Assess:  -Perform Chun assessment every shift  -Clean and moisturize skin every  shift  -Inspect skin when repositioning, toileting, and assisting with ADLS  -Assess under medical devices such as mepilex every shift  -Assess extremities for adequate circulation and sensation     Bed Management:  -Have minimal linens on bed & keep smooth, unwrinkled  -Change linens as needed when moist or perspiring  -Avoid sitting or lying in one position for more than 2 hours while in bed  -Keep HOB at 30degrees     Toileting:  -Offer bedside commode  -Assess for incontinence every shift  -Use incontinent care products after each incontinent episode such as moisture barrier    Activity:  -Mobilize patient 3 times a day  -Encourage activity and walks on unit  -Encourage or provide ROM exercises   -Turn and reposition patient every 2 Hours  -Use appropriate equipment to lift or move patient in bed  -Instruct/ Assist with weight shifting every 15 mins.  when out of bed in chair  -Consider limitation of chair time 2 hour intervals    Skin Care:  -Avoid use of baby powder, tape, friction and shearing, hot water or constrictive clothing  -Relieve pressure over bony prominences using mepilex  -Do not massage red bony areas    Next Steps:  -Teach patient strategies to minimize risks such as mobility   -Consider consults to  interdisciplinary teams such as PT  Outcome: Progressing     Problem: MUSCULOSKELETAL - ADULT  Goal: Maintain or return mobility to safest level of function  Description: INTERVENTIONS:  - Assess patient's ability to carry out ADLs; assess patient's baseline for ADL function and identify physical deficits which impact ability to perform ADLs (bathing, care of mouth/teeth, toileting, grooming, dressing, etc.)  - Assess/evaluate cause of self-care deficits   - Assess range of motion  - Assess patient's mobility  - Assess patient's need for assistive devices and provide as appropriate  - Encourage maximum independence but intervene and supervise when necessary  - Involve family in performance of  ADLs  - Assess for home care needs following discharge   - Consider OT consult to assist with ADL evaluation and planning for discharge  - Provide patient education as appropriate  Outcome: Progressing     Problem: Prexisting or High Potential for Compromised Skin Integrity  Goal: Skin integrity is maintained or improved  Description: INTERVENTIONS:  - Identify patients at risk for skin breakdown  - Assess and monitor skin integrity  - Assess and monitor nutrition and hydration status  - Monitor labs   - Assess for incontinence   - Turn and reposition patient  - Assist with mobility/ambulation  - Relieve pressure over bony prominences  - Avoid friction and shearing  - Provide appropriate hygiene as needed including keeping skin clean and dry  - Evaluate need for skin moisturizer/barrier cream  - Collaborate with interdisciplinary team   - Patient/family teaching  - Consider wound care consult   Outcome: Progressing

## 2025-03-03 NOTE — ASSESSMENT & PLAN NOTE
Home iron supplementation not on hospital formulary - will discuss w/patient to see if she would like to bring in from home

## 2025-03-03 NOTE — PLAN OF CARE
Problem: PAIN - ADULT  Goal: Verbalizes/displays adequate comfort level or baseline comfort level  Description: Interventions:  - Encourage patient to monitor pain and request assistance  - Assess pain using appropriate pain scale  - Administer analgesics based on type and severity of pain and evaluate response  - Implement non-pharmacological measures as appropriate and evaluate response  - Consider cultural and social influences on pain and pain management  - Notify physician/advanced practitioner if interventions unsuccessful or patient reports new pain  Outcome: Progressing     Problem: INFECTION - ADULT  Goal: Absence or prevention of progression during hospitalization  Description: INTERVENTIONS:  - Assess and monitor for signs and symptoms of infection  - Monitor lab/diagnostic results  - Monitor all insertion sites, i.e. indwelling lines, tubes, and drains  - Monitor endotracheal if appropriate and nasal secretions for changes in amount and color  - [place] appropriate cooling/warming therapies per order  - Administer medications as ordered  - Instruct and encourage patient and family to use good hand hygiene technique  - Identify and instruct in appropriate isolation precautions for identified infection/condition  Outcome: Progressing

## 2025-03-04 LAB
ALBUMIN SERPL BCG-MCNC: 2.8 G/DL (ref 3.5–5)
ALP SERPL-CCNC: 41 U/L (ref 34–104)
ALT SERPL W P-5'-P-CCNC: 12 U/L (ref 7–52)
ANION GAP SERPL CALCULATED.3IONS-SCNC: 4 MMOL/L (ref 4–13)
AST SERPL W P-5'-P-CCNC: 14 U/L (ref 13–39)
ATRIAL RATE: 81 BPM
ATRIAL RATE: 92 BPM
BASOPHILS # BLD AUTO: 0.04 THOUSANDS/ÂΜL (ref 0–0.1)
BASOPHILS NFR BLD AUTO: 1 % (ref 0–1)
BILIRUB SERPL-MCNC: 0.28 MG/DL (ref 0.2–1)
BUN SERPL-MCNC: 16 MG/DL (ref 5–25)
CALCIUM ALBUM COR SERPL-MCNC: 8 MG/DL (ref 8.3–10.1)
CALCIUM SERPL-MCNC: 7 MG/DL (ref 8.4–10.2)
CHLORIDE SERPL-SCNC: 115 MMOL/L (ref 96–108)
CO2 SERPL-SCNC: 22 MMOL/L (ref 21–32)
CREAT SERPL-MCNC: 0.78 MG/DL (ref 0.6–1.3)
EOSINOPHIL # BLD AUTO: 0.21 THOUSAND/ÂΜL (ref 0–0.61)
EOSINOPHIL NFR BLD AUTO: 3 % (ref 0–6)
ERYTHROCYTE [DISTWIDTH] IN BLOOD BY AUTOMATED COUNT: 14.7 % (ref 11.6–15.1)
GFR SERPL CREATININE-BSD FRML MDRD: 86 ML/MIN/1.73SQ M
GLUCOSE SERPL-MCNC: 126 MG/DL (ref 65–140)
GLUCOSE SERPL-MCNC: 128 MG/DL (ref 65–140)
GLUCOSE SERPL-MCNC: 138 MG/DL (ref 65–140)
GLUCOSE SERPL-MCNC: 215 MG/DL (ref 65–140)
GLUCOSE SERPL-MCNC: 225 MG/DL (ref 65–140)
GLUCOSE SERPL-MCNC: 239 MG/DL (ref 65–140)
HCT VFR BLD AUTO: 32.6 % (ref 34.8–46.1)
HGB BLD-MCNC: 10.4 G/DL (ref 11.5–15.4)
IMM GRANULOCYTES # BLD AUTO: 0.13 THOUSAND/UL (ref 0–0.2)
IMM GRANULOCYTES NFR BLD AUTO: 2 % (ref 0–2)
LYMPHOCYTES # BLD AUTO: 3.05 THOUSANDS/ÂΜL (ref 0.6–4.47)
LYMPHOCYTES NFR BLD AUTO: 42 % (ref 14–44)
MAGNESIUM SERPL-MCNC: 1.6 MG/DL (ref 1.9–2.7)
MCH RBC QN AUTO: 26.6 PG (ref 26.8–34.3)
MCHC RBC AUTO-ENTMCNC: 31.9 G/DL (ref 31.4–37.4)
MCV RBC AUTO: 83 FL (ref 82–98)
MONOCYTES # BLD AUTO: 0.81 THOUSAND/ÂΜL (ref 0.17–1.22)
MONOCYTES NFR BLD AUTO: 11 % (ref 4–12)
NEUTROPHILS # BLD AUTO: 3.02 THOUSANDS/ÂΜL (ref 1.85–7.62)
NEUTS SEG NFR BLD AUTO: 41 % (ref 43–75)
NRBC BLD AUTO-RTO: 0 /100 WBCS
P AXIS: 29 DEGREES
P AXIS: 52 DEGREES
PHOSPHATE SERPL-MCNC: 2.2 MG/DL (ref 2.7–4.5)
PLATELET # BLD AUTO: 289 THOUSANDS/UL (ref 149–390)
PMV BLD AUTO: 11.1 FL (ref 8.9–12.7)
POTASSIUM SERPL-SCNC: 3.8 MMOL/L (ref 3.5–5.3)
PR INTERVAL: 152 MS
PR INTERVAL: 152 MS
PROT SERPL-MCNC: 4.8 G/DL (ref 6.4–8.4)
QRS AXIS: 11 DEGREES
QRS AXIS: 9 DEGREES
QRSD INTERVAL: 80 MS
QRSD INTERVAL: 86 MS
QT INTERVAL: 386 MS
QT INTERVAL: 412 MS
QTC INTERVAL: 477 MS
QTC INTERVAL: 478 MS
RBC # BLD AUTO: 3.91 MILLION/UL (ref 3.81–5.12)
SODIUM SERPL-SCNC: 141 MMOL/L (ref 135–147)
T WAVE AXIS: 38 DEGREES
T WAVE AXIS: 43 DEGREES
VENTRICULAR RATE: 81 BPM
VENTRICULAR RATE: 92 BPM
WBC # BLD AUTO: 7.26 THOUSAND/UL (ref 4.31–10.16)

## 2025-03-04 PROCEDURE — 99222 1ST HOSP IP/OBS MODERATE 55: CPT | Performed by: STUDENT IN AN ORGANIZED HEALTH CARE EDUCATION/TRAINING PROGRAM

## 2025-03-04 PROCEDURE — 82948 REAGENT STRIP/BLOOD GLUCOSE: CPT

## 2025-03-04 PROCEDURE — 85025 COMPLETE CBC W/AUTO DIFF WBC: CPT | Performed by: NURSE PRACTITIONER

## 2025-03-04 PROCEDURE — 83735 ASSAY OF MAGNESIUM: CPT | Performed by: NURSE PRACTITIONER

## 2025-03-04 PROCEDURE — 94664 DEMO&/EVAL PT USE INHALER: CPT

## 2025-03-04 PROCEDURE — 80053 COMPREHEN METABOLIC PANEL: CPT | Performed by: NURSE PRACTITIONER

## 2025-03-04 PROCEDURE — 99232 SBSQ HOSP IP/OBS MODERATE 35: CPT | Performed by: INTERNAL MEDICINE

## 2025-03-04 PROCEDURE — NC001 PR NO CHARGE: Performed by: NURSE PRACTITIONER

## 2025-03-04 PROCEDURE — 93010 ELECTROCARDIOGRAM REPORT: CPT | Performed by: INTERNAL MEDICINE

## 2025-03-04 PROCEDURE — 94760 N-INVAS EAR/PLS OXIMETRY 1: CPT

## 2025-03-04 PROCEDURE — 84100 ASSAY OF PHOSPHORUS: CPT | Performed by: NURSE PRACTITIONER

## 2025-03-04 RX ORDER — INSULIN LISPRO 100 [IU]/ML
1-5 INJECTION, SOLUTION INTRAVENOUS; SUBCUTANEOUS
Status: DISCONTINUED | OUTPATIENT
Start: 2025-03-05 | End: 2025-03-05 | Stop reason: HOSPADM

## 2025-03-04 RX ORDER — INSULIN LISPRO 100 [IU]/ML
1-5 INJECTION, SOLUTION INTRAVENOUS; SUBCUTANEOUS
Status: DISCONTINUED | OUTPATIENT
Start: 2025-03-04 | End: 2025-03-05 | Stop reason: HOSPADM

## 2025-03-04 RX ORDER — INSULIN GLARGINE 100 [IU]/ML
12 INJECTION, SOLUTION SUBCUTANEOUS
Status: DISCONTINUED | OUTPATIENT
Start: 2025-03-04 | End: 2025-03-05 | Stop reason: HOSPADM

## 2025-03-04 RX ORDER — MAGNESIUM SULFATE HEPTAHYDRATE 40 MG/ML
2 INJECTION, SOLUTION INTRAVENOUS ONCE
Status: COMPLETED | OUTPATIENT
Start: 2025-03-04 | End: 2025-03-04

## 2025-03-04 RX ADMIN — B-COMPLEX W/ C & FOLIC ACID TAB 1 TABLET: TAB at 08:04

## 2025-03-04 RX ADMIN — PANTOPRAZOLE SODIUM 40 MG: 40 TABLET, DELAYED RELEASE ORAL at 17:00

## 2025-03-04 RX ADMIN — HEPARIN SODIUM 5000 UNITS: 5000 INJECTION INTRAVENOUS; SUBCUTANEOUS at 05:35

## 2025-03-04 RX ADMIN — ATORVASTATIN CALCIUM 80 MG: 80 TABLET, FILM COATED ORAL at 21:49

## 2025-03-04 RX ADMIN — METOPROLOL TARTRATE 25 MG: 25 TABLET, FILM COATED ORAL at 17:00

## 2025-03-04 RX ADMIN — INSULIN LISPRO 2 UNITS: 100 INJECTION, SOLUTION INTRAVENOUS; SUBCUTANEOUS at 21:49

## 2025-03-04 RX ADMIN — CHLORHEXIDINE GLUCONATE 15 ML: 1.2 SOLUTION ORAL at 10:49

## 2025-03-04 RX ADMIN — LEVETIRACETAM 500 MG: 500 TABLET, FILM COATED ORAL at 08:03

## 2025-03-04 RX ADMIN — ONDANSETRON 4 MG: 4 TABLET, ORALLY DISINTEGRATING ORAL at 01:43

## 2025-03-04 RX ADMIN — PREGABALIN 100 MG: 100 CAPSULE ORAL at 08:04

## 2025-03-04 RX ADMIN — HEPARIN SODIUM 5000 UNITS: 5000 INJECTION INTRAVENOUS; SUBCUTANEOUS at 15:00

## 2025-03-04 RX ADMIN — MAGNESIUM SULFATE HEPTAHYDRATE 2 G: 40 INJECTION, SOLUTION INTRAVENOUS at 07:59

## 2025-03-04 RX ADMIN — HEPARIN SODIUM 5000 UNITS: 5000 INJECTION INTRAVENOUS; SUBCUTANEOUS at 21:48

## 2025-03-04 RX ADMIN — INSULIN LISPRO 2 UNITS: 100 INJECTION, SOLUTION INTRAVENOUS; SUBCUTANEOUS at 16:59

## 2025-03-04 RX ADMIN — METOPROLOL TARTRATE 25 MG: 25 TABLET, FILM COATED ORAL at 08:04

## 2025-03-04 RX ADMIN — PREGABALIN 100 MG: 100 CAPSULE ORAL at 21:48

## 2025-03-04 RX ADMIN — FENOFIBRATE 145 MG: 145 TABLET, FILM COATED ORAL at 08:03

## 2025-03-04 RX ADMIN — FAMOTIDINE 20 MG: 20 TABLET, FILM COATED ORAL at 21:48

## 2025-03-04 RX ADMIN — INSULIN GLARGINE 12 UNITS: 100 INJECTION, SOLUTION SUBCUTANEOUS at 21:49

## 2025-03-04 RX ADMIN — CITALOPRAM HYDROBROMIDE 40 MG: 20 TABLET ORAL at 08:04

## 2025-03-04 RX ADMIN — PREGABALIN 100 MG: 100 CAPSULE ORAL at 16:59

## 2025-03-04 RX ADMIN — CHLORHEXIDINE GLUCONATE 15 ML: 1.2 SOLUTION ORAL at 21:49

## 2025-03-04 RX ADMIN — CHOLECALCIFEROL TAB 25 MCG (1000 UNIT) 2000 UNITS: 25 TAB at 08:04

## 2025-03-04 RX ADMIN — ASPIRIN 81 MG: 81 TABLET, COATED ORAL at 08:04

## 2025-03-04 RX ADMIN — PANTOPRAZOLE SODIUM 40 MG: 40 TABLET, DELAYED RELEASE ORAL at 08:04

## 2025-03-04 RX ADMIN — INSULIN LISPRO 2 UNITS: 100 INJECTION, SOLUTION INTRAVENOUS; SUBCUTANEOUS at 11:53

## 2025-03-04 RX ADMIN — LEVETIRACETAM 500 MG: 500 TABLET, FILM COATED ORAL at 17:00

## 2025-03-04 RX ADMIN — POTASSIUM & SODIUM PHOSPHATES POWDER PACK 280-160-250 MG 1 PACKET: 280-160-250 PACK at 07:59

## 2025-03-04 NOTE — CONSULTS
Consultation - Haylee Balbuena 54 y.o. female MRN: [id number]    Unit/Bed#: ICU 02-01 Encounter: [id number]    Administrative Statements   VIRTUAL CARE DOCUMENTATION:     1. This service was provided via Telemedicine using T[employer] Kit     2. Parties in the room with patient during teleconsult Patient only    3. Confidentiality My office door was closed     4. Participants No one else was in the room    5. Patient acknowledged consent and understanding of privacy and security of the  Telemedicine consult. I informed the patient that I have reviewed their record in Epic and presented the opportunity for them to ask any questions regarding the visit today.  The patient agreed to participate.    6. I have spent a total time of 40 minutes in caring for this patient on the day of the visit/encounter including Diagnostic results, Prognosis, Risks and benefits of tx options, Instructions for management, Patient and family education, Importance of tx compliance, Risk factor reductions, Impressions, Counseling / Coordination of care, Documenting in the medical record, Reviewing/placing orders in the medical record (including tests, medications, and/or procedures), and Obtaining or reviewing history  , not including the time spent for establishing the audio/video connection.         Assessment & Plan     Type 2 diabetes mellitus with diabetic autonomic neuropathy, with long-term current use of insulin (HCC)  Assessment & Plan  Lab Results   Component Value Date    HGBA1C 12.0 (H) 03/02/2025       Recent Labs     03/04/25  0139 03/04/25  0758 03/04/25  1133 03/04/25  1655   POCGLU 128 138 225* 215*       Blood Sugar Average: Last 72 hrs:  (P) 163    Poorly controlled diabetes with A1C of 12.0% which complicated by hyperglycemia and normal anion gap metabolic acidosis with mild elevated BHB, ? Mild DKA vs metabolic acidosis in setting of LINDY.  Home regimen: u500 55 units before bf, 36 units before lunch and dinner.  Anupama  25 mg daily and Mounjaro 10 mg weekly.  Received IV insulin and currently on just SSI with minimal daily requirement, although blood sugars started rising as her appetite improved, I started low dose basal insulin lantus 12 units qhs and continue SSI for now.  Continue to monitor blood sugar over time and make adjustments to the regimen if necessary.  Endocrinology will follow.  On discharge annamarie Tse          CC: Diabetes Consult    History of Present Illness     HPI: Haylee Balbuena is a 54 y.o. year old female with type 2 diabetes, hypertension, hyperlipidemia who presented for nausea and vomiting for last couple of days.  Patient was found to have hyperglycemia of 494 mg/dL, with anion gap of 8 and bicarb of 18.    She reports feeling sick with nausea and vomiting along with diarrhea, for which she did not take her insulin,   She follows with [facility] [place] office and last visit was on 2/4/2025 by Dr Washington.  As per last note, home regimen consists of u500 55 units before bf, 36 units before lunch and dinner.  Jardiane 25 mg daily and Mounjaro 10 mg weekly( last injection was last Sunday).    She received IV insulin and currently on just SSI.    Inpatient consult to Endocrinology  Consult performed by: Gabe Hui MD  Consult ordered by: SYLWIA Andujar          Review of Systems   Constitutional:  Positive for appetite change and fatigue.   Gastrointestinal:  Negative for diarrhea.   Endocrine: Negative for polydipsia and polyuria.       Historical Information   Past Medical History:   Diagnosis Date    Atypical chest pain     Bilateral calf pain 03/05/2024    Diabetes mellitus (HCC)     Gastroparesis     GERD (gastroesophageal reflux disease)     Hyperlipidemia     Hypertension     MRSA bacteremia 08/01/2023    Psychiatric disorder     Sciatica     Seizure disorder (HCC)      Past Surgical History:   Procedure Laterality Date    APPENDECTOMY      BREAST BIOPSY Left  benign      SECTION      CHOLECYSTECTOMY      COLONOSCOPY      HYSTERECTOMY      IR PICC PLACEMENT SINGLE LUMEN  2023    OH LAPAROSCOPIC APPENDECTOMY N/A 2021    Procedure: APPENDECTOMY LAPAROSCOPIC;  Surgeon: Onel Martin MD;  Location:  MAIN OR;  Service: General    OH LAPS ABD PRTM&OMENTUM DX W/WO SPEC BR/WA SPX N/A 2021    Procedure: LAPAROSCOPY DIAGNOSTIC, EXTENSIVE DESI;  Surgeon: Onel Martin MD;  Location:  MAIN OR;  Service: General    TUBAL LIGATION      TYMPANOSTOMY TUBE PLACEMENT Bilateral 2024    UPPER GASTROINTESTINAL ENDOSCOPY       Social History   Social History     Substance and Sexual Activity   Alcohol Use Never    Comment: occ     Social History     Substance and Sexual Activity   Drug Use Never     Social History     Tobacco Use   Smoking Status Former    Current packs/day: 0.00    Types: Cigarettes    Quit date:     Years since quittin.1   Smokeless Tobacco Never     Family History:   Family History   Problem Relation Age of Onset    No Known Problems Mother     No Known Problems Father     No Known Problems Daughter     No Known Problems Maternal Grandmother     No Known Problems Paternal Grandmother     No Known Problems Paternal Aunt     No Known Problems Paternal Aunt     No Known Problems Paternal Aunt        Meds/Allergies   Current Facility-Administered Medications   Medication Dose Route Frequency Provider Last Rate Last Admin    acetaminophen (TYLENOL) tablet 650 mg  650 mg Oral Q6H PRN XIMENA BarrigaNP   650 mg at 25    albuterol (PROVENTIL HFA,VENTOLIN HFA) inhaler 1 puff  1 puff Inhalation Q6H PRN SYLWIA Barriga        albuterol inhalation solution 2.5 mg  2.5 mg Nebulization Q6H PRN SYLWIA Barriga        aspirin (ECOTRIN LOW STRENGTH) EC tablet 81 mg  81 mg Oral Daily XIMENA RothmanNP   81 mg at 25 0804    atorvastatin (LIPITOR) tablet 80 mg  80 mg Oral HS SYLWIA Barriga   80 mg  at 03/03/25 2122    chlorhexidine (PERIDEX) 0.12 % oral rinse 15 mL  15 mL [facility]Throat Q12H [facility] SYLWIA Barriga   15 mL at 03/04/25 1049    Cholecalciferol (VITAMIN D3) tablet 2,000 Units  2,000 Units Oral Daily SYLWIA Barriga   2,000 Units at 03/04/25 0804    citalopram (CeleXA) tablet 40 mg  40 mg Oral Daily SYLWIA Barriga   40 mg at 03/04/25 0804    famotidine (PEPCID) tablet 20 mg  20 mg Oral HS SYLWIA Rothman   20 mg at 03/03/25 2122    fenofibrate (TRICOR) tablet 145 mg  145 mg Oral Daily SYLWIA Rothman   145 mg at 03/04/25 0803    heparin (porcine) subcutaneous injection 5,000 Units  5,000 Units Subcutaneous Q8H [facility] SYLWIA Barriga   5,000 Units at 03/04/25 1500    insulin lispro (HumALOG/ADMELOG) 100 units/mL subcutaneous injection 1-6 Units  1-6 Units Subcutaneous TID AC SYLWIA Andujar   2 Units at 03/04/25 1153    insulin lispro (HumALOG/ADMELOG) 100 units/mL subcutaneous injection 1-6 Units  1-6 Units Subcutaneous HS SYLWIA Andujar   3 Units at 03/03/25 2133    levETIRAcetam (KEPPRA) tablet 500 mg  500 mg Oral BID SYLWIA Barriga   500 mg at 03/04/25 0803    metoclopramide (REGLAN) injection 10 mg  10 mg Intravenous Q6H PRN SYLWIA Barriga        metoprolol tartrate (LOPRESSOR) tablet 25 mg  25 mg Oral BID SYLWIA Barriga   25 mg at 03/04/25 0804    multivitamin stress formula tablet 1 tablet  1 tablet Oral QAM SYLWIA Barriga   1 tablet at 03/04/25 0804    ondansetron (ZOFRAN-ODT) dispersible tablet 4 mg  4 mg Oral Q6H PRN SYLWIA Rothman   4 mg at 03/04/25 0143    pantoprazole (PROTONIX) EC tablet 40 mg  40 mg Oral BID SYLWIA Barriga   40 mg at 03/04/25 0804    pregabalin (LYRICA) capsule 100 mg  100 mg Oral TID SYLWIA Barriga   100 mg at 03/04/25 0804    simethicone (MYLICON) oral suspension 40 mg  40 mg Oral Q6H PRN Zarina A [place], CRNP        witch hazel-glycerin  "(TUCKS) topical pad 1 Pad  1 Pad Topical Q4H PRN Zarina Martinez, CRNP   1 Pad at 03/03/25 2128     Allergies   Allergen Reactions    Butorphanol Hallucinations    Toradol [Ketorolac Tromethamine] GI Intolerance    Oxycodone GI Intolerance    Benztropine Hallucinations     Was seeing things that were not right    Penicillins     Zolpidem     Medical Tape Rash     Ok with paper tape       Objective   Vitals: Blood pressure 137/63, pulse 66, temperature 98.5 °F (36.9 °C), temperature source Temporal, resp. rate (!) 26, height 5' 2\" (1.575 m), weight 82.8 kg (182 lb 8.7 oz), SpO2 96%, not currently breastfeeding.    Intake/Output Summary (Last 24 hours) at 3/4/2025 1635  Last data filed at 3/4/2025 1301  Gross per 24 hour   Intake 1260 ml   Output --   Net 1260 ml     Invasive Devices       Peripheral Intravenous Line  Duration             Peripheral IV 03/02/25 Right Antecubital 2 days    Peripheral IV 03/02/25 Right;Ventral (anterior) Forearm 1 day                    Physical Exam  Constitutional:       General: She is not in acute distress.     Appearance: Normal appearance. She is not ill-appearing, toxic-appearing or diaphoretic.   HENT:      Head: Normocephalic and atraumatic.   Eyes:      Extraocular Movements: Extraocular movements intact.   Pulmonary:      Effort: Pulmonary effort is normal. No respiratory distress.   Neurological:      Mental Status: She is alert and oriented to person, place, and time.         The history was obtained from the review of the chart, patient.    Lab Results:   Results from last 7 days   Lab Units 03/02/25  0856   HEMOGLOBIN A1C % 12.0*     Lab Results   Component Value Date    WBC 7.26 03/04/2025    HGB 10.4 (L) 03/04/2025    HCT 32.6 (L) 03/04/2025    MCV 83 03/04/2025     03/04/2025     Lab Results   Component Value Date/Time    BUN 16 03/04/2025 04:52 AM    BUN 27 (H) 02/22/2021 10:37 AM    K 3.8 03/04/2025 04:52 AM    K 4.3 02/22/2021 10:37 AM     (H) " "03/04/2025 04:52 AM     02/22/2021 10:37 AM    CO2 22 03/04/2025 04:52 AM    CO2 28 02/22/2021 10:37 AM    CREATININE 0.78 03/04/2025 04:52 AM    CREATININE 0.99 02/22/2021 10:37 AM    AST 14 03/04/2025 04:52 AM    AST 18 08/10/2020 08:31 AM    ALT 12 03/04/2025 04:52 AM    ALT 29 08/10/2020 08:31 AM    TP 4.8 (L) 03/04/2025 04:52 AM    TP 6.7 08/10/2020 08:31 AM    ALB 2.8 (L) 03/04/2025 04:52 AM    ALB 3.4 (L) 08/10/2020 08:31 AM     No results for input(s): \"CHOL\", \"HDL\", \"LDL\", \"TRIG\", \"VLDL\" in the last 72 hours.  No results found for: \"MICROALBUR\", \"SSLR03KTZ\"  POC Glucose (mg/dl)   Date Value   03/04/2025 225 (H)   03/04/2025 138   03/04/2025 128   03/03/2025 211 (H)   03/03/2025 222 (H)   03/03/2025 104   03/03/2025 72   03/03/2025 110   03/03/2025 134   03/03/2025 149 (H)       Imaging Studies: Results Review Statement: No pertinent imaging studies reviewed.    Portions of the record may have been created with voice recognition software.    "

## 2025-03-04 NOTE — PLAN OF CARE
Problem: PAIN - ADULT  Goal: Verbalizes/displays adequate comfort level or baseline comfort level  Description: Interventions:  - Encourage patient to monitor pain and request assistance  - Assess pain using appropriate pain scale  - Administer analgesics based on type and severity of pain and evaluate response  - Implement non-pharmacological measures as appropriate and evaluate response  - Consider cultural and social influences on pain and pain management  - Notify physician/advanced practitioner if interventions unsuccessful or patient reports new pain  Outcome: Progressing     Problem: INFECTION - ADULT  Goal: Absence or prevention of progression during hospitalization  Description: INTERVENTIONS:  - Assess and monitor for signs and symptoms of infection  - Monitor lab/diagnostic results  - Monitor all insertion sites, i.e. indwelling lines, tubes, and drains  - Monitor endotracheal if appropriate and nasal secretions for changes in amount and color  - [place] appropriate cooling/warming therapies per order  - Administer medications as ordered  - Instruct and encourage patient and family to use good hand hygiene technique  - Identify and instruct in appropriate isolation precautions for identified infection/condition  Outcome: Progressing

## 2025-03-04 NOTE — NURSING NOTE
Called to the room by patient states when she closes her close everything starts spinning.  /54. States she feel nauseous at time

## 2025-03-04 NOTE — PROGRESS NOTES
Progress Note - Critical Care/ICU   Name: Haylee Balbuena 54 y.o. female I MRN: [id number]  Unit/Bed#: ICU 02-01 I Date of Admission: 3/2/2025   Date of Service: 3/4/2025 I Hospital Day: 2      Assessment & Plan  Nausea, vomiting, and diarrhea  Reports n/v/d x3D   Possibly initially in setting of GI illness followed by ? mild DKA vs dehydration/metabolic acidosis  Enteric stool panel and CDIF pending  Continue w/aggressive volume resuscitation  PRN antiemetics for n/v  Diabetic diet as tolerated  Metabolic acidosis  AG normal, but bicarb remains low  Suspect this is multifactorial in setting of dehydration in setting of GI illness vs mild DKA vs bicarb losses in setting of diarrhea vs hyperchloremia  LA WNL  Continue w/aggressive volume resuscitation, however limit chloride containing solutions  Continue dextrose-containing infusion until able to consistently tolerate PO intake  Continue non-DKA insulin gtt for now  Monitor for worsening acidosis     Acute renal failure superimposed on stage 3a chronic kidney disease (HCC)  Lab Results   Component Value Date    EGFR 54 03/03/2025    EGFR 55 03/03/2025    EGFR 53 03/02/2025    CREATININE 1.15 03/03/2025    CREATININE 1.12 03/03/2025    CREATININE 1.16 03/02/2025     Creatinine 2.26 on admission  Baseline creatinine appears to be around 0.9 - 1.1  Suspect this is 2/2 severe dehydration in setting of GI illness w/metabolic acidosis as noted above  Avoid nephrotoxic agents and hypotension  Trend renal indices  Strict I/O  Daily weights  Hyperglycemia due to diabetes mellitus (HCC)  Lab Results   Component Value Date    HGBA1C 12.0 (H) 03/02/2025       Recent Labs     03/03/25  0732 03/03/25  1700 03/03/25  2133 03/04/25  0139   POCGLU 104 222* 211* 128       Blood Sugar Average: Last 72 hrs:  (P) 158.6207708736075678    Non-gapped metabolic acidosis w/? component of mild DKA - gluc 296, AG 13 w/bicarb 18, pH 7.2, BHB 0.99  Holding home U-500 Insulin 55 units  w/breakfast, 36 units w/lunch and dinner while requiring insulin gtt  Holding home Mounjaro while here  Continue non-DKA insulin for now until consistently tolerating PO and bicarb improves  Endocrine consult   Continue Q2H fingersticks  Diabetic diet  Goal  - 180  Fall  Reported fall x3 at home without head strike  Denies any injury and/or wounds  Low threshold for trauma work-up  Fall precautions  Hypertension  Continue home Lopressor  Seizure disorder (HCC)  Continue home Keppra  Diabetic polyneuropathy associated with type 2 diabetes mellitus (HCC)  Continue home Lyrica  Gastroparesis  Previously on Reglan as OP, however does not appear to be taking currently - Continue PRN for n/v  Depression with anxiety  Continue home Celexa  Mild intermittent asthma without complication  Continue home PRN Albuterol inhaler/nebs  Aggressive pulmonary hygiene  Hypercholesteremia  Continue home statin and Fenofibrate  Iron deficiency anemia secondary to inadequate dietary iron intake  Home iron supplementation not on hospital formulary - patient likely being discharged today  GERD (gastroesophageal reflux disease)  Continue home PPI and Pepcid  Vitamin D deficiency  Continue home vitamin D supplement  Renal cell carcinoma (HCC)  Is s/p R partial nephrectomy in 2017  Follows w/Nephrology as OP  Obesity (BMI 30-39.9)  Encourage healthy lifestyle modifications when appropriate  Disposition: Stepdown Level 2    ICU Core Measures     A: Assess, Prevent, and Manage Pain Has pain been assessed? Yes  Need for changes to pain regimen? NA   B: Both SAT/SAT  N/A   C: Choice of Sedation RASS Goal: N/A patient not on sedation  Need for changes to sedation or analgesia regimen? NA   D: Delirium CAM-ICU: Negative   E: Early Mobility  Plan for early mobility? Yes   F: Family Engagement Plan for family engagement today? Yes         Prophylaxis:  VTE VTE covered by:  heparin (porcine), Subcutaneous, 5,000 Units at 03/03/25 2122        Stress Ulcer  covered byfamotidine (PEPCID) 20 mg tablet [[id number]] (Long-Term Med), famotidine (PEPCID) 40 MG tablet [[id number]] (Long-Term Med), famotidine (PEPCID) tablet 20 mg [[id number]], pantoprazole (PROTONIX) 40 mg tablet [[id number]] (Long-Term Med), pantoprazole (PROTONIX) EC tablet 40 mg [[id number]]         24 Hour Events : Tolerated diet last evening. Remains on non dka insulin infusion.  Subjective   Review of Systems: Review of Systems   Constitutional:  Negative for fatigue and fever.   HENT:  Negative for sore throat.    Respiratory:  Negative for cough and shortness of breath.    Cardiovascular:  Negative for chest pain, palpitations and leg swelling.   Gastrointestinal:  Negative for constipation, diarrhea, nausea and vomiting.        Abdominal cramping   Musculoskeletal:  Negative for arthralgias and myalgias.   Skin:  Negative for color change.   Neurological:  Negative for dizziness, seizures and weakness.   Psychiatric/Behavioral:  The patient is not nervous/anxious.    All other systems reviewed and are negative.      Objective :                   Vitals I/O      Most Recent Min/Max in 24hrs   Temp 98 °F (36.7 °C) Temp  Min: 97 °F (36.1 °C)  Max: 98 °F (36.7 °C)   Pulse 68 Pulse  Min: 65  Max: 82   Resp (!) 5 Resp  Min: 5  Max: 24   /54 BP  Min: 104/54  Max: 148/75   O2 Sat 96 % SpO2  Min: 93 %  Max: 100 %      Intake/Output Summary (Last 24 hours) at 3/4/2025 0353  Last data filed at 3/4/2025 0000  Gross per 24 hour   Intake 2485 ml   Output 1950 ml   Net 535 ml       Diet Ricardo/CHO Controlled; Consistent Carbohydrate Diet Level 2 (5 carb servings/75 grams CHO/meal)    Invasive Monitoring           Physical Exam   Physical Exam  Eyes:      Extraocular Movements: Extraocular movements intact.      Pupils: Pupils are equal, round, and reactive to light.   Skin:     General: Skin is warm and dry.   HENT:      Head: Normocephalic and atraumatic.      Mouth/Throat:      Mouth: Mucous  membranes are moist.   Cardiovascular:      Rate and Rhythm: Normal rate and regular rhythm.      Pulses: Normal pulses.   Musculoskeletal:         General: Normal range of motion.   Abdominal: General: Bowel sounds are normal. There is no distension.      Palpations: Abdomen is soft.      Tenderness: There is no abdominal tenderness.   Constitutional:       Appearance: She is not ill-appearing.   Pulmonary:      Effort: Pulmonary effort is normal. No accessory muscle usage or accessory muscle usage.      Breath sounds: No wheezing.      Comments: Decreased at edi bases  Psychiatric:         Mood and Affect: Mood normal.         Speech: Speech normal.         Behavior: Behavior normal. Behavior is cooperative.   Neurological:      GCS: GCS eye subscore is 4. GCS verbal subscore is 5. GCS motor subscore is 6.      Cranial Nerves: Cranial nerves 2-12 are intact.          Diagnostic Studies        Lab Results: I have reviewed the following results:     Medications:  Scheduled PRN   aspirin, 81 mg, Daily  atorvastatin, 80 mg, HS  chlorhexidine, 15 mL, Q12H HOLDEN  cholecalciferol, 2,000 Units, Daily  citalopram, 40 mg, Daily  famotidine, 20 mg, HS  fenofibrate, 145 mg, Daily  heparin (porcine), 5,000 Units, Q8H HOLDEN  insulin lispro, 1-6 Units, TID AC  insulin lispro, 1-6 Units, HS  levETIRAcetam, 500 mg, BID  metoprolol tartrate, 25 mg, BID  multivitamin stress formula, 1 tablet, QAM  pantoprazole, 40 mg, BID  pregabalin, 100 mg, TID      acetaminophen, 650 mg, Q6H PRN  albuterol, 1 puff, Q6H PRN  albuterol, 2.5 mg, Q6H PRN  metoclopramide, 10 mg, Q6H PRN  ondansetron, 4 mg, Q6H PRN  simethicone, 40 mg, Q6H PRN  witch hazel-glycerin, 1 Pad, Q4H PRN       Continuous          Labs:   CBC    Recent Labs     03/02/25  0750 03/02/25  0856 03/03/25  0450   WBC 7.90  --  6.85   HGB 13.9  --  10.1*   HCT 44.4  --  31.7*    343 282     BMP    Recent Labs     03/03/25  0450 03/03/25  1653   SODIUM 140 139   K 3.5 4.0   *  110*   CO2 22 23   AGAP 4 6   BUN 32* 23   CREATININE 1.12 1.15   CALCIUM 7.8* 8.1*       Coags    No recent results     Additional Electrolytes  Recent Labs     03/03/25  0450 03/03/25  1653   MG 2.0 2.0   PHOS 1.7* 3.2   CAIONIZED 1.19 1.14          Blood Gas    No recent results  Recent Labs     03/02/25  1413   PHVEN 7.256*   DXO7HRY 38.1*   PO2VEN 53.6*   [id number] 16.6*   BEVEN -9.8   A6JNNII 83.5*    LFTs  Recent Labs     03/02/25  0750 03/03/25  0450   ALT 21 13   AST 14 11*   ALKPHOS 68 43   ALB 4.3 2.9*   TBILI 0.52 0.26       Infectious  No recent results  Glucose  Recent Labs     03/02/25  1413 03/02/25  2006 03/03/25  0450 03/03/25  1653   GLUC 97 129 140 224*

## 2025-03-04 NOTE — PROGRESS NOTES
Progress Note - ICU Transfer to SD/MS tele/MS   Haylee Balbuena 54 y.o. female MRN: [id number]  [facility]   Unit[facility]Bed[facility] ICU 02-01 Encounter: [id number]    Code Status: Level 1 - Full Code  POA:    Referring Physician: Dr. Calero  Accepting Physician: Dr. Hernandez  _____________________________________________________________________    Reason for ICU/SD admission: DKA    History of Present Illness: 54 y.o. who presents with past medical history hyperlipidemia, hypertension, uncontrolled diabetes, IBS, chronic back pain, neuropathy, GERD, CKD, gastroparesis, seizure disorder.  Presents today with nausea and vomiting and poor oral intake x 3 days.  Patient reports not taking her insulin due to fear of hypoglycemia.  Patient reported 3 falls at home due to weakness. In the ED noted to be hyperglycemic with a metabolic acidosis.  Patient did not have an anion gap, but was started on DKA insulin drip with fluid resuscitation.  Patient switched to a regular insulin drip with IV fluids and until patient able to take oral.     Summary of clinical course: Once transitioned to SSI coverage she was placed on a regular diet with no issues. Complaining occasionally of abdominal pain suspected to be multifactorial due to gastroparesis and recent GI upset. No issues with n/v/d. Blood glucose is stable on SSI coverage. Awaiting Endocrinology consult. Unable to place on home SSI coverage as she takes 55 units breakfast and 65 units with lunch and dinner. Will wait for Endocrinology prior to transitioning.    Consultants: Endocrinology  _____________________________________________________________________    Diagnostic Data:  CBC:  Results from last 7 days   Lab Units 03/04/25  0452   WBC Thousand/uL 7.26   HEMOGLOBIN g/dL 10.4*   HEMATOCRIT % 32.6*   PLATELETS Thousands/uL 289      CMP:   Lab Results   Component Value Date    SODIUM 141 03/04/2025    K 3.8 03/04/2025     (H) 03/04/2025    CO2 22 03/04/2025  "   BUN 16 03/04/2025    CREATININE 0.78 03/04/2025    CALCIUM 7.0 (L) 03/04/2025    AST 14 03/04/2025    ALT 12 03/04/2025    ALKPHOS 41 03/04/2025    EGFR 86 03/04/2025   ,   PT/INR: No results found for: \"PT\", \"INR\",   Magnesium: No components found for: \"MAG\",  Phosphorous:   Lab Results   Component Value Date    PHOS 2.2 (L) 03/04/2025       ABG: No results found for: \"PHART\", \"WHK8FJP\", \"PO2ART\", \"UWE6YNZ\", \"J9EGSOXL\", \"BEART\", \"SOURCE\",     Microbiology:        Imaging: I have personally reviewed the pertinent imaging studies on the PACS system       Cardiac/EKG/telemetry/Echo:       NSR  _____________________________________________________________________    Recent or scheduled procedures:      Outstanding/pending diagnostics:       Mobilization Plan: Activity as toleration    Home medications that are not reordered and reason why:  Insulin- unclear dosage due to blood glucose    Spoke with Dr. Ceballos  regarding transfer. Please call [phone] with any questions or concerns.     Portions of the record may have been created with voice recognition software.  Occasional wrong word or \"sound a like\" substitutions may have occurred due to the inherent limitations of voice recognition software.  Read the chart carefully and recognize, using context, where substitutions have occurred.    SYLWIA Lang     "

## 2025-03-04 NOTE — PLAN OF CARE
Problem: PAIN - ADULT  Goal: Verbalizes/displays adequate comfort level or baseline comfort level  Description: Interventions:  - Encourage patient to monitor pain and request assistance  - Assess pain using appropriate pain scale  - Administer analgesics based on type and severity of pain and evaluate response  - Implement non-pharmacological measures as appropriate and evaluate response  - Consider cultural and social influences on pain and pain management  - Notify physician/advanced practitioner if interventions unsuccessful or patient reports new pain  Outcome: Progressing     Problem: INFECTION - ADULT  Goal: Absence or prevention of progression during hospitalization  Description: INTERVENTIONS:  - Assess and monitor for signs and symptoms of infection  - Monitor lab/diagnostic results  - Monitor all insertion sites, i.e. indwelling lines, tubes, and drains  - Monitor endotracheal if appropriate and nasal secretions for changes in amount and color  - Colo appropriate cooling/warming therapies per order  - Administer medications as ordered  - Instruct and encourage patient and family to use good hand hygiene technique  - Identify and instruct in appropriate isolation precautions for identified infection/condition  Outcome: Progressing     Problem: SAFETY ADULT  Goal: Patient will remain free of falls  Description: INTERVENTIONS:  - Educate patient/family on patient safety including physical limitations  - Instruct patient to call for assistance with activity   - Consult OT/PT to assist with strengthening/mobility   - Keep Call bell within reach  - Keep bed low and locked with side rails adjusted as appropriate  - Keep care items and personal belongings within reach  - Initiate and maintain comfort rounds  - Make Fall Risk Sign visible to staff  - Offer Toileting every 2 Hours, in advance of need  - Initiate/Maintain bed alarm  - Obtain necessary fall risk management equipment:   - Apply yellow socks and  bracelet for high fall risk patients  - Consider moving patient to room near nurses station  Outcome: Progressing  Goal: Maintain or return to baseline ADL function  Description: INTERVENTIONS:  -  Assess patient's ability to carry out ADLs; assess patient's baseline for ADL function and identify physical deficits which impact ability to perform ADLs (bathing, care of mouth/teeth, toileting, grooming, dressing, etc.)  - Assess/evaluate cause of self-care deficits   - Assess range of motion  - Assess patient's mobility; develop plan if impaired  - Assess patient's need for assistive devices and provide as appropriate  - Encourage maximum independence but intervene and supervise when necessary  - Involve family in performance of ADLs  - Assess for home care needs following discharge   - Consider OT consult to assist with ADL evaluation and planning for discharge  - Provide patient education as appropriate  Outcome: Progressing  Goal: Maintains/Returns to pre admission functional level  Description: INTERVENTIONS:  - Perform AM-PAC 6 Click Basic Mobility/ Daily Activity assessment daily.  - Set and communicate daily mobility goal to care team and patient/family/caregiver.   - Collaborate with rehabilitation services on mobility goals if consulted  - Perform Range of Motion 3 times a day.  - Reposition patient every 2 hours.  - Dangle patient 3 times a day  - Stand patient 3 times a day  - Ambulate patient 3 times a day  - Out of bed to chair 3 times a day   - Out of bed for meals 3 times a day  - Out of bed for toileting  - Record patient progress and toleration of activity level   Outcome: Progressing     Problem: DISCHARGE PLANNING  Goal: Discharge to home or other facility with appropriate resources  Description: INTERVENTIONS:  - Identify barriers to discharge w/patient and caregiver  - Arrange for needed discharge resources and transportation as appropriate  - Identify discharge learning needs (meds, wound care,  etc.)  - Arrange for interpretive services to assist at discharge as needed  - Refer to Case Management Department for coordinating discharge planning if the patient needs post-hospital services based on physician/advanced practitioner order or complex needs related to functional status, cognitive ability, or social support system  Outcome: Progressing     Problem: Knowledge Deficit  Goal: Patient/family/caregiver demonstrates understanding of disease process, treatment plan, medications, and discharge instructions  Description: Complete learning assessment and assess knowledge base.  Interventions:  - Provide teaching at level of understanding  - Provide teaching via preferred learning methods  Outcome: Progressing     Problem: GASTROINTESTINAL - ADULT  Goal: Minimal or absence of nausea and/or vomiting  Description: INTERVENTIONS:  - Administer IV fluids if ordered to ensure adequate hydration  - Maintain NPO status until nausea and vomiting are resolved  - Nasogastric tube if ordered  - Administer ordered antiemetic medications as needed  - Provide nonpharmacologic comfort measures as appropriate  - Advance diet as tolerated, if ordered  - Consider nutrition services referral to assist patient with adequate nutrition and appropriate food choices  Outcome: Progressing  Goal: Maintains or returns to baseline bowel function  Description: INTERVENTIONS:  - Assess bowel function  - Encourage oral fluids to ensure adequate hydration  - Administer IV fluids if ordered to ensure adequate hydration  - Administer ordered medications as needed  - Encourage mobilization and activity  - Consider nutritional services referral to assist patient with adequate nutrition and appropriate food choices  Outcome: Progressing     Problem: SKIN/TISSUE INTEGRITY - ADULT  Goal: Skin Integrity remains intact(Skin Breakdown Prevention)  Description: Assess:  -Perform Chun assessment every shift  -Clean and moisturize skin every  shift  -Inspect skin when repositioning, toileting, and assisting with ADLS  -Assess under medical devices such as mepilex every shift  -Assess extremities for adequate circulation and sensation     Bed Management:  -Have minimal linens on bed & keep smooth, unwrinkled  -Change linens as needed when moist or perspiring  -Avoid sitting or lying in one position for more than 2 hours while in bed  -Keep HOB at 30 degrees     Toileting:  -Offer bedside commode  -Assess for incontinence every 2 hours  -Use incontinent care products after each incontinent episode such as moisture barrier    Activity:  -Mobilize patient 3 times a day  -Encourage activity and walks on unit  -Encourage or provide ROM exercises   -Turn and reposition patient every 2 Hours  -Use appropriate equipment to lift or move patient in bed  -Instruct/ Assist with weight shifting every 15 mins.  when out of bed in chair  -Consider limitation of chair time 2 hour intervals    Skin Care:  -Avoid use of baby powder, tape, friction and shearing, hot water or constrictive clothing  -Relieve pressure over bony prominences using mepilex  -Do not massage red bony areas    Next Steps:  -Teach patient strategies to minimize risks such as mobility   -Consider consults to  interdisciplinary teams such as PT  Outcome: Progressing     Problem: MUSCULOSKELETAL - ADULT  Goal: Maintain or return mobility to safest level of function  Description: INTERVENTIONS:  - Assess patient's ability to carry out ADLs; assess patient's baseline for ADL function and identify physical deficits which impact ability to perform ADLs (bathing, care of mouth/teeth, toileting, grooming, dressing, etc.)  - Assess/evaluate cause of self-care deficits   - Assess range of motion  - Assess patient's mobility  - Assess patient's need for assistive devices and provide as appropriate  - Encourage maximum independence but intervene and supervise when necessary  - Involve family in performance of  ADLs  - Assess for home care needs following discharge   - Consider OT consult to assist with ADL evaluation and planning for discharge  - Provide patient education as appropriate  Outcome: Progressing     Problem: Prexisting or High Potential for Compromised Skin Integrity  Goal: Skin integrity is maintained or improved  Description: INTERVENTIONS:  - Identify patients at risk for skin breakdown  - Assess and monitor skin integrity  - Assess and monitor nutrition and hydration status  - Monitor labs   - Assess for incontinence   - Turn and reposition patient  - Assist with mobility/ambulation  - Relieve pressure over bony prominences  - Avoid friction and shearing  - Provide appropriate hygiene as needed including keeping skin clean and dry  - Evaluate need for skin moisturizer/barrier cream  - Collaborate with interdisciplinary team   - Patient/family teaching  - Consider wound care consult   Outcome: Progressing

## 2025-03-05 ENCOUNTER — TELEPHONE (OUTPATIENT)
Age: 55
End: 2025-03-05

## 2025-03-05 VITALS
BODY MASS INDEX: 34.08 KG/M2 | WEIGHT: 185.19 LBS | SYSTOLIC BLOOD PRESSURE: 128 MMHG | TEMPERATURE: 97.6 F | OXYGEN SATURATION: 90 % | RESPIRATION RATE: 18 BRPM | HEART RATE: 64 BPM | DIASTOLIC BLOOD PRESSURE: 71 MMHG | HEIGHT: 62 IN

## 2025-03-05 LAB
ANION GAP SERPL CALCULATED.3IONS-SCNC: 7 MMOL/L (ref 4–13)
ANISOCYTOSIS BLD QL SMEAR: PRESENT
BASOPHILS # BLD MANUAL: 0 THOUSAND/UL (ref 0–0.1)
BASOPHILS NFR MAR MANUAL: 0 % (ref 0–1)
BUN SERPL-MCNC: 16 MG/DL (ref 5–25)
CALCIUM SERPL-MCNC: 8.4 MG/DL (ref 8.4–10.2)
CHLORIDE SERPL-SCNC: 106 MMOL/L (ref 96–108)
CO2 SERPL-SCNC: 26 MMOL/L (ref 21–32)
CREAT SERPL-MCNC: 0.94 MG/DL (ref 0.6–1.3)
EOSINOPHIL # BLD MANUAL: 0.22 THOUSAND/UL (ref 0–0.4)
EOSINOPHIL NFR BLD MANUAL: 3 % (ref 0–6)
ERYTHROCYTE [DISTWIDTH] IN BLOOD BY AUTOMATED COUNT: 14.2 % (ref 11.6–15.1)
GFR SERPL CREATININE-BSD FRML MDRD: 68 ML/MIN/1.73SQ M
GLUCOSE SERPL-MCNC: 149 MG/DL (ref 65–140)
GLUCOSE SERPL-MCNC: 175 MG/DL (ref 65–140)
GLUCOSE SERPL-MCNC: 276 MG/DL (ref 65–140)
HCT VFR BLD AUTO: 33.8 % (ref 34.8–46.1)
HGB BLD-MCNC: 10.4 G/DL (ref 11.5–15.4)
LG PLATELETS BLD QL SMEAR: PRESENT
LYMPHOCYTES # BLD AUTO: 4.11 THOUSAND/UL (ref 0.6–4.47)
LYMPHOCYTES # BLD AUTO: 55 % (ref 14–44)
MAGNESIUM SERPL-MCNC: 1.9 MG/DL (ref 1.9–2.7)
MCH RBC QN AUTO: 26 PG (ref 26.8–34.3)
MCHC RBC AUTO-ENTMCNC: 30.8 G/DL (ref 31.4–37.4)
MCV RBC AUTO: 85 FL (ref 82–98)
METAMYELOCYTE ABSOLUTE CT: 0.07 THOUSAND/UL (ref 0–0.1)
METAMYELOCYTES NFR BLD MANUAL: 1 % (ref 0–1)
MONOCYTES # BLD AUTO: 0.45 THOUSAND/UL (ref 0–1.22)
MONOCYTES NFR BLD: 6 % (ref 4–12)
MYELOCYTE ABSOLUTE CT: 0.3 THOUSAND/UL (ref 0–0.1)
MYELOCYTES NFR BLD MANUAL: 4 % (ref 0–1)
NEUTROPHILS # BLD MANUAL: 2.32 THOUSAND/UL (ref 1.85–7.62)
NEUTS SEG NFR BLD AUTO: 31 % (ref 43–75)
PLATELET # BLD AUTO: 271 THOUSANDS/UL (ref 149–390)
PLATELET BLD QL SMEAR: ADEQUATE
PMV BLD AUTO: 10.9 FL (ref 8.9–12.7)
POIKILOCYTOSIS BLD QL SMEAR: PRESENT
POLYCHROMASIA BLD QL SMEAR: PRESENT
POTASSIUM SERPL-SCNC: 3.8 MMOL/L (ref 3.5–5.3)
RBC # BLD AUTO: 4 MILLION/UL (ref 3.81–5.12)
RBC MORPH BLD: PRESENT
SODIUM SERPL-SCNC: 139 MMOL/L (ref 135–147)
WBC # BLD AUTO: 7.47 THOUSAND/UL (ref 4.31–10.16)

## 2025-03-05 PROCEDURE — 83735 ASSAY OF MAGNESIUM: CPT | Performed by: NURSE PRACTITIONER

## 2025-03-05 PROCEDURE — 94760 N-INVAS EAR/PLS OXIMETRY 1: CPT

## 2025-03-05 PROCEDURE — 99238 HOSP IP/OBS DSCHRG MGMT 30/<: CPT

## 2025-03-05 PROCEDURE — 80048 BASIC METABOLIC PNL TOTAL CA: CPT | Performed by: NURSE PRACTITIONER

## 2025-03-05 PROCEDURE — 94664 DEMO&/EVAL PT USE INHALER: CPT

## 2025-03-05 PROCEDURE — 82948 REAGENT STRIP/BLOOD GLUCOSE: CPT

## 2025-03-05 PROCEDURE — 85027 COMPLETE CBC AUTOMATED: CPT | Performed by: NURSE PRACTITIONER

## 2025-03-05 PROCEDURE — 85007 BL SMEAR W/DIFF WBC COUNT: CPT | Performed by: NURSE PRACTITIONER

## 2025-03-05 RX ORDER — INSULIN HUMAN 500 [IU]/ML
INJECTION, SOLUTION SUBCUTANEOUS
Qty: 6 ML | Refills: 0 | Status: SHIPPED | OUTPATIENT
Start: 2025-03-05

## 2025-03-05 RX ADMIN — B-COMPLEX W/ C & FOLIC ACID TAB 1 TABLET: TAB at 09:16

## 2025-03-05 RX ADMIN — PREGABALIN 100 MG: 100 CAPSULE ORAL at 09:16

## 2025-03-05 RX ADMIN — PANTOPRAZOLE SODIUM 40 MG: 40 TABLET, DELAYED RELEASE ORAL at 09:16

## 2025-03-05 RX ADMIN — CHOLECALCIFEROL TAB 25 MCG (1000 UNIT) 2000 UNITS: 25 TAB at 09:16

## 2025-03-05 RX ADMIN — LEVETIRACETAM 500 MG: 500 TABLET, FILM COATED ORAL at 09:16

## 2025-03-05 RX ADMIN — ACETAMINOPHEN 650 MG: 325 TABLET ORAL at 02:06

## 2025-03-05 RX ADMIN — FENOFIBRATE 145 MG: 145 TABLET, FILM COATED ORAL at 09:16

## 2025-03-05 RX ADMIN — HEPARIN SODIUM 5000 UNITS: 5000 INJECTION INTRAVENOUS; SUBCUTANEOUS at 06:32

## 2025-03-05 RX ADMIN — INSULIN LISPRO 2 UNITS: 100 INJECTION, SOLUTION INTRAVENOUS; SUBCUTANEOUS at 11:47

## 2025-03-05 RX ADMIN — CITALOPRAM HYDROBROMIDE 40 MG: 20 TABLET ORAL at 09:16

## 2025-03-05 RX ADMIN — ASPIRIN 81 MG: 81 TABLET, COATED ORAL at 09:16

## 2025-03-05 RX ADMIN — METOPROLOL TARTRATE 25 MG: 25 TABLET, FILM COATED ORAL at 09:16

## 2025-03-05 NOTE — NURSING NOTE
AWAKE AND ALERT AND ORIENTED X4 R/A . 02 SAT 97% HR 65/MIN. RT FOOT DRESSING IS DRY AND INTACT . DENIES PAIN. CALL BELL NEAR. VOIDS CLEAR AND YELLOW URINE IN URINAL

## 2025-03-05 NOTE — DISCHARGE INSTR - AVS FIRST PAGE
Please follow-up with your primary care physician within a week  Please call Dr. Washington and set up a follow-up appointment ASAP  You were followed by endocrinology while inpatient: Per their recommendations will continue Mounjaro on discharge, and change U-500 insulin to 40 units with breakfast and 30 units before lunch and dinner.  You are to stop taking Jardiance.  Follow-up with Dr. Padmini CHAUDHARIP

## 2025-03-05 NOTE — ASSESSMENT & PLAN NOTE
Reported fall x3 at home without head strike  Denies any injury and/or wounds  Low threshold for trauma work-up  Mobility at baseline discharging home

## 2025-03-05 NOTE — NURSING NOTE
IV sites removed.  Discharge instructions given to patient, verbalized understanding.  Patient discharged via wheelchair to car accompanied by RN and spouse

## 2025-03-05 NOTE — NURSING NOTE
Patrient transferred from ICU, report received from Ortiz TORRES.  Patient oriented to room, call bell in reach

## 2025-03-05 NOTE — ASSESSMENT & PLAN NOTE
Lab Results   Component Value Date    HGBA1C 12.0 (H) 03/02/2025       Recent Labs     03/04/25  1655 03/04/25 2035 03/05/25  0709 03/05/25  1115   POCGLU 215* 239* 149* 276*       Blood Sugar Average: Last 72 hrs:  (P) 170.0798019234170601    Continue prehospital Lyrica on discharge

## 2025-03-05 NOTE — ASSESSMENT & PLAN NOTE
Present on arrival   AG normal, but bicarb remains low  Suspect this is multifactorial in setting of dehydration in setting of GI illness vs mild DKA vs bicarb losses in setting of diarrhea vs hyperchloremia  LA WNL  Status post IV fluid resuscitation  Status post insulin infusion since discontinued  Acidosis resolved

## 2025-03-05 NOTE — TELEPHONE ENCOUNTER
Pt called in and is out of her sensors. She called endo and they dont have any in stock. Her pharmacy said they are on back order and they dont know when they will get more in.     She is wondering if the office has any she can have since she is out? Please advise, thanks.

## 2025-03-05 NOTE — ASSESSMENT & PLAN NOTE
Lab Results   Component Value Date    HGBA1C 12.0 (H) 03/02/2025       Recent Labs     03/04/25  0139 03/04/25  0758 03/04/25  1133 03/04/25  1655   POCGLU 128 138 225* 215*       Blood Sugar Average: Last 72 hrs:  (P) 163    Poorly controlled diabetes with A1C of 12.0% which complicated by hyperglycemia and normal anion gap metabolic acidosis with mild elevated BHB, ? Mild DKA vs metabolic acidosis in setting of LINDY.  Home regimen: u500 55 units before bf, 36 units before lunch and dinner.  Jardiane 25 mg daily and Mounjaro 10 mg weekly.  Received IV insulin and currently on just SSI with minimal daily requirement, although blood sugars started rising as her appetite improved, I started low dose basal insulin lantus 12 units qhs and continue SSI for now.  Continue to monitor blood sugar over time and make adjustments to the regimen if necessary.  Endocrinology will follow.  On discharge annamarie Oropeza.

## 2025-03-05 NOTE — ASSESSMENT & PLAN NOTE
POA  Reports n/v/d x3D   Possibly initially in setting of GI illness followed by ? mild DKA vs dehydration/metabolic acidosis  S/p IV fluid resuscitation   Nausea vomiting diarrhea resolved

## 2025-03-05 NOTE — ASSESSMENT & PLAN NOTE
Lab Results   Component Value Date    HGBA1C 12.0 (H) 03/02/2025       Recent Labs     03/04/25  1655 03/04/25 2035 03/05/25  0709 03/05/25  1115   POCGLU 215* 239* 149* 276*       Blood Sugar Average: Last 72 hrs:  (P) 170.4917494765699920    Poorly controlled diabetes with A1C of 12.0% which complicated by hyperglycemia and normal anion gap metabolic acidosis with mild elevated BHB, ? Mild DKA vs metabolic acidosis in setting of LINDY.  Home regimen: u500 55 units before bf, 36 units before lunch and dinner. Jardiance 25 mg daily and Mounjaro 10 mg weekly.  Appreciate input of endocrinology who followed-will discharge on U-500 40 units before breakfast, and 30 units before lunch and dinner as recommended by endocrinology.  Discontinuing Jardiance on discharge.  Continue prehospital Mounjaro and follow-up with cardiology outpatient ASAP  Encouraged compliance with diabetic diet on discharge

## 2025-03-05 NOTE — ASSESSMENT & PLAN NOTE
Lab Results   Component Value Date    EGFR 68 03/05/2025    EGFR 86 03/04/2025    EGFR 54 03/03/2025    CREATININE 0.94 03/05/2025    CREATININE 0.78 03/04/2025    CREATININE 1.15 03/03/2025   Creatinine at baseline  Euvolemic on exam  Discharging home, follow-up PCP in a week

## 2025-03-05 NOTE — NURSING NOTE
Awake and alert and oriented x4. R/a status 96% hr 59/min. Resp easy denies stools since 3/3/25 just passing gas. Abdomen is soft and non tender. Call bell near and no distress. Still in need of c diff collection, and patient aware. Will let slim aware also

## 2025-03-05 NOTE — DISCHARGE SUMMARY
Discharge Summary - Hospitalist   Name: Haylee Balbuena 54 y.o. female I MRN: [id number]  Unit/Bed#: -01 I Date of Admission: 3/2/2025   Date of Service: 3/5/2025 I Hospital Day: 3     Assessment & Plan  Nausea, vomiting, and diarrhea  POA  Reports n/v/d x3D   Possibly initially in setting of GI illness followed by ? mild DKA vs dehydration/metabolic acidosis  S/p IV fluid resuscitation   Nausea vomiting diarrhea resolved   Seizure disorder (HCC)  Continue prehospital Keppra on discharge  Type 2 diabetes mellitus with diabetic autonomic neuropathy, with long-term current use of insulin ([facility])  Lab Results   Component Value Date    HGBA1C 12.0 (H) 03/02/2025       Recent Labs     03/04/25 1655 03/04/25 2035 03/05/25  0709 03/05/25  1115   POCGLU 215* 239* 149* 276*       Blood Sugar Average: Last 72 hrs:  (P) 170.6941237438855804    Poorly controlled diabetes with A1C of 12.0% which complicated by hyperglycemia and normal anion gap metabolic acidosis with mild elevated BHB, ? Mild DKA vs metabolic acidosis in setting of LINDY.  Home regimen: u500 55 units before bf, 36 units before lunch and dinner. Jardiance 25 mg daily and Mounjaro 10 mg weekly.  Appreciate input of endocrinology who followed-will discharge on U-500 40 units before breakfast, and 30 units before lunch and dinner as recommended by endocrinology.  Discontinuing Jardiance on discharge.  Continue prehospital Mounjaro and follow-up with cardiology outpatient ASAP  Encouraged compliance with diabetic diet on discharge  Gastroparesis  Continue prehospital Reglan Pepcid Bentyl and Protonix on discharge  GERD (gastroesophageal reflux disease)  Continue prehospital PPI on discharge  Depression with anxiety  Discharge on prehospital regimen including Celexa and Keppra  Diabetic polyneuropathy associated with type 2 diabetes mellitus (HCC)  Lab Results   Component Value Date    HGBA1C 12.0 (H) 03/02/2025       Recent Labs     03/04/25 1655 03/04/25 2035  03/05/25  0709 03/05/25  1115   POCGLU 215* 239* 149* 276*       Blood Sugar Average: Last 72 hrs:  (P) 170.9451540656255828    Continue prehospital Lyrica on discharge  Hypertension  Blood pressure controlled  Continue prehospital metoprolol discharge  Mild intermittent asthma without complication  Continue home PRN Albuterol inhaler/nebs on discharge  Hypercholesteremia  Continue prehospital statin on discharge  Iron deficiency anemia secondary to inadequate dietary iron intake  Continue prehospital iron supplement on discharge  Vitamin D deficiency  Continue prehospital supplementation on discharge  Acute renal failure superimposed on stage 3a chronic kidney disease (HCC)  Lab Results   Component Value Date    EGFR 68 03/05/2025    EGFR 86 03/04/2025    EGFR 54 03/03/2025    CREATININE 0.94 03/05/2025    CREATININE 0.78 03/04/2025    CREATININE 1.15 03/03/2025   Creatinine at baseline  Euvolemic on exam  Discharging home, follow-up PCP in a week  Renal cell carcinoma (HCC)  Is s/p R partial nephrectomy in 2017  Follows w/Nephrology as OP  Obesity (BMI 30-39.9)  BMI 33.87  Counseled on diet, exercise, lifestyle changes  Fall  Reported fall x3 at home without head strike  Denies any injury and/or wounds  Low threshold for trauma work-up  Mobility at baseline discharging home  Metabolic acidosis  Present on arrival   AG normal, but bicarb remains low  Suspect this is multifactorial in setting of dehydration in setting of GI illness vs mild DKA vs bicarb losses in setting of diarrhea vs hyperchloremia  LA WNL  Status post IV fluid resuscitation  Status post insulin infusion since discontinued  Acidosis resolved  Hyperglycemia due to diabetes mellitus (HCC)  Lab Results   Component Value Date    HGBA1C 12.0 (H) 03/02/2025       Recent Labs     03/04/25  1655 03/04/25 2035 03/05/25  0709 03/05/25  1115   POCGLU 215* 239* 149* 276*       Blood Sugar Average: Last 72 hrs:  (P) 170.9270501725565293  Non-gapped metabolic  acidosis w/? component of mild DKA - gluc 296, AG 13 w/bicarb 18, pH 7.2, BHB 0.99  Initially admitted to ICU, Slim assumed care of patient today  But sugars much improved, nausea vomiting diarrhea resolved.  Anxious to go home  Appreciate input of endocrinology who followed-Per discussion with endocrinology will discharge on Doose dose of U-500-40 units with breakfast and 30 units before lunch and dinner.  Will continue Mounjaro, discontinue Jardiance on discharge and follow-up with Dr. Padmini DANIELLE     Medical Problems       Resolved Problems  Date Reviewed: 3/3/2025          Resolved    Diarrhea 3/2/2025     Resolved by  SYLWIA Rothman        Discharging Physician / Practitioner: SYLWIA Armstrong  PCP: SYLWIA Larson  Admission Date:   Admission Orders (From admission, onward)       Ordered        25 0912  INPATIENT ADMISSION  Once            25 0825  INPATIENT ADMISSION  Once            25 0827  Inpatient Admission  Once                          Discharge Date: 25    Consultations During Hospital Stay:  Endocrinology    Procedures Performed:   None    Significant Findings / Test Results:   3/2 VB.22/37.9/35.5/15.5  3/2 CMP: Bicarb 18, BUN 64, creatinine 2.26, glucose 296  3/2 serial high sensitive troponins negative  3/2 lactic acid 0.8  3/2 WBC is 7.90    Incidental Findings:   None    Test Results Pending at Discharge (will require follow up):   None     Outpatient Tests Requested:  None    Complications: None    Reason for Admission: Nausea vomiting and diarrhea    Hospital Course:   For full details regarding patient's hospitalization please refer to the contents of the chart and the history and physical as dictated by our critical care colleague Armida De Jesus.  In brief, Haylee Balbuena is a 54 y.o. female patient who originally presented to the hospital on 3/2/2025 due to nausea and vomiting with diarrhea 3 days prior to arrival.  Of note patient has  "history of gastroparesis and chronic abdominal pain.  VBG on arrival suggested look acidosis.  She was also noted to be hyperglycemic and was initiated on an insulin drip on arrival and admitted to ICU for further management.  She was also noted to have an LINDY on CKD stage III on arrival in setting of nausea vomiting and diarrhea.  Treated with IV fluids-now resolved.  Patient stabilized and slim assumed care of patient today.  Patient is euvolemic on exam.  No further nausea vomiting or diarrhea.  Medically stable for discharge today.  Prior to discharge discussed prehospital regimen with endocrinology who followed patient.  Per endocrinology recommendations will reduce patient's U-500 insulin to 40 units with breakfast and 30 units before lunch and dinner, discontinue Jardiance, continue prehospital Mounjaro and have patient follow-up with endocrinology ASAP.  Further advised patient to follow-up with PCP within a week.        Please see above list of diagnoses and related plan for additional information.     Condition at Discharge: good    Discharge Day Visit / Exam:   Subjective: Denies any nausea vomiting or diarrhea.  Tolerating diet.  Anxious to go home.  Vitals: Blood Pressure: 128/71 (03/05/25 0708)  Pulse: 64 (03/05/25 0708)  Temperature: 97.6 °F (36.4 °C) (03/05/25 0708)  Temp Source: Oral (03/05/25 0708)  Respirations: 18 (03/05/25 0708)  Height: 5' 2\" (157.5 cm) (03/02/25 0931)  Weight - Scale: 84 kg (185 lb 3 oz) (by pca) (03/05/25 0600)  SpO2: 90 % (03/05/25 0743)  Physical Exam  Vitals reviewed.   Constitutional:       General: She is not in acute distress.     Appearance: She is well-developed.   HENT:      Head: Normocephalic and atraumatic.   Cardiovascular:      Rate and Rhythm: Normal rate and regular rhythm.      Pulses: Normal pulses.      Heart sounds: Normal heart sounds. No murmur heard.  Pulmonary:      Effort: Pulmonary effort is normal. No respiratory distress.      Breath sounds: Normal " breath sounds. No wheezing or rales.   Abdominal:      General: Bowel sounds are normal. There is no distension.      Palpations: Abdomen is soft.      Tenderness: There is no abdominal tenderness. There is no guarding.   Musculoskeletal:         General: No swelling.   Skin:     General: Skin is warm and dry.      Capillary Refill: Capillary refill takes less than 2 seconds.   Neurological:      General: No focal deficit present.      Mental Status: She is alert and oriented to person, place, and time. Mental status is at baseline.   Psychiatric:         Mood and Affect: Mood normal.         Behavior: Behavior normal.         Thought Content: Thought content normal.         Judgment: Judgment normal.          Discussion with Family: Patient declined call to .     Discharge instructions/Information to patient and family:   See after visit summary for information provided to patient and family.      Provisions for Follow-Up Care:  See after visit summary for information related to follow-up care and any pertinent home health orders.      Mobility at time of Discharge:   Basic Mobility Inpatient Raw Score: 23  JH-HLM Goal: 7: Walk 25 feet or more  JH-HLM Achieved: 6: Walk 10 steps or more  HLM Goal NOT achieved. Continue to encourage mobility in post discharge setting.     Disposition:   Home    Planned Readmission: No    Discharge Medications:  See after visit summary for reconciled discharge medications provided to patient and/or family.      Administrative Statements   Discharge Statement:  I have spent a total time of 38 minutes in caring for this patient on the day of the visit/encounter. >30 minutes of time was spent on: Patient and family education, Counseling / Coordination of care, Documenting in the medical record, Reviewing / ordering tests, medicine, procedures  , and Communicating with other healthcare professionals .    **Please Note: This note may have been constructed using a voice  recognition system**

## 2025-03-05 NOTE — ASSESSMENT & PLAN NOTE
Lab Results   Component Value Date    HGBA1C 12.0 (H) 03/02/2025       Recent Labs     03/04/25  1655 03/04/25 2035 03/05/25  0709 03/05/25  1115   POCGLU 215* 239* 149* 276*       Blood Sugar Average: Last 72 hrs:  (P) 170.3895157281740312  Non-gapped metabolic acidosis w/? component of mild DKA - gluc 296, AG 13 w/bicarb 18, pH 7.2, BHB 0.99  Initially admitted to ICU, Slim assumed care of patient today  But sugars much improved, nausea vomiting diarrhea resolved.  Anxious to go home  Appreciate input of endocrinology who followed-Per discussion with endocrinology will discharge on Doose dose of U-500-40 units with breakfast and 30 units before lunch and dinner.  Will continue Mounjaro, discontinue Jardiance on discharge and follow-up with Dr. Padmini DANIELLE

## 2025-03-05 NOTE — PLAN OF CARE
Problem: PAIN - ADULT  Goal: Verbalizes/displays adequate comfort level or baseline comfort level  Description: Interventions:  - Encourage patient to monitor pain and request assistance  - Assess pain using appropriate pain scale  - Administer analgesics based on type and severity of pain and evaluate response  - Implement non-pharmacological measures as appropriate and evaluate response  - Consider cultural and social influences on pain and pain management  - Notify physician/advanced practitioner if interventions unsuccessful or patient reports new pain  Outcome: Progressing     Problem: INFECTION - ADULT  Goal: Absence or prevention of progression during hospitalization  Description: INTERVENTIONS:  - Assess and monitor for signs and symptoms of infection  - Monitor lab/diagnostic results  - Monitor all insertion sites, i.e. indwelling lines, tubes, and drains  - Monitor endotracheal if appropriate and nasal secretions for changes in amount and color  - [place] appropriate cooling/warming therapies per order  - Administer medications as ordered  - Instruct and encourage patient and family to use good hand hygiene technique  - Identify and instruct in appropriate isolation precautions for identified infection/condition  Outcome: Progressing     Problem: Knowledge Deficit  Goal: Patient/family/caregiver demonstrates understanding of disease process, treatment plan, medications, and discharge instructions  Description: Complete learning assessment and assess knowledge base.  Interventions:  - Provide teaching at level of understanding  - Provide teaching via preferred learning methods  Outcome: Progressing     Problem: GASTROINTESTINAL - ADULT  Goal: Minimal or absence of nausea and/or vomiting  Description: INTERVENTIONS:  - Administer IV fluids if ordered to ensure adequate hydration  - Maintain NPO status until nausea and vomiting are resolved  - Nasogastric tube if ordered  - Administer ordered antiemetic  medications as needed  - Provide nonpharmacologic comfort measures as appropriate  - Advance diet as tolerated, if ordered  - Consider nutrition services referral to assist patient with adequate nutrition and appropriate food choices  Outcome: Progressing  Goal: Maintains or returns to baseline bowel function  Description: INTERVENTIONS:  - Assess bowel function  - Encourage oral fluids to ensure adequate hydration  - Administer IV fluids if ordered to ensure adequate hydration  - Administer ordered medications as needed  - Encourage mobilization and activity  - Consider nutritional services referral to assist patient with adequate nutrition and appropriate food choices  Outcome: Progressing     Problem: MUSCULOSKELETAL - ADULT  Goal: Maintain or return mobility to safest level of function  Description: INTERVENTIONS:  - Assess patient's ability to carry out ADLs; assess patient's baseline for ADL function and identify physical deficits which impact ability to perform ADLs (bathing, care of mouth/teeth, toileting, grooming, dressing, etc.)  - Assess/evaluate cause of self-care deficits   - Assess range of motion  - Assess patient's mobility  - Assess patient's need for assistive devices and provide as appropriate  - Encourage maximum independence but intervene and supervise when necessary  - Involve family in performance of ADLs  - Assess for home care needs following discharge   - Consider OT consult to assist with ADL evaluation and planning for discharge  - Provide patient education as appropriate  Outcome: Progressing

## 2025-03-06 ENCOUNTER — TELEPHONE (OUTPATIENT)
Age: 55
End: 2025-03-06

## 2025-03-06 LAB
DME PARACHUTE DELIVERY DATE REQUESTED: NORMAL
DME PARACHUTE ITEM DESCRIPTION: NORMAL
DME PARACHUTE ORDER STATUS: NORMAL
DME PARACHUTE SUPPLIER NAME: NORMAL
DME PARACHUTE SUPPLIER PHONE: NORMAL

## 2025-03-06 NOTE — TELEPHONE ENCOUNTER
Patient  called she want to know  do we have  any  samples of  Dexcom G7  sensors.  
Spoke to patient she will  1 sensor and sent order to [facility] through [place]  
Patient/Caregiver provided printed discharge information.

## 2025-03-06 NOTE — TELEPHONE ENCOUNTER
Patient called the RX Refill Line. Message is being forwarded to the office.     Patient called again for smples of G& sensors.  She called around to a few pharmacies and no one has them.     Please contact patient at [phone] -

## 2025-03-06 NOTE — TELEPHONE ENCOUNTER
Pt called to report that her pharmacy , [facility] , does not have any dexcom g 7 sensors and reported that it will be in backorder. Pt would like to know if the office has any samples or any local endo offices have any samples. Pt will be out by tomorrow. Pt is asking for at least 3 sensors. Advised that she can also try to call around to see if any local pharmacies currently have it in stock so that the prescription can be sent there. Please advise

## 2025-03-06 NOTE — UTILIZATION REVIEW
NOTIFICATION OF ADMISSION DISCHARGE   This is a Notification of Discharge from [facility]. Please be advised that this patient has been discharge from our facility. Below you will find the admission and discharge date and time including the patient’s disposition.   UTILIZATION REVIEW CONTACT:  Katie Lieberman  Utilization   Network Utilization Review Department  Phone: [phone] x carefully listen to the prompts. All voicemails are confidential.  Email: [email or web address]     ADMISSION INFORMATION  PRESENTATION DATE: 3/2/2025  7:28 AM  OBERVATION ADMISSION DATE: N/A  INPATIENT ADMISSION DATE: 3/2/25  8:25 AM   DISCHARGE DATE: 3/5/2025  3:25 PM   DISPOSITION:Home/Self Care    Network Utilization Review Department  ATTENTION: Please call with any questions or concerns to [phone] and carefully listen to the prompts so that you are directed to the right person. All voicemails are confidential.   For Discharge needs, contact Care Management DC Support Team at [phone] opt. 2  Send all requests for admission clinical reviews, approved or denied determinations and any other requests to dedicated fax number below belonging to the campus where the patient is receiving treatment. List of dedicated fax numbers for the Facilities:  FACILITY NAME UR FAX NUMBER   ADMISSION DENIALS (Administrative/Medical Necessity) [phone]   DISCHARGE SUPPORT TEAM ([facility]) [phone]   PARENT CHILD HEALTH (Maternity/NICU/Pediatrics) [phone]   [facility] [phone]   [facility] [phone]   [facility] [phone]   [facility] [phone]   [facility] [phone]   [facility] [phone]   [facility] [phone]   [facility] [phone]    [facility] [phone]   Critical access hospital [phone]   [facility] [phone]   [facility] [phone]

## 2025-03-06 NOTE — TELEPHONE ENCOUNTER
Spoke to patient , we will be sending order to [facility] and she can  1 in office until then

## 2025-03-07 ENCOUNTER — RA CDI HCC (OUTPATIENT)
Dept: OTHER | Facility: HOSPITAL | Age: 55
End: 2025-03-07

## 2025-03-10 ENCOUNTER — OFFICE VISIT (OUTPATIENT)
Dept: ENDOCRINOLOGY | Facility: CLINIC | Age: 55
End: 2025-03-10
Payer: COMMERCIAL

## 2025-03-10 VITALS
SYSTOLIC BLOOD PRESSURE: 102 MMHG | HEIGHT: 61 IN | DIASTOLIC BLOOD PRESSURE: 52 MMHG | TEMPERATURE: 97.8 F | WEIGHT: 185 LBS | HEART RATE: 55 BPM | BODY MASS INDEX: 34.93 KG/M2

## 2025-03-10 DIAGNOSIS — E11.65 TYPE 2 DIABETES MELLITUS WITH HYPERGLYCEMIA, WITH LONG-TERM CURRENT USE OF INSULIN (HCC): Primary | ICD-10-CM

## 2025-03-10 DIAGNOSIS — E78.1 HYPERTRIGLYCERIDEMIA: ICD-10-CM

## 2025-03-10 DIAGNOSIS — Z79.4 TYPE 2 DIABETES MELLITUS WITH DIABETIC AUTONOMIC NEUROPATHY, WITH LONG-TERM CURRENT USE OF INSULIN (HCC): Primary | ICD-10-CM

## 2025-03-10 DIAGNOSIS — E11.43 TYPE 2 DIABETES MELLITUS WITH DIABETIC AUTONOMIC NEUROPATHY, WITH LONG-TERM CURRENT USE OF INSULIN (HCC): Primary | ICD-10-CM

## 2025-03-10 DIAGNOSIS — Z79.4 TYPE 2 DIABETES MELLITUS WITH HYPERGLYCEMIA, WITH LONG-TERM CURRENT USE OF INSULIN (HCC): Primary | ICD-10-CM

## 2025-03-10 PROCEDURE — 99214 OFFICE O/P EST MOD 30 MIN: CPT | Performed by: PHYSICIAN ASSISTANT

## 2025-03-10 RX ORDER — METFORMIN HYDROCHLORIDE 500 MG/1
1000 TABLET, EXTENDED RELEASE ORAL
Qty: 180 TABLET | Refills: 1 | Status: SHIPPED | OUTPATIENT
Start: 2025-03-10

## 2025-03-10 NOTE — PATIENT INSTRUCTIONS
Start metformin at 500mg daily x 1 week, then increase to 1000mg daily thereafter if well tolerated.     Increase Total daily dose of U500 insulin as follows - take 65 units before breakfast, 45 units before dinner.

## 2025-03-10 NOTE — LETTER
March 10, 2025     Patient: Haylee Balbuena  YOB: 1970  Date of Visit: 3/10/2025      To Whom it May Concern:    Haylee Balbuena is under my professional care. Haylee GARCIAS was seen in my office on 3/10/2025. Haylee GARCIAS may return to work on 3/11/25 but recommend more light duty capacity given recovery from recent hospitalization . Light duty should continue for the next 2 weeks, until and including 3/24/25.    If you have any questions or concerns, please don't hesitate to call.         Sincerely,          Adriana So PA-C        CC: No Recipients

## 2025-03-10 NOTE — PROGRESS NOTES
Name: Haylee Balbuena      : 1970      MRN: [id number]  Encounter Provider: Adriana So PA-C  Encounter Date: 3/10/2025   Encounter department: [facility]    No chief complaint on file.  :  Assessment & Plan  Type 2 diabetes mellitus with hyperglycemia, with long-term current use of insulin (HCC)    Lab Results   Component Value Date    HGBA1C 12.0 (H) 2025   Improving BG control - GMI 9.3% on CGM, compared to most recent A1c of 12%, recent hospitalization for DKA noted.    Agree with stopping SGLT2i in setting of DKA.  To help with insulin resistance, will start metformin cautiously at 500mg daily x 1 week followed by 1000mg daily thereafter. Previously this was discontinued in setting of CKD and GI side effects, but reasonable to proceed cautiously at lower dosing.  To continue Humulin U500 but will redistribute insulin, condense dosing from BID to TID as follows - take 65 units with breakfast, 45 units with dinner (TDD increase from 100 units to 110 units).   To continue GLP1 - continue Mounjaro 10mg weekly dose for now (recently started this new dose within the past few weeks). May look to up-titrate soon pending glycemic control and tolerability.     Encouraged dietary compliance, regular meal times and carbohydrate content / awareness.     Provided note for work to allow for light duty accommodation x 2 weeks.     Due to high risk status - To follow up with us in office in 2 weeks, possible further insulin adjustments to follow.       Orders:    metFORMIN (GLUCOPHAGE-XR) 500 mg 24 hr tablet; Take 2 tablets (1,000 mg total) by mouth daily with breakfast      I have spent a total time of 35 minutes in caring for this patient on the day of the visit/encounter including Diagnostic results, Impressions, Counseling / Coordination of care, Obtaining or reviewing history  , Communicating with other healthcare professionals , and CGM report review .  "    History of Present Illness     Haylee Balbuena is a 54 y.o. female    Haylee is currently taking U500 at 40-30-30.  Takes about 5-10 minutes prior to eating.   Also on Mounjaro 10mg weekly dose for less than a month now. No side effects from this dose so far.       Diet - making some adjustments, for example reducing amount of coffee with creamer in AM. She is also cutting back on carbohydrate content of meals. She is trying to be aware / mindful of carbohydrate content with meals - ranging from about 50-100g each meal. Snacks - before bed has PB crackers, yogurt.     Denies ongoing nausea, dyspepsia.     Of note metformin previously discontinued due to renal function and GI side effects.   Jardiance discontinued with recent hospitalization - DKA.     CGM report review:   Haylee Balbuena   Device used : Dexcom G7  Home use   Indication: Type 2 Diabetes  More than 72 hours of data was reviewed. Report to be scanned to chart.   Date Range: 2/25/25 - 3/10/25    Analysis of data:   % time CGM used: 95%  Average Glucose: 250mg/dL  Coefficient of Variation: 37.3%  GMI: 9.3%  Time in Target Range: 26% in range  Time Above Range: 23% high, 51% very high  Time Below Range: 0% low, 0% very low.    Interpretation of data:  FBG at lowest, average around 200, with significant and predictable post-prandial spike after breakfast, which then seems to persist throughout day most days.        Review of Systems as per HPI  Medical History Reviewed by provider this encounter:  Tobacco  Allergies  Meds  Problems  Med Hx  Surg Hx  Fam Hx     .     Medical History Reviewed by provider this encounter:     .    Objective   /52 (Patient Position: Sitting, Cuff Size: Standard)   Pulse 55   Temp 97.8 °F (36.6 °C) (Temporal)   Ht 5' 1\" (1.549 m)   Wt 83.9 kg (185 lb)   BMI 34.96 kg/m²      Body mass index is 34.96 kg/m².  Wt Readings from Last 3 Encounters:   03/10/25 83.9 kg (185 lb)   03/05/25 84 kg (185 lb 3 oz) "   02/21/25 84 kg (185 lb 3 oz)     Physical Exam    Labs:   Lab Results   Component Value Date    HGBA1C 12.0 (H) 03/02/2025    HGBA1C 11.5 (H) 10/12/2024    HGBA1C 11.3 (A) 09/12/2024     Lab Results   Component Value Date    CREATININE 0.94 03/05/2025    CREATININE 0.78 03/04/2025    CREATININE 1.15 03/03/2025    BUN 16 03/05/2025    K 3.8 03/05/2025     03/05/2025    CO2 26 03/05/2025     GFR, Calculated   Date Value Ref Range Status   08/10/2020 80 >60 mL/min/1.73m2 Final     Comment:     mL/min per 1.73 square meters                                            Normal Function or Mild Renal    Disease (if clinically at risk):  >or=60  Moderately Decreased:                30-59  Severely Decreased:                  15-29  Renal Failure:                         <15                                            -American GFR: multiply reported GFR by 1.16    Please note that the eGFR is based on the CKD-EPI calculation, and is not intended to be used for drug dosing.                                            Note: Calculated GFR may not be an accurate indicator of renal function if the patient's renal function is not in a steady state.     eGFR   Date Value Ref Range Status   03/05/2025 68 ml/min/1.73sq m Final     Lab Results   Component Value Date    HDL 37 (L) 10/12/2024    TRIG 236 (H) 10/12/2024     Lab Results   Component Value Date    ALT 12 03/04/2025    AST 14 03/04/2025    ALKPHOS 41 03/04/2025     Lab Results   Component Value Date    XND5SGHUWSXA 1.440 03/02/2025    UFP0EVYPIUEJ 1.784 07/24/2023    JDV5MOEUXUSK 1.090 11/14/2022     Lab Results   Component Value Date    FREET4 1.17 07/15/2019       There are no Patient Instructions on file for this visit.    Discussed with the patient and all questioned fully answered. She will call me if any problems arise.

## 2025-03-12 ENCOUNTER — APPOINTMENT (OUTPATIENT)
Dept: LAB | Facility: CLINIC | Age: 55
End: 2025-03-12
Payer: COMMERCIAL

## 2025-03-12 DIAGNOSIS — Z79.4 TYPE 2 DIABETES MELLITUS WITH DIABETIC AUTONOMIC NEUROPATHY, WITH LONG-TERM CURRENT USE OF INSULIN (HCC): ICD-10-CM

## 2025-03-12 DIAGNOSIS — E55.9 VITAMIN D DEFICIENCY: ICD-10-CM

## 2025-03-12 DIAGNOSIS — E78.1 HYPERTRIGLYCERIDEMIA: ICD-10-CM

## 2025-03-12 DIAGNOSIS — E11.43 TYPE 2 DIABETES MELLITUS WITH DIABETIC AUTONOMIC NEUROPATHY, WITH LONG-TERM CURRENT USE OF INSULIN (HCC): ICD-10-CM

## 2025-03-12 DIAGNOSIS — N18.31 STAGE 3A CHRONIC KIDNEY DISEASE (HCC): ICD-10-CM

## 2025-03-12 LAB
25(OH)D3 SERPL-MCNC: 64.4 NG/ML (ref 30–100)
ALBUMIN SERPL BCG-MCNC: 4.1 G/DL (ref 3.5–5)
ALP SERPL-CCNC: 71 U/L (ref 34–104)
ALT SERPL W P-5'-P-CCNC: 32 U/L (ref 7–52)
ANION GAP SERPL CALCULATED.3IONS-SCNC: 11 MMOL/L (ref 4–13)
AST SERPL W P-5'-P-CCNC: 33 U/L (ref 13–39)
BASOPHILS # BLD AUTO: 0.06 THOUSANDS/ÂΜL (ref 0–0.1)
BASOPHILS NFR BLD AUTO: 1 % (ref 0–1)
BILIRUB SERPL-MCNC: 0.31 MG/DL (ref 0.2–1)
BUN SERPL-MCNC: 17 MG/DL (ref 5–25)
CALCIUM SERPL-MCNC: 9.1 MG/DL (ref 8.4–10.2)
CHLORIDE SERPL-SCNC: 102 MMOL/L (ref 96–108)
CHOLEST SERPL-MCNC: 175 MG/DL (ref ?–200)
CO2 SERPL-SCNC: 29 MMOL/L (ref 21–32)
CREAT SERPL-MCNC: 1.19 MG/DL (ref 0.6–1.3)
EOSINOPHIL # BLD AUTO: 0.26 THOUSAND/ÂΜL (ref 0–0.61)
EOSINOPHIL NFR BLD AUTO: 3 % (ref 0–6)
ERYTHROCYTE [DISTWIDTH] IN BLOOD BY AUTOMATED COUNT: 16 % (ref 11.6–15.1)
EST. AVERAGE GLUCOSE BLD GHB EST-MCNC: 275 MG/DL
GFR SERPL CREATININE-BSD FRML MDRD: 51 ML/MIN/1.73SQ M
GLUCOSE P FAST SERPL-MCNC: 165 MG/DL (ref 65–99)
HBA1C MFR BLD: 11.2 %
HCT VFR BLD AUTO: 39.4 % (ref 34.8–46.1)
HDLC SERPL-MCNC: 47 MG/DL
HGB BLD-MCNC: 12 G/DL (ref 11.5–15.4)
IMM GRANULOCYTES # BLD AUTO: 0.05 THOUSAND/UL (ref 0–0.2)
IMM GRANULOCYTES NFR BLD AUTO: 1 % (ref 0–2)
LDLC SERPL CALC-MCNC: 92 MG/DL (ref 0–100)
LYMPHOCYTES # BLD AUTO: 2.92 THOUSANDS/ÂΜL (ref 0.6–4.47)
LYMPHOCYTES NFR BLD AUTO: 37 % (ref 14–44)
MAGNESIUM SERPL-MCNC: 2.4 MG/DL (ref 1.9–2.7)
MCH RBC QN AUTO: 26.1 PG (ref 26.8–34.3)
MCHC RBC AUTO-ENTMCNC: 30.5 G/DL (ref 31.4–37.4)
MCV RBC AUTO: 86 FL (ref 82–98)
MONOCYTES # BLD AUTO: 0.66 THOUSAND/ÂΜL (ref 0.17–1.22)
MONOCYTES NFR BLD AUTO: 8 % (ref 4–12)
NEUTROPHILS # BLD AUTO: 4.01 THOUSANDS/ÂΜL (ref 1.85–7.62)
NEUTS SEG NFR BLD AUTO: 50 % (ref 43–75)
NRBC BLD AUTO-RTO: 0 /100 WBCS
PHOSPHATE SERPL-MCNC: 4.5 MG/DL (ref 2.7–4.5)
PLATELET # BLD AUTO: 393 THOUSANDS/UL (ref 149–390)
PMV BLD AUTO: 11.1 FL (ref 8.9–12.7)
POTASSIUM SERPL-SCNC: 4.3 MMOL/L (ref 3.5–5.3)
PROT SERPL-MCNC: 6.6 G/DL (ref 6.4–8.4)
PTH-INTACT SERPL-MCNC: 54.5 PG/ML (ref 12–88)
RBC # BLD AUTO: 4.59 MILLION/UL (ref 3.81–5.12)
SODIUM SERPL-SCNC: 142 MMOL/L (ref 135–147)
T4 FREE SERPL-MCNC: 0.97 NG/DL (ref 0.61–1.12)
TRIGL SERPL-MCNC: 180 MG/DL (ref ?–150)
TSH SERPL DL<=0.05 MIU/L-ACNC: 4.51 UIU/ML (ref 0.45–4.5)
WBC # BLD AUTO: 7.96 THOUSAND/UL (ref 4.31–10.16)

## 2025-03-12 PROCEDURE — 84439 ASSAY OF FREE THYROXINE: CPT

## 2025-03-12 PROCEDURE — 84100 ASSAY OF PHOSPHORUS: CPT

## 2025-03-12 PROCEDURE — 36415 COLL VENOUS BLD VENIPUNCTURE: CPT

## 2025-03-12 PROCEDURE — 82306 VITAMIN D 25 HYDROXY: CPT

## 2025-03-12 PROCEDURE — 83036 HEMOGLOBIN GLYCOSYLATED A1C: CPT

## 2025-03-12 PROCEDURE — 83735 ASSAY OF MAGNESIUM: CPT

## 2025-03-12 PROCEDURE — 84443 ASSAY THYROID STIM HORMONE: CPT

## 2025-03-12 PROCEDURE — 83970 ASSAY OF PARATHORMONE: CPT

## 2025-03-12 PROCEDURE — 85025 COMPLETE CBC W/AUTO DIFF WBC: CPT

## 2025-03-12 PROCEDURE — 80053 COMPREHEN METABOLIC PANEL: CPT

## 2025-03-12 PROCEDURE — 80061 LIPID PANEL: CPT

## 2025-03-14 ENCOUNTER — TRANSITIONAL CARE MANAGEMENT (OUTPATIENT)
Dept: FAMILY MEDICINE CLINIC | Facility: CLINIC | Age: 55
End: 2025-03-14

## 2025-03-14 ENCOUNTER — RESULTS FOLLOW-UP (OUTPATIENT)
Dept: NEPHROLOGY | Facility: CLINIC | Age: 55
End: 2025-03-14

## 2025-03-14 ENCOUNTER — OFFICE VISIT (OUTPATIENT)
Dept: FAMILY MEDICINE CLINIC | Facility: CLINIC | Age: 55
End: 2025-03-14
Payer: COMMERCIAL

## 2025-03-14 VITALS
BODY MASS INDEX: 33.42 KG/M2 | RESPIRATION RATE: 18 BRPM | DIASTOLIC BLOOD PRESSURE: 60 MMHG | HEIGHT: 61 IN | HEART RATE: 72 BPM | SYSTOLIC BLOOD PRESSURE: 116 MMHG | OXYGEN SATURATION: 98 % | WEIGHT: 177 LBS | TEMPERATURE: 98.1 F

## 2025-03-14 DIAGNOSIS — E11.10 DIABETIC KETOACIDOSIS WITHOUT COMA ASSOCIATED WITH TYPE 2 DIABETES MELLITUS (HCC): Primary | ICD-10-CM

## 2025-03-14 DIAGNOSIS — N17.9 ACUTE RENAL FAILURE SUPERIMPOSED ON STAGE 3A CHRONIC KIDNEY DISEASE, UNSPECIFIED ACUTE RENAL FAILURE TYPE (HCC): ICD-10-CM

## 2025-03-14 DIAGNOSIS — N18.31 ACUTE RENAL FAILURE SUPERIMPOSED ON STAGE 3A CHRONIC KIDNEY DISEASE, UNSPECIFIED ACUTE RENAL FAILURE TYPE (HCC): ICD-10-CM

## 2025-03-14 DIAGNOSIS — T75.3XXD SEA SICKNESS, SUBSEQUENT ENCOUNTER: ICD-10-CM

## 2025-03-14 DIAGNOSIS — Z79.4 TYPE 2 DIABETES MELLITUS WITH HYPERGLYCEMIA, WITH LONG-TERM CURRENT USE OF INSULIN (HCC): ICD-10-CM

## 2025-03-14 DIAGNOSIS — E55.9 VITAMIN D DEFICIENCY: ICD-10-CM

## 2025-03-14 DIAGNOSIS — K31.84 GASTROPARESIS: Chronic | ICD-10-CM

## 2025-03-14 DIAGNOSIS — N18.30 BENIGN HYPERTENSION WITH CKD (CHRONIC KIDNEY DISEASE) STAGE III (HCC): Chronic | ICD-10-CM

## 2025-03-14 DIAGNOSIS — N32.81 OAB (OVERACTIVE BLADDER): ICD-10-CM

## 2025-03-14 DIAGNOSIS — E11.65 TYPE 2 DIABETES MELLITUS WITH HYPERGLYCEMIA, WITH LONG-TERM CURRENT USE OF INSULIN (HCC): ICD-10-CM

## 2025-03-14 DIAGNOSIS — F41.8 DEPRESSION WITH ANXIETY: Chronic | ICD-10-CM

## 2025-03-14 DIAGNOSIS — E78.00 HYPERCHOLESTEREMIA: ICD-10-CM

## 2025-03-14 DIAGNOSIS — Z86.39 HX OF DIABETIC GASTROPARESIS: ICD-10-CM

## 2025-03-14 DIAGNOSIS — I12.9 BENIGN HYPERTENSION WITH CKD (CHRONIC KIDNEY DISEASE) STAGE III (HCC): Chronic | ICD-10-CM

## 2025-03-14 DIAGNOSIS — L01.00 IMPETIGO: ICD-10-CM

## 2025-03-14 PROCEDURE — 99495 TRANSJ CARE MGMT MOD F2F 14D: CPT | Performed by: NURSE PRACTITIONER

## 2025-03-14 PROCEDURE — 99214 OFFICE O/P EST MOD 30 MIN: CPT | Performed by: NURSE PRACTITIONER

## 2025-03-14 RX ORDER — ERGOCALCIFEROL 1.25 MG/1
50000 CAPSULE, LIQUID FILLED ORAL WEEKLY
Qty: 12 CAPSULE | Refills: 3 | Status: SHIPPED | OUTPATIENT
Start: 2025-03-14

## 2025-03-14 RX ORDER — MUPIROCIN 20 MG/G
OINTMENT TOPICAL 3 TIMES DAILY
Qty: 30 G | Refills: 0 | Status: SHIPPED | OUTPATIENT
Start: 2025-03-14

## 2025-03-14 RX ORDER — OXYBUTYNIN CHLORIDE 10 MG/1
10 TABLET, EXTENDED RELEASE ORAL
Qty: 100 TABLET | Refills: 3 | Status: SHIPPED | OUTPATIENT
Start: 2025-03-14

## 2025-03-14 RX ORDER — SCOPOLAMINE 1 MG/3D
1 PATCH, EXTENDED RELEASE TRANSDERMAL
Qty: 10 PATCH | Refills: 0 | Status: SHIPPED | OUTPATIENT
Start: 2025-03-14

## 2025-03-14 NOTE — ASSESSMENT & PLAN NOTE
Lab Results   Component Value Date    EGFR 51 03/12/2025    EGFR 68 03/05/2025    EGFR 86 03/04/2025    CREATININE 1.19 03/12/2025    CREATININE 0.94 03/05/2025    CREATININE 0.78 03/04/2025

## 2025-03-14 NOTE — ASSESSMENT & PLAN NOTE
Depression Screening Follow-up Plan: Patient's depression screening was positive with a PHQ-9 score of 9.

## 2025-03-14 NOTE — PROGRESS NOTES
Transition of Care Visit  Name: Haylee Balbuena      : 1970      MRN: [id number]  Encounter Provider: SYLWIA Larson  Encounter Date: 3/14/2025   Encounter department: [facility]    Assessment & Plan  Impetigo    Orders:    mupirocin (BACTROBAN) 2 % ointment; Apply topically 3 (three) times a day    Diabetic ketoacidosis without coma associated with type 2 diabetes mellitus (HCC)    Lab Results   Component Value Date    HGBA1C 11.2 (H) 2025            Sea sickness, subsequent encounter    Orders:    scopolamine (TRANSDERM-SCOP) 1 mg/3 days TD 72 hr patch; Place 1 patch on the skin over 72 hours every third day    Type 2 diabetes mellitus with hyperglycemia, with long-term current use of insulin (HCC)    Lab Results   Component Value Date    HGBA1C 11.2 (H) 2025       Orders:    ergocalciferol (VITAMIN D2) 50,000 units; Take 1 capsule (50,000 Units total) by mouth once a week    Vitamin D deficiency    Orders:    ergocalciferol (VITAMIN D2) 50,000 units; Take 1 capsule (50,000 Units total) by mouth once a week    OAB (overactive bladder)    Orders:    oxybutynin (DITROPAN-XL) 10 MG 24 hr tablet; Take 1 tablet (10 mg total) by mouth daily at bedtime    Gastroparesis         Benign hypertension with CKD (chronic kidney disease) stage III ([facility])  Lab Results   Component Value Date    EGFR 51 2025    EGFR 68 2025    EGFR 86 2025    CREATININE 1.19 2025    CREATININE 0.94 2025    CREATININE 0.78 2025            Acute renal failure superimposed on stage 3a chronic kidney disease, unspecified acute renal failure type (HCC)  Lab Results   Component Value Date    EGFR 51 2025    EGFR 68 2025    EGFR 86 2025    CREATININE 1.19 2025    CREATININE 0.94 2025    CREATININE 0.78 2025            Depression with anxiety  Depression Screening Follow-up Plan: Patient's depression screening was positive with a PHQ-9  score of 9.          Hypercholesteremia         Hx of diabetic gastroparesis           Depression Screening and Follow-up Plan: Patient's depression screening was positive with a PHQ-9 score of 9.   Patient assessed for underlying major depression. Brief counseling provided and recommend additional follow-up/re-evaluation next office visit. Patient with underlying depression and was advised to continue current medications as prescribed.         History of Present Illness     Transitional Care Management Review:   Haylee Balbuena is a 54 y.o. female here for TCM follow up.     During the TCM phone call patient stated:  TCM Call (since 2/28/2025)       Date and time call was made  3/14/2025 10:44 AM    Hospital care reviewed  Discussed with Inpatient Physician    Patient was hospitialized at  [facility]    Date of Admission  03/02/25    Date of discharge  03/05/25    Disposition  Home    Were the patients medications reviewed and updated  No    Current Symptoms  None          TCM Call (since 2/28/2025)       Post hospital issues  None    Scheduled for follow up?  Yes    Patients specialists  Endocrinologist    Did you obtain your prescribed medications  Yes    Do you need help managing your prescriptions or medications  No    Is transportation to your appointment needed  No    I have advised the patient to call PCP with any new or worsening symptoms  Denise TORIBIO    Living Arrangements  Family members    Support System  Family    The type of support provided  Physical    Do you have social support  Yes, as much as I need          TCM visit and 6 month medcheck/lab review. HgA1c now 11.2, has upcoming visit with Endocrinology    Date of Admission: 3/2/2025   Date of Service: 3/5/2025 I Hospital Day: 3      Assessment & Plan  Nausea, vomiting, and diarrhea  · POA  · Reports n/v/d x3D   · Possibly initially in setting of GI illness followed by ? mild DKA vs dehydration/metabolic acidosis  · S/p IV fluid  resuscitation   · Nausea vomiting diarrhea resolved   Seizure disorder (HCC)  · Continue prehospital Keppra on discharge  Type 2 diabetes mellitus with diabetic autonomic neuropathy, with long-term current use of insulin ([facility])  Lab Results  Component Value Date    HGBA1C 12.0 (H) 03/02/2025        Recent Labs    03/04/25  1655 03/04/25 2035 03/05/25  0709 03/05/25  1115  POCGLU 215* 239* 149* 276*        Blood Sugar Average: Last 72 hrs:  (P) 170.0937253018354011     · Poorly controlled diabetes with A1C of 12.0% which complicated by hyperglycemia and normal anion gap metabolic acidosis with mild elevated BHB, ? Mild DKA vs metabolic acidosis in setting of LINDY.  · Home regimen: u500 55 units before bf, 36 units before lunch and dinner. Jardiance 25 mg daily and Mounjaro 10 mg weekly.  · Appreciate input of endocrinology who followed-will discharge on U-500 40 units before breakfast, and 30 units before lunch and dinner as recommended by endocrinology.  Discontinuing Jardiance on discharge.  Continue prehospital Mounjaro and follow-up with cardiology outpatient ASAP  · Encouraged compliance with diabetic diet on discharge  Gastroparesis  · Continue prehospital Reglan Pepcid Bentyl and Protonix on discharge  GERD (gastroesophageal reflux disease)  · Continue prehospital PPI on discharge  Depression with anxiety  · Discharge on prehospital regimen including Celexa and Keppra  Diabetic polyneuropathy associated with type 2 diabetes mellitus ([facility])  Lab Results  Component Value Date    HGBA1C 12.0 (H) 03/02/2025        Recent Labs    03/04/25  1655 03/04/25 2035 03/05/25  0709 03/05/25  1115  POCGLU 215* 239* 149* 276*        Blood Sugar Average: Last 72 hrs:  (P) 170.2981287744936235     · Continue prehospital Lyrica on discharge  Hypertension  · Blood pressure controlled  · Continue prehospital metoprolol discharge  Mild intermittent asthma without complication  · Continue home PRN Albuterol inhaler/nebs on  discharge  Hypercholesteremia  · Continue prehospital statin on discharge  Iron deficiency anemia secondary to inadequate dietary iron intake  · Continue prehospital iron supplement on discharge  Vitamin D deficiency  · Continue prehospital supplementation on discharge  Acute renal failure superimposed on stage 3a chronic kidney disease (HCC)  Lab Results  Component Value Date    EGFR 68 03/05/2025    EGFR 86 03/04/2025    EGFR 54 03/03/2025    CREATININE 0.94 03/05/2025    CREATININE 0.78 03/04/2025    CREATININE 1.15 03/03/2025  · Creatinine at baseline  · Euvolemic on exam  · Discharging home, follow-up PCP in a week  Renal cell carcinoma (HCC)  · Is s/p R partial nephrectomy in 2017  · Follows w/Nephrology as OP  Obesity (BMI 30-39.9)  · BMI 33.87  · Counseled on diet, exercise, lifestyle changes  Fall  · Reported fall x3 at home without head strike  · Denies any injury and/or wounds  · Low threshold for trauma work-up  · Mobility at baseline discharging home  Metabolic acidosis  · Present on arrival   · AG normal, but bicarb remains low  · Suspect this is multifactorial in setting of dehydration in setting of GI illness vs mild DKA vs bicarb losses in setting of diarrhea vs hyperchloremia  · LA WNL  · Status post IV fluid resuscitation  · Status post insulin infusion since discontinued  · Acidosis resolved  Hyperglycemia due to diabetes mellitus (HCC)  Lab Results  Component Value Date    HGBA1C 12.0 (H) 03/02/2025        Recent Labs    03/04/25  1655 03/04/25  2035 03/05/25  0709 03/05/25  1115  POCGLU 215* 239* 149* 276*        Blood Sugar Average: Last 72 hrs:  (P) 170.4172525786272350  · Non-gapped metabolic acidosis w/? component of mild DKA - gluc 296, AG 13 w/bicarb 18, pH 7.2, BHB 0.99  · Initially admitted to ICU, Slim assumed care of patient today  · But sugars much improved, nausea vomiting diarrhea resolved.  Anxious to go home  · Appreciate input of endocrinology who followed-Per discussion with  endocrinology will discharge on Doose dose of U-500-40 units with breakfast and 30 units before lunch and dinner.  Will continue Mounjaro, discontinue Jardiance on discharge and follow-up with Dr. Padmini DANIELLE     Medical Problems          Resolved Problems  Date Reviewed: 3/3/2025             Resolved    Diarrhea 3/2/2025      Resolved by  SYLWIA Rothman       Discharging Physician / Practitioner: SYLWIA Armstrong  PCP: SYLWIA Larson  Admission Date:   Admission Orders (From admission, onward)           Ordered        25 0912     INPATIENT ADMISSION  Once           25 0825     INPATIENT ADMISSION  Once           25 0827     Inpatient Admission  Once                            Discharge Date: 25     Consultations During Hospital Stay:  · Endocrinology     Procedures Performed:   · None     Significant Findings / Test Results:   · 3/2 VB.22/37.9/35.5/15.5  · 3/2 CMP: Bicarb 18, BUN 64, creatinine 2.26, glucose 296  · 3/2 serial high sensitive troponins negative  · 3/2 lactic acid 0.8  · 3/2 WBC is 7.90     Incidental Findings:   · None     Test Results Pending at Discharge (will require follow up):   · None     Outpatient Tests Requested:  · None     Complications: None     Reason for Admission: Nausea vomiting and diarrhea     Hospital Course:   For full details regarding patient's hospitalization please refer to the contents of the chart and the history and physical as dictated by our critical care colleague Armida De Jesus.  In brief, Haylee Balbuena is a 54 y.o. female patient who originally presented to the hospital on 3/2/2025 due to nausea and vomiting with diarrhea 3 days prior to arrival.  Of note patient has history of gastroparesis and chronic abdominal pain.  VBG on arrival suggested look acidosis.  She was also noted to be hyperglycemic and was initiated on an insulin drip on arrival and admitted to ICU for further management.  She was also noted to have  "an LINDY on CKD stage III on arrival in setting of nausea vomiting and diarrhea.  Treated with IV fluids-now resolved.  Patient stabilized and slim assumed care of patient today.  Patient is euvolemic on exam.  No further nausea vomiting or diarrhea.  Medically stable for discharge today.  Prior to discharge discussed prehospital regimen with endocrinology who followed patient.  Per endocrinology recommendations will reduce patient's U-500 insulin to 40 units with breakfast and 30 units before lunch and dinner, discontinue Jardiance, continue prehospital Mounjaro and have patient follow-up with endocrinology ASAP.  Further advised patient to follow-up with PCP within a week.              Review of Systems   Constitutional:  Negative for activity change, diaphoresis, fatigue and fever.   HENT:  Negative for congestion, facial swelling, hearing loss, rhinorrhea, sinus pressure, sinus pain, sneezing, sore throat and voice change.    Eyes:  Negative for discharge and visual disturbance.   Respiratory:  Negative for cough, choking, chest tightness, shortness of breath, wheezing and stridor.    Cardiovascular:  Negative for chest pain, palpitations and leg swelling.   Gastrointestinal:  Positive for diarrhea, nausea and vomiting (she had N/V/D last night- she believes it was from having eggs for lunch. Today/this morning her symptoms have resolved. She did put on a \"sea sickness patch\" with improvement of symptoms last night. Has increase has- burping and flatulence.). Negative for abdominal distention, abdominal pain and constipation.   Endocrine: Negative for polydipsia, polyphagia and polyuria.   Genitourinary:  Negative for difficulty urinating, dysuria, frequency and urgency.   Musculoskeletal:  Negative for arthralgias, back pain, gait problem, joint swelling, myalgias, neck pain and neck stiffness.   Skin:  Positive for rash (upper lip- started with a sore on upper lip while in the hospital). Negative for color " "change and wound.   Neurological:  Negative for dizziness, syncope, speech difficulty, weakness, light-headedness and headaches.   Hematological:  Negative for adenopathy. Does not bruise/bleed easily.   Psychiatric/Behavioral:  Positive for dysphoric mood. Negative for agitation, behavioral problems, confusion, hallucinations, sleep disturbance and suicidal ideas. The patient is not nervous/anxious.      Objective   /60   Pulse 72   Temp 98.1 °F (36.7 °C)   Resp 18   Ht 5' 1\" (1.549 m)   Wt 80.3 kg (177 lb)   SpO2 98%   BMI 33.44 kg/m²     Physical Exam  Vitals and nursing note reviewed. Exam conducted with a chaperone present (mother in law- Cari).   Constitutional:       General: She is not in acute distress.     Appearance: She is well-developed. She is not diaphoretic.   Cardiovascular:      Rate and Rhythm: Normal rate and regular rhythm.      Heart sounds: Normal heart sounds.   Pulmonary:      Effort: Pulmonary effort is normal. No respiratory distress.      Breath sounds: Normal breath sounds. No wheezing.   Musculoskeletal:         General: No tenderness or deformity. Normal range of motion.      Right lower leg: No edema.      Left lower leg: No edema.   Skin:     General: Skin is warm and dry.      Findings: Erythema and rash (upper lip- circular, open, erythematic, honey crust) present.   Neurological:      Mental Status: She is alert and oriented to person, place, and time.   Psychiatric:         Attention and Perception: Attention normal.         Mood and Affect: Mood normal. Affect is flat.         Behavior: Behavior normal. Behavior is cooperative.         Thought Content: Thought content normal.         Cognition and Memory: Cognition and memory normal.         Judgment: Judgment normal.       Medications have been reviewed by provider in current encounter      "

## 2025-03-17 ENCOUNTER — TELEPHONE (OUTPATIENT)
Age: 55
End: 2025-03-17

## 2025-03-17 NOTE — TELEPHONE ENCOUNTER
Please print CGM report for review if possible?( May be able to lift light duty if her BG is improved.)    Please also call patient to check how she is feeling overall, get verbal permission to speak to her employer on her behalf re: light duty clarification /  hours limitation. At this point if she is feeling well, I would propose we start work back as planned, as it sounds like it may be difficult to accommodate light duty with current work demands.

## 2025-03-17 NOTE — TELEPHONE ENCOUNTER
Spoke to pt and she says her sugars are going up, report on your desk, she's very tired and did give verbal permission for you to talk to her boss. She wants us to call back after a decision is made

## 2025-03-17 NOTE — TELEPHONE ENCOUNTER
Keith from [facility] asking if we can clarify what the light duty for patient entails? 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 Patient is claiming she is unable to do these things. Please call Keith back at [phone]

## 2025-03-20 DIAGNOSIS — E11.43 TYPE 2 DIABETES MELLITUS WITH DIABETIC AUTONOMIC NEUROPATHY, WITH LONG-TERM CURRENT USE OF INSULIN (HCC): ICD-10-CM

## 2025-03-20 DIAGNOSIS — Z79.4 TYPE 2 DIABETES MELLITUS WITH DIABETIC AUTONOMIC NEUROPATHY, WITH LONG-TERM CURRENT USE OF INSULIN (HCC): ICD-10-CM

## 2025-03-20 RX ORDER — EMPAGLIFLOZIN 25 MG/1
25 TABLET, FILM COATED ORAL DAILY
Qty: 90 TABLET | Refills: 1 | Status: SHIPPED | OUTPATIENT
Start: 2025-03-20

## 2025-03-20 NOTE — TELEPHONE ENCOUNTER
Keith from [facility] calling again asking if we can clarify what the light duty for patient entails. Made aware provider was sent records to review and will we will return call.

## 2025-03-21 DIAGNOSIS — K59.00 CONSTIPATION, UNSPECIFIED CONSTIPATION TYPE: ICD-10-CM

## 2025-03-21 RX ORDER — DOCUSATE SODIUM 100 MG/1
100 CAPSULE, LIQUID FILLED ORAL 2 TIMES DAILY
Qty: 180 CAPSULE | Refills: 1 | Status: SHIPPED | OUTPATIENT
Start: 2025-03-21

## 2025-03-24 ENCOUNTER — OFFICE VISIT (OUTPATIENT)
Dept: ENDOCRINOLOGY | Facility: CLINIC | Age: 55
End: 2025-03-24
Payer: COMMERCIAL

## 2025-03-24 VITALS
DIASTOLIC BLOOD PRESSURE: 62 MMHG | SYSTOLIC BLOOD PRESSURE: 114 MMHG | BODY MASS INDEX: 34.85 KG/M2 | TEMPERATURE: 97 F | HEART RATE: 72 BPM | WEIGHT: 184.6 LBS | HEIGHT: 61 IN

## 2025-03-24 DIAGNOSIS — Z79.4 TYPE 2 DIABETES MELLITUS WITH HYPERGLYCEMIA, WITH LONG-TERM CURRENT USE OF INSULIN (HCC): Primary | ICD-10-CM

## 2025-03-24 DIAGNOSIS — E11.65 TYPE 2 DIABETES MELLITUS WITH HYPERGLYCEMIA, WITH LONG-TERM CURRENT USE OF INSULIN (HCC): Primary | ICD-10-CM

## 2025-03-24 PROCEDURE — 99213 OFFICE O/P EST LOW 20 MIN: CPT | Performed by: PHYSICIAN ASSISTANT

## 2025-03-24 NOTE — TELEPHONE ENCOUNTER
Late entry - as of Friday 3/21/25 spoke with both patient and her employer re: light duty concern. Overall will plan to review/discuss in more detail at today's visit.

## 2025-03-24 NOTE — LETTER
March 24, 2025     Patient: Haylee Balbuena  YOB: 1970  Date of Visit: 3/24/2025      To Whom it May Concern:    Haylee Balbuena is under my professional care. Haylee GARCIAS was seen in my office on 3/24/2025. Haylee GARCIAS may return to work on 3/25/25 without restrictions .    If you have any questions or concerns, please don't hesitate to call.         Sincerely,          Adriana So PA-C        CC: No Recipients

## 2025-03-24 NOTE — PROGRESS NOTES
Name: Haylee Balbuena      : 1970      MRN: [id number]  Encounter Provider: Adriana So PA-C  Encounter Date: 3/24/2025   Encounter department: [facility]    No chief complaint on file.  :  Assessment & Plan  Type 2 diabetes mellitus with hyperglycemia, with long-term current use of insulin (HCC)    Lab Results   Component Value Date    HGBA1C 11.2 (H) 2025   CGM report reviewed - in need of better glycemic control during daytime hours, but with mild hypoglycemia, near hypoglycemia overnight many nights. Congratulated her on her progress however, her BG averages are greatly improved compared to her baseline.    Continue U500 but will change to 45 units before breakfast, 25 units before dinner. Of note, finds consolidated BID dosing more ideal, has improved compliance versus TID dosing.     Healthy dietary choices and regular physical activity encouraged  as part of DM management plan    Has been off work for 2 weeks in setting of recovery from recent hospitalization. Now feeling better, ok to return to work without restrictions.     Follow up - 2 months.             History of Present Illness     Haylee Balbuena is a 54 y.o. female with h/o uncontrolled type 2 DM, here for routine follow up.   Most recent endocrinology visit: 3/10/25. At that time we restarted metformin cautiously, redistributed U500 insulin from TID to BID dosing due to ease of dosing / compliance issues. GLP1 was continued.     Currently taking Humulin U500 40 units with breakfast, 30 units with dinner.   Also continues on Mounjaro 10mg weekly without complaints. Has been tolerating Mounjaro much better than Trulicity, which lead to abdominal discomfort, bloating.    Appettie fair, variable. No N/V, No abdominal discomfort at this time.   Took a recent cruise from 3/16 - 3/23.    2 week CGM report:   Haylee Balbuena   Device used: Dexcom G7  Home use     Indication: Type 2  "Diabetes  More than 72 hours of data was reviewed. Report to be scanned to chart.   Date Range: 3/11/25 - 3/24/25    Analysis of data:   % time CGM used: 94%  Average Glucose: 214mg/dL  Coefficient of Variation: 34.7%  GMI: 8.4%  Time in Target Range: 31% in range  Time Above Range: 38% high, 30% very high  Time Below Range: 1% low, < 1% very low    Interpretation of data:  high variability with limited time in range, with GMI 8.4%. However, improving control overall the past 2 weeks, especially given Haylee's A1c values over the past 2 years have been consistently > 11%!           Review of Systems as per HPI  Medical History Reviewed by provider this encounter:  Tobacco  Allergies  Meds  Problems  Med Hx  Surg Hx  Fam Hx     .     Medical History Reviewed by provider this encounter:     .    Objective   /62 (Patient Position: Sitting, Cuff Size: Standard)   Pulse 72   Temp (!) 97 °F (36.1 °C) (Temporal)   Ht 5' 1\" (1.549 m)   Wt 83.7 kg (184 lb 9.6 oz)   BMI 34.88 kg/m²      Body mass index is 34.88 kg/m².  Wt Readings from Last 3 Encounters:   03/24/25 83.7 kg (184 lb 9.6 oz)   03/14/25 80.3 kg (177 lb)   03/10/25 83.9 kg (185 lb)     Physical Exam    Labs:   Lab Results   Component Value Date    HGBA1C 11.2 (H) 03/12/2025    HGBA1C 12.0 (H) 03/02/2025    HGBA1C 11.5 (H) 10/12/2024     Lab Results   Component Value Date    CREATININE 1.19 03/12/2025    CREATININE 0.94 03/05/2025    CREATININE 0.78 03/04/2025    BUN 17 03/12/2025    K 4.3 03/12/2025     03/12/2025    CO2 29 03/12/2025     GFR, Calculated   Date Value Ref Range Status   08/10/2020 80 >60 mL/min/1.73m2 Final     Comment:     mL/min per 1.73 square meters                                            Normal Function or Mild Renal    Disease (if clinically at risk):  >or=60  Moderately Decreased:                30-59  Severely Decreased:                  15-29  Renal Failure:                         <15                             "                -American GFR: multiply reported GFR by 1.16    Please note that the eGFR is based on the CKD-EPI calculation, and is not intended to be used for drug dosing.                                            Note: Calculated GFR may not be an accurate indicator of renal function if the patient's renal function is not in a steady state.     eGFR   Date Value Ref Range Status   03/12/2025 51 ml/min/1.73sq m Final     Lab Results   Component Value Date    HDL 47 (L) 03/12/2025    TRIG 180 (H) 03/12/2025     Lab Results   Component Value Date    ALT 32 03/12/2025    AST 33 03/12/2025    ALKPHOS 71 03/12/2025     Lab Results   Component Value Date    ZGS2THMXDGZB 4.515 (H) 03/12/2025    JOP3EXNKFISH 1.440 03/02/2025    GZI8HUHPXVXG 1.784 07/24/2023     Lab Results   Component Value Date    FREET4 0.97 03/12/2025       There are no Patient Instructions on file for this visit.    Discussed with the patient and all questioned fully answered. She will call me if any problems arise.

## 2025-03-25 DIAGNOSIS — Z79.4 TYPE 2 DIABETES MELLITUS WITH HYPERGLYCEMIA, WITH LONG-TERM CURRENT USE OF INSULIN (HCC): ICD-10-CM

## 2025-03-25 DIAGNOSIS — E11.65 TYPE 2 DIABETES MELLITUS WITH HYPERGLYCEMIA, WITH LONG-TERM CURRENT USE OF INSULIN (HCC): ICD-10-CM

## 2025-03-26 RX ORDER — ASPIRIN 81 MG/1
81 TABLET ORAL DAILY
Qty: 30 TABLET | Refills: 5 | Status: SHIPPED | OUTPATIENT
Start: 2025-03-26

## 2025-04-01 PROBLEM — R19.7 NAUSEA, VOMITING, AND DIARRHEA: Status: RESOLVED | Noted: 2025-03-02 | Resolved: 2025-04-01

## 2025-04-01 PROBLEM — R11.2 NAUSEA, VOMITING, AND DIARRHEA: Status: RESOLVED | Noted: 2025-03-02 | Resolved: 2025-04-01

## 2025-04-01 PROBLEM — E11.10 DIABETIC KETOACIDOSIS WITHOUT COMA ASSOCIATED WITH TYPE 2 DIABETES MELLITUS (HCC): Status: RESOLVED | Noted: 2025-03-02 | Resolved: 2025-04-01

## 2025-04-10 ENCOUNTER — APPOINTMENT (EMERGENCY)
Dept: CT IMAGING | Facility: HOSPITAL | Age: 55
End: 2025-04-10
Payer: COMMERCIAL

## 2025-04-10 ENCOUNTER — HOSPITAL ENCOUNTER (EMERGENCY)
Facility: HOSPITAL | Age: 55
Discharge: HOME | End: 2025-04-10
Attending: EMERGENCY MEDICINE
Payer: COMMERCIAL

## 2025-04-10 VITALS
WEIGHT: 184 LBS | HEIGHT: 61 IN | RESPIRATION RATE: 20 BRPM | OXYGEN SATURATION: 96 % | BODY MASS INDEX: 34.74 KG/M2 | SYSTOLIC BLOOD PRESSURE: 114 MMHG | DIASTOLIC BLOOD PRESSURE: 56 MMHG | HEART RATE: 60 BPM | TEMPERATURE: 97.9 F

## 2025-04-10 DIAGNOSIS — K31.84 GASTROPARESIS DUE TO DM  (HCC): ICD-10-CM

## 2025-04-10 DIAGNOSIS — E86.0 DEHYDRATION: Primary | ICD-10-CM

## 2025-04-10 DIAGNOSIS — E11.43 GASTROPARESIS DUE TO DM  (HCC): ICD-10-CM

## 2025-04-10 LAB
ALBUMIN SERPL BCG-MCNC: 3.9 G/DL (ref 3.5–5)
ALP SERPL-CCNC: 49 U/L (ref 34–104)
ALT SERPL W P-5'-P-CCNC: 28 U/L (ref 7–52)
ANION GAP SERPL CALCULATED.3IONS-SCNC: 5 MMOL/L (ref 4–13)
AST SERPL W P-5'-P-CCNC: 24 U/L (ref 13–39)
ATRIAL RATE: 66 BPM
B-OH-BUTYR SERPL-MCNC: <0.05 MMOL/L (ref 0.02–0.27)
BASE EX.OXY STD BLDV CALC-SCNC: 57.7 % (ref 60–80)
BASE EXCESS BLDV CALC-SCNC: -2.3 MMOL/L
BILIRUB SERPL-MCNC: 0.34 MG/DL (ref 0.2–1)
BUN SERPL-MCNC: 29 MG/DL (ref 5–25)
CALCIUM SERPL-MCNC: 8.9 MG/DL (ref 8.4–10.2)
CARDIAC TROPONIN I PNL SERPL HS: <2 NG/L (ref ?–50)
CARDIAC TROPONIN I PNL SERPL HS: <2 NG/L (ref ?–50)
CHLORIDE SERPL-SCNC: 105 MMOL/L (ref 96–108)
CO2 SERPL-SCNC: 28 MMOL/L (ref 21–32)
CREAT SERPL-MCNC: 1.04 MG/DL (ref 0.6–1.3)
ERYTHROCYTE [DISTWIDTH] IN BLOOD BY AUTOMATED COUNT: 14.2 % (ref 11.6–15.1)
GFR SERPL CREATININE-BSD FRML MDRD: 61 ML/MIN/1.73SQ M
GLUCOSE SERPL-MCNC: 128 MG/DL (ref 65–140)
GLUCOSE SERPL-MCNC: 143 MG/DL (ref 65–140)
HCO3 BLDV-SCNC: 24.3 MMOL/L (ref 24–30)
HCT VFR BLD AUTO: 40.1 % (ref 34.8–46.1)
HGB BLD-MCNC: 12.5 G/DL (ref 11.5–15.4)
MAGNESIUM SERPL-MCNC: 1.9 MG/DL (ref 1.9–2.7)
MCH RBC QN AUTO: 26 PG (ref 26.8–34.3)
MCHC RBC AUTO-ENTMCNC: 31.2 G/DL (ref 31.4–37.4)
MCV RBC AUTO: 84 FL (ref 82–98)
O2 CT BLDV-SCNC: 10.6 ML/DL
P AXIS: 61 DEGREES
PCO2 BLDV: 48.9 MM HG (ref 42–50)
PH BLDV: 7.31 [PH] (ref 7.3–7.4)
PLATELET # BLD AUTO: 335 THOUSANDS/UL (ref 149–390)
PMV BLD AUTO: 10.6 FL (ref 8.9–12.7)
PO2 BLDV: 32.7 MM HG (ref 35–45)
POTASSIUM SERPL-SCNC: 4.1 MMOL/L (ref 3.5–5.3)
PR INTERVAL: 170 MS
PROT SERPL-MCNC: 6.4 G/DL (ref 6.4–8.4)
QRS AXIS: 26 DEGREES
QRSD INTERVAL: 82 MS
QT INTERVAL: 428 MS
QTC INTERVAL: 448 MS
RBC # BLD AUTO: 4.8 MILLION/UL (ref 3.81–5.12)
SODIUM SERPL-SCNC: 138 MMOL/L (ref 135–147)
T WAVE AXIS: 34 DEGREES
VENTRICULAR RATE: 66 BPM
WBC # BLD AUTO: 9.9 THOUSAND/UL (ref 4.31–10.16)

## 2025-04-10 PROCEDURE — 82948 REAGENT STRIP/BLOOD GLUCOSE: CPT

## 2025-04-10 PROCEDURE — 96375 TX/PRO/DX INJ NEW DRUG ADDON: CPT

## 2025-04-10 PROCEDURE — 93010 ELECTROCARDIOGRAM REPORT: CPT | Performed by: INTERNAL MEDICINE

## 2025-04-10 PROCEDURE — 83735 ASSAY OF MAGNESIUM: CPT | Performed by: EMERGENCY MEDICINE

## 2025-04-10 PROCEDURE — 99285 EMERGENCY DEPT VISIT HI MDM: CPT | Performed by: EMERGENCY MEDICINE

## 2025-04-10 PROCEDURE — 84484 ASSAY OF TROPONIN QUANT: CPT | Performed by: EMERGENCY MEDICINE

## 2025-04-10 PROCEDURE — 96374 THER/PROPH/DIAG INJ IV PUSH: CPT

## 2025-04-10 PROCEDURE — 36415 COLL VENOUS BLD VENIPUNCTURE: CPT | Performed by: EMERGENCY MEDICINE

## 2025-04-10 PROCEDURE — 82010 KETONE BODYS QUAN: CPT | Performed by: EMERGENCY MEDICINE

## 2025-04-10 PROCEDURE — 99284 EMERGENCY DEPT VISIT MOD MDM: CPT

## 2025-04-10 PROCEDURE — 74176 CT ABD & PELVIS W/O CONTRAST: CPT

## 2025-04-10 PROCEDURE — 80053 COMPREHEN METABOLIC PANEL: CPT | Performed by: EMERGENCY MEDICINE

## 2025-04-10 PROCEDURE — 82805 BLOOD GASES W/O2 SATURATION: CPT | Performed by: EMERGENCY MEDICINE

## 2025-04-10 PROCEDURE — 93005 ELECTROCARDIOGRAM TRACING: CPT

## 2025-04-10 PROCEDURE — 85027 COMPLETE CBC AUTOMATED: CPT | Performed by: EMERGENCY MEDICINE

## 2025-04-10 PROCEDURE — 96361 HYDRATE IV INFUSION ADD-ON: CPT

## 2025-04-10 RX ORDER — SODIUM CHLORIDE 9 MG/ML
3 INJECTION INTRAVENOUS
Status: DISCONTINUED | OUTPATIENT
Start: 2025-04-10 | End: 2025-04-10 | Stop reason: HOSPADM

## 2025-04-10 RX ORDER — METOCLOPRAMIDE HYDROCHLORIDE 5 MG/ML
10 INJECTION INTRAMUSCULAR; INTRAVENOUS ONCE
Status: COMPLETED | OUTPATIENT
Start: 2025-04-10 | End: 2025-04-10

## 2025-04-10 RX ADMIN — MORPHINE SULFATE 2 MG: 2 INJECTION, SOLUTION INTRAMUSCULAR; INTRAVENOUS at 06:05

## 2025-04-10 RX ADMIN — SODIUM CHLORIDE 1000 ML: 0.9 INJECTION, SOLUTION INTRAVENOUS at 06:00

## 2025-04-10 RX ADMIN — METOCLOPRAMIDE 10 MG: 5 INJECTION, SOLUTION INTRAMUSCULAR; INTRAVENOUS at 09:41

## 2025-04-10 NOTE — ED PROCEDURE NOTE
PROCEDURE  ECG 12 Lead Documentation Only    Date/Time: 4/10/2025 6:12 AM    Performed by: Robi Rivera MD  Authorized by: Robi Rivera MD    Indications / Diagnosis:  Nausea  ECG reviewed by me, the ED Provider: yes    Patient location:  ED  Previous ECG:     Previous ECG:  Compared to current    Comparison ECG info:  March 2, 2025    Similarity:  No change  Interpretation:     Interpretation: non-specific    Rate:     ECG rate:  66    ECG rate assessment: normal    Rhythm:     Rhythm: sinus rhythm    Ectopy:     Ectopy: none    QRS:     QRS axis:  Normal  Conduction:     Conduction: normal    ST segments:     ST segments:  Normal  T waves:     T waves: normal    Comments:      Low voltage noted no acute changes.       Robi Rivera MD  04/10/25 0612

## 2025-04-10 NOTE — ED PROVIDER NOTES
Time reflects when diagnosis was documented in both MDM as applicable and the Disposition within this note       Time User Action Codes Description Comment    4/10/2025  6:08 AM Robi Rivera Add [E86.0] Dehydration     4/10/2025  6:08 AM Robi Rivera Add [E11.43,  K31.84] Gastroparesis due to DM  (HCC)           ED Disposition       ED Disposition   Discharge    Condition   Stable    Date/Time   Thu Apr 10, 2025  9:20 AM    Comment   Haylee Balbuena discharge to home/self care.                   Assessment & Plan       Medical Decision Making  PT HAS GADTROPAREISIS, AND DIABTESE BUT NO SIGNS OF DKA, ALTERATION N MS, SEPSIS, OR HYPOXIA. SHE HAS MILD TO MODERATE DEHYDRATION AND ABD PAIN WILL ORDER CT SCA ABD PELVIS, AND ENDORSE TO THE AM ER PHYSICIAN.    Amount and/or Complexity of Data Reviewed  Labs: ordered.  Radiology: ordered.    Risk  Prescription drug management.        ED Course as of 04/10/25 1000   Thu Apr 10, 2025   0539 CBC normal venous pH 731 pCO2 48 normal for venous sample.   0540 Beta hydroxybutyrate less than 0.05 therefore no evidence of DKA.  Blood sugar 143 normal.       Medications   sodium chloride (PF) 0.9 % injection 3 mL (has no administration in time range)   sodium chloride 0.9 % bolus 1,000 mL (0 mL Intravenous Stopped 4/10/25 0800)   morphine injection 2 mg (2 mg Intravenous Given 4/10/25 0605)   metoclopramide (REGLAN) injection 10 mg (10 mg Intravenous Given 4/10/25 0941)       ED Risk Strat Scores   HEART Risk Score      Flowsheet Row Most Recent Value   Heart Score Risk Calculator    History 0 Filed at: 04/10/2025 0655   ECG 0 Filed at: 04/10/2025 0655   Age 1 Filed at: 04/10/2025 0655   Risk Factors 1 Filed at: 04/10/2025 0655   Troponin 0 Filed at: 04/10/2025 0655   HEART Score 2 Filed at: 04/10/2025 0655          HEART Risk Score      Flowsheet Row Most Recent Value   Heart Score Risk Calculator    History 0 Filed at: 04/10/2025 0655   ECG 0 Filed at: 04/10/2025 0655    Age 1 Filed at: 04/10/2025 0655   Risk Factors 1 Filed at: 04/10/2025 0655   Troponin 0 Filed at: 04/10/2025 0655   HEART Score 2 Filed at: 04/10/2025 0655                      No data recorded        SBIRT 22yo+      Flowsheet Row Most Recent Value   Initial Alcohol Screen: US AUDIT-C     1. How often do you have a drink containing alcohol? 0 Filed at: 04/10/2025 0513   2. How many drinks containing alcohol do you have on a typical day you are drinking?  0 Filed at: 04/10/2025 0513   3b. FEMALE Any Age, or MALE 65+: How often do you have 4 or more drinks on one occassion? 0 Filed at: 04/10/2025 0513   Audit-C Score 0 Filed at: 04/10/2025 0513   LILIA: How many times in the past year have you...    Used an illegal drug or used a prescription medication for non-medical reasons? Never Filed at: 04/10/2025 0513                            History of Present Illness       Chief Complaint   Patient presents with    Abdominal Pain     Elevated blood sugars and difficulty regulating them. Recent admission for dka. States she passed out this morning.       Past Medical History:   Diagnosis Date    Atypical chest pain     Bilateral calf pain 2024    Diabetes mellitus (HCC)     Gastroparesis     GERD (gastroesophageal reflux disease)     Hyperlipidemia     Hypertension     MRSA bacteremia 2023    Psychiatric disorder     Sciatica     Seizure disorder (HCC)       Past Surgical History:   Procedure Laterality Date    APPENDECTOMY      BREAST BIOPSY Left  benign     SECTION      CHOLECYSTECTOMY      COLONOSCOPY      HYSTERECTOMY      IR PICC PLACEMENT SINGLE LUMEN  2023    OR LAPAROSCOPIC APPENDECTOMY N/A 2021    Procedure: APPENDECTOMY LAPAROSCOPIC;  Surgeon: Onel Martin MD;  Location:  MAIN OR;  Service: General    OR LAPS ABD PRTM&OMENTUM DX W/WO SPEC BR/WA SPX N/A 2021    Procedure: LAPAROSCOPY DIAGNOSTIC, EXTENSIVE DESI;  Surgeon: Onel Martin MD;  Location:  MAIN OR;   Service: General    TUBAL LIGATION      TYMPANOSTOMY TUBE PLACEMENT Bilateral 2024    UPPER GASTROINTESTINAL ENDOSCOPY        Family History   Problem Relation Age of Onset    No Known Problems Mother     No Known Problems Father     No Known Problems Daughter     No Known Problems Maternal Grandmother     No Known Problems Paternal Grandmother     No Known Problems Paternal Aunt     No Known Problems Paternal Aunt     No Known Problems Paternal Aunt       Social History     Tobacco Use    Smoking status: Former     Current packs/day: 0.00     Types: Cigarettes     Quit date:      Years since quittin.2    Smokeless tobacco: Never   Vaping Use    Vaping status: Never Used   Substance Use Topics    Alcohol use: Never     Comment: occ    Drug use: Never      E-Cigarette/Vaping    E-Cigarette Use Never User       E-Cigarette/Vaping Substances    Nicotine No     THC No     CBD No     Flavoring No     Other No     Unknown No       I have reviewed and agree with the history as documented.     54-year-old known diabetic complaining of vomiting x 1 and feeling abdominal discomfort so she came to the emergency department.  She has a history of diabetes insulin-dependent gastroparesis GERD hypertension.  Patient states she did take her insulin the evening dose.  Review of the chart shows that in March she was here and had a hemoglobin A1c of 12%.  At that time she was admitted for DKA complication.  Patient denies fever chills no dysuria no diarrhea.  No chest pain no sore throat.      Abdominal Pain  Associated symptoms: fatigue and nausea    Associated symptoms: no chest pain, no chills, no cough, no diarrhea, no dysuria, no fever, no hematuria, no shortness of breath, no sore throat and no vomiting        Review of Systems   Constitutional:  Positive for fatigue. Negative for chills and fever.   HENT:  Negative for ear pain and sore throat.    Eyes:  Negative for pain and visual disturbance.   Respiratory:   Negative for cough and shortness of breath.    Cardiovascular:  Negative for chest pain and palpitations.   Gastrointestinal:  Positive for abdominal pain and nausea. Negative for diarrhea and vomiting.   Genitourinary:  Negative for difficulty urinating, dysuria, flank pain, frequency and hematuria.   Musculoskeletal:  Negative for arthralgias and back pain.   Skin:  Negative for color change and rash.   Neurological:  Negative for seizures and syncope.   All other systems reviewed and are negative.          Objective       ED Triage Vitals [04/10/25 0510]   Temperature Pulse Blood Pressure Respirations SpO2 Patient Position - Orthostatic VS   (!) 97.3 °F (36.3 °C) 73 141/66 16 96 % Lying      Temp Source Heart Rate Source BP Location FiO2 (%) Pain Score    Temporal Monitor Left arm -- 9      Vitals      Date and Time Temp Pulse SpO2 Resp BP Pain Score FACES Pain Rating User   04/10/25 0936 -- -- -- -- -- 8 -- BEBETO   04/10/25 0936 97.9 °F (36.6 °C) 60 96 % 20 114/56 -- -- TMS   04/10/25 0900 -- 65 95 % 16 105/54 -- -- BEBETO   04/10/25 0830 -- 78 93 % 18 119/58 9 -- BEBETO   04/10/25 0730 -- 64 98 % 12 120/60 -- -- BEBETO   04/10/25 0510 97.3 °F (36.3 °C) 73 96 % 16 141/66 9 -- TG            Physical Exam  Vitals and nursing note reviewed.   Constitutional:       General: She is not in acute distress.     Appearance: She is well-developed.      Comments: Patient is awake slow to respond but makes good eye contact.   HENT:      Head: Normocephalic and atraumatic.      Mouth/Throat:      Mouth: Mucous membranes are moist.      Pharynx: Oropharynx is clear.   Eyes:      Extraocular Movements: Extraocular movements intact.      Conjunctiva/sclera: Conjunctivae normal.      Pupils: Pupils are equal, round, and reactive to light.   Cardiovascular:      Rate and Rhythm: Normal rate and regular rhythm.      Heart sounds: No murmur heard.  Pulmonary:      Effort: Pulmonary effort is normal. No respiratory distress.      Breath  sounds: Normal breath sounds.   Abdominal:      General: Abdomen is protuberant. Bowel sounds are increased. There is no distension.      Palpations: Abdomen is soft.      Tenderness: There is abdominal tenderness. There is rebound. There is no right CVA tenderness, left CVA tenderness or guarding.      Hernia: No hernia is present.   Musculoskeletal:         General: No swelling.      Cervical back: Neck supple.   Skin:     General: Skin is warm and dry.      Capillary Refill: Capillary refill takes less than 2 seconds.      Coloration: Skin is not cyanotic or pale.   Neurological:      General: No focal deficit present.      Mental Status: She is alert and oriented to person, place, and time.   Psychiatric:         Mood and Affect: Mood normal.         Results Reviewed       Procedure Component Value Units Date/Time    HS Troponin I 2hr [[id number]] Collected: 04/10/25 0837    Lab Status: Final result Specimen: Blood from Arm, Right Updated: 04/10/25 0907     hs TnI 2hr <2 ng/L      Delta 2hr hsTnI --    UA w Reflex to Microscopic w Reflex to Culture [[id number]]     Lab Status: No result Specimen: Urine     HS Troponin 0hr (reflex protocol) [[id number]]  (Normal) Collected: 04/10/25 0519    Lab Status: Final result Specimen: Blood from Arm, Right Updated: 04/10/25 0630     hs TnI 0hr <2 ng/L     Beta Hydroxybutyrate [[id number]]  (Normal) Collected: 04/10/25 0519    Lab Status: Final result Specimen: Blood from Arm, Right Updated: 04/10/25 0539     Beta- Hydroxybutyrate <0.05 mmol/L     Comprehensive metabolic panel [[id number]]  (Abnormal) Collected: 04/10/25 0519    Lab Status: Final result Specimen: Blood from Arm, Right Updated: 04/10/25 0539     Sodium 138 mmol/L      Potassium 4.1 mmol/L      Chloride 105 mmol/L      CO2 28 mmol/L      ANION GAP 5 mmol/L      BUN 29 mg/dL      Creatinine 1.04 mg/dL      Glucose 143 mg/dL      Calcium 8.9 mg/dL      AST 24 U/L      ALT 28 U/L      Alkaline Phosphatase 49 U/L       Total Protein 6.4 g/dL      Albumin 3.9 g/dL      Total Bilirubin 0.34 mg/dL      eGFR 61 ml/min/1.73sq m     Narrative:      [employer] guidelines for Chronic Kidney Disease (CKD):     Stage 1 with normal or high GFR (GFR > 90 mL/min/1.73 square meters)    Stage 2 Mild CKD (GFR = 60-89 mL/min/1.73 square meters)    Stage 3A Moderate CKD (GFR = 45-59 mL/min/1.73 square meters)    Stage 3B Moderate CKD (GFR = 30-44 mL/min/1.73 square meters)    Stage 4 Severe CKD (GFR = 15-29 mL/min/1.73 square meters)    Stage 5 End Stage CKD (GFR <15 mL/min/1.73 square meters)  Note: GFR calculation is accurate only with a steady state creatinine    Magnesium [[id number]]  (Normal) Collected: 04/10/25 0519    Lab Status: Final result Specimen: Blood from Arm, Right Updated: 04/10/25 0539     Magnesium 1.9 mg/dL     Blood gas, venous [[id number]]  (Abnormal) Collected: 04/10/25 0521    Lab Status: Final result Specimen: Blood from Arm, Left Updated: 04/10/25 0528     pH, Ovidio 7.314     pCO2, Ovidio 48.9 mm Hg      pO2, Ovidio 32.7 mm Hg      HCO3, Ovidio 24.3 mmol/L      Base Excess, Ovidio -2.3 mmol/L      O2 Content, Ovidio 10.6 ml/dL      O2 HGB, VENOUS 57.7 %     CBC [[id number]]  (Abnormal) Collected: 04/10/25 0519    Lab Status: Final result Specimen: Blood from Arm, Right Updated: 04/10/25 0525     WBC 9.90 Thousand/uL      RBC 4.80 Million/uL      Hemoglobin 12.5 g/dL      Hematocrit 40.1 %      MCV 84 fL      MCH 26.0 pg      MCHC 31.2 g/dL      RDW 14.2 %      Platelets 335 Thousands/uL      MPV 10.6 fL     Fingerstick Glucose (POCT) [[id number]]  (Normal) Collected: 04/10/25 0511    Lab Status: Final result Specimen: Blood Updated: 04/10/25 0512     POC Glucose 128 mg/dl             CT abdomen pelvis wo contrast   Final Interpretation by Juan Workman DO (04/10 0708)      Mild gastric distention, large amount of heterogeneous material in the stomach, correlates with the patient's history of gastroparesis.  No evidence of bowel obstruction.      Partially distended bladder. Mild circumferential bladder wall thickening, possibly exaggerated by under distention. Consider a cystitis in the appropriate clinical setting. Correlation with the patient's symptoms, laboratory values, and urinalysis    recommended.      Coronary atherosclerosis, right renal cysts, and other findings as above.      Workstation performed: [id number]             Procedures    ED Medication and Procedure Management   Prior to Admission Medications   Prescriptions Last Dose Informant Patient Reported? Taking?   Alcohol Swabs (Pharmacist Choice Alcohol) PADS  Self No No   Sig: Apply topically 4 (four) times a day Use as directed   B-D UF III MINI PEN NEEDLES 31G X 5 MM MISC  Self No No   Sig: Pt uses 5 pen needles daily   Continuous Glucose Sensor (Dexcom G7 Sensor)   No No   Sig: Use 1 Device every 10 days   Glucagon (Baqsimi One Pack) 3 MG/DOSE POWD   No No   Si each into each nostril if needed (PRN for hypoglycemia emergency)   Insulin Regular Human, Conc, (HumuLIN R U-500 KwikPen) 500 units/mL CONCENTRATED U-500 injection pen   No No   Sig: E11.65 inject 40 units with breakfast, 30 units with lunch and dinner, or as directed TDD up to 200 units   Iron Heme Polypeptide 12 MG TABS  Self Yes No   Sig: Take 1 tablet by mouth   Jardiance 25 MG TABS   No No   Sig: TAKE ONE TABLET BY MOUTH EVERY MORNING   Magnesium Citrate 200 MG TABS   No No   Sig: Take 1 tablet by mouth 2 (two) times a day   OneTouch Ultra test strip  Self No No   Sig: USE 4 TIMES A DAY   Pharmacist Choice Lancets MISC  Self No No   Sig: USE AS DIRECTED   Restasis 0.05 % ophthalmic emulsion  Self Yes No   Tirzepatide 10 MG/0.5ML SOAJ   No No   Sig: Inject 10 mg under the skin once a week   albuterol (PROVENTIL HFA,VENTOLIN HFA) 90 mcg/act inhaler  Self No No   Sig: INHALE ONE PUFF BY MOUTH AND INTO THE LUNGS EVERY 6 HOURS AS NEEDED FOR WHEEZING   aspirin (ECOTRIN LOW STRENGTH) 81  mg EC tablet   No No   Sig: TAKE ONE TABLET BY MOUTH ONCE DAILY   atorvastatin (LIPITOR) 80 mg tablet   No No   Sig: TAKE ONE TABLET BY MOUTH DAILY AT BEDTIME   cholecalciferol (VITAMIN D3) 1,000 units tablet   No No   Sig: Take 2 tablets (2,000 Units total) by mouth daily   citalopram (CeleXA) 40 mg tablet   No No   Sig: TAKE ONE TABLET BY MOUTH DAILY   dicyclomine (BENTYL) 20 mg tablet   No No   Sig: Take twice daily (am and before bedtime), may take additional 2 doses per day, max 4x/day (every 6 hours if needed)   docusate sodium (COLACE) 100 mg capsule   No No   Sig: TAKE ONE CAPSULE BY MOUTH TWICE DAILY   ergocalciferol (VITAMIN D2) 50,000 units   No No   Sig: Take 1 capsule (50,000 Units total) by mouth once a week   famotidine (PEPCID) 40 MG tablet   No No   Sig: TAKE ONE TABLET BY MOUTH TWO HOURS PRIOR TO BEDTIME   fenofibrate micronized (LOFIBRA) 134 MG capsule  Self No No   Sig: Take 1 capsule (134 mg total) by mouth daily with breakfast   levETIRAcetam (KEPPRA) 500 mg tablet   No No   Sig: TAKE ONE TABLET BY MOUTH TWICE DAILY   linaCLOtide (Linzess) 72 MCG CAPS  Self No No   Sig: Take 1 capsule by mouth daily before breakfast   meclizine (ANTIVERT) 25 mg tablet  Self No No   Sig: Take 1 tablet (25 mg total) by mouth 3 (three) times a day as needed for dizziness   metFORMIN (GLUCOPHAGE-XR) 500 mg 24 hr tablet   No No   Sig: Take 2 tablets (1,000 mg total) by mouth daily with breakfast   methocarbamol (ROBAXIN) 500 mg tablet   No No   Sig: Take 1 tablet (500 mg total) by mouth 3 (three) times a day as needed for muscle spasms   metoclopramide (Reglan) 10 mg tablet  Self No No   Sig: Take 0.5 tablets (5 mg total) by mouth every 6 (six) hours   metoprolol tartrate (LOPRESSOR) 25 mg tablet   No No   Sig: TAKE ONE TABLET BY MOUTH TWICE DAILY   multivitamin (THERAGRAN) TABS  Self Yes No   Sig: Take 1 tablet by mouth every morning   mupirocin (BACTROBAN) 2 % ointment   No No   Sig: Apply topically 3 (three)  times a day   ondansetron (ZOFRAN) 4 mg tablet   No No   Sig: TAKE 1 TABLET (4 MG TOTAL) BY MOUTH EVERY 8 (EIGHT) HOURS AS NEEDED FOR NAUSEA OR VOMITING   oxybutynin (DITROPAN-XL) 10 MG 24 hr tablet   No No   Sig: Take 1 tablet (10 mg total) by mouth daily at bedtime   pantoprazole (PROTONIX) 40 mg tablet   No No   Sig: TAKE ONE TABLET BY MOUTH TWICE DAILY   pregabalin (LYRICA) 100 mg capsule   No No   Sig: TAKE ONE CAPSULE BY MOUTH THREE TIMES A DAY   scopolamine (TRANSDERM-SCOP) 1 mg/3 days TD 72 hr patch   No No   Sig: Place 1 patch on the skin over 72 hours every third day      Facility-Administered Medications: None     Discharge Medication List as of 4/10/2025  9:21 AM        CONTINUE these medications which have NOT CHANGED    Details   albuterol (PROVENTIL HFA,VENTOLIN HFA) 90 mcg/act inhaler INHALE ONE PUFF BY MOUTH AND INTO THE LUNGS EVERY 6 HOURS AS NEEDED FOR WHEEZING, Normal      Alcohol Swabs (Pharmacist Choice Alcohol) PADS Apply topically 4 (four) times a day Use as directed, Starting Tue 9/5/2023, Normal      aspirin (ECOTRIN LOW STRENGTH) 81 mg EC tablet TAKE ONE TABLET BY MOUTH ONCE DAILY, Starting Wed 3/26/2025, Normal      atorvastatin (LIPITOR) 80 mg tablet TAKE ONE TABLET BY MOUTH DAILY AT BEDTIME, Starting Tue 1/7/2025, Normal      B-D UF III MINI PEN NEEDLES 31G X 5 MM MISC Pt uses 5 pen needles daily, Normal      cholecalciferol (VITAMIN D3) 1,000 units tablet Take 2 tablets (2,000 Units total) by mouth daily, Starting Thu 10/17/2024, Normal      citalopram (CeleXA) 40 mg tablet TAKE ONE TABLET BY MOUTH DAILY, Starting Fri 11/15/2024, Normal      Continuous Glucose Sensor (Dexcom G7 Sensor) Use 1 Device every 10 days, Starting Mon 10/28/2024, Normal      dicyclomine (BENTYL) 20 mg tablet Take twice daily (am and before bedtime), may take additional 2 doses per day, max 4x/day (every 6 hours if needed), Normal      docusate sodium (COLACE) 100 mg capsule TAKE ONE CAPSULE BY MOUTH TWICE  DAILY, Starting Fri 3/21/2025, Normal      ergocalciferol (VITAMIN D2) 50,000 units Take 1 capsule (50,000 Units total) by mouth once a week, Starting Fri 3/14/2025, Normal      famotidine (PEPCID) 40 MG tablet TAKE ONE TABLET BY MOUTH TWO HOURS PRIOR TO BEDTIME, Normal      fenofibrate micronized (LOFIBRA) 134 MG capsule Take 1 capsule (134 mg total) by mouth daily with breakfast, Starting Fri 1/12/2024, Normal      Glucagon (Baqsimi One Pack) 3 MG/DOSE POWD 1 each into each nostril if needed (PRN for hypoglycemia emergency), Starting Thu 11/14/2024, Normal      Insulin Regular Human, Conc, (HumuLIN R U-500 KwikPen) 500 units/mL CONCENTRATED U-500 injection pen E11.65 inject 40 units with breakfast, 30 units with lunch and dinner, or as directed TDD up to 200 units, Normal      Iron Heme Polypeptide 12 MG TABS Take 1 tablet by mouth, Historical Med      Jardiance 25 MG TABS TAKE ONE TABLET BY MOUTH EVERY MORNING, Starting Thu 3/20/2025, Normal      levETIRAcetam (KEPPRA) 500 mg tablet TAKE ONE TABLET BY MOUTH TWICE DAILY, Starting Fri 1/24/2025, Normal      linaCLOtide (Linzess) 72 MCG CAPS Take 1 capsule by mouth daily before breakfast, Starting Tue 9/5/2023, Normal      Magnesium Citrate 200 MG TABS Take 1 tablet by mouth 2 (two) times a day, Starting Thu 9/12/2024, Normal      meclizine (ANTIVERT) 25 mg tablet Take 1 tablet (25 mg total) by mouth 3 (three) times a day as needed for dizziness, Starting Mon 7/10/2023, Normal      metFORMIN (GLUCOPHAGE-XR) 500 mg 24 hr tablet Take 2 tablets (1,000 mg total) by mouth daily with breakfast, Starting Mon 3/10/2025, Normal      methocarbamol (ROBAXIN) 500 mg tablet Take 1 tablet (500 mg total) by mouth 3 (three) times a day as needed for muscle spasms, Starting Tue 5/14/2024, Normal      metoclopramide (Reglan) 10 mg tablet Take 0.5 tablets (5 mg total) by mouth every 6 (six) hours, Starting Thu 12/21/2023, Normal      metoprolol tartrate (LOPRESSOR) 25 mg tablet TAKE  ONE TABLET BY MOUTH TWICE DAILY, Starting Wed 11/27/2024, Normal      multivitamin (THERAGRAN) TABS Take 1 tablet by mouth every morning, Historical Med      mupirocin (BACTROBAN) 2 % ointment Apply topically 3 (three) times a day, Starting Fri 3/14/2025, Normal      ondansetron (ZOFRAN) 4 mg tablet TAKE 1 TABLET (4 MG TOTAL) BY MOUTH EVERY 8 (EIGHT) HOURS AS NEEDED FOR NAUSEA OR VOMITING, Starting Thu 10/3/2024, Normal      OneTouch Ultra test strip USE 4 TIMES A DAY, Normal      oxybutynin (DITROPAN-XL) 10 MG 24 hr tablet Take 1 tablet (10 mg total) by mouth daily at bedtime, Starting Fri 3/14/2025, Normal      pantoprazole (PROTONIX) 40 mg tablet TAKE ONE TABLET BY MOUTH TWICE DAILY, Starting Wed 2/5/2025, Normal      Pharmacist Choice Lancets MISC USE AS DIRECTED, Normal      pregabalin (LYRICA) 100 mg capsule TAKE ONE CAPSULE BY MOUTH THREE TIMES A DAY, Starting Tue 11/26/2024, Normal      Restasis 0.05 % ophthalmic emulsion Starting Thu 3/2/2023, Historical Med      scopolamine (TRANSDERM-SCOP) 1 mg/3 days TD 72 hr patch Place 1 patch on the skin over 72 hours every third day, Starting Fri 3/14/2025, Normal      Tirzepatide 10 MG/0.5ML SOAJ Inject 10 mg under the skin once a week, Starting Mon 1/27/2025, Normal           No discharge procedures on file.  ED SEPSIS DOCUMENTATION   Time reflects when diagnosis was documented in both MDM as applicable and the Disposition within this note       Time User Action Codes Description Comment    4/10/2025  6:08 AM Robi Rivera [E86.0] Dehydration     4/10/2025  6:08 AM Robi Rivera [E11.43,  K31.84] Gastroparesis due to DM  (HCC)                  Robi Rivera MD  04/10/25 1000

## 2025-04-10 NOTE — Clinical Note
Haylee Balbuena was seen and treated in our emergency department on 4/10/2025.                Diagnosis:     Haylee GARCIAS  .    She may return on this date: 04/12/2025         If you have any questions or concerns, please don't hesitate to call.      Js Freeman, DO    ______________________________           _______________          _______________  Hospital Representative                              Date                                Time

## 2025-04-10 NOTE — ED CARE HANDOFF
Emergency Department Sign Out Note        Sign out and transfer of care from [facility]. See Separate Emergency Department note.     The patient, Haylee Balbuena, was evaluated by the previous provider for abdominal pain, nausea.    Workup Completed:  Labs, CT pending. Mild dehydration    ED Course / Workup Pending (followup):  CT, re-eval, PO challenge      HEART Risk Score      Flowsheet Row Most Recent Value   Heart Score Risk Calculator    History 0 Filed at: 04/10/2025 0655   ECG 0 Filed at: 04/10/2025 0655   Age 1 Filed at: 04/10/2025 0655   Risk Factors 1 Filed at: 04/10/2025 0655   Troponin 0 Filed at: 04/10/2025 0655   HEART Score 2 Filed at: 04/10/2025 0655          No data recorded                          ED Course as of 04/10/25 1401   Thu Apr 10, 2025   0921 Patient sleeping comfortably in the bed, no vomiting while in the emergency department.  Informed of results, she is comfortable with discharge at this time will give a dose of Reglan prior to discharge.     Procedures  Medical Decision Making  Amount and/or Complexity of Data Reviewed  Labs: ordered.  Radiology: ordered.    Risk  Prescription drug management.            Disposition  Final diagnoses:   Dehydration   Gastroparesis due to DM  (HCC)     Time reflects when diagnosis was documented in both MDM as applicable and the Disposition within this note       Time User Action Codes Description Comment    4/10/2025  6:08 AM Rboi Rivera Add [E86.0] Dehydration     4/10/2025  6:08 AM Robi Rivera Add [E11.43,  K31.84] Gastroparesis due to DM  (HCC)           ED Disposition       None          Follow-up Information    None       Patient's Medications   Discharge Prescriptions    No medications on file     No discharge procedures on file.       ED Provider  Electronically Signed by     Js Freeman DO  04/10/25 1401

## 2025-04-12 DIAGNOSIS — F31.9 BIPOLAR AFFECTIVE DISORDER, REMISSION STATUS UNSPECIFIED (HCC): ICD-10-CM

## 2025-04-14 RX ORDER — CITALOPRAM HYDROBROMIDE 40 MG/1
40 TABLET ORAL DAILY
Qty: 90 TABLET | Refills: 1 | Status: SHIPPED | OUTPATIENT
Start: 2025-04-14

## 2025-04-23 ENCOUNTER — HOSPITAL ENCOUNTER (EMERGENCY)
Facility: HOSPITAL | Age: 55
Discharge: HOME | End: 2025-04-23
Attending: EMERGENCY MEDICINE
Payer: COMMERCIAL

## 2025-04-23 VITALS
DIASTOLIC BLOOD PRESSURE: 78 MMHG | SYSTOLIC BLOOD PRESSURE: 138 MMHG | TEMPERATURE: 97.7 F | HEART RATE: 64 BPM | OXYGEN SATURATION: 97 % | RESPIRATION RATE: 17 BRPM

## 2025-04-23 DIAGNOSIS — R10.9 ABDOMINAL PAIN: Primary | ICD-10-CM

## 2025-04-23 DIAGNOSIS — E11.9 DM (DIABETES MELLITUS) (HCC): ICD-10-CM

## 2025-04-23 DIAGNOSIS — K31.84 GASTROPARESIS: ICD-10-CM

## 2025-04-23 LAB
ALBUMIN SERPL BCG-MCNC: 3.8 G/DL (ref 3.5–5)
ALP SERPL-CCNC: 52 U/L (ref 34–104)
ALT SERPL W P-5'-P-CCNC: 26 U/L (ref 7–52)
ANION GAP SERPL CALCULATED.3IONS-SCNC: 6 MMOL/L (ref 4–13)
AST SERPL W P-5'-P-CCNC: 22 U/L (ref 13–39)
ATRIAL RATE: 65 BPM
B-OH-BUTYR SERPL-MCNC: <0.05 MMOL/L (ref 0.02–0.27)
BASE EX.OXY STD BLDV CALC-SCNC: 67.9 % (ref 60–80)
BASE EXCESS BLDV CALC-SCNC: 0.4 MMOL/L
BILIRUB SERPL-MCNC: 0.31 MG/DL (ref 0.2–1)
BILIRUB UR QL STRIP: NEGATIVE
BUN SERPL-MCNC: 28 MG/DL (ref 5–25)
CALCIUM SERPL-MCNC: 9 MG/DL (ref 8.4–10.2)
CHLORIDE SERPL-SCNC: 102 MMOL/L (ref 96–108)
CLARITY UR: CLEAR
CO2 SERPL-SCNC: 30 MMOL/L (ref 21–32)
COLOR UR: YELLOW
CREAT SERPL-MCNC: 1 MG/DL (ref 0.6–1.3)
ERYTHROCYTE [DISTWIDTH] IN BLOOD BY AUTOMATED COUNT: 14.5 % (ref 11.6–15.1)
FLUAV RNA RESP QL NAA+PROBE: NEGATIVE
FLUBV RNA RESP QL NAA+PROBE: NEGATIVE
GFR SERPL CREATININE-BSD FRML MDRD: 64 ML/MIN/1.73SQ M
GLUCOSE SERPL-MCNC: 166 MG/DL (ref 65–140)
GLUCOSE UR STRIP-MCNC: ABNORMAL MG/DL
HCO3 BLDV-SCNC: 24.1 MMOL/L (ref 24–30)
HCT VFR BLD AUTO: 37.7 % (ref 34.8–46.1)
HGB BLD-MCNC: 12.1 G/DL (ref 11.5–15.4)
HGB UR QL STRIP.AUTO: NEGATIVE
KETONES UR STRIP-MCNC: NEGATIVE MG/DL
LEUKOCYTE ESTERASE UR QL STRIP: NEGATIVE
LIPASE SERPL-CCNC: 67 U/L (ref 11–82)
MAGNESIUM SERPL-MCNC: 1.9 MG/DL (ref 1.9–2.7)
MCH RBC QN AUTO: 26.2 PG (ref 26.8–34.3)
MCHC RBC AUTO-ENTMCNC: 32.1 G/DL (ref 31.4–37.4)
MCV RBC AUTO: 82 FL (ref 82–98)
NITRITE UR QL STRIP: NEGATIVE
O2 CT BLDV-SCNC: 11.9 ML/DL
P AXIS: 30 DEGREES
PCO2 BLDV: 35.8 MM HG (ref 42–50)
PH BLDV: 7.45 [PH] (ref 7.3–7.4)
PH UR STRIP.AUTO: 6 [PH]
PLATELET # BLD AUTO: 351 THOUSANDS/UL (ref 149–390)
PMV BLD AUTO: 10.9 FL (ref 8.9–12.7)
PO2 BLDV: 33.3 MM HG (ref 35–45)
POTASSIUM SERPL-SCNC: 4.2 MMOL/L (ref 3.5–5.3)
PR INTERVAL: 168 MS
PROT SERPL-MCNC: 6.2 G/DL (ref 6.4–8.4)
PROT UR STRIP-MCNC: NEGATIVE MG/DL
QRS AXIS: 17 DEGREES
QRSD INTERVAL: 78 MS
QT INTERVAL: 438 MS
QTC INTERVAL: 455 MS
RBC # BLD AUTO: 4.61 MILLION/UL (ref 3.81–5.12)
RSV RNA RESP QL NAA+PROBE: NEGATIVE
SARS-COV-2 RNA RESP QL NAA+PROBE: NEGATIVE
SODIUM SERPL-SCNC: 138 MMOL/L (ref 135–147)
SP GR UR STRIP.AUTO: 1.02
T WAVE AXIS: 19 DEGREES
UROBILINOGEN UR QL STRIP.AUTO: 0.2 E.U./DL
VENTRICULAR RATE: 65 BPM
WBC # BLD AUTO: 8.02 THOUSAND/UL (ref 4.31–10.16)

## 2025-04-23 PROCEDURE — 83735 ASSAY OF MAGNESIUM: CPT | Performed by: EMERGENCY MEDICINE

## 2025-04-23 PROCEDURE — 82805 BLOOD GASES W/O2 SATURATION: CPT | Performed by: EMERGENCY MEDICINE

## 2025-04-23 PROCEDURE — 99284 EMERGENCY DEPT VISIT MOD MDM: CPT

## 2025-04-23 PROCEDURE — 99285 EMERGENCY DEPT VISIT HI MDM: CPT | Performed by: EMERGENCY MEDICINE

## 2025-04-23 PROCEDURE — 83690 ASSAY OF LIPASE: CPT | Performed by: EMERGENCY MEDICINE

## 2025-04-23 PROCEDURE — 82010 KETONE BODYS QUAN: CPT | Performed by: EMERGENCY MEDICINE

## 2025-04-23 PROCEDURE — 96375 TX/PRO/DX INJ NEW DRUG ADDON: CPT

## 2025-04-23 PROCEDURE — 0241U HB NFCT DS VIR RESP RNA 4 TRGT: CPT | Performed by: EMERGENCY MEDICINE

## 2025-04-23 PROCEDURE — 96361 HYDRATE IV INFUSION ADD-ON: CPT

## 2025-04-23 PROCEDURE — 85027 COMPLETE CBC AUTOMATED: CPT | Performed by: EMERGENCY MEDICINE

## 2025-04-23 PROCEDURE — 80053 COMPREHEN METABOLIC PANEL: CPT | Performed by: EMERGENCY MEDICINE

## 2025-04-23 PROCEDURE — 93005 ELECTROCARDIOGRAM TRACING: CPT

## 2025-04-23 PROCEDURE — 36415 COLL VENOUS BLD VENIPUNCTURE: CPT | Performed by: EMERGENCY MEDICINE

## 2025-04-23 PROCEDURE — 81003 URINALYSIS AUTO W/O SCOPE: CPT | Performed by: EMERGENCY MEDICINE

## 2025-04-23 PROCEDURE — 96365 THER/PROPH/DIAG IV INF INIT: CPT

## 2025-04-23 PROCEDURE — 93010 ELECTROCARDIOGRAM REPORT: CPT | Performed by: INTERNAL MEDICINE

## 2025-04-23 RX ORDER — ACETAMINOPHEN 10 MG/ML
1000 INJECTION, SOLUTION INTRAVENOUS ONCE
Status: COMPLETED | OUTPATIENT
Start: 2025-04-23 | End: 2025-04-23

## 2025-04-23 RX ORDER — FENTANYL CITRATE 50 UG/ML
50 INJECTION, SOLUTION INTRAMUSCULAR; INTRAVENOUS ONCE
Refills: 0 | Status: COMPLETED | OUTPATIENT
Start: 2025-04-23 | End: 2025-04-23

## 2025-04-23 RX ORDER — SODIUM CHLORIDE 9 MG/ML
3 INJECTION INTRAVENOUS
Status: DISCONTINUED | OUTPATIENT
Start: 2025-04-23 | End: 2025-04-23 | Stop reason: HOSPADM

## 2025-04-23 RX ADMIN — SODIUM CHLORIDE 1000 ML: 0.9 INJECTION, SOLUTION INTRAVENOUS at 07:44

## 2025-04-23 RX ADMIN — FENTANYL CITRATE 50 MCG: 50 INJECTION INTRAMUSCULAR; INTRAVENOUS at 08:11

## 2025-04-23 RX ADMIN — ACETAMINOPHEN 1000 MG: 1000 INJECTION, SOLUTION INTRAVENOUS at 08:11

## 2025-04-23 NOTE — ED PROVIDER NOTES
"Time reflects when diagnosis was documented in both MDM as applicable and the Disposition within this note       Time User Action Codes Description Comment    4/23/2025 10:31 AM Ranjeet Cano [R10.9] Abdominal pain     4/23/2025 10:31 AM Ranjeet Cano [K31.84] Gastroparesis     4/23/2025 10:31 AM Ranjeet Cano [E11.9] DM (diabetes mellitus) ([facility])           ED Disposition       ED Disposition   Discharge    Condition   Stable    Date/Time   Wed Apr 23, 2025 10:31 AM    Comment   Haylee Balbuena discharge to home/self care.                   Assessment & Plan       Medical Decision Making  Amount and/or Complexity of Data Reviewed  Labs: ordered.    Risk  Prescription drug management.    Long standing history of gastroparesis follows with GI has not had a long study that was ordered back in February of this year, fluctuating blood sugars last calendar week, abdominal pain that resolved after IV fluid hydration, pain medicine via the IV, no evidence of gap metabolic acidosis on labs, negative beta hydroxybutyrate, patient stable for discharge to home, did have a discussion with the patient at the bedside about with CT imaging since she had protracted pain, then since she is feeling better hold off on imaging because she has had multiple CAT scans in the last 2 years.    Portions of the record may have been created with voice recognition software. Occasional wrong word or \"sound a like\" substitutions may have occurred due to the inherent limitations of voice recognition software. Read the chart carefully and recognize, using context, where substitutions have occurred.     ED Course as of 04/23/25 1210   Wed Apr 23, 2025   0733 Patient seen, evaluated, examined, chart reviewed, 96 hours of generalized abdominal cramping fluctuating blood sugars anywhere from mid 300s to upper 400 poorly controlled diabetic.  No vomiting.  History of gastroparesis    Brief focused differential diagnosis in this patient " is as follows: Gastroparesis versus generalized abdominal cramping secondary to elevated blood sugar versus DKA versus other metabolic derangement versus pancreatitis versus UTI.  Multiple abdominal surgeries, at risk for bowel obstructions but no vomiting, pt constipation is normal for her.  Other considerations would be flu like symptoms due to myalgias.   0753 H/H stable, nrl WBC.   0846 Patient is reassessed, fluids are fusing, patient still reporting some mild 5 out of 10 abdominal pain, labs are reassuring.  Glucose 166, no evidence of gap metabolic acidosis, beta hydroxybutyrate undetectable.   0936 Reassessed, still having generalized epigastric abdominal pain, awaiting urinalysis results discussed with patient versus benefit of CT imaging despite having numerous CT imaging previously in the last 2 years, would off until subsequent reassessment and results of the urine analysis.   1033 Reassessed at  the bedside, patient is feeling better, abdominal pain is resolved, UA negative patient states that she is feeling better able to go home, requesting work note, request granted.       Medications   sodium chloride (PF) 0.9 % injection 3 mL (has no administration in time range)   sodium chloride 0.9 % bolus 1,000 mL (0 mL Intravenous Stopped 4/23/25 1039)   acetaminophen (Ofirmev) injection 1,000 mg (0 mg Intravenous Stopped 4/23/25 0929)   fentaNYL injection 50 mcg (50 mcg Intravenous Given 4/23/25 0811)       ED Risk Strat Scores                    No data recorded        SBIRT 20yo+      Flowsheet Row Most Recent Value   Initial Alcohol Screen: US AUDIT-C     1. How often do you have a drink containing alcohol? 0 Filed at: 04/23/2025 0828   2. How many drinks containing alcohol do you have on a typical day you are drinking?  0 Filed at: 04/23/2025 0828   3b. FEMALE Any Age, or MALE 65+: How often do you have 4 or more drinks on one occassion? 0 Filed at: 04/23/2025 0828   Audit-C Score 0 Filed at: 04/23/2025  0828   LILIA: How many times in the past year have you...    Used an illegal drug or used a prescription medication for non-medical reasons? Never Filed at: 2025 0828                            History of Present Illness       Chief Complaint   Patient presents with    Abdominal Pain     Centralized abdominal pain  Described as a cramping       Past Medical History:   Diagnosis Date    Atypical chest pain     Bilateral calf pain 2024    Diabetes mellitus (HCC)     Gastroparesis     GERD (gastroesophageal reflux disease)     Hyperlipidemia     Hypertension     MRSA bacteremia 2023    Psychiatric disorder     Sciatica     Seizure disorder (HCC)       Past Surgical History:   Procedure Laterality Date    APPENDECTOMY      BREAST BIOPSY Left  benign     SECTION      CHOLECYSTECTOMY      COLONOSCOPY      HYSTERECTOMY      IR PICC PLACEMENT SINGLE LUMEN  2023    NC LAPAROSCOPIC APPENDECTOMY N/A 2021    Procedure: APPENDECTOMY LAPAROSCOPIC;  Surgeon: Onel Martin MD;  Location:  MAIN OR;  Service: General    NC LAPS ABD PRTM&OMENTUM DX W/WO SPEC BR/WA SPX N/A 2021    Procedure: LAPAROSCOPY DIAGNOSTIC, EXTENSIVE DESI;  Surgeon: Onel Martin MD;  Location: [facility] OR;  Service: General    TUBAL LIGATION      TYMPANOSTOMY TUBE PLACEMENT Bilateral 2024    UPPER GASTROINTESTINAL ENDOSCOPY        Family History   Problem Relation Age of Onset    No Known Problems Mother     No Known Problems Father     No Known Problems Daughter     No Known Problems Maternal Grandmother     No Known Problems Paternal Grandmother     No Known Problems Paternal Aunt     No Known Problems Paternal Aunt     No Known Problems Paternal Aunt       Social History     Tobacco Use    Smoking status: Former     Current packs/day: 0.00     Types: Cigarettes     Quit date:      Years since quittin.3    Smokeless tobacco: Never   Vaping Use    Vaping status: Never Used   Substance Use  Topics    Alcohol use: Never     Comment: occ    Drug use: Never      E-Cigarette/Vaping    E-Cigarette Use Never User       E-Cigarette/Vaping Substances    Nicotine No     THC No     CBD No     Flavoring No     Other No     Unknown No       I have reviewed and agree with the history as documented.     HPI    Very pleasant, 54-year-old female, arrival by POV with  both which are reliable competent historians with no language barrier concerns presents with a 96-hour history generalized malaise, muscle aches, fluctuating blood sugars, highest blood sugar was in the 400s.  Reported nausea without vomiting, bowel movement was 4 days ago, no history of melena or hematochezia.  No documented fevers, no recent fall, no rash, no night sweats.  Travel outside the geographical area.    Past medical history and surgical history reviewed: Of note patient is a poorly controlled diabetic with a A1c of 12, history of gastroparesis with greater than 4 CT imaging of the abdomen pelvis in the last 2 years, admitted twice in 2025 for hypoglycemia  / DKA.  Full hysterectomy, appendectomy and cholecystectomy.  Hx of GERD w/o espohagitis. Last EGD: 2/12/25: mild erythematous changes in mucosa of antrum.    Follows with Dr. Hidalgo from GI, next appointment May 12, has a barium swallow Duddy scheduled back in February never had this done.    Remaining 12 point review of systems is unremarkable.        Review of Systems   Constitutional: Negative.  Negative for chills, diaphoresis, fatigue and fever.   HENT:  Negative for congestion.    Eyes: Negative.    Respiratory: Negative.  Negative for chest tightness and shortness of breath.    Cardiovascular: Negative.  Negative for chest pain, palpitations and leg swelling.   Gastrointestinal:  Positive for abdominal pain, constipation and nausea. Negative for abdominal distention, anal bleeding, blood in stool, diarrhea, rectal pain and vomiting.   Endocrine: Negative.    Genitourinary:   Positive for dysuria. Negative for difficulty urinating, dyspareunia, enuresis, flank pain, frequency, hematuria and menstrual problem.   Musculoskeletal:  Positive for arthralgias and myalgias.   Skin: Negative.    Allergic/Immunologic: Negative.    Neurological: Negative.    Hematological: Negative.    Psychiatric/Behavioral: Negative.             Objective       ED Triage Vitals   Temperature Pulse Blood Pressure Respirations SpO2 Patient Position - Orthostatic VS   04/23/25 0710 04/23/25 0710 04/23/25 0710 04/23/25 0710 04/23/25 0710 04/23/25 0710   98.6 °F (37 °C) 69 135/83 16 96 % Sitting      Temp Source Heart Rate Source BP Location FiO2 (%) Pain Score    04/23/25 0710 04/23/25 0811 04/23/25 0710 -- 04/23/25 0710    Temporal Monitor Left arm  10 - Worst Possible Pain      Vitals      Date and Time Temp Pulse SpO2 Resp BP Pain Score FACES Pain Rating User   04/23/25 1030 97.7 °F (36.5 °C) 64 97 % 17 138/78 -- --    04/23/25 1000 -- 64 98 % 14 121/57 -- --    04/23/25 0930 -- 65 97 % 20 123/70 -- --    04/23/25 0900 -- 59 94 % 17 132/74 -- --    04/23/25 0830 -- 62 96 % 17 130/79 5 --    04/23/25 0811 -- 69 97 % 20 117/58 -- --    04/23/25 0710 98.6 °F (37 °C) -- -- 16 135/83 10 - Worst Possible Pain -- [facility]   04/23/25 0710 -- 69 96 % -- -- -- --             Physical Exam  Vitals and nursing note reviewed.   Constitutional:       General: She is not in acute distress.     Appearance: She is well-developed. She is obese. She is not ill-appearing, toxic-appearing or diaphoretic.   HENT:      Head: Normocephalic and atraumatic.      Mouth/Throat:      Mouth: Mucous membranes are moist.      Pharynx: Oropharynx is clear.   Eyes:      Extraocular Movements: Extraocular movements intact.      Pupils: Pupils are equal, round, and reactive to light.   Cardiovascular:      Rate and Rhythm: Normal rate and regular rhythm.   Pulmonary:      Effort: Pulmonary effort is normal.      Breath sounds: Normal  breath sounds.   Abdominal:      General: Bowel sounds are normal.      Palpations: Abdomen is soft.      Tenderness: There is abdominal tenderness in the right lower quadrant, epigastric area, periumbilical area and left lower quadrant.   Skin:     General: Skin is warm.      Capillary Refill: Capillary refill takes less than 2 seconds.   Neurological:      General: No focal deficit present.      Mental Status: She is alert and oriented to person, place, and time.   Psychiatric:         Mood and Affect: Mood normal.         Behavior: Behavior normal.         Results Reviewed       Procedure Component Value Units Date/Time    UA w Reflex to Microscopic w Reflex to Culture [[id number]]  (Abnormal) Collected: 04/23/25 0950    Lab Status: Final result Specimen: Urine, Other Updated: 04/23/25 0956     Color, UA Yellow     Clarity, UA Clear     Specific Gravity, UA 1.020     pH, UA 6.0     Leukocytes, UA Negative     Nitrite, UA Negative     Protein, UA Negative mg/dl      Glucose, UA Trace mg/dl      Ketones, UA Negative mg/dl      Urobilinogen, UA 0.2 E.U./dl      Bilirubin, UA Negative     Occult Blood, UA Negative    FLU/RSV/COVID - if FLU/RSV clinically relevant (2hr TAT) [[id number]]  (Normal) Collected: 04/23/25 0748    Lab Status: Final result Specimen: Nares from Nose Updated: 04/23/25 0836     SARS-CoV-2 Negative     INFLUENZA A PCR Negative     INFLUENZA B PCR Negative     RSV PCR Negative    Narrative:      This test has been performed using the CoV-2/Flu/RSV plus assay on the [employer] GeneXpert platform. This test has been validated by the  and verified by the performing laboratory.     This test is designed to amplify and detect the following: nucleocapsid (N), envelope (E), and RNA-dependent RNA polymerase (RdRP) genes of the SARS-CoV-2 genome; matrix (M), basic polymerase (PB2), and acidic protein (PA) segments of the influenza A genome; matrix (M) and non-structural protein (NS) segments of the  influenza B genome, and the nucleocapsid genes of RSV A and RSV B.     Positive results are indicative of the presence of Flu A, Flu B, RSV, and/or SARS-CoV-2 RNA. Positive results for SARS-CoV-2 or suspected novel influenza should be reported to state, local, or federal health departments according to local reporting requirements.      All results should be assessed in conjunction with clinical presentation and other laboratory markers for clinical management.     FOR PEDIATRIC PATIENTS - copy/paste COVID Guidelines URL to browser: [email or web address]       Comprehensive metabolic panel [[id number]]  (Abnormal) Collected: 04/23/25 0744    Lab Status: Final result Specimen: Blood from Arm, Right Updated: 04/23/25 0806     Sodium 138 mmol/L      Potassium 4.2 mmol/L      Chloride 102 mmol/L      CO2 30 mmol/L      ANION GAP 6 mmol/L      BUN 28 mg/dL      Creatinine 1.00 mg/dL      Glucose 166 mg/dL      Calcium 9.0 mg/dL      AST 22 U/L      ALT 26 U/L      Alkaline Phosphatase 52 U/L      Total Protein 6.2 g/dL      Albumin 3.8 g/dL      Total Bilirubin 0.31 mg/dL      eGFR 64 ml/min/1.73sq m     Narrative:      National Kidney Disease Foundation guidelines for Chronic Kidney Disease (CKD):     Stage 1 with normal or high GFR (GFR > 90 mL/min/1.73 square meters)    Stage 2 Mild CKD (GFR = 60-89 mL/min/1.73 square meters)    Stage 3A Moderate CKD (GFR = 45-59 mL/min/1.73 square meters)    Stage 3B Moderate CKD (GFR = 30-44 mL/min/1.73 square meters)    Stage 4 Severe CKD (GFR = 15-29 mL/min/1.73 square meters)    Stage 5 End Stage CKD (GFR <15 mL/min/1.73 square meters)  Note: GFR calculation is accurate only with a steady state creatinine    Beta Hydroxybutyrate [[id number]]  (Normal) Collected: 04/23/25 0744    Lab Status: Final result Specimen: Blood from Arm, Right Updated: 04/23/25 0806     Beta- Hydroxybutyrate <0.05 mmol/L     Lipase [[id number]]  (Normal)  Collected: 25    Lab Status: Final result Specimen: Blood from Arm, Right Updated: 25 0806     Lipase 67 u/L     Magnesium [[id number]]  (Normal) Collected: 25    Lab Status: Final result Specimen: Blood from Arm, Right Updated: 25 0806     Magnesium 1.9 mg/dL     Blood gas, venous [[id number]]  (Abnormal) Collected: 25 0755    Lab Status: Final result Specimen: Blood from Arm, Right Updated: 25 0759     pH, Ovidio 7.446     pCO2, Ovidio 35.8 mm Hg      pO2, Ovidio 33.3 mm Hg      HCO3, Ovidio 24.1 mmol/L      Base Excess, Ovidio 0.4 mmol/L      O2 Content, Ovidio 11.9 ml/dL      O2 HGB, VENOUS 67.9 %     CBC [[id number]]  (Abnormal) Collected: 25    Lab Status: Final result Specimen: Blood from Arm, Right Updated: 25 075     WBC 8.02 Thousand/uL      RBC 4.61 Million/uL      Hemoglobin 12.1 g/dL      Hematocrit 37.7 %      MCV 82 fL      MCH 26.2 pg      MCHC 32.1 g/dL      RDW 14.5 %      Platelets 351 Thousands/uL      MPV 10.9 fL             No orders to display       Procedures    ED Medication and Procedure Management   Prior to Admission Medications   Prescriptions Last Dose Informant Patient Reported? Taking?   Alcohol Swabs (Pharmacist Choice Alcohol) PADS  Self No No   Sig: Apply topically 4 (four) times a day Use as directed   B-D UF III MINI PEN NEEDLES 31G X 5 MM MISC  Self No No   Sig: Pt uses 5 pen needles daily   Continuous Glucose Sensor (Dexcom G7 Sensor)   No No   Sig: Use 1 Device every 10 days   Glucagon (Baqsimi One Pack) 3 MG/DOSE POWD   No No   Si each into each nostril if needed (PRN for hypoglycemia emergency)   Insulin Regular Human, Conc, (HumuLIN R U-500 KwikPen) 500 units/mL CONCENTRATED U-500 injection pen   No No   Sig: E11.65 inject 40 units with breakfast, 30 units with lunch and dinner, or as directed TDD up to 200 units   Iron Heme Polypeptide 12 MG TABS  Self Yes No   Sig: Take 1 tablet by mouth   Jardiance 25 MG TABS   No No   Sig:  TAKE ONE TABLET BY MOUTH EVERY MORNING   Magnesium Citrate 200 MG TABS   No No   Sig: Take 1 tablet by mouth 2 (two) times a day   OneTouch Ultra test strip  Self No No   Sig: USE 4 TIMES A DAY   Pharmacist Choice Lancets MISC  Self No No   Sig: USE AS DIRECTED   Restasis 0.05 % ophthalmic emulsion  Self Yes No   Tirzepatide 10 MG/0.5ML SOAJ   No No   Sig: Inject 10 mg under the skin once a week   albuterol (PROVENTIL HFA,VENTOLIN HFA) 90 mcg/act inhaler  Self No No   Sig: INHALE ONE PUFF BY MOUTH AND INTO THE LUNGS EVERY 6 HOURS AS NEEDED FOR WHEEZING   aspirin (ECOTRIN LOW STRENGTH) 81 mg EC tablet   No No   Sig: TAKE ONE TABLET BY MOUTH ONCE DAILY   atorvastatin (LIPITOR) 80 mg tablet   No No   Sig: TAKE ONE TABLET BY MOUTH DAILY AT BEDTIME   cholecalciferol (VITAMIN D3) 1,000 units tablet   No No   Sig: Take 2 tablets (2,000 Units total) by mouth daily   citalopram (CeleXA) 40 mg tablet   No No   Sig: TAKE ONE TABLET BY MOUTH DAILY   dicyclomine (BENTYL) 20 mg tablet   No No   Sig: Take twice daily (am and before bedtime), may take additional 2 doses per day, max 4x/day (every 6 hours if needed)   docusate sodium (COLACE) 100 mg capsule   No No   Sig: TAKE ONE CAPSULE BY MOUTH TWICE DAILY   ergocalciferol (VITAMIN D2) 50,000 units   No No   Sig: Take 1 capsule (50,000 Units total) by mouth once a week   famotidine (PEPCID) 40 MG tablet   No No   Sig: TAKE ONE TABLET BY MOUTH TWO HOURS PRIOR TO BEDTIME   fenofibrate micronized (LOFIBRA) 134 MG capsule  Self No No   Sig: Take 1 capsule (134 mg total) by mouth daily with breakfast   levETIRAcetam (KEPPRA) 500 mg tablet   No No   Sig: TAKE ONE TABLET BY MOUTH TWICE DAILY   linaCLOtide (Linzess) 72 MCG CAPS  Self No No   Sig: Take 1 capsule by mouth daily before breakfast   meclizine (ANTIVERT) 25 mg tablet  Self No No   Sig: Take 1 tablet (25 mg total) by mouth 3 (three) times a day as needed for dizziness   metFORMIN (GLUCOPHAGE-XR) 500 mg 24 hr tablet   No No    Sig: Take 2 tablets (1,000 mg total) by mouth daily with breakfast   methocarbamol (ROBAXIN) 500 mg tablet   No No   Sig: Take 1 tablet (500 mg total) by mouth 3 (three) times a day as needed for muscle spasms   metoclopramide (Reglan) 10 mg tablet  Self No No   Sig: Take 0.5 tablets (5 mg total) by mouth every 6 (six) hours   metoprolol tartrate (LOPRESSOR) 25 mg tablet   No No   Sig: TAKE ONE TABLET BY MOUTH TWICE DAILY   multivitamin (THERAGRAN) TABS  Self Yes No   Sig: Take 1 tablet by mouth every morning   mupirocin (BACTROBAN) 2 % ointment   No No   Sig: Apply topically 3 (three) times a day   ondansetron (ZOFRAN) 4 mg tablet   No No   Sig: TAKE 1 TABLET (4 MG TOTAL) BY MOUTH EVERY 8 (EIGHT) HOURS AS NEEDED FOR NAUSEA OR VOMITING   oxybutynin (DITROPAN-XL) 10 MG 24 hr tablet   No No   Sig: Take 1 tablet (10 mg total) by mouth daily at bedtime   pantoprazole (PROTONIX) 40 mg tablet   No No   Sig: TAKE ONE TABLET BY MOUTH TWICE DAILY   pregabalin (LYRICA) 100 mg capsule   No No   Sig: TAKE ONE CAPSULE BY MOUTH THREE TIMES A DAY   scopolamine (TRANSDERM-SCOP) 1 mg/3 days TD 72 hr patch   No No   Sig: Place 1 patch on the skin over 72 hours every third day      Facility-Administered Medications: None     Discharge Medication List as of 4/23/2025 10:35 AM        CONTINUE these medications which have NOT CHANGED    Details   albuterol (PROVENTIL HFA,VENTOLIN HFA) 90 mcg/act inhaler INHALE ONE PUFF BY MOUTH AND INTO THE LUNGS EVERY 6 HOURS AS NEEDED FOR WHEEZING, Normal      Alcohol Swabs (Pharmacist Choice Alcohol) PADS Apply topically 4 (four) times a day Use as directed, Starting Tue 9/5/2023, Normal      aspirin (ECOTRIN LOW STRENGTH) 81 mg EC tablet TAKE ONE TABLET BY MOUTH ONCE DAILY, Starting Wed 3/26/2025, Normal      atorvastatin (LIPITOR) 80 mg tablet TAKE ONE TABLET BY MOUTH DAILY AT BEDTIME, Starting Tue 1/7/2025, Normal      B-D UF III MINI PEN NEEDLES 31G X 5 MM MISC Pt uses 5 pen needles daily,  Normal      cholecalciferol (VITAMIN D3) 1,000 units tablet Take 2 tablets (2,000 Units total) by mouth daily, Starting Thu 10/17/2024, Normal      citalopram (CeleXA) 40 mg tablet TAKE ONE TABLET BY MOUTH DAILY, Starting Mon 4/14/2025, Normal      Continuous Glucose Sensor (Dexcom G7 Sensor) Use 1 Device every 10 days, Starting Mon 10/28/2024, Normal      dicyclomine (BENTYL) 20 mg tablet Take twice daily (am and before bedtime), may take additional 2 doses per day, max 4x/day (every 6 hours if needed), Normal      docusate sodium (COLACE) 100 mg capsule TAKE ONE CAPSULE BY MOUTH TWICE DAILY, Starting Fri 3/21/2025, Normal      ergocalciferol (VITAMIN D2) 50,000 units Take 1 capsule (50,000 Units total) by mouth once a week, Starting Fri 3/14/2025, Normal      famotidine (PEPCID) 40 MG tablet TAKE ONE TABLET BY MOUTH TWO HOURS PRIOR TO BEDTIME, Normal      fenofibrate micronized (LOFIBRA) 134 MG capsule Take 1 capsule (134 mg total) by mouth daily with breakfast, Starting Fri 1/12/2024, Normal      Glucagon (Baqsimi One Pack) 3 MG/DOSE POWD 1 each into each nostril if needed (PRN for hypoglycemia emergency), Starting Thu 11/14/2024, Normal      Insulin Regular Human, Conc, (HumuLIN R U-500 KwikPen) 500 units/mL CONCENTRATED U-500 injection pen E11.65 inject 40 units with breakfast, 30 units with lunch and dinner, or as directed TDD up to 200 units, Normal      Iron Heme Polypeptide 12 MG TABS Take 1 tablet by mouth, Historical Med      Jardiance 25 MG TABS TAKE ONE TABLET BY MOUTH EVERY MORNING, Starting Thu 3/20/2025, Normal      levETIRAcetam (KEPPRA) 500 mg tablet TAKE ONE TABLET BY MOUTH TWICE DAILY, Starting Fri 1/24/2025, Normal      linaCLOtide (Linzess) 72 MCG CAPS Take 1 capsule by mouth daily before breakfast, Starting Tue 9/5/2023, Normal      Magnesium Citrate 200 MG TABS Take 1 tablet by mouth 2 (two) times a day, Starting Thu 9/12/2024, Normal      meclizine (ANTIVERT) 25 mg tablet Take 1 tablet (25  mg total) by mouth 3 (three) times a day as needed for dizziness, Starting Mon 7/10/2023, Normal      metFORMIN (GLUCOPHAGE-XR) 500 mg 24 hr tablet Take 2 tablets (1,000 mg total) by mouth daily with breakfast, Starting Mon 3/10/2025, Normal      methocarbamol (ROBAXIN) 500 mg tablet Take 1 tablet (500 mg total) by mouth 3 (three) times a day as needed for muscle spasms, Starting Tue 5/14/2024, Normal      metoclopramide (Reglan) 10 mg tablet Take 0.5 tablets (5 mg total) by mouth every 6 (six) hours, Starting Thu 12/21/2023, Normal      metoprolol tartrate (LOPRESSOR) 25 mg tablet TAKE ONE TABLET BY MOUTH TWICE DAILY, Starting Wed 11/27/2024, Normal      multivitamin (THERAGRAN) TABS Take 1 tablet by mouth every morning, Historical Med      mupirocin (BACTROBAN) 2 % ointment Apply topically 3 (three) times a day, Starting Fri 3/14/2025, Normal      ondansetron (ZOFRAN) 4 mg tablet TAKE 1 TABLET (4 MG TOTAL) BY MOUTH EVERY 8 (EIGHT) HOURS AS NEEDED FOR NAUSEA OR VOMITING, Starting Thu 10/3/2024, Normal      OneTouch Ultra test strip USE 4 TIMES A DAY, Normal      oxybutynin (DITROPAN-XL) 10 MG 24 hr tablet Take 1 tablet (10 mg total) by mouth daily at bedtime, Starting Fri 3/14/2025, Normal      pantoprazole (PROTONIX) 40 mg tablet TAKE ONE TABLET BY MOUTH TWICE DAILY, Starting Wed 2/5/2025, Normal      Pharmacist Choice Lancets MISC USE AS DIRECTED, Normal      pregabalin (LYRICA) 100 mg capsule TAKE ONE CAPSULE BY MOUTH THREE TIMES A DAY, Starting Tue 11/26/2024, Normal      Restasis 0.05 % ophthalmic emulsion Starting Thu 3/2/2023, Historical Med      scopolamine (TRANSDERM-SCOP) 1 mg/3 days TD 72 hr patch Place 1 patch on the skin over 72 hours every third day, Starting Fri 3/14/2025, Normal      Tirzepatide 10 MG/0.5ML SOAJ Inject 10 mg under the skin once a week, Starting Mon 1/27/2025, Normal           No discharge procedures on file.  ED SEPSIS DOCUMENTATION   Time reflects when diagnosis was documented in  both MDM as applicable and the Disposition within this note       Time User Action Codes Description Comment    4/23/2025 10:31 AM Ranjeet Cano [R10.9] Abdominal pain     4/23/2025 10:31 AM Ranjeet Cano [K31.84] Gastroparesis     4/23/2025 10:31 AM Ranjeet Cano [E11.9] DM (diabetes mellitus) (HCC)                  Ranjeet Cano III, DO  04/23/25 1210       Ranjeet Cano III, DO  04/23/25 1210

## 2025-04-23 NOTE — Clinical Note
Haylee Balbuena was seen and treated in our emergency department on 4/23/2025.    No restrictions            Diagnosis:     Haylee GARCIAS  may return to work on return date.    She may return on this date: 04/25/2025         If you have any questions or concerns, please don't hesitate to call.      Ranjeet Cano III, DO    ______________________________           _______________          _______________  Hospital Representative                              Date                                Time

## 2025-04-25 ENCOUNTER — TELEPHONE (OUTPATIENT)
Dept: ENDOCRINOLOGY | Facility: CLINIC | Age: 55
End: 2025-04-25

## 2025-04-25 ENCOUNTER — TELEPHONE (OUTPATIENT)
Age: 55
End: 2025-04-25

## 2025-04-25 ENCOUNTER — OFFICE VISIT (OUTPATIENT)
Dept: FAMILY MEDICINE CLINIC | Facility: CLINIC | Age: 55
End: 2025-04-25
Payer: COMMERCIAL

## 2025-04-25 ENCOUNTER — TELEPHONE (OUTPATIENT)
Dept: FAMILY MEDICINE CLINIC | Facility: CLINIC | Age: 55
End: 2025-04-25

## 2025-04-25 VITALS
RESPIRATION RATE: 18 BRPM | OXYGEN SATURATION: 99 % | HEIGHT: 61 IN | WEIGHT: 181 LBS | DIASTOLIC BLOOD PRESSURE: 70 MMHG | BODY MASS INDEX: 34.17 KG/M2 | TEMPERATURE: 97.7 F | SYSTOLIC BLOOD PRESSURE: 130 MMHG | HEART RATE: 74 BPM

## 2025-04-25 DIAGNOSIS — Z79.4 TYPE 2 DIABETES MELLITUS WITH DIABETIC AUTONOMIC NEUROPATHY, WITH LONG-TERM CURRENT USE OF INSULIN (HCC): ICD-10-CM

## 2025-04-25 DIAGNOSIS — K59.00 CONSTIPATION, UNSPECIFIED CONSTIPATION TYPE: Primary | ICD-10-CM

## 2025-04-25 DIAGNOSIS — E11.43 TYPE 2 DIABETES MELLITUS WITH DIABETIC AUTONOMIC NEUROPATHY, WITH LONG-TERM CURRENT USE OF INSULIN (HCC): ICD-10-CM

## 2025-04-25 DIAGNOSIS — K21.9 GASTROESOPHAGEAL REFLUX DISEASE, UNSPECIFIED WHETHER ESOPHAGITIS PRESENT: ICD-10-CM

## 2025-04-25 DIAGNOSIS — C64.1 RENAL CELL CARCINOMA OF RIGHT KIDNEY (HCC): ICD-10-CM

## 2025-04-25 DIAGNOSIS — F32.2 SEVERE MAJOR DEPRESSIVE DISORDER (HCC): ICD-10-CM

## 2025-04-25 PROCEDURE — 99214 OFFICE O/P EST MOD 30 MIN: CPT | Performed by: NURSE PRACTITIONER

## 2025-04-25 RX ORDER — SUCRALFATE ORAL 1 G/10ML
1 SUSPENSION ORAL 4 TIMES DAILY
Qty: 720 ML | Refills: 0 | Status: SHIPPED | OUTPATIENT
Start: 2025-04-25 | End: 2025-05-01 | Stop reason: SDUPTHER

## 2025-04-25 NOTE — LETTER
2025  ATTN: [facility] Clinical Services Team     Patient: Haylee Balbuena :1970 Member ID:[id number]    Re: Request for Reconsideration of Sucralfate 1 g/10 mL      To Whom It May Concern:   Haylee Balbuena 1970 was denied coverage for the medication Sucralfate 1 g/10 mL on 2025. The denial reason was stated as not covered due to not meeting insurance criteria. I am requesting a redetermination of the denial of coverage as my patient would benefit from the liquid form of medication as recommended by GI specialist.       In conclusion, please reconsider the denial of Sucralfate 1 g/10 mL  for my patient. I would appreciate prompt review of this appeal and approval of this FDA-approved medication. I can be reached by phone at [phone] or via fax at [phone] for additional information and discussion. Thank you.      Sincerely,   SYLWIA Haynes

## 2025-04-25 NOTE — PROGRESS NOTES
Name: Haylee Balbuena      : 1970      MRN: [id number]  Encounter Provider: SYLWIA Boyer  Encounter Date: 2025   Encounter department: [facility] PRIMARY [facility]  :  Assessment & Plan  Gastroesophageal reflux disease, unspecified whether esophagitis present    Orders:    sucralfate (CARAFATE) 1 g/10 mL suspension; Take 10 mL (1 g total) by mouth 4 (four) times a day    Constipation, unspecified constipation type    Orders:    linaCLOtide 145 MCG CAPS; Take 1 capsule (145 mcg total) by mouth daily at least 30 minutes prior to the first meal of the day    Severe major depressive disorder (HCC)  Appears stable at this time          Renal cell carcinoma of right kidney (HCC)  Following with nephrology        Type 2 diabetes mellitus with diabetic autonomic neuropathy, with long-term current use of insulin (HCC)    Lab Results   Component Value Date    HGBA1C 11.2 (H) 2025   Interested in an insulin pump, endo to reschedule patient sooner                 History of Present Illness   Patient presents for a follow up visit from several ED visits, concerning abdominal pain and dehydration with fluctuating blood glucose levels   Medications reviewed and we have spoke via secure chat with gi and endo    We have discussed holding the mounjaro and will be interested in an insulin pump   She will also increase the linzess     Abdominal Pain  Associated symptoms include constipation and nausea. Pertinent negatives include no arthralgias, dysuria, fever, hematuria or vomiting.     Review of Systems   Constitutional:  Negative for chills and fever.   HENT:  Negative for ear pain and sore throat.    Eyes:  Negative for pain and visual disturbance.   Respiratory:  Negative for cough and shortness of breath.    Cardiovascular:  Negative for chest pain and palpitations.   Gastrointestinal:  Positive for abdominal pain, constipation and nausea. Negative for vomiting.   Genitourinary:  Negative for  "dysuria and hematuria.   Musculoskeletal:  Negative for arthralgias and back pain.   Skin:  Negative for color change and rash.   Neurological:  Negative for seizures and syncope.   All other systems reviewed and are negative.      Objective   /70   Pulse 74   Temp 97.7 °F (36.5 °C) (Temporal)   Resp 18   Ht 5' 1\" (1.549 m)   Wt 82.1 kg (181 lb)   SpO2 99%   BMI 34.20 kg/m²      Physical Exam  Vitals and nursing note reviewed.   Constitutional:       General: She is not in acute distress.     Appearance: She is well-developed. She is ill-appearing.   HENT:      Head: Normocephalic and atraumatic.   Eyes:      Conjunctiva/sclera: Conjunctivae normal.   Cardiovascular:      Rate and Rhythm: Normal rate and regular rhythm.      Heart sounds: No murmur heard.  Pulmonary:      Effort: Pulmonary effort is normal. No respiratory distress.      Breath sounds: Normal breath sounds.   Abdominal:      Palpations: Abdomen is soft.      Tenderness: There is abdominal tenderness.   Musculoskeletal:         General: No swelling.      Cervical back: Neck supple.   Skin:     General: Skin is warm and dry.      Capillary Refill: Capillary refill takes less than 2 seconds.   Neurological:      Mental Status: She is alert and oriented to person, place, and time.   Psychiatric:         Mood and Affect: Mood normal.         Behavior: Behavior normal.         "

## 2025-04-25 NOTE — ASSESSMENT & PLAN NOTE
Orders:    sucralfate (CARAFATE) 1 g/10 mL suspension; Take 10 mL (1 g total) by mouth 4 (four) times a day

## 2025-04-25 NOTE — ASSESSMENT & PLAN NOTE
Lab Results   Component Value Date    HGBA1C 11.2 (H) 03/12/2025   Interested in an insulin pump, endo to reschedule patient sooner

## 2025-04-25 NOTE — TELEPHONE ENCOUNTER
PA for sucralfate (CARAFATE) 1 g/10 mL SUBMITTED to [employer]    via    [x]CMM-KEY: [id number]  []Surescripts-Case ID #   []Availity-Auth ID # NDC #   []Faxed to plan   []Other website   []Phone call Case ID #     []PA sent as URGENT    All office notes, labs and other pertaining documents and studies sent. Clinical questions answered. Awaiting determination from insurance company.     Turnaround time for your insurance to make a decision on your Prior Authorization can take 7-21 business days.

## 2025-04-25 NOTE — TELEPHONE ENCOUNTER
Spoke with PT 4/25/25 and they confirmed appt for next week Monday 4/28 at 3:30pm with RW-message sent to OM to schedule. Patient believes she would be interested in a pump and would like to discuss. Patient states she may be a few minutes late to this appt-I did not have any later appts available (3:30 is Adriana's latest, and 's 3:40pm appts are booked in the time frame Adriana suggested we schedule this patient, next 1-2 wks). Patient states she will not be more than 15 minutes late, advised for patient to call if so-may be subject to a reschedule if more than 15 minutes late. Patient has our number and I informed them they can call if needed before appt as she does not feel well, sugars fluctuating.

## 2025-04-25 NOTE — TELEPHONE ENCOUNTER
Called spoke with patient discussed stopping Mounjaro. Discussed picking up Carafate, Patient takes U-500 twice daily once in the morning once in the night with meals

## 2025-04-25 NOTE — TELEPHONE ENCOUNTER
----- Message from Adriana So PA-C sent at 4/25/2025 11:25 AM EDT -----  Regarding: high risk patient in need of follow up  Any openings to move up Haylee for a sooner follow up visit, within the next 2 weeks if possible?   She's been in and out of ER recently a few times.    Thank you!

## 2025-04-28 ENCOUNTER — OFFICE VISIT (OUTPATIENT)
Dept: ENDOCRINOLOGY | Facility: CLINIC | Age: 55
End: 2025-04-28
Payer: COMMERCIAL

## 2025-04-28 VITALS
WEIGHT: 184.6 LBS | HEART RATE: 88 BPM | BODY MASS INDEX: 34.85 KG/M2 | HEIGHT: 61 IN | TEMPERATURE: 97.8 F | OXYGEN SATURATION: 99 % | SYSTOLIC BLOOD PRESSURE: 110 MMHG | DIASTOLIC BLOOD PRESSURE: 70 MMHG

## 2025-04-28 DIAGNOSIS — E11.43 TYPE 2 DIABETES MELLITUS WITH DIABETIC AUTONOMIC NEUROPATHY, WITH LONG-TERM CURRENT USE OF INSULIN (HCC): ICD-10-CM

## 2025-04-28 DIAGNOSIS — Z79.4 TYPE 2 DIABETES MELLITUS WITH DIABETIC AUTONOMIC NEUROPATHY, WITH LONG-TERM CURRENT USE OF INSULIN (HCC): ICD-10-CM

## 2025-04-28 DIAGNOSIS — R79.89 ABNORMAL TSH: ICD-10-CM

## 2025-04-28 DIAGNOSIS — Z79.4 TYPE 2 DIABETES MELLITUS WITH HYPERGLYCEMIA, WITH LONG-TERM CURRENT USE OF INSULIN (HCC): Primary | ICD-10-CM

## 2025-04-28 DIAGNOSIS — E11.65 TYPE 2 DIABETES MELLITUS WITH HYPERGLYCEMIA, WITH LONG-TERM CURRENT USE OF INSULIN (HCC): Primary | ICD-10-CM

## 2025-04-28 PROCEDURE — 99215 OFFICE O/P EST HI 40 MIN: CPT | Performed by: PHYSICIAN ASSISTANT

## 2025-04-28 RX ORDER — INSULIN HUMAN 500 [IU]/ML
INJECTION, SOLUTION SUBCUTANEOUS
Status: SHIPPED
Start: 2025-04-28

## 2025-04-28 NOTE — PROGRESS NOTES
Name: Haylee Balbuena      : 1970      MRN: [id number]  Encounter Provider: SYLWIA Dawn  Encounter Date: 2025   Encounter department: [facility]  :  Assessment & Plan  Stage 3a chronic kidney disease (HCC)  Lab Results   Component Value Date    EGFR 64 2025    EGFR 61 04/10/2025    EGFR 51 2025    CREATININE 1.00 2025    CREATININE 1.04 04/10/2025    CREATININE 1.19 2025   Baseline creatinine around 1 mg/dL.  Follows Dr. Yancey  Creatinine 1.0 mg/dL, GFR 64 mL/min, 2025  Etiology decreased nephron mass s/p renal resection D/T renal cell carcinoma, hypertensive nephrosclerosis, diabetic nephropathy and obesity related FSGS.  UA with trace glucose, 2025.  UACR unable to complete, 10/12/2024.  CT AP with normal appearance of the left kidney.  Parapelvic cyst on the right.  No hydronephrosis.  Postsurgical changes in the right kidney, otherwise grossly unremarkable, 4/10/2025.  Volume status examines euvoelmic  Continue to avoid NSAIDs and nephrotoxins.  Stay well-hydrated.  No current indication for RAAS or SGLT2.Orders:    Basic metabolic panel; Future    CBC and differential; Future    Primary hypertension  Blood pressure 128/82 mmHg  Home blood pressure does not check at home  Continue 2 g sodium restriction  Continue metoprolol 25 mg twice daily     Type 2 diabetes mellitus with diabetic autonomic neuropathy, with long-term current use of insulin ([facility])    Lab Results   Component Value Date    HGBA1C 11.2 (H) 2025   Continue encourage improved glycemic control.  Management as per endocrinology/PCP.     Renal cell carcinoma, unspecified laterality (HCC)  History of renal cell carcinoma renal cell carcinoma, right kidney s/p resection in 2017.Orders:    Albumin / creatinine urine ratio; Future    Urinalysis with microscopic; Future    Iron deficiency anemia secondary to inadequate dietary iron  intake  Hemoglobin 12.1 mg/dL, 4/23/2025. Iron studies appropriate, 4/12/2024.  Will continue to monitor.     Lower extremity edema  Bilateral lower extremities examined  Continue 2 g sodium restriction  No current indication for diuretic    Obesity (BMI 35.0-39.9 without comorbidity)  Continue to encourage healthy lifestyle choices including diet and exercise.     Hypomagnesemia  Magnesium 1.9 mg/dL, 4/23/2025  Continue magnesium citrate 200 mg twice dailyOrders:    Magnesium; Future    Metabolic acidosis  HCO3 30 mmol/L, 4/23/2025.  No indication for sodium bicarb at this time.  Will continue to monitor.     Vitamin D deficiency  Vitamin D 64.4 ng/mL, 3/12/25.   Med rec lists ergocalciferol 50,000 units weekly and cholecalciferol 2000 units daily. Will discontinue ergocalciferol and continue cholecalciferol. Patient will check with pharmacy regarding what is being included in pill packs to make sure it is correct. .   Orders:    Vitamin D 25 hydroxy; Future    Persistent proteinuria  UACR unable to complete, 10/12/2024.Orders:    Albumin / creatinine urine ratio; Future    Urinalysis with microscopic; Future    Secondary hyperparathyroidism of renal origin (HCC)  PTH 54.5 pg/mL, 3/12/25. No phosphorus levels to review. Will continue to monitor.         History of Present Illness   HPI  Haylee Balbuena is a 54 y.o. female who presents to [place] office for follow-up of CKD stage IIIa, hypertension, history of renal cell carcinoma, iron deficiency anemia, bilateral extremity edema, hypomagnesemia and metabolic acidosis.  PMH includes obesity, asthma, gastroparesis, GERD, IBS, neuropathy, seizures, chronic low back pain, depression and hypercholesterol.  Recent ED visit last week due to abdominal pain which she was told was due to gastroparesis. Follows with GI with appt on 5/12. Since then denies recent hospitalizations, emergency department visits or illness.  States overall is feeling well although she is  feeling somewhat dehydrated. Labs reviewed do not appear dehydrated, advised to aim for at least 2 L fluid intake daily and ways to keep mouth refreshed and moist such as ice chips, hard candy and toothbrushing.  Most recent labs are from 4/23/2025 which we reviewed together.        Review of Systems   Constitutional:  Negative for activity change, appetite change, chills, fatigue and fever.   HENT:  Negative for ear pain and sore throat.    Eyes:  Negative for pain and visual disturbance.   Respiratory:  Negative for cough and shortness of breath.    Cardiovascular:  Negative for chest pain, palpitations and leg swelling.   Gastrointestinal:  Negative for abdominal pain, constipation, diarrhea, nausea and vomiting.   Genitourinary:  Negative for decreased urine volume, difficulty urinating, dysuria, flank pain and hematuria.   Musculoskeletal:  Negative for arthralgias and back pain.   Skin:  Negative for color change and rash.   Neurological:  Negative for dizziness, seizures, syncope, weakness, light-headedness and headaches.   Hematological: Negative.    Psychiatric/Behavioral: Negative.     All other systems reviewed and are negative.         Objective   There were no vitals taken for this visit.     Physical Exam  Vitals and nursing note reviewed.   Constitutional:       General: She is not in acute distress.     Appearance: Normal appearance. She is well-developed. She is obese. She is not ill-appearing.   HENT:      Head: Normocephalic and atraumatic.      Right Ear: External ear normal.      Left Ear: External ear normal.      Nose: Nose normal.      Mouth/Throat:      Mouth: Mucous membranes are moist.      Pharynx: Oropharynx is clear.   Eyes:      Extraocular Movements: Extraocular movements intact.      Conjunctiva/sclera: Conjunctivae normal.      Pupils: Pupils are equal, round, and reactive to light.   Cardiovascular:      Rate and Rhythm: Normal rate and regular rhythm.      Heart sounds: Normal  heart sounds. No murmur heard.  Pulmonary:      Effort: Pulmonary effort is normal. No respiratory distress.      Breath sounds: Normal breath sounds.   Abdominal:      Palpations: Abdomen is soft.      Tenderness: There is no abdominal tenderness.   Musculoskeletal:         General: No swelling.      Cervical back: Neck supple.      Right lower leg: No edema.      Left lower leg: No edema.   Skin:     General: Skin is warm and dry.      Capillary Refill: Capillary refill takes less than 2 seconds.   Neurological:      General: No focal deficit present.      Mental Status: She is alert and oriented to person, place, and time.   Psychiatric:         Mood and Affect: Mood normal.         Behavior: Behavior normal.

## 2025-04-28 NOTE — ASSESSMENT & PLAN NOTE
Blood pressure 128/82 mmHg  Home blood pressure does not check at home  Continue 2 g sodium restriction  Continue metoprolol 25 mg twice daily

## 2025-04-28 NOTE — ASSESSMENT & PLAN NOTE
HCO3 30 mmol/L, 4/23/2025.  No indication for sodium bicarb at this time.  Will continue to monitor.

## 2025-04-28 NOTE — ASSESSMENT & PLAN NOTE
History of renal cell carcinoma renal cell carcinoma, right kidney s/p resection in 2017.Orders:    Albumin / creatinine urine ratio; Future    Urinalysis with microscopic; Future

## 2025-04-28 NOTE — ASSESSMENT & PLAN NOTE
Hemoglobin 12.1 mg/dL, 4/23/2025. Iron studies appropriate, 4/12/2024.  Will continue to monitor.

## 2025-04-28 NOTE — TELEPHONE ENCOUNTER
Tracy ordered this and would like an appeal started- she would benefit from the liquid form as per the GI specialist

## 2025-04-28 NOTE — ASSESSMENT & PLAN NOTE
Bilateral lower extremities examined  Continue 2 g sodium restriction  No current indication for diuretic

## 2025-04-28 NOTE — ASSESSMENT & PLAN NOTE
Lab Results   Component Value Date    EGFR 64 04/23/2025    EGFR 61 04/10/2025    EGFR 51 03/12/2025    CREATININE 1.00 04/23/2025    CREATININE 1.04 04/10/2025    CREATININE 1.19 03/12/2025   Baseline creatinine around 1 mg/dL.  Follows Dr. Yacney  Creatinine 1.0 mg/dL, GFR 64 mL/min, 4/23/2025  Etiology decreased nephron mass s/p renal resection D/T renal cell carcinoma, hypertensive nephrosclerosis, diabetic nephropathy and obesity related FSGS.  UA with trace glucose, 4/23/2025.  UACR unable to complete, 10/12/2024.  CT AP with normal appearance of the left kidney.  Parapelvic cyst on the right.  No hydronephrosis.  Postsurgical changes in the right kidney, otherwise grossly unremarkable, 4/10/2025.  Volume status examines euvoelmic  Continue to avoid NSAIDs and nephrotoxins.  Stay well-hydrated.  No current indication for RAAS or SGLT2.Orders:    Basic metabolic panel; Future    CBC and differential; Future

## 2025-04-28 NOTE — PROGRESS NOTES
Name: Haylee Balbuena      : 1970      MRN: [id number]  Encounter Provider: Adriana So PA-C  Encounter Date: 2025   Encounter department: [facility]    No chief complaint on file.  :  Assessment & Plan  Type 2 diabetes mellitus with hyperglycemia, with long-term current use of insulin (HCC)    Lab Results   Component Value Date    HGBA1C 11.2 (H) 2025   Chronically uncontrolled, complicated to manage on concentrated insulin.   To adjust Humulin U500 from 40 units in AM, 30 units in evening to 50 units with breakfast, 35 units with dinner. Recommend continue U500 at condensed BID versus TID dosing for ease of dosing / medication adherence, however stressed importance of taking about 30 minutes prior to a meal.     Haylee has a history of GLP1 intolerance to both Ozempic and Trulicity, however up until recently has been tolerating Mounjaro very well. She has experienced > 20lbs weight loss.. A1c had also been trending down slowly - recent GMI on CGM report from 2025 = 8.4%.    Unfortunately now with persistent abdominal pain the past several weeks, pending GI workup.  Will plan to HOLD Mounjaro.. May consider restarting Mounjaro at lower dosing versus discontinuation altogether. .    To follow up in 6 weeks due to high risk status. Will attempt to download CGM report every 2 weeks in meantime.    Haylee will plan to diabetes education to schedule pre-pump education visit. She understands that insulin pump process requires multiple visits to learn appropriate skills/foundation required for successful pump start - including carbohydrate counting, flexible insulin training. She also understands that given high TDD insulin needs / concentrated insulin needs this may complicate insulin pump use.     Orders:    Ambulatory referral to Diabetic Education; Future    Hemoglobin A1C; Future    Basic metabolic panel; Future    C-peptide; Future    Type 2  diabetes mellitus with diabetic autonomic neuropathy, with long-term current use of insulin ([facility])    Lab Results   Component Value Date    HGBA1C 11.2 (H) 03/12/2025       Orders:    Insulin Regular Human, Conc, (HumuLIN R U-500 KwikPen) 500 units/mL CONCENTRATED U-500 injection pen; E11.65 inject 50 units with breakfast, 35 units with dinner, or as directed TDD up to 150 units daily    I have spent a total time of 45 minutes in caring for this patient on the day of the visit/encounter including Diagnostic results, Instructions for management, Importance of tx compliance, Impressions, Counseling / Coordination of care, Documenting in the medical record, Obtaining or reviewing history  , and CGM report review .     History of Present Illness     Haylee Balbuena is a 54 y.o. female with type 2 DM here for close interval follow up in setting of recent ER visits for possible GLP1 adverse effects.      Over the past few weeks Haylee has developed persistent mid-abdominal pain described as sharp, constant. Certain foods seem to trigger pain such as - tomato sauce, fried foods, chocolate. Large volume meal tends to worsen symptoms as well. No GERD symptoms. She states this does not feel like her previous gastroparesis symptoms  - this is usually associated with nausea, vomiting, which is not present currently. She states her symptoms seem similar to when she was found to have a gastric ulcer.    DM medication review:   Humulin U500 40 units before breakfast, 30 units in evening - confirmed evening dose is a few hours AFTER dinner.  Metformin XR 1000mg with breakfast only    Jardiance now discontinued due to h/o DKA.   Also attempted Ozempic and Trulicity in past but unfortunately had adverse effects - gastroparesis. Mounjaro has been much better tolerated on escalating doses until recently.    2 week CGM report review.  Haylee Balbuena   Device used: Dexcom G7  Home use   Indication: Type 2 Diabetes  More than 72 hours of  "data was reviewed. Report to be scanned to chart.   Date Range: 4/15/25 - 4/28/25  Analysis of data:   % time CGM used: 97%  Average Glucose: 282mg/dL  Coefficient of Variation: 24.3%  GMI: 10.0%  Time in Target Range: 7% in range  Time Above Range: 26% high, 67% very high  Time Below Range: 0% low, 0% very low    Interpretation of data:  poorly controlled DM, majority of time spent > 250mg/dL. BG tends to escalate as day progresses.            Review of Systems as per HPI  Medical History Reviewed by provider this encounter:  Tobacco  Allergies  Meds  Problems  Med Hx  Surg Hx  Fam Hx     .     Medical History Reviewed by provider this encounter:     .    Objective   /70 (BP Location: Left arm, Patient Position: Sitting)   Pulse 88   Temp 97.8 °F (36.6 °C)   Ht 5' 1\" (1.549 m)   Wt 83.7 kg (184 lb 9.6 oz)   SpO2 99%   BMI 34.88 kg/m²      Body mass index is 34.88 kg/m².  Wt Readings from Last 3 Encounters:   04/28/25 83.7 kg (184 lb 9.6 oz)   04/25/25 82.1 kg (181 lb)   04/10/25 83.5 kg (184 lb)     Physical Exam  Vitals reviewed.   Constitutional:       General: She is not in acute distress.     Appearance: She is not ill-appearing.   HENT:      Head: Normocephalic.   Pulmonary:      Effort: No respiratory distress.   Neurological:      Mental Status: She is alert.   Psychiatric:         Mood and Affect: Mood normal.         Labs:   Lab Results   Component Value Date    HGBA1C 11.2 (H) 03/12/2025    HGBA1C 12.0 (H) 03/02/2025    HGBA1C 11.5 (H) 10/12/2024     Lab Results   Component Value Date    CREATININE 1.00 04/23/2025    CREATININE 1.04 04/10/2025    CREATININE 1.19 03/12/2025    BUN 28 (H) 04/23/2025    K 4.2 04/23/2025     04/23/2025    CO2 30 04/23/2025     GFR, Calculated   Date Value Ref Range Status   08/10/2020 80 >60 mL/min/1.73m2 Final     Comment:     mL/min per 1.73 square meters                                            Normal Function or Mild Renal    Disease (if " clinically at risk):  >or=60  Moderately Decreased:                30-59  Severely Decreased:                  15-29  Renal Failure:                         <15                                            -American GFR: multiply reported GFR by 1.16    Please note that the eGFR is based on the CKD-EPI calculation, and is not intended to be used for drug dosing.                                            Note: Calculated GFR may not be an accurate indicator of renal function if the patient's renal function is not in a steady state.     eGFR   Date Value Ref Range Status   04/23/2025 64 ml/min/1.73sq m Final     Lab Results   Component Value Date    HDL 47 (L) 03/12/2025    TRIG 180 (H) 03/12/2025     Lab Results   Component Value Date    ALT 26 04/23/2025    AST 22 04/23/2025    ALKPHOS 52 04/23/2025     Lab Results   Component Value Date    RIR8JLNBDBVC 4.515 (H) 03/12/2025    VAI6PRTCVMKU 1.440 03/02/2025    YZI1JCMNZEMA 1.784 07/24/2023     Lab Results   Component Value Date    FREET4 0.97 03/12/2025       There are no Patient Instructions on file for this visit.    Discussed with the patient and all questioned fully answered. She will call me if any problems arise.

## 2025-04-28 NOTE — ASSESSMENT & PLAN NOTE
Lab Results   Component Value Date    HGBA1C 11.2 (H) 03/12/2025   Continue encourage improved glycemic control.  Management as per endocrinology/PCP.

## 2025-04-28 NOTE — TELEPHONE ENCOUNTER
PA for sucralfate (CARAFATE) 1 g/10 mL DENIED    Reason:(Screenshot if applicable)        Message sent to office clinical pool Yes    Denial letter scanned into Media Yes    Appeal started No (Provider will need to decide if appeal is warranted and send clinical documentation to Prior Authorization Team for initiation.)    **Please follow up with your patient regarding denial and next steps**

## 2025-04-28 NOTE — ASSESSMENT & PLAN NOTE
Lab Results   Component Value Date    HGBA1C 11.2 (H) 03/12/2025       Orders:    Insulin Regular Human, Conc, (HumuLIN R U-500 KwikPen) 500 units/mL CONCENTRATED U-500 injection pen; E11.65 inject 50 units with breakfast, 35 units with dinner, or as directed TDD up to 150 units daily

## 2025-04-28 NOTE — ASSESSMENT & PLAN NOTE
Magnesium 1.9 mg/dL, 4/23/2025  Continue magnesium citrate 200 mg twice dailyOrders:    Magnesium; Future

## 2025-04-29 ENCOUNTER — OFFICE VISIT (OUTPATIENT)
Age: 55
End: 2025-04-29
Payer: COMMERCIAL

## 2025-04-29 VITALS
WEIGHT: 180 LBS | TEMPERATURE: 96.3 F | DIASTOLIC BLOOD PRESSURE: 82 MMHG | BODY MASS INDEX: 34.01 KG/M2 | SYSTOLIC BLOOD PRESSURE: 128 MMHG | OXYGEN SATURATION: 97 % | HEART RATE: 70 BPM

## 2025-04-29 DIAGNOSIS — R60.0 LOWER EXTREMITY EDEMA: ICD-10-CM

## 2025-04-29 DIAGNOSIS — E66.9 OBESITY (BMI 35.0-39.9 WITHOUT COMORBIDITY): ICD-10-CM

## 2025-04-29 DIAGNOSIS — E11.43 TYPE 2 DIABETES MELLITUS WITH DIABETIC AUTONOMIC NEUROPATHY, WITH LONG-TERM CURRENT USE OF INSULIN (HCC): ICD-10-CM

## 2025-04-29 DIAGNOSIS — E83.42 HYPOMAGNESEMIA: ICD-10-CM

## 2025-04-29 DIAGNOSIS — R80.1 PERSISTENT PROTEINURIA: ICD-10-CM

## 2025-04-29 DIAGNOSIS — E55.9 VITAMIN D DEFICIENCY: ICD-10-CM

## 2025-04-29 DIAGNOSIS — I10 PRIMARY HYPERTENSION: ICD-10-CM

## 2025-04-29 DIAGNOSIS — Z79.4 TYPE 2 DIABETES MELLITUS WITH DIABETIC AUTONOMIC NEUROPATHY, WITH LONG-TERM CURRENT USE OF INSULIN (HCC): ICD-10-CM

## 2025-04-29 DIAGNOSIS — N25.81 SECONDARY HYPERPARATHYROIDISM OF RENAL ORIGIN (HCC): ICD-10-CM

## 2025-04-29 DIAGNOSIS — C64.9 RENAL CELL CARCINOMA, UNSPECIFIED LATERALITY (HCC): ICD-10-CM

## 2025-04-29 DIAGNOSIS — E87.20 METABOLIC ACIDOSIS: ICD-10-CM

## 2025-04-29 DIAGNOSIS — D50.8 IRON DEFICIENCY ANEMIA SECONDARY TO INADEQUATE DIETARY IRON INTAKE: ICD-10-CM

## 2025-04-29 DIAGNOSIS — N18.31 STAGE 3A CHRONIC KIDNEY DISEASE (HCC): Primary | ICD-10-CM

## 2025-04-29 PROCEDURE — 99214 OFFICE O/P EST MOD 30 MIN: CPT

## 2025-04-29 NOTE — TELEPHONE ENCOUNTER
PA for Sucralfate 1 g/10 mL APPEALED via     []CMM  []SS  [x]Letter sent to insurance via fax [phone]  []Other site or means    All necessary records sent. Will await response from insurance company    Turnaround time for a decision to be made on an appeal could take up to 30 business days

## 2025-04-29 NOTE — ASSESSMENT & PLAN NOTE
Vitamin D 64.4 ng/mL, 3/12/25.   Med rec lists ergocalciferol 50,000 units weekly and cholecalciferol 2000 units daily. Will discontinue ergocalciferol and continue cholecalciferol. Patient will check with pharmacy regarding what is being included in pill packs to make sure it is correct. .   Orders:    Vitamin D 25 hydroxy; Future

## 2025-04-29 NOTE — PATIENT INSTRUCTIONS
Pleasure seeing you today.     Your kidney function is stable with a creatinine of 1.0 mg/dL and a GFR of 64 mL/min.  Please continue to avoid NSAIDs such as Advil, Motrin, Aleve, ibuprofen and naproxen.  Please continue to follow 2 g sodium restriction.    Please continue checking blood pressure at home 3-4 times per week and keep a record of readings.  If blood pressure upper number is consistently less than 100 or greater than 150 please contact the office.  Please also call the office if you are experiencing increased headaches, dizziness, lightheadedness, chest pain or blurred vision.  Please continue to maintain a 2 g sodium restriction.    Please continue taking all medications as previously ordered.    Please return for follow-up appointment in 6 months with lab work completed prior to that visit.

## 2025-04-30 NOTE — TELEPHONE ENCOUNTER
PA Appeal for sucralfate (CARAFATE) 1 g/10 mL DENIED    Reason:(Screenshot if applicable)        Message sent to office clinical pool Yes    Denial letter scanned into Media Yes

## 2025-05-01 ENCOUNTER — TELEPHONE (OUTPATIENT)
Age: 55
End: 2025-05-01

## 2025-05-01 DIAGNOSIS — K21.9 GASTROESOPHAGEAL REFLUX DISEASE, UNSPECIFIED WHETHER ESOPHAGITIS PRESENT: ICD-10-CM

## 2025-05-01 DIAGNOSIS — K31.84 GASTROPARESIS: Primary | ICD-10-CM

## 2025-05-01 RX ORDER — SUCRALFATE ORAL 1 G/10ML
1 SUSPENSION ORAL 4 TIMES DAILY
Qty: 720 ML | Refills: 0 | Status: SHIPPED | OUTPATIENT
Start: 2025-05-01

## 2025-05-01 NOTE — TELEPHONE ENCOUNTER
PA for Sucralfate 1GM/10ML SUBMITTED               via    [x]CMM: [id number]    [x]PA sent as URGENT    All office notes, labs and other pertaining documents and studies sent. Clinical questions answered. Awaiting determination from insurance company.     Turnaround time for your insurance to make a decision on your Prior Authorization can take 7-21 business days.

## 2025-05-05 NOTE — TELEPHONE ENCOUNTER
PA for Sucralfate 1GM/10ML  DENIED    Reason:(Screenshot if applicable)        Message sent to office clinical pool Yes    Denial letter scanned into Media Yes    We can gladly do an appeal but the process can take about 30-60 days to provide determination. Please Schedule a Peer to Peer at phone [phone] . If an appeal is truly warranted please have Provider send clinical documentation to the PA department to support the appeal.     **Please follow up with your patient regarding denial and next steps**

## 2025-05-06 ENCOUNTER — HOSPITAL ENCOUNTER (OUTPATIENT)
Dept: ULTRASOUND IMAGING | Facility: HOSPITAL | Age: 55
Discharge: HOME | End: 2025-05-06
Payer: COMMERCIAL

## 2025-05-06 DIAGNOSIS — Z85.528 HISTORY OF RENAL CELL CARCINOMA: ICD-10-CM

## 2025-05-06 DIAGNOSIS — N28.1 CYST OF KIDNEY, ACQUIRED: ICD-10-CM

## 2025-05-06 PROCEDURE — 76775 US EXAM ABDO BACK WALL LIM: CPT

## 2025-05-09 ENCOUNTER — RESULTS FOLLOW-UP (OUTPATIENT)
Dept: UROLOGY | Facility: CLINIC | Age: 55
End: 2025-05-09

## 2025-05-12 ENCOUNTER — OFFICE VISIT (OUTPATIENT)
Age: 55
End: 2025-05-12
Payer: COMMERCIAL

## 2025-05-12 ENCOUNTER — PATIENT MESSAGE (OUTPATIENT)
Dept: ENDOCRINOLOGY | Facility: CLINIC | Age: 55
End: 2025-05-12

## 2025-05-12 VITALS
HEART RATE: 77 BPM | DIASTOLIC BLOOD PRESSURE: 62 MMHG | WEIGHT: 187 LBS | HEIGHT: 61 IN | BODY MASS INDEX: 35.3 KG/M2 | OXYGEN SATURATION: 96 % | SYSTOLIC BLOOD PRESSURE: 118 MMHG

## 2025-05-12 DIAGNOSIS — K31.84 GASTROPARESIS: Chronic | ICD-10-CM

## 2025-05-12 DIAGNOSIS — K58.2 IRRITABLE BOWEL SYNDROME WITH BOTH CONSTIPATION AND DIARRHEA: ICD-10-CM

## 2025-05-12 DIAGNOSIS — R13.12 OROPHARYNGEAL DYSPHAGIA: Primary | ICD-10-CM

## 2025-05-12 DIAGNOSIS — K21.9 GASTROESOPHAGEAL REFLUX DISEASE WITHOUT ESOPHAGITIS: Chronic | ICD-10-CM

## 2025-05-12 PROCEDURE — 99214 OFFICE O/P EST MOD 30 MIN: CPT | Performed by: STUDENT IN AN ORGANIZED HEALTH CARE EDUCATION/TRAINING PROGRAM

## 2025-05-12 RX ORDER — SODIUM CHLORIDE, SODIUM LACTATE, POTASSIUM CHLORIDE, CALCIUM CHLORIDE 600; 310; 30; 20 MG/100ML; MG/100ML; MG/100ML; MG/100ML
125 INJECTION, SOLUTION INTRAVENOUS CONTINUOUS
OUTPATIENT
Start: 2025-05-12

## 2025-05-12 RX ORDER — OMEPRAZOLE 40 MG/1
40 CAPSULE, DELAYED RELEASE ORAL DAILY
Qty: 90 CAPSULE | Refills: 1 | Status: SHIPPED | OUTPATIENT
Start: 2025-05-12

## 2025-05-12 NOTE — PROGRESS NOTES
Name: Haylee Balbuena      : 1970      MRN: [id number]  Encounter Provider: Denny Hidalgo DO  Encounter Date: 2025   Encounter department: [facility] [place]    :  Assessment & Plan  Oropharyngeal dysphagia  Continues to experience symptoms.  Her symptoms do appear to be primarily oropharyngeal.  There could be an underlying motility component although this seems less likely based on location of complaint.  That being said, she also does not have typical oropharyngeal symptoms including coughing with swallowing.    I have discussed options including modified video barium swallow study versus esophageal manometry versus EGD with empiric dilation using Savary dilator  She prefers to undergo EGD with attempt again empiric dilation to see if this will help her symptoms  If this is unremarkable, I would recommend modified video barium swallow study as the next step    I obtained informed consent from the patient.  The risks/benefits/alternatives of the procedure were discussed with the patient.  Risks included, but not limited to, infection, bleeding, perforation, injury to organs in the abdomen, missed lesion and incomplete procedure were discussed.  Patient was agreeable and electronic signature was obtained.     Orders:    omeprazole (PriLOSEC) 40 MG capsule; Take 1 capsule (40 mg total) by mouth in the morning    EGD Dilation; Future    Gastroesophageal reflux disease without esophagitis  EGD from 2025 reviewed.  Mild gastritis.  Biopsies negative.  Multiple fundic land polyps noted.    We will continue omeprazole 40 mg daily    Orders:    omeprazole (PriLOSEC) 40 MG capsule; Take 1 capsule (40 mg total) by mouth in the morning    EGD Dilation; Future    Gastroparesis  Likely contributing to reflux and possibly even some of her dysphagia complaints.  Likely related to poorly controlled diabetes.  Gastric emptying study in  confirmed.    I emphasized the importance  of better glycemic control  She will follow-up with her endocrinologist  Plan for EGD for dilation as noted above  She may use Reglan sparingly         Irritable bowel syndrome with both constipation and diarrhea  Symptoms improved but still with some mild constipation.  She is on Linzess 145 mcg daily which is helping although seems slightly subtherapeutic.    We will increase Linzess to 290 mcg daily  Encourage hydration and dietary fiber    Orders:    linaCLOtide 290 MCG CAPS; Take 1 capsule by mouth daily at least 30 minutes prior to first meal of the day      History of Present Illness  54-year-old female presents to GI office for follow-up.  Previously seen for complaints of dysphagia, reflux, and IBS with constipation.  Her constipation has improved on Linzess 145 mcg daily although does seem somewhat subtherapeutic.  She does report that it is helping but sometimes feels constipated still.  She is interested in increasing the dose if possible.  She continues to experience dysphagia symptoms.  She reports a sensation of food getting stuck in the back of the throat.  She denies any symptoms of food getting stuck in her chest.  She denies coughing with swallowing.  Her reflux symptoms are improved on omeprazole.  She is primarily concerned with her diabetes.  Her sugars remain high.  She will be following up with her endocrinologist.    History obtained from: patient    Review of Systems   Constitutional:  Negative for appetite change and unexpected weight change.   HENT:  Positive for trouble swallowing.    Respiratory:  Negative for shortness of breath.    Cardiovascular:  Negative for chest pain and palpitations.   Gastrointestinal:  Positive for constipation. Negative for abdominal distention, abdominal pain, blood in stool, diarrhea, nausea, rectal pain and vomiting.   All other systems reviewed and are negative.    Medical History Reviewed by provider this encounter:     .  Current Outpatient Medications  on File Prior to Visit   Medication Sig Dispense Refill    albuterol (PROVENTIL HFA,VENTOLIN HFA) 90 mcg/act inhaler INHALE ONE PUFF BY MOUTH AND INTO THE LUNGS EVERY 6 HOURS AS NEEDED FOR WHEEZING 36 g 3    Alcohol Swabs (Pharmacist Choice Alcohol) PADS Apply topically 4 (four) times a day Use as directed 300 each 5    aspirin (ECOTRIN LOW STRENGTH) 81 mg EC tablet TAKE ONE TABLET BY MOUTH ONCE DAILY 30 tablet 5    atorvastatin (LIPITOR) 80 mg tablet TAKE ONE TABLET BY MOUTH DAILY AT BEDTIME 90 tablet 1    B-D UF III MINI PEN NEEDLES 31G X 5 MM MISC Pt uses 5 pen needles daily 500 each 0    cholecalciferol (VITAMIN D3) 1,000 units tablet Take 2 tablets (2,000 Units total) by mouth daily 60 tablet 3    citalopram (CeleXA) 40 mg tablet TAKE ONE TABLET BY MOUTH DAILY 90 tablet 1    Continuous Glucose Sensor (Dexcom G7 Sensor) Use 1 Device every 10 days 9 each 3    dicyclomine (BENTYL) 20 mg tablet Take twice daily (am and before bedtime), may take additional 2 doses per day, max 4x/day (every 6 hours if needed) 90 tablet 0    docusate sodium (COLACE) 100 mg capsule TAKE ONE CAPSULE BY MOUTH TWICE DAILY 180 capsule 1    famotidine (PEPCID) 40 MG tablet TAKE ONE TABLET BY MOUTH TWO HOURS PRIOR TO BEDTIME 30 tablet 5    fenofibrate micronized (LOFIBRA) 134 MG capsule Take 1 capsule (134 mg total) by mouth daily with breakfast 90 capsule 1    Glucagon (Baqsimi One Pack) 3 MG/DOSE POWD 1 each into each nostril if needed (PRN for hypoglycemia emergency) 1 each 0    Insulin Regular Human, Conc, (HumuLIN R U-500 KwikPen) 500 units/mL CONCENTRATED U-500 injection pen E11.65 inject 50 units with breakfast, 35 units with dinner, or as directed TDD up to 150 units daily      Iron Heme Polypeptide 12 MG TABS Take 1 tablet by mouth      levETIRAcetam (KEPPRA) 500 mg tablet TAKE ONE TABLET BY MOUTH TWICE DAILY 180 tablet 1    Magnesium Citrate 200 MG TABS Take 1 tablet by mouth 2 (two) times a day 180 tablet 1    meclizine  (ANTIVERT) 25 mg tablet Take 1 tablet (25 mg total) by mouth 3 (three) times a day as needed for dizziness 30 tablet 0    metFORMIN (GLUCOPHAGE-XR) 500 mg 24 hr tablet Take 2 tablets (1,000 mg total) by mouth daily with breakfast 180 tablet 1    methocarbamol (ROBAXIN) 500 mg tablet Take 1 tablet (500 mg total) by mouth 3 (three) times a day as needed for muscle spasms 90 tablet 0    metoclopramide (Reglan) 10 mg tablet Take 0.5 tablets (5 mg total) by mouth every 6 (six) hours 30 tablet 0    metoprolol tartrate (LOPRESSOR) 25 mg tablet TAKE ONE TABLET BY MOUTH TWICE DAILY 180 tablet 1    multivitamin (THERAGRAN) TABS Take 1 tablet by mouth every morning      mupirocin (BACTROBAN) 2 % ointment Apply topically 3 (three) times a day 30 g 0    ondansetron (ZOFRAN) 4 mg tablet TAKE 1 TABLET (4 MG TOTAL) BY MOUTH EVERY 8 (EIGHT) HOURS AS NEEDED FOR NAUSEA OR VOMITING 30 tablet 1    OneTouch Ultra test strip USE 4 TIMES A  each 0    oxybutynin (DITROPAN-XL) 10 MG 24 hr tablet Take 1 tablet (10 mg total) by mouth daily at bedtime 100 tablet 3    Pharmacist Choice Lancets MISC USE AS DIRECTED 100 each 1    pregabalin (LYRICA) 100 mg capsule TAKE ONE CAPSULE BY MOUTH THREE TIMES A DAY 90 capsule 5    Restasis 0.05 % ophthalmic emulsion       scopolamine (TRANSDERM-SCOP) 1 mg/3 days TD 72 hr patch Place 1 patch on the skin over 72 hours every third day 10 patch 0    sucralfate (CARAFATE) 1 g/10 mL suspension Take 10 mL (1 g total) by mouth 4 (four) times a day 720 mL 0    [DISCONTINUED] linaCLOtide 145 MCG CAPS Take 1 capsule (145 mcg total) by mouth daily at least 30 minutes prior to the first meal of the day 30 capsule 5    [DISCONTINUED] pantoprazole (PROTONIX) 40 mg tablet TAKE ONE TABLET BY MOUTH TWICE DAILY 60 tablet 5     No current facility-administered medications on file prior to visit.      Social History     Tobacco Use    Smoking status: Former     Current packs/day: 0.00     Types: Cigarettes     Quit  "date:      Years since quittin.3    Smokeless tobacco: Never   Vaping Use    Vaping status: Never Used   Substance and Sexual Activity    Alcohol use: Never     Comment: occ    Drug use: Never    Sexual activity: Not Currently     Partners: Male     Birth control/protection: None        Objective   /62   Pulse 77   Ht 5' 1\" (1.549 m)   Wt 84.8 kg (187 lb)   SpO2 96%   BMI 35.33 kg/m²      Physical Exam  No apparent distress.  Appears appropriate for age.  Heart rate is normal.  Rhythm is regular.  Lung sounds clear bilaterally.  No respiratory distress.  Abdomen soft, nontender, nondistended.  Audible bowel sounds.  No lower extremity edema.       Results      Results for orders placed during the hospital encounter of 25    EGD    Impression  Multiple fundic gland polyps in the fundus of the stomach, body of the stomach, greater curve of the stomach and lesser curve of the stomach; performed cold forceps biopsy  Mild erythematous mucosa in the antrum, prepyloric region and pylorus, consistent with gastritis; performed cold forceps biopsy to rule out H. pylori  The upper third of the esophagus, middle third of the esophagus and lower third of the esophagus appeared normal. Performed random biopsy to rule out eosinophilic esophagitis.  The duodenal bulb, 1st part of the duodenum, 2nd part of the duodenum and major papilla appeared normal.      RECOMMENDATION:  Await pathology results  Follow up with me in clinic  Recommend modified video barium study for oropharyngeal dysphagia  Could also consider esophageal manometry            Denny Hidalgo, DO      Administrative Statements   I have spent a total time of 20 minutes in caring for this patient on the day of the visit/encounter including Diagnostic results, Prognosis, Risks and benefits of tx options, Instructions for management, Patient and family education, Importance of tx compliance, Risk factor reductions, Impressions, Counseling / " Coordination of care, Documenting in the medical record, Reviewing/placing orders in the medical record (including tests, medications, and/or procedures), and Obtaining or reviewing history  .      Denny Hidalgo D.O.  [facility]  Division of Gastroenterology & Hepatology  Available on TigerText  [email or web address]    ** Please Note: This note is constructed using artificial intelligence and a voice recognition dictation system. **

## 2025-05-12 NOTE — ASSESSMENT & PLAN NOTE
Likely contributing to reflux and possibly even some of her dysphagia complaints.  Likely related to poorly controlled diabetes.  Gastric emptying study in 2023 confirmed.    I emphasized the importance of better glycemic control  She will follow-up with her endocrinologist  Plan for EGD for dilation as noted above  She may use Reglan sparingly

## 2025-05-12 NOTE — ASSESSMENT & PLAN NOTE
Continues to experience symptoms.  Her symptoms do appear to be primarily oropharyngeal.  There could be an underlying motility component although this seems less likely based on location of complaint.  That being said, she also does not have typical oropharyngeal symptoms including coughing with swallowing.    I have discussed options including modified video barium swallow study versus esophageal manometry versus EGD with empiric dilation using Savary dilator  She prefers to undergo EGD with attempt again empiric dilation to see if this will help her symptoms  If this is unremarkable, I would recommend modified video barium swallow study as the next step    I obtained informed consent from the patient.  The risks/benefits/alternatives of the procedure were discussed with the patient.  Risks included, but not limited to, infection, bleeding, perforation, injury to organs in the abdomen, missed lesion and incomplete procedure were discussed.  Patient was agreeable and electronic signature was obtained.     Orders:    omeprazole (PriLOSEC) 40 MG capsule; Take 1 capsule (40 mg total) by mouth in the morning    EGD Dilation; Future

## 2025-05-12 NOTE — ASSESSMENT & PLAN NOTE
Symptoms improved but still with some mild constipation.  She is on Linzess 145 mcg daily which is helping although seems slightly subtherapeutic.    We will increase Linzess to 290 mcg daily  Encourage hydration and dietary fiber    Orders:    linaCLOtide 290 MCG CAPS; Take 1 capsule by mouth daily at least 30 minutes prior to first meal of the day

## 2025-05-12 NOTE — ASSESSMENT & PLAN NOTE
EGD from 2/2025 reviewed.  Mild gastritis.  Biopsies negative.  Multiple fundic land polyps noted.    We will continue omeprazole 40 mg daily    Orders:    omeprazole (PriLOSEC) 40 MG capsule; Take 1 capsule (40 mg total) by mouth in the morning    EGD Dilation; Future

## 2025-05-13 ENCOUNTER — TELEPHONE (OUTPATIENT)
Dept: ENDOCRINOLOGY | Facility: CLINIC | Age: 55
End: 2025-05-13

## 2025-05-13 NOTE — TELEPHONE ENCOUNTER
Pt came in and asked for me to print new report for you to review as her sugars are running high is aware you wont be in till thursday

## 2025-05-15 NOTE — TELEPHONE ENCOUNTER
Spoke to patient , uploaded Dexcom put on [facility] desk , patient stated she is feeling tired and nauseated

## 2025-05-15 NOTE — TELEPHONE ENCOUNTER
Report printed 5/13, but recent insulin adjustment made as of 5/12/25 - should continue new U500 dosing - 50 units with breakfast, -35 units with dinner for now. Please confirm she started this on 5/13/25.     Please make sure she is sharing data with us with [facility]. Will plan to follow CGM report in 2 weeks. Please call sooner with any concerning BG trends or questions.

## 2025-05-20 NOTE — TELEPHONE ENCOUNTER
Spoke with patient today because her stomach is hurting and she has been having diarrhea since last night. Patient reports that she has had this before and it feels the same way. At that time her symptoms just went away after some time. Patient could not think of any recent change that could be the cause. Reports just having the stomach pain when using bathroom and diarrhea. Has taken Zofran but it did not help. Patient would like an appointment sometime today to be seen, and she will also need a note for her work. Can you squeeze her in somewhere?   Average build Average build

## 2025-05-20 NOTE — ASSESSMENT & PLAN NOTE
Continue on Symbicort and Spiriva regularly.   Continue on albuterol inhaler as needed for shortness of breath.  Please get Screening CT chest in May 2026.  Continue on CPAP nightly. Our office will obtain your data from [facility].   Call with any questions or concerns.  Follow up with Terri DAVIS in September 2025 and bring your CPAP with you.   · Mixed dyslipidemia-continue statin, continue Tricor

## 2025-05-21 ENCOUNTER — TELEPHONE (OUTPATIENT)
Age: 55
End: 2025-05-21

## 2025-05-21 NOTE — TELEPHONE ENCOUNTER
Hello!    The pt had appts via ASAP referral and canceled 3 appts. We will be closing the referral at this time. Just wanted to make you aware since referral was ASAP     Thank you!

## 2025-05-22 ENCOUNTER — DOCUMENTATION (OUTPATIENT)
Dept: ADMINISTRATIVE | Facility: OTHER | Age: 55
End: 2025-05-22

## 2025-05-22 NOTE — TELEPHONE ENCOUNTER
Noted cancelled DM education visits x 3. (This was initial step in insulin pump initiation.) Will plan to d/w patient at next routine follow up in June.    Please print CGM report for review - would like to monitor progress closely on U500 insulin.

## 2025-05-22 NOTE — PROGRESS NOTES
05/22/25 12:07 PM     DM A1c outreach is not required, patient was called within the last 3 months.    Thank you.  Gricel Mock  [facility]

## 2025-05-24 DIAGNOSIS — E11.59 HYPERTENSION ASSOCIATED WITH DIABETES  (HCC): ICD-10-CM

## 2025-05-24 DIAGNOSIS — I15.2 HYPERTENSION ASSOCIATED WITH DIABETES  (HCC): ICD-10-CM

## 2025-05-25 RX ORDER — METOPROLOL TARTRATE 25 MG/1
25 TABLET, FILM COATED ORAL 2 TIMES DAILY
Qty: 180 TABLET | Refills: 1 | Status: SHIPPED | OUTPATIENT
Start: 2025-05-25

## 2025-05-28 DIAGNOSIS — E11.42 DIABETIC POLYNEUROPATHY ASSOCIATED WITH TYPE 2 DIABETES MELLITUS (HCC): ICD-10-CM

## 2025-05-28 NOTE — TELEPHONE ENCOUNTER
Patient requesting refill(s) of: Lyrica 100 mg   Last filled: 11/26/24  Last appt:4/25/25  Next appt: 9/16/25  [facility]: [facility]

## 2025-05-28 NOTE — TELEPHONE ENCOUNTER
Reason for call:   [x] Refill   [] Prior Auth  [] Other:     Office:   [x] PCP/Provider - Kaylan Guzmán   [] Specialty/Provider -     Medication: Pregabalin     Dose/Frequency: 100 mg TID     Quantity: 90    [facility]: [facility]     [facility]   Does the patient have enough for 3 days?   [] Yes   [x] No - Send as HP to POD    [facility] Pharmacy   Does the patient have enough for 10 days?   [] Yes   [] No - Send as HP to POD

## 2025-05-29 RX ORDER — PREGABALIN 100 MG/1
100 CAPSULE ORAL 3 TIMES DAILY
Qty: 90 CAPSULE | Refills: 5 | Status: SHIPPED | OUTPATIENT
Start: 2025-05-29

## 2025-06-01 ENCOUNTER — APPOINTMENT (EMERGENCY)
Dept: RADIOLOGY | Facility: HOSPITAL | Age: 55
End: 2025-06-01
Payer: COMMERCIAL

## 2025-06-01 ENCOUNTER — HOSPITAL ENCOUNTER (EMERGENCY)
Facility: HOSPITAL | Age: 55
Discharge: HOME | End: 2025-06-01
Attending: EMERGENCY MEDICINE | Admitting: EMERGENCY MEDICINE
Payer: COMMERCIAL

## 2025-06-01 VITALS
HEART RATE: 58 BPM | SYSTOLIC BLOOD PRESSURE: 110 MMHG | TEMPERATURE: 97.1 F | WEIGHT: 180 LBS | HEIGHT: 61 IN | OXYGEN SATURATION: 94 % | RESPIRATION RATE: 17 BRPM | DIASTOLIC BLOOD PRESSURE: 53 MMHG | BODY MASS INDEX: 33.99 KG/M2

## 2025-06-01 DIAGNOSIS — R07.9 CHEST PAIN, UNSPECIFIED: Primary | ICD-10-CM

## 2025-06-01 LAB
2HR DELTA HS TROPONIN: 0 NG/L
ALBUMIN SERPL BCG-MCNC: 4.3 G/DL (ref 3.5–5)
ALP SERPL-CCNC: 84 U/L (ref 34–104)
ALT SERPL W P-5'-P-CCNC: 35 U/L (ref 7–52)
ANION GAP SERPL CALCULATED.3IONS-SCNC: 9 MMOL/L (ref 4–13)
AST SERPL W P-5'-P-CCNC: 21 U/L (ref 13–39)
ATRIAL RATE: 57 BPM
BASOPHILS # BLD AUTO: 0.05 THOUSANDS/ÂΜL (ref 0–0.1)
BASOPHILS NFR BLD AUTO: 1 % (ref 0–1)
BILIRUB SERPL-MCNC: 0.36 MG/DL (ref 0.2–1)
BUN SERPL-MCNC: 34 MG/DL (ref 5–25)
CALCIUM SERPL-MCNC: 9.6 MG/DL (ref 8.4–10.2)
CARDIAC TROPONIN I PNL SERPL HS: 3 NG/L (ref ?–50)
CARDIAC TROPONIN I PNL SERPL HS: 3 NG/L (ref ?–50)
CHLORIDE SERPL-SCNC: 96 MMOL/L (ref 96–108)
CO2 SERPL-SCNC: 31 MMOL/L (ref 21–32)
CREAT SERPL-MCNC: 1.05 MG/DL (ref 0.6–1.3)
EOSINOPHIL # BLD AUTO: 0.18 THOUSAND/ÂΜL (ref 0–0.61)
EOSINOPHIL NFR BLD AUTO: 2 % (ref 0–6)
ERYTHROCYTE [DISTWIDTH] IN BLOOD BY AUTOMATED COUNT: 13.5 % (ref 11.6–15.1)
GFR SERPL CREATININE-BSD FRML MDRD: 60 ML/MIN/1.73SQ M
GLUCOSE SERPL-MCNC: 95 MG/DL (ref 65–140)
HCT VFR BLD AUTO: 37.1 % (ref 34.8–46.1)
HGB BLD-MCNC: 12.3 G/DL (ref 11.5–15.4)
IMM GRANULOCYTES # BLD AUTO: 0.03 THOUSAND/UL (ref 0–0.2)
IMM GRANULOCYTES NFR BLD AUTO: 0 % (ref 0–2)
LYMPHOCYTES # BLD AUTO: 4.67 THOUSANDS/ÂΜL (ref 0.6–4.47)
LYMPHOCYTES NFR BLD AUTO: 45 % (ref 14–44)
MCH RBC QN AUTO: 26.5 PG (ref 26.8–34.3)
MCHC RBC AUTO-ENTMCNC: 33.2 G/DL (ref 31.4–37.4)
MCV RBC AUTO: 80 FL (ref 82–98)
MONOCYTES # BLD AUTO: 0.85 THOUSAND/ÂΜL (ref 0.17–1.22)
MONOCYTES NFR BLD AUTO: 8 % (ref 4–12)
NEUTROPHILS # BLD AUTO: 4.5 THOUSANDS/ÂΜL (ref 1.85–7.62)
NEUTS SEG NFR BLD AUTO: 44 % (ref 43–75)
NRBC BLD AUTO-RTO: 0 /100 WBCS
P AXIS: 31 DEGREES
PLATELET # BLD AUTO: 340 THOUSANDS/UL (ref 149–390)
PMV BLD AUTO: 11.2 FL (ref 8.9–12.7)
POTASSIUM SERPL-SCNC: 3.3 MMOL/L (ref 3.5–5.3)
PR INTERVAL: 164 MS
PROT SERPL-MCNC: 6.9 G/DL (ref 6.4–8.4)
QRS AXIS: 7 DEGREES
QRSD INTERVAL: 68 MS
QT INTERVAL: 494 MS
QTC INTERVAL: 480 MS
RBC # BLD AUTO: 4.65 MILLION/UL (ref 3.81–5.12)
SODIUM SERPL-SCNC: 136 MMOL/L (ref 135–147)
T WAVE AXIS: 5 DEGREES
VENTRICULAR RATE: 57 BPM
WBC # BLD AUTO: 10.28 THOUSAND/UL (ref 4.31–10.16)

## 2025-06-01 PROCEDURE — 85025 COMPLETE CBC W/AUTO DIFF WBC: CPT | Performed by: EMERGENCY MEDICINE

## 2025-06-01 PROCEDURE — 99285 EMERGENCY DEPT VISIT HI MDM: CPT

## 2025-06-01 PROCEDURE — 80053 COMPREHEN METABOLIC PANEL: CPT | Performed by: EMERGENCY MEDICINE

## 2025-06-01 PROCEDURE — 36415 COLL VENOUS BLD VENIPUNCTURE: CPT | Performed by: EMERGENCY MEDICINE

## 2025-06-01 PROCEDURE — 71045 X-RAY EXAM CHEST 1 VIEW: CPT

## 2025-06-01 PROCEDURE — 93005 ELECTROCARDIOGRAM TRACING: CPT

## 2025-06-01 PROCEDURE — 84484 ASSAY OF TROPONIN QUANT: CPT | Performed by: EMERGENCY MEDICINE

## 2025-06-01 PROCEDURE — 93010 ELECTROCARDIOGRAM REPORT: CPT | Performed by: INTERNAL MEDICINE

## 2025-06-01 PROCEDURE — 99285 EMERGENCY DEPT VISIT HI MDM: CPT | Performed by: EMERGENCY MEDICINE

## 2025-06-01 RX ORDER — POTASSIUM CHLORIDE 1500 MG/1
20 TABLET, EXTENDED RELEASE ORAL ONCE
Status: COMPLETED | OUTPATIENT
Start: 2025-06-01 | End: 2025-06-01

## 2025-06-01 RX ORDER — SODIUM CHLORIDE 9 MG/ML
3 INJECTION INTRAVENOUS
Status: DISCONTINUED | OUTPATIENT
Start: 2025-06-01 | End: 2025-06-01 | Stop reason: HOSPADM

## 2025-06-01 RX ADMIN — POTASSIUM CHLORIDE 20 MEQ: 1500 TABLET, EXTENDED RELEASE ORAL at 04:07

## 2025-06-01 NOTE — DISCHARGE INSTRUCTIONS
Continue taking medication as prescribed previously.  If any symptoms become worse such as severe chest pain difficulty breathing dizziness please return to emergency department.

## 2025-06-01 NOTE — ED PROVIDER NOTES
Time reflects when diagnosis was documented in both MDM as applicable and the Disposition within this note       Time User Action Codes Description Comment    6/1/2025  5:03 AM Robi Rivera Add [R07.9] Chest pain, unspecified           ED Disposition       ED Disposition   Discharge    Condition   Stable    Date/Time   Sun Jun 1, 2025  5:03 AM    Comment   Haylee Balbuena discharge to home/self care.                   Assessment & Plan       Medical Decision Making  54-year-old female atypical chest discomfort no evidence of acute coronary syndrome troponin x 2 negative.  Patient will be discharged for outpatient follow-up.  No evidence of sepsis dehydration alteration of mental status.  No evidence of any major electrolyte disorder, with exception of slight decrease in potassium 3.3 given oral KCl p.o.  No further workup indicated.    Amount and/or Complexity of Data Reviewed  Labs: ordered.  Radiology: ordered.    Risk  Prescription drug management.             Medications   sodium chloride (PF) 0.9 % injection 3 mL (has no administration in time range)   potassium chloride (Klor-Con M20) CR tablet 20 mEq (20 mEq Oral Given 6/1/25 0407)       ED Risk Strat Scores   HEART Risk Score      Flowsheet Row Most Recent Value   Heart Score Risk Calculator    History 0 Filed at: 06/01/2025 0344   ECG 0 Filed at: 06/01/2025 0344   Age 1 Filed at: 06/01/2025 0344   Risk Factors 1 Filed at: 06/01/2025 0344   Troponin 0 Filed at: 06/01/2025 0344   HEART Score 2 Filed at: 06/01/2025 0344          HEART Risk Score      Flowsheet Row Most Recent Value   Heart Score Risk Calculator    History 0 Filed at: 06/01/2025 0344   ECG 0 Filed at: 06/01/2025 0344   Age 1 Filed at: 06/01/2025 0344   Risk Factors 1 Filed at: 06/01/2025 0344   Troponin 0 Filed at: 06/01/2025 0344   HEART Score 2 Filed at: 06/01/2025 0344                      No data recorded        SBIRT 22yo+      Flowsheet Row Most Recent Value   Initial Alcohol Screen:  US AUDIT-C     1. How often do you have a drink containing alcohol? 0 Filed at: 06/01/2025 0157   2. How many drinks containing alcohol do you have on a typical day you are drinking?  0 Filed at: 06/01/2025 0157   3a. Male UNDER 65: How often do you have five or more drinks on one occasion? 0 Filed at: 06/01/2025 0157   3b. FEMALE Any Age, or MALE 65+: How often do you have 4 or more drinks on one occassion? 0 Filed at: 06/01/2025 0157   Audit-C Score 0 Filed at: 06/01/2025 0157   LILIA: How many times in the past year have you...    Used an illegal drug or used a prescription medication for non-medical reasons? Never Filed at: 06/01/2025 0157                            History of Present Illness       Chief Complaint   Patient presents with    Chest Pain     Pt reports chest pain that began an hour ago; pt also reports nausea       Past Medical History[1]   Past Surgical History[2]   Family History[3]   Social History[4]   E-Cigarette/Vaping    E-Cigarette Use Never User       E-Cigarette/Vaping Substances    Nicotine No     THC No     CBD No     Flavoring No     Other No     Unknown No       I have reviewed and agree with the history as documented.     54-year-old female states that 1 AM she has onset of chest discomfort nonradiating associated with slight shortness of breath.  He has no history of heart disease has history of chronic low back pain hypertension type 2 diabetes on insulin and seizure disorder.  Also has history of reversible airway disease hypercholesterolemia.  History of atypical chest pain psychiatric disorder.  Past surgical history hysterectomy cholecystectomy.  She has had no recent fevers chills nausea vomiting no diarrhea no dysuria no sore throat.      Chest Pain  Associated symptoms: no abdominal pain, no back pain, no cough, no fever, no palpitations, no shortness of breath and not vomiting        Review of Systems   Constitutional:  Negative for chills and fever.   HENT:  Negative for  ear pain and sore throat.    Eyes:  Negative for pain and visual disturbance.   Respiratory:  Negative for cough and shortness of breath.    Cardiovascular:  Positive for chest pain. Negative for palpitations.   Gastrointestinal:  Negative for abdominal pain and vomiting.   Genitourinary:  Negative for dysuria and hematuria.   Musculoskeletal:  Negative for arthralgias and back pain.   Skin:  Negative for color change and rash.   Neurological:  Negative for seizures and syncope.   All other systems reviewed and are negative.          Objective       ED Triage Vitals   Temperature Pulse Blood Pressure Respirations SpO2 Patient Position - Orthostatic VS   06/01/25 0248 06/01/25 0215 06/01/25 0215 06/01/25 0215 06/01/25 0215 06/01/25 0248   (!) 97.1 °F (36.2 °C) 59 149/82 20 97 % Sitting      Temp Source Heart Rate Source BP Location FiO2 (%) Pain Score    06/01/25 0248 06/01/25 0248 06/01/25 0248 -- 06/01/25 0248    Temporal Monitor Right arm  8      Vitals      Date and Time Temp Pulse SpO2 Resp BP Pain Score FACES Pain Rating User   06/01/25 0445 -- 51 94 % 18 121/57 -- -- AMF   06/01/25 0248 97.1 °F (36.2 °C) 54 97 % 19 132/71 8 -- AMF   06/01/25 0215 -- 59 97 % 20 149/82 -- -- AM            Physical Exam  Vitals and nursing note reviewed.   Constitutional:       General: She is not in acute distress.     Appearance: She is well-developed.      Comments: Awake alert normal mental status no distress noted.   HENT:      Head: Normocephalic and atraumatic.     Eyes:      Extraocular Movements: Extraocular movements intact.      Conjunctiva/sclera: Conjunctivae normal.      Pupils: Pupils are equal, round, and reactive to light.       Cardiovascular:      Rate and Rhythm: Normal rate and regular rhythm.      Heart sounds: Normal heart sounds. No murmur heard.  Pulmonary:      Effort: Pulmonary effort is normal. No respiratory distress.      Breath sounds: Normal breath sounds.   Abdominal:      Palpations: Abdomen is  soft.      Tenderness: There is no abdominal tenderness.     Musculoskeletal:         General: No swelling. Normal range of motion.      Cervical back: Neck supple.      Right lower leg: No edema.      Left lower leg: No edema.     Skin:     General: Skin is warm and dry.      Capillary Refill: Capillary refill takes less than 2 seconds.     Neurological:      General: No focal deficit present.      Mental Status: She is alert and oriented to person, place, and time.     Psychiatric:         Mood and Affect: Mood normal.         Results Reviewed       Procedure Component Value Units Date/Time    HS Troponin I 2hr [[id number]]  (Normal) Collected: 06/01/25 0412    Lab Status: Final result Specimen: Blood from Arm, Right Updated: 06/01/25 0440     hs TnI 2hr 3 ng/L      Delta 2hr hsTnI 0 ng/L     HS Troponin I 4hr [[id number]]     Lab Status: No result Specimen: Blood     HS Troponin 0hr (reflex protocol) [[id number]]  (Normal) Collected: 06/01/25 0212    Lab Status: Final result Specimen: Blood from Arm, Right Updated: 06/01/25 0251     hs TnI 0hr 3 ng/L     Comprehensive metabolic panel [[id number]]  (Abnormal) Collected: 06/01/25 0212    Lab Status: Final result Specimen: Blood from Arm, Right Updated: 06/01/25 0232     Sodium 136 mmol/L      Potassium 3.3 mmol/L      Chloride 96 mmol/L      CO2 31 mmol/L      ANION GAP 9 mmol/L      BUN 34 mg/dL      Creatinine 1.05 mg/dL      Glucose 95 mg/dL      Calcium 9.6 mg/dL      AST 21 U/L      ALT 35 U/L      Alkaline Phosphatase 84 U/L      Total Protein 6.9 g/dL      Albumin 4.3 g/dL      Total Bilirubin 0.36 mg/dL      eGFR 60 ml/min/1.73sq m     Narrative:      National Kid[employer] guidelines for Chronic Kidney Disease (CKD):     Stage 1 with normal or high GFR (GFR > 90 mL/min/1.73 square meters)    Stage 2 Mild CKD (GFR = 60-89 mL/min/1.73 square meters)    Stage 3A Moderate CKD (GFR = 45-59 mL/min/1.73 square meters)    Stage 3B Moderate CKD (GFR = 30-44  mL/min/1.73 square meters)    Stage 4 Severe CKD (GFR = 15-29 mL/min/1.73 square meters)    Stage 5 End Stage CKD (GFR <15 mL/min/1.73 square meters)  Note: GFR calculation is accurate only with a steady state creatinine    CBC and differential [[id number]]  (Abnormal) Collected: 25    Lab Status: Final result Specimen: Blood from Arm, Right Updated: 25     WBC 10.28 Thousand/uL      RBC 4.65 Million/uL      Hemoglobin 12.3 g/dL      Hematocrit 37.1 %      MCV 80 fL      MCH 26.5 pg      MCHC 33.2 g/dL      RDW 13.5 %      MPV 11.2 fL      Platelets 340 Thousands/uL      nRBC 0 /100 WBCs      Segmented % 44 %      Immature Grans % 0 %      Lymphocytes % 45 %      Monocytes % 8 %      Eosinophils Relative 2 %      Basophils Relative 1 %      Absolute Neutrophils 4.50 Thousands/µL      Absolute Immature Grans 0.03 Thousand/uL      Absolute Lymphocytes 4.67 Thousands/µL      Absolute Monocytes 0.85 Thousand/µL      Eosinophils Absolute 0.18 Thousand/µL      Basophils Absolute 0.05 Thousands/µL             X-ray chest 1 view portable    (Results Pending)       Procedures    ED Medication and Procedure Management   Prior to Admission Medications   Prescriptions Last Dose Informant Patient Reported? Taking?   Alcohol Swabs (Pharmacist Choice Alcohol) PADS  Self No No   Sig: Apply topically 4 (four) times a day Use as directed   B-D UF III MINI PEN NEEDLES 31G X 5 MM MISC  Self No No   Sig: Pt uses 5 pen needles daily   Continuous Glucose Sensor (Dexcom G7 Sensor)   No No   Sig: Use 1 Device every 10 days   Glucagon (Baqsimi One Pack) 3 MG/DOSE POWD   No No   Si each into each nostril if needed (PRN for hypoglycemia emergency)   Insulin Regular Human, Conc, (HumuLIN R U-500 KwikPen) 500 units/mL CONCENTRATED U-500 injection pen   No No   Sig: E11.65 inject 50 units with breakfast, 35 units with dinner, or as directed TDD up to 150 units daily   Iron Heme Polypeptide 12 MG TABS  Self Yes No   Sig:  Take 1 tablet by mouth   Magnesium Citrate 200 MG TABS   No No   Sig: Take 1 tablet by mouth 2 (two) times a day   OneTouch Ultra test strip  Self No No   Sig: USE 4 TIMES A DAY   Pharmacist Choice Lancets MISC  Self No No   Sig: USE AS DIRECTED   Restasis 0.05 % ophthalmic emulsion  Self Yes No   albuterol (PROVENTIL HFA,VENTOLIN HFA) 90 mcg/act inhaler  Self No No   Sig: INHALE ONE PUFF BY MOUTH AND INTO THE LUNGS EVERY 6 HOURS AS NEEDED FOR WHEEZING   aspirin (ECOTRIN LOW STRENGTH) 81 mg EC tablet   No No   Sig: TAKE ONE TABLET BY MOUTH ONCE DAILY   atorvastatin (LIPITOR) 80 mg tablet   No No   Sig: TAKE ONE TABLET BY MOUTH DAILY AT BEDTIME   cholecalciferol (VITAMIN D3) 1,000 units tablet   No No   Sig: Take 2 tablets (2,000 Units total) by mouth daily   citalopram (CeleXA) 40 mg tablet   No No   Sig: TAKE ONE TABLET BY MOUTH DAILY   dicyclomine (BENTYL) 20 mg tablet   No No   Sig: Take twice daily (am and before bedtime), may take additional 2 doses per day, max 4x/day (every 6 hours if needed)   docusate sodium (COLACE) 100 mg capsule   No No   Sig: TAKE ONE CAPSULE BY MOUTH TWICE DAILY   famotidine (PEPCID) 40 MG tablet   No No   Sig: TAKE ONE TABLET BY MOUTH TWO HOURS PRIOR TO BEDTIME   fenofibrate micronized (LOFIBRA) 134 MG capsule  Self No No   Sig: Take 1 capsule (134 mg total) by mouth daily with breakfast   levETIRAcetam (KEPPRA) 500 mg tablet   No No   Sig: TAKE ONE TABLET BY MOUTH TWICE DAILY   linaCLOtide 290 MCG CAPS   No No   Sig: Take 1 capsule by mouth daily at least 30 minutes prior to first meal of the day   meclizine (ANTIVERT) 25 mg tablet  Self No No   Sig: Take 1 tablet (25 mg total) by mouth 3 (three) times a day as needed for dizziness   metFORMIN (GLUCOPHAGE-XR) 500 mg 24 hr tablet   No No   Sig: Take 2 tablets (1,000 mg total) by mouth daily with breakfast   methocarbamol (ROBAXIN) 500 mg tablet   No No   Sig: Take 1 tablet (500 mg total) by mouth 3 (three) times a day as needed for  muscle spasms   metoclopramide (Reglan) 10 mg tablet  Self No No   Sig: Take 0.5 tablets (5 mg total) by mouth every 6 (six) hours   metoprolol tartrate (LOPRESSOR) 25 mg tablet   No No   Sig: TAKE ONE TABLET BY MOUTH TWICE DAILY   multivitamin (THERAGRAN) TABS  Self Yes No   Sig: Take 1 tablet by mouth every morning   mupirocin (BACTROBAN) 2 % ointment   No No   Sig: Apply topically 3 (three) times a day   omeprazole (PriLOSEC) 40 MG capsule   No No   Sig: Take 1 capsule (40 mg total) by mouth in the morning   ondansetron (ZOFRAN) 4 mg tablet   No No   Sig: TAKE 1 TABLET (4 MG TOTAL) BY MOUTH EVERY 8 (EIGHT) HOURS AS NEEDED FOR NAUSEA OR VOMITING   oxybutynin (DITROPAN-XL) 10 MG 24 hr tablet   No No   Sig: Take 1 tablet (10 mg total) by mouth daily at bedtime   pregabalin (LYRICA) 100 mg capsule   No No   Sig: Take 1 capsule (100 mg total) by mouth 3 (three) times a day   scopolamine (TRANSDERM-SCOP) 1 mg/3 days TD 72 hr patch   No No   Sig: Place 1 patch on the skin over 72 hours every third day   sucralfate (CARAFATE) 1 g/10 mL suspension   No No   Sig: Take 10 mL (1 g total) by mouth 4 (four) times a day      Facility-Administered Medications: None     Patient's Medications   Discharge Prescriptions    No medications on file     No discharge procedures on file.  ED SEPSIS DOCUMENTATION   Time reflects when diagnosis was documented in both MDM as applicable and the Disposition within this note       Time User Action Codes Description Comment    6/1/2025  5:03 AM Robi Rivera Add [R07.9] Chest pain, unspecified                      [1]   Past Medical History:  Diagnosis Date    Atypical chest pain     Bilateral calf pain 03/05/2024    Diabetes mellitus (HCC)     Gastroparesis     GERD (gastroesophageal reflux disease)     Hyperlipidemia     Hypertension     MRSA bacteremia 08/01/2023    Psychiatric disorder     Sciatica     Seizure disorder (HCC)    [2]   Past Surgical History:  Procedure Laterality Date     APPENDECTOMY      BREAST BIOPSY Left  benign     SECTION      CHOLECYSTECTOMY      COLONOSCOPY      HYSTERECTOMY      IR PICC PLACEMENT SINGLE LUMEN  2023    GA LAPAROSCOPIC APPENDECTOMY N/A 2021    Procedure: APPENDECTOMY LAPAROSCOPIC;  Surgeon: Onel Martin MD;  Location:  MAIN OR;  Service: General    GA LAPS ABD PRTM&OMENTUM DX W/WO SPEC BR/WA SPX N/A 2021    Procedure: LAPAROSCOPY DIAGNOSTIC, EXTENSIVE DESI;  Surgeon: Onel Martin MD;  Location:  MAIN OR;  Service: General    TUBAL LIGATION      TYMPANOSTOMY TUBE PLACEMENT Bilateral 2024    UPPER GASTROINTESTINAL ENDOSCOPY     [3]   Family History  Problem Relation Name Age of Onset    No Known Problems Mother      No Known Problems Father      No Known Problems Daughter      No Known Problems Maternal Grandmother      No Known Problems Paternal Grandmother      No Known Problems Paternal Aunt      No Known Problems Paternal Aunt      No Known Problems Paternal Aunt     [4]   Social History  Tobacco Use    Smoking status: Former     Current packs/day: 0.00     Types: Cigarettes     Quit date:      Years since quittin.4    Smokeless tobacco: Never   Vaping Use    Vaping status: Never Used   Substance Use Topics    Alcohol use: Never     Comment: occ    Drug use: Never        Robi Rivera MD  25 050

## 2025-06-01 NOTE — ED PROCEDURE NOTE
PROCEDURE  ECG 12 Lead Documentation Only    Date/Time: 6/1/2025 2:04 AM    Performed by: Robi Rivera MD  Authorized by: Robi Rivera MD    Indications / Diagnosis:  Chest pain  ECG reviewed by me, the ED Provider: yes    Patient location:  ED  Previous ECG:     Previous ECG:  Compared to current    Comparison ECG info:  April 23, 2025    Similarity:  No change  Interpretation:     Interpretation: normal    Rate:     ECG rate:  60  Rhythm:     Rhythm: sinus rhythm    Ectopy:     Ectopy: none    QRS:     QRS axis:  Normal    QRS intervals:  Normal  Conduction:     Conduction: normal    ST segments:     ST segments:  Normal  T waves:     T waves: normal    Comments:      Sinus rhythm with PAC, otherwise normal ECG.       Robi Rivera MD  06/01/25 0205

## 2025-06-02 LAB
ATRIAL RATE: 60 BPM
P AXIS: 63 DEGREES
PR INTERVAL: 176 MS
QRS AXIS: 39 DEGREES
QRSD INTERVAL: 86 MS
QT INTERVAL: 474 MS
QTC INTERVAL: 474 MS
T WAVE AXIS: 41 DEGREES
VENTRICULAR RATE: 60 BPM

## 2025-06-02 PROCEDURE — 93010 ELECTROCARDIOGRAM REPORT: CPT | Performed by: INTERNAL MEDICINE

## 2025-06-03 ENCOUNTER — OFFICE VISIT (OUTPATIENT)
Dept: FAMILY MEDICINE CLINIC | Facility: CLINIC | Age: 55
End: 2025-06-03
Payer: COMMERCIAL

## 2025-06-03 VITALS
SYSTOLIC BLOOD PRESSURE: 102 MMHG | RESPIRATION RATE: 18 BRPM | TEMPERATURE: 98.2 F | HEART RATE: 57 BPM | HEIGHT: 61 IN | BODY MASS INDEX: 34.17 KG/M2 | OXYGEN SATURATION: 98 % | WEIGHT: 181 LBS | DIASTOLIC BLOOD PRESSURE: 70 MMHG

## 2025-06-03 DIAGNOSIS — B95.62 MRSA CELLULITIS: Primary | ICD-10-CM

## 2025-06-03 DIAGNOSIS — Z12.31 ENCOUNTER FOR SCREENING MAMMOGRAM FOR BREAST CANCER: ICD-10-CM

## 2025-06-03 DIAGNOSIS — L03.90 MRSA CELLULITIS: Primary | ICD-10-CM

## 2025-06-03 DIAGNOSIS — H61.21 CERUMEN DEBRIS ON TYMPANIC MEMBRANE OF RIGHT EAR: ICD-10-CM

## 2025-06-03 PROCEDURE — 99213 OFFICE O/P EST LOW 20 MIN: CPT | Performed by: NURSE PRACTITIONER

## 2025-06-03 RX ORDER — SULFAMETHOXAZOLE AND TRIMETHOPRIM 800; 160 MG/1; MG/1
1 TABLET ORAL EVERY 12 HOURS SCHEDULED
Qty: 20 TABLET | Refills: 0 | Status: SHIPPED | OUTPATIENT
Start: 2025-06-03 | End: 2025-06-13

## 2025-06-03 NOTE — PROGRESS NOTES
"[facility]  Name: Haylee Balbuena      : 1970      MRN: [id number]  Encounter Provider: SYLWIA Larson  Encounter Date: 6/3/2025   Encounter department: [facility] PRIMARY [facility]  :  Assessment & Plan  MRSA cellulitis    Orders:    sulfamethoxazole-trimethoprim (BACTRIM DS) 800-160 mg per tablet; Take 1 tablet by mouth every 12 (twelve) hours for 10 days    Encounter for screening mammogram for breast cancer    Orders:    Mammo screening bilateral w 3d and cad; Future    Cerumen debris on tympanic membrane of right ear    Orders:    carbamide peroxide (DEBROX) 6.5 % otic solution; Administer 5 drops into both ears 2 (two) times a day          Depression Screening and Follow-up Plan: Patient was screened for depression during today's encounter. They screened negative with a PHQ-9 score of 0.        History of Present Illness   C/o pain in her right ear  C/o pain of left elbow worse in the last few days- same area as having MRSA, no open sores, reports skin is very sensitive      Review of Systems   HENT:  Positive for ear pain.    Musculoskeletal:  Positive for arthralgias and myalgias.   Skin:  Positive for rash (on left elbow/forearm).       Objective   /70   Pulse 57   Temp 98.2 °F (36.8 °C)   Resp 18   Ht 5' 1\" (1.549 m)   Wt 82.1 kg (181 lb)   SpO2 98%   BMI 34.20 kg/m²      Physical Exam  Vitals and nursing note reviewed. Exam conducted with a chaperone present (mother- Fransisca).   Constitutional:       General: She is not in acute distress.     Appearance: Normal appearance. She is well-developed. She is not diaphoretic.   HENT:      Right Ear: Tympanic membrane, ear canal and external ear normal.      Ears:      Comments: Cerumen in right canal but no impaction or blockage  Pulmonary:      Effort: Pulmonary effort is normal. No respiratory distress.     Skin:     Coloration: Skin is not pale.      Findings: Erythema and rash (outer lateral left elbow, no wound, no abscess, no " drainage. no vesicles, no raised lesions.) present.     Neurological:      Mental Status: She is alert and oriented to person, place, and time.     Psychiatric:         Speech: Speech normal.         Behavior: Behavior normal.         Thought Content: Thought content normal.         Judgment: Judgment normal.

## 2025-06-04 ENCOUNTER — ANESTHESIA (OUTPATIENT)
Dept: GASTROENTEROLOGY | Facility: HOSPITAL | Age: 55
End: 2025-06-04
Payer: COMMERCIAL

## 2025-06-04 ENCOUNTER — ANESTHESIA EVENT (OUTPATIENT)
Dept: GASTROENTEROLOGY | Facility: HOSPITAL | Age: 55
End: 2025-06-04
Payer: COMMERCIAL

## 2025-06-04 ENCOUNTER — HOSPITAL ENCOUNTER (OUTPATIENT)
Dept: GASTROENTEROLOGY | Facility: HOSPITAL | Age: 55
Setting detail: OUTPATIENT SURGERY
Discharge: HOME | End: 2025-06-04
Attending: STUDENT IN AN ORGANIZED HEALTH CARE EDUCATION/TRAINING PROGRAM
Payer: COMMERCIAL

## 2025-06-04 VITALS
RESPIRATION RATE: 16 BRPM | OXYGEN SATURATION: 98 % | TEMPERATURE: 97.7 F | DIASTOLIC BLOOD PRESSURE: 68 MMHG | HEART RATE: 58 BPM | SYSTOLIC BLOOD PRESSURE: 121 MMHG

## 2025-06-04 DIAGNOSIS — K21.9 GASTROESOPHAGEAL REFLUX DISEASE WITHOUT ESOPHAGITIS: Chronic | ICD-10-CM

## 2025-06-04 DIAGNOSIS — R13.12 OROPHARYNGEAL DYSPHAGIA: ICD-10-CM

## 2025-06-04 LAB — GLUCOSE SERPL-MCNC: 110 MG/DL (ref 65–140)

## 2025-06-04 PROCEDURE — 43248 EGD GUIDE WIRE INSERTION: CPT | Performed by: STUDENT IN AN ORGANIZED HEALTH CARE EDUCATION/TRAINING PROGRAM

## 2025-06-04 PROCEDURE — 82948 REAGENT STRIP/BLOOD GLUCOSE: CPT

## 2025-06-04 RX ORDER — GLYCOPYRROLATE 0.2 MG/ML
INJECTION INTRAMUSCULAR; INTRAVENOUS AS NEEDED
Status: DISCONTINUED | OUTPATIENT
Start: 2025-06-04 | End: 2025-06-04

## 2025-06-04 RX ORDER — SODIUM CHLORIDE, SODIUM LACTATE, POTASSIUM CHLORIDE, CALCIUM CHLORIDE 600; 310; 30; 20 MG/100ML; MG/100ML; MG/100ML; MG/100ML
125 INJECTION, SOLUTION INTRAVENOUS CONTINUOUS
Status: DISCONTINUED | OUTPATIENT
Start: 2025-06-04 | End: 2025-06-04

## 2025-06-04 RX ORDER — PROPOFOL 10 MG/ML
INJECTION, EMULSION INTRAVENOUS AS NEEDED
Status: DISCONTINUED | OUTPATIENT
Start: 2025-06-04 | End: 2025-06-04

## 2025-06-04 RX ORDER — LIDOCAINE HYDROCHLORIDE 20 MG/ML
INJECTION, SOLUTION EPIDURAL; INFILTRATION; INTRACAUDAL; PERINEURAL AS NEEDED
Status: DISCONTINUED | OUTPATIENT
Start: 2025-06-04 | End: 2025-06-04

## 2025-06-04 RX ADMIN — PROPOFOL 50 MG: 10 INJECTION, EMULSION INTRAVENOUS at 07:26

## 2025-06-04 RX ADMIN — SODIUM CHLORIDE, SODIUM LACTATE, POTASSIUM CHLORIDE, AND CALCIUM CHLORIDE 125 ML/HR: .6; .31; .03; .02 INJECTION, SOLUTION INTRAVENOUS at 06:51

## 2025-06-04 RX ADMIN — LIDOCAINE HYDROCHLORIDE 60 MG: 20 INJECTION, SOLUTION EPIDURAL; INFILTRATION; INTRACAUDAL; PERINEURAL at 07:24

## 2025-06-04 RX ADMIN — PROPOFOL 100 MG: 10 INJECTION, EMULSION INTRAVENOUS at 07:24

## 2025-06-04 RX ADMIN — GLYCOPYRROLATE 0.2 MG: 0.2 INJECTION, SOLUTION INTRAMUSCULAR; INTRAVENOUS at 07:24

## 2025-06-04 RX ADMIN — PROPOFOL 50 MG: 10 INJECTION, EMULSION INTRAVENOUS at 07:30

## 2025-06-04 NOTE — H&P
[facility]  History & Physical     PATIENT INFO     Name: Haylee Balbuena  YOB: 1970   Age: 54 y.o.   Sex: female   MRN: [id number]     HISTORY OF PRESENT ILLNESS     Haylee Balbuena is a 54 y.o. year old female who presents for EGD for dysphagia. No antithrombotics or anticoagulants.     REVIEW OF SYSTEMS     Per the HPI, and otherwise unremarkable.    Historical Information   Past Medical History[1]  Past Surgical History[2]  Social History   Social History     Substance and Sexual Activity   Alcohol Use Never    Comment: occ     Social History     Substance and Sexual Activity   Drug Use Never     Tobacco Use History[3]  Family History[4]     MEDICATIONS & ALLERGIES     Current Outpatient Medications   Medication Instructions    albuterol (PROVENTIL HFA,VENTOLIN HFA) 90 mcg/act inhaler INHALE ONE PUFF BY MOUTH AND INTO THE LUNGS EVERY 6 HOURS AS NEEDED FOR WHEEZING    Alcohol Swabs (Pharmacist Choice Alcohol) PADS Apply externally, 4 times daily, Use as directed    aspirin (ECOTRIN LOW STRENGTH) 81 mg, Oral, Daily    atorvastatin (LIPITOR) 80 mg, Oral, Daily at bedtime    B-D UF III MINI PEN NEEDLES 31G X 5 MM MISC Pt uses 5 pen needles daily    carbamide peroxide (DEBROX) 6.5 % otic solution 5 drops, Both Ears, 2 times daily    cholecalciferol (VITAMIN D3) 2,000 Units, Oral, Daily    citalopram (CELEXA) 40 mg, Oral, Daily    Continuous Glucose Sensor (Dexcom G7 Sensor) 1 Device, Does not apply, Every 10 days    dicyclomine (BENTYL) 20 mg tablet Take twice daily (am and before bedtime), may take additional 2 doses per day, max 4x/day (every 6 hours if needed)    docusate sodium (COLACE) 100 mg, Oral, 2 times daily    famotidine (PEPCID) 40 MG tablet TAKE ONE TABLET BY MOUTH TWO HOURS PRIOR TO BEDTIME    fenofibrate micronized (LOFIBRA) 134 mg, Oral, Daily with breakfast    Glucagon (Baqsimi One Pack) 3 MG/DOSE POWD 1 each, Nasal, As needed    Insulin Regular Human,  Conc, (HumuLIN R U-500 KwikPen) 500 units/mL CONCENTRATED U-500 injection pen E11.65 inject 50 units with breakfast, 35 units with dinner, or as directed TDD up to 150 units daily    Iron Heme Polypeptide 12 MG TABS 1 tablet    levETIRAcetam (KEPPRA) 500 mg, Oral, 2 times daily    linaCLOtide 290 MCG CAPS 1 capsule, Oral, Daily, at least 30 minutes prior to first meal of the day    Magnesium Citrate 200 MG TABS 1 tablet, Oral, 2 times daily    meclizine (ANTIVERT) 25 mg, Oral, 3 times daily PRN    metFORMIN (GLUCOPHAGE-XR) 1,000 mg, Oral, Daily with breakfast    methocarbamol (ROBAXIN) 500 mg, Oral, 3 times daily PRN    metoclopramide (REGLAN) 5 mg, Oral, Every 6 hours    metoprolol tartrate (LOPRESSOR) 25 mg, Oral, 2 times daily    multivitamin (THERAGRAN) TABS 1 tablet, Every morning    mupirocin (BACTROBAN) 2 % ointment Topical, 3 times daily    omeprazole (PRILOSEC) 40 mg, Oral, Daily    ondansetron (ZOFRAN) 4 mg, Oral, Every 8 hours PRN    OneTouch Ultra test strip USE 4 TIMES A DAY    oxybutynin (DITROPAN-XL) 10 mg, Oral, Daily at bedtime    Pharmacist Choice Lancets MISC USE AS DIRECTED    pregabalin (LYRICA) 100 mg, Oral, 3 times daily    Restasis 0.05 % ophthalmic emulsion No dose, route, or frequency recorded.    scopolamine (TRANSDERM-SCOP) 1 mg/3 days TD 72 hr patch 1 patch, Transdermal, Every 72 hours    sucralfate (CARAFATE) 1 g, Oral, 4 times daily    sulfamethoxazole-trimethoprim (BACTRIM DS) 800-160 mg per tablet 1 tablet, Oral, Every 12 hours scheduled     Allergies[5]     PHYSICAL EXAM      Objective   Blood pressure 129/66, pulse (!) 54, temperature 97.7 °F (36.5 °C), temperature source Temporal, resp. rate 18, SpO2 98%, not currently breastfeeding. There is no height or weight on file to calculate BMI.    General Appearance:   Alert, cooperative, no distress   Lungs:   Equal chest rise, respirations unlabored    Heart:   Regular rate and rhythm   Abdomen:   Soft, non-tender, non-distended;  normal bowel sounds; no masses, no organomegaly    Extremities:   No edema       ASSESSMENT & PLAN     This is a 54 y.o. year old female here for EGD, and she is stable and optimized for her procedure.      Denny Hidalgo D.O.  [facility]  Division of Gastroenterology & Hepatology  Available on TigerText  [email or web address]    ** Please Note: This note is constructed using a voice recognition dictation system. **       [1]   Past Medical History:  Diagnosis Date    Atypical chest pain     Bilateral calf pain 2024    Diabetes mellitus (HCC)     Gastroparesis     GERD (gastroesophageal reflux disease)     Hyperlipidemia     Hypertension     MRSA bacteremia 2023    Psychiatric disorder     Sciatica     Seizure disorder (HCC)    [2]   Past Surgical History:  Procedure Laterality Date    APPENDECTOMY      BREAST BIOPSY Left  benign     SECTION      CHOLECYSTECTOMY      COLONOSCOPY      HYSTERECTOMY      IR PICC PLACEMENT SINGLE LUMEN  2023    MO LAPAROSCOPIC APPENDECTOMY N/A 2021    Procedure: APPENDECTOMY LAPAROSCOPIC;  Surgeon: Onel Martin MD;  Location:  MAIN OR;  Service: General    MO LAPS ABD PRTM&OMENTUM DX W/WO SPEC BR/WA SPX N/A 2021    Procedure: LAPAROSCOPY DIAGNOSTIC, EXTENSIVE DESI;  Surgeon: Onel Martin MD;  Location:  MAIN OR;  Service: General    TUBAL LIGATION      TYMPANOSTOMY TUBE PLACEMENT Bilateral 2024    UPPER GASTROINTESTINAL ENDOSCOPY     [3]   Social History  Tobacco Use   Smoking Status Former    Current packs/day: 0.00    Types: Cigarettes    Quit date:     Years since quittin.4   Smokeless Tobacco Never   [4]   Family History  Problem Relation Name Age of Onset    No Known Problems Mother      No Known Problems Father      No Known Problems Daughter      No Known Problems Maternal Grandmother      No Known Problems Paternal Grandmother      No Known Problems Paternal Aunt      No Known Problems Paternal  Aunt      No Known Problems Paternal Aunt     [5]   Allergies  Allergen Reactions    Butorphanol Hallucinations    Toradol [Ketorolac Tromethamine] GI Intolerance    Oxycodone GI Intolerance    Benztropine Hallucinations     Was seeing things that were not right    Penicillins     Zolpidem     Medical Tape Rash     Ok with paper tape

## 2025-06-04 NOTE — ANESTHESIA PREPROCEDURE EVALUATION
Procedure:  EGD    Relevant Problems   CARDIO   (+) Hypercholesteremia   (+) Hypertension   (+) Hypertriglyceridemia   (+) Migraine      ENDO   (+) Type 2 diabetes mellitus with diabetic autonomic neuropathy, with long-term current use of insulin (HCC)      GI/HEPATIC   (+) GERD (gastroesophageal reflux disease)   (+) Oropharyngeal dysphagia      /RENAL   (+) Acute renal failure superimposed on stage 3a chronic kidney disease (HCC)   (+) Renal cell carcinoma (HCC)   (+) Stage 3a chronic kidney disease (HCC)      HEMATOLOGY   (+) Anemia   (+) Iron deficiency anemia secondary to inadequate dietary iron intake      MUSCULOSKELETAL   (+) Chronic low back pain with sciatica      NEURO/PSYCH   (+) Chronic low back pain with sciatica   (+) Depression with anxiety   (+) Diabetic polyneuropathy associated with type 2 diabetes mellitus (HCC)   (+) Migraine   (+) Seizure disorder (HCC)   (+) Severe major depressive disorder (HCC)   (+) Type 2 diabetes mellitus with diabetic autonomic neuropathy, with long-term current use of insulin (HCC)      PULMONARY   (+) Mild intermittent asthma without complication      Lab Results   Component Value Date    SODIUM 136 06/01/2025    K 3.3 (L) 06/01/2025    CL 96 06/01/2025    CO2 31 06/01/2025    AGAP 9 06/01/2025    BUN 34 (H) 06/01/2025    CREATININE 1.05 06/01/2025    GLUC 95 06/01/2025    GLUF 165 (H) 03/12/2025    CALCIUM 9.6 06/01/2025    AST 21 06/01/2025    ALT 35 06/01/2025    ALKPHOS 84 06/01/2025    TP 6.9 06/01/2025    TBILI 0.36 06/01/2025    EGFR 60 06/01/2025     Lab Results   Component Value Date    WBC 10.28 (H) 06/01/2025    HGB 12.3 06/01/2025    HCT 37.1 06/01/2025    MCV 80 (L) 06/01/2025     06/01/2025       Physical Exam    Airway     Mallampati score: II  TM Distance: >3 FB  Neck ROM: full      Cardiovascular      Dental    edentulous    Pulmonary      Neurological      Other Findings  post-pubertal.      Anesthesia Plan  ASA Score- 2     Anesthesia Type-  IV sedation with anesthesia with ASA Monitors.         Additional Monitors:     Airway Plan:            Plan Factors-Exercise tolerance (METS): >4 METS.    Chart reviewed. EKG reviewed. Imaging results reviewed. Existing labs reviewed. Patient summary reviewed.                  Induction-     Postoperative Plan- .   Monitoring Plan - Monitoring plan - standard ASA monitoring          Informed Consent- Anesthetic plan and risks discussed with patient.  I personally reviewed this patient with the CRNA. Discussed and agreed on the Anesthesia Plan with the CRNA..      NPO Status:  Vitals Value Taken Time   Date of last liquid 06/03/25 06/04/25 06:36   Time of last liquid 2030 06/04/25 06:36   Date of last solid 05/29/25 06/04/25 06:36   Time of last solid 1800 06/04/25 06:36

## 2025-06-04 NOTE — ANESTHESIA POSTPROCEDURE EVALUATION
Post-Op Assessment Note    CV Status:  Stable  Pain Score: 0    Pain management: adequate       Mental Status:  Sleepy   Hydration Status:  Stable   PONV Controlled:  None   Airway Patency:  Patent     Post Op Vitals Reviewed: Yes    No anethesia notable event occurred.    Staff: CRNA           Last Filed PACU Vitals:  Vitals Value Taken Time   Temp 97.8 °F (36.6 °C) 06/04/25 07:37   Pulse 61 06/04/25 07:37   /66 06/04/25 07:37   Resp 16 06/04/25 07:37   SpO2 94 % 06/04/25 07:37

## 2025-06-13 ENCOUNTER — TELEPHONE (OUTPATIENT)
Age: 55
End: 2025-06-13

## 2025-06-13 NOTE — TELEPHONE ENCOUNTER
Patient calling to see if office can provide a sample of Dexcom G7 sensor. Made aware message was sent to provider and we will return her call. Patient verbalized understanding.

## 2025-06-13 NOTE — TELEPHONE ENCOUNTER
Patient  called  she is out of  her Dexcom g7 sensors . She called  pharmacy  they are out she want to know if we had samples in our office.

## 2025-06-13 NOTE — TELEPHONE ENCOUNTER
Please update patient - Would like to help, but CGM sample supplies are limited, monitored more closely - meant for new CGM start only.    There is limited G7 sensors through local pharmacies, but we are having better luck through DME companies at present, still have supply - will send new Rx through DME company. Please prepare new Rx through [employer].

## 2025-06-16 NOTE — TELEPHONE ENCOUNTER
Patient called with a number her insurance company gave her for a [email or web address]  company for the sensors.  Patient does not know the name just has the number [phone] for authorization.

## 2025-06-16 NOTE — TELEPHONE ENCOUNTER
Patient called back regarding sensors.  She still has an account with [facility] ([phone]).  They accept her insurance. Will her dexcom be cheaper through them?

## 2025-06-20 DIAGNOSIS — E78.00 HYPERCHOLESTEREMIA: ICD-10-CM

## 2025-06-21 DIAGNOSIS — K21.9 GASTROESOPHAGEAL REFLUX DISEASE WITHOUT ESOPHAGITIS: ICD-10-CM

## 2025-06-21 DIAGNOSIS — K21.9 GASTROESOPHAGEAL REFLUX DISEASE, UNSPECIFIED WHETHER ESOPHAGITIS PRESENT: ICD-10-CM

## 2025-06-21 RX ORDER — ATORVASTATIN CALCIUM 80 MG/1
80 TABLET, FILM COATED ORAL
Qty: 90 TABLET | Refills: 1 | Status: SHIPPED | OUTPATIENT
Start: 2025-06-21

## 2025-06-23 RX ORDER — FAMOTIDINE 40 MG/1
TABLET, FILM COATED ORAL
Qty: 30 TABLET | Refills: 5 | Status: SHIPPED | OUTPATIENT
Start: 2025-06-23

## 2025-07-14 ENCOUNTER — APPOINTMENT (OUTPATIENT)
Dept: LAB | Facility: CLINIC | Age: 55
End: 2025-07-14
Payer: COMMERCIAL

## 2025-07-14 DIAGNOSIS — E11.65 TYPE 2 DIABETES MELLITUS WITH HYPERGLYCEMIA, WITH LONG-TERM CURRENT USE OF INSULIN (HCC): ICD-10-CM

## 2025-07-14 DIAGNOSIS — Z79.4 TYPE 2 DIABETES MELLITUS WITH HYPERGLYCEMIA, WITH LONG-TERM CURRENT USE OF INSULIN (HCC): ICD-10-CM

## 2025-07-14 DIAGNOSIS — R79.89 ABNORMAL TSH: ICD-10-CM

## 2025-07-14 LAB
ANION GAP SERPL CALCULATED.3IONS-SCNC: 7 MMOL/L (ref 4–13)
BUN SERPL-MCNC: 27 MG/DL (ref 5–25)
CALCIUM SERPL-MCNC: 9.3 MG/DL (ref 8.4–10.2)
CHLORIDE SERPL-SCNC: 104 MMOL/L (ref 96–108)
CO2 SERPL-SCNC: 29 MMOL/L (ref 21–32)
CREAT SERPL-MCNC: 1.11 MG/DL (ref 0.6–1.3)
EST. AVERAGE GLUCOSE BLD GHB EST-MCNC: 352 MG/DL
GFR SERPL CREATININE-BSD FRML MDRD: 56 ML/MIN/1.73SQ M
GLUCOSE P FAST SERPL-MCNC: 140 MG/DL (ref 65–99)
HBA1C MFR BLD: 13.9 %
POTASSIUM SERPL-SCNC: 4.4 MMOL/L (ref 3.5–5.3)
SODIUM SERPL-SCNC: 140 MMOL/L (ref 135–147)
TSH SERPL DL<=0.05 MIU/L-ACNC: 1.75 UIU/ML (ref 0.45–4.5)

## 2025-07-14 PROCEDURE — 83036 HEMOGLOBIN GLYCOSYLATED A1C: CPT

## 2025-07-14 PROCEDURE — 84443 ASSAY THYROID STIM HORMONE: CPT

## 2025-07-14 PROCEDURE — 84681 ASSAY OF C-PEPTIDE: CPT

## 2025-07-14 PROCEDURE — 80048 BASIC METABOLIC PNL TOTAL CA: CPT

## 2025-07-14 PROCEDURE — 36415 COLL VENOUS BLD VENIPUNCTURE: CPT

## 2025-07-16 ENCOUNTER — OFFICE VISIT (OUTPATIENT)
Dept: ENDOCRINOLOGY | Facility: CLINIC | Age: 55
End: 2025-07-16
Payer: COMMERCIAL

## 2025-07-16 VITALS
DIASTOLIC BLOOD PRESSURE: 78 MMHG | HEART RATE: 60 BPM | WEIGHT: 181 LBS | BODY MASS INDEX: 34.17 KG/M2 | OXYGEN SATURATION: 97 % | TEMPERATURE: 97.2 F | SYSTOLIC BLOOD PRESSURE: 124 MMHG | HEIGHT: 61 IN

## 2025-07-16 DIAGNOSIS — E11.43 TYPE 2 DIABETES MELLITUS WITH DIABETIC AUTONOMIC NEUROPATHY, WITH LONG-TERM CURRENT USE OF INSULIN (HCC): Primary | ICD-10-CM

## 2025-07-16 DIAGNOSIS — E78.00 HYPERCHOLESTEREMIA: ICD-10-CM

## 2025-07-16 DIAGNOSIS — Z79.4 TYPE 2 DIABETES MELLITUS WITH DIABETIC AUTONOMIC NEUROPATHY, WITH LONG-TERM CURRENT USE OF INSULIN (HCC): Primary | ICD-10-CM

## 2025-07-16 LAB — C PEPTIDE SERPL-MCNC: 2.5 NG/ML (ref 1.1–4.4)

## 2025-07-16 PROCEDURE — 99214 OFFICE O/P EST MOD 30 MIN: CPT | Performed by: STUDENT IN AN ORGANIZED HEALTH CARE EDUCATION/TRAINING PROGRAM

## 2025-07-16 PROCEDURE — 95251 CONT GLUC MNTR ANALYSIS I&R: CPT | Performed by: STUDENT IN AN ORGANIZED HEALTH CARE EDUCATION/TRAINING PROGRAM

## 2025-07-16 RX ORDER — TIRZEPATIDE 2.5 MG/.5ML
2.5 INJECTION, SOLUTION SUBCUTANEOUS WEEKLY
Qty: 2 ML | Refills: 0 | Status: SHIPPED | OUTPATIENT
Start: 2025-07-16

## 2025-07-16 RX ORDER — INSULIN HUMAN 500 [IU]/ML
INJECTION, SOLUTION SUBCUTANEOUS
Qty: 6 ML | Refills: 1 | Status: SHIPPED | OUTPATIENT
Start: 2025-07-16 | End: 2025-07-22 | Stop reason: SDUPTHER

## 2025-07-16 NOTE — ASSESSMENT & PLAN NOTE
1. Type 2 diabetes: Inadequately controlled.  Recent A1c of 11.2% indicates chronic poor control of diabetes. Discussed the need for better insulin management and the possibility of insulin pump therapy. Reviewed patient's failure to attend diabetic education sessions. Discussed signs of diabetic ketoacidosis (DKA) and the importance of monitoring for symptoms such as loss of appetite, nausea, changes in breathing pattern, and confusion. Adjusted Humulin R U-500 regimen to 50 units in the morning, 30 units at midday, and 30 units in the evening. Discontinued metformin. Reintroduced Mounjaro at a starting dose of 2.5 mg, increasing to 5 mg after one month. Provided Dexcom G7 sensor.    Follow-up: A follow-up visit is scheduled in 2 months.    Lab Results   Component Value Date    HGBA1C 13.9 (H) 07/14/2025       Orders:  •  Insulin Regular Human, Conc, (HumuLIN R U-500 KwikPen) 500 units/mL CONCENTRATED U-500 injection pen; E11.65 inject 50 units with breakfast, 30 units with lunner and dinner, or as directed TDD up to 150 units daily  •  Tirzepatide (Mounjaro) 2.5 MG/0.5ML SOAJ; Inject 2.5 mg under the skin once a week

## 2025-07-16 NOTE — PROGRESS NOTES
Name: Haylee Balbuena      : 1970      MRN: [id number]  Encounter Provider: Anders Washington DO  Encounter Date: 2025   Encounter department: [facility]    Chief Complaint   Patient presents with   • Diabetes Type 2   :  Assessment & Plan  Type 2 diabetes mellitus with diabetic autonomic neuropathy, with long-term current use of insulin (HCC)  1. Type 2 diabetes: Inadequately controlled.  Recent A1c of 11.2% indicates chronic poor control of diabetes. Discussed the need for better insulin management and the possibility of insulin pump therapy. Reviewed patient's failure to attend diabetic education sessions. Discussed signs of diabetic ketoacidosis (DKA) and the importance of monitoring for symptoms such as loss of appetite, nausea, changes in breathing pattern, and confusion. Adjusted Humulin R U-500 regimen to 50 units in the morning, 30 units at midday, and 30 units in the evening. Discontinued metformin. Reintroduced Mounjaro at a starting dose of 2.5 mg, increasing to 5 mg after one month. Provided Dexcom G7 sensor.    Follow-up: A follow-up visit is scheduled in 2 months.    Lab Results   Component Value Date    HGBA1C 13.9 (H) 2025       Orders:  •  Insulin Regular Human, Conc, (HumuLIN R U-500 KwikPen) 500 units/mL CONCENTRATED U-500 injection pen; E11.65 inject 50 units with breakfast, 30 units with lunner and dinner, or as directed TDD up to 150 units daily  •  Tirzepatide (Mounjaro) 2.5 MG/0.5ML SOAJ; Inject 2.5 mg under the skin once a week    BMI 34.0-34.9,adult         Hypercholesteremia  Continues on atorvastatin and fenofibrate.               Pertinent Medical History         History of Present Illness   History of Present Illness  The patient is a 54-year-old female who presents for type 2 diabetes complicated by diabetic autonomic neuropathy, chronic hyperglycemia, and chronic poor control of diabetes as noted by a recent A1c of  11.2%. She is presently on therapy with Humulin R U-500, 50 units with breakfast and 35 units with dinner. She is also on metformin 1000 mg daily. For hyperlipidemia, she takes atorvastatin and fenofibrate. The patient had recently been exploring ideas about insulin pump therapy with our advanced practitioner, but in review of charting, it is noted that she failed to attend diabetic education over several visits.    She reports that her blood sugar levels fluctuate, decreasing at times but then rising again. She has been considering insulin pump therapy but has not yet committed to it. Her current regimen includes Humulin R U-500, which she takes at a dose of 50 units in the morning around 8:00 AM before breakfast and 35 units with dinner. She has previously been on higher doses of this medication. Her typical breakfast consists of an English muffin, egg, and hdz, and she is exploring other dietary options that would not significantly raise her blood sugar levels. Despite her efforts to maintain a healthy diet, she finds that her blood sugar levels remain high. She has experienced diabetic ketoacidosis (DKA) once before and is concerned about the possibility of it recurring. She was previously on Mounjaro, which she believes helped control her blood sugar levels better than her current medications, but it was discontinued due to stomach issues. 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 She typically consumes three meals a day. She is considering reintroducing Mounjaro into her treatment plan. She expresses a desire to discontinue metformin as she does not feel it is effective. She recently had lab tests done and is awaiting the results. She tries to manage her condition at home as much as possible to avoid hospital visits. She recalls an incident where she felt nauseous and had hot and cold flashes, and wonders if this could have been related to DKA.    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      CGM Interpretation:  Date Range: Jul 3 - Jul 16, 2025  Device used: DEXCOM  Type: Type 2 Diabetes  Home use     Analysis of data:   Average Glucose: 352  GMI: 11.7%  Coefficient of Variation: 22.3%  SD: 79 mg/dL  Time in Target Range: 7%   Time Above Range: 4% high, 89% very high  Time Below Range: 0% low, 0% very low  Interpretation of data: severe protracted hyperglycemia with significant diurnal variability, no hypoglycemia  More than 72 hours of data was reviewed. Report to be scanned to chart.     Review of Systems as per HPI         Medical History Reviewed by provider this encounter:  Tobacco  Allergies  Meds  Problems  Med Hx  Surg Hx  Fam Hx     .    Objective   /78 (BP Location: Right arm, Patient Position: Sitting, Cuff Size: Large)   Pulse 60   Temp (!) 97.2 °F (36.2 °C)   Ht 5' 1\" (1.549 m)   Wt 82.1 kg (181 lb)   SpO2 97%   BMI 34.20 kg/m²      Body mass index is 34.2 kg/m².  Wt Readings from Last 3 Encounters:   07/16/25 82.1 kg (181 lb)   06/03/25 82.1 kg (181 lb)   06/01/25 81.6 kg (180 lb)     Physical Exam  Vitals reviewed.   Constitutional:       General: She is not in acute distress.     Appearance: Normal appearance.   HENT:      Head: Normocephalic and atraumatic.     Eyes:      General: No scleral icterus.     Conjunctiva/sclera: Conjunctivae normal.     Pulmonary:      Effort: Pulmonary effort is normal. No respiratory distress.     Musculoskeletal:         General: No deformity.      Cervical back: Normal range of motion.     Neurological:      General: No focal deficit present.      Mental Status: She is alert.     Psychiatric:         Mood and Affect: Mood normal.         Behavior: Behavior normal.         Last Eye Exam: 01/10/2024  Last Foot Exam: 09/12/2024  Health Maintenance   Topic Date Due   • Diabetic Foot Exam  09/12/2025   • Diabetic Eye " Exam  01/10/2026       Labs: I have reviewed pertinent labs including:   Lab Results   Component Value Date    HGBA1C 13.9 (H) 07/14/2025    HGBA1C 11.2 (H) 03/12/2025    HGBA1C 12.0 (H) 03/02/2025      Lab Results   Component Value Date    CREATININE 1.11 07/14/2025    CREATININE 1.05 06/01/2025    CREATININE 1.00 04/23/2025    BUN 27 (H) 07/14/2025    K 4.4 07/14/2025     07/14/2025    CO2 29 07/14/2025      GFR, Calculated   Date Value Ref Range Status   08/10/2020 80 >60 mL/min/1.73m2 Final     Comment:     mL/min per 1.73 square meters                                            Normal Function or Mild Renal    Disease (if clinically at risk):  >or=60  Moderately Decreased:                30-59  Severely Decreased:                  15-29  Renal Failure:                         <15                                            -American GFR: multiply reported GFR by 1.16    Please note that the eGFR is based on the CKD-EPI calculation, and is not intended to be used for drug dosing.                                            Note: Calculated GFR may not be an accurate indicator of renal function if the patient's renal function is not in a steady state.     eGFR   Date Value Ref Range Status   07/14/2025 56 ml/min/1.73sq m Final      HDL, Direct   Date Value Ref Range Status   03/12/2025 47 (L) >=50 mg/dL Final     Triglycerides   Date Value Ref Range Status   03/12/2025 180 (H) See Comment mg/dL Final     Comment:     Triglyceride:     0-9Y            <75mg/dL     10Y-17Y         <90 mg/dL       >=18Y     Normal          <150 mg/dL     Borderline High 150-199 mg/dL     High            200-499 mg/dL        Very High       >499 mg/dL    Specimen collection should occur prior to Metamizole administration due to the potential for falsely depressed results.        There are no Patient Instructions on file for this visit.    Discussed with the patient and all questioned fully answered. She will call me if any  problems arise.

## 2025-07-21 DIAGNOSIS — K21.9 GASTROESOPHAGEAL REFLUX DISEASE WITHOUT ESOPHAGITIS: Chronic | ICD-10-CM

## 2025-07-21 DIAGNOSIS — R13.12 OROPHARYNGEAL DYSPHAGIA: ICD-10-CM

## 2025-07-22 DIAGNOSIS — E11.43 TYPE 2 DIABETES MELLITUS WITH DIABETIC AUTONOMIC NEUROPATHY, WITH LONG-TERM CURRENT USE OF INSULIN (HCC): ICD-10-CM

## 2025-07-22 DIAGNOSIS — Z79.4 TYPE 2 DIABETES MELLITUS WITH DIABETIC AUTONOMIC NEUROPATHY, WITH LONG-TERM CURRENT USE OF INSULIN (HCC): ICD-10-CM

## 2025-07-22 RX ORDER — OMEPRAZOLE 40 MG/1
40 CAPSULE, DELAYED RELEASE ORAL DAILY
Qty: 90 CAPSULE | Refills: 1 | Status: SHIPPED | OUTPATIENT
Start: 2025-07-22

## 2025-07-23 RX ORDER — INSULIN HUMAN 500 [IU]/ML
INJECTION, SOLUTION SUBCUTANEOUS
Qty: 6 ML | Refills: 1 | Status: SHIPPED | OUTPATIENT
Start: 2025-07-23

## 2025-07-25 DIAGNOSIS — E11.43 TYPE 2 DIABETES MELLITUS WITH DIABETIC AUTONOMIC NEUROPATHY, WITH LONG-TERM CURRENT USE OF INSULIN (HCC): ICD-10-CM

## 2025-07-25 DIAGNOSIS — Z79.4 TYPE 2 DIABETES MELLITUS WITH DIABETIC AUTONOMIC NEUROPATHY, WITH LONG-TERM CURRENT USE OF INSULIN (HCC): ICD-10-CM

## 2025-07-25 RX ORDER — INSULIN HUMAN 500 [IU]/ML
INJECTION, SOLUTION SUBCUTANEOUS
Qty: 6 ML | Refills: 1 | OUTPATIENT
Start: 2025-07-25

## 2025-07-31 DIAGNOSIS — R56.9 SEIZURES (HCC): ICD-10-CM

## 2025-08-01 ENCOUNTER — OFFICE VISIT (OUTPATIENT)
Dept: FAMILY MEDICINE CLINIC | Facility: CLINIC | Age: 55
End: 2025-08-01
Payer: COMMERCIAL

## 2025-08-01 ENCOUNTER — APPOINTMENT (OUTPATIENT)
Dept: LAB | Facility: CLINIC | Age: 55
End: 2025-08-01
Payer: COMMERCIAL

## 2025-08-01 VITALS
TEMPERATURE: 98.3 F | SYSTOLIC BLOOD PRESSURE: 132 MMHG | WEIGHT: 181 LBS | HEIGHT: 61 IN | OXYGEN SATURATION: 98 % | HEART RATE: 63 BPM | RESPIRATION RATE: 16 BRPM | DIASTOLIC BLOOD PRESSURE: 72 MMHG | BODY MASS INDEX: 34.17 KG/M2

## 2025-08-01 DIAGNOSIS — R53.83 OTHER FATIGUE: Primary | ICD-10-CM

## 2025-08-01 DIAGNOSIS — R53.83 OTHER FATIGUE: ICD-10-CM

## 2025-08-01 DIAGNOSIS — E86.0 DEHYDRATION: ICD-10-CM

## 2025-08-01 DIAGNOSIS — R11.0 NAUSEA: ICD-10-CM

## 2025-08-01 DIAGNOSIS — Z86.39 HX OF DIABETIC GASTROPARESIS: ICD-10-CM

## 2025-08-01 PROCEDURE — 81001 URINALYSIS AUTO W/SCOPE: CPT

## 2025-08-01 PROCEDURE — 82043 UR ALBUMIN QUANTITATIVE: CPT

## 2025-08-01 PROCEDURE — 87086 URINE CULTURE/COLONY COUNT: CPT

## 2025-08-01 PROCEDURE — 80053 COMPREHEN METABOLIC PANEL: CPT

## 2025-08-01 PROCEDURE — 85025 COMPLETE CBC W/AUTO DIFF WBC: CPT

## 2025-08-01 PROCEDURE — 36415 COLL VENOUS BLD VENIPUNCTURE: CPT

## 2025-08-01 PROCEDURE — 99214 OFFICE O/P EST MOD 30 MIN: CPT | Performed by: NURSE PRACTITIONER

## 2025-08-01 PROCEDURE — 82570 ASSAY OF URINE CREATININE: CPT

## 2025-08-01 RX ORDER — ONDANSETRON 4 MG/1
4 TABLET, FILM COATED ORAL EVERY 6 HOURS PRN
Qty: 45 TABLET | Refills: 1 | Status: SHIPPED | OUTPATIENT
Start: 2025-08-01

## 2025-08-01 RX ORDER — LEVETIRACETAM 500 MG/1
500 TABLET ORAL 2 TIMES DAILY
Qty: 180 TABLET | Refills: 1 | Status: SHIPPED | OUTPATIENT
Start: 2025-08-01

## 2025-08-02 LAB
ALBUMIN SERPL BCG-MCNC: 4.1 G/DL (ref 3.5–5)
ALP SERPL-CCNC: 93 U/L (ref 34–104)
ALT SERPL W P-5'-P-CCNC: 24 U/L (ref 7–52)
ANION GAP SERPL CALCULATED.3IONS-SCNC: 11 MMOL/L (ref 4–13)
AST SERPL W P-5'-P-CCNC: 23 U/L (ref 13–39)
BACTERIA UR QL AUTO: ABNORMAL /HPF
BASOPHILS # BLD AUTO: 0.04 THOUSANDS/ÂΜL (ref 0–0.1)
BASOPHILS NFR BLD AUTO: 0 % (ref 0–1)
BILIRUB SERPL-MCNC: 0.28 MG/DL (ref 0.2–1)
BILIRUB UR QL STRIP: NEGATIVE
BUN SERPL-MCNC: 31 MG/DL (ref 5–25)
CALCIUM SERPL-MCNC: 9.6 MG/DL (ref 8.4–10.2)
CHLORIDE SERPL-SCNC: 96 MMOL/L (ref 96–108)
CLARITY UR: CLEAR
CO2 SERPL-SCNC: 29 MMOL/L (ref 21–32)
COLOR UR: COLORLESS
CREAT SERPL-MCNC: 1.42 MG/DL (ref 0.6–1.3)
CREAT UR-MCNC: 40.9 MG/DL
EOSINOPHIL # BLD AUTO: 0.23 THOUSAND/ÂΜL (ref 0–0.61)
EOSINOPHIL NFR BLD AUTO: 2 % (ref 0–6)
ERYTHROCYTE [DISTWIDTH] IN BLOOD BY AUTOMATED COUNT: 13.3 % (ref 11.6–15.1)
GFR SERPL CREATININE-BSD FRML MDRD: 41 ML/MIN/1.73SQ M
GLUCOSE SERPL-MCNC: 338 MG/DL (ref 65–140)
GLUCOSE UR STRIP-MCNC: ABNORMAL MG/DL
HCT VFR BLD AUTO: 38.5 % (ref 34.8–46.1)
HGB BLD-MCNC: 12.5 G/DL (ref 11.5–15.4)
HGB UR QL STRIP.AUTO: NEGATIVE
IMM GRANULOCYTES # BLD AUTO: 0.04 THOUSAND/UL (ref 0–0.2)
IMM GRANULOCYTES NFR BLD AUTO: 0 % (ref 0–2)
KETONES UR STRIP-MCNC: NEGATIVE MG/DL
LEUKOCYTE ESTERASE UR QL STRIP: ABNORMAL
LYMPHOCYTES # BLD AUTO: 2.72 THOUSANDS/ÂΜL (ref 0.6–4.47)
LYMPHOCYTES NFR BLD AUTO: 28 % (ref 14–44)
MCH RBC QN AUTO: 26.8 PG (ref 26.8–34.3)
MCHC RBC AUTO-ENTMCNC: 32.5 G/DL (ref 31.4–37.4)
MCV RBC AUTO: 83 FL (ref 82–98)
MICROALBUMIN UR-MCNC: <7 MG/L
MONOCYTES # BLD AUTO: 0.83 THOUSAND/ÂΜL (ref 0.17–1.22)
MONOCYTES NFR BLD AUTO: 9 % (ref 4–12)
NEUTROPHILS # BLD AUTO: 5.91 THOUSANDS/ÂΜL (ref 1.85–7.62)
NEUTS SEG NFR BLD AUTO: 61 % (ref 43–75)
NITRITE UR QL STRIP: NEGATIVE
NON-SQ EPI CELLS URNS QL MICRO: ABNORMAL /HPF
NRBC BLD AUTO-RTO: 0 /100 WBCS
PH UR STRIP.AUTO: 5.5 [PH]
PLATELET # BLD AUTO: 334 THOUSANDS/UL (ref 149–390)
PMV BLD AUTO: 11.9 FL (ref 8.9–12.7)
POTASSIUM SERPL-SCNC: 4.5 MMOL/L (ref 3.5–5.3)
PROT SERPL-MCNC: 6.8 G/DL (ref 6.4–8.4)
PROT UR STRIP-MCNC: NEGATIVE MG/DL
RBC # BLD AUTO: 4.66 MILLION/UL (ref 3.81–5.12)
RBC #/AREA URNS AUTO: ABNORMAL /HPF
SODIUM SERPL-SCNC: 136 MMOL/L (ref 135–147)
SP GR UR STRIP.AUTO: 1.01 (ref 1–1.03)
TRANS CELLS #/AREA URNS HPF: PRESENT /[HPF]
UROBILINOGEN UR STRIP-ACNC: <2 MG/DL
WBC # BLD AUTO: 9.77 THOUSAND/UL (ref 4.31–10.16)
WBC #/AREA URNS AUTO: ABNORMAL /HPF

## 2025-08-03 LAB — BACTERIA UR CULT: NORMAL

## 2025-08-11 ENCOUNTER — OFFICE VISIT (OUTPATIENT)
Dept: UROLOGY | Facility: CLINIC | Age: 55
End: 2025-08-11
Payer: COMMERCIAL

## (undated) DEVICE — GARMENT,MEDLINE,DVT,INT,CALF,FOAM,MED: Brand: MEDLINE

## (undated) DEVICE — ENDOPATH ETS45 2.5MM RELOADS (VASCULAR/THIN): Brand: ENDOPATH

## (undated) DEVICE — SPONGE GAUZE 2 X 2 4PLY STRL

## (undated) DEVICE — NEEDLE 25G X 1 1/2

## (undated) DEVICE — TROCAR: Brand: KII FIOS FIRST ENTRY

## (undated) DEVICE — ETS45 RELOAD STANDARD 45MM: Brand: ENDOPATH

## (undated) DEVICE — 5 MM CURVED DISSECTORS WITH MONOPOLAR CAUTERY: Brand: ENDOPATH

## (undated) DEVICE — GAUZE SPONGES,8 PLY: Brand: CURITY

## (undated) DEVICE — Device

## (undated) DEVICE — INTENDED FOR TISSUE SEPARATION, AND OTHER PROCEDURES THAT REQUIRE A SHARP SURGICAL BLADE TO PUNCTURE OR CUT.: Brand: BARD-PARKER ® CARBON RIB-BACK BLADES

## (undated) DEVICE — CHLORAPREP HI-LITE 26ML ORANGE

## (undated) DEVICE — GLOVE INDICATOR PI UNDERGLOVE SZ 7.5 BLUE

## (undated) DEVICE — [HIGH FLOW INSUFFLATOR,  DO NOT USE IF PACKAGE IS DAMAGED,  KEEP DRY,  KEEP AWAY FROM SUNLIGHT,  PROTECT FROM HEAT AND RADIOACTIVE SOURCES.]: Brand: PNEUMOSURE

## (undated) DEVICE — LAPAROSCOPY PACK: Brand: MEDLINE INDUSTRIES, INC.

## (undated) DEVICE — Device: Brand: OMNICLOSE TROCAR SITE CLOSURE DEVICE

## (undated) DEVICE — ENDOPATH ETS-FLEX45 ARTICULATING ENDOSCOPIC LINEAR CUTTER, NO RELOAD: Brand: ENDOPATH

## (undated) DEVICE — LAP AEM SCISSOR TIP .5 IN

## (undated) DEVICE — SURGICAL CLIPPER BLADE GENERAL USE

## (undated) DEVICE — GLOVE SRG BIOGEL 7.5

## (undated) DEVICE — THE ECHELON, ECHELON ENDOPATH™ AND ECHELON FLEX™ FAMILIES OF ENDOSCOPIC LINEAR CUTTERS AND RELOADS ARE STERILE, SINGLE PATIENT USE INSTRUMENTS THAT SIMULTANEOUSLY CUT AND STAPLE TISSUE. THERE ARE SIX STAGGERED ROWS OF STAPLES, THREE ON EITHER SIDE OF THE CUT LINE. THE 45 MM INSTRUMENTS HAVE A STAPLE LINE THATIS APPROXIMATELY 45 MM LONG AND A CUT LINE THAT IS APPROXIMATELY 42 MM LONG. THE SHAFT CAN ROTATE FREELY IN BOTH DIRECTIONS AND AN ARTICULATION MECHANISM ON ARTICULATING INSTRUMENTS ENABLES BENDING THE DISTAL PORTIONOF THE SHAFT TO FACILITATE LATERAL ACCESS OF THE OPERATIVE SITE.THE INSTRUMENTS ARE SHIPPED WITHOUT A RELOAD AND MUST BE LOADED PRIOR TO USE. A STAPLE RETAINING CAP ON THE RELOAD PROTECTS THE STAPLE LEG POINTS DURING SHIPPING AND TRANSPORTATION. THE INSTRUMENTS’ LOCK-OUT FEATURE IS DESIGNED TO PREVENT A USED RELOAD FROM BEING REFIRED.: Brand: ECHELON ENDOPATH

## (undated) DEVICE — ADHESIVE SKIN HIGH VISCOSITY EXOFIN 1ML

## (undated) DEVICE — IRRIGATOR DISPOSABLE SUCTION

## (undated) DEVICE — HARMONIC 1100 SHEARS, 36CM SHAFT LENGTH: Brand: HARMONIC

## (undated) DEVICE — TUBING SMOKE EVAC W/FILTRATION DEVICE PLUMEPORT

## (undated) DEVICE — TROCAR: Brand: KII® SLEEVE

## (undated) DEVICE — SYRINGE 10ML LL

## (undated) DEVICE — SUT MONOCRYL 4-0 PS-2 27 IN Y426H

## (undated) DEVICE — ENDOPOUCH RETRIEVER SPECIMEN RETRIEVAL BAGS: Brand: ENDOPOUCH RETRIEVER

## (undated) DEVICE — SUT VICRYL 0 UR-6 27 IN J603H

## (undated) DEVICE — GLOVE SRG BIOGEL 7

## (undated) DEVICE — TROCAR 12MM BLUNT TIP

## (undated) DEVICE — NEEDLE 18 G X 1 1/2 SAFETY

## (undated) DEVICE — NEEDLE SPINAL 22G X 1 1/2

## (undated) DEVICE — 3M™ TEGADERM™ TRANSPARENT FILM DRESSING FRAME STYLE, 1624W, 2-3/8 IN X 2-3/4 IN (6 CM X 7 CM), 100/CT 4CT/CASE: Brand: 3M™ TEGADERM™

## (undated) DEVICE — TROCARS: Brand: KII® BALLOON BLUNT TIP SYSTEM

## (undated) DEVICE — 4-PORT MANIFOLD: Brand: NEPTUNE 2

## (undated) DEVICE — ENDOPATH XCEL BLADELESS TROCARS WITH STABILITY SLEEVES: Brand: ENDOPATH XCEL